# Patient Record
Sex: MALE | Race: WHITE | Employment: UNEMPLOYED | URBAN - METROPOLITAN AREA
[De-identification: names, ages, dates, MRNs, and addresses within clinical notes are randomized per-mention and may not be internally consistent; named-entity substitution may affect disease eponyms.]

---

## 2017-03-06 ENCOUNTER — OFFICE VISIT (OUTPATIENT)
Dept: BEHAVIORAL/MENTAL HEALTH CLINIC | Age: 52
End: 2017-03-06

## 2017-03-06 VITALS
OXYGEN SATURATION: 93 % | WEIGHT: 182 LBS | BODY MASS INDEX: 24.12 KG/M2 | SYSTOLIC BLOOD PRESSURE: 162 MMHG | HEIGHT: 73 IN | DIASTOLIC BLOOD PRESSURE: 101 MMHG | HEART RATE: 77 BPM

## 2017-03-06 DIAGNOSIS — F19.10 POLYSUBSTANCE ABUSE (HCC): ICD-10-CM

## 2017-03-06 DIAGNOSIS — Z91.199 NON COMPLIANCE WITH MEDICAL TREATMENT: ICD-10-CM

## 2017-03-06 DIAGNOSIS — F60.9 PERSONALITY DISORDER (HCC): ICD-10-CM

## 2017-03-06 DIAGNOSIS — F19.94 DRUG-INDUCED MOOD DISORDER (HCC): ICD-10-CM

## 2017-03-06 DIAGNOSIS — F31.62 BIPOLAR 1 DISORDER, MIXED, MODERATE (HCC): Primary | ICD-10-CM

## 2017-03-06 RX ORDER — DOXEPIN HYDROCHLORIDE 25 MG/1
25 CAPSULE ORAL
Qty: 30 CAP | Refills: 1 | Status: SHIPPED | OUTPATIENT
Start: 2017-03-06 | End: 2017-04-05

## 2017-03-06 RX ORDER — DIVALPROEX SODIUM 500 MG/1
500 TABLET, DELAYED RELEASE ORAL 2 TIMES DAILY
Qty: 60 TAB | Refills: 1 | Status: SHIPPED | OUTPATIENT
Start: 2017-03-06 | End: 2017-04-05

## 2017-03-06 RX ORDER — ARIPIPRAZOLE 10 MG/1
10 TABLET ORAL
Qty: 30 TAB | Refills: 1 | Status: SHIPPED | OUTPATIENT
Start: 2017-03-06 | End: 2017-07-10 | Stop reason: SDUPTHER

## 2017-03-06 RX ORDER — METOPROLOL TARTRATE 50 MG/1
50 TABLET ORAL 2 TIMES DAILY
Status: ON HOLD | COMMUNITY
Start: 2017-01-18 | End: 2017-06-06

## 2017-03-06 RX ORDER — ARIPIPRAZOLE 5 MG/1
TABLET ORAL
Refills: 0 | COMMUNITY
Start: 2017-02-21 | End: 2017-03-06 | Stop reason: SDUPTHER

## 2017-03-06 RX ORDER — ASPIRIN 81 MG/1
TABLET ORAL
Refills: 5 | COMMUNITY
Start: 2017-02-24 | End: 2017-06-05

## 2017-03-06 RX ORDER — TRAZODONE HYDROCHLORIDE 50 MG/1
50 TABLET ORAL
COMMUNITY
End: 2017-03-06 | Stop reason: SINTOL

## 2017-03-06 RX ORDER — GABAPENTIN 300 MG/1
300 CAPSULE ORAL 3 TIMES DAILY
COMMUNITY
Start: 2017-01-18 | End: 2017-03-06 | Stop reason: SDUPTHER

## 2017-03-06 RX ORDER — LISINOPRIL 10 MG/1
10 TABLET ORAL DAILY
Status: ON HOLD | COMMUNITY
Start: 2017-01-18 | End: 2017-06-06

## 2017-03-06 RX ORDER — DIVALPROEX SODIUM 250 MG/1
250 TABLET, DELAYED RELEASE ORAL 2 TIMES DAILY
COMMUNITY
Start: 2017-01-18 | End: 2017-03-06 | Stop reason: SDUPTHER

## 2017-03-06 RX ORDER — NAPROXEN 375 MG/1
TABLET ORAL
Refills: 0 | Status: ON HOLD | COMMUNITY
Start: 2017-02-24 | End: 2017-06-06

## 2017-03-06 RX ORDER — GABAPENTIN 100 MG/1
600 CAPSULE ORAL 3 TIMES DAILY
Qty: 180 CAP | Refills: 1 | Status: SHIPPED | OUTPATIENT
Start: 2017-03-06 | End: 2017-04-05

## 2017-03-06 RX ORDER — GLIPIZIDE 5 MG/1
5 TABLET ORAL 2 TIMES DAILY
Status: ON HOLD | COMMUNITY
Start: 2017-01-18 | End: 2017-06-06

## 2017-03-06 RX ORDER — HYDROXYZINE PAMOATE 50 MG/1
50 CAPSULE ORAL
Status: ON HOLD | COMMUNITY
Start: 2017-01-18 | End: 2017-06-06

## 2017-03-06 RX ORDER — METFORMIN HYDROCHLORIDE 750 MG/1
TABLET, EXTENDED RELEASE ORAL
Refills: 5 | Status: ON HOLD | COMMUNITY
Start: 2017-02-24 | End: 2017-06-06

## 2017-03-06 RX ORDER — ARIPIPRAZOLE 5 MG/1
10 TABLET ORAL
Qty: 30 TAB | Refills: 1 | Status: SHIPPED | OUTPATIENT
Start: 2017-03-06 | End: 2017-03-06 | Stop reason: SDUPTHER

## 2017-03-06 RX ORDER — ATORVASTATIN CALCIUM 40 MG/1
TABLET, FILM COATED ORAL
Refills: 5 | COMMUNITY
Start: 2017-02-24 | End: 2017-06-08

## 2017-03-06 RX ORDER — BACLOFEN 20 MG/1
TABLET ORAL
Refills: 0 | COMMUNITY
Start: 2017-01-18 | End: 2017-03-06
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
## 2017-06-05 ENCOUNTER — HOSPITAL ENCOUNTER (EMERGENCY)
Age: 52
Discharge: PSYCHIATRIC OR SUBSTANCE USE FACILITY | End: 2017-06-05
Attending: EMERGENCY MEDICINE
Payer: MEDICARE

## 2017-06-05 ENCOUNTER — HOSPITAL ENCOUNTER (INPATIENT)
Age: 52
LOS: 3 days | Discharge: HOME | DRG: 881 | End: 2017-06-08
Attending: PSYCHIATRY & NEUROLOGY | Admitting: PSYCHIATRY & NEUROLOGY
Payer: MEDICARE

## 2017-06-05 ENCOUNTER — APPOINTMENT (OUTPATIENT)
Dept: GENERAL RADIOLOGY | Age: 52
End: 2017-06-05
Attending: EMERGENCY MEDICINE
Payer: MEDICARE

## 2017-06-05 VITALS
BODY MASS INDEX: 21.45 KG/M2 | SYSTOLIC BLOOD PRESSURE: 125 MMHG | DIASTOLIC BLOOD PRESSURE: 81 MMHG | OXYGEN SATURATION: 98 % | TEMPERATURE: 98 F | HEIGHT: 73 IN | RESPIRATION RATE: 14 BRPM | HEART RATE: 56 BPM | WEIGHT: 161.82 LBS

## 2017-06-05 DIAGNOSIS — E86.0 DEHYDRATION: ICD-10-CM

## 2017-06-05 DIAGNOSIS — F32.A DEPRESSION, UNSPECIFIED DEPRESSION TYPE: Primary | ICD-10-CM

## 2017-06-05 DIAGNOSIS — F19.10 POLYSUBSTANCE ABUSE (HCC): ICD-10-CM

## 2017-06-05 DIAGNOSIS — R73.9 HYPERGLYCEMIA: ICD-10-CM

## 2017-06-05 PROBLEM — F43.20 ADJUSTMENT DISORDER: Status: ACTIVE | Noted: 2017-06-05

## 2017-06-05 LAB
ALBUMIN SERPL BCP-MCNC: 3.7 G/DL (ref 3.5–5)
ALBUMIN/GLOB SERPL: 0.9 {RATIO} (ref 1.1–2.2)
ALP SERPL-CCNC: 95 U/L (ref 45–117)
ALT SERPL-CCNC: 78 U/L (ref 12–78)
AMPHET UR QL SCN: NEGATIVE
ANION GAP BLD CALC-SCNC: 6 MMOL/L (ref 5–15)
APAP SERPL-MCNC: <2 UG/ML (ref 10–30)
APPEARANCE UR: CLEAR
AST SERPL W P-5'-P-CCNC: 33 U/L (ref 15–37)
ATRIAL RATE: 77 BPM
BACTERIA URNS QL MICRO: NEGATIVE /HPF
BARBITURATES UR QL SCN: NEGATIVE
BASOPHILS # BLD AUTO: 0.1 K/UL (ref 0–0.1)
BASOPHILS # BLD: 1 % (ref 0–1)
BENZODIAZ UR QL: NEGATIVE
BILIRUB SERPL-MCNC: 0.8 MG/DL (ref 0.2–1)
BILIRUB UR QL: NEGATIVE
BUN SERPL-MCNC: 15 MG/DL (ref 6–20)
BUN/CREAT SERPL: 15 (ref 12–20)
CALCIUM SERPL-MCNC: 8.8 MG/DL (ref 8.5–10.1)
CALCULATED P AXIS, ECG09: 80 DEGREES
CALCULATED R AXIS, ECG10: 71 DEGREES
CALCULATED T AXIS, ECG11: 70 DEGREES
CANNABINOIDS UR QL SCN: NEGATIVE
CHLORIDE SERPL-SCNC: 98 MMOL/L (ref 97–108)
CO2 SERPL-SCNC: 29 MMOL/L (ref 21–32)
COCAINE UR QL SCN: POSITIVE
COLOR UR: ABNORMAL
CREAT SERPL-MCNC: 0.99 MG/DL (ref 0.7–1.3)
DIAGNOSIS, 93000: NORMAL
DRUG SCRN COMMENT,DRGCM: ABNORMAL
EOSINOPHIL # BLD: 0.1 K/UL (ref 0–0.4)
EOSINOPHIL NFR BLD: 1 % (ref 0–7)
EPITH CASTS URNS QL MICRO: ABNORMAL /LPF
ERYTHROCYTE [DISTWIDTH] IN BLOOD BY AUTOMATED COUNT: 13.8 % (ref 11.5–14.5)
ETHANOL SERPL-MCNC: <10 MG/DL
GLOBULIN SER CALC-MCNC: 4.2 G/DL (ref 2–4)
GLUCOSE BLD STRIP.AUTO-MCNC: 211 MG/DL (ref 65–100)
GLUCOSE BLD STRIP.AUTO-MCNC: 240 MG/DL (ref 65–100)
GLUCOSE SERPL-MCNC: 361 MG/DL (ref 65–100)
GLUCOSE UR STRIP.AUTO-MCNC: >1000 MG/DL
HCT VFR BLD AUTO: 47.7 % (ref 36.6–50.3)
HGB BLD-MCNC: 17.4 G/DL (ref 12.1–17)
HGB UR QL STRIP: NEGATIVE
HYALINE CASTS URNS QL MICRO: ABNORMAL /LPF (ref 0–5)
KETONES UR QL STRIP.AUTO: NEGATIVE MG/DL
LEUKOCYTE ESTERASE UR QL STRIP.AUTO: NEGATIVE
LYMPHOCYTES # BLD AUTO: 14 % (ref 12–49)
LYMPHOCYTES # BLD: 0.9 K/UL (ref 0.8–3.5)
MCH RBC QN AUTO: 31.8 PG (ref 26–34)
MCHC RBC AUTO-ENTMCNC: 36.5 G/DL (ref 30–36.5)
MCV RBC AUTO: 84.5 FL (ref 80–99)
METHADONE UR QL: NEGATIVE
MONOCYTES # BLD: 0.5 K/UL (ref 0–1)
MONOCYTES NFR BLD AUTO: 8 % (ref 5–13)
NEUTS SEG # BLD: 4.8 K/UL (ref 1.8–8)
NEUTS SEG NFR BLD AUTO: 76 % (ref 32–75)
NITRITE UR QL STRIP.AUTO: NEGATIVE
OPIATES UR QL: NEGATIVE
P-R INTERVAL, ECG05: 150 MS
PCP UR QL: NEGATIVE
PH UR STRIP: 5.5 [PH] (ref 5–8)
PLATELET # BLD AUTO: 152 K/UL (ref 150–400)
POTASSIUM SERPL-SCNC: 3.8 MMOL/L (ref 3.5–5.1)
PROT SERPL-MCNC: 7.9 G/DL (ref 6.4–8.2)
PROT UR STRIP-MCNC: ABNORMAL MG/DL
Q-T INTERVAL, ECG07: 370 MS
QRS DURATION, ECG06: 94 MS
QTC CALCULATION (BEZET), ECG08: 418 MS
RBC # BLD AUTO: 5.35 M/UL (ref 4.1–5.7)
RBC #/AREA URNS HPF: ABNORMAL /HPF (ref 0–5)
SALICYLATES SERPL-MCNC: <1.7 MG/DL (ref 2.8–20)
SERVICE CMNT-IMP: ABNORMAL
SERVICE CMNT-IMP: ABNORMAL
SODIUM SERPL-SCNC: 133 MMOL/L (ref 136–145)
SP GR UR REFRACTOMETRY: 1.01 (ref 1–1.03)
TROPONIN I SERPL-MCNC: <0.04 NG/ML
UA: UC IF INDICATED,UAUC: ABNORMAL
UROBILINOGEN UR QL STRIP.AUTO: 0.2 EU/DL (ref 0.2–1)
VENTRICULAR RATE, ECG03: 77 BPM
WBC # BLD AUTO: 6.4 K/UL (ref 4.1–11.1)
WBC URNS QL MICRO: ABNORMAL /HPF (ref 0–4)

## 2017-06-05 PROCEDURE — 71020 XR CHEST PA LAT: CPT

## 2017-06-05 PROCEDURE — 80053 COMPREHEN METABOLIC PANEL: CPT | Performed by: EMERGENCY MEDICINE

## 2017-06-05 PROCEDURE — 36415 COLL VENOUS BLD VENIPUNCTURE: CPT | Performed by: EMERGENCY MEDICINE

## 2017-06-05 PROCEDURE — 81001 URINALYSIS AUTO W/SCOPE: CPT | Performed by: EMERGENCY MEDICINE

## 2017-06-05 PROCEDURE — 84484 ASSAY OF TROPONIN QUANT: CPT | Performed by: EMERGENCY MEDICINE

## 2017-06-05 PROCEDURE — 65220000003 HC RM SEMIPRIVATE PSYCH

## 2017-06-05 PROCEDURE — 96361 HYDRATE IV INFUSION ADD-ON: CPT

## 2017-06-05 PROCEDURE — 85025 COMPLETE CBC W/AUTO DIFF WBC: CPT | Performed by: EMERGENCY MEDICINE

## 2017-06-05 PROCEDURE — 80307 DRUG TEST PRSMV CHEM ANLYZR: CPT | Performed by: EMERGENCY MEDICINE

## 2017-06-05 PROCEDURE — 82962 GLUCOSE BLOOD TEST: CPT

## 2017-06-05 PROCEDURE — 99284 EMERGENCY DEPT VISIT MOD MDM: CPT

## 2017-06-05 PROCEDURE — 90791 PSYCH DIAGNOSTIC EVALUATION: CPT

## 2017-06-05 PROCEDURE — 74011250637 HC RX REV CODE- 250/637: Performed by: PSYCHIATRY & NEUROLOGY

## 2017-06-05 PROCEDURE — 93005 ELECTROCARDIOGRAM TRACING: CPT

## 2017-06-05 PROCEDURE — 74011250636 HC RX REV CODE- 250/636: Performed by: EMERGENCY MEDICINE

## 2017-06-05 PROCEDURE — 96360 HYDRATION IV INFUSION INIT: CPT

## 2017-06-05 RX ORDER — OLANZAPINE 5 MG/1
5 TABLET ORAL
Status: DISCONTINUED | OUTPATIENT
Start: 2017-06-05 | End: 2017-06-08 | Stop reason: HOSPADM

## 2017-06-05 RX ORDER — BENZTROPINE MESYLATE 2 MG/1
2 TABLET ORAL
Status: DISCONTINUED | OUTPATIENT
Start: 2017-06-05 | End: 2017-06-08 | Stop reason: HOSPADM

## 2017-06-05 RX ORDER — BENZTROPINE MESYLATE 1 MG/ML
2 INJECTION INTRAMUSCULAR; INTRAVENOUS
Status: DISCONTINUED | OUTPATIENT
Start: 2017-06-05 | End: 2017-06-08 | Stop reason: HOSPADM

## 2017-06-05 RX ORDER — METFORMIN HYDROCHLORIDE 500 MG/1
500 TABLET ORAL 2 TIMES DAILY WITH MEALS
Qty: 60 TAB | Refills: 0 | Status: SHIPPED | OUTPATIENT
Start: 2017-06-05 | End: 2017-06-08

## 2017-06-05 RX ORDER — HYDROXYZINE PAMOATE 25 MG/1
50 CAPSULE ORAL
Status: DISCONTINUED | OUTPATIENT
Start: 2017-06-05 | End: 2017-06-08 | Stop reason: HOSPADM

## 2017-06-05 RX ORDER — IBUPROFEN 200 MG
1 TABLET ORAL
Status: DISCONTINUED | OUTPATIENT
Start: 2017-06-05 | End: 2017-06-08 | Stop reason: HOSPADM

## 2017-06-05 RX ORDER — ZOLPIDEM TARTRATE 10 MG/1
10 TABLET ORAL
Status: DISCONTINUED | OUTPATIENT
Start: 2017-06-05 | End: 2017-06-08 | Stop reason: HOSPADM

## 2017-06-05 RX ORDER — IBUPROFEN 400 MG/1
400 TABLET ORAL
Status: DISCONTINUED | OUTPATIENT
Start: 2017-06-05 | End: 2017-06-08 | Stop reason: HOSPADM

## 2017-06-05 RX ORDER — ADHESIVE BANDAGE
30 BANDAGE TOPICAL DAILY PRN
Status: DISCONTINUED | OUTPATIENT
Start: 2017-06-05 | End: 2017-06-08 | Stop reason: HOSPADM

## 2017-06-05 RX ORDER — ACETAMINOPHEN 325 MG/1
650 TABLET ORAL
Status: DISCONTINUED | OUTPATIENT
Start: 2017-06-05 | End: 2017-06-08 | Stop reason: HOSPADM

## 2017-06-05 RX ADMIN — SODIUM CHLORIDE 1000 ML: 900 INJECTION, SOLUTION INTRAVENOUS at 12:41

## 2017-06-05 RX ADMIN — SODIUM CHLORIDE 1000 ML: 900 INJECTION, SOLUTION INTRAVENOUS at 13:13

## 2017-06-05 RX ADMIN — IBUPROFEN 400 MG: 400 TABLET, FILM COATED ORAL at 20:25

## 2017-06-05 NOTE — IP AVS SNAPSHOT
[facility] 
 
 
 [address] Kristin Aparicio Patient: Tabitha Sheppard MRN: [id number] :1965 You are allergic to the following Allergen Reactions Versed (Midazolam) Shortness of Breath Weakness Versed (Midazolam) Unknown (comments) Numbness, with shortness of breath Recent Documentation Smoking Status Current Every Day Smoker Emergency Contacts Name Discharge Info Relation Home Work Mobile [address]  CAREGIVER [3] Father [15] [phone] About your hospitalization You were admitted on:  2017 [facility] You were discharged on:  2017 Unit phone number:  [phone] Why you were hospitalized Your primary diagnosis was: Adjustment Disorder With Depressed Mood Your diagnoses also included:  Type 2 Diabetes Mellitus (Hcc), Non Compliance With Medical Treatment, Polysubstance Abuse Providers Seen During Your Hospitalizations Provider Role Specialty Primary office phone Loyd Meyer MD Attending Provider Psychiatry [phone] Your Primary Care Physician (PCP) Primary Care Physician Office Phone Office Fax NONE ** None ** ** None ** Follow-up Information Follow up With Details Comments Contact Info Skyler MACE  Please follow up with Skyler MACE by accessing walk-in Mon, Tues or Th from 9am-3pm for case management and/or substance abuse services   [facility] FARHAD Davidson. [place], [address] [place] [address] Ph: [phone] Fax: [phone] None   None (395) Patient stated that they have no PCP Current Discharge Medication List  
  
CONTINUE these medications which have CHANGED Dose & Instructions Dispensing Information Comments Morning Noon Evening Bedtime  
 metFORMIN 1,000 mg tablet Commonly known as:  GLUCOPHAGE  
 What changed:   
- medication strength 
- how much to take Your last dose was: Your next dose is:    
   
   
 Dose:  1000 mg Take 1 Tab by mouth two (2) times daily (with meals) for 7 days. Indications: type 2 diabetes mellitus Quantity:  14 Tab Refills:  1 STOP taking these medications   
 atorvastatin 40 mg tablet Commonly known as:  LIPITOR  
   
  
  
ASK your doctor about these medications Dose & Instructions Dispensing Information Comments Morning Noon Evening Bedtime ARIPiprazole 10 mg tablet Commonly known as:  ABILIFY Your last dose was: Your next dose is:    
   
   
 Dose:  10 mg Take 1 Tab by mouth daily (after breakfast) for 30 days. Indications: MIXED BIPOLAR I DISORDER Quantity:  30 Tab Refills:  1 Where to Get Your Medications Information on where to get these meds will be given to you by the nurse or doctor. ! Ask your nurse or doctor about these medications  
  metFORMIN 1,000 mg tablet Discharge Instructions DISCHARGE SUMMARY from Nurse The following personal items are in your possession at time of discharge: 
 
Dental Appliances: None Visual Aid: None Home Medications: None (pt had hard copy script on chart) Jewelry: None Clothing: Footwear, Shirt, Shorts, Socks, Undergarments Other Valuables: Keys, Money (comment), Wallet (sent to safe) Personal Items Sent to Safe: wallet, keys, money PATIENT INSTRUCTIONS: 
 
 
What to do at Home: 
Recommended activity: Activity as tolerated, If I feel that I can not keep these promises and I am at risk of hurting myself or others, I will call the crisis office and speak with a crisis worker who will assist me during my crisis. [address] [place] [address]  [phone] [address] [place] [address]   [phone] [facility] Ministerio Todd Crisis  [phone] [facility]  [phone] *  Please give a list of your current medications to your Primary Care Provider. *  Please update this list whenever your medications are discontinued, doses are 
    changed, or new medications (including over-the-counter products) are added. *  Please carry medication information at all times in case of emergency situations. These are general instructions for a healthy lifestyle: No smoking/ No tobacco products/ Avoid exposure to second hand smoke Surgeon General's Warning:  Quitting smoking now greatly reduces serious risk to your health. Obesity, smoking, and sedentary lifestyle greatly increases your risk for illness A healthy diet, regular physical exercise & weight monitoring are important for maintaining a healthy lifestyle You may be retaining fluid if you have a history of heart failure or if you experience any of the following symptoms:  Weight gain of 3 pounds or more overnight or 5 pounds in a week, increased swelling in our hands or feet or shortness of breath while lying flat in bed. Please call your doctor as soon as you notice any of these symptoms; do not wait until your next office visit. Recognize signs and symptoms of STROKE: 
 
F-face looks uneven A-arms unable to move or move unevenly S-speech slurred or non-existent T-time-call 911 as soon as signs and symptoms begin-DO NOT go Back to bed or wait to see if you get better-TIME IS BRAIN. Warning Signs of HEART ATTACK Call 911 if you have these symptoms: 
? Chest discomfort. Most heart attacks involve discomfort in the center of the chest that lasts more than a few minutes, or that goes away and comes back. It can feel like uncomfortable pressure, squeezing, fullness, or pain. ? Discomfort in other areas of the upper body. Symptoms can include pain or discomfort in one or both arms, the back, neck, jaw, or stomach. ? Shortness of breath with or without chest discomfort. ? Other signs may include breaking out in a cold sweat, nausea, or lightheadedness. Don't wait more than five minutes to call [address]! Fast action can save your life. Calling 911 is almost always the fastest way to get lifesaving treatment. Emergency Medical Services staff can begin treatment when they arrive  up to an hour sooner than if someone gets to the hospital by car. The discharge information has been reviewed with the patient. The patient verbalized understanding. Discharge medications reviewed with the patient and appropriate educational materials and side effects teaching were provided. Discharge Orders None [employer] Announcement We are excited to announce that we are making your provider's discharge notes available to you in [employer]. You will see these notes when they are completed and signed by the physician that discharged you from your recent hospital stay. If you have any questions or concerns about any information you see in [employer], please call the Health Information Department where you were seen or reach out to your Primary Care Provider for more information about your plan of care. Introducing [facility]! Marylu Rodríguez introduces [employer] patient portal. Now you can access parts of your medical record, email your doctor's office, and request medication refills online. 1. In your internet browser, go to [email or web address] 2. Click on the First Time User? Click Here link in the Sign In box. You will see the New Member Sign Up page. 3. Enter your [employer] Access Code exactly as it appears below. You will not need to use this code after youve completed the sign-up process. If you do not sign up before the expiration date, you must request a new code. · [employer] Access Code: [email or web address] Expires: 9/3/2017  3:54 PM 
 
 4. Enter the last four digits of your Social Security Number (xxxx) and Date of Birth (mm/dd/yyyy) as indicated and click Submit. You will be taken to the next sign-up page. 5. Create a [employer] ID. This will be your [employer] login ID and cannot be changed, so think of one that is secure and easy to remember. 6. Create a [employer] password. You can change your password at any time. 7. Enter your Password Reset Question and Answer. This can be used at a later time if you forget your password. 8. Enter your e-mail address. You will receive e-mail notification when new information is available in [address] 9. Click Sign Up. You can now view and download portions of your medical record. 10. Click the Download Summary menu link to download a portable copy of your medical information. If you have questions, please visit the Frequently Asked Questions section of the [employer] website. Remember, [employer] is NOT to be used for urgent needs. For medical emergencies, dial 911. Now available from your iPhone and Android! General Information Please provide this summary of care documentation to your next provider. Patient Signature:  ____________________________________________________________ Date:  ____________________________________________________________  
  
Mark Breath Provider Signature:  ____________________________________________________________ Date:  ____________________________________________________________

## 2017-06-05 NOTE — BSMART NOTE
Comprehensive Assessment Form Part 1      Section I - Disposition    Axis I - Adjustment Disorder with depressed mood vs Major Depressive Disorder   Bipolar Disorder   Substance Induced Mood Disorder by history   Malingering by history   Axis II - Personality Disorder NOS  Axis III -   Past Medical History:   Diagnosis Date    Adverse effect of anesthesia     breathing diff. with versed    Bipolar 1 disorder, mixed, moderate ([address] 3/6/2017    Chronic pain     Depression     Pt stated diagnosed years ago    Depression 3/13/2013    Diabetes ([address]     Diabetes ([address] 2003    Drug-induced mood disorder 5/28/2013    Homicide attempt     HTN (hypertension) 11/3/2011    Narcotic dependence, episodic use ([address] 11/3/2011    NIDDM (non-insulin dependent diabetes mellitus) 11/3/2011    Non compliance with medical treatment 11/3/2011    Other ill-defined conditions     chronic low back pain    Psychiatric disorder     bipolar    Sleep disorder     Substance abuse     Suicidal thoughts    Axis IV - Lack of structure, Problems with housing, Relationship issues   [place] V - 45      The Medical Doctor to Psychiatrist conference was not completed. The Medical Doctor is in agreement with Psychiatrist disposition because of (reason) patient is a voluntary admission. The plan is admission to [facility]. The on-call Psychiatrist consulted was Dr. Myrick Cooks. The admitting Psychiatrist will be Dr. Myrick Cooks. The admitting Diagnosis is Adjustment Disorder vs Major Depressive Disorder. The Payor source is [place] Medicare/ [place] Medicare Part A & B. Section II - Integrated Summary  Summary:  Patient is a 51yo male who presents to the ER with reports of suicidal ideation with vague plans. He denies homicidal ideation or hallucinations. He reports feeling overwhelmed with life and reports \"I go to bed each night hoping I do not wake up and get made because I know I will. \" He reports \"What's the point? \" Patient reports lots fo recent stressors but is only willing to talk about a few. He reports that his aunt  2 month ago and he is still very upset and started crying when mentioning her death. He also reports that his girlfriend broke up with him, he had to move back in with his parents, and he relapsed on cocaine. He reports being sober since January and relapsed 2 days ago using cocaine one time. He denies continued use and denies use of any other substances except for cigarettes. Patient reports he has been sleeping in his car the last couple days because he wants to be alone and that is the best place to be alone. He reports not eating due to not being interested in food. Patient reports his sleep is poor and he sleeps a few hours at a time. Patient appears thin and tired with flat affect and little eye contact. Patient reports that he has an adult son but that he has tried to form a relationship but his son does not want one so he pretends he does not have a son. He reports the only person he can talk to is his friend Rita You but that \"I don't tell her how messed up my head is. \" Patient reports he has tried counseling once \"it didn't work\" and he stopped after a few months. He admits to meeting 1 time with a psychiatrist but reports he did not like the psychiatrist and never went back. Patient reports he has not taken medications in months due to not having a psychiatrist and considered getting a doctor but has not. He reports he is not taking any medications as he lost \"a bunch of weight and I don't need them anymore. \" Patient reports needing help because he can't live like this anymore and he is considering attempting suicide and reports the main thought would be to hang himself but he does not know if this will be painless. He reports past attempt to hurt himself including previous attempt on 10/17/16 by taking 4 Seroquel, past overdoses, and putting a gun to his head but not pulling the trigger.  Patient denies currently having a weapon. He admits previous hospitalizations and treatment. Reports being on disability for back issues and has pain but that this is managed with over the counter medications. Per chart review: Patient was admitted to [facility] on 10/17-10/20/16 for substance induced mood disorder. He also met with Dr. Brenden Malagon at the [facility] outpatient clinic on 3/6/17 but never returned 4 weeks later when scheduled. The diagnosis if Bipolar Disorder was given He was prescribed multiple medications at that time including medication changes. Medications are listed in office visit note. Patient reports he did take the Depakote. Previous admissions were in 2013 and earlier. Consulted Dr Soren Najera who is willing to admit patient to [facility] acute but patient is to be informed that he will not receive narcotics for pain on the unit. Discussed blood sugar levels and Dr. Soren Najera would like ER provider to write how to address patient's blood sugar levels as patient is reporting not taking medication for this. Informed him that patient is receiving a second bag of fluid and his level will be checked again. The patienthas demonstrated mental capacity to provide informed consent. The information is given by the patient and past medical records. The Chief Complaint is mental health problem, suicidal ideation. The Precipitant Factors are psychosocial stressors. Previous Hospitalizations: Yes  The patient has not previously been in restraints. Current Psychiatrist and/or  is N/A. Lethality Assessment:    The potential for suicide noted by the following: previous history of attempts per patient report, vague plan, ideation and current substance abuse . The potential for homicide is not noted. The patient has not been a perpetrator of sexual or physical abuse. There are not pending charges. The patient is felt to be at risk for self harm or harm to others.   The attending nurse was advised to remove potentially harmful or dangerous items from the patient's room  and that security has not been notified. Section III - Psychosocial  The patient's overall mood and attitude is depressed, withdrawn, and tearful. Feelings of helplessness and hopelessness are observed by verbal report, lack of eye contact, and flat affect. Generalized anxiety is not observed. Panic is not observed. Phobias are not observed. Obsessive compulsive tendencies are not observed. Section IV - Mental Status Exam  The patient's appearance shows no evidence of impairment. The patient's behavior shows poor eye contact. The patient is oriented to time, place, person and situation. The patient's speech is soft. The patient's mood is depressed, is withdrawn, is sad and displays anhedonia. The range of affect is flat. The patient's thought content demonstrates no evidence of impairment. The thought process shows no evidence of impairment. The patient's perception shows no evidence of impairment. The patient's memory shows no evidence of impairment. The patient's appetite is decreased and shows signs of weight loss. The patient's sleep has evidence of insomnia. The patient shows no insight. The patient's judgement is psychologically impaired. Section V - Substance Abuse  The patient is using substances. The patient is using tobacco by inhalation for 3-4 months (started smoking in February) with last use on today and cocaine by inhalation for unknown with last use on 2 days ago. The patient has experienced the following withdrawal symptoms: N/A. Section VI - Living Arrangements  The patient is single. The patient lives with a parent. The patient has 1 child ages 29's. The patient does plan to return home upon discharge. The patient does not have legal issues pending. The patient's source of income comes from disability. Mormon and cultural practices have not been voiced at this time.     The patient's greatest support comes from friend, Seema Aleman and this person will not be involved with the treatment. The patient has not been in an event described as horrible or outside the realm of ordinary life experience either currently or in the past.  The patient has not been a victim of sexual/physical abuse. Section VII - Other Areas of Clinical Concern  The highest grade achieved is not assessed with the overall quality of school experience being described as not assessed. The patient is currently disabled and speaks [place] as a primary language. The patient has no communication impairments affecting communication. The patient's preference for learning can be described as: can read and write adequately.   The patient's hearing is normal.  The patient's vision is normal.      Gaston Jane MA [facility] Resident in Counseling

## 2017-06-05 NOTE — ED PROVIDER NOTES
HPI Comments: Martín Carmen is a 47 y.o. male with PMHx of DM, HTN, depression, and suicidal thoughts presenting ambulatory to [facility] c/o being depressed for 1 week. When asked what he is depressed about he responds with \"life in general\". He reports that he has been eating and drinking, but has not been sleeping well. Pt notes that he does not work because he is disabled. Per pt, he does not have a specific plan for suicide, and does not have a gun at home. Pt notes that he lives at home with family, but recently has been sleeping in his car alone. He reports additional symptom of right to mid chest pain frequently, and reports that his last episode was yesterday. Pt describes the pain as \"stabbing\". He denies fever, chills, or SOB. PCP: None  Social Hx: + tobacco, - EtOH, + illicit drugs (used cocaine 4 days ago)    There are no other complaints, changes, or physical findings at this time. The history is provided by the patient. No  was used.         Past Medical History:   Diagnosis Date    Adverse effect of anesthesia     breathing diff. with versed    Bipolar 1 disorder, mixed, moderate ([address] 3/6/2017    Chronic pain     Depression     Pt stated diagnosed years ago    Depression 3/13/2013    Diabetes ([address]     Diabetes ([address] 2003    Drug-induced mood disorder 5/28/2013    Homicide attempt     HTN (hypertension) 11/3/2011    Narcotic dependence, episodic use ([address] 11/3/2011    NIDDM (non-insulin dependent diabetes mellitus) 11/3/2011    Non compliance with medical treatment 11/3/2011    Other ill-defined conditions     chronic low back pain    Psychiatric disorder     bipolar    Sleep disorder     Substance abuse     Suicidal thoughts        Past Surgical History:   Procedure Laterality Date    CARDIAC SURG PROCEDURE UNLIST      cardiac stents X2 lad drug eluting    COLONOSCOPY N/A 8/31/2016    COLONOSCOPY performed by Apurva Shaffer MD at [facility] ENDOSCOPY  COLONOSCOPY,DIAGNOSTIC  8/31/2016         HX ORTHOPAEDIC      chronic back pain    HX ORTHOPAEDIC      left knee arthroplasty    NV ANESTH,LUMBAR SPINE,CORD SURGERY  7 1999    l4 l5    REMV KIDNEY,COMPLICATED  9199    side unknown - kidney stones    UPPER GI ENDOSCOPY,BIOPSY  8/31/2016              Family History:   Problem Relation Age of Onset    Stroke Mother     Hypertension Mother     Heart Disease Father     Hypertension Father     Cancer Maternal Grandmother      unknown type    Diabetes Maternal Grandfather        Social History     Social History    Marital status:      Spouse name: N/A    Number of children: N/A    Years of education: N/A     Occupational History    Not on file. Social History Main Topics    Smoking status: Current Every Day Smoker     Packs/day: 0.50    Smokeless tobacco: Never Used    Alcohol use No      Comment: sociially in the past    Drug use: Yes     Special: Cocaine    Sexual activity: Yes     Partners: Female     Birth control/ protection: None      Comment:  but seperated now     Other Topics Concern    Not on file     Social History Narrative    ** Merged History Encounter **     states he has 2 yrs of college. He was working part time for 7 months- missed his job and he might loose his job. , 1 children. Moved in with Parent    No legal problem         ALLERGIES: Versed [midazolam] and Versed [midazolam]    Review of Systems   Constitutional: Negative for activity change, appetite change, chills, fever and unexpected weight change. HENT: Negative for congestion. Eyes: Negative for pain and visual disturbance. Respiratory: Negative for cough and shortness of breath. Cardiovascular: Negative for chest pain. Gastrointestinal: Negative for abdominal pain, diarrhea, nausea and vomiting. Genitourinary: Negative for dysuria. Musculoskeletal: Negative for back pain. Skin: Negative for rash.    Neurological: Negative for headaches. Psychiatric/Behavioral: Positive for suicidal ideas. Patient Vitals for the past 12 hrs:   Temp Pulse Resp BP SpO2   06/05/17 1414 - 60 14 117/84 99 %   06/05/17 1118 98 °F (36.7 °C) (!) 101 14 135/85 99 %            Physical Exam   Constitutional: He is oriented to person, place, and time. He appears well-developed and well-nourished. He appears distressed. Cachetic middle aged male in moderate distress  Very flat affect   No eye contact   HENT:   Head: Normocephalic and atraumatic. Mouth/Throat: Oropharynx is clear and moist.   Eyes: Conjunctivae and EOM are normal. Pupils are equal, round, and reactive to light. Right eye exhibits no discharge. Left eye exhibits no discharge. Neck: Normal range of motion. Neck supple. Cardiovascular: Normal rate and normal heart sounds. No murmur heard. Pulmonary/Chest: Effort normal and breath sounds normal. No respiratory distress. He has no wheezes. He has no rales. Abdominal: Soft. Bowel sounds are normal. He exhibits no distension. There is no tenderness. Musculoskeletal: Normal range of motion. Scattered bruising in bilateral lower extremities  No edema   Neurological: He is alert and oriented to person, place, and time. No cranial nerve deficit. He exhibits normal muscle tone. Skin: Skin is warm and dry. No rash noted. He is not diaphoretic. Nursing note and vitals reviewed. MDM  Number of Diagnoses or Management Options  Dehydration:   Depression, unspecified depression type:   Hyperglycemia:   Polysubstance abuse:   Diagnosis management comments:  middle aged male with high concern for depression and lack of emotional support at home. Concern for suicide risk will consult [facility].         Amount and/or Complexity of Data Reviewed  Clinical lab tests: ordered and reviewed  Tests in the radiology section of CPT®: ordered and reviewed  Tests in the medicine section of CPT®: ordered and reviewed  Review and summarize past medical records: yes (Admission 2016 after similar presentation. )  Independent visualization of images, tracings, or specimens: yes    Patient Progress  Patient progress: stable    ED Course     EKG interpretation: (Preliminary)  11:37 AM  Rhythm: normal sinus rhythm; and regular . Rate (approx.): 77; Axis: normal; [place] interval: normal; QRS interval: normal ; ST/T wave: normal; Other findings: possible left atrial enlargement. Procedures    11:31 AM  [facility] has been notified, and is on the way. Written by Hansa Chaudhari ED Scribe, as dictated by Kamilla Preciado MD.    12:30 BS elevated. IVF infusion initiated. 16:30 BS <250, pt accepted to [facility] Dr Cinthia Perales, psychiatry. VS continue wnl. Await transport. LABORATORY TESTS:  Recent Results (from the past 12 hour(s))   CBC WITH AUTOMATED DIFF    Collection Time: 06/05/17 11:36 AM   Result Value Ref Range    WBC 6.4 4.1 - 11.1 K/uL    RBC 5.35 4.10 - 5.70 M/uL    HGB 17.4 (H) 12.1 - 17.0 g/dL    HCT 47.7 36.6 - 50.3 %    MCV 84.5 80.0 - 99.0 FL    MCH 31.8 26.0 - 34.0 PG    MCHC 36.5 30.0 - 36.5 g/dL    RDW 13.8 11.5 - 14.5 %    PLATELET 821 165 - 944 K/uL    NEUTROPHILS 76 (H) 32 - 75 %    LYMPHOCYTES 14 12 - 49 %    MONOCYTES 8 5 - 13 %    EOSINOPHILS 1 0 - 7 %    BASOPHILS 1 0 - 1 %    ABS. NEUTROPHILS 4.8 1.8 - 8.0 K/UL    ABS. LYMPHOCYTES 0.9 0.8 - 3.5 K/UL    ABS. MONOCYTES 0.5 0.0 - 1.0 K/UL    ABS. EOSINOPHILS 0.1 0.0 - 0.4 K/UL    ABS.  BASOPHILS 0.1 0.0 - 0.1 K/UL   METABOLIC PANEL, COMPREHENSIVE    Collection Time: 06/05/17 11:36 AM   Result Value Ref Range    Sodium 133 (L) 136 - 145 mmol/L    Potassium 3.8 3.5 - 5.1 mmol/L    Chloride 98 97 - 108 mmol/L    CO2 29 21 - 32 mmol/L    Anion gap 6 5 - 15 mmol/L    Glucose 361 (H) 65 - 100 mg/dL    BUN 15 6 - 20 MG/DL    Creatinine 0.99 0.70 - 1.30 MG/DL    BUN/Creatinine ratio 15 12 - 20      GFR est AA >60 >60 ml/min/1.73m2    GFR est non-AA >60 >60 ml/min/1.73m2    Calcium 8.8 8.5 - 10.1 MG/DL    Bilirubin, total 0.8 0.2 - 1.0 MG/DL    ALT (SGPT) 78 12 - 78 U/L    AST (SGOT) 33 15 - 37 U/L    Alk.  phosphatase 95 45 - 117 U/L    Protein, total 7.9 6.4 - 8.2 g/dL    Albumin 3.7 3.5 - 5.0 g/dL    Globulin 4.2 (H) 2.0 - 4.0 g/dL    A-G Ratio 0.9 (L) 1.1 - 2.2     SALICYLATE    Collection Time: 06/05/17 11:36 AM   Result Value Ref Range    SALICYLATE <6.9 (L) 2.8 - 20.0 MG/DL   ACETAMINOPHEN    Collection Time: 06/05/17 11:36 AM   Result Value Ref Range    Acetaminophen level <2 (L) 10 - 30 ug/mL   ETHYL ALCOHOL    Collection Time: 06/05/17 11:36 AM   Result Value Ref Range    ALCOHOL(ETHYL),SERUM <10 <10 MG/DL   TROPONIN I    Collection Time: 06/05/17 11:36 AM   Result Value Ref Range    Troponin-I, Qt. <0.04 <0.05 ng/mL   EKG, 12 LEAD, INITIAL    Collection Time: 06/05/17 11:37 AM   Result Value Ref Range    Ventricular Rate 77 BPM    Atrial Rate 77 BPM    P-R Interval 150 ms    QRS Duration 94 ms    Q-T Interval 370 ms    QTC Calculation (Bezet) 418 ms    Calculated P Axis 80 degrees    Calculated R Axis 71 degrees    Calculated T Axis 70 degrees    Diagnosis       Normal sinus rhythm  Left atrial enlargement  When compared with ECG of 20-NOV-2016 11:31,  No significant change was found  Confirmed by Rich Slagado ([id number]) on 6/5/2017 4:31:28 PM     URINALYSIS W/ REFLEX CULTURE    Collection Time: 06/05/17 11:55 AM   Result Value Ref Range    Color YELLOW/STRAW      Appearance CLEAR CLEAR      Specific gravity 1.015 1.003 - 1.030      pH (UA) 5.5 5.0 - 8.0      Protein TRACE (A) NEG mg/dL    Glucose >1000 (A) NEG mg/dL    Ketone NEGATIVE  NEG mg/dL    Bilirubin NEGATIVE  NEG      Blood NEGATIVE  NEG      Urobilinogen 0.2 0.2 - 1.0 EU/dL    Nitrites NEGATIVE  NEG      Leukocyte Esterase NEGATIVE  NEG      UA:UC IF INDICATED CULTURE NOT INDICATED BY UA RESULT CNI      WBC 0-4 0 - 4 /hpf    RBC 0-5 0 - 5 /hpf    Epithelial cells FEW FEW /lpf    Bacteria NEGATIVE  NEG /hpf    Hyaline cast 0-2 0 - 5 /lpf   DRUG SCREEN, URINE    Collection Time: 06/05/17 11:55 AM   Result Value Ref Range    AMPHETAMINE NEGATIVE  NEG      BARBITURATES NEGATIVE  NEG      BENZODIAZEPINE NEGATIVE  NEG      COCAINE POSITIVE (A) NEG      METHADONE NEGATIVE  NEG      OPIATES NEGATIVE  NEG      PCP(PHENCYCLIDINE) NEGATIVE  NEG      THC (TH-CANNABINOL) NEGATIVE  NEG      Drug screen comment (NOTE)    GLUCOSE, POC    Collection Time: 06/05/17  1:45 PM   Result Value Ref Range    Glucose (POC) 240 (H) 65 - 100 mg/dL    Performed by Mary Ellen Wilcox (ED [facility])    GLUCOSE, POC    Collection Time: 06/05/17  3:25 PM   Result Value Ref Range    Glucose (POC) 211 (H) 65 - 100 mg/dL    Performed by Mary Ellen Wilcox (ED [facility])        IMAGING RESULTS:    Exam: 2 view chest     Indication: Suicidal ideations     Comparison to 11/20/2016.     PA and lateral views demonstrate normal heart size. There is no acute process in  the lung fields. Degenerative changes are seen in the thoracic spine.     IMPRESSION  Impression: No acute process or change compared to the prior exam.       MEDICATIONS GIVEN:  Medications   sodium chloride 0.9 % bolus infusion 1,000 mL (1,000 mL IntraVENous New Bag 6/5/17 1241)   sodium chloride 0.9 % bolus infusion 1,000 mL (1,000 mL IntraVENous New Bag 6/5/17 1313)       IMPRESSION:  1. Depression, unspecified depression type    2. Polysubstance abuse    3. Hyperglycemia    4. Dehydration        PLAN:  1. Transfer to [facility]    Transfer Note:  4:41 PM  Pt is being transferred to [facility], transfer is accepted by Dr. Bismark Aguila. The reasons for their transfer have been discussed with them and available family.  They convey agreement and understanding of the need to be transferred as explained to them by Deja Arroyo MD.        This note is prepared by Wei García, acting as Scribe for MD Deja Jameson MD: The scribe's documentation has been prepared under my direction and personally reviewed by me in its entirety. I confirm that the note above accurately reflects all work, treatment, procedures, and medical decision making performed by me.

## 2017-06-05 NOTE — ED NOTES
Pt presents c/o suicidal ideations. Pt states he does not care if he lives or dies anymore. Pt denies a specific plan at this time. Pt denies homicidal ideations at this time as well.

## 2017-06-05 NOTE — ED NOTES
Per [facility], they need to respond to [facility] to see another pt, but can see results from there. Pt's BS currently at 240, but they would like to see it lower before accepting the pt at [place]. [facility] will call here in a bit to see status, and hopefully admit pt to [facility] at that time.

## 2017-06-05 NOTE — IP AVS SNAPSHOT
Summary of Care Report The Summary of Care report has been created to help improve care coordination. Users with access to [employer] or [address] (Web-based application) may access additional patient information including the Discharge Summary. If you are not currently a [address] user and need more information, please call the number listed below in the [place] [address] section and ask to be connected with Medical Records. Facility Information Name Address Phone [facility] [address] [place] [id number] [phone] Patient Information Patient Name Sex UMESH Ludwig ([id number]) Male 1965 Discharge Information Admitting Provider Service Area Unit Stacy Aranda MD / [address] / [phone] Discharge Provider Discharge Date/Time Discharge Disposition Destination Stacy Aranda MD / [phone] 17 [id number] [facility] (none) Patient Language Language ENGLISH [13] Hospital Problems as of 2017  Reviewed: 3/6/2017  9:27 AM by Merlin Steward MD  
  
  
  
 Class Noted - Resolved Last Modified POA Active Problems Type 2 diabetes mellitus ([facility] [address]  11/3/2011 - Present 2017 by Stacy Aranda MD Yes Entered by Joseph Vela MD  
  Non compliance with medical treatment  11/3/2011 - Present 2017 by Stacy Aranda MD Yes Entered by Joseph Vela MD  
  Polysubstance abuse  10/18/2016 - Present 2017 by Stacy Aranda MD Yes Entered by Stacy Aranda MD  
  Overview Signed 3/6/2017  9:26 AM by Merlin Steward MD  
   Opioids, THC, cocaine * (Principal)Adjustment disorder with depressed mood  2017 - Present 2017 by Stacy Aranda MD Yes   Entered by Stacy Aranda MD  
  
 Non-Hospital Problems as of 6/8/2017  Reviewed: 3/6/2017  9:27 AM by Ken Perry MD  
  
  
  
 Class Noted - Resolved Last Modified Active Problems HTN (hypertension)  11/3/2011 - Present 10/18/2016 by Chloé Presley MD  
  Entered by Jose M Pham MD  
  Chronic pain  11/3/2011 - Present 11/3/2011 by Jose M Pham MD  
  Entered by Jose M Pham MD  
  Do not give narcotics  11/8/2011 - Present 11/8/2011 by Jose M Pham MD  
  Entered by Jose M Pham MD  
  Drug-induced mood disorder ([address]  5/28/2013 - Present 3/6/2017 by Ken Perry MD  
  Entered by Ken Perry MD  
  Personality disorder  7/26/2013 - Present 3/6/2017 by Ken Perry MD  
  Entered by Ken Perry MD  
  Bipolar 1 disorder, mixed, moderate ([address]  3/6/2017 - Present 3/6/2017 by Ken Perry MD  
  Entered by Ken Perry MD  
  
You are allergic to the following Allergen Reactions Versed (Midazolam) Shortness of Breath Weakness Versed (Midazolam) Unknown (comments) Numbness, with shortness of breath Current Discharge Medication List  
  
CONTINUE these medications which have CHANGED Dose & Instructions Dispensing Information Comments  
 metFORMIN 1,000 mg tablet Commonly known as:  GLUCOPHAGE What changed:   
- medication strength 
- how much to take Dose:  1000 mg Take 1 Tab by mouth two (2) times daily (with meals) for 7 days. Indications: type 2 diabetes mellitus Quantity:  14 Tab Refills:  1 STOP taking these medications Comments  
 atorvastatin 40 mg tablet Commonly known as:  LIPITOR  
   
   
  
ASK your doctor about these medications Dose & Instructions Dispensing Information Comments ARIPiprazole 10 mg tablet Commonly known as:  ABILIFY Dose:  10 mg Take 1 Tab by mouth daily (after breakfast) for 30 days.  Indications: MIXED BIPOLAR I DISORDER  
 Quantity:  30 Tab Refills:  1 Current Immunizations Name Date Hep B, Adol/Ped 3/12/2013 Pneumococcal Conjugate (PCV-13)  Deferred (Patient Refused) Follow-up Information Follow up With Details Comments Contact Info Skyler MACE  Please follow up with Skyler MACE by accessing walk-in Mon, Tues or Thurs from 9am-3pm for case management and/or substance abuse services   [address] [place] [address], [address] [place] [address] Ph: [phone] Fax: [phone] None   None (395) Patient stated that they have no PCP Discharge Instructions DISCHARGE SUMMARY from Nurse The following personal items are in your possession at time of discharge: 
 
Dental Appliances: None Visual Aid: None Home Medications: None (pt had hard copy script on chart) Jewelry: None Clothing: Footwear, Shirt, Shorts, Socks, Undergarments Other Valuables: Keys, Money (comment), Wallet (sent to safe) Personal Items Sent to Safe: wallet, keys, money PATIENT INSTRUCTIONS: 
 
 
What to do at Home: 
Recommended activity: Activity as tolerated, If I feel that I can not keep these promises and I am at risk of hurting myself or others, I will call the crisis office and speak with a crisis worker who will assist me during my crisis. [address] [place] [address]  [phone] [address] [place] [address]   [phone] [place] [facility]  [phone] [facility]  [phone] *  Please give a list of your current medications to your Primary Care Provider. *  Please update this list whenever your medications are discontinued, doses are 
    changed, or new medications (including over-the-counter products) are added. *  Please carry medication information at all times in case of emergency situations. These are general instructions for a healthy lifestyle: No smoking/ No tobacco products/ Avoid exposure to second hand smoke Surgeon General's Warning:  Quitting smoking now greatly reduces serious risk to your health. Obesity, smoking, and sedentary lifestyle greatly increases your risk for illness A healthy diet, regular physical exercise & weight monitoring are important for maintaining a healthy lifestyle You may be retaining fluid if you have a history of heart failure or if you experience any of the following symptoms:  Weight gain of 3 pounds or more overnight or 5 pounds in a week, increased swelling in our hands or feet or shortness of breath while lying flat in bed. Please call your doctor as soon as you notice any of these symptoms; do not wait until your next office visit. Recognize signs and symptoms of STROKE: 
 
F-face looks uneven A-arms unable to move or move unevenly S-speech slurred or non-existent T-time-call 911 as soon as signs and symptoms begin-DO NOT go Back to bed or wait to see if you get better-TIME IS BRAIN. Warning Signs of HEART ATTACK Call 911 if you have these symptoms: 
? Chest discomfort. Most heart attacks involve discomfort in the center of the chest that lasts more than a few minutes, or that goes away and comes back. It can feel like uncomfortable pressure, squeezing, fullness, or pain. ? Discomfort in other areas of the upper body. Symptoms can include pain or discomfort in one or both arms, the back, neck, jaw, or stomach. ? Shortness of breath with or without chest discomfort. ? Other signs may include breaking out in a cold sweat, nausea, or lightheadedness. Don't wait more than five minutes to call [address]! Fast action can save your life. Calling 911 is almost always the fastest way to get lifesaving treatment. Emergency Medical Services staff can begin treatment when they arrive  up to an hour sooner than if someone gets to the hospital by car. The discharge information has been reviewed with the patient.   The patient verbalized understanding. Discharge medications reviewed with the patient and appropriate educational materials and side effects teaching were provided. Chart Review Routing History No Routing History on File

## 2017-06-05 NOTE — IP AVS SNAPSHOT
Current Discharge Medication List  
  
CONTINUE these medications which have CHANGED Dose & Instructions Dispensing Information Comments Morning Noon Evening Bedtime  
 metFORMIN 1,000 mg tablet Commonly known as:  GLUCOPHAGE What changed:   
- medication strength 
- how much to take Your last dose was: Your next dose is:    
   
   
 Dose:  1000 mg Take 1 Tab by mouth two (2) times daily (with meals) for 7 days. Indications: type 2 diabetes mellitus Quantity:  14 Tab Refills:  1 STOP taking these medications   
 atorvastatin 40 mg tablet Commonly known as:  LIPITOR  
   
  
  
ASK your doctor about these medications Dose & Instructions Dispensing Information Comments Morning Noon Evening Bedtime ARIPiprazole 10 mg tablet Commonly known as:  ABILIFY Your last dose was: Your next dose is:    
   
   
 Dose:  10 mg Take 1 Tab by mouth daily (after breakfast) for 30 days. Indications: MIXED BIPOLAR I DISORDER Quantity:  30 Tab Refills:  1 Where to Get Your Medications Information on where to get these meds will be given to you by the nurse or doctor. ! Ask your nurse or doctor about these medications  
  metFORMIN 1,000 mg tablet

## 2017-06-05 NOTE — ROUTINE PROCESS
TRANSFER - OUT REPORT:    Verbal report given to ALICE Pulido(name) on Claudean Catching  being transferred to [facility](unit) for routine progression of care       Report consisted of patients Situation, Background, Assessment and   Recommendations(SBAR). Information from the following report(s) SBAR, ED Summary and Recent Results was reviewed with the receiving nurse. Lines:       Opportunity for questions and clarification was provided.       Patient transported with:

## 2017-06-05 NOTE — ED NOTES
Bedside shift change report given to Ambrose Sanders RN (oncoming nurse) by Claudio Amador RN (offgoing nurse). Report included the following information SBAR, ED Summary, Intake/Output, MAR and Recent Results.

## 2017-06-06 PROBLEM — F43.21 ADJUSTMENT DISORDER WITH DEPRESSED MOOD: Status: ACTIVE | Noted: 2017-06-05

## 2017-06-06 LAB
CHOLEST SERPL-MCNC: 123 MG/DL
GLUCOSE BLD STRIP.AUTO-MCNC: 246 MG/DL (ref 65–100)
GLUCOSE BLD STRIP.AUTO-MCNC: 287 MG/DL (ref 65–100)
GLUCOSE BLD STRIP.AUTO-MCNC: 316 MG/DL (ref 65–100)
GLUCOSE P FAST SERPL-MCNC: 259 MG/DL (ref 65–100)
HDLC SERPL-MCNC: 52 MG/DL
HDLC SERPL: 2.4 {RATIO} (ref 0–5)
LDLC SERPL CALC-MCNC: 54.4 MG/DL (ref 0–100)
LIPID PROFILE,FLP: NORMAL
SERVICE CMNT-IMP: ABNORMAL
TRIGL SERPL-MCNC: 83 MG/DL (ref ?–150)
TSH SERPL DL<=0.05 MIU/L-ACNC: 0.32 UIU/ML (ref 0.36–3.74)
VLDLC SERPL CALC-MCNC: 16.6 MG/DL

## 2017-06-06 PROCEDURE — 74011250637 HC RX REV CODE- 250/637: Performed by: PSYCHIATRY & NEUROLOGY

## 2017-06-06 PROCEDURE — 82962 GLUCOSE BLOOD TEST: CPT

## 2017-06-06 PROCEDURE — 65220000003 HC RM SEMIPRIVATE PSYCH

## 2017-06-06 PROCEDURE — 36415 COLL VENOUS BLD VENIPUNCTURE: CPT | Performed by: PSYCHIATRY & NEUROLOGY

## 2017-06-06 PROCEDURE — 82962 GLUCOSE BLOOD TEST: CPT | Performed by: PSYCHIATRY & NEUROLOGY

## 2017-06-06 PROCEDURE — 82947 ASSAY GLUCOSE BLOOD QUANT: CPT | Performed by: PSYCHIATRY & NEUROLOGY

## 2017-06-06 PROCEDURE — 80061 LIPID PANEL: CPT | Performed by: PSYCHIATRY & NEUROLOGY

## 2017-06-06 PROCEDURE — 84443 ASSAY THYROID STIM HORMONE: CPT | Performed by: PSYCHIATRY & NEUROLOGY

## 2017-06-06 RX ORDER — DEXTROSE 50 % IN WATER (D50W) INTRAVENOUS SYRINGE
12.5-25 AS NEEDED
Status: DISCONTINUED | OUTPATIENT
Start: 2017-06-06 | End: 2017-06-06

## 2017-06-06 RX ORDER — MAGNESIUM SULFATE 100 %
4 CRYSTALS MISCELLANEOUS AS NEEDED
Status: DISCONTINUED | OUTPATIENT
Start: 2017-06-06 | End: 2017-06-08 | Stop reason: HOSPADM

## 2017-06-06 RX ORDER — INSULIN LISPRO 100 [IU]/ML
INJECTION, SOLUTION INTRAVENOUS; SUBCUTANEOUS
Status: DISCONTINUED | OUTPATIENT
Start: 2017-06-07 | End: 2017-06-08 | Stop reason: HOSPADM

## 2017-06-06 RX ORDER — DEXTROSE 50 % IN WATER (D50W) INTRAVENOUS SYRINGE
12.5-25 AS NEEDED
Status: DISCONTINUED | OUTPATIENT
Start: 2017-06-06 | End: 2017-06-08 | Stop reason: HOSPADM

## 2017-06-06 RX ORDER — METFORMIN HYDROCHLORIDE 500 MG/1
500 TABLET ORAL 2 TIMES DAILY WITH MEALS
Status: DISCONTINUED | OUTPATIENT
Start: 2017-06-06 | End: 2017-06-08

## 2017-06-06 RX ADMIN — IBUPROFEN 400 MG: 400 TABLET, FILM COATED ORAL at 20:27

## 2017-06-06 RX ADMIN — METFORMIN HYDROCHLORIDE 500 MG: 500 TABLET, FILM COATED ORAL at 11:51

## 2017-06-06 RX ADMIN — ZOLPIDEM TARTRATE 10 MG: 10 TABLET, FILM COATED ORAL at 21:11

## 2017-06-06 RX ADMIN — METFORMIN HYDROCHLORIDE 500 MG: 500 TABLET, FILM COATED ORAL at 16:57
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
## 2017-06-06 NOTE — PROGRESS NOTES
[facility] Admission Pharmacy Medication Reconciliation     Recommendations/Findings:   1) Patient reports that he hasn't been taking his prescription medications for the past 3-4 months. Information confirmed with [facility] pharmacy. Patient most recently filled atorvastatin and metformin in March 2017. 2) Dr. Caleb Aldana had prescribed numerous medications for the patient that he didn't get filled. Those medications were: gabapentin, divalproex DR, doxepin, aripiprazole, and metaxalone     Medications added:   None    Medications modified:    None    Medications removed (due to no recent fills):   Lisinopril 10 mg PO daily   Metoprolol 50 mg PO twice daily   Glipizide 5 mg PO twice daily   Hydroxyzine 50 mg PO three times daily as needed   Metformin  mg PO once daily     Total Time Spent: 20 minutes    Information obtained from: Patient, [facility] pharmacy ([phone])    Patient allergies: Allergies as of 06/05/2017 - Review Complete 06/05/2017   Allergen Reaction Noted    Versed [midazolam] Shortness of Breath 10/26/2011    Versed [midazolam] Unknown (comments) 10/01/2011       Prior to Admission Medications   Prescriptions Last Dose Informant Patient Reported? Taking?             atorvastatin (LIPITOR) 40 mg tablet March 2017  Yes No   Sig: TAKE 1 TABLET BY MOUTH EVERY DAY   metFORMIN (GLUCOPHAGE) 500 mg tablet 6/5/2017 at Unknown time  No Yes   Sig: Take 1 Tab by mouth two (2) times daily (with meals).       Facility-Administered Medications: None       Thank you,  Esteban Hernández, PHARMD, BCPS

## 2017-06-06 NOTE — BH NOTES
REFLECTIONS GROUP THERAPY PROGRESS NOTE    The patient Cee Campos is participating in [facility]. Group time: 30 minutes    Personal goal for participation: Share with group about feelings and concerns throughout the course of the day. Goal orientation: personal    Group therapy participation: active     Therapeutic interventions reviewed and discussed: Yes    Impression of participation:   Positive input.     Mariama Easton  6/5/2017

## 2017-06-06 NOTE — BH NOTES
GROUP THERAPY PROGRESS NOTE    Sherrell Nielsen is participating in [employer].      Group time: 30 minutes    Personal goal for participation:  Unit orientation    Goal orientation: Community    Group therapy participation: active    Therapeutic interventions reviewed and discussed: Yes    Impression of participation: good
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
## 2017-06-06 NOTE — BH NOTES
Pt was admitted voluntarily for the professional services of Dr. Geoff Sher. Pt reported that he relapsed on cocaine and last used 2 days ago. Pt also reported SI. He presented sad on admission. He was cooperative with the skin assessment and search. Pt was oriented to the unit. Orders received. Will monitor pt q15 minutes for safety and support.

## 2017-06-06 NOTE — BH NOTES
[facility] Setting TRANSFER - OUT REPORT:    Verbal report given to Burt RN(name) on Linda Jacobs  being transferred to general (unit) for routine progression of care       Report consisted of patients Situation, Background, Assessment and   Recommendations(SBAR). Information from the following report(s) SBAR was reviewed with the receiving nurse. Lines:       Opportunity for questions and clarification was provided.       Patient transported with:   Registered Nurse

## 2017-06-06 NOTE — BH NOTES
Patient skin assessment was benign except for the following. Patient had callus on R foot; patient also has healed scars from self harm on his bilateral forearms as well as a small abrasion on his forearm. No tattoos or open wounds noted. Patient was calm and cooperative during skin assessment and search.
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
## 2017-06-07 LAB
EST. AVERAGE GLUCOSE BLD GHB EST-MCNC: 220 MG/DL
GLUCOSE BLD STRIP.AUTO-MCNC: 157 MG/DL (ref 65–100)
GLUCOSE BLD STRIP.AUTO-MCNC: 234 MG/DL (ref 65–100)
GLUCOSE BLD STRIP.AUTO-MCNC: 234 MG/DL (ref 65–100)
GLUCOSE BLD STRIP.AUTO-MCNC: 379 MG/DL (ref 65–100)
HBA1C MFR BLD: 9.3 % (ref 4.2–6.3)
SERVICE CMNT-IMP: ABNORMAL

## 2017-06-07 PROCEDURE — 36415 COLL VENOUS BLD VENIPUNCTURE: CPT | Performed by: PSYCHIATRY & NEUROLOGY

## 2017-06-07 PROCEDURE — 83036 HEMOGLOBIN GLYCOSYLATED A1C: CPT | Performed by: PSYCHIATRY & NEUROLOGY

## 2017-06-07 PROCEDURE — 82962 GLUCOSE BLOOD TEST: CPT

## 2017-06-07 PROCEDURE — 65220000003 HC RM SEMIPRIVATE PSYCH

## 2017-06-07 PROCEDURE — 74011250637 HC RX REV CODE- 250/637: Performed by: PSYCHIATRY & NEUROLOGY

## 2017-06-07 PROCEDURE — 74011636637 HC RX REV CODE- 636/637: Performed by: FAMILY MEDICINE

## 2017-06-07 RX ADMIN — INSULIN LISPRO 10 UNITS: 100 INJECTION, SOLUTION INTRAVENOUS; SUBCUTANEOUS at 15:56

## 2017-06-07 RX ADMIN — INSULIN LISPRO 2 UNITS: 100 INJECTION, SOLUTION INTRAVENOUS; SUBCUTANEOUS at 12:02

## 2017-06-07 RX ADMIN — METFORMIN HYDROCHLORIDE 500 MG: 500 TABLET, FILM COATED ORAL at 07:29

## 2017-06-07 RX ADMIN — ZOLPIDEM TARTRATE 10 MG: 10 TABLET, FILM COATED ORAL at 21:01

## 2017-06-07 RX ADMIN — METFORMIN HYDROCHLORIDE 500 MG: 500 TABLET, FILM COATED ORAL at 17:32

## 2017-06-07 RX ADMIN — INSULIN LISPRO 2 UNITS: 100 INJECTION, SOLUTION INTRAVENOUS; SUBCUTANEOUS at 07:28

## 2017-06-07 RX ADMIN — IBUPROFEN 400 MG: 400 TABLET, FILM COATED ORAL at 20:10

## 2017-06-07 NOTE — BH NOTES
GROUP THERAPY PROGRESS NOTE    Katrina Spear is participating in [place].      Group time: 30 minutes    Personal goal for participation: daily orientation    Goal orientation: personal    Group therapy participation: active    Therapeutic interventions reviewed and discussed: yes    Impression of participation: cooperative

## 2017-06-07 NOTE — BH NOTES
The patient is depressed in mood and has a flat affect. He denies SI/HI. He is visible on the unit and in the milieu. He interacts with a few of his peers. The patient does attend group and remain until it is finished. monitoring for safety needs and support will continue Q 15 minutes.

## 2017-06-07 NOTE — BH NOTES
The pt has been med meal and milieu compliant. The pt continues to deny S/H I, A/V H nor displayed S/S of distress. The pt has been visible on the unit. The pt was observed getting agitated with mother on phone but was not loud or disruptive to the unit. Will continue to monitor support and update as needed. UPDATE: the pt complained of diarrhea after he flushed. Staff reported this to MD [facility] was advised. Pt was told to let staff know next occurrence and do not flush. PRN ambien given at 2027. Will monitor support and update as needed.

## 2017-06-07 NOTE — PROGRESS NOTES
Leobardo Alberto actively participated in [address] about [place] on [address] [facility]. [address] [facility] ([id number])

## 2017-06-07 NOTE — CONSULTS
[facility]   [address]   [address] [facility] ([place], [place])       Name:  Stephanie Ortiz   MR#:  [id number]   :  1965   Account #:  [de-identified]    Date of Consultation:  2017   Date of Adm:  2017       REFERRING PHYSICIAN: Vidya Jones MD    REASON FOR CONSULTATION: Medical evaluation for psychiatric   admission. CHIEF COMPLAINT: Depression. HISTORY OF PRESENT ILLNESS: A 79-year-old who presents   depressed, requiring further psychiatric evaluation and treatment. Denies any chest pain, shortness of breath, nausea, vomiting, or   diarrhea. Does not have a primary care physician. PAST MEDICAL HISTORY: Diabetes, hypertension, depression,   coronary artery disease status post stent, suicidal ideation. PAST SURGICAL HISTORY: Stent placement. ALLERGIES: VERSED. MEDICINES   1. Metformin 500 mg b.i.d.   2. Lipitor 40 mg at bedtime. 3. Abilify 10 mg. SOCIAL HISTORY: Does smoke cigarettes. Denies any alcohol use. Does smoke cocaine, which he relapsed recently. Denies any heroin or   marijuana. , has one child, age 28. Unemployed and actually   gets disability due to his degenerative back issues. PHYSICAL EXAMINATION   VITAL SIGNS: Temperature 98.0, blood pressure 135/85, pulse 101,   respirations 14, pulse oximetry 99%. GENERAL: Pleasant in no acute distress. HEAD, EYES, EARS, NOSE, AND THROAT: Oropharynx is clear. NECK: Supple. LUNGS: Clear to auscultation. No wheezes, rales or rhonchi. CARDIOVASCULAR: Regular rate. No murmurs, gallops, or rubs. ABDOMEN: Soft, nontender, nondistended. Normoactive bowel   sounds. No hepatosplenomegaly. EXTREMITIES: No cyanosis, clubbing, or edema. LABORATORY DATA: Hemoglobin 17.4, hematocrit is 47.7. UA was   negative. He does have greater than 1000 glucose. Sodium 133,   potassium 3.8, chloride 98, bicarbonate 29, BUN is 15, creatinine 0.99,   glucose 301.  Troponin is less than 0.04. Acetaminophen less than 2. Salicylate is less than 1.7. IMPRESSION: A 50-year-old male with past medical history of   hypertension, diabetes, depression, substance abuse, presents with   severe depression and suicidal ideation, admitted for further   psychiatric evaluation and treatment. PLAN   1. Psychiatry management of mental health issues. 2. Continue home medications once confirmed. 3. We will check hemoglobin A1c and place the patient on sliding   scale. 4. No VTE prophylaxis indicated or warranted at this time. Thank you for this consult.         Hemal Valdez MD DC / Brandi Toure   D:  06/06/2017   22:51   T:  06/06/2017   23:06   Job #:  [id number]

## 2017-06-07 NOTE — PROGRESS NOTES
Problem: Depressed Mood (Adult/Pediatric)  Goal: *STG: Participates in treatment plan  Attends 2 groups daily after 72 hours   Outcome: Progressing Towards Goal  Patient has been resting in their room with their eyes closed and has showed no signs of distress through out the shift. Patient slept for 7 hours. Patient is on every 15 minute checks for safety. Labs obtained.

## 2017-06-07 NOTE — PROGRESS NOTES
Problem: Diabetes Self-Management  Goal: *Developing strategies to promote health/change behavior  Outcome: Progressing Towards Goal  The pt's blood sugar was 379 mg/dL and required 10 units of Humalog insulin. The pt was able to attribute the pasta with lunch as elevating his blood sugar. The pt discussed even though he has been ordered a diabetic diet he still has choices to make that will not elevate blood glucose. The pt requested a salad for dinner without prompting. The pt was attentive when writer explained the concept of positive feedback loop concept as it relates to insulin needs and hunger. The pt reports he has not been med compliant for DM in sometime and does not have a PCP. The pt discussed a plan to follow up with his immediate family's PCP for DM management upon discharge. Will continue to monitor, support and update as needed. UPDATE @ 2048 pt's blood sugar was 157 mg/dL. UPDATE: at 2010 pt disclosed his headache was 5 of 10 and requested prn motrin and declined ice pack. At 2100 the pt reports the headache has subsided. Pt given prn Ambien.
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
## 2017-06-07 NOTE — DIABETES MGMT
DTC Consult Note    Recommendations/ Comments: Please consider the following:     Increasing Metformin to 1000 mg BID. Patient may also benefit from adding Glipizide 5 mg acb to help with prandial BG spikes during the day. DTC will continue to follow patient as needed. ____________________________    Consult received for:   [x]           Hospital Medication Recommendations                 [x]           Hospital Blood Glucose Management    DTC will follow up with patient prior to discharge to discuss outpatient education. Chart reviewed and initial evaluation complete [place]. Patient is a 47 y.o. male with known diabetes on Metformin 500 mg BID at home. A1c:   Lab Results   Component Value Date/Time    Hemoglobin A1c 9.3 06/07/2017 04:58 AM       Recent Glucose Results:   Lab Results   Component Value Date/Time    GLUCPOC 234 (H) 06/07/2017 11:10 AM    GLUCPOC 234 (H) 06/07/2017 07:14 AM    GLUCPOC 246 (H) 06/06/2017 08:23 PM        Lab Results   Component Value Date/Time    Creatinine 0.99 06/05/2017 11:36 AM       Active Orders   Diet    DIET DIABETIC CONSISTENT CARB Regular        PO intake: No data found. Current hospital DM medication: metformin 500 mg BID, Lispro Correctional insulin with normal sensitivity      Thank you.     Chino Reeder, [address], [address]  Office:  [phone]
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
## 2017-06-07 NOTE — BH NOTES
GROUP THERAPY PROGRESS NOTE    The patient She Echevarria a 47 y.o. male is participating in Coping Skills Group. Group time: 45 minutes    Personal goal for participation: To participate in self esteem Paxer game    Goal orientation:  personal    Group therapy participation: active    Therapeutic interventions reviewed and discussed: things pertaining to self esteem    Impression of participation:  The patient was attentive.     Amanda Roth  6/7/2017  1:43 PM

## 2017-06-08 VITALS
TEMPERATURE: 97.1 F | RESPIRATION RATE: 16 BRPM | DIASTOLIC BLOOD PRESSURE: 89 MMHG | SYSTOLIC BLOOD PRESSURE: 140 MMHG | OXYGEN SATURATION: 99 % | HEART RATE: 54 BPM

## 2017-06-08 LAB
GLUCOSE BLD STRIP.AUTO-MCNC: 217 MG/DL (ref 65–100)
SERVICE CMNT-IMP: ABNORMAL

## 2017-06-08 PROCEDURE — 82962 GLUCOSE BLOOD TEST: CPT

## 2017-06-08 PROCEDURE — 74011250637 HC RX REV CODE- 250/637: Performed by: PSYCHIATRY & NEUROLOGY

## 2017-06-08 PROCEDURE — 74011636637 HC RX REV CODE- 636/637: Performed by: FAMILY MEDICINE

## 2017-06-08 RX ORDER — METFORMIN HYDROCHLORIDE 500 MG/1
1000 TABLET ORAL 2 TIMES DAILY WITH MEALS
Status: DISCONTINUED | OUTPATIENT
Start: 2017-06-08 | End: 2017-06-08 | Stop reason: HOSPADM

## 2017-06-08 RX ORDER — METFORMIN HYDROCHLORIDE 1000 MG/1
1000 TABLET ORAL 2 TIMES DAILY WITH MEALS
Qty: 14 TAB | Refills: 1 | Status: SHIPPED | OUTPATIENT
Start: 2017-06-08 | End: 2017-06-15

## 2017-06-08 RX ADMIN — INSULIN LISPRO 2 UNITS: 100 INJECTION, SOLUTION INTRAVENOUS; SUBCUTANEOUS at 07:51

## 2017-06-08 RX ADMIN — METFORMIN HYDROCHLORIDE 500 MG: 500 TABLET, FILM COATED ORAL at 07:52

## 2017-06-08 NOTE — DISCHARGE INSTRUCTIONS
DISCHARGE SUMMARY from Nurse    The following personal items are in your possession at time of discharge:    Dental Appliances: None  Visual Aid: None     Home Medications: None (pt had hard copy script on chart)  Jewelry: None  Clothing: Footwear, Shirt, Shorts, Socks, Undergarments  Other Valuables: Keys, Money (comment), Wallet (sent to safe)  Personal Items Sent to Safe: wallet, keys, money          PATIENT INSTRUCTIONS:      What to do at Home:  Recommended activity: Activity as tolerated,     If I feel that I can not keep these promises and I am at risk of hurting myself or others, I will call the crisis office and speak with a crisis worker who will assist me during my crisis. [facility]  [address] [facility]  [phone]           *  Please give a list of your current medications to your Primary Care Provider. *  Please update this list whenever your medications are discontinued, doses are      changed, or new medications (including over-the-counter products) are added. *  Please carry medication information at all times in case of emergency situations. These are general instructions for a healthy lifestyle:    No smoking/ No tobacco products/ Avoid exposure to second hand smoke    Surgeon General's Warning:  Quitting smoking now greatly reduces serious risk to your health. Obesity, smoking, and sedentary lifestyle greatly increases your risk for illness    A healthy diet, regular physical exercise & weight monitoring are important for maintaining a healthy lifestyle    You may be retaining fluid if you have a history of heart failure or if you experience any of the following symptoms:  Weight gain of 3 pounds or more overnight or 5 pounds in a week, increased swelling in our hands or feet or shortness of breath while lying flat in bed.   Please call your doctor as soon as you notice any of these symptoms; do not wait until your next office visit. Recognize signs and symptoms of STROKE:    F-face looks uneven    A-arms unable to move or move unevenly    S-speech slurred or non-existent    T-time-call 911 as soon as signs and symptoms begin-DO NOT go       Back to bed or wait to see if you get better-TIME IS BRAIN. Warning Signs of HEART ATTACK     Call 911 if you have these symptoms:   Chest discomfort. Most heart attacks involve discomfort in the center of the chest that lasts more than a few minutes, or that goes away and comes back. It can feel like uncomfortable pressure, squeezing, fullness, or pain.  Discomfort in other areas of the upper body. Symptoms can include pain or discomfort in one or both arms, the back, neck, jaw, or stomach.  Shortness of breath with or without chest discomfort.  Other signs may include breaking out in a cold sweat, nausea, or lightheadedness. Don't wait more than five minutes to call 911 - MINUTES MATTER! Fast action can save your life. Calling 911 is almost always the fastest way to get lifesaving treatment. Emergency Medical Services staff can begin treatment when they arrive -- up to an hour sooner than if someone gets to the hospital by car. The discharge information has been reviewed with the patient. The patient verbalized understanding. Discharge medications reviewed with the patient and appropriate educational materials and side effects teaching were provided.

## 2017-06-08 NOTE — DIABETES MGMT
Patient known to [facility] from previous admissions. Patient has been on glipizide previously as well as Januvia. Please consider the following:   Increasing Metformin to 1000 mg BID and adding Glipizide 5 mg acb to help with prandial BG spikes during the day.        [facility], [address] [place] [address], E;

## 2017-06-08 NOTE — BH NOTES
GROUP THERAPY PROGRESS NOTE    Britney Funk is participating in [place].      Group time: 30 minutes    Personal goal for participation: daily orientation    Goal orientation: personal    Group therapy participation: active    Therapeutic interventions reviewed and discussed: yes    Impression of participation: cooperative

## 2017-06-08 NOTE — BH NOTES
GROUP THERAPY PROGRESS NOTE    Penelope Carmen is participating in Process Group. Group time: 50 minutes    Personal goal for participation: Identify existing and new methods of self-care to promote mental health    Goal orientation: personal    Group therapy participation: active    Therapeutic interventions reviewed and discussed:   Topic: Mental Health & Self-care -   Pt reported feeling exhausted, both physically and mentally. He also stated feeling happy, but lonely. Pt reported to have spoken with his ex-girlfriend last night which helped him to emotioanlly separate from her. He stated that she would not take any responsibility for her role in their break up and this lack of accountability was unattractive. Pt reported that his aunt is his main familial support due to her patience, acceptance, and listening skills. HE stated that he needs to improve on taking his medications as prescribed. Impression of participation:   Pt presented with a full affect and euthymic mood. He was an active participant and contributed to discussion. Pts behavior was appropriate and thoughts organized.     LALITA Velázquez, Supervisee in Social Work

## 2017-06-08 NOTE — DISCHARGE SUMMARY
PSYCHIATRIC DISCHARGE SUMMARY         IDENTIFICATION:    Patient Name  Colin Severe   Date of Birth 1965   [facility] [id number]   Medical Record Number  [id number]      Age  47 y.o. PCP None   Admit date:  6/5/2017    Discharge date: 6/8/2017   Room Number  327/01  @ [address]   Date of Service  6/8/2017            TYPE OF DISCHARGE: REGULAR               CONDITION AT DISCHARGE: improved       PROVISIONAL & DISCHARGE DIAGNOSES:    Problem List  Date Reviewed: 3/6/2017          Codes Class    * (Principal)Adjustment disorder with depressed mood ICD-10-CM: F43.21  ICD-9-CM: 309.0         Bipolar 1 disorder, mixed, moderate (HCC) ICD-10-CM: F31.62  ICD-9-CM: 296.62         Polysubstance abuse ICD-10-CM: F19.10  ICD-9-CM: 305.90     Overview Signed 3/6/2017  9:26 AM by Merlin Steward MD     Opioids, THC, cocaine             Personality disorder ICD-10-CM: F60.9  ICD-9-CM: 301.9         Drug-induced mood disorder ([facility] ICD-10-CM: F19.94  ICD-9-CM: 292.84, E980.5         Do not give narcotics ICD-10-CM: IGY7256  ICD-9-CM: [id number]         HTN (hypertension) ICD-10-CM: I10  ICD-9-CM: 401.9         Type 2 diabetes mellitus ([facility] ICD-10-CM: E11.9  ICD-9-CM: 250.00         Chronic pain ICD-10-CM: G89.29  ICD-9-CM: 338.29         Non compliance with medical treatment ICD-10-CM: Z91.19  ICD-9-CM: V15.81               Active Hospital Problems    *Adjustment disorder with depressed mood      Polysubstance abuse      Type 2 diabetes mellitus ([facility]      Non compliance with medical treatment        DISCHARGE DIAGNOSIS:   Axis I:  SEE ABOVE  Axis II: SEE ABOVE  Axis III: SEE ABOVE  Axis IV:  Substance use, relationship problem,lack of structure  Axis V:  30 on admission, 70 on discharge      CC & HISTORY OF PRESENT ILLNESS:  The patient, Colin Severe, is a 47 y.o.   WHITE [place]  male with a past psychiatric history significant for opiate dependence,substance induced mood disorder, who presents at this time with complaints of (and/or evidence of) the following emotional symptoms: depression and suicidal thoughts/threats. Additional symptomatology include  anxiety, chronic pain, concern about health problems, depression worse, difficulty sleeping, financial problems, increased irritability, relationship difficulties and \"I don't want to go on living like this\". Pt feeling overwhelmed and used cocaine 2 days ago. Denies other drug use and report he has been sober from opiates. The above symptoms have been present for few days. These symptoms are of severe severity. These symptoms are constant in nature. The patient's condition has been precipitated by and psychosocial stressors (relationship, housing, financial ). Patient's condition made worse by continued illicit drug use and treatment noncompliance. UDS: +cocaine; BAL=0.        SOCIAL HISTORY:    Social History     Social History    Marital status:      Spouse name: N/A    Number of children: N/A    Years of education: N/A     Occupational History    Not on file. Social History Main Topics    Smoking status: Current Every Day Smoker     Packs/day: 0.50    Smokeless tobacco: Never Used    Alcohol use No      Comment: sociially in the past    Drug use: Yes     Special: Cocaine      Comment: recent use , past opiate use    Sexual activity: Yes     Partners: Female     Birth control/ protection: None      Comment:  but seperated now     Other Topics Concern    Not on file     Social History Narrative    ** Merged History Encounter **     states he has 2 yrs of college. On disability for chronic pain. , 1 children.  Was in relationship but break    Moved in with Parent    No legal problem      FAMILY HISTORY:   Family History   Problem Relation Age of Onset    Stroke Mother     Hypertension Mother     Heart Disease Father     Hypertension Father     Cancer Maternal Grandmother      unknown type    Diabetes Maternal Grandfather              HOSPITALIZATION COURSE:    Marcellus Avendaño was admitted to the inpatient psychiatric unit [facility] for acute psychiatric stabilization in regards to symptomatology as described in the HPI above. While on the unit Marcellus Avendaño was involved in individual, group, occupational and milieu therapy. Psychiatric medications were adjusted during this hospitalization including metformin for unstable DM, prn med. Marcellus Avendaño demonstrated a slow, but progressive improvement in overall condition. Much of patient's depression appeared to be related to situational stressors, effects of drugs of abuse, and psychological factors. Please see individual progress notes for more specific details regarding patient's hospitalization course. At time of discharge, Marcellus Avendaño is without significant problems of depression psychosis  amparo. Patient free of suicidal and homicidal ideations (appears to be at very low risk of suicide or homicide) and reports many positive predictive factors in terms of not attempting suicide or homicide. Overall presentation at time of discharge is most consistent with the diagnosis of adjustment disorder with depressed mood. Patient with request for discharge today. There are no grounds to seek a TDO. Patient has maximized benefit to be derived from acute inpatient psychiatric treatment. All members of the treatment team concur with each other in regards to plans for discharge today per patient's request.  Patient and family are aware and in agreement with discharge and discharge plan. Per my last note: Adjustment disorder with depressed mood (6/5/2017) ruled in    Assessment: stressor leading to change in mood- on admission reports depressed mood, anxious, poor sleep after break up with his GF- now feeling better and denies SI/HI/AVH. Affect is slowly improving, still preoccupied .  Denies SI/HI/AVH    Plan: prn med, advise therapy, consider IOP     Type 2 diabetes mellitus ([facility] (11/3/2011)    Assessment: unstable, new diagnosis, receiving metformin and glipized but pt has been non compliant    Plan: restart Metformin, accu-check, IM to follow- HbA1c- 9.3, On SSI     Non compliance with medical treatment (11/3/2011)    Assessment: per history    Plan: educate   Polysubstance abuse (10/18/2016)    Assessment: opiate dependence in partial remission, cocaine abuse    Plan: rehab vs IOP               LABS AND IMAGAING:    Labs Reviewed   TSH 3RD GENERATION - Abnormal; Notable for the following:        Result Value    TSH 0.32 (*)     All other components within normal limits   GLUCOSE, FASTING - Abnormal; Notable for the following:     Glucose 259 (*)     All other components within normal limits   HEMOGLOBIN A1C WITH EAG - Abnormal; Notable for the following:     Hemoglobin A1c 9.3 (*)     All other components within normal limits   GLUCOSE, POC - Abnormal; Notable for the following:     Glucose (POC) 316 (*)     All other components within normal limits   GLUCOSE, POC - Abnormal; Notable for the following:     Glucose (POC) 287 (*)     All other components within normal limits   GLUCOSE, POC - Abnormal; Notable for the following:     Glucose (POC) 246 (*)     All other components within normal limits   GLUCOSE, POC - Abnormal; Notable for the following:     Glucose (POC) 234 (*)     All other components within normal limits   GLUCOSE, POC - Abnormal; Notable for the following:     Glucose (POC) 234 (*)     All other components within normal limits   GLUCOSE, POC - Abnormal; Notable for the following:     Glucose (POC) 379 (*)     All other components within normal limits   GLUCOSE, POC - Abnormal; Notable for the following:     Glucose (POC) 157 (*)     All other components within normal limits   GLUCOSE, POC - Abnormal; Notable for the following:     Glucose (POC) 217 (*)     All other components within normal limits   LIPID PANEL   POC GLUCOSE   POC GLUCOSE   POC GLUCOSE   POC GLUCOSE   POC GLUCOSE   POC GLUCOSE   POC GLUCOSE   POC GLUCOSE   POC GLUCOSE     No results found for: DS35, PHEN, PHENO, PHENT, DILF, DS39, PHENY, PTN, VALF2, VALAC, VALP, VALPR, DS6, CRBAM, CRBAMP, CARB2, XCRBAM  Admission on 06/05/2017   Component Date Value Ref Range Status    TSH 06/06/2017 0.32* 0.36 - 3.74 uIU/mL Final    LIPID PROFILE 06/06/2017        Final    Cholesterol, total 06/06/2017 123  <200 MG/DL Final    Triglyceride 06/06/2017 83  <150 MG/DL Final    HDL Cholesterol 06/06/2017 52  MG/DL Final    LDL, calculated 06/06/2017 54.4  0 - 100 MG/DL Final    VLDL, calculated 06/06/2017 16.6  MG/DL Final    CHOL/HDL Ratio 06/06/2017 2.4  0 - 5.0   Final    Glucose 06/06/2017 259* 65 - 100 MG/DL Final    Glucose (POC) 06/06/2017 316* 65 - 100 mg/dL Final    Performed by 06/06/2017 PRAVEENA Cam   Final    Glucose (POC) 06/06/2017 287* 65 - 100 mg/dL Final    Performed by 06/06/2017 Joanne Damico   Final    Glucose (POC) 06/06/2017 246* 65 - 100 mg/dL Final    Performed by 06/06/2017 Joanne Damico   Final    Hemoglobin A1c 06/07/2017 9.3* 4.2 - 6.3 % Final    Est. average glucose 06/07/2017 220  mg/dL Final    Glucose (POC) 06/07/2017 234* 65 - 100 mg/dL Final    Performed by 06/07/2017 Auther Pole L   Final    Glucose (POC) 06/07/2017 234* 65 - 100 mg/dL Final    Performed by 06/07/2017 Peterer Pole L   Final    Glucose (POC) 06/07/2017 379* 65 - 100 mg/dL Final    Performed by 06/07/2017 Joanne Damico   Final    Glucose (POC) 06/07/2017 157* 65 - 100 mg/dL Final    Performed by 06/07/2017 Joanne Damico   Final    Glucose (POC) 06/08/2017 217* 65 - 100 mg/dL Final    Performed by 06/08/2017 [place]   Final   Admission on 06/05/2017, Discharged on 06/05/2017   Component Date Value Ref Range Status    WBC 06/05/2017 6.4  4.1 - 11.1 K/uL Final    RBC 06/05/2017 5.35  4.10 - 5.70 M/uL Final    HGB 06/05/2017 17.4* 12.1 - 17.0 g/dL Final    HCT 06/05/2017 47.7  36.6 - 50.3 % Final    MCV 06/05/2017 84.5  80.0 - 99.0 FL Final    MCH 06/05/2017 31.8  26.0 - 34.0 PG Final    MCHC 06/05/2017 36.5  30.0 - 36.5 g/dL Final    RDW 06/05/2017 13.8  11.5 - 14.5 % Final    PLATELET 06/54/8114 623  150 - 400 K/uL Final    NEUTROPHILS 06/05/2017 76* 32 - 75 % Final    LYMPHOCYTES 06/05/2017 14  12 - 49 % Final    MONOCYTES 06/05/2017 8  5 - 13 % Final    EOSINOPHILS 06/05/2017 1  0 - 7 % Final    BASOPHILS 06/05/2017 1  0 - 1 % Final    ABS. NEUTROPHILS 06/05/2017 4.8  1.8 - 8.0 K/UL Final    ABS. LYMPHOCYTES 06/05/2017 0.9  0.8 - 3.5 K/UL Final    ABS. MONOCYTES 06/05/2017 0.5  0.0 - 1.0 K/UL Final    ABS. EOSINOPHILS 06/05/2017 0.1  0.0 - 0.4 K/UL Final    ABS. BASOPHILS 06/05/2017 0.1  0.0 - 0.1 K/UL Final    Sodium 06/05/2017 133* 136 - 145 mmol/L Final    Potassium 06/05/2017 3.8  3.5 - 5.1 mmol/L Final    Chloride 06/05/2017 98  97 - 108 mmol/L Final    CO2 06/05/2017 29  21 - 32 mmol/L Final    Anion gap 06/05/2017 6  5 - 15 mmol/L Final    Glucose 06/05/2017 361* 65 - 100 mg/dL Final    BUN 06/05/2017 15  6 - 20 MG/DL Final    Creatinine 06/05/2017 0.99  0.70 - 1.30 MG/DL Final    BUN/Creatinine ratio 06/05/2017 15  12 - 20   Final    GFR est AA 06/05/2017 >60  >60 ml/min/1.73m2 Final    GFR est non-AA 06/05/2017 >60  >60 ml/min/1.73m2 Final    Calcium 06/05/2017 8.8  8.5 - 10.1 MG/DL Final    Bilirubin, total 06/05/2017 0.8  0.2 - 1.0 MG/DL Final    ALT (SGPT) 06/05/2017 78  12 - 78 U/L Final    AST (SGOT) 06/05/2017 33  15 - 37 U/L Final    Alk.  phosphatase 06/05/2017 95  45 - 117 U/L Final    Protein, total 06/05/2017 7.9  6.4 - 8.2 g/dL Final    Albumin 06/05/2017 3.7  3.5 - 5.0 g/dL Final    Globulin 06/05/2017 4.2* 2.0 - 4.0 g/dL Final    A-G Ratio 06/05/2017 0.9* 1.1 - 2.2   Final    Color 06/05/2017 YELLOW/STRAW    Final    Appearance 06/05/2017 CLEAR  CLEAR   Final    Specific gravity 06/05/2017 1.015  1.003 - 1.030   Final  pH (UA) 06/05/2017 5.5  5.0 - 8.0   Final    Protein 06/05/2017 TRACE* NEG mg/dL Final    Glucose 06/05/2017 >1000* NEG mg/dL Final    Ketone 06/05/2017 NEGATIVE   NEG mg/dL Final    Bilirubin 06/05/2017 NEGATIVE   NEG   Final    Blood 06/05/2017 NEGATIVE   NEG   Final    Urobilinogen 06/05/2017 0.2  0.2 - 1.0 EU/dL Final    Nitrites 06/05/2017 NEGATIVE   NEG   Final    Leukocyte Esterase 06/05/2017 NEGATIVE   NEG   Final    UA:UC IF INDICATED 06/05/2017 CULTURE NOT INDICATED BY UA RESULT  CNI   Final    WBC 06/05/2017 0-4  0 - 4 /hpf Final    RBC 06/05/2017 0-5  0 - 5 /hpf Final    Epithelial cells 06/05/2017 FEW  FEW /lpf Final    Bacteria 06/05/2017 NEGATIVE   NEG /hpf Final    Hyaline cast 06/05/2017 0-2  0 - 5 /lpf Final    AMPHETAMINE 06/05/2017 NEGATIVE   NEG   Final    BARBITURATES 06/05/2017 NEGATIVE   NEG   Final    BENZODIAZEPINE 06/05/2017 NEGATIVE   NEG   Final    COCAINE 06/05/2017 POSITIVE* NEG   Final    METHADONE 06/05/2017 NEGATIVE   NEG   Final    OPIATES 06/05/2017 NEGATIVE   NEG   Final    PCP(PHENCYCLIDINE) 06/05/2017 NEGATIVE   NEG   Final    THC (TH-CANNABINOL) 06/05/2017 NEGATIVE   NEG   Final    Drug screen comment 06/05/2017 (NOTE)   Final    SALICYLATE 87/44/5859 <2.8* 2.8 - 20.0 MG/DL Final    Acetaminophen level 06/05/2017 <2* 10 - 30 ug/mL Final    ALCOHOL(ETHYL),SERUM 06/05/2017 <10  <10 MG/DL Final    Ventricular Rate 06/05/2017 77  BPM Final    Atrial Rate 06/05/2017 77  BPM Final    P-R Interval 06/05/2017 150  ms Final    QRS Duration 06/05/2017 94  ms Final    Q-T Interval 06/05/2017 370  ms Final    QTC Calculation (Bezet) 06/05/2017 418  ms Final    Calculated P Axis 06/05/2017 80  degrees Final    Calculated R Axis 06/05/2017 71  degrees Final    Calculated T Axis 06/05/2017 70  degrees Final    Diagnosis 06/05/2017    Final                    Value:Normal sinus rhythm  Left atrial enlargement  When compared with ECG of 20-NOV-2016 11:31,  No significant change was found  Confirmed by Evangelina Gamino ([id number]) on 6/5/2017 4:31:28 PM      Troponin-I, Qt. 06/05/2017 <0.04  <0.05 ng/mL Final    Glucose (POC) 06/05/2017 240* 65 - 100 mg/dL Final    Performed by 06/05/2017 Na Vaughan ([facility])   Final    Glucose (POC) 06/05/2017 211* 65 - 100 mg/dL Final    Performed by 06/05/2017 Na Vaughan ([facility])   Final     Xr Chest Pa Lat    Result Date: 6/5/2017  Exam:  2 view chest Indication: Suicidal ideations Comparison to 11/20/2016. PA and lateral views demonstrate normal heart size. There is no acute process in the lung fields. Degenerative changes are seen in the thoracic spine. Impression: No acute process or change compared to the prior exam.                   DISPOSITION:    Home. Patient to f/u with drug/etoh rehabilitation, and psychotherapy appointments. Patient is to f/u with internist as directed. FOLLOW-UP CARE:    Activity as tolerated  Diabetic Diet  Wound Care: none needed. Follow-up Information     Follow up With Details Comments Contact Info    [facility]  Please follow up with Skyler MACE by accessing walk-in Mon, Tues or Thurs from 9am-3pm. A message was also left for your , Bk Grimm (ph: [phone]) [facility] SJonny Austin. [facility], [address] [place] [address]  Ph: [phone]  Fax: [phone]    None   None ([phone]) Patient stated that they have no PCP                   PROGNOSIS:   Guarded / Poor---- based on nature of patient's pathology/ies and treatment compliance issues. Prognosis is greatly dependent upon patient's ability to remain sober and to follow up with drug/etoh rehabilitation and psychiatric/psychotherapy appointments as well as to comply with psychiatric medications as prescribed.             DISCHARGE MEDICATIONS:     Informed consent given for the use of following psychotropic medications:  Current Discharge Medication List      CONTINUE these medications which have CHANGED    Details metFORMIN (GLUCOPHAGE) 1,000 mg tablet Take 1 Tab by mouth two (2) times daily (with meals) for 7 days. Indications: type 2 diabetes mellitus  Qty: 14 Tab, Refills: 1         STOP taking these medications       atorvastatin (LIPITOR) 40 mg tablet Comments:   Reason for Stopping:                      A coordinated, multidisplinary treatment team round was conducted with Cathy Luong is done daily here at [facility]. This team consists of the nurse, psychiatric unit pharmcist,  and writer. I have spent greater than 35 minutes on discharge work.     Signed:  Yulissa Reyes MD  6/8/2017

## 2017-06-08 NOTE — BH NOTES
Patient was discharged today. Discharge forms were signed and given to patient after being verified by staff member and patient. Discharge instructions were explained to patient and patient indicated understanding verbally. Patient has been compliant with taking medications and stated understanding the importance of taking medications upon discharge. Patient was given prescriptions and belongings, and was escorted from the unit by staff.

## 2017-06-09 NOTE — BH NOTES
Patient was in a good mood, had an appropriate affect, exhibited no signs or symptoms of being a harm to himself or others and was discharged. His parents transported him home. Worker communicated with patient's former , who said the patient was closed to services and would have to access walk-in for case management and substance abuse services. Due to him having Medicare they would not be able to serve him psychiatrically. Worker informed patient of this and accepted referral back to Dr. Pierce Moscoso office (he wanted to see an NP). Worker contacted Dr. Pierce Moscoso office and they said the patient would have to see Dr. Lima Gibson again and could request changing providers. The patient left the unit before worker could give him this information and I attempted to call the patient at all available numbers. None of the numbers worked for worker to leave a message. Worker called patient's former [facility] worker, Mr. Gold White, and left a message stating if patient came to [facility] to instruct him to call Dr. Pierce Moscoso office to set up his psychiatric appointment. There is availability Monday, 6/12. Continuing care paperwork was faxed to [facility]. Skyler MACE  [id number].   [facility], [address] [place] [address]  Ph: [phone]  Fax: [phone]  Please follow up with Skyler MACE by accessing walk-in Mon, Tues or Thurs from 9am-3pm for case management and/or substance abuse services

## 2017-06-30 ENCOUNTER — HOSPITAL ENCOUNTER (OUTPATIENT)
Dept: MRI IMAGING | Age: 52
Discharge: HOME | End: 2017-06-30
Attending: PHYSICAL MEDICINE & REHABILITATION
Payer: MEDICARE

## 2017-06-30 DIAGNOSIS — M54.9 BACK PAIN: ICD-10-CM

## 2017-06-30 DIAGNOSIS — M51.36 DDD (DEGENERATIVE DISC DISEASE), LUMBAR: ICD-10-CM

## 2017-06-30 PROCEDURE — 74011250636 HC RX REV CODE- 250/636: Performed by: PHYSICAL MEDICINE & REHABILITATION

## 2017-06-30 PROCEDURE — 72158 MRI LUMBAR SPINE W/O & W/DYE: CPT

## 2017-06-30 PROCEDURE — A9577 INJ MULTIHANCE: HCPCS | Performed by: PHYSICAL MEDICINE & REHABILITATION

## 2017-06-30 RX ADMIN — GADOBENATE DIMEGLUMINE 16 ML: 529 INJECTION, SOLUTION INTRAVENOUS at 16:39

## 2017-07-11 RX ORDER — GABAPENTIN 300 MG/1
600 CAPSULE ORAL 3 TIMES DAILY
Qty: 180 CAP | Refills: 0 | Status: SHIPPED | OUTPATIENT
Start: 2017-07-11 | End: 2017-08-30

## 2017-07-11 RX ORDER — ARIPIPRAZOLE 10 MG/1
10 TABLET ORAL
Qty: 30 TAB | Refills: 0 | Status: ON HOLD | OUTPATIENT
Start: 2017-07-11 | End: 2022-05-13

## 2017-07-11 RX ORDER — DOXEPIN HYDROCHLORIDE 25 MG/1
25 CAPSULE ORAL
Qty: 30 CAP | Refills: 0 | Status: SHIPPED | OUTPATIENT
Start: 2017-07-11 | End: 2017-08-30 | Stop reason: SDUPTHER

## 2017-07-11 RX ORDER — DIVALPROEX SODIUM 500 MG/1
500 TABLET, DELAYED RELEASE ORAL 2 TIMES DAILY
Qty: 60 TAB | Refills: 0 | Status: SHIPPED | OUTPATIENT
Start: 2017-07-11 | End: 2017-08-30

## 2017-07-11 RX ORDER — HYDROXYZINE PAMOATE 100 MG/1
100 CAPSULE ORAL
Qty: 60 CAP | Refills: 0 | Status: SHIPPED | OUTPATIENT
Start: 2017-07-11 | End: 2017-07-25

## 2017-07-18 ENCOUNTER — TELEPHONE (OUTPATIENT)
Dept: BEHAVIORAL/MENTAL HEALTH CLINIC | Age: 52
End: 2017-07-18

## 2017-07-18 NOTE — TELEPHONE ENCOUNTER
Pt states that the 500 mg of Depeco is too much for him and he feels severely drowsy when he takes it. He was wondering if he could be reduce to 250 mg.

## 2017-08-30 ENCOUNTER — OFFICE VISIT (OUTPATIENT)
Dept: BEHAVIORAL/MENTAL HEALTH CLINIC | Age: 52
End: 2017-08-30

## 2017-08-30 VITALS
HEIGHT: 73 IN | SYSTOLIC BLOOD PRESSURE: 157 MMHG | BODY MASS INDEX: 21.74 KG/M2 | WEIGHT: 164 LBS | HEART RATE: 73 BPM | OXYGEN SATURATION: 98 % | DIASTOLIC BLOOD PRESSURE: 91 MMHG

## 2017-08-30 DIAGNOSIS — F19.10 POLYSUBSTANCE ABUSE (HCC): ICD-10-CM

## 2017-08-30 DIAGNOSIS — F31.62 BIPOLAR 1 DISORDER, MIXED, MODERATE (HCC): Primary | ICD-10-CM

## 2017-08-30 DIAGNOSIS — F60.9 PERSONALITY DISORDER (HCC): ICD-10-CM

## 2017-08-30 DIAGNOSIS — Z91.199 NON COMPLIANCE WITH MEDICAL TREATMENT: ICD-10-CM

## 2017-08-30 PROBLEM — F43.21 ADJUSTMENT DISORDER WITH DEPRESSED MOOD: Status: RESOLVED | Noted: 2017-06-05 | Resolved: 2017-08-30

## 2017-08-30 RX ORDER — PREGABALIN 75 MG/1
75 CAPSULE ORAL 2 TIMES DAILY
COMMUNITY
End: 2018-01-28 | Stop reason: SDUPTHER

## 2017-08-30 RX ORDER — HYDROCODONE BITARTRATE AND ACETAMINOPHEN 5; 325 MG/1; MG/1
1 TABLET ORAL
COMMUNITY
Start: 2017-08-18 | End: 2017-08-30

## 2017-08-30 RX ORDER — DOXEPIN HYDROCHLORIDE 25 MG/1
25 CAPSULE ORAL
Qty: 30 CAP | Refills: 2 | Status: SHIPPED | OUTPATIENT
Start: 2017-08-30 | End: 2019-05-23

## 2017-08-30 NOTE — PROGRESS NOTES
Psychiatric Outpatient Progress Note    Account Number:  [id number]  Name: Eunice Song    SUBJECTIVE:   CHIEF COMPLAINT:  Eunice Song is a 47 y.o. , White  male and was seen today for first follow-up of psychiatric condition and psychotropic medication management. He was initially seen on 3/6/17 and has been NS since then. He was admitted to inpatient psych at [facility] from 6/5-6/8/17 with SI. He was positive for cocaine in his UDS. HPI:    Jad Koehler reports the following psychiatric symptoms:  depression, anxiety and mood swings, BRIGETTE. The symptoms have been present for years and are of moderate severity. The symptoms occur daily. Pt reported that he has been clean since his last admission at [facility]. Compliance with psych medications is questionable yet reports taking Abilify and Doxepin. He does not want to take Depakote as it makes him groggy and unsteady on his feet. Reports stable sleep/appetite. Denies any psychosis or amparo. He is disabled and currently not working. Lives with parents or GF. Not very active physically due to chronic pain. Takes Lyrica for pain. Contributing factors include: BRIGETTE. Patient denies SI/HI/SIB. Side Effects:  none      Fam/Soc Hx (from [place] with updates):       REVIEW OF SYSTEMS:  Psychiatric:  depression, axiety  Appetite:good   Sleep: good       OBJECTIVE:                 Mental Status exam: WNL except for      Sensorium  oriented to time, place and person   Relations cooperative    Eye Contact    appropriate   Appearance:  age appropriate and casually dressed   Motor Behavior/Gait:  within normal limits   Speech:  normal pitch and normal volume   Thought Process: goal directed and logical   Thought Content free of delusions and free of hallucinations   Suicidal ideations none   Homicidal ideations none   Mood:  euthymic   Affect:  anxious   Memory recent  adequate   Memory remote:  adequate   Concentration:  adequate   Abstraction:  concrete   Insight:  fair Reliability fair   Judgment:  fair       MEDICAL DECISION MAKING  Data: pertinent labs, imaging, medical records and diagnostic tests reviewed and incorporated in diagnosis and treatment plan    Allergies   Allergen Reactions    Versed [Midazolam] Shortness of Breath     Weakness     Versed [Midazolam] Unknown (comments)     Numbness, with shortness of breath        Current Outpatient Prescriptions   Medication Sig Dispense Refill    pregabalin (LYRICA) 75 mg capsule Take 75 mg by mouth two (2) times a day.  doxepin (SINEQUAN) 25 mg capsule Take 1 Cap by mouth nightly. 30 Cap 2    ARIPiprazole (ABILIFY) 10 mg tablet Take 1 Tab by mouth daily (after breakfast). Indications: MIXED BIPOLAR I DISORDER 30 Tab 0        Visit Vitals    BP (!) 157/91 (BP 1 Location: Left arm, BP Patient Position: Sitting)    Pulse 73    Ht 6' 1\" (1.854 m)    Wt 74.4 kg (164 lb)    SpO2 98%    BMI 21.64 kg/m2         Problems addressed today:    ICD-10-CM ICD-9-CM    1. Bipolar 1 disorder, mixed, moderate (HCC) F31.62 296.62    2. Polysubstance abuse F19.10 305.90    3. Personality disorder F60.9 301.9    4. Non compliance with medical treatment Z91.19 V15.81        Assessment:   John Haji  is a 47 y.o.  male  is not compliant with treatment. Symptoms are unstable. Patient denies SI/HI/SIB. No evidence of AH/VH or delusions. Risk Scoring- chronic illnesses and prescription drug management    Treatment Plan:  1. Medications:          Medication Changes/Adjustments: Resume Abilify and Doxepin    Current Outpatient Prescriptions   Medication Sig Dispense Refill    pregabalin (LYRICA) 75 mg capsule Take 75 mg by mouth two (2) times a day.  doxepin (SINEQUAN) 25 mg capsule Take 1 Cap by mouth nightly. 30 Cap 2    ARIPiprazole (ABILIFY) 10 mg tablet Take 1 Tab by mouth daily (after breakfast).  Indications: MIXED BIPOLAR I DISORDER 30 Tab 0                  The following regarding medications was addressed:    (The risks and benefits of the proposed medication; the potential medication side effects ie    dry mouth, weight gain, GI upset, headache; patient given opportunity to ask questions)       2. Counseling and coordination of care including instructions for treatment, risks/benefits, risk factor reduction and patient/family education. He agrees with the plan. Patient instructed to call with any side effects, questions or issues. 3.  Follow-up Disposition:  Return in about 3 months (around 11/30/2017). PSYCHOTHERAPY:  approx 20 minutes  Type:  Supportive/Solution Focused psychotherapy provided  Focus:     Current problems              Non compliance              BRIGETTE   Housing issues   Medical issues     Psychoeducation provided on psych medications    Treatment plan reviewed with patient-including diagnosis and medications    Worked on issues of denial & effects of substance dependency/use    Latosha Madera is not progressing.     Xenia Rossi MD  8/30/2017

## 2018-01-25 ENCOUNTER — OFFICE VISIT (OUTPATIENT)
Dept: INTERNAL MEDICINE CLINIC | Age: 53
End: 2018-01-25

## 2018-01-25 VITALS
OXYGEN SATURATION: 97 % | WEIGHT: 174.2 LBS | TEMPERATURE: 97.7 F | HEART RATE: 62 BPM | BODY MASS INDEX: 23.09 KG/M2 | DIASTOLIC BLOOD PRESSURE: 95 MMHG | SYSTOLIC BLOOD PRESSURE: 168 MMHG | HEIGHT: 73 IN | RESPIRATION RATE: 18 BRPM

## 2018-01-25 DIAGNOSIS — Z72.0 TOBACCO ABUSE: Chronic | ICD-10-CM

## 2018-01-25 DIAGNOSIS — N52.8 OTHER MALE ERECTILE DYSFUNCTION: Chronic | ICD-10-CM

## 2018-01-25 DIAGNOSIS — E11.65 TYPE 2 DIABETES MELLITUS WITH HYPERGLYCEMIA, WITHOUT LONG-TERM CURRENT USE OF INSULIN (HCC): Chronic | ICD-10-CM

## 2018-01-25 DIAGNOSIS — Z00.00 ROUTINE ADULT HEALTH MAINTENANCE: ICD-10-CM

## 2018-01-25 DIAGNOSIS — F31.62 BIPOLAR 1 DISORDER, MIXED, MODERATE (HCC): ICD-10-CM

## 2018-01-25 DIAGNOSIS — I10 ESSENTIAL HYPERTENSION: Primary | Chronic | ICD-10-CM

## 2018-01-25 DIAGNOSIS — F19.10 POLYSUBSTANCE ABUSE (HCC): ICD-10-CM

## 2018-01-25 DIAGNOSIS — B18.2 CHRONIC HEPATITIS C WITHOUT HEPATIC COMA (HCC): Chronic | ICD-10-CM

## 2018-01-25 DIAGNOSIS — G89.4 CHRONIC PAIN SYNDROME: Chronic | ICD-10-CM

## 2018-01-25 DIAGNOSIS — Z23 ENCOUNTER FOR IMMUNIZATION: ICD-10-CM

## 2018-01-25 RX ORDER — ASPIRIN 81 MG/1
81 TABLET ORAL DAILY
Qty: 90 TAB | Refills: 3 | Status: SHIPPED | OUTPATIENT
Start: 2018-01-25

## 2018-01-25 NOTE — PROGRESS NOTES
Marisol Duron is a 47 y.o. male  Chief Complaint   Patient presents with    New Patient     All medications are out due to no PCP patient not taking any meds at this time     Visit Vitals    BP (!) 168/95 (BP 1 Location: Left arm, BP Patient Position: Sitting)    Pulse 62    Temp 97.7 °F (36.5 °C) (Oral)    Resp 18    Ht 6' 1\" (1.854 m)    Wt 174 lb 3.2 oz (79 kg)    SpO2 97%    BMI 22.98 kg/m2     1. Have you been to the ER, urgent care clinic since your last visit? Hospitalized since your last visit?no    2. Have you seen or consulted any other health care providers outside of the [address] [place] [address] since your last visit? Include any pap smears or colon screening.  No

## 2018-01-25 NOTE — PATIENT INSTRUCTIONS
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
## 2018-01-25 NOTE — MR AVS SNAPSHOT
[address] [facility] 
[phone] Patient: Elan Marlow MRN: [id number] :1965 Visit Information Date & Time Provider Department Dept. Phone Encounter #  
 2018  2:30 PM Jorene Riedel, [address]  [id number] Follow-up Instructions Return in about 2 months (around 3/25/2018). Upcoming Health Maintenance Date Due  
 FOOT EXAM Q1 1974 EYE EXAM RETINAL OR DILATED Q1 1974 Pneumococcal 19-64 Medium Risk (1 of 1 - PPSV23) 1983 DTaP/Tdap/Td series (1 - Tdap) 1985 MICROALBUMIN Q1 10/26/2012 FOBT Q 1 YEAR AGE 50-75 2014 Influenza Age 5 to Adult 2017 HEMOGLOBIN A1C Q6M 2017 LIPID PANEL Q1 2018 Allergies as of 2018  Review Complete On: 2018 By: Richmond Otoole III, DO Severity Noted Reaction Type Reactions Versed [Midazolam] High 10/26/2011    Shortness of Breath Weakness Versed [Midazolam]  10/01/2011   Intolerance Unknown (comments) Numbness, with shortness of breath Current Immunizations  Reviewed on 2018 Name Date Hep B, Adol/Ped 3/12/2013  5:30 PM  
 Pneumococcal Conjugate (PCV-13)  Deferred (Patient Refused) Reviewed by Quiana Chand LPN on 3/92/5703 at  3:02 PM  
You Were Diagnosed With   
  
 Codes Comments Essential hypertension    -  Primary ICD-10-CM: I10 
ICD-9-CM: 401.9 Bipolar 1 disorder, mixed, moderate (HCC)     ICD-10-CM: F31.62 
ICD-9-CM: 296.62 Type 2 diabetes mellitus with hyperglycemia, without long-term current use of insulin (HCC)     ICD-10-CM: E11.65 ICD-9-CM: 250.00, 790.29 Polysubstance abuse     ICD-10-CM: F19.10 ICD-9-CM: 305.90 Chronic pain syndrome     ICD-10-CM: G89.4 ICD-9-CM: 338. 4 Chronic hepatitis C without hepatic coma (HCC)     ICD-10-CM: B18.2 ICD-9-CM: 070.54   
 Other male erectile dysfunction     ICD-10-CM: N52.8 ICD-9-CM: 607.84 Tobacco abuse     ICD-10-CM: Z72.0 ICD-9-CM: 305.1 Routine adult health maintenance     ICD-10-CM: Z00.00 ICD-9-CM: V70.0 Vitals BP Pulse Temp Resp Height(growth percentile) Weight(growth percentile) (!) 168/95 (BP 1 Location: Left arm, BP Patient Position: Sitting) 62 97.7 °F (36.5 °C) (Oral) 18 6' 1\" (1.854 m) 174 lb 3.2 oz (79 kg) SpO2 BMI Smoking Status 97% 22.98 kg/m2 Current Every Day Smoker Vitals History BMI and BSA Data Body Mass Index Body Surface Area  
 22.98 kg/m 2 2.02 m 2 Preferred Pharmacy Pharmacy Name Phone [facility] [address] [place] [address] [phone] Your Updated Medication List  
  
   
This list is accurate as of: 1/25/18  4:01 PM.  Always use your most recent med list.  
  
  
  
  
 ARIPiprazole 10 mg tablet Commonly known as:  ABILIFY Take 1 Tab by mouth daily (after breakfast). Indications: MIXED BIPOLAR I DISORDER  
  
 aspirin delayed-release 81 mg tablet Take 1 Tab by mouth daily. doxepin 25 mg capsule Commonly known as:  SINEquan Take 1 Cap by mouth nightly. LYRICA 75 mg capsule Generic drug:  pregabalin Take 75 mg by mouth two (2) times a day. Prescriptions Sent to Pharmacy Refills  
 aspirin delayed-release 81 mg tablet 3 Sig: Take 1 Tab by mouth daily. Class: Normal  
 Pharmacy: [address] [place] [address] #: [phone] Route: Oral  
  
We Performed the Following CBC WITH AUTOMATED DIFF [36930 CPT(R)] HEMOGLOBIN A1C WITH EAG [82066 CPT(R)] HEPATITIS C AB [14904 CPT(R)] HIV 1/2 AG/AB, 4TH GENERATION,W RFLX CONFIRM [id number] CPT(R)] LIPID PANEL [31151 CPT(R)] METABOLIC PANEL, COMPREHENSIVE [16164 CPT(R)] MICROALBUMIN, UR, RAND W/ MICROALBUMIN/CREA RATIO P021958 CPT(R)] PSA, DIAGNOSTIC (PROSTATE SPECIFIC AG) [id number] CPT(R)] TESTOSTERONE, FREE & TOTAL [01178 CPT(R)] TSH 3RD GENERATION [67073 CPT(R)] Follow-up Instructions Return in about 2 months (around 3/25/2018). Patient Instructions Learning About Diabetes Food Guidelines Your Care Instructions Meal planning is important to manage diabetes. It helps keep your blood sugar at a target level (which you set with your doctor). You don't have to eat special foods. You can eat what your family eats, including sweets once in a while. But you do have to pay attention to how often you eat and how much you eat of certain foods. You may want to work with a dietitian or a certified diabetes educator (CDE) to help you plan meals and snacks. A dietitian or CDE can also help you lose weight if that is one of your goals. What should you know about eating carbs? Managing the amount of carbohydrate (carbs) you eat is an important part of healthy meals when you have diabetes. Carbohydrate is found in many foods. · Learn which foods have carbs. And learn the amounts of carbs in different foods. ¨ Bread, cereal, pasta, and rice have about 15 grams of carbs in a serving. A serving is 1 slice of bread (1 ounce), ½ cup of cooked cereal, or 1/3 cup of cooked pasta or rice. ¨ Fruits have 15 grams of carbs in a serving. A serving is 1 small fresh fruit, such as an apple or orange; ½ of a banana; ½ cup of cooked or canned fruit; ½ cup of fruit juice; 1 cup of melon or raspberries; or 2 tablespoons of dried fruit. ¨ Milk and no-sugar-added yogurt have 15 grams of carbs in a serving. A serving is 1 cup of milk or 2/3 cup of no-sugar-added yogurt. ¨ Starchy vegetables have 15 grams of carbs in a serving. A serving is ½ cup of mashed potatoes or sweet potato; 1 cup winter squash; ½ of a small baked potato; ½ cup of cooked beans; or ½ cup cooked corn or green peas. · Learn how much carbs to eat each day and at each meal. A dietitian or CDE can teach you how to keep track of the amount of carbs you eat. This is called carbohydrate counting. · If you are not sure how to count carbohydrate grams, use the Plate Method to plan meals. It is a good, quick way to make sure that you have a balanced meal. It also helps you spread carbs throughout the day. ¨ Divide your plate by types of foods. Put non-starchy vegetables on half the plate, meat or other protein food on one-quarter of the plate, and a grain or starchy vegetable in the final quarter of the plate. To this you can add a small piece of fruit and 1 cup of milk or yogurt, depending on how many carbs you are supposed to eat at a meal. 
· Try to eat about the same amount of carbs at each meal. Do not \"save up\" your daily allowance of carbs to eat at one meal. 
· Proteins have very little or no carbs per serving. Examples of proteins are beef, chicken, turkey, fish, eggs, tofu, cheese, cottage cheese, and peanut butter. A serving size of meat is 3 ounces, which is about the size of a deck of cards. Examples of meat substitute serving sizes (equal to 1 ounce of meat) are 1/4 cup of cottage cheese, 1 egg, 1 tablespoon of peanut butter, and ½ cup of tofu. How can you eat out and still eat healthy? · Learn to estimate the serving sizes of foods that have carbohydrate. If you measure food at home, it will be easier to estimate the amount in a serving of restaurant food. · If the meal you order has too much carbohydrate (such as potatoes, corn, or baked beans), ask to have a low-carbohydrate food instead. Ask for a salad or green vegetables. · If you use insulin, check your blood sugar before and after eating out to help you plan how much to eat in the future. · If you eat more carbohydrate at a meal than you had planned, take a walk or do other exercise. This will help lower your blood sugar. What else should you know? · Limit saturated fat, such as the fat from meat and dairy products. This is a healthy choice because people who have diabetes are at higher risk of heart disease. So choose lean cuts of meat and nonfat or low-fat dairy products. Use olive or canola oil instead of butter or shortening when cooking. · Don't skip meals. Your blood sugar may drop too low if you skip meals and take insulin or certain medicines for diabetes. · Check with your doctor before you drink alcohol. Alcohol can cause your blood sugar to drop too low. Alcohol can also cause a bad reaction if you take certain diabetes medicines. Follow-up care is a key part of your treatment and safety. Be sure to make and go to all appointments, and call your doctor if you are having problems. It's also a good idea to know your test results and keep a list of the medicines you take. Where can you learn more? Go to http://reta-karthik.info/. Enter D565 in the search box to learn more about \"Learning About Diabetes Food Guidelines. \" Current as of: March 13, 2017 Content Version: 11.4 © 5662-7999 [facility]. Care instructions adapted under license by [employer] (which disclaims liability or warranty for this information). If you have questions about a medical condition or this instruction, always ask your healthcare professional. [address] any warranty or liability for your use of this information. Come back for fasting labs, lab opens 8 am, mon-fri. No appt needed. Introducing hospitals & HEALTH SERVICES! Niki Méndez introduces [employer] patient portal. Now you can access parts of your medical record, email your doctor's office, and request medication refills online. 1. In your internet browser, go to [email or web address] 2. Click on the First Time User? Click Here link in the Sign In box. You will see the New Member Sign Up page. 3. Enter your SpinSnap Access Code exactly as it appears below. You will not need to use this code after youve completed the sign-up process. If you do not sign up before the expiration date, you must request a new code. · SpinSnap Access Code: [email or web address] Expires: 4/25/2018  4:01 PM 
 
4. Enter the last four digits of your Social Security Number (xxxx) and Date of Birth (mm/dd/yyyy) as indicated and click Submit. You will be taken to the next sign-up page. 5. Create a SpinSnap ID. This will be your SpinSnap login ID and cannot be changed, so think of one that is secure and easy to remember. 6. Create a SpinSnap password. You can change your password at any time. 7. Enter your Password Reset Question and Answer. This can be used at a later time if you forget your password. 8. Enter your e-mail address. You will receive e-mail notification when new information is available in [address] 9. Click Sign Up. You can now view and download portions of your medical record. 10. Click the Download Summary menu link to download a portable copy of your medical information. If you have questions, please visit the Frequently Asked Questions section of the SpinSnap website. Remember, SpinSnap is NOT to be used for urgent needs. For medical emergencies, dial 911. Now available from your iPhone and Android! Please provide this summary of care documentation to your next provider. Your primary care clinician is listed as NONE. If you have any questions after today's visit, please call [phone].

## 2018-01-25 NOTE — LETTER
1/29/2018 12:31 PM 
 
Mr. Rebecca Chavez [address] [facility] [address] Dear Rebecca Chavez: 
 
Please find your most recent results below. Resulted Orders CBC WITH AUTOMATED DIFF Result Value Ref Range WBC 5.2 3.4 - 10.8 x10E3/uL  
 RBC 5.20 4. 14 - 5.80 x10E6/uL HGB 16.4 13.0 - 17.7 g/dL HCT 46.3 37.5 - 51.0 % MCV 89 79 - 97 fL  
 MCH 31.5 26.6 - 33.0 pg  
 MCHC 35.4 31.5 - 35.7 g/dL  
 RDW 14.1 12.3 - 15.4 % PLATELET 753 (L) 749 - 379 x10E3/uL NEUTROPHILS 66 Not Estab. % Lymphocytes 22 Not Estab. % MONOCYTES 8 Not Estab. % EOSINOPHILS 3 Not Estab. % BASOPHILS 1 Not Estab. %  
 ABS. NEUTROPHILS 3.5 1.4 - 7.0 x10E3/uL Abs Lymphocytes 1.1 0.7 - 3.1 x10E3/uL  
 ABS. MONOCYTES 0.4 0.1 - 0.9 x10E3/uL  
 ABS. EOSINOPHILS 0.1 0.0 - 0.4 x10E3/uL  
 ABS. BASOPHILS 0.1 0.0 - 0.2 x10E3/uL IMMATURE GRANULOCYTES 0 Not Estab. %  
 ABS. IMM. GRANS. 0.0 0.0 - 0.1 x10E3/uL Narrative Performed at:  [address]  [id number] : Kvng Wing MD, Phone:  [phone] METABOLIC PANEL, COMPREHENSIVE Result Value Ref Range Glucose 321 (H) 65 - 99 mg/dL BUN 19 6 - 24 mg/dL Creatinine 0.75 (L) 0.76 - 1.27 mg/dL GFR est non- >59 mL/min/1.73 GFR est  >59 mL/min/1.73  
 BUN/Creatinine ratio 25 (H) 9 - 20 Sodium 137 134 - 144 mmol/L Potassium 4.5 3.5 - 5.2 mmol/L Chloride 96 96 - 106 mmol/L  
 CO2 28 18 - 29 mmol/L Calcium 9.8 8.7 - 10.2 mg/dL Protein, total 7.1 6.0 - 8.5 g/dL Albumin 4.2 3.5 - 5.5 g/dL GLOBULIN, TOTAL 2.9 1.5 - 4.5 g/dL A-G Ratio 1.4 1.2 - 2.2 Bilirubin, total 0.5 0.0 - 1.2 mg/dL Alk. phosphatase 101 39 - 117 IU/L  
 AST (SGOT) 42 (H) 0 - 40 IU/L  
 ALT (SGPT) 87 (H) 0 - 44 IU/L Narrative Performed at:  [address]  [id number] : Kvng Wing MD, Phone:  [phone] HEMOGLOBIN A1C WITH EAG Result Value Ref Range Hemoglobin A1c 8.8 (H) 4.8 - 5.6 % Comment:  
            Pre-diabetes: 5.7 - 6.4 Diabetes: >6.4 Glycemic control for adults with diabetes: <7.0 Estimated average glucose 206 mg/dL Narrative Performed at:  [address]  [id number] : Sav Anton MD, Phone:  [phone] LIPID PANEL Result Value Ref Range Cholesterol, total 141 100 - 199 mg/dL Triglyceride 79 0 - 149 mg/dL HDL Cholesterol 58 >39 mg/dL VLDL, calculated 16 5 - 40 mg/dL LDL, calculated 67 0 - 99 mg/dL Narrative Performed at:  [address]  [id number] : Sav Anton MD, Phone:  [phone] TSH 3RD GENERATION Result Value Ref Range TSH 0.970 0.450 - 4.500 uIU/mL Narrative Performed at:  [address]  [id number] : Sav Atnon MD, Phone:  [phone] HIV 1/2 AG/AB, 4TH GENERATION,W RFLX CONFIRM Result Value Ref Range HIV SCREEN 4TH GENERATION WRFX Non Reactive Non Reactive Narrative Performed at:  [address]  [id number] : Sav Anton MD, Phone:  [phone] HEPATITIS C AB Result Value Ref Range Hep C Virus Ab >11.0 (H) 0.0 - 0.9 s/co ratio Comment:  
                                     Negative:     < 0.8 Indeterminate: 0.8 - 0.9 Positive:     > 0.9 The CDC recommends that a positive HCV antibody result 
 be followed up with a HCV Nucleic Acid Amplification 
 test (459126). Narrative Performed at:  [address]  [id number] : Sav Anton MD, Phone:  [phone] TESTOSTERONE, FREE & TOTAL Result Value Ref Range Testosterone 537 264 - 916 ng/dL Comment:  
   Adult male reference interval is based on a population of 
healthy nonobese males (BMI <30) between 23and 44years old. [address] [facility], [address] [facility] 7026,257;1065-1878. PMID: [id number]. Free testosterone (Direct) 10.7 7.2 - 24.0 pg/mL Narrative Performed at:  [address]  [id number] : Francy Herrera MD, Phone:  [phone] PSA, DIAGNOSTIC (PROSTATE SPECIFIC AG) Result Value Ref Range Prostate Specific Ag 0.8 0.0 - 4.0 ng/mL Comment:  
   Roche ECLIA methodology. According to the American Urological Association, Serum PSA should 
decrease and remain at undetectable levels after radical 
prostatectomy. The AUA defines biochemical recurrence as an initial 
PSA value 0.2 ng/mL or greater followed by a subsequent confirmatory PSA value 0.2 ng/mL or greater. Values obtained with different assay methods or kits cannot be used 
interchangeably. Results cannot be interpreted as absolute evidence 
of the presence or absence of malignant disease. Narrative Performed at:  [address]  [id number] : Francy Herrera MD, Phone:  [phone] MICROALBUMIN, UR, RAND W/ MICROALBUMIN/CREA RATIO Result Value Ref Range Creatinine, urine 51.6 Not Estab. mg/dL Microalbumin, urine 3.7 Not Estab. ug/mL Microalb/Creat ratio (ug/mg creat.) 7.2 0.0 - 30.0 mg/g creat Narrative Performed at:  [address]  [id number] : Francy Herrera MD, Phone:  [phone] RECOMMENDATIONS: 
Your diabetes is poorly controlled.  A prescription has been sent in to start glucophage as wells as isinopril to help with your blood pressure and protect kidneys.  Hep c virus antibody was positive, next blood drawn with check for viral load, to make sure that it has been cured.  Testosterone, prostate testing and other labs look good.  I suspect your erectile dysfunction is from your poorly controlled diabetes and hypertension, so please make sure to start and take the glucophage and lisnopril every day.  We tried to reach you by phone and both of your contact numbers are disconnected. Please call our office with updated contact numbers. Please call me if you have any questions: [phone] Sincerely, Shannon Kang III, DO

## 2018-01-26 ENCOUNTER — HOSPITAL ENCOUNTER (OUTPATIENT)
Dept: LAB | Age: 53
Discharge: HOME | End: 2018-01-26
Payer: MEDICARE

## 2018-01-26 ENCOUNTER — APPOINTMENT (OUTPATIENT)
Dept: INTERNAL MEDICINE CLINIC | Age: 53
End: 2018-01-26

## 2018-01-26 PROCEDURE — 84443 ASSAY THYROID STIM HORMONE: CPT

## 2018-01-26 PROCEDURE — 87389 HIV-1 AG W/HIV-1&-2 AB AG IA: CPT

## 2018-01-26 PROCEDURE — 82043 UR ALBUMIN QUANTITATIVE: CPT

## 2018-01-26 PROCEDURE — 85025 COMPLETE CBC W/AUTO DIFF WBC: CPT

## 2018-01-26 PROCEDURE — 36415 COLL VENOUS BLD VENIPUNCTURE: CPT

## 2018-01-26 PROCEDURE — 80061 LIPID PANEL: CPT

## 2018-01-26 PROCEDURE — 86803 HEPATITIS C AB TEST: CPT

## 2018-01-26 PROCEDURE — 83036 HEMOGLOBIN GLYCOSYLATED A1C: CPT

## 2018-01-26 PROCEDURE — 84153 ASSAY OF PSA TOTAL: CPT

## 2018-01-26 PROCEDURE — 84403 ASSAY OF TOTAL TESTOSTERONE: CPT

## 2018-01-26 PROCEDURE — 80053 COMPREHEN METABOLIC PANEL: CPT

## 2018-01-28 DIAGNOSIS — E11.40 TYPE 2 DIABETES MELLITUS WITH DIABETIC NEUROPATHY, WITHOUT LONG-TERM CURRENT USE OF INSULIN (HCC): Primary | Chronic | ICD-10-CM

## 2018-01-28 LAB
ALBUMIN SERPL-MCNC: 4.2 G/DL (ref 3.5–5.5)
ALBUMIN/CREAT UR: 7.2 MG/G CREAT (ref 0–30)
ALBUMIN/GLOB SERPL: 1.4 {RATIO} (ref 1.2–2.2)
ALP SERPL-CCNC: 101 IU/L (ref 39–117)
ALT SERPL-CCNC: 87 IU/L (ref 0–44)
AST SERPL-CCNC: 42 IU/L (ref 0–40)
BASOPHILS # BLD AUTO: 0.1 X10E3/UL (ref 0–0.2)
BASOPHILS NFR BLD AUTO: 1 %
BILIRUB SERPL-MCNC: 0.5 MG/DL (ref 0–1.2)
BUN SERPL-MCNC: 19 MG/DL (ref 6–24)
BUN/CREAT SERPL: 25 (ref 9–20)
CALCIUM SERPL-MCNC: 9.8 MG/DL (ref 8.7–10.2)
CHLORIDE SERPL-SCNC: 96 MMOL/L (ref 96–106)
CHOLEST SERPL-MCNC: 141 MG/DL (ref 100–199)
CO2 SERPL-SCNC: 28 MMOL/L (ref 18–29)
CREAT SERPL-MCNC: 0.75 MG/DL (ref 0.76–1.27)
CREAT UR-MCNC: 51.6 MG/DL
EOSINOPHIL # BLD AUTO: 0.1 X10E3/UL (ref 0–0.4)
EOSINOPHIL NFR BLD AUTO: 3 %
ERYTHROCYTE [DISTWIDTH] IN BLOOD BY AUTOMATED COUNT: 14.1 % (ref 12.3–15.4)
EST. AVERAGE GLUCOSE BLD GHB EST-MCNC: 206 MG/DL
GFR SERPLBLD CREATININE-BSD FMLA CKD-EPI: 105 ML/MIN/1.73
GFR SERPLBLD CREATININE-BSD FMLA CKD-EPI: 121 ML/MIN/1.73
GLOBULIN SER CALC-MCNC: 2.9 G/DL (ref 1.5–4.5)
GLUCOSE SERPL-MCNC: 321 MG/DL (ref 65–99)
HBA1C MFR BLD: 8.8 % (ref 4.8–5.6)
HCT VFR BLD AUTO: 46.3 % (ref 37.5–51)
HCV AB S/CO SERPL IA: >11 S/CO RATIO (ref 0–0.9)
HDLC SERPL-MCNC: 58 MG/DL
HGB BLD-MCNC: 16.4 G/DL (ref 13–17.7)
HIV 1+2 AB+HIV1 P24 AG SERPL QL IA: NON REACTIVE
IMM GRANULOCYTES # BLD: 0 X10E3/UL (ref 0–0.1)
IMM GRANULOCYTES NFR BLD: 0 %
LDLC SERPL CALC-MCNC: 67 MG/DL (ref 0–99)
LYMPHOCYTES # BLD AUTO: 1.1 X10E3/UL (ref 0.7–3.1)
LYMPHOCYTES NFR BLD AUTO: 22 %
MCH RBC QN AUTO: 31.5 PG (ref 26.6–33)
MCHC RBC AUTO-ENTMCNC: 35.4 G/DL (ref 31.5–35.7)
MCV RBC AUTO: 89 FL (ref 79–97)
MICROALBUMIN UR-MCNC: 3.7 UG/ML
MONOCYTES # BLD AUTO: 0.4 X10E3/UL (ref 0.1–0.9)
MONOCYTES NFR BLD AUTO: 8 %
NEUTROPHILS # BLD AUTO: 3.5 X10E3/UL (ref 1.4–7)
NEUTROPHILS NFR BLD AUTO: 66 %
PLATELET # BLD AUTO: 141 X10E3/UL (ref 150–379)
POTASSIUM SERPL-SCNC: 4.5 MMOL/L (ref 3.5–5.2)
PROT SERPL-MCNC: 7.1 G/DL (ref 6–8.5)
PSA SERPL-MCNC: 0.8 NG/ML (ref 0–4)
RBC # BLD AUTO: 5.2 X10E6/UL (ref 4.14–5.8)
SODIUM SERPL-SCNC: 137 MMOL/L (ref 134–144)
TESTOST FREE SERPL-MCNC: 10.7 PG/ML (ref 7.2–24)
TESTOST SERPL-MCNC: 537 NG/DL (ref 264–916)
TRIGL SERPL-MCNC: 79 MG/DL (ref 0–149)
TSH SERPL DL<=0.005 MIU/L-ACNC: 0.97 UIU/ML (ref 0.45–4.5)
VLDLC SERPL CALC-MCNC: 16 MG/DL (ref 5–40)
WBC # BLD AUTO: 5.2 X10E3/UL (ref 3.4–10.8)

## 2018-01-28 RX ORDER — LISINOPRIL 5 MG/1
5 TABLET ORAL DAILY
Qty: 30 TAB | Refills: 9 | Status: SHIPPED | OUTPATIENT
Start: 2018-01-28 | End: 2018-12-18

## 2018-01-28 RX ORDER — METFORMIN HYDROCHLORIDE 500 MG/1
500 TABLET, EXTENDED RELEASE ORAL 2 TIMES DAILY
Qty: 60 TAB | Refills: 6 | Status: SHIPPED | OUTPATIENT
Start: 2018-01-28 | End: 2018-03-26 | Stop reason: SDUPTHER

## 2018-01-28 RX ORDER — PREGABALIN 75 MG/1
75 CAPSULE ORAL 2 TIMES DAILY
Qty: 60 CAP | Refills: 6 | Status: SHIPPED | OUTPATIENT
Start: 2018-01-28 | End: 2018-12-18

## 2018-01-28 NOTE — PROGRESS NOTES
Diabetes poorly controlled. rx sent in to start glucophage. Lisinopril sent in to help with bp and protect kidneys. Hep c virus antibody was positive, next blood drawn with check for viral load, to make sure that it has been cured. Testosterone, psa and other labs look good. Suspect his ED is from his poorly controlled diabetes and htn, so would make sure to start and take the glucophage and lisnopril every day.

## 2018-01-29 NOTE — PROGRESS NOTES
I have attempted to contact this patient by phone with the following results:  Diabetes poorly controlled.  rx sent in to start glucophage. Lisinopril sent in to help with bp and protect kidneys. Hep c virus antibody was positive, next blood drawn with check for viral load, to make sure that it has been cured. Testosterone, psa and other labs look good.  Suspect his ED is from his poorly controlled diabetes and htn, so would make sure to start and take the glucophage and lisnopril every day. Both contact numbers for patient are disconnected. Will mail results. Results mailed to patient's home.

## 2018-01-30 ENCOUNTER — OFFICE VISIT (OUTPATIENT)
Dept: INTERNAL MEDICINE CLINIC | Age: 53
End: 2018-01-30

## 2018-01-30 VITALS
BODY MASS INDEX: 22.8 KG/M2 | TEMPERATURE: 97.7 F | WEIGHT: 172 LBS | SYSTOLIC BLOOD PRESSURE: 161 MMHG | OXYGEN SATURATION: 100 % | HEART RATE: 67 BPM | HEIGHT: 73 IN | DIASTOLIC BLOOD PRESSURE: 87 MMHG | RESPIRATION RATE: 16 BRPM

## 2018-01-30 DIAGNOSIS — M48.061 SPINAL STENOSIS OF LUMBAR REGION, UNSPECIFIED WHETHER NEUROGENIC CLAUDICATION PRESENT: Primary | ICD-10-CM

## 2018-01-30 DIAGNOSIS — I10 ESSENTIAL HYPERTENSION: Chronic | ICD-10-CM

## 2018-01-30 DIAGNOSIS — E11.40 TYPE 2 DIABETES MELLITUS WITH DIABETIC NEUROPATHY, WITHOUT LONG-TERM CURRENT USE OF INSULIN (HCC): Chronic | ICD-10-CM

## 2018-01-30 DIAGNOSIS — Z72.0 TOBACCO ABUSE: Chronic | ICD-10-CM

## 2018-01-30 DIAGNOSIS — F31.62 BIPOLAR 1 DISORDER, MIXED, MODERATE (HCC): ICD-10-CM

## 2018-01-30 LAB — GLUCOSE DOSE-GTT, POCT, GLDSPOCT: 232

## 2018-01-30 RX ORDER — LANCETS
EACH MISCELLANEOUS
Qty: 50 EACH | Refills: 11 | Status: ON HOLD | OUTPATIENT
Start: 2018-01-30 | End: 2022-05-13

## 2018-01-30 RX ORDER — TRAMADOL HYDROCHLORIDE 50 MG/1
50 TABLET ORAL
Qty: 30 TAB | Refills: 1 | Status: SHIPPED | OUTPATIENT
Start: 2018-01-30 | End: 2018-04-20

## 2018-01-30 RX ORDER — INSULIN PUMP SYRINGE, 3 ML
EACH MISCELLANEOUS
Qty: 1 KIT | Refills: 0 | Status: ON HOLD | OUTPATIENT
Start: 2018-01-30 | End: 2022-05-13

## 2018-01-30 RX ORDER — CLONIDINE HYDROCHLORIDE 0.1 MG/1
0.1 TABLET ORAL 2 TIMES DAILY
Qty: 60 TAB | Refills: 6 | Status: SHIPPED | OUTPATIENT
Start: 2018-01-30 | End: 2018-03-26 | Stop reason: SDUPTHER

## 2018-01-30 NOTE — MR AVS SNAPSHOT
[address] 
[phone] Patient: Norberto Riley MRN: [id number] :1965 Visit Information Date & Time Provider Department Dept. Phone Encounter #  
 2018  2:15 PM Roseanna Mckenzie, [address] [place] [address],[address] [phone] [id number] Follow-up Instructions Return in about 3 months (around 2018). Your Appointments 3/26/2018  8:15 AM  
ROUTINE CARE with Coleman Joaquin III, DO [facility] Appt Note: 2 month follow up  
 [facility] [address] [address]  
[address] [place] [address] [address] Upcoming Health Maintenance Date Due  
 FOOT EXAM Q1 1974 EYE EXAM RETINAL OR DILATED Q1 1974 DTaP/Tdap/Td series (1 - Tdap) 1985 HEMOGLOBIN A1C Q6M 2018 MICROALBUMIN Q1 2019 LIPID PANEL Q1 2019 COLONOSCOPY 2026 Allergies as of 2018  Review Complete On: 2018 By: Coleman Joaquin III, DO Severity Noted Reaction Type Reactions Versed [Midazolam] High 10/26/2011    Shortness of Breath Weakness Versed [Midazolam]  10/01/2011   Intolerance Unknown (comments) Numbness, with shortness of breath Current Immunizations  Reviewed on 2018 Name Date Hep B, Adol/Ped 3/12/2013  5:30 PM  
 Influenza Vaccine (Quad) PF 2018 Pneumococcal Conjugate (PCV-13)  Deferred (Patient Refused) Pneumococcal Polysaccharide (PPSV-23) 2018 Not reviewed this visit You Were Diagnosed With   
  
 Codes Comments Spinal stenosis of lumbar region, unspecified whether neurogenic claudication present    -  Primary ICD-10-CM: M48.061 
ICD-9-CM: 724.02 Type 2 diabetes mellitus with diabetic neuropathy, without long-term current use of insulin (HCC)     ICD-10-CM: E11.40 ICD-9-CM: 250.60, 357.2 Bipolar 1 disorder, mixed, moderate (HCC)     ICD-10-CM: F31.62 
ICD-9-CM: 296.62 Essential hypertension     ICD-10-CM: I10 
ICD-9-CM: 401.9 Tobacco abuse     ICD-10-CM: Z72.0 ICD-9-CM: 305.1 Vitals BP Pulse Temp Resp Height(growth percentile) Weight(growth percentile) 161/87 (BP 1 Location: Right arm, BP Patient Position: Sitting) 67 97.7 °F (36.5 °C) (Oral) 16 6' 1\" (1.854 m) 172 lb (78 kg) SpO2 BMI Smoking Status 100% 22.69 kg/m2 Current Every Day Smoker Vitals History BMI and BSA Data Body Mass Index Body Surface Area  
 22.69 kg/m 2 2 m 2 Preferred Pharmacy Pharmacy Name Phone [facility] [address] [place], [address] [phone] Your Updated Medication List  
  
   
This list is accurate as of: 1/30/18  3:19 PM.  Always use your most recent med list.  
  
  
  
  
 ARIPiprazole 10 mg tablet Commonly known as:  ABILIFY Take 1 Tab by mouth daily (after breakfast). Indications: MIXED BIPOLAR I DISORDER  
  
 aspirin delayed-release 81 mg tablet Take 1 Tab by mouth daily. Blood-Glucose Meter monitoring kit Check blood sugars daily. DX: E11.9  
  
 cloNIDine HCl 0.1 mg tablet Commonly known as:  CATAPRES Take 1 Tab by mouth two (2) times a day. doxepin 25 mg capsule Commonly known as:  SINEquan Take 1 Cap by mouth nightly. glucose blood VI test strips strip Commonly known as:  ASCENSIA AUTODISC VI, ONE TOUCH ULTRA TEST VI Check blood sugars daily. DX: E11.9 Lancets Misc Commonly known as:  LANCETS, Shayan 49 Check blood sugars daily. DX: E11.9  
  
 lisinopril 5 mg tablet Commonly known as:  Cleotis Campoverde Take 1 Tab by mouth daily. metFORMIN  mg tablet Commonly known as:  GLUCOPHAGE XR Take 1 Tab by mouth two (2) times a day. pregabalin 75 mg capsule Commonly known as:  Zaid Rubalcava Take 1 Cap by mouth two (2) times a day. Max Daily Amount: 150 mg. Indications: Diabetic Peripheral Neuropathy  
  
 traMADol 50 mg tablet Commonly known as:  ULTRAM  
Take 1 Tab by mouth every six (6) hours as needed for Pain. Max Daily Amount: 200 mg. Prescriptions Printed Refills  
 traMADol (ULTRAM) 50 mg tablet 1 Sig: Take 1 Tab by mouth every six (6) hours as needed for Pain. Max Daily Amount: 200 mg. Class: Print Route: Oral  
  
Prescriptions Sent to Pharmacy Refills Blood-Glucose Meter monitoring kit 0 Sig: Check blood sugars daily. DX: E11.9 Class: Normal  
 Pharmacy: [facility] [place] [address] Ph #: [phone]  
 glucose blood VI test strips (ASCENSIA AUTODISC VI, ONE TOUCH ULTRA TEST VI) strip 11 Sig: Check blood sugars daily. DX: E11.9 Class: Normal  
 Pharmacy: [address] [place] [address] Ph #: [phone] Lancets (LANCETS, SUPER THIN) misc 11 Sig: Check blood sugars daily. DX: E11.9 Class: Normal  
 Pharmacy: [address] [place] [address] Ph #: [phone]  
 cloNIDine HCl (CATAPRES) 0.1 mg tablet 6 Sig: Take 1 Tab by mouth two (2) times a day. Class: Normal  
 Pharmacy: [address] [place] [address] Ph #: [phone] Route: Oral  
  
We Performed the Following AMB POC GLUCOSE TEST [56390 CPT(R)] REFERRAL TO NEUROSURGERY [TFU03 Custom] Comments:  
 Please evaluate patient for lumbar spinal stenosis. Follow-up Instructions Return in about 3 months (around 4/30/2018). Referral Information Referral ID Referred By Referred To [id number] Hannah Hodge MD   
   [address] [place], [address] Phone: [phone] Fax: [phone] Visits Status Start Date End Date 1 New Request 1/30/18 1/30/19 If your referral has a status of pending review or denied, additional information will be sent to support the outcome of this decision. Patient Instructions Learning About Benefits From Quitting Smoking How does quitting smoking make you healthier? If you're thinking about quitting smoking, you may have a few reasons to be smoke-free. Your health may be one of them. · When you quit smoking, you lower your risks for cancer, lung disease, heart attack, stroke, blood vessel disease, and blindness from macular degeneration. · When you're smoke-free, you get sick less often, and you heal faster. You are less likely to get colds, flu, bronchitis, and pneumonia. · As a nonsmoker, you may find that your mood is better and you are less stressed. When and how will you feel healthier? Quitting has real health benefits that start from day 1 of being smoke-free. And the longer you stay smoke-free, the healthier you get and the better you feel. The first hours · After just 20 minutes, your blood pressure and heart rate go down. That means there's less stress on your heart and blood vessels. · Within 12 hours, the level of carbon monoxide in your blood drops back to normal. That makes room for more oxygen. With more oxygen in your body, you may notice that you have more energy than when you smoked. After 2 weeks · Your lungs start to work better. · Your risk of heart attack starts to drop. After 1 month · When your lungs are clear, you cough less and breathe deeper, so it's easier to be active. · Your sense of taste and smell return. That means you can enjoy food more than you have since you started smoking. Over the years · After 1 year, your risk of heart disease is half what it would be if you kept smoking. · After 5 years, your risk of stroke starts to shrink. Within a few years after that, it's about the same as if you'd never smoked. · After 10 years, your risk of dying from lung cancer is cut by about half. And your risk for many other types of cancer is lower too. How would quitting help others in your life? When you quit smoking, you improve the health of everyone who now breathes in your smoke. · Their heart, lung, and cancer risks drop, much like yours. · They are sick less. For babies and small children, living smoke-free means they're less likely to have ear infections, pneumonia, and bronchitis. · If you're a woman who is or will be pregnant someday, quitting smoking means a healthier . · Children who are close to you are less likely to become adult smokers. Where can you learn more? Go to http://retaLife is Techkarthik.info/. Enter [id number] [id number] 11 in the search box to learn more about \"Learning About Benefits From Quitting Smoking. \" Current as of: 2017 Content Version: 11.4 © 8755-2546 [employer]. Care instructions adapted under license by [employer] (which disclaims liability or warranty for this information). If you have questions about a medical condition or this instruction, always ask your healthcare professional. [place] [address] any warranty or liability for your use of this information. Lumbar Spinal Stenosis: Care Instructions Your Care Instructions Stenosis in the spine is a narrowing of the canal that is around the spinal cord and nerve roots in your back. It can happen as part of aging. Sometimes bone and other tissue grow into this canal and press on the nerves that branch out from the spinal cord. This can cause pain, numbness, and weakness. When it happens in the lower part of your back, it is called lumbar spinal stenosis. It can cause problems in the legs, feet, and rear end (buttocks). You may be able to get relief from the symptoms of spinal stenosis by taking pain medicine.  Your doctor may suggest physical therapy and exercises to keep your spine strong and flexible. Some people try steroid shots to reduce swelling. If pain and numbness in your legs are still so bad that you cannot do your normal activities, you may need surgery. Follow-up care is a key part of your treatment and safety. Be sure to make and go to all appointments, and call your doctor if you are having problems. It's also a good idea to know your test results and keep a list of the medicines you take. How can you care for yourself at home? · Take an over-the-counter pain medicine, such as acetaminophen (Tylenol), ibuprofen (Advil, Motrin), or naproxen (Aleve). Be safe with medicines. Read and follow all instructions on the label. · Do not take two or more pain medicines at the same time unless the doctor told you to. Many pain medicines have acetaminophen, which is Tylenol. Too much acetaminophen (Tylenol) can be harmful. · Stay at a healthy weight. Being overweight puts extra strain on your spine. · Change positions often when you sit or stand. This can ease pain. It may also reduce pressure on the spinal cord and its nerves. · Avoid doing things that make your symptoms worse. Walking downhill and standing for a long time may cause pain. · Stretch and strengthen your back muscles as your doctor or physical therapist recommends. If your doctor says it is okay to do them, these exercises may help. ¨ Lie on your back with your knees bent. Gently pull one bent knee to your chest. Put that foot back on the floor, and then pull the other knee to your chest. 
¨ Do pelvic tilts. Lie on your back with your knees bent. Tighten your stomach muscles. Pull your belly button (navel) in and up toward your ribs. You should feel like your back is pressing to the floor and your hips and pelvis are slightly lifting off the floor. Hold for 6 seconds while breathing smoothly. ¨ Stand with your back flat against a wall.  Slowly slide down until your knees are slightly bent. Hold for 10 seconds, then slide back up the wall. · Remove or change anything in your house that may cause you to fall. Keep walkways clear of clutter, electrical cords, and throw rugs. When should you call for help? Call 911 anytime you think you may need emergency care. For example, call if: 
? · You are unable to move a leg at all. ?Call your doctor now or seek immediate medical care if: 
? · You have new or worse symptoms in your legs, belly, or buttocks. Symptoms may include: ¨ Numbness or tingling. ¨ Weakness. ¨ Pain. ? · You lose bladder or bowel control. ? Watch closely for changes in your health, and be sure to contact your doctor if you are not getting better as expected. Where can you learn more? Go to http://retaCreditCards.comkarthik.info/. Dhaval Martinez in the search box to learn more about \"Lumbar Spinal Stenosis: Care Instructions. \" Current as of: March 21, 2017 Content Version: 11.4 © 4822-3234 [employer] (formerly [employer]). Care instructions adapted under license by [employer]! (which disclaims liability or warranty for this information). If you have questions about a medical condition or this instruction, always ask your healthcare professional. [address] any warranty or liability for your use of this information. Make sure that once you start taking clonidine that you don't miss any doses, as it can make your bp worse. Introducing [facility] [place] [facility]! [facility] introduces Swype patient portal. Now you can access parts of your medical record, email your doctor's office, and request medication refills online. 1. In your internet browser, go to [email or web address] 2. Click on the First Time User? Click Here link in the Sign In box. You will see the New Member Sign Up page. 3. Enter your Swype Access Code exactly as it appears below.  You will not need to use this code after youve completed the sign-up process. If you do not sign up before the expiration date, you must request a new code. · [employer] Access Code: [email or web address] Expires: 4/25/2018  4:01 PM 
 
4. Enter the last four digits of your Social Security Number (xxxx) and Date of Birth (mm/dd/yyyy) as indicated and click Submit. You will be taken to the next sign-up page. 5. Create a [employer] ID. This will be your [employer] login ID and cannot be changed, so think of one that is secure and easy to remember. 6. Create a [employer] password. You can change your password at any time. 7. Enter your Password Reset Question and Answer. This can be used at a later time if you forget your password. 8. Enter your e-mail address. You will receive e-mail notification when new information is available in [address] 9. Click Sign Up. You can now view and download portions of your medical record. 10. Click the Download Summary menu link to download a portable copy of your medical information. If you have questions, please visit the Frequently Asked Questions section of the [employer] website. Remember, [employer] is NOT to be used for urgent needs. For medical emergencies, dial 911. Now available from your iPhone and Android! Please provide this summary of care documentation to your next provider. Your primary care clinician is listed as Wellington Patel If you have any questions after today's visit, please call [phone].

## 2018-01-30 NOTE — PATIENT INSTRUCTIONS
Learning About Benefits From Quitting Smoking  How does quitting smoking make you healthier? If you're thinking about quitting smoking, you may have a few reasons to be smoke-free. Your health may be one of them. · When you quit smoking, you lower your risks for cancer, lung disease, heart attack, stroke, blood vessel disease, and blindness from macular degeneration. · When you're smoke-free, you get sick less often, and you heal faster. You are less likely to get colds, flu, bronchitis, and pneumonia. · As a nonsmoker, you may find that your mood is better and you are less stressed. When and how will you feel healthier? Quitting has real health benefits that start from day 1 of being smoke-free. And the longer you stay smoke-free, the healthier you get and the better you feel. The first hours  · After just 20 minutes, your blood pressure and heart rate go down. That means there's less stress on your heart and blood vessels. · Within 12 hours, the level of carbon monoxide in your blood drops back to normal. That makes room for more oxygen. With more oxygen in your body, you may notice that you have more energy than when you smoked. After 2 weeks  · Your lungs start to work better. · Your risk of heart attack starts to drop. After 1 month  · When your lungs are clear, you cough less and breathe deeper, so it's easier to be active. · Your sense of taste and smell return. That means you can enjoy food more than you have since you started smoking. Over the years  · After 1 year, your risk of heart disease is half what it would be if you kept smoking. · After 5 years, your risk of stroke starts to shrink. Within a few years after that, it's about the same as if you'd never smoked. · After 10 years, your risk of dying from lung cancer is cut by about half. And your risk for many other types of cancer is lower too. How would quitting help others in your life?   When you quit smoking, you improve the health of everyone who now breathes in your smoke. · Their heart, lung, and cancer risks drop, much like yours. · They are sick less. For babies and small children, living smoke-free means they're less likely to have ear infections, pneumonia, and bronchitis. · If you're a woman who is or will be pregnant someday, quitting smoking means a healthier . · Children who are close to you are less likely to become adult smokers. Where can you learn more? Go to http://reta-karthik.info/. Enter [id number] [id number] [id number] in the search box to learn more about \"Learning About Benefits From Quitting Smoking. \"  Current as of: 2017  Content Version: 11.4  © 9821-7759 [employer]. Care instructions adapted under license by [employer] (which disclaims liability or warranty for this information). If you have questions about a medical condition or this instruction, always ask your healthcare professional. [place] [address] any warranty or liability for your use of this information. Lumbar Spinal Stenosis: Care Instructions  Your Care Instructions    Stenosis in the spine is a narrowing of the canal that is around the spinal cord and nerve roots in your back. It can happen as part of aging. Sometimes bone and other tissue grow into this canal and press on the nerves that branch out from the spinal cord. This can cause pain, numbness, and weakness. When it happens in the lower part of your back, it is called lumbar spinal stenosis. It can cause problems in the legs, feet, and rear end (buttocks). You may be able to get relief from the symptoms of spinal stenosis by taking pain medicine. Your doctor may suggest physical therapy and exercises to keep your spine strong and flexible. Some people try steroid shots to reduce swelling. If pain and numbness in your legs are still so bad that you cannot do your normal activities, you may need surgery.   Follow-up care is a key part of your treatment and safety. Be sure to make and go to all appointments, and call your doctor if you are having problems. It's also a good idea to know your test results and keep a list of the medicines you take. How can you care for yourself at home? · Take an over-the-counter pain medicine, such as acetaminophen (Tylenol), ibuprofen (Advil, Motrin), or naproxen (Aleve). Be safe with medicines. Read and follow all instructions on the label. · Do not take two or more pain medicines at the same time unless the doctor told you to. Many pain medicines have acetaminophen, which is Tylenol. Too much acetaminophen (Tylenol) can be harmful. · Stay at a healthy weight. Being overweight puts extra strain on your spine. · Change positions often when you sit or stand. This can ease pain. It may also reduce pressure on the spinal cord and its nerves. · Avoid doing things that make your symptoms worse. Walking downhill and standing for a long time may cause pain. · Stretch and strengthen your back muscles as your doctor or physical therapist recommends. If your doctor says it is okay to do them, these exercises may help. ¨ Lie on your back with your knees bent. Gently pull one bent knee to your chest. Put that foot back on the floor, and then pull the other knee to your chest.  ¨ Do pelvic tilts. Lie on your back with your knees bent. Tighten your stomach muscles. Pull your belly button (navel) in and up toward your ribs. You should feel like your back is pressing to the floor and your hips and pelvis are slightly lifting off the floor. Hold for 6 seconds while breathing smoothly. ¨ Stand with your back flat against a wall. Slowly slide down until your knees are slightly bent. Hold for 10 seconds, then slide back up the wall. · Remove or change anything in your house that may cause you to fall. Keep walkways clear of clutter, electrical cords, and throw rugs. When should you call for help?   Call 911 anytime you think you may need emergency care. For example, call if:  ? · You are unable to move a leg at all. ?Call your doctor now or seek immediate medical care if:  ? · You have new or worse symptoms in your legs, belly, or buttocks. Symptoms may include:  ¨ Numbness or tingling. ¨ Weakness. ¨ Pain. ? · You lose bladder or bowel control. ? Watch closely for changes in your health, and be sure to contact your doctor if you are not getting better as expected. Where can you learn more? Go to http://reta-karthik.info/. Francy Huerta in the search box to learn more about \"Lumbar Spinal Stenosis: Care Instructions. \"  Current as of: March 21, 2017  Content Version: 11.4  © 8034-6169 [employer]. Care instructions adapted under license by [employer] (which disclaims liability or warranty for this information). If you have questions about a medical condition or this instruction, always ask your healthcare professional. [address] any warranty or liability for your use of this information. Make sure that once you start taking clonidine that you don't miss any doses, as it can make your bp worse.

## 2018-01-30 NOTE — PROGRESS NOTES
Francy White is a 47 y.o. male who presents for evaluation of low back pain, re-aggravated it Sunday. Has chronic low back pain. Denies any new trauma. No bowel or bladder issues. Used to see dr Juani Lawson and dr Guevara Carreon, most recently last year. States has had injections done that did not help much. Last seen by me jan 25, 2018 in new pt visit. Girlfriend with him again today.       ROS:  Constitutional: negative for fevers, chills, anorexia and weight loss  Eyes:   negative for visual disturbance and irritation  ENT:   negative for tinnitus,sore throat,nasal congestion,ear pain,hoarseness  Respiratory:  negative for cough, hemoptysis, dyspnea,wheezing  CV:   negative for chest pain, palpitations, lower extremity edema  GI:   negative for nausea, vomiting, diarrhea, abdominal pain,melena  Genitourinary: negative for frequency, dysuria and hematuria  Musculoskel: negative for myalgias, arthralgias, back pain, muscle weakness, joint pain.  ++low back pain  Neurological:  negative for headaches, dizziness, focal weakness, numbness  Psychiatric:     ++ for depression or anxiety      Past Medical History:   Diagnosis Date    Adverse effect of anesthesia     breathing diff. with versed    Bipolar 1 disorder, mixed, moderate ([address] 3/6/2017    Chronic pain     Depression     Pt stated diagnosed years ago    Depression 3/13/2013    Diabetes ([address]     Diabetes ([address] 2003    Drug-induced mood disorder 5/28/2013    Homicide attempt     HTN (hypertension) 11/3/2011    Narcotic dependence, episodic use ([address] 11/3/2011    NIDDM (non-insulin dependent diabetes mellitus) 11/3/2011    Non compliance with medical treatment 11/3/2011    Other ill-defined conditions(799.89)     chronic low back pain    Psychiatric disorder     bipolar    Sleep disorder     Substance abuse     Suicidal thoughts        Past Surgical History:   Procedure Laterality Date    CARDIAC SURG PROCEDURE UNLIST      cardiac stents X2 lad drug eluting    COLONOSCOPY N/A 8/31/2016    COLONOSCOPY performed by Mick Ortiz [facility] at [facility] ENDOSCOPY    COLONOSCOPY,DIAGNOSTIC  8/31/2016         HX ORTHOPAEDIC      chronic back pain    HX ORTHOPAEDIC      left knee arthroplasty    LA ANESTH,LUMBAR SPINE,CORD SURGERY  7 1999    l4 l5    REMV KIDNEY,COMPLICATED  7175    side unknown - kidney stones    UPPER GI ENDOSCOPY,BIOPSY  8/31/2016            Family History   Problem Relation Age of Onset    Stroke Mother     Hypertension Mother     Heart Disease Father     Hypertension Father     Cancer Maternal Grandmother      unknown type    Diabetes Maternal Grandfather        Social History     Social History    Marital status:      Spouse name: N/A    Number of children: N/A    Years of education: N/A     Occupational History    Not on file. Social History Main Topics    Smoking status: Current Every Day Smoker     Packs/day: 1.00    Smokeless tobacco: Never Used    Alcohol use No      Comment: sociially in the past    Drug use: No      Comment: recent use , past opiate use    Sexual activity: Yes     Partners: Female     Birth control/ protection: None      Comment:  but seperated now     Other Topics Concern    Not on file     Social History Narrative    ** Merged History Encounter **     states he has 2 yrs of college. On disability for chronic pain. , 1 children.  Was in relationship but break    Moved in with Parent    No legal problem            Visit Vitals    /87 (BP 1 Location: Right arm, BP Patient Position: Sitting)    Pulse 67    Temp 97.7 °F (36.5 °C) (Oral)    Resp 16    Ht 6' 1\" (1.854 m)    Wt 172 lb (78 kg)    SpO2 100%    BMI 22.69 kg/m2       Physical Examination:   General - looks uncomfortable male  HEENT - PERRL, TM no erythema/opacification, normal nasal turbinates, no oropharyngeal erythema or exudate, MMM  Neck - supple, no bruits, no thyroidomegaly, no lymphadenopathy  Pulm - clear to auscultation bilaterally  Cardio - RRR, normal S1 S2, no murmur  Abd - soft, nontender, no masses, no HSM  Extrem - no edema, +2 distal pulses. ++straight leg raising on left  Neuro-  No focal deficits, CN intact     Assessment/Plan:    1. Low back pain with sciatica--rx for ultram.  Mri lumbar spine done June 2017 showed some spinal stenosis. Will refer him to neurosx, dr Suzanne Carmen  2. htn--continue lisinopril. Clonidine added  3. Bipolar--add clonidine. He had stopped all of his other meds few months ago  4. Cad with stents--on asa  5. Uncontrolled dm, type 2--on glucophage now, last a1c 8.8  6.   Tobacco abuse  7.  hcv ++ ab--need to check viral load with next labs, as he states he was treated and cured while he lived in [place] [address] MD    rtc 3 months        Sonja Endo III, DO

## 2018-01-30 NOTE — PROGRESS NOTES
Reviewed record in preparation for visit and have obtained necessary documentation. Identified pt with two pt identifiers(name and ). Chief Complaint   Patient presents with    Back Pain    Leg Pain     bilateral, but mainly left side    Extremity Weakness     bilateral legs       Health Maintenance Due   Topic Date Due    FOOT EXAM Q1  1974    EYE EXAM RETINAL OR DILATED Q1  1974    DTaP/Tdap/Td series (1 - Tdap) 1985       Mr. Burak Funes has a reminder for a \"due or due soon\" health maintenance. I have asked that he discuss health maintenance topic(s) due with His  primary care provider. Coordination of Care Questionnaire:  :     1) Have you been to an emergency room, urgent care clinic since your last visit? no   Hospitalized since your last visit? no             2) Have you seen or consulted any other health care providers outside of [address] [place] [address] since your last visit? no  (Include any pap smears or colon screenings in this section.)    3) Do you have an Advance Directive on file? no    4) Are you interested in receiving information on Advance Directives? NO    Patient is accompanied by self I have received verbal consent from Syeda Reyes to discuss any/all medical information while they are present in the room.

## 2018-02-02 NOTE — PROGRESS NOTES
Lab results reviewed with patient during his visit on 01/30/18. Patient gave office updated phone numbers at that time. Patient verbalized understanding and does not have any questions at this time.

## 2018-03-26 ENCOUNTER — OFFICE VISIT (OUTPATIENT)
Dept: INTERNAL MEDICINE CLINIC | Age: 53
End: 2018-03-26

## 2018-03-26 VITALS
HEART RATE: 61 BPM | DIASTOLIC BLOOD PRESSURE: 91 MMHG | WEIGHT: 174 LBS | BODY MASS INDEX: 23.06 KG/M2 | RESPIRATION RATE: 16 BRPM | TEMPERATURE: 97.6 F | OXYGEN SATURATION: 99 % | SYSTOLIC BLOOD PRESSURE: 181 MMHG | HEIGHT: 73 IN

## 2018-03-26 DIAGNOSIS — I10 ESSENTIAL HYPERTENSION: Primary | Chronic | ICD-10-CM

## 2018-03-26 DIAGNOSIS — G89.4 CHRONIC PAIN SYNDROME: Chronic | ICD-10-CM

## 2018-03-26 DIAGNOSIS — B18.2 CHRONIC HEPATITIS C WITHOUT HEPATIC COMA (HCC): Chronic | ICD-10-CM

## 2018-03-26 DIAGNOSIS — Z00.00 INITIAL MEDICARE ANNUAL WELLNESS VISIT: ICD-10-CM

## 2018-03-26 DIAGNOSIS — Z72.0 TOBACCO ABUSE: Chronic | ICD-10-CM

## 2018-03-26 DIAGNOSIS — E11.65 TYPE 2 DIABETES MELLITUS WITH HYPERGLYCEMIA, WITHOUT LONG-TERM CURRENT USE OF INSULIN (HCC): Chronic | ICD-10-CM

## 2018-03-26 DIAGNOSIS — M48.062 SPINAL STENOSIS OF LUMBAR REGION WITH NEUROGENIC CLAUDICATION: Chronic | ICD-10-CM

## 2018-03-26 DIAGNOSIS — F31.62 BIPOLAR 1 DISORDER, MIXED, MODERATE (HCC): ICD-10-CM

## 2018-03-26 RX ORDER — CLONIDINE HYDROCHLORIDE 0.2 MG/1
0.2 TABLET ORAL 2 TIMES DAILY
Qty: 60 TAB | Refills: 9 | Status: SHIPPED | OUTPATIENT
Start: 2018-03-26 | End: 2018-10-17 | Stop reason: SDUPTHER

## 2018-03-26 RX ORDER — METFORMIN HYDROCHLORIDE 500 MG/1
500 TABLET, EXTENDED RELEASE ORAL 2 TIMES DAILY
Qty: 360 TAB | Refills: 3 | Status: SHIPPED | OUTPATIENT
Start: 2018-03-26 | End: 2022-05-16

## 2018-03-26 NOTE — MR AVS SNAPSHOT
[address] [facility] 
[phone] Patient: Rich Huber MRN: [id number] :1965 Visit Information Date & Time Provider Department Dept. Phone Encounter #  
 3/26/2018  8:15 AM [place], [address] [id number] Follow-up Instructions Return in about 3 months (around 2018). Upcoming Health Maintenance Date Due  
 EYE EXAM RETINAL OR DILATED Q1 1974 DTaP/Tdap/Td series (1 - Tdap) 1985 HEMOGLOBIN A1C Q6M 2018 MICROALBUMIN Q1 2019 LIPID PANEL Q1 2019 FOOT EXAM Q1 3/26/2019 MEDICARE YEARLY EXAM 3/27/2019 COLONOSCOPY 2026 Allergies as of 3/26/2018  Review Complete On: 3/26/2018 By: Joana Guzmán III, DO Severity Noted Reaction Type Reactions Versed [Midazolam] High 10/26/2011    Shortness of Breath Weakness Versed [Midazolam]  10/01/2011   Intolerance Unknown (comments) Numbness, with shortness of breath Current Immunizations  Reviewed on 2018 Name Date Hep B, Adol/Ped 3/12/2013  5:30 PM  
 Influenza Vaccine (Quad) PF 2018 Pneumococcal Conjugate (PCV-13)  Deferred (Patient Refused) Pneumococcal Polysaccharide (PPSV-23) 2018 Not reviewed this visit You Were Diagnosed With   
  
 Codes Comments Essential hypertension    -  Primary ICD-10-CM: I10 
ICD-9-CM: 401.9 Type 2 diabetes mellitus with hyperglycemia, without long-term current use of insulin (HCC)     ICD-10-CM: E11.65 ICD-9-CM: 250.00, 790.29 Initial Medicare annual wellness visit     ICD-10-CM: Z00.00 ICD-9-CM: V70.0 Chronic pain syndrome     ICD-10-CM: G89.4 ICD-9-CM: 338. 4 Chronic hepatitis C without hepatic coma (HCC)     ICD-10-CM: B18.2 ICD-9-CM: 070.54  Spinal stenosis of lumbar region with neurogenic claudication     ICD-10-CM: M48.062 
 ICD-9-CM: 724.03 Bipolar 1 disorder, mixed, moderate (HCC)     ICD-10-CM: F31.62 
ICD-9-CM: 296.62 Tobacco abuse     ICD-10-CM: Z72.0 ICD-9-CM: 305.1 Vitals BP Pulse Temp Resp Height(growth percentile) Weight(growth percentile) (!) 181/91 (BP 1 Location: Left arm, BP Patient Position: Sitting) 61 97.6 °F (36.4 °C) (Oral) 16 6' 1\" (1.854 m) 174 lb (78.9 kg) SpO2 BMI Smoking Status 99% 22.96 kg/m2 Current Every Day Smoker Vitals History BMI and BSA Data Body Mass Index Body Surface Area  
 22.96 kg/m 2 2.02 m 2 Preferred Pharmacy Pharmacy Name Phone [facility] [address] [place] [address] [phone] Your Updated Medication List  
  
   
This list is accurate as of 3/26/18  8:54 AM.  Always use your most recent med list.  
  
  
  
  
 ARIPiprazole 10 mg tablet Commonly known as:  ABILIFY Take 1 Tab by mouth daily (after breakfast). Indications: MIXED BIPOLAR I DISORDER  
  
 aspirin delayed-release 81 mg tablet Take 1 Tab by mouth daily. Blood-Glucose Meter monitoring kit Check blood sugars daily. DX: E11.9  
  
 cloNIDine HCl 0.2 mg tablet Commonly known as:  CATAPRES Take 1 Tab by mouth two (2) times a day. diphtheria-pertussis (acellular)-tetanus 2.5-8-5 Lf-mcg-Lf/0.5mL Susp susp Commonly known as:  BOOSTRIX TDAP  
0.5 mL by IntraMUSCular route once for 1 dose. doxepin 25 mg capsule Commonly known as:  SINEquan Take 1 Cap by mouth nightly. glucose blood VI test strips strip Commonly known as:  ASCENSIA AUTODISC VI, ONE TOUCH ULTRA TEST VI Check blood sugars daily. DX: E11.9 Lancets Misc Commonly known as:  LANCETSShayan 49 Check blood sugars daily. DX: E11.9  
  
 lisinopril 5 mg tablet Commonly known as:  Daxa [facility] Take 1 Tab by mouth daily. metFORMIN  mg tablet Commonly known as:  GLUCOPHAGE XR  
 Take 1 Tab by mouth two (2) times a day. pregabalin 75 mg capsule Commonly known as:  [place] Take 1 Cap by mouth two (2) times a day. Max Daily Amount: 150 mg. Indications: Diabetic Peripheral Neuropathy  
  
 traMADol 50 mg tablet Commonly known as:  ULTRAM  
Take 1 Tab by mouth every six (6) hours as needed for Pain. Max Daily Amount: 200 mg. Prescriptions Printed Refills diphtheria-pertussis, acellular,-tetanus (BOOSTRIX TDAP) 2.5-8-5 Lf-mcg-Lf/0.5mL susp susp 0 Si.5 mL by IntraMUSCular route once for 1 dose. Class: Print Route: IntraMUSCular Prescriptions Sent to Pharmacy Refills  
 cloNIDine HCl (CATAPRES) 0.2 mg tablet 9 Sig: Take 1 Tab by mouth two (2) times a day. Class: Normal  
 Pharmacy: [address] [place] [address] Ph #: [phone] Route: Oral  
 metFORMIN ER (GLUCOPHAGE XR) 500 mg tablet 3 Sig: Take 1 Tab by mouth two (2) times a day. Class: Normal  
 Pharmacy: [address] [place] [address] Ph #: [phone] Route: Oral  
  
We Performed the Following FUNDUS PHOTOGRAPHY [id number] CPT(R)]  DIABETES FOOT EXAM [HM7 Custom] REFERRAL TO NEUROSURGERY [FZQ43 Custom] Follow-up Instructions Return in about 3 months (around 2018). Referral Information Referral ID Referred By Referred To  
  
 [id number] [facility] [address] [place] [address] Phone: [phone] Fax: [phone] Visits Status Start Date End Date 1 New Request 3/26/18 3/26/19 If your referral has a status of pending review or denied, additional information will be sent to support the outcome of this decision. Patient Instructions Medicare Wellness Visit, Male The best way to live healthy is to have a healthy lifestyle by eating a well-balanced diet, exercising regularly, limiting alcohol and stopping smoking. Regular physical exams and screening tests are another way to keep healthy. Preventive exams provided by your health care provider can find health problems before they become diseases or illnesses. Preventive services including immunizations, screening tests, monitoring and exams can help you take care of your own health. All people over age 72 should have a pneumovax  and and a prevnar shot to prevent pneumonia. These are once in a lifetime unless you and your provider decide differently. All people over 65 should have a yearly flu shot and a tetanus vaccine every 10 years. Screening for diabetes mellitus with a blood sugar test should be done every year. Glaucoma is a disease of the eye due to increased ocular pressure that can lead to blindness and it should be done every year by an eye professional. 
 
Cardiovascular screening tests that check for elevated lipids (fatty part of blood) which can lead to heart disease and strokes should be done every 5 years. Colorectal screening that evaluates for blood or polyps in your colon should be done yearly as a stool test or every five years as a flexible sigmoidoscope or every 10 years as a colonoscopy up to age 76. Men up to age 76 may need a screening blood test for prostate cancer at certain intervals, depending on their personal and family history. This decision is between the patient and his provider. If you have been a smoker or had family history of abdominal aortic aneurysms, you and your provider may decide to schedule an ultrasound test of your aorta. Hepatitis C screening is also recommended for anyone born between [birth year] through [address]. A shingles vaccine is also recommended once in a lifetime after age 61. Your Medicare Wellness Exam is recommended annually. Here is a list of your current Health Maintenance items with a due date: Health Maintenance Due Topic Date Due  
 Diabetic Foot Care  05/20/1974  Eye Exam  05/20/1974  
 DTaP/Tdap/Td  (1 - Tdap) 05/20/1985 [facility] Annual Well Visit  03/14/2018 Introducing hospitals & HEALTH SERVICES! [facility] introduces [employer] patient portal. Now you can access parts of your medical record, email your doctor's office, and request medication refills online. 1. In your internet browser, go to [email or web address] 2. Click on the First Time User? Click Here link in the Sign In box. You will see the New Member Sign Up page. 3. Enter your [employer] Access Code exactly as it appears below. You will not need to use this code after youve completed the sign-up process. If you do not sign up before the expiration date, you must request a new code. · [employer] Access Code: [email or web address] Expires: 4/25/2018  5:01 PM 
 
4. Enter the last four digits of your Social Security Number (xxxx) and Date of Birth (mm/dd/yyyy) as indicated and click Submit. You will be taken to the next sign-up page. 5. Create a [employer] ID. This will be your [employer] login ID and cannot be changed, so think of one that is secure and easy to remember. 6. Create a [employer] password. You can change your password at any time. 7. Enter your Password Reset Question and Answer. This can be used at a later time if you forget your password. 8. Enter your e-mail address. You will receive e-mail notification when new information is available in [address] 9. Click Sign Up. You can now view and download portions of your medical record. 10. Click the Download Summary menu link to download a portable copy of your medical information. If you have questions, please visit the Frequently Asked Questions section of the [employer] website. Remember, [employer] is NOT to be used for urgent needs. For medical emergencies, dial 911. Now available from your iPhone and Android! Please provide this summary of care documentation to your next provider. Your primary care clinician is listed as Scarlet Cobian If you have any questions after today's visit, please call [phone].

## 2018-03-26 NOTE — PROGRESS NOTES
Reviewed record in preparation for visit and have obtained necessary documentation. Identified pt with two pt identifiers(name and ). Chief Complaint   Patient presents with    Diabetes     follow up    Hypertension    Pain (Chronic)     back       Health Maintenance Due   Topic Date Due    FOOT EXAM Q1  1974    EYE EXAM RETINAL OR DILATED Q1  1974    DTaP/Tdap/Td series (1 - Tdap) 1985    MEDICARE YEARLY EXAM  2018       Mr. Lainey Miranda has a reminder for a \"due or due soon\" health maintenance. I have asked that he discuss health maintenance topic(s) due with His  primary care provider. Coordination of Care Questionnaire:  :     1) Have you been to an emergency room, urgent care clinic since your last visit? no   Hospitalized since your last visit? no             2) Have you seen or consulted any other health care providers outside of [place] [address] since your last visit? no  (Include any pap smears or colon screenings in this section.)    3) Do you have an Advance Directive on file? no    4) Are you interested in receiving information on Advance Directives? yes    Patient is accompanied by self I have received verbal consent from Rodo Braswell to discuss any/all medical information while they are present in the room.

## 2018-03-26 NOTE — PATIENT INSTRUCTIONS

## 2018-03-26 NOTE — PROGRESS NOTES
Yazmin Horne is a 47 y.o. male who presents for evaluation of routine follow up. Last seen by me jan 30, 2018. Mri done after that visit showed lumbar spinal stenosis. He has been unable to make appt with neurosx yet. States he called numerous times and left messages, but has been unable to get an appt. Complains of low back pain and foot neuropathy. ROS:  Constitutional: negative for fevers, chills, anorexia and weight loss  Eyes:   negative for visual disturbance and irritation  ENT:   negative for tinnitus,sore throat,nasal congestion,ear pain,hoarseness  Respiratory:  negative for cough, hemoptysis, dyspnea,wheezing  CV:   negative for chest pain, palpitations, lower extremity edema  GI:   negative for nausea, vomiting, diarrhea, abdominal pain,melena  Genitourinary: negative for frequency, dysuria and hematuria  Musculoskel: negative for myalgias, arthralgias,  muscle weakness, joint pain.   ++lbp  Neurological:  negative for headaches, dizziness, focal weakness, numbness  Psychiatric:     Negative for depression or anxiety      Past Medical History:   Diagnosis Date    Adverse effect of anesthesia     breathing diff. with versed    Bipolar 1 disorder, mixed, moderate ([address] 3/6/2017    Chronic pain     Depression     Pt stated diagnosed years ago    Depression 3/13/2013    Diabetes ([address]     Diabetes ([address] 2003    Drug-induced mood disorder 5/28/2013    Homicide attempt     HTN (hypertension) 11/3/2011    Narcotic dependence, episodic use ([address] 11/3/2011    NIDDM (non-insulin dependent diabetes mellitus) 11/3/2011    Non compliance with medical treatment 11/3/2011    Other ill-defined conditions(799.89)     chronic low back pain    Psychiatric disorder     bipolar    Sleep disorder     Substance abuse     Suicidal thoughts        Past Surgical History:   Procedure Laterality Date    CARDIAC SURG PROCEDURE UNLIST      cardiac stents X2 lad drug eluting    COLONOSCOPY N/A 8/31/2016 COLONOSCOPY performed by Nathalie Muir MD at [facility]"" ENDOSCOPY    COLONOSCOPY,DIAGNOSTIC  8/31/2016         HX ORTHOPAEDIC      chronic back pain    HX ORTHOPAEDIC      left knee arthroplasty    [place] ANESTH,LUMBAR SPINE,CORD SURGERY  7 1999    l4 l5    REMV KIDNEY,COMPLICATED  5300    side unknown - kidney stones    UPPER GI ENDOSCOPY,BIOPSY  8/31/2016            Family History   Problem Relation Age of Onset    Stroke Mother     Hypertension Mother     Heart Disease Father     Hypertension Father     Cancer Maternal Grandmother      unknown type    Diabetes Maternal Grandfather        Social History     Social History    Marital status:      Spouse name: N/A    Number of children: N/A    Years of education: N/A     Occupational History    Not on file. Social History Main Topics    Smoking status: Current Every Day Smoker     Packs/day: 1.00    Smokeless tobacco: Never Used    Alcohol use No      Comment: sociially in the past    Drug use: No      Comment: recent use , past opiate use    Sexual activity: Yes     Partners: Female     Birth control/ protection: None      Comment:  but seperated now     Other Topics Concern    Not on file     Social History Narrative    ** Merged History Encounter **     states he has 2 yrs of college. On disability for chronic pain. , 1 children.  Was in relationship but break    Moved in with Parent    No legal problem            Visit Vitals    BP (!) 181/91 (BP 1 Location: Left arm, BP Patient Position: Sitting)    Pulse 61    Temp 97.6 °F (36.4 °C) (Oral)    Resp 16    Ht 6' 1\" (1.854 m)    Wt 174 lb (78.9 kg)    SpO2 99%    BMI 22.96 kg/m2       Physical Examination:   General - Well appearing male  HEENT - PERRL, TM no erythema/opacification, normal nasal turbinates, no oropharyngeal erythema or exudate, MMM  Neck - supple, no bruits, no thyroidomegaly, no lymphadenopathy  Pulm - clear to auscultation bilaterally  Cardio - RRR, normal S1 S2, no murmur  Abd - soft, nontender, no masses, no HSM  Extrem - no edema, +2 distal pulses  Neuro-  No focal deficits, CN intact         Diabetic foot exam performed by Cristhian Murphy III, DO     Measurement  Response Nurse Comment Physician Comment   Monofilament  R - reduced sensation with micro filament  L - reduced sensation with micro filament     Pulse DP R - present  L - present     Pulse TP R - present  L - present     Structural deformity R - None  L - None     Skin Integrity / Deformity R - None  L - None        Reviewed by:              Assessment/Plan:    1.  htn--increase clonidine from 0.1 bid to 0.2 bid. Continue lisinopril  2. Uncontrolled dm, type 2 with PN--last a1c 8.8. Increase glucopage to 1000 bid. Continue with lyrica as well. 3.  Lumbar spinal stenosis with chronic back pain--referral again to see neurosx. Continue ultram  4. Bipolar--continue abilify  5. Cad with stents--on asa  6. Tobacco abuse  7. Hx hcv  8. Routine adult health maintenance--rx given for tdap. Plans to have retinal scan done today in office    rtc 3 months        Cristhian Murphy III, DO                This is an Initial Medicare Annual Wellness Exam (AWV) (Performed 12 months after IPPE or effective date of Medicare Part B enrollment, Once in a lifetime)    I have reviewed the patient's medical history in detail and updated the computerized patient record.      History     Past Medical History:   Diagnosis Date    Adverse effect of anesthesia     breathing diff. with versed    Bipolar 1 disorder, mixed, moderate ([address] 3/6/2017    Chronic pain     Depression     Pt stated diagnosed years ago    Depression 3/13/2013    Diabetes ([address]     Diabetes ([address] 2003    Drug-induced mood disorder 5/28/2013    Homicide attempt     HTN (hypertension) 11/3/2011    Narcotic dependence, episodic use ([address] 11/3/2011    NIDDM (non-insulin dependent diabetes mellitus) 11/3/2011    Non compliance with medical treatment 11/3/2011    Other ill-defined conditions(799.89)     chronic low back pain    Psychiatric disorder     bipolar    Sleep disorder     Substance abuse     Suicidal thoughts       Past Surgical History:   Procedure Laterality Date    CARDIAC SURG PROCEDURE UNLIST      cardiac stents X2 lad drug eluting    COLONOSCOPY N/A 8/31/2016    COLONOSCOPY performed [facility]    COLONOSCOPY,DIAGNOSTIC  8/31/2016         HX ORTHOPAEDIC      chronic back pain    HX ORTHOPAEDIC      left knee arthroplasty    [place] ANESTH,LUMBAR SPINE,CORD SURGERY  7 1999    l4 l5    REMV KIDNEY,COMPLICATED  0608    side unknown - kidney stones    UPPER GI ENDOSCOPY,BIOPSY  8/31/2016          Current Outpatient Prescriptions   Medication Sig Dispense Refill    Blood-Glucose Meter monitoring kit Check blood sugars daily. DX: E11.9 1 Kit 0    glucose blood VI test strips (ASCENSIA AUTODISC VI, ONE TOUCH ULTRA TEST VI) strip Check blood sugars daily. DX: E11.9 50 Strip 11    Lancets (LANCETS, SUPER THIN) misc Check blood sugars daily. DX: E11.9 50 Each 11    cloNIDine HCl (CATAPRES) 0.1 mg tablet Take 1 Tab by mouth two (2) times a day. 60 Tab 6    traMADol (ULTRAM) 50 mg tablet Take 1 Tab by mouth every six (6) hours as needed for Pain. Max Daily Amount: 200 mg. 30 Tab 1    pregabalin (LYRICA) 75 mg capsule Take 1 Cap by mouth two (2) times a day. Max Daily Amount: 150 mg. Indications: Diabetic Peripheral Neuropathy 60 Cap 6    metFORMIN ER (GLUCOPHAGE XR) 500 mg tablet Take 1 Tab by mouth two (2) times a day. 60 Tab 6    lisinopril (PRINIVIL, ZESTRIL) 5 mg tablet Take 1 Tab by mouth daily. 30 Tab 9    aspirin delayed-release 81 mg tablet Take 1 Tab by mouth daily. 90 Tab 3    doxepin (SINEQUAN) 25 mg capsule Take 1 Cap by mouth nightly. 30 Cap 2    ARIPiprazole (ABILIFY) 10 mg tablet Take 1 Tab by mouth daily (after breakfast). Indications: MIXED BIPOLAR I DISORDER 30 Tab 0     Allergies   Allergen Reactions    Versed [Midazolam] Shortness of Breath     Weakness     Versed [Midazolam] Unknown (comments)     Numbness, with shortness of breath     Family History   Problem Relation Age of Onset    Stroke Mother     Hypertension Mother     Heart Disease Father     Hypertension Father     Cancer Maternal Grandmother      unknown type    Diabetes Maternal Grandfather      Social History   Substance Use Topics    Smoking status: Current Every Day Smoker     Packs/day: 1.00    Smokeless tobacco: Never Used    Alcohol use No      Comment: sociially in the past     Patient Active Problem List   Diagnosis Code    Non compliance with medical treatment Z91.19    Do not give narcotics [id number]    Personality disorder F60.9    Polysubstance abuse F19.10    Bipolar 1 disorder, mixed, moderate (HCC) F31.62    Type 2 diabetes mellitus with hyperglycemia, without long-term current use of insulin (HCC) E11.65    Essential hypertension I10    Chronic pain syndrome G89.4    Chronic hepatitis C without hepatic coma (HCC) B18.2    Other male erectile dysfunction N52.8    Tobacco abuse Z72.0    Type 2 diabetes mellitus with diabetic neuropathy, without long-term current use of insulin (HCC) E11.40       Depression Risk Factor Screening:   No flowsheet data found. Alcohol Risk Factor Screening: You do not drink alcohol or very rarely. Functional Ability and Level of Safety:     Hearing Loss  Hearing is good. Activities of Daily Living  The home contains: no safety equipment. Patient does total self care    Fall Risk  No flowsheet data found.     Abuse Screen  Patient is not abused    Cognitive Screening   Evaluation of Cognitive Function:  Has your family/caregiver stated any concerns about your memory: no  Normal    Patient Care Team   Patient Care Team:  Bridgett Hernandez III, DO as PCP - General (Internal Medicine)    Assessment/Plan   Education and counseling provided:  Are appropriate based on today's review and evaluation  End-of-Life planning (with patient's consent)  Pneumococcal Vaccine  Influenza Vaccine  Prostate cancer screening tests (PSA, covered annually)  Colorectal cancer screening tests  Screening for glaucoma    Diagnoses and all orders for this visit:    1. Type 2 diabetes mellitus with hyperglycemia, without long-term current use of insulin (HCC)  -     FUNDUS PHOTOGRAPHY    2.  Initial Medicare annual wellness visit         Health Maintenance Due   Topic Date Due    FOOT EXAM Q1  05/20/1974    EYE EXAM RETINAL OR DILATED Q1  05/20/1974    DTaP/Tdap/Td series (1 - Tdap) 05/20/1985    MEDICARE YEARLY EXAM  03/14/2018

## 2018-03-28 ENCOUNTER — APPOINTMENT (OUTPATIENT)
Dept: GENERAL RADIOLOGY | Age: 53
End: 2018-03-28
Attending: PHYSICIAN ASSISTANT
Payer: MEDICARE

## 2018-03-28 ENCOUNTER — HOSPITAL ENCOUNTER (EMERGENCY)
Age: 53
Discharge: HOME | End: 2018-03-28
Attending: EMERGENCY MEDICINE
Payer: MEDICARE

## 2018-03-28 VITALS
RESPIRATION RATE: 16 BRPM | HEART RATE: 73 BPM | DIASTOLIC BLOOD PRESSURE: 88 MMHG | TEMPERATURE: 97.6 F | SYSTOLIC BLOOD PRESSURE: 156 MMHG | OXYGEN SATURATION: 100 % | HEIGHT: 73 IN | BODY MASS INDEX: 23.37 KG/M2 | WEIGHT: 176.37 LBS

## 2018-03-28 DIAGNOSIS — R07.81 RIB PAIN ON LEFT SIDE: Primary | ICD-10-CM

## 2018-03-28 PROCEDURE — 71101 X-RAY EXAM UNILAT RIBS/CHEST: CPT

## 2018-03-28 PROCEDURE — 99282 EMERGENCY DEPT VISIT SF MDM: CPT

## 2018-03-28 RX ORDER — HYDROCODONE BITARTRATE AND ACETAMINOPHEN 5; 325 MG/1; MG/1
1 TABLET ORAL
Qty: 10 TAB | Refills: 0 | Status: SHIPPED | OUTPATIENT
Start: 2018-03-28 | End: 2018-04-20 | Stop reason: CLARIF

## 2018-03-28 RX ORDER — IBUPROFEN 800 MG/1
800 TABLET ORAL
Qty: 20 TAB | Refills: 0 | Status: SHIPPED | OUTPATIENT
Start: 2018-03-28 | End: 2018-04-04

## 2018-03-28 NOTE — ED PROVIDER NOTES
EMERGENCY DEPARTMENT HISTORY AND PHYSICAL EXAM      Date: 3/28/2018  Patient Name: Rodo Braswell    I have seen and evaluated this patient in the Express Care portion of triage for rib pain. The patients care will begin now and orders have been placed. This patient will be seen and provided further care in the Emergency Room. Written by Kayce Zacarias. Pancho Estrada, a scribe for [employer].    History of Presenting Illness     Chief Complaint   Patient presents with    Rib Pain     pt reported he was leaning over a chair x3 weeks ago and felt something pop in his ribs . C/o pain to left ribs still. Pt reported taking aleve at home for his pain. History Provided By: Patient    HPI: Rodo Braswell, 47 y.o. male with PMHx significant for DM, HTN, substance abuse, depression, bipolar 1 disorder, presents ambulatory to the ED with cc of constant, moderate left sided rib pain x 3 weeks s/p injury sustained while leaning over in a chair resulting in a popping sensation. Pt endorses use of Aleve at home without significant relief of his pain. He reports exacerbation of pain with deep inspiration, when coughing, and with movement. He notes he is currently followed by orthopedics and has plans to follow up with neurosurgery regarding chronic back pain. He denies any further symptoms or complaints. PCP: Libia Armijo III, DO    There are no other complaints, changes, or physical findings at this time. Current Outpatient Prescriptions   Medication Sig Dispense Refill    HYDROcodone-acetaminophen (NORCO) 5-325 mg per tablet Take 1 Tab by mouth every four (4) hours as needed for Pain. Max Daily Amount: 6 Tabs. 10 Tab 0    ibuprofen (MOTRIN) 800 mg tablet Take 1 Tab by mouth every eight (8) hours as needed for Pain for up to 7 days. 20 Tab 0    cloNIDine HCl (CATAPRES) 0.2 mg tablet Take 1 Tab by mouth two (2) times a day.  60 Tab 9    metFORMIN ER (GLUCOPHAGE XR) 500 mg tablet Take 1 Tab by mouth two (2) times a day. 360 Tab 3    Blood-Glucose Meter monitoring kit Check blood sugars daily. DX: E11.9 1 Kit 0    glucose blood VI test strips (ASCENSIA AUTODISC VI, ONE TOUCH ULTRA TEST VI) strip Check blood sugars daily. DX: E11.9 50 Strip 11    Lancets (LANCETS, SUPER THIN) misc Check blood sugars daily. DX: E11.9 50 Each 11    traMADol (ULTRAM) 50 mg tablet Take 1 Tab by mouth every six (6) hours as needed for Pain. Max Daily Amount: 200 mg. 30 Tab 1    pregabalin (LYRICA) 75 mg capsule Take 1 Cap by mouth two (2) times a day. Max Daily Amount: 150 mg. Indications: Diabetic Peripheral Neuropathy 60 Cap 6    lisinopril (PRINIVIL, ZESTRIL) 5 mg tablet Take 1 Tab by mouth daily. 30 Tab 9    aspirin delayed-release 81 mg tablet Take 1 Tab by mouth daily. 90 Tab 3    doxepin (SINEQUAN) 25 mg capsule Take 1 Cap by mouth nightly. 30 Cap 2    ARIPiprazole (ABILIFY) 10 mg tablet Take 1 Tab by mouth daily (after breakfast).  Indications: MIXED BIPOLAR I DISORDER 30 Tab 0       Past History     Past Medical History:  Past Medical History:   Diagnosis Date    Adverse effect of anesthesia     breathing diff. with versed    Bipolar 1 disorder, mixed, moderate ([address] 3/6/2017    Chronic pain     Depression     Pt stated diagnosed years ago    Depression 3/13/2013    Diabetes ([address]     Diabetes ([address] 2003    Drug-induced mood disorder 5/28/2013    Homicide attempt     HTN (hypertension) 11/3/2011    Narcotic dependence, episodic use ([address] 11/3/2011    NIDDM (non-insulin dependent diabetes mellitus) 11/3/2011    Non compliance with medical treatment 11/3/2011    Other ill-defined conditions(799.89)     chronic low back pain    Psychiatric disorder     bipolar    Sleep disorder     Substance abuse     Suicidal thoughts        Past Surgical History:  Past Surgical History:   Procedure Laterality Date    CARDIAC SURG PROCEDURE UNLIST      cardiac stents X2 lad drug eluting    COLONOSCOPY N/A 8/31/2016 COLONOSCOPY performed by Leena Sun [facility] at [facility] ENDOSCOPY    COLONOSCOPY,DIAGNOSTIC  8/31/2016         HX ORTHOPAEDIC      chronic back pain    HX ORTHOPAEDIC      left knee arthroplasty    [place] ANESTH,LUMBAR SPINE,CORD SURGERY  7 1999    l4 l5    REMV KIDNEY,COMPLICATED  9318    side unknown - kidney stones    UPPER GI ENDOSCOPY,BIOPSY  8/31/2016            Family History:  Family History   Problem Relation Age of Onset    Stroke Mother     Hypertension Mother     Heart Disease Father     Hypertension Father     Cancer Maternal Grandmother      unknown type    Diabetes Maternal Grandfather        Social History:  Social History   Substance Use Topics    Smoking status: Current Every Day Smoker     Packs/day: 1.00    Smokeless tobacco: Never Used    Alcohol use No      Comment: sociially in the past       Allergies: Allergies   Allergen Reactions    Versed [Midazolam] Shortness of Breath     Weakness     Versed [Midazolam] Unknown (comments)     Numbness, with shortness of breath         Review of Systems   Review of Systems   Constitutional: Negative for fatigue and fever. HENT: Negative for congestion, ear pain and rhinorrhea. Eyes: Negative for pain and redness. Respiratory: Negative for cough and wheezing. Cardiovascular: Negative for chest pain and palpitations. Gastrointestinal: Negative for abdominal pain, nausea and vomiting. Genitourinary: Negative for dysuria, frequency and urgency. Musculoskeletal: Negative for back pain, neck pain and neck stiffness. Positive for rib pain. Skin: Negative for rash and wound. Neurological: Negative for weakness, light-headedness, numbness and headaches. Physical Exam   Physical Exam   Constitutional: He is oriented to person, place, and time. He appears well-developed and well-nourished. Non-toxic appearance. No distress. HENT:   Head: Normocephalic and atraumatic.  Head is without right periorbital erythema and without left periorbital erythema. Right Ear: External ear normal.   Left Ear: External ear normal.   Nose: Nose normal.   Mouth/Throat: Uvula is midline. No trismus in the jaw. Eyes: Conjunctivae and EOM are normal. Pupils are equal, round, and reactive to light. No scleral icterus. Neck: Normal range of motion and full passive range of motion without pain. Cardiovascular: Normal rate, regular rhythm and normal heart sounds. Pulmonary/Chest: Effort normal and breath sounds normal. No accessory muscle usage. No tachypnea. No respiratory distress. He has no decreased breath sounds. He has no wheezes. Symmetric, full chest wall expansion with deep inspiration. Breathing unlabored; lungs are clear  No bruising, redness or swelling  Left sided rib tenderness   Abdominal: Soft. There is no tenderness. There is no rigidity and no guarding. Musculoskeletal: Normal range of motion. Neurological: He is alert and oriented to person, place, and time. He is not disoriented. No cranial nerve deficit or sensory deficit. GCS eye subscore is 4. GCS verbal subscore is 5. GCS motor subscore is 6. Skin: Skin is intact. No rash noted. Psychiatric: He has a normal mood and affect. His speech is normal.   Nursing note and vitals reviewed. Diagnostic Study Results     Radiologic Studies -   XR RIBS LT W PA CXR MIN 3 V   Final Result   EXAM:  XR RIBS LT W PA CXR MIN 3 V  INDICATION: Left rib pain since leaning over a chair 3 weeks ago and feeling  something pop. COMPARISON: None.     FINDINGS: A frontal radiograph of the chest and 3 oblique views of the left ribs  demonstrate no fracture. There is no pneumothorax or pleural effusion.     IMPRESSION  IMPRESSION: No rib fracture identified. Medical Decision Making   I am the first provider for this patient.     I reviewed the vital signs, available nursing notes, past medical history, past surgical history, family history and social history. Vital Signs-Reviewed the patient's vital signs. Patient Vitals for the past 12 hrs:   Temp Pulse Resp BP SpO2   03/28/18 1212 97.6 °F (36.4 °C) 73 16 156/88 100 %     Records Reviewed: Nursing Notes and Old Medical Records    Provider Notes (Medical Decision Making):   DDx: Sprain, strain, contusion, chronic pain    ED Course:   Initial assessment performed. The patients presenting problems have been discussed, and they are in agreement with the care plan formulated and outlined with them. I have encouraged them to ask questions as they arise throughout their visit. 2:28 PM  I have reviewed discharge instructions with the patient and explained medications that he is being discharged with. The patient verbalized understanding and agrees with plan. Disposition:  Discharge Note:  2:28 PM  The patient has been re-evaluated and is ready for discharge. Reviewed available results with patient. Counseled patient on diagnosis and care plan. Patient has expressed understanding, and all questions have been answered. Patient agrees with plan and agrees to follow up as recommended, or return to the ED if their symptoms worsen. Discharge instructions have been provided and explained to the patient, along with reasons to return to the ED. PLAN:  1. Discharge Medication List as of 3/28/2018  2:17 PM      START taking these medications    Details   HYDROcodone-acetaminophen (NORCO) 5-325 mg per tablet Take 1 Tab by mouth every four (4) hours as needed for Pain. Max Daily Amount: 6 Tabs., Print, Disp-10 Tab, R-0      ibuprofen (MOTRIN) 800 mg tablet Take 1 Tab by mouth every eight (8) hours as needed for Pain for up to 7 days. , Print, Disp-20 Tab, R-0         CONTINUE these medications which have NOT CHANGED    Details   cloNIDine HCl (CATAPRES) 0.2 mg tablet Take 1 Tab by mouth two (2) times a day., Normal, Disp-60 Tab, R-9      metFORMIN ER (GLUCOPHAGE XR) 500 mg tablet Take 1 Tab by mouth two (2) times a day., Normal, Disp-360 Tab, R-3      Blood-Glucose Meter monitoring kit Check blood sugars daily. DX: E11.9, Normal, Disp-1 Kit, R-0      glucose blood VI test strips (ASCENSIA AUTODISC VI, ONE TOUCH ULTRA TEST VI) strip Check blood sugars daily. DX: E11.9, Normal, Disp-50 Strip, R-11      Lancets (LANCETS, SUPER THIN) misc Check blood sugars daily. DX: E11.9, Normal, Disp-50 Each, R-11      traMADol (ULTRAM) 50 mg tablet Take 1 Tab by mouth every six (6) hours as needed for Pain. Max Daily Amount: 200 mg., Print, Disp-30 Tab, R-1      pregabalin (LYRICA) 75 mg capsule Take 1 Cap by mouth two (2) times a day. Max Daily Amount: 150 mg. Indications: Diabetic Peripheral Neuropathy, Print, Disp-60 Cap, R-6      lisinopril (PRINIVIL, ZESTRIL) 5 mg tablet Take 1 Tab by mouth daily. , Normal, Disp-30 Tab, R-9      aspirin delayed-release 81 mg tablet Take 1 Tab by mouth daily. , Normal, Disp-90 Tab, R-3      doxepin (SINEQUAN) 25 mg capsule Take 1 Cap by mouth nightly., Normal, Disp-30 Cap, R-2      ARIPiprazole (ABILIFY) 10 mg tablet Take 1 Tab by mouth daily (after breakfast). Indications: MIXED BIPOLAR I DISORDER, Normal, Disp-30 Tab, R-0           2. Follow-up Information     Follow up With Details Comments [address] [place] [address] Rd III, DO Schedule an appointment as soon as possible for a visit PRIMARY CARE: call to schedule follow up [address] [place] [address]  [phone]          Return to ED if worse     Diagnosis     Clinical Impression:   1. Rib pain on left side        Attestations: This note is prepared by Brian Bradley, acting as Scribe for Kaylie Kelly. JAY Valles: The scribe's documentation has been prepared under my direction and personally reviewed by me in its entirety. I confirm that the note above accurately reflects all work, treatment, procedures, and medical decision making performed by me.

## 2018-03-28 NOTE — LETTER
[address] 
[facility] EMERGENCY DEPT 
[address] [place] [address] [address] [id number] [phone] Work/School Note Date: 3/28/2018 To Whom It May concern: 
 
Francy White was seen and treated today in the emergency room by the following provider(s): 
Attending Provider: Chyna Townsend DO Physician Assistant: BIN Saha. Francy White may return to work on [id number]. Sincerely, BIN Saha

## 2018-03-28 NOTE — ED NOTES
Received patient to exam room, sitting in a chair in a position of comfort, call bell within reach; pt reports ongoing left rib pain x several weeks, states he twisted wrong and felt a pop, reports SOB onset night, pt is worse with mvmt and breathing

## 2018-04-20 ENCOUNTER — APPOINTMENT (OUTPATIENT)
Dept: ULTRASOUND IMAGING | Age: 53
DRG: 629 | End: 2018-04-20
Attending: EMERGENCY MEDICINE
Payer: MEDICARE

## 2018-04-20 ENCOUNTER — TELEPHONE (OUTPATIENT)
Dept: INTERNAL MEDICINE CLINIC | Age: 53
End: 2018-04-20

## 2018-04-20 ENCOUNTER — APPOINTMENT (OUTPATIENT)
Dept: GENERAL RADIOLOGY | Age: 53
DRG: 629 | End: 2018-04-20
Attending: PHYSICIAN ASSISTANT
Payer: MEDICARE

## 2018-04-20 ENCOUNTER — APPOINTMENT (OUTPATIENT)
Dept: GENERAL RADIOLOGY | Age: 53
DRG: 629 | End: 2018-04-20
Attending: EMERGENCY MEDICINE
Payer: MEDICARE

## 2018-04-20 ENCOUNTER — HOSPITAL ENCOUNTER (EMERGENCY)
Age: 53
Discharge: HOME | DRG: 629 | End: 2018-04-20
Attending: EMERGENCY MEDICINE
Payer: MEDICARE

## 2018-04-20 VITALS
RESPIRATION RATE: 16 BRPM | OXYGEN SATURATION: 100 % | DIASTOLIC BLOOD PRESSURE: 90 MMHG | WEIGHT: 170.42 LBS | SYSTOLIC BLOOD PRESSURE: 172 MMHG | BODY MASS INDEX: 22.59 KG/M2 | TEMPERATURE: 97.3 F | HEART RATE: 73 BPM | HEIGHT: 73 IN

## 2018-04-20 DIAGNOSIS — M25.572 ACUTE LEFT ANKLE PAIN: ICD-10-CM

## 2018-04-20 DIAGNOSIS — L03.116 CELLULITIS OF LEFT LEG: Primary | ICD-10-CM

## 2018-04-20 LAB
ANION GAP SERPL CALC-SCNC: 4 MMOL/L (ref 5–15)
BASOPHILS # BLD: 0.1 K/UL (ref 0–0.1)
BASOPHILS NFR BLD: 1 % (ref 0–1)
BUN SERPL-MCNC: 21 MG/DL (ref 6–20)
BUN/CREAT SERPL: 24 (ref 12–20)
CALCIUM SERPL-MCNC: 9.4 MG/DL (ref 8.5–10.1)
CHLORIDE SERPL-SCNC: 100 MMOL/L (ref 97–108)
CO2 SERPL-SCNC: 33 MMOL/L (ref 21–32)
CREAT SERPL-MCNC: 0.86 MG/DL (ref 0.7–1.3)
DIFFERENTIAL METHOD BLD: ABNORMAL
EOSINOPHIL # BLD: 0.2 K/UL (ref 0–0.4)
EOSINOPHIL NFR BLD: 2 % (ref 0–7)
ERYTHROCYTE [DISTWIDTH] IN BLOOD BY AUTOMATED COUNT: 13.2 % (ref 11.5–14.5)
GLUCOSE BLD STRIP.AUTO-MCNC: 272 MG/DL (ref 65–100)
GLUCOSE SERPL-MCNC: 250 MG/DL (ref 65–100)
HCT VFR BLD AUTO: 45.4 % (ref 36.6–50.3)
HGB BLD-MCNC: 16.7 G/DL (ref 12.1–17)
IMM GRANULOCYTES # BLD: 0 K/UL (ref 0–0.04)
IMM GRANULOCYTES NFR BLD AUTO: 0 % (ref 0–0.5)
LACTATE SERPL-SCNC: 1.4 MMOL/L (ref 0.4–2)
LYMPHOCYTES # BLD: 1.7 K/UL (ref 0.8–3.5)
LYMPHOCYTES NFR BLD: 20 % (ref 12–49)
MCH RBC QN AUTO: 31.2 PG (ref 26–34)
MCHC RBC AUTO-ENTMCNC: 36.8 G/DL (ref 30–36.5)
MCV RBC AUTO: 84.7 FL (ref 80–99)
MONOCYTES # BLD: 0.8 K/UL (ref 0–1)
MONOCYTES NFR BLD: 9 % (ref 5–13)
NEUTS SEG # BLD: 6.2 K/UL (ref 1.8–8)
NEUTS SEG NFR BLD: 69 % (ref 32–75)
NRBC # BLD: 0 K/UL (ref 0–0.01)
NRBC BLD-RTO: 0 PER 100 WBC
PLATELET # BLD AUTO: 183 K/UL (ref 150–400)
PMV BLD AUTO: 9.5 FL (ref 8.9–12.9)
POTASSIUM SERPL-SCNC: 4.3 MMOL/L (ref 3.5–5.1)
RBC # BLD AUTO: 5.36 M/UL (ref 4.1–5.7)
SERVICE CMNT-IMP: ABNORMAL
SODIUM SERPL-SCNC: 137 MMOL/L (ref 136–145)
WBC # BLD AUTO: 8.9 K/UL (ref 4.1–11.1)

## 2018-04-20 PROCEDURE — 99283 EMERGENCY DEPT VISIT LOW MDM: CPT

## 2018-04-20 PROCEDURE — 85025 COMPLETE CBC W/AUTO DIFF WBC: CPT | Performed by: PHYSICIAN ASSISTANT

## 2018-04-20 PROCEDURE — 80048 BASIC METABOLIC PNL TOTAL CA: CPT | Performed by: PHYSICIAN ASSISTANT

## 2018-04-20 PROCEDURE — 73630 X-RAY EXAM OF FOOT: CPT

## 2018-04-20 PROCEDURE — 83605 ASSAY OF LACTIC ACID: CPT | Performed by: EMERGENCY MEDICINE

## 2018-04-20 PROCEDURE — 74011250636 HC RX REV CODE- 250/636: Performed by: EMERGENCY MEDICINE

## 2018-04-20 PROCEDURE — 96365 THER/PROPH/DIAG IV INF INIT: CPT

## 2018-04-20 PROCEDURE — 76882 US LMTD JT/FCL EVL NVASC XTR: CPT

## 2018-04-20 PROCEDURE — 36415 COLL VENOUS BLD VENIPUNCTURE: CPT | Performed by: PHYSICIAN ASSISTANT

## 2018-04-20 PROCEDURE — 87040 BLOOD CULTURE FOR BACTERIA: CPT | Performed by: EMERGENCY MEDICINE

## 2018-04-20 PROCEDURE — 82962 GLUCOSE BLOOD TEST: CPT

## 2018-04-20 PROCEDURE — 73610 X-RAY EXAM OF ANKLE: CPT

## 2018-04-20 RX ORDER — CLINDAMYCIN PHOSPHATE 600 MG/50ML
600 INJECTION INTRAVENOUS
Status: COMPLETED | OUTPATIENT
Start: 2018-04-20 | End: 2018-04-20

## 2018-04-20 RX ORDER — DOXYCYCLINE HYCLATE 100 MG
100 TABLET ORAL 2 TIMES DAILY
Qty: 20 TAB | Refills: 0 | Status: SHIPPED | OUTPATIENT
Start: 2018-04-20 | End: 2018-04-29

## 2018-04-20 RX ORDER — IBUPROFEN 600 MG/1
600 TABLET ORAL
Qty: 20 TAB | Refills: 0 | Status: SHIPPED | OUTPATIENT
Start: 2018-04-20 | End: 2018-04-29

## 2018-04-20 RX ORDER — CYCLOBENZAPRINE HCL 10 MG
10 TABLET ORAL
Qty: 20 TAB | Refills: 0 | Status: SHIPPED | OUTPATIENT
Start: 2018-04-20 | End: 2018-05-07 | Stop reason: SDUPTHER

## 2018-04-20 RX ADMIN — CLINDAMYCIN PHOSPHATE 600 MG: 12 INJECTION, SOLUTION INTRAMUSCULAR; INTRAVENOUS at 09:44
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
## 2018-04-20 NOTE — DISCHARGE INSTRUCTIONS
Cellulitis: Care Instructions  Your Care Instructions    Cellulitis is a skin infection. It often occurs after a break in the skin from a scrape, cut, bite, or puncture, or after a rash. The doctor has checked you carefully, but problems can develop later. If you notice any problems or new symptoms, get medical treatment right away. Follow-up care is a key part of your treatment and safety. Be sure to make and go to all appointments, and call your doctor if you are having problems. It's also a good idea to know your test results and keep a list of the medicines you take. How can you care for yourself at home? · Take your antibiotics as directed. Do not stop taking them just because you feel better. You need to take the full course of antibiotics. · Prop up the infected area on pillows to reduce pain and swelling. Try to keep the area above the level of your heart as often as you can. · If your doctor told you how to care for your wound, follow your doctor's instructions. If you did not get instructions, follow this general advice:  ¨ Wash the wound with clean water 2 times a day. Don't use hydrogen peroxide or alcohol, which can slow healing. ¨ You may cover the wound with a thin layer of petroleum jelly, such as Vaseline, and a nonstick bandage. ¨ Apply more petroleum jelly and replace the bandage as needed. · Be safe with medicines. Take pain medicines exactly as directed. ¨ If the doctor gave you a prescription medicine for pain, take it as prescribed. ¨ If you are not taking a prescription pain medicine, ask your doctor if you can take an over-the-counter medicine. To prevent cellulitis in the future  · Try to prevent cuts, scrapes, or other injuries to your skin. Cellulitis most often occurs where there is a break in the skin. · If you get a scrape, cut, mild burn, or bite, wash the wound with clean water as soon as you can to help avoid infection.  Don't use hydrogen peroxide or alcohol, which can slow healing. · If you have swelling in your legs (edema), support stockings and good skin care may help prevent leg sores and cellulitis. · Take care of your feet, especially if you have diabetes or other conditions that increase the risk of infection. Wear shoes and socks. Do not go barefoot. If you have athlete's foot or other skin problems on your feet, talk to your doctor about how to treat them. When should you call for help? Call your doctor now or seek immediate medical care if:  ? · You have signs that your infection is getting worse, such as:  ¨ Increased pain, swelling, warmth, or redness. ¨ Red streaks leading from the area. ¨ Pus draining from the area. ¨ A fever. ? · You get a rash. ? Watch closely for changes in your health, and be sure to contact your doctor if:  ? · You are not getting better after 1 day (24 hours). ? · You do not get better as expected. Where can you learn more? Go to http://reta-karthik.info/. Richmond Mccoy in the search box to learn more about \"Cellulitis: Care Instructions. \"  Current as of: October 13, 2016  Content Version: 11.4  © 8466-4336 [employer]. Care instructions adapted under license by [employer] (which disclaims liability or warranty for this information). If you have questions about a medical condition or this instruction, always ask your healthcare professional. [place] [address] any warranty or liability for your use of this information. Thank you! Thank you for allowing us to provide you with excellent care today. We hope we addressed all of your concerns and needs. We strive to provide excellent quality care in the Emergency Department. You may receive a survey after your visit to evaluate the care you were provided. Should you receive a survey from us, we invite you to share your experience and tell us what made it excellent.     It was a pleasure serving you, we invite you to share your experience with us, in our pursuit for excellence, should you be selected to receive a survey. If you feel that you have not received excellent quality care or timely care, please ask to speak to the nurse manager. Please choose us in the future for your continued health care needs. ------------------------------------------------------------------------------------------------------------  The exam and treatment you received in the Emergency Department were for an urgent problem and are not intended as complete care. It is important that you follow up with a doctor, nurse practitioner, or physician assistant for ongoing care. If your symptoms become worse or you do not improve as expected and you are unable to reach your usual health care provider, you should return to the Emergency Department. We are available 24 hours a day. Please take your discharge instructions with you when you go to your follow-up appointment. If you have any problem arranging a follow-up appointment, contact the Emergency Department immediately. If a prescription has been provided, please have it filled as soon as possible to prevent a delay in treatment. Read the entire medication instruction sheet provided to you by the pharmacy. If you have any questions or reservations about taking the medication due to side effects or interactions with other medications, please call your primary care physician or contact the ER to speak with the charge nurse. Make an appointment with your family doctor or the physician you were referred to for follow-up of this visit as instructed on your discharge paperwork, as this is mandatory follow-up. Return to the ER if you are unable to be seen or if you are unable to be seen in a timely manner. If you have any problem arranging the follow-up visit, contact the Emergency Department immediately.

## 2018-04-22 ENCOUNTER — HOSPITAL ENCOUNTER (INPATIENT)
Age: 53
LOS: 7 days | Discharge: HOME | DRG: 629 | End: 2018-04-29
Attending: EMERGENCY MEDICINE | Admitting: INTERNAL MEDICINE
Payer: MEDICARE

## 2018-04-22 DIAGNOSIS — R74.8 ELEVATED LIVER ENZYMES: ICD-10-CM

## 2018-04-22 DIAGNOSIS — E11.40 TYPE 2 DIABETES MELLITUS WITH DIABETIC NEUROPATHY, WITHOUT LONG-TERM CURRENT USE OF INSULIN (HCC): Chronic | ICD-10-CM

## 2018-04-22 DIAGNOSIS — I10 ESSENTIAL HYPERTENSION: Chronic | ICD-10-CM

## 2018-04-22 DIAGNOSIS — L03.116 CELLULITIS OF LEFT ANKLE: Primary | ICD-10-CM

## 2018-04-22 DIAGNOSIS — R73.9 HYPERGLYCEMIA: ICD-10-CM

## 2018-04-22 LAB
ALBUMIN SERPL-MCNC: 3.4 G/DL (ref 3.5–5)
ALBUMIN/GLOB SERPL: 0.8 {RATIO} (ref 1.1–2.2)
ALP SERPL-CCNC: 125 U/L (ref 45–117)
ALT SERPL-CCNC: 141 U/L (ref 12–78)
ANION GAP SERPL CALC-SCNC: 5 MMOL/L (ref 5–15)
AST SERPL-CCNC: 61 U/L (ref 15–37)
BASOPHILS # BLD: 0.1 K/UL (ref 0–0.1)
BASOPHILS NFR BLD: 1 % (ref 0–1)
BILIRUB SERPL-MCNC: 0.8 MG/DL (ref 0.2–1)
BUN SERPL-MCNC: 21 MG/DL (ref 6–20)
BUN/CREAT SERPL: 26 (ref 12–20)
CALCIUM SERPL-MCNC: 9 MG/DL (ref 8.5–10.1)
CHLORIDE SERPL-SCNC: 99 MMOL/L (ref 97–108)
CO2 SERPL-SCNC: 30 MMOL/L (ref 21–32)
CREAT SERPL-MCNC: 0.8 MG/DL (ref 0.7–1.3)
DIFFERENTIAL METHOD BLD: ABNORMAL
EOSINOPHIL # BLD: 0.1 K/UL (ref 0–0.4)
EOSINOPHIL NFR BLD: 1 % (ref 0–7)
ERYTHROCYTE [DISTWIDTH] IN BLOOD BY AUTOMATED COUNT: 13.2 % (ref 11.5–14.5)
GLOBULIN SER CALC-MCNC: 4.1 G/DL (ref 2–4)
GLUCOSE BLD STRIP.AUTO-MCNC: 419 MG/DL (ref 65–100)
GLUCOSE SERPL-MCNC: 370 MG/DL (ref 65–100)
HCT VFR BLD AUTO: 40.3 % (ref 36.6–50.3)
HGB BLD-MCNC: 14.7 G/DL (ref 12.1–17)
IMM GRANULOCYTES # BLD: 0 K/UL (ref 0–0.04)
IMM GRANULOCYTES NFR BLD AUTO: 1 % (ref 0–0.5)
LACTATE SERPL-SCNC: 1.1 MMOL/L (ref 0.4–2)
LYMPHOCYTES # BLD: 1.4 K/UL (ref 0.8–3.5)
LYMPHOCYTES NFR BLD: 21 % (ref 12–49)
MCH RBC QN AUTO: 31 PG (ref 26–34)
MCHC RBC AUTO-ENTMCNC: 36.5 G/DL (ref 30–36.5)
MCV RBC AUTO: 85 FL (ref 80–99)
MONOCYTES # BLD: 0.6 K/UL (ref 0–1)
MONOCYTES NFR BLD: 9 % (ref 5–13)
NEUTS SEG # BLD: 4.4 K/UL (ref 1.8–8)
NEUTS SEG NFR BLD: 67 % (ref 32–75)
NRBC # BLD: 0 K/UL (ref 0–0.01)
NRBC BLD-RTO: 0 PER 100 WBC
PLATELET # BLD AUTO: 151 K/UL (ref 150–400)
PMV BLD AUTO: 10.2 FL (ref 8.9–12.9)
POTASSIUM SERPL-SCNC: 4.7 MMOL/L (ref 3.5–5.1)
PROT SERPL-MCNC: 7.5 G/DL (ref 6.4–8.2)
RBC # BLD AUTO: 4.74 M/UL (ref 4.1–5.7)
SERVICE CMNT-IMP: ABNORMAL
SODIUM SERPL-SCNC: 134 MMOL/L (ref 136–145)
WBC # BLD AUTO: 6.5 K/UL (ref 4.1–11.1)

## 2018-04-22 PROCEDURE — 96366 THER/PROPH/DIAG IV INF ADDON: CPT

## 2018-04-22 PROCEDURE — 96367 TX/PROPH/DG ADDL SEQ IV INF: CPT

## 2018-04-22 PROCEDURE — 87040 BLOOD CULTURE FOR BACTERIA: CPT | Performed by: EMERGENCY MEDICINE

## 2018-04-22 PROCEDURE — 65660000000 HC RM CCU STEPDOWN

## 2018-04-22 PROCEDURE — 74011636637 HC RX REV CODE- 636/637: Performed by: INTERNAL MEDICINE

## 2018-04-22 PROCEDURE — 82962 GLUCOSE BLOOD TEST: CPT

## 2018-04-22 PROCEDURE — 74011000258 HC RX REV CODE- 258: Performed by: INTERNAL MEDICINE

## 2018-04-22 PROCEDURE — 74011250636 HC RX REV CODE- 250/636: Performed by: FAMILY MEDICINE

## 2018-04-22 PROCEDURE — 96375 TX/PRO/DX INJ NEW DRUG ADDON: CPT

## 2018-04-22 PROCEDURE — 74011250637 HC RX REV CODE- 250/637: Performed by: INTERNAL MEDICINE

## 2018-04-22 PROCEDURE — 99284 EMERGENCY DEPT VISIT MOD MDM: CPT

## 2018-04-22 PROCEDURE — 96376 TX/PRO/DX INJ SAME DRUG ADON: CPT

## 2018-04-22 PROCEDURE — 74011000258 HC RX REV CODE- 258: Performed by: EMERGENCY MEDICINE

## 2018-04-22 PROCEDURE — 74011250636 HC RX REV CODE- 250/636: Performed by: EMERGENCY MEDICINE

## 2018-04-22 PROCEDURE — 80053 COMPREHEN METABOLIC PANEL: CPT | Performed by: EMERGENCY MEDICINE

## 2018-04-22 PROCEDURE — 74011250636 HC RX REV CODE- 250/636: Performed by: INTERNAL MEDICINE

## 2018-04-22 PROCEDURE — 36415 COLL VENOUS BLD VENIPUNCTURE: CPT | Performed by: EMERGENCY MEDICINE

## 2018-04-22 PROCEDURE — 83605 ASSAY OF LACTIC ACID: CPT | Performed by: EMERGENCY MEDICINE

## 2018-04-22 PROCEDURE — 96365 THER/PROPH/DIAG IV INF INIT: CPT

## 2018-04-22 PROCEDURE — 85025 COMPLETE CBC W/AUTO DIFF WBC: CPT | Performed by: EMERGENCY MEDICINE

## 2018-04-22 RX ORDER — DOXEPIN HYDROCHLORIDE 25 MG/1
25 CAPSULE ORAL
Status: DISCONTINUED | OUTPATIENT
Start: 2018-04-22 | End: 2018-04-29 | Stop reason: HOSPADM

## 2018-04-22 RX ORDER — INSULIN LISPRO 100 [IU]/ML
INJECTION, SOLUTION INTRAVENOUS; SUBCUTANEOUS
Status: DISCONTINUED | OUTPATIENT
Start: 2018-04-22 | End: 2018-04-29 | Stop reason: HOSPADM

## 2018-04-22 RX ORDER — ONDANSETRON 2 MG/ML
4 INJECTION INTRAMUSCULAR; INTRAVENOUS
Status: COMPLETED | OUTPATIENT
Start: 2018-04-22 | End: 2018-04-22

## 2018-04-22 RX ORDER — ARIPIPRAZOLE 5 MG/1
10 TABLET ORAL
Status: DISCONTINUED | OUTPATIENT
Start: 2018-04-23 | End: 2018-04-29 | Stop reason: HOSPADM

## 2018-04-22 RX ORDER — IBUPROFEN 200 MG
1 TABLET ORAL DAILY
Status: DISCONTINUED | OUTPATIENT
Start: 2018-04-23 | End: 2018-04-29 | Stop reason: HOSPADM

## 2018-04-22 RX ORDER — LISINOPRIL 5 MG/1
5 TABLET ORAL DAILY
Status: DISCONTINUED | OUTPATIENT
Start: 2018-04-23 | End: 2018-04-23

## 2018-04-22 RX ORDER — FENTANYL CITRATE 50 UG/ML
25 INJECTION, SOLUTION INTRAMUSCULAR; INTRAVENOUS
Status: COMPLETED | OUTPATIENT
Start: 2018-04-22 | End: 2018-04-22

## 2018-04-22 RX ORDER — VANCOMYCIN 2 GRAM/500 ML IN 0.9 % SODIUM CHLORIDE INTRAVENOUS
2000
Status: COMPLETED | OUTPATIENT
Start: 2018-04-22 | End: 2018-04-22

## 2018-04-22 RX ORDER — LANOLIN ALCOHOL/MO/W.PET/CERES
3 CREAM (GRAM) TOPICAL
Status: DISCONTINUED | OUTPATIENT
Start: 2018-04-22 | End: 2018-04-29 | Stop reason: HOSPADM

## 2018-04-22 RX ORDER — ACETAMINOPHEN 325 MG/1
650 TABLET ORAL
Status: DISCONTINUED | OUTPATIENT
Start: 2018-04-22 | End: 2018-04-29 | Stop reason: HOSPADM

## 2018-04-22 RX ORDER — ENOXAPARIN SODIUM 100 MG/ML
40 INJECTION SUBCUTANEOUS EVERY 24 HOURS
Status: DISCONTINUED | OUTPATIENT
Start: 2018-04-22 | End: 2018-04-29 | Stop reason: HOSPADM

## 2018-04-22 RX ORDER — MAGNESIUM SULFATE 100 %
4 CRYSTALS MISCELLANEOUS AS NEEDED
Status: DISCONTINUED | OUTPATIENT
Start: 2018-04-22 | End: 2018-04-29 | Stop reason: HOSPADM

## 2018-04-22 RX ORDER — ONDANSETRON 2 MG/ML
4 INJECTION INTRAMUSCULAR; INTRAVENOUS
Status: DISCONTINUED | OUTPATIENT
Start: 2018-04-22 | End: 2018-04-29 | Stop reason: HOSPADM

## 2018-04-22 RX ORDER — ASPIRIN 81 MG/1
81 TABLET ORAL DAILY
Status: DISCONTINUED | OUTPATIENT
Start: 2018-04-23 | End: 2018-04-29 | Stop reason: HOSPADM

## 2018-04-22 RX ORDER — HYDRALAZINE HYDROCHLORIDE 20 MG/ML
10 INJECTION INTRAMUSCULAR; INTRAVENOUS
Status: DISCONTINUED | OUTPATIENT
Start: 2018-04-22 | End: 2018-04-29 | Stop reason: HOSPADM

## 2018-04-22 RX ORDER — OXYCODONE AND ACETAMINOPHEN 5; 325 MG/1; MG/1
1 TABLET ORAL
Status: DISCONTINUED | OUTPATIENT
Start: 2018-04-22 | End: 2018-04-29 | Stop reason: HOSPADM

## 2018-04-22 RX ORDER — METFORMIN HYDROCHLORIDE 500 MG/1
1000 TABLET ORAL 2 TIMES DAILY WITH MEALS
Status: DISCONTINUED | OUTPATIENT
Start: 2018-04-22 | End: 2018-04-29 | Stop reason: HOSPADM

## 2018-04-22 RX ORDER — SODIUM CHLORIDE 9 MG/ML
75 INJECTION, SOLUTION INTRAVENOUS CONTINUOUS
Status: DISCONTINUED | OUTPATIENT
Start: 2018-04-22 | End: 2018-04-25

## 2018-04-22 RX ORDER — DEXTROSE 50 % IN WATER (D50W) INTRAVENOUS SYRINGE
12.5-25 AS NEEDED
Status: DISCONTINUED | OUTPATIENT
Start: 2018-04-22 | End: 2018-04-29 | Stop reason: HOSPADM

## 2018-04-22 RX ORDER — PREGABALIN 25 MG/1
75 CAPSULE ORAL 2 TIMES DAILY
Status: DISCONTINUED | OUTPATIENT
Start: 2018-04-22 | End: 2018-04-29 | Stop reason: HOSPADM

## 2018-04-22 RX ORDER — FENTANYL CITRATE 50 UG/ML
50 INJECTION, SOLUTION INTRAMUSCULAR; INTRAVENOUS
Status: COMPLETED | OUTPATIENT
Start: 2018-04-22 | End: 2018-04-22

## 2018-04-22 RX ORDER — CLONIDINE HYDROCHLORIDE 0.1 MG/1
0.2 TABLET ORAL 2 TIMES DAILY
Status: DISCONTINUED | OUTPATIENT
Start: 2018-04-22 | End: 2018-04-23

## 2018-04-22 RX ADMIN — ONDANSETRON 4 MG: 2 INJECTION INTRAMUSCULAR; INTRAVENOUS at 12:47

## 2018-04-22 RX ADMIN — FENTANYL CITRATE 50 MCG: 50 INJECTION, SOLUTION INTRAMUSCULAR; INTRAVENOUS at 12:47

## 2018-04-22 RX ADMIN — DOXEPIN HYDROCHLORIDE 25 MG: 25 CAPSULE ORAL at 22:51

## 2018-04-22 RX ADMIN — MELATONIN 3 MG ORAL TABLET 3 MG: 3 TABLET ORAL at 22:51

## 2018-04-22 RX ADMIN — ONDANSETRON 4 MG: 2 INJECTION INTRAMUSCULAR; INTRAVENOUS at 14:49

## 2018-04-22 RX ADMIN — SODIUM CHLORIDE 50 ML/HR: 900 INJECTION, SOLUTION INTRAVENOUS at 20:24

## 2018-04-22 RX ADMIN — SODIUM CHLORIDE 1000 ML: 900 INJECTION, SOLUTION INTRAVENOUS at 13:22

## 2018-04-22 RX ADMIN — VANCOMYCIN HYDROCHLORIDE 1000 MG: 1 INJECTION, POWDER, LYOPHILIZED, FOR SOLUTION INTRAVENOUS at 22:51

## 2018-04-22 RX ADMIN — METFORMIN HYDROCHLORIDE 1000 MG: 500 TABLET, FILM COATED ORAL at 19:31

## 2018-04-22 RX ADMIN — FENTANYL CITRATE 25 MCG: 50 INJECTION, SOLUTION INTRAMUSCULAR; INTRAVENOUS at 14:49

## 2018-04-22 RX ADMIN — INSULIN LISPRO 10 UNITS: 100 INJECTION, SOLUTION INTRAVENOUS; SUBCUTANEOUS at 22:51

## 2018-04-22 RX ADMIN — VANCOMYCIN HYDROCHLORIDE 2000 MG: 10 INJECTION, POWDER, LYOPHILIZED, FOR SOLUTION INTRAVENOUS at 13:32

## 2018-04-22 RX ADMIN — PIPERACILLIN SODIUM,TAZOBACTAM SODIUM 3.38 G: 3; .375 INJECTION, POWDER, FOR SOLUTION INTRAVENOUS at 12:47

## 2018-04-22 RX ADMIN — PREGABALIN 75 MG: 25 CAPSULE ORAL at 19:31

## 2018-04-22 RX ADMIN — CLONIDINE HYDROCHLORIDE 0.2 MG: 0.1 TABLET ORAL at 19:31

## 2018-04-22 RX ADMIN — PIPERACILLIN SODIUM,TAZOBACTAM SODIUM 3.38 G: 3; .375 INJECTION, POWDER, FOR SOLUTION INTRAVENOUS at 20:25

## 2018-04-22 RX ADMIN — OXYCODONE HYDROCHLORIDE AND ACETAMINOPHEN 1 TABLET: 5; 325 TABLET ORAL at 20:23

## 2018-04-22 RX ADMIN — ENOXAPARIN SODIUM 40 MG: 40 INJECTION SUBCUTANEOUS at 19:31
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
## 2018-04-22 NOTE — PROGRESS NOTES
Primary Nurse Hermelinda Urbina RN and Mick Luz RN performed a dual skin assessment on this patient Impairment noted- see wound doc flow sheet  Can score is 21  Patient has wound to left ankle red and swollen. Patient's buttocks and sacrum pink but blanchable.

## 2018-04-22 NOTE — IP AVS SNAPSHOT
[address] [facility] 
[phone] Patient: Caroline Rodarte MRN: [id number] :1965 About your hospitalization You were admitted on:  2018 You last received care in the:  [facility] 3 [facility] You were discharged on:  2018 Why you were hospitalized Your primary diagnosis was:  Not on File Your diagnoses also included:  Cellulitis Of Left Ankle, Bipolar 1 Disorder, Mixed, Moderate (Hcc), Type 2 Diabetes Mellitus With Hyperglycemia, Without Long-Term Current Use Of Insulin (Hcc), Essential Hypertension, Chronic Hepatitis C Without Hepatic Coma (Hcc) Follow-up Information Follow up With Details Comments Contact Info [facility] DIABETIC TREATMENT Go on 2018 You are scheduled at 1pm on 18. Please arrive 15min early. Bring your blood sugar readings and discharge instructions with you. [address] [facility] 81. [address] [facility] 
[phone] Professor Mukund 108, DO   [place] [address] [facility] [address] [facility] 
[phone] Your Scheduled Appointments Friday May 11, 2018  1:00 PM EDT  
DIABETES CLASS 1HR with SURVIVAL SKILLS CLASS [facility] DIABETIC TREATMENT ([address]) [address] [facility] 81. [facility]  
[phone] Discharge Orders None A check diann indicates which time of day the medication should be taken. My Medications START taking these medications Instructions Each Dose to Equal  
 Morning Noon Evening Bedtime  
 ciprofloxacin HCl 750 mg tablet Commonly known as:  CIPRO Your last dose was: Your next dose is: Take 1 Tab by mouth two (2) times a day for 28 days. 750 mg  
    
   
   
   
  
 insulin aspart U-100 100 unit/mL Inpn Commonly known as:  NovoLOG Flexpen U-100 Insulin Your last dose was: Your next dose is:    
   
   
 4 Units by SubCUTAneous route Before breakfast, lunch, and dinner. + SSI; For a glucose of: 140-199=2 u 200-249=3 u 250-299=5 u 300-349=7 u 350-400=9 u  
 4 Units  
    
   
   
   
  
 insulin detemir U-100 100 unit/mL (3 mL) Inpn Commonly known as:  LEVEMIR FLEXTOUCH U-100 INSULN Your last dose was: Your next dose is:    
   
   
 12 Units by SubCUTAneous route nightly. 12 Units L. acidoph & paracasei- S therm- Bifido 8 billion cell Cap cap Commonly known as:  ARLENE-Q/RISAQUAD Start taking on:  4/30/2018 Your last dose was: Your next dose is: Take 1 Cap by mouth daily. 1 Cap  
    
   
   
   
  
 oxyCODONE-acetaminophen  mg per tablet Commonly known as:  PERCOCET 10 Your last dose was: Your next dose is: Take 1 Tab by mouth every six (6) hours as needed for Pain for up to 7 days. Max Daily Amount: 4 Tabs. 1 Tab CHANGE how you take these medications Instructions Each Dose to Equal  
 Morning Noon Evening Bedtime  
 metFORMIN  mg tablet Commonly known as:  GLUCOPHAGE XR What changed:  how much to take Your last dose was: Your next dose is: Take 1 Tab by mouth two (2) times a day. 500 mg CONTINUE taking these medications Instructions Each Dose to Equal  
 Morning Noon Evening Bedtime ARIPiprazole 10 mg tablet Commonly known as:  ABILIFY Your last dose was: Your next dose is: Take 1 Tab by mouth daily (after breakfast). Indications: MIXED BIPOLAR I DISORDER  
 10 mg  
    
   
   
   
  
 aspirin delayed-release 81 mg tablet Your last dose was: Your next dose is: Take 1 Tab by mouth daily. 81 mg Blood-Glucose Meter monitoring kit Your last dose was: Your next dose is:    
   
   
 Check blood sugars daily. DX: E11.9  
     
   
   
   
  
 cloNIDine HCl 0.2 mg tablet Commonly known as:  CATAPRES Your last dose was: Your next dose is: Take 1 Tab by mouth two (2) times a day. 0.2 mg  
    
   
   
   
  
 cyclobenzaprine 10 mg tablet Commonly known as:  FLEXERIL Your last dose was: Your next dose is: Take 1 Tab by mouth three (3) times daily as needed for Muscle Spasm(s). 10 mg  
    
   
   
   
  
 doxepin 25 mg capsule Commonly known as:  SINEquan Your last dose was: Your next dose is: Take 1 Cap by mouth nightly. 25 mg  
    
   
   
   
  
 glucose blood VI test strips strip Commonly known as:  ASCENSIA AUTODISC VI, ONE TOUCH ULTRA TEST VI Your last dose was: Your next dose is:    
   
   
 Check blood sugars daily. DX: E11.9 Lancets Misc Commonly known as:  Shayan BASS Your last dose was: Your next dose is:    
   
   
 Check blood sugars daily. DX: E11.9  
     
   
   
   
  
 lisinopril 5 mg tablet Commonly known as:  Ronald Emma Your last dose was: Your next dose is: Take 1 Tab by mouth daily. 5 mg  
    
   
   
   
  
 pregabalin 75 mg capsule Commonly known as:  Liat Bar Your last dose was: Your next dose is: Take 1 Cap by mouth two (2) times a day. Max Daily Amount: 150 mg. Indications: Diabetic Peripheral Neuropathy 75 mg  
    
   
   
   
  
  
STOP taking these medications   
 doxycycline 100 mg tablet Commonly known as:  VIBRA-TABS  
   
  
 ibuprofen 600 mg tablet Commonly known as:  MOTRIN Where to Get Your Medications Information on where to get these meds will be given to you by the nurse or doctor. ! Ask your nurse or doctor about these medications ciprofloxacin HCl 750 mg tablet  
 insulin aspart U-100 100 unit/mL Inpn  
 insulin detemir U-100 100 unit/mL (3 mL) Inpn L. acidoph & paracasei- S therm- Bifido 8 billion cell Cap cap  
 oxyCODONE-acetaminophen  mg per tablet Opioid Education Prescription Opioids: What You Need to Know: 
 
 
NAME: Brook Ospina :  1965 MRN:  [id number] Date/Time:  2018 8:56 AM 
 
ADMIT DATE: 2018 DISCHARGE DATE: 2018 DISCHARGE DIAGNOSIS: 
Left Ankle Cellulitis/DM Ankle Ulcer Mild Left Lateral Malleolus Osteomyelitis DM type 2, uncontrolled, A1c 9.0:  
Hypertension Chronic hepatitis C Bipolar Disorder Tobacco Abuse History of IV drug abuse 
  MEDICATIONS: 
As per medication reconciliation  list 
· It is important that you take the medication exactly as they are prescribed. · Keep your medication in the bottles provided by the pharmacist and keep a list of the medication names, dosages, and times to be taken in your wallet. · Do not take other medications without consulting your doctor. Pain Management: I have given you a Rx for percocet. Please let your girlfriend, Kourtney Sullivan, regulate these meds as we have discussed and as she has agreed to. What to do at [facility] Recommended diet:  Diabetic Diet Recommended activity: Activity as tolerated. Use your crutches. If you experience any of the following symptoms then please call your primary care physician or return to the emergency room if you cannot get hold of your doctor: 
Fever, chills, nausea, vomiting, diarrhea, change in mentation, falling, bleeding, shortness of breath or any other concerning symptoms.  
 
Follow Up: 
 With your PCP, Dr. Emil Douglas, DO, to discuss you diabetes management Call Dr. Anival Parikh for follow-up appointment in 7-10 days. His office is [facility] and phone number is [phone]. Also call Dr. Evelyn Cano office at [phone] and schedule a 2 week follow-up appointment. Information obtained by : 
I understand that if any problems occur once I am at home I am to contact my physician. I understand and acknowledge receipt of the instructions indicated above. Physician's or R.N.'s Signature                                                                  Date/Time Patient or Representative Signature                                                          Date/Time · [employer] Announcement We are excited to announce that we are making your provider's discharge notes available to you in [employer]. You will see these notes when they are completed and signed by the physician that discharged you from your recent hospital stay. If you have any questions or concerns about any information you see in [employer], please call the Health Information Department where you were seen or reach out to your Primary Care Provider for more information about your plan of care. Introducing [facility] [employer] patient portal. Now you can access parts of your medical record, email your doctor's office, and request medication refills online. 1. In your internet browser, go to [email or web address] 2. Click on the First Time User? Click Here link in the Sign In box. You will see the New Member Sign Up page. 3. Enter your [employer] Access Code exactly as it appears below. You will not need to use this code after youve completed the sign-up process. If you do not sign up before the expiration date, you must request a new code. · [employer] Access Code: [email or web address] Expires: 7/20/2018  5:24 AM 
 
4. Enter the last four digits of your Social Security Number (xxxx) and Date of Birth (mm/dd/yyyy) as indicated and click Submit. You will be taken to the next sign-up page. 5. Create a DokDokt ID. This will be your [employer] login ID and cannot be changed, so think of one that is secure and easy to remember. 6. Create a DokDokt password. You can change your password at any time. 7. Enter your Password Reset Question and Answer. This can be used at a later time if you forget your password. 8. Enter your e-mail address. You will receive e-mail notification when new information is available in [address] 9. Click Sign Up. You can now view and download portions of your medical record. 10. Click the Download Summary menu link to download a portable copy of your medical information. If you have questions, please visit the Frequently Asked Questions section of the [employer] website. Remember, [employer] is NOT to be used for urgent needs. For medical emergencies, dial 911. Now available from your iPhone and Android! Introducing Jt Ritter As a [facility] patient, I wanted to make you aware of our electronic visit tool called [employer]. [facility] 24/7 allows you to connect within minutes with a medical provider 24 hours a day, seven days a week via a mobile device or tablet or logging into a secure website from your computer. You can access Jt Ritter from anywhere in the [place].  
 
A virtual visit might be right for you when you have a simple condition and feel like you just dont want to get out of bed, or cant get away from work for an appointment, when your regular [facility] provider is not available (evenings, weekends or holidays), or when youre out of town and need minor care. Electronic visits cost only $49 and if the [facility] 24/7 provider determines a prescription is needed to treat your condition, one can be electronically transmitted to a nearby pharmacy*. Please take a moment to enroll today if you have not already done so. The enrollment process is free and takes just a few minutes. To enroll, please download the Supramed/Verdiem vivienne to your tablet or phone, or visit [email or web address] to enroll on your computer. And, as an [address] [place] [address] patient with a Send [employer] account, the results of your visits will be scanned into your electronic medical record and your primary care provider will be able to view the scanned results. We urge you to continue to see your regular [facility] provider for your ongoing medical care. And while your primary care provider may not be the one available when you seek a [employer] virtual visit, the peace of mind you get from getting a real diagnosis real time can be priceless. For more information on [employer], view our Frequently Asked Questions (FAQs) at [email or web address]. Sincerely, 
 
Brionna Lucero MD 
Chief Medical Officer [employer] *:  certain medications cannot be prescribed via [employer] Providers Seen During Your Hospitalization Provider Specialty Primary office phone Roxie Kelly MD Emergency Medicine [phone] Sonido Babin MD Internal Medicine [phone] Your Primary Care Physician (PCP) Primary Care Physician Office Phone Office Fax Nichelle [phone] You are allergic to the following Allergen Reactions Versed (Midazolam) Shortness of Breath Weakness Versed (Midazolam) Unknown (comments) Numbness, with shortness of breath Recent Documentation Height Weight BMI Smoking Status 1.854 m 79.8 kg 23.21 kg/m2 Current Every Day Smoker Emergency Contacts Name Discharge Info Relation Home Work Mobile [address] CAREGIVER [3] Father [15] [phone] Patient Belongings The following personal items are in your possession at time of discharge: 
  Dental Appliances: None  Visual Aid: None      Home Medications: None   Jewelry: None  Clothing: Undergarments, Sweater, Shirt, Pants, Footwear    Other Valuables: Cell Phone Please provide this summary of care documentation to your next provider. Signatures-by signing, you are acknowledging that this After Visit Summary has been reviewed with you and you have received a copy. Patient Signature:  ____________________________________________________________ Date:  ____________________________________________________________  
  
Samantha Dickens Provider Signature:  ____________________________________________________________ Date:  ____________________________________________________________
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
## 2018-04-22 NOTE — ED NOTES
TRANSFER - OUT REPORT:    Verbal report given to Whitney(name) on Oskar Velazquez  being transferred to Telemetry(unit) for routine progression of care       Report consisted of patients Situation, Background, Assessment and   Recommendations(SBAR). Information from the following report(s) SBAR, Kardex, ED Summary, Intake/Output, MAR and Cardiac Rhythm Sinus [place] was reviewed with the receiving nurse. Lines:   Peripheral IV 04/22/18 Right Antecubital (Active)   Site Assessment Clean, dry, & intact 4/22/2018 11:38 AM   Phlebitis Assessment 0 4/22/2018 11:38 AM   Infiltration Assessment 0 4/22/2018 11:38 AM   Dressing Status Clean, dry, & intact 4/22/2018 11:38 AM   Dressing Type Transparent;Tape 4/22/2018 11:38 AM        Opportunity for questions and clarification was provided.

## 2018-04-22 NOTE — ED TRIAGE NOTES
Pt arrives to ED with worsening left leg and ankle pain related to diagnosed cellulitis x2 days ago.  Pt reports increased swelling, pain, and redness since discharge

## 2018-04-22 NOTE — PROGRESS NOTES
Pharmacy Automatic Renal Dosing Protocol - Antimicrobials    Indication for Antimicrobials: Left ankle cellulitis/DM foot ulcer     Current Regimen of Each Antimicrobial:  Vancomycin 2000 mg LD followed by (Start Date ; Day #)  Zosyn 3.375 g Q8 hours (Start Date ; Day # )    Previous Antimicrobial Therapy:   Patient recently discharged from ED  with Rx for doxycycline 100 mg BID x 10 days    Goal Level: VANCOMYCIN TROUGH GOAL RANGE    Vancomycin Trough: 10 - 15 mcg/mL    Measured / Extrapolated Vancomycin Level: none    Significant Cultures:    Blood (paired)- Prelim    Radiology / Imaging results: (X-ray, CT scan or MRI): none    Paralysis, amputations, malnutrition: none    Labs:  Recent Labs      18   1204  18   0929   CREA  0.80  0.86   BUN  21*  21*   WBC  6.5  8.9     Temp (24hrs), Av.2 °F (36.2 °C), Min:97.2 °F (36.2 °C), Max:97.2 °F (36.2 °C)      Creatinine Clearance (mL/min) or Dialysis: >100 ml/min      Impression/Plan:   · Scr at baseline, patient afebrile, no leukocytosis  · Zosyn dosed appropriately for renal function and indication  · Vancomycin 2000 mg LD followed by 1,000 mg Q8 hours for anticipated trough of ~14.4 mcg/ml. · Antimicrobial stop date 7 days  · CBC and CMP entered for tomorrow AM     Pharmacy will follow daily and adjust medications as appropriate for renal function and/or serum levels. Thank you,  LUDY Laughlin          Recommended duration of therapy  [email or web address]/[place]/[facility]/Pharmacy/Clinical%20Companion/[email or web address]. docx    Renal Dosing  [email or web address]/[place]/[facility]/Pharmacy/Clinical%20Companion/Renal%[id number]. pdf

## 2018-04-23 ENCOUNTER — APPOINTMENT (OUTPATIENT)
Dept: MRI IMAGING | Age: 53
DRG: 629 | End: 2018-04-23
Attending: INTERNAL MEDICINE
Payer: MEDICARE

## 2018-04-23 DIAGNOSIS — E11.9 DIABETES MELLITUS WITHOUT COMPLICATION (HCC): Primary | ICD-10-CM

## 2018-04-23 LAB
ALBUMIN SERPL-MCNC: 2.9 G/DL (ref 3.5–5)
ALBUMIN/GLOB SERPL: 0.9 {RATIO} (ref 1.1–2.2)
ALP SERPL-CCNC: 80 U/L (ref 45–117)
ALT SERPL-CCNC: 128 U/L (ref 12–78)
ANION GAP SERPL CALC-SCNC: 6 MMOL/L (ref 5–15)
AST SERPL-CCNC: 77 U/L (ref 15–37)
BASOPHILS # BLD: 0 K/UL (ref 0–0.1)
BASOPHILS NFR BLD: 1 % (ref 0–1)
BILIRUB SERPL-MCNC: 0.5 MG/DL (ref 0.2–1)
BUN SERPL-MCNC: 24 MG/DL (ref 6–20)
BUN/CREAT SERPL: 32 (ref 12–20)
CALCIUM SERPL-MCNC: 8.6 MG/DL (ref 8.5–10.1)
CHLORIDE SERPL-SCNC: 105 MMOL/L (ref 97–108)
CO2 SERPL-SCNC: 28 MMOL/L (ref 21–32)
CREAT SERPL-MCNC: 0.75 MG/DL (ref 0.7–1.3)
DIFFERENTIAL METHOD BLD: ABNORMAL
EOSINOPHIL # BLD: 0.1 K/UL (ref 0–0.4)
EOSINOPHIL NFR BLD: 3 % (ref 0–7)
ERYTHROCYTE [DISTWIDTH] IN BLOOD BY AUTOMATED COUNT: 13.3 % (ref 11.5–14.5)
EST. AVERAGE GLUCOSE BLD GHB EST-MCNC: 212 MG/DL
GLOBULIN SER CALC-MCNC: 3.2 G/DL (ref 2–4)
GLUCOSE BLD STRIP.AUTO-MCNC: 132 MG/DL (ref 65–100)
GLUCOSE BLD STRIP.AUTO-MCNC: 176 MG/DL (ref 65–100)
GLUCOSE BLD STRIP.AUTO-MCNC: 224 MG/DL (ref 65–100)
GLUCOSE BLD STRIP.AUTO-MCNC: 293 MG/DL (ref 65–100)
GLUCOSE SERPL-MCNC: 152 MG/DL (ref 65–100)
HBA1C MFR BLD: 9 % (ref 4.2–6.3)
HCT VFR BLD AUTO: 35 % (ref 36.6–50.3)
HGB BLD-MCNC: 12.7 G/DL (ref 12.1–17)
IMM GRANULOCYTES # BLD: 0 K/UL (ref 0–0.04)
IMM GRANULOCYTES NFR BLD AUTO: 1 % (ref 0–0.5)
LYMPHOCYTES # BLD: 1.5 K/UL (ref 0.8–3.5)
LYMPHOCYTES NFR BLD: 34 % (ref 12–49)
MAGNESIUM SERPL-MCNC: 2 MG/DL (ref 1.6–2.4)
MCH RBC QN AUTO: 31.5 PG (ref 26–34)
MCHC RBC AUTO-ENTMCNC: 36.3 G/DL (ref 30–36.5)
MCV RBC AUTO: 86.8 FL (ref 80–99)
MONOCYTES # BLD: 0.4 K/UL (ref 0–1)
MONOCYTES NFR BLD: 9 % (ref 5–13)
NEUTS SEG # BLD: 2.4 K/UL (ref 1.8–8)
NEUTS SEG NFR BLD: 53 % (ref 32–75)
NRBC # BLD: 0 K/UL (ref 0–0.01)
NRBC BLD-RTO: 0 PER 100 WBC
PLATELET # BLD AUTO: 134 K/UL (ref 150–400)
PMV BLD AUTO: 10.2 FL (ref 8.9–12.9)
POTASSIUM SERPL-SCNC: 4.3 MMOL/L (ref 3.5–5.1)
PROT SERPL-MCNC: 6.1 G/DL (ref 6.4–8.2)
RBC # BLD AUTO: 4.03 M/UL (ref 4.1–5.7)
SERVICE CMNT-IMP: ABNORMAL
SODIUM SERPL-SCNC: 139 MMOL/L (ref 136–145)
WBC # BLD AUTO: 4.4 K/UL (ref 4.1–11.1)

## 2018-04-23 PROCEDURE — 77030009526 HC GEL CARSYN MDII -A

## 2018-04-23 PROCEDURE — G8978 MOBILITY CURRENT STATUS: HCPCS

## 2018-04-23 PROCEDURE — 74011636637 HC RX REV CODE- 636/637: Performed by: INTERNAL MEDICINE

## 2018-04-23 PROCEDURE — G8979 MOBILITY GOAL STATUS: HCPCS

## 2018-04-23 PROCEDURE — 73723 MRI JOINT LWR EXTR W/O&W/DYE: CPT

## 2018-04-23 PROCEDURE — 97161 PT EVAL LOW COMPLEX 20 MIN: CPT

## 2018-04-23 PROCEDURE — 74011250636 HC RX REV CODE- 250/636: Performed by: INTERNAL MEDICINE

## 2018-04-23 PROCEDURE — 74011250637 HC RX REV CODE- 250/637: Performed by: INTERNAL MEDICINE

## 2018-04-23 PROCEDURE — 97116 GAIT TRAINING THERAPY: CPT

## 2018-04-23 PROCEDURE — 74011000258 HC RX REV CODE- 258: Performed by: INTERNAL MEDICINE

## 2018-04-23 PROCEDURE — G8988 SELF CARE GOAL STATUS: HCPCS

## 2018-04-23 PROCEDURE — G8989 SELF CARE D/C STATUS: HCPCS

## 2018-04-23 PROCEDURE — 77030011256 HC DRSG MEPILEX <16IN NO BORD MOLN -A

## 2018-04-23 PROCEDURE — 87522 HEPATITIS C REVRS TRNSCRPJ: CPT | Performed by: INTERNAL MEDICINE

## 2018-04-23 PROCEDURE — G8987 SELF CARE CURRENT STATUS: HCPCS

## 2018-04-23 PROCEDURE — 87902 NFCT AGT GNTYP ALYS HEP C: CPT | Performed by: INTERNAL MEDICINE

## 2018-04-23 PROCEDURE — 83735 ASSAY OF MAGNESIUM: CPT | Performed by: INTERNAL MEDICINE

## 2018-04-23 PROCEDURE — 82962 GLUCOSE BLOOD TEST: CPT

## 2018-04-23 PROCEDURE — 97165 OT EVAL LOW COMPLEX 30 MIN: CPT

## 2018-04-23 PROCEDURE — 36415 COLL VENOUS BLD VENIPUNCTURE: CPT | Performed by: INTERNAL MEDICINE

## 2018-04-23 PROCEDURE — 83036 HEMOGLOBIN GLYCOSYLATED A1C: CPT | Performed by: INTERNAL MEDICINE

## 2018-04-23 PROCEDURE — A9576 INJ PROHANCE MULTIPACK: HCPCS | Performed by: INTERNAL MEDICINE

## 2018-04-23 PROCEDURE — 85025 COMPLETE CBC W/AUTO DIFF WBC: CPT | Performed by: INTERNAL MEDICINE

## 2018-04-23 PROCEDURE — 65660000000 HC RM CCU STEPDOWN

## 2018-04-23 PROCEDURE — 77030011255 HC DSG AQUACEL AG BMS -A

## 2018-04-23 PROCEDURE — 80053 COMPREHEN METABOLIC PANEL: CPT | Performed by: INTERNAL MEDICINE

## 2018-04-23 RX ORDER — CLONIDINE HYDROCHLORIDE 0.1 MG/1
0.1 TABLET ORAL 2 TIMES DAILY
Status: DISCONTINUED | OUTPATIENT
Start: 2018-04-23 | End: 2018-04-29 | Stop reason: HOSPADM

## 2018-04-23 RX ORDER — INSULIN LISPRO 100 [IU]/ML
3 INJECTION, SOLUTION INTRAVENOUS; SUBCUTANEOUS
Status: DISCONTINUED | OUTPATIENT
Start: 2018-04-23 | End: 2018-04-25

## 2018-04-23 RX ORDER — MUPIROCIN 20 MG/G
OINTMENT TOPICAL DAILY
Status: DISCONTINUED | OUTPATIENT
Start: 2018-04-23 | End: 2018-04-25

## 2018-04-23 RX ADMIN — OXYCODONE HYDROCHLORIDE AND ACETAMINOPHEN 1 TABLET: 5; 325 TABLET ORAL at 13:27

## 2018-04-23 RX ADMIN — METFORMIN HYDROCHLORIDE 1000 MG: 500 TABLET, FILM COATED ORAL at 16:07

## 2018-04-23 RX ADMIN — Medication 1 CAPSULE: at 17:25

## 2018-04-23 RX ADMIN — CLONIDINE HYDROCHLORIDE 0.1 MG: 0.1 TABLET ORAL at 17:25

## 2018-04-23 RX ADMIN — OXYCODONE HYDROCHLORIDE AND ACETAMINOPHEN 1 TABLET: 5; 325 TABLET ORAL at 08:57

## 2018-04-23 RX ADMIN — VANCOMYCIN HYDROCHLORIDE 1000 MG: 1 INJECTION, POWDER, LYOPHILIZED, FOR SOLUTION INTRAVENOUS at 21:21

## 2018-04-23 RX ADMIN — ASPIRIN 81 MG: 81 TABLET, COATED ORAL at 08:57

## 2018-04-23 RX ADMIN — VANCOMYCIN HYDROCHLORIDE 1000 MG: 1 INJECTION, POWDER, LYOPHILIZED, FOR SOLUTION INTRAVENOUS at 16:00

## 2018-04-23 RX ADMIN — PREGABALIN 75 MG: 25 CAPSULE ORAL at 08:57

## 2018-04-23 RX ADMIN — INSULIN LISPRO 3 UNITS: 100 INJECTION, SOLUTION INTRAVENOUS; SUBCUTANEOUS at 08:58

## 2018-04-23 RX ADMIN — OXYCODONE HYDROCHLORIDE AND ACETAMINOPHEN 1 TABLET: 5; 325 TABLET ORAL at 05:07

## 2018-04-23 RX ADMIN — INSULIN HUMAN 10 UNITS: 100 INJECTION, SUSPENSION SUBCUTANEOUS at 16:59

## 2018-04-23 RX ADMIN — DOXEPIN HYDROCHLORIDE 25 MG: 25 CAPSULE ORAL at 21:19

## 2018-04-23 RX ADMIN — INSULIN HUMAN 10 UNITS: 100 INJECTION, SUSPENSION SUBCUTANEOUS at 10:26

## 2018-04-23 RX ADMIN — INSULIN LISPRO 3 UNITS: 100 INJECTION, SOLUTION INTRAVENOUS; SUBCUTANEOUS at 17:00

## 2018-04-23 RX ADMIN — PIPERACILLIN SODIUM,TAZOBACTAM SODIUM 3.38 G: 3; .375 INJECTION, POWDER, FOR SOLUTION INTRAVENOUS at 19:25

## 2018-04-23 RX ADMIN — PREGABALIN 75 MG: 25 CAPSULE ORAL at 17:25

## 2018-04-23 RX ADMIN — METFORMIN HYDROCHLORIDE 1000 MG: 500 TABLET, FILM COATED ORAL at 08:57

## 2018-04-23 RX ADMIN — ENOXAPARIN SODIUM 40 MG: 40 INJECTION SUBCUTANEOUS at 16:07

## 2018-04-23 RX ADMIN — PIPERACILLIN SODIUM,TAZOBACTAM SODIUM 3.38 G: 3; .375 INJECTION, POWDER, FOR SOLUTION INTRAVENOUS at 13:27

## 2018-04-23 RX ADMIN — VANCOMYCIN HYDROCHLORIDE 1000 MG: 1 INJECTION, POWDER, LYOPHILIZED, FOR SOLUTION INTRAVENOUS at 05:45

## 2018-04-23 RX ADMIN — OXYCODONE HYDROCHLORIDE AND ACETAMINOPHEN 1 TABLET: 5; 325 TABLET ORAL at 19:23

## 2018-04-23 RX ADMIN — INSULIN LISPRO 5 UNITS: 100 INJECTION, SOLUTION INTRAVENOUS; SUBCUTANEOUS at 13:22

## 2018-04-23 RX ADMIN — ARIPIPRAZOLE 10 MG: 5 TABLET ORAL at 08:57

## 2018-04-23 RX ADMIN — PIPERACILLIN SODIUM,TAZOBACTAM SODIUM 3.38 G: 3; .375 INJECTION, POWDER, FOR SOLUTION INTRAVENOUS at 05:07

## 2018-04-23 RX ADMIN — GADOTERIDOL 15 ML: 279.3 INJECTION, SOLUTION INTRAVENOUS at 20:45

## 2018-04-23 NOTE — PROGRESS NOTES
Hospitalist Progress Note    NAME: Saman Ireland   :  1965   MRN:  [id number]       Assessment / Plan:  Left Ankle Cellulitis/DM Ankle Ulcer:   -appreciate wound care evaluation  -await podiatry thoughts  -MRI left ankle ordered, need to rule out left lateral malleolus osteo  -continue on Vancomycin and Zosyn, start probiotic  -percocet prn pain    DM type 2, uncontrolled, A1c 9.0:   -continue metformin  -NPH 10 units BID started  -Humalog 3 units TID AC started    History of Hypertension with low BP measurements this am  -lisinopril discontinued  -home clonidine dose tapered and BP parameters specified to hold if systolic BP less than 813 mmHg  -continue IVF's for now    Sinus Bradycardia: seems stable, avoid BB, monitor in telemetry    H/O HepC: treated as per patient has increased LFT, viral load pending   Bipolar Disorder: c/w home regimen  Tobacco Abuse: nicotine patch    Code Status: Full Code  Surrogate Decision Maker: Alberto Alvarez [phone]  DVT Prophylaxis: lovenox  GI Prophylaxis: not indicated  Baseline: Independent  Body mass index is 23.21 kg/(m^2). Subjective:     Chief Complaint / Reason for Physician Visit: follow-up cellulitis  Patient reports that ankle hurts more today  Denies ever having any drainage    Review of Systems:  Symptom Y/N Comments  Symptom Y/N Comments   Fever/Chills n   Chest Pain n    Poor Appetite    Edema y Feels like left foot starting to swell   Cough    Abdominal Pain     Sputum    Joint Pain     SOB/VIGIL n   Pruritis/Rash     Nausea/vomit    Tolerating PT/OT     Diarrhea n But has been loose  Tolerating Diet     Constipation    Other       Could NOT obtain due to:      Objective:     VITALS:   Last 24hrs VS reviewed since prior progress note.  Most recent are:  Patient Vitals for the past 24 hrs:   Temp Pulse Resp BP SpO2   18 0741 98.1 °F (36.7 °C) (!) 51 19 107/67 97 %   18 0333 97.8 °F (36.6 °C) (!) 49 18 (!) 79/46 98 %   18 2349 97.5 °F (36.4 °C) (!) 48 18 113/72 97 %   04/22/18 1811 97.7 °F (36.5 °C) (!) 57 16 114/69 97 %   04/22/18 1600 - - 13 109/72 100 %   04/22/18 1300 - (!) 55 13 113/75 100 %   04/22/18 1230 - (!) 51 19 100/66 99 %   04/22/18 1157 - (!) 55 17 - 100 %   04/22/18 1154 - - - 118/75 -   04/22/18 1124 97.2 °F (36.2 °C) 71 18 113/72 100 %       Intake/Output Summary (Last 24 hours) at 04/23/18 1004  Last data filed at 04/23/18 0644   Gross per 24 hour   Intake          1216.67 ml   Output                0 ml   Net          1216.67 ml        PHYSICAL EXAM:  General: WD, WN. Alert, cooperative, no acute distress    EENT:  EOMI. Anicteric sclerae. MM dry  Resp:  CTA bilaterally, no wheezing or rales. No accessory muscle use  CV:  Regular  rhythm,  No edema  GI:  Soft, Non distended, Non tender.  +Bowel sounds  Neurologic:  Alert and oriented X 3, normal speech,   Psych:   Good insight. Not anxious nor agitated  Skin:  See below. No jaundice        Reviewed most current lab test results and cultures  YES  Reviewed most current radiology test results   YES  Review and summation of old records today    NO  Reviewed patient's current orders and MAR    YES  PMH/ reviewed - no change compared to H&P  ________________________________________________________________________  Care Plan discussed with:    Comments   Patient x    Family      RN x    Care Manager     Consultant                        Multidiciplinary team rounds were held today with , nursing, pharmacist and clinical coordinator. Patient's plan of care was discussed; medications were reviewed and discharge planning was addressed.      ________________________________________________________________________  Total NON critical care TIME:  35   Minutes    Total CRITICAL CARE TIME Spent:   Minutes non procedure based      Comments   >50% of visit spent in counseling and coordination of care x ________________________________________________________________________  Michelle Duong MD     Procedures: see electronic medical records for all procedures/Xrays and details which were not copied into this note but were reviewed prior to creation of Plan. LABS:  I reviewed today's most current labs and imaging studies.   Pertinent labs include:  Recent Labs      04/23/18   0504  04/22/18   1204   WBC  4.4  6.5   HGB  12.7  14.7   HCT  35.0*  40.3   PLT  134*  151     Recent Labs      04/23/18   0504  04/22/18   1204   NA  139  134*   K  4.3  4.7   CL  105  99   CO2  28  30   GLU  152*  370*   BUN  24*  21*   CREA  0.75  0.80   CA  8.6  9.0   MG  2.0   --    ALB  2.9*  3.4*   TBILI  0.5  0.8   SGOT  77*  61*   ALT  128*  141*       Signed: Michelle Duong MD

## 2018-04-23 NOTE — CONSULTS
Podiatry Consult    Subjective: Pt notice wound on L ankle less than 1 week ago, which appeared to heal. He then had rapid increase in swelling, redness and pain L ankle starting about 2 days ago. He was then admitted to [facility] through the ED. Date of Consultation:  April 23, 2018    Referring Physician: Maria Teresa Fregoso MD    Patient is a 47 y.o.  male who is being seen for cellulitis left ankle.      Patient Active Problem List    Diagnosis Date Noted    Cellulitis of left ankle 04/22/2018    Spinal stenosis of lumbar region with neurogenic claudication 03/26/2018    Type 2 diabetes mellitus with diabetic neuropathy, without long-term current use of insulin ([address] 01/28/2018    Type 2 diabetes mellitus with hyperglycemia, without long-term current use of insulin ([address] 01/25/2018    Essential hypertension 01/25/2018    Chronic pain syndrome 01/25/2018    Chronic hepatitis C without hepatic coma ([address] 01/25/2018    Other male erectile dysfunction 01/25/2018    Tobacco abuse 01/25/2018    Bipolar 1 disorder, mixed, moderate ([address] 03/06/2017    Polysubstance abuse 10/18/2016    Personality disorder 07/26/2013    Do not give narcotics 11/08/2011    Non compliance with medical treatment 11/03/2011     Past Medical History:   Diagnosis Date    Adverse effect of anesthesia     breathing diff. with versed    Bipolar 1 disorder, mixed, moderate ([address] 3/6/2017    Chronic pain     Depression     Pt stated diagnosed years ago    Depression 3/13/2013    Diabetes ([address]     Diabetes ([address] 2003    Drug-induced mood disorder 5/28/2013    Homicide attempt     HTN (hypertension) 11/3/2011    Narcotic dependence, episodic use ([address] 11/3/2011    NIDDM (non-insulin dependent diabetes mellitus) 11/3/2011    Non compliance with medical treatment 11/3/2011    Other ill-defined conditions([address]99[address]89)     chronic low back pain    Psychiatric disorder     bipolar    Sleep disorder     Substance abuse     Suicidal thoughts       Family History   Problem Relation Age of Onset   [facility] Stroke Mother     Hypertension Mother     Heart Disease Father     Hypertension Father     Cancer Maternal Grandmother      unknown type    Diabetes Maternal Grandfather       Social History   Substance Use Topics    Smoking status: Current Every Day Smoker     Packs/day: 0.50    Smokeless tobacco: Never Used    Alcohol use No      Comment: sociially      Past Surgical History:   Procedure Laterality Date    CARDIAC SURG PROCEDURE UNLIST      cardiac stents X2 lad drug eluting    COLONOSCOPY N/A 8/31/2016    COLONOSCOPY performed by Dalia Aase., [facility] at [facility] ENDOSCOPY    COLONOSCOPY,DIAGNOSTIC  8/31/2016         HX ORTHOPAEDIC      chronic back pain    HX ORTHOPAEDIC      left knee arthroplasty    LA ANESTH,LUMBAR SPINE,CORD SURGERY  7 1999    l4 l5    REMV KIDNEY,COMPLICATED  6447    side unknown - kidney stones    UPPER GI ENDOSCOPY,BIOPSY  8/31/2016           Prior to Admission medications    Medication Sig Start Date End Date Taking? Authorizing Provider   doxycycline (VIBRA-TABS) 100 mg tablet Take 1 Tab by mouth two (2) times a day for 10 days. 4/20/18 4/30/18  Chandni Hurt MD   cyclobenzaprine (FLEXERIL) 10 mg tablet Take 1 Tab by mouth three (3) times daily as needed for Muscle Spasm(s). 4/20/18   Chandni Hurt MD   ibuprofen (MOTRIN) 600 mg tablet Take 1 Tab by mouth every six (6) hours as needed for Pain. 4/20/18   Chandni Hurt MD   cloNIDine HCl (CATAPRES) 0.2 mg tablet Take 1 Tab by mouth two (2) times a day. 3/26/18   Reilly Jimenes III, DO   metFORMIN ER (GLUCOPHAGE XR) 500 mg tablet Take 1 Tab by mouth two (2) times a day. Patient taking differently: Take 1,000 mg by mouth two (2) times a day. 3/26/18   Reilly Jimenes III, DO   Blood-Glucose Meter monitoring kit Check blood sugars daily.   DX: E11.9 1/30/18   Reilly Jimenes III, DO   glucose blood VI test strips (ASCENSIA AUTODISC VI, ONE TOUCH ULTRA TEST VI) strip Check blood sugars daily. DX: E11.9 18   Reilly Jimenes III, DO   Lancets (LANCETS, SUPER THIN) misc Check blood sugars daily. DX: E11.9 18   Reilly Jimenes III, DO   pregabalin (LYRICA) 75 mg capsule Take 1 Cap by mouth two (2) times a day. Max Daily Amount: 150 mg. Indications: Diabetic Peripheral Neuropathy 18   Caitlyn Harvey III, DO   lisinopril (PRINIVIL, ZESTRIL) 5 mg tablet Take 1 Tab by mouth daily. 18   Caitlyn Harvey III, DO   aspirin delayed-release 81 mg tablet Take 1 Tab by mouth daily. 18   Reilly Jimenes III, DO   doxepin (SINEQUAN) 25 mg capsule Take 1 Cap by mouth nightly. 17   Kori Castaneda MD   ARIPiprazole (ABILIFY) 10 mg tablet Take 1 Tab by mouth daily (after breakfast). Indications: MIXED BIPOLAR I DISORDER 17   Kori Castaneda MD     Allergies   Allergen Reactions    Versed [Midazolam] Shortness of Breath     Weakness     Versed [Midazolam] Unknown (comments)     Numbness, with shortness of breath        Review of Systems:  AO x3. No current fever or chills. Pain L ankle, but otherwise no distress. Objective:     Patient Vitals for the past 8 hrs:   BP Temp Pulse Resp SpO2   18 1530 150/68 98.4 °F (36.9 °C) 70 20 95 %   18 1525 117/73 98.6 °F (37 °C) (!) 59 20 98 %   18 1522 135/51 98.6 °F (37 °C) 64 20 98 %   18 1135 106/64 98.5 °F (36.9 °C) (!) 53 20 96 %     Temp (24hrs), Av.2 °F (36.8 °C), Min:97.5 °F (36.4 °C), Max:98.6 °F (37 °C)    Lower Extremity Exam:  Skin intact R foot/ankle. +Erythema, edema and calor left foot and ankle. 1 x 2 cm moist eschar lateral Left ankle, but no drainage and no fluctuance of the soft tissue. DP and PT 2/4 B; CFT less than 3 seconds all toes  LE strength 5/5 B. Sensation absent to fine touch B feet and ankles. Moderate pain L ankle with pain and ROM, but pt is able to perform active ROM.     X-rays: 3 views left ankle show no FB, FX or acute osseous process. 3 views left foot also show no FB, FX or acute osseous process. U/S L ankle: no soft tissue abscess. Data Review:   Recent Results (from the past 24 hour(s))   GLUCOSE, POC    Collection Time: 04/22/18  9:50 PM   Result Value Ref Range    Glucose (POC) 419 (H) 65 - 100 mg/dL    Performed by Kellie Lee    CBC WITH AUTOMATED DIFF    Collection Time: 04/23/18  5:04 AM   Result Value Ref Range    WBC 4.4 4.1 - 11.1 K/uL    RBC 4.03 (L) 4.10 - 5.70 M/uL    HGB 12.7 12.1 - 17.0 g/dL    HCT 35.0 (L) 36.6 - 50.3 %    MCV 86.8 80.0 - 99.0 FL    MCH 31.5 26.0 - 34.0 PG    MCHC 36.3 30.0 - 36.5 g/dL    RDW 13.3 11.5 - 14.5 %    PLATELET 140 (L) 372 - 400 K/uL    MPV 10.2 8.9 - 12.9 FL    NRBC 0.0 0  WBC    ABSOLUTE NRBC 0.00 0.00 - 0.01 K/uL    NEUTROPHILS 53 32 - 75 %    LYMPHOCYTES 34 12 - 49 %    MONOCYTES 9 5 - 13 %    EOSINOPHILS 3 0 - 7 %    BASOPHILS 1 0 - 1 %    IMMATURE GRANULOCYTES 1 (H) 0.0 - 0.5 %    ABS. NEUTROPHILS 2.4 1.8 - 8.0 K/UL    ABS. LYMPHOCYTES 1.5 0.8 - 3.5 K/UL    ABS. MONOCYTES 0.4 0.0 - 1.0 K/UL    ABS. EOSINOPHILS 0.1 0.0 - 0.4 K/UL    ABS. BASOPHILS 0.0 0.0 - 0.1 K/UL    ABS. IMM.  GRANS. 0.0 0.00 - 0.04 K/UL    DF AUTOMATED     HEMOGLOBIN A1C WITH EAG    Collection Time: 04/23/18  5:04 AM   Result Value Ref Range    Hemoglobin A1c 9.0 (H) 4.2 - 6.3 %    Est. average glucose 212 mg/dL   MAGNESIUM    Collection Time: 04/23/18  5:04 AM   Result Value Ref Range    Magnesium 2.0 1.6 - 2.4 mg/dL   METABOLIC PANEL, COMPREHENSIVE    Collection Time: 04/23/18  5:04 AM   Result Value Ref Range    Sodium 139 136 - 145 mmol/L    Potassium 4.3 3.5 - 5.1 mmol/L    Chloride 105 97 - 108 mmol/L    CO2 28 21 - 32 mmol/L    Anion gap 6 5 - 15 mmol/L    Glucose 152 (H) 65 - 100 mg/dL    BUN 24 (H) 6 - 20 MG/DL    Creatinine 0.75 0.70 - 1.30 MG/DL    BUN/Creatinine ratio 32 (H) 12 - 20      GFR est AA >60 >60 ml/min/1.73m2    GFR est non-AA >60 >60 ml/min/1.73m2    Calcium 8.6 8.5 - 10.1 MG/DL    Bilirubin, total 0.5 0.2 - 1.0 MG/DL    ALT (SGPT) 128 (H) 12 - 78 U/L    AST (SGOT) 77 (H) 15 - 37 U/L    Alk. phosphatase 80 45 - 117 U/L    Protein, total 6.1 (L) 6.4 - 8.2 g/dL    Albumin 2.9 (L) 3.5 - 5.0 g/dL    Globulin 3.2 2.0 - 4.0 g/dL    A-G Ratio 0.9 (L) 1.1 - 2.2     GLUCOSE, POC    Collection Time: 04/23/18  7:45 AM   Result Value Ref Range    Glucose (POC) 224 (H) 65 - 100 mg/dL    Performed by Rebecca Stroud (PCT)    GLUCOSE, POC    Collection Time: 04/23/18 12:03 PM   Result Value Ref Range    Glucose (POC) 293 (H) 65 - 100 mg/dL    Performed by Rebecca Stroud (PCT)    GLUCOSE, POC    Collection Time: 04/23/18  4:12 PM   Result Value Ref Range    Glucose (POC) 132 (H) 65 - 100 mg/dL    Performed by Rebecca Stroud (PCT)          Impression:   1. Diabetic wound left ankle  2. Cellulitis left ankle  3. Cannot rule out osteomyelitis left ankle or septic arthritis, but these are less likely based upon exam and HPI. Recommendation: An MRI has been ordered and is expected to be performed later tonight. I will await the results. No change in wound care at this time. I will continue to follow.

## 2018-04-23 NOTE — PROGRESS NOTES
Bedside and Verbal shift change report given to [place] (oncoming nurse) by Damien Hernandez RN (offgoing nurse). Report included the following information SBAR, Kardex, Intake/Output, MAR and Cardiac Rhythm sinus arlet. Zone Phone for oncoming shift:   [phone]    Shift Summary: PRN Percocet x2 for ankle pain     LDAs               Peripheral IV 04/22/18 Right Antecubital (Active)   Site Assessment Clean, dry, & intact 4/23/2018  3:33 AM   Phlebitis Assessment 0 4/23/2018  3:33 AM   Infiltration Assessment 0 4/23/2018  3:33 AM   Dressing Status Clean, dry, & intact 4/23/2018  3:33 AM   Dressing Type Transparent;Tape 4/23/2018  3:33 AM   Hub Color/Line Status Pink; Infusing 4/23/2018  3:33 AM                        Intake & Output   Date 04/22/18 0700 - 04/23/18 0659 04/23/18 0700 - 04/24/18 0659   Shift 5054-1858 9494-7806 24 Hour Total 8409-9097 2085-9512 24 Hour Total   I  N  T  A  K  E   I.V.  (mL/kg/hr)  1216.7 1216.7         Volume (0.9% sodium chloride infusion)  516.7 516.7         Volume (piperacillin-tazobactam (ZOSYN) 3.375 g in 0.9% sodium chloride (MBP/ADV) 100 mL)  200 200         Volume (vancomycin (VANCOCIN) 1,000 mg in 0.9% sodium chloride (MBP/ADV) 250 mL)  500 500       Shift Total  (mL/kg)  1216.7  (15.2) 1216.7  (15.2)      O  U  T  P  U  T   Shift Total  (mL/kg)         NET  1216.7 1216.7      Weight (kg) 79.8 79.8 79.8 79.8 79.8 79.8      Last Bowel Movement Last Bowel Movement Date: 04/22/18   Glucose Checks [] N/A  [x] AC/HS  [] Q6  Concerns: Bedtime  gave 10 units insulin per MD   Nutrition Active Orders   Diet    DIET CARDIAC Regular; Consistent Carb 1800kcal       Consults []PT  []OT  []Speech  []Case Management   Cardiac Monitoring []N/A [x]Yes Expires:

## 2018-04-23 NOTE — DIABETES MGMT
[facility]    Elevated A1C Visit Note    Recommendations/ Comments:   Please provide pt with prescriptions for the contour next EZ test strips and generic lancets. Current DM medications: NPH 10units ac b/d added today, humalog correction and metformin 1000mg ac b/d    Patient is a 47 y.o. male with known Type 2 Diabetes on metformin ER 1000mg ac b/d at home. Pt reports his metformin was increased at his appt last month with Dr. Jeanna Evans. Pt reports he has been on additional pills as well as insulin ac b/d in the past.  He cannot recall the names of previous medications. He does not have working glucometer. Provided pt with a new glucometer- pt declined demonstration or to give a return demo stating he knows how to use one. Pt declined insulin instruction as well stating that he remembers how to draw up and inject insulin. Pt reports he had a blister on his foot approx 3-4 weeks ago. Began bothering him last week when he presented to the ED Friday. Reviewed foot care with pt.       A1c:   Lab Results   Component Value Date/Time    Hemoglobin A1c 9.0 (H) 04/23/2018 05:04 AM    Hemoglobin A1c 8.8 (H) 01/26/2018 08:16 AM       Recent Glucose Results:   Lab Results   Component Value Date/Time     (H) 04/23/2018 05:04 AM    GLUCPOC 293 (H) 04/23/2018 12:03 PM    GLUCPOC 224 (H) 04/23/2018 07:45 AM    GLUCPOC 419 (H) 04/22/2018 09:50 PM        Lab Results   Component Value Date/Time    Creatinine 0.75 04/23/2018 05:04 AM       Active Orders   Diet    DIET CARDIAC Regular; Consistent Carb 1800kcal          Assessed and instructed patient on the following:   ·  interpretation of lab results, blood sugar goals, complications of diabetes mellitus and referred to Diabetes Educator    Provided patient with the following: [x]          Diabetes Self-Care Guide               [x]          Insulin education materials               []          Diabetes survival skills handout               [] BG guidelines for post-op patients               []          \"Decreasing  the Cost of Diabetes Care\"               [x]          Outpatient DTC contact number          Patient to follow up with PCP after discharge. Will continue to follow as needed. Thank you.   JUANPABLO Best, RN, [phone]

## 2018-04-23 NOTE — PROGRESS NOTES
Problem: Mobility Impaired (Adult and Pediatric)  Goal: *Acute Goals and Plan of Care (Insert Text)  Physical Therapy Goals  Initiated 4/23/2018  1. Patient will move from supine to sit and sit to supine  in bed with independence within 7 day(s). 2.  Patient will transfer from bed to chair and chair to bed with modified independence using the least restrictive device within 7 day(s). 3.  Patient will perform sit to stand with modified independence within 7 day(s). 4.  Patient will ambulate with modified independence for 200 feet with the least restrictive device within 7 day(s). 5.  Patient will ascend/descend 14 stairs with 1 handrail(s) or with crutches with modified independence within 7 day(s). physical Therapy EVALUATION  Patient: Ese Reza (30 y.o. male)  Date: 4/23/2018  Primary Diagnosis: Cellulitis of left ankle        Precautions:        ASSESSMENT :  Based on the objective data described below, the patient presents with impaired balance and altered gait due to pain in L ankle. Pt was independent with mobility prior to admission, currently on disability. He was received in supine and cleared by nursing to mobilize. He performed bed mobility at an independent/supervision level to come to the EOB. Reports increased pain in L ankle when placed in a dependent position. He was able to perform tranfers with mod I using crutches and ambulated down the beard for 100ft with supervision, no major LOB and can safely manage the crutches. Pt wanted to return to the room before stair training could be assessed. Likely on need 1 more session for negotiate stair and then no needs at discharge. Patient will benefit from skilled intervention to address the above impairments.   Patients rehabilitation potential is considered to be Good  Factors which may influence rehabilitation potential include:   [x]         None noted  []         Mental ability/status  []         Medical condition  []         Home/family situation and support systems  []         Safety awareness  []         Pain tolerance/management  []         Other:      PLAN :  Recommendations and Planned Interventions:  [x]           Bed Mobility Training             []    Neuromuscular Re-Education  [x]           Transfer Training                   []    Orthotic/Prosthetic Training  [x]           Gait Training                         []    Modalities  [x]           Therapeutic Exercises           []    Edema Management/Control  [x]           Therapeutic Activities            [x]    Patient and Family Training/Education  []           Other (comment):    Frequency/Duration: Patient will be followed by physical therapy  3 times a week to address goals. Discharge Recommendations: None  Further Equipment Recommendations for Discharge: has DME     SUBJECTIVE:   Patient stated Namrata Chand can borrow crutches .     OBJECTIVE DATA SUMMARY:   HISTORY:    Past Medical History:   Diagnosis Date    Adverse effect of anesthesia     breathing diff. with versed    Bipolar 1 disorder, mixed, moderate ([address] 3/6/2017    Chronic pain     Depression     Pt stated diagnosed years ago    Depression 3/13/2013    Diabetes ([address]     Diabetes ([address] 2003    Drug-induced mood disorder 5/28/2013    Homicide attempt     HTN (hypertension) 11/3/2011    Narcotic dependence, episodic use ([address] 11/3/2011    NIDDM (non-insulin dependent diabetes mellitus) 11/3/2011    Non compliance with medical treatment 11/3/2011    Other ill-defined conditions(799.89)     chronic low back pain    Psychiatric disorder     bipolar    Sleep disorder     Substance abuse     Suicidal thoughts      Past Surgical History:   Procedure Laterality Date    CARDIAC SURG PROCEDURE UNLIST      cardiac stents X2 lad drug eluting    COLONOSCOPY N/A 8/31/2016    COLONOSCOPY performed by Marty Silver MD at [facility]"" ENDOSCOPY    COLONOSCOPY,DIAGNOSTIC  8/31/2016         HX ORTHOPAEDIC      chronic back pain    HX ORTHOPAEDIC      left knee arthroplasty    IN ANESTH,LUMBAR SPINE,CORD SURGERY  7 1999    l4 l5    REMV KIDNEY,COMPLICATED  1208    side unknown - kidney stones    UPPER GI ENDOSCOPY,BIOPSY  8/31/2016          Prior Level of Function/Home Situation: pt independent living with frederic and her son. On disability, but active. Personal factors and/or comorbidities impacting plan of care:     Home Situation  Home Environment: Private residence  # Steps to Enter: 3  Rails to Enter: No  One/Two Story Residence: Two story  # of Interior Steps: 15  Interior Rails: Right  Living Alone: No  Support Systems: Spouse/Significant Other/Partner  Patient Expects to be Discharged to[de-identified] Private residence  Current DME Used/Available at Home: Crutches, Walker, rolling  Tub or Shower Type: Tub/Shower combination    EXAMINATION/PRESENTATION/DECISION MAKING:   Critical Behavior:   alert and oriented x 4            Hearing: Auditory  Auditory Impairment: None  Skin:  Redness in L ankle  Edema: L ankle  Range Of Motion:  AROM: Within functional limits           PROM: Within functional limits           Strength:    Strength: Within functional limits                    Tone & Sensation:   Tone: Normal              Sensation: Intact               Coordination:  Coordination: Within functional limits  Vision:      Functional Mobility:  Bed Mobility:  Rolling: Independent  Supine to Sit: Independent     Scooting: Independent  Transfers:  Sit to Stand: Modified independent  Stand to Sit: Modified independent        Bed to Chair: Modified independent              Balance:   Sitting: Intact  Standing: Intact; With support  Ambulation/Gait Training:  Distance (ft): 100 Feet (ft)  Assistive Device: Crutches  Ambulation - Level of Assistance: Supervision        Gait Abnormalities: Antalgic        Base of Support: Shift to right  Stance: Right increased  Speed/Kalyn: Pace decreased (<100 feet/min)  Step Length: Left shortened Functional Measure:  Barthel Index:    Bathin  Bladder: 10  Bowels: 10  Groomin  Dressing: 10  Feeding: 10  Mobility: 10  Stairs: 5  Toilet Use: 10  Transfer (Bed to Chair and Back): 10  Total: 85       Barthel and G-code impairment scale:  Percentage of impairment CH  0% CI  1-19% CJ  20-39% CK  40-59% CL  60-79% CM  80-99% CN  100%   Barthel Score 0-100 100 99-80 79-60 59-40 20-39 1-19   0   Barthel Score 0-20 20 17-19 13-16 9-12 5-8 1-4 0      The Barthel ADL Index: Guidelines  1. The index should be used as a record of what a patient does, not as a record of what a patient could do. 2. The main aim is to establish degree of independence from any help, physical or verbal, however minor and for whatever reason. 3. The need for supervision renders the patient not independent. 4. A patient's performance should be established using the best available evidence. Asking the patient, friends/relatives and nurses are the usual sources, but direct observation and common sense are also important. However direct testing is not needed. 5. Usually the patient's performance over the preceding 24-48 hours is important, but occasionally longer periods will be relevant. 6. Middle categories imply that the patient supplies over 50 per cent of the effort. 7. Use of aids to be independent is allowed. Bella Lugo., Barthel, DKASIE. (1626). Functional evaluation: the Barthel Index. [address] [facility] (14)2. Taya Betancourt, BRENTJJonnyMSELENE, Yennifer Kimble., Sarah Jimenez.[facility], [address] [place] [address] (). Measuring the change indisability after inpatient rehabilitation; comparison of the responsiveness of the Barthel Index and Functional [place] Measure. Journal of Neurology, Neurosurgery, and Psychiatry, 66(4), 800-628. Misty Tuttle, N.J.A, JOSUE Escalera, & Mg Soliz M.A. (2004.) Assessment of post-stroke quality of life in cost-effectiveness studies: The usefulness of the Barthel Index and the EuroQoL-5D.  Quality of Life Research, 13, 951-58         G codes: In compliance with CMSs Claims Based Outcome Reporting, the following G-code set was chosen for this patient based on their primary functional limitation being treated: The outcome measure chosen to determine the severity of the functional limitation was the barthel with a score of 85/100 which was correlated with the impairment scale. ? Mobility - Walking and Moving Around:     - CURRENT STATUS: CI - 1%-19% impaired, limited or restricted    - GOAL STATUS: CH - 0% impaired, limited or restricted    - D/C STATUS:  ---------------To be determined---------------      Physical Therapy Evaluation Charge Determination   History Examination Presentation Decision-Making   HIGH Complexity :3+ comorbidities / personal factors will impact the outcome/ POC  LOW Complexity : 1-2 Standardized tests and measures addressing body structure, function, activity limitation and / or participation in recreation  LOW Complexity : Stable, uncomplicated  Other outcome measures barthel  LOW       Based on the above components, the patient evaluation is determined to be of the following complexity level: LOW     Pain:  Pain Scale 1: Numeric (0 - 10)  Pain Intensity 1: 0  Pain Location 1: Foot  Pain Orientation 1: Lower  Pain Description 1: Aching; Throbbing  Pain Intervention(s) 1: Medication (see MAR)  Activity Tolerance:   VSS  Please refer to the flowsheet for vital signs taken during this treatment. After treatment:   [x]         Patient left in no apparent distress sitting up in chair  []         Patient left in no apparent distress in bed  [x]         Call bell left within reach  [x]         Nursing notified  []         Caregiver present  []         Bed alarm activated    COMMUNICATION/EDUCATION:   The patients plan of care was discussed with: Occupational Therapist, Registered Nurse and .   [x]         Fall prevention education was provided and the patient/caregiver indicated understanding. []         Patient/family have participated as able in goal setting and plan of care. [x]         Patient/family agree to work toward stated goals and plan of care. []         Patient understands intent and goals of therapy, but is neutral about his/her participation. []         Patient is unable to participate in goal setting and plan of care.     Thank you for this referral.  Kraig Guardado, PT, DPT   Time Calculation: 20 mins
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
## 2018-04-23 NOTE — PROGRESS NOTES
Problem: Falls - Risk of  Goal: *Absence of Falls  Document Yu Fall Risk and appropriate interventions in the flowsheet.   Outcome: Progressing Towards Goal  Fall Risk Interventions:  Mobility Interventions: Patient to call before getting OOB         Medication Interventions: Teach patient to arise slowly, Patient to call before getting OOB

## 2018-04-23 NOTE — PROGRESS NOTES

## 2018-04-23 NOTE — PROGRESS NOTES
Reason for Admission:   Sepsis                    RRAT Score:     21             Do you (patient/family) have any concerns for transition/discharge? compliance with meds//follow up vivienne't              Plan for utilizing home health:     Declines the need. Encouraged to let me know if he changes his mind    Likelihood of readmission?   moderate            Transition of Care Plan:          Pt admitted with sepsis. Has past history of bipoloar, non compliance, drug abuse, and suicide attempts. Pt is independent with adls, has crutches//RW, full code, Medicare due to back issues, and PCP Dr Jimenes(vivienne't for 6/26). Per chart, PT ambulated with him 100 feet and has no d/c recommendations at present time. Chart states he lives with Venora Grumbling at [phone] and her son. Care Management Interventions  PCP Verified by CM:  Yes  Mode of Transport at Discharge: Self  Transition of Care Consult (CM Consult): Discharge Planning  MyChart Signup: No  Discharge Durable Medical Equipment: Yes  Health Maintenance Reviewed: Yes  Physical Therapy Consult: Yes  Occupational Therapy Consult: Yes  Speech Therapy Consult: No  Current Support Network: Lives with Caregiver  Confirm Follow Up Transport: Family  Plan discussed with Pt/Family/Caregiver: Yes  Freedom of Choice Offered: Yes  Discharge Location  Discharge Placement: Home

## 2018-04-23 NOTE — WOUND CARE
Wound care Nurse consult from Dr Dee Dee Rodriguez for \" left ankle DM foot ulcer\" a consult placed to podiatry also. Patient is a 48 y/o CM who developed a blister to left lateral ankle while pulling up carpet aprox 3-4 weeks ago. Within the past week, the wound became painful and swollen. He was admitted to [facility] last evening for cellulitis of left ankle and IV ABX's. Patient has a dry scab/eschar with a white ring of intact skin surrounding and then erythema. There is some warmth to swollen skin. Pt had an x-ray which showed no abnormalities and is scheduled to have an MRI. WOUND POA CONDITIONS    Wound Ankle Left;Lateral (Active)   DRESSING TYPE Open to air 4/23/2018  3:31 PM   Non-Pressure Injury Full thickness (subcut/muscle) 4/23/2018  3:31 PM   Wound Length (cm) 1.2 cm 4/23/2018  3:31 PM   Wound Width (cm) 0.8 cm 4/23/2018  3:31 PM   Wound Surface area (cm^2) 0.96 cm^2 4/23/2018  3:31 PM   Condition of Base Eschar 4/23/2018  3:31 PM   Condition of Edges Open 4/23/2018  3:31 PM   Tissue Type Black 4/23/2018  3:31 PM   Tissue Type Percent Black 100 % 4/23/2018  3:31 PM   Drainage Amount  None 4/23/2018  3:31 PM   Wound Odor None 4/23/2018  3:31 PM   Periwound Skin Condition Erythema, blanchable;Edematous 4/23/2018  3:31 PM   Cleansing and Cleansing Agents  Dermal wound cleanser 4/23/2018  3:31 PM   Dressing Type Applied Wound gel;Silver products; Foam 4/23/2018  3:31 PM   Procedure Tolerated Well 4/23/2018  3:31 PM   Number of days:1           Recommend;    Left, lateral ankle: cleanse with dermal wound cleanser (Saida), wipe clean. Apply Bactroban/mipirocin ointment to wound and cover with small foam dressing.  Please defer to podiatrist wound care orders once seen by MD. Maria D Gonzalez RN, CWON

## 2018-04-24 ENCOUNTER — ANESTHESIA EVENT (OUTPATIENT)
Dept: SURGERY | Age: 53
DRG: 629 | End: 2018-04-24
Payer: MEDICARE

## 2018-04-24 ENCOUNTER — ANESTHESIA (OUTPATIENT)
Dept: SURGERY | Age: 53
DRG: 629 | End: 2018-04-24
Payer: MEDICARE

## 2018-04-24 LAB
DATE LAST DOSE: ABNORMAL
GLUCOSE BLD STRIP.AUTO-MCNC: 101 MG/DL (ref 65–100)
GLUCOSE BLD STRIP.AUTO-MCNC: 103 MG/DL (ref 65–100)
GLUCOSE BLD STRIP.AUTO-MCNC: 226 MG/DL (ref 65–100)
GLUCOSE BLD STRIP.AUTO-MCNC: 237 MG/DL (ref 65–100)
GLUCOSE BLD STRIP.AUTO-MCNC: 96 MG/DL (ref 65–100)
REPORTED DOSE,DOSE: ABNORMAL UNITS
REPORTED DOSE/TIME,TMG: ABNORMAL
SERVICE CMNT-IMP: ABNORMAL
SERVICE CMNT-IMP: NORMAL
VANCOMYCIN TROUGH SERPL-MCNC: 12 UG/ML (ref 5–10)

## 2018-04-24 PROCEDURE — 74011250636 HC RX REV CODE- 250/636: Performed by: INTERNAL MEDICINE

## 2018-04-24 PROCEDURE — 88311 DECALCIFY TISSUE: CPT | Performed by: PODIATRIST

## 2018-04-24 PROCEDURE — 77030002916 HC SUT ETHLN J&J -A: Performed by: PODIATRIST

## 2018-04-24 PROCEDURE — 74011250636 HC RX REV CODE- 250/636

## 2018-04-24 PROCEDURE — 0QBK0ZX EXCISION OF LEFT FIBULA, OPEN APPROACH, DIAGNOSTIC: ICD-10-PCS | Performed by: PODIATRIST

## 2018-04-24 PROCEDURE — 88307 TISSUE EXAM BY PATHOLOGIST: CPT | Performed by: PODIATRIST

## 2018-04-24 PROCEDURE — 74011250637 HC RX REV CODE- 250/637: Performed by: INTERNAL MEDICINE

## 2018-04-24 PROCEDURE — 87077 CULTURE AEROBIC IDENTIFY: CPT | Performed by: INTERNAL MEDICINE

## 2018-04-24 PROCEDURE — 80202 ASSAY OF VANCOMYCIN: CPT | Performed by: INTERNAL MEDICINE

## 2018-04-24 PROCEDURE — 76010000138 HC OR TIME 0.5 TO 1 HR: Performed by: PODIATRIST

## 2018-04-24 PROCEDURE — 77030018836 HC SOL IRR NACL ICUM -A: Performed by: PODIATRIST

## 2018-04-24 PROCEDURE — 74011636637 HC RX REV CODE- 636/637: Performed by: INTERNAL MEDICINE

## 2018-04-24 PROCEDURE — 77030006788 HC BLD SAW OSC STRY -B: Performed by: PODIATRIST

## 2018-04-24 PROCEDURE — 74011000258 HC RX REV CODE- 258: Performed by: INTERNAL MEDICINE

## 2018-04-24 PROCEDURE — 77030011640 HC PAD GRND REM COVD -A: Performed by: PODIATRIST

## 2018-04-24 PROCEDURE — 87186 SC STD MICRODIL/AGAR DIL: CPT | Performed by: INTERNAL MEDICINE

## 2018-04-24 PROCEDURE — 36415 COLL VENOUS BLD VENIPUNCTURE: CPT | Performed by: INTERNAL MEDICINE

## 2018-04-24 PROCEDURE — 65660000000 HC RM CCU STEPDOWN

## 2018-04-24 PROCEDURE — 76210000063 HC OR PH I REC FIRST 0.5 HR: Performed by: PODIATRIST

## 2018-04-24 PROCEDURE — 74011250636 HC RX REV CODE- 250/636: Performed by: ANESTHESIOLOGY

## 2018-04-24 PROCEDURE — 76060000032 HC ANESTHESIA 0.5 TO 1 HR: Performed by: PODIATRIST

## 2018-04-24 PROCEDURE — 82962 GLUCOSE BLOOD TEST: CPT

## 2018-04-24 PROCEDURE — 87205 SMEAR GRAM STAIN: CPT | Performed by: INTERNAL MEDICINE

## 2018-04-24 PROCEDURE — 77030031139 HC SUT VCRL2 J&J -A: Performed by: PODIATRIST

## 2018-04-24 PROCEDURE — 77030000032 HC CUF TRNQT ZIMM -B: Performed by: PODIATRIST

## 2018-04-24 PROCEDURE — 87075 CULTR BACTERIA EXCEPT BLOOD: CPT | Performed by: INTERNAL MEDICINE

## 2018-04-24 RX ORDER — LIDOCAINE HYDROCHLORIDE 10 MG/ML
0.1 INJECTION, SOLUTION EPIDURAL; INFILTRATION; INTRACAUDAL; PERINEURAL AS NEEDED
Status: DISCONTINUED | OUTPATIENT
Start: 2018-04-24 | End: 2018-04-24 | Stop reason: HOSPADM

## 2018-04-24 RX ORDER — PROPOFOL 10 MG/ML
INJECTION, EMULSION INTRAVENOUS
Status: DISCONTINUED | OUTPATIENT
Start: 2018-04-24 | End: 2018-04-24 | Stop reason: HOSPADM

## 2018-04-24 RX ORDER — PROPOFOL 10 MG/ML
INJECTION, EMULSION INTRAVENOUS AS NEEDED
Status: DISCONTINUED | OUTPATIENT
Start: 2018-04-24 | End: 2018-04-24 | Stop reason: HOSPADM

## 2018-04-24 RX ORDER — FENTANYL CITRATE 50 UG/ML
50 INJECTION, SOLUTION INTRAMUSCULAR; INTRAVENOUS AS NEEDED
Status: DISCONTINUED | OUTPATIENT
Start: 2018-04-24 | End: 2018-04-24 | Stop reason: HOSPADM

## 2018-04-24 RX ORDER — SODIUM CHLORIDE, SODIUM LACTATE, POTASSIUM CHLORIDE, CALCIUM CHLORIDE 600; 310; 30; 20 MG/100ML; MG/100ML; MG/100ML; MG/100ML
25 INJECTION, SOLUTION INTRAVENOUS CONTINUOUS
Status: DISCONTINUED | OUTPATIENT
Start: 2018-04-24 | End: 2018-04-24 | Stop reason: HOSPADM

## 2018-04-24 RX ORDER — VANCOMYCIN HYDROCHLORIDE
1250 EVERY 8 HOURS
Status: DISCONTINUED | OUTPATIENT
Start: 2018-04-24 | End: 2018-04-26

## 2018-04-24 RX ORDER — FENTANYL CITRATE 50 UG/ML
INJECTION, SOLUTION INTRAMUSCULAR; INTRAVENOUS AS NEEDED
Status: DISCONTINUED | OUTPATIENT
Start: 2018-04-24 | End: 2018-04-24 | Stop reason: HOSPADM

## 2018-04-24 RX ORDER — SODIUM CHLORIDE 0.9 % (FLUSH) 0.9 %
5-10 SYRINGE (ML) INJECTION AS NEEDED
Status: DISCONTINUED | OUTPATIENT
Start: 2018-04-24 | End: 2018-04-25 | Stop reason: HOSPADM

## 2018-04-24 RX ORDER — FENTANYL CITRATE 50 UG/ML
50 INJECTION, SOLUTION INTRAMUSCULAR; INTRAVENOUS
Status: DISCONTINUED | OUTPATIENT
Start: 2018-04-24 | End: 2018-04-29 | Stop reason: HOSPADM

## 2018-04-24 RX ORDER — FENTANYL CITRATE 50 UG/ML
25 INJECTION, SOLUTION INTRAMUSCULAR; INTRAVENOUS
Status: DISCONTINUED | OUTPATIENT
Start: 2018-04-24 | End: 2018-04-25 | Stop reason: HOSPADM

## 2018-04-24 RX ORDER — HYDROMORPHONE HYDROCHLORIDE 1 MG/ML
.2-.5 INJECTION, SOLUTION INTRAMUSCULAR; INTRAVENOUS; SUBCUTANEOUS
Status: DISCONTINUED | OUTPATIENT
Start: 2018-04-24 | End: 2018-04-25 | Stop reason: HOSPADM

## 2018-04-24 RX ORDER — DIPHENHYDRAMINE HYDROCHLORIDE 50 MG/ML
12.5 INJECTION, SOLUTION INTRAMUSCULAR; INTRAVENOUS AS NEEDED
Status: ACTIVE | OUTPATIENT
Start: 2018-04-24 | End: 2018-04-24

## 2018-04-24 RX ORDER — MORPHINE SULFATE 10 MG/ML
2 INJECTION, SOLUTION INTRAMUSCULAR; INTRAVENOUS
Status: DISCONTINUED | OUTPATIENT
Start: 2018-04-24 | End: 2018-04-25 | Stop reason: HOSPADM

## 2018-04-24 RX ADMIN — INSULIN LISPRO 3 UNITS: 100 INJECTION, SOLUTION INTRAVENOUS; SUBCUTANEOUS at 08:47

## 2018-04-24 RX ADMIN — ACETAMINOPHEN 650 MG: 325 TABLET ORAL at 07:58

## 2018-04-24 RX ADMIN — INSULIN LISPRO 3 UNITS: 100 INJECTION, SOLUTION INTRAVENOUS; SUBCUTANEOUS at 12:39

## 2018-04-24 RX ADMIN — VANCOMYCIN HYDROCHLORIDE 1250 MG: 10 INJECTION, POWDER, LYOPHILIZED, FOR SOLUTION INTRAVENOUS at 17:30

## 2018-04-24 RX ADMIN — PIPERACILLIN SODIUM,TAZOBACTAM SODIUM 3.38 G: 3; .375 INJECTION, POWDER, FOR SOLUTION INTRAVENOUS at 20:17

## 2018-04-24 RX ADMIN — Medication 1 CAPSULE: at 08:33

## 2018-04-24 RX ADMIN — PREGABALIN 75 MG: 25 CAPSULE ORAL at 08:33

## 2018-04-24 RX ADMIN — PIPERACILLIN SODIUM,TAZOBACTAM SODIUM 3.38 G: 3; .375 INJECTION, POWDER, FOR SOLUTION INTRAVENOUS at 04:13

## 2018-04-24 RX ADMIN — INSULIN HUMAN 10 UNITS: 100 INJECTION, SUSPENSION SUBCUTANEOUS at 18:00

## 2018-04-24 RX ADMIN — PROPOFOL 50 MCG/KG/MIN: 10 INJECTION, EMULSION INTRAVENOUS at 22:53

## 2018-04-24 RX ADMIN — INSULIN HUMAN 10 UNITS: 100 INJECTION, SUSPENSION SUBCUTANEOUS at 09:19

## 2018-04-24 RX ADMIN — SODIUM CHLORIDE, POTASSIUM CHLORIDE, SODIUM LACTATE AND CALCIUM CHLORIDE: 600; 310; 30; 20 INJECTION, SOLUTION INTRAVENOUS at 22:30

## 2018-04-24 RX ADMIN — CLONIDINE HYDROCHLORIDE 0.1 MG: 0.1 TABLET ORAL at 08:32

## 2018-04-24 RX ADMIN — PROPOFOL 75 MCG/KG/MIN: 10 INJECTION, EMULSION INTRAVENOUS at 22:50

## 2018-04-24 RX ADMIN — PROPOFOL 50 MG: 10 INJECTION, EMULSION INTRAVENOUS at 22:54

## 2018-04-24 RX ADMIN — FENTANYL CITRATE 50 MCG: 50 INJECTION, SOLUTION INTRAMUSCULAR; INTRAVENOUS at 23:00

## 2018-04-24 RX ADMIN — METFORMIN HYDROCHLORIDE 1000 MG: 500 TABLET, FILM COATED ORAL at 08:32

## 2018-04-24 RX ADMIN — PROPOFOL 50 MG: 10 INJECTION, EMULSION INTRAVENOUS at 22:53

## 2018-04-24 RX ADMIN — SODIUM CHLORIDE, POTASSIUM CHLORIDE, SODIUM LACTATE AND CALCIUM CHLORIDE: 600; 310; 30; 20 INJECTION, SOLUTION INTRAVENOUS at 23:00

## 2018-04-24 RX ADMIN — VANCOMYCIN HYDROCHLORIDE 1000 MG: 1 INJECTION, POWDER, LYOPHILIZED, FOR SOLUTION INTRAVENOUS at 05:02

## 2018-04-24 RX ADMIN — FENTANYL CITRATE 50 MCG: 50 INJECTION, SOLUTION INTRAMUSCULAR; INTRAVENOUS at 11:20

## 2018-04-24 RX ADMIN — OXYCODONE HYDROCHLORIDE AND ACETAMINOPHEN 1 TABLET: 5; 325 TABLET ORAL at 16:00

## 2018-04-24 RX ADMIN — MUPIROCIN: 20 OINTMENT TOPICAL at 17:31

## 2018-04-24 RX ADMIN — SODIUM CHLORIDE 100 ML/HR: 900 INJECTION, SOLUTION INTRAVENOUS at 04:14

## 2018-04-24 RX ADMIN — OXYCODONE HYDROCHLORIDE AND ACETAMINOPHEN 1 TABLET: 5; 325 TABLET ORAL at 05:02

## 2018-04-24 RX ADMIN — PROPOFOL 50 MG: 10 INJECTION, EMULSION INTRAVENOUS at 22:55

## 2018-04-24 RX ADMIN — PREGABALIN 75 MG: 25 CAPSULE ORAL at 17:31

## 2018-04-24 RX ADMIN — FENTANYL CITRATE 50 MCG: 50 INJECTION, SOLUTION INTRAMUSCULAR; INTRAVENOUS at 20:24

## 2018-04-24 RX ADMIN — CLONIDINE HYDROCHLORIDE 0.1 MG: 0.1 TABLET ORAL at 17:32

## 2018-04-24 RX ADMIN — ARIPIPRAZOLE 10 MG: 5 TABLET ORAL at 08:31

## 2018-04-24 RX ADMIN — FENTANYL CITRATE 50 MCG: 50 INJECTION, SOLUTION INTRAMUSCULAR; INTRAVENOUS at 22:53

## 2018-04-24 RX ADMIN — ASPIRIN 81 MG: 81 TABLET, COATED ORAL at 08:32

## 2018-04-24 RX ADMIN — PIPERACILLIN SODIUM,TAZOBACTAM SODIUM 3.38 G: 3; .375 INJECTION, POWDER, FOR SOLUTION INTRAVENOUS at 12:41

## 2018-04-24 RX ADMIN — METFORMIN HYDROCHLORIDE 1000 MG: 500 TABLET, FILM COATED ORAL at 17:31

## 2018-04-24 NOTE — PROGRESS NOTES
Hospitalist Progress Note    NAME: Saman Ireland   :  1965   MRN:  [id number]       Assessment / Plan:  Left Ankle Cellulitis/DM Ankle Ulcer  Rule out Left Lateral Malleolus Osteomyelitis  -appreciate wound care evaluation  -Case discussed with Dr. Roderick Vázquez today, patient to go for Bone Bx tonight  -MRI left ankle: IMPRESSION:  1. Superficial wound in the skin surface lateral to the lateral malleolus. There is extensive subcutaneous edema surrounding the ankle and over the dorsum of the foot. No evidence of a focal drainable fluid collection adjacent to the lateral malleolus. There is mild edema in the tip of lateral malleolus which may be  reactive or related to early osteomyelitis. 2. Small fluid collection in the subcutaneous tissues plantar to the posterior calcaneus.  -continue on Vancomycin and Zosyn  -continue probiotic  -percocet prn pain; IV fentanyl added for severe pain  -check ESR and CRP tomorrow    DM type 2, uncontrolled, A1c 9.0:   -continue metformin  -NPH 10 units BID started  -Humalog 3 units TID AC started    History of Hypertension with low BP measurements this am  -lisinopril discontinued  -home clonidine dose tapered and BP parameters specified to hold if systolic BP less than 378 mmHg  -continue IVF's for now    Sinus Bradycardia: seems stable, avoid BB, monitor in telemetry    H/O HepC: treated as per patient has increased LFT, viral load pending   Bipolar Disorder: c/w home regimen  Tobacco Abuse: nicotine patch    Code Status: Full Code  Surrogate Decision Maker: Alberto Alvarez [phone]  DVT Prophylaxis: lovenox  GI Prophylaxis: not indicated  Baseline: Independent  Body mass index is 23.21 kg/(m^2).        Subjective:     Chief Complaint / Reason for Physician Visit: follow-up cellulitis  Patient reports that percocet is not helping his pain  He is going for bone Bx tonight    Review of Systems:  Symptom Y/N Comments  Symptom Y/N Comments   Fever/Chills n   Chest Pain n Poor Appetite    Edema y Left ankle   Cough    Abdominal Pain     Sputum    Joint Pain     SOB/VIGIL n   Pruritis/Rash     Nausea/vomit    Tolerating PT/OT     Diarrhea n remains loose  Tolerating Diet y    Constipation    Other       Could NOT obtain due to:      Objective:     VITALS:   Last 24hrs VS reviewed since prior progress note. Most recent are:  Patient Vitals for the past 24 hrs:   Temp Pulse Resp BP SpO2   04/24/18 0739 98 °F (36.7 °C) (!) 59 18 143/88 98 %   04/24/18 0304 98.2 °F (36.8 °C) (!) 53 14 138/65 97 %   04/24/18 0012 98 °F (36.7 °C) 63 16 148/73 98 %   04/23/18 2000 98.3 °F (36.8 °C) 69 18 152/78 97 %   04/23/18 1530 98.4 °F (36.9 °C) 70 20 150/68 95 %   04/23/18 1525 98.6 °F (37 °C) (!) 59 20 117/73 98 %   04/23/18 1522 98.6 °F (37 °C) 64 20 135/51 98 %   04/23/18 1135 98.5 °F (36.9 °C) (!) 53 20 106/64 96 %       Intake/Output Summary (Last 24 hours) at 04/24/18 1118  Last data filed at 04/24/18 4056   Gross per 24 hour   Intake             2460 ml   Output                0 ml   Net             2460 ml        PHYSICAL EXAM:  General: WD, WN. Alert, cooperative, no acute distress    EENT:  EOMI. Anicteric sclerae. MMM  Resp:  CTA bilaterally, no wheezing or rales. No accessory muscle use  CV:  Regular  rhythm,  swelling around left ankle  GI:  Soft, Non distended, Non tender.  +Bowel sounds  Neurologic:  Alert and oriented X 3, normal speech,   Psych:   Good insight. Not anxious nor agitated  Skin:  See below.   No jaundice    Reviewed most current lab test results and cultures  YES  Reviewed most current radiology test results   YES  Review and summation of old records today    NO  Reviewed patient's current orders and MAR    YES  PMH/SH reviewed - no change compared to H&P  ________________________________________________________________________  Care Plan discussed with:    Comments   Patient x    Family      RN x    Care Manager     Consultant  x Dr. Mingo Burns Multidiciplinary team rounds were held today with , nursing, pharmacist and clinical coordinator. Patient's plan of care was discussed; medications were reviewed and discharge planning was addressed. ________________________________________________________________________  Total NON critical care TIME:  25   Minutes    Total CRITICAL CARE TIME Spent:   Minutes non procedure based      Comments   >50% of visit spent in counseling and coordination of care     ________________________________________________________________________  Gabriella Alberts MD     Procedures: see electronic medical records for all procedures/Xrays and details which were not copied into this note but were reviewed prior to creation of Plan. LABS:  I reviewed today's most current labs and imaging studies.   Pertinent labs include:  Recent Labs      04/23/18   0504  04/22/18   1204   WBC  4.4  6.5   HGB  12.7  14.7   HCT  35.0*  40.3   PLT  134*  151     Recent Labs      04/23/18   0504  04/22/18   1204   NA  139  134*   K  4.3  4.7   CL  105  99   CO2  28  30   GLU  152*  370*   BUN  24*  21*   CREA  0.75  0.80   CA  8.6  9.0   MG  2.0   --    ALB  2.9*  3.4*   TBILI  0.5  0.8   SGOT  77*  61*   ALT  128*  141*       Signed: Gabriella Alberts MD

## 2018-04-24 NOTE — PROGRESS NOTES
Vancomycin trough = 12; Extrapolated trough = 9.90 mcg/ml    Will adjust Vancomycin dosing regimen to 1250 mg IV Q8H for predicted level of 12.4 mcg/ml

## 2018-04-24 NOTE — PROGRESS NOTES
Podiatry    MRI reviewed, report and images. No definite evidence of osteomyelitis, but in T2 there is significant edema and signal increase in the left lateral malleolus and cortical changes in T1. Recommended bone biopsy to determine whether or not the pt needs 4-6 weeks of IV antibiotics. Either way, I don't think aggressive surgical procedure will be needed. Pt will be scheduled for tonight.

## 2018-04-24 NOTE — PROGRESS NOTES
Pt asked to speak with CM. Pt was concerned about copay since he only has Medicare A and B. Pt indicated that he only gets disability and he is on a limited income. CM offered him a Care Card Application to see if he could apply for BS FA program.  He was very appreciative. CM discussed with Pt if he would be open to the idea of [facility] for RN care. . We are anticipating Wound Care and possible antibiotic needs. Pt stated that his fiancee/sister are very supportive and they would be willing to possibly be taught to help care for Pt at home with [facility] services monitoring if needed. Based on GLOS. . Pt may be ready for DC on 4/26/18? CM will continue to monitor discharge plan.      Olga Simeon, [address]   Ext [phone]

## 2018-04-24 NOTE — PROGRESS NOTES
Bedside and Verbal shift change report given to [address] [place] [address] (oncoming nurse) by Bam Alston (offgoing nurse). Report included the following information SBAR, Kardex, Intake/Output, MAR, Accordion, Recent Results and Cardiac Rhythm NSR. Zone Phone for oncoming shift:   [phone]    Shift Summary: Pt scheduled for biopsy this evening. Has been NPO since 0900. PRN fentanyl given x1 this shift. LDAs               Peripheral IV 04/23/18 Right Arm (Active)   Site Assessment Clean, dry, & intact 4/24/2018  2:50 PM   Phlebitis Assessment 0 4/24/2018  2:50 PM   Infiltration Assessment 0 4/24/2018  2:50 PM   Dressing Status Clean, dry, & intact 4/24/2018  2:50 PM   Dressing Type Tape;Transparent 4/24/2018  2:50 PM   Hub Color/Line Status Infusing;Green 4/24/2018  2:50 PM                        Intake & Output   Date 04/23/18 1900 - 04/24/18 0659 04/24/18 0700 - 04/25/18 0659   Shift 2423-8748 24 Hour Total 3893-4047 7754-2871 24 Hour Total   I  N  T  A  K  E   P.O. 1040 1680 240  240      P. O. 1040 1680 240  240    I.V.  (mL/kg/hr) 1100 1100         I.V. 1100 1100       Shift Total  (mL/kg) 2140  (26.8) 2780  (34.8) 240  (3)  240  (3)   O  U  T  P  U  T   Shift Total  (mL/kg)        NET 2140 2780 240  240   Weight (kg) 79.8 79.8 79.8 79.8 79.8      Last Bowel Movement Last Bowel Movement Date: 04/24/18   Glucose Checks [] N/A  [x] AC/HS  [] Q6  Concerns:   Nutrition Active Orders   Diet    DIET NPO Except Meds       Consults []PT  []OT  []Speech  [x]Case Management   Cardiac Monitoring []N/A [x]Yes Expires:

## 2018-04-24 NOTE — PROGRESS NOTES
Problem: Falls - Risk of  Goal: *Absence of Falls  Document Yu Fall Risk and appropriate interventions in the flowsheet.    Outcome: Progressing Towards Goal  Fall Risk Interventions:  Mobility Interventions: Communicate number of staff needed for ambulation/transfer, Patient to call before getting OOB, PT Consult for mobility concerns, PT Consult for assist device competence         Medication Interventions: Evaluate medications/consider consulting pharmacy, Patient to call before getting OOB, Teach patient to arise slowly         History of Falls Interventions: Consult care management for discharge planning, Evaluate medications/consider consulting pharmacy

## 2018-04-24 NOTE — PROGRESS NOTES
Bedside shift change report given to Darian High (oncoming nurse) by Oswaldo Cross (offgoing nurse). Report included the following information SBAR, Kardex, Intake/Output, MAR and Recent Results. Zone Phone for oncoming shift:   [phone]    Shift Summary: Pt rested quietly through night. Some issues with ankle pain, partly relieved by medication. LDAs               Peripheral IV 04/22/18 Right Antecubital (Active)   Site Assessment Clean, dry, & intact 4/24/2018  3:06 AM   Phlebitis Assessment 0 4/24/2018  3:06 AM   Infiltration Assessment 0 4/24/2018  3:06 AM   Dressing Status Clean, dry, & intact 4/24/2018  3:06 AM   Dressing Type Transparent;Tape 4/24/2018  3:06 AM   Hub Color/Line Status Pink; Infusing 4/24/2018  3:06 AM                        Intake & Output   Date 04/23/18 0700 - 04/24/18 0659 04/24/18 0700 - 04/25/18 0659   Shift 3905-1454 1301-6262 24 Hour Total 3497-9705 3054-7344 24 Hour Total   I  N  T  A  K  E   P. O. 640 1040 1680         P. O. 640 1040 1680       I.V.  (mL/kg/hr)  1100 1100         I.V.  1100 1100       Shift Total  (mL/kg) 640  (8) 2140  (26.8) 2780  (34.8)      O  U  T  P  U  T   Shift Total  (mL/kg)          2140 2780      Weight (kg) 79.8 79.8 79.8 79.8 79.8 79.8      Last Bowel Movement Last Bowel Movement Date: 04/22/18   Glucose Checks [] N/A  [x] AC/HS  [] Q6  Concerns:   Nutrition Active Orders   Diet    DIET CARDIAC Regular; Consistent Carb 1800kcal       Consults [x]PT  [x]OT  []Speech  [x]Case Management   Cardiac Monitoring []N/A [x]Yes Expires:48 hrs

## 2018-04-24 NOTE — ANESTHESIA PREPROCEDURE EVALUATION
Anesthetic History   No history of anesthetic complications            Review of Systems / Medical History  Patient summary reviewed, nursing notes reviewed and pertinent labs reviewed    Pulmonary  Within defined limits                 Neuro/Psych         Psychiatric history    Comments: Bipolar  Suicidal Thoughts  Depression  Cardiovascular    Hypertension: well controlled          CAD and cardiac stents    Exercise tolerance: >4 METS     GI/Hepatic/Renal           Liver disease    Comments: Abdominal pain  Cholelithiasis  Diarrhea  Elevated AST Endo/Other    Diabetes: poorly controlled, type 2         Other Findings   Comments: Left Ankle Wound  Substance abuse - opioid addiction  Non-compliance with medical treatment         Physical Exam    Airway  Mallampati: I  TM Distance: > 6 cm  Neck ROM: normal range of motion   Mouth opening: Normal     Cardiovascular  Regular rate and rhythm,  S1 and S2 normal,  no murmur, click, rub, or gallop             Dental    Dentition: Poor dentition  Comments: Some broken lower molars (bilateral)   Pulmonary  Breath sounds clear to auscultation               Abdominal  GI exam deferred       Other Findings            Anesthetic Plan    ASA: 3  Anesthesia type: MAC and total IV anesthesia          Induction: Intravenous  Anesthetic plan and risks discussed with: Patient

## 2018-04-24 NOTE — DIABETES MGMT
[facility]    Progress Note    Recommendations/ Comments: Pt with  mg/dL this am. If appropriate, please consider:  1. Increasing NPH pm only to 12 units  2. Increase meal time insulin to 6 units once pt's diet resumed      Current DM medications: NPH 10units  BID before meals, humalog correction and metformin 1000mg BID    Patient is a 47 y.o. male with known Type 2 Diabetes on metformin ER 1000mg ac b/d at home. A1c:   Lab Results   Component Value Date/Time    Hemoglobin A1c 9.0 (H) 04/23/2018 05:04 AM    Hemoglobin A1c 8.8 (H) 01/26/2018 08:16 AM       Recent Glucose Results:   Lab Results   Component Value Date/Time    GLUCPOC 103 (H) 04/24/2018 03:51 PM    GLUCPOC 237 (H) 04/24/2018 11:28 AM    GLUCPOC 226 (H) 04/24/2018 08:30 AM        Lab Results   Component Value Date/Time    Creatinine 0.75 04/23/2018 05:04 AM       Active Orders   Diet    DIET NPO Except Meds                Thank you.   Haris Ortiz, [address]

## 2018-04-25 LAB
ALBUMIN SERPL-MCNC: 3.2 G/DL (ref 3.5–5)
ALBUMIN/GLOB SERPL: 0.8 {RATIO} (ref 1.1–2.2)
ALP SERPL-CCNC: 80 U/L (ref 45–117)
ALT SERPL-CCNC: 176 U/L (ref 12–78)
ANION GAP SERPL CALC-SCNC: 6 MMOL/L (ref 5–15)
AST SERPL-CCNC: 121 U/L (ref 15–37)
BASOPHILS # BLD: 0 K/UL (ref 0–0.1)
BASOPHILS NFR BLD: 1 % (ref 0–1)
BILIRUB SERPL-MCNC: 0.4 MG/DL (ref 0.2–1)
BUN SERPL-MCNC: 9 MG/DL (ref 6–20)
BUN/CREAT SERPL: 13 (ref 12–20)
CALCIUM SERPL-MCNC: 8.6 MG/DL (ref 8.5–10.1)
CHLORIDE SERPL-SCNC: 102 MMOL/L (ref 97–108)
CO2 SERPL-SCNC: 32 MMOL/L (ref 21–32)
CREAT SERPL-MCNC: 0.69 MG/DL (ref 0.7–1.3)
CRP SERPL-MCNC: 0.88 MG/DL (ref 0–0.6)
DIFFERENTIAL METHOD BLD: ABNORMAL
EOSINOPHIL # BLD: 0.1 K/UL (ref 0–0.4)
EOSINOPHIL NFR BLD: 1 % (ref 0–7)
ERYTHROCYTE [DISTWIDTH] IN BLOOD BY AUTOMATED COUNT: 13.1 % (ref 11.5–14.5)
ERYTHROCYTE [SEDIMENTATION RATE] IN BLOOD: 12 MM/HR (ref 0–20)
GLOBULIN SER CALC-MCNC: 3.9 G/DL (ref 2–4)
GLUCOSE BLD STRIP.AUTO-MCNC: 145 MG/DL (ref 65–100)
GLUCOSE BLD STRIP.AUTO-MCNC: 157 MG/DL (ref 65–100)
GLUCOSE BLD STRIP.AUTO-MCNC: 218 MG/DL (ref 65–100)
GLUCOSE BLD STRIP.AUTO-MCNC: 239 MG/DL (ref 65–100)
GLUCOSE SERPL-MCNC: 123 MG/DL (ref 65–100)
HCT VFR BLD AUTO: 41.2 % (ref 36.6–50.3)
HGB BLD-MCNC: 14.9 G/DL (ref 12.1–17)
IMM GRANULOCYTES # BLD: 0 K/UL (ref 0–0.04)
IMM GRANULOCYTES NFR BLD AUTO: 0 % (ref 0–0.5)
LYMPHOCYTES # BLD: 0.9 K/UL (ref 0.8–3.5)
LYMPHOCYTES NFR BLD: 19 % (ref 12–49)
MCH RBC QN AUTO: 31.4 PG (ref 26–34)
MCHC RBC AUTO-ENTMCNC: 36.2 G/DL (ref 30–36.5)
MCV RBC AUTO: 86.7 FL (ref 80–99)
MONOCYTES # BLD: 0.2 K/UL (ref 0–1)
MONOCYTES NFR BLD: 5 % (ref 5–13)
NEUTS SEG # BLD: 3.6 K/UL (ref 1.8–8)
NEUTS SEG NFR BLD: 74 % (ref 32–75)
NRBC # BLD: 0 K/UL (ref 0–0.01)
NRBC BLD-RTO: 0 PER 100 WBC
PLATELET # BLD AUTO: 146 K/UL (ref 150–400)
PMV BLD AUTO: 9.1 FL (ref 8.9–12.9)
POTASSIUM SERPL-SCNC: 3.1 MMOL/L (ref 3.5–5.1)
PROT SERPL-MCNC: 7.1 G/DL (ref 6.4–8.2)
RBC # BLD AUTO: 4.75 M/UL (ref 4.1–5.7)
SERVICE CMNT-IMP: ABNORMAL
SODIUM SERPL-SCNC: 140 MMOL/L (ref 136–145)
WBC # BLD AUTO: 4.8 K/UL (ref 4.1–11.1)

## 2018-04-25 PROCEDURE — 82962 GLUCOSE BLOOD TEST: CPT

## 2018-04-25 PROCEDURE — 74011636637 HC RX REV CODE- 636/637: Performed by: INTERNAL MEDICINE

## 2018-04-25 PROCEDURE — 74011250636 HC RX REV CODE- 250/636: Performed by: INTERNAL MEDICINE

## 2018-04-25 PROCEDURE — 65660000000 HC RM CCU STEPDOWN

## 2018-04-25 PROCEDURE — 86140 C-REACTIVE PROTEIN: CPT | Performed by: INTERNAL MEDICINE

## 2018-04-25 PROCEDURE — 36415 COLL VENOUS BLD VENIPUNCTURE: CPT | Performed by: INTERNAL MEDICINE

## 2018-04-25 PROCEDURE — 74011000258 HC RX REV CODE- 258: Performed by: INTERNAL MEDICINE

## 2018-04-25 PROCEDURE — 85652 RBC SED RATE AUTOMATED: CPT | Performed by: INTERNAL MEDICINE

## 2018-04-25 PROCEDURE — 80053 COMPREHEN METABOLIC PANEL: CPT | Performed by: INTERNAL MEDICINE

## 2018-04-25 PROCEDURE — 74011250637 HC RX REV CODE- 250/637: Performed by: INTERNAL MEDICINE

## 2018-04-25 PROCEDURE — G8980 MOBILITY D/C STATUS: HCPCS

## 2018-04-25 PROCEDURE — 97530 THERAPEUTIC ACTIVITIES: CPT

## 2018-04-25 PROCEDURE — 85025 COMPLETE CBC W/AUTO DIFF WBC: CPT | Performed by: INTERNAL MEDICINE

## 2018-04-25 RX ORDER — INSULIN LISPRO 100 [IU]/ML
4 INJECTION, SOLUTION INTRAVENOUS; SUBCUTANEOUS
Status: DISCONTINUED | OUTPATIENT
Start: 2018-04-25 | End: 2018-04-29 | Stop reason: HOSPADM

## 2018-04-25 RX ORDER — POTASSIUM CHLORIDE 750 MG/1
40 TABLET, FILM COATED, EXTENDED RELEASE ORAL
Status: COMPLETED | OUTPATIENT
Start: 2018-04-25 | End: 2018-04-25

## 2018-04-25 RX ADMIN — CLONIDINE HYDROCHLORIDE 0.1 MG: 0.1 TABLET ORAL at 17:28

## 2018-04-25 RX ADMIN — INSULIN LISPRO 3 UNITS: 100 INJECTION, SOLUTION INTRAVENOUS; SUBCUTANEOUS at 07:56

## 2018-04-25 RX ADMIN — PIPERACILLIN SODIUM,TAZOBACTAM SODIUM 3.38 G: 3; .375 INJECTION, POWDER, FOR SOLUTION INTRAVENOUS at 11:23

## 2018-04-25 RX ADMIN — INSULIN LISPRO 3 UNITS: 100 INJECTION, SOLUTION INTRAVENOUS; SUBCUTANEOUS at 16:50

## 2018-04-25 RX ADMIN — PIPERACILLIN SODIUM,TAZOBACTAM SODIUM 3.38 G: 3; .375 INJECTION, POWDER, FOR SOLUTION INTRAVENOUS at 19:30

## 2018-04-25 RX ADMIN — OXYCODONE HYDROCHLORIDE AND ACETAMINOPHEN 1 TABLET: 5; 325 TABLET ORAL at 10:29

## 2018-04-25 RX ADMIN — VANCOMYCIN HYDROCHLORIDE 1250 MG: 10 INJECTION, POWDER, LYOPHILIZED, FOR SOLUTION INTRAVENOUS at 06:53

## 2018-04-25 RX ADMIN — DOXEPIN HYDROCHLORIDE 25 MG: 25 CAPSULE ORAL at 00:38

## 2018-04-25 RX ADMIN — POTASSIUM CHLORIDE 40 MEQ: 750 TABLET, FILM COATED, EXTENDED RELEASE ORAL at 16:21

## 2018-04-25 RX ADMIN — CLONIDINE HYDROCHLORIDE 0.1 MG: 0.1 TABLET ORAL at 08:02

## 2018-04-25 RX ADMIN — Medication 1 CAPSULE: at 08:01

## 2018-04-25 RX ADMIN — OXYCODONE HYDROCHLORIDE AND ACETAMINOPHEN 1 TABLET: 5; 325 TABLET ORAL at 04:49

## 2018-04-25 RX ADMIN — INSULIN LISPRO 2 UNITS: 100 INJECTION, SOLUTION INTRAVENOUS; SUBCUTANEOUS at 07:55

## 2018-04-25 RX ADMIN — VANCOMYCIN HYDROCHLORIDE 1250 MG: 10 INJECTION, POWDER, LYOPHILIZED, FOR SOLUTION INTRAVENOUS at 14:21

## 2018-04-25 RX ADMIN — INSULIN LISPRO 3 UNITS: 100 INJECTION, SOLUTION INTRAVENOUS; SUBCUTANEOUS at 11:22

## 2018-04-25 RX ADMIN — SODIUM CHLORIDE 75 ML/HR: 900 INJECTION, SOLUTION INTRAVENOUS at 02:30

## 2018-04-25 RX ADMIN — FENTANYL CITRATE 50 MCG: 50 INJECTION, SOLUTION INTRAMUSCULAR; INTRAVENOUS at 19:31

## 2018-04-25 RX ADMIN — METFORMIN HYDROCHLORIDE 1000 MG: 500 TABLET, FILM COATED ORAL at 16:51

## 2018-04-25 RX ADMIN — VANCOMYCIN HYDROCHLORIDE 1250 MG: 10 INJECTION, POWDER, LYOPHILIZED, FOR SOLUTION INTRAVENOUS at 22:19

## 2018-04-25 RX ADMIN — FENTANYL CITRATE 50 MCG: 50 INJECTION, SOLUTION INTRAMUSCULAR; INTRAVENOUS at 14:09

## 2018-04-25 RX ADMIN — INSULIN HUMAN 10 UNITS: 100 INJECTION, SUSPENSION SUBCUTANEOUS at 07:55

## 2018-04-25 RX ADMIN — VANCOMYCIN HYDROCHLORIDE 1250 MG: 10 INJECTION, POWDER, LYOPHILIZED, FOR SOLUTION INTRAVENOUS at 02:28

## 2018-04-25 RX ADMIN — INSULIN HUMAN 10 UNITS: 100 INJECTION, SUSPENSION SUBCUTANEOUS at 16:49

## 2018-04-25 RX ADMIN — FENTANYL CITRATE 50 MCG: 50 INJECTION, SOLUTION INTRAMUSCULAR; INTRAVENOUS at 07:57

## 2018-04-25 RX ADMIN — PIPERACILLIN SODIUM,TAZOBACTAM SODIUM 3.38 G: 3; .375 INJECTION, POWDER, FOR SOLUTION INTRAVENOUS at 03:43

## 2018-04-25 RX ADMIN — METFORMIN HYDROCHLORIDE 1000 MG: 500 TABLET, FILM COATED ORAL at 08:01

## 2018-04-25 RX ADMIN — ENOXAPARIN SODIUM 40 MG: 40 INJECTION SUBCUTANEOUS at 16:50

## 2018-04-25 RX ADMIN — PREGABALIN 75 MG: 25 CAPSULE ORAL at 17:28

## 2018-04-25 RX ADMIN — ARIPIPRAZOLE 10 MG: 5 TABLET ORAL at 08:02

## 2018-04-25 RX ADMIN — DOXEPIN HYDROCHLORIDE 25 MG: 25 CAPSULE ORAL at 22:20

## 2018-04-25 RX ADMIN — ASPIRIN 81 MG: 81 TABLET, COATED ORAL at 08:02

## 2018-04-25 RX ADMIN — INSULIN LISPRO 4 UNITS: 100 INJECTION, SOLUTION INTRAVENOUS; SUBCUTANEOUS at 16:49

## 2018-04-25 RX ADMIN — PREGABALIN 75 MG: 25 CAPSULE ORAL at 08:01

## 2018-04-25 NOTE — OP NOTES
[address]  OPERATIVE REPORT    Meaghan Toth  MR#: [id number]  : 1965  ACCOUNT #: [de-identified]   DATE OF SERVICE: 2018    SURGEON:  Ministerio Ventura DPM       ASSISTANT:  none     ANESTHESIA TYPE:  IV sedation with local infiltration. PREOPERATIVE DIAGNOSES:  1. Diabetic ulcer, lateral left ankle. 2.  Suspected osteomyelitis, left fibula (lateral malleolus). POSTOPERATIVE DIAGNOSES:    1. Diabetic ulcer, lateral left ankle. 2.  Suspected osteomyelitis, left fibula (lateral malleolus). PROCEDURES PERFORMED:  1. Open bone biopsy, left fibula. 2.  Debridement of ulcer to subcutaneous tissue, left ankle. ESTIMATED BLOOD LOSS:  5 mL. COMPLICATIONS:  None. SPECIMENS REMOVED:  1. Bone biopsy, left fibula, suspected osteomyelitis. 2.  Aerobic and anaerobic bone cultures, left fibula. IMPLANTS:  none      PROCEDURE IN DETAIL:  The patient was brought into the operating room and placed supine upon the OR table. After administration of IV sedation, the left lateral ankle was infiltrated with 10 mL of 0.5% plain Marcaine. The left lower extremity was prepped and draped in the usual sterile technique. A #15 blade was used to make an incision around the margins of the ulceration. The ulceration was of irregular dimensions, but was essentially 1 cm wide and 2 cm long, and penetrated to a depth of 4 mm, reaching the subcutaneous tissue. There was a central dark area that penetrated deeper, approximately 5 mm, but did not directly probe to bone over the lateral malleolus. The surgical site was flushed well with sterile irrigation. A fresh, clean #15 blade was used to make a linear incision from superior to inferior, beginning just above and ending just below the ulceration over the tip of the lateral malleolus. This was deepened directly to bone with the #15 blade, and a Pierpont elevator was used to elevate the periosteum.   Tourniquet was not used, so there was some bleeding in the surgical field, but I did not notice any visible abnormality of the bone of the fibula. A Jamshidi needle was then used to take 2 core specimens from the fibula, corresponding to the deepest point of the ulceration. One was sent for examination by pathology. The other was split into 2 sections and placed into aerobic and anaerobic culture tubes. The site was flushed again with sterile irrigation and closure was carried out utilizing 5-0 nylon in simple interrupted sutures. The foot was cleaned with wet, then dry gauze and a dry sterile dressing with Xeroform over the wound and incision was applied. The patient was transported to the recovery room with vital signs stable and vascular status intact. He will remain until deemed stable. After that, he will be transfered back up to his hospital bed.   The patient will be followed as long as he is in the hospital.      Kelly Alonzo DPM CB / SHANTEL  D: 04/24/2018 23:42     T: 04/25/2018 02:04  JOB #: [id number]

## 2018-04-25 NOTE — PERIOP NOTES
Handoff Report from Operating Room to PACU    Report received from Vanna Xie CRNA and Sana Farris RN regarding Lora Folds. Surgeon(s):  Vinayak Adan DPM  And Procedure(s) (LRB):  Debridement Left Ankle with Left Ankle Bone Biopsy (Left)  confirmed   with allergies and dressings discussed. Anesthesia type, drugs, patient history, complications, estimated blood loss, vital signs, intake and output, and last pain medication, lines and temperature were reviewed.

## 2018-04-25 NOTE — PROGRESS NOTES
Yesterday, pt was concerned about copay since he only has Medicare A and B. Pt indicated that he only gets disability and he is on a limited income. CM offered him a Care Card Application to see if he could apply for BS FA program.      Pt may benefit from Aidan Romano as we are anticipating Wound Care and possible antibiotic needs. Pt stated that his fiancee/sister are very supportive and they would be willing to possibly be taught to help care for Pt at home with [facility] services monitoring if needed.       2730  Pt has a history of IVDA per chart. Pt may benefit from SNF vs PICC line at home.       1230  Chart cclink'd to [address], and [facility] after discussion with ID.

## 2018-04-25 NOTE — PROGRESS NOTES
Bedside shift change report given to Enriqueta Levine (oncoming nurse) by Nedra Arango RN (offgoing nurse). Report included the following information SBAR, Kardex, MAR and Recent Results. Patient had biopsy of ankle last night around 10pm.  Patient has had some pain in ankle and can receive percocet or fentanly. Patient required 1X dose of fentanyl and 1x dose of percocet. Antibiotics (Zosyn and Vanc) are scheduled.

## 2018-04-25 NOTE — PROGRESS NOTES
Problem: Mobility Impaired (Adult and Pediatric)  Goal: *Acute Goals and Plan of Care (Insert Text)  Physical Therapy Goals  Initiated 4/23/2018  1. Patient will move from supine to sit and sit to supine  in bed with independence within 7 day(s). 2.  Patient will transfer from bed to chair and chair to bed with modified independence using the least restrictive device within 7 day(s). 3.  Patient will perform sit to stand with modified independence within 7 day(s). 4.  Patient will ambulate with modified independence for 200 feet with the least restrictive device within 7 day(s). 5.  Patient will ascend/descend 14 stairs with 1 handrail(s) or with crutches with modified independence within 7 day(s). physical Therapy TREATMENT/DISCHARGE  Patient: Oskar Velazquez (45 y.o. male)  Date: 4/25/2018  Diagnosis: Cellulitis of left ankle  Left Ankle Wound <principal problem not specified>  Procedure(s) (LRB):  Debridement Left Ankle with Left Ankle Bone Biopsy (Left) 1 Day Post-Op  Precautions:    Chart, physical therapy assessment, plan of care and goals were reviewed. ASSESSMENT:  Pt was received in supine and cleared by nursing to mobilize. He performed all mobility at an independent or modified independent level. He was able to ambulate into the beard and use a wheelchair to transport to ortho gym where he negotiated 4 stairs 4x with the L rail and crutch on the R using step to pattern. He did not have any questions or concerns at the end of the session. Does not need any further PT needs. Crutches that were brought from home were adjusted to proper height. Will complete order. Progression toward goals:  [x]      Improving appropriately and progressing toward goals  []      Improving slowly and progressing toward goals  []      Not making progress toward goals and plan of care will be adjusted     PLAN:  Patient will be discharged from physical therapy at this time.   Rationale for discharge:  [x] Goals Achieved  [] Plateau Reached  [] Patient not participating in therapy  [] Other:  Discharge Recommendations:  None  Further Equipment Recommendations for Discharge:  Has crutches in room     SUBJECTIVE:   Patient stated it is hurting more after the biopsy .     OBJECTIVE DATA SUMMARY:   Critical Behavior:  Neurologic State: Alert  Orientation Level: Oriented X4  Cognition: Follows commands     Functional Mobility Training:  Bed Mobility:  Rolling: Independent  Supine to Sit: Independent     Scooting: Independent        Transfers:  Sit to Stand: Modified independent  Stand to Sit: Modified independent        Bed to Chair: Modified independent                    Balance:  Sitting: Intact  Standing: Intact; With support  Ambulation/Gait Training:  Distance (ft): 200 Feet (ft)  Assistive Device: Crutches  Ambulation - Level of Assistance: Modified independent        Gait Abnormalities: Antalgic                         Stairs:  Number of Stairs Trained: 16  Stairs - Level of Assistance: Modified independent   Rail Use: Left   Functional Measure:  Barthel Index:    Bathin  Bladder: 10  Bowels: 10  Groomin  Dressing: 10  Feeding: 10  Mobility: 15  Stairs: 10  Toilet Use: 10  Transfer (Bed to Chair and Back): 15  Total: 100       Barthel and G-code impairment scale:  Percentage of impairment CH  0% CI  1-19% CJ  20-39% CK  40-59% CL  60-79% CM  80-99% CN  100%   Barthel Score 0-100 100 99-80 79-60 59-40 20-39 1-19   0   Barthel Score 0-20 20 17-19 13-16 9-12 5-8 1-4 0      The Barthel ADL Index: Guidelines  1. The index should be used as a record of what a patient does, not as a record of what a patient could do. 2. The main aim is to establish degree of independence from any help, physical or verbal, however minor and for whatever reason. 3. The need for supervision renders the patient not independent. 4. A patient's performance should be established using the best available evidence.  Asking the patient, friends/relatives and nurses are the usual sources, but direct observation and common sense are also important. However direct testing is not needed. 5. Usually the patient's performance over the preceding 24-48 hours is important, but occasionally longer periods will be relevant. 6. Middle categories imply that the patient supplies over 50 per cent of the effort. 7. Use of aids to be independent is allowed. Marty Landers., Barthel, D.W. (6073). Functional evaluation: the Barthel Index. [facility] (14)2. Kelley Linn jalen LOPEZ Sanchez, Yessica Mena., Susana Bolaños., [place], [address] (1999). Measuring the change indisability after inpatient rehabilitation; comparison of the responsiveness of the Barthel Index and Functional [place] Measure. Journal of Neurology, Neurosurgery, and Psychiatry, 66(4), 737-988. SANDRO Eldridge, JOSUE Escalera, & Leisa Carrillo MSERGEY. (2004.) Assessment of post-stroke quality of life in cost-effectiveness studies: The usefulness of the Barthel Index and the EuroQoL-5D. Quality of Life Research, 13, 343-32         G codes: In compliance with CMSs Claims Based Outcome Reporting, the following G-code set was chosen for this patient based on their primary functional limitation being treated: The outcome measure chosen to determine the severity of the functional limitation was the barthel with a score of 100/100 which was correlated with the impairment scale.     ? Mobility - Walking and Moving Around:     - CURRENT STATUS: CI - 1%-19% impaired, limited or restricted    - GOAL STATUS: CH - 0% impaired, limited or restricted    - D/C STATUS:  CH - 0% impaired, limited or restricted     Pain:  Pain Scale 1: Numeric (0 - 10)  Pain Intensity 1: 6  Pain Location 1: Ankle  Pain Orientation 1: Left  Pain Description 1: Throbbing  Pain Intervention(s) 1: Medication (see MAR)  Activity Tolerance:   WFL, painful ankle  Please refer to the flowsheet for vital signs taken during this treatment.   After treatment:   [x] Patient left in no apparent distress sitting up in chair  [] Patient left in no apparent distress in bed  [x] Call bell left within reach  [x] Nursing notified  [] Caregiver present  [] Bed alarm activated    COMMUNICATION/COLLABORATION:   The patients plan of care was discussed with: Registered Nurse    Henry López, PT, DPT   Time Calculation: 20 mins

## 2018-04-25 NOTE — DIABETES MGMT
[facility]    Progress Note    Recommendations/ Comments: If appropriate, please consider:  1. Increase meal time insulin to 6 units pt has required 8 units of correction over the past 24 hours. Current DM medications: NPH 10units  BID before meals, humalog correction and metformin 1000mg BID    Patient is a 47 y.o. male with known Type 2 Diabetes on metformin ER 1000mg ac b/d at home. A1c:   Lab Results   Component Value Date/Time    Hemoglobin A1c 9.0 (H) 04/23/2018 05:04 AM    Hemoglobin A1c 8.8 (H) 01/26/2018 08:16 AM       Recent Glucose Results:   Lab Results   Component Value Date/Time     (H) 04/25/2018 01:08 AM    GLUCPOC 239 (H) 04/25/2018 11:09 AM    GLUCPOC 157 (H) 04/25/2018 07:39 AM    GLUCPOC 96 04/24/2018 11:43 PM        Lab Results   Component Value Date/Time    Creatinine 0.69 (L) 04/25/2018 01:08 AM       Active Orders   Diet    DIET DIABETIC CONSISTENT CARB Regular                Thank you.     Sylvia Hinkle, [address], [address]  Office: [phone]

## 2018-04-25 NOTE — BRIEF OP NOTE
BRIEF OPERATIVE NOTE    Date of Procedure: 4/24/2018   Preoperative Diagnosis: 1. Left Ankle Diabetic Ulcer, 2. Suspected Osteomyelitis Left fibula (lateral malleolus)  Postoperative Diagnosis: 1. Left Ankle Diabetic Ulcer, 2. Suspected Osteomyelitis Left fibula (lateral malleolus)   Procedure(s):  1. Debridement Left Ankle Ulcer, 2.  Left Ankle Bone Biopsy, open  Surgeon(s) and Role:     * Chacha Euceda DPM - Primary         Surgical Assistant: None    Surgical Staff:  Circ-1: Jennifer Bermudez RN  Circ-Relief: Vladimir Bartlett RN  Scrub Tech-1: Montserrat Campbell  Scrub Tech-Relief: Alvina Huynh  Event Time In   Incision Start 2306   Incision Close 2326     Anesthesia: MAC   Estimated Blood Loss: 5 ml  Specimens:   ID Type Source Tests Collected by Time Destination   1 : left ankle bone biopsy Preservative Ankle  Chacha Euceda DPM 4/24/2018 2314 Pathology   1 : bone biopsy left ankle Tissue Ankle CULTURE, TISSUE W GRAM STAIN Jesu Elkins DPM 4/24/2018 2316 Microbiology   2 : bone biopsy left ankle Tissue Ankle CULTURE, ANAEROBIC Jesu Adan DPM 4/24/2018 2317 Microbiology      Findings: No abscess, no visible osseous changes   Complications: None  Implants: * No implants in log *

## 2018-04-25 NOTE — PERIOP NOTES
TRANSFER - OUT REPORT:    Verbal report given to Markell Ruelas RN (name) on Brook Ospina  being transferred to [place] [address] (unit) for routine post - op       Report consisted of patients Situation, Background, Assessment and   Recommendations(SBAR). Information from the following report(s) SBAR, Kardex, OR Summary, Procedure Summary, Intake/Output and MAR was reviewed with the receiving nurse. Lines:   Peripheral IV 04/23/18 Right Arm (Active)   Site Assessment Clean, dry, & intact 4/24/2018  2:50 PM   Phlebitis Assessment 0 4/24/2018  2:50 PM   Infiltration Assessment 0 4/24/2018  2:50 PM   Dressing Status Clean, dry, & intact 4/24/2018  2:50 PM   Dressing Type Tape;Transparent 4/24/2018  2:50 PM   Hub Color/Line Status Infusing;Green 4/24/2018  2:50 PM        Opportunity for questions and clarification was provided.       Patient transported with:   Registered Nurse

## 2018-04-25 NOTE — PROGRESS NOTES
2130  Pt very frustrated about still not being taken for biopsy. OR called to inquire about time. OR stated that pt was next in line and would be gotten soon. When pt given this information, he was very frustrated. Stated that if he had not seen anyone to take him to the OR by 2200, he would not be getting it. Stated that he felt as if his time was not valuable. 2140  OR called for report, stating that the pt had already been sent for. Pt informed of this information, with slightly improving disposition. 2340  Received report from PACU.    2345   Pt arrived back on unit. Pt in much better mood after getting procedure done.

## 2018-04-25 NOTE — CDMP QUERY
Please give the clinical significance of the following lab data.      K 3.1    Please clarify and document your clinical opinion in the progress notes and discharge summary including the definitive and/or presumptive diagnosis, (suspected or probable), related to the above clinical findings. Please include clinical findings supporting your diagnosis.   Thanks,  Skyler Nesbitt RN/CDMP

## 2018-04-25 NOTE — PROGRESS NOTES
Podiatry    Subjective:         Patient is a 47 y.o.  male who is being seen for cellulitis, possible osteomyelitis left ankle.     Patient Active Problem List    Diagnosis Date Noted    Cellulitis of left ankle 04/22/2018    Spinal stenosis of lumbar region with neurogenic claudication 03/26/2018    Type 2 diabetes mellitus with diabetic neuropathy, without long-term current use of insulin ([address] 01/28/2018    Type 2 diabetes mellitus with hyperglycemia, without long-term current use of insulin ([address] 01/25/2018    Essential hypertension 01/25/2018    Chronic pain syndrome 01/25/2018    Chronic hepatitis C without hepatic coma ([address] 01/25/2018    Other male erectile dysfunction 01/25/2018    Tobacco abuse 01/25/2018    Bipolar 1 disorder, mixed, moderate ([address] 03/06/2017    Polysubstance abuse 10/18/2016    Personality disorder 07/26/2013    Do not give narcotics 11/08/2011    Non compliance with medical treatment 11/03/2011     Past Medical History:   Diagnosis Date    Adverse effect of anesthesia     breathing diff. with versed    Bipolar 1 disorder, mixed, moderate ([address] 3/6/2017    Chronic pain     Depression     Pt stated diagnosed years ago    Depression 3/13/2013    Diabetes ([address]     Diabetes ([address] 2003    Drug-induced mood disorder 5/28/2013    Homicide attempt     HTN (hypertension) 11/3/2011    Narcotic dependence, episodic use ([address] 11/3/2011    NIDDM (non-insulin dependent diabetes mellitus) 11/3/2011    Non compliance with medical treatment 11/3/2011    Other ill-defined conditions(799.89)     chronic low back pain    Psychiatric disorder     bipolar    Sleep disorder     Substance abuse     Suicidal thoughts      Past Surgical History:   Procedure Laterality Date    CARDIAC SURG PROCEDURE UNLIST      cardiac stents X2 lad drug eluting    COLONOSCOPY N/A 8/31/2016    [facility]COPY    COLONOSCOPY,DIAGNOSTIC  8/31/2016         HX ORTHOPAEDIC      chronic back pain    HX ORTHOPAEDIC      left knee arthroplasty    HI ANESTH,LUMBAR SPINE,CORD SURGERY  7 1999    l4 l5    REMV KIDNEY,COMPLICATED  4180    side unknown - kidney stones    UPPER GI ENDOSCOPY,BIOPSY  8/31/2016            Family History   Problem Relation Age of Onset    Stroke Mother     Hypertension Mother     Heart Disease Father     Hypertension Father     Cancer Maternal Grandmother      unknown type    Diabetes Maternal Grandfather       Social History   Substance Use Topics    Smoking status: Current Every Day Smoker     Packs/day: 0.50    Smokeless tobacco: Never Used    Alcohol use No      Comment: sociially      Allergies   Allergen Reactions    Versed [Midazolam] Shortness of Breath     Weakness     Versed [Midazolam] Unknown (comments)     Numbness, with shortness of breath     Prior to Admission medications    Medication Sig Start Date End Date Taking? Authorizing Provider   doxycycline (VIBRA-TABS) 100 mg tablet Take 1 Tab by mouth two (2) times a day for 10 days. 4/20/18 4/30/18  Ashley Griffin MD   cyclobenzaprine (FLEXERIL) 10 mg tablet Take 1 Tab by mouth three (3) times daily as needed for Muscle Spasm(s). 4/20/18   Ashley Griffin MD   ibuprofen (MOTRIN) 600 mg tablet Take 1 Tab by mouth every six (6) hours as needed for Pain. 4/20/18   Ashley Griffin MD   cloNIDine HCl (CATAPRES) 0.2 mg tablet Take 1 Tab by mouth two (2) times a day. 3/26/18   Reilly Jimenes III, DO   metFORMIN ER (GLUCOPHAGE XR) 500 mg tablet Take 1 Tab by mouth two (2) times a day. Patient taking differently: Take 1,000 mg by mouth two (2) times a day. 3/26/18   Reilly Jimenes III, DO   Blood-Glucose Meter monitoring kit Check blood sugars daily. DX: E11.9 1/30/18   Reilly Jimenes III, DO   glucose blood VI test strips (ASCENSIA AUTODISC VI, ONE TOUCH ULTRA TEST VI) strip Check blood sugars daily.  DX: E11.9 1/30/18 Reilly Jimenes III, DO   Lancets (LANCETS, SUPER THIN) misc Check blood sugars daily. DX: E11.9 18   Reilly Jimenes III, DO   pregabalin (LYRICA) 75 mg capsule Take 1 Cap by mouth two (2) times a day. Max Daily Amount: 150 mg. Indications: Diabetic Peripheral Neuropathy 18   Renaye [place] III, DO   lisinopril (PRINIVIL, ZESTRIL) 5 mg tablet Take 1 Tab by mouth daily. 18   Kyle Abrahaml III, DO   aspirin delayed-release 81 mg tablet Take 1 Tab by mouth daily. 18   Reilly Jimenes III, DO   doxepin (SINEQUAN) 25 mg capsule Take 1 Cap by mouth nightly. 17   Anders Hi MD   ARIPiprazole (ABILIFY) 10 mg tablet Take 1 Tab by mouth daily (after breakfast). Indications: MIXED BIPOLAR I DISORDER 17   Anders Hi MD       Review of Systems AO x3. Pain left ankle, but no acute distress. Objective:     Patient Vitals for the past 8 hrs:   BP Temp Pulse Resp SpO2   18 1434 155/86 98.1 °F (36.7 °C) 62 16 100 %     Temp (24hrs), Av.9 °F (36.6 °C), Min:97.7 °F (36.5 °C), Max:98.1 °F (36.7 °C)      Physical Exam Lower Extremity  Dressing clean, dry and intact L ankle, not removed. Still +edema and erythema L foot, ankle and lower leg. DP and PT 2/4; CFT less than 3 seconds  Sensation absent to fine touch B feet.     Bone C&S: pending  Bone Biopsy L ankle: pending    Data Review:   Recent Results (from the past 24 hour(s))   GLUCOSE, POC    Collection Time: 18  9:09 PM   Result Value Ref Range    Glucose (POC) 101 (H) 65 - 100 mg/dL    Performed by Scarlet Cake    CULTURE, WOUND [place] STAIN    Collection Time: 18 11:16 PM   Result Value Ref Range    Special Requests: NO SPECIAL REQUESTS      GRAM STAIN NO ORGANISMS SEEN      Culture result: PENDING    GLUCOSE, POC    Collection Time: 18 11:43 PM   Result Value Ref Range    Glucose (POC) 96 65 - 100 mg/dL    Performed by Meg Washington    CBC WITH AUTOMATED DIFF Collection Time: 04/25/18  1:08 AM   Result Value Ref Range    WBC 4.8 4.1 - 11.1 K/uL    RBC 4.75 4.10 - 5.70 M/uL    HGB 14.9 12.1 - 17.0 g/dL    HCT 41.2 36.6 - 50.3 %    MCV 86.7 80.0 - 99.0 FL    MCH 31.4 26.0 - 34.0 PG    MCHC 36.2 30.0 - 36.5 g/dL    RDW 13.1 11.5 - 14.5 %    PLATELET 164 (L) 417 - 400 K/uL    MPV 9.1 8.9 - 12.9 FL    NRBC 0.0 0  WBC    ABSOLUTE NRBC 0.00 0.00 - 0.01 K/uL    NEUTROPHILS 74 32 - 75 %    LYMPHOCYTES 19 12 - 49 %    MONOCYTES 5 5 - 13 %    EOSINOPHILS 1 0 - 7 %    BASOPHILS 1 0 - 1 %    IMMATURE GRANULOCYTES 0 0.0 - 0.5 %    ABS. NEUTROPHILS 3.6 1.8 - 8.0 K/UL    ABS. LYMPHOCYTES 0.9 0.8 - 3.5 K/UL    ABS. MONOCYTES 0.2 0.0 - 1.0 K/UL    ABS. EOSINOPHILS 0.1 0.0 - 0.4 K/UL    ABS. BASOPHILS 0.0 0.0 - 0.1 K/UL    ABS. IMM. GRANS. 0.0 0.00 - 0.04 K/UL    DF AUTOMATED     METABOLIC PANEL, COMPREHENSIVE    Collection Time: 04/25/18  1:08 AM   Result Value Ref Range    Sodium 140 136 - 145 mmol/L    Potassium 3.1 (L) 3.5 - 5.1 mmol/L    Chloride 102 97 - 108 mmol/L    CO2 32 21 - 32 mmol/L    Anion gap 6 5 - 15 mmol/L    Glucose 123 (H) 65 - 100 mg/dL    BUN 9 6 - 20 MG/DL    Creatinine 0.69 (L) 0.70 - 1.30 MG/DL    BUN/Creatinine ratio 13 12 - 20      GFR est AA >60 >60 ml/min/1.73m2    GFR est non-AA >60 >60 ml/min/1.73m2    Calcium 8.6 8.5 - 10.1 MG/DL    Bilirubin, total 0.4 0.2 - 1.0 MG/DL    ALT (SGPT) 176 (H) 12 - 78 U/L    AST (SGOT) 121 (H) 15 - 37 U/L    Alk.  phosphatase 80 45 - 117 U/L    Protein, total 7.1 6.4 - 8.2 g/dL    Albumin 3.2 (L) 3.5 - 5.0 g/dL    Globulin 3.9 2.0 - 4.0 g/dL    A-G Ratio 0.8 (L) 1.1 - 2.2     SED RATE (ESR)    Collection Time: 04/25/18  1:08 AM   Result Value Ref Range    Sed rate, automated 12 0 - 20 mm/hr   C REACTIVE PROTEIN, QT    Collection Time: 04/25/18  1:08 AM   Result Value Ref Range    C-Reactive protein 0.88 (H) 0.00 - 0.60 mg/dL   GLUCOSE, POC    Collection Time: 04/25/18  7:39 AM   Result Value Ref Range    Glucose (POC) 157 (H) 65 - 100 mg/dL    Performed by BRANDEN GONZALES(CON)    GLUCOSE, POC    Collection Time: 04/25/18 11:09 AM   Result Value Ref Range    Glucose (POC) 239 (H) 65 - 100 mg/dL    Performed by BRANDEN GONZALES(CON)    GLUCOSE, POC    Collection Time: 04/25/18  4:19 PM   Result Value Ref Range    Glucose (POC) 218 (H) 65 - 100 mg/dL    Performed by Cal Calloway          Impression:   1. Diabetic wound L ankle  2. Cellulitis L ankle  3. Possible osteomyelitis L ankle    Recommendation:   Will change bandage tomorrow. Still waiting on bone pathology and cultures for now.     Signed By: Yessenia Dean DPM     April 25, 2018

## 2018-04-25 NOTE — PROGRESS NOTES
Hospitalist Progress Note    NAME: Lyric Jordan   :  1965   MRN:  [id number]       Assessment / Plan:  Left Ankle Cellulitis/DM Ankle Ulcer  Suspect Mild Left Lateral Malleolus Osteomyelitis  -Dr. Nava Courser following, s/p Bone Bx on evening of   -continue on Vancomycin and Zosyn  -continue probiotic  -percocet prn pain; IV fentanyl added for severe pain  -consult ID  -note that patient has a history of IV drug abuse and this would make it risky to discharge with PICC in place    DM type 2, uncontrolled, A1c 9.0:   -continue metformin  -NPH 10 units BID started  -Humalog increased to 4 units TID AC     History of Hypertension with low BP on Hospital Day 1, resolved  -lisinopril discontinued  -home clonidine dose tapered and BP parameters specified to hold if systolic BP less than 643 mmHg  -holding IVF's    Sinus Bradycardia: improved, avoid BB, monitor in telemetry    Hypokalemia:  -replete and monitor    H/O HepC: treated as per patient has increased LFT, viral load still elevated  Bipolar Disorder: c/w home regimen  Tobacco Abuse: nicotine patch    Code Status: Full Code  Surrogate Decision Maker: Alexey Damon [phone]  DVT Prophylaxis: lovenox  GI Prophylaxis: not indicated  Baseline: Independent  Body mass index is 23.21 kg/(m^2).        Subjective:     Chief Complaint / Reason for Physician Visit: follow-up cellulitis  Pain remains especially since s/p bone Bx  Patient states that he has been a long time since he used IV drugs and he does not plan to restart    Review of Systems:  Symptom Y/N Comments  Symptom Y/N Comments   Fever/Chills    Chest Pain n    Poor Appetite    Edema y Left ankle   Cough    Abdominal Pain     Sputum    Joint Pain     SOB/VIGIL n   Pruritis/Rash     Nausea/vomit    Tolerating PT/OT     Diarrhea y 3-4 times last pm  Tolerating Diet y    Constipation    Other       Could NOT obtain due to:      Objective:     VITALS:   Last 24hrs VS reviewed since prior progress note. Most recent are:  Patient Vitals for the past 24 hrs:   Temp Pulse Resp BP SpO2   04/25/18 0836 - - - - 99 %   04/25/18 0736 98.1 °F (36.7 °C) 66 16 (!) 176/97 99 %   04/25/18 0000 97.7 °F (36.5 °C) 60 15 141/75 99 %   04/24/18 2355 - 67 19 151/79 99 %   04/24/18 2350 - 63 14 141/84 99 %   04/24/18 2345 - 62 15 131/80 99 %   04/24/18 2340 - 62 13 127/73 100 %   04/24/18 2337 97.7 °F (36.5 °C) 63 17 123/81 100 %   04/24/18 2004 97.8 °F (36.6 °C) - - 114/66 98 %   04/24/18 1731 - 64 - 140/74 -   04/24/18 1602 - 74 - 152/87 -   04/24/18 1536 97.5 °F (36.4 °C) 76 20 170/79 98 %   04/24/18 1133 98.5 °F (36.9 °C) 65 18 170/89 97 %       Intake/Output Summary (Last 24 hours) at 04/25/18 1047  Last data filed at 04/25/18 0836   Gross per 24 hour   Intake             4170 ml   Output                0 ml   Net             4170 ml        PHYSICAL EXAM:  General: WD, WN. Alert, cooperative, no acute distress    EENT:  EOMI. Anicteric sclerae. MMM  Resp:  CTA bilaterally, no wheezing or rales. No accessory muscle use  CV:  Regular  rhythm,  swelling around left ankle  GI:  Soft, Non distended, Non tender.  +Bowel sounds  Neurologic:  Alert and oriented X 3, normal speech,   Psych:   Good insight. Not anxious nor agitated  Skin:  Left Ankle Bandaged. No jaundice    Reviewed most current lab test results and cultures  YES  Reviewed most current radiology test results   YES  Review and summation of old records today    NO  Reviewed patient's current orders and MAR    YES  PMH/SH reviewed - no change compared to H&P  ________________________________________________________________________  Care Plan discussed with:    Comments   Patient x    Family      RN x    Care Manager x    Consultant                        Multidiciplinary team rounds were held today with , nursing, pharmacist and clinical coordinator. Patient's plan of care was discussed; medications were reviewed and discharge planning was addressed. ________________________________________________________________________  Total NON critical care TIME:  25   Minutes    Total CRITICAL CARE TIME Spent:   Minutes non procedure based      Comments   >50% of visit spent in counseling and coordination of care     ________________________________________________________________________  Michelle Duong MD     Procedures: see electronic medical records for all procedures/Xrays and details which were not copied into this note but were reviewed prior to creation of Plan. LABS:  I reviewed today's most current labs and imaging studies.   Pertinent labs include:  Recent Labs      04/25/18   0108  04/23/18   0504  04/22/18   1204   WBC  4.8  4.4  6.5   HGB  14.9  12.7  14.7   HCT  41.2  35.0*  40.3   PLT  146*  134*  151     Recent Labs      04/25/18   0108  04/23/18   0504  04/22/18   1204   NA  140  139  134*   K  3.1*  4.3  4.7   CL  102  105  99   CO2  32  28  30   GLU  123*  152*  370*   BUN  9  24*  21*   CREA  0.69*  0.75  0.80   CA  8.6  8.6  9.0   MG   --   2.0   --    ALB  3.2*  2.9*  3.4*   TBILI  0.4  0.5  0.8   SGOT  121*  77*  61*   ALT  176*  128*  141*       Signed: Michelle Duong MD

## 2018-04-26 LAB
ALBUMIN SERPL-MCNC: 2.7 G/DL (ref 3.5–5)
ALBUMIN/GLOB SERPL: 0.8 {RATIO} (ref 1.1–2.2)
ALP SERPL-CCNC: 76 U/L (ref 45–117)
ALT SERPL-CCNC: 151 U/L (ref 12–78)
ANION GAP SERPL CALC-SCNC: 2 MMOL/L (ref 5–15)
AST SERPL-CCNC: 97 U/L (ref 15–37)
BACTERIA SPEC CULT: NORMAL
BACTERIA SPEC CULT: NORMAL
BASOPHILS # BLD: 0 K/UL (ref 0–0.1)
BASOPHILS NFR BLD: 1 % (ref 0–1)
BILIRUB SERPL-MCNC: 0.5 MG/DL (ref 0.2–1)
BUN SERPL-MCNC: 9 MG/DL (ref 6–20)
BUN/CREAT SERPL: 13 (ref 12–20)
CALCIUM SERPL-MCNC: 8.8 MG/DL (ref 8.5–10.1)
CHLORIDE SERPL-SCNC: 104 MMOL/L (ref 97–108)
CO2 SERPL-SCNC: 32 MMOL/L (ref 21–32)
CREAT SERPL-MCNC: 0.68 MG/DL (ref 0.7–1.3)
DATE LAST DOSE: ABNORMAL
DIFFERENTIAL METHOD BLD: ABNORMAL
EOSINOPHIL # BLD: 0.1 K/UL (ref 0–0.4)
EOSINOPHIL NFR BLD: 3 % (ref 0–7)
ERYTHROCYTE [DISTWIDTH] IN BLOOD BY AUTOMATED COUNT: 13.2 % (ref 11.5–14.5)
GLOBULIN SER CALC-MCNC: 3.6 G/DL (ref 2–4)
GLUCOSE BLD STRIP.AUTO-MCNC: 151 MG/DL (ref 65–100)
GLUCOSE BLD STRIP.AUTO-MCNC: 164 MG/DL (ref 65–100)
GLUCOSE BLD STRIP.AUTO-MCNC: 181 MG/DL (ref 65–100)
GLUCOSE BLD STRIP.AUTO-MCNC: 200 MG/DL (ref 65–100)
GLUCOSE SERPL-MCNC: 167 MG/DL (ref 65–100)
HBV SURFACE AG SER QL: <0.1 INDEX
HBV SURFACE AG SER QL: NEGATIVE
HCT VFR BLD AUTO: 36.5 % (ref 36.6–50.3)
HCV AB SER IA-ACNC: >11.9 INDEX
HCV AB SERPL QL IA: REACTIVE
HCV COMMENT,HCGAC: ABNORMAL
HCV GENOTYPE: NORMAL
HCV GENTYP SERPL NAA+PROBE: NORMAL
HCV RNA SERPL NAA+PROBE-ACNC: NORMAL IU/ML
HCV RNA SERPL NAA+PROBE-LOG IU: 5.99 LOG10 IU/ML
HGB BLD-MCNC: 13.2 G/DL (ref 12.1–17)
HIV1 P24 AG SERPL QL IA: NONREACTIVE
HIV1+2 AB SERPL QL IA: NONREACTIVE
IMM GRANULOCYTES # BLD: 0 K/UL (ref 0–0.04)
IMM GRANULOCYTES NFR BLD AUTO: 0 % (ref 0–0.5)
LYMPHOCYTES # BLD: 1.2 K/UL (ref 0.8–3.5)
LYMPHOCYTES NFR BLD: 30 % (ref 12–49)
MCH RBC QN AUTO: 31.2 PG (ref 26–34)
MCHC RBC AUTO-ENTMCNC: 36.2 G/DL (ref 30–36.5)
MCV RBC AUTO: 86.3 FL (ref 80–99)
MONOCYTES # BLD: 0.4 K/UL (ref 0–1)
MONOCYTES NFR BLD: 9 % (ref 5–13)
NEUTS SEG # BLD: 2.2 K/UL (ref 1.8–8)
NEUTS SEG NFR BLD: 56 % (ref 32–75)
NRBC # BLD: 0 K/UL (ref 0–0.01)
NRBC BLD-RTO: 0 PER 100 WBC
PLATELET # BLD AUTO: 126 K/UL (ref 150–400)
PLEASE NOTE, 550474: NORMAL
PMV BLD AUTO: 9.7 FL (ref 8.9–12.9)
POTASSIUM SERPL-SCNC: 4.2 MMOL/L (ref 3.5–5.1)
PROT SERPL-MCNC: 6.3 G/DL (ref 6.4–8.2)
RBC # BLD AUTO: 4.23 M/UL (ref 4.1–5.7)
REPORTED DOSE,DOSE: ABNORMAL UNITS
REPORTED DOSE/TIME,TMG: ABNORMAL
SERVICE CMNT-IMP: ABNORMAL
SERVICE CMNT-IMP: NORMAL
SERVICE CMNT-IMP: NORMAL
SODIUM SERPL-SCNC: 138 MMOL/L (ref 136–145)
TEST INFORMATION, 550045: NORMAL
VANCOMYCIN TROUGH SERPL-MCNC: <0.8 UG/ML (ref 5–10)
WBC # BLD AUTO: 4 K/UL (ref 4.1–11.1)

## 2018-04-26 PROCEDURE — 80053 COMPREHEN METABOLIC PANEL: CPT | Performed by: INTERNAL MEDICINE

## 2018-04-26 PROCEDURE — 74011636637 HC RX REV CODE- 636/637: Performed by: INTERNAL MEDICINE

## 2018-04-26 PROCEDURE — 74011250637 HC RX REV CODE- 250/637: Performed by: INTERNAL MEDICINE

## 2018-04-26 PROCEDURE — 36415 COLL VENOUS BLD VENIPUNCTURE: CPT | Performed by: INTERNAL MEDICINE

## 2018-04-26 PROCEDURE — 85025 COMPLETE CBC W/AUTO DIFF WBC: CPT | Performed by: INTERNAL MEDICINE

## 2018-04-26 PROCEDURE — 80202 ASSAY OF VANCOMYCIN: CPT | Performed by: INTERNAL MEDICINE

## 2018-04-26 PROCEDURE — 77030009526 HC GEL CARSYN MDII -A

## 2018-04-26 PROCEDURE — 74011000258 HC RX REV CODE- 258: Performed by: INTERNAL MEDICINE

## 2018-04-26 PROCEDURE — 65660000000 HC RM CCU STEPDOWN

## 2018-04-26 PROCEDURE — 82962 GLUCOSE BLOOD TEST: CPT

## 2018-04-26 PROCEDURE — 74011250636 HC RX REV CODE- 250/636: Performed by: INTERNAL MEDICINE

## 2018-04-26 RX ORDER — VANCOMYCIN/0.9 % SOD CHLORIDE 1.5G/250ML
1500 PLASTIC BAG, INJECTION (ML) INTRAVENOUS EVERY 8 HOURS
Status: DISCONTINUED | OUTPATIENT
Start: 2018-04-26 | End: 2018-04-27

## 2018-04-26 RX ADMIN — CLONIDINE HYDROCHLORIDE 0.1 MG: 0.1 TABLET ORAL at 08:26

## 2018-04-26 RX ADMIN — PREGABALIN 75 MG: 25 CAPSULE ORAL at 17:17

## 2018-04-26 RX ADMIN — PIPERACILLIN SODIUM,TAZOBACTAM SODIUM 3.38 G: 3; .375 INJECTION, POWDER, FOR SOLUTION INTRAVENOUS at 04:35

## 2018-04-26 RX ADMIN — ARIPIPRAZOLE 10 MG: 5 TABLET ORAL at 08:26

## 2018-04-26 RX ADMIN — PREGABALIN 75 MG: 25 CAPSULE ORAL at 08:26

## 2018-04-26 RX ADMIN — VANCOMYCIN HYDROCHLORIDE 1500 MG: 10 INJECTION, POWDER, LYOPHILIZED, FOR SOLUTION INTRAVENOUS at 17:11

## 2018-04-26 RX ADMIN — PIPERACILLIN SODIUM,TAZOBACTAM SODIUM 3.38 G: 3; .375 INJECTION, POWDER, FOR SOLUTION INTRAVENOUS at 20:06

## 2018-04-26 RX ADMIN — FENTANYL CITRATE 50 MCG: 50 INJECTION, SOLUTION INTRAMUSCULAR; INTRAVENOUS at 13:17

## 2018-04-26 RX ADMIN — OXYCODONE HYDROCHLORIDE AND ACETAMINOPHEN 1 TABLET: 5; 325 TABLET ORAL at 09:17

## 2018-04-26 RX ADMIN — CLONIDINE HYDROCHLORIDE 0.1 MG: 0.1 TABLET ORAL at 17:17

## 2018-04-26 RX ADMIN — METFORMIN HYDROCHLORIDE 1000 MG: 500 TABLET, FILM COATED ORAL at 17:17

## 2018-04-26 RX ADMIN — INSULIN HUMAN 10 UNITS: 100 INJECTION, SUSPENSION SUBCUTANEOUS at 08:26

## 2018-04-26 RX ADMIN — FENTANYL CITRATE 50 MCG: 50 INJECTION, SOLUTION INTRAMUSCULAR; INTRAVENOUS at 04:48

## 2018-04-26 RX ADMIN — PIPERACILLIN SODIUM,TAZOBACTAM SODIUM 3.38 G: 3; .375 INJECTION, POWDER, FOR SOLUTION INTRAVENOUS at 12:19

## 2018-04-26 RX ADMIN — INSULIN LISPRO 2 UNITS: 100 INJECTION, SOLUTION INTRAVENOUS; SUBCUTANEOUS at 22:02

## 2018-04-26 RX ADMIN — VANCOMYCIN HYDROCHLORIDE 1250 MG: 10 INJECTION, POWDER, LYOPHILIZED, FOR SOLUTION INTRAVENOUS at 05:42

## 2018-04-26 RX ADMIN — INSULIN LISPRO 2 UNITS: 100 INJECTION, SOLUTION INTRAVENOUS; SUBCUTANEOUS at 12:19

## 2018-04-26 RX ADMIN — METFORMIN HYDROCHLORIDE 1000 MG: 500 TABLET, FILM COATED ORAL at 08:26

## 2018-04-26 RX ADMIN — INSULIN LISPRO 4 UNITS: 100 INJECTION, SOLUTION INTRAVENOUS; SUBCUTANEOUS at 12:19

## 2018-04-26 RX ADMIN — INSULIN LISPRO 2 UNITS: 100 INJECTION, SOLUTION INTRAVENOUS; SUBCUTANEOUS at 17:19

## 2018-04-26 RX ADMIN — INSULIN HUMAN 12 UNITS: 100 INJECTION, SUSPENSION SUBCUTANEOUS at 18:02

## 2018-04-26 RX ADMIN — INSULIN LISPRO 2 UNITS: 100 INJECTION, SOLUTION INTRAVENOUS; SUBCUTANEOUS at 08:25

## 2018-04-26 RX ADMIN — DOXEPIN HYDROCHLORIDE 25 MG: 25 CAPSULE ORAL at 22:01

## 2018-04-26 RX ADMIN — ENOXAPARIN SODIUM 40 MG: 40 INJECTION SUBCUTANEOUS at 17:19

## 2018-04-26 RX ADMIN — INSULIN LISPRO 4 UNITS: 100 INJECTION, SOLUTION INTRAVENOUS; SUBCUTANEOUS at 08:25

## 2018-04-26 RX ADMIN — ASPIRIN 81 MG: 81 TABLET, COATED ORAL at 08:26

## 2018-04-26 RX ADMIN — INSULIN LISPRO 4 UNITS: 100 INJECTION, SOLUTION INTRAVENOUS; SUBCUTANEOUS at 17:19

## 2018-04-26 RX ADMIN — FENTANYL CITRATE 50 MCG: 50 INJECTION, SOLUTION INTRAMUSCULAR; INTRAVENOUS at 18:40

## 2018-04-26 RX ADMIN — Medication 1 CAPSULE: at 08:26

## 2018-04-26 NOTE — PROGRESS NOTES
Pharmacy Automatic Renal Dosing Protocol - Antimicrobials    Indication for Antimicrobials: Left ankle cellulitis/DM foot ulcer; possible osteo    Current Regimen of Each Antimicrobial:  Vancomycin 2000 mg LD followed by 1250 mg IV Q8H (Start Date ; Day #)  Zosyn 3.375 g Q8 hours (Start Date ; Day # )    Previous Antimicrobial Therapy:   Patient recently discharged from ED  with Rx for doxycycline 100 mg BID x 10 days    Goal Level: VANCOMYCIN TROUGH GOAL RANGE:  15-20 for now    Measured / Extrapolated Vancomycin Level:   :  vancomycin level 12  : vancomycin level <0.8    Significant Cultures:    Blood (paired)- NG - Prelim   Wound - No organisms - Prelim   Anaerobic - Pending    Radiology / Imaging results: (X-ray, CT scan or MRI):   : MRI ankle: ?osteo    Paralysis, amputations, malnutrition: none    Labs:  Recent Labs      18   0441  18   0108   CREA  0.68*  0.69*   BUN  9  9   WBC  4.0*  4.8     Temp (24hrs), Av ° F (36.7 ° C), Min:97.6 ° F (36.4 ° C), Max:98.2 ° F (36.8 ° C)      Creatinine Clearance (mL/min) or Dialysis: >100 ml/min      Impression/Plan:   · Awaiting pathology report  · Vancomycin level on  was drawn 7 hours after last dose and does not appear accurate. · Based on population based kinetics; will adjust regimen to 1500 mg IV q8h for projected trough of ~18 to cover for possible osteo for now. · Will order another vancomycin trough on  prior to 0600 dose  · Will continue same zosyn dose for now     Pharmacy will follow daily and adjust medications as appropriate for renal function and/or serum levels.     Thank you,  Sebastien Boykin, PHARMD

## 2018-04-26 NOTE — PROGRESS NOTES
Podiatry    Subjective:         Patient is a 47 y.o.  male who is being seen for cellulitis and possible osteomyelitis L ankle. Workup has revealed bone biopsy negative for pathology, but \"possible staph aureus\" from the bone culture.     Patient Active Problem List    Diagnosis Date Noted    Cellulitis of left ankle 04/22/2018    Spinal stenosis of lumbar region with neurogenic claudication 03/26/2018    Type 2 diabetes mellitus with diabetic neuropathy, without long-term current use of insulin ([address] 01/28/2018    Type 2 diabetes mellitus with hyperglycemia, without long-term current use of insulin ([address] 01/25/2018    Essential hypertension 01/25/2018    Chronic pain syndrome 01/25/2018    Chronic hepatitis C without hepatic coma ([address] 01/25/2018    Other male erectile dysfunction 01/25/2018    Tobacco abuse 01/25/2018    Bipolar 1 disorder, mixed, moderate ([address] 03/06/2017    Polysubstance abuse 10/18/2016    Personality disorder 07/26/2013    Do not give narcotics 11/08/2011    Non compliance with medical treatment 11/03/2011     Past Medical History:   Diagnosis Date    Adverse effect of anesthesia     breathing diff. with versed    Bipolar 1 disorder, mixed, moderate ([address] 3/6/2017    Chronic pain     Depression     Pt stated diagnosed years ago    Depression 3/13/2013    Diabetes ([address]     Diabetes ([address] 2003    Drug-induced mood disorder 5/28/2013    Homicide attempt     HTN (hypertension) 11/3/2011    Narcotic dependence, episodic use ([address] 11/3/2011    NIDDM (non-insulin dependent diabetes mellitus) 11/3/2011    Non compliance with medical treatment 11/3/2011    Other ill-defined conditions(799.89)     chronic low back pain    Psychiatric disorder     bipolar    Sleep disorder     Substance abuse     Suicidal thoughts      Past Surgical History:   Procedure Laterality Date    CARDIAC SURG PROCEDURE UNLIST      cardiac stents X2 lad drug eluting    COLONOSCOPY N/A 8/31/2016    [facility] Louise [facility]COPY    COLONOSCOPY,DIAGNOSTIC  8/31/2016         HX ORTHOPAEDIC      chronic back pain    HX ORTHOPAEDIC      left knee arthroplasty    [place] ANESTH,LUMBAR SPINE,CORD SURGERY  7 1999    l4 l5    REMV KIDNEY,COMPLICATED  2636    side unknown - kidney stones    UPPER GI ENDOSCOPY,BIOPSY  8/31/2016            Family History   Problem Relation Age of Onset    Stroke Mother     Hypertension Mother     Heart Disease Father     Hypertension Father     Cancer Maternal Grandmother      unknown type    Diabetes Maternal Grandfather       Social History   Substance Use Topics    Smoking status: Current Every Day Smoker     Packs/day: 0.50    Smokeless tobacco: Never Used    Alcohol use No      Comment: sociially      Allergies   Allergen Reactions    Versed [Midazolam] Shortness of Breath     Weakness     Versed [Midazolam] Unknown (comments)     Numbness, with shortness of breath     Prior to Admission medications    Medication Sig Start Date End Date Taking? Authorizing Provider   doxycycline (VIBRA-TABS) 100 mg tablet Take 1 Tab by mouth two (2) times a day for 10 days. 4/20/18 4/30/18  Benito Shetty MD   cyclobenzaprine (FLEXERIL) 10 mg tablet Take 1 Tab by mouth three (3) times daily as needed for Muscle Spasm(s). 4/20/18   Benito Shetty MD   ibuprofen (MOTRIN) 600 mg tablet Take 1 Tab by mouth every six (6) hours as needed for Pain. 4/20/18   Benito Shetty MD   cloNIDine HCl (CATAPRES) 0.2 mg tablet Take 1 Tab by mouth two (2) times a day. 3/26/18   Reilly Jimenes III, DO   metFORMIN ER (GLUCOPHAGE XR) 500 mg tablet Take 1 Tab by mouth two (2) times a day. Patient taking differently: Take 1,000 mg by mouth two (2) times a day. 3/26/18   Reilly Jimenes III,    Blood-Glucose Meter monitoring kit Check blood sugars daily.   DX: E11.9 1/30/18   Reilly Jimenes III, DO   glucose blood VI test strips (ASCENSIA AUTODISC VI, ONE TOUCH ULTRA TEST VI) strip Check blood sugars daily. DX: E11.9 18   Reilly Jimenes III, DO   Lancets (LANCETS, SUPER THIN) misc Check blood sugars daily. DX: E11.9 18   Reilly Jimenes III, DO   pregabalin (LYRICA) 75 mg capsule Take 1 Cap by mouth two (2) times a day. Max Daily Amount: 150 mg. Indications: Diabetic Peripheral Neuropathy 18   Bryanna Cespedes III, DO   lisinopril (PRINIVIL, ZESTRIL) 5 mg tablet Take 1 Tab by mouth daily. 18   Bryanna Cespedes III, DO   aspirin delayed-release 81 mg tablet Take 1 Tab by mouth daily. 18   Reilly Jimenes III, DO   doxepin (SINEQUAN) 25 mg capsule Take 1 Cap by mouth nightly. 17   Jethro Hooks MD   ARIPiprazole (ABILIFY) 10 mg tablet Take 1 Tab by mouth daily (after breakfast). Indications: MIXED BIPOLAR I DISORDER 17   Jethro Hooks MD       Review of Systems AO x3. NAD. No fever, chills, nausea or vomiting. Objective:     Patient Vitals for the past 8 hrs:   BP Temp Pulse Resp SpO2   18 1717 (!) 156/93 - 67 - -   18 1430 116/71 98.1 °F (36.7 °C) 62 15 97 %   18 1106 120/74 98 °F (36.7 °C) 60 16 99 %     Temp (24hrs), Av °F (36.7 °C), Min:97.6 °F (36.4 °C), Max:98.2 °F (36.8 °C)      Physical Exam Lower extremity  Dressings removed. Continued erythema, edema and calor left ankle. Wound lateral left ankle has a clean, granular base. Sutures intact. No active bleeding or pus, but some blood and drainage on the bandage. DP and PT 2/4 B; CFT less than 3 seconds  Sensation absent to fine touch B feet.     Bone biopsy: negative for acute osteomyelitis  Bone culture: \"Possible light staph aureus\"    Data Review:   Recent Results (from the past 24 hour(s))   GLUCOSE, POC    Collection Time: 18  9:21 PM   Result Value Ref Range    Glucose (POC) 145 (H) 65 - 100 mg/dL    Performed by Inocente Dockery, TROUGH    Collection Time: 18  4:41 AM   Result Value Ref Range    Vancomycin,trough <0.8 (L) 5.0 - 10.0 ug/mL    Reported dose date: NOT PROVIDED      Reported dose time: NOT PROVIDED      Reported dose: NOT PROVIDED UNITS   CBC WITH AUTOMATED DIFF    Collection Time: 04/26/18  4:41 AM   Result Value Ref Range    WBC 4.0 (L) 4.1 - 11.1 K/uL    RBC 4.23 4. 10 - 5.70 M/uL    HGB 13.2 12.1 - 17.0 g/dL    HCT 36.5 (L) 36.6 - 50.3 %    MCV 86.3 80.0 - 99.0 FL    MCH 31.2 26.0 - 34.0 PG    MCHC 36.2 30.0 - 36.5 g/dL    RDW 13.2 11.5 - 14.5 %    PLATELET 562 (L) 892 - 400 K/uL    MPV 9.7 8.9 - 12.9 FL    NRBC 0.0 0  WBC    ABSOLUTE NRBC 0.00 0.00 - 0.01 K/uL    NEUTROPHILS 56 32 - 75 %    LYMPHOCYTES 30 12 - 49 %    MONOCYTES 9 5 - 13 %    EOSINOPHILS 3 0 - 7 %    BASOPHILS 1 0 - 1 %    IMMATURE GRANULOCYTES 0 0.0 - 0.5 %    ABS. NEUTROPHILS 2.2 1.8 - 8.0 K/UL    ABS. LYMPHOCYTES 1.2 0.8 - 3.5 K/UL    ABS. MONOCYTES 0.4 0.0 - 1.0 K/UL    ABS. EOSINOPHILS 0.1 0.0 - 0.4 K/UL    ABS. BASOPHILS 0.0 0.0 - 0.1 K/UL    ABS. IMM. GRANS. 0.0 0.00 - 0.04 K/UL    DF AUTOMATED     METABOLIC PANEL, COMPREHENSIVE    Collection Time: 04/26/18  4:41 AM   Result Value Ref Range    Sodium 138 136 - 145 mmol/L    Potassium 4.2 3.5 - 5.1 mmol/L    Chloride 104 97 - 108 mmol/L    CO2 32 21 - 32 mmol/L    Anion gap 2 (L) 5 - 15 mmol/L    Glucose 167 (H) 65 - 100 mg/dL    BUN 9 6 - 20 MG/DL    Creatinine 0.68 (L) 0.70 - 1.30 MG/DL    BUN/Creatinine ratio 13 12 - 20      GFR est AA >60 >60 ml/min/1.73m2    GFR est non-AA >60 >60 ml/min/1.73m2    Calcium 8.8 8.5 - 10.1 MG/DL    Bilirubin, total 0.5 0.2 - 1.0 MG/DL    ALT (SGPT) 151 (H) 12 - 78 U/L    AST (SGOT) 97 (H) 15 - 37 U/L    Alk.  phosphatase 76 45 - 117 U/L    Protein, total 6.3 (L) 6.4 - 8.2 g/dL    Albumin 2.7 (L) 3.5 - 5.0 g/dL    Globulin 3.6 2.0 - 4.0 g/dL    A-G Ratio 0.8 (L) 1.1 - 2.2     GLUCOSE, POC    Collection Time: 04/26/18  7:18 AM   Result Value Ref Range    Glucose (POC) 151 (H) 65 - 100 mg/dL Performed by Luz Quiles (PCT)    GLUCOSE, POC    Collection Time: 04/26/18 11:29 AM   Result Value Ref Range    Glucose (POC) 181 (H) 65 - 100 mg/dL    Performed by Abigail Stuart (PCT)    GLUCOSE, POC    Collection Time: 04/26/18  3:47 PM   Result Value Ref Range    Glucose (POC) 164 (H) 65 - 100 mg/dL    Performed by Reg Dunaway          Impression:   1. Diabetic would L ankle  2. Cellulitis L ankle  3. Osteomyelitis L ankle      Recommendation:   Dressing changed, nursing staff may resume daily dressing changes. Antibiotics per ID service. I will continue to follow.       Signed By: Eli Estrada DPM     April 26, 2018

## 2018-04-26 NOTE — PROGRESS NOTES
Problem: Falls - Risk of  Goal: *Absence of Falls  Document Yu Fall Risk and appropriate interventions in the flowsheet.    Outcome: Progressing Towards Goal  Fall Risk Interventions:  Mobility Interventions: Communicate number of staff needed for ambulation/transfer         Medication Interventions: Evaluate medications/consider consulting pharmacy, Teach patient to arise slowly         History of Falls Interventions: Consult care management for discharge planning

## 2018-04-26 NOTE — PROGRESS NOTES
Hospitalist Progress Note    NAME: Codie Chadwick   :  1965   MRN:  [id number]       Assessment / Plan:  Left Ankle Cellulitis/DM Ankle Ulcer  Suspect Mild Left Lateral Malleolus Osteomyelitis  -Dr. Catarina Grider following, s/p Bone Bx on evening of   -continue on Vancomycin and Zosyn  -continue probiotic  -percocet prn pain; IV fentanyl for severe pain  -ID following, awaiting bone pathology  -note that patient has a history of IV drug abuse and this would make it risky to discharge with PICC in place    DM type 2, uncontrolled, A1c 9.0:   -continue metformin  -NPH increased to 12 units BID   -Continue Humalog 4 units TID AC   -continue SSI    History of Hypertension with low BP on Hospital Day 1, resolved  -lisinopril discontinued  -home clonidine dose tapered and BP parameters specified to hold if systolic BP less than 355 mmHg    Sinus Bradycardia: improved, avoid BB, monitor in telemetry    Hypokalemia:  -wnl today after repleation on     H/O HepC: treated as per patient has increased LFT, viral load still elevated  Bipolar Disorder: c/w home regimen  Tobacco Abuse: nicotine patch    Code Status: Full Code  Surrogate Decision Maker: Erasmo Rodriguezshirley [phone]  DVT Prophylaxis: lovenox  GI Prophylaxis: not indicated  Baseline: Independent  Body mass index is 23.21 kg/(m^2).        Subjective:     Chief Complaint / Reason for Physician Visit: follow-up cellulitis  Pain remains especially since s/p bone Bx  Patient states that he has been a long time since he used IV drugs and he does not plan to restart    Review of Systems:  Symptom Y/N Comments  Symptom Y/N Comments   Fever/Chills    Chest Pain n    Poor Appetite    Edema y Left ankle   Cough    Abdominal Pain     Sputum    Joint Pain     SOB/VIGIL n   Pruritis/Rash     Nausea/vomit    Tolerating PT/OT     Diarrhea y Better today  Tolerating Diet y    Constipation    Other       Could NOT obtain due to:      Objective:     VITALS:   Last 24hrs VS reviewed since prior progress note. Most recent are:  Patient Vitals for the past 24 hrs:   Temp Pulse Resp BP SpO2   04/26/18 0819 98.2 °F (36.8 °C) 78 18 155/90 -   04/25/18 2321 97.6 °F (36.4 °C) (!) 56 16 124/83 99 %   04/25/18 1928 98.2 °F (36.8 °C) 63 16 129/87 100 %   04/25/18 1434 98.1 °F (36.7 °C) 62 16 155/86 100 %   04/25/18 1057 98 °F (36.7 °C) (!) 59 14 (!) 150/92 98 %       Intake/Output Summary (Last 24 hours) at 04/26/18 0939  Last data filed at 04/26/18 0829   Gross per 24 hour   Intake              360 ml   Output                0 ml   Net              360 ml        PHYSICAL EXAM:  General: WD, WN. Alert, cooperative, no acute distress    EENT:  EOMI. Anicteric sclerae. MMM  Resp:  CTA bilaterally, no wheezing or rales. No accessory muscle use  CV:  Regular  rhythm,  swelling around left ankle  GI:  Soft, Non distended, Non tender.  +Bowel sounds  Neurologic:  Alert and oriented X 3, normal speech,   Psych:   Good insight. Not anxious nor agitated  Skin:  Left Ankle Bandaged. No jaundice    Reviewed most current lab test results and cultures  YES  Reviewed most current radiology test results   YES  Review and summation of old records today    NO  Reviewed patient's current orders and MAR    YES  PMH/ reviewed - no change compared to H&P  ________________________________________________________________________  Care Plan discussed with:    Comments   Patient x    Family      RN x    Care Manager x    Consultant                        Multidiciplinary team rounds were held today with , nursing, pharmacist and clinical coordinator. Patient's plan of care was discussed; medications were reviewed and discharge planning was addressed.      ________________________________________________________________________  Total NON critical care TIME:  20   Minutes    Total CRITICAL CARE TIME Spent:   Minutes non procedure based      Comments   >50% of visit spent in counseling and coordination of care     ________________________________________________________________________  Raheem Loza MD     Procedures: see electronic medical records for all procedures/Xrays and details which were not copied into this note but were reviewed prior to creation of Plan. LABS:  I reviewed today's most current labs and imaging studies.   Pertinent labs include:  Recent Labs      04/26/18 0441 04/25/18   0108   WBC  4.0*  4.8   HGB  13.2  14.9   HCT  36.5*  41.2   PLT  126*  146*     Recent Labs      04/26/18 0441 04/25/18   0108   NA  138  140   K  4.2  3.1*   CL  104  102   CO2  32  32   GLU  167*  123*   BUN  9  9   CREA  0.68*  0.69*   CA  8.8  8.6   ALB  2.7*  3.2*   TBILI  0.5  0.4   SGOT  97*  121*   ALT  151*  176*       Signed: Raheem Loza MD

## 2018-04-26 NOTE — PROGRESS NOTES
Bedside and Verbal shift change report given to Josué Warner (oncoming nurse) by Paul Bullard RN (offgoing nurse). Report included the following information SBAR, Kardex, MAR and Recent Results. Patient rested quietly throughout the night. Patient complained of pain once during shift. Pain medicine was given at 0448.

## 2018-04-27 LAB
ALBUMIN SERPL-MCNC: 2.6 G/DL (ref 3.5–5)
ALBUMIN/GLOB SERPL: 0.7 {RATIO} (ref 1.1–2.2)
ALP SERPL-CCNC: 70 U/L (ref 45–117)
ALT SERPL-CCNC: 155 U/L (ref 12–78)
ANION GAP SERPL CALC-SCNC: 5 MMOL/L (ref 5–15)
AST SERPL-CCNC: 106 U/L (ref 15–37)
BACTERIA SPEC CULT: ABNORMAL
BACTERIA SPEC CULT: NORMAL
BACTERIA SPEC CULT: NORMAL
BASOPHILS # BLD: 0 K/UL (ref 0–0.1)
BASOPHILS NFR BLD: 1 % (ref 0–1)
BILIRUB SERPL-MCNC: 0.4 MG/DL (ref 0.2–1)
BUN SERPL-MCNC: 11 MG/DL (ref 6–20)
BUN/CREAT SERPL: 16 (ref 12–20)
CALCIUM SERPL-MCNC: 8.5 MG/DL (ref 8.5–10.1)
CHLORIDE SERPL-SCNC: 106 MMOL/L (ref 97–108)
CO2 SERPL-SCNC: 29 MMOL/L (ref 21–32)
CREAT SERPL-MCNC: 0.68 MG/DL (ref 0.7–1.3)
DATE LAST DOSE: ABNORMAL
DIFFERENTIAL METHOD BLD: ABNORMAL
EOSINOPHIL # BLD: 0.1 K/UL (ref 0–0.4)
EOSINOPHIL NFR BLD: 3 % (ref 0–7)
ERYTHROCYTE [DISTWIDTH] IN BLOOD BY AUTOMATED COUNT: 13.2 % (ref 11.5–14.5)
GLOBULIN SER CALC-MCNC: 3.5 G/DL (ref 2–4)
GLUCOSE BLD STRIP.AUTO-MCNC: 117 MG/DL (ref 65–100)
GLUCOSE BLD STRIP.AUTO-MCNC: 132 MG/DL (ref 65–100)
GLUCOSE BLD STRIP.AUTO-MCNC: 153 MG/DL (ref 65–100)
GLUCOSE BLD STRIP.AUTO-MCNC: 199 MG/DL (ref 65–100)
GLUCOSE SERPL-MCNC: 138 MG/DL (ref 65–100)
GRAM STN SPEC: ABNORMAL
HCT VFR BLD AUTO: 36.3 % (ref 36.6–50.3)
HGB BLD-MCNC: 12.8 G/DL (ref 12.1–17)
IMM GRANULOCYTES # BLD: 0 K/UL (ref 0–0.04)
IMM GRANULOCYTES NFR BLD AUTO: 0 % (ref 0–0.5)
LYMPHOCYTES # BLD: 1.1 K/UL (ref 0.8–3.5)
LYMPHOCYTES NFR BLD: 26 % (ref 12–49)
MCH RBC QN AUTO: 31 PG (ref 26–34)
MCHC RBC AUTO-ENTMCNC: 35.3 G/DL (ref 30–36.5)
MCV RBC AUTO: 87.9 FL (ref 80–99)
MONOCYTES # BLD: 0.4 K/UL (ref 0–1)
MONOCYTES NFR BLD: 9 % (ref 5–13)
NEUTS SEG # BLD: 2.5 K/UL (ref 1.8–8)
NEUTS SEG NFR BLD: 61 % (ref 32–75)
NRBC # BLD: 0 K/UL (ref 0–0.01)
NRBC BLD-RTO: 0 PER 100 WBC
PLATELET # BLD AUTO: 131 K/UL (ref 150–400)
POTASSIUM SERPL-SCNC: 4.1 MMOL/L (ref 3.5–5.1)
PROT SERPL-MCNC: 6.1 G/DL (ref 6.4–8.2)
RBC # BLD AUTO: 4.13 M/UL (ref 4.1–5.7)
RBC MORPH BLD: ABNORMAL
REPORTED DOSE,DOSE: ABNORMAL UNITS
REPORTED DOSE/TIME,TMG: ABNORMAL
SERVICE CMNT-IMP: ABNORMAL
SERVICE CMNT-IMP: NORMAL
SERVICE CMNT-IMP: NORMAL
SODIUM SERPL-SCNC: 140 MMOL/L (ref 136–145)
VANCOMYCIN TROUGH SERPL-MCNC: 18.2 UG/ML (ref 5–10)
WBC # BLD AUTO: 4.1 K/UL (ref 4.1–11.1)

## 2018-04-27 PROCEDURE — 74011250636 HC RX REV CODE- 250/636: Performed by: INTERNAL MEDICINE

## 2018-04-27 PROCEDURE — 74011636637 HC RX REV CODE- 636/637: Performed by: INTERNAL MEDICINE

## 2018-04-27 PROCEDURE — 80053 COMPREHEN METABOLIC PANEL: CPT | Performed by: INTERNAL MEDICINE

## 2018-04-27 PROCEDURE — 85025 COMPLETE CBC W/AUTO DIFF WBC: CPT | Performed by: INTERNAL MEDICINE

## 2018-04-27 PROCEDURE — 74011250637 HC RX REV CODE- 250/637: Performed by: INTERNAL MEDICINE

## 2018-04-27 PROCEDURE — 74011000258 HC RX REV CODE- 258: Performed by: INTERNAL MEDICINE

## 2018-04-27 PROCEDURE — 65660000000 HC RM CCU STEPDOWN

## 2018-04-27 PROCEDURE — 77010033678 HC OXYGEN DAILY

## 2018-04-27 PROCEDURE — 82962 GLUCOSE BLOOD TEST: CPT

## 2018-04-27 PROCEDURE — 36415 COLL VENOUS BLD VENIPUNCTURE: CPT | Performed by: INTERNAL MEDICINE

## 2018-04-27 PROCEDURE — 80202 ASSAY OF VANCOMYCIN: CPT | Performed by: INTERNAL MEDICINE

## 2018-04-27 RX ORDER — LISINOPRIL 5 MG/1
5 TABLET ORAL
Status: DISCONTINUED | OUTPATIENT
Start: 2018-04-27 | End: 2018-04-29 | Stop reason: HOSPADM

## 2018-04-27 RX ADMIN — FENTANYL CITRATE 50 MCG: 50 INJECTION, SOLUTION INTRAMUSCULAR; INTRAVENOUS at 15:35

## 2018-04-27 RX ADMIN — CLONIDINE HYDROCHLORIDE 0.1 MG: 0.1 TABLET ORAL at 17:55

## 2018-04-27 RX ADMIN — DOXEPIN HYDROCHLORIDE 25 MG: 25 CAPSULE ORAL at 22:36

## 2018-04-27 RX ADMIN — PREGABALIN 75 MG: 25 CAPSULE ORAL at 17:55

## 2018-04-27 RX ADMIN — FENTANYL CITRATE 50 MCG: 50 INJECTION, SOLUTION INTRAMUSCULAR; INTRAVENOUS at 20:36

## 2018-04-27 RX ADMIN — INSULIN HUMAN 12 UNITS: 100 INJECTION, SUSPENSION SUBCUTANEOUS at 17:54

## 2018-04-27 RX ADMIN — FENTANYL CITRATE 50 MCG: 50 INJECTION, SOLUTION INTRAMUSCULAR; INTRAVENOUS at 03:32

## 2018-04-27 RX ADMIN — INSULIN LISPRO 2 UNITS: 100 INJECTION, SOLUTION INTRAVENOUS; SUBCUTANEOUS at 12:34

## 2018-04-27 RX ADMIN — PREGABALIN 75 MG: 25 CAPSULE ORAL at 08:16

## 2018-04-27 RX ADMIN — LISINOPRIL 5 MG: 5 TABLET ORAL at 22:36

## 2018-04-27 RX ADMIN — INSULIN HUMAN 12 UNITS: 100 INJECTION, SUSPENSION SUBCUTANEOUS at 08:15

## 2018-04-27 RX ADMIN — ARIPIPRAZOLE 10 MG: 5 TABLET ORAL at 08:16

## 2018-04-27 RX ADMIN — VANCOMYCIN HYDROCHLORIDE 1500 MG: 10 INJECTION, POWDER, LYOPHILIZED, FOR SOLUTION INTRAVENOUS at 10:05

## 2018-04-27 RX ADMIN — INSULIN LISPRO 4 UNITS: 100 INJECTION, SOLUTION INTRAVENOUS; SUBCUTANEOUS at 08:15

## 2018-04-27 RX ADMIN — PIPERACILLIN SODIUM,TAZOBACTAM SODIUM 3.38 G: 3; .375 INJECTION, POWDER, FOR SOLUTION INTRAVENOUS at 12:34

## 2018-04-27 RX ADMIN — NAFCILLIN 2 G: 2 INJECTION, POWDER, FOR SOLUTION INTRAMUSCULAR; INTRAVENOUS at 20:58

## 2018-04-27 RX ADMIN — MELATONIN 3 MG ORAL TABLET 3 MG: 3 TABLET ORAL at 22:36

## 2018-04-27 RX ADMIN — METFORMIN HYDROCHLORIDE 1000 MG: 500 TABLET, FILM COATED ORAL at 08:16

## 2018-04-27 RX ADMIN — VANCOMYCIN HYDROCHLORIDE 1500 MG: 10 INJECTION, POWDER, LYOPHILIZED, FOR SOLUTION INTRAVENOUS at 01:43

## 2018-04-27 RX ADMIN — INSULIN LISPRO 4 UNITS: 100 INJECTION, SOLUTION INTRAVENOUS; SUBCUTANEOUS at 12:34

## 2018-04-27 RX ADMIN — ACETAMINOPHEN 650 MG: 325 TABLET ORAL at 13:33

## 2018-04-27 RX ADMIN — Medication 1 CAPSULE: at 08:16

## 2018-04-27 RX ADMIN — METFORMIN HYDROCHLORIDE 1000 MG: 500 TABLET, FILM COATED ORAL at 17:55

## 2018-04-27 RX ADMIN — INSULIN LISPRO 2 UNITS: 100 INJECTION, SOLUTION INTRAVENOUS; SUBCUTANEOUS at 17:54

## 2018-04-27 RX ADMIN — CLONIDINE HYDROCHLORIDE 0.1 MG: 0.1 TABLET ORAL at 08:16

## 2018-04-27 RX ADMIN — INSULIN LISPRO 4 UNITS: 100 INJECTION, SOLUTION INTRAVENOUS; SUBCUTANEOUS at 17:54

## 2018-04-27 RX ADMIN — ENOXAPARIN SODIUM 40 MG: 40 INJECTION SUBCUTANEOUS at 17:54

## 2018-04-27 RX ADMIN — PIPERACILLIN SODIUM,TAZOBACTAM SODIUM 3.38 G: 3; .375 INJECTION, POWDER, FOR SOLUTION INTRAVENOUS at 03:59

## 2018-04-27 RX ADMIN — OXYCODONE HYDROCHLORIDE AND ACETAMINOPHEN 1 TABLET: 5; 325 TABLET ORAL at 08:25

## 2018-04-27 RX ADMIN — ASPIRIN 81 MG: 81 TABLET, COATED ORAL at 08:16

## 2018-04-27 RX ADMIN — NAFCILLIN 2 G: 2 INJECTION, POWDER, FOR SOLUTION INTRAMUSCULAR; INTRAVENOUS at 18:32

## 2018-04-27 NOTE — PROGRESS NOTES
Bedside and Verbal shift change report given to Derick GREGORY (oncoming nurse) by Christian Koroma (offgoing nurse). Report included the following information SBAR, Kardex, Intake/Output, MAR, Accordion, Recent Results and Cardiac Rhythm NSR. PRN percocet and PRN fentanyl given x1 this shift. Daily wound care complete.

## 2018-04-27 NOTE — PROGRESS NOTES
Infectious Disease Progress Note        IMPRESSION:      · Cellulitis /Diabetic foot ulcer / s/p I&D on   · MRI shows mild edema tip of lateral malleolus ? Early early OM. . ESR 12/ CRP . 80 which is not c/w OM   · Bone biopsy & pathology pending / Initial culture+ rare possible Staph aureus  · Poorly controlled DM2 with neuropathy A1c 9.0  · Hep C , h/o partial treatment in the past  · Current smoker  · UDS+ cocaine         PLAN:      · Continue Vancomycin / Pip-tazobactam IV   · Await cultures ID sensitivity on Staph, bone pathology. · Change antibiotics per bone cultures   · Duration of antibiotics per bone pathology . If no OM on bone pathology would recommend 2 weeks of antibiotics with close out patient  follow up . May need to extend duration as needed to 4 weeks if wound healing at 2 week diann unsatisfactory. · Better blood sugar control, d/w patient   · Smoking cessation, d/w patient  · Out patient referral to Dr Dilshad Carr for treatment of Hep C on discharge , d/w patient        Subjective:      Pt seen . Complains of pain on weight bearing on foot     Review of Systems:  A comprehensive review of systems was negative except for that written in the History of Present Illness. Objective:     Blood pressure 133/79, pulse 60, temperature 97.7 °F (36.5 °C), resp. rate 16, height 6' 1\" (1.854 m), weight 175 lb 14.8 oz (79.8 kg), SpO2 98 %.   Temp (24hrs), Av °F (36.7 °C), Min:97.7 °F (36.5 °C), Max:98.2 °F (36.8 °C)      Patient Vitals for the past 24 hrs:   Temp Pulse Resp BP SpO2   18 2355 97.7 °F (36.5 °C) 60 16 133/79 98 %   18 1856 97.8 °F (36.6 °C) 75 16 (!) 175/92 98 %   18 1717 - 67 - (!) 156/93 -   18 1430 98.1 °F (36.7 °C) 62 15 116/71 97 %   18 1106 98 °F (36.7 °C) 60 16 120/74 99 %   18 0819 98.2 °F (36.8 °C) 78 18 155/90 -         Lines:  Peripheral IV:            Physical Exam:   General:  Awake, cooperative,    Extremities: No  Edema, wound dressing on. Lines/Devices:  Intact, no erythema, drainage or tenderness     Data Review:   CBC:   Recent Labs      04/26/18   0441  04/25/18   0108   WBC  4.0*  4.8   RBC  4.23  4.75   HGB  13.2  14.9   HCT  36.5*  41.2   PLT  126*  146*   GRANS  56  74   LYMPH  30  19   EOS  3  1     CMP:   Recent Labs      04/26/18   0441  04/25/18   0108   GLU  167*  123*   NA  138  140   K  4.2  3.1*   CL  104  102   CO2  32  32   BUN  9  9   CREA  0.68*  0.69*   CA  8.8  8.6   AGAP  2*  6   BUCR  13  13   AP  76  80   TP  6.3*  7.1   ALB  2.7*  3.2*   GLOB  3.6  3.9   AGRAT  0.8*  0.8*       Studies:      Lab Results   Component Value Date/Time    Culture result: RARE POSSIBLE STAPHYLOCOCCUS AUREUS (A) 04/24/2018 11:16 PM    Culture result: NO GROWTH 4 DAYS 04/22/2018 12:30 PM    Culture result: NO GROWTH 6 DAYS 04/20/2018 09:46 AM    Culture result: NO GROWTH 6 DAYS 04/20/2018 09:44 AM        XR Results (most recent):    Results from Hospital Encounter encounter on 04/20/18   XR FOOT LT MIN 3 V   Narrative EXAM:  XR FOOT LT MIN 3 V    INDICATION:   Trauma. COMPARISON:  None. FINDINGS:  Three views of the left foot demonstrate no fracture or other acute  osseous or articular abnormality. There are plantar and dorsal calcaneal spurs. There are vascular calcifications. Impression IMPRESSION:  No acute abnormality.               Patient Active Problem List   Diagnosis Code    Non compliance with medical treatment Z91.19    Do not give narcotics [id number]    Personality disorder F60.9    Polysubstance abuse F19.10    Bipolar 1 disorder, mixed, moderate ([address] F31.62    Type 2 diabetes mellitus with hyperglycemia, without long-term current use of insulin (HCC) E11.65    Essential hypertension I10    Chronic pain syndrome G89.4    Chronic hepatitis C without hepatic coma (HCC) B18.2    Other male erectile dysfunction N52.8    Tobacco abuse Z72.0    Type 2 diabetes mellitus with diabetic neuropathy, without long-term current use of insulin ([facility]) E11.40    Spinal stenosis of lumbar region with neurogenic claudication M48.062    Cellulitis of left ankle L03.116         ICD-10-CM ICD-9-CM    1. Cellulitis of left ankle L03.116 682.6    2. Type 2 diabetes mellitus with diabetic neuropathy, without long-term current use of insulin ([facility]) E11.40 250.60      357.2    3. Essential hypertension I10 401.9    4. Hyperglycemia R73.9 790.29    5. Elevated liver enzymes R74.8 790.5        I have discussed the diagnosis with the patient and the intended plan as seen in the above orders. I have discussed medication side effects and warnings with the patient as well.     Reviewed test results at length with patient    Anti-infectives:      Vancomycin IV    Pip-tazobactam IV      Bry Wallace MD [address]

## 2018-04-27 NOTE — PROGRESS NOTES
Infectious Disease Progress Note        IMPRESSION:      · Cellulitis /Diabetic foot ulcer / s/p I&D on   · MRI shows mild edema tip of lateral malleolus ? Early early OM. . ESR 12/ CRP .86 . Bone pathology negative for OM  · Left lower leg & ankle still erythematous , warm , swollen. . D/w Dr Afia Snyder - appearance suggestive of early Osteomyelitis . MRI  suggests same   · Bone  culture+ rare possible Staph aureus, AILEEN ready  · Poorly controlled DM2 with neuropathy A1c 9.0  · Hep C , h/o partial treatment in the past  · Current smoker  · UDS+ cocaine         PLAN:      · Start Nafcillin , DC Vancomycin / Pip-tazobactam IV   · Continue until erythema, swelling , discharge improves 2-3 days, then DC planning on Cipro 750 mg po BIDx 4-6 weeks. · .Outpatient follow up in 2 weeks, call [phone] for appointment  · Better blood sugar control, d/w patient   · Smoking cessation, d/w patient  · Out patient referral to Dr Mickeal Aase for treatment of Hep C on discharge , d/w patient        Subjective:      Pt seen . Complains of pain on weight bearing on foot     Review of Systems:  A comprehensive review of systems was negative except for that written in the History of Present Illness. Objective:     Blood pressure 137/85, pulse 62, temperature 97.7 °F (36.5 °C), resp. rate 16, height 6' 1\" (1.854 m), weight 175 lb 14.8 oz (79.8 kg), SpO2 97 %.   Temp (24hrs), Av.9 °F (36.6 °C), Min:97.7 °F (36.5 °C), Max:98.4 °F (36.9 °C)      Patient Vitals for the past 24 hrs:   Temp Pulse Resp BP SpO2   18 1104 97.7 °F (36.5 °C) 62 16 137/85 97 %   18 0800 98.4 °F (36.9 °C) 69 16 (!) 159/95 96 %   18 0315 98 °F (36.7 °C) (!) 58 18 127/86 97 %   18 2355 97.7 °F (36.5 °C) 60 16 133/79 98 %   18 1856 97.8 °F (36.6 °C) 75 16 (!) 175/92 98 %   18 1717 - 67 - (!) 156/93 -         Lines:  Peripheral IV:            Physical Exam:   General:  Awake, cooperative,    Extremities: No  Edema, wound dressing on. Wound exam done - small wound over left lateral malleolus, localized erythema , swelling , warmth of lower leg & ankle   Lines/Devices:  Intact, no erythema, drainage or tenderness     Data Review:   CBC:   Recent Labs      04/27/18   0455  04/26/18   0441  04/25/18   0108   WBC  4.1  4.0*  4.8   RBC  4.13  4.23  4.75   HGB  12.8  13.2  14.9   HCT  36.3*  36.5*  41.2   PLT  131*  126*  146*   GRANS  61  56  74   LYMPH  26  30  19   EOS  3  3  1     CMP:   Recent Labs      04/27/18   0455  04/26/18   0441  04/25/18   0108   GLU  138*  167*  123*   NA  140  138  140   K  4.1  4.2  3.1*   CL  106  104  102   CO2  29  32  32   BUN  11  9  9   CREA  0.68*  0.68*  0.69*   CA  8.5  8.8  8.6   AGAP  5  2*  6   BUCR  16  13  13   AP  70  76  80   TP  6.1*  6.3*  7.1   ALB  2.6*  2.7*  3.2*   GLOB  3.5  3.6  3.9   AGRAT  0.7*  0.8*  0.8*       Studies:      Lab Results   Component Value Date/Time    Culture result: NO ANAEROBES ISOLATED 04/24/2018 11:16 PM    Culture result: SCANT STAPHYLOCOCCUS AUREUS (A) 04/24/2018 11:16 PM    Culture result: NO GROWTH 5 DAYS 04/22/2018 12:30 PM    Culture result: NO GROWTH 6 DAYS 04/20/2018 09:46 AM    Culture result: NO GROWTH 6 DAYS 04/20/2018 09:44 AM        XR Results (most recent):    Results from Hospital Encounter encounter on 04/20/18   XR FOOT LT MIN 3 V   Narrative EXAM:  XR FOOT LT MIN 3 V    INDICATION:   Trauma. COMPARISON:  None. FINDINGS:  Three views of the left foot demonstrate no fracture or other acute  osseous or articular abnormality. There are plantar and dorsal calcaneal spurs. There are vascular calcifications. Impression IMPRESSION:  No acute abnormality.               Patient Active Problem List   Diagnosis Code    Non compliance with medical treatment Z91.19    Do not give narcotics [id number]    Personality disorder F60.9    Polysubstance abuse F19.10    Bipolar 1 disorder, mixed, moderate ([address] F31.62    Type 2 diabetes mellitus with hyperglycemia, without long-term current use of insulin (HCC) E11.65    Essential hypertension I10    Chronic pain syndrome G89.4    Chronic hepatitis C without hepatic coma (HCC) B18.2    Other male erectile dysfunction N52.8    Tobacco abuse Z72.0    Type 2 diabetes mellitus with diabetic neuropathy, without long-term current use of insulin (HCC) E11.40    Spinal stenosis of lumbar region with neurogenic claudication M48.062    Cellulitis of left ankle L03.116         ICD-10-CM ICD-9-CM    1. Cellulitis of left ankle L03.116 682.6    2. Type 2 diabetes mellitus with diabetic neuropathy, without long-term current use of insulin (HCC) E11.40 250.60      357.2    3. Essential hypertension I10 401.9    4. Hyperglycemia R73.9 790.29    5. Elevated liver enzymes R74.8 790.5        I have discussed the diagnosis with the patient and mom &  the intended plan as seen in the above orders. I have discussed medication side effects and warnings with the patient as well.     Reviewed test results at length with patient    Anti-infectives:      Vancomycin IV -DC   Pip-tazobactam IV - DC     Stephen Phlegm MD [address]

## 2018-04-27 NOTE — PROGRESS NOTES
D/C plans discussed with Dr Mitch Rojas who anticipates the need for a SNF at d/c. Pt has Medicare only and will need IV abx. Pt has a history of IVDA so Home IV abx and infusion center are not safe options. I have not discussed SNF with pt as he really wants to go home. [facility] SNF can accept at present time.

## 2018-04-27 NOTE — PROGRESS NOTES
Bedside and Verbal shift change report given to [address] [place] [address] (oncoming nurse) by Rolly Banda (offgoing nurse). Report included the following information SBAR, Kardex, Intake/Output, MAR, Accordion, Recent Results and Cardiac Rhythm NSR. Zone Phone for oncoming shift:   [phone]    Shift Summary: PRN tylenol, PRN percocet and PRN fentanyl each given x1 this shift for L ankle pain. Daily would care done. LDAs               Peripheral IV 04/23/18 Right Arm (Active)   Site Assessment Clean, dry, & intact 4/27/2018  8:00 AM   Phlebitis Assessment 0 4/27/2018  8:00 AM   Infiltration Assessment 0 4/27/2018  8:00 AM   Dressing Status Clean, dry, & intact 4/27/2018  8:00 AM   Dressing Type Tape;Transparent 4/27/2018  8:00 AM   Hub Color/Line Status Green; Infusing 4/27/2018  8:00 AM                        Intake & Output   Date 04/26/18 1900 - 04/27/18 0659 04/27/18 0700 - 04/28/18 0659   Shift 8031-2798 24 Hour Total 5880-4683 8678-6918 24 Hour Total   I  N  T  A  K  E   P.O.  360 480  480      P. O.  360 480  480    Shift Total  (mL/kg)  360  (4.5) 480  (6)  480  (6)   O  U  T  P  U  T   Shift Total  (mL/kg)        NET  360 480  480   Weight (kg) 79.8 79.8 79.8 79.8 79.8      Last Bowel Movement Last Bowel Movement Date: 04/27/18   Glucose Checks [] N/A  [x] AC/HS  [] Q6  Concerns:   Nutrition Active Orders   Diet    DIET DIABETIC CONSISTENT CARB Regular       Consults []PT  []OT  []Speech  [x]Case Management   Cardiac Monitoring []N/A [x]Yes Expires:

## 2018-04-27 NOTE — PROGRESS NOTES
Pharmacy Automatic Renal Dosing Protocol - Antimicrobials    Indication for Antimicrobials: Left ankle cellulitis/DM foot ulcer; possible osteo    Current Regimen of Each Antimicrobial:  Vancomycin 2000 mg LD followed by 1250 mg IV Q8H (Start Date ; Day #)  Zosyn 3.375 g Q8 hours (Start Date ; Day # )    Previous Antimicrobial Therapy:   Patient recently discharged from ED  with Rx for doxycycline 100 mg BID x 10 days    Goal Level: VANCOMYCIN TROUGH GOAL RANGE:  15-20 for now    Measured / Extrapolated Vancomycin Level:   :  vancomycin level 12  : vancomycin level <0.8    Significant Cultures:    Blood (paired)- NG - Final   Wound - Scant S. Aureus - Final   Anaerobic - No Anaerobes isolated - Final    Radiology / Imaging results: (X-ray, CT scan or MRI):   : MRI ankle: ?osteo    Paralysis, amputations, malnutrition: none    Labs:  Recent Labs      18   0455  18   0441  18   0108   CREA  0.68*  0.68*  0.69*   BUN  11  9  9   WBC  4.1  4.0*  4.8     Temp (24hrs), Av °F (36.7 °C), Min:97.7 °F (36.5 °C), Max:98.4 °F (36.9 °C)      Creatinine Clearance (mL/min) or Dialysis: >100 ml/min      Impression/Plan:   · Pathology \" Unremarkable bone, negative for acute osteomyelitis\"  · Scant MSSA growing on culture from ankle. · Spoke with Dr. Rinku Lynn about need for IV abx on discharge due to patients hx of drug abuse. Per Podiatry this looks like early osteo so we will continue IV treatment. · Extrapolated Vancomycin trough = 17.5 mcg/ml which is at goal.   · Continue Vancomycin 1.5 g IV Q8H  · Continue Zosyn 3.375g IV Q8H extended infusion. · 7 day stop dates entered by admitting provider. Would assume this will want to be extended. Pharmacy will follow daily and adjust medications as appropriate for renal function and/or serum levels.     Thank you,  Daryl Champion, PHARMD          Recommended duration of therapy  [email or web address]%[email or web address]. docx    Renal Dosing  [email or web address]/[facility]/Pharmacy/Clinical%20Companion/Renal%[email or web address]. pdf

## 2018-04-27 NOTE — PROGRESS NOTES
Hospitalist Progress Note    NAME: Michelle Amaro   :  1965   MRN:  [id number]       Assessment / Plan:  Left Ankle Cellulitis/DM Ankle Ulcer  Mild Left Lateral Malleolus Osteomyelitis  -Dr. Ardon Sero following, s/p Bone Bx on evening of , Bx negative for osteo but Bone Cx with MSSA  -Per Dr Ezequiel Guillen (case discussed with her today), continue on Nafcillin until swelling improves then will switch to Cipro for 4-6 weeks (patient has a history of IV drug abuse and this would make it risky to discharge with PICC in place)    -continue probiotic  -percocet prn pain; IV fentanyl for severe pain    DM type 2, uncontrolled, A1c 9.0:   -continue metformin  -continue NPH 12 units BID   -Continue Humalog 4 units TID AC   -continue SSI    History of Hypertension with low BP on Hospital Day 1, resolved  -lisinopril to restart tonight   -home clonidine dose tapered and BP parameters specified to hold if systolic BP less than 228 mmHg    Sinus Bradycardia: improved, avoid BB, monitor in telemetry    Hypokalemia:  -wnl today     H/O HepC: treated as per patient has increased LFT, viral load still elevated  Bipolar Disorder: c/w home regimen  Tobacco Abuse: nicotine patch    Code Status: Full Code  Surrogate Decision Maker: Allan Tatiana [phone]  DVT Prophylaxis: lovenox  GI Prophylaxis: not indicated  Baseline: Independent  Body mass index is 23.21 kg/(m^2).        Subjective:     Chief Complaint / Reason for Physician Visit: follow-up cellulitis  Pain about the same, still with swelling    Review of Systems:  Symptom Y/N Comments  Symptom Y/N Comments   Fever/Chills    Chest Pain n    Poor Appetite    Edema y Left ankle a little better   Cough    Abdominal Pain     Sputum    Joint Pain     SOB/VIGIL n   Pruritis/Rash     Nausea/vomit    Tolerating PT/OT     Diarrhea n Loose stool  Tolerating Diet y    Constipation    Other       Could NOT obtain due to:      Objective:     VITALS:   Last 24hrs VS reviewed since prior progress note. Most recent are:  Patient Vitals for the past 24 hrs:   Temp Pulse Resp BP SpO2   04/27/18 0800 98.4 °F (36.9 °C) 69 16 (!) 159/95 96 %   04/27/18 0315 98 °F (36.7 °C) (!) 58 18 127/86 97 %   04/26/18 2355 97.7 °F (36.5 °C) 60 16 133/79 98 %   04/26/18 1856 97.8 °F (36.6 °C) 75 16 (!) 175/92 98 %   04/26/18 1717 - 67 - (!) 156/93 -   04/26/18 1430 98.1 °F (36.7 °C) 62 15 116/71 97 %   04/26/18 1106 98 °F (36.7 °C) 60 16 120/74 99 %       Intake/Output Summary (Last 24 hours) at 04/27/18 0917  Last data filed at 04/27/18 0829   Gross per 24 hour   Intake              240 ml   Output                0 ml   Net              240 ml        PHYSICAL EXAM:  General: WD, WN. Alert, cooperative, no acute distress    EENT:  EOMI. Anicteric sclerae. MMM  Resp:  CTA bilaterally, no wheezing or rales. No accessory muscle use  CV:  Regular  rhythm,  swelling around left ankle  GI:  Soft, Non distended, Non tender.  +Bowel sounds  Neurologic:  Alert and oriented X 3, normal speech,   Psych:   Good insight. Not anxious nor agitated  Skin:  Left Ankle Bandaged remaoves and swelling a little better but erythema unchanged. No jaundice    Reviewed most current lab test results and cultures  YES  Reviewed most current radiology test results   YES  Review and summation of old records today    NO  Reviewed patient's current orders and MAR    YES  PMH/ reviewed - no change compared to H&P  ________________________________________________________________________  Care Plan discussed with:    Comments   Patient x    Family      RN x    Care Manager x    Consultant                        Multidiciplinary team rounds were held today with , nursing, pharmacist and clinical coordinator. Patient's plan of care was discussed; medications were reviewed and discharge planning was addressed.      ________________________________________________________________________  Total NON critical care TIME:  20 Minutes    Total CRITICAL CARE TIME Spent:   Minutes non procedure based      Comments   >50% of visit spent in counseling and coordination of care     ________________________________________________________________________  Debbie Colindres MD     Procedures: see electronic medical records for all procedures/Xrays and details which were not copied into this note but were reviewed prior to creation of Plan. LABS:  I reviewed today's most current labs and imaging studies.   Pertinent labs include:  Recent Labs      04/27/18 0455  04/26/18   0441  04/25/18   0108   WBC  4.1  4.0*  4.8   HGB  12.8  13.2  14.9   HCT  36.3*  36.5*  41.2   PLT  131*  126*  146*     Recent Labs      04/27/18 0455  04/26/18   0441  04/25/18   0108   NA  140  138  140   K  4.1  4.2  3.1*   CL  106  104  102   CO2  29  32  32   GLU  138*  167*  123*   BUN  11  9  9   CREA  0.68*  0.68*  0.69*   CA  8.5  8.8  8.6   ALB  2.6*  2.7*  3.2*   TBILI  0.4  0.5  0.4   SGOT  106*  97*  121*   ALT  155*  151*  176*       Signed: Debbie Colindres MD

## 2018-04-27 NOTE — PROGRESS NOTES
Bedside and Verbal shift change report given to Luigi King RN (oncoming nurse) by Randi Martin RN (offgoing nurse). Report included the following information SBAR, Kardex, MAR and Recent Results. Patient had one complaint of pain during shift. Pain medicine was given at 0330. Patient still receiving IV antibiotics.
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
## 2018-04-27 NOTE — DIABETES MGMT
[facility]    Progress Note    Recommendations/ Comments: If appropriate, please consider:  1. Increase meal time insulin to 6 units pt has required 8 units of correction over the past 24 hours. Current DM medications: NPH 10units  BID before meals, humalog correction and metformin 1000mg BID    Patient is a 47 y.o. male with known Type 2 Diabetes on metformin ER 1000mg ac b/d at home. A1c:   Lab Results   Component Value Date/Time    Hemoglobin A1c 9.0 (H) 04/23/2018 05:04 AM    Hemoglobin A1c 8.8 (H) 01/26/2018 08:16 AM       Recent Glucose Results:   Lab Results   Component Value Date/Time     (H) 04/27/2018 04:55 AM    GLUCPOC 199 (H) 04/27/2018 11:08 AM    GLUCPOC 132 (H) 04/27/2018 07:59 AM    GLUCPOC 200 (H) 04/26/2018 09:07 PM        Lab Results   Component Value Date/Time    Creatinine 0.68 (L) 04/27/2018 04:55 AM       Active Orders   Diet    DIET DIABETIC CONSISTENT CARB Regular                Thank you.     Misty Ford, [address], [address]  Office: [phone]

## 2018-04-27 NOTE — PROGRESS NOTES
Spiritual Care Partner Volunteer visited patient in [address] on 4/26/18. Documented by:  TI Oliva

## 2018-04-28 LAB
ALBUMIN SERPL-MCNC: 2.9 G/DL (ref 3.5–5)
ALBUMIN/GLOB SERPL: 0.9 {RATIO} (ref 1.1–2.2)
ALP SERPL-CCNC: 65 U/L (ref 45–117)
ALT SERPL-CCNC: 138 U/L (ref 12–78)
ANION GAP SERPL CALC-SCNC: 4 MMOL/L (ref 5–15)
AST SERPL-CCNC: 87 U/L (ref 15–37)
BASOPHILS # BLD: 0 K/UL (ref 0–0.1)
BASOPHILS NFR BLD: 1 % (ref 0–1)
BILIRUB SERPL-MCNC: 0.8 MG/DL (ref 0.2–1)
BUN SERPL-MCNC: 17 MG/DL (ref 6–20)
BUN/CREAT SERPL: 16 (ref 12–20)
CALCIUM SERPL-MCNC: 9 MG/DL (ref 8.5–10.1)
CHLORIDE SERPL-SCNC: 105 MMOL/L (ref 97–108)
CO2 SERPL-SCNC: 29 MMOL/L (ref 21–32)
CREAT SERPL-MCNC: 1.06 MG/DL (ref 0.7–1.3)
DIFFERENTIAL METHOD BLD: ABNORMAL
EOSINOPHIL # BLD: 0.1 K/UL (ref 0–0.4)
EOSINOPHIL NFR BLD: 1 % (ref 0–7)
ERYTHROCYTE [DISTWIDTH] IN BLOOD BY AUTOMATED COUNT: 13.2 % (ref 11.5–14.5)
GLOBULIN SER CALC-MCNC: 3.4 G/DL (ref 2–4)
GLUCOSE BLD STRIP.AUTO-MCNC: 118 MG/DL (ref 65–100)
GLUCOSE BLD STRIP.AUTO-MCNC: 173 MG/DL (ref 65–100)
GLUCOSE BLD STRIP.AUTO-MCNC: 220 MG/DL (ref 65–100)
GLUCOSE SERPL-MCNC: 124 MG/DL (ref 65–100)
HCT VFR BLD AUTO: 35.9 % (ref 36.6–50.3)
HGB BLD-MCNC: 12.9 G/DL (ref 12.1–17)
IMM GRANULOCYTES # BLD: 0 K/UL (ref 0–0.04)
IMM GRANULOCYTES NFR BLD AUTO: 0 % (ref 0–0.5)
LYMPHOCYTES # BLD: 1 K/UL (ref 0.8–3.5)
LYMPHOCYTES NFR BLD: 15 % (ref 12–49)
MCH RBC QN AUTO: 31 PG (ref 26–34)
MCHC RBC AUTO-ENTMCNC: 35.9 G/DL (ref 30–36.5)
MCV RBC AUTO: 86.3 FL (ref 80–99)
MONOCYTES # BLD: 0.7 K/UL (ref 0–1)
MONOCYTES NFR BLD: 10 % (ref 5–13)
NEUTS SEG # BLD: 5.1 K/UL (ref 1.8–8)
NEUTS SEG NFR BLD: 74 % (ref 32–75)
NRBC # BLD: 0 K/UL (ref 0–0.01)
NRBC BLD-RTO: 0 PER 100 WBC
PLATELET # BLD AUTO: 141 K/UL (ref 150–400)
PMV BLD AUTO: 9.3 FL (ref 8.9–12.9)
POTASSIUM SERPL-SCNC: 3.9 MMOL/L (ref 3.5–5.1)
PROT SERPL-MCNC: 6.3 G/DL (ref 6.4–8.2)
RBC # BLD AUTO: 4.16 M/UL (ref 4.1–5.7)
SERVICE CMNT-IMP: ABNORMAL
SODIUM SERPL-SCNC: 138 MMOL/L (ref 136–145)
WBC # BLD AUTO: 6.9 K/UL (ref 4.1–11.1)

## 2018-04-28 PROCEDURE — 74011000258 HC RX REV CODE- 258: Performed by: INTERNAL MEDICINE

## 2018-04-28 PROCEDURE — 74011250637 HC RX REV CODE- 250/637: Performed by: INTERNAL MEDICINE

## 2018-04-28 PROCEDURE — 74011250636 HC RX REV CODE- 250/636: Performed by: INTERNAL MEDICINE

## 2018-04-28 PROCEDURE — 82962 GLUCOSE BLOOD TEST: CPT

## 2018-04-28 PROCEDURE — 36415 COLL VENOUS BLD VENIPUNCTURE: CPT | Performed by: INTERNAL MEDICINE

## 2018-04-28 PROCEDURE — 74011636637 HC RX REV CODE- 636/637: Performed by: INTERNAL MEDICINE

## 2018-04-28 PROCEDURE — 65660000000 HC RM CCU STEPDOWN

## 2018-04-28 PROCEDURE — 80053 COMPREHEN METABOLIC PANEL: CPT | Performed by: INTERNAL MEDICINE

## 2018-04-28 PROCEDURE — 85025 COMPLETE CBC W/AUTO DIFF WBC: CPT | Performed by: INTERNAL MEDICINE

## 2018-04-28 RX ADMIN — INSULIN LISPRO 2 UNITS: 100 INJECTION, SOLUTION INTRAVENOUS; SUBCUTANEOUS at 16:21

## 2018-04-28 RX ADMIN — DOXEPIN HYDROCHLORIDE 25 MG: 25 CAPSULE ORAL at 21:44

## 2018-04-28 RX ADMIN — FENTANYL CITRATE 50 MCG: 50 INJECTION, SOLUTION INTRAMUSCULAR; INTRAVENOUS at 20:40

## 2018-04-28 RX ADMIN — INSULIN LISPRO 3 UNITS: 100 INJECTION, SOLUTION INTRAVENOUS; SUBCUTANEOUS at 11:51

## 2018-04-28 RX ADMIN — ASPIRIN 81 MG: 81 TABLET, COATED ORAL at 08:22

## 2018-04-28 RX ADMIN — FENTANYL CITRATE 50 MCG: 50 INJECTION, SOLUTION INTRAMUSCULAR; INTRAVENOUS at 12:03

## 2018-04-28 RX ADMIN — NAFCILLIN 2 G: 2 INJECTION, POWDER, FOR SOLUTION INTRAMUSCULAR; INTRAVENOUS at 16:41

## 2018-04-28 RX ADMIN — LISINOPRIL 5 MG: 5 TABLET ORAL at 21:43

## 2018-04-28 RX ADMIN — Medication 1 CAPSULE: at 08:22

## 2018-04-28 RX ADMIN — OXYCODONE HYDROCHLORIDE AND ACETAMINOPHEN 1 TABLET: 5; 325 TABLET ORAL at 08:23

## 2018-04-28 RX ADMIN — FENTANYL CITRATE 50 MCG: 50 INJECTION, SOLUTION INTRAMUSCULAR; INTRAVENOUS at 16:07

## 2018-04-28 RX ADMIN — NAFCILLIN 2 G: 2 INJECTION, POWDER, FOR SOLUTION INTRAMUSCULAR; INTRAVENOUS at 13:54

## 2018-04-28 RX ADMIN — ONDANSETRON 4 MG: 2 INJECTION INTRAMUSCULAR; INTRAVENOUS at 08:37

## 2018-04-28 RX ADMIN — ENOXAPARIN SODIUM 40 MG: 40 INJECTION SUBCUTANEOUS at 16:07

## 2018-04-28 RX ADMIN — INSULIN HUMAN 12 UNITS: 100 INJECTION, SUSPENSION SUBCUTANEOUS at 08:23

## 2018-04-28 RX ADMIN — OXYCODONE HYDROCHLORIDE AND ACETAMINOPHEN 1 TABLET: 5; 325 TABLET ORAL at 00:39

## 2018-04-28 RX ADMIN — MELATONIN 3 MG ORAL TABLET 3 MG: 3 TABLET ORAL at 21:44

## 2018-04-28 RX ADMIN — NAFCILLIN 2 G: 2 INJECTION, POWDER, FOR SOLUTION INTRAMUSCULAR; INTRAVENOUS at 23:37

## 2018-04-28 RX ADMIN — INSULIN LISPRO 4 UNITS: 100 INJECTION, SOLUTION INTRAVENOUS; SUBCUTANEOUS at 08:23

## 2018-04-28 RX ADMIN — NAFCILLIN 2 G: 2 INJECTION, POWDER, FOR SOLUTION INTRAMUSCULAR; INTRAVENOUS at 09:21

## 2018-04-28 RX ADMIN — NAFCILLIN 2 G: 2 INJECTION, POWDER, FOR SOLUTION INTRAMUSCULAR; INTRAVENOUS at 20:21

## 2018-04-28 RX ADMIN — METFORMIN HYDROCHLORIDE 1000 MG: 500 TABLET, FILM COATED ORAL at 16:07

## 2018-04-28 RX ADMIN — INSULIN LISPRO 4 UNITS: 100 INJECTION, SOLUTION INTRAVENOUS; SUBCUTANEOUS at 16:21

## 2018-04-28 RX ADMIN — ARIPIPRAZOLE 10 MG: 5 TABLET ORAL at 08:22

## 2018-04-28 RX ADMIN — PREGABALIN 75 MG: 25 CAPSULE ORAL at 08:22

## 2018-04-28 RX ADMIN — NAFCILLIN 2 G: 2 INJECTION, POWDER, FOR SOLUTION INTRAMUSCULAR; INTRAVENOUS at 00:31

## 2018-04-28 RX ADMIN — INSULIN HUMAN 12 UNITS: 100 INJECTION, SUSPENSION SUBCUTANEOUS at 16:22

## 2018-04-28 RX ADMIN — INSULIN LISPRO 4 UNITS: 100 INJECTION, SOLUTION INTRAVENOUS; SUBCUTANEOUS at 11:50

## 2018-04-28 RX ADMIN — NAFCILLIN 2 G: 2 INJECTION, POWDER, FOR SOLUTION INTRAMUSCULAR; INTRAVENOUS at 03:52

## 2018-04-28 RX ADMIN — METFORMIN HYDROCHLORIDE 1000 MG: 500 TABLET, FILM COATED ORAL at 08:23

## 2018-04-28 RX ADMIN — FENTANYL CITRATE 50 MCG: 50 INJECTION, SOLUTION INTRAMUSCULAR; INTRAVENOUS at 05:08

## 2018-04-28 RX ADMIN — PREGABALIN 75 MG: 25 CAPSULE ORAL at 17:32

## 2018-04-28 NOTE — PROGRESS NOTES
Bedside and Verbal shift change report given to [place] (oncoming nurse) by Orlando Hernandez RN (offgoing nurse). Report included the following information SBAR, Kardex, Intake/Output, MAR, Accordion, Recent Results and Med Rec Status.

## 2018-04-28 NOTE — PROGRESS NOTES
Hospitalist Progress Note    NAME: Salbador Molina   :  1965   MRN:  [id number]       Assessment / Plan:  Left Ankle Cellulitis/DM Ankle Ulcer  Mild Left Lateral Malleolus Osteomyelitis  -Dr. Afia Snyder following, s/p Bone Bx on evening of , Bx negative for osteo but Bone Cx with MSSA  -Per Dr Rosy Cloud continue on Nafcillin until swelling improves then will switch to Cipro for 4-6 weeks (patient has a history of IV drug abuse and this would make it risky to discharge with PICC in place), patient may be ready for discharge tomorrow  -continue probiotic  -percocet prn pain; IV fentanyl for severe pain    DM type 2, uncontrolled, A1c 9.0:   -continue metformin  -continue NPH 12 units BID   -Continue Humalog 4 units TID AC   -continue SSI    History of Hypertension with low BP on Hospital Day 1, resolved  -lisinopril restarted on evening of  and BP better today  -home clonidine dose tapered and BP parameters specified to hold if systolic BP less than 030 mmHg    Sinus Bradycardia: improved, avoid BB, monitor in telemetry    Hypokalemia:  -wnl today     H/O HepC: treated as per patient has increased LFT, viral load still elevated  Bipolar Disorder: c/w home regimen  Tobacco Abuse: nicotine patch    Code Status: Full Code  Surrogate Decision Maker: Shanta Iverson [phone]  DVT Prophylaxis: lovenox  GI Prophylaxis: not indicated  Baseline: Independent  Body mass index is 23.21 kg/(m^2).        Subjective:     Chief Complaint / Reason for Physician Visit: follow-up cellulitis  Pain about the same, swelling is improving, BP better  Okay with his girlfriend holding his pain meds and dispensing to him on discharge    Review of Systems:  Symptom Y/N Comments  Symptom Y/N Comments   Fever/Chills    Chest Pain n    Poor Appetite    Edema     Cough    Abdominal Pain     Sputum    Joint Pain     SOB/VIGIL n   Pruritis/Rash     Nausea/vomit    Tolerating PT/OT     Diarrhea y   Tolerating Diet y    Constipation Other       Could NOT obtain due to:      Objective:     VITALS:   Last 24hrs VS reviewed since prior progress note. Most recent are:  Patient Vitals for the past 24 hrs:   Temp Pulse Resp BP SpO2   04/28/18 1140 97.9 °F (36.6 °C) 60 16 139/84 95 %   04/28/18 0725 98.7 °F (37.1 °C) 61 18 128/77 94 %   04/28/18 0355 98 °F (36.7 °C) 61 18 116/69 96 %   04/28/18 0025 98.1 °F (36.7 °C) 66 18 137/83 97 %   04/27/18 1942 97.8 °F (36.6 °C) 69 17 155/86 98 %   04/27/18 1755 - 63 - (!) 178/93 -   04/27/18 1649 97.6 °F (36.4 °C) 60 16 (!) 162/91 99 %     No intake or output data in the 24 hours ending 04/28/18 1446     PHYSICAL EXAM:  General: WD, WN. Alert, cooperative, no acute distress    EENT:  EOMI. Anicteric sclerae. MMM  Resp:  CTA bilaterally, no wheezing or rales. No accessory muscle use  CV:  Regular  rhythm,  swelling around left ankle  GI:  Soft, Non distended, Non tender.  +Bowel sounds  Neurologic:  Alert and oriented X 3, normal speech,   Psych:   Good insight. Not anxious nor agitated  Skin:  Left Ankle Bandaged remaoves and swelling a little better but erythema unchanged. No jaundice    Reviewed most current lab test results and cultures  YES  Reviewed most current radiology test results   YES  Review and summation of old records today    NO  Reviewed patient's current orders and MAR    YES  PMH/SH reviewed - no change compared to H&P  ________________________________________________________________________  Care Plan discussed with:    Comments   Patient x    Family      RN x    Care Manager     Consultant                        Multidiciplinary team rounds were held today with , nursing, pharmacist and clinical coordinator. Patient's plan of care was discussed; medications were reviewed and discharge planning was addressed.      ________________________________________________________________________  Total NON critical care TIME:  25   Minutes    Total CRITICAL CARE TIME Spent:   Minutes non procedure based      Comments   >50% of visit spent in counseling and coordination of care x Discussion about antibiotic plan, DM control and addiction   ________________________________________________________________________  Edna Buckley MD     Procedures: see electronic medical records for all procedures/Xrays and details which were not copied into this note but were reviewed prior to creation of Plan. LABS:  I reviewed today's most current labs and imaging studies.   Pertinent labs include:  Recent Labs      04/28/18 0401 04/27/18 0455  04/26/18   0441   WBC  6.9  4.1  4.0*   HGB  12.9  12.8  13.2   HCT  35.9*  36.3*  36.5*   PLT  141*  131*  126*     Recent Labs      04/28/18 0401 04/27/18 0455  04/26/18   0441   NA  138  140  138   K  3.9  4.1  4.2   CL  105  106  104   CO2  29  29  32   GLU  124*  138*  167*   BUN  17  11  9   CREA  1.06  0.68*  0.68*   CA  9.0  8.5  8.8   ALB  2.9*  2.6*  2.7*   TBILI  0.8  0.4  0.5   SGOT  87*  106*  97*   ALT  138*  155*  151*       Signed: Edna Buckley MD

## 2018-04-29 VITALS
SYSTOLIC BLOOD PRESSURE: 134 MMHG | BODY MASS INDEX: 23.32 KG/M2 | TEMPERATURE: 98 F | HEART RATE: 64 BPM | WEIGHT: 175.93 LBS | DIASTOLIC BLOOD PRESSURE: 80 MMHG | HEIGHT: 73 IN | OXYGEN SATURATION: 97 % | RESPIRATION RATE: 20 BRPM

## 2018-04-29 LAB
ANION GAP SERPL CALC-SCNC: 6 MMOL/L (ref 5–15)
BASOPHILS # BLD: 0.1 K/UL (ref 0–0.1)
BASOPHILS NFR BLD: 1 % (ref 0–1)
BUN SERPL-MCNC: 19 MG/DL (ref 6–20)
BUN/CREAT SERPL: 17 (ref 12–20)
CALCIUM SERPL-MCNC: 9.1 MG/DL (ref 8.5–10.1)
CHLORIDE SERPL-SCNC: 107 MMOL/L (ref 97–108)
CO2 SERPL-SCNC: 29 MMOL/L (ref 21–32)
CREAT SERPL-MCNC: 1.15 MG/DL (ref 0.7–1.3)
DIFFERENTIAL METHOD BLD: ABNORMAL
EOSINOPHIL # BLD: 0.1 K/UL (ref 0–0.4)
EOSINOPHIL NFR BLD: 2 % (ref 0–7)
ERYTHROCYTE [DISTWIDTH] IN BLOOD BY AUTOMATED COUNT: 13 % (ref 11.5–14.5)
GLUCOSE BLD STRIP.AUTO-MCNC: 101 MG/DL (ref 65–100)
GLUCOSE BLD STRIP.AUTO-MCNC: 115 MG/DL (ref 65–100)
GLUCOSE SERPL-MCNC: 126 MG/DL (ref 65–100)
HCT VFR BLD AUTO: 37.4 % (ref 36.6–50.3)
HGB BLD-MCNC: 13.4 G/DL (ref 12.1–17)
IMM GRANULOCYTES # BLD: 0 K/UL (ref 0–0.04)
IMM GRANULOCYTES NFR BLD AUTO: 0 % (ref 0–0.5)
LYMPHOCYTES # BLD: 1.1 K/UL (ref 0.8–3.5)
LYMPHOCYTES NFR BLD: 20 % (ref 12–49)
MCH RBC QN AUTO: 31.1 PG (ref 26–34)
MCHC RBC AUTO-ENTMCNC: 35.8 G/DL (ref 30–36.5)
MCV RBC AUTO: 86.8 FL (ref 80–99)
MONOCYTES # BLD: 0.5 K/UL (ref 0–1)
MONOCYTES NFR BLD: 9 % (ref 5–13)
NEUTS SEG # BLD: 3.7 K/UL (ref 1.8–8)
NEUTS SEG NFR BLD: 67 % (ref 32–75)
NRBC # BLD: 0 K/UL (ref 0–0.01)
NRBC BLD-RTO: 0 PER 100 WBC
PLATELET # BLD AUTO: 144 K/UL (ref 150–400)
PMV BLD AUTO: 9.1 FL (ref 8.9–12.9)
POTASSIUM SERPL-SCNC: 4 MMOL/L (ref 3.5–5.1)
RBC # BLD AUTO: 4.31 M/UL (ref 4.1–5.7)
SERVICE CMNT-IMP: ABNORMAL
SERVICE CMNT-IMP: ABNORMAL
SODIUM SERPL-SCNC: 142 MMOL/L (ref 136–145)
WBC # BLD AUTO: 5.5 K/UL (ref 4.1–11.1)

## 2018-04-29 PROCEDURE — 74011000258 HC RX REV CODE- 258: Performed by: INTERNAL MEDICINE

## 2018-04-29 PROCEDURE — 74011636637 HC RX REV CODE- 636/637: Performed by: INTERNAL MEDICINE

## 2018-04-29 PROCEDURE — 36415 COLL VENOUS BLD VENIPUNCTURE: CPT | Performed by: INTERNAL MEDICINE

## 2018-04-29 PROCEDURE — 85025 COMPLETE CBC W/AUTO DIFF WBC: CPT | Performed by: INTERNAL MEDICINE

## 2018-04-29 PROCEDURE — 80048 BASIC METABOLIC PNL TOTAL CA: CPT | Performed by: INTERNAL MEDICINE

## 2018-04-29 PROCEDURE — 74011250637 HC RX REV CODE- 250/637: Performed by: INTERNAL MEDICINE

## 2018-04-29 PROCEDURE — 77030011256 HC DRSG MEPILEX <16IN NO BORD MOLN -A

## 2018-04-29 PROCEDURE — 82962 GLUCOSE BLOOD TEST: CPT

## 2018-04-29 RX ORDER — INSULIN ASPART 100 [IU]/ML
4 INJECTION, SOLUTION INTRAVENOUS; SUBCUTANEOUS
Qty: 4 ADJUSTABLE DOSE PRE-FILLED PEN SYRINGE | Refills: 0 | Status: SHIPPED | OUTPATIENT
Start: 2018-04-29 | End: 2018-09-18

## 2018-04-29 RX ORDER — OXYCODONE AND ACETAMINOPHEN 10; 325 MG/1; MG/1
1 TABLET ORAL
Qty: 28 TAB | Refills: 0 | Status: SHIPPED | OUTPATIENT
Start: 2018-04-29 | End: 2018-05-07 | Stop reason: ALTCHOICE

## 2018-04-29 RX ORDER — CIPROFLOXACIN 750 MG/1
750 TABLET, FILM COATED ORAL 2 TIMES DAILY
Qty: 56 TAB | Refills: 0 | Status: SHIPPED | OUTPATIENT
Start: 2018-04-29 | End: 2018-05-27

## 2018-04-29 RX ADMIN — INSULIN LISPRO 4 UNITS: 100 INJECTION, SOLUTION INTRAVENOUS; SUBCUTANEOUS at 08:01

## 2018-04-29 RX ADMIN — ARIPIPRAZOLE 10 MG: 5 TABLET ORAL at 08:00

## 2018-04-29 RX ADMIN — INSULIN HUMAN 12 UNITS: 100 INJECTION, SUSPENSION SUBCUTANEOUS at 08:01

## 2018-04-29 RX ADMIN — OXYCODONE HYDROCHLORIDE AND ACETAMINOPHEN 1 TABLET: 5; 325 TABLET ORAL at 08:00

## 2018-04-29 RX ADMIN — ASPIRIN 81 MG: 81 TABLET, COATED ORAL at 08:00

## 2018-04-29 RX ADMIN — NAFCILLIN 2 G: 2 INJECTION, POWDER, FOR SOLUTION INTRAMUSCULAR; INTRAVENOUS at 08:43

## 2018-04-29 RX ADMIN — PREGABALIN 75 MG: 25 CAPSULE ORAL at 08:00

## 2018-04-29 RX ADMIN — METFORMIN HYDROCHLORIDE 1000 MG: 500 TABLET, FILM COATED ORAL at 07:46

## 2018-04-29 RX ADMIN — OXYCODONE HYDROCHLORIDE AND ACETAMINOPHEN 1 TABLET: 5; 325 TABLET ORAL at 03:59

## 2018-04-29 RX ADMIN — Medication 1 CAPSULE: at 08:44

## 2018-04-29 RX ADMIN — CLONIDINE HYDROCHLORIDE 0.1 MG: 0.1 TABLET ORAL at 08:00

## 2018-04-29 RX ADMIN — NAFCILLIN 2 G: 2 INJECTION, POWDER, FOR SOLUTION INTRAMUSCULAR; INTRAVENOUS at 03:54

## 2018-04-29 NOTE — PROGRESS NOTES
Discharge instructions, prescriptions and follow up appointments were reviewed with patient who verbalized an understanding. An opportunity for questions and clarification was provided for.

## 2018-04-29 NOTE — DISCHARGE INSTRUCTIONS
HOSPITALIST DISCHARGE INSTRUCTIONS    NAME: Katerina Calle   :  1965   MRN:  [id number]     Date/Time:  2018 8:56 AM    ADMIT DATE: 2018     DISCHARGE DATE: 2018     DISCHARGE DIAGNOSIS:  Left Ankle Cellulitis/DM Ankle Ulcer  Mild Left Lateral Malleolus Osteomyelitis  DM type 2, uncontrolled, A1c 9.0:   Hypertension  Chronic hepatitis C  Bipolar Disorder  Tobacco Abuse  History of IV drug abuse       MEDICATIONS:  As per medication reconciliation  list  · It is important that you take the medication exactly as they are prescribed. · Keep your medication in the bottles provided by the pharmacist and keep a list of the medication names, dosages, and times to be taken in your wallet. · Do not take other medications without consulting your doctor. Pain Management: I have given you a Rx for percocet. Please let your girlfriend, Christine Moya, regulate these meds as we have discussed and as she has agreed to. What to do at Home    Recommended diet:  Diabetic Diet    Recommended activity: Activity as tolerated. Use your crutches. If you experience any of the following symptoms then please call your primary care physician or return to the emergency room if you cannot get hold of your doctor:  Fever, chills, nausea, vomiting, diarrhea, change in mentation, falling, bleeding, shortness of breath or any other concerning symptoms. Follow Up: With your PCP, Dr. Ilene Valdez DO, to discuss you diabetes management    Call Dr. Goldie Noguera for follow-up appointment in 7-10 days. His office is [facility] and phone number is [phone]. Also call Dr. Markie Garrison office at [phone] and schedule a 2 week follow-up appointment. Information obtained by :  I understand that if any problems occur once I am at home I am to contact my physician. I understand and acknowledge receipt of the instructions indicated above. Physician's or R.N.'s Signature                                                                  Date/Time                                                                                                                                              Patient or Representative Signature                                                          Date/Time    ·

## 2018-04-29 NOTE — DISCHARGE SUMMARY
Hospitalist Discharge Summary     Patient ID:  Florence   [id number]  56 y.o.  1965    PCP on record: Caitlyn Harvey III, DO    Admit date: 4/22/2018  Discharge date and time: 4/29/2018      DISCHARGE DIAGNOSIS:  Left Ankle Cellulitis/DM Ankle Ulcer  Mild Left Lateral Malleolus Osteomyelitis  DM type 2, uncontrolled, A1c 9.0:   Hypertension  Chronic hepatitis C  Bipolar Disorder  Tobacco Abuse  History of IV drug abuse      CONSULTATIONS:  IP CONSULT TO PODIATRY  IP CONSULT TO INFECTIOUS DISEASES    Excerpted HPI from H&P of Kelly Sarmiento MD:  Adi Ramos is a 47 y.o.  male  with PMHx significant for DM, and HTN, presents ambulatory to the ED with cc of worsening constant left foot redness for 3 days. Pt notes additional symptom nausea, left left ankle pain, left foot pain, and left leg pain. He reports that he was at [facility] ED 2 days ago for the same issues. Pt notes that had a blister to his left ankle that he thought had healed, but then developed his current symptoms. He reports that the pain is now worse at 9/10, and notes that the pain is exacerbated with movement or touching the area. Pt notes that the area had spread since 2 days ago. He reports that he was given antibiotics 2 days ago which he has been taking without relief. He reports that he has FMHx of DM and cancer. Pt denies fever, chills, chest pain, or SOB. At this time patient is lying in bed c/o pain and swelling in left ankle with associated erythema, denies chest pain, no SOB, no fever, no Diarrhea, no cough, no urinary symptoms, no other associated symptoms. We were asked to admit for work up and evaluation of the above problems. \"     ______________________________________________________________________  DISCHARGE SUMMARY/HOSPITAL COURSE:  for full details see H&P, daily progress notes, labs, consult notes.      Hospital course via problem below:  Left Ankle Cellulitis/DM Ankle Ulcer  Mild Left Lateral Malleolus Osteomyelitis  -Dr. Clayton Blanc following, s/p Bone Bx on evening of 4/24, Bx negative for osteo but Bone Cx with MSSA  -Per Dr Maddison Rodriguez I continued Nafcillin until swelling improved (wound looked improved today with minimal swelling and essentially resolved erythema) so patient was discharged home with Rx for 4 weeks of Cipro 750 mg BID  -continue probiotic  -percocet prn pain; I spoke with his girlfriend Kalina Gutierrez this am and she agreed to he his pain meds locked up and administer them as Rx's  -follow-up per below with Podiatry and ID     DM type 2, uncontrolled, A1c 9.0:   -continue metformin  -NPH 12 units BID changed to levemir 12 units qhs on discharge  -Continue Humalog 4 units TID AC   -continue SSI  -patient comfortable injecting insulin     History of Hypertension with low BP on Hospital Day 1, resolved  -resume home antihypertensives on discharge     Sinus Bradycardia: improved, avoid BB, monitor in telemetry     Hypokalemia:  -wnl today      H/O HepC: treated as per patient has increased LFT, viral load still elevated; will need outpatient follow-up  Bipolar Disorder: c/w home regimen  Tobacco Abuse: nicotine patch, cessation encouraged        _______________________________________________________________________  Patient seen and examined by me on discharge day. Pertinent Findings:  Gen:    Not in distress  Chest: No accessory muscle use  CVS:   No edema  Abd:  ND  Neuro:  Alert  _______________________________________________________________________  DISCHARGE MEDICATIONS:   Current Discharge Medication List      START taking these medications    Details   insulin aspart U-100 (NOVOLOG FLEXPEN U-100 INSULIN) 100 unit/mL inpn 4 Units by SubCUTAneous route Before breakfast, lunch, and dinner. + SSI;  For a glucose of:  140-199=2 u          200-249=3 u  250-299=5 u  300-349=7 u  350-400=9 u  Qty: 4 Adjustable Dose Pre-filled Pen Syringe, Refills: 0      L. acidoph & paracasei- S therm- Bifido (ARLENE-Q/RISAQUAD) 8 billion cell cap cap Take 1 Cap by mouth daily. Qty: 30 Cap, Refills: 1      insulin detemir U-100 (LEVEMIR FLEXTOUCH U-100 INSULN) 100 unit/mL (3 mL) inpn 12 Units by SubCUTAneous route nightly. Qty: 2 Adjustable Dose Pre-filled Pen Syringe, Refills: 0      ciprofloxacin HCl (CIPRO) 750 mg tablet Take 1 Tab by mouth two (2) times a day for 28 days. Qty: 56 Tab, Refills: 0      oxyCODONE-acetaminophen (PERCOCET 10)  mg per tablet Take 1 Tab by mouth every six (6) hours as needed for Pain for up to 7 days. Max Daily Amount: 4 Tabs. Qty: 28 Tab, Refills: 0    Associated Diagnoses: Cellulitis of left ankle         CONTINUE these medications which have NOT CHANGED    Details   cyclobenzaprine (FLEXERIL) 10 mg tablet Take 1 Tab by mouth three (3) times daily as needed for Muscle Spasm(s). Qty: 20 Tab, Refills: 0      cloNIDine HCl (CATAPRES) 0.2 mg tablet Take 1 Tab by mouth two (2) times a day. Qty: 60 Tab, Refills: 9      metFORMIN ER (GLUCOPHAGE XR) 500 mg tablet Take 1 Tab by mouth two (2) times a day. Qty: 360 Tab, Refills: 3      Blood-Glucose Meter monitoring kit Check blood sugars daily. DX: E11.9  Qty: 1 Kit, Refills: 0      glucose blood VI test strips (ASCENSIA AUTODISC VI, ONE TOUCH ULTRA TEST VI) strip Check blood sugars daily. DX: E11.9  Qty: 50 Strip, Refills: 11      Lancets (LANCETS, SUPER THIN) misc Check blood sugars daily. DX: E11.9  Qty: 50 Each, Refills: 11      pregabalin (LYRICA) 75 mg capsule Take 1 Cap by mouth two (2) times a day. Max Daily Amount: 150 mg. Indications: Diabetic Peripheral Neuropathy  Qty: 60 Cap, Refills: 6    Associated Diagnoses: Type 2 diabetes mellitus with diabetic neuropathy, without long-term current use of insulin (HCC)      lisinopril (PRINIVIL, ZESTRIL) 5 mg tablet Take 1 Tab by mouth daily. Qty: 30 Tab, Refills: 9      aspirin delayed-release 81 mg tablet Take 1 Tab by mouth daily.   Qty: 90 Tab, Refills: 3      doxepin (SINEQUAN) 25 mg capsule Take 1 Cap by mouth nightly. Qty: 30 Cap, Refills: 2      ARIPiprazole (ABILIFY) 10 mg tablet Take 1 Tab by mouth daily (after breakfast). Indications: MIXED BIPOLAR I DISORDER  Qty: 30 Tab, Refills: 0         STOP taking these medications       doxycycline (VIBRA-TABS) 100 mg tablet Comments:   Reason for Stopping:         ibuprofen (MOTRIN) 600 mg tablet Comments:   Reason for Stopping:               My Recommended Diet, Activity, Wound Care, and follow-up labs are listed in the patient's Discharge Insturctions which I have personally completed and reviewed. ______________________________________________________________________    Risk of deterioration: Low    Condition at Discharge:  Stable  ______________________________________________________________________    Disposition  Home with family, no needs  ______________________________________________________________________    Care Plan discussed with:   Patient, Family, RN    ______________________________________________________________________    Code Status: Full Code  ______________________________________________________________________      Follow up with:   PCP : Kinza Simmons III, DO  Follow-up Information     Follow up With Details Comments Contact Info    [facility] Go on 5/11/2018 You are scheduled at 1pm on 5/11/18. Please arrive 15min early. Bring your blood sugar readings and discharge instructions with you. [facility] [address] [place] [address]  [phone]          With your PCP, Dr. Tai Villareal DO, to discuss you diabetes management    Call Dr. Wendy Orellana for follow-up appointment in 7-10 days. His office is [facility] and phone number is [phone].     Also call Dr. Nicolas Scott office at [phone] and schedule a 2 week follow-up appointment.         Total time in minutes spent coordinating this discharge (includes going over instructions, follow-up, prescriptions, and preparing report for sign off to her PCP) :  35 minutes    Signed:  Gabriella Alberts MD

## 2018-04-29 NOTE — PROGRESS NOTES
Bedside and Verbal shift change report given to [place] (oncoming nurse) by Jessika Padilla RN (offgoing nurse). Report included the following information SBAR, Kardex, Intake/Output, MAR, Accordion, Recent Results and Med Rec Status.

## 2018-04-30 ENCOUNTER — PATIENT OUTREACH (OUTPATIENT)
Dept: INTERNAL MEDICINE CLINIC | Age: 53
End: 2018-04-30
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
## 2018-04-30 NOTE — PROGRESS NOTES
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 5/1/2018 - AFD  8779  Attempted NBA contact. VM left with purpose of call and contact information for this NN.    1500  Pt has not returned call. Will attempt NBA contact on 5/2/18.

## 2018-05-07 ENCOUNTER — PATIENT OUTREACH (OUTPATIENT)
Dept: INTERNAL MEDICINE CLINIC | Age: 53
End: 2018-05-07

## 2018-05-07 ENCOUNTER — OFFICE VISIT (OUTPATIENT)
Dept: INTERNAL MEDICINE CLINIC | Age: 53
End: 2018-05-07

## 2018-05-07 VITALS
HEIGHT: 73 IN | BODY MASS INDEX: 23.33 KG/M2 | TEMPERATURE: 98.1 F | WEIGHT: 176 LBS | HEART RATE: 75 BPM | OXYGEN SATURATION: 97 % | SYSTOLIC BLOOD PRESSURE: 153 MMHG | RESPIRATION RATE: 16 BRPM | DIASTOLIC BLOOD PRESSURE: 84 MMHG

## 2018-05-07 DIAGNOSIS — M86.172 OTHER ACUTE OSTEOMYELITIS OF LEFT ANKLE (HCC): Primary | ICD-10-CM

## 2018-05-07 DIAGNOSIS — L03.116 CELLULITIS OF LEFT ANKLE: ICD-10-CM

## 2018-05-07 DIAGNOSIS — I10 ESSENTIAL HYPERTENSION: Chronic | ICD-10-CM

## 2018-05-07 DIAGNOSIS — Z72.0 TOBACCO ABUSE: Chronic | ICD-10-CM

## 2018-05-07 DIAGNOSIS — E11.40 TYPE 2 DIABETES MELLITUS WITH DIABETIC NEUROPATHY, WITHOUT LONG-TERM CURRENT USE OF INSULIN (HCC): Chronic | ICD-10-CM

## 2018-05-07 RX ORDER — IBUPROFEN 200 MG
1 TABLET ORAL EVERY 24 HOURS
Qty: 30 PATCH | Refills: 0 | Status: SHIPPED | OUTPATIENT
Start: 2018-05-07 | End: 2018-06-06

## 2018-05-07 RX ORDER — CYCLOBENZAPRINE HCL 10 MG
10 TABLET ORAL
Qty: 40 TAB | Refills: 0 | Status: SHIPPED | OUTPATIENT
Start: 2018-05-07 | End: 2018-09-18 | Stop reason: ALTCHOICE

## 2018-05-07 NOTE — PROGRESS NOTES
Pt was seen today by Dr. Saturnino Elias for NBA. Pt was admitted to [id number] [facility] 4/22-4/29/18 for: Left Ankle Cellulitis/DM Ankle Ulcer  Mild Left Lateral Malleolus Osteomyelitis  DM type 2, uncontrolled, A1c 9.0:   Hypertension  Chronic hepatitis C  Bipolar Disorder  Tobacco Abuse  History of IV drug abuse    Lab Results   Component Value Date/Time    Hemoglobin A1c 9.0 (H) 04/23/2018 05:04 AM    Hemoglobin A1c 8.8 (H) 01/26/2018 08:16 AM    Hemoglobin A1c 9.3 (H) 06/07/2017 04:58 AM     Key Antihyperglycemic Medications             insulin aspart U-100 (NOVOLOG FLEXPEN U-100 INSULIN) 100 unit/mL inpn 4 Units by SubCUTAneous route Before breakfast, lunch, and dinner. + SSI; For a glucose of:  140-199=2 u          200-249=3 u  250-299=5 u  300-349=7 u  350-400=9 u    insulin detemir U-100 (LEVEMIR FLEXTOUCH U-100 INSULN) 100 unit/mL (3 mL) inpn 12 Units by SubCUTAneous route nightly. metFORMIN ER (GLUCOPHAGE XR) 500 mg tablet Take 1 Tab by mouth two (2) times a day. Pt was given Living With Type 2 Diabetes: Where Do I Begin? Discussed what is a carb. Pt has appt with the [facility] on 5/11/18. Pt was given my contact info to call as a resource when he finishes his series of classes.      Future Appointments  Date Time Provider Bert Chappell   5/11/2018 1:00 PM SURVIVAL SKILLS CLASS [facility]   5/22/2018 1:00 PM Jada Alex MD [id number]   6/26/2018 8:15 AM T[address]      Last Appointment My Department:  5/7/2018

## 2018-05-07 NOTE — PROGRESS NOTES
[address] - AFD - 2018    Pt seen during [address] Pt stated he did not have the time for a full interview. Stated medication review was performed by the rooming nurse and Dr. David Valdez. 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 Pt states he is very busy. Hospital Discharge Follow-Up      Date/Time:  2018 5:03 PM    Patient was admitted to [facility] on 18 and discharged on 18 for left ankle cellulitis. The physician discharge summary was available at the time of outreach. Patient was contacted within 1 business days of discharge. 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 Pt was unable to state a time in coming days when a NBA interview could be completed. Inpatient RRAT score: 27    Top Challenges reviewed with the provider            Was this a readmission? no   Nurse Navigators impression of the reason for the readmission: na  Patient stated reason for the readmission: na    Method of communication with provider :chart routing    Nurse Navigator (NN) contacted the patient by telephone to perform post hospital discharge assessment. Verified name and  with patient as identifiers. Provided introduction to self, and explanation of the Nurse Navigator role. Reviewed discharge instructions and red flags with  patient who verbalizes understanding. Patient given an opportunity to ask questions and does not have any further questions or concerns at this time. The patient agrees to contact the PCP office for questions related to their healthcare. NN provided contact information for future reference. Summary of patients top problems:  Pt denied any new issues. Pt states he has chronic pain but he \"deals with it\". Home Health orders at discharge: [address]: na  Date of initial visit: na    Durable Medical Equipment ordered/company: None  Durable Medical Equipment received: na    Barriers to care?  Unable to determine barriers    Advance Care Planning:   Does patient have an Advance Directive:  not on file       Medication:     New Medications at Discharge:   insulin aspart U-100 (NOVOLOG FLEXPEN U-100 INSULIN) 100 unit/mL inpn 4 Units by SubCUTAneous route Before breakfast, lunch, and dinner. + SSI; For a glucose of:  140-199=2 u          200-249=3 u  250-299=5 u  300-349=7 u  350-400=9 u  Qty: 4 Adjustable Dose Pre-filled Pen Syringe, Refills: 0       L. acidoph & paracasei- S therm- Bifido (ARLENE-Q/RISAQUAD) 8 billion cell cap cap Take 1 Cap by mouth daily. Qty: 30 Cap, Refills: 1       insulin detemir U-100 (LEVEMIR FLEXTOUCH U-100 INSULN) 100 unit/mL (3 mL) inpn 12 Units by SubCUTAneous route nightly. Qty: 2 Adjustable Dose Pre-filled Pen Syringe, Refills: 0       ciprofloxacin HCl (CIPRO) 750 mg tablet Take 1 Tab by mouth two (2) times a day for 28 days. Qty: 56 Tab, Refills: 0       oxyCODONE-acetaminophen (PERCOCET 10)  mg per tablet Take 1 Tab by mouth every six (6) hours as needed for Pain for up to 7 days. Max Daily Amount: 4 Tabs. Qty: 28 Tab, Refills: 0     Associated Diagnoses: Cellulitis of left ankle       Changed Medications at Discharge: na  Discontinued Medications at Discharge:   doxycycline (VIBRA-TABS) 100 mg tablet Comments:   Reason for Stopping:             ibuprofen (MOTRIN) 600 mg tablet Comments:   Reason for Stopping:                Medication reconciliation was performed by rooming nurse and Dr. Lady Wilcox    Referral to Pharm D needed: no     Prior to Admission medications    Medication Sig Start Date End Date Taking? Authorizing Provider   cyclobenzaprine (FLEXERIL) 10 mg tablet Take 1 Tab by mouth three (3) times daily as needed for Muscle Spasm(s). 5/7/18   Reilly Jimenes III, DO   nicotine (NICODERM CQ) 14 mg/24 hr patch 1 Patch by TransDERmal route every twenty-four (24) hours for 30 days.  5/7/18 6/6/18  Reilly Jimenes III, DO   insulin aspart U-100 (NOVOLOG FLEXPEN U-100 INSULIN) 100 unit/mL inpn 4 Units by SubCUTAneous route Before breakfast, lunch, and dinner. + SSI; For a glucose of:  140-199=2 u          200-249=3 u  250-299=5 u  300-349=7 u  350-400=9 u 4/29/18   Tyrone Rey MD   L. acidoph & paracasei- S therm- Bifido (ARLENE-Q/RISAQUAD) 8 billion cell cap cap Take 1 Cap by mouth daily. 4/30/18   Tyrone Rey MD   insulin detemir U-100 (LEVEMIR FLEXTOUCH U-100 INSULN) 100 unit/mL (3 mL) inpn 12 Units by SubCUTAneous route nightly. 4/29/18   Tyrone Rey MD   ciprofloxacin HCl (CIPRO) 750 mg tablet Take 1 Tab by mouth two (2) times a day for 28 days. 4/29/18 5/27/18  Tyrone Rey MD   cloNIDine HCl (CATAPRES) 0.2 mg tablet Take 1 Tab by mouth two (2) times a day. 3/26/18   Reilly Jimenes III, DO   metFORMIN ER (GLUCOPHAGE XR) 500 mg tablet Take 1 Tab by mouth two (2) times a day. Patient taking differently: Take 1,000 mg by mouth two (2) times a day. 3/26/18   Reilly Jimenes III, DO   Blood-Glucose Meter monitoring kit Check blood sugars daily. DX: E11.9 1/30/18   Reilly Jimenes III, DO   glucose blood VI test strips (ASCENSIA AUTODISC VI, ONE TOUCH ULTRA TEST VI) strip Check blood sugars daily. DX: E11.9 1/30/18   Reilly Jimenes III, DO   Lancets (LANCETS, SUPER THIN) misc Check blood sugars daily. DX: E11.9 1/30/18   Reilly Jimenes III, DO   pregabalin (LYRICA) 75 mg capsule Take 1 Cap by mouth two (2) times a day. Max Daily Amount: 150 mg. Indications: Diabetic Peripheral Neuropathy 1/28/18   Kong Anton III, DO   lisinopril (PRINIVIL, ZESTRIL) 5 mg tablet Take 1 Tab by mouth daily. 1/28/18   Kong Anton III, DO   aspirin delayed-release 81 mg tablet Take 1 Tab by mouth daily. 1/25/18   Reilly Jimenes III, DO   doxepin (SINEQUAN) 25 mg capsule Take 1 Cap by mouth nightly. 8/30/17   Sonia Dias MD   ARIPiprazole (ABILIFY) 10 mg tablet Take 1 Tab by mouth daily (after breakfast).  Indications: MIXED BIPOLAR I DISORDER 7/11/17 Brandin Ricketts MD       There are no discontinued medications.     PCP/Specialist follow up: Future Appointments  Date Time Provider Bert Wrayisti   5/11/2018 1:00 PM SURVIVAL SKILLS CLASS [facility]   5/22/2018 1:00 PM Demetrice Israel MD [address] [place]   6/26/2018 8:15 AM Catalina Warner III, DO [facility] SCHED      Goals: Unable to conduct full interview and determine pt's goals

## 2018-05-07 NOTE — PROGRESS NOTES
Reviewed record in preparation for visit and have obtained necessary documentation. Identified pt with two pt identifiers(name and ). Chief Complaint   Patient presents with   [facility] Follow Up       Health Maintenance Due   Topic Date Due    EYE EXAM RETINAL OR DILATED Q1  1974    DTaP/Tdap/Td series (1 - Tdap) 1985       Mr. Varsha Sommer has a reminder for a \"due or due soon\" health maintenance. I have asked that he discuss health maintenance topic(s) due with His  primary care provider. Coordination of Care Questionnaire:  :     1) Have you been to an emergency room, urgent care clinic since your last visit? no   Hospitalized since your last visit? no             2) Have you seen or consulted any other health care providers outside of [address] [place] [address] since your last visit? no  (Include any pap smears or colon screenings in this section.)    3) Do you have an Advance Directive on file? no    4) Are you interested in receiving information on Advance Directives? NO    Patient is accompanied by self I have received verbal consent from Radha Noguera to discuss any/all medical information while they are present in the room.

## 2018-05-07 NOTE — MR AVS SNAPSHOT
[address] [facility] 
[phone] Patient: Oskar Velazquez MRN: [id number] :1965 Visit Information Date & Time Provider Department Dept. Phone Encounter #  
 2018  3:30 PM Jenny Meier, [address] [id number] Follow-up Instructions Return in about 3 months (around 2018). Your Appointments 2018  1:00 PM  
New Patient with Chris Kahn MD  
[facility] [address] [facility]) Appt Note: [address] [facility] [address] [phone]  
[facility]  
  
    
 2018  8:15 AM  
ROUTINE CARE with Angsu Dorsey III, DO [facility] [address] [facility]) Appt Note: 3  mnth follow up  
 [address] [facility] [address] [phone]  
[address] [facility] [address] [facility] Upcoming Health Maintenance Date Due  
 EYE EXAM RETINAL OR DILATED Q1 1974 DTaP/Tdap/Td series (1 - Tdap) 1985 Influenza Age 5 to Adult 2018 HEMOGLOBIN A1C Q6M 10/23/2018 MICROALBUMIN Q1 2019 LIPID PANEL Q1 2019 MEDICARE YEARLY EXAM 3/27/2019 FOOT EXAM Q1 2019 COLONOSCOPY 2026 Allergies as of 2018  Review Complete On: 2018 By: Angus Dorsey III, DO Severity Noted Reaction Type Reactions Versed [Midazolam] High 10/26/2011    Shortness of Breath Weakness Versed [Midazolam]  10/01/2011   Intolerance Unknown (comments) Numbness, with shortness of breath Current Immunizations  Reviewed on 2018 Name Date Hep B, Adol/Ped 3/12/2013  5:30 PM  
 Influenza Vaccine (Quad) PF 2018 Pneumococcal Conjugate (PCV-13)  Deferred (Patient Refused) Pneumococcal Polysaccharide (PPSV-23) 1/25/2018 Not reviewed this visit You Were Diagnosed With   
  
 Codes Comments Other acute osteomyelitis of left ankle ([facility] [address]    -  Primary ICD-10-CM: C18.038 ICD-9-CM: 730.07 Cellulitis of left ankle     ICD-10-CM: L03.116 ICD-9-CM: 287. 6 Type 2 diabetes mellitus with diabetic neuropathy, without long-term current use of insulin (HCC)     ICD-10-CM: E11.40 ICD-9-CM: 250.60, 357.2 Essential hypertension     ICD-10-CM: I10 
ICD-9-CM: 401.9 Tobacco abuse     ICD-10-CM: Z72.0 ICD-9-CM: 305.1 Vitals BP Pulse Temp Resp Height(growth percentile) Weight(growth percentile) 153/84 (BP 1 Location: Left arm, BP Patient Position: Sitting) 75 98.1 °F (36.7 °C) (Oral) 16 6' 1\" (1.854 m) 176 lb (79.8 kg) SpO2 BMI Smoking Status 97% 23.22 kg/m2 Current Every Day Smoker Vitals History BMI and BSA Data Body Mass Index Body Surface Area  
 23.22 kg/m 2 2.03 m 2 Preferred Pharmacy Pharmacy Name Phone [facility] [address] [place] [address] [phone] Your Updated Medication List  
  
   
This list is accurate as of 5/7/18  4:53 PM.  Always use your most recent med list.  
  
  
  
  
 ARIPiprazole 10 mg tablet Commonly known as:  ABILIFY Take 1 Tab by mouth daily (after breakfast). Indications: MIXED BIPOLAR I DISORDER  
  
 aspirin delayed-release 81 mg tablet Take 1 Tab by mouth daily. Blood-Glucose Meter monitoring kit Check blood sugars daily. DX: E11.9  
  
 ciprofloxacin HCl 750 mg tablet Commonly known as:  CIPRO Take 1 Tab by mouth two (2) times a day for 28 days. cloNIDine HCl 0.2 mg tablet Commonly known as:  CATAPRES Take 1 Tab by mouth two (2) times a day. cyclobenzaprine 10 mg tablet Commonly known as:  FLEXERIL Take 1 Tab by mouth three (3) times daily as needed for Muscle Spasm(s). doxepin 25 mg capsule Commonly known as:  SINEquan Take 1 Cap by mouth nightly. glucose blood VI test strips strip Commonly known as:  ASCENSIA AUTODISC VI, ONE TOUCH ULTRA TEST VI Check blood sugars daily. DX: E11.9  
  
 insulin aspart U-100 100 unit/mL Inpn Commonly known as:  NovoLOG Flexpen U-100 Insulin 4 Units by SubCUTAneous route Before breakfast, lunch, and dinner. + SSI; For a glucose of: 140-199=2 u 200-249=3 u 250-299=5 u 300-349=7 u 350-400=9 u  
  
 insulin detemir U-100 100 unit/mL (3 mL) Inpn Commonly known as:  LEVEMIR FLEXTOUCH U-100 INSULN  
12 Units by SubCUTAneous route nightly. L. acidoph & paracasei- S therm- Bifido 8 billion cell Cap cap Commonly known as:  ARLENE-Q/RISAQUAD Take 1 Cap by mouth daily. Lancets Misc Commonly known as:  LANCETSShayan 49 Check blood sugars daily. DX: E11.9  
  
 lisinopril 5 mg tablet Commonly known as:  Nelson Guido Take 1 Tab by mouth daily. metFORMIN  mg tablet Commonly known as:  GLUCOPHAGE XR Take 1 Tab by mouth two (2) times a day. nicotine 14 mg/24 hr patch Commonly known as:  NICODERM CQ  
1 Patch by TransDERmal route every twenty-four (24) hours for 30 days. pregabalin 75 mg capsule Commonly known as:  Carllindan Karel Take 1 Cap by mouth two (2) times a day. Max Daily Amount: 150 mg. Indications: Diabetic Peripheral Neuropathy Prescriptions Printed Refills  
 nicotine (NICODERM CQ) 14 mg/24 hr patch 0 Si Patch by TransDERmal route every twenty-four (24) hours for 30 days. Class: Print Route: TransDERmal  
  
Prescriptions Sent to Pharmacy Refills  
 cyclobenzaprine (FLEXERIL) 10 mg tablet 0 Sig: Take 1 Tab by mouth three (3) times daily as needed for Muscle Spasm(s). Class: Normal  
 Pharmacy: [address] [place] [address], [address] [place] [address] #: [phone] Route: Oral  
  
Follow-up Instructions Return in about 3 months (around 8/7/2018). To-Do List   
 05/11/2018 1:00 PM  
  Appointment with [address] [facility] DI[facility] ([phone]) Patient Instructions Learning About Benefits From Quitting Smoking How does quitting smoking make you healthier? If you're thinking about quitting smoking, you may have a few reasons to be smoke-free. Your health may be one of them. · When you quit smoking, you lower your risks for cancer, lung disease, heart attack, stroke, blood vessel disease, and blindness from macular degeneration. · When you're smoke-free, you get sick less often, and you heal faster. You are less likely to get colds, flu, bronchitis, and pneumonia. · As a nonsmoker, you may find that your mood is better and you are less stressed. When and how will you feel healthier? Quitting has real health benefits that start from day 1 of being smoke-free. And the longer you stay smoke-free, the healthier you get and the better you feel. The first hours · After just 20 minutes, your blood pressure and heart rate go down. That means there's less stress on your heart and blood vessels. · Within 12 hours, the level of carbon monoxide in your blood drops back to normal. That makes room for more oxygen. With more oxygen in your body, you may notice that you have more energy than when you smoked. After 2 weeks · Your lungs start to work better. · Your risk of heart attack starts to drop. After 1 month · When your lungs are clear, you cough less and breathe deeper, so it's easier to be active. · Your sense of taste and smell return. That means you can enjoy food more than you have since you started smoking. Over the years · After 1 year, your risk of heart disease is half what it would be if you kept smoking. · After 5 years, your risk of stroke starts to shrink. Within a few years after that, it's about the same as if you'd never smoked. · After 10 years, your risk of dying from lung cancer is cut by about half. And your risk for many other types of cancer is lower too. How would quitting help others in your life? When you quit smoking, you improve the health of everyone who now breathes in your smoke. · Their heart, lung, and cancer risks drop, much like yours. · They are sick less. For babies and small children, living smoke-free means they're less likely to have ear infections, pneumonia, and bronchitis. · If you're a woman who is or will be pregnant someday, quitting smoking means a healthier . · Children who are close to you are less likely to become adult smokers. Where can you learn more? Go to http://retaToutpostkarthik.info/. Enter [id number] [id number] [id number] 11 in the search box to learn more about \"Learning About Benefits From Quitting Smoking. \" Current as of: 2017 Content Version: 11.4 © 5374-7795 CinemaWell.com. Care instructions adapted under license by Stkr.it (which disclaims liability or warranty for this information). If you have questions about a medical condition or this instruction, always ask your healthcare professional. [place] [address] any warranty or liability for your use of this information. Stopping Smoking: Care Instructions Your Care Instructions Cigarette smokers crave the nicotine in cigarettes. Giving it up is much harder than simply changing a habit. Your body has to stop craving the nicotine. It is hard to quit, but you can do it. There are many tools that people use to quit smoking. You may find that combining tools works best for you. There are several steps to quitting. First you get ready to quit. Then you get support to help you. After that, you learn new skills and behaviors to become a nonsmoker. For many people, a necessary step is getting and using medicine. Your doctor will help you set up the plan that best meets your needs.  You may want to attend a smoking cessation program to help you quit smoking. When you choose a program, look for one that has proven success. Ask your doctor for ideas. You will greatly increase your chances of success if you take medicine as well as get counseling or join a cessation program. 
Some of the changes you feel when you first quit tobacco are uncomfortable. Your body will miss the nicotine at first, and you may feel short-tempered and grumpy. You may have trouble sleeping or concentrating. Medicine can help you deal with these symptoms. You may struggle with changing your smoking habits and rituals. The last step is the tricky one: Be prepared for the smoking urge to continue for a time. This is a lot to deal with, but keep at it. You will feel better. Follow-up care is a key part of your treatment and safety. Be sure to make and go to all appointments, and call your doctor if you are having problems. It's also a good idea to know your test results and keep a list of the medicines you take. How can you care for yourself at home? · Ask your family, friends, and coworkers for support. You have a better chance of quitting if you have help and support. · Join a support group, such as Nicotine Anonymous, for people who are trying to quit smoking. · Consider signing up for a smoking cessation program, such as the American Lung Association's Freedom from Smoking program. 
· Set a quit date. Pick your date carefully so that it is not right in the middle of a big deadline or stressful time. Once you quit, do not even take a puff. Get rid of all ashtrays and lighters after your last cigarette. Clean your house and your clothes so that they do not smell of smoke. · Learn how to be a nonsmoker. Think about ways you can avoid those things that make you reach for a cigarette. ¨ Avoid situations that put you at greatest risk for smoking. For some people, it is hard to have a drink with friends without smoking.  For others, they might skip a coffee break with coworkers who smoke. ¨ Change your daily routine. Take a different route to work or eat a meal in a different place. · Cut down on stress. Calm yourself or release tension by doing an activity you enjoy, such as reading a book, taking a hot bath, or gardening. · Talk to your doctor or pharmacist about nicotine replacement therapy, which replaces the nicotine in your body. You still get nicotine but you do not use tobacco. Nicotine replacement products help you slowly reduce the amount of nicotine you need. These products come in several forms, many of them available over-the-counter: ¨ Nicotine patches ¨ Nicotine gum and lozenges ¨ Nicotine inhaler · Ask your doctor about bupropion (Wellbutrin) or varenicline (Chantix), which are prescription medicines. They do not contain nicotine. They help you by reducing withdrawal symptoms, such as stress and anxiety. · Some people find hypnosis, acupuncture, and massage helpful for ending the smoking habit. · Eat a healthy diet and get regular exercise. Having healthy habits will help your body move past its craving for nicotine. · Be prepared to keep trying. Most people are not successful the first few times they try to quit. Do not get mad at yourself if you smoke again. Make a list of things you learned and think about when you want to try again, such as next week, next month, or next year. Where can you learn more? Go to http://reta-karthik.info/. Enter H405 in the search box to learn more about \"Stopping Smoking: Care Instructions. \" Current as of: March 20, 2017 Content Version: 11.4 © 4472-3748 ONDiGO Mobile CRM. Care instructions adapted under license by EmSense (which disclaims liability or warranty for this information).  If you have questions about a medical condition or this instruction, always ask your healthcare professional. [employer] disclaims any warranty or liability for your use of this information. Introducing [facility]! [facility] introduces Teedot patient portal. Now you can access parts of your medical record, email your doctor's office, and request medication refills online. 1. In your internet browser, go to [email or web address] 2. Click on the First Time User? Click Here link in the Sign In box. You will see the New Member Sign Up page. 3. Enter your Teedot Access Code exactly as it appears below. You will not need to use this code after youve completed the sign-up process. If you do not sign up before the expiration date, you must request a new code. · Teedot Access Code: [email or web address] Expires: 7/20/2018  5:24 AM 
 
4. Enter the last four digits of your Social Security Number (xxxx) and Date of Birth (mm/dd/yyyy) as indicated and click Submit. You will be taken to the next sign-up page. 5. Create a Teedot ID. This will be your Teedot login ID and cannot be changed, so think of one that is secure and easy to remember. 6. Create a Teedot password. You can change your password at any time. 7. Enter your Password Reset Question and Answer. This can be used at a later time if you forget your password. 8. Enter your e-mail address. You will receive e-mail notification when new information is available in [address] 9. Click Sign Up. You can now view and download portions of your medical record. 10. Click the Download Summary menu link to download a portable copy of your medical information. If you have questions, please visit the Frequently Asked Questions section of the Teedot website. Remember, Teedot is NOT to be used for urgent needs. For medical emergencies, dial 911. Now available from your iPhone and Android! Please provide this summary of care documentation to your next provider. Your primary care clinician is listed as Glorious Robes If you have any questions after today's visit, please call [phone].

## 2018-05-07 NOTE — PATIENT INSTRUCTIONS
Learning About Benefits From Quitting Smoking  How does quitting smoking make you healthier? If you're thinking about quitting smoking, you may have a few reasons to be smoke-free. Your health may be one of them. · When you quit smoking, you lower your risks for cancer, lung disease, heart attack, stroke, blood vessel disease, and blindness from macular degeneration. · When you're smoke-free, you get sick less often, and you heal faster. You are less likely to get colds, flu, bronchitis, and pneumonia. · As a nonsmoker, you may find that your mood is better and you are less stressed. When and how will you feel healthier? Quitting has real health benefits that start from day 1 of being smoke-free. And the longer you stay smoke-free, the healthier you get and the better you feel. The first hours  · After just 20 minutes, your blood pressure and heart rate go down. That means there's less stress on your heart and blood vessels. · Within 12 hours, the level of carbon monoxide in your blood drops back to normal. That makes room for more oxygen. With more oxygen in your body, you may notice that you have more energy than when you smoked. After 2 weeks  · Your lungs start to work better. · Your risk of heart attack starts to drop. After 1 month  · When your lungs are clear, you cough less and breathe deeper, so it's easier to be active. · Your sense of taste and smell return. That means you can enjoy food more than you have since you started smoking. Over the years  · After 1 year, your risk of heart disease is half what it would be if you kept smoking. · After 5 years, your risk of stroke starts to shrink. Within a few years after that, it's about the same as if you'd never smoked. · After 10 years, your risk of dying from lung cancer is cut by about half. And your risk for many other types of cancer is lower too. How would quitting help others in your life?   When you quit smoking, you improve the health of everyone who now breathes in your smoke. · Their heart, lung, and cancer risks drop, much like yours. · They are sick less. For babies and small children, living smoke-free means they're less likely to have ear infections, pneumonia, and bronchitis. · If you're a woman who is or will be pregnant someday, quitting smoking means a healthier . · Children who are close to you are less likely to become adult smokers. Where can you learn more? Go to http://reta-karthik.info/. Enter [id number] [id number] [id number] in the search box to learn more about \"Learning About Benefits From Quitting Smoking. \"  Current as of: 2017  Content Version: 11.4  © 0137-7340 [employer]. Care instructions adapted under license by [employer] (which disclaims liability or warranty for this information). If you have questions about a medical condition or this instruction, always ask your healthcare professional. [place] [address] any warranty or liability for your use of this information. Stopping Smoking: Care Instructions  Your Care Instructions    Cigarette smokers crave the nicotine in cigarettes. Giving it up is much harder than simply changing a habit. Your body has to stop craving the nicotine. It is hard to quit, but you can do it. There are many tools that people use to quit smoking. You may find that combining tools works best for you. There are several steps to quitting. First you get ready to quit. Then you get support to help you. After that, you learn new skills and behaviors to become a nonsmoker. For many people, a necessary step is getting and using medicine. Your doctor will help you set up the plan that best meets your needs. You may want to attend a smoking cessation program to help you quit smoking. When you choose a program, look for one that has proven success. Ask your doctor for ideas.  You will greatly increase your chances of success if you take medicine as well as get counseling or join a cessation program.  Some of the changes you feel when you first quit tobacco are uncomfortable. Your body will miss the nicotine at first, and you may feel short-tempered and grumpy. You may have trouble sleeping or concentrating. Medicine can help you deal with these symptoms. You may struggle with changing your smoking habits and rituals. The last step is the tricky one: Be prepared for the smoking urge to continue for a time. This is a lot to deal with, but keep at it. You will feel better. Follow-up care is a key part of your treatment and safety. Be sure to make and go to all appointments, and call your doctor if you are having problems. It's also a good idea to know your test results and keep a list of the medicines you take. How can you care for yourself at home? · Ask your family, friends, and coworkers for support. You have a better chance of quitting if you have help and support. · Join a support group, such as Nicotine Anonymous, for people who are trying to quit smoking. · Consider signing up for a smoking cessation program, such as the American Lung Association's Freedom from Smoking program.  · Set a quit date. Pick your date carefully so that it is not right in the middle of a big deadline or stressful time. Once you quit, do not even take a puff. Get rid of all ashtrays and lighters after your last cigarette. Clean your house and your clothes so that they do not smell of smoke. · Learn how to be a nonsmoker. Think about ways you can avoid those things that make you reach for a cigarette. ¨ Avoid situations that put you at greatest risk for smoking. For some people, it is hard to have a drink with friends without smoking. For others, they might skip a coffee break with coworkers who smoke. ¨ Change your daily routine. Take a different route to work or eat a meal in a different place. · Cut down on stress.  Calm yourself or release tension by doing an activity you enjoy, such as reading a book, taking a hot bath, or gardening. · Talk to your doctor or pharmacist about nicotine replacement therapy, which replaces the nicotine in your body. You still get nicotine but you do not use tobacco. Nicotine replacement products help you slowly reduce the amount of nicotine you need. These products come in several forms, many of them available over-the-counter:  ¨ Nicotine patches  ¨ Nicotine gum and lozenges  ¨ Nicotine inhaler  · Ask your doctor about bupropion (Wellbutrin) or varenicline (Chantix), which are prescription medicines. They do not contain nicotine. They help you by reducing withdrawal symptoms, such as stress and anxiety. · Some people find hypnosis, acupuncture, and massage helpful for ending the smoking habit. · Eat a healthy diet and get regular exercise. Having healthy habits will help your body move past its craving for nicotine. · Be prepared to keep trying. Most people are not successful the first few times they try to quit. Do not get mad at yourself if you smoke again. Make a list of things you learned and think about when you want to try again, such as next week, next month, or next year. Where can you learn more? Go to http://reta-karthik.info/. Enter D055 in the search box to learn more about \"Stopping Smoking: Care Instructions. \"  Current as of: March 20, 2017  Content Version: 11.4  © 0766-7391 [employer]. Care instructions adapted under license by [employer] (which disclaims liability or warranty for this information). If you have questions about a medical condition or this instruction, always ask your healthcare professional. [place] [address] any warranty or liability for your use of this information.

## 2018-05-07 NOTE — PROGRESS NOTES
Loco Wooten is a 47 y.o. male who presents for evaluation of transition of care. Was inpt [facility] from April 22-29 with left lateral malleolus cellulitis and osteomyelitis. D/c to home with cipro, which he is still taking. Able to walk much better, though still has some pain. Had some increased bleeding to ankle overnight, but none since. No purulent drainage. ROS:  Constitutional: negative for fevers, chills, anorexia and weight loss  Eyes:   negative for visual disturbance and irritation  ENT:   negative for tinnitus,sore throat,nasal congestion,ear pain,hoarseness  Respiratory:  negative for cough, hemoptysis, dyspnea,wheezing  CV:   negative for chest pain, palpitations, lower extremity edema  GI:   negative for nausea, vomiting, diarrhea, abdominal pain,melena  Genitourinary: negative for frequency, dysuria and hematuria  Musculoskel: negative for myalgias, arthralgias, back pain, muscle weakness.   ++left ankle joint pain  Neurological:  negative for headaches, dizziness, focal weakness, numbness  Psychiatric:     ++ for depression or anxiety      Past Medical History:   Diagnosis Date    Adverse effect of anesthesia     breathing diff. with versed    Bipolar 1 disorder, mixed, moderate ([address] 3/6/2017    Chronic pain     Depression     Pt stated diagnosed years ago    Depression 3/13/2013    Diabetes ([address]     Diabetes ([address] 2003    Drug-induced mood disorder 5/28/2013    Homicide attempt     HTN (hypertension) 11/3/2011    Narcotic dependence, episodic use ([address] 11/3/2011    NIDDM (non-insulin dependent diabetes mellitus) 11/3/2011    Non compliance with medical treatment 11/3/2011    Other ill-defined conditions(799.89)     chronic low back pain    Psychiatric disorder     bipolar    Sleep disorder     Substance abuse     Suicidal thoughts        Past Surgical History:   Procedure Laterality Date    CARDIAC SURG PROCEDURE UNLIST      cardiac stents X2 lad drug eluting    COLONOSCOPY N/A 8/31/2016    COLONOSCOPY performed by Aspen Ngo [facility] at [facility] ENDOSCOPY    COLONOSCOPY,DIAGNOSTIC  8/31/2016         HX ORTHOPAEDIC      chronic back pain    HX ORTHOPAEDIC      left knee arthroplasty    CO ANESTH,LUMBAR SPINE,CORD SURGERY  7 1999    l4 l5    REMV KIDNEY,COMPLICATED  3214    side unknown - kidney stones    UPPER GI ENDOSCOPY,BIOPSY  8/31/2016            Family History   Problem Relation Age of Onset    Stroke Mother     Hypertension Mother     Heart Disease Father     Hypertension Father     Cancer Maternal Grandmother      unknown type    Diabetes Maternal Grandfather        Social History     Social History    Marital status:      Spouse name: N/A    Number of children: N/A    Years of education: N/A     Occupational History    Not on file. Social History Main Topics    Smoking status: Current Every Day Smoker     Packs/day: 0.50    Smokeless tobacco: Never Used    Alcohol use No      Comment: sociially     Drug use: Yes     Special: Cocaine, Prescription      Comment: recent use , past opiate use    Sexual activity: Yes     Partners: Female     Birth control/ protection: None      Comment:  but seperated now     Other Topics Concern    Not on file     Social History Narrative    ** Merged History Encounter **     states he has 2 yrs of college. On disability for chronic pain. , 1 children.  Was in relationship but break    Moved in with Parent    No legal problem            Visit Vitals    /84 (BP 1 Location: Left arm, BP Patient Position: Sitting)    Pulse 75    Temp 98.1 °F (36.7 °C) (Oral)    Resp 16    Ht 6' 1\" (1.854 m)    Wt 176 lb (79.8 kg)    SpO2 97%    BMI 23.22 kg/m2       Physical Examination:   General - Well appearing male  HEENT - PERRL, TM no erythema/opacification, normal nasal turbinates, no oropharyngeal erythema or exudate, MMM  Neck - supple, no bruits, no thyroidomegaly, no lymphadenopathy  Pulm - clear to auscultation bilaterally  Cardio - RRR, normal S1 S2, no murmur  Abd - soft, nontender, no masses, no HSM  Extrem - no edema, +2 distal pulses. ++left lateral malleolus with no drainage, no erythema, does cause some pain with palpation and is some mild swelling  Neuro-  No focal deficits, CN intact     Assessment/Plan:    1. Left lateral malleolus cellulitis and osteomyelitis--remains on cipro. Has follow up later this week with podiatry, dr Emmie Garcia, and ID next week, dr garcia. 2.  Dm, type 2 with pn--continue levemir, glucophage, and aspart with meals  3. Tobacco abuse--rx given for patches  4. Hx CAD with stents--on asa  5. Hx hcv  6. Hx ivdu  7. Lumbar spinal stenosis  8.   Bipolar--contineu with abilify, doxepin    rtc 3 months        Rosebud Pilo III, DO

## 2018-07-09 ENCOUNTER — APPOINTMENT (OUTPATIENT)
Dept: GENERAL RADIOLOGY | Age: 53
End: 2018-07-09
Attending: EMERGENCY MEDICINE
Payer: MEDICARE

## 2018-07-09 ENCOUNTER — HOSPITAL ENCOUNTER (EMERGENCY)
Age: 53
Discharge: HOME | End: 2018-07-09
Attending: EMERGENCY MEDICINE | Admitting: EMERGENCY MEDICINE
Payer: MEDICARE

## 2018-07-09 VITALS
HEIGHT: 73 IN | SYSTOLIC BLOOD PRESSURE: 171 MMHG | HEART RATE: 90 BPM | WEIGHT: 157.41 LBS | TEMPERATURE: 98.4 F | OXYGEN SATURATION: 99 % | BODY MASS INDEX: 20.86 KG/M2 | RESPIRATION RATE: 17 BRPM | DIASTOLIC BLOOD PRESSURE: 105 MMHG

## 2018-07-09 DIAGNOSIS — R03.0 ELEVATED BLOOD PRESSURE READING: ICD-10-CM

## 2018-07-09 DIAGNOSIS — S62.339A CLOSED BOXER'S FRACTURE, INITIAL ENCOUNTER: Primary | ICD-10-CM

## 2018-07-09 PROCEDURE — 75810000053 HC SPLINT APPLICATION

## 2018-07-09 PROCEDURE — 73130 X-RAY EXAM OF HAND: CPT

## 2018-07-09 PROCEDURE — 99281 EMR DPT VST MAYX REQ PHY/QHP: CPT

## 2018-07-09 RX ORDER — HYDROCODONE BITARTRATE AND ACETAMINOPHEN 7.5; 325 MG/1; MG/1
1 TABLET ORAL
Qty: 12 TAB | Refills: 0 | Status: SHIPPED | OUTPATIENT
Start: 2018-07-09 | End: 2018-08-23

## 2018-07-09 NOTE — DISCHARGE INSTRUCTIONS
Bones of the Hand: Anatomy Sketch    Current as of: March 21, 2017  Content Version: 11.4  © 5414-8932 [employer]. Care instructions adapted under license by [employer] (which disclaims liability or warranty for this information). If you have questions about a medical condition or this instruction, always ask your healthcare professional. [address] any warranty or liability for your use of this information. Broken Hand: Care Instructions  Your Care Instructions  A hand can break (fracture) during sports, a fall, or other accidents. The break may happen when your hand twists, is hit, or is used to protect you in a fall. Fractures can range from a small, hairline crack, to a bone or bones broken into two or more pieces. Your treatment depends on how bad the break is. Your doctor may have put your hand in a brace, splint, or cast to allow it to heal or to keep it stable until you see another doctor. It may take weeks or months for your hand to heal. You can help it heal with some care at home. You heal best when you take good care of yourself. Eat a variety of healthy foods, and don't smoke. Follow-up care is a key part of your treatment and safety. Be sure to make and go to all appointments, and call your doctor if you are having problems. It's also a good idea to know your test results and keep a list of the medicines you take. How can you care for yourself at home? · Put ice or a cold pack on your hand for 10 to 20 minutes at a time. Try to do this every 1 to 2 hours for the next 3 days (when you are awake). Put a thin cloth between the ice and your cast or splint. Keep your cast or splint dry. · Follow the cast care instructions your doctor gives you. If you have a splint, do not take it off unless your doctor tells you to. · Be safe with medicines. Take pain medicines exactly as directed.   ¨ If the doctor gave you a prescription medicine for pain, take it as prescribed. ¨ If you are not taking a prescription pain medicine, ask your doctor if you can take an over-the-counter medicine. · Prop up your hand on pillows when you sit or lie down in the first few days after the injury. Keep your hand higher than the level of your heart. This will help reduce swelling. · Follow instructions for exercises to keep your arm strong. · Wiggle your uninjured fingers often to reduce swelling and stiffness, but do not use that hand to grasp or carry anything. When should you call for help? Call your doctor now or seek immediate medical care if:  ? · You have new or worse pain. ? · Your hand or fingers are cool or pale or change color. ? · Your cast or splint feels too tight. ? · You have tingling, weakness, or numbness in your hand or fingers. ? Watch closely for changes in your health, and be sure to contact your doctor if:  ? · You do not get better as expected. ? · You have problems with your cast or splint. Where can you learn more? Go to http://reta-karthik.info/. Enter [phone] in the search box to learn more about \"Broken Hand: Care Instructions. \"  Current as of: March 21, 2017  Content Version: 11.4  © 8017-6320 [employer]. Care instructions adapted under license by [employer] (which disclaims liability or warranty for this information). If you have questions about a medical condition or this instruction, always ask your healthcare professional. [place] [address] any warranty or liability for your use of this information.

## 2018-07-09 NOTE — ED NOTES
Assumed care of patient. Patient is alert and oriented and is ambulatory or ambulatory with minimal assistance. Patient is evaluated by mid-level provider in the JET express procedure of the Emergency Department. The Patient is made aware this nurse is available for any assistance at any point of the JET express process. Family and/or caregiver is encouraged to be present. Focus Note: Patient c/o right hand pain secondary to punching the wall two days ago      Physical Assessment:    Neuro:  WDL  Cardiac: WDL  Resp: WDL  PVS: WDL  GI/: WDL  Skin: WDL  ENT: WDL  Musculoskeletal: right hand swelling and pain

## 2018-07-09 NOTE — ED NOTES
Resident applied ulnar splint. Discharge instructions provided to patient by PA/MD. VSS. Patient refuses wheelchair and ambulatroy to discharge with steady gait.
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
## 2018-07-09 NOTE — LETTER
[address] 
[facility] EMERGENCY DEPT 
[address] [place] [address] [address] [address] [phone] Work/School Note Date: 7/9/2018 To Whom It May concern: 
 
Claudia Hamilton was seen and treated today in the emergency room by the following provider(s): 
Attending Provider: Mansoor Jorge MD 
Physician Assistant: Jackie Freitas. Tim May. Claudia Hamilton may return to work on 7/11/2018. Sincerely, 
 
 
 
 
Jackie Freitas.  Tim May

## 2018-07-11 ENCOUNTER — OFFICE VISIT (OUTPATIENT)
Dept: INTERNAL MEDICINE CLINIC | Age: 53
End: 2018-07-11

## 2018-07-11 VITALS
TEMPERATURE: 98.1 F | WEIGHT: 166 LBS | HEART RATE: 75 BPM | RESPIRATION RATE: 19 BRPM | SYSTOLIC BLOOD PRESSURE: 158 MMHG | HEIGHT: 73 IN | DIASTOLIC BLOOD PRESSURE: 81 MMHG | OXYGEN SATURATION: 98 % | BODY MASS INDEX: 22 KG/M2

## 2018-07-11 DIAGNOSIS — I10 ESSENTIAL HYPERTENSION: Chronic | ICD-10-CM

## 2018-07-11 DIAGNOSIS — E11.40 TYPE 2 DIABETES MELLITUS WITH DIABETIC NEUROPATHY, WITHOUT LONG-TERM CURRENT USE OF INSULIN (HCC): Chronic | ICD-10-CM

## 2018-07-11 DIAGNOSIS — S62.91XA CLOSED FRACTURE OF RIGHT HAND, INITIAL ENCOUNTER: Primary | ICD-10-CM

## 2018-07-11 DIAGNOSIS — B18.2 CHRONIC HEPATITIS C WITHOUT HEPATIC COMA (HCC): Chronic | ICD-10-CM

## 2018-07-11 DIAGNOSIS — F31.62 BIPOLAR 1 DISORDER, MIXED, MODERATE (HCC): ICD-10-CM

## 2018-07-11 NOTE — PATIENT INSTRUCTIONS
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
## 2018-07-11 NOTE — MR AVS SNAPSHOT
[address] [facility] 
[phone] Patient: Federico Sender MRN: [id number] :1965 Visit Information Date & Time Provider Department Dept. Phone Encounter #  
 2018  8:00 AM Dionte Curran, [address] [id number] Follow-up Instructions Return if symptoms worsen or fail to improve, for regular visit. Upcoming Health Maintenance Date Due  
 EYE EXAM RETINAL OR DILATED Q1 1974 DTaP/Tdap/Td series (1 - Tdap) 1985 Influenza Age 5 to Adult 2018 HEMOGLOBIN A1C Q6M 10/23/2018 MICROALBUMIN Q1 2019 LIPID PANEL Q1 2019 MEDICARE YEARLY EXAM 3/27/2019 FOOT EXAM Q1 2019 COLONOSCOPY 2026 Allergies as of 2018  Review Complete On: 2018 By: Kesha Vick III, DO Severity Noted Reaction Type Reactions Versed [Midazolam] High 10/26/2011    Shortness of Breath Weakness Versed [Midazolam]  10/01/2011   Intolerance Unknown (comments) Numbness, with shortness of breath Current Immunizations  Reviewed on 2018 Name Date Hep B, Adol/Ped 3/12/2013  5:30 PM  
 Influenza Vaccine (Quad) PF 2018 Pneumococcal Conjugate (PCV-13)  Deferred (Patient Refused) Pneumococcal Polysaccharide (PPSV-23) 2018 Not reviewed this visit You Were Diagnosed With   
  
 Codes Comments Closed fracture of right hand, initial encounter    -  Primary ICD-10-CM: Y47.00AW ICD-9-CM: 815.00 Essential hypertension     ICD-10-CM: I10 
ICD-9-CM: 401.9 Bipolar 1 disorder, mixed, moderate (HCC)     ICD-10-CM: F31.62 
ICD-9-CM: 296.62 Chronic hepatitis C without hepatic coma (HCC)     ICD-10-CM: B18.2 ICD-9-CM: 070.54 Type 2 diabetes mellitus with diabetic neuropathy, without long-term current use of insulin (HCC)     ICD-10-CM: E11.40 ICD-9-CM: 250.60, 357.2 Vitals BP Pulse Temp Resp Height(growth percentile) Weight(growth percentile) 158/81 (BP 1 Location: Left arm, BP Patient Position: Sitting) 75 98.1 °F (36.7 °C) (Oral) 19 6' 1\" (1.854 m) 166 lb (75.3 kg) SpO2 BMI Smoking Status 98% 21.9 kg/m2 Current Every Day Smoker BMI and BSA Data Body Mass Index Body Surface Area  
 21.9 kg/m 2 1.97 m 2 Preferred Pharmacy Pharmacy Name Phone [facility], [facility] [address] [place] [address] [phone] Your Updated Medication List  
  
   
This list is accurate as of 7/11/18  8:13 AM.  Always use your most recent med list.  
  
  
  
  
 ARIPiprazole 10 mg tablet Commonly known as:  ABILIFY Take 1 Tab by mouth daily (after breakfast). Indications: MIXED BIPOLAR I DISORDER  
  
 aspirin delayed-release 81 mg tablet Take 1 Tab by mouth daily. Blood-Glucose Meter monitoring kit Check blood sugars daily. DX: E11.9  
  
 cloNIDine HCl 0.2 mg tablet Commonly known as:  CATAPRES Take 1 Tab by mouth two (2) times a day. cyclobenzaprine 10 mg tablet Commonly known as:  FLEXERIL Take 1 Tab by mouth three (3) times daily as needed for Muscle Spasm(s). doxepin 25 mg capsule Commonly known as:  SINEquan Take 1 Cap by mouth nightly. glucose blood VI test strips strip Commonly known as:  ASCENSIA AUTODISC VI, ONE TOUCH ULTRA TEST VI Check blood sugars daily. DX: E11.9 HYDROcodone-acetaminophen 7.5-325 mg per tablet Commonly known as:  Edmar Bent Take 1 Tab by mouth every six (6) hours as needed for Pain. Max Daily Amount: 4 Tabs. insulin aspart U-100 100 unit/mL Inpn Commonly known as:  NovoLOG Flexpen U-100 Insulin 4 Units by SubCUTAneous route Before breakfast, lunch, and dinner. + SSI; For a glucose of: 140-199=2 u 200-249=3 u 250-299=5 u 300-349=7 u 350-400=9 u  
  
 insulin detemir U-100 100 unit/mL (3 mL) Inpn Commonly known as:  LEVEMIR FLEXTOUCH U-100 INSULN  
12 Units by SubCUTAneous route nightly. L. acidoph & paracasei- S therm- Bifido 8 billion cell Cap cap Commonly known as:  ARLENE-Q/RISAQUAD Take 1 Cap by mouth daily. Lancets Misc Commonly known as:  LANCETSShayan 49 Check blood sugars daily. DX: E11.9  
  
 lisinopril 5 mg tablet Commonly known as:  Sonia Pinch Take 1 Tab by mouth daily. metFORMIN  mg tablet Commonly known as:  GLUCOPHAGE XR Take 1 Tab by mouth two (2) times a day. pregabalin 75 mg capsule Commonly known as:  Larey Melquiades Take 1 Cap by mouth two (2) times a day. Max Daily Amount: 150 mg. Indications: Diabetic Peripheral Neuropathy We Performed the Following REFERRAL TO ORTHOPEDICS [LKB410 Custom] Follow-up Instructions Return if symptoms worsen or fail to improve, for regular visit. Referral Information Referral ID Referred By Referred To  
  
 [id number] Daily العراقي MD   
   [address] [place] [address] Phone: [phone] Fax: [phone] Visits Status Start Date End Date 1 New Request 7/11/18 7/11/19 If your referral has a status of pending review or denied, additional information will be sent to support the outcome of this decision. Patient Instructions Learning About Diabetes Food Guidelines Your Care Instructions Meal planning is important to manage diabetes. It helps keep your blood sugar at a target level (which you set with your doctor). You don't have to eat special foods. You can eat what your family eats, including sweets once in a while. But you do have to pay attention to how often you eat and how much you eat of certain foods. You may want to work with a dietitian or a certified diabetes educator (CDE) to help you plan meals and snacks.  A dietitian or CDE can also help you lose weight if that is one of your goals. What should you know about eating carbs? Managing the amount of carbohydrate (carbs) you eat is an important part of healthy meals when you have diabetes. Carbohydrate is found in many foods. · Learn which foods have carbs. And learn the amounts of carbs in different foods. ¨ Bread, cereal, pasta, and rice have about 15 grams of carbs in a serving. A serving is 1 slice of bread (1 ounce), ½ cup of cooked cereal, or 1/3 cup of cooked pasta or rice. ¨ Fruits have 15 grams of carbs in a serving. A serving is 1 small fresh fruit, such as an apple or orange; ½ of a banana; ½ cup of cooked or canned fruit; ½ cup of fruit juice; 1 cup of melon or raspberries; or 2 tablespoons of dried fruit. ¨ Milk and no-sugar-added yogurt have 15 grams of carbs in a serving. A serving is 1 cup of milk or 2/3 cup of no-sugar-added yogurt. ¨ Starchy vegetables have 15 grams of carbs in a serving. A serving is ½ cup of mashed potatoes or sweet potato; 1 cup winter squash; ½ of a small baked potato; ½ cup of cooked beans; or ½ cup cooked corn or green peas. · Learn how much carbs to eat each day and at each meal. A dietitian or CDE can teach you how to keep track of the amount of carbs you eat. This is called carbohydrate counting. · If you are not sure how to count carbohydrate grams, use the Plate Method to plan meals. It is a good, quick way to make sure that you have a balanced meal. It also helps you spread carbs throughout the day. ¨ Divide your plate by types of foods. Put non-starchy vegetables on half the plate, meat or other protein food on one-quarter of the plate, and a grain or starchy vegetable in the final quarter of the plate.  To this you can add a small piece of fruit and 1 cup of milk or yogurt, depending on how many carbs you are supposed to eat at a meal. 
· Try to eat about the same amount of carbs at each meal. Do not \"save up\" your daily allowance of carbs to eat at one meal. 
· Proteins have very little or no carbs per serving. Examples of proteins are beef, chicken, turkey, fish, eggs, tofu, cheese, cottage cheese, and peanut butter. A serving size of meat is 3 ounces, which is about the size of a deck of cards. Examples of meat substitute serving sizes (equal to 1 ounce of meat) are 1/4 cup of cottage cheese, 1 egg, 1 tablespoon of peanut butter, and ½ cup of tofu. How can you eat out and still eat healthy? · Learn to estimate the serving sizes of foods that have carbohydrate. If you measure food at home, it will be easier to estimate the amount in a serving of restaurant food. · If the meal you order has too much carbohydrate (such as potatoes, corn, or baked beans), ask to have a low-carbohydrate food instead. Ask for a salad or green vegetables. · If you use insulin, check your blood sugar before and after eating out to help you plan how much to eat in the future. · If you eat more carbohydrate at a meal than you had planned, take a walk or do other exercise. This will help lower your blood sugar. What else should you know? · Limit saturated fat, such as the fat from meat and dairy products. This is a healthy choice because people who have diabetes are at higher risk of heart disease. So choose lean cuts of meat and nonfat or low-fat dairy products. Use olive or canola oil instead of butter or shortening when cooking. · Don't skip meals. Your blood sugar may drop too low if you skip meals and take insulin or certain medicines for diabetes. · Check with your doctor before you drink alcohol. Alcohol can cause your blood sugar to drop too low. Alcohol can also cause a bad reaction if you take certain diabetes medicines. Follow-up care is a key part of your treatment and safety.  Be sure to make and go to all appointments, and call your doctor if you are having problems. It's also a good idea to know your test results and keep a list of the medicines you take. Where can you learn more? Go to http://reta-karthik.info/. Enter N880 in the search box to learn more about \"Learning About Diabetes Food Guidelines. \" Current as of: December 7, 2017 Content Version: 11.7 © 9362-1196 [employer]. Care instructions adapted under license by [employer] (which disclaims liability or warranty for this information). If you have questions about a medical condition or this instruction, always ask your healthcare professional. [address] any warranty or liability for your use of this information. Introducing [facility]! [employer] introduces [employer] patient portal. Now you can access parts of your medical record, email your doctor's office, and request medication refills online. 1. In your internet browser, go to [email or web address] 2. Click on the First Time User? Click Here link in the Sign In box. You will see the New Member Sign Up page. 3. Enter your A[employer]asker Access Code exactly as it appears below. You will not need to use this code after youve completed the sign-up process. If you do not sign up before the expiration date, you must request a new code. · [employer]er Access Code: [email or web address] Expires: 7/20/2018  5:24 AM 
 
4. Enter the last four digits of your Social Security Number (xxxx) and Date of Birth (mm/dd/yyyy) as indicated and click Submit. You will be taken to the next sign-up page. 5. Create a Airtasker ID. This will be your Airtasker login ID and cannot be changed, so think of one that is secure and easy to remember. 6. Create a A[employer]asker password. You can change your password at any time. 7. Enter your Password Reset Question and Answer. This can be used at a later time if you forget your password. 8. Enter your e-mail address. You will receive e-mail notification when new information is available in [address] 9. Click Sign Up. You can now view and download portions of your medical record. 10. Click the Download Summary menu link to download a portable copy of your medical information. If you have questions, please visit the Frequently Asked Questions section of the [employer] website. Remember, [employer] is NOT to be used for urgent needs. For medical emergencies, dial 911. Now available from your iPhone and Android! Please provide this summary of care documentation to your next provider. Your primary care clinician is listed as Hanane Freeman If you have any questions after today's visit, please call [phone].

## 2018-07-11 NOTE — PROGRESS NOTES
Chief Complaint   Patient presents with    Hand Injury     Injured hand on Saturday     1. Have you been to the ER, urgent care clinic since your last visit? Monday ED [facility]  Hospitalized since your last visit? No     2. Have you seen or consulted any other health care providers outside of the [address] [place] [address] since your last visit? No        AVS & Referral given to patient. Vidal Olea LPN

## 2018-08-23 ENCOUNTER — HOSPITAL ENCOUNTER (EMERGENCY)
Age: 53
Discharge: HOME | End: 2018-08-23
Attending: EMERGENCY MEDICINE
Payer: MEDICARE

## 2018-08-23 ENCOUNTER — APPOINTMENT (OUTPATIENT)
Dept: GENERAL RADIOLOGY | Age: 53
End: 2018-08-23
Attending: PHYSICIAN ASSISTANT
Payer: MEDICARE

## 2018-08-23 VITALS
WEIGHT: 168.21 LBS | DIASTOLIC BLOOD PRESSURE: 100 MMHG | HEIGHT: 72 IN | BODY MASS INDEX: 22.78 KG/M2 | TEMPERATURE: 98.3 F | HEART RATE: 62 BPM | OXYGEN SATURATION: 100 % | RESPIRATION RATE: 18 BRPM | SYSTOLIC BLOOD PRESSURE: 180 MMHG

## 2018-08-23 DIAGNOSIS — R03.0 ELEVATED BLOOD PRESSURE READING: ICD-10-CM

## 2018-08-23 DIAGNOSIS — M54.50 ACUTE MIDLINE LOW BACK PAIN WITHOUT SCIATICA: ICD-10-CM
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
## 2018-08-23 DIAGNOSIS — S32.2XXA CLOSED FRACTURE OF COCCYX, INITIAL ENCOUNTER (HCC): Primary | ICD-10-CM

## 2018-08-23 PROCEDURE — 74011250637 HC RX REV CODE- 250/637: Performed by: PHYSICIAN ASSISTANT

## 2018-08-23 PROCEDURE — 99283 EMERGENCY DEPT VISIT LOW MDM: CPT

## 2018-08-23 PROCEDURE — 72100 X-RAY EXAM L-S SPINE 2/3 VWS: CPT

## 2018-08-23 PROCEDURE — 72220 X-RAY EXAM SACRUM TAILBONE: CPT

## 2018-08-23 RX ORDER — TIZANIDINE HYDROCHLORIDE 4 MG/1
4 CAPSULE, GELATIN COATED ORAL 3 TIMES DAILY
Qty: 21 CAP | Refills: 0 | Status: SHIPPED | OUTPATIENT
Start: 2018-08-23 | End: 2018-08-30

## 2018-08-23 RX ORDER — HYDROCODONE BITARTRATE AND ACETAMINOPHEN 5; 325 MG/1; MG/1
1 TABLET ORAL
Qty: 20 TAB | Refills: 0 | Status: SHIPPED | OUTPATIENT
Start: 2018-08-23 | End: 2018-09-18 | Stop reason: SDUPTHER

## 2018-08-23 RX ORDER — CLONIDINE HYDROCHLORIDE 0.1 MG/1
0.2 TABLET ORAL
Status: COMPLETED | OUTPATIENT
Start: 2018-08-23 | End: 2018-08-23

## 2018-08-23 RX ORDER — NAPROXEN 500 MG/1
500 TABLET ORAL 2 TIMES DAILY WITH MEALS
Qty: 20 TAB | Refills: 0 | Status: SHIPPED | OUTPATIENT
Start: 2018-08-23 | End: 2018-09-02

## 2018-08-23 RX ADMIN — CLONIDINE HYDROCHLORIDE 0.2 MG: 0.1 TABLET ORAL at 23:00

## 2018-08-24 NOTE — DISCHARGE INSTRUCTIONS
Thank you!     Thank you for allowing us to provide you with excellent care today. We hope we addressed all of your concerns and needs. We strive to provide excellent quality care in the Emergency Department. You will receive a survey after your visit to evaluate the care you were provided.      Please rate us a level 5 (excellent), as anything less than excellent does not meet our goals.      If you feel that you have not received excellent quality care or timely care, please ask to speak to the nurse manager. Please choose us in the future for your continued health care needs. ______________________________________________________________________    The exam and treatment you received in the Emergency Department were for an urgent problem and are not intended as complete care. It is important that you follow-up with a doctor, nurse practitioner, or physician assistant to:  (1) confirm your diagnosis,  (2) re-evaluation of changes in your illness and treatment, and  (3) for ongoing care. If your symptoms become worse or you do not improve as expected and you are unable to reach your usual health care provider, you should return to the Emergency Department. We are available 24 hours a day. Take this sheet with you when you go to your follow-up visit. If you have any problem arranging the follow-up visit, contact [address] [place] [address] 21 [phone])    Make an appointment with your Primary Care doctor for follow up of this visit. Return to the ER if you are unable to be seen in the time recommended on your discharge instructions. Tailbone Injury: Care Instructions  Your Care Instructions    Injuries to the tailbone (coccyx) can occur when you slip or fall and hit your tailbone. A tailbone injury causes pain when you sit, especially when you slump or sit on a hard seat. Straining to have a bowel movement can also be very painful.  Tailbone injuries can take several months to heal, but home treatment can ease the pain.  Follow-up care is a key part of your treatment and safety. Be sure to make and go to all appointments, and call your doctor if you are having problems. It's also a good idea to know your test results and keep a list of the medicines you take. How can you care for yourself at home? · Take pain medicines exactly as directed. ¨ If the doctor gave you a prescription medicine for pain, take it as prescribed. ¨ If you are not taking a prescription pain medicine, ask your doctor if you can take an over-the-counter medicine to reduce pain and swelling. Read and follow all instructions on the label. · Put ice or a cold pack on your tailbone for 10 to 20 minutes at a time. Try to do this every 1 to 2 hours for the next 3 days (when you are awake) or until the swelling goes down. Put a thin cloth between the ice and your skin. · You can switch between using ice and heat 2 to 3 days after the injury. Take a warm bath for 20 minutes, 3 or 4 times a day. You can use a doughnut-shaped pillow or towel in the tub to pad the hard tub surface. · Do not sit on hard, unpadded surfaces. Sit on a doughnut-shaped pillow to take pressure off the tailbone area. · Avoid constipation, because straining to have a bowel movement will increase your tailbone pain. ¨ Include fruits, vegetables, beans, and whole grains in your diet each day. These foods are high in fiber. ¨ Drink plenty of fluids, enough so that your urine is light yellow or clear like water. If you have kidney, heart, or liver disease and have to limit fluids, talk with your doctor before you increase your fluid intake. ¨ Get some exercise every day. Build up slowly to 30 to 60 minutes a day on 5 or more days of the week. ¨ Take a fiber supplement, such as Citrucel or Metamucil, every day if needed. Read and follow all instructions on the label. ¨ Schedule time each day for a bowel movement. A daily routine may help.  Take your time and do not strain when having your bowel movement. When should you call for help? Call your doctor now or seek immediate medical care if you have new or worse symptoms in your legs or buttocks. Symptoms may include:    · Numbness or tingling.     · Weakness.     · Pain.    Watch closely for changes in your health, and be sure to contact your doctor if:    · You do not get better as expected. Where can you learn more? Go to http://reta-karthik.info/. Enter P195 in the search box to learn more about \"Tailbone Injury: Care Instructions. \"  Current as of: November 29, 2017  Content Version: 11.7  © 4535-1442 [employer]. Care instructions adapted under license by [employer] (which disclaims liability or warranty for this information). If you have questions about a medical condition or this instruction, always ask your healthcare professional. [address] any warranty or liability for your use of this information.
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
## 2018-09-05 ENCOUNTER — APPOINTMENT (OUTPATIENT)
Dept: GENERAL RADIOLOGY | Age: 53
End: 2018-09-05
Attending: PHYSICIAN ASSISTANT
Payer: MEDICARE

## 2018-09-05 ENCOUNTER — HOSPITAL ENCOUNTER (EMERGENCY)
Age: 53
Discharge: HOME | End: 2018-09-05
Attending: EMERGENCY MEDICINE | Admitting: EMERGENCY MEDICINE
Payer: MEDICARE

## 2018-09-05 VITALS
TEMPERATURE: 98.2 F | HEART RATE: 88 BPM | SYSTOLIC BLOOD PRESSURE: 152 MMHG | OXYGEN SATURATION: 100 % | RESPIRATION RATE: 14 BRPM | DIASTOLIC BLOOD PRESSURE: 97 MMHG

## 2018-09-05 DIAGNOSIS — S62.396A CLOSED NONDISPLACED FRACTURE OF OTHER PART OF FIFTH METACARPAL BONE OF RIGHT HAND, INITIAL ENCOUNTER: Primary | ICD-10-CM

## 2018-09-05 PROCEDURE — 99283 EMERGENCY DEPT VISIT LOW MDM: CPT

## 2018-09-05 PROCEDURE — 75810000053 HC SPLINT APPLICATION

## 2018-09-05 PROCEDURE — 73130 X-RAY EXAM OF HAND: CPT

## 2018-09-05 PROCEDURE — 74011250637 HC RX REV CODE- 250/637: Performed by: PHYSICIAN ASSISTANT

## 2018-09-05 RX ORDER — HYDROCODONE BITARTRATE AND ACETAMINOPHEN 5; 325 MG/1; MG/1
1 TABLET ORAL
Qty: 15 TAB | Refills: 0 | Status: SHIPPED | OUTPATIENT
Start: 2018-09-05 | End: 2018-09-18 | Stop reason: ALTCHOICE

## 2018-09-05 RX ORDER — ACETAMINOPHEN 650 MG/1
650 SUPPOSITORY RECTAL
Status: DISCONTINUED | OUTPATIENT
Start: 2018-09-05 | End: 2018-09-05

## 2018-09-05 RX ORDER — ACETAMINOPHEN 325 MG/1
650 TABLET ORAL
Status: COMPLETED | OUTPATIENT
Start: 2018-09-05 | End: 2018-09-05

## 2018-09-05 RX ADMIN — ACETAMINOPHEN 650 MG: 325 TABLET ORAL at 15:46
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
## 2018-09-05 NOTE — LETTER
[address] 
[facility] EMERGENCY DEPT 
[address] [place] [address] [id number] [id number] [phone] Work/School Note Date: 9/5/2018 To Whom It May concern: 
 
Winifred Pabon was seen and treated today in the emergency room by the following provider(s): 
Attending Provider: Virginie Sibley MD 
Physician Assistant: BIN Hyatt. Winifred Pabon may return to work on [id number]. Sincerely, BIN Hyatt

## 2018-09-17 ENCOUNTER — TELEPHONE (OUTPATIENT)
Dept: INTERNAL MEDICINE CLINIC | Age: 53
End: 2018-09-17

## 2018-09-17 NOTE — TELEPHONE ENCOUNTER
Spoke with patient after 2 patient identifiers being note and advised of appt on Tuesday, September 18, 2018 10:30 AM, patient accepted. Patient expressed understanding and has no further questions at this time.

## 2018-09-17 NOTE — TELEPHONE ENCOUNTER
#[phone] pt states he needs an appt due to hand and how he has been feeling otherwise as soon as possible. Please use this number only. Thanks.

## 2018-09-18 ENCOUNTER — OFFICE VISIT (OUTPATIENT)
Dept: INTERNAL MEDICINE CLINIC | Age: 53
End: 2018-09-18

## 2018-09-18 ENCOUNTER — HOSPITAL ENCOUNTER (OUTPATIENT)
Dept: LAB | Age: 53
Discharge: HOME | End: 2018-09-18
Payer: MEDICARE

## 2018-09-18 VITALS
DIASTOLIC BLOOD PRESSURE: 110 MMHG | TEMPERATURE: 98 F | BODY MASS INDEX: 22.75 KG/M2 | HEIGHT: 72 IN | HEART RATE: 78 BPM | RESPIRATION RATE: 18 BRPM | OXYGEN SATURATION: 99 % | SYSTOLIC BLOOD PRESSURE: 205 MMHG | WEIGHT: 168 LBS

## 2018-09-18 DIAGNOSIS — I10 ESSENTIAL HYPERTENSION: Chronic | ICD-10-CM

## 2018-09-18 DIAGNOSIS — Z72.0 TOBACCO ABUSE: Chronic | ICD-10-CM

## 2018-09-18 DIAGNOSIS — F31.62 BIPOLAR 1 DISORDER, MIXED, MODERATE (HCC): ICD-10-CM

## 2018-09-18 DIAGNOSIS — Z23 ENCOUNTER FOR IMMUNIZATION: ICD-10-CM

## 2018-09-18 DIAGNOSIS — E11.40 TYPE 2 DIABETES MELLITUS WITH DIABETIC NEUROPATHY, WITHOUT LONG-TERM CURRENT USE OF INSULIN (HCC): Chronic | ICD-10-CM

## 2018-09-18 DIAGNOSIS — S62.91XA CLOSED FRACTURE OF RIGHT HAND, INITIAL ENCOUNTER: Chronic | ICD-10-CM

## 2018-09-18 DIAGNOSIS — R53.83 FATIGUE, UNSPECIFIED TYPE: Primary | ICD-10-CM

## 2018-09-18 LAB
BILIRUB UR QL STRIP: NEGATIVE
GLUCOSE UR-MCNC: NORMAL MG/DL
KETONES P FAST UR STRIP-MCNC: NEGATIVE MG/DL
PH UR STRIP: 7 [PH] (ref 4.6–8)
PROT UR QL STRIP: NEGATIVE
SP GR UR STRIP: 1.01 (ref 1–1.03)
UA UROBILINOGEN AMB POC: NORMAL (ref 0.2–1)
URINALYSIS CLARITY POC: CLEAR
URINALYSIS COLOR POC: YELLOW
URINE BLOOD POC: NORMAL
URINE LEUKOCYTES POC: NEGATIVE
URINE NITRITES POC: NEGATIVE

## 2018-09-18 PROCEDURE — 84403 ASSAY OF TOTAL TESTOSTERONE: CPT

## 2018-09-18 PROCEDURE — 85025 COMPLETE CBC W/AUTO DIFF WBC: CPT

## 2018-09-18 PROCEDURE — 36415 COLL VENOUS BLD VENIPUNCTURE: CPT

## 2018-09-18 PROCEDURE — 83036 HEMOGLOBIN GLYCOSYLATED A1C: CPT

## 2018-09-18 PROCEDURE — 84443 ASSAY THYROID STIM HORMONE: CPT

## 2018-09-18 PROCEDURE — 80053 COMPREHEN METABOLIC PANEL: CPT

## 2018-09-18 NOTE — MR AVS SNAPSHOT
[address] [facility] 
[phone] Patient: Letty Moran MRN: [id number] :1965 Visit Information Date & Time Provider Department Dept. Phone Encounter #  
 2018 10:30 AM Danika Lambert, [address] [phone] [id number] Follow-up Instructions Return in about 3 months (around 2018). Upcoming Health Maintenance Date Due  
 EYE EXAM RETINAL OR DILATED Q1 1974 DTaP/Tdap/Td series (1 - Tdap) 1985 Influenza Age 5 to Adult 2018 HEMOGLOBIN A1C Q6M 10/23/2018 MICROALBUMIN Q1 2019 LIPID PANEL Q1 2019 MEDICARE YEARLY EXAM 3/27/2019 FOOT EXAM Q1 2019 COLONOSCOPY 2026 Allergies as of 2018  Review Complete On: 2018 By: Angela Weinberg III, DO Severity Noted Reaction Type Reactions Versed [Midazolam] High 10/26/2011    Shortness of Breath Weakness Versed [Midazolam]  10/01/2011   Intolerance Unknown (comments) Numbness, with shortness of breath Current Immunizations  Reviewed on 2018 Name Date Hep B, Adol/Ped 3/12/2013  5:30 PM  
 Influenza Vaccine (Quad) PF  Incomplete, 2018 Pneumococcal Conjugate (PCV-13)  Deferred (Patient Refused) Pneumococcal Polysaccharide (PPSV-23) 2018 Not reviewed this visit You Were Diagnosed With   
  
 Codes Comments Fatigue, unspecified type    -  Primary ICD-10-CM: R53.83 ICD-9-CM: 780.79 Type 2 diabetes mellitus with diabetic neuropathy, without long-term current use of insulin (HCC)     ICD-10-CM: E11.40 ICD-9-CM: 250.60, 357.2 Encounter for immunization     ICD-10-CM: Z66 ICD-9-CM: V03.89 Bipolar 1 disorder, mixed, moderate (HCC)     ICD-10-CM: F31.62 
ICD-9-CM: 296.62 Essential hypertension     ICD-10-CM: I10 
ICD-9-CM: 401.9 Tobacco abuse     ICD-10-CM: Z72.0 ICD-9-CM: 305.1 Closed fracture of right hand, initial encounter     ICD-10-CM: S62.91XA ICD-9-CM: 815.00 Vitals BP Pulse Temp Resp Height(growth percentile) Weight(growth percentile) (!) 205/110 (BP 1 Location: Left arm, BP Patient Position: Sitting) 78 98 °F (36.7 °C) (Oral) 18 6' (1.829 m) 168 lb (76.2 kg) SpO2 BMI Smoking Status 99% 22.78 kg/m2 Current Every Day Smoker BMI and BSA Data Body Mass Index Body Surface Area 22.78 kg/m 2 1.97 m 2 Preferred Pharmacy Pharmacy Name Phone [facility], [address] [place] [address] [phone] Your Updated Medication List  
  
   
This list is accurate as of 9/18/18 11:40 AM.  Always use your most recent med list.  
  
  
  
  
 ARIPiprazole 10 mg tablet Commonly known as:  ABILIFY Take 1 Tab by mouth daily (after breakfast). Indications: MIXED BIPOLAR I DISORDER  
  
 aspirin delayed-release 81 mg tablet Take 1 Tab by mouth daily. Blood-Glucose Meter monitoring kit Check blood sugars daily. DX: E11.9  
  
 cloNIDine HCl 0.2 mg tablet Commonly known as:  CATAPRES Take 1 Tab by mouth two (2) times a day. doxepin 25 mg capsule Commonly known as:  SINEquan Take 1 Cap by mouth nightly. glucose blood VI test strips strip Commonly known as:  ASCENSIA AUTODISC VI, ONE TOUCH ULTRA TEST VI Check blood sugars daily. DX: E11.9  
  
 insulin detemir U-100 100 unit/mL (3 mL) Inpn Commonly known as:  LEVEMIR FLEXTOUCH U-100 INSULN  
12 Units by SubCUTAneous route nightly. L. acidoph & paracasei- S therm- Bifido 8 billion cell Cap cap Commonly known as:  ARLENE-Q/RISAQUAD Take 1 Cap by mouth daily. Lancets Misc Commonly known as:  LANCETSShayan 49 Check blood sugars daily. DX: E11.9  
  
 lisinopril 5 mg tablet Commonly known as:  Rosy Shows Take 1 Tab by mouth daily. metFORMIN  mg tablet Commonly known as:  GLUCOPHAGE XR Take 1 Tab by mouth two (2) times a day. pregabalin 75 mg capsule Commonly known as:  Aguilar Daily Take 1 Cap by mouth two (2) times a day. Max Daily Amount: 150 mg. Indications: Diabetic Peripheral Neuropathy We Performed the Following AMB POC URINALYSIS DIP STICK AUTO W/ MICRO [92315 CPT(R)] CBC WITH AUTOMATED DIFF [80257 CPT(R)] HEMOGLOBIN A1C WITH EAG [72795 CPT(R)] INFLUENZA VIRUS VAC QUAD,SPLIT,PRESV FREE SYRINGE IM [id number] CPT(R)] METABOLIC PANEL, COMPREHENSIVE [51903 CPT(R)] REFERRAL TO ORTHOPEDICS [QBB716 Custom] TESTOSTERONE, FREE & TOTAL [83016 CPT(R)] TSH 3RD GENERATION [75704 CPT(R)] Follow-up Instructions Return in about 3 months (around 12/18/2018). Referral Information Referral ID Referred By Referred To  
  
 [id number] [address], Lesley Dent M.D. [address] Visits Status Start Date End Date 1 New Request 9/18/18 9/18/19 If your referral has a status of pending review or denied, additional information will be sent to support the outcome of this decision. Patient Instructions Learning About Diabetes Food Guidelines Your Care Instructions Meal planning is important to manage diabetes. It helps keep your blood sugar at a target level (which you set with your doctor). You don't have to eat special foods. You can eat what your family eats, including sweets once in a while. But you do have to pay attention to how often you eat and how much you eat of certain foods. You may want to work with a dietitian or a certified diabetes educator (CDE) to help you plan meals and snacks. A dietitian or CDE can also help you lose weight if that is one of your goals. What should you know about eating carbs?  
Managing the amount of carbohydrate (carbs) you eat is an important part of healthy meals when you have diabetes. Carbohydrate is found in many foods. · Learn which foods have carbs. And learn the amounts of carbs in different foods. ¨ Bread, cereal, pasta, and rice have about 15 grams of carbs in a serving. A serving is 1 slice of bread (1 ounce), ½ cup of cooked cereal, or 1/3 cup of cooked pasta or rice. ¨ Fruits have 15 grams of carbs in a serving. A serving is 1 small fresh fruit, such as an apple or orange; ½ of a banana; ½ cup of cooked or canned fruit; ½ cup of fruit juice; 1 cup of melon or raspberries; or 2 tablespoons of dried fruit. ¨ Milk and no-sugar-added yogurt have 15 grams of carbs in a serving. A serving is 1 cup of milk or 2/3 cup of no-sugar-added yogurt. ¨ Starchy vegetables have 15 grams of carbs in a serving. A serving is ½ cup of mashed potatoes or sweet potato; 1 cup winter squash; ½ of a small baked potato; ½ cup of cooked beans; or ½ cup cooked corn or green peas. · Learn how much carbs to eat each day and at each meal. A dietitian or CDE can teach you how to keep track of the amount of carbs you eat. This is called carbohydrate counting. · If you are not sure how to count carbohydrate grams, use the Plate Method to plan meals. It is a good, quick way to make sure that you have a balanced meal. It also helps you spread carbs throughout the day. ¨ Divide your plate by types of foods. Put non-starchy vegetables on half the plate, meat or other protein food on one-quarter of the plate, and a grain or starchy vegetable in the final quarter of the plate. To this you can add a small piece of fruit and 1 cup of milk or yogurt, depending on how many carbs you are supposed to eat at a meal. 
· Try to eat about the same amount of carbs at each meal. Do not \"save up\" your daily allowance of carbs to eat at one meal. 
· Proteins have very little or no carbs per serving.  Examples of proteins are beef, chicken, turkey, fish, eggs, tofu, cheese, cottage cheese, and peanut butter. A serving size of meat is 3 ounces, which is about the size of a deck of cards. Examples of meat substitute serving sizes (equal to 1 ounce of meat) are 1/4 cup of cottage cheese, 1 egg, 1 tablespoon of peanut butter, and ½ cup of tofu. How can you eat out and still eat healthy? · Learn to estimate the serving sizes of foods that have carbohydrate. If you measure food at home, it will be easier to estimate the amount in a serving of restaurant food. · If the meal you order has too much carbohydrate (such as potatoes, corn, or baked beans), ask to have a low-carbohydrate food instead. Ask for a salad or green vegetables. · If you use insulin, check your blood sugar before and after eating out to help you plan how much to eat in the future. · If you eat more carbohydrate at a meal than you had planned, take a walk or do other exercise. This will help lower your blood sugar. What else should you know? · Limit saturated fat, such as the fat from meat and dairy products. This is a healthy choice because people who have diabetes are at higher risk of heart disease. So choose lean cuts of meat and nonfat or low-fat dairy products. Use olive or canola oil instead of butter or shortening when cooking. · Don't skip meals. Your blood sugar may drop too low if you skip meals and take insulin or certain medicines for diabetes. · Check with your doctor before you drink alcohol. Alcohol can cause your blood sugar to drop too low. Alcohol can also cause a bad reaction if you take certain diabetes medicines. Follow-up care is a key part of your treatment and safety. Be sure to make and go to all appointments, and call your doctor if you are having problems. It's also a good idea to know your test results and keep a list of the medicines you take. Where can you learn more? Go to http://reta-karthik.info/.  
Enter L522 in the search box to learn more about \"Learning About Diabetes Food Guidelines. \" Current as of: December 7, 2017 Content Version: 11.7 © 0375-4904 [employer]. Care instructions adapted under license by [employer] (which disclaims liability or warranty for this information). If you have questions about a medical condition or this instruction, always ask your healthcare professional. [address] any warranty or liability for your use of this information. Diabetes Blood Sugar Emergencies: Your Action Plan How can you prevent a blood sugar emergency? An important part of living with diabetes is keeping your blood sugar in your target range. You'll need to know what to do if it's too high or too low. Managing your blood sugar levels helps you avoid emergencies. This care sheet will teach you about the signs of high and low blood sugar. It will help you make an action plan with your doctor for when these signs occur. Low blood sugar is more likely to happen if you take certain medicines for diabetes. It can also happen if you skip a meal, drink alcohol, or exercise more than usual. 
You may get high blood sugar if you eat differently than you normally do. One example is eating more carbohydrate than usual. Having a cold, the flu, or other sudden illness can also cause high blood sugar levels. Levels can also rise if you miss a dose of medicine. Any change in how you take your medicine may affect your blood sugar level. So it's important to work with your doctor before you make any changes. Check your blood sugar Work with your doctor to fill in the blank spaces below that apply to you. Track your levels, know your target range, and write down ways you can get your blood sugar back in your target range. A log book can help you track your levels. Take the book to all of your medical appointments. · Check your blood sugar _____ times a day, at these times:________________________________________________.   (For example: Before meals, at bedtime, before exercise, during exercise, other.) · Your blood sugar target range before a meal is ___________________. Your blood sugar target range after a meal is _______________________. · Do mivt-___________________________________________________-mk get your blood sugar back within your safe range if your blood sugar results are _________________________________________. (For example: Less than 70 or above 250 mg/dL.) Call your doctor when your blood sugar results are ___________________________________. (For example: Less than 70 or above 250 mg/dL.) What are the symptoms of low and high blood sugar? Common symptoms of low blood sugar are sweating and feeling shaky, weak, hungry, or confused. Symptoms can start quickly. Common symptoms of high blood sugar are feeling very thirsty or very hungry. You may also pass urine more often than usual. You may have blurry vision and may lose weight without trying. But some people may have high or low blood sugar without having any symptoms. That's a good reason to check your blood sugar on a regular schedule. What should you do if you have symptoms? Work with your doctor to fill in the blank spaces below that apply to you. Low blood sugar If you have symptoms of low blood sugar, check your blood sugar. If it's below _____ ( for example, below 70), eat or drink a quick-sugar food that has about 15 grams of carbohydrate. Your goal is to get your level back to your safe range. Check your blood sugar again 15 minutes later. If it's still not in your target range, take another 15 grams of carbohydrate and check your blood sugar again in 15 minutes. Repeat this until you reach your target. Then go back to your regular testing schedule. When you have low blood sugar, it's best to stop or reduce any physical activity until your blood sugar is back in your target range and is stable. If you must stay active, eat or drink 30 grams of carbohydrate. Then check your blood sugar again in 15 minutes. If it's not in your target range, take another 30 grams of carbohydrates. Check your blood sugar again in 15 minutes. Keep doing this until you reach your target. You can then go back to your regular testing schedule. If your symptoms or blood sugar levels are getting worse or have not improved after 15 minutes, seek medical care right away. Here are some examples of quick-sugar foods with 15 grams of carbohydrate: · 3 or 4 glucose tablets · 1 tube of glucose gel · Hard candy (such as 3 Jolly Ranchers or 5 to [employer]) · ½ cup to ¾ cup (4 to 6 ounces) of fruit juice or regular (not diet) soda High blood sugar If you have symptoms of high blood sugar, check your blood sugar. Your goal is to get your level back to your target range. If it's above ______ ( for example, above 250), follow these steps: · If you missed a dose of your diabetes medicine, take it now. Take only the amount of medicine that you have been prescribed. Do not take more or less medicine. · Give yourself insulin if your doctor has prescribed it for high blood sugar. · Test for ketones, if the doctor told you to do so. If the results of the ketone test show a moderate-to-large amount of ketones, call the doctor for advice. · Wait 30 minutes after you take the extra insulin or the missed medicine. Check your blood sugar again. If your symptoms or blood sugar levels are getting worse or have not improved after taking these steps, seek medical care right away. Follow-up care is a key part of your treatment and safety. Be sure to make and go to all appointments, and call your doctor if you are having problems. It's also a good idea to know your test results and keep a list of the medicines you take. Where can you learn more? Go to http://reta-karthik.info/. Enter E914 in the search box to learn more about \"Diabetes Blood Sugar Emergencies: Your Action Plan. \" 
 Current as of: December 7, 2017 Content Version: 11.7 © 2072-7612 [place] Everywhere. Care instructions adapted under license by [employer] (which disclaims liability or warranty for this information). If you have questions about a medical condition or this instruction, always ask your healthcare professional. [address] any warranty or liability for your use of this information. You need to start checking your sugars at least every morning, and ideally 2x per day. Introducing [facility]! [address] introduces [employer] patient portal. Now you can access parts of your medical record, email your doctor's office, and request medication refills online. 1. In your internet browser, go to [email or web address] 2. Click on the First Time User? Click Here link in the Sign In box. You will see the New Member Sign Up page. 3. Enter your [employer] Access Code exactly as it appears below. You will not need to use this code after youve completed the sign-up process. If you do not sign up before the expiration date, you must request a new code. · [employer] Access Code: [email or web address] Expires: 11/21/2018  8:19 PM 
 
4. Enter the last four digits of your Social Security Number (xxxx) and Date of Birth (mm/dd/yyyy) as indicated and click Submit. You will be taken to the next sign-up page. 5. Create a [employer] ID. This will be your [employer] login ID and cannot be changed, so think of one that is secure and easy to remember. 6. Create a [employer] password. You can change your password at any time. 7. Enter your Password Reset Question and Answer. This can be used at a later time if you forget your password. 8. Enter your e-mail address. You will receive e-mail notification when new information is available in [address]. 9. Click Sign Up. You can now view and download portions of your medical record. 10. Click the Download Summary menu link to download a portable copy of your medical information. If you have questions, please visit the Frequently Asked Questions section of the [employer] website. Remember, [employer] is NOT to be used for urgent needs. For medical emergencies, dial 911. Now available from your iPhone and Android! Please provide this summary of care documentation to your next provider. Your primary care clinician is listed as Pj Castillo If you have any questions after today's visit, please call [phone].

## 2018-09-18 NOTE — PATIENT INSTRUCTIONS
Learning About Diabetes Food Guidelines  Your Care Instructions    Meal planning is important to manage diabetes. It helps keep your blood sugar at a target level (which you set with your doctor). You don't have to eat special foods. You can eat what your family eats, including sweets once in a while. But you do have to pay attention to how often you eat and how much you eat of certain foods. You may want to work with a dietitian or a certified diabetes educator (CDE) to help you plan meals and snacks. A dietitian or CDE can also help you lose weight if that is one of your goals. What should you know about eating carbs? Managing the amount of carbohydrate (carbs) you eat is an important part of healthy meals when you have diabetes. Carbohydrate is found in many foods. · Learn which foods have carbs. And learn the amounts of carbs in different foods. ¨ Bread, cereal, pasta, and rice have about 15 grams of carbs in a serving. A serving is 1 slice of bread (1 ounce), ½ cup of cooked cereal, or 1/3 cup of cooked pasta or rice. ¨ Fruits have 15 grams of carbs in a serving. A serving is 1 small fresh fruit, such as an apple or orange; ½ of a banana; ½ cup of cooked or canned fruit; ½ cup of fruit juice; 1 cup of melon or raspberries; or 2 tablespoons of dried fruit. ¨ Milk and no-sugar-added yogurt have 15 grams of carbs in a serving. A serving is 1 cup of milk or 2/3 cup of no-sugar-added yogurt. ¨ Starchy vegetables have 15 grams of carbs in a serving. A serving is ½ cup of mashed potatoes or sweet potato; 1 cup winter squash; ½ of a small baked potato; ½ cup of cooked beans; or ½ cup cooked corn or green peas. · Learn how much carbs to eat each day and at each meal. A dietitian or CDE can teach you how to keep track of the amount of carbs you eat. This is called carbohydrate counting. · If you are not sure how to count carbohydrate grams, use the Plate Method to plan meals.  It is a good, quick way to make sure that you have a balanced meal. It also helps you spread carbs throughout the day. ¨ Divide your plate by types of foods. Put non-starchy vegetables on half the plate, meat or other protein food on one-quarter of the plate, and a grain or starchy vegetable in the final quarter of the plate. To this you can add a small piece of fruit and 1 cup of milk or yogurt, depending on how many carbs you are supposed to eat at a meal.  · Try to eat about the same amount of carbs at each meal. Do not \"save up\" your daily allowance of carbs to eat at one meal.  · Proteins have very little or no carbs per serving. Examples of proteins are beef, chicken, turkey, fish, eggs, tofu, cheese, cottage cheese, and peanut butter. A serving size of meat is 3 ounces, which is about the size of a deck of cards. Examples of meat substitute serving sizes (equal to 1 ounce of meat) are 1/4 cup of cottage cheese, 1 egg, 1 tablespoon of peanut butter, and ½ cup of tofu. How can you eat out and still eat healthy? · Learn to estimate the serving sizes of foods that have carbohydrate. If you measure food at home, it will be easier to estimate the amount in a serving of restaurant food. · If the meal you order has too much carbohydrate (such as potatoes, corn, or baked beans), ask to have a low-carbohydrate food instead. Ask for a salad or green vegetables. · If you use insulin, check your blood sugar before and after eating out to help you plan how much to eat in the future. · If you eat more carbohydrate at a meal than you had planned, take a walk or do other exercise. This will help lower your blood sugar. What else should you know? · Limit saturated fat, such as the fat from meat and dairy products. This is a healthy choice because people who have diabetes are at higher risk of heart disease. So choose lean cuts of meat and nonfat or low-fat dairy products.  Use olive or canola oil instead of butter or shortening when cooking. · Don't skip meals. Your blood sugar may drop too low if you skip meals and take insulin or certain medicines for diabetes. · Check with your doctor before you drink alcohol. Alcohol can cause your blood sugar to drop too low. Alcohol can also cause a bad reaction if you take certain diabetes medicines. Follow-up care is a key part of your treatment and safety. Be sure to make and go to all appointments, and call your doctor if you are having problems. It's also a good idea to know your test results and keep a list of the medicines you take. Where can you learn more? Go to http://reta-karthik.info/. Enter H547 in the search box to learn more about \"Learning About Diabetes Food Guidelines. \"  Current as of: December 7, 2017  Content Version: 11.7  © 8722-4306 [employer]. Care instructions adapted under license by [employer] (which disclaims liability or warranty for this information). If you have questions about a medical condition or this instruction, always ask your healthcare professional. [address] any warranty or liability for your use of this information. Diabetes Blood Sugar Emergencies: Your Action Plan  How can you prevent a blood sugar emergency? An important part of living with diabetes is keeping your blood sugar in your target range. You'll need to know what to do if it's too high or too low. Managing your blood sugar levels helps you avoid emergencies. This care sheet will teach you about the signs of high and low blood sugar. It will help you make an action plan with your doctor for when these signs occur. Low blood sugar is more likely to happen if you take certain medicines for diabetes. It can also happen if you skip a meal, drink alcohol, or exercise more than usual.  You may get high blood sugar if you eat differently than you normally do.  One example is eating more carbohydrate than usual. Having a cold, the flu, or other sudden illness can also cause high blood sugar levels. Levels can also rise if you miss a dose of medicine. Any change in how you take your medicine may affect your blood sugar level. So it's important to work with your doctor before you make any changes. Check your blood sugar  Work with your doctor to fill in the blank spaces below that apply to you. Track your levels, know your target range, and write down ways you can get your blood sugar back in your target range. A log book can help you track your levels. Take the book to all of your medical appointments. · Check your blood sugar _____ times a day, at these times:________________________________________________. (For example: Before meals, at bedtime, before exercise, during exercise, other.)  · Your blood sugar target range before a meal is ___________________. Your blood sugar target range after a meal is _______________________. · Do ihzs-___________________________________________________-eh get your blood sugar back within your safe range if your blood sugar results are _________________________________________. (For example: Less than 70 or above 250 mg/dL.)  Call your doctor when your blood sugar results are ___________________________________. (For example: Less than 70 or above 250 mg/dL.)  What are the symptoms of low and high blood sugar? Common symptoms of low blood sugar are sweating and feeling shaky, weak, hungry, or confused. Symptoms can start quickly. Common symptoms of high blood sugar are feeling very thirsty or very hungry. You may also pass urine more often than usual. You may have blurry vision and may lose weight without trying. But some people may have high or low blood sugar without having any symptoms. That's a good reason to check your blood sugar on a regular schedule. What should you do if you have symptoms? Work with your doctor to fill in the blank spaces below that apply to you.   Low blood sugar  If you have symptoms of low blood sugar, check your blood sugar. If it's below _____ ( for example, below 70), eat or drink a quick-sugar food that has about 15 grams of carbohydrate. Your goal is to get your level back to your safe range. Check your blood sugar again 15 minutes later. If it's still not in your target range, take another 15 grams of carbohydrate and check your blood sugar again in 15 minutes. Repeat this until you reach your target. Then go back to your regular testing schedule. When you have low blood sugar, it's best to stop or reduce any physical activity until your blood sugar is back in your target range and is stable. If you must stay active, eat or drink 30 grams of carbohydrate. Then check your blood sugar again in 15 minutes. If it's not in your target range, take another 30 grams of carbohydrates. Check your blood sugar again in 15 minutes. Keep doing this until you reach your target. You can then go back to your regular testing schedule. If your symptoms or blood sugar levels are getting worse or have not improved after 15 minutes, seek medical care right away. Here are some examples of quick-sugar foods with 15 grams of carbohydrate:  · 3 or 4 glucose tablets  · 1 tube of glucose gel  · Hard candy (such as 3 Jolly Ranchers or 5 to 7 Life Savers)  · ½ cup to ¾ cup (4 to 6 ounces) of fruit juice or regular (not diet) soda  High blood sugar  If you have symptoms of high blood sugar, check your blood sugar. Your goal is to get your level back to your target range. If it's above ______ ( for example, above 250), follow these steps:  · If you missed a dose of your diabetes medicine, take it now. Take only the amount of medicine that you have been prescribed. Do not take more or less medicine. · Give yourself insulin if your doctor has prescribed it for high blood sugar. · Test for ketones, if the doctor told you to do so.  If the results of the ketone test show a moderate-to-large amount of ketones, call the doctor for advice. · Wait 30 minutes after you take the extra insulin or the missed medicine. Check your blood sugar again. If your symptoms or blood sugar levels are getting worse or have not improved after taking these steps, seek medical care right away. Follow-up care is a key part of your treatment and safety. Be sure to make and go to all appointments, and call your doctor if you are having problems. It's also a good idea to know your test results and keep a list of the medicines you take. Where can you learn more? Go to http://reta-karthik.info/. Enter G775 in the search box to learn more about \"Diabetes Blood Sugar Emergencies: Your Action Plan. \"  Current as of: December 7, 2017  Content Version: 11.7  © 8522-0115 [employer]. Care instructions adapted under license by "[employer]" (which disclaims liability or warranty for this information). If you have questions about a medical condition or this instruction, always ask your healthcare professional. [address] any warranty or liability for your use of this information. You need to start checking your sugars at least every morning, and ideally 2x per day.

## 2018-09-18 NOTE — PROGRESS NOTES
Reviewed record in preparation for visit and have obtained necessary documentation. Identified pt with two pt identifiers(name and ). Chief Complaint   Patient presents with    Hand Injury     x3 weeks    Fatigue     not able to sleep       Health Maintenance Due   Topic Date Due    Eye Exam  1975    DTaP/Tdap/Td  (1 - Tdap) 1985    Flu Vaccine  2018       Mr. Curt Travis has a reminder for a \"due or due soon\" health maintenance. I have asked that he discuss this further with his primary care provider for follow-up on this health maintenance. Coordination of Care Questionnaire:  :     1) Have you been to an emergency room, urgent care clinic since your last visit? yes   Hospitalized since your last visit? no             2) Have you seen or consulted any other health care providers outside of [address] [place] [address] since your last visit? no  (Include any pap smears or colon screenings in this section.)    3) In the event something were to happen to you and you were unable to speak on your behalf, do you have an Advance Directive/ Living Will in place stating your wishes? NO    Do you have an Advance Directive on file? no    4) Are you interested in receiving information on Advance Directives? NO    Patient is accompanied by self I have received verbal consent from Issa Morse to discuss any/all medical information while they are present in the room.

## 2018-09-20 LAB
ALBUMIN SERPL-MCNC: 4.8 G/DL (ref 3.5–5.5)
ALBUMIN/GLOB SERPL: 1.7 {RATIO} (ref 1.2–2.2)
ALP SERPL-CCNC: 92 IU/L (ref 39–117)
ALT SERPL-CCNC: 70 IU/L (ref 0–44)
AST SERPL-CCNC: 43 IU/L (ref 0–40)
BASOPHILS # BLD AUTO: 0 X10E3/UL (ref 0–0.2)
BASOPHILS NFR BLD AUTO: 1 %
BILIRUB SERPL-MCNC: 0.5 MG/DL (ref 0–1.2)
BUN SERPL-MCNC: 12 MG/DL (ref 6–24)
BUN/CREAT SERPL: 17 (ref 9–20)
CALCIUM SERPL-MCNC: 9.6 MG/DL (ref 8.7–10.2)
CHLORIDE SERPL-SCNC: 95 MMOL/L (ref 96–106)
CO2 SERPL-SCNC: 29 MMOL/L (ref 20–29)
CREAT SERPL-MCNC: 0.72 MG/DL (ref 0.76–1.27)
EOSINOPHIL # BLD AUTO: 0.1 X10E3/UL (ref 0–0.4)
EOSINOPHIL NFR BLD AUTO: 2 %
ERYTHROCYTE [DISTWIDTH] IN BLOOD BY AUTOMATED COUNT: 13.8 % (ref 12.3–15.4)
EST. AVERAGE GLUCOSE BLD GHB EST-MCNC: 209 MG/DL
GLOBULIN SER CALC-MCNC: 2.8 G/DL (ref 1.5–4.5)
GLUCOSE SERPL-MCNC: 230 MG/DL (ref 65–99)
HBA1C MFR BLD: 8.9 % (ref 4.8–5.6)
HCT VFR BLD AUTO: 44.2 % (ref 37.5–51)
HGB BLD-MCNC: 15.8 G/DL (ref 13–17.7)
IMM GRANULOCYTES # BLD: 0 X10E3/UL (ref 0–0.1)
IMM GRANULOCYTES NFR BLD: 0 %
LYMPHOCYTES # BLD AUTO: 1.2 X10E3/UL (ref 0.7–3.1)
LYMPHOCYTES NFR BLD AUTO: 26 %
MCH RBC QN AUTO: 31.7 PG (ref 26.6–33)
MCHC RBC AUTO-ENTMCNC: 35.7 G/DL (ref 31.5–35.7)
MCV RBC AUTO: 89 FL (ref 79–97)
MONOCYTES # BLD AUTO: 0.3 X10E3/UL (ref 0.1–0.9)
MONOCYTES NFR BLD AUTO: 6 %
NEUTROPHILS # BLD AUTO: 2.9 X10E3/UL (ref 1.4–7)
NEUTROPHILS NFR BLD AUTO: 65 %
PLATELET # BLD AUTO: 164 X10E3/UL (ref 150–379)
POTASSIUM SERPL-SCNC: 4.2 MMOL/L (ref 3.5–5.2)
PROT SERPL-MCNC: 7.6 G/DL (ref 6–8.5)
RBC # BLD AUTO: 4.98 X10E6/UL (ref 4.14–5.8)
SODIUM SERPL-SCNC: 136 MMOL/L (ref 134–144)
TESTOST FREE SERPL-MCNC: 9.7 PG/ML (ref 7.2–24)
TESTOST SERPL-MCNC: 404 NG/DL (ref 264–916)
TSH SERPL DL<=0.005 MIU/L-ACNC: 0.87 UIU/ML (ref 0.45–4.5)
WBC # BLD AUTO: 4.4 X10E3/UL (ref 3.4–10.8)

## 2018-09-20 NOTE — PROGRESS NOTES
Diabetes poorly controlled, suspect fatigue due to that. Would resume levemir each evening with dinner. Other labs look fine.

## 2018-10-02 ENCOUNTER — TELEPHONE (OUTPATIENT)
Dept: INTERNAL MEDICINE CLINIC | Age: 53
End: 2018-10-02

## 2018-10-02 NOTE — TELEPHONE ENCOUNTER
Spoke with patient after 2 patient identifiers being note and advised that he thinks that his BP is elevated and he is nauseous. He reports that he can not come in today and that he did not know what his BP was as he does not have a BP machine at this girlfriends house. Patient will be home later today and he will check hi BP with his dads machine and also check his blood sugar and call us with the results . Patient expressed understanding and has no further questions at this time.

## 2018-10-02 NOTE — TELEPHONE ENCOUNTER
#[phone] or #[phone] pt states on 10-1-18 pt was light headed and dizzy with a headache and nausea when he woke up. This is really concerning him and would like to discuss this with you. He thinks it could be b/p.

## 2018-10-18 RX ORDER — CLONIDINE HYDROCHLORIDE 0.2 MG/1
0.2 TABLET ORAL 2 TIMES DAILY
Qty: 180 TAB | Refills: 3 | Status: SHIPPED | OUTPATIENT
Start: 2018-10-18 | End: 2022-02-17

## 2018-12-18 ENCOUNTER — OFFICE VISIT (OUTPATIENT)
Dept: INTERNAL MEDICINE CLINIC | Age: 53
End: 2018-12-18

## 2018-12-18 VITALS
BODY MASS INDEX: 23.3 KG/M2 | SYSTOLIC BLOOD PRESSURE: 177 MMHG | HEART RATE: 66 BPM | DIASTOLIC BLOOD PRESSURE: 92 MMHG | RESPIRATION RATE: 16 BRPM | OXYGEN SATURATION: 93 % | TEMPERATURE: 97.9 F | HEIGHT: 72 IN | WEIGHT: 172 LBS

## 2018-12-18 DIAGNOSIS — B18.2 CHRONIC HEPATITIS C WITHOUT HEPATIC COMA (HCC): ICD-10-CM

## 2018-12-18 DIAGNOSIS — I10 ESSENTIAL HYPERTENSION: Primary | ICD-10-CM

## 2018-12-18 DIAGNOSIS — M48.062 SPINAL STENOSIS OF LUMBAR REGION WITH NEUROGENIC CLAUDICATION: ICD-10-CM

## 2018-12-18 DIAGNOSIS — Z72.0 TOBACCO ABUSE: ICD-10-CM

## 2018-12-18 DIAGNOSIS — F31.62 BIPOLAR 1 DISORDER, MIXED, MODERATE (HCC): ICD-10-CM

## 2018-12-18 DIAGNOSIS — E11.40 TYPE 2 DIABETES MELLITUS WITH DIABETIC NEUROPATHY, WITHOUT LONG-TERM CURRENT USE OF INSULIN (HCC): Chronic | ICD-10-CM

## 2018-12-18 LAB — HBA1C MFR BLD HPLC: 8 %

## 2018-12-18 RX ORDER — LISINOPRIL 10 MG/1
10 TABLET ORAL DAILY
Qty: 90 TAB | Refills: 3 | Status: SHIPPED | OUTPATIENT
Start: 2018-12-18 | End: 2022-02-17

## 2018-12-18 RX ORDER — IBUPROFEN 600 MG/1
600 TABLET ORAL
Qty: 30 TAB | Refills: 0 | Status: SHIPPED | OUTPATIENT
Start: 2018-12-18 | End: 2022-05-21

## 2018-12-18 RX ORDER — PREGABALIN 150 MG/1
150 CAPSULE ORAL 2 TIMES DAILY
Qty: 60 CAP | Refills: 11 | Status: ON HOLD | OUTPATIENT
Start: 2018-12-18 | End: 2022-05-12 | Stop reason: ALTCHOICE

## 2018-12-18 NOTE — PROGRESS NOTES
Reviewed record in preparation for visit and have obtained necessary documentation. Identified pt with two pt identifiers(name and ). Chief Complaint   Patient presents with   SYL Aram RODRIGUEZ [place] [address] Maintenance Due   Topic Date Due    Eye Exam  1974    DTaP/Tdap/Td  (1 - Tdap) 1985    Shingles Vaccine (1 of 2) 2014       Mr. Annamaria Sheppard has a reminder for a \"due or due soon\" health maintenance. I have asked that he discuss this further with his primary care provider for follow-up on this health maintenance. Coordination of Care Questionnaire:  :     1) Have you been to an emergency room, urgent care clinic since your last visit? no   Hospitalized since your last visit? no             2) Have you seen or consulted any other health care providers outside of [address] [place] [address] since your last visit? no  (Include any pap smears or colon screenings in this section.)    3) In the event something were to happen to you and you were unable to speak on your behalf, do you have an Advance Directive/ Living Will in place stating your wishes? NO    Do you have an Advance Directive on file? no    4) Are you interested in receiving information on Advance Directives? NO    Patient is accompanied by self I have received verbal consent from Antonio Campa to discuss any/all medical information while they are present in the room.
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
## 2018-12-18 NOTE — PATIENT INSTRUCTIONS

## 2019-05-23 ENCOUNTER — APPOINTMENT (OUTPATIENT)
Dept: GENERAL RADIOLOGY | Age: 54
End: 2019-05-23
Payer: MEDICARE

## 2019-05-23 ENCOUNTER — HOSPITAL ENCOUNTER (EMERGENCY)
Age: 54
Discharge: HOME | End: 2019-05-23
Attending: EMERGENCY MEDICINE
Payer: MEDICARE

## 2019-05-23 VITALS
WEIGHT: 169.31 LBS | HEART RATE: 63 BPM | BODY MASS INDEX: 22.44 KG/M2 | HEIGHT: 73 IN | TEMPERATURE: 98.7 F | DIASTOLIC BLOOD PRESSURE: 62 MMHG | RESPIRATION RATE: 16 BRPM | SYSTOLIC BLOOD PRESSURE: 149 MMHG | OXYGEN SATURATION: 99 %

## 2019-05-23 DIAGNOSIS — K13.70 ORAL LESION: Primary | ICD-10-CM

## 2019-05-23 DIAGNOSIS — R59.1 LAD (LYMPHADENOPATHY): ICD-10-CM

## 2019-05-23 DIAGNOSIS — F17.200 TOBACCO DEPENDENCE: ICD-10-CM

## 2019-05-23 DIAGNOSIS — E11.9 TYPE 2 DIABETES MELLITUS WITHOUT COMPLICATION, WITHOUT LONG-TERM CURRENT USE OF INSULIN (HCC): ICD-10-CM

## 2019-05-23 PROCEDURE — 74011000250 HC RX REV CODE- 250: Performed by: PHYSICIAN ASSISTANT

## 2019-05-23 PROCEDURE — 70360 X-RAY EXAM OF NECK: CPT

## 2019-05-23 PROCEDURE — 99283 EMERGENCY DEPT VISIT LOW MDM: CPT

## 2019-05-23 PROCEDURE — 74011250637 HC RX REV CODE- 250/637: Performed by: PHYSICIAN ASSISTANT

## 2019-05-23 RX ORDER — LIDOCAINE HYDROCHLORIDE 20 MG/ML
15 SOLUTION OROPHARYNGEAL AS NEEDED
Qty: 1 BOTTLE | Refills: 0 | Status: SHIPPED | OUTPATIENT
Start: 2019-05-23 | End: 2022-02-17

## 2019-05-23 RX ORDER — PENICILLIN V POTASSIUM 500 MG/1
500 TABLET, FILM COATED ORAL 3 TIMES DAILY
Qty: 30 TAB | Refills: 0 | Status: SHIPPED | OUTPATIENT
Start: 2019-05-23 | End: 2019-06-02

## 2019-05-23 RX ADMIN — LIDOCAINE HYDROCHLORIDE: 20 SOLUTION ORAL; TOPICAL at 12:17

## 2019-05-23 NOTE — LETTER
[address] 
[facility] EMERGENCY DEPT 
[address] [place] [address] [address] [id number] [phone] Work/School Note Date: 5/23/2019 To Whom It May concern: 
 
Jarrett Tafoya was seen and treated today in the emergency room. He may return to work in1 to 2 days, as symptoms improve. Sincerely, Tay Shah, [address]

## 2019-05-23 NOTE — ED TRIAGE NOTES
Assumed care of pt from triage. Pt is A&O x 4. Pt reports CC of sore under his tongue that he noticed last night. Pt also reports he has swollen glands on his left neck. Pt reports the swelling has gone down and he is now feeling worse. PA at bedside.

## 2019-05-24 NOTE — ED PROVIDER NOTES
EMERGENCY DEPARTMENT HISTORY AND PHYSICAL EXAM      Date: 5/23/2019  Patient Name: Michelle Hampton    History of Presenting Illness     Chief Complaint   Patient presents with    Gland Swelling     reports last night he felt a sore under his tongue and now he feels like his lymph nodes are swollen on the left side of his neck       History Provided By: Patient    HPI: Michelle Hampton, 53 y.o. male presents ambulatory to the ED with concern for oral lesion noted beneath his tongue on 5/22/19. He reports \"it had a head on it\". He denied feeling any drainage or bleeding. No fever/chills. He notes increased swelling to the L side of his throat and in front of his L ear. He is a smoker. He is diabetic. He denied h/o similar sx in the past.  He denied difficulty swallowing/breathing. He denied chest pain or shortness of breath. No N/V/D. Chief Complaint: oral lesion and gland swelling  Duration: 1 Days  Timing:  Acute  Location: sublingual, L preauricular and L anterior cervical region  Quality: Sharp  Severity: Moderate  Modifying Factors: pt is a smoker and is diabetic  Associated Symptoms: denies any other associated signs or symptoms        There are no other complaints, changes, or physical findings at this time. PCP: Kalina Funes, DO    Current Outpatient Medications   Medication Sig Dispense Refill    penicillin v potassium (VEETID) 500 mg tablet Take 1 Tab by mouth three (3) times daily for 10 days. 30 Tab 0    lidocaine (LIDOCAINE VISCOUS) 2 % solution Take 15 mL by mouth as needed for Pain for up to 6 doses. 1 Bottle 0    pregabalin (LYRICA) 150 mg capsule Take 1 Cap by mouth two (2) times a day. Max Daily Amount: 300 mg. 60 Cap 11    lisinopril (PRINIVIL, ZESTRIL) 10 mg tablet Take 1 Tab by mouth daily. 90 Tab 3    ibuprofen (MOTRIN) 600 mg tablet Take 1 Tab by mouth every twelve (12) hours as needed for Pain.  30 Tab 0    cloNIDine HCl (CATAPRES) 0.2 mg tablet Take 1 Tab by mouth two (2) times a day. 180 Tab 3    L. acidoph & paracasei- S therm- Bifido (ARLENE-Q/RISAQUAD) 8 billion cell cap cap Take 1 Cap by mouth daily. 30 Cap 1    metFORMIN ER (GLUCOPHAGE XR) 500 mg tablet Take 1 Tab by mouth two (2) times a day. (Patient taking differently: Take 1,000 mg by mouth two (2) times a day.) 360 Tab 3    Blood-Glucose Meter monitoring kit Check blood sugars daily. DX: E11.9 1 Kit 0    glucose blood VI test strips (ASCENSIA AUTODISC VI, ONE TOUCH ULTRA TEST VI) strip Check blood sugars daily. DX: E11.9 50 Strip 11    Lancets (LANCETS, SUPER THIN) misc Check blood sugars daily. DX: E11.9 50 Each 11    aspirin delayed-release 81 mg tablet Take 1 Tab by mouth daily. 90 Tab 3    ARIPiprazole (ABILIFY) 10 mg tablet Take 1 Tab by mouth daily (after breakfast).  Indications: MIXED BIPOLAR I DISORDER 30 Tab 0       Past History     Past Medical History:  Past Medical History:   Diagnosis Date    Adverse effect of anesthesia     breathing diff. with versed    Bipolar 1 disorder, mixed, moderate ([address] 3/6/2017    Chronic pain     Depression     Pt stated diagnosed years ago    Depression 3/13/2013    Diabetes ([address]     Diabetes ([address] 2003    Drug-induced mood disorder 5/28/2013    Homicide attempt     HTN (hypertension) 11/3/2011    Narcotic dependence, episodic use ([address] 11/3/2011    NIDDM (non-insulin dependent diabetes mellitus) 11/3/2011    Non compliance with medical treatment 11/3/2011    Other ill-defined conditions(799.89)     chronic low back pain    Psychiatric disorder     bipolar    Sleep disorder     Substance abuse ([address]     Suicidal thoughts        Past Surgical History:  Past Surgical History:   Procedure Laterality Date    CARDIAC SURG PROCEDURE UNLIST      cardiac stents X2 lad drug eluting    COLONOSCOPY N/A 8/31/2016    COLONOSCOPY performed by Jef Riley MD at hospitals ENDOSCOPY    COLONOSCOPY,DIAGNOSTIC  8/31/2016         HX ORTHOPAEDIC chronic back pain    HX ORTHOPAEDIC      left knee arthroplasty    HX ORTHOPAEDIC  08/2018    right hand    [place] ANESTH,LUMBAR SPINE,CORD SURGERY  7 1999    l4 l5    REMV KIDNEY,COMPLICATED  5051    side unknown - kidney stones    UPPER GI ENDOSCOPY,BIOPSY  8/31/2016            Family History:  Family History   Problem Relation Age of Onset    Stroke Mother     Hypertension Mother     Heart Disease Father     Hypertension Father     Cancer Maternal Grandmother         unknown type    Diabetes Maternal Grandfather        Social History:  Social History     Tobacco Use    Smoking status: Current Every Day Smoker     Packs/day: 0.25    Smokeless tobacco: Never Used   Substance Use Topics    Alcohol use: No    Drug use: Not Currently     Types: Cocaine, Prescription     Comment: recent use , past opiate use       Allergies: Allergies   Allergen Reactions    Versed [Midazolam] Shortness of Breath     Weakness     Versed [Midazolam] Unknown (comments)     Numbness, with shortness of breath         Review of Systems   Review of Systems   Constitutional: Negative for chills and fever. HENT: Positive for mouth sores. Negative for congestion, facial swelling, rhinorrhea, sore throat and trouble swallowing. Eyes: Negative for pain, discharge and redness. Respiratory: Negative for cough, choking, shortness of breath, wheezing and stridor. Cardiovascular: Negative for chest pain and palpitations. Gastrointestinal: Negative for diarrhea, nausea and vomiting. Musculoskeletal: Negative for neck pain and neck stiffness. Skin: Negative for rash and wound. Allergic/Immunologic: Negative for food allergies and immunocompromised state. Neurological: Negative for dizziness and headaches. Hematological: Negative for adenopathy. Does not bruise/bleed easily. Psychiatric/Behavioral: Negative for agitation and confusion.        Physical Exam   Physical Exam   Constitutional: He is oriented to person, place, and time. He appears well-developed and well-nourished. No distress. WDWN male, alert, in NAD   HENT:   Head: Normocephalic and atraumatic. Nose: Nose normal.   Mouth/Throat: No oropharyngeal exudate. Punctate oral lesion noted to central sublingual region, no fluctuance, no purulent drainage, no bleeding. Minimal tenderness. Managing own secretions well. Eyes: Conjunctivae and EOM are normal. Right eye exhibits no discharge. Left eye exhibits no discharge. No scleral icterus. Neck: Normal range of motion. Neck supple. No JVD present. No tracheal deviation present. No thyromegaly present. +L anterior cervical LAD noted   Cardiovascular: Normal rate, regular rhythm and normal heart sounds. Pulmonary/Chest: Effort normal and breath sounds normal. No respiratory distress. He has no wheezes. Musculoskeletal: Normal range of motion. He exhibits no edema. Lymphadenopathy:     He has cervical adenopathy. Neurological: He is alert and oriented to person, place, and time. He exhibits normal muscle tone. Coordination normal.   Skin: Skin is warm and dry. He is not diaphoretic. Psychiatric: He has a normal mood and affect. His behavior is normal. Judgment normal.   Nursing note and vitals reviewed. Diagnostic Study Results     Labs -   No results found for this or any previous visit (from the past 12 hour(s)). Radiologic Studies -   XR NECK SOFT TISSUE   Final Result   Normal airway. Medical Decision Making   I am the first provider for this patient. I reviewed the vital signs, available nursing notes, past medical history, past surgical history, family history and social history. Vital Signs-Reviewed the patient's vital signs.   Patient Vitals for the past 12 hrs:   Temp Pulse Resp BP SpO2   05/23/19 1149 98.7 °F (37.1 °C) 63 16 149/62 99 %         Records Reviewed: Nursing Notes, Old Medical Records, Previous Radiology Studies and Previous Laboratory Studies    Provider Notes (Medical Decision Making):   Aphthous ulcer, intra oral lesion/cancer, abscess, LAD    ED Course:   Initial assessment performed. The patients presenting problems have been discussed, and they are in agreement with the care plan formulated and outlined with them. I have encouraged them to ask questions as they arise throughout their visit. TOBACCO CESSATION COUNSELING  The patient was counseled on the dangers of tobacco use, and was advised to quit. Reviewed strategies to maximize success, including removing cigarettes and smoking materials from environment, stress management, substitution of other forms of reinforcement, support of family/friends and written materials. DISCHARGE NOTE:  The care plan has been outline with the patient and/or family, who verbally conveyed understanding and agreement. Available results have been reviewed. Patient and/or family understand the follow up plan as outlined and discharge instructions. Should their condition deterioration at any time after discharge the patient agrees to return, follow up sooner than outlined or seek medical assistance at the closest Emergency Room as soon as possible. Questions have been answered. Discharge instructions and educational information regarding the patient's diagnosis as well a list of reasons why the patient would want to seek immediate medical attention, should their condition change, were reviewed directly with the patient/family       PLAN:  1. Discharge Medication List as of 5/23/2019  1:13 PM        2.    Follow-up Information     Follow up With Specialties Details Why Manuel Beck DO Internal Medicine   [facility] [address]  [address] [id number]      [place] Rajan[place], RUBEN Oral Surgery  As needed [address]  [phone]      [facility] EMERGENCY DEPT Emergency Medicine  If symptoms worsen [address] [place] [facility]  [phone]        Return to ED if worse     Diagnosis     Clinical Impression:   1. Oral lesion    2. LAD (lymphadenopathy)    3. Type 2 diabetes mellitus without complication, without long-term current use of insulin ([facility]    4.  Tobacco dependence

## 2020-05-16 ENCOUNTER — HOSPITAL ENCOUNTER (EMERGENCY)
Age: 55
Discharge: HOME | End: 2020-05-16
Attending: EMERGENCY MEDICINE
Payer: MEDICARE

## 2020-05-16 VITALS
BODY MASS INDEX: 24.61 KG/M2 | HEIGHT: 73 IN | RESPIRATION RATE: 11 BRPM | WEIGHT: 185.7 LBS | OXYGEN SATURATION: 96 % | DIASTOLIC BLOOD PRESSURE: 81 MMHG | SYSTOLIC BLOOD PRESSURE: 138 MMHG | HEART RATE: 61 BPM | TEMPERATURE: 98.1 F

## 2020-05-16 DIAGNOSIS — T40.2X1A OPIOID OVERDOSE, ACCIDENTAL OR UNINTENTIONAL, INITIAL ENCOUNTER (HCC): ICD-10-CM

## 2020-05-16 DIAGNOSIS — F14.10 COCAINE ABUSE (HCC): Primary | ICD-10-CM

## 2020-05-16 LAB
ALBUMIN SERPL-MCNC: 3.5 G/DL (ref 3.5–5)
ALBUMIN/GLOB SERPL: 1 {RATIO} (ref 1.1–2.2)
ALP SERPL-CCNC: 152 U/L (ref 45–117)
ALT SERPL-CCNC: 167 U/L (ref 12–78)
AMPHET UR QL SCN: NEGATIVE
ANION GAP SERPL CALC-SCNC: 7 MMOL/L (ref 5–15)
APAP SERPL-MCNC: <2 UG/ML (ref 10–30)
APPEARANCE UR: CLEAR
AST SERPL-CCNC: 226 U/L (ref 15–37)
ATRIAL RATE: 72 BPM
BACTERIA URNS QL MICRO: NEGATIVE /HPF
BARBITURATES UR QL SCN: NEGATIVE
BASOPHILS # BLD: 0.1 K/UL (ref 0–0.1)
BASOPHILS NFR BLD: 1 % (ref 0–1)
BENZODIAZ UR QL: NEGATIVE
BILIRUB SERPL-MCNC: 0.7 MG/DL (ref 0.2–1)
BILIRUB UR QL: NEGATIVE
BUN SERPL-MCNC: 31 MG/DL (ref 6–20)
BUN/CREAT SERPL: 24 (ref 12–20)
CALCIUM SERPL-MCNC: 8.6 MG/DL (ref 8.5–10.1)
CALCULATED P AXIS, ECG09: 69 DEGREES
CALCULATED R AXIS, ECG10: 56 DEGREES
CALCULATED T AXIS, ECG11: 77 DEGREES
CANNABINOIDS UR QL SCN: NEGATIVE
CHLORIDE SERPL-SCNC: 100 MMOL/L (ref 97–108)
CO2 SERPL-SCNC: 29 MMOL/L (ref 21–32)
COCAINE UR QL SCN: POSITIVE
COLOR UR: ABNORMAL
CREAT SERPL-MCNC: 1.27 MG/DL (ref 0.7–1.3)
DIAGNOSIS, 93000: NORMAL
DIFFERENTIAL METHOD BLD: ABNORMAL
DRUG SCRN COMMENT,DRGCM: ABNORMAL
EOSINOPHIL # BLD: 0.1 K/UL (ref 0–0.4)
EOSINOPHIL NFR BLD: 3 % (ref 0–7)
EPITH CASTS URNS QL MICRO: ABNORMAL /LPF
ERYTHROCYTE [DISTWIDTH] IN BLOOD BY AUTOMATED COUNT: 13.2 % (ref 11.5–14.5)
GLOBULIN SER CALC-MCNC: 3.4 G/DL (ref 2–4)
GLUCOSE BLD STRIP.AUTO-MCNC: 460 MG/DL (ref 65–100)
GLUCOSE SERPL-MCNC: 450 MG/DL (ref 65–100)
GLUCOSE UR STRIP.AUTO-MCNC: >1000 MG/DL
HCT VFR BLD AUTO: 41 % (ref 36.6–50.3)
HGB BLD-MCNC: 14.8 G/DL (ref 12.1–17)
HGB UR QL STRIP: ABNORMAL
HYALINE CASTS URNS QL MICRO: ABNORMAL /LPF (ref 0–5)
IMM GRANULOCYTES # BLD AUTO: 0.1 K/UL (ref 0–0.04)
IMM GRANULOCYTES NFR BLD AUTO: 1 % (ref 0–0.5)
KETONES UR QL STRIP.AUTO: NEGATIVE MG/DL
LACTATE SERPL-SCNC: 3.3 MMOL/L (ref 0.4–2)
LEUKOCYTE ESTERASE UR QL STRIP.AUTO: NEGATIVE
LYMPHOCYTES # BLD: 1.1 K/UL (ref 0.8–3.5)
LYMPHOCYTES NFR BLD: 21 % (ref 12–49)
MAGNESIUM SERPL-MCNC: 2.2 MG/DL (ref 1.6–2.4)
MCH RBC QN AUTO: 31 PG (ref 26–34)
MCHC RBC AUTO-ENTMCNC: 36.1 G/DL (ref 30–36.5)
MCV RBC AUTO: 85.8 FL (ref 80–99)
METHADONE UR QL: NEGATIVE
MONOCYTES # BLD: 0.4 K/UL (ref 0–1)
MONOCYTES NFR BLD: 7 % (ref 5–13)
NEUTS SEG # BLD: 3.6 K/UL (ref 1.8–8)
NEUTS SEG NFR BLD: 68 % (ref 32–75)
NITRITE UR QL STRIP.AUTO: NEGATIVE
NRBC # BLD: 0 K/UL (ref 0–0.01)
NRBC BLD-RTO: 0 PER 100 WBC
OPIATES UR QL: NEGATIVE
P-R INTERVAL, ECG05: 166 MS
PCP UR QL: NEGATIVE
PH UR STRIP: 6 [PH] (ref 5–8)
PLATELET # BLD AUTO: 135 K/UL (ref 150–400)
PMV BLD AUTO: 9.8 FL (ref 8.9–12.9)
POTASSIUM SERPL-SCNC: 4.4 MMOL/L (ref 3.5–5.1)
PROT SERPL-MCNC: 6.9 G/DL (ref 6.4–8.2)
PROT UR STRIP-MCNC: 30 MG/DL
Q-T INTERVAL, ECG07: 422 MS
QRS DURATION, ECG06: 96 MS
QTC CALCULATION (BEZET), ECG08: 462 MS
RBC # BLD AUTO: 4.78 M/UL (ref 4.1–5.7)
RBC #/AREA URNS HPF: ABNORMAL /HPF (ref 0–5)
SALICYLATES SERPL-MCNC: <1.7 MG/DL (ref 2.8–20)
SERVICE CMNT-IMP: ABNORMAL
SODIUM SERPL-SCNC: 136 MMOL/L (ref 136–145)
SP GR UR REFRACTOMETRY: 1.03 (ref 1–1.03)
TROPONIN I SERPL-MCNC: <0.05 NG/ML
UROBILINOGEN UR QL STRIP.AUTO: 0.2 EU/DL (ref 0.2–1)
VENTRICULAR RATE, ECG03: 72 BPM
WBC # BLD AUTO: 5.3 K/UL (ref 4.1–11.1)
WBC URNS QL MICRO: ABNORMAL /HPF (ref 0–4)

## 2020-05-16 PROCEDURE — 81001 URINALYSIS AUTO W/SCOPE: CPT

## 2020-05-16 PROCEDURE — 93005 ELECTROCARDIOGRAM TRACING: CPT

## 2020-05-16 PROCEDURE — 96361 HYDRATE IV INFUSION ADD-ON: CPT

## 2020-05-16 PROCEDURE — 96375 TX/PRO/DX INJ NEW DRUG ADDON: CPT

## 2020-05-16 PROCEDURE — 83735 ASSAY OF MAGNESIUM: CPT

## 2020-05-16 PROCEDURE — 80053 COMPREHEN METABOLIC PANEL: CPT

## 2020-05-16 PROCEDURE — 74011250636 HC RX REV CODE- 250/636: Performed by: STUDENT IN AN ORGANIZED HEALTH CARE EDUCATION/TRAINING PROGRAM

## 2020-05-16 PROCEDURE — 84484 ASSAY OF TROPONIN QUANT: CPT

## 2020-05-16 PROCEDURE — 74011250636 HC RX REV CODE- 250/636: Performed by: EMERGENCY MEDICINE

## 2020-05-16 PROCEDURE — 85025 COMPLETE CBC W/AUTO DIFF WBC: CPT

## 2020-05-16 PROCEDURE — 82962 GLUCOSE BLOOD TEST: CPT

## 2020-05-16 PROCEDURE — 83605 ASSAY OF LACTIC ACID: CPT

## 2020-05-16 PROCEDURE — 80307 DRUG TEST PRSMV CHEM ANLYZR: CPT

## 2020-05-16 PROCEDURE — 36415 COLL VENOUS BLD VENIPUNCTURE: CPT

## 2020-05-16 PROCEDURE — 74011250637 HC RX REV CODE- 250/637: Performed by: EMERGENCY MEDICINE

## 2020-05-16 PROCEDURE — 96374 THER/PROPH/DIAG INJ IV PUSH: CPT

## 2020-05-16 PROCEDURE — 99285 EMERGENCY DEPT VISIT HI MDM: CPT

## 2020-05-16 RX ORDER — NALOXONE HYDROCHLORIDE 1 MG/ML
2 INJECTION INTRAMUSCULAR; INTRAVENOUS; SUBCUTANEOUS
Status: COMPLETED | OUTPATIENT
Start: 2020-05-16 | End: 2020-05-16

## 2020-05-16 RX ORDER — ONDANSETRON 4 MG/1
4 TABLET, ORALLY DISINTEGRATING ORAL
Status: COMPLETED | OUTPATIENT
Start: 2020-05-16 | End: 2020-05-16

## 2020-05-16 RX ADMIN — SODIUM CHLORIDE 1000 ML: 900 INJECTION, SOLUTION INTRAVENOUS at 03:54

## 2020-05-16 RX ADMIN — SODIUM CHLORIDE 1000 ML: 900 INJECTION, SOLUTION INTRAVENOUS at 03:05

## 2020-05-16 RX ADMIN — NALOXONE HYDROCHLORIDE 2 MG: 1 INJECTION PARENTERAL at 02:50

## 2020-05-16 RX ADMIN — ONDANSETRON 4 MG: 4 TABLET, ORALLY DISINTEGRATING ORAL at 05:15

## 2020-05-16 NOTE — ED NOTES
Spoke with parents at this time about pt update, all questions and concerns answered at this time    Parents are going to head home and wait for updates via phone    Mom Martin Ash  Dad, [address] [place] [address] Avenue: [phone]

## 2020-05-16 NOTE — ED NOTES
Alejandro Cast MD reviewed discharge instructions with the patient. The patient verbalized understanding. All questions and concerns were addressed. The patient is discharged ambulatory in the care of family members with instructions and prescriptions in hand. Pt is alert and oriented x 4. Respirations are clear and unlabored.

## 2020-05-16 NOTE — ED PROVIDER NOTES
EMERGENCY DEPARTMENT HISTORY AND PHYSICAL EXAM          Date: 5/16/2020  Patient Name: John Serra  Attending of Record: Gena Davidson    History of Presenting Illness     Chief Complaint   Patient presents with    Drug Overdose     Pt as found at home by parents unresponsive, brought in by EMS. Per EMS irregular breathing, pale during transport. EMS adminsitered 2 mg of nasal Narcan. After administration of Narcan pts respiratory rate increased and patient woke for a couple minutes 5 minutes prior to arrival. Pts parents states he has a hx of drug use of heroine.  High Blood Sugar     BG of 513 per EMS prior to arrival. 460 at hospital.        History Provided By: EMS, Patient    HPI: John Serra is a 53 y.o. male, pmhx of hypertension, diabetes mellitus, hepatitis C, polysubstance abuse (heroin, cocaine), who presents via EMS to the ED c/o drug overdose. Per EMS the patient's parents found the patient unresponsive and called EMS. When EMS arrived he had irregular respirations and was pale. He got 0.4 mg of intranasal Narcan which increase the patient's respiratory rate and made him more alert. Per EMS his blood sugar was 513. Currently patient denies any pain he \"does not remember\" what he took tonight. He does endorse history of cocaine abuse but says he \"does not know\" if he used heroin. History is otherwise limited due to patient's mental status. PCP: Phyllis Mayo DO    There are no other complaints, changes, or physical findings at this time. Current Facility-Administered Medications   Medication Dose Route Frequency Provider Last Rate Last Dose    sodium chloride 0.9 % bolus infusion 1,000 mL  1,000 mL IntraVENous ONCE Chuy RODRIGUEZ DO         Current Outpatient Medications   Medication Sig Dispense Refill    lidocaine (LIDOCAINE VISCOUS) 2 % solution Take 15 mL by mouth as needed for Pain for up to 6 doses.  1 Bottle 0    pregabalin (LYRICA) 150 mg capsule Take 1 Cap by mouth two (2) times a day. Max Daily Amount: 300 mg. 60 Cap 11    lisinopril (PRINIVIL, ZESTRIL) 10 mg tablet Take 1 Tab by mouth daily. 90 Tab 3    ibuprofen (MOTRIN) 600 mg tablet Take 1 Tab by mouth every twelve (12) hours as needed for Pain. 30 Tab 0    cloNIDine HCl (CATAPRES) 0.2 mg tablet Take 1 Tab by mouth two (2) times a day. 180 Tab 3    L. acidoph & paracasei- S therm- Bifido (ARLENE-Q/RISAQUAD) 8 billion cell cap cap Take 1 Cap by mouth daily. 30 Cap 1    metFORMIN ER (GLUCOPHAGE XR) 500 mg tablet Take 1 Tab by mouth two (2) times a day. (Patient taking differently: Take 1,000 mg by mouth two (2) times a day.) 360 Tab 3    Blood-Glucose Meter monitoring kit Check blood sugars daily. DX: E11.9 1 Kit 0    glucose blood VI test strips (ASCENSIA AUTODISC VI, ONE TOUCH ULTRA TEST VI) strip Check blood sugars daily. DX: E11.9 50 Strip 11    Lancets (LANCETS, SUPER THIN) misc Check blood sugars daily. DX: E11.9 50 Each 11    aspirin delayed-release 81 mg tablet Take 1 Tab by mouth daily. 90 Tab 3    ARIPiprazole (ABILIFY) 10 mg tablet Take 1 Tab by mouth daily (after breakfast).  Indications: MIXED BIPOLAR I DISORDER 30 Tab 0       Past History     Past Medical History:  Past Medical History:   Diagnosis Date    Adverse effect of anesthesia     breathing diff. with versed    Bipolar 1 disorder, mixed, moderate ([address] 3/6/2017    Chronic pain     Depression     Pt stated diagnosed years ago    Depression 3/13/2013    Diabetes ([address]     Diabetes ([address] 2003    Drug-induced mood disorder 5/28/2013    Homicide attempt     HTN (hypertension) 11/3/2011    Narcotic dependence, episodic use ([address] 11/3/2011    NIDDM (non-insulin dependent diabetes mellitus) 11/3/2011    Non compliance with medical treatment 11/3/2011    Other ill-defined conditions(799.89)     chronic low back pain    Psychiatric disorder     bipolar    Sleep disorder     Substance abuse ([address]     Suicidal thoughts        Past Surgical History:  Past Surgical History:   Procedure Laterality Date    CARDIAC SURG PROCEDURE UNLIST      cardiac stents X2 lad drug eluting    COLONOSCOPY N/A 8/31/2016    COL[facility] performed by Sofia Gordon [facility] at [facility] ENDOSCOPY    COLONOSCOPY,DIAGNOSTIC  8/31/2016         HX ORTHOPAEDIC      chronic back pain    HX ORTHOPAEDIC      left knee arthroplasty    HX ORTHOPAEDIC  08/2018    right hand    [place] ANESTH,LUMBAR SPINE,CORD SURGERY  7 1999    l4 l5    REMV KIDNEY,COMPLICATED  9552    side unknown - kidney stones    UPPER GI ENDOSCOPY,BIOPSY  8/31/2016            Family History:  Family History   Problem Relation Age of Onset    Stroke Mother     Hypertension Mother     Heart Disease Father     Hypertension Father     Cancer Maternal Grandmother         unknown type    Diabetes Maternal Grandfather        Social History:  Social History     Tobacco Use    Smoking status: Current Every Day Smoker     Packs/day: 0.25    Smokeless tobacco: Never Used   Substance Use Topics    Alcohol use: No    Drug use: Not Currently     Types: Cocaine, Prescription     Comment: recent use , past opiate use       Allergies: Allergies   Allergen Reactions    Versed [Midazolam] Shortness of Breath     Weakness     Versed [Midazolam] Unknown (comments)     Numbness, with shortness of breath         Review of Systems   Review of Systems   Respiratory: Negative for shortness of breath. Cardiovascular: Negative for chest pain. Gastrointestinal: Negative for abdominal pain. Neurological: Negative for headaches. Review of systems otherwise limited due to patient's mental status    Physical Exam   Physical Exam  Vitals signs and nursing note reviewed. Constitutional:       Appearance: He is normal weight. He is diaphoretic. Comments: Patient somnolent, intermittently responsive, responds to sternal rub   HENT:      Head: Normocephalic and atraumatic.    Eyes:      Conjunctiva/sclera: Conjunctivae normal.      Pupils: Pupils are equal, round, and reactive to light. Cardiovascular:      Rate and Rhythm: Normal rate. Rhythm irregular. Pulses:           Radial pulses are 2+ on the right side and 2+ on the left side. Dorsalis pedis pulses are 2+ on the right side and 2+ on the left side. Heart sounds: Normal heart sounds. No murmur. Pulmonary:      Effort: Pulmonary effort is normal.      Breath sounds: Normal breath sounds. Chest:      Chest wall: No tenderness. Abdominal:      Palpations: Abdomen is soft. Tenderness: There is no abdominal tenderness. There is no guarding or rebound. Skin:     Capillary Refill: Capillary refill takes less than 2 seconds. Coloration: Skin is pale. Neurological:      General: No focal deficit present. Comments: Patient is sleepy but oriented. He moves all extremities symmetrically. He has no facial asymmetry. He has no pupillary abnormalities.   Due to his somnolence I am unable to assess his gait time          Diagnostic Study Results     Labs -     Recent Results (from the past 12 hour(s))   GLUCOSE, POC    Collection Time: 05/16/20  2:42 AM   Result Value Ref Range    Glucose (POC) 460 (H) 65 - 100 mg/dL    Performed by Mamta Goodman    EKG, 12 LEAD, INITIAL    Collection Time: 05/16/20  2:43 AM   Result Value Ref Range    Ventricular Rate 72 BPM    Atrial Rate 72 BPM    P-R Interval 166 ms    QRS Duration 96 ms    Q-T Interval 422 ms    QTC Calculation (Bezet) 462 ms    Calculated P Axis 69 degrees    Calculated R Axis 56 degrees    Calculated T Axis 77 degrees    Diagnosis       Normal sinus rhythm with sinus arrhythmia  Normal ECG  When compared with ECG of 05-JUN-2017 11:37,  No significant change was found         Radiologic Studies -   No orders to display     CT Results  (Last 48 hours)    None        CXR Results  (Last 48 hours)    None            Medical Decision Making   I am the first provider for this patient. I reviewed the vital signs, available nursing notes, past medical history, past surgical history, family history and social history. Vital Signs-Reviewed the patient's vital signs. Patient Vitals for the past 12 hrs:   Temp Pulse Resp BP SpO2   05/16/20 0247 97.5 °F (36.4 °C) 72 18 131/81 91 %       Records Reviewed: Nursing Notes and Old Medical Records    Provider Notes (Medical Decision Making):   Patient is a 58-year-old male with above-mentioned past medical history who presents by EMS for being unresponsive after likely drug overdose. Patient responded to Narcan. Currently he is still sleepy and falling asleep during our conversation. He is oxygenation is at 92% on room air. His vital signs are otherwise stable. He has no gross neurologic abnormalities. Will give 2 more milligrams IV Narcan. Will obtain a CBC, BMP, drug screen, UDS, Tylenol and aspirin levels. We will continue to monitor in the emergency department for several hours to ensure that his Narcan does not wear off or cause any adverse side effects. Will evaluate for organic causes of somnolence and altered mental status. If patient does not improve with the next dose of Narcan will consider head CT as well. ED Course and Progress Notes:   Initial assessment performed. The patients presenting problems have been discussed, and they are in agreement with the care plan formulated and outlined with them. I have encouraged them to ask questions as they arise throughout their visit. ED Course as of May 16 0451   Sat May 16, 2020   0874 Patient's lab work reveals elevated glucose at 450 but patient denies taking any of his medications. [LD]   4847 His lactic acid was elevated at 3.3. He appears dry on exam.  We will give him another liter of IV fluids for the elevated lactate as well as a hyperglycemia. Other lab work reveals negative salicylate and acetaminophen levels.   Normal magnesium, no elevation in the troponin, CBC that is unremarkable. Still awaiting his urinalysis and urine drug screen. Patient now endorses snorting cocaine regularly and his concern is that his cocaine may have been laced with an opiate. He is more alert now, responding to all questions with a normal neurologic exam.    [LD]   0424 Urinalysis reveals some protein and greater than thousand glucose. This is not surprising as patient has hyperglycemia and a diagnosis of diabetes and will not take his medications. [LD]   2943 However there is no evidence of ketones and his metabolic panel does not reveal any concerning findings for DKA.    [LD]   0434 UDS is positive for cocaine. [LD]      ED Course User Index  [LD] Trevin Mayfield DO     4006 Patient re-evaluated. Symptoms improved. Wants to eat and drink. VS are stable. Counseled him extensively about abstaining from drugs and alcohol and following up with a primary care doctor for his chronic medical problems. He is stable for discharge. Diagnosis     Clinical Impression:   1. Cocaine abuse ([facility] [address]    2. Opioid overdose, accidental or unintentional, initial encounter [facility])        Disposition:  Discharge    DISCHARGE PLAN:   1. See PCP for medications  Current Discharge Medication List        2. FU with PCP per list   Follow-up Information    None       3.  Return to ED if worse         Resident Signature: Valencia Carter DO

## 2020-05-16 NOTE — ED NOTES
After standing pt for d/c, pt became nauseous and had an episode of vomiting.   Verbal orders from Antoinette Greer MD for ODT zofran 4mg

## 2020-05-16 NOTE — ED TRIAGE NOTES
Pt arrives via EMS from home. Pt was found by parents unresponsive. Per EMS pt has irregular respirations and pale skin. EMS administered Narcan intranasal and IV and found an improvement of RR. Per EMS BG was 513 on transport. Pt BG was 460 on arrival to ED. Per EMS pt became more arrousable 5 minutes prior to arrival to hospital. Pt presents to ED with voice and stimulation. Pt lives with parents,they stated he has a hx of cocaine and heroin use. Pt is A/o x4 however still is lethargic. Dr. Chitra Alvarez at bedside and verbal for Narcan IV. Monitor x3. Bed lowest position. Call bell within reach. Pt arrived to ED with 90% O2 stats. Pt placed on NC at 2L pt O2 improved to 96.    0300: pt given 2 mg iv narcan 0250, pt is more arousable and answering questions appropriately. Pt stated \"I bought a 20 bag of cocaine and sniffed it, I think they tried to kill me\".

## 2020-05-16 NOTE — DISCHARGE INSTRUCTIONS
You should stop using Cocaine as it can cause heart problems and kill you. Your liver enzymes were elevated so you should stop drinking alcohol as well. Your sugar was elevated and it is important to take your diabetes medications as it can cause kidney failure as well as other health problems. Please see a primary care doctor and get evaluated for all your chronic medical problems.    Local Primary Care Physicians  [facility] [phone]  MD Kira Fischer MD Donold Papa, MD [facility] [phone]  Matteo Garcia, [facility]  MD Katalina Regalado MD Arne Falco, MD [address] [facility] [phone]  MD Mary Ann Claros MD [address] [facility] [phone]  Dorisann Hodgkin, MD Arleene Bolt, MD Laretta Blind, MD Gerhard Sharp, MD   [facility] [phone]  OMAR XVZEBD , MD Margarita Amaral, MD Albino Robledo, NP [address] [phone]  MD Penny Dixon MD Lucie Chant, MD Danita Byers, MD Epifanio Garza, MD Roxy Mendoza MD   [facility] [phone]  Brian Polo MD [facility] [phone]  MD Titus Granda, NP  Brit Agee, MD Reji Alanis MD Metro Erp, MD Rosanna Slick, MD   [address] [facility] [phone]  MD Héctor Harvey, [facility]  Ricky Waters, NP  Daniela Hsieh, MD Ham Garnica MD Brynn Fitting, MD [facility] [phone]  MD Justen Zamora MD Cindia Piccolo, MD Sherryl Harper, MD Alvaro Bear, MD   [facility] [phone]  DuMD Alejandra Arreola MD [place] [phone]  MD Kristy Glover MD  [facility], [facility] [phone]  MD Bhanu Figueroa MD Rox Elks, MD Gwendolyn Saran MD Yolis Whatley MD Santo Snider, BETHANY Herrera MD [facility]   [phone]  Hilliard Haller, MD Nolia Buerger, MD Purnell Ding, MD   [address]102 [facility] [phone]  MD Roly Call, FNP  JAY Bajwa, JAY Alcantara MD Pat Goodnight, BETHANY Arias DO             Miscellaneous:  Samara Saint, MD [facility] Departments     For adult and child immunizations, family planning, TB screening, STD testing and women's health services. [facility]: [place] [phone]      [facility] 25   657 [facility]   [address]   170 [facility]: [facility] 200 [facility] Street  [phone]      [address] [place] [address]          [address] [address]     For primary care services, woman and child wellness, and some clinics providing specialty care. [facility] -- [facility]. [address] [place] [address] [phone]   411 [facility] 200 [facility] [address] [place] [address] [phone]   [address] [place], [place] [address]eestr. [address] [facility] [address] [phone]   [address]  [employer] [address] [place] [address] 5850  Community  [phone]   [facility] [place] [address] [address] [place] [phone]   [address] [phone]   Erna Sethi [facility] [address] [place] [address] [phone]   [facility] shravan Tovar [address] [place] [address], [address] [phone]     [address] [phone]   [facility] 5850  Community  [phone]   [employer]  [address], [address] ([email or web address]) [phone]         Sexual Health/Woman Wellness Clinics    For STD/HIV testing and treatment, pregnancy testing and services, men's health, birth control services, LGBT services, and hepatitis/HPV vaccine services. [facility] [employer] [facility] for [employer] 201 [facility] 75 [facility] 1579 [address] [phone]   [facility] [address], 5th floor [phone]   Pregnancy 3928 [facility] [address] for Women [address]  Senthil Dyson [phone]         [place] [address] [facility] [address] [place] [address]   [phone]   [place]   [phone]   Women, Infant and Children's Services: [address] [phone]       [address] [place] [address]   [phone]   [address]   5214 [facility]     [phone]   [address] Crisis   1212 [facility] [phone]

## 2020-05-18 ENCOUNTER — PATIENT OUTREACH (OUTPATIENT)
Dept: INTERNAL MEDICINE CLINIC | Age: 55
End: 2020-05-18

## 2020-05-18 NOTE — PROGRESS NOTES
Patient contacted regarding recent discharge and COVID-19 risk   Care Transition Nurse/ Ambulatory Care Manager contacted the patient by telephone to perform post discharge assessment; unable to reach the patient - see contacts/comments. Patient has following risk factors of: diabetes. Rx -none. - PCP Follow-Up: (Dr. Cheryl Kent Provider).

## 2020-05-20 NOTE — PROGRESS NOTES
2020  1:13 PM    Patient contacted regarding recent discharge and COVID-19 risk   Care Transition Nurse/ Ambulatory Care Manager contacted the patient by telephone to perform post discharge assessment. Verified name and  with patient as identifiers. Patient has following risk factors of: diabetes. CTN/ACM reviewed discharge instructions, medical action plan and red flags related to discharge diagnosis. Reviewed and educated them on any new and changed medications related to discharge diagnosis. ; Rx -none. Advised obtaining a 90-day supply of all daily and as-needed medications. Education provided regarding infection prevention, and signs and symptoms of COVID-19 and when to seek medical attention with patient who verbalized understanding. Discussed exposure protocols and quarantine from [address] you at higher risk for severe illness  and given an opportunity for questions and concerns. The patient agrees to contact the COVID-19 hotline [phone] or PCP office for questions related to their healthcare. CTN/ACM provided contact information for future reference. From CDC: Are you at higher risk for severe illness?  Wash your hands often.  Avoid close contact (6 feet, which is about two arm lengths) with people who are sick.  Put distance between yourself and other people if COVID-19 is spreading in your community.  Clean and disinfect frequently touched surfaces.  Avoid all cruise travel and non-essential air travel.  Call your healthcare professional if you have concerns about COVID-19 and your underlying condition or if you are sick.     For more information on steps you can take to protect yourself, see CDC's How to Protect Yourself      Patient/family/caregiver given information for Erick Rossi and agrees to enroll no  Patient's preferred e-mail:  N/A  Patient's preferred phone number: N/A  Based on Loop alert triggers, patient will be contacted by nurse care manager for worsening symptoms.    - PCP Follow-Up: (Dr. Maria Del Rosario Haskins Provider). Patient plans to contact his PCP office today regarding post-discharge follow-up. Patient declines ACM assistance with appointment scheduling. Plan for follow-up call in 7-14 days based on severity of symptoms and risk factors.

## 2020-06-05 ENCOUNTER — PATIENT OUTREACH (OUTPATIENT)
Dept: INTERNAL MEDICINE CLINIC | Age: 55
End: 2020-06-05

## 2020-06-05 NOTE — PROGRESS NOTES
Patient resolved from Transition of Care episode on 6/5/2020 . ACM/CTN was unsuccessful at contacting this patient today. Patient/family was provided the following resources and education related to COVID-19 during the initial call:                         Signs, symptoms and red flags related to COVID-19            CDC exposure and quarantine guidelines            Conduit exposure contact - [phone]            Contact for their local Department of Health                 Patient has not had any additional ED or hospital visits. No further outreach scheduled with this CTN/ACM. Episode of Care resolved. Patient has this CTN/ACM contact information if future needs arise.

## 2020-08-21 NOTE — BH NOTES
Anticoagulation Summary  As of 2020    INR goal:  2.0-3.0   TTR:  77.3 % (2.2 y)   INR used for dosin.60 (2020)   Warfarin maintenance plan:  1.5 mg (3 mg x 0.5) every Wed; 3 mg (3 mg x 1) all other days   Weekly warfarin total:  19.5 mg   Plan last modified:  Brady Piña, PharmD (2020)   Next INR check:  2020   Target end date:  Indefinite    Indications    Chronic anticoagulation [Z79.01]  PAF (paroxysmal atrial fibrillation) ([facility]) [I48.0]             Anticoagulation Episode Summary     INR check location:      Preferred lab:      Send INR reminders to:      Comments:        Anticoagulation Care Providers     Provider Role Specialty Phone number    Ganesh Mckeon M.D. Referring Geriatrics [phone]    [facility] Responsible  [phone]        Anticoagulation Patient Findings  Patient Findings     Positives:  Extra doses    Negatives:  Signs/symptoms of thrombosis, Signs/symptoms of bleeding, Laboratory test error suspected, Change in health, Change in alcohol use, Change in activity, Upcoming invasive procedure, Emergency department visit, Upcoming dental procedure, Missed doses, Change in medications, Change in diet/appetite, Hospital admission, Bruising, Other complaints              HPI:   Shereen Dale was seen in clinic today, on anticoagulation therapy with warfarin for stroke prevention due to history of PAF.    Patient's previous INR was subtherapeutic at 1.9 on 20, at which time patient was instructed to bolus X 1 3 mg dose and continue on with the weekly regimen.  She returns to clinic today to recheck INR to ensure it is therapeutic and thus preventing possible clotting and/or bleeding/bruising complications.    CHADS-VASc = 5  (unadjusted ischemic stroke risk/year:  6.7%, which is moderate risk)    Does patient have any changes to current medical/health status since last appt (Y/N):  No  Does patient have any signs/symptoms of bleeding  GROUP THERAPY PROGRESS NOTE    The patient Britney dias 47 y.o. male is participating in [employer]. Group time: 1 hour    Personal goal for participation: To concentrate on selected task    Goal orientation: social    Group therapy participation: active    Therapeutic interventions reviewed and discussed: Crafts, games, music    Impression of participation: The patient was attentive.     Raina Browning  6/7/2017  5:29 PM and/or thrombosis since the last appt (Y/N):  No  Does patient have any interval changes to diet or medications since last appt (Y/N):  Yes, patient increased weekly regimen on her own.  Are there any complications or cost restrictions with current therapy (Y/N):  No     Does patient have [facility] PCP? Dr. Ganesh Mckeon (If not, please document discussion that patient must be seen at [facility])       Vitals:  declined by patient today.    There were no vitals filed for this visit.     Asssessment:      INR subtherapeutic at 1.6, therefore increasing risk of blood clots and stroke.   Reason(s) for out of range INR today:  Dose too low.      Plan:  patient was told to bolus X 1 and increase weekly regimen as detailed above.      Follow up:  Because warfarin is a high risk medication and current CHEST guidelines recommend regular monitoring intervals (few days up to 12 weeks), will have patient return to clinic in 10 days to recheck INR.    Mathieu Bosch, Pharmacy intern  Brady Piña, PharmD, BCACP

## 2021-01-15 ENCOUNTER — HOSPITAL ENCOUNTER (EMERGENCY)
Age: 56
Discharge: LEFT BEFORE DISCHARGE | End: 2021-01-15
Attending: EMERGENCY MEDICINE
Payer: MEDICARE

## 2021-01-15 VITALS
WEIGHT: 168.21 LBS | HEART RATE: 85 BPM | BODY MASS INDEX: 22.29 KG/M2 | DIASTOLIC BLOOD PRESSURE: 86 MMHG | TEMPERATURE: 100.3 F | SYSTOLIC BLOOD PRESSURE: 142 MMHG | OXYGEN SATURATION: 99 % | RESPIRATION RATE: 18 BRPM | HEIGHT: 73 IN

## 2021-01-15 LAB
FLUAV AG NPH QL IA: NEGATIVE
FLUBV AG NOSE QL IA: NEGATIVE

## 2021-01-15 PROCEDURE — 75810000275 HC EMERGENCY DEPT VISIT NO LEVEL OF CARE

## 2021-01-15 PROCEDURE — 87804 INFLUENZA ASSAY W/OPTIC: CPT

## 2021-01-15 PROCEDURE — 87635 SARS-COV-2 COVID-19 AMP PRB: CPT

## 2021-01-15 NOTE — LETTER
1/22/2021 
 
 
Keanu Gonzalez 
[address] [place] [id number] 
 
 
Dear Jonny Carlos, 
 
 
You were recently seen in the Emergency Department of [facility] and had lab work performed. 
 
We would like to discuss these results with you. Please call the Emergency Department at your earliest convenience at [phone] between 10am-8pm to speak with one of our providers. 
 
Sincerely, 
 
 
BIN Solis 
 
 
[facility] EMERGENCY DEPT 
[address] [facility] [address] [phone] Option #3

## 2021-01-18 LAB
COVID-19, XGCOVT: DETECTED
HEALTH STATUS, XMCV2T: ABNORMAL
SOURCE, COVRS: ABNORMAL
SPECIMEN SOURCE, FCOV2M: ABNORMAL
SPECIMEN TYPE, XMCV1T: ABNORMAL

## 2021-03-05 ENCOUNTER — VIRTUAL VISIT (OUTPATIENT)
Dept: INTERNAL MEDICINE CLINIC | Age: 56
End: 2021-03-05
Payer: MEDICARE

## 2021-03-05 DIAGNOSIS — R10.10 UPPER ABDOMINAL PAIN: Primary | ICD-10-CM

## 2021-03-05 DIAGNOSIS — R11.2 NON-INTRACTABLE VOMITING WITH NAUSEA, UNSPECIFIED VOMITING TYPE: ICD-10-CM

## 2021-03-05 PROCEDURE — 99442 PR PHYS/QHP TELEPHONE EVALUATION 11-20 MIN: CPT | Performed by: FAMILY MEDICINE

## 2021-03-05 RX ORDER — PANTOPRAZOLE SODIUM 40 MG/1
40 TABLET, DELAYED RELEASE ORAL DAILY
Qty: 30 TAB | Refills: 1 | Status: ON HOLD | OUTPATIENT
Start: 2021-03-05 | End: 2022-05-12 | Stop reason: ALTCHOICE

## 2021-03-05 RX ORDER — ONDANSETRON 4 MG/1
4 TABLET, ORALLY DISINTEGRATING ORAL
Qty: 15 TAB | Refills: 1 | Status: ON HOLD | OUTPATIENT
Start: 2021-03-05 | End: 2022-05-13
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
## 2021-05-25 ENCOUNTER — HOSPITAL ENCOUNTER (EMERGENCY)
Age: 56
Discharge: HOME | End: 2021-05-25
Attending: EMERGENCY MEDICINE
Payer: MEDICARE

## 2021-05-25 ENCOUNTER — APPOINTMENT (OUTPATIENT)
Dept: GENERAL RADIOLOGY | Age: 56
End: 2021-05-25
Attending: PHYSICIAN ASSISTANT
Payer: MEDICARE

## 2021-05-25 VITALS
RESPIRATION RATE: 15 BRPM | HEART RATE: 65 BPM | TEMPERATURE: 97.9 F | SYSTOLIC BLOOD PRESSURE: 192 MMHG | BODY MASS INDEX: 22.26 KG/M2 | OXYGEN SATURATION: 100 % | HEIGHT: 73 IN | DIASTOLIC BLOOD PRESSURE: 106 MMHG | WEIGHT: 167.99 LBS

## 2021-05-25 DIAGNOSIS — G89.29 CHRONIC HAND PAIN, RIGHT: Primary | ICD-10-CM

## 2021-05-25 DIAGNOSIS — M79.641 CHRONIC HAND PAIN, RIGHT: Primary | ICD-10-CM

## 2021-05-25 PROCEDURE — 99282 EMERGENCY DEPT VISIT SF MDM: CPT

## 2021-05-25 PROCEDURE — 73130 X-RAY EXAM OF HAND: CPT

## 2021-05-25 NOTE — ED NOTES
Discharge paperwork provided to patient at this time. Vital signs stable. Patient in no apparent distress at this time. Mental status at baseline. Discharge paperwork in hand and prescription instructions if applicable. [employer] form filled out by patient/family member. Patient given a copy and second copy placed in folder with ED .

## 2021-05-25 NOTE — DISCHARGE INSTRUCTIONS
DISCHARGE NOTE:  11:19 AM  The pt is ready for discharge. The pt's signs, symptoms, diagnosis, and discharge instructions have been discussed and pt has conveyed their understanding. The pt is to follow up as recommended or return to ER should their symptoms worsen. Plan has been discussed and pt is in agreement.
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
## 2021-05-26 ENCOUNTER — PATIENT OUTREACH (OUTPATIENT)
Dept: CASE MANAGEMENT | Age: 56
End: 2021-05-26

## 2021-05-26 NOTE — ACP (ADVANCE CARE PLANNING)
Advance Care Planning:   Does patient have an Advance Directive: not on file; education provided. Pt named his sister as his SDM stating he has an adult son, but does not even know where he is, therefore sister is primary.       Primary Decision Maker: Pamela Jones - Sister - [phone]

## 2021-05-26 NOTE — PROGRESS NOTES
Educated patient about risk for severe COVID-19 due to risk factors according to CDC guidelines-[facility] ED 5/25/21 dx-chronic Rt hand pain     ACM reviewed discharge instructions, medical action plan and red flag symptoms patient who verbalized understanding. Discussed COVID vaccination status yes. Education provided on COVID-19 vaccination as appropriate. Discussed exposure protocols and quarantine with CDC Guidelines. Patient was given an opportunity to verbalize any questions and concerns and agrees to contact ACM or health care provider for questions related to their healthcare. Pt stated his hand still hurts and OTCs don't work. He is at a loss. Advised pt to schedule appt with ortho and he verbalized understanding. Pt will call PCP also. Pt had first Moderna covid vaccine this morning at [facility]. Advised to complete the second dose in ~ four weeks. Pt verbalized understanding, but here are no available appts at this time. He will keep trying to schedule appt.

## 2021-06-09 ENCOUNTER — PATIENT OUTREACH (OUTPATIENT)
Dept: CASE MANAGEMENT | Age: 56
End: 2021-06-09

## 2021-06-09 NOTE — PROGRESS NOTES
Patient resolved from Transition of Care episode on 6/9/21to follow up on [facility] ED 5/25/21 dx-chronic Rt hand pain . ACM/CTN was unsuccessful at contacting this patient today. Patient/family was provided the following resources and education related to COVID-19 during the initial call:                         Signs, symptoms and red flags related to COVID-19            CDC exposure and quarantine guidelines            [employer] exposure contact - [phone]            Contact for their local Department of Health                 Patient has not had any additional ED or hospital visits. No further outreach scheduled with this CTN/ACM. Episode of Care resolved. Patient has this CTN/ACM contact information if future needs arise.

## 2021-08-14 ENCOUNTER — APPOINTMENT (OUTPATIENT)
Dept: GENERAL RADIOLOGY | Age: 56
End: 2021-08-14
Attending: EMERGENCY MEDICINE
Payer: MEDICARE

## 2021-08-14 ENCOUNTER — HOSPITAL ENCOUNTER (EMERGENCY)
Age: 56
Discharge: HOME | End: 2021-08-14
Attending: EMERGENCY MEDICINE | Admitting: EMERGENCY MEDICINE
Payer: MEDICARE

## 2021-08-14 VITALS
DIASTOLIC BLOOD PRESSURE: 86 MMHG | HEIGHT: 74 IN | OXYGEN SATURATION: 99 % | TEMPERATURE: 97.8 F | RESPIRATION RATE: 23 BRPM | BODY MASS INDEX: 21.82 KG/M2 | HEART RATE: 70 BPM | SYSTOLIC BLOOD PRESSURE: 164 MMHG | WEIGHT: 170 LBS

## 2021-08-14 DIAGNOSIS — T25.029A: ICD-10-CM

## 2021-08-14 DIAGNOSIS — L03.119 CELLULITIS OF LOWER EXTREMITY, UNSPECIFIED LATERALITY: Primary | ICD-10-CM

## 2021-08-14 LAB
ALBUMIN SERPL-MCNC: 3.3 G/DL (ref 3.5–5)
ALBUMIN/GLOB SERPL: 0.7 {RATIO} (ref 1.1–2.2)
ALP SERPL-CCNC: 96 U/L (ref 45–117)
ALT SERPL-CCNC: 37 U/L (ref 12–78)
ANION GAP SERPL CALC-SCNC: 4 MMOL/L (ref 5–15)
AST SERPL-CCNC: 23 U/L (ref 15–37)
BASOPHILS # BLD: 0.1 K/UL (ref 0–0.1)
BASOPHILS NFR BLD: 1 % (ref 0–1)
BILIRUB SERPL-MCNC: 0.6 MG/DL (ref 0.2–1)
BUN SERPL-MCNC: 25 MG/DL (ref 6–20)
BUN/CREAT SERPL: 22 (ref 12–20)
CALCIUM SERPL-MCNC: 9.3 MG/DL (ref 8.5–10.1)
CHLORIDE SERPL-SCNC: 100 MMOL/L (ref 97–108)
CO2 SERPL-SCNC: 29 MMOL/L (ref 21–32)
COMMENT, HOLDF: NORMAL
CREAT SERPL-MCNC: 1.12 MG/DL (ref 0.7–1.3)
DIFFERENTIAL METHOD BLD: ABNORMAL
EOSINOPHIL # BLD: 0.1 K/UL (ref 0–0.4)
EOSINOPHIL NFR BLD: 1 % (ref 0–7)
ERYTHROCYTE [DISTWIDTH] IN BLOOD BY AUTOMATED COUNT: 13.5 % (ref 11.5–14.5)
GLOBULIN SER CALC-MCNC: 4.6 G/DL (ref 2–4)
GLUCOSE BLD STRIP.AUTO-MCNC: 401 MG/DL (ref 65–117)
GLUCOSE SERPL-MCNC: 380 MG/DL (ref 65–100)
HCT VFR BLD AUTO: 36.8 % (ref 36.6–50.3)
HGB BLD-MCNC: 13.1 G/DL (ref 12.1–17)
IMM GRANULOCYTES # BLD AUTO: 0.1 K/UL (ref 0–0.04)
IMM GRANULOCYTES NFR BLD AUTO: 1 % (ref 0–0.5)
LACTATE BLD-SCNC: 1.6 MMOL/L (ref 0.4–2)
LYMPHOCYTES # BLD: 0.8 K/UL (ref 0.8–3.5)
LYMPHOCYTES NFR BLD: 12 % (ref 12–49)
MCH RBC QN AUTO: 30.8 PG (ref 26–34)
MCHC RBC AUTO-ENTMCNC: 35.6 G/DL (ref 30–36.5)
MCV RBC AUTO: 86.4 FL (ref 80–99)
MONOCYTES # BLD: 0.5 K/UL (ref 0–1)
MONOCYTES NFR BLD: 8 % (ref 5–13)
NEUTS SEG # BLD: 4.8 K/UL (ref 1.8–8)
NEUTS SEG NFR BLD: 77 % (ref 32–75)
NRBC # BLD: 0 K/UL (ref 0–0.01)
NRBC BLD-RTO: 0 PER 100 WBC
PLATELET # BLD AUTO: 157 K/UL (ref 150–400)
PMV BLD AUTO: 10 FL (ref 8.9–12.9)
POTASSIUM SERPL-SCNC: 4 MMOL/L (ref 3.5–5.1)
PROT SERPL-MCNC: 7.9 G/DL (ref 6.4–8.2)
RBC # BLD AUTO: 4.26 M/UL (ref 4.1–5.7)
RBC MORPH BLD: ABNORMAL
SAMPLES BEING HELD,HOLD: NORMAL
SERVICE CMNT-IMP: ABNORMAL
SODIUM SERPL-SCNC: 133 MMOL/L (ref 136–145)
WBC # BLD AUTO: 6.4 K/UL (ref 4.1–11.1)

## 2021-08-14 PROCEDURE — 83605 ASSAY OF LACTIC ACID: CPT

## 2021-08-14 PROCEDURE — 73630 X-RAY EXAM OF FOOT: CPT

## 2021-08-14 PROCEDURE — 87040 BLOOD CULTURE FOR BACTERIA: CPT

## 2021-08-14 PROCEDURE — 80053 COMPREHEN METABOLIC PANEL: CPT

## 2021-08-14 PROCEDURE — 74011000250 HC RX REV CODE- 250: Performed by: EMERGENCY MEDICINE

## 2021-08-14 PROCEDURE — 85025 COMPLETE CBC W/AUTO DIFF WBC: CPT

## 2021-08-14 PROCEDURE — 82962 GLUCOSE BLOOD TEST: CPT

## 2021-08-14 PROCEDURE — 74011250636 HC RX REV CODE- 250/636: Performed by: EMERGENCY MEDICINE

## 2021-08-14 PROCEDURE — 96374 THER/PROPH/DIAG INJ IV PUSH: CPT

## 2021-08-14 PROCEDURE — 99285 EMERGENCY DEPT VISIT HI MDM: CPT

## 2021-08-14 PROCEDURE — 74011250637 HC RX REV CODE- 250/637: Performed by: EMERGENCY MEDICINE

## 2021-08-14 PROCEDURE — 73590 X-RAY EXAM OF LOWER LEG: CPT

## 2021-08-14 PROCEDURE — 36415 COLL VENOUS BLD VENIPUNCTURE: CPT

## 2021-08-14 RX ORDER — CIPROFLOXACIN 500 MG/1
500 TABLET ORAL 2 TIMES DAILY
Qty: 20 TABLET | Refills: 0 | OUTPATIENT
Start: 2021-08-14 | End: 2021-08-23

## 2021-08-14 RX ORDER — DOXYCYCLINE HYCLATE 100 MG
100 TABLET ORAL 2 TIMES DAILY
Qty: 20 TABLET | Refills: 0 | OUTPATIENT
Start: 2021-08-14 | End: 2021-08-23

## 2021-08-14 RX ORDER — CIPROFLOXACIN 500 MG/1
750 TABLET ORAL
Status: COMPLETED | OUTPATIENT
Start: 2021-08-14 | End: 2021-08-14

## 2021-08-14 RX ORDER — DOXYCYCLINE HYCLATE 100 MG
100 TABLET ORAL
Status: COMPLETED | OUTPATIENT
Start: 2021-08-14 | End: 2021-08-14

## 2021-08-14 RX ORDER — BACITRACIN 500 [USP'U]/G
OINTMENT TOPICAL 3 TIMES DAILY
Qty: 1 TUBE | Refills: 2 | Status: SHIPPED | OUTPATIENT
Start: 2021-08-14 | End: 2022-02-17

## 2021-08-14 RX ORDER — MORPHINE SULFATE 2 MG/ML
4 INJECTION, SOLUTION INTRAMUSCULAR; INTRAVENOUS
Status: COMPLETED | OUTPATIENT
Start: 2021-08-14 | End: 2021-08-14

## 2021-08-14 RX ORDER — BACITRACIN 500 UNIT/G
1 PACKET (EA) TOPICAL
Status: COMPLETED | OUTPATIENT
Start: 2021-08-14 | End: 2021-08-14

## 2021-08-14 RX ORDER — OXYCODONE HYDROCHLORIDE 5 MG/1
5 TABLET ORAL
Qty: 18 TABLET | Refills: 0 | Status: SHIPPED | OUTPATIENT
Start: 2021-08-14 | End: 2021-08-19

## 2021-08-14 RX ADMIN — SODIUM CHLORIDE 1000 ML: 9 INJECTION, SOLUTION INTRAVENOUS at 04:39

## 2021-08-14 RX ADMIN — DOXYCYCLINE HYCLATE 100 MG: 100 TABLET, COATED ORAL at 04:39

## 2021-08-14 RX ADMIN — CIPROFLOXACIN 750 MG: 500 TABLET, FILM COATED ORAL at 04:39

## 2021-08-14 RX ADMIN — MORPHINE SULFATE 4 MG: 2 INJECTION, SOLUTION INTRAMUSCULAR; INTRAVENOUS at 04:20

## 2021-08-14 RX ADMIN — BACITRACIN 1 PACKET: 500 OINTMENT TOPICAL at 04:42
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
## 2021-08-14 NOTE — ED NOTES
I have reviewed written and verbal discharge instructions with the patient. The patient verbalized understanding. I.V. removed and all questions answered. Wound care applied to BLE. Pt ambulated without difficulty.

## 2021-08-14 NOTE — DISCHARGE INSTRUCTIONS
It was a pleasure taking care of you in our Emergency Department today. We know that when you come to [facility], you are entrusting us with your health, comfort, and safety. Our physicians and nurses honor that trust, and truly appreciate the opportunity to care for you and your loved ones. We also value your feedback. If you receive a survey about your Emergency Department experience today, please fill it out. We care about our patients' feedback, and we listen to what you have to say.   Thank you!       --- Dr. Marcela Kanner, MD

## 2021-08-20 LAB
BACTERIA SPEC CULT: NORMAL
SERVICE CMNT-IMP: NORMAL

## 2021-08-23 ENCOUNTER — HOSPITAL ENCOUNTER (EMERGENCY)
Age: 56
Discharge: HOME | End: 2021-08-23
Attending: STUDENT IN AN ORGANIZED HEALTH CARE EDUCATION/TRAINING PROGRAM
Payer: MEDICARE

## 2021-08-23 VITALS
SYSTOLIC BLOOD PRESSURE: 184 MMHG | TEMPERATURE: 98.2 F | HEART RATE: 83 BPM | WEIGHT: 175 LBS | HEIGHT: 73 IN | OXYGEN SATURATION: 100 % | DIASTOLIC BLOOD PRESSURE: 94 MMHG | RESPIRATION RATE: 14 BRPM | BODY MASS INDEX: 23.19 KG/M2

## 2021-08-23 DIAGNOSIS — T25.221S: ICD-10-CM

## 2021-08-23 DIAGNOSIS — F17.200 TOBACCO DEPENDENCE: ICD-10-CM

## 2021-08-23 DIAGNOSIS — T25.222S: Primary | ICD-10-CM

## 2021-08-23 DIAGNOSIS — E11.40 TYPE 2 DIABETES MELLITUS WITH DIABETIC NEUROPATHY, WITH LONG-TERM CURRENT USE OF INSULIN (HCC): ICD-10-CM

## 2021-08-23 DIAGNOSIS — Z79.4 TYPE 2 DIABETES MELLITUS WITH DIABETIC NEUROPATHY, WITH LONG-TERM CURRENT USE OF INSULIN (HCC): ICD-10-CM

## 2021-08-23 PROCEDURE — 99283 EMERGENCY DEPT VISIT LOW MDM: CPT

## 2021-08-23 PROCEDURE — 74011000250 HC RX REV CODE- 250: Performed by: PHYSICIAN ASSISTANT

## 2021-08-23 RX ORDER — OXYCODONE HYDROCHLORIDE 5 MG/1
5 TABLET ORAL
Qty: 12 TABLET | Refills: 0 | Status: SHIPPED | OUTPATIENT
Start: 2021-08-23 | End: 2021-08-28 | Stop reason: SDUPTHER

## 2021-08-23 RX ORDER — CIPROFLOXACIN 500 MG/1
500 TABLET ORAL 2 TIMES DAILY
Qty: 10 TABLET | Refills: 0 | Status: SHIPPED | OUTPATIENT
Start: 2021-08-23 | End: 2021-08-28

## 2021-08-23 RX ORDER — DOXYCYCLINE HYCLATE 100 MG
100 TABLET ORAL 2 TIMES DAILY
Qty: 10 TABLET | Refills: 0 | Status: SHIPPED | OUTPATIENT
Start: 2021-08-23 | End: 2021-08-28

## 2021-08-23 RX ADMIN — BACITRACIN ZINC, POLYMYXIN B SULFATE, NEOMYCIN SULFATE 1 PACKET: 400; 5000; 3.5 OINTMENT TOPICAL at 12:15
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
## 2021-08-23 NOTE — ED NOTES
1129: Assumed care of patient at this time, placed on monitor x 2, call light in reach    1130: PA at bedside    1248: I have reviewed discharge instructions with the patient. The patient verbalized understanding.  Patient aware of prescriptions sent to pharmacy
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
## 2021-08-28 RX ORDER — OXYCODONE HYDROCHLORIDE 5 MG/1
5 TABLET ORAL
Qty: 18 TABLET | Refills: 0 | Status: SHIPPED | OUTPATIENT
Start: 2021-08-28 | End: 2021-09-02

## 2021-09-29 LAB — HBA1C MFR BLD HPLC: 9.2 %

## 2022-01-05 ENCOUNTER — HOSPITAL ENCOUNTER (EMERGENCY)
Age: 57
Discharge: HOME | End: 2022-01-05
Attending: STUDENT IN AN ORGANIZED HEALTH CARE EDUCATION/TRAINING PROGRAM
Payer: MEDICARE

## 2022-01-05 VITALS
DIASTOLIC BLOOD PRESSURE: 110 MMHG | WEIGHT: 175 LBS | OXYGEN SATURATION: 98 % | RESPIRATION RATE: 18 BRPM | HEIGHT: 73 IN | HEART RATE: 112 BPM | BODY MASS INDEX: 23.19 KG/M2 | TEMPERATURE: 97.8 F | SYSTOLIC BLOOD PRESSURE: 184 MMHG

## 2022-01-05 DIAGNOSIS — S91.312A LACERATION OF LEFT FOOT, INITIAL ENCOUNTER: Primary | ICD-10-CM

## 2022-01-05 DIAGNOSIS — Z23 NEED FOR PROPHYLACTIC VACCINATION AGAINST DIPHTHERIA AND TETANUS: ICD-10-CM

## 2022-01-05 PROCEDURE — 75810000293 HC SIMP/SUPERF WND  RPR

## 2022-01-05 PROCEDURE — 90715 TDAP VACCINE 7 YRS/> IM: CPT | Performed by: PHYSICIAN ASSISTANT

## 2022-01-05 PROCEDURE — 74011250636 HC RX REV CODE- 250/636: Performed by: PHYSICIAN ASSISTANT

## 2022-01-05 PROCEDURE — 99281 EMR DPT VST MAYX REQ PHY/QHP: CPT

## 2022-01-05 PROCEDURE — 90471 IMMUNIZATION ADMIN: CPT

## 2022-01-05 PROCEDURE — 74011000250 HC RX REV CODE- 250: Performed by: PHYSICIAN ASSISTANT

## 2022-01-05 RX ORDER — IBUPROFEN 600 MG/1
600 TABLET ORAL
Qty: 20 TABLET | Refills: 0 | Status: SHIPPED | OUTPATIENT
Start: 2022-01-05 | End: 2022-02-17

## 2022-01-05 RX ORDER — CEPHALEXIN 500 MG/1
500 CAPSULE ORAL 3 TIMES DAILY
Qty: 21 CAPSULE | Refills: 0 | Status: SHIPPED | OUTPATIENT
Start: 2022-01-05 | End: 2022-01-12

## 2022-01-05 RX ORDER — LIDOCAINE HYDROCHLORIDE AND EPINEPHRINE 20; 10 MG/ML; UG/ML
10 INJECTION, SOLUTION INFILTRATION; PERINEURAL
Status: COMPLETED | OUTPATIENT
Start: 2022-01-05 | End: 2022-01-05

## 2022-01-05 RX ORDER — HYDROCODONE BITARTRATE AND ACETAMINOPHEN 5; 325 MG/1; MG/1
1 TABLET ORAL
Qty: 9 TABLET | Refills: 0 | Status: SHIPPED | OUTPATIENT
Start: 2022-01-05 | End: 2022-01-08

## 2022-01-05 RX ADMIN — LIDOCAINE HYDROCHLORIDE AND EPINEPHRINE 200 MG: 20; 10 INJECTION, SOLUTION INFILTRATION; PERINEURAL at 20:24

## 2022-01-05 RX ADMIN — TETANUS TOXOID, REDUCED DIPHTHERIA TOXOID AND ACELLULAR PERTUSSIS VACCINE, ADSORBED 0.5 ML: 5; 2.5; 8; 8; 2.5 SUSPENSION INTRAMUSCULAR at 20:24
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
## 2022-02-12 ENCOUNTER — HOSPITAL ENCOUNTER (INPATIENT)
Age: 57
LOS: 5 days | Discharge: HOME HEALTH | DRG: 617 | End: 2022-02-17
Attending: EMERGENCY MEDICINE | Admitting: STUDENT IN AN ORGANIZED HEALTH CARE EDUCATION/TRAINING PROGRAM
Payer: MEDICARE

## 2022-02-12 ENCOUNTER — APPOINTMENT (OUTPATIENT)
Dept: GENERAL RADIOLOGY | Age: 57
DRG: 617 | End: 2022-02-12
Attending: EMERGENCY MEDICINE
Payer: MEDICARE

## 2022-02-12 DIAGNOSIS — I73.9 PAD (PERIPHERAL ARTERY DISEASE) (HCC): ICD-10-CM

## 2022-02-12 DIAGNOSIS — E11.621 DIABETIC ULCER OF LEFT MIDFOOT ASSOCIATED WITH TYPE 2 DIABETES MELLITUS, WITH MUSCLE INVOLVEMENT WITHOUT EVIDENCE OF NECROSIS (HCC): ICD-10-CM

## 2022-02-12 DIAGNOSIS — B95.7 COAGULASE-NEGATIVE STAPHYLOCOCCAL INFECTION: ICD-10-CM

## 2022-02-12 DIAGNOSIS — L03.116 CELLULITIS OF LEFT ANKLE: ICD-10-CM

## 2022-02-12 DIAGNOSIS — M86.9 OSTEOMYELITIS OF LEFT FOOT, UNSPECIFIED TYPE (HCC): Primary | ICD-10-CM

## 2022-02-12 DIAGNOSIS — B18.2 CHRONIC HEPATITIS C WITHOUT HEPATIC COMA (HCC): Chronic | ICD-10-CM

## 2022-02-12 DIAGNOSIS — E11.621 DIABETIC ULCER OF LEFT MIDFOOT ASSOCIATED WITH TYPE 2 DIABETES MELLITUS, WITH BONE INVOLVEMENT WITHOUT EVIDENCE OF NECROSIS (HCC): ICD-10-CM

## 2022-02-12 DIAGNOSIS — M86.60 CHRONIC OSTEOMYELITIS (HCC): ICD-10-CM

## 2022-02-12 DIAGNOSIS — I10 ESSENTIAL HYPERTENSION: Chronic | ICD-10-CM

## 2022-02-12 DIAGNOSIS — F17.210 CIGARETTE SMOKER: ICD-10-CM

## 2022-02-12 DIAGNOSIS — F19.10 POLYSUBSTANCE ABUSE (HCC): ICD-10-CM

## 2022-02-12 DIAGNOSIS — A49.1 INFECTION DUE TO ALPHA-HEMOLYTIC STREPTOCOCCUS: ICD-10-CM

## 2022-02-12 DIAGNOSIS — A49.8 INFECTION DUE TO DIPHTHEROID BACTERIA: ICD-10-CM

## 2022-02-12 DIAGNOSIS — Z91.199 NON COMPLIANCE WITH MEDICAL TREATMENT: ICD-10-CM

## 2022-02-12 DIAGNOSIS — L97.425 DIABETIC ULCER OF LEFT MIDFOOT ASSOCIATED WITH TYPE 2 DIABETES MELLITUS, WITH MUSCLE INVOLVEMENT WITHOUT EVIDENCE OF NECROSIS (HCC): ICD-10-CM

## 2022-02-12 DIAGNOSIS — L97.426 DIABETIC ULCER OF LEFT MIDFOOT ASSOCIATED WITH TYPE 2 DIABETES MELLITUS, WITH BONE INVOLVEMENT WITHOUT EVIDENCE OF NECROSIS (HCC): ICD-10-CM

## 2022-02-12 PROBLEM — L97.509 DIABETIC FOOT ULCER (HCC): Status: ACTIVE | Noted: 2022-02-12

## 2022-02-12 LAB
ALBUMIN SERPL-MCNC: 3.6 G/DL (ref 3.5–5)
ALBUMIN/GLOB SERPL: 0.9 {RATIO} (ref 1.1–2.2)
ALP SERPL-CCNC: 93 U/L (ref 45–117)
ALT SERPL-CCNC: 104 U/L (ref 12–78)
ANION GAP SERPL CALC-SCNC: 1 MMOL/L (ref 5–15)
AST SERPL-CCNC: 47 U/L (ref 15–37)
BASOPHILS # BLD: 0.1 K/UL (ref 0–0.1)
BASOPHILS NFR BLD: 1 % (ref 0–1)
BILIRUB SERPL-MCNC: 0.8 MG/DL (ref 0.2–1)
BUN SERPL-MCNC: 27 MG/DL (ref 6–20)
BUN/CREAT SERPL: 26 (ref 12–20)
CALCIUM SERPL-MCNC: 9.2 MG/DL (ref 8.5–10.1)
CHLORIDE SERPL-SCNC: 103 MMOL/L (ref 97–108)
CO2 SERPL-SCNC: 30 MMOL/L (ref 21–32)
CREAT SERPL-MCNC: 1.03 MG/DL (ref 0.7–1.3)
DIFFERENTIAL METHOD BLD: ABNORMAL
EOSINOPHIL # BLD: 0.1 K/UL (ref 0–0.4)
EOSINOPHIL NFR BLD: 1 % (ref 0–7)
ERYTHROCYTE [DISTWIDTH] IN BLOOD BY AUTOMATED COUNT: 13 % (ref 11.5–14.5)
GLOBULIN SER CALC-MCNC: 3.9 G/DL (ref 2–4)
GLUCOSE SERPL-MCNC: 215 MG/DL (ref 65–100)
HCT VFR BLD AUTO: 34.4 % (ref 36.6–50.3)
HGB BLD-MCNC: 12.6 G/DL (ref 12.1–17)
IMM GRANULOCYTES # BLD AUTO: 0 K/UL (ref 0–0.04)
IMM GRANULOCYTES NFR BLD AUTO: 0 % (ref 0–0.5)
LYMPHOCYTES # BLD: 1.3 K/UL (ref 0.8–3.5)
LYMPHOCYTES NFR BLD: 19 % (ref 12–49)
MCH RBC QN AUTO: 30.7 PG (ref 26–34)
MCHC RBC AUTO-ENTMCNC: 36.6 G/DL (ref 30–36.5)
MCV RBC AUTO: 83.9 FL (ref 80–99)
MONOCYTES # BLD: 0.5 K/UL (ref 0–1)
MONOCYTES NFR BLD: 8 % (ref 5–13)
NEUTS SEG # BLD: 4.8 K/UL (ref 1.8–8)
NEUTS SEG NFR BLD: 71 % (ref 32–75)
NRBC # BLD: 0 K/UL (ref 0–0.01)
NRBC BLD-RTO: 0 PER 100 WBC
PLATELET # BLD AUTO: 160 K/UL (ref 150–400)
PMV BLD AUTO: 9.3 FL (ref 8.9–12.9)
POTASSIUM SERPL-SCNC: 4.6 MMOL/L (ref 3.5–5.1)
PROT SERPL-MCNC: 7.5 G/DL (ref 6.4–8.2)
RBC # BLD AUTO: 4.1 M/UL (ref 4.1–5.7)
SODIUM SERPL-SCNC: 134 MMOL/L (ref 136–145)
WBC # BLD AUTO: 6.8 K/UL (ref 4.1–11.1)

## 2022-02-12 PROCEDURE — 99284 EMERGENCY DEPT VISIT MOD MDM: CPT

## 2022-02-12 PROCEDURE — 96375 TX/PRO/DX INJ NEW DRUG ADDON: CPT

## 2022-02-12 PROCEDURE — 36415 COLL VENOUS BLD VENIPUNCTURE: CPT

## 2022-02-12 PROCEDURE — 74011250636 HC RX REV CODE- 250/636: Performed by: EMERGENCY MEDICINE

## 2022-02-12 PROCEDURE — 74011000258 HC RX REV CODE- 258: Performed by: EMERGENCY MEDICINE

## 2022-02-12 PROCEDURE — 96365 THER/PROPH/DIAG IV INF INIT: CPT

## 2022-02-12 PROCEDURE — 73630 X-RAY EXAM OF FOOT: CPT

## 2022-02-12 PROCEDURE — 85025 COMPLETE CBC W/AUTO DIFF WBC: CPT

## 2022-02-12 PROCEDURE — 74011250637 HC RX REV CODE- 250/637: Performed by: EMERGENCY MEDICINE

## 2022-02-12 PROCEDURE — 87040 BLOOD CULTURE FOR BACTERIA: CPT

## 2022-02-12 PROCEDURE — 65270000029 HC RM PRIVATE

## 2022-02-12 PROCEDURE — 80053 COMPREHEN METABOLIC PANEL: CPT

## 2022-02-12 RX ORDER — METRONIDAZOLE 500 MG/100ML
500 INJECTION, SOLUTION INTRAVENOUS EVERY 8 HOURS
Status: DISCONTINUED | OUTPATIENT
Start: 2022-02-12 | End: 2022-02-14

## 2022-02-12 RX ORDER — CLONIDINE HYDROCHLORIDE 0.1 MG/1
0.2 TABLET ORAL 2 TIMES DAILY
Status: DISCONTINUED | OUTPATIENT
Start: 2022-02-13 | End: 2022-02-16

## 2022-02-12 RX ORDER — INSULIN LISPRO 100 [IU]/ML
INJECTION, SOLUTION INTRAVENOUS; SUBCUTANEOUS
Status: DISCONTINUED | OUTPATIENT
Start: 2022-02-13 | End: 2022-02-17 | Stop reason: HOSPADM

## 2022-02-12 RX ORDER — HYDROCODONE BITARTRATE AND ACETAMINOPHEN 5; 325 MG/1; MG/1
1 TABLET ORAL
Status: COMPLETED | OUTPATIENT
Start: 2022-02-12 | End: 2022-02-12

## 2022-02-12 RX ORDER — VANCOMYCIN 2 GRAM/500 ML IN 0.9 % SODIUM CHLORIDE INTRAVENOUS
2000 ONCE
Status: COMPLETED | OUTPATIENT
Start: 2022-02-12 | End: 2022-02-12

## 2022-02-12 RX ORDER — ASPIRIN 81 MG/1
81 TABLET ORAL DAILY
Status: DISCONTINUED | OUTPATIENT
Start: 2022-02-13 | End: 2022-02-17 | Stop reason: HOSPADM

## 2022-02-12 RX ORDER — FENTANYL CITRATE 50 UG/ML
100 INJECTION, SOLUTION INTRAMUSCULAR; INTRAVENOUS ONCE
Status: COMPLETED | OUTPATIENT
Start: 2022-02-12 | End: 2022-02-12

## 2022-02-12 RX ORDER — PANTOPRAZOLE SODIUM 40 MG/1
40 TABLET, DELAYED RELEASE ORAL DAILY
Status: DISCONTINUED | OUTPATIENT
Start: 2022-02-13 | End: 2022-02-17 | Stop reason: HOSPADM

## 2022-02-12 RX ORDER — MORPHINE SULFATE 2 MG/ML
2 INJECTION, SOLUTION INTRAMUSCULAR; INTRAVENOUS
Status: DISCONTINUED | OUTPATIENT
Start: 2022-02-12 | End: 2022-02-17 | Stop reason: HOSPADM

## 2022-02-12 RX ORDER — ARIPIPRAZOLE 5 MG/1
10 TABLET ORAL
Status: DISCONTINUED | OUTPATIENT
Start: 2022-02-13 | End: 2022-02-17 | Stop reason: HOSPADM

## 2022-02-12 RX ORDER — INSULIN GLARGINE 100 [IU]/ML
15 INJECTION, SOLUTION SUBCUTANEOUS
Status: DISCONTINUED | OUTPATIENT
Start: 2022-02-12 | End: 2022-02-17 | Stop reason: HOSPADM

## 2022-02-12 RX ORDER — MAGNESIUM SULFATE 100 %
4 CRYSTALS MISCELLANEOUS AS NEEDED
Status: DISCONTINUED | OUTPATIENT
Start: 2022-02-12 | End: 2022-02-17 | Stop reason: HOSPADM

## 2022-02-12 RX ORDER — PREGABALIN 150 MG/1
150 CAPSULE ORAL 2 TIMES DAILY
Status: DISCONTINUED | OUTPATIENT
Start: 2022-02-13 | End: 2022-02-17 | Stop reason: HOSPADM

## 2022-02-12 RX ORDER — LISINOPRIL 10 MG/1
10 TABLET ORAL DAILY
Status: DISCONTINUED | OUTPATIENT
Start: 2022-02-13 | End: 2022-02-17

## 2022-02-12 RX ADMIN — VANCOMYCIN HYDROCHLORIDE 2000 MG: 10 INJECTION, POWDER, LYOPHILIZED, FOR SOLUTION INTRAVENOUS at 21:37

## 2022-02-12 RX ADMIN — PIPERACILLIN AND TAZOBACTAM 3.38 G: 3; .375 INJECTION, POWDER, LYOPHILIZED, FOR SOLUTION INTRAVENOUS at 19:43

## 2022-02-12 RX ADMIN — HYDROCODONE BITARTRATE AND ACETAMINOPHEN 1 TABLET: 5; 325 TABLET ORAL at 18:46

## 2022-02-12 RX ADMIN — FENTANYL CITRATE 100 MCG: 50 INJECTION, SOLUTION INTRAMUSCULAR; INTRAVENOUS at 20:19
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
## 2022-02-13 ENCOUNTER — APPOINTMENT (OUTPATIENT)
Dept: MRI IMAGING | Age: 57
DRG: 617 | End: 2022-02-13
Attending: PODIATRIST
Payer: MEDICARE

## 2022-02-13 ENCOUNTER — ANESTHESIA EVENT (OUTPATIENT)
Dept: SURGERY | Age: 57
DRG: 617 | End: 2022-02-13
Payer: MEDICARE

## 2022-02-13 LAB
ANION GAP SERPL CALC-SCNC: 3 MMOL/L (ref 5–15)
BUN SERPL-MCNC: 27 MG/DL (ref 6–20)
BUN/CREAT SERPL: 24 (ref 12–20)
CALCIUM SERPL-MCNC: 8.9 MG/DL (ref 8.5–10.1)
CHLORIDE SERPL-SCNC: 104 MMOL/L (ref 97–108)
CO2 SERPL-SCNC: 27 MMOL/L (ref 21–32)
CREAT SERPL-MCNC: 1.12 MG/DL (ref 0.7–1.3)
ERYTHROCYTE [DISTWIDTH] IN BLOOD BY AUTOMATED COUNT: 13.1 % (ref 11.5–14.5)
EST. AVERAGE GLUCOSE BLD GHB EST-MCNC: 189 MG/DL
GLUCOSE BLD STRIP.AUTO-MCNC: 170 MG/DL (ref 65–117)
GLUCOSE BLD STRIP.AUTO-MCNC: 202 MG/DL (ref 65–117)
GLUCOSE BLD STRIP.AUTO-MCNC: 225 MG/DL (ref 65–117)
GLUCOSE SERPL-MCNC: 269 MG/DL (ref 65–100)
HBA1C MFR BLD: 8.2 % (ref 4–5.6)
HCT VFR BLD AUTO: 32.9 % (ref 36.6–50.3)
HGB BLD-MCNC: 11.8 G/DL (ref 12.1–17)
LACTATE SERPL-SCNC: 1.5 MMOL/L (ref 0.4–2)
MCH RBC QN AUTO: 30.4 PG (ref 26–34)
MCHC RBC AUTO-ENTMCNC: 35.9 G/DL (ref 30–36.5)
MCV RBC AUTO: 84.8 FL (ref 80–99)
NRBC # BLD: 0 K/UL (ref 0–0.01)
NRBC BLD-RTO: 0 PER 100 WBC
PLATELET # BLD AUTO: 123 K/UL (ref 150–400)
PMV BLD AUTO: 9.5 FL (ref 8.9–12.9)
POTASSIUM SERPL-SCNC: 4 MMOL/L (ref 3.5–5.1)
RBC # BLD AUTO: 3.88 M/UL (ref 4.1–5.7)
SERVICE CMNT-IMP: ABNORMAL
SODIUM SERPL-SCNC: 134 MMOL/L (ref 136–145)
WBC # BLD AUTO: 5.1 K/UL (ref 4.1–11.1)

## 2022-02-13 PROCEDURE — 74011000258 HC RX REV CODE- 258: Performed by: INTERNAL MEDICINE

## 2022-02-13 PROCEDURE — 74011250636 HC RX REV CODE- 250/636: Performed by: INTERNAL MEDICINE

## 2022-02-13 PROCEDURE — A9576 INJ PROHANCE MULTIPACK: HCPCS | Performed by: INTERNAL MEDICINE

## 2022-02-13 PROCEDURE — 74011636637 HC RX REV CODE- 636/637: Performed by: STUDENT IN AN ORGANIZED HEALTH CARE EDUCATION/TRAINING PROGRAM

## 2022-02-13 PROCEDURE — 82962 GLUCOSE BLOOD TEST: CPT

## 2022-02-13 PROCEDURE — 74011250636 HC RX REV CODE- 250/636: Performed by: STUDENT IN AN ORGANIZED HEALTH CARE EDUCATION/TRAINING PROGRAM

## 2022-02-13 PROCEDURE — 80048 BASIC METABOLIC PNL TOTAL CA: CPT

## 2022-02-13 PROCEDURE — 73720 MRI LWR EXTREMITY W/O&W/DYE: CPT

## 2022-02-13 PROCEDURE — 93005 ELECTROCARDIOGRAM TRACING: CPT

## 2022-02-13 PROCEDURE — 36415 COLL VENOUS BLD VENIPUNCTURE: CPT

## 2022-02-13 PROCEDURE — 85027 COMPLETE CBC AUTOMATED: CPT

## 2022-02-13 PROCEDURE — 83036 HEMOGLOBIN GLYCOSYLATED A1C: CPT

## 2022-02-13 PROCEDURE — 83605 ASSAY OF LACTIC ACID: CPT

## 2022-02-13 PROCEDURE — 74011250637 HC RX REV CODE- 250/637: Performed by: INTERNAL MEDICINE

## 2022-02-13 PROCEDURE — 74011250637 HC RX REV CODE- 250/637: Performed by: STUDENT IN AN ORGANIZED HEALTH CARE EDUCATION/TRAINING PROGRAM

## 2022-02-13 PROCEDURE — 65270000029 HC RM PRIVATE

## 2022-02-13 RX ORDER — SODIUM CHLORIDE 9 MG/ML
125 INJECTION, SOLUTION INTRAVENOUS CONTINUOUS
Status: DISCONTINUED | OUTPATIENT
Start: 2022-02-13 | End: 2022-02-15

## 2022-02-13 RX ORDER — HYDROCODONE BITARTRATE AND ACETAMINOPHEN 5; 325 MG/1; MG/1
1 TABLET ORAL
Status: DISCONTINUED | OUTPATIENT
Start: 2022-02-13 | End: 2022-02-13

## 2022-02-13 RX ORDER — ACETAMINOPHEN 325 MG/1
650 TABLET ORAL
Status: DISCONTINUED | OUTPATIENT
Start: 2022-02-13 | End: 2022-02-17 | Stop reason: HOSPADM

## 2022-02-13 RX ORDER — OXYCODONE HYDROCHLORIDE 5 MG/1
2.5 TABLET ORAL
Status: DISCONTINUED | OUTPATIENT
Start: 2022-02-13 | End: 2022-02-15

## 2022-02-13 RX ADMIN — SODIUM CHLORIDE 125 ML/HR: 9 INJECTION, SOLUTION INTRAVENOUS at 16:16

## 2022-02-13 RX ADMIN — MORPHINE SULFATE 2 MG: 2 INJECTION, SOLUTION INTRAMUSCULAR; INTRAVENOUS at 13:08

## 2022-02-13 RX ADMIN — HYDROCODONE BITARTRATE AND ACETAMINOPHEN 1 TABLET: 5; 325 TABLET ORAL at 14:49

## 2022-02-13 RX ADMIN — MORPHINE SULFATE 2 MG: 2 INJECTION, SOLUTION INTRAMUSCULAR; INTRAVENOUS at 00:37

## 2022-02-13 RX ADMIN — PREGABALIN 150 MG: 150 CAPSULE ORAL at 19:01

## 2022-02-13 RX ADMIN — ARIPIPRAZOLE 10 MG: 5 TABLET ORAL at 08:58

## 2022-02-13 RX ADMIN — SODIUM CHLORIDE 1000 ML: 9 INJECTION, SOLUTION INTRAVENOUS at 15:10

## 2022-02-13 RX ADMIN — MORPHINE SULFATE 2 MG: 2 INJECTION, SOLUTION INTRAMUSCULAR; INTRAVENOUS at 19:00

## 2022-02-13 RX ADMIN — MORPHINE SULFATE 2 MG: 2 INJECTION, SOLUTION INTRAMUSCULAR; INTRAVENOUS at 09:07

## 2022-02-13 RX ADMIN — VANCOMYCIN HYDROCHLORIDE 750 MG: 750 INJECTION, POWDER, LYOPHILIZED, FOR SOLUTION INTRAVENOUS at 13:08

## 2022-02-13 RX ADMIN — GADOTERIDOL 15 ML: 279.3 INJECTION, SOLUTION INTRAVENOUS at 18:10

## 2022-02-13 RX ADMIN — Medication 2 UNITS: at 13:22

## 2022-02-13 RX ADMIN — ASPIRIN 81 MG: 81 TABLET, COATED ORAL at 08:58

## 2022-02-13 RX ADMIN — MORPHINE SULFATE 2 MG: 2 INJECTION, SOLUTION INTRAMUSCULAR; INTRAVENOUS at 04:50

## 2022-02-13 RX ADMIN — METRONIDAZOLE 500 MG: 500 INJECTION, SOLUTION INTRAVENOUS at 05:11

## 2022-02-13 RX ADMIN — METRONIDAZOLE 500 MG: 500 INJECTION, SOLUTION INTRAVENOUS at 13:23

## 2022-02-13 RX ADMIN — PREGABALIN 150 MG: 150 CAPSULE ORAL at 08:58

## 2022-02-13 RX ADMIN — CEFEPIME HYDROCHLORIDE 2 G: 2 INJECTION, POWDER, FOR SOLUTION INTRAVENOUS at 19:00

## 2022-02-13 RX ADMIN — PANTOPRAZOLE SODIUM 40 MG: 40 TABLET, DELAYED RELEASE ORAL at 08:58

## 2022-02-13 RX ADMIN — CEFEPIME HYDROCHLORIDE 2 G: 2 INJECTION, POWDER, FOR SOLUTION INTRAVENOUS at 04:23

## 2022-02-13 RX ADMIN — CLONIDINE HYDROCHLORIDE 0.2 MG: 0.1 TABLET ORAL at 08:58

## 2022-02-13 RX ADMIN — Medication 3 UNITS: at 08:57

## 2022-02-13 RX ADMIN — CEFEPIME HYDROCHLORIDE 2 G: 2 INJECTION, POWDER, FOR SOLUTION INTRAVENOUS at 12:02

## 2022-02-13 RX ADMIN — LISINOPRIL 10 MG: 10 TABLET ORAL at 08:58

## 2022-02-13 RX ADMIN — Medication 15 UNITS: at 04:16

## 2022-02-13 NOTE — PROGRESS NOTES
End of Shift Note    Bedside shift change report given to Steven Patrick RN (oncoming nurse) by Basilia Harmon RN (offgoing nurse). Report included the following information SBAR, Kardex, ED Summary, Intake/Output, MAR and Recent Results    Shift worked:  [id number]     Shift summary and any significant changes:     Pt received medication for pain this shift-- see MAR    Pt states pain begins to become noticeable about 2 hours after pain medication has been administered. Pt has no orders for a rapid COVID test   Concerns for physician to address:  Rapid COVID test?    Pain medication administration intervals adjusted to be given sooner than Q4?    Zone phone for oncoming shift:   Ismael Schreiber RN

## 2022-02-13 NOTE — PROGRESS NOTES
-Please complete MRI History and Safety Screening Form   - Patient cannot be scanned until this form is completed, including signatures, and reviewed in MRI to ensure patient is SAFE and eligible for MRI. - CALL MRI when this has been successfully completed at [phone].

## 2022-02-13 NOTE — CONSULTS
Seen patient in the room resting comfortably    Has been suffering with left foot ulceration for at least 6 months and has been treated at [facility] and [facility] [place] [facility]     Hx of abx treatment IV and from the reviewed notes from nov' 2021 was suspected of possible OM in the 5th Met left    Reviewed the xrays most likely chronic OM     Will get MRI and Vascular eval     Needs surgical debridement with possible amputation of the left 5th partial ray resection     Spoke to patient of the tentative treatment plan and verbal consent obtained will scheduel for the same    Full note to follow

## 2022-02-13 NOTE — ED NOTES
Patient is being transferred to [facility] 2 General Surgery, Room # 2100. Report given to Erin Fabian RN on Reynold Browning for routine progression of care. Report consisted of the following information SBAR, ED Summary, Intake/Output, MAR and Recent Results. Patient transferred to receiving unit by: tranporter (RN or tech name). Outstanding consults needed: No     Next labs due: Yes    The following personal items will be sent with the patient during transfer to the floor:     All valuables:    Cardiac monitoring ordered: No     The following CURRENT information was reported to the receiving RN:    Code status: Full Code at time of transfer    Last set of vital signs:  Vital Signs  Level of Consciousness: Alert (0) (02/12/22 2200)  Temp: 98.3 °F (36.8 °C) (02/12/22 1702)  Temp Source: Oral (02/12/22 1702)  Pulse (Heart Rate): 85 (02/12/22 1702)  Heart Rate Source: Monitor (02/12/22 2200)  Resp Rate: 18 (02/12/22 1702)  BP: (!) 144/89 (02/12/22 2200)  MAP (Monitor): 106 (02/12/22 2200)  MAP (Calculated): 107 (02/12/22 2200)  BP 1 Location: Left upper arm (02/12/22 1702)  MEWS Score: 1 (02/12/22 1702)         Oxygen Therapy  O2 Sat (%): 99 % (02/12/22 2200)  Pulse via Oximetry: 69 beats per minute (02/12/22 2200)  O2 Device: None (Room air) (02/12/22 2200)      Last pain assessment:  Pain 1  Pain Scale 1: Numeric (0 - 10)  Pain Intensity 1:  (\"it's pretty bad\")      Wounds: Yes    Urinary catheter: voiding  Is there a barragan order: No     LDAs:       Peripheral IV 02/12/22 Left Antecubital (Active)   Site Assessment Clean, dry, & intact 02/12/22 1902   Phlebitis Assessment 0 02/12/22 1902   Infiltration Assessment 0 02/12/22 1902   Dressing Status Clean, dry, & intact 02/12/22 1902   Dressing Type Tape;Transparent 02/12/22 1902   Hub Color/Line Status Pink;Flushed;Patent 02/12/22 1902   Action Taken Blood drawn 02/12/22 1902   Alcohol Cap Used No 02/12/22 1902         Opportunity for questions and clarification was provided.     Chad Izquierdo RN

## 2022-02-13 NOTE — PROGRESS NOTES
Pharmacy Antimicrobial Kinetic Dosing    Indication for Antimicrobials: Osteomyelitis     Current Regimen of Each Antimicrobial:  Vancomycin 1000 mg x 1 (Start Date ; Day # 1)  Zosyn 3.375 gm x 1 (Start Date: ; Day #1)    Previous Antimicrobial Therapy:    Goal Level: AUC: 400-600 mg/hr/Liter/day    Date Dose & Interval Measured (mcg/mL) Predicted AUC/AILEEN                       Date & time of next level:     Dosing calculator used: Gl[employer]knowledge calculator    Significant Positive Cultures:       Conditions for Dosing Consideration: None    Labs:  Recent Labs     22   CREA 1.03   BUN 27*     Recent Labs     22   WBC 6.8     Temp (24hrs), Av.3 °F (36.8 °C), Min:98.3 °F (36.8 °C), Max:98.3 °F (36.8 °C)      Creatinine Clearance (mL/min):   CrCl (Ideal Body Weight): 86.9   If actual weight < IBW: CrCl (Actual Body Weight) 86.9    Impression/Plan:   · XR of Foot shows- suspicious for acute osteomyelitis at the fifth MTP joint. .  · Based on current indication and renal function, will adjust Vancomycin loading dose to 2 gm x 1  · Pt has received Zosyn 3.375 gm loading dose  · Antimicrobial stop date TBD  · Will order BMP, CBC w/o dif     Pharmacy will follow daily and adjust medications as appropriate for renal function and/or serum levels.     Thank you,  Latasha Colon, PHARMD    Vancomycin Dosing Document    Documents located on pharmacy Teams site: Clinical Practice -> Antimicrobial Stewardship -> Antibiotics_Vancomycin     Aminoglycoside Dosing Document    Documents located on pharmacy Teams site: Clinical Practice -> Antimicrobial Stewardship -> Antibiotics_Aminoglycosides
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
## 2022-02-14 ENCOUNTER — APPOINTMENT (OUTPATIENT)
Dept: VASCULAR SURGERY | Age: 57
DRG: 617 | End: 2022-02-14
Attending: PODIATRIST
Payer: MEDICARE

## 2022-02-14 ENCOUNTER — ANESTHESIA (OUTPATIENT)
Dept: SURGERY | Age: 57
DRG: 617 | End: 2022-02-14
Payer: MEDICARE

## 2022-02-14 LAB
ANION GAP SERPL CALC-SCNC: 2 MMOL/L (ref 5–15)
ATRIAL RATE: 50 BPM
BUN SERPL-MCNC: 37 MG/DL (ref 6–20)
BUN/CREAT SERPL: 23 (ref 12–20)
CALCIUM SERPL-MCNC: 8.6 MG/DL (ref 8.5–10.1)
CALCULATED P AXIS, ECG09: 69 DEGREES
CALCULATED R AXIS, ECG10: 58 DEGREES
CALCULATED T AXIS, ECG11: 74 DEGREES
CHLORIDE SERPL-SCNC: 108 MMOL/L (ref 97–108)
CO2 SERPL-SCNC: 27 MMOL/L (ref 21–32)
COVID-19 RAPID TEST, COVR: NOT DETECTED
CREAT SERPL-MCNC: 1.63 MG/DL (ref 0.7–1.3)
DIAGNOSIS, 93000: NORMAL
ERYTHROCYTE [DISTWIDTH] IN BLOOD BY AUTOMATED COUNT: 13.2 % (ref 11.5–14.5)
GLUCOSE BLD STRIP.AUTO-MCNC: 136 MG/DL (ref 65–117)
GLUCOSE BLD STRIP.AUTO-MCNC: 146 MG/DL (ref 65–117)
GLUCOSE BLD STRIP.AUTO-MCNC: 176 MG/DL (ref 65–117)
GLUCOSE BLD STRIP.AUTO-MCNC: 181 MG/DL (ref 65–117)
GLUCOSE BLD STRIP.AUTO-MCNC: 196 MG/DL (ref 65–117)
GLUCOSE SERPL-MCNC: 186 MG/DL (ref 65–100)
HCT VFR BLD AUTO: 28.1 % (ref 36.6–50.3)
HGB BLD-MCNC: 9.7 G/DL (ref 12.1–17)
MCH RBC QN AUTO: 30.3 PG (ref 26–34)
MCHC RBC AUTO-ENTMCNC: 34.5 G/DL (ref 30–36.5)
MCV RBC AUTO: 87.8 FL (ref 80–99)
NRBC # BLD: 0 K/UL (ref 0–0.01)
NRBC BLD-RTO: 0 PER 100 WBC
P-R INTERVAL, ECG05: 164 MS
PLATELET # BLD AUTO: 92 K/UL (ref 150–400)
PMV BLD AUTO: 9.4 FL (ref 8.9–12.9)
POTASSIUM SERPL-SCNC: 4 MMOL/L (ref 3.5–5.1)
Q-T INTERVAL, ECG07: 442 MS
QRS DURATION, ECG06: 100 MS
QTC CALCULATION (BEZET), ECG08: 402 MS
RBC # BLD AUTO: 3.2 M/UL (ref 4.1–5.7)
SERVICE CMNT-IMP: ABNORMAL
SODIUM SERPL-SCNC: 137 MMOL/L (ref 136–145)
SOURCE, COVRS: NORMAL
VANCOMYCIN SERPL-MCNC: 14 UG/ML
VENTRICULAR RATE, ECG03: 50 BPM
WBC # BLD AUTO: 4 K/UL (ref 4.1–11.1)

## 2022-02-14 PROCEDURE — 74011250636 HC RX REV CODE- 250/636: Performed by: NURSE ANESTHETIST, CERTIFIED REGISTERED

## 2022-02-14 PROCEDURE — 74011636637 HC RX REV CODE- 636/637: Performed by: STUDENT IN AN ORGANIZED HEALTH CARE EDUCATION/TRAINING PROGRAM

## 2022-02-14 PROCEDURE — 74011636637 HC RX REV CODE- 636/637: Performed by: PODIATRIST

## 2022-02-14 PROCEDURE — 82962 GLUCOSE BLOOD TEST: CPT

## 2022-02-14 PROCEDURE — 80202 ASSAY OF VANCOMYCIN: CPT

## 2022-02-14 PROCEDURE — 74011250636 HC RX REV CODE- 250/636: Performed by: PODIATRIST

## 2022-02-14 PROCEDURE — 77030006788 HC BLD SAW OSC STRY -B: Performed by: PODIATRIST

## 2022-02-14 PROCEDURE — 74011000250 HC RX REV CODE- 250: Performed by: NURSE ANESTHETIST, CERTIFIED REGISTERED

## 2022-02-14 PROCEDURE — 85027 COMPLETE CBC AUTOMATED: CPT

## 2022-02-14 PROCEDURE — 76210000063 HC OR PH I REC FIRST 0.5 HR: Performed by: PODIATRIST

## 2022-02-14 PROCEDURE — 77030002916 HC SUT ETHLN J&J -A: Performed by: PODIATRIST

## 2022-02-14 PROCEDURE — 36415 COLL VENOUS BLD VENIPUNCTURE: CPT

## 2022-02-14 PROCEDURE — 74011250637 HC RX REV CODE- 250/637: Performed by: INTERNAL MEDICINE

## 2022-02-14 PROCEDURE — 74011250636 HC RX REV CODE- 250/636: Performed by: STUDENT IN AN ORGANIZED HEALTH CARE EDUCATION/TRAINING PROGRAM

## 2022-02-14 PROCEDURE — 74011250637 HC RX REV CODE- 250/637: Performed by: PODIATRIST

## 2022-02-14 PROCEDURE — 87186 SC STD MICRODIL/AGAR DIL: CPT

## 2022-02-14 PROCEDURE — 76060000033 HC ANESTHESIA 1 TO 1.5 HR: Performed by: PODIATRIST

## 2022-02-14 PROCEDURE — 74011000258 HC RX REV CODE- 258: Performed by: INTERNAL MEDICINE

## 2022-02-14 PROCEDURE — 65270000029 HC RM PRIVATE

## 2022-02-14 PROCEDURE — 93922 UPR/L XTREMITY ART 2 LEVELS: CPT

## 2022-02-14 PROCEDURE — 80048 BASIC METABOLIC PNL TOTAL CA: CPT

## 2022-02-14 PROCEDURE — 76010000149 HC OR TIME 1 TO 1.5 HR: Performed by: PODIATRIST

## 2022-02-14 PROCEDURE — 74011000258 HC RX REV CODE- 258: Performed by: PODIATRIST

## 2022-02-14 PROCEDURE — 87077 CULTURE AEROBIC IDENTIFY: CPT

## 2022-02-14 PROCEDURE — 74011250636 HC RX REV CODE- 250/636: Performed by: INTERNAL MEDICINE

## 2022-02-14 PROCEDURE — 87205 SMEAR GRAM STAIN: CPT

## 2022-02-14 PROCEDURE — 77030038692 HC WND DEB SYS IRMX -B: Performed by: PODIATRIST

## 2022-02-14 PROCEDURE — 0Y6N0ZF DETACHMENT AT LEFT FOOT, PARTIAL 5TH RAY, OPEN APPROACH: ICD-10-PCS | Performed by: PODIATRIST

## 2022-02-14 PROCEDURE — 51798 US URINE CAPACITY MEASURE: CPT

## 2022-02-14 PROCEDURE — 74011000250 HC RX REV CODE- 250: Performed by: PODIATRIST

## 2022-02-14 PROCEDURE — 77030000032 HC CUF TRNQT ZIMM -B: Performed by: PODIATRIST

## 2022-02-14 PROCEDURE — 88311 DECALCIFY TISSUE: CPT

## 2022-02-14 PROCEDURE — 88305 TISSUE EXAM BY PATHOLOGIST: CPT

## 2022-02-14 PROCEDURE — 87075 CULTR BACTERIA EXCEPT BLOOD: CPT

## 2022-02-14 PROCEDURE — 2709999900 HC NON-CHARGEABLE SUPPLY: Performed by: PODIATRIST

## 2022-02-14 PROCEDURE — 87635 SARS-COV-2 COVID-19 AMP PRB: CPT

## 2022-02-14 RX ORDER — HYDROMORPHONE HYDROCHLORIDE 1 MG/ML
0.2 INJECTION, SOLUTION INTRAMUSCULAR; INTRAVENOUS; SUBCUTANEOUS
Status: DISCONTINUED | OUTPATIENT
Start: 2022-02-14 | End: 2022-02-14 | Stop reason: HOSPADM

## 2022-02-14 RX ORDER — SODIUM CHLORIDE, SODIUM LACTATE, POTASSIUM CHLORIDE, CALCIUM CHLORIDE 600; 310; 30; 20 MG/100ML; MG/100ML; MG/100ML; MG/100ML
INJECTION, SOLUTION INTRAVENOUS
Status: DISCONTINUED | OUTPATIENT
Start: 2022-02-14 | End: 2022-02-14 | Stop reason: HOSPADM

## 2022-02-14 RX ORDER — BUPIVACAINE HYDROCHLORIDE 5 MG/ML
INJECTION, SOLUTION EPIDURAL; INTRACAUDAL AS NEEDED
Status: DISCONTINUED | OUTPATIENT
Start: 2022-02-14 | End: 2022-02-14 | Stop reason: HOSPADM

## 2022-02-14 RX ORDER — PROPOFOL 10 MG/ML
INJECTION, EMULSION INTRAVENOUS
Status: DISCONTINUED | OUTPATIENT
Start: 2022-02-14 | End: 2022-02-14 | Stop reason: HOSPADM

## 2022-02-14 RX ORDER — METRONIDAZOLE 500 MG/100ML
500 INJECTION, SOLUTION INTRAVENOUS EVERY 12 HOURS
Status: DISCONTINUED | OUTPATIENT
Start: 2022-02-14 | End: 2022-02-17 | Stop reason: HOSPADM

## 2022-02-14 RX ORDER — EPHEDRINE SULFATE/0.9% NACL/PF 50 MG/5 ML
SYRINGE (ML) INTRAVENOUS AS NEEDED
Status: DISCONTINUED | OUTPATIENT
Start: 2022-02-14 | End: 2022-02-14 | Stop reason: HOSPADM

## 2022-02-14 RX ORDER — SODIUM CHLORIDE 0.9 % (FLUSH) 0.9 %
5-40 SYRINGE (ML) INJECTION AS NEEDED
Status: CANCELLED | OUTPATIENT
Start: 2022-02-14

## 2022-02-14 RX ORDER — SODIUM CHLORIDE 0.9 % (FLUSH) 0.9 %
5-40 SYRINGE (ML) INJECTION EVERY 8 HOURS
Status: CANCELLED | OUTPATIENT
Start: 2022-02-14

## 2022-02-14 RX ORDER — PROPOFOL 10 MG/ML
INJECTION, EMULSION INTRAVENOUS AS NEEDED
Status: DISCONTINUED | OUTPATIENT
Start: 2022-02-14 | End: 2022-02-14 | Stop reason: HOSPADM

## 2022-02-14 RX ORDER — LIDOCAINE HYDROCHLORIDE 10 MG/ML
0.1 INJECTION, SOLUTION EPIDURAL; INFILTRATION; INTRACAUDAL; PERINEURAL AS NEEDED
Status: CANCELLED | OUTPATIENT
Start: 2022-02-14

## 2022-02-14 RX ORDER — DIPHENHYDRAMINE HYDROCHLORIDE 50 MG/ML
12.5 INJECTION, SOLUTION INTRAMUSCULAR; INTRAVENOUS AS NEEDED
Status: DISCONTINUED | OUTPATIENT
Start: 2022-02-14 | End: 2022-02-14 | Stop reason: HOSPADM

## 2022-02-14 RX ORDER — VANCOMYCIN HYDROCHLORIDE
1250 EVERY 24 HOURS
Status: DISCONTINUED | OUTPATIENT
Start: 2022-02-15 | End: 2022-02-16

## 2022-02-14 RX ORDER — SODIUM CHLORIDE, SODIUM LACTATE, POTASSIUM CHLORIDE, CALCIUM CHLORIDE 600; 310; 30; 20 MG/100ML; MG/100ML; MG/100ML; MG/100ML
25 INJECTION, SOLUTION INTRAVENOUS CONTINUOUS
Status: DISCONTINUED | OUTPATIENT
Start: 2022-02-14 | End: 2022-02-14 | Stop reason: HOSPADM

## 2022-02-14 RX ORDER — SODIUM CHLORIDE 0.9 % (FLUSH) 0.9 %
5-40 SYRINGE (ML) INJECTION EVERY 8 HOURS
Status: DISCONTINUED | OUTPATIENT
Start: 2022-02-14 | End: 2022-02-14 | Stop reason: HOSPADM

## 2022-02-14 RX ORDER — ONDANSETRON 2 MG/ML
INJECTION INTRAMUSCULAR; INTRAVENOUS AS NEEDED
Status: DISCONTINUED | OUTPATIENT
Start: 2022-02-14 | End: 2022-02-14 | Stop reason: HOSPADM

## 2022-02-14 RX ORDER — SODIUM CHLORIDE 0.9 % (FLUSH) 0.9 %
5-40 SYRINGE (ML) INJECTION AS NEEDED
Status: DISCONTINUED | OUTPATIENT
Start: 2022-02-14 | End: 2022-02-14 | Stop reason: HOSPADM

## 2022-02-14 RX ORDER — DEXMEDETOMIDINE HYDROCHLORIDE 100 UG/ML
INJECTION, SOLUTION INTRAVENOUS AS NEEDED
Status: DISCONTINUED | OUTPATIENT
Start: 2022-02-14 | End: 2022-02-14 | Stop reason: HOSPADM

## 2022-02-14 RX ORDER — GLYCOPYRROLATE 0.2 MG/ML
INJECTION INTRAMUSCULAR; INTRAVENOUS AS NEEDED
Status: DISCONTINUED | OUTPATIENT
Start: 2022-02-14 | End: 2022-02-14 | Stop reason: HOSPADM

## 2022-02-14 RX ORDER — FENTANYL CITRATE 50 UG/ML
INJECTION, SOLUTION INTRAMUSCULAR; INTRAVENOUS AS NEEDED
Status: DISCONTINUED | OUTPATIENT
Start: 2022-02-14 | End: 2022-02-14 | Stop reason: HOSPADM

## 2022-02-14 RX ORDER — LIDOCAINE HYDROCHLORIDE 20 MG/ML
INJECTION, SOLUTION EPIDURAL; INFILTRATION; INTRACAUDAL; PERINEURAL AS NEEDED
Status: DISCONTINUED | OUTPATIENT
Start: 2022-02-14 | End: 2022-02-14 | Stop reason: HOSPADM

## 2022-02-14 RX ORDER — FENTANYL CITRATE 50 UG/ML
25 INJECTION, SOLUTION INTRAMUSCULAR; INTRAVENOUS
Status: DISCONTINUED | OUTPATIENT
Start: 2022-02-14 | End: 2022-02-14 | Stop reason: HOSPADM

## 2022-02-14 RX ADMIN — GLYCOPYRROLATE 0.2 MG: 0.2 INJECTION, SOLUTION INTRAMUSCULAR; INTRAVENOUS at 09:28

## 2022-02-14 RX ADMIN — METRONIDAZOLE 500 MG: 500 INJECTION, SOLUTION INTRAVENOUS at 00:31

## 2022-02-14 RX ADMIN — VANCOMYCIN HYDROCHLORIDE 750 MG: 750 INJECTION, POWDER, LYOPHILIZED, FOR SOLUTION INTRAVENOUS at 15:53

## 2022-02-14 RX ADMIN — LIDOCAINE HYDROCHLORIDE 20 MG: 20 INJECTION, SOLUTION EPIDURAL; INFILTRATION; INTRACAUDAL; PERINEURAL at 09:23

## 2022-02-14 RX ADMIN — FENTANYL CITRATE 25 MCG: 50 INJECTION, SOLUTION INTRAMUSCULAR; INTRAVENOUS at 09:34

## 2022-02-14 RX ADMIN — LISINOPRIL 10 MG: 10 TABLET ORAL at 11:41

## 2022-02-14 RX ADMIN — PROPOFOL 25 MG: 10 INJECTION, EMULSION INTRAVENOUS at 09:27

## 2022-02-14 RX ADMIN — CLONIDINE HYDROCHLORIDE 0.2 MG: 0.1 TABLET ORAL at 11:41

## 2022-02-14 RX ADMIN — DEXMEDETOMIDINE HYDROCHLORIDE 10 MCG: 100 INJECTION, SOLUTION, CONCENTRATE INTRAVENOUS at 09:22

## 2022-02-14 RX ADMIN — ONDANSETRON HYDROCHLORIDE 4 MG: 2 INJECTION, SOLUTION INTRAMUSCULAR; INTRAVENOUS at 09:35

## 2022-02-14 RX ADMIN — VANCOMYCIN HYDROCHLORIDE 750 MG: 750 INJECTION, POWDER, LYOPHILIZED, FOR SOLUTION INTRAVENOUS at 00:31

## 2022-02-14 RX ADMIN — FENTANYL CITRATE 25 MCG: 50 INJECTION, SOLUTION INTRAMUSCULAR; INTRAVENOUS at 09:24

## 2022-02-14 RX ADMIN — PANTOPRAZOLE SODIUM 40 MG: 40 TABLET, DELAYED RELEASE ORAL at 11:42

## 2022-02-14 RX ADMIN — ARIPIPRAZOLE 10 MG: 5 TABLET ORAL at 11:41

## 2022-02-14 RX ADMIN — Medication 2 UNITS: at 00:39

## 2022-02-14 RX ADMIN — FENTANYL CITRATE 25 MCG: 50 INJECTION, SOLUTION INTRAMUSCULAR; INTRAVENOUS at 09:30

## 2022-02-14 RX ADMIN — Medication 15 UNITS: at 00:39

## 2022-02-14 RX ADMIN — PROPOFOL 75 MCG/KG/MIN: 10 INJECTION, EMULSION INTRAVENOUS at 09:27

## 2022-02-14 RX ADMIN — SODIUM CHLORIDE 125 ML/HR: 9 INJECTION, SOLUTION INTRAVENOUS at 23:39

## 2022-02-14 RX ADMIN — Medication 15 UNITS: at 22:09

## 2022-02-14 RX ADMIN — SODIUM CHLORIDE 125 ML/HR: 9 INJECTION, SOLUTION INTRAVENOUS at 05:12

## 2022-02-14 RX ADMIN — MORPHINE SULFATE 2 MG: 2 INJECTION, SOLUTION INTRAMUSCULAR; INTRAVENOUS at 15:50

## 2022-02-14 RX ADMIN — OXYCODONE 2.5 MG: 5 TABLET ORAL at 13:32

## 2022-02-14 RX ADMIN — SODIUM CHLORIDE, POTASSIUM CHLORIDE, SODIUM LACTATE AND CALCIUM CHLORIDE: 600; 310; 30; 20 INJECTION, SOLUTION INTRAVENOUS at 09:19

## 2022-02-14 RX ADMIN — OXYCODONE 2.5 MG: 5 TABLET ORAL at 00:44

## 2022-02-14 RX ADMIN — Medication 10 MG: at 09:52

## 2022-02-14 RX ADMIN — PROPOFOL 25 MG: 10 INJECTION, EMULSION INTRAVENOUS at 09:25

## 2022-02-14 RX ADMIN — MORPHINE SULFATE 2 MG: 2 INJECTION, SOLUTION INTRAMUSCULAR; INTRAVENOUS at 11:35

## 2022-02-14 RX ADMIN — PREGABALIN 150 MG: 150 CAPSULE ORAL at 09:00

## 2022-02-14 RX ADMIN — METRONIDAZOLE 500 MG: 500 INJECTION, SOLUTION INTRAVENOUS at 22:02

## 2022-02-14 RX ADMIN — CEFEPIME HYDROCHLORIDE 2 G: 2 INJECTION, POWDER, FOR SOLUTION INTRAVENOUS at 05:12

## 2022-02-14 RX ADMIN — ACETAMINOPHEN 650 MG: 325 TABLET ORAL at 13:32

## 2022-02-14 RX ADMIN — ASPIRIN 81 MG: 81 TABLET, COATED ORAL at 11:41

## 2022-02-14 RX ADMIN — CEFEPIME HYDROCHLORIDE 2 G: 2 INJECTION, POWDER, FOR SOLUTION INTRAVENOUS at 11:48

## 2022-02-14 RX ADMIN — MORPHINE SULFATE 2 MG: 2 INJECTION, SOLUTION INTRAMUSCULAR; INTRAVENOUS at 21:01

## 2022-02-14 RX ADMIN — PREGABALIN 150 MG: 150 CAPSULE ORAL at 17:56

## 2022-02-14 RX ADMIN — GLYCOPYRROLATE 0.2 MG: 0.2 INJECTION, SOLUTION INTRAMUSCULAR; INTRAVENOUS at 09:35

## 2022-02-14 RX ADMIN — Medication 2 UNITS: at 13:32

## 2022-02-14 RX ADMIN — METRONIDAZOLE 500 MG: 500 INJECTION, SOLUTION INTRAVENOUS at 08:46

## 2022-02-14 RX ADMIN — FENTANYL CITRATE 25 MCG: 50 INJECTION, SOLUTION INTRAMUSCULAR; INTRAVENOUS at 09:27

## 2022-02-14 RX ADMIN — CEFEPIME HYDROCHLORIDE 2 G: 2 INJECTION, POWDER, FOR SOLUTION INTRAVENOUS at 21:01

## 2022-02-14 NOTE — PROGRESS NOTES
End of Shift Note    Bedside shift change report given to Duyen, (oncoming nurse) by Margarito Rankin RN (offgoing nurse).   Report included the following information SBAR, Kardex, ED Summary, Intake/Output, MAR and Recent Results    Shift worked:  7p-7a     Shift summary and any significant changes:    Pt was placed on cardiac monitoring     Pt remained sinus arlet this shift with HR in the 40's the majority of this shift    No urine output this shift    Pt received BG coverage    BP in the 80's/50's, but MAP greater than 60    Pt received PRN pain medication-- see MAR    Pt rested this shift   Concerns for physician to address:  Lower BP and heart rate in the 40's     Zone phone for oncoming shift:   Derek Pool, RN

## 2022-02-14 NOTE — CONSULTS
Vascular Surgery Consult Note  2/14/2022    Subjective:     Celso Rowley is a 61 y.o. male with a past medical history significant for diabetes mellitus, hypertension, polysubstance abuse, and bipolar disorder. He is admitted to the hospital with a nonhealing diabetic ulcer of the left foot. He is status post left fifth partial ray amputation with Dr. Renate Gallagher 2/14. We have been asked to evaluate for PAD. He has been hypotensive with bradycardia since admission. Past medical history  Diabetes mellitus  Hypertension  Coronary artery disease  -History of drug-eluting stent placement  Renal calculi  Chronic pain syndrome  Chronic hepatitis C  Polysubstance abuse  -Tobacco and opioids  Bipolar disorder    Past surgical history  Left knee arthroplasty  Right hand surgery for closed fracture    Family history  Cancer: Maternal grandmother  Diabetes: Maternal grandfather  Coronary artery disease: Father  Hypertension: Mother and father  Stroke: Mother    Social history  He is 1.5 pack/day smoker  He has a history of opioid abuse  He denies alcohol use    Home medications   ibuprofen (MOTRIN) 600 mg tablet Take 1 Tablet by mouth every eight (8) hours as needed for Pain. bacitracin (BACITRACIN) 500 unit/gram oint Apply  to affected area three (3) times daily. ondansetron (ZOFRAN ODT) 4 mg disintegrating tablet Take 1 Tab by mouth every eight (8) hours as needed for Nausea or Vomiting. pantoprazole (PROTONIX) 40 mg tablet Take 1 Tab by mouth daily. lidocaine (LIDOCAINE VISCOUS) 2 % solution Take 15 mL by mouth as needed for Pain for up to 6 doses. pregabalin (LYRICA) 150 mg capsule Take 1 Cap by mouth two (2) times a day. Max Daily Amount: 300 mg.   lisinopril (PRINIVIL, ZESTRIL) 10 mg tablet Take 1 Tab by mouth daily. ibuprofen (MOTRIN) 600 mg tablet Take 1 Tab by mouth every twelve (12) hours as needed for Pain. cloNIDine HCl (CATAPRES) 0.2 mg tablet Take 1 Tab by mouth two (2) times a day.    Génesis Hernandez paracasei- S therm- Bifido (ARLENE-Q/RISAQUAD) 8 billion cell cap cap Take 1 Cap by mouth daily. metFORMIN ER (GLUCOPHAGE XR) 500 mg tablet Take 1 Tab by mouth two (2) times a day. Patient taking differently: Take 1,000 mg by mouth two (2) times a day. Blood-Glucose Meter monitoring kit Check blood sugars daily. DX: E11.9   glucose blood VI test strips (ASCENSIA AUTODISC VI, ONE TOUCH ULTRA TEST VI) strip Check blood sugars daily. DX: E11.9   Lancets (LANCETS, SUPER THIN) misc Check blood sugars daily. DX: E11.9   aspirin delayed-release 81 mg tablet Take 1 Tab by mouth daily. ARIPiprazole (ABILIFY) 10 mg tablet Take 1 Tab by mouth daily (after breakfast). Indications: MIXED BIPOLAR I DISORDER     Allergies:  Versed: Shortness of breath and numbness    Review of Systems   Constitutional: Positive for activity change. Negative for chills, fatigue and fever. HENT: Negative for congestion. Eyes: Negative for visual disturbance. Respiratory: Negative for cough and shortness of breath. Cardiovascular: Positive for leg swelling. Negative for chest pain. Gastrointestinal: Negative for nausea and vomiting. Endocrine: Negative for polydipsia and polyuria. Genitourinary: Negative. Musculoskeletal: Positive for gait problem, joint swelling and myalgias. Skin: Positive for color change and wound. Allergic/Immunologic: Negative for immunocompromised state. Neurological: Negative for weakness. Hematological: Negative. Psychiatric/Behavioral: Negative.       Objective:     Patient Vitals for the past 24 hrs:   BP Temp Pulse Resp SpO2 Height Weight   02/14/22 1320 96/65 97.7 °F (36.5 °C) (!) 53 8 100 % -- --   02/14/22 1220 106/69 97.8 °F (36.6 °C) (!) 58 20 99 % -- --   02/14/22 1120 (!) 147/89 97.9 °F (36.6 °C) (!) 55 20 99 % -- --   02/14/22 1100 (!) 145/76 97.7 °F (36.5 °C) (!) 50 12 100 % -- --   02/14/22 1045 (!) 143/77 -- (!) 53 17 100 % -- --   02/14/22 1040 137/78 -- (!) 53 15 98 % -- --   02/14/22 1035 (!) 126/46 -- (!) 53 12 100 % -- --   02/14/22 1030 118/75 97.7 °F (36.5 °C) (!) 54 14 99 % -- --   02/14/22 1029 118/75 -- -- -- -- -- --   02/14/22 0840 129/76 -- (!) 48 14 100 % -- --   02/14/22 0818 122/82 98.1 °F (36.7 °C) (!) 50 14 100 % 6' (1.829 m) 77.6 kg (171 lb 1.2 oz)   02/14/22 0518 (!) 80/54 98.1 °F (36.7 °C) (!) 42 12 96 % -- --   02/13/22 2323 (!) 87/53 97.4 °F (36.3 °C) (!) 40 16 -- -- --   02/13/22 1934 92/63 -- (!) 45 -- -- -- --   02/13/22 1849 (!) 86/60 -- (!) 47 -- -- -- --   02/13/22 1800 -- -- (!) 47 -- -- -- --   02/13/22 1711 (!) 85/56 -- (!) 45 -- -- -- --   02/13/22 1655 (!) 83/54 -- (!) 45 -- -- -- --   02/13/22 1640 (!) 77/49 -- (!) 47 -- -- -- --   02/13/22 1639 (!) 76/49 -- (!) 48 -- -- -- --   02/13/22 1621 (!) 83/59 -- (!) 49 -- -- -- --   02/13/22 1605 (!) 81/56 -- (!) 45 -- -- -- --   02/13/22 1551 (!) 84/51 -- (!) 48 -- -- -- --   02/13/22 1535 (!) 77/50 -- (!) 48 -- -- -- --   02/13/22 1522 (!) 74/48 -- (!) 47 -- -- -- --   02/13/22 1521 (!) 75/51 -- (!) 49 -- -- -- --   02/13/22 1519 (!) 75/53 -- (!) 49 -- -- -- --   02/13/22 1515 (!) 77/51 -- (!) 49 -- -- -- --   02/13/22 1512 (!) 68/50 -- (!) 42 -- -- -- --        Physical Exam  HENT:      Head: Normocephalic. Nose: Nose normal.      Mouth/Throat:      Mouth: Mucous membranes are moist.   Eyes:      Pupils: Pupils are equal, round, and reactive to light. Cardiovascular:      Rate and Rhythm: Regular rhythm. Bradycardia present. Pulmonary:      Effort: Pulmonary effort is normal. No respiratory distress. Abdominal:      General: Abdomen is flat. There is no distension. Musculoskeletal:         General: Normal range of motion. Cervical back: Normal range of motion. Skin:     General: Skin is warm. Comments: Bulky dressing left foot. Unable to assess distal pulses. Neurological:      General: No focal deficit present. Mental Status: He is alert. Mental status is at baseline. Psychiatric:         Behavior: Behavior normal.       Pertinent Test Results:   Recent Results (from the past 24 hour(s))   EKG, 12 LEAD, INITIAL    Collection Time: 02/13/22  3:13 PM   Result Value Ref Range    Ventricular Rate 50 BPM    Atrial Rate 50 BPM    P-R Interval 164 ms    QRS Duration 100 ms    Q-T Interval 442 ms    QTC Calculation (Bezet) 402 ms    Calculated P Axis 69 degrees    Calculated R Axis 58 degrees    Calculated T Axis 74 degrees    Diagnosis       Sinus bradycardia  When compared with ECG of 16-MAY-2020 02:43,  QT has shortened  Confirmed by Tiffanie Ulrich ([id number]) on 2/14/2022 1:59:55 PM     LACTIC ACID    Collection Time: 02/13/22  3:25 PM   Result Value Ref Range    Lactic acid 1.5 0.4 - 2.0 MMOL/L   GLUCOSE, POC    Collection Time: 02/13/22 10:55 PM   Result Value Ref Range    Glucose (POC) 225 (H) 65 - 117 mg/dL    Performed by James Maddox 113, BASIC    Collection Time: 02/14/22  5:31 AM   Result Value Ref Range    Sodium 137 136 - 145 mmol/L    Potassium 4.0 3.5 - 5.1 mmol/L    Chloride 108 97 - 108 mmol/L    CO2 27 21 - 32 mmol/L    Anion gap 2 (L) 5 - 15 mmol/L    Glucose 186 (H) 65 - 100 mg/dL    BUN 37 (H) 6 - 20 MG/DL    Creatinine 1.63 (H) 0.70 - 1.30 MG/DL    BUN/Creatinine ratio 23 (H) 12 - 20      GFR est AA 53 (L) >60 ml/min/1.73m2    GFR est non-AA 44 (L) >60 ml/min/1.73m2    Calcium 8.6 8.5 - 10.1 MG/DL   CBC W/O DIFF    Collection Time: 02/14/22  5:31 AM   Result Value Ref Range    WBC 4.0 (L) 4.1 - 11.1 K/uL    RBC 3.20 (L) 4.10 - 5.70 M/uL    HGB 9.7 (L) 12.1 - 17.0 g/dL    HCT 28.1 (L) 36.6 - 50.3 %    MCV 87.8 80.0 - 99.0 FL    MCH 30.3 26.0 - 34.0 PG    MCHC 34.5 30.0 - 36.5 g/dL    RDW 13.2 11.5 - 14.5 %    PLATELET 92 (L) 139 - 400 K/uL    MPV 9.4 8.9 - 12.9 FL    NRBC 0.0 0  WBC    ABSOLUTE NRBC 0.00 0.00 - 0.01 K/uL   COVID-19 RAPID TEST    Collection Time: 02/14/22  8:13 AM   Result Value Ref Range    Specimen source Nasopharyngeal COVID-19 rapid test Not detected NOTD     GLUCOSE, POC    Collection Time: 02/14/22  8:48 AM   Result Value Ref Range    Glucose (POC) 196 (H) 65 - 117 mg/dL    Performed by Sunny Villanueva    GLUCOSE, POC    Collection Time: 02/14/22 10:31 AM   Result Value Ref Range    Glucose (POC) 181 (H) 65 - 117 mg/dL    Performed by Chris Mejia    GLUCOSE, POC    Collection Time: 02/14/22 11:46 AM   Result Value Ref Range    Glucose (POC) 146 (H) 65 - 117 mg/dL    Performed by David Jacob RN    ANKLE BRACHIAL INDEX    Collection Time: 02/14/22  2:27 PM   Result Value Ref Range    Left toe pressure 103 mmHg    Right arm  mmHg    Right posterior tibial 180 mmHg    Right dorsalis pedis  mmHg    Right toe pressure 114 mmHg    Right RAY 1.62     Left TBI 0.93     Right TBI 1.03        Assessmen/Plan:     Peripheral arterial disease with nonhealing diabetic ulcer of the left foot  -Status post left partial fifth ray amputation Dr. Lianna Ferrer 2/14  -Aspirin  -ABIs NC. TBIs w/in normal limits. · Normal perfusion of the bilateral lower extremities. No need for further vascular evaluation, procedure, or follow-up at this time. If foot pain fails to improve after aggressive debridement and IV antibiotics an arteriogram could be considered. Reconsult as needed. · Wound care per Dr. Lianna Ferrer. · IV antibiotics per primary team.    Hypotension with a history of hypertension  -Low blood pressure improving. Clonidine and lisinopril resumed.   Bradycardia  Coronary artery disease    Noninsulin dependent diabetes mellitus w/ hyperglycemia  -Hemoglobin A1c 8.2  Pseudohyponatremia  -resolved    Acute kidney injury nPOA   -IV hydration initiated    Anemia of acute blood loss nPOA   Pancytopenia nPOA   Chronic hepatitis C   -with elevated LFTs    Chronic pain syndrome  Polysubstance abuse  -Tobacco and opioids  -Smoking cessation encouraged  Bipolar I disorder    VTE Prophylaxis:  Held for procedure   SCDs contraindicated in diffuse PAD    Disposition:  TBD following procedure. Likely home with home health. Vascular surgery signing off. We appreciate the opportunity care for Mr. Des Munoz. Please reconsult as needed.     Signed By: Lucila Delaney NP     February 14, 2022

## 2022-02-14 NOTE — PROGRESS NOTES
End of Shift Note    Bedside shift change report given to [address] (oncoming nurse) by Gloria De Guzman RN (offgoing nurse). Report included the following information SBAR, Kardex, Procedure Summary, Intake/Output, MAR, Accordion and Recent Results    Shift worked:  9a-7p     Shift summary and any significant changes:     Pts BP 71/41 HR 47, pt feeling dizzy and tired RRT called, Dr Pasha Hi at the bedside. 1L NS 0.9 bulus given, pt positioned in trendelenberg, EKG and labs done, cont IVF started. Goal is for map to be 60 or greater. Pt responded to IVF BP 90s/60s at end of shift. MRI left foot done. Plan for OR tomorrow. Concerns for physician to address:  none     Zone phone for oncoming shift:   [phone]       Activity:  Activity Level: Up with Assistance  Number times ambulated in hallways past shift: 0  Number of times OOB to chair past shift: 0    Cardiac:   Cardiac Monitoring: Yes           Access:   Current line(s): PIV     Genitourinary:   Urinary status: voiding    Respiratory:   O2 Device: None (Room air)  Chronic home O2 use?: NO  Incentive spirometer at bedside: NO     GI:     Current diet:  ADULT DIET Regular; 4 carb choices (60 gm/meal)  DIET NPO  Passing flatus: YES  Tolerating current diet: YES       Pain Management:   Patient states pain is manageable on current regimen: YES    Skin:  Can Score: 20  Interventions: increase time out of bed    Patient Safety:  Fall Score:  Total Score: 1  Interventions: gripper socks       Length of Stay:  Expected LOS: - - -  Actual LOS: 1      Gloria De Guzman RN

## 2022-02-14 NOTE — PROGRESS NOTES
Pharmacy Antimicrobial Kinetic Dosing    Indication for Antimicrobials: Osteomyelitis     Current Regimen of Each Antimicrobial:  Vancomycin 2000 mg x 1, pharmacy to dose (Start Date ; Day # 3)  Cefepime 2g IV q8h (start ; day 3)  Metronidazole 500mg IV q12h (start ; day 3)    Previous Antimicrobial Therapy:  Zosyn 3.375 gm x 1 (Start Date: ; Day #2)    Goal Level: AUC: 400-600 mg/hr/Liter/day    Date Dose & Interval Measured (mcg/mL) Predicted AUC/AILEEN    750 mg q12h 14.0 21.7                 Date & time of next level:     Dosing calculator used: [employer] calculator    Significant Positive Cultures:    bcx - ngsf (pending)   foot -   anaerobic -     Conditions for Dosing Consideration: None    Labs:  Recent Labs     2231 225 22  1902   CREA 1.63* 1.12 1.03   BUN 37* 27* 27*     Recent Labs     2231 225 22  1902   WBC 4.0* 5.1 6.8     Temp (24hrs), Av.7 °F (36.5 °C), Min:97.4 °F (36.3 °C), Max:98.1 °F (36.7 °C)    Creatinine Clearance (mL/min):   CrCl (Ideal Body Weight): 54.9   If actual weight < IBW: CrCl (Actual Body Weight) 54.9    Impression/Plan:   S/p toe amputation   Vancomycin adjusted to 1250 mg q24h, expected , trough 16.1  C/w cefepime/flagyl  Antimicrobial stop date TBD  BMP daily     Pharmacy will follow daily and adjust medications as appropriate for renal function and/or serum levels.     Thank you,  Brandyn Lane, PHARMD    Vancomycin Dosing Document    Documents located on pharmacy Teams site: Clinical Practice -> Antimicrobial Stewardship -> Antibiotics_Vancomycin     Aminoglycoside Dosing Document    Documents located on pharmacy Teams site: Clinical Practice -> Antimicrobial Stewardship -> Antibiotics_Aminoglycosides

## 2022-02-14 NOTE — PERIOP NOTES
1029-Handoff Report from Operating Room to PACU    Report received from [address] and Noris Munson CRNA regarding Ene Clancy. Surgeon(s):  Benja Fitzgerald DPM  And Procedure(s) (LRB):  LEFT 5TH PARTIAL RAY AMPUTATION (Left)  confirmed   with allergies and dressings discussed. Anesthesia type, drugs, patient history, complications, estimated blood loss, vital signs, intake and output, and last pain medication, lines, reversal medications and temperature were reviewed. 1100- TRANSFER - OUT REPORT:    Verbal report given to Duyen RN(name) on Ene Clancy  being transferred to surg tele(unit) for routine post - op       Report consisted of patients Situation, Background, Assessment and   Recommendations(SBAR). Information from the following report(s) SBAR, Kardex, OR Summary, Procedure Summary, Intake/Output, MAR and Recent Results was reviewed with the receiving nurse. Opportunity for questions and clarification was provided.       Patient transported with:   Monitor  Registered Nurse  Tech

## 2022-02-14 NOTE — PROGRESS NOTES
Pharmacy Antimicrobial Kinetic Dosing    Indication for Antimicrobials: Osteomyelitis     Current Regimen of Each Antimicrobial:  Vancomycin 2000 mg x 1 (Start Date ; Day # 3)  Cefepime 2g IV q8h (start ; day 3)  Metronidazole 500mg IV q12h (start ; day 3)    Previous Antimicrobial Therapy:  Zosyn 3.375 gm x 1 (Start Date: ; Day #2)    Goal Level: AUC: 400-600 mg/hr/Liter/day    Date Dose & Interval Measured (mcg/mL) Predicted AUC/AILEEN                       Date & time of next level:      Dosing calculator used: [employer] calculator    Significant Positive Cultures:    bcx - ngsf (pending)   foot -   anaerobic -     Conditions for Dosing Consideration: None    Labs:  Recent Labs     22  1902   CREA 1.63* 1.12 1.03   BUN 37* 27* 27*     Recent Labs     2231 22  1902   WBC 4.0* 5.1 6.8     Temp (24hrs), Av.9 °F (36.6 °C), Min:97.4 °F (36.3 °C), Max:98.4 °F (36.9 °C)    Creatinine Clearance (mL/min):   CrCl (Ideal Body Weight): 54.9   If actual weight < IBW: CrCl (Actual Body Weight) 54.9    Impression/Plan:   S/p toe amputation   Bump in Scr -- asked RN to draw level now before dose. Will evaluate to adjust vancomycin regimen. Continue cefepime as ordered  Will adjust metronidazole to 500mg IV q12h per protocol  Antimicrobial stop date TBD  BMP daily     Pharmacy will follow daily and adjust medications as appropriate for renal function and/or serum levels.     Thank you,  Rico Wilkinson, PHARMD    Vancomycin Dosing Document    Documents located on pharmacy Teams site: Clinical Practice -> Antimicrobial Stewardship -> Antibiotics_Vancomycin     Aminoglycoside Dosing Document    Documents located on pharmacy Teams site: Clinical Practice -> Antimicrobial Stewardship -> Antibiotics_Aminoglycosides

## 2022-02-14 NOTE — ANESTHESIA PREPROCEDURE EVALUATION
Anesthetic History   No history of anesthetic complications            Review of Systems / Medical History  Patient summary reviewed, nursing notes reviewed and pertinent labs reviewed    Pulmonary          Smoker      Comments: Current Every Day Smoker   Neuro/Psych         Psychiatric history (Bipolar 1 disorder, Depression, Hx Suicidal thoughts)    Comments: Diabetic Neuropathy  Spinal stenosis of lumbar region with neurogenic claudication s/p lumbar surgery at L4-L5 (1999) Cardiovascular    Hypertension: well controlled          CAD and cardiac stents    Exercise tolerance: >4 METS  Comments: ECG (5/16/20):  Normal sinus rhythm with sinus arrhythmia   Normal ECG   When compared with ECG of 05-JUN-2017 11:37,   No significant change was found      GI/Hepatic/Renal       Hepatitis (Chronic hepatitis C without hepatic coma ): type C  Renal disease: stones  Liver disease    Comments: Abdominal pain  Cholelithiasis  Diarrhea  Elevated AST Endo/Other    Diabetes: poorly controlled, type 2, using insulin    Anemia (Hgb = 11.8 on 2/13/22)     Other Findings   Comments: Diabetic left foot ulcer  Thrombocytopenia  Chronic pain syndrome  Polysubstance abuse   Non-compliance with medical treatment         Physical Exam    Airway  Mallampati: I  TM Distance: 4 - 6 cm  Neck ROM: normal range of motion   Mouth opening: Normal     Cardiovascular  Regular rate and rhythm,  S1 and S2 normal,  no murmur, click, rub, or gallop             Dental      Comments: Some broken lower molars (bilateral)   Pulmonary  Breath sounds clear to auscultation               Abdominal  GI exam deferred       Other Findings            Anesthetic Plan    ASA: 3  Anesthesia type: MAC and total IV anesthesia          Induction: Intravenous  Anesthetic plan and risks discussed with: Patient

## 2022-02-14 NOTE — PROGRESS NOTES
End of Shift Note    Bedside shift change report given to [address] (oncoming nurse) by Curt Vaughan RN (offgoing nurse). Report included the following information SBAR and Procedure Summary    Shift worked:  7a-3:30p     Shift summary and any significant changes:     Pt went for left partial foot resection. Bandages continue to be clean dry and intact. Pt needs to void. May need to be bladder scanned if no output within the next hour.       Concerns for physician to address: Pain management, pt is on Oxycodone 2.5mg.    Zone phone for oncoming shift:   Ronna Crystal RN

## 2022-02-14 NOTE — PERIOP NOTES
12:  TRANSFER - IN REPORT:    Verbal report received from [facility], [address] [facility] on [facility]  being received from [phone] for ordered procedure      Report consisted of patients Situation, Background, Assessment and   Recommendations(SBAR). Information from the following report(s) SBAR, Kardex, Intake/Output, MAR, Recent Results and Cardiac Rhythm SB was reviewed with the receiving nurse. Opportunity for questions and clarification was provided. 0800:  Patient assisted OOB to BR to void. Steady gait. Denies being lightheaded or dizzy. 7663:  Assessment completed upon patients arrival to unit and care assumed. Nares culture obtained for rapid COVID test & taken to lab. Nozin completed after sample obtained. Tele box sent to PACU     8306:  Dr. Hurst Odor in to see the patient & discuss anesthesia plan of care.

## 2022-02-14 NOTE — PROGRESS NOTES
Hospitalist Progress Note    NAME: John Smith   :  1965   MRN:  [id number]       Assessment / Plan:  Addendum:  Rapid response called ghis afternoon for symptomatic hypotension  -occurred just after patient received norco (had morphine earlier for foot pain)  -patient alert, responsive, reports feeling lightheaded  -BP 70/50s, mild sinus arlet on EKG  -BP already responding to fluid bolus  -will bolus 1L NS then run fluids at 125cc/hr  -monitor on telemetry  -lactic acid normal  -avoid unnecessary opiods    Diabetic foot ulcer  Acute osteomyelitis of left fifth MTP joint     X-ray foot:  Question of postsurgical changes in the distal fifth metatarsal.  Findings suspicious for acute osteomyelitis at the fifth MTP joint. .     IV antibiotics vancomycin plus cefepime plus Flagyl  -appreciate podiatry consult, plan for MRI  -MRI foot ordered  -tentatively planned for surgery, possible 5th partial ray resection  -wound culture and blood culture send on admission     Hypertension  Continue with lisinopril and clonidine     Diabetes mellitus  Hold Metformin  Sliding scale plus Lantus, will need tight blood sugar control     CAD  Continue with aspirin     Code Status: Full  Surrogate Decision Maker:     DVT Prophylaxis: Lovenox  GI Prophylaxis: not indicated     Baseline: Ambulatory     Subjective:     Chief Complaint / Reason for Physician Visit  Pt complaints of left foot pain, denies fevers/chills/nausea    Discussed with RN events overnight. Review of Systems:  Symptom Y/N Comments  Symptom Y/N Comments   Fever/Chills n   Chest Pain n    Poor Appetite n   Edema n    Cough n   Abdominal Pain n    Sputum n   Joint Pain  Lt foot pain   SOB/VIGIL n   Pruritis/Rash     Nausea/vomit n   Tolerating PT/OT     Diarrhea n   Tolerating Diet     Constipation n   Other       Could NOT obtain due to:      Objective:     VITALS:   Last 24hrs VS reviewed since prior progress note.  Most recent are:  Patient Vitals for the past 24 hrs:   Temp Pulse Resp BP SpO2   02/13/22 2323 -- (!) 40 -- (!) 87/53 --   02/13/22 1934 -- (!) 45 -- 92/63 --   02/13/22 1849 -- (!) 47 -- (!) 86/60 --   02/13/22 1800 -- (!) 47 -- -- --   02/13/22 1711 -- (!) 45 -- (!) 85/56 --   02/13/22 1655 -- (!) 45 -- (!) 83/54 --   02/13/22 1640 -- (!) 47 -- (!) 77/49 --   02/13/22 1639 -- (!) 48 -- (!) 76/49 --   02/13/22 1621 -- (!) 49 -- (!) 83/59 --   02/13/22 1605 -- (!) 45 -- (!) 81/56 --   02/13/22 1551 -- (!) 48 -- (!) 84/51 --   02/13/22 1535 -- (!) 48 -- (!) 77/50 --   02/13/22 1522 -- (!) 47 -- (!) 74/48 --   02/13/22 1521 -- (!) 49 -- (!) 75/51 --   02/13/22 1519 -- (!) 49 -- (!) 75/53 --   02/13/22 1515 -- (!) 49 -- (!) 77/51 --   02/13/22 1512 -- (!) 42 -- (!) 68/50 --   02/13/22 1505 -- (!) 48 -- (!) 71/41 --   02/13/22 1500 98.4 °F (36.9 °C) (!) 51 19 (!) 72/42 98 %   02/13/22 1149 99 °F (37.2 °C) (!) 53 18 (!) 78/50 98 %   02/13/22 0944 98.8 °F (37.1 °C) (!) 58 19 118/67 98 %   02/13/22 0341 98.3 °F (36.8 °C) (!) 58 18 111/71 100 %       Intake/Output Summary (Last 24 hours) at 2/13/2022 2334  Last data filed at 2/13/2022 0650  Gross per 24 hour   Intake 200 ml   Output 825 ml   Net -625 ml        I had a face to face encounter and independently examined this patient on 2/13/2022, as outlined below:  PHYSICAL EXAM:  General: WD, WN. Alert, cooperative, no acute distress    EENT:  EOMI. Anicteric sclerae. MMM  Resp:  CTA bilaterally, no wheezing or rales. No accessory muscle use  CV:  Regular  rhythm,  No edema  GI:  Soft, Non distended, Non tender. +Bowel sounds  Neurologic:  Alert and oriented X 3, normal speech,   Psych:   Good insight. Not anxious nor agitated  Skin:  No rashes.   No jaundice  MSK:   Lt foot ulceration plantar of 5th digit, no drainage    Reviewed most current lab test results and cultures  YES  Reviewed most current radiology test results   YES  Review and summation of old records today    NO  Reviewed patient's current orders and MAR    YES  PMH/SH reviewed - no change compared to H&P  ________________________________________________________________________  Care Plan discussed with:    Comments   Patient x    Family      RN x    Care Manager     Consultant                        Multidiciplinary team rounds were held today with , nursing, pharmacist and clinical coordinator. Patient's plan of care was discussed; medications were reviewed and discharge planning was addressed. ________________________________________________________________________  Total NON critical care TIME:  35   Minutes    Total CRITICAL CARE TIME Spent: 35   Minutes non procedure based    I provided 35 minutes of critical care time. During this entire length of time I was immediately available to the patient.      The reason for providing this level of medical care was due to a critical illness that impaired one or more vital organ systems, such that there was a high probability of imminent or life threatening deterioration in the patient's condition. This care involved high complexity decision making which includes reviewing the patient's past medical records, current laboratory results, and actual Xray films in order to assess, support vital system function, and to treat this degree of vital organ system failure, and to prevent further life threatening deterioration of the patients condition.          Comments   >50% of visit spent in counseling and coordination of care x    ________________________________________________________________________  Yosvany Copping, DO     Procedures: see electronic medical records for all procedures/Xrays and details which were not copied into this note but were reviewed prior to creation of Plan. LABS:  I reviewed today's most current labs and imaging studies.   Pertinent labs include:  Recent Labs     02/13/22  0415 02/12/22  1902   WBC 5.1 6.8   HGB 11.8* 12.6   HCT 32.9* 34.4*   * 160 Recent Labs     02/13/22  0415 02/12/22  1902   * 134*   K 4.0 4.6    103   CO2 27 30   * 215*   BUN 27* 27*   CREA 1.12 1.03   CA 8.9 9.2   ALB  --  3.6   TBILI  --  0.8   ALT  --  104*       Signed: Flor Montgomery, DO

## 2022-02-14 NOTE — PERIOP NOTES
Irrisept Wound Debridement and Cleansing System  Ref: [id number]; LOT: [id number] Expiration Date: 10/31/2023

## 2022-02-14 NOTE — BRIEF OP NOTE
Brief Postoperative Note    Patient: Arvin Argueta  YOB: 1965  MRN: [id number]    Date of Procedure: 2/14/2022     Pre-Op Diagnosis: infected left foot with Osteomyelitis 5th ray    Post-Op Diagnosis: Same as preoperative diagnosis.       Procedure(s):  LEFT 5TH PARTIAL RAY AMPUTATION    Surgeon(s):  Lauren Rinne, DPM    Surgical Assistant: None    Anesthesia: MAC     Estimated Blood Loss (mL): less than 657     Complications: None    Specimens: * No specimens in log * removed toe 5th left up to mid metatarsal and Aerobic and Anaerobic cultures     Implants: * No implants in log *     Drains: * No LDAs found *    Findings: good viable margins with excellent hemorrhage     Electronically Signed by Chucho Roy DPM on 2/14/2022 at 10:30 AM

## 2022-02-14 NOTE — PROGRESS NOTES
TRANSFER - IN REPORT:    Verbal report received from PACU RN(name) on Keke Cook  being received from [place] [id number] (unit) for routine post - op      Report consisted of patients Situation, Background, Assessment and   Recommendations(SBAR). Information from the following report(s) SBAR, OR Summary and MAR was reviewed with the receiving nurse. Opportunity for questions and clarification was provided. Assessment completed upon patients arrival to unit and care assumed. Vitals stable. Dressing to Left foot is clean, dry and intact without any breakthrough drainage.

## 2022-02-14 NOTE — PROGRESS NOTES
1510 - Rapid Response called secondary to hypotension, Dr. Chacon Schools at bedside for treatment options, receiving Bolus NS, labs pending. No further escalation of care needed.

## 2022-02-14 NOTE — ANESTHESIA POSTPROCEDURE EVALUATION
Procedure(s):  LEFT 5TH PARTIAL RAY AMPUTATION. MAC, total IV anesthesia    Anesthesia Post Evaluation      Multimodal analgesia: multimodal analgesia used between 6 hours prior to anesthesia start to PACU discharge  Patient location during evaluation: PACU  Level of consciousness: sleepy but conscious  Pain management: adequate  Airway patency: patent  Anesthetic complications: no  Cardiovascular status: acceptable  Respiratory status: acceptable  Hydration status: acceptable  Comments: +Post-Anesthesia Evaluation and Assessment    Patient: Wicho Johnson MRN: [id number]  SSN: [id number]   YOB: 1965  Age: 61 y.o. Sex: male      Cardiovascular Function/Vital Signs    BP (!) 143/77   Pulse (!) 53   Temp 36.5 °C (97.7 °F)   Resp 17   Ht 6' (1.829 m)   Wt 77.6 kg (171 lb 1.2 oz)   SpO2 100%   BMI 23.20 kg/m²     Patient is status post Procedure(s) with comments:  LEFT 5TH PARTIAL RAY AMPUTATION - MAC WITH BLOCK. Nausea/Vomiting: Controlled. Postoperative hydration reviewed and adequate. Pain:  Pain Scale 1: Numeric (0 - 10) (02/14/22 1040)  Pain Intensity 1: 0 (02/14/22 1040)   Managed. Neurological Status:   Neuro (WDL): Exceptions to WDL (02/14/22 1030)   At baseline. Mental Status and Level of Consciousness: Arousable. Pulmonary Status:   O2 Device: None (Room air) (02/14/22 1040)   Adequate oxygenation and airway patent. Complications related to anesthesia: None    Post-anesthesia assessment completed. No concerns. Signed By: Nicolas Forbes MD    2/14/2022  Post anesthesia nausea and vomiting:  controlled  Final Post Anesthesia Temperature Assessment:  Normothermia (36.0-37.5 degrees C)      INITIAL Post-op Vital signs:   Vitals Value Taken Time   /77 02/14/22 1045   Temp 36.5 °C (97.7 °F) 02/14/22 1030   Pulse 52 02/14/22 1048   Resp 12 02/14/22 1048   SpO2 100 % 02/14/22 1048   Vitals shown include unvalidated device data.

## 2022-02-15 LAB
ANION GAP SERPL CALC-SCNC: 2 MMOL/L (ref 5–15)
BUN SERPL-MCNC: 34 MG/DL (ref 6–20)
BUN/CREAT SERPL: 29 (ref 12–20)
CALCIUM SERPL-MCNC: 7.9 MG/DL (ref 8.5–10.1)
CHLORIDE SERPL-SCNC: 109 MMOL/L (ref 97–108)
CO2 SERPL-SCNC: 26 MMOL/L (ref 21–32)
CREAT SERPL-MCNC: 1.18 MG/DL (ref 0.7–1.3)
ERYTHROCYTE [DISTWIDTH] IN BLOOD BY AUTOMATED COUNT: 13.1 % (ref 11.5–14.5)
GLUCOSE BLD STRIP.AUTO-MCNC: 161 MG/DL (ref 65–117)
GLUCOSE BLD STRIP.AUTO-MCNC: 172 MG/DL (ref 65–117)
GLUCOSE BLD STRIP.AUTO-MCNC: 205 MG/DL (ref 65–117)
GLUCOSE BLD STRIP.AUTO-MCNC: 224 MG/DL (ref 65–117)
GLUCOSE SERPL-MCNC: 252 MG/DL (ref 65–100)
HCT VFR BLD AUTO: 29.4 % (ref 36.6–50.3)
HGB BLD-MCNC: 10.1 G/DL (ref 12.1–17)
LEFT TBI: 0.93
LEFT TOE PRESSURE: 103 MMHG
MCH RBC QN AUTO: 30.6 PG (ref 26–34)
MCHC RBC AUTO-ENTMCNC: 34.4 G/DL (ref 30–36.5)
MCV RBC AUTO: 89.1 FL (ref 80–99)
NRBC # BLD: 0 K/UL (ref 0–0.01)
NRBC BLD-RTO: 0 PER 100 WBC
PLATELET # BLD AUTO: 99 K/UL (ref 150–400)
PMV BLD AUTO: 9.8 FL (ref 8.9–12.9)
POTASSIUM SERPL-SCNC: 4.1 MMOL/L (ref 3.5–5.1)
RBC # BLD AUTO: 3.3 M/UL (ref 4.1–5.7)
RIGHT ABI: 1.62
RIGHT ARM BP: 111 MMHG
RIGHT POSTERIOR TIBIAL: 180 MMHG
RIGHT TBI: 1.03
RIGHT TOE PRESSURE: 114 MMHG
SERVICE CMNT-IMP: ABNORMAL
SODIUM SERPL-SCNC: 137 MMOL/L (ref 136–145)
VAS RIGHT DORSALIS PEDIS BP: 164 MMHG
WBC # BLD AUTO: 4 K/UL (ref 4.1–11.1)

## 2022-02-15 PROCEDURE — 74011250637 HC RX REV CODE- 250/637: Performed by: PODIATRIST

## 2022-02-15 PROCEDURE — 74011636637 HC RX REV CODE- 636/637: Performed by: PODIATRIST

## 2022-02-15 PROCEDURE — 85027 COMPLETE CBC AUTOMATED: CPT

## 2022-02-15 PROCEDURE — 97116 GAIT TRAINING THERAPY: CPT

## 2022-02-15 PROCEDURE — 74011250636 HC RX REV CODE- 250/636: Performed by: INTERNAL MEDICINE

## 2022-02-15 PROCEDURE — 36415 COLL VENOUS BLD VENIPUNCTURE: CPT

## 2022-02-15 PROCEDURE — 74011000258 HC RX REV CODE- 258: Performed by: PODIATRIST

## 2022-02-15 PROCEDURE — 80048 BASIC METABOLIC PNL TOTAL CA: CPT

## 2022-02-15 PROCEDURE — 51798 US URINE CAPACITY MEASURE: CPT

## 2022-02-15 PROCEDURE — 97161 PT EVAL LOW COMPLEX 20 MIN: CPT

## 2022-02-15 PROCEDURE — 74011250637 HC RX REV CODE- 250/637: Performed by: INTERNAL MEDICINE

## 2022-02-15 PROCEDURE — 82962 GLUCOSE BLOOD TEST: CPT

## 2022-02-15 PROCEDURE — 65270000029 HC RM PRIVATE

## 2022-02-15 PROCEDURE — 94760 N-INVAS EAR/PLS OXIMETRY 1: CPT

## 2022-02-15 PROCEDURE — 74011250636 HC RX REV CODE- 250/636: Performed by: PODIATRIST

## 2022-02-15 RX ORDER — OXYCODONE HYDROCHLORIDE 5 MG/1
5 TABLET ORAL
Status: DISCONTINUED | OUTPATIENT
Start: 2022-02-15 | End: 2022-02-17 | Stop reason: HOSPADM

## 2022-02-15 RX ORDER — ENOXAPARIN SODIUM 100 MG/ML
40 INJECTION SUBCUTANEOUS EVERY 24 HOURS
Status: DISCONTINUED | OUTPATIENT
Start: 2022-02-16 | End: 2022-02-17 | Stop reason: HOSPADM

## 2022-02-15 RX ORDER — ONDANSETRON 4 MG/1
4 TABLET, ORALLY DISINTEGRATING ORAL
Status: DISCONTINUED | OUTPATIENT
Start: 2022-02-15 | End: 2022-02-17 | Stop reason: HOSPADM

## 2022-02-15 RX ORDER — ONDANSETRON 2 MG/ML
4 INJECTION INTRAMUSCULAR; INTRAVENOUS
Status: DISCONTINUED | OUTPATIENT
Start: 2022-02-15 | End: 2022-02-17 | Stop reason: HOSPADM

## 2022-02-15 RX ADMIN — OXYCODONE 2.5 MG: 5 TABLET ORAL at 04:18

## 2022-02-15 RX ADMIN — MORPHINE SULFATE 2 MG: 2 INJECTION, SOLUTION INTRAMUSCULAR; INTRAVENOUS at 08:56

## 2022-02-15 RX ADMIN — CLONIDINE HYDROCHLORIDE 0.2 MG: 0.1 TABLET ORAL at 09:00

## 2022-02-15 RX ADMIN — LISINOPRIL 10 MG: 10 TABLET ORAL at 09:00

## 2022-02-15 RX ADMIN — MORPHINE SULFATE 2 MG: 2 INJECTION, SOLUTION INTRAMUSCULAR; INTRAVENOUS at 01:14

## 2022-02-15 RX ADMIN — Medication 15 UNITS: at 22:00

## 2022-02-15 RX ADMIN — CEFEPIME HYDROCHLORIDE 2 G: 2 INJECTION, POWDER, FOR SOLUTION INTRAVENOUS at 13:08

## 2022-02-15 RX ADMIN — MORPHINE SULFATE 2 MG: 2 INJECTION, SOLUTION INTRAMUSCULAR; INTRAVENOUS at 05:12

## 2022-02-15 RX ADMIN — MORPHINE SULFATE 2 MG: 2 INJECTION, SOLUTION INTRAMUSCULAR; INTRAVENOUS at 22:48

## 2022-02-15 RX ADMIN — METRONIDAZOLE 500 MG: 500 INJECTION, SOLUTION INTRAVENOUS at 22:01

## 2022-02-15 RX ADMIN — PREGABALIN 150 MG: 150 CAPSULE ORAL at 09:00

## 2022-02-15 RX ADMIN — MORPHINE SULFATE 2 MG: 2 INJECTION, SOLUTION INTRAMUSCULAR; INTRAVENOUS at 13:08

## 2022-02-15 RX ADMIN — SODIUM CHLORIDE 125 ML/HR: 9 INJECTION, SOLUTION INTRAVENOUS at 09:48

## 2022-02-15 RX ADMIN — OXYCODONE 5 MG: 5 TABLET ORAL at 21:37

## 2022-02-15 RX ADMIN — MORPHINE SULFATE 2 MG: 2 INJECTION, SOLUTION INTRAMUSCULAR; INTRAVENOUS at 17:48

## 2022-02-15 RX ADMIN — Medication 2 UNITS: at 14:24

## 2022-02-15 RX ADMIN — Medication 2 UNITS: at 09:49

## 2022-02-15 RX ADMIN — ARIPIPRAZOLE 10 MG: 5 TABLET ORAL at 09:00

## 2022-02-15 RX ADMIN — METRONIDAZOLE 500 MG: 500 INJECTION, SOLUTION INTRAVENOUS at 09:00

## 2022-02-15 RX ADMIN — VANCOMYCIN HYDROCHLORIDE 1250 MG: 10 INJECTION, POWDER, LYOPHILIZED, FOR SOLUTION INTRAVENOUS at 16:00

## 2022-02-15 RX ADMIN — ASPIRIN 81 MG: 81 TABLET, COATED ORAL at 09:00

## 2022-02-15 RX ADMIN — ONDANSETRON 4 MG: 4 TABLET, ORALLY DISINTEGRATING ORAL at 10:39

## 2022-02-15 RX ADMIN — Medication 3 UNITS: at 16:30

## 2022-02-15 RX ADMIN — CEFEPIME HYDROCHLORIDE 2 G: 2 INJECTION, POWDER, FOR SOLUTION INTRAVENOUS at 03:39

## 2022-02-15 RX ADMIN — PANTOPRAZOLE SODIUM 40 MG: 40 TABLET, DELAYED RELEASE ORAL at 09:00

## 2022-02-15 RX ADMIN — CEFEPIME HYDROCHLORIDE 2 G: 2 INJECTION, POWDER, FOR SOLUTION INTRAVENOUS at 22:01

## 2022-02-15 RX ADMIN — PREGABALIN 150 MG: 150 CAPSULE ORAL at 17:42

## 2022-02-15 NOTE — ROUTINE PROCESS
End of Shift Note    Bedside shift change report given to ALICE villeda (oncoming nurse) by Orlando Capellan RN (offgoing nurse). Report included the following information SBAR, Kardex, Intake/Output, MAR and Recent Results    Shift worked:  7P-7A     Shift summary and any significant changes:          Concerns for physician to address:       Zone phone for oncoming shift:   [phone]       Activity:  Activity Level: Up with Assistance  Number times ambulated in hallways past shift: 0  Number of times OOB to chair past shift: 0    Cardiac:   Cardiac Monitoring: Yes      Cardiac Rhythm: Sinus Hieu    Access:   Current line(s): PIV     Genitourinary:   Urinary status: voiding    Respiratory:   O2 Device: None (Room air)  Chronic home O2 use?: NO  Incentive spirometer at bedside: NO     GI:  Last Bowel Movement Date: 02/12/22  Current diet:  ADULT DIET Regular; 4 carb choices (60 gm/meal)  Passing flatus: NO  Tolerating current diet: YES       Pain Management:   Patient states pain is manageable on current regimen: YES    Skin:  Can Score: 21  Interventions: float heels and increase time out of bed    Patient Safety:  Fall Score:  Total Score: 2  Interventions: assistive device (walker, cane, etc), gripper socks and pt to call before getting OOB  High Fall Risk: Yes    Length of Stay:  Expected LOS: - - -  Actual LOS: 3      Axel Wilkins RN

## 2022-02-15 NOTE — PROGRESS NOTES
Problem: Falls - Risk of  Goal: *Absence of Falls  Description: Document Dennie Oak Fall Risk and appropriate interventions in the flowsheet.   2/15/2022 0440 by Rusty Henriquez RN  Outcome: Progressing Towards Goal  Note: Fall Risk Interventions:  Mobility Interventions: Patient to call before getting OOB         Medication Interventions: Teach patient to arise slowly,Patient to call before getting OOB                2/15/2022 0439 by Rusty Henriquez RN  Outcome: Progressing Towards Goal  Note: Fall Risk Interventions:  Mobility Interventions: Patient to call before getting OOB         Medication Interventions: Teach patient to arise slowly,Patient to call before getting OOB                   Problem: Patient Education: Go to Patient Education Activity  Goal: Patient/Family Education  2/15/2022 0440 by Rusty Henriquez RN  Outcome: Progressing Towards Goal  2/15/2022 0439 by Rusty Henriquez RN  Outcome: Progressing Towards Goal     Problem: Patient Education: Go to Patient Education Activity  Goal: Patient/Family Education  Outcome: Progressing Towards Goal     Problem: Pain  Goal: *Control of Pain  Outcome: Progressing Towards Goal     Problem: Patient Education: Go to Patient Education Activity  Goal: Patient/Family Education  Outcome: Progressing Towards Goal

## 2022-02-15 NOTE — PROGRESS NOTES
Hospitalist Progress Note    NAME: Bushra Kimble   :  1965   MRN:  [id number]       Assessment / Plan:  Diabetic foot ulcer  Acute osteomyelitis of left fifth MTP joint  -s/p partial ray amputation 2022  -cont pain management  -cont broad spectrum abx  -await surgical cultures  -per op report viable margins  -nausea earlier this am improved with zofran     MRI LT foot   1. Osteomyelitis distal fifth metatarsal shaft and proximal phalanx of the  little toe. No drainable abscess or other areas of osteomyelitis    X-ray foot:  Question of postsurgical changes in the distal fifth metatarsal.  Findings suspicious for acute osteomyelitis at the fifth MTP joint    hypotension  -resolved  -rapid response called . Patient became hypotensive after administration of norco (had received morphone earlier  -sinus bradycardia on EKG  -improved with fluid bolus  -normal lactate  -hold antihypertensives, will DC clonidine     Hypertension  -antihypertensives on hold as above d/t hypotension     Diabetes mellitus  Hold Metformin  Sliding scale plus Lantus, will need tight blood sugar control     CAD  Continue with aspirin     Code Status: Full  Surrogate Decision Maker:     DVT Prophylaxis: Lovenox  GI Prophylaxis: not indicated     Baseline: Ambulatory     Subjective:     Chief Complaint / Reason for Physician Visit  Admits nausea earlier this am. Foot pain relatively controlled. Still experiences some breakthrough pain    Discussed with RN events overnight. Review of Systems:  Symptom Y/N Comments  Symptom Y/N Comments   Fever/Chills n   Chest Pain n    Poor Appetite n   Edema n    Cough n   Abdominal Pain n    Sputum n   Joint Pain  Lt foot pain   SOB/VIGIL n   Pruritis/Rash     Nausea/vomit n   Tolerating PT/OT     Diarrhea n   Tolerating Diet     Constipation n   Other       Could NOT obtain due to:      Objective:     VITALS:   Last 24hrs VS reviewed since prior progress note.  Most recent are:  Patient Vitals for the past 24 hrs:   Temp Pulse Resp BP SpO2   02/15/22 1123 98.5 °F (36.9 °C) (!) 53 18 (!) 146/78 99 %   02/15/22 0824 98 °F (36.7 °C) (!) 56 18 (!) 156/92 97 %   02/15/22 0435 97.8 °F (36.6 °C) (!) 53 18 111/65 99 %   02/15/22 0340 98.8 °F (37.1 °C) (!) 50 18 128/70 98 %   02/15/22 0120 -- (!) 51 -- 131/68 --   02/15/22 0000 97.5 °F (36.4 °C) (!) 50 16 115/68 100 %   02/14/22 2340 97.8 °F (36.6 °C) (!) 46 16 120/74 98 %   02/14/22 2059 97.7 °F (36.5 °C) (!) 47 18 125/76 98 %   02/14/22 2012 97 °F (36.1 °C) (!) 51 16 107/67 100 %   02/14/22 1616 97.4 °F (36.3 °C) (!) 44 16 122/66 100 %       Intake/Output Summary (Last 24 hours) at 2/15/2022 1526  Last data filed at 2/15/2022 1317  Gross per 24 hour   Intake 979 ml   Output 2140 ml   Net -1161 ml        I had a face to face encounter and independently examined this patient on 2/15/2022, as outlined below:  PHYSICAL EXAM:  General: WD, WN. Alert, cooperative, no acute distress    EENT:  EOMI. Anicteric sclerae. MMM  Resp:  CTA bilaterally, no wheezing or rales. No accessory muscle use  CV:  Regular  rhythm,  No edema  GI:  Soft, Non distended, Non tender. +Bowel sounds  Neurologic:  Alert and oriented X 3, normal speech,   Psych:   Good insight. Not anxious nor agitated  Skin:  No rashes. No jaundice  MSK:   Lt foot with surgical dressing in place    Reviewed most current lab test results and cultures  YES  Reviewed most current radiology test results   YES  Review and summation of old records today    NO  Reviewed patient's current orders and MAR    YES  PMH/SH reviewed - no change compared to H&P  ________________________________________________________________________  Care Plan discussed with:    Comments   Patient x    Family      RN x    Care Manager     Consultant                        Multidiciplinary team rounds were held today with , nursing, pharmacist and clinical coordinator.   Patient's plan of care was discussed; medications were reviewed and discharge planning was addressed. ________________________________________________________________________  Total NON critical care TIME:  35   Minutes    Total CRITICAL CARE TIME Spent: 35   Minutes non procedure based    I provided 35 minutes of critical care time. During this entire length of time I was immediately available to the patient.      The reason for providing this level of medical care was due to a critical illness that impaired one or more vital organ systems, such that there was a high probability of imminent or life threatening deterioration in the patient's condition. This care involved high complexity decision making which includes reviewing the patient's past medical records, current laboratory results, and actual Xray films in order to assess, support vital system function, and to treat this degree of vital organ system failure, and to prevent further life threatening deterioration of the patients condition.          Comments   >50% of visit spent in counseling and coordination of care x    ________________________________________________________________________  Amber Zhou DO     Procedures: see electronic medical records for all procedures/Xrays and details which were not copied into this note but were reviewed prior to creation of Plan. LABS:  I reviewed today's most current labs and imaging studies.   Pertinent labs include:  Recent Labs     02/15/22  0136 02/14/22  0531 02/13/22  0415   WBC 4.0* 4.0* 5.1   HGB 10.1* 9.7* 11.8*   HCT 29.4* 28.1* 32.9*   PLT 99* 92* 123*     Recent Labs     02/15/22  0136 02/14/22  0531 02/13/22  0415 02/12/22  1902 02/12/22  1902    137 134*   < > 134*   K 4.1 4.0 4.0   < > 4.6   * 108 104   < > 103   CO2 26 27 27   < > 30   * 186* 269*   < > 215*   BUN 34* 37* 27*   < > 27*   CREA 1.18 1.63* 1.12   < > 1.03   CA 7.9* 8.6 8.9   < > 9.2   ALB  --   --   --   --  3.6   TBILI  --   --   --   --  0.8 ALT  --   --   --   --  104*    < > = values in this interval not displayed.        Signed: Jenna Proper, DO

## 2022-02-15 NOTE — PROGRESS NOTES
Transition of Care Plan:    RUR: 12  Disposition: Home with [facility]  Follow up appointments: PCP  DME needed:Pt has access to necessary DME's  Transportation at Discharge: Pt parked his car at Emergency department. [place] or means to access home: Yes    IM Medicare Letter: Will provider upon d/c   Is patient a BCPI-A Bundle:        NA   If yes, was Bundle Letter given?:  NA  Is patient a  and connected with the [place]? NA             If yes, was [employer] transfer form completed and [facility] notified? NA  Caregiver Contact:  Jermaine Tracey [phone]  Discharge Caregiver contacted prior to discharge? Yes  Care Conference needed?:       No    Reason for Admission:  Infected left foot with Osteomyelitis 5th ray                     RUR Score:  12                  Plan for utilizing home health:    Yes if necessary       PCP: First and Last name:  None     [facility]    Are you a current patient: Yes/No: Yes   Approximate date of last visit: next appointment on scheduled 2/22/2022   Can you participate in a virtual visit with your PCP: Yes                    Current Advanced Directive/Advance Care Plan: Full Code      Healthcare Decision Maker:   Click here to complete [employer] including selection of the Healthcare Decision Maker Relationship (ie \"Primary\")             Primary Decision MakerChevis Bench Brian Tracey - [phone]                  Transition of Care Plan:         Home with [address] Management Interventions  PCP Verified by CM:  Yes  Mode of Transport at Discharge: Self (Parked at Emergency room )  Transition of Care Consult (CM Consult):  (Pt never has any experiance with [facility] or  rehab)  Discharge Durable Medical Equipment:  (Pt has acess to cane,RW,crutches)  Physical Therapy Consult: Yes  Support Systems: Other Family Member(s)  Confirm Follow Up Transport: Self  Discharge Location  Patient Expects to be Discharged to[de-identified] Home with home health    Advance Care Planning     General Advance Care Planning (ACP) Conversation      Date of Conversation: 2022  Conducted with: Patient with Decision Making Capacity    Healthcare Decision Maker:     Primary Decision Maker: Serena Jennings - Sister - [phone]  Click here to complete [address] including selection of the Healthcare Decision Maker Relationship (ie \"Primary\")      Content/Action Overview:   DECLINED ACP conversation - will revisit periodically   Reviewed DNR/DNI and patient elects Full Code (Attempt Resuscitation)    Length of Voluntary ACP Conversation in minutes:  16 minutes    Morris                       CM spoke to pt and completed CM initial assessment. Pt reported that he stays with his father in a single level home has 4 steps at entrance. Pt was his father's care take. Pt parked his car at hospital ER parking lot and he assume he will drive his car back to home upon d/c. Floor CM will follow up. As per chart pt  is status post left fifth partial ray amputation with Dr. Chetna Green            Morris Elgin MSW  ED Case Manager   Exy [phone]
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
## 2022-02-15 NOTE — PROGRESS NOTES
Hospitalist Progress Note    NAME: Arvin Argueta   :  1965   MRN:  [id number]       Assessment / Plan:  Diabetic foot ulcer  Acute osteomyelitis of left fifth MTP joint  -s/p partial ray amputation today ()   -cont pain management  -cont broad spectrum abx  -await surgical cultures  -per op report viable margins     MRI LT foot   1. Osteomyelitis distal fifth metatarsal shaft and proximal phalanx of the  little toe. No drainable abscess or other areas of osteomyelitis    X-ray foot:  Question of postsurgical changes in the distal fifth metatarsal.  Findings suspicious for acute osteomyelitis at the fifth MTP joint    Hypotensive shock  -resolved  -rapid response called . Patient became hypotensive after administration of norco (had received morphone earlier  -sinus bradycardia on EKG  -improved with fluid bolus  -normal lactate  -hold antihypertensives     Hypertension  Continue with lisinopril and clonidine     Diabetes mellitus  Hold Metformin  Sliding scale plus Lantus, will need tight blood sugar control     CAD  Continue with aspirin     Code Status: Full  Surrogate Decision Maker:     DVT Prophylaxis: Lovenox  GI Prophylaxis: not indicated     Baseline: Ambulatory     Subjective:     Chief Complaint / Reason for Physician Visit  Having some expected post-op foot pain. Denies n/v or fevers    Discussed with RN events overnight. Review of Systems:  Symptom Y/N Comments  Symptom Y/N Comments   Fever/Chills n   Chest Pain n    Poor Appetite n   Edema n    Cough n   Abdominal Pain n    Sputum n   Joint Pain  Lt foot pain   SOB/VIGIL n   Pruritis/Rash     Nausea/vomit n   Tolerating PT/OT     Diarrhea n   Tolerating Diet     Constipation n   Other       Could NOT obtain due to:      Objective:     VITALS:   Last 24hrs VS reviewed since prior progress note.  Most recent are:  Patient Vitals for the past 24 hrs:   Temp Pulse Resp BP SpO2   22 97.7 °F (36.5 °C) (!) 47 18 125/76 98 % 02/14/22 2012 97 °F (36.1 °C) (!) 51 16 107/67 100 %   02/14/22 1616 97.4 °F (36.3 °C) (!) 44 16 122/66 100 %   02/14/22 1511 97.5 °F (36.4 °C) (!) 50 20 126/80 99 %   02/14/22 1420 97.5 °F (36.4 °C) (!) 55 -- 96/60 --   02/14/22 1320 97.7 °F (36.5 °C) (!) 53 8 96/65 100 %   02/14/22 1220 97.8 °F (36.6 °C) (!) 58 20 106/69 99 %   02/14/22 1120 97.9 °F (36.6 °C) (!) 55 20 (!) 147/89 99 %   02/14/22 1100 97.7 °F (36.5 °C) (!) 50 12 (!) 145/76 100 %   02/14/22 1045 -- (!) 53 17 (!) 143/77 100 %   02/14/22 1040 -- (!) 53 15 137/78 98 %   02/14/22 1035 -- (!) 53 12 (!) 126/46 100 %   02/14/22 1030 97.7 °F (36.5 °C) (!) 54 14 118/75 99 %   02/14/22 1029 -- -- -- 118/75 --   02/14/22 0840 -- (!) 48 14 129/76 100 %   02/14/22 0818 98.1 °F (36.7 °C) (!) 50 14 122/82 100 %   02/14/22 0518 98.1 °F (36.7 °C) (!) 42 12 (!) 80/54 96 %       Intake/Output Summary (Last 24 hours) at 2/14/2022 2356  Last data filed at 2/14/2022 2111  Gross per 24 hour   Intake 3389.42 ml   Output 430 ml   Net 2959.42 ml        I had a face to face encounter and independently examined this patient on 2/14/2022, as outlined below:  PHYSICAL EXAM:  General: WD, WN. Alert, cooperative, no acute distress    EENT:  EOMI. Anicteric sclerae. MMM  Resp:  CTA bilaterally, no wheezing or rales. No accessory muscle use  CV:  Regular  rhythm,  No edema  GI:  Soft, Non distended, Non tender. +Bowel sounds  Neurologic:  Alert and oriented X 3, normal speech,   Psych:   Good insight. Not anxious nor agitated  Skin:  No rashes.   No jaundice  MSK:   Lt foot with surgical dressing in place    Reviewed most current lab test results and cultures  YES  Reviewed most current radiology test results   YES  Review and summation of old records today    NO  Reviewed patient's current orders and MAR    YES  PMH/SH reviewed - no change compared to H&P  ________________________________________________________________________  Care Plan discussed with:    Comments   Patient x Family      RN x    Care Manager     Consultant                        Multidiciplinary team rounds were held today with , nursing, pharmacist and clinical coordinator. Patient's plan of care was discussed; medications were reviewed and discharge planning was addressed. ________________________________________________________________________  Total NON critical care TIME:  35   Minutes    Total CRITICAL CARE TIME Spent: 35   Minutes non procedure based    I provided 35 minutes of critical care time. During this entire length of time I was immediately available to the patient.      The reason for providing this level of medical care was due to a critical illness that impaired one or more vital organ systems, such that there was a high probability of imminent or life threatening deterioration in the patient's condition. This care involved high complexity decision making which includes reviewing the patient's past medical records, current laboratory results, and actual Xray films in order to assess, support vital system function, and to treat this degree of vital organ system failure, and to prevent further life threatening deterioration of the patients condition.          Comments   >50% of visit spent in counseling and coordination of care x    ________________________________________________________________________  Susan Purvis,      Procedures: see electronic medical records for all procedures/Xrays and details which were not copied into this note but were reviewed prior to creation of Plan. LABS:  I reviewed today's most current labs and imaging studies.   Pertinent labs include:  Recent Labs     02/14/22  0531 02/13/22 0415 02/12/22 1902   WBC 4.0* 5.1 6.8   HGB 9.7* 11.8* 12.6   HCT 28.1* 32.9* 34.4*   PLT 92* 123* 160     Recent Labs     02/14/22  0531 02/13/22 0415 02/12/22 1902    134* 134*   K 4.0 4.0 4.6    104 103   CO2 27 27 30   * 269* 215* BUN 37* 27* 27*   CREA 1.63* 1.12 1.03   CA 8.6 8.9 9.2   ALB  --   --  3.6   TBILI  --   --  0.8   ALT  --   --  104*       Signed: Susan Purvis, DO

## 2022-02-16 LAB
ANION GAP SERPL CALC-SCNC: 4 MMOL/L (ref 5–15)
BACTERIA SPEC CULT: NORMAL
BUN SERPL-MCNC: 26 MG/DL (ref 6–20)
BUN/CREAT SERPL: 23 (ref 12–20)
CALCIUM SERPL-MCNC: 8.9 MG/DL (ref 8.5–10.1)
CHLORIDE SERPL-SCNC: 111 MMOL/L (ref 97–108)
CO2 SERPL-SCNC: 23 MMOL/L (ref 21–32)
CREAT SERPL-MCNC: 1.14 MG/DL (ref 0.7–1.3)
ERYTHROCYTE [DISTWIDTH] IN BLOOD BY AUTOMATED COUNT: 13.3 % (ref 11.5–14.5)
GLUCOSE BLD STRIP.AUTO-MCNC: 110 MG/DL (ref 65–117)
GLUCOSE BLD STRIP.AUTO-MCNC: 132 MG/DL (ref 65–117)
GLUCOSE BLD STRIP.AUTO-MCNC: 152 MG/DL (ref 65–117)
GLUCOSE BLD STRIP.AUTO-MCNC: 224 MG/DL (ref 65–117)
GLUCOSE SERPL-MCNC: 121 MG/DL (ref 65–100)
HCT VFR BLD AUTO: 30.2 % (ref 36.6–50.3)
HGB BLD-MCNC: 10.6 G/DL (ref 12.1–17)
MCH RBC QN AUTO: 30.5 PG (ref 26–34)
MCHC RBC AUTO-ENTMCNC: 35.1 G/DL (ref 30–36.5)
MCV RBC AUTO: 87 FL (ref 80–99)
NRBC # BLD: 0 K/UL (ref 0–0.01)
NRBC BLD-RTO: 0 PER 100 WBC
PLATELET # BLD AUTO: 111 K/UL (ref 150–400)
PMV BLD AUTO: 10.2 FL (ref 8.9–12.9)
POTASSIUM SERPL-SCNC: 4.5 MMOL/L (ref 3.5–5.1)
RBC # BLD AUTO: 3.47 M/UL (ref 4.1–5.7)
SERVICE CMNT-IMP: ABNORMAL
SERVICE CMNT-IMP: NORMAL
SERVICE CMNT-IMP: NORMAL
SODIUM SERPL-SCNC: 138 MMOL/L (ref 136–145)
VANCOMYCIN SERPL-MCNC: 11.9 UG/ML
WBC # BLD AUTO: 5.4 K/UL (ref 4.1–11.1)

## 2022-02-16 PROCEDURE — 74011250637 HC RX REV CODE- 250/637: Performed by: STUDENT IN AN ORGANIZED HEALTH CARE EDUCATION/TRAINING PROGRAM

## 2022-02-16 PROCEDURE — 36415 COLL VENOUS BLD VENIPUNCTURE: CPT

## 2022-02-16 PROCEDURE — 74011250637 HC RX REV CODE- 250/637: Performed by: PODIATRIST

## 2022-02-16 PROCEDURE — 80202 ASSAY OF VANCOMYCIN: CPT

## 2022-02-16 PROCEDURE — 99223 1ST HOSP IP/OBS HIGH 75: CPT | Performed by: INTERNAL MEDICINE

## 2022-02-16 PROCEDURE — 74011250636 HC RX REV CODE- 250/636: Performed by: PODIATRIST

## 2022-02-16 PROCEDURE — 74011250637 HC RX REV CODE- 250/637: Performed by: INTERNAL MEDICINE

## 2022-02-16 PROCEDURE — 74011636637 HC RX REV CODE- 636/637: Performed by: PODIATRIST

## 2022-02-16 PROCEDURE — 74011250636 HC RX REV CODE- 250/636: Performed by: INTERNAL MEDICINE

## 2022-02-16 PROCEDURE — 85027 COMPLETE CBC AUTOMATED: CPT

## 2022-02-16 PROCEDURE — 74011000258 HC RX REV CODE- 258: Performed by: PODIATRIST

## 2022-02-16 PROCEDURE — 65270000029 HC RM PRIVATE

## 2022-02-16 PROCEDURE — 74011250636 HC RX REV CODE- 250/636: Performed by: STUDENT IN AN ORGANIZED HEALTH CARE EDUCATION/TRAINING PROGRAM

## 2022-02-16 PROCEDURE — 80048 BASIC METABOLIC PNL TOTAL CA: CPT

## 2022-02-16 PROCEDURE — 82962 GLUCOSE BLOOD TEST: CPT

## 2022-02-16 RX ORDER — VANCOMYCIN/0.9 % SOD CHLORIDE 1.5G/250ML
1500 PLASTIC BAG, INJECTION (ML) INTRAVENOUS EVERY 24 HOURS
Status: DISCONTINUED | OUTPATIENT
Start: 2022-02-16 | End: 2022-02-16

## 2022-02-16 RX ORDER — CLONIDINE HYDROCHLORIDE 0.1 MG/1
0.2 TABLET ORAL 2 TIMES DAILY
Status: DISCONTINUED | OUTPATIENT
Start: 2022-02-16 | End: 2022-02-17 | Stop reason: HOSPADM

## 2022-02-16 RX ORDER — VANCOMYCIN HYDROCHLORIDE
1250
Status: DISCONTINUED | OUTPATIENT
Start: 2022-02-16 | End: 2022-02-17 | Stop reason: HOSPADM

## 2022-02-16 RX ADMIN — CLONIDINE HYDROCHLORIDE 0.2 MG: 0.1 TABLET ORAL at 13:04

## 2022-02-16 RX ADMIN — MORPHINE SULFATE 2 MG: 2 INJECTION, SOLUTION INTRAMUSCULAR; INTRAVENOUS at 15:05

## 2022-02-16 RX ADMIN — PANTOPRAZOLE SODIUM 40 MG: 40 TABLET, DELAYED RELEASE ORAL at 09:12

## 2022-02-16 RX ADMIN — CEFEPIME HYDROCHLORIDE 2 G: 2 INJECTION, POWDER, FOR SOLUTION INTRAVENOUS at 08:43

## 2022-02-16 RX ADMIN — Medication 15 UNITS: at 22:00

## 2022-02-16 RX ADMIN — METRONIDAZOLE 500 MG: 500 INJECTION, SOLUTION INTRAVENOUS at 10:58

## 2022-02-16 RX ADMIN — OXYCODONE 5 MG: 5 TABLET ORAL at 17:47

## 2022-02-16 RX ADMIN — OXYCODONE 5 MG: 5 TABLET ORAL at 09:12

## 2022-02-16 RX ADMIN — MORPHINE SULFATE 2 MG: 2 INJECTION, SOLUTION INTRAMUSCULAR; INTRAVENOUS at 11:03

## 2022-02-16 RX ADMIN — PREGABALIN 150 MG: 150 CAPSULE ORAL at 09:12

## 2022-02-16 RX ADMIN — MORPHINE SULFATE 2 MG: 2 INJECTION, SOLUTION INTRAMUSCULAR; INTRAVENOUS at 07:03

## 2022-02-16 RX ADMIN — ENOXAPARIN SODIUM 40 MG: 100 INJECTION SUBCUTANEOUS at 09:13

## 2022-02-16 RX ADMIN — Medication 2 UNITS: at 00:18

## 2022-02-16 RX ADMIN — ARIPIPRAZOLE 10 MG: 5 TABLET ORAL at 09:12

## 2022-02-16 RX ADMIN — ASPIRIN 81 MG: 81 TABLET, COATED ORAL at 09:12

## 2022-02-16 RX ADMIN — METRONIDAZOLE 500 MG: 500 INJECTION, SOLUTION INTRAVENOUS at 22:10

## 2022-02-16 RX ADMIN — PREGABALIN 150 MG: 150 CAPSULE ORAL at 17:44

## 2022-02-16 RX ADMIN — Medication 2 UNITS: at 13:04

## 2022-02-16 RX ADMIN — LISINOPRIL 10 MG: 10 TABLET ORAL at 09:12

## 2022-02-16 RX ADMIN — CLONIDINE HYDROCHLORIDE 0.2 MG: 0.1 TABLET ORAL at 17:44

## 2022-02-16 RX ADMIN — Medication 2 UNITS: at 22:00

## 2022-02-16 RX ADMIN — VANCOMYCIN HYDROCHLORIDE 1250 MG: 10 INJECTION, POWDER, LYOPHILIZED, FOR SOLUTION INTRAVENOUS at 12:05

## 2022-02-16 RX ADMIN — CEFEPIME HYDROCHLORIDE 2 G: 2 INJECTION, POWDER, FOR SOLUTION INTRAVENOUS at 15:05

## 2022-02-16 RX ADMIN — MORPHINE SULFATE 2 MG: 2 INJECTION, SOLUTION INTRAMUSCULAR; INTRAVENOUS at 22:00

## 2022-02-16 NOTE — PROGRESS NOTES
End of Shift Note    Bedside shift change report given to ALICE Mayer (oncoming nurse) by Soniya Gold LPN (offgoing nurse). Report included the following information SBAR, Kardex, OR Summary, Procedure Summary, Intake/Output, MAR and Recent Results    Shift worked:  7am-7pm     Shift summary and any significant changes:     Continued plan of care, Monitor Vitals, BP and heart rate, monitor pain needs, monitor I&O     Concerns for physician to address:  Plan of care     Zone phone for oncoming shift:   [phone]       Activity:  Activity Level: Up with Assistance  Number times ambulated in hallways past shift: 0  Number of times OOB to chair past shift: 1    Cardiac:   Cardiac Monitoring: Yes      Cardiac Rhythm: Sinus Rhythm    Access:   Current line(s): PIV     Genitourinary:   Urinary status: voiding    Respiratory:   O2 Device: None (Room air)  Chronic home O2 use?: NO  Incentive spirometer at bedside: YES     GI:  Last Bowel Movement Date: 02/12/22  Current diet:  ADULT DIET Regular; 4 carb choices (60 gm/meal)  Passing flatus: YES  Tolerating current diet: YES       Pain Management:   Patient states pain is manageable on current regimen: YES    Skin:  Can Score: 21  Interventions: float heels, increase time out of bed, PT/OT consult and nutritional support     Patient Safety:  Fall Score:  Total Score: 2  Interventions: assistive device (walker, cane, etc), gripper socks, pt to call before getting OOB and stay with me (per policy)  High Fall Risk: Yes    Length of Stay:  Expected LOS: Ethyl Lea  Actual LOS: [address], LPN

## 2022-02-16 NOTE — PROGRESS NOTES
Physician Progress Note      Rafi Fritz  [facility] #:                  [id number]  :                       1965  ADMIT DATE:       2022 5:06 PM  DISCH DATE:  RESPONDING  PROVIDER #:        Yaritza Almazan MD        QUERY TEXT:    Type of Shock: Please provide further specificity, if known. Clinical indicators include:  Options provided:  -- Cardiogenic shock  -- Septic shock  -- Hypovolemic shock  -- Hemorrhagic shock due to trauma  -- Hemorrhagic shock related to surgery  -- Anaphylactic shock  -- Other - I will add my own diagnosis  -- Disagree - Not applicable / Not valid  -- Disagree - Clinically Unable to determine / Unknown        PROVIDER RESPONSE TEXT:    Provider dismissed this query because it was not applicable to the patient or not a valid query. no shock          QUERY TEXT:    Pt admitted with Dm foot ulcer. Pt noted to have crea 1.63 (1.03). If possible, please document in the progress notes and discharge summary if you are evaluating and/or treating any of the following:     The medical record reflects the following:  Risk Factors: Dm foot ulcer -s/p partial ray amputation today ()  Clinical Indicators: crea 1.63 (1.03)  Treatment: ivfs@ 125, NS1L bolus  Thanks,  Anders Thrasher RN/CDI     Defined by Kidney Disease Improving Global Outcomes (KDIGO) clinical practice guideline for acute kidney injury:  -Increase in SCr by greater than or equal to 0.3 mg/dl within 48?hours; or  -Increase or decrease in SCr to greater than or equal to 1.5 times baseline, which is known or presumed to have occurred within the prior 7 days; or  -Urine volume < 0.5ml/kg/h for 6 hours  Options provided:  -- Acute kidney failure  -- Acute kidney failure with acute tubular necrosis  -- Acute kidney injury  -- Other - I will add my own diagnosis  -- Disagree - Not applicable / Not valid  -- Disagree - Clinically unable to determine / Unknown  -- Refer to Clinical Documentation Reviewer    PROVIDER RESPONSE TEXT:    This patient is in Acute kidney failure.     Query created by: Arnol Ortiz on 2/15/2022 12:36 PM      Electronically signed by:  Rey Hull MD 2/16/2022 11:54 AM

## 2022-02-16 NOTE — PROGRESS NOTES
Spiritual Care Assessment/Progress Note  [facility]      NAME: Annabel Barlow      MRN: [id number]  AGE: 61 y.o.  SEX: male  Sabianism Affiliation: No Sabianism   Language: English     2/16/2022     Total Time (in minutes): 14     Spiritual Assessment begun in [facility] 3 SURG TELE through conversation with:         [x]Patient        [] Family    [] Friend(s)        Reason for Consult: Initial/Spiritual assessment, patient floor     Spiritual beliefs: (Please include comment if needed)     [] Identifies with a matias tradition:         [] Supported by a matias community:            [] Claims no spiritual orientation:           [] Seeking spiritual identity:                [x] Adheres to an individual form of spirituality:           [] Not able to assess:                           Identified resources for coping:      [] Prayer                               [] Music                  [] Guided Imagery     [x] Family/friends                 [] Pet visits     [] Devotional reading                         [] Unknown     [] Other:                                              Interventions offered during this visit: (See comments for more details)    Patient Interventions: Affirmation of emotions/emotional suffering,Affirmation of matias,Catharsis/review of pertinent events in supportive environment,Iconic (affirming the presence of God/Higher Power),Initial/Spiritual assessment, patient floor,Sabianism beliefs/image of God discussed           Plan of Care:     [] Support spiritual and/or cultural needs    [x] Support AMD and/or advance care planning process      [] Support grieving process   [] Coordinate Rites and/or Rituals    [] Coordination with community clergy   [] No spiritual needs identified at this time   [] Detailed Plan of Care below (See Comments)  [] Make referral to Music Therapy  [] Make referral to Pet Therapy     [] Make referral to Addiction services  [] Make referral to [facility]  [] Make referral to Spiritual Care Partner  [] No future visits requested        [] Contact Spiritual Care for further referrals     Comments:   Reviewed chart prior to visit with patient on Surg Tele. No family/friends present. Provided bedside presence and supportive listening as patient shared about his recent surgery. He expressed hope of being discharged soon. Mr. Anders Arana shared that he helps care for his father who is elderly. Chart review indicated care manager had documented patient's sister as primary decision maker.  explained  healthcare decision makers according to state's hierarchy of decision makers. Explained his father, as [facility] ROVERTO, would be his decision maker and suggested it would be prudent to complete the advance medical directive if he wanted someone else to make his decisions. He stated he would want his sister to serve in that capacity.  will return this afternoon to assist with completing advance medical directive.      XOCHITL Flores, [facility], Staff [address] [place], [place] [address]ing Service  [phone])

## 2022-02-16 NOTE — PROGRESS NOTES
End of Shift Note    Bedside shift change report given to [facility], RN (oncoming nurse) by Valery Pinzon RN (offgoing nurse). Report included the following information SBAR, Kardex, Intake/Output, MAR and Recent Results    Shift worked:  Day     Shift summary and any significant changes:    Patient has had high blood pressures today despite pain management medications. Clonidine given this evening. Concerns for physician to address:  n/a     Zone phone for oncoming shift:  [phone]     Activity:  Activity Level: Up with Assistance  Number times ambulated in hallways past shift: 0  Number of times OOB to chair past shift: 0    Cardiac:   Cardiac Monitoring: Yes      Cardiac Rhythm: Sinus Hieu    Access:   Current line(s): PIV     Genitourinary:   Urinary status: voiding    Respiratory:   O2 Device: None (Room air)  Chronic home O2 use?: NO  Incentive spirometer at bedside: YES     GI:  Last Bowel Movement Date: 02/12/22  Current diet:  ADULT DIET Regular; 4 carb choices (60 gm/meal)  Passing flatus: YES  Tolerating current diet: YES       Pain Management:   Patient states pain is manageable on current regimen: YES    Skin:  Can Score: 18  Interventions: float heels and increase time out of bed    Patient Safety:  Fall Score:  Total Score: 2  Interventions: gripper socks and pt to call before getting OOB  High Fall Risk: Yes    Length of Stay:  Expected LOS: Ramon Aid  Actual LOS: Zechariah Lan RN

## 2022-02-16 NOTE — PROGRESS NOTES
Problem: Falls - Risk of  Goal: *Absence of Falls  Description: Document Ines Bradley Fall Risk and appropriate interventions in the flowsheet. Outcome: Progressing Towards Goal  Note: Fall Risk Interventions:  Mobility Interventions: Communicate number of staff needed for ambulation/transfer         Medication Interventions: Patient to call before getting OOB,Teach patient to arise slowly    Elimination Interventions: Call light in reach    History of Falls Interventions: Consult care management for discharge planning         Problem: Patient Education: Go to Patient Education Activity  Goal: Patient/Family Education  Outcome: Progressing Towards Goal     Problem: Diabetes Self-Management  Goal: *Disease process and treatment process  Description: Define diabetes and identify own type of diabetes; list 3 options for treating diabetes. Outcome: Progressing Towards Goal  Goal: *Incorporating nutritional management into lifestyle  Description: Describe effect of type, amount and timing of food on blood glucose; list 3 methods for planning meals. Outcome: Progressing Towards Goal  Goal: *Incorporating physical activity into lifestyle  Description: State effect of exercise on blood glucose levels. Outcome: Progressing Towards Goal  Goal: *Developing strategies to promote health/change behavior  Description: Define the ABC's of diabetes; identify appropriate screenings, schedule and personal plan for screenings. Outcome: Progressing Towards Goal  Goal: *Using medications safely  Description: State effect of diabetes medications on diabetes; name diabetes medication taking, action and side effects. Outcome: Progressing Towards Goal  Goal: *Monitoring blood glucose, interpreting and using results  Description: Identify recommended blood glucose targets  and personal targets.   Outcome: Progressing Towards Goal  Goal: *Prevention, detection, treatment of acute complications  Description: List symptoms of hyper- and hypoglycemia; describe how to treat low blood sugar and actions for lowering  high blood glucose level. Outcome: Progressing Towards Goal  Goal: *Prevention, detection and treatment of chronic complications  Description: Define the natural course of diabetes and describe the relationship of blood glucose levels to long term complications of diabetes.   Outcome: Progressing Towards Goal  Goal: *Developing strategies to address psychosocial issues  Description: Describe feelings about living with diabetes; identify support needed and support network  Outcome: Progressing Towards Goal  Goal: *Insulin pump training  Outcome: Progressing Towards Goal  Goal: *Sick day guidelines  Outcome: Progressing Towards Goal  Goal: *Patient Specific Goal (EDIT GOAL, INSERT TEXT)  Outcome: Progressing Towards Goal     Problem: Patient Education: Go to Patient Education Activity  Goal: Patient/Family Education  Outcome: Progressing Towards Goal     Problem: Pain  Goal: *Control of Pain  Outcome: Progressing Towards Goal     Problem: Patient Education: Go to Patient Education Activity  Goal: Patient/Family Education  Outcome: Progressing Towards Goal

## 2022-02-16 NOTE — PROGRESS NOTES
Pharmacy Antimicrobial Kinetic Dosing    Indication for Antimicrobials: Osteomyelitis     Current Regimen of Each Antimicrobial:  Vancomycin 2000 mg x 1, pharmacy to dose (Start Date ; Day # 5)  Cefepime 2g IV q8h (start ; day 5)  Metronidazole 500mg IV q12h (start ; day 5)    Previous Antimicrobial Therapy:  Zosyn 3.375 gm x 1 (Start Date: ; Day #2)    Goal Level: AUC: 400-600 mg/hr/Liter/day    Date Dose & Interval Measured (mcg/mL) Predicted AUC/AILEEN    750 mg q12h 14.0 21.7    0741 1250mg q24h 11.9 366           Date & time of next level:     Dosing calculator used: [employer] calculator    Significant Positive Cultures:    bcx - ngsf (pending)   foot - alpha strep, diphtheroids, staph coag neg (pending)   anaerobic - ng    Conditions for Dosing Consideration: None    Labs:  Recent Labs     22  0741 02/15/22  0136 22  0531   CREA 1.14 1.18 1.63*   BUN 26* 34* 37*     Recent Labs     22  0741 02/15/22  0136 22  0531   WBC 5.4 4.0* 4.0*     Temp (24hrs), Av.2 °F (36.8 °C), Min:97.9 °F (36.6 °C), Max:98.5 °F (36.9 °C)    Creatinine Clearance (mL/min):   CrCl (Ideal Body Weight): 78.5   If actual weight < IBW: CrCl (Actual Body Weight) 78.5    Impression/Plan:   S/p toe amputation   Scr improved. RL gives AUC below goal.  Will adjust to vancomycin 1250mg IV q18h to give an estimated AUC of 479. C/w cefepime/flagyl  Antimicrobial stop date TBD - waiting for final pathology  BMP daily     Pharmacy will follow daily and adjust medications as appropriate for renal function and/or serum levels.     Thank you,  Catherine Tomlin, PHARMD    Vancomycin Dosing Document    Documents located on pharmacy Teams site: Clinical Practice -> Antimicrobial Stewardship -> Antibiotics_Vancomycin     Aminoglycoside Dosing Document    Documents located on pharmacy Teams site: Clinical Practice -> Antimicrobial Stewardship -> Antibiotics_Aminoglycosides
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
## 2022-02-16 NOTE — PROGRESS NOTES
Reviewed the path report     Can be discharged when medically appropriate with following recommendations and instructions    1. Follow up in my office for post op care in 2 weeks  2. Limited fore foot weight bearing left in a post op shoe  3. Bandage can be changed after one week and replace with a gauze sterile bandage and keep the foot clean and dry.     If need further assistance from me please reach out to me on perfect serve

## 2022-02-16 NOTE — PROGRESS NOTES
End of Shift Note    Bedside shift change report given to Amber Painter RN (oncoming nurse) by Den Pillai RN (offgoing nurse). Report included the following information SBAR, Kardex, Intake/Output and MAR    Shift worked:  7p-7a     Shift summary and any significant changes:    Pt received PRN pain medications-- see MAR       Concerns for physician to address:  None      Zone phone for oncoming shift:          Activity:  Activity Level: Up with Assistance  Number times ambulated in hallways past shift: 0  Number of times OOB to chair past shift: 0    Cardiac:   Cardiac Monitoring: Yes      Cardiac Rhythm: Sinus Hieu    Access:   Current line(s): PIV     Genitourinary:   Urinary status: voiding    Respiratory:   O2 Device: None (Room air)  Chronic home O2 use?: NO  GI:  Last Bowel Movement Date: 02/12/22  Current diet:  ADULT DIET Regular; 4 carb choices (60 gm/meal)  Tolerating current diet: YES       Pain Management:   Patient states pain is manageable on current regimen: YES    Skin:  Can Score: 18  Interventions: increase time out of bed    Patient Safety:  Fall Score:  Total Score: 2  Interventions: assistive device (walker, cane, etc), gripper socks and pt to call before getting OOB  High Fall Risk: Yes    Length of Stay:  Expected LOS: 5d 19h  Actual LOS: 4      Den Pillai RN

## 2022-02-16 NOTE — PROGRESS NOTES
Spiritual Care Assessment/Progress Note  [facility]      NAME: Des Munoz      MRN: [id number]  AGE: 61 y.o.  SEX: male  Advent Affiliation: No Episcopal   Language: English     2/16/2022     Total Time (in minutes): 76     Spiritual Assessment begun in [facility] 3 SURG TELE through conversation with:         [x]Patient        [] Family    [] Friend(s)        Reason for Consult: Advance medical directive consult     Spiritual beliefs: (Please include comment if needed)     [] Identifies with a matias tradition:         [] Supported by a matias community:            [] Claims no spiritual orientation:           [x] Seeking spiritual identity:                [] Adheres to an individual form of spirituality:           [] Not able to assess:                           Identified resources for coping:      [] Prayer                               [] Music                  [] Guided Imagery     [x] Family/friends                 [] Pet visits     [] Devotional reading                         [] Unknown     [x] Other: His cat Shmuel                                            Interventions offered during this visit: (See comments for more details)    Patient Interventions: Advance medical directive consult,Advance medical directive completed,Affirmation of emotions/emotional suffering,Affirmation of matias,Catharsis/review of pertinent events in supportive environment,Coping skills reviewed/reinforced,Iconic (affirming the presence of God/Higher Power),Initial/Spiritual assessment, patient floor,Life review/legacy,Issues around forgiveness/closure,Normalization of emotional/spiritual concerns,Advent beliefs/image of God discussed           Plan of Care:     [x] Support spiritual and/or cultural needs    [] Support AMD and/or advance care planning process      [] Support grieving process   [] Coordinate Rites and/or Rituals    [] Coordination with community clergy   [] No spiritual needs identified at this time   [] Detailed Plan of Care below (See Comments)  [] Make referral to Music Therapy  [] Make referral to Pet Therapy     [] Make referral to Addiction services  [] Make referral to [facility]  [] Make referral to Spiritual Care Partner  [] No future visits requested        [] Contact Spiritual Care for further referrals     Comments:  ACP Assessment:    Chart review indicated patient had told the care manager he wanted his sister to be his decision maker. During our visit, patient mentioned his father.  explained state hierarchy of healthcare decision makers and that in absence of advance directive, his father being his Leena Setter would be his healthcare decision maker. Suggested if he wanted to be certain his sister would be the decision maker it would be best to complete advance directive. Assisted him in completing advance medical directive documenting his sister Javy Foley as his primary decision maker. He also elected to document his end of life care decisions as well as his preference to be considered for organ donation. During visit, patient engaged in life review sharing thoughts and feelings that have brought him to this moment in time. He shared about some significant losses in his life. Mr Luz Elena Escobedo also shared about his experience of Shinto and his beliefs about God. It seems apparent he has a searching matias. Explored various ways of knowing God. Provided empathic listening and emotional support.  plans to return tomorrow to provide further support.        XOCHITL Adams, [facility], Staff [address]    [address] [place] [address] Road Paging Service  [phone]

## 2022-02-16 NOTE — ACP (ADVANCE CARE PLANNING)
Advance Care Planning   Advance Care Planning Inpatient Note  [address]  Spiritual Care Department    Today's Date: 2/16/2022  Unit: [facility] 3 SURG TELE      Review of chart indicated possible need for documentation of healthcare decision maker. Zoila Arechiga Upon review of chart and communication with care team, patient's decision making abilities are not in question. Patient was present in the room during visit. Goals of ACP Conversation:  Discuss Advance Care planning documents  Establish and document healthcare decision maker    Health Care Decision Makers:      Primary Decision Maker: Donna Willett Sister - [phone]      Summary:  Completed New Documents    Advance Care Planning Documents (Patient Wishes) on file:  Healthcare Power of /Advance Directive appointment of Health care agent  Living Will/ Advance Directive  Anatomical Gift/Organ donation     Assessment:    Chart review indicated patient had told the care manager he wanted his sister to be his decision maker. During our visit, patient mentioned his father.  explained state hierarchy of healthcare decision makers and that in absence of advance directive, his father being his Nolvia Beckham would be his healthcare decision maker. Suggested if he wanted to be certain his sister would be the decision maker it would be best to complete advance directive. Assisted him in completing advance medical directive documenting his sister Ellamae Holstein as his primary decision maker. He also elected to document his end of life care decisions as well as his preference to be considered for organ donation.          Interventions:  Provided education on documents for clarity and greater understanding  Discussed and provided education on state decision maker hierarchy  Assisted in the completion of documents according to patient's wishes at this time  Encouraged ongoing ACP conversation with future decision makers and loved ones    Care Preferences Communicated:  No    Outcomes/Plan:  ACP Discussion Completed  New Advance Directive completed  Returned original document(s) to patient, as well as copies for distribution to appointed agents  Placed copy of advance directive on patient's chart    Melvin Payne [facility] on 2/16/2022 at 2:55 PM

## 2022-02-16 NOTE — PROGRESS NOTES
Hospitalist Progress Note    NAME: Maik Andrew   :  1965   MRN:  [id number]       Assessment / Plan:  Diabetic foot ulcer  Acute osteomyelitis of left fifth MTP joint  -s/p partial ray amputation 2022  -cont pain management  -cont broad spectrum abx  -Surgery surgical culture shows Streptococcus, CONS, diphtheroids, final report still pending. As per ID patient can go home on oral antibiotics. Podiatry cleared the patient.  -per op report viable margins  -nausea earlier this am improved with zofran     MRI LT foot   1. Osteomyelitis distal fifth metatarsal shaft and proximal phalanx of the  little toe. No drainable abscess or other areas of osteomyelitis    X-ray foot:  Question of postsurgical changes in the distal fifth metatarsal.  Findings suspicious for acute osteomyelitis at the fifth MTP joint    hypotension  -resolved  -rapid response called . Patient became hypotensive after administration of norco (had received morphone earlier  -sinus bradycardia on EKG  -improved with fluid bolus  -normal lactate  -hold antihypertensives, will DC clonidine     Hypertension  -antihypertensives on hold as above d/t hypotension     Diabetes mellitus  Hold Metformin  Sliding scale plus Lantus, will need tight blood sugar control     CAD  Continue with aspirin     Code Status: Full  Surrogate Decision Maker:     DVT Prophylaxis: Lovenox  GI Prophylaxis: not indicated     Baseline: Ambulatory     Subjective:     Chief Complaint / Reason for Physician Visit  Patient seen today, doing fine, no complaints today. Discussed with RN events overnight.      Review of Systems:  Symptom Y/N Comments  Symptom Y/N Comments   Fever/Chills n   Chest Pain n    Poor Appetite n   Edema n    Cough n   Abdominal Pain n    Sputum n   Joint Pain  Lt foot pain   SOB/VIGIL n   Pruritis/Rash     Nausea/vomit n   Tolerating PT/OT     Diarrhea n   Tolerating Diet     Constipation n   Other       Could NOT obtain due to: Objective:     VITALS:   Last 24hrs VS reviewed since prior progress note. Most recent are:  Patient Vitals for the past 24 hrs:   Temp Pulse Resp BP SpO2   02/16/22 1250 -- -- -- (!) 183/99 --   02/16/22 1222 98.1 °F (36.7 °C) (!) 56 17 (!) 199/89 99 %   02/16/22 0825 97.9 °F (36.6 °C) (!) 52 17 (!) 176/86 99 %   02/15/22 2355 98 °F (36.7 °C) (!) 106 18 112/75 97 %   02/15/22 2054 98.4 °F (36.9 °C) (!) 48 18 105/60 92 %   02/15/22 1604 98 °F (36.7 °C) (!) 50 18 104/65 100 %       Intake/Output Summary (Last 24 hours) at 2/16/2022 1303  Last data filed at 2/15/2022 1317  Gross per 24 hour   Intake --   Output 500 ml   Net -500 ml        I had a face to face encounter and independently examined this patient on 2/16/2022, as outlined below:  PHYSICAL EXAM:  General: WD, WN. Alert, cooperative, no acute distress    EENT:  EOMI. Anicteric sclerae. MMM  Resp:  CTA bilaterally, no wheezing or rales. No accessory muscle use  CV:  Regular  rhythm,  No edema  GI:  Soft, Non distended, Non tender. +Bowel sounds  Neurologic:  Alert and oriented X 3, normal speech,   Psych:   Good insight. Not anxious nor agitated  Skin:  No rashes. No jaundice  MSK:   Lt foot with surgical dressing in place    Reviewed most current lab test results and cultures  YES  Reviewed most current radiology test results   YES  Review and summation of old records today    NO  Reviewed patient's current orders and MAR    YES  PMH/SH reviewed - no change compared to H&P  ________________________________________________________________________  Care Plan discussed with:    Comments   Patient x    Family      RN x    Care Manager     Consultant                        Multidiciplinary team rounds were held today with , nursing, pharmacist and clinical coordinator. Patient's plan of care was discussed; medications were reviewed and discharge planning was addressed. ________________________________________________________________________  Total NON critical care TIME:  35   Minutes    Total CRITICAL CARE TIME Spent: 35   Minutes non procedure based    I provided 35 minutes of critical care time. During this entire length of time I was immediately available to the patient.      The reason for providing this level of medical care was due to a critical illness that impaired one or more vital organ systems, such that there was a high probability of imminent or life threatening deterioration in the patient's condition. This care involved high complexity decision making which includes reviewing the patient's past medical records, current laboratory results, and actual Xray films in order to assess, support vital system function, and to treat this degree of vital organ system failure, and to prevent further life threatening deterioration of the patients condition.          Comments   >50% of visit spent in counseling and coordination of care x    ________________________________________________________________________  Rachelle Baugh MD     Procedures: see electronic medical records for all procedures/Xrays and details which were not copied into this note but were reviewed prior to creation of Plan. LABS:  I reviewed today's most current labs and imaging studies.   Pertinent labs include:  Recent Labs     02/16/22  0741 02/15/22  0136 02/14/22  0531   WBC 5.4 4.0* 4.0*   HGB 10.6* 10.1* 9.7*   HCT 30.2* 29.4* 28.1*   * 99* 92*     Recent Labs     02/16/22  0741 02/15/22  0136 02/14/22  0531    137 137   K 4.5 4.1 4.0   * 109* 108   CO2 23 26 27   * 252* 186*   BUN 26* 34* 37*   CREA 1.14 1.18 1.63*   CA 8.9 7.9* 8.6       Signed: Rachelle Baugh MD

## 2022-02-17 VITALS
WEIGHT: 171.08 LBS | DIASTOLIC BLOOD PRESSURE: 84 MMHG | TEMPERATURE: 97.6 F | BODY MASS INDEX: 23.17 KG/M2 | HEIGHT: 72 IN | SYSTOLIC BLOOD PRESSURE: 184 MMHG | HEART RATE: 51 BPM | OXYGEN SATURATION: 100 % | RESPIRATION RATE: 17 BRPM

## 2022-02-17 LAB
ANION GAP SERPL CALC-SCNC: 2 MMOL/L (ref 5–15)
BUN SERPL-MCNC: 27 MG/DL (ref 6–20)
BUN/CREAT SERPL: 23 (ref 12–20)
CALCIUM SERPL-MCNC: 8.2 MG/DL (ref 8.5–10.1)
CHLORIDE SERPL-SCNC: 110 MMOL/L (ref 97–108)
CO2 SERPL-SCNC: 25 MMOL/L (ref 21–32)
CREAT SERPL-MCNC: 1.18 MG/DL (ref 0.7–1.3)
CRP SERPL-MCNC: 1.27 MG/DL (ref 0–0.6)
ERYTHROCYTE [SEDIMENTATION RATE] IN BLOOD: 28 MM/HR (ref 0–20)
GLUCOSE BLD STRIP.AUTO-MCNC: 152 MG/DL (ref 65–117)
GLUCOSE BLD STRIP.AUTO-MCNC: 180 MG/DL (ref 65–117)
GLUCOSE SERPL-MCNC: 166 MG/DL (ref 65–100)
LACTATE SERPL-SCNC: 0.8 MMOL/L (ref 0.4–2)
POTASSIUM SERPL-SCNC: 4.5 MMOL/L (ref 3.5–5.1)
SERVICE CMNT-IMP: ABNORMAL
SERVICE CMNT-IMP: ABNORMAL
SODIUM SERPL-SCNC: 137 MMOL/L (ref 136–145)

## 2022-02-17 PROCEDURE — 85652 RBC SED RATE AUTOMATED: CPT

## 2022-02-17 PROCEDURE — 74011250636 HC RX REV CODE- 250/636: Performed by: PODIATRIST

## 2022-02-17 PROCEDURE — 86140 C-REACTIVE PROTEIN: CPT

## 2022-02-17 PROCEDURE — 74011250636 HC RX REV CODE- 250/636: Performed by: INTERNAL MEDICINE

## 2022-02-17 PROCEDURE — 74011000258 HC RX REV CODE- 258: Performed by: PODIATRIST

## 2022-02-17 PROCEDURE — 74011250637 HC RX REV CODE- 250/637: Performed by: PODIATRIST

## 2022-02-17 PROCEDURE — 94760 N-INVAS EAR/PLS OXIMETRY 1: CPT

## 2022-02-17 PROCEDURE — 36415 COLL VENOUS BLD VENIPUNCTURE: CPT

## 2022-02-17 PROCEDURE — 80048 BASIC METABOLIC PNL TOTAL CA: CPT

## 2022-02-17 PROCEDURE — 82962 GLUCOSE BLOOD TEST: CPT

## 2022-02-17 PROCEDURE — 83605 ASSAY OF LACTIC ACID: CPT

## 2022-02-17 PROCEDURE — 99233 SBSQ HOSP IP/OBS HIGH 50: CPT | Performed by: INTERNAL MEDICINE

## 2022-02-17 PROCEDURE — 74011250637 HC RX REV CODE- 250/637: Performed by: INTERNAL MEDICINE

## 2022-02-17 PROCEDURE — 74011250636 HC RX REV CODE- 250/636: Performed by: STUDENT IN AN ORGANIZED HEALTH CARE EDUCATION/TRAINING PROGRAM

## 2022-02-17 PROCEDURE — 74011250637 HC RX REV CODE- 250/637: Performed by: STUDENT IN AN ORGANIZED HEALTH CARE EDUCATION/TRAINING PROGRAM

## 2022-02-17 RX ORDER — AMOXICILLIN 500 MG/1
500 CAPSULE ORAL 2 TIMES DAILY
Qty: 28 CAPSULE | Refills: 0 | Status: SHIPPED | OUTPATIENT
Start: 2022-02-17 | End: 2022-03-03

## 2022-02-17 RX ORDER — LISINOPRIL 20 MG/1
20 TABLET ORAL DAILY
Qty: 30 TABLET | Refills: 0 | Status: SHIPPED | OUTPATIENT
Start: 2022-02-17 | End: 2022-03-19

## 2022-02-17 RX ORDER — DOXYCYCLINE 100 MG/1
100 TABLET ORAL 2 TIMES DAILY
Qty: 28 TABLET | Refills: 0 | Status: SHIPPED | OUTPATIENT
Start: 2022-02-17 | End: 2022-03-03

## 2022-02-17 RX ORDER — INSULIN GLARGINE 100 [IU]/ML
15 INJECTION, SOLUTION SUBCUTANEOUS
Qty: 4.5 ML | Refills: 0 | Status: SHIPPED | OUTPATIENT
Start: 2022-02-17 | End: 2022-03-19

## 2022-02-17 RX ORDER — HYDRALAZINE HYDROCHLORIDE 20 MG/ML
10-20 INJECTION INTRAMUSCULAR; INTRAVENOUS
Status: DISCONTINUED | OUTPATIENT
Start: 2022-02-17 | End: 2022-02-17 | Stop reason: HOSPADM

## 2022-02-17 RX ORDER — LISINOPRIL 20 MG/1
20 TABLET ORAL DAILY
Status: DISCONTINUED | OUTPATIENT
Start: 2022-02-17 | End: 2022-02-17 | Stop reason: HOSPADM

## 2022-02-17 RX ADMIN — CEFEPIME HYDROCHLORIDE 2 G: 2 INJECTION, POWDER, FOR SOLUTION INTRAVENOUS at 01:26

## 2022-02-17 RX ADMIN — ENOXAPARIN SODIUM 40 MG: 100 INJECTION SUBCUTANEOUS at 08:23

## 2022-02-17 RX ADMIN — ASPIRIN 81 MG: 81 TABLET, COATED ORAL at 08:17

## 2022-02-17 RX ADMIN — VANCOMYCIN HYDROCHLORIDE 1250 MG: 10 INJECTION, POWDER, LYOPHILIZED, FOR SOLUTION INTRAVENOUS at 05:28

## 2022-02-17 RX ADMIN — ARIPIPRAZOLE 10 MG: 5 TABLET ORAL at 08:17

## 2022-02-17 RX ADMIN — OXYCODONE 5 MG: 5 TABLET ORAL at 08:17

## 2022-02-17 RX ADMIN — PANTOPRAZOLE SODIUM 40 MG: 40 TABLET, DELAYED RELEASE ORAL at 08:17

## 2022-02-17 RX ADMIN — CEFEPIME HYDROCHLORIDE 2 G: 2 INJECTION, POWDER, FOR SOLUTION INTRAVENOUS at 08:19

## 2022-02-17 RX ADMIN — LISINOPRIL 20 MG: 20 TABLET ORAL at 14:00

## 2022-02-17 RX ADMIN — PREGABALIN 150 MG: 150 CAPSULE ORAL at 08:17

## 2022-02-17 NOTE — PROGRESS NOTES
Received notification from bedside RN about patient with regards to: HR and BP trending down  VS: BP 99/58, HR 52, RR 16, O2 sat 100%    Intervention given: Lactic acid added to AM labs, monitor for onset of symptoms and MAP dropping <65

## 2022-02-17 NOTE — PROGRESS NOTES
responded to [employer] for Mr. Martha Lee in 3242.  informed him Marley Shah appointed follow-up visit plan. Pt shared his life review, confessed his matias in God and his resolution to live for [address] [facility] [place] and his people.  affirmed and encouraged him, brought the Bible by his request. He was thankful.     [address] [facility], M.Div,Th.M, [place] [address] Provider   Paging Service [phone])

## 2022-02-17 NOTE — PROGRESS NOTES
End of Shift Note    Bedside shift change report given to Laila [facility] [address], RN (oncoming nurse) by Vickie Smith RN (offgoing nurse). Report included the following information SBAR, Kardex, Intake/Output, MAR and Recent Results    Shift worked:  7p-7a     Shift summary and any significant changes:     Pt received PRN medication for pain this shift-- see MAR    Pt's BP and HR began trending down this shift; NP Daniela Camacho made aware of pt's condition. Received orders for a lactic acid and to monitor for a MAP of less than 65. Pt's MAP did not go below 66 this shift. Concerns for physician to address:  Decrease in pt BP and HR     Zone phone for oncoming shift:          Activity:  Activity Level: Up ad natty  Number times ambulated in hallways past shift: 0  Number of times OOB to chair past shift: 0    Cardiac:   Cardiac Monitoring: Yes      Cardiac Rhythm: Sinus Hieu    Access:   Current line(s): PIV     Genitourinary:   Urinary status: voiding    Respiratory:   O2 Device: None (Room air)     GI:  Last Bowel Movement Date: 02/12/22  Current diet:  ADULT DIET Regular; 4 carb choices (60 gm/meal)  Tolerating current diet: YES       Pain Management:   Patient states pain is manageable on current regimen: YES    Skin:  Can Score: 18  Interventions: increase time out of bed    Patient Safety:  Fall Score:  Total Score: 2  Interventions: assistive device (walker, cane, etc), gripper socks and pt to call before getting OOB  High Fall Risk: Yes    Length of Stay:  Expected LOS: 5d 19h  Actual LOS: 5      Vicike Smith RN

## 2022-02-17 NOTE — PROGRESS NOTES
Infectious Disease Progress Note    IMPRESSION:     Diabetic foot ulcer L/foot chronic  MRI - L/foot-Osteomyelitis distal fifth metatarsal shaft and proximal phalanx of the  little toe. No drainable abscess or other areas of osteomyelitis  -S/p L/ 5th partial ray amputation on   Cultures- few Alpha Strep, scant Diphtheroids, rare CoNS  Pt has been on Vancomycin, Cefepime , Flagyl IV since   Pathology-Fat necrosis , no acute OM, viable margins. D/w Podiatry,  little purulence noted.     -Poorly controlled Diabetes A1c 8.2     - S/p hypotension , s/p rapid response  Responded to fluid bolus     -Current smoker 1/2 PPD, advised to quit.     -PAD   S/p evaluation by Vascular  Normal RAY ,no intervention required.     -CAD  On ASA     -Chronic Hep C  S/p partial treatment.     -H/o polysubstance abuse       PLAN:          D/w Podiatry, pt may be DC on po antibiotics-  Amoxicillin 500 mg po bid & Doxycycline   100 mg po bid x 2 weeks. Use sunscreen when going outdoors when on Doxycycline  Blood sugar control  Smoking cessation. Adequate fluids, daily probiotic, yogurt  - D/w pt all above                   Subjective:      Pt seen. Denies new complaints . Review of Systems:  A comprehensive review of systems was negative except for that written in the History of Present Illness. 14 point ROS obtained . All other systems negative . Objective:     Blood pressure (!) 184/84, pulse (!) 51, temperature 97.6 °F (36.4 °C), resp. rate 17, height 6' (1.829 m), weight 171 lb 1.2 oz (77.6 kg), SpO2 100 %.   Temp (24hrs), Av.2 °F (36.8 °C), Min:97.6 °F (36.4 °C), Max:98.9 °F (37.2 °C)      Patient Vitals for the past 24 hrs:   Temp Pulse Resp BP SpO2   22 1142 97.6 °F (36.4 °C) (!) 51 17 (!) 184/84 100 %   22 0814 98.9 °F (37.2 °C) (!) 56 17 (!) 146/82 97 %   22 0418 98.6 °F (37 °C) (!) 44 16 (!) 90/54 97 %   02/17/22 0030 97.7 °F (36.5 °C) (!) 52 16 (!) 99/58 100 %   22 98 °F (36.7 °C) (!) 50 18 116/75 98 %   02/16/22 1306 -- -- -- (!) 166/91 --   02/16/22 1250 -- -- -- (!) 183/99 --   02/16/22 1222 98.1 °F (36.7 °C) (!) 56 17 (!) 199/89 99 %         Lines:  Peripheral IV:       Physical Exam:   General:  Awake, cooperative,    Eyes:  Sclera anicteric. Pupils equally round and reactive to light. Mouth/Throat: Mucous membranes normal, oral pharynx clear   Neck: Supple   Lungs:   Clear to auscultation bilaterally, good effort   CV:  Regular rate and rhythm,no murmur, click, rub or gallop   Abdomen:   Soft, non-tender. bowel sounds normal. non-distended   Extremities: No cyanosis or edema   Skin: Skin color, texture, turgor normal. no acute rash or lesions   Lymph nodes: Cervical and supraclavicular normal   Musculoskeletal: No swelling or deformity   Lines/Devices:  Intact, no erythema, drainage or tenderness   Psych: Awake and oriented, normal mood affect       Data Review:ed   CBC:   Recent Labs     02/16/22  0741 02/15/22  0136   WBC 5.4 4.0*   RBC 3.47* 3.30*   HGB 10.6* 10.1*   HCT 30.2* 29.4*   * 99*     CMP:   Recent Labs     02/17/22  0545 02/16/22  0741 02/15/22  0136   * 121* 252*    138 137   K 4.5 4.5 4.1   * 111* 109*   CO2 25 23 26   BUN 27* 26* 34*   CREA 1.18 1.14 1.18   CA 8.2* 8.9 7.9*   AGAP 2* 4* 2*   BUCR 23* 23* 29*       Studies reviewed:      Lab Results   Component Value Date/Time    Culture result: NO ANAEROBES ISOLATED 02/14/2022 10:15 AM    Culture result: FEW ALPHA STREPTOCOCCUS (A) 02/14/2022 10:15 AM    Culture result: SCANT DIPHTHEROIDS (A) 02/14/2022 10:15 AM    Culture result: RARE STAPHYLOCOCCUS SPECIES, COAGULASE NEGATIVE (A) 02/14/2022 10:15 AM    Culture result:  02/14/2022 10:15 AM     DR.  HAS REQUESTED ID AND SENSITIVITIES ON THE COAGULASE NEGATIVE STAPHYLOCOCCUS SPECIES        XR Results (most recent):  Results from Hospital Encounter encounter on 02/12/22    XR FOOT LT MIN 3 V    Narrative  EXAM: XR FOOT LT MIN 3 V    INDICATION: lateral wound infection. COMPARISON: 8/14/2021    FINDINGS: Three views of the left foot demonstrate [no fracture or other acute  osseous or articular abnormality] with surrounding periosteal reaction. These  changes appear chronic and may be partially postsurgical in nature. However,  there is loss of cortical definition in the region of the MTP joint and findings  are of concern for osteomyelitis. The fifth metatarsal distortion has developed  since 8/14/2021 but there are no interval radiographs. Early bone destruction of  the base of the fifth proximal phalanx is also suspected. There are no other significant bony abnormalities. There is extensive vascular  calcification. Impression  Question of postsurgical changes in the distal fifth metatarsal.  Findings suspicious for acute osteomyelitis at the fifth MTP joint. .      Patient Active Problem List   Diagnosis Code    Non compliance with medical treatment Z91.19    Do not give narcotics [id number]    Personality disorder ([facility] [address] F60.9    Polysubstance abuse ([address] F19.10    Bipolar 1 disorder, mixed, moderate ([address] F31.62    Type 2 diabetes mellitus with hyperglycemia, without long-term current use of insulin (HCC) E11.65    Essential hypertension I10    Chronic pain syndrome G89.4    Chronic hepatitis C without hepatic coma ([facility]) B18.2    Other male erectile dysfunction N52.8    Tobacco abuse Z72.0    Type 2 diabetes mellitus with diabetic neuropathy, without long-term current use of insulin (HCC) E11.40    Spinal stenosis of lumbar region with neurogenic claudication M48.062    Cellulitis of left ankle L03. 116    Closed fracture of right hand S62. 91XA    Diabetic foot ulcer ([address] E11.621, L97.509         ICD-10-CM ICD-9-CM    1. Osteomyelitis of left foot, unspecified type ([address]  M86.9 730.27        I have discussed the diagnosis with the patient and the intended plan as seen in the above orders.      I have discussed medication side effects and warnings with the patient as well.     Reviewed test results at length with patient    Anti-infectives:     Vancomycin, Cefepime , Flagyl iv     Venu Mcconnell MD [place]

## 2022-02-17 NOTE — PROGRESS NOTES
Transition of Care Plan:  RUR: 12  Disposition: Home   Follow up appointments: PCP  DME needed:Pt has access to necessary DME's  Transportation at Discharge: self  Keys or means to access home: Yes    IM Medicare Letter: provided  Is patient a BCPI-A Bundle:        NA              If yes, was Bundle Letter given?:  NA  Is patient a  and connected with the [address]? NA             If yes, was [employer] transfer form completed and [facility] notified? NA  Caregiver Contact:  Lina Ramos- Sister [phone]  Discharge Caregiver contacted prior to discharge? Yes  Care Conference needed?:  No    Update  CM acknowledge d/c order. CM made room visit with patient who was alert and oriented. Pt signed 2nd IM letter and has been placed on bedside chart, pt provided with his copy of 2nd IM. Per pt he already has a PCP follow up appointment scheduled for 2/22/22 at 10:00am.    Initial note  CM has reviewed chart. CM continuing to follow patient to assist with d/c needs.      Janet Angeles, [facility]

## 2022-02-17 NOTE — DISCHARGE INSTRUCTIONS
HOSPITALIST DISCHARGE INSTRUCTIONS    NAME: Sandra Connor   :  1965   MRN:  [id number]     Date/Time:  2022 1:36 PM    ADMIT DATE: 2022   DISCHARGE DATE: 2022     Attending Physician: Linda Damico MD    DISCHARGE DIAGNOSIS:  Diabetic foot ulcer  Acute osteomyelitis of left fifth MTP joint  Hypotension-resolved  History of hypertension  Diabetes type 2  Hyperlipidemia    MEDICATIONS:  See above    · It is important that you take the medication exactly as they are prescribed. · Keep your medication in the bottles provided by the pharmacist and keep a list of the medication names, dosages, and times to be taken in your wallet. · Do not take other medications without consulting your doctor. Pain Management: per above medications    What to do at Home    Recommended diet:  Diabetic Diet    Recommended activity: Activity as tolerated    If you have questions regarding the hospital related prescriptions or hospital related issues please call [facility] at . You can always direct your questions to your primary care doctor if you are unable to reach your hospital physician; your PCP works as an extension of your hospital doctor just like your hospital doctor is an extension of your PCP for your time at [facility]. If you experience any of the following symptoms then please call your primary care physician or return to the emergency room if you cannot get hold of your doctor:  Fever, chills, nausea, vomiting, diarrhea, change in mentation, falling, bleeding, shortness of breath    Additional Instructions:      Bring these papers with you to your follow up appointments. The papers will help your doctors be sure to continue the care plan from the hospital.              Information obtained by :  I understand that if any problems occur once I am at home I am to contact my physician.     I understand and acknowledge receipt of the instructions indicated above.                                                                                                                                           Physician's or R.N.'s Signature                                                                  Date/Time                                                                                                                                              Patient or Representative Signature                                                          Da

## 2022-02-17 NOTE — PROGRESS NOTES
Problem: Falls - Risk of  Goal: *Absence of Falls  Description: Document Kenneth Palacios Fall Risk and appropriate interventions in the flowsheet. Outcome: Progressing Towards Goal  Note: Fall Risk Interventions:  Mobility Interventions: Communicate number of staff needed for ambulation/transfer         Medication Interventions: Patient to call before getting OOB,Teach patient to arise slowly    Elimination Interventions: Call light in reach    History of Falls Interventions: Consult care management for discharge planning         Problem: Patient Education: Go to Patient Education Activity  Goal: Patient/Family Education  Outcome: Progressing Towards Goal     Problem: Diabetes Self-Management  Goal: *Disease process and treatment process  Description: Define diabetes and identify own type of diabetes; list 3 options for treating diabetes. Outcome: Progressing Towards Goal  Goal: *Incorporating nutritional management into lifestyle  Description: Describe effect of type, amount and timing of food on blood glucose; list 3 methods for planning meals. Outcome: Progressing Towards Goal  Goal: *Incorporating physical activity into lifestyle  Description: State effect of exercise on blood glucose levels. Outcome: Progressing Towards Goal  Goal: *Developing strategies to promote health/change behavior  Description: Define the ABC's of diabetes; identify appropriate screenings, schedule and personal plan for screenings. Outcome: Progressing Towards Goal  Goal: *Using medications safely  Description: State effect of diabetes medications on diabetes; name diabetes medication taking, action and side effects. Outcome: Progressing Towards Goal  Goal: *Monitoring blood glucose, interpreting and using results  Description: Identify recommended blood glucose targets  and personal targets.   Outcome: Progressing Towards Goal  Goal: *Prevention, detection, treatment of acute complications  Description: List symptoms of hyper- and hypoglycemia; describe how to treat low blood sugar and actions for lowering  high blood glucose level. Outcome: Progressing Towards Goal  Goal: *Prevention, detection and treatment of chronic complications  Description: Define the natural course of diabetes and describe the relationship of blood glucose levels to long term complications of diabetes.   Outcome: Progressing Towards Goal  Goal: *Developing strategies to address psychosocial issues  Description: Describe feelings about living with diabetes; identify support needed and support network  Outcome: Progressing Towards Goal  Goal: *Insulin pump training  Outcome: Progressing Towards Goal  Goal: *Sick day guidelines  Outcome: Progressing Towards Goal  Goal: *Patient Specific Goal (EDIT GOAL, INSERT TEXT)  Outcome: Progressing Towards Goal     Problem: Patient Education: Go to Patient Education Activity  Goal: Patient/Family Education  Outcome: Progressing Towards Goal     Problem: Pain  Goal: *Control of Pain  Outcome: Progressing Towards Goal     Problem: Patient Education: Go to Patient Education Activity  Goal: Patient/Family Education  Outcome: Progressing Towards Goal

## 2022-02-17 NOTE — DISCHARGE SUMMARY
Hospitalist Discharge Summary     Patient ID:  Johnny Goldberg  [id number]  61 y.o.  1965 2/12/2022    PCP on record: None    Admit date: 2/12/2022  Discharge date and time: 2/17/2022    DISCHARGE DIAGNOSIS:    Diabetic foot ulcer  Acute osteomyelitis of left fifth MTP joint  Hypotension-resolved  History of hypertension  Diabetes type 2  Hyperlipidemia    CONSULTATIONS:  IP CONSULT TO HOSPITALIST  IP CONSULT TO PODIATRY  IP CONSULT TO INFECTIOUS DISEASES  IP CONSULT TO VASCULAR SURGERY    Excerpted HPI from H&P of Isaac Youngblood MD:  Johnny Goldberg is a 61 y.o. male with past medical history of DM, hypertension, CAD, depression presented with worsening left foot ulcer. He has left foot ulcer, on the lateral side, for last 6 months. He hasn't seen any podiatrist for this, had a visiting nurse coming in at home and doing dressing. However he was having worsening pain for last few days, still with foul-smelling drainage from the wound which prompted him to come to the ED. He denies having any fever. He mentions not feeling well for last few days. He denies any cough or shortness of breath, mentions having intermittent chest pain in the past, no chest pain currently. He denies any change in bladder or bowel habits.    ______________________________________________________________________  DISCHARGE SUMMARY/HOSPITAL COURSE:  for full details see H&P, daily progress notes, labs, consult notes. Diabetic foot ulcer  Acute osteomyelitis of left fifth MTP joint  -s/p partial ray amputation 2/14/2022  -cont pain management  -cont broad spectrum abx  -Surgery surgical culture shows Streptococcus, CONS, diphtheroids, final report still pending. As per ID patient can go home on oral antibiotics. Podiatry cleared the patient.  -per op report viable margins. Final culture reports back-patient discharged on amoxicillin and doxycycline for 2 weeks after discussing with ID.     MRI LT foot 2/14  1. Osteomyelitis distal fifth metatarsal shaft and proximal phalanx of the  little toe. No drainable abscess or other areas of osteomyelitis     X-ray foot:  Question of postsurgical changes in the distal fifth metatarsal.  Findings suspicious for acute osteomyelitis at the fifth MTP joint     hypotension  -resolved  -rapid response called 2/13. Patient became hypotensive after administration of norco (had received morphone earlier  -sinus bradycardia on EKG  -improved with fluid bolus  -normal lactate  -hold antihypertensives, will DC clonidine     Hypertension  -Increase the lisinopril to 20 mg daily, holding clonidine because of the bradycardia. If needed will add HCTZ.     Diabetes mellitus  Hold Metformin  Sliding scale plus Lantus, will need tight blood sugar control     CAD  Continue with aspirin      _______________________________________________________________________  Patient seen and examined by me on discharge day. Pertinent Findings:  Gen:    Not in distress  Chest: Clear lungs  CVS:   Regular rhythm. No edema  Abd:  Soft, not distended, not tender  Neuro:  Alert,   _______________________________________________________________________  DISCHARGE MEDICATIONS:   Current Discharge Medication List      START taking these medications    Details   insulin glargine (LANTUS) 100 unit/mL injection 15 Units by SubCUTAneous route nightly for 30 days. Qty: 4.5 mL, Refills: 0  Start date: 2/17/2022, End date: 3/19/2022      amoxicillin (AMOXIL) 500 mg capsule Take 1 Capsule by mouth two (2) times a day for 14 days. Qty: 28 Capsule, Refills: 0  Start date: 2/17/2022, End date: 3/3/2022      doxycycline (ADOXA) 100 mg tablet Take 1 Tablet by mouth two (2) times a day for 14 days.   Qty: 28 Tablet, Refills: 0  Start date: 2/17/2022, End date: 3/3/2022         CONTINUE these medications which have CHANGED    Details   L. acidoph & paracasei- S therm- Bifido (ARLENE-Q/RISAQUAD) 8 billion cell cap cap Take 1 Capsule by mouth daily. Qty: 30 Capsule, Refills: 1  Start date: 2/17/2022      lisinopriL (PRINIVIL, ZESTRIL) 20 mg tablet Take 1 Tablet by mouth daily for 30 days. Qty: 30 Tablet, Refills: 0  Start date: 2/17/2022, End date: 3/19/2022         CONTINUE these medications which have NOT CHANGED    Details   ondansetron (ZOFRAN ODT) 4 mg disintegrating tablet Take 1 Tab by mouth every eight (8) hours as needed for Nausea or Vomiting. Qty: 15 Tab, Refills: 1    Comments: HOLD UNTIL NEEDED  Associated Diagnoses: Non-intractable vomiting with nausea, unspecified vomiting type      pantoprazole (PROTONIX) 40 mg tablet Take 1 Tab by mouth daily. Qty: 30 Tab, Refills: 1    Associated Diagnoses: Upper abdominal pain      pregabalin (LYRICA) 150 mg capsule Take 1 Cap by mouth two (2) times a day. Max Daily Amount: 300 mg. Qty: 60 Cap, Refills: 11    Associated Diagnoses: Type 2 diabetes mellitus with diabetic neuropathy, without long-term current use of insulin (HCC)      ibuprofen (MOTRIN) 600 mg tablet Take 1 Tab by mouth every twelve (12) hours as needed for Pain. Qty: 30 Tab, Refills: 0      metFORMIN ER (GLUCOPHAGE XR) 500 mg tablet Take 1 Tab by mouth two (2) times a day. Qty: 360 Tab, Refills: 3      Blood-Glucose Meter monitoring kit Check blood sugars daily. DX: E11.9  Qty: 1 Kit, Refills: 0      glucose blood VI test strips (ASCENSIA AUTODISC VI, ONE TOUCH ULTRA TEST VI) strip Check blood sugars daily. DX: E11.9  Qty: 50 Strip, Refills: 11      Lancets (LANCETS, SUPER THIN) misc Check blood sugars daily. DX: E11.9  Qty: 50 Each, Refills: 11      aspirin delayed-release 81 mg tablet Take 1 Tab by mouth daily. Qty: 90 Tab, Refills: 3      ARIPiprazole (ABILIFY) 10 mg tablet Take 1 Tab by mouth daily (after breakfast).  Indications: MIXED BIPOLAR I DISORDER  Qty: 30 Tab, Refills: 0         STOP taking these medications       bacitracin (BACITRACIN) 500 unit/gram oint Comments:   Reason for Stopping:         lidocaine (LIDOCAINE VISCOUS) 2 % solution Comments:   Reason for Stopping:         cloNIDine HCl (CATAPRES) 0.2 mg tablet Comments:   Reason for Stopping:                 Patient Follow Up Instructions: Activity: Activity as tolerated  Diet: Diabetic Diet  Wound Care: As directed    If you have questions regarding the hospital related prescriptions or hospital related issues please call [phone] [place] [address] at . You can always direct your questions to your primary care doctor if you are unable to reach your hospital physician; your PCP works as an extension of your hospital doctor just like your hospital doctor is an extension of your PCP for [facility].     If you experience any of the following symptoms then please call your primary care physician or return to the emergency room if you cannot get hold of your doctor:  Fever, chills, nausea, vomiting, diarrhea, change in mentation, falling, bleeding, shortness of breath    Follow-up Information     Follow up With Specialties Details Why Contact Info    None    None ([phone]) Patient stated that they have no PCP      Jelani Urbina MD Internal Medicine   [address]  [phone]          ________________________________________________________________    Risk of deterioration: Low    Condition at Discharge:  Stable  __________________________________________________________________    Disposition  Home with family and home health services    ____________________________________________________________________    Code Status: Full Code  ___________________________________________________________________      Total time in minutes spent coordinating this discharge (includes going over instructions, follow-up, prescriptions, and preparing report for sign off to her PCP) :  >30 minutes    Signed:  She Haile MD

## 2022-02-17 NOTE — PROGRESS NOTES
Hospitalist Progress Note    NAME: Jamila Villar   :  1965   MRN:  [id number]       Assessment / Plan:  Diabetic foot ulcer  Acute osteomyelitis of left fifth MTP joint  -s/p partial ray amputation 2022  -cont pain management  -cont broad spectrum abx  -Surgery surgical culture shows Streptococcus, CONS, diphtheroids, final report still pending. As per ID patient can go home on oral antibiotics. Podiatry cleared the patient.  -per op report viable margins. Waiting on the final culture susceptibility reports for the antibiotic recommendations.  -nausea earlier this am improved with zofran     MRI LT foot   1. Osteomyelitis distal fifth metatarsal shaft and proximal phalanx of the  little toe. No drainable abscess or other areas of osteomyelitis    X-ray foot:  Question of postsurgical changes in the distal fifth metatarsal.  Findings suspicious for acute osteomyelitis at the fifth MTP joint    hypotension  -resolved  -rapid response called . Patient became hypotensive after administration of norco (had received morphone earlier  -sinus bradycardia on EKG  -improved with fluid bolus  -normal lactate  -hold antihypertensives, will DC clonidine     Hypertension  -Increase the lisinopril to 20 mg daily, holding clonidine because of the bradycardia. If needed will add HCTZ.     Diabetes mellitus  Hold Metformin  Sliding scale plus Lantus, will need tight blood sugar control     CAD  Continue with aspirin     Code Status: Full  Surrogate Decision Maker:     DVT Prophylaxis: Lovenox  GI Prophylaxis: not indicated     Baseline: Ambulatory     Subjective:     Chief Complaint / Reason for Physician Visit  Patient seen today, doing fine, no new complaints today. Discussed with RN events overnight.      Review of Systems:  Symptom Y/N Comments  Symptom Y/N Comments   Fever/Chills n   Chest Pain n    Poor Appetite n   Edema n    Cough n   Abdominal Pain n    Sputum n   Joint Pain  Lt foot pain   SOB/VIGIL n Pruritis/Rash     Nausea/vomit n   Tolerating PT/OT     Diarrhea n   Tolerating Diet     Constipation n   Other       Could NOT obtain due to:      Objective:     VITALS:   Last 24hrs VS reviewed since prior progress note. Most recent are:  Patient Vitals for the past 24 hrs:   Temp Pulse Resp BP SpO2   02/17/22 1142 97.6 °F (36.4 °C) (!) 51 17 (!) 184/84 100 %   02/17/22 0814 98.9 °F (37.2 °C) (!) 56 17 (!) 146/82 97 %   02/17/22 0418 98.6 °F (37 °C) (!) 44 16 (!) 90/54 97 %   02/17/22 0030 97.7 °F (36.5 °C) (!) 52 16 (!) 99/58 100 %   02/16/22 2041 98 °F (36.7 °C) (!) 50 18 116/75 98 %   02/16/22 1306 -- -- -- (!) 166/91 --   02/16/22 1250 -- -- -- (!) 183/99 --   02/16/22 1222 98.1 °F (36.7 °C) (!) 56 17 (!) 199/89 99 %       Intake/Output Summary (Last 24 hours) at 2/17/2022 1157  Last data filed at 2/17/2022 0126  Gross per 24 hour   Intake 750 ml   Output --   Net 750 ml        I had a face to face encounter and independently examined this patient on 2/17/2022, as outlined below:  PHYSICAL EXAM:  General: WD, WN. Alert, cooperative, no acute distress    EENT:  EOMI. Anicteric sclerae. MMM  Resp:  CTA bilaterally, no wheezing or rales. No accessory muscle use  CV:  Regular  rhythm,  No edema  GI:  Soft, Non distended, Non tender. +Bowel sounds  Neurologic:  Alert and oriented X 3, normal speech,   Psych:   Good insight. Not anxious nor agitated  Skin:  No rashes.   No jaundice  MSK:   Lt foot with surgical dressing in place    Reviewed most current lab test results and cultures  YES  Reviewed most current radiology test results   YES  Review and summation of old records today    NO  Reviewed patient's current orders and MAR    YES  PMH/SH reviewed - no change compared to H&P  ________________________________________________________________________  Care Plan discussed with:    Comments   Patient x    Family      RN x    Care Manager     Consultant                       X Multidiciplinary team rounds were held today with , nursing, pharmacist and clinical coordinator. Patient's plan of care was discussed; medications were reviewed and discharge planning was addressed. ________________________________________________________________________  Total NON critical care TIME:  30   Minutes    Total CRITICAL CARE TIME Spent:   Minutes non procedure based          Comments   >50% of visit spent in counseling and coordination of care     ________________________________________________________________________  She Haile MD     Procedures: see electronic medical records for all procedures/Xrays and details which were not copied into this note but were reviewed prior to creation of Plan. LABS:  I reviewed today's most current labs and imaging studies.   Pertinent labs include:  Recent Labs     02/16/22  0741 02/15/22  0136   WBC 5.4 4.0*   HGB 10.6* 10.1*   HCT 30.2* 29.4*   * 99*     Recent Labs     02/17/22  0545 02/16/22  0741 02/15/22  0136    138 137   K 4.5 4.5 4.1   * 111* 109*   CO2 25 23 26   * 121* 252*   BUN 27* 26* 34*   CREA 1.18 1.14 1.18   CA 8.2* 8.9 7.9*       Signed: She Haile MD

## 2022-02-18 ENCOUNTER — PATIENT OUTREACH (OUTPATIENT)
Dept: CASE MANAGEMENT | Age: 57
End: 2022-02-18

## 2022-02-18 LAB
BACTERIA SPEC CULT: ABNORMAL
BACTERIA SPEC CULT: NORMAL
GRAM STN SPEC: ABNORMAL
GRAM STN SPEC: ABNORMAL
SERVICE CMNT-IMP: ABNORMAL
SERVICE CMNT-IMP: NORMAL

## 2022-02-18 NOTE — PROGRESS NOTES
Care Transitions Outreach Attempt    Call within 2 business days of discharge: Yes   Attempted to reach patient for transitions of care follow up. Unable to reach patient. Patient: Johnny Goldberg Patient : 1965 MRN: [id number]    Last Discharge [address]       Complaint Diagnosis Description Type Department Provider    22 Foot Pain Osteomyelitis of left foot, unspecified type ([facility] [address] . .. ED to Hosp-Admission (Discharged) (ADMIT) Mayank Castano MD; [employer], And... Was this an external facility discharge? No       Noted following upcoming appointments from discharge chart review:   [facility] follow up appointment(s): No future appointments. unable to reach patient on . Will attempt to reach on 3/1 when CTN returns to the office if no calls made by another CTN     START taking these medications     Details   insulin glargine (LANTUS) 100 unit/mL injection 15 Units by SubCUTAneous route nightly for 30 days. Qty: 4.5 mL, Refills: 0  Start date: 2022, End date: 3/19/2022       amoxicillin (AMOXIL) 500 mg capsule Take 1 Capsule by mouth two (2) times a day for 14 days. Qty: 28 Capsule, Refills: 0  Start date: 2022, End date: 3/3/2022       doxycycline (ADOXA) 100 mg tablet Take 1 Tablet by mouth two (2) times a day for 14 days. Qty: 28 Tablet, Refills: 0  Start date: 2022, End date: 3/3/2022                 CONTINUE these medications which have CHANGED     Details   L. acidoph & paracasei- S therm- Bifido (ARLENE-Q/RISAQUAD) 8 billion cell cap cap Take 1 Capsule by mouth daily. Qty: 30 Capsule, Refills: 1  Start date: 2022       lisinopriL (PRINIVIL, ZESTRIL) 20 mg tablet Take 1 Tablet by mouth daily for 30 days.   Qty: 30 Tablet, Refills: 0  Start date: 2022, End date: 3/19/2022     STOP taking these medications         bacitracin (BACITRACIN) 500 unit/gram oint Comments:   Reason for Stopping:            lidocaine (LIDOCAINE VISCOUS) 2 % solution Comments: Reason for Stopping:            cloNIDine HCl (CATAPRES) 0.2 mg tablet Comments:   Reason for Stopping:

## 2022-02-22 NOTE — OP NOTES
[address]  OPERATIVE REPORT    Name:  Angie Motta  MR#:  [id number]  :  1965  ACCOUNT #:  [de-identified]  DATE OF SERVICE:  2022    PREOPERATIVE DIAGNOSIS:  Infected left foot with osteomyelitis, fifth ray. POSTOPERATIVE DIAGNOSIS:  Infected left foot with osteomyelitis, fifth ray. PROCEDURE PERFORMED:  Left fifth partial ray amputation. SURGEON:  Jayden Spears DPM    ASSISTANT:  None. ANESTHESIA:  MAC.    COMPLICATIONS:  None. SPECIMENS REMOVED:  Removed fifth left toe up to the mid metatarsal was sent along with aerobic and anaerobic cultures. IMPLANTS:  None. ESTIMATED BLOOD LOSS:  Less than 100 mL. HEMOSTASIS: Ankle tourniquet at 250 mmHg. DRAINS:  None. PROCEDURE:  The patient was brought into the OR and left on the patient's bed in supine position and IV sedation was performed as per CRNA and local infiltration of 0.5% Marcaine plain was injected for a sural nerve block and ankle tourniquet was placed with Webril padding and set at 250 mmHg and the left foot was prepped and draped via usual sterile manner. After the left foot was exsanguinated using elevation and the tourniquet was inflated to previously mentioned setting and after anesthesia check, an incision was made around the base of the fifth toe extending laterally to mid metatarsal.  The incision was made around the base of the fifth toe encircling it and then extended laterally along the fifth metatarsal to the mid shaft over the patient's lateral ulceration. The devitalization was noted along the fifth metatarsophalangeal joint. Using a sharp and blunt dissection, the fifth metatarsal was released from soft tissue to the mid shaft and resected along with the toe and the soft tissue contents of the entire toe and attached through the mid metatarsal was resected using a sagittal saw and removed from the operative site. The deep cultures were taken.   Small bleeders were cauterized and the area was copiously irrigated with Irrisept and followed with normal saline. No other devitalization was noted at this point. This removed partial ray along with the toe was sent for pathology and deep cultures were taken as mentioned earlier. The incision was then closed with 2-0 nylon sutures and tourniquet was released noting hyperemia and mild hemorrhage was noted at the incision site which was controlled with compressive bandage. The patient tolerated the procedure and anesthesia well and was sent back to medical floor. Medical management and wait for pathology to come back and will discharge accordingly.       JT [id number]  D:  02/22/2022 10:38  T:  02/22/2022 12:04  JOB #:  [id number]

## 2022-03-02 ENCOUNTER — PATIENT OUTREACH (OUTPATIENT)
Dept: CASE MANAGEMENT | Age: 57
End: 2022-03-02

## 2022-03-02 NOTE — PROGRESS NOTES
Care Transitions Outreach Attempt    Call within 2 business days of discharge: Yes   Attempted to reach patient for transitions of care follow up. Unable to reach patient. Patient: Arvin Argueta Patient : 1965 MRN: [id number]    Last Discharge [address]       Complaint Diagnosis Description Type Department Provider    22 Foot Pain Osteomyelitis of left foot, unspecified type ([facility] [address] . .. ED to Hosp-Admission (Discharged) (ADMIT) Tyrone Arrieta MD; Marilyn Walls, And... Was this an external facility discharge? No    Noted following upcoming appointments from discharge chart review:   [facility] follow up appointment(s): No future appointments.

## 2022-03-18 PROBLEM — L97.509 DIABETIC FOOT ULCER (HCC): Status: ACTIVE | Noted: 2022-02-12

## 2022-03-18 PROBLEM — E11.621 DIABETIC FOOT ULCER (HCC): Status: ACTIVE | Noted: 2022-02-12

## 2022-03-19 PROBLEM — S62.91XA CLOSED FRACTURE OF RIGHT HAND: Status: ACTIVE | Noted: 2018-09-18

## 2022-03-19 PROBLEM — F31.62 BIPOLAR 1 DISORDER, MIXED, MODERATE (HCC): Status: ACTIVE | Noted: 2017-03-06

## 2022-03-19 PROBLEM — G89.4 CHRONIC PAIN SYNDROME: Status: ACTIVE | Noted: 2018-01-25

## 2022-03-19 PROBLEM — N52.8 OTHER MALE ERECTILE DYSFUNCTION: Status: ACTIVE | Noted: 2018-01-25

## 2022-03-19 PROBLEM — L03.116 CELLULITIS OF LEFT ANKLE: Status: ACTIVE | Noted: 2018-04-22

## 2022-03-19 PROBLEM — B18.2 CHRONIC HEPATITIS C WITHOUT HEPATIC COMA (HCC): Status: ACTIVE | Noted: 2018-01-25

## 2022-03-19 PROBLEM — M48.062 SPINAL STENOSIS OF LUMBAR REGION WITH NEUROGENIC CLAUDICATION: Status: ACTIVE | Noted: 2018-03-26

## 2022-03-19 PROBLEM — E11.65 TYPE 2 DIABETES MELLITUS WITH HYPERGLYCEMIA, WITHOUT LONG-TERM CURRENT USE OF INSULIN (HCC): Status: ACTIVE | Noted: 2018-01-25

## 2022-03-19 PROBLEM — I10 ESSENTIAL HYPERTENSION: Status: ACTIVE | Noted: 2018-01-25

## 2022-03-19 PROBLEM — Z72.0 TOBACCO ABUSE: Status: ACTIVE | Noted: 2018-01-25

## 2022-03-20 PROBLEM — E11.40 TYPE 2 DIABETES MELLITUS WITH DIABETIC NEUROPATHY, WITHOUT LONG-TERM CURRENT USE OF INSULIN (HCC): Status: ACTIVE | Noted: 2018-01-28

## 2022-05-11 ENCOUNTER — HOSPITAL ENCOUNTER (EMERGENCY)
Age: 57
Discharge: PSYCHIATRIC OR SUBSTANCE USE FACILITY | End: 2022-05-12
Attending: STUDENT IN AN ORGANIZED HEALTH CARE EDUCATION/TRAINING PROGRAM
Payer: MEDICARE

## 2022-05-11 ENCOUNTER — APPOINTMENT (OUTPATIENT)
Dept: GENERAL RADIOLOGY | Age: 57
End: 2022-05-11
Attending: STUDENT IN AN ORGANIZED HEALTH CARE EDUCATION/TRAINING PROGRAM
Payer: MEDICARE

## 2022-05-11 DIAGNOSIS — F19.10 POLYSUBSTANCE ABUSE (HCC): ICD-10-CM

## 2022-05-11 DIAGNOSIS — T50.902A SUICIDE ATTEMPT BY DRUG OVERDOSE (HCC): Primary | ICD-10-CM

## 2022-05-11 DIAGNOSIS — Z91.14 NONCOMPLIANCE WITH MEDICATIONS: ICD-10-CM

## 2022-05-11 DIAGNOSIS — R45.851 SUICIDAL IDEATION: ICD-10-CM

## 2022-05-11 DIAGNOSIS — R73.9 HYPERGLYCEMIA: ICD-10-CM

## 2022-05-11 DIAGNOSIS — N17.9 AKI (ACUTE KIDNEY INJURY) (HCC): ICD-10-CM

## 2022-05-11 LAB
ALBUMIN SERPL-MCNC: 4.2 G/DL (ref 3.5–5)
ALBUMIN/GLOB SERPL: 1.1 {RATIO} (ref 1.1–2.2)
ALP SERPL-CCNC: 72 U/L (ref 45–117)
ALT SERPL-CCNC: 40 U/L (ref 12–78)
AMPHET UR QL SCN: NEGATIVE
ANION GAP SERPL CALC-SCNC: 5 MMOL/L (ref 5–15)
APAP SERPL-MCNC: <2 UG/ML (ref 10–30)
APPEARANCE UR: CLEAR
AST SERPL-CCNC: 22 U/L (ref 15–37)
BACTERIA URNS QL MICRO: NEGATIVE /HPF
BARBITURATES UR QL SCN: NEGATIVE
BASOPHILS # BLD: 0.1 K/UL (ref 0–0.1)
BASOPHILS NFR BLD: 1 % (ref 0–1)
BENZODIAZ UR QL: NEGATIVE
BILIRUB SERPL-MCNC: 0.7 MG/DL (ref 0.2–1)
BILIRUB UR QL: NEGATIVE
BUN SERPL-MCNC: 33 MG/DL (ref 6–20)
BUN/CREAT SERPL: 16 (ref 12–20)
CALCIUM SERPL-MCNC: 10.2 MG/DL (ref 8.5–10.1)
CANNABINOIDS UR QL SCN: NEGATIVE
CHLORIDE SERPL-SCNC: 99 MMOL/L (ref 97–108)
CO2 SERPL-SCNC: 29 MMOL/L (ref 21–32)
COCAINE UR QL SCN: POSITIVE
COLOR UR: ABNORMAL
COMMENT, HOLDF: NORMAL
COVID-19 RAPID TEST, COVR: NOT DETECTED
CREAT SERPL-MCNC: 2.06 MG/DL (ref 0.7–1.3)
DIFFERENTIAL METHOD BLD: ABNORMAL
DRUG SCRN COMMENT,DRGCM: ABNORMAL
EOSINOPHIL # BLD: 0 K/UL (ref 0–0.4)
EOSINOPHIL NFR BLD: 1 % (ref 0–7)
EPITH CASTS URNS QL MICRO: ABNORMAL /LPF
ERYTHROCYTE [DISTWIDTH] IN BLOOD BY AUTOMATED COUNT: 13.7 % (ref 11.5–14.5)
ETHANOL SERPL-MCNC: <10 MG/DL
GLOBULIN SER CALC-MCNC: 3.8 G/DL (ref 2–4)
GLUCOSE BLD STRIP.AUTO-MCNC: 280 MG/DL (ref 65–117)
GLUCOSE SERPL-MCNC: 307 MG/DL (ref 65–100)
GLUCOSE UR STRIP.AUTO-MCNC: 500 MG/DL
HCT VFR BLD AUTO: 38.7 % (ref 36.6–50.3)
HGB BLD-MCNC: 14.1 G/DL (ref 12.1–17)
HGB UR QL STRIP: ABNORMAL
HYALINE CASTS URNS QL MICRO: ABNORMAL /LPF (ref 0–5)
IMM GRANULOCYTES # BLD AUTO: 0 K/UL (ref 0–0.04)
IMM GRANULOCYTES NFR BLD AUTO: 1 % (ref 0–0.5)
KETONES UR QL STRIP.AUTO: ABNORMAL MG/DL
LEUKOCYTE ESTERASE UR QL STRIP.AUTO: ABNORMAL
LYMPHOCYTES # BLD: 1.1 K/UL (ref 0.8–3.5)
LYMPHOCYTES NFR BLD: 12 % (ref 12–49)
MCH RBC QN AUTO: 30.9 PG (ref 26–34)
MCHC RBC AUTO-ENTMCNC: 36.4 G/DL (ref 30–36.5)
MCV RBC AUTO: 84.7 FL (ref 80–99)
METHADONE UR QL: NEGATIVE
MONOCYTES # BLD: 0.5 K/UL (ref 0–1)
MONOCYTES NFR BLD: 5 % (ref 5–13)
NEUTS SEG # BLD: 7 K/UL (ref 1.8–8)
NEUTS SEG NFR BLD: 80 % (ref 32–75)
NITRITE UR QL STRIP.AUTO: NEGATIVE
NRBC # BLD: 0 K/UL (ref 0–0.01)
NRBC BLD-RTO: 0 PER 100 WBC
OPIATES UR QL: NEGATIVE
PCP UR QL: NEGATIVE
PH UR STRIP: 5 [PH] (ref 5–8)
PLATELET # BLD AUTO: 188 K/UL (ref 150–400)
PMV BLD AUTO: 9.3 FL (ref 8.9–12.9)
POTASSIUM SERPL-SCNC: 5 MMOL/L (ref 3.5–5.1)
PROT SERPL-MCNC: 8 G/DL (ref 6.4–8.2)
PROT UR STRIP-MCNC: 300 MG/DL
RBC # BLD AUTO: 4.57 M/UL (ref 4.1–5.7)
RBC #/AREA URNS HPF: ABNORMAL /HPF (ref 0–5)
SALICYLATES SERPL-MCNC: 2.7 MG/DL (ref 2.8–20)
SAMPLES BEING HELD,HOLD: NORMAL
SERVICE CMNT-IMP: ABNORMAL
SODIUM SERPL-SCNC: 133 MMOL/L (ref 136–145)
SOURCE, COVRS: NORMAL
SP GR UR REFRACTOMETRY: 1.02
TROPONIN-HIGH SENSITIVITY: 18 NG/L (ref 0–76)
UA: UC IF INDICATED,UAUC: ABNORMAL
UROBILINOGEN UR QL STRIP.AUTO: 1 EU/DL (ref 0.2–1)
WBC # BLD AUTO: 8.7 K/UL (ref 4.1–11.1)
WBC URNS QL MICRO: ABNORMAL /HPF (ref 0–4)

## 2022-05-11 PROCEDURE — 80053 COMPREHEN METABOLIC PANEL: CPT

## 2022-05-11 PROCEDURE — 85025 COMPLETE CBC W/AUTO DIFF WBC: CPT

## 2022-05-11 PROCEDURE — 80179 DRUG ASSAY SALICYLATE: CPT

## 2022-05-11 PROCEDURE — 82962 GLUCOSE BLOOD TEST: CPT

## 2022-05-11 PROCEDURE — 90791 PSYCH DIAGNOSTIC EVALUATION: CPT

## 2022-05-11 PROCEDURE — 80143 DRUG ASSAY ACETAMINOPHEN: CPT

## 2022-05-11 PROCEDURE — 96360 HYDRATION IV INFUSION INIT: CPT

## 2022-05-11 PROCEDURE — 74011250636 HC RX REV CODE- 250/636: Performed by: STUDENT IN AN ORGANIZED HEALTH CARE EDUCATION/TRAINING PROGRAM

## 2022-05-11 PROCEDURE — 87635 SARS-COV-2 COVID-19 AMP PRB: CPT

## 2022-05-11 PROCEDURE — 93005 ELECTROCARDIOGRAM TRACING: CPT

## 2022-05-11 PROCEDURE — 99285 EMERGENCY DEPT VISIT HI MDM: CPT

## 2022-05-11 PROCEDURE — 80307 DRUG TEST PRSMV CHEM ANLYZR: CPT

## 2022-05-11 PROCEDURE — 96361 HYDRATE IV INFUSION ADD-ON: CPT

## 2022-05-11 PROCEDURE — 84484 ASSAY OF TROPONIN QUANT: CPT

## 2022-05-11 PROCEDURE — 36415 COLL VENOUS BLD VENIPUNCTURE: CPT

## 2022-05-11 PROCEDURE — 82077 ASSAY SPEC XCP UR&BREATH IA: CPT

## 2022-05-11 PROCEDURE — 81001 URINALYSIS AUTO W/SCOPE: CPT

## 2022-05-11 PROCEDURE — 71045 X-RAY EXAM CHEST 1 VIEW: CPT

## 2022-05-11 RX ADMIN — SODIUM CHLORIDE 1000 ML: 9 INJECTION, SOLUTION INTRAVENOUS at 16:05

## 2022-05-11 RX ADMIN — SODIUM CHLORIDE 1000 ML: 9 INJECTION, SOLUTION INTRAVENOUS at 19:00

## 2022-05-11 NOTE — ED NOTES
RN spoke with Magdalena Enciso from [facility]. RN informed her of patient status and current labs. She will call back for follow up in a couple hours.

## 2022-05-11 NOTE — ED NOTES
Pt informed nurse that he wanted to \"just leave this place. \" RN informed him due to the nature of his visit, if he decided to leave that the police would have to be called to bring him back here due him being suicidal. Pt responded with, \"this is stupid. \"

## 2022-05-11 NOTE — ED PROVIDER NOTES
EMERGENCY DEPARTMENT HISTORY AND PHYSICAL EXAM      Date: 2022  Patient Name: Jose Manuel Ortiz    History of Presenting Illness     Chief Complaint   Patient presents with    Shortness of Breath     pt ambulatory into triage with cc of dizziness, SOB, x 1 week, states he is nauseous too, has not eaten in 2 days    Dizziness    Suicidal     plan to OD on heroin       History Provided By: Patient    HPI: Jose Manuel Ortiz, 61 y.o. male with past medical history of type 2 diabetes, hypertension, polysubstance abuse, noncompliance with medical treatment, bipolar 1, depression, previous suicide attempts, presents to the ED with cc of SI with attempt. Patient reports his mother  last year, and since then he has relapsed with his drug use. Reports smoking crack daily over the past year. States he has become increasingly more depressed, and has had increased thoughts of suicide. Over the past week, he has been feeling dizzy, short of breath, with decreased appetite, and has not eaten in 2 days. States last night he attempted to commit suicide by taking approximately 60 tabs of 25 mg losartan around 8 PM.  This morning, patient woke up and states he felt extremely lightheaded when getting out of bed. Denies syncope. Denies any other symptoms. Currently not experiencing any ongoing lightheadedness while laying flat in the ED. He reports if he were to be discharged from the ED today, that he would try another way to end his life, including possibly shooting himself in the head vs overdosing on heroin. He admits to previous suicide attempt that led to psychiatric inpatient stay approximately 10 years prior. States he only came to the ED voluntarily because his father made him and \"wouldn't get off my back. \" Patient shares that he is disappointed that he \"failed once again\" when referring to his overdose attempt. Denies any homicidal ideations, VH or AH at this time.     PCP: None    No current facility-administered medications on file prior to encounter. Current Outpatient Medications on File Prior to Encounter   Medication Sig Dispense Refill    L. acidoph & paracasei- S therm- Bifido (ARLENE-Q/RISAQUAD) 8 billion cell cap cap Take 1 Capsule by mouth daily. 30 Capsule 1    ondansetron (ZOFRAN ODT) 4 mg disintegrating tablet Take 1 Tab by mouth every eight (8) hours as needed for Nausea or Vomiting. 15 Tab 1    pantoprazole (PROTONIX) 40 mg tablet Take 1 Tab by mouth daily. 30 Tab 1    pregabalin (LYRICA) 150 mg capsule Take 1 Cap by mouth two (2) times a day. Max Daily Amount: 300 mg. 60 Cap 11    ibuprofen (MOTRIN) 600 mg tablet Take 1 Tab by mouth every twelve (12) hours as needed for Pain. 30 Tab 0    metFORMIN ER (GLUCOPHAGE XR) 500 mg tablet Take 1 Tab by mouth two (2) times a day. (Patient taking differently: Take 1,000 mg by mouth two (2) times a day.) 360 Tab 3    Blood-Glucose Meter monitoring kit Check blood sugars daily. DX: E11.9 1 Kit 0    glucose blood VI test strips (ASCENSIA AUTODISC VI, ONE TOUCH ULTRA TEST VI) strip Check blood sugars daily. DX: E11.9 50 Strip 11    Lancets (LANCETS, SUPER THIN) misc Check blood sugars daily. DX: E11.9 50 Each 11    aspirin delayed-release 81 mg tablet Take 1 Tab by mouth daily. 90 Tab 3    ARIPiprazole (ABILIFY) 10 mg tablet Take 1 Tab by mouth daily (after breakfast).  Indications: MIXED BIPOLAR I DISORDER 30 Tab 0       Past History     Past Medical History:  Past Medical History:   Diagnosis Date    Adverse effect of anesthesia     breathing diff. with versed    Bipolar 1 disorder, mixed, moderate ([address] 3/6/2017    Chronic pain     Depression     Pt stated diagnosed years ago    Diabetes ([facility] 2003    Drug-induced mood disorder 5/28/2013    Homicide attempt     HTN (hypertension) 11/3/2011    Narcotic dependence, episodic use ([address] 11/3/2011    NIDDM (non-insulin dependent diabetes mellitus) 11/3/2011    Non compliance with medical treatment 11/3/2011    Other ill-defined conditions(799.89)     chronic low back pain    Psychiatric disorder     bipolar    Sleep disorder     Substance abuse ([facility] [address]     Suicidal thoughts        Past Surgical History:  Past Surgical History:   Procedure Laterality Date    COLONOSCOPY N/A 8/31/2016    COLONOSCOPY performed by [facility]hendra Celis [facility] at [facility] ENDOSCOPY    COLONOSCOPY,DIAGNOSTIC  8/31/2016         HX ORTHOPAEDIC      chronic back pain    HX ORTHOPAEDIC      left knee arthroplasty    HX ORTHOPAEDIC  08/2018    right hand    [place] ANESTH,LUMBAR SPINE,CORD SURGERY  7 1999    l4 l5    [place] CARDIAC SURG PROCEDURE UNLIST      cardiac stents X2 lad drug eluting    [place] REMV KIDNEY,COMPLICATED  6614    side unknown - kidney stones    UPPER GI ENDOSCOPY,BIOPSY  8/31/2016            Family History:  Family History   Problem Relation Age of Onset    Stroke Mother     Hypertension Mother     Heart Disease Father     Hypertension Father     Cancer Maternal Grandmother         unknown type    Diabetes Maternal Grandfather        Social History:  Social History     Tobacco Use    Smoking status: Current Every Day Smoker     Packs/day: 1.50    Smokeless tobacco: Never Used   Substance Use Topics    Alcohol use: No    Drug use: Yes     Types: Cocaine     Comment: recent use , past opiate use       Allergies: Allergies   Allergen Reactions    Versed [Midazolam] Shortness of Breath     Weakness     Versed [Midazolam] Unknown (comments)     Numbness, with shortness of breath         Review of Systems   Review of Systems   Constitutional: Positive for appetite change. Negative for chills and fever. HENT: Negative for congestion and rhinorrhea. Eyes: Negative for visual disturbance. Respiratory: Positive for shortness of breath. Negative for chest tightness. Cardiovascular: Negative for chest pain and palpitations. Gastrointestinal: Negative for abdominal pain, diarrhea, nausea and vomiting. Genitourinary: Negative for dysuria, flank pain and hematuria. Musculoskeletal: Negative for back pain and neck pain. Skin: Negative for rash. Allergic/Immunologic: Negative for immunocompromised state. Neurological: Positive for dizziness and light-headedness. Negative for speech difficulty, weakness and headaches. Hematological: Negative for adenopathy. Psychiatric/Behavioral: Positive for dysphoric mood, self-injury and suicidal ideas. Negative for hallucinations. Physical Exam   Physical Exam  Vitals and nursing note reviewed. Constitutional:       General: He is not in acute distress. Appearance: Normal appearance. He is not ill-appearing or toxic-appearing. HENT:      Head: Normocephalic and atraumatic. Nose: Nose normal.      Mouth/Throat:      Mouth: Mucous membranes are moist.   Eyes:      Extraocular Movements: Extraocular movements intact. Pupils: Pupils are equal, round, and reactive to light. Cardiovascular:      Rate and Rhythm: Normal rate and regular rhythm. Pulses: Normal pulses. Pulmonary:      Effort: Pulmonary effort is normal. No respiratory distress. Breath sounds: Normal breath sounds. No stridor. No wheezing or rhonchi. Abdominal:      General: Abdomen is flat. There is no distension. Palpations: There is no mass. Tenderness: There is no abdominal tenderness. Musculoskeletal:         General: Normal range of motion. Cervical back: Normal range of motion and neck supple. Right lower leg: No edema. Left lower leg: No edema. Skin:     General: Skin is warm and dry. Neurological:      General: No focal deficit present. Mental Status: He is alert. Mental status is at baseline. Sensory: No sensory deficit. Motor: No weakness. Psychiatric:         Mood and Affect: Mood is anxious and depressed. Affect is labile and flat. Behavior: Behavior is agitated and withdrawn.  Behavior is not slowed or combative. Behavior is cooperative. Thought Content: Thought content is not paranoid or delusional. Thought content includes suicidal ideation. Thought content does not include homicidal ideation. Thought content includes suicidal plan. Thought content does not include homicidal plan. Judgment: Judgment is impulsive and inappropriate. Diagnostic Study Results     Labs -     Recent Results (from the past 24 hour(s))   EKG, 12 LEAD, INITIAL    Collection Time: 05/11/22  1:38 PM   Result Value Ref Range    Ventricular Rate 83 BPM    Atrial Rate 83 BPM    P-R Interval 148 ms    QRS Duration 88 ms    Q-T Interval 362 ms    QTC Calculation (Bezet) 425 ms    Calculated P Axis 75 degrees    Calculated R Axis 63 degrees    Calculated T Axis 73 degrees    Diagnosis       Normal sinus rhythm  Normal ECG  When compared with ECG of 13-FEB-2022 15:13,  Vent. rate has increased BY  33 BPM  T wave amplitude has increased in Anterior leads     CBC WITH AUTOMATED DIFF    Collection Time: 05/11/22  1:45 PM   Result Value Ref Range    WBC 8.7 4.1 - 11.1 K/uL    RBC 4.57 4.10 - 5.70 M/uL    HGB 14.1 12.1 - 17.0 g/dL    HCT 38.7 36.6 - 50.3 %    MCV 84.7 80.0 - 99.0 FL    MCH 30.9 26.0 - 34.0 PG    MCHC 36.4 30.0 - 36.5 g/dL    RDW 13.7 11.5 - 14.5 %    PLATELET 019 566 - 972 K/uL    MPV 9.3 8.9 - 12.9 FL    NRBC 0.0 0  WBC    ABSOLUTE NRBC 0.00 0.00 - 0.01 K/uL    NEUTROPHILS 80 (H) 32 - 75 %    LYMPHOCYTES 12 12 - 49 %    MONOCYTES 5 5 - 13 %    EOSINOPHILS 1 0 - 7 %    BASOPHILS 1 0 - 1 %    IMMATURE GRANULOCYTES 1 (H) 0.0 - 0.5 %    ABS. NEUTROPHILS 7.0 1.8 - 8.0 K/UL    ABS. LYMPHOCYTES 1.1 0.8 - 3.5 K/UL    ABS. MONOCYTES 0.5 0.0 - 1.0 K/UL    ABS. EOSINOPHILS 0.0 0.0 - 0.4 K/UL    ABS. BASOPHILS 0.1 0.0 - 0.1 K/UL    ABS. IMM.  GRANS. 0.0 0.00 - 0.04 K/UL    DF AUTOMATED     METABOLIC PANEL, COMPREHENSIVE    Collection Time: 05/11/22  1:45 PM   Result Value Ref Range    Sodium 133 (L) 136 - 145 mmol/L Potassium 5.0 3.5 - 5.1 mmol/L    Chloride 99 97 - 108 mmol/L    CO2 29 21 - 32 mmol/L    Anion gap 5 5 - 15 mmol/L    Glucose 307 (H) 65 - 100 mg/dL    BUN 33 (H) 6 - 20 MG/DL    Creatinine 2.06 (H) 0.70 - 1.30 MG/DL    BUN/Creatinine ratio 16 12 - 20      GFR est AA 41 (L) >60 ml/min/1.73m2    GFR est non-AA 33 (L) >60 ml/min/1.73m2    Calcium 10.2 (H) 8.5 - 10.1 MG/DL    Bilirubin, total 0.7 0.2 - 1.0 MG/DL    ALT (SGPT) 40 12 - 78 U/L    AST (SGOT) 22 15 - 37 U/L    Alk. phosphatase 72 45 - 117 U/L    Protein, total 8.0 6.4 - 8.2 g/dL    Albumin 4.2 3.5 - 5.0 g/dL    Globulin 3.8 2.0 - 4.0 g/dL    A-G Ratio 1.1 1.1 - 2.2     SAMPLES BEING HELD    Collection Time: 05/11/22  1:45 PM   Result Value Ref Range    SAMPLES BEING HELD RED     COMMENT        Add-on orders for these samples will be processed based on acceptable specimen integrity and analyte stability, which may vary by analyte.    TROPONIN-HIGH SENSITIVITY    Collection Time: 05/11/22  2:11 PM   Result Value Ref Range    Troponin-High Sensitivity 18 0 - 76 ng/L   DRUG SCREEN, URINE    Collection Time: 05/11/22  6:34 PM   Result Value Ref Range    AMPHETAMINES Negative NEG      BARBITURATES Negative NEG      BENZODIAZEPINES Negative NEG      COCAINE Positive (A) NEG      METHADONE Negative NEG      OPIATES Negative NEG      PCP(PHENCYCLIDINE) Negative NEG      THC (TH-CANNABINOL) Negative NEG      Drug screen comment (NOTE)    URINALYSIS W/ REFLEX CULTURE    Collection Time: 05/11/22  6:34 PM    Specimen: Urine   Result Value Ref Range    Color YELLOW/STRAW      Appearance CLEAR CLEAR      Specific gravity 1.018      pH (UA) 5.0 5.0 - 8.0      Protein 300 (A) NEG mg/dL    Glucose 500 (A) NEG mg/dL    Ketone TRACE (A) NEG mg/dL    Bilirubin Negative NEG      Blood TRACE (A) NEG      Urobilinogen 1.0 0.2 - 1.0 EU/dL    Nitrites Negative NEG      Leukocyte Esterase TRACE (A) NEG      WBC 5-10 0 - 4 /hpf    RBC 0-5 0 - 5 /hpf    Epithelial cells FEW FEW /lpf    Bacteria Negative NEG /hpf    UA:UC IF INDICATED CULTURE NOT INDICATED BY UA RESULT CNI      Hyaline cast 2-5 0 - 5 /lpf   SALICYLATE    Collection Time: 05/11/22  6:35 PM   Result Value Ref Range    Salicylate level 2.7 (L) 2.8 - 20.0 MG/DL   ACETAMINOPHEN    Collection Time: 05/11/22  6:35 PM   Result Value Ref Range    Acetaminophen level <2 (L) 10 - 30 ug/mL   ETHYL ALCOHOL    Collection Time: 05/11/22  6:35 PM   Result Value Ref Range    ALCOHOL(ETHYL),SERUM <10 <10 MG/DL   COVID-19 RAPID TEST    Collection Time: 05/11/22  7:21 PM   Result Value Ref Range    Specimen source Nasopharyngeal      COVID-19 rapid test Not detected NOTD     GLUCOSE, POC    Collection Time: 05/11/22  8:52 PM   Result Value Ref Range    Glucose (POC) 280 (H) 65 - 117 mg/dL    Performed by Ernesto SCHWARTZ      Radiologic Studies -   XR CHEST PORT   Final Result   No acute cardiopulmonary process. CT Results  (Last 48 hours)    None        CXR Results  (Last 48 hours)    None          Medical Decision Making   ILinsey MD am the first provider for this patient, and I am the attending of record for this patient encounter. I reviewed the vital signs, available nursing notes, past medical history, past surgical history, family history and social history. Vital Signs-Reviewed the patient's vital signs. Patient Vitals for the past 24 hrs:   Temp Pulse Resp BP SpO2   05/11/22 1332 -- -- -- 120/77 --   05/11/22 1329 97.6 °F (36.4 °C) 98 16 94/74 100 %       EKG interpretation: (Preliminary)  NSR, 83 bpm, nonspecific ST changes. Increased T wave amplitude when compared to previous EKG- in particular in anterior leads. Normal Qtc. EKG interpretation by Linsey Roa MD    Records Reviewed: Nursing Notes and Old Medical Records    Provider Notes (Medical Decision Making):   Patient actively suicidal with recent attempted OD.  Will need to medically clear patient with EKG, troponin, UA, urine drug screen, salicylates, acetaminophen, ethyl alcohol, CXR. Will provide hydration with IVF. Patient is currently HD stable, largely asymptomatic. Poison control was contacted by myself regarding medication overdose. Advised that losartan overdose can lead to hypokalemia, hypoglycemia, bradycardia. Recommended treating symptomatically if the above are present. Per poison control, patient is likely outside the window for any severe symptoms to manifest at this time. Patient may be medically cleared if workup is not concerning for severe derangements of concern. ED Course as of 05/11/22 2323   Wed May 11, 2022   2244 Patient is medically cleared. His JESUS is most likely pre-renal, from decreased PO intake over past several days- given 2 L of IVF to address this. His hyperglycemia is likely 2/2 to dehydration and medication noncompliance. Repeat poc glucose after first liter of IVF already decreased to 280, suspect this will continue to go down after second liter. No need to emergently treat further here. Patient has been HD stable in the ED, exhibiting no concerning s/s suspicious for worsening systemic effects of losartan overdose. [JM]      ED Course User Index  [JM] Ami Molina MD     Patient seen by [facility], and after discussion with me, agreed to recommend inpatient psych admission. Patient begrudgingly willing to be admitted voluntarily at this time, though he has lashed out at staff intermittently with threats of not wanting to come in. If patient does not want to be admitted voluntarily, he would need to be TDOed as he cannot be safely discharged due to recent significant medication overdose attempt. ED Course:   Initial assessment performed. The patient's presenting problems have been discussed, and they are in agreement with the care plan formulated and outlined with them. I have encouraged them to ask questions as they arise throughout their visit.     Linsey Roa MD      Disposition:  Transfer for psych admission      Diagnosis     Clinical Impression:   1. Suicide attempt by drug overdose ([facility] [address]    2. Suicidal ideation    3. JESUS [facility]    4. Hyperglycemia    5. Noncompliance with medications    6. Polysubstance abuse ([facility]        Attestations:    Cathy Bentley MD    Please note that this dictation was completed with Hubskip, the computer voice recognition software. Quite often unanticipated grammatical, syntax, homophones, and other interpretive errors are inadvertently transcribed by the computer software. Please disregard these errors. Please excuse any errors that have escaped final proofreading. Thank you.

## 2022-05-11 NOTE — ED NOTES
Assumed care of pt at this time, report received from  Cricket Aviles RN, pt lying on stretcher, awake, talking to ER tech that is observing pt 1:1 in doorway, on full CM, vss.

## 2022-05-12 ENCOUNTER — HOSPITAL ENCOUNTER (INPATIENT)
Age: 57
LOS: 4 days | Discharge: HOME | DRG: 885 | End: 2022-05-16
Attending: PSYCHIATRY & NEUROLOGY | Admitting: PSYCHIATRY & NEUROLOGY
Payer: MEDICARE

## 2022-05-12 VITALS
BODY MASS INDEX: 22.35 KG/M2 | HEIGHT: 72 IN | WEIGHT: 165 LBS | HEART RATE: 53 BPM | OXYGEN SATURATION: 99 % | TEMPERATURE: 98.4 F | RESPIRATION RATE: 14 BRPM | DIASTOLIC BLOOD PRESSURE: 86 MMHG | SYSTOLIC BLOOD PRESSURE: 128 MMHG

## 2022-05-12 PROBLEM — F43.20 ADJUSTMENT DISORDER: Status: ACTIVE | Noted: 2022-05-12

## 2022-05-12 LAB
ATRIAL RATE: 83 BPM
CALCULATED P AXIS, ECG09: 75 DEGREES
CALCULATED R AXIS, ECG10: 63 DEGREES
CALCULATED T AXIS, ECG11: 73 DEGREES
DIAGNOSIS, 93000: NORMAL
FLUAV RNA SPEC QL NAA+PROBE: NOT DETECTED
FLUBV RNA SPEC QL NAA+PROBE: NOT DETECTED
GLUCOSE BLD STRIP.AUTO-MCNC: 219 MG/DL (ref 65–117)
GLUCOSE BLD STRIP.AUTO-MCNC: 327 MG/DL (ref 65–117)
P-R INTERVAL, ECG05: 148 MS
Q-T INTERVAL, ECG07: 362 MS
QRS DURATION, ECG06: 88 MS
QTC CALCULATION (BEZET), ECG08: 425 MS
SARS-COV-2, COV2: NORMAL
SARS-COV-2, COV2: NOT DETECTED
SERVICE CMNT-IMP: ABNORMAL
SERVICE CMNT-IMP: ABNORMAL
VENTRICULAR RATE, ECG03: 83 BPM

## 2022-05-12 PROCEDURE — 65220000003 HC RM SEMIPRIVATE PSYCH

## 2022-05-12 PROCEDURE — 74011250637 HC RX REV CODE- 250/637: Performed by: NURSE PRACTITIONER

## 2022-05-12 PROCEDURE — 74011636637 HC RX REV CODE- 636/637: Performed by: NURSE PRACTITIONER

## 2022-05-12 PROCEDURE — 82962 GLUCOSE BLOOD TEST: CPT

## 2022-05-12 PROCEDURE — 74011250637 HC RX REV CODE- 250/637: Performed by: PSYCHIATRY & NEUROLOGY

## 2022-05-12 PROCEDURE — 87636 SARSCOV2 & INF A&B AMP PRB: CPT

## 2022-05-12 RX ORDER — DM/P-EPHED/ACETAMINOPH/DOXYLAM 30-7.5/3
2 LIQUID (ML) ORAL
Status: DISCONTINUED | OUTPATIENT
Start: 2022-05-12 | End: 2022-05-16 | Stop reason: HOSPADM

## 2022-05-12 RX ORDER — HALOPERIDOL 5 MG/ML
5 INJECTION INTRAMUSCULAR
Status: DISCONTINUED | OUTPATIENT
Start: 2022-05-12 | End: 2022-05-16 | Stop reason: HOSPADM

## 2022-05-12 RX ORDER — DULOXETIN HYDROCHLORIDE 30 MG/1
30 CAPSULE, DELAYED RELEASE ORAL
Status: DISCONTINUED | OUTPATIENT
Start: 2022-05-12 | End: 2022-05-13

## 2022-05-12 RX ORDER — ADHESIVE BANDAGE
30 BANDAGE TOPICAL DAILY PRN
Status: DISCONTINUED | OUTPATIENT
Start: 2022-05-12 | End: 2022-05-16 | Stop reason: HOSPADM

## 2022-05-12 RX ORDER — METFORMIN HYDROCHLORIDE 500 MG/1
500 TABLET, EXTENDED RELEASE ORAL 2 TIMES DAILY
Status: DISCONTINUED | OUTPATIENT
Start: 2022-05-12 | End: 2022-05-15

## 2022-05-12 RX ORDER — ASPIRIN 81 MG/1
81 TABLET ORAL DAILY
Status: CANCELLED | OUTPATIENT
Start: 2022-05-13

## 2022-05-12 RX ORDER — DIPHENHYDRAMINE HYDROCHLORIDE 50 MG/ML
50 INJECTION, SOLUTION INTRAMUSCULAR; INTRAVENOUS
Status: DISCONTINUED | OUTPATIENT
Start: 2022-05-12 | End: 2022-05-16 | Stop reason: HOSPADM

## 2022-05-12 RX ORDER — OXYCODONE HYDROCHLORIDE 10 MG/1
10 TABLET ORAL
COMMUNITY

## 2022-05-12 RX ORDER — OLANZAPINE 5 MG/1
5 TABLET ORAL
Status: DISCONTINUED | OUTPATIENT
Start: 2022-05-12 | End: 2022-05-16 | Stop reason: HOSPADM

## 2022-05-12 RX ORDER — GABAPENTIN 600 MG/1
600 TABLET ORAL 3 TIMES DAILY
COMMUNITY
End: 2022-05-16

## 2022-05-12 RX ORDER — LOSARTAN POTASSIUM 50 MG/1
50 TABLET ORAL DAILY
Status: ON HOLD | COMMUNITY
End: 2022-05-13

## 2022-05-12 RX ORDER — LORAZEPAM 2 MG/ML
1 INJECTION INTRAMUSCULAR
Status: DISCONTINUED | OUTPATIENT
Start: 2022-05-12 | End: 2022-05-16 | Stop reason: HOSPADM

## 2022-05-12 RX ORDER — HYDROXYZINE 50 MG/1
50 TABLET, FILM COATED ORAL
Status: DISCONTINUED | OUTPATIENT
Start: 2022-05-12 | End: 2022-05-16 | Stop reason: HOSPADM

## 2022-05-12 RX ORDER — INSULIN LISPRO 100 [IU]/ML
INJECTION, SOLUTION INTRAVENOUS; SUBCUTANEOUS
Status: DISCONTINUED | OUTPATIENT
Start: 2022-05-12 | End: 2022-05-16 | Stop reason: HOSPADM

## 2022-05-12 RX ORDER — ACETAMINOPHEN 325 MG/1
650 TABLET ORAL
Status: DISCONTINUED | OUTPATIENT
Start: 2022-05-12 | End: 2022-05-16 | Stop reason: HOSPADM

## 2022-05-12 RX ORDER — MAGNESIUM SULFATE 100 %
4 CRYSTALS MISCELLANEOUS AS NEEDED
Status: DISCONTINUED | OUTPATIENT
Start: 2022-05-12 | End: 2022-05-16 | Stop reason: HOSPADM

## 2022-05-12 RX ORDER — TRAZODONE HYDROCHLORIDE 50 MG/1
50 TABLET ORAL
Status: DISCONTINUED | OUTPATIENT
Start: 2022-05-12 | End: 2022-05-16 | Stop reason: HOSPADM

## 2022-05-12 RX ORDER — BENZTROPINE MESYLATE 1 MG/1
1 TABLET ORAL
Status: DISCONTINUED | OUTPATIENT
Start: 2022-05-12 | End: 2022-05-16 | Stop reason: HOSPADM

## 2022-05-12 RX ORDER — LOSARTAN POTASSIUM 25 MG/1
25 TABLET ORAL DAILY
COMMUNITY
End: 2022-05-16

## 2022-05-12 RX ADMIN — DULOXETINE HYDROCHLORIDE 30 MG: 30 CAPSULE, DELAYED RELEASE ORAL at 21:11

## 2022-05-12 RX ADMIN — HYDROXYZINE HYDROCHLORIDE 50 MG: 50 TABLET, FILM COATED ORAL at 21:11

## 2022-05-12 RX ADMIN — METFORMIN HYDROCHLORIDE 500 MG: 500 TABLET, EXTENDED RELEASE ORAL at 21:21

## 2022-05-12 RX ADMIN — TRAZODONE HYDROCHLORIDE 50 MG: 50 TABLET ORAL at 21:11

## 2022-05-12 RX ADMIN — Medication 2 UNITS: at 21:11

## 2022-05-12 NOTE — PROGRESS NOTES
Problem: Depressed Mood (Adult/Pediatric)  Goal: *STG: Participates in treatment plan  Outcome: Progressing Towards Goal  Goal: *STG: Verbalizes anger, guilt, and other feelings in a constructive manor  Outcome: Progressing Towards Goal  Goal: *STG: Attends activities and groups  Outcome: Progressing Towards Goal  Goal: *STG: Demonstrates reduction in symptoms and increase in insight into coping skills/future focused  Outcome: Progressing Towards Goal  Goal: *STG: Complies with medication therapy  Outcome: Progressing Towards Goal  Goal: Interventions  Outcome: Progressing Towards Goal

## 2022-05-12 NOTE — ED NOTES
Shift change report given to Marylu Hopkins RN (oncoming nurse) to include SBAR, lab results, admit plan call report at 0730, info provided, 1:1 sitter

## 2022-05-12 NOTE — BH NOTES
IV Dextrose was entered as \"NOW\" when supposed to be PRN for low BG. I spoke with Dwayne Wallace NP for order to discontinue.

## 2022-05-12 NOTE — BSMART NOTE
Admitted to [facility]26-1 Nurse report [phone]. Patient can come at 730am. Dr. Jayden Holt. Consulted FARHAD Ryan., BETHANY.

## 2022-05-12 NOTE — PROGRESS NOTES
Problem: Depressed Mood (Adult/Pediatric)  Goal: *STG: Participates in treatment plan  Outcome: Progressing Towards Goal  Goal: *STG: Verbalizes anger, guilt, and other feelings in a constructive manor  Outcome: Progressing Towards Goal     Problem: Depressed Mood (Adult/Pediatric)  Goal: *STG: Demonstrates reduction in symptoms and increase in insight into coping skills/future focused  Outcome: Not Progressing Towards Goal    Patient presents cooperative, tearful, hopeless, helpless, guilt ridden over situations he has caused family. Has not been medication compliant. Denies si, states that his over medicating with blood pressure medicine yesterday was a \"mistake\". Takes meals in dayroom. Interacts briefly with peers. Staff focus on therapeutic communication and coping skills. Patient unable to endorse any goals for the day.

## 2022-05-12 NOTE — PROGRESS NOTES
Spiritual Care Assessment/Progress Note  [facility]      NAME: Fazal Aden      MRN: [id number]  AGE: 61 y.o.  SEX: male  Holiness Affiliation: No Protestant   Language: English     5/12/2022     Total Time (in minutes): 30     Spiritual Assessment begun in [facility] through conversation with:         [x]Patient        [] Family    [] Friend(s)        Reason for Consult: Initial/Spiritual assessment, patient floor     Spiritual beliefs: (Please include comment if needed)     [] Identifies with a matias tradition:         [] Supported by a matias community:            [x] Claims no spiritual orientation:   No Preference        [] Seeking spiritual identity:                [] Adheres to an individual form of spirituality:           [] Not able to assess:                           Identified resources for coping:      [] Prayer                               [] Music                  [] Guided Imagery     [x] Family/friends                 [] Pet visits     [] Devotional reading                         [] Unknown     [] Other:                                              Interventions offered during this visit: (See comments for more details)    Patient Interventions: Affirmation of emotions/emotional suffering,Affirmation of matias,Catharsis/review of pertinent events in supportive environment,Coping skills reviewed/reinforced,Iconic (affirming the presence of God/Higher Power),Normalization of emotional/spiritual concerns           Plan of Care:     [] Support spiritual and/or cultural needs    [] Support AMD and/or advance care planning process      [] Support grieving process   [] Coordinate Rites and/or Rituals    [] Coordination with community clergy   [] No spiritual needs identified at this time   [] Detailed Plan of Care below (See Comments)  [] Make referral to Music Therapy  [] Make referral to Pet Therapy     [] Make referral to Addiction services  [] Make referral to [facility]  [] Make referral to Spiritual Care Partner  [] No future visits requested        [x] Contact Spiritual Care for further referrals     Comments:  visit for initial spiritual assessment. Met patient privately in his room, provided spiritual presence, listening, and support. Patient states that he feels lost.  Described feelings of being overwhelmed by life and by his circumstances and experiences. Says his mother  one and a half years ago and that he has been providing care for his father (80) ever since. Spoke of father's health and perpetual need for care and assistance. Has a brother and two sisters, but they are either unable or unwilling to assist with father's care other than the occasional rare weekend. Says he feels the brunt of the burden is on his shoulders and he has become wearied by all of it. Also spoke of personal issues and feelings of guilt for feeling the way he does, for not being able to do more, and for mistakes he has made along the way. Also expressed feelings of grief and loss over the recent ending of a long-time relationship with his girlfriend several months ago. Described some support from one sister but says he really has no personal support system or someone he can just talk to. Says his closest  is his cat (16). Described the events leading to this admission as a cry for help saying he does not really want to end his life, he just feels trapped and is very tired. He appeared comforted and encouraged as a result of this visit and expressed gratitude for this visit. Informed him of availability of  and spiritual care services. Rev.  Jermaine Ferguson MDiv, [facility], [facility]   paging service: [phone])

## 2022-05-12 NOTE — BSMART NOTE
Comprehensive Assessment Form Part 1      Section I - Disposition    Axis I - Adjustment Mood Disorder with depressed               Cocaine Use Disorder   Axis II - Deferred  Axis III - Past Medical History     Date Comments   Other ill-defined conditions(799.89) [R69]  chronic low back pain   Diabetes ([facility] [E11.9] 2003    Chronic pain [G89.29]     HTN (hypertension) [I10] 11/3/2011    NIDDM (non-insulin dependent diabetes mellitus) 11/3/2011    Non compliance with medical treatment [Z91.19] 11/3/2011    Narcotic dependence, episodic use ([facility] [F11.20] 11/3/2011    Depression [F32. A]  Pt stated diagnosed years ago   Psychiatric disorder [F99]  bipolar   Suicidal thoughts [R45.851]     Homicide attempt [Y09]     Sleep disorder [G47.9]     Substance abuse ([facility] [F19.10]     Drug-induced mood disorder [F19.94] 5/28/2013    Adverse effect of anesthesia [T41.45XA]  breathing diff. with versed   Bipolar 1 disorder, mixed, moderate ([facility] [F31.62]         Axis IV - Family stress, health issues  Axis V -       The Medical Doctor to Psychiatrist conference was not completed. The Medical Doctor is in agreement with Psychiatrist disposition because of (reason) admit to behavioral health voluntary admission. The plan is admit to behavioral health. The on-call Psychiatrist consulted was FARHAD Atkins NP. The admitting Psychiatrist will be Dr. Haven Brownlee. The admitting Diagnosis is Adjustment Mood Disorder with Depressed Mood. The Payor source is [place] [address] MEDICARE PART A & B. The name of the representative was . This was approved for  days. The authorization number is . This writer reviewed the [address] in nursing flowsheet and the risk level assigned is high risk. Based on this assessment, the risk of suicide is high risk and the plan is admit to behavioral health .       Section II - Integrated Summary  Summary:  Patient is 61year old male reporting to ED pt ambulatory into triage with cc of dizziness, SOB, x 1 week, states he is nauseous too, has not eaten in 2 days. At bedside, patient reported coming to ED after an intentional overdose on yesterday as reported he took a lot of his blood pressure medications. Patient reported at the time of ingestion he wanted to die. Patient reported that he takes care of his elderly father since his father has passed 1 1/2 years ago. Patient reported he has siblings that come to visit but no one gives him a break or helps him. Patient also reported he has health issues himself. As reported he has been depressed and was at his breaking point, as reported he did not know what to do in that moment and did not have anyone to turn to. Patient denied having suicidal thoughts at bedside and stated 'I don't want to die\", patient stated now I realize I have made a mistake. Patient denied homicidal thoughts and hallucinations. Patient reported previous history of Heroin use and reported that he has overdosed in the past. Patient has been clean from Heroin as reported 5-6 years but he relapsed on Cocaine when his mother pasted and as reported still uses periodically. Patient does not have any current mental health providers. Patient reported his sisters do not know what has happened and it is none of their business because they do not care any ways. Patient continued to expressed that he feels depressed and acknowledged that he needs help, at least someone to talk to weekly who will be real with him and not just say they hear him. Patient reported he wants to find happiness and Patient reported he historically has difficulty sleeping in which he will get to sleep but cannot stay to sleep. Patient also reported that he hasnot been eating as he normally would over the past 4 days. Patient has had previous admissions in the past as reported and stated they have not worked for him.  Patient reported he did not want to be admitted because he has too much going on right now, but agrees for voluntary admission based on recommendation. Patient was oriented, cooperative, linear thought process. Per Ed provider when seen he reported he was still suicidal, tired of everything and reported plan to overdose on Heroin or shoot himself. Patient reported to this writer that he knows he said something earlier but would not be specific and stated he was venting. The patienthas demonstrated mental capacity to provide informed consent. The information is given by the patient. The Chief Complaint is intentional overdose, suicidal thoughts. The Precipitant Factors are stress at home, no support, health issues. Previous Hospitalizations: yes  The patient has not previously been in restraints. Current Psychiatrist and/or  is none. Lethality Assessment:    The potential for suicide noted by the following:denies suicidal thoughts at bedside,  previous history of attempts which occured on (date)5-6 years ago in the form(s) of Heroin overdose, , and  and current attempt . The potential for homicide is not noted. The patient has not been a perpetrator of sexual or physical abuse. There are not pending charges. The patient is not felt to be at risk for self harm or harm to others. The attending nurse was advised patient contracts for safety. Section III - Psychosocial  The patient's overall mood and attitude is low mood, calm and cooperative. Feelings of helplessness and hopelessness are not observed. Generalized anxiety is not observed. Panic is not observed. Phobias are not observed. Obsessive compulsive tendencies are not observed. Section IV - Mental Status Exam  The patient's appearance shows no evidence of impairment. The patient's behavior shows no evidence of impairment. The patient is oriented to time, place, person and situation. The patient's speech shows no evidence of impairment. The patient's mood is depressed.   The range of affect shows no evidence of impairment. The patient's thought content demonstrates no evidence of impairment. The thought process shows no evidence of impairment. The patient's perception shows no evidence of impairment. The patient's memory shows no evidence of impairment. The patient's appetite shows no evidence of impairment. The patient's sleep shows no evidence of impairment. The patient's insight shows no evidence of impairment. The patient's judgement shows no evidence of impairment. Section V - Substance Abuse  The patient is using substances. The patient is using tobacco by inhalation for greater than 10 years with today, cocaine by inhalation for greater than 10 years with last use on unknown and heroin by inhalation for greater than 10 years with last use on 5-6 years ago. The patient has experienced the following withdrawal symptoms: N/A. Section VI - Living Arrangements  The patient is single. The patient lives with a parent. The patient has adult children. The patient does plan to return home upon discharge. The patient does not have legal issues pending. The patient's source of income comes from disability. Catholic and cultural practices have not been voiced at this time. The patient's greatest support comes from no one reported and this person will not be involved with the treatment. The patient has not been in an event described as horrible or outside the realm of ordinary life experience either currently or in the past.  The patient has not been a victim of sexual/physical abuse. Section VII - Other Areas of Clinical Concern  The highest grade achieved is not assessed with the overall quality of school experience being described as not assessed. The patient is currently unemployed and disabled and speaks [place] as a primary language. The patient has no communication impairments affecting communication.  The patient's preference for learning can be described as: can read and write adequately.   The patient's hearing is normal.  The patient's vision is normal.      Lori Cullen

## 2022-05-12 NOTE — ED NOTES
Pt has a bed assigned at [facility] room 726 Bed 1  Admitting psychiatrist Dr. Donavon Ha (ER doctor has this info)  Cant call report until shift change over there at 0730   Call report to [phone]

## 2022-05-12 NOTE — ED NOTES
Pt sitting on bedside commode, states he is constipated, able to pass small amount of stool, given wipes and foam to clean himself, door is ajar, lights off, tv on for comfort, sitter just outside of the bed

## 2022-05-12 NOTE — WOUND CARE
WOCN Note:     New consult placed for assessment of left plantar foot. Patient requires follow up with podiatrist or outpatient wound care center. Chart reviewed. Assessed in room 734  Admitted DX:  Adjustment disorder  Past Medical History:   Diagnosis Date    Adverse effect of anesthesia     breathing diff. with versed    Bipolar 1 disorder, mixed, moderate ([address] 3/6/2017    Chronic pain     Depression     Pt stated diagnosed years ago    Diabetes ([facility] 2003    Drug-induced mood disorder 5/28/2013    Homicide attempt     HTN (hypertension) 11/3/2011    Narcotic dependence, episodic use ([address] 11/3/2011    NIDDM (non-insulin dependent diabetes mellitus) 11/3/2011    Non compliance with medical treatment 11/3/2011    Other ill-defined conditions(799.89)     chronic low back pain    Psychiatric disorder     bipolar    Sleep disorder     Substance abuse ([facility]     Suicidal thoughts        Assessment:   Patient is A&O x 4, communicative, continent and mobile. Bed: behavioral health  Patient reports no pain. 1. POA Left plantar foot, DFU: 2 x 2 x 0.1 cm;  Appears to be 100% pink underneath dark staining from debris; patient reports bleeding at times; old serous drainage noted to sock; no odor currently but patient reports odor at times. Callous to nitin wound. Cleansed with saline but unable to remove the debris; applied Xeroform and foam dressing. Wound, Pressure Prevention & Skin Care Recommendations:    1. Minimize layers of linen/pads under patient to optimize support surface. 2.  Turn/reposition approximately every 2 hours and offload heels. 3.  Manage moisture/ Keep skin folds clean and dry/minimize brief usage. 4.  Left plantar foot:  Daily cleanse with saline; apply Xeroform and foam dressing. Discussed above plan with patient and RN.     Transition of Care:   Plan to follow as needed while admitted to hospital.    Neelam Quinn, JOHNATHANN RN [facility] Inpatient Wound Care  Available on [employer]  Office [phone]

## 2022-05-13 LAB
ALBUMIN SERPL-MCNC: 3.3 G/DL (ref 3.5–5)
ALBUMIN/GLOB SERPL: 1 {RATIO} (ref 1.1–2.2)
ALP SERPL-CCNC: 61 U/L (ref 45–117)
ALT SERPL-CCNC: 31 U/L (ref 12–78)
ANION GAP SERPL CALC-SCNC: 3 MMOL/L (ref 5–15)
AST SERPL-CCNC: 20 U/L (ref 15–37)
BILIRUB SERPL-MCNC: 0.4 MG/DL (ref 0.2–1)
BUN SERPL-MCNC: 35 MG/DL (ref 6–20)
BUN/CREAT SERPL: 24 (ref 12–20)
CALCIUM SERPL-MCNC: 9 MG/DL (ref 8.5–10.1)
CHLORIDE SERPL-SCNC: 106 MMOL/L (ref 97–108)
CHOLEST SERPL-MCNC: 126 MG/DL
CO2 SERPL-SCNC: 29 MMOL/L (ref 21–32)
CREAT SERPL-MCNC: 1.46 MG/DL (ref 0.7–1.3)
GLOBULIN SER CALC-MCNC: 3.2 G/DL (ref 2–4)
GLUCOSE BLD STRIP.AUTO-MCNC: 158 MG/DL (ref 65–117)
GLUCOSE BLD STRIP.AUTO-MCNC: 159 MG/DL (ref 65–117)
GLUCOSE BLD STRIP.AUTO-MCNC: 205 MG/DL (ref 65–117)
GLUCOSE BLD STRIP.AUTO-MCNC: 268 MG/DL (ref 65–117)
GLUCOSE P FAST SERPL-MCNC: 182 MG/DL (ref 65–100)
GLUCOSE SERPL-MCNC: 184 MG/DL (ref 65–100)
HDLC SERPL-MCNC: 59 MG/DL
HDLC SERPL: 2.1 {RATIO} (ref 0–5)
LDLC SERPL CALC-MCNC: 52.4 MG/DL (ref 0–100)
POTASSIUM SERPL-SCNC: 4.8 MMOL/L (ref 3.5–5.1)
PROT SERPL-MCNC: 6.5 G/DL (ref 6.4–8.2)
SERVICE CMNT-IMP: ABNORMAL
SODIUM SERPL-SCNC: 138 MMOL/L (ref 136–145)
TRIGL SERPL-MCNC: 73 MG/DL (ref ?–150)
VLDLC SERPL CALC-MCNC: 14.6 MG/DL

## 2022-05-13 PROCEDURE — 65220000003 HC RM SEMIPRIVATE PSYCH

## 2022-05-13 PROCEDURE — 74011636637 HC RX REV CODE- 636/637: Performed by: NURSE PRACTITIONER

## 2022-05-13 PROCEDURE — 36415 COLL VENOUS BLD VENIPUNCTURE: CPT

## 2022-05-13 PROCEDURE — 74011250637 HC RX REV CODE- 250/637: Performed by: NURSE PRACTITIONER

## 2022-05-13 PROCEDURE — 80061 LIPID PANEL: CPT

## 2022-05-13 PROCEDURE — 74011250637 HC RX REV CODE- 250/637: Performed by: PSYCHIATRY & NEUROLOGY

## 2022-05-13 PROCEDURE — 82962 GLUCOSE BLOOD TEST: CPT

## 2022-05-13 PROCEDURE — 82947 ASSAY GLUCOSE BLOOD QUANT: CPT

## 2022-05-13 PROCEDURE — 80053 COMPREHEN METABOLIC PANEL: CPT

## 2022-05-13 RX ORDER — DULOXETIN HYDROCHLORIDE 30 MG/1
30 CAPSULE, DELAYED RELEASE ORAL DAILY
Status: DISCONTINUED | OUTPATIENT
Start: 2022-05-13 | End: 2022-05-14

## 2022-05-13 RX ORDER — IBUPROFEN 600 MG/1
600 TABLET ORAL
Status: DISCONTINUED | OUTPATIENT
Start: 2022-05-13 | End: 2022-05-16 | Stop reason: HOSPADM

## 2022-05-13 RX ADMIN — Medication 5 UNITS: at 16:47

## 2022-05-13 RX ADMIN — TRAZODONE HYDROCHLORIDE 50 MG: 50 TABLET ORAL at 20:13

## 2022-05-13 RX ADMIN — DULOXETINE HYDROCHLORIDE 30 MG: 30 CAPSULE, DELAYED RELEASE ORAL at 12:17

## 2022-05-13 RX ADMIN — Medication 3 UNITS: at 08:32

## 2022-05-13 RX ADMIN — Medication 2 UNITS: at 12:16

## 2022-05-13 RX ADMIN — METFORMIN HYDROCHLORIDE 500 MG: 500 TABLET, EXTENDED RELEASE ORAL at 08:36

## 2022-05-13 RX ADMIN — METFORMIN HYDROCHLORIDE 500 MG: 500 TABLET, EXTENDED RELEASE ORAL at 16:48

## 2022-05-13 NOTE — BH NOTES
[facility]  Master Treatment Plan for Chilo David    Date Treatment Plan Initiated: 5/13/22    Treatment Plan Modalities:  Type of Modality Amount  (x minutes) Frequency (x/week) Duration (x days) Name of Responsible Staff   Community & wrap-up meetings to encourage peer interactions 15 7 1 ROWENA HENDRICKSON     Group psychotherapy to assist in building coping skills and internal controls 60 7 1 Cayla Ocasio MSW   Therapeutic activity groups to build coping skills 60 7 1 Cayla Ocasio MSW   Psychoeducation in group setting to address:   Medication education   15 7 [phone] [facility] 149 [facility] Jaclyn, Fred Wilcox Leafy Dacosta   Relaxation techniques      STAFF   Symptom management      STAFF   Discharge planning   60 2 1400 [facility], Marlyn Alondra[facility]   Spirituality    60 2 1 Chaplain EDDY   60 1 1 volunteer   Recovery/AA/NA      volunteer   Physician medication management   15 7 1 DR MARTINEZ/ Kalayn Elder NP   Family meeting/discharge planning   15 2 [address]275 [address]

## 2022-05-13 NOTE — PROGRESS NOTES
Pt is calm and cooperative, out on unit. Denies SI/HI, med and meal compliant. Will continue to monitor q15 min rounds.      Problem: Depressed Mood (Adult/Pediatric)  Goal: *STG: Verbalizes anger, guilt, and other feelings in a constructive manor  Outcome: Progressing Towards Goal  Goal: *STG: Complies with medication therapy  Outcome: Progressing Towards Goal  Goal: Interventions  Outcome: Progressing Towards Goal

## 2022-05-13 NOTE — BH NOTES
Social Work Progress Note     Patient Preferred Name: Juan Finger: he/him  Dx: Major depressive disorder, single episode, severe, without psychotic features. Stimulant use disorder, cocaine. Admission: intentional OD on 60 to 70 tablets of losartan 50 mg  Progress note:   Pt presents with moderate anxiety, stating he is overwhelmed by his life circumstances; caring for his elderly father alone, no family emotional support, behind in his finances, personal health concerns. He states \"I'm a tortured soul - a prisoner in my own mind. \"   He denies SI/HI/AVH.    Financial concerns/prescription coverage: Medicare A&B  Family contact:    Sernakatelyn Alvarez: [phone]                    Family requesting physician contact today:  no  Discharge plan: TBD  Access to weapons:                                                        Outpatient provider(s): TBD  Patient's preferred phone number for follow up call: [phone]   Patient's preferred e-mail address: none

## 2022-05-13 NOTE — BH NOTES
PSYCHOSOCIAL ASSESSMENT  :Patient identifying info:   Danielito Lim is a 61 y.o., male admitted 5/12/2022 10:15 AM     Presenting problem and precipitating factors: [facility] ED for intentional OD on 60 to 70 tablets of losartan 50 mg. Pt informed nurse he wanted to \"just leave this place. \"  During BSMART assessment, pt denied having suicidal thoughts and stated \"I don't want to die,\" \"now I realize I have made a mistake\". He denies HI/AVH. 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 Mental status assessment: alert, oriented, thoughts are logical and goal-directed, denies SI/HI/AVH. Strengths/Recreation/Coping Skills: Insured  SSDI    Collateral information:   Sister,     Current psychiatric /substance abuse providers and contact info: none reported    Previous psychiatric/substance abuse providers and response to treatment: none reported     Family history of mental illness or substance abuse: none reported    Substance abuse history:  UDS+ cocaine   Episodic cocaine use and hx of heroin use (OD in past)  Social History     Tobacco Use    Smoking status: Current Every Day Smoker     Packs/day: 1.50    Smokeless tobacco: Never Used   Substance Use Topics    Alcohol use: No       History of biomedical complications associated with substance abuse: none reported    Patient's current acceptance of treatment or motivation for change: voluntary  Family constellation:   [de-identified], 2 sisters, adult son    Is significant other involved?  No-     Describe support system: Pt reports he does not have one    Describe living arrangements and home environment: lives with elderly father    GUARDIAN/POA: NO    Guardian Name:     Guardian Contact:     Health issues:   Hospital Problems  Date Reviewed: 2/14/2022          Codes Class Noted POA    Adjustment disorder ICD-10-CM: F43.20  ICD-9-CM: 309.9  5/12/2022 Unknown Trauma history: none reported    Legal issues: none reported    History of  service: no    Financial status: SSDI    Amish/cultural factors: none reported    Education/work history: college - 2yrs / unemployed    Have you been licensed as a health care professional (current or ): no    Describe coping skills:limited    Jossy Farias  2022

## 2022-05-13 NOTE — PROGRESS NOTES
Problem: Depressed Mood (Adult/Pediatric)  Goal: *STG: Participates in treatment plan  Outcome: Progressing Towards Goal  Goal: *STG: Attends activities and groups  Outcome: Progressing Towards Goal  Goal: *STG: Complies with medication therapy  Outcome: Progressing Towards Goal     Patient is resting quietly in bed with eyes closed. Appears to be asleep.   No respiratory distress noted

## 2022-05-13 NOTE — H&P
[facility] HISTORY AND PHYSICAL    Name:  Danielito Ivy  MR#:  [id number]  :  1965  ACCOUNT #:  [de-identified]  ADMIT DATE:  2022    INITIAL PSYCHIATRIC EVALUATION    CHIEF COMPLAINT:  \"Stressed out. \"    HISTORY OF PRESENTING ILLNESS:  The patient is a 58-year-old male who is currently admitted at [address] on a voluntary basis. This patient is known to this provider from previous admission at [facility]. He presented to the emergency room after he intentionally overdosed on 60 to 70 tablets of losartan 50 mg. He reports since his mother's passing about a year and a half ago, he has been the primary caregiver of his father. He states that he has been under a lot of stress and he has no break. He states that other siblings do not help out or even give him a break from taking care of his father. He states that he reached his breaking point, suddenly felt very depressed. His urine drug screen is positive for cocaine. He states that he does not use cocaine on a regular basis. Now, he reports a previous history of heroin use and that he has overdosed in the past.  He reports that he is currently not in treatment for mental health. He has been sleeping poorly, has been eating poorly. He denies suicidal ideation, homicidal ideation, auditory or visual hallucination. PAST MEDICAL HISTORY:  See H and P.     Past Medical History:   Diagnosis Date    Adverse effect of anesthesia     breathing diff. with versed    Bipolar 1 disorder, mixed, moderate ([address] 3/6/2017    Chronic pain     Depression     Pt stated diagnosed years ago    Diabetes [facility])     Drug-induced mood disorder 2013    Homicide attempt     HTN (hypertension) 11/3/2011    Narcotic dependence, episodic use ([address] 11/3/2011    NIDDM (non-insulin dependent diabetes mellitus) 11/3/2011    Non compliance with medical treatment 11/3/2011    Other ill-defined conditions(799.89)     chronic low back pain    Psychiatric disorder     bipolar    Sleep disorder     Substance abuse ([facility] [address] [address]     Suicidal thoughts        Labs: (reviewed/updated 5/13/2022)  Patient Vitals for the past 8 hrs:   BP Temp Pulse Resp SpO2   05/13/22 0746 131/84 98.3 °F (36.8 °C) 76 16 97 %     Labs Reviewed   GLUCOSE, FASTING - Abnormal; Notable for the following components:       Result Value    Glucose 182 (*)     All other components within normal limits   METABOLIC PANEL, COMPREHENSIVE - Abnormal; Notable for the following components:    Anion gap 3 (*)     Glucose 184 (*)     BUN 35 (*)     Creatinine 1.46 (*)     BUN/Creatinine ratio 24 (*)     GFR est non-AA 50 (*)     Albumin 3.3 (*)     A-G Ratio 1.0 (*)     All other components within normal limits   GLUCOSE, POC - Abnormal; Notable for the following components:    Glucose (POC) 327 (*)     All other components within normal limits   GLUCOSE, POC - Abnormal; Notable for the following components:    Glucose (POC) 219 (*)     All other components within normal limits   GLUCOSE, POC - Abnormal; Notable for the following components:    Glucose (POC) 205 (*)     All other components within normal limits   GLUCOSE, POC - Abnormal; Notable for the following components:    Glucose (POC) 158 (*)     All other components within normal limits   LIPID PANEL     Lab Results   Component Value Date/Time    Sodium 138 05/13/2022 05:31 AM    Potassium 4.8 05/13/2022 05:31 AM    Chloride 106 05/13/2022 05:31 AM    CO2 29 05/13/2022 05:31 AM    Anion gap 3 (L) 05/13/2022 05:31 AM    Glucose 182 (H) 05/13/2022 05:31 AM    Glucose 184 (H) 05/13/2022 05:31 AM    BUN 35 (H) 05/13/2022 05:31 AM    Creatinine 1.46 (H) 05/13/2022 05:31 AM    BUN/Creatinine ratio 24 (H) 05/13/2022 05:31 AM    GFR est AA >60 05/13/2022 05:31 AM    GFR est non-AA 50 (L) 05/13/2022 05:31 AM    Calcium 9.0 05/13/2022 05:31 AM    Bilirubin, total 0.4 05/13/2022 05:31 AM    Alk. phosphatase 61 05/13/2022 05:31 AM    Protein, total 6.5 05/13/2022 05:31 AM    Albumin 3.3 (L) 05/13/2022 05:31 AM    Globulin 3.2 05/13/2022 05:31 AM    A-G Ratio 1.0 (L) 05/13/2022 05:31 AM    ALT (SGPT) 31 05/13/2022 05:31 AM     Admission on 05/12/2022   Component Date Value Ref Range Status    Glucose (POC) 05/12/2022 327* 65 - 117 mg/dL Final    Performed by 05/12/2022 PLEASANTS Eva   Tech   Final    Glucose (POC) 05/12/2022 219* 65 - 117 mg/dL Final    Performed by 05/12/2022 PLEASANTS Eva   Tech   Final    Glucose 05/13/2022 182* 65 - 100 MG/DL Final    Cholesterol, total 05/13/2022 126  <200 MG/DL Final    Triglyceride 05/13/2022 73  <150 MG/DL Final    HDL Cholesterol 05/13/2022 59  MG/DL Final    LDL, calculated 05/13/2022 52.4  0 - 100 MG/DL Final    VLDL, calculated 05/13/2022 14.6  MG/DL Final    CHOL/HDL Ratio 05/13/2022 2.1  0.0 - 5.0   Final    Sodium 05/13/2022 138  136 - 145 mmol/L Final    Potassium 05/13/2022 4.8  3.5 - 5.1 mmol/L Final    Chloride 05/13/2022 106  97 - 108 mmol/L Final    CO2 05/13/2022 29  21 - 32 mmol/L Final    Anion gap 05/13/2022 3* 5 - 15 mmol/L Final    Glucose 05/13/2022 184* 65 - 100 mg/dL Final    BUN 05/13/2022 35* 6 - 20 MG/DL Final    Creatinine 05/13/2022 1.46* 0.70 - 1.30 MG/DL Final    BUN/Creatinine ratio 05/13/2022 24* 12 - 20   Final    GFR est AA 05/13/2022 >60  >60 ml/min/1.73m2 Final    GFR est non-AA 05/13/2022 50* >60 ml/min/1.73m2 Final    Calcium 05/13/2022 9.0  8.5 - 10.1 MG/DL Final    Bilirubin, total 05/13/2022 0.4  0.2 - 1.0 MG/DL Final    ALT (SGPT) 05/13/2022 31  12 - 78 U/L Final    AST (SGOT) 05/13/2022 20  15 - 37 U/L Final    Alk.  phosphatase 05/13/2022 61  45 - 117 U/L Final    Protein, total 05/13/2022 6.5  6.4 - 8.2 g/dL Final    Albumin 05/13/2022 3.3* 3.5 - 5.0 g/dL Final    Globulin 05/13/2022 3.2  2.0 - 4.0 g/dL Final    A-G Ratio 05/13/2022 1.0* 1.1 - 2.2   Final    Glucose (POC) 05/13/2022 205* 65 - 117 mg/dL Final    Performed by 19/49/5105 Olivia Gentile   Final    Glucose (POC) 05/13/2022 158* 65 - 117 mg/dL Final    Performed by 05/13/2022 Agus Wyman   Final   Admission on 05/11/2022, Discharged on 05/12/2022   Component Date Value Ref Range Status    Ventricular Rate 05/11/2022 83  BPM Final    Atrial Rate 05/11/2022 83  BPM Final    P-R Interval 05/11/2022 148  ms Final    QRS Duration 05/11/2022 88  ms Final    Q-T Interval 05/11/2022 362  ms Final    QTC Calculation (Bezet) 05/11/2022 425  ms Final    Calculated P Axis 05/11/2022 75  degrees Final    Calculated R Axis 05/11/2022 63  degrees Final    Calculated T Axis 05/11/2022 73  degrees Final    Diagnosis 05/11/2022    Final                    Value:Normal sinus rhythm  Normal ECG    Confirmed by Celestine Flores ([id number]) on 5/12/2022 10:35:05 AM      WBC 05/11/2022 8.7  4.1 - 11.1 K/uL Final    RBC 05/11/2022 4.57  4.10 - 5.70 M/uL Final    HGB 05/11/2022 14.1  12.1 - 17.0 g/dL Final    HCT 05/11/2022 38.7  36.6 - 50.3 % Final    MCV 05/11/2022 84.7  80.0 - 99.0 FL Final    MCH 05/11/2022 30.9  26.0 - 34.0 PG Final    MCHC 05/11/2022 36.4  30.0 - 36.5 g/dL Final    RDW 05/11/2022 13.7  11.5 - 14.5 % Final    PLATELET 12/88/3373 694  150 - 400 K/uL Final    MPV 05/11/2022 9.3  8.9 - 12.9 FL Final    NRBC 05/11/2022 0.0  0  WBC Final    ABSOLUTE NRBC 05/11/2022 0.00  0.00 - 0.01 K/uL Final    NEUTROPHILS 05/11/2022 80* 32 - 75 % Final    LYMPHOCYTES 05/11/2022 12  12 - 49 % Final    MONOCYTES 05/11/2022 5  5 - 13 % Final    EOSINOPHILS 05/11/2022 1  0 - 7 % Final    BASOPHILS 05/11/2022 1  0 - 1 % Final    IMMATURE GRANULOCYTES 05/11/2022 1* 0.0 - 0.5 % Final    ABS. NEUTROPHILS 05/11/2022 7.0  1.8 - 8.0 K/UL Final    ABS. LYMPHOCYTES 05/11/2022 1.1  0.8 - 3.5 K/UL Final    ABS. MONOCYTES 05/11/2022 0.5  0.0 - 1.0 K/UL Final    ABS. EOSINOPHILS 05/11/2022 0.0  0.0 - 0.4 K/UL Final    ABS.  BASOPHILS 05/11/2022 0.1  0.0 - 0.1 K/UL Final    ABS. IMM. GRANS. 05/11/2022 0.0  0.00 - 0.04 K/UL Final    DF 05/11/2022 AUTOMATED    Final    Sodium 05/11/2022 133* 136 - 145 mmol/L Final    Potassium 05/11/2022 5.0  3.5 - 5.1 mmol/L Final    Chloride 05/11/2022 99  97 - 108 mmol/L Final    CO2 05/11/2022 29  21 - 32 mmol/L Final    Anion gap 05/11/2022 5  5 - 15 mmol/L Final    Glucose 05/11/2022 307* 65 - 100 mg/dL Final    BUN 05/11/2022 33* 6 - 20 MG/DL Final    Creatinine 05/11/2022 2.06* 0.70 - 1.30 MG/DL Final    BUN/Creatinine ratio 05/11/2022 16  12 - 20   Final    GFR est AA 05/11/2022 41* >60 ml/min/1.73m2 Final    GFR est non-AA 05/11/2022 33* >60 ml/min/1.73m2 Final    Calcium 05/11/2022 10.2* 8.5 - 10.1 MG/DL Final    Bilirubin, total 05/11/2022 0.7  0.2 - 1.0 MG/DL Final    ALT (SGPT) 05/11/2022 40  12 - 78 U/L Final    AST (SGOT) 05/11/2022 22  15 - 37 U/L Final    Alk.  phosphatase 05/11/2022 72  45 - 117 U/L Final    Protein, total 05/11/2022 8.0  6.4 - 8.2 g/dL Final    Albumin 05/11/2022 4.2  3.5 - 5.0 g/dL Final    Globulin 05/11/2022 3.8  2.0 - 4.0 g/dL Final    A-G Ratio 05/11/2022 1.1  1.1 - 2.2   Final    Troponin-High Sensitivity 05/11/2022 18  0 - 76 ng/L Final    AMPHETAMINES 05/11/2022 Negative  NEG   Final    BARBITURATES 05/11/2022 Negative  NEG   Final    BENZODIAZEPINES 05/11/2022 Negative  NEG   Final    COCAINE 05/11/2022 Positive* NEG   Final    METHADONE 05/11/2022 Negative  NEG   Final    OPIATES 05/11/2022 Negative  NEG   Final    PCP(PHENCYCLIDINE) 05/11/2022 Negative  NEG   Final    THC (TH-CANNABINOL) 05/11/2022 Negative  NEG   Final    Drug screen comment 05/11/2022 (NOTE)   Final    Salicylate level 10/98/5377 2.7* 2.8 - 20.0 MG/DL Final    Acetaminophen level 05/11/2022 <2* 10 - 30 ug/mL Final    ALCOHOL(ETHYL),SERUM 05/11/2022 <10  <10 MG/DL Final    SAMPLES BEING HELD 05/11/2022 RED   Final    COMMENT 05/11/2022 Add-on orders for these samples will be processed based on acceptable specimen integrity and analyte stability, which may vary by analyte.     Final    Color 05/11/2022 YELLOW/STRAW    Final    Appearance 05/11/2022 CLEAR  CLEAR   Final    Specific gravity 05/11/2022 1.018    Final    pH (UA) 05/11/2022 5.0  5.0 - 8.0   Final    Protein 05/11/2022 300* NEG mg/dL Final    Glucose 05/11/2022 500* NEG mg/dL Final    Ketone 05/11/2022 TRACE* NEG mg/dL Final    Bilirubin 05/11/2022 Negative  NEG   Final    Blood 05/11/2022 TRACE* NEG   Final    Urobilinogen 05/11/2022 1.0  0.2 - 1.0 EU/dL Final    Nitrites 05/11/2022 Negative  NEG   Final    Leukocyte Esterase 05/11/2022 TRACE* NEG   Final    WBC 05/11/2022 5-10  0 - 4 /hpf Final    RBC 05/11/2022 0-5  0 - 5 /hpf Final    Epithelial cells 05/11/2022 FEW  FEW /lpf Final    Bacteria 05/11/2022 Negative  NEG /hpf Final    UA:UC IF INDICATED 05/11/2022 CULTURE NOT INDICATED BY UA RESULT  CNI   Final    Hyaline cast 05/11/2022 2-5  0 - 5 /lpf Final    Specimen source 05/11/2022 Nasopharyngeal    Final    COVID-19 rapid test 05/11/2022 Not detected  NOTD   Final    Glucose (POC) 05/11/2022 280* 65 - 117 mg/dL Final    [facility]   Final    SARS-CoV-2 by PCR 05/12/2022 Please find results under separate order    Final    SARS-CoV-2 by PCR 05/12/2022 Not detected  NOTD   Final    Influenza A by PCR 05/12/2022 Not detected  NOTD   Final    Influenza B by PCR 05/12/2022 Not detected  NOTD   Final     Vitals:    05/12/22 0950 05/12/22 1606 05/13/22 0746   BP: 116/72 138/87 131/84   Pulse: 61 69 76   Resp: 16 16 16   Temp: 98.2 °F (36.8 °C) 98.3 °F (36.8 °C) 98.3 °F (36.8 °C)   SpO2: 99% 97% 97%   Weight: 74.8 kg (165 lb)     Height: 6' (1.829 m)       Recent Results (from the past 24 hour(s))   GLUCOSE, POC    Collection Time: 05/12/22  6:24 PM   Result Value Ref Range    Glucose (POC) 327 (H) 65 - 117 mg/dL    Performed by Susan Ospina GLUCOSE, POC    Collection Time: 05/12/22  7:54 PM   Result Value Ref Range    Glucose (POC) 219 (H) 65 - 117 mg/dL    Performed by Xiomara Brice   Tech    GLUCOSE, FASTING    Collection Time: 05/13/22  5:31 AM   Result Value Ref Range    Glucose 182 (H) 65 - 100 MG/DL   LIPID PANEL    Collection Time: 05/13/22  5:31 AM   Result Value Ref Range    Cholesterol, total 126 <200 MG/DL    Triglyceride 73 <150 MG/DL    HDL Cholesterol 59 MG/DL    LDL, calculated 52.4 0 - 100 MG/DL    VLDL, calculated 14.6 MG/DL    CHOL/HDL Ratio 2.1 0.0 - 5.0     METABOLIC PANEL, COMPREHENSIVE    Collection Time: 05/13/22  5:31 AM   Result Value Ref Range    Sodium 138 136 - 145 mmol/L    Potassium 4.8 3.5 - 5.1 mmol/L    Chloride 106 97 - 108 mmol/L    CO2 29 21 - 32 mmol/L    Anion gap 3 (L) 5 - 15 mmol/L    Glucose 184 (H) 65 - 100 mg/dL    BUN 35 (H) 6 - 20 MG/DL    Creatinine 1.46 (H) 0.70 - 1.30 MG/DL    BUN/Creatinine ratio 24 (H) 12 - 20      GFR est AA >60 >60 ml/min/1.73m2    GFR est non-AA 50 (L) >60 ml/min/1.73m2    Calcium 9.0 8.5 - 10.1 MG/DL    Bilirubin, total 0.4 0.2 - 1.0 MG/DL    ALT (SGPT) 31 12 - 78 U/L    AST (SGOT) 20 15 - 37 U/L    Alk. phosphatase 61 45 - 117 U/L    Protein, total 6.5 6.4 - 8.2 g/dL    Albumin 3.3 (L) 3.5 - 5.0 g/dL    Globulin 3.2 2.0 - 4.0 g/dL    A-G Ratio 1.0 (L) 1.1 - 2.2     GLUCOSE, POC    Collection Time: 05/13/22  7:34 AM   Result Value Ref Range    Glucose (POC) 205 (H) 65 - 117 mg/dL    Performed by [address], POC    Collection Time: 05/13/22 11:26 AM   Result Value Ref Range    Glucose (POC) 158 (H) 65 - 117 mg/dL    Performed by 95Jessica Juan [address]:  Results from Hospital Encounter encounter on 05/11/22    XR CHEST PORT    Narrative  EXAM: XR CHEST PORT    HISTORY: sob. COMPARISON: 6/5/2017    FINDINGS: Single view(s) of the chest. The lungs are well inflated. No focal  consolidation, pleural effusion, or pneumothorax.  The cardiomediastinal  silhouette is unremarkable. The visualized osseous structures are unremarkable. Impression  No acute cardiopulmonary process. XR FOOT LT MIN 3 V    Result Date: 2/12/2022  EXAM: XR FOOT LT MIN 3 V INDICATION: lateral wound infection. COMPARISON: 8/14/2021 FINDINGS: Three views of the left foot demonstrate [no fracture or other acute osseous or articular abnormality] with surrounding periosteal reaction. These changes appear chronic and may be partially postsurgical in nature. However, there is loss of cortical definition in the region of the MTP joint and findings are of concern for osteomyelitis. The fifth metatarsal distortion has developed since 8/14/2021 but there are no interval radiographs. Early bone destruction of the base of the fifth proximal phalanx is also suspected. There are no other significant bony abnormalities. There is extensive vascular calcification. Question of postsurgical changes in the distal fifth metatarsal. Findings suspicious for acute osteomyelitis at the fifth MTP joint. Hina Moscoso MRI FOOT LT W WO CONT    Result Date: 2/13/2022  INDICATION: Ostomy minus left foot fifth metatarsal and possible first metatarsal Exam: MRI left foot with and without contrast Sequences include short axis and long axis T1, short axis, long axis and sagittal T2 with fat saturation. Short axis TI with fat saturation. After the intravenous administration of 15 cc ProHance multiplanar fat-suppressed T1-weighted images were obtained Comparisons: Prior day FINDINGS: There is abnormal marrow signal within the distal half of the fifth metatarsal shaft with cortical bone loss. Similar findings are noted at the base of the proximal phalanx of the little toe. Findings are compatible with osteomyelitis. Soft tissue ulcer lateral to the distal shaft of the fifth metatarsal with probable sinus tract. No drainable abscess.  Increased signal within the muscles of the foot Examination of the remaining marrow signal demonstrates no other areas of osteomyelitis. Specifically no osteomyelitis of the sesamoids. No fluid tracking proximally within the tendon sheath     1. Osteomyelitis distal fifth metatarsal shaft and proximal phalanx of the little toe. No drainable abscess or other areas of osteomyelitis     XR CHEST PORT    Result Date: 5/11/2022  EXAM: XR CHEST PORT HISTORY: sob. COMPARISON: 6/5/2017 FINDINGS: Single view(s) of the chest. The lungs are well inflated. No focal consolidation, pleural effusion, or pneumothorax. The cardiomediastinal silhouette is unremarkable. The visualized osseous structures are unremarkable. No acute cardiopulmonary process. ANKLE BRACHIAL INDEX    Result Date: 2/15/2022  · Right ABIs are unreliable due to medial calcinosis. Right TBI is WNL (1.03). · Left RAY non-compressible. Left TBI is WNL (0.93). · PVR waveforms are normal bilaterally; no evidence of significant arterial obstruction according to PVR waveforms. PAST PSYCHIATRIC HISTORY:  He was previously admitted at [facility].  He is currently not receiving services for mental health, though he agreed to be started on duloxetine. PSYCHOSOCIAL HISTORY:  He is . He has a 43-year-old son. His highest level of education is 2 years of college. He is on social security disability income. He lives with his father for whom the patient is the primary caregiver. MENTAL STATUS EXAM:  He is alert and oriented in all spheres. He is dressed in hospital apparel. He reports his mood is anxious and depressed. Affect is mildly constricted. Speech is normal rate and rhythm. Thought process, logical and goal directed. He denies suicidal ideation, homicidal ideation, auditory or visual hallucinations. Memory is intact. Intelligence is average. Insight is poor. Judgment is poor. DIAGNOSES:  Major depressive disorder, single episode, severe, without psychotic features.   Stimulant use disorder, cocaine. TREATMENT PLANNING:  I will continue his inpatient stay. He will be provided with support and encouraged to attend groups. His safety will be monitored. His medications will be modified and assessed. Case Management will work on discharge planning. ASSETS AND STRENGTHS:  He is willing to seek help. He is willing to take medications. ESTIMATED LENGTH OF STAY:  5-7 days. This note was dictated with an electronic dictation software. Quite often, unanticipated grammatical, syntax, homophones, and other interpretive errors are inadvertently transcribed. Please disregard these errors. Please excuse any errors that have escaped final proofreading. If there are any questions, please contact me directly for clarification.           LORI MARS NP      [id number]  D:  05/12/2022 22:46  T:  05/13/2022 0:33  JOB #:  [id number]

## 2022-05-13 NOTE — BH NOTES
PSYCHIATRIC PROGRESS NOTE       Patient: Bambi Fernandez MRN: [id number]  SSN: [id number]    YOB: 1965  Age: 61 y.o. Sex: male      Admit Date: 5/12/2022    LOS: 1 day       Chief Complaint:  I have hangover from the medications. Interval History:  Bambi Fernandez is feeling drowsy from taking both trazodone and atarax at the same time last night. He's been worried about his dad, says he needs to go home. He shares that he's been under a lot of stress, overwhelmed finances, health issues, not having support. \"I know my mental health is not the best. I am a tortured soul. \" Denies si hi or avh.  If he requests discharge, we will request [facility] for tdo eval.      Past Medical History:  Past Medical History:   Diagnosis Date    Adverse effect of anesthesia     breathing diff. with versed    Bipolar 1 disorder, mixed, moderate ([address] 3/6/2017    Chronic pain     Depression     Pt stated diagnosed years ago    Diabetes ([address] 2003    Drug-induced mood disorder 5/28/2013    Homicide attempt     HTN (hypertension) 11/3/2011    Narcotic dependence, episodic use ([address] 11/3/2011    NIDDM (non-insulin dependent diabetes mellitus) 11/3/2011    Non compliance with medical treatment 11/3/2011    Other ill-defined conditions(799.89)     chronic low back pain    Psychiatric disorder     bipolar    Sleep disorder     Substance abuse ([address]     Suicidal thoughts          ALLERGIES:(reviewed/updated 5/13/2022)  Allergies   Allergen Reactions    Versed [Midazolam] Shortness of Breath     Weakness     Versed [Midazolam] Unknown (comments)     Numbness, with shortness of breath       Laboratory report:  Lab Results   Component Value Date/Time    WBC 8.7 05/11/2022 01:45 PM    Hemoglobin (POC) 15.3 07/25/2013 05:37 PM    HGB 14.1 05/11/2022 01:45 PM    Hematocrit (POC) 45 07/25/2013 05:37 PM    HCT 38.7 05/11/2022 01:45 PM    PLATELET 420 62/82/2127 01:45 PM    MCV 84.7 05/11/2022 01:45 PM      Lab Results   Component Value Date/Time    Sodium 138 05/13/2022 05:31 AM    Potassium 4.8 05/13/2022 05:31 AM    Chloride 106 05/13/2022 05:31 AM    CO2 29 05/13/2022 05:31 AM    Anion gap 3 (L) 05/13/2022 05:31 AM    Glucose 182 (H) 05/13/2022 05:31 AM    Glucose 184 (H) 05/13/2022 05:31 AM    BUN 35 (H) 05/13/2022 05:31 AM    Creatinine 1.46 (H) 05/13/2022 05:31 AM    BUN/Creatinine ratio 24 (H) 05/13/2022 05:31 AM    GFR est AA >60 05/13/2022 05:31 AM    GFR est non-AA 50 (L) 05/13/2022 05:31 AM    Calcium 9.0 05/13/2022 05:31 AM    Bilirubin, total 0.4 05/13/2022 05:31 AM    Alk.  phosphatase 61 05/13/2022 05:31 AM    Protein, total 6.5 05/13/2022 05:31 AM    Albumin 3.3 (L) 05/13/2022 05:31 AM    Globulin 3.2 05/13/2022 05:31 AM    A-G Ratio 1.0 (L) 05/13/2022 05:31 AM    ALT (SGPT) 31 05/13/2022 05:31 AM      Vitals:    05/12/22 0950 05/12/22 1606 05/13/22 0746   BP: 116/72 138/87 131/84   Pulse: 61 69 76   Resp: 16 16 16   Temp: 98.2 °F (36.8 °C) 98.3 °F (36.8 °C) 98.3 °F (36.8 °C)   SpO2: 99% 97% 97%   Weight: 74.8 kg (165 lb)     Height: 6' (1.829 m)         No results found for: VALF2, VALAC, VALP, VALPR, DS6, CRBAM, CRBAMP, CARB2, XCRBAM  No results found for: LITHM    Vital Signs  Patient Vitals for the past 24 hrs:   Temp Pulse Resp BP SpO2   05/13/22 0746 98.3 °F (36.8 °C) 76 16 131/84 97 %   05/12/22 1606 98.3 °F (36.8 °C) 69 16 138/87 97 %     Wt Readings from Last 3 Encounters:   05/12/22 74.8 kg (165 lb)   05/11/22 74.8 kg (165 lb)   02/14/22 77.6 kg (171 lb 1.2 oz)     Temp Readings from Last 3 Encounters:   05/13/22 98.3 °F (36.8 °C)   05/12/22 98.4 °F (36.9 °C)   02/17/22 97.6 °F (36.4 °C)     BP Readings from Last 3 Encounters:   05/13/22 131/84   05/12/22 128/86   02/17/22 (!) 184/84     Pulse Readings from Last 3 Encounters:   05/13/22 76   05/12/22 (!) 53   02/17/22 (!) 51       Radiology (reviewed/updated 5/13/2022)  XR FOOT LT MIN 3 V    Result Date: 2/12/2022  EXAM: XR FOOT LT MIN 3 V INDICATION: lateral wound infection. COMPARISON: 8/14/2021 FINDINGS: Three views of the left foot demonstrate [no fracture or other acute osseous or articular abnormality] with surrounding periosteal reaction. These changes appear chronic and may be partially postsurgical in nature. However, there is loss of cortical definition in the region of the MTP joint and findings are of concern for osteomyelitis. The fifth metatarsal distortion has developed since 8/14/2021 but there are no interval radiographs. Early bone destruction of the base of the fifth proximal phalanx is also suspected. There are no other significant bony abnormalities. There is extensive vascular calcification. Question of postsurgical changes in the distal fifth metatarsal. Findings suspicious for acute osteomyelitis at the fifth MTP joint. [employer] MRI FOOT LT W WO CONT    Result Date: 2/13/2022  INDICATION: Ostomy minus left foot fifth metatarsal and possible first metatarsal Exam: MRI left foot with and without contrast Sequences include short axis and long axis T1, short axis, long axis and sagittal T2 with fat saturation. Short axis TI with fat saturation. After the intravenous administration of 15 cc ProHance multiplanar fat-suppressed T1-weighted images were obtained Comparisons: Prior day FINDINGS: There is abnormal marrow signal within the distal half of the fifth metatarsal shaft with cortical bone loss. Similar findings are noted at the base of the proximal phalanx of the little toe. Findings are compatible with osteomyelitis. Soft tissue ulcer lateral to the distal shaft of the fifth metatarsal with probable sinus tract. No drainable abscess. Increased signal within the muscles of the foot Examination of the remaining marrow signal demonstrates no other areas of osteomyelitis. Specifically no osteomyelitis of the sesamoids. No fluid tracking proximally within the tendon sheath     1.  Osteomyelitis distal fifth metatarsal shaft and proximal phalanx of the little toe. No drainable abscess or other areas of osteomyelitis     XR CHEST PORT    Result Date: 5/11/2022  EXAM: XR CHEST PORT HISTORY: sob. COMPARISON: 6/5/2017 FINDINGS: Single view(s) of the chest. The lungs are well inflated. No focal consolidation, pleural effusion, or pneumothorax. The cardiomediastinal silhouette is unremarkable. The visualized osseous structures are unremarkable. No acute cardiopulmonary process. ANKLE BRACHIAL INDEX    Result Date: 2/15/2022  · Right ABIs are unreliable due to medial calcinosis. Right TBI is WNL (1.03). · Left RAY non-compressible. Left TBI is WNL (0.93). · PVR waveforms are normal bilaterally; no evidence of significant arterial obstruction according to PVR waveforms.         Current Facility-Administered Medications   Medication Dose Route Frequency Provider Last Rate Last Admin    OLANZapine (ZyPREXA) tablet 5 mg  5 mg Oral Q6H PRN Kvng Henao MD        haloperidol lactate (HALDOL) injection 5 mg  5 mg IntraMUSCular Q6H PRN Kvng Henao MD        benztropine (COGENTIN) tablet 1 mg  1 mg Oral BID PRN Kvng Henao MD        diphenhydrAMINE (BENADRYL) injection 50 mg  50 mg IntraMUSCular BID PRN Kvng Henao MD        hydrOXYzine HCL (ATARAX) tablet 50 mg  50 mg Oral TID PRN Kvng Henao MD   50 mg at 05/12/22 2111    LORazepam (ATIVAN) injection 1 mg  1 mg IntraMUSCular Q4H PRN Kvng Henao MD        traZODone (DESYREL) tablet 50 mg  50 mg Oral QHS PRN Kvng Henao MD   50 mg at 05/12/22 2111    acetaminophen (TYLENOL) tablet 650 mg  650 mg Oral Q4H PRN Kvng Henao MD        magnesium hydroxide (MILK OF MAGNESIA) 400 mg/5 mL oral suspension 30 mL  30 mL Oral DAILY PRN Kvng Henao MD        nicotine buccal (POLACRILEX) lozenge 2 mg  2 mg Oral Q2H PRN Kvng Henao MD        metFORMIN ER (GLUCOPHAGE XR) tablet 500 mg  500 mg Oral BID Sunni Daniel NP   500 mg at 05/13/22 0836    DULoxetine (CYMBALTA) capsule 30 mg  30 mg Oral QHS Darrell RODRIGUEZ NP   30 mg at 05/12/22 2111    insulin lispro (HUMALOG) injection   SubCUTAneous AC&HS Darrell RODRIGUEZ NP   3 Units at 05/13/22 0101    glucose chewable tablet 16 g  4 Tablet Oral PRN Sunni Daniel NP        glucagon (GLUCAGEN) injection 1 mg  1 mg IntraMUSCular PRN Sunni Daniel NP        dextrose 10 % infusion 0-250 mL  0-250 mL IntraVENous PRN Anna Alvares NP           Side Effects: (reviewed/updated 5/13/2022)  None reported or admitted to. Review of Systems: (reviewed/updated 5/13/2022)  Appetite: good  Sleep: good   All other Review of Systems: foot pain    Mental Status Exam:  Eye contact: Good eye contact  Psychomotor activity: wnl  Speech is spontaneous  Thought process: Logical and goal directed   Mood is \"ok\"  Affect: Blunted  Perception: No avh  Suicidal ideation: No si  Homicidal ideation: No hi  Insight/judgment: Poor  Cognition is grossly intact      Physical Exam:  Musculoskeletal system: steady gait  Tremor not present  Cog wheeling not present      Assessment and Plan:  Markel Kang meets criteria for a diagnosis of Major depressive disorder, single episode, severe, without psychotic features. Stimulant use disorder, cocaine. Cymbalta 30 mg daily. TDO eval should he requests discharge. Continue current medications as prescribed. We will closely monitor for safety. We will encourage reality orientation. Disposition planning to continue. I certify that this patients inpatient psychiatric hospital services furnished since the previous certification were, and continue to be, required for treatment that could reasonably be expected to improve the patient's condition, or for diagnostic study, and that the patient continues to need, on a daily basis, active treatment furnished directly by or requiring the supervision of inpatient psychiatric facility personnel.  In addition, the hospital records show that services furnished were intensive treatment services, admission or related services, or equivalent services.       Signed:  Brigid Herrera NP  5/13/2022

## 2022-05-13 NOTE — INTERDISCIPLINARY ROUNDS
Behavioral Health Interdisciplinary Rounds     Patient Name: Fazal Aden  Age: 61 y.o. Room/Bed:  734/02  Primary Diagnosis: <principal problem not specified>   Admission Status: Voluntary     Readmission within 30 days: no  Power of  in place: no  Patient requires a blocked bed: no          Reason for blocked bed:     VTE Prophylaxis: No    Mobility needs/Fall risk: no  Flu Vaccine : no   Nutritional Plan: no  Consults:          Labs/Testing due today?:     Sleep hours:        Participation in Care/Groups:   Medication Compliant?: Yes  PRNS (last 24 hours): Antipsychotic (IM) and Antianxiety    Restraints (last 24 hours):  no     CIWA (range last 24 hours):     COWS (range last 24 hours):      Alcohol screening (AUDIT) completed -         If applicable, date SBIRT discussed in treatment team AND documented:   AUDIT Screen Score:        Document Brief Intervention (corresponds directly with the 5 A's, Ask, Advise, Assess, Assist, and Arrange): At- Risk Patients (Score 7-15 for women; 8-15 for men)  Discuss concern patient is drinking at unhealthy levels known to increase risk of alcohol-related health problems. Is Patient ready to commit to change? If No:   Encourage reflection   Discuss short term and long term health risks of consuming alcohol   Barriers to change   Reaffirm willingness to help / Educational materials provided  If Yes:   Set goal  [employer] provided    Harmful use or Dependence (Score 16 or greater)   Discuss short term and long term health risks of consuming alcohol   Recommendations   Negotiate drinking goal   Recommend addiction specialist/center   Arrange follow-up appointments.     Tobacco - patient is a smoker: Have You Used Tobacco in the Past 30 Days: Yes  Illegal Drugs use: Have You Used Any Illegal Substances Over the Past 12 Months: Yes    24 hour chart check complete: yes ____________________________________________________________________________________________________________    Patient goal(s) for today:   Treatment team focus/goals:   Progress note     LOS:  1    Financial concerns/prescription coverage:    Family contact:       Family requesting physician contact today:    Discharge plan:   Access to weapons :        Outpatient provider(s):   Patient's preferred phone number for follow up call :   Patient's preferred e-mail address :  Participating treatment team members: Sky Gongora, * (assigned SW),

## 2022-05-13 NOTE — PROGRESS NOTES
Problem: Discharge Planning  Goal: *Discharge to safe environment  Outcome: Progressing Towards Goal  Note: Pt will return home when stable  Goal: *Knowledge of medication management  Outcome: Progressing Towards Goal  Note: Pt verbalized his understanding of the prescribed medications  Goal: *Knowledge of discharge instructions  Outcome: Progressing Towards Goal  Note: Pt verbalized his understanding of the treatment goals and discharge plan.

## 2022-05-13 NOTE — PROGRESS NOTES
Problem: Depressed Mood (Adult/Pediatric)  Goal: *STG: Participates in treatment plan  Outcome: Progressing Towards Goal  Goal: *STG: Verbalizes anger, guilt, and other feelings in a constructive manor  Outcome: Progressing Towards Goal  Goal: *STG: Demonstrates reduction in symptoms and increase in insight into coping skills/future focused  Outcome: Progressing Towards Goal  Variance Patient slowly responding

## 2022-05-14 LAB
GLUCOSE BLD STRIP.AUTO-MCNC: 109 MG/DL (ref 65–117)
GLUCOSE BLD STRIP.AUTO-MCNC: 167 MG/DL (ref 65–117)
GLUCOSE BLD STRIP.AUTO-MCNC: 192 MG/DL (ref 65–117)
GLUCOSE BLD STRIP.AUTO-MCNC: 203 MG/DL (ref 65–117)
SERVICE CMNT-IMP: ABNORMAL
SERVICE CMNT-IMP: NORMAL

## 2022-05-14 PROCEDURE — 74011250637 HC RX REV CODE- 250/637: Performed by: PSYCHIATRY & NEUROLOGY

## 2022-05-14 PROCEDURE — 74011250637 HC RX REV CODE- 250/637: Performed by: NURSE PRACTITIONER

## 2022-05-14 PROCEDURE — 74011636637 HC RX REV CODE- 636/637: Performed by: NURSE PRACTITIONER

## 2022-05-14 PROCEDURE — 82962 GLUCOSE BLOOD TEST: CPT

## 2022-05-14 PROCEDURE — 65220000003 HC RM SEMIPRIVATE PSYCH

## 2022-05-14 RX ORDER — OXYCODONE HYDROCHLORIDE 5 MG/1
10 TABLET ORAL
Status: DISCONTINUED | OUTPATIENT
Start: 2022-05-14 | End: 2022-05-16 | Stop reason: HOSPADM

## 2022-05-14 RX ORDER — LOSARTAN POTASSIUM 25 MG/1
25 TABLET ORAL DAILY
Status: DISCONTINUED | OUTPATIENT
Start: 2022-05-15 | End: 2022-05-15

## 2022-05-14 RX ORDER — DULOXETIN HYDROCHLORIDE 60 MG/1
60 CAPSULE, DELAYED RELEASE ORAL DAILY
Status: DISCONTINUED | OUTPATIENT
Start: 2022-05-15 | End: 2022-05-16 | Stop reason: HOSPADM

## 2022-05-14 RX ADMIN — Medication 2 UNITS: at 20:50

## 2022-05-14 RX ADMIN — Medication 2 UNITS: at 16:40

## 2022-05-14 RX ADMIN — DULOXETINE HYDROCHLORIDE 30 MG: 30 CAPSULE, DELAYED RELEASE ORAL at 08:13

## 2022-05-14 RX ADMIN — TRAZODONE HYDROCHLORIDE 50 MG: 50 TABLET ORAL at 20:50

## 2022-05-14 RX ADMIN — OXYCODONE 10 MG: 5 TABLET ORAL at 13:53

## 2022-05-14 RX ADMIN — Medication 2 UNITS: at 08:50

## 2022-05-14 RX ADMIN — METFORMIN HYDROCHLORIDE 500 MG: 500 TABLET, EXTENDED RELEASE ORAL at 08:13

## 2022-05-14 RX ADMIN — METFORMIN HYDROCHLORIDE 500 MG: 500 TABLET, EXTENDED RELEASE ORAL at 16:40

## 2022-05-14 NOTE — PROGRESS NOTES
Problem: Depressed Mood (Adult/Pediatric)  Goal: *STG: Participates in treatment plan  Outcome: Progressing Towards Goal  Note: Pt out on the unit watching TV. Ate meals and took scheduled medications. Continues to feel depressed. Stated he has \" a lot on his mind\" and continues to have anxiety regarding his home life and health. Wound care completed in the morning.    Goal: Interventions  Outcome: Progressing Towards Goal     Problem: Patient Education: Go to Patient Education Activity  Goal: Patient/Family Education  Outcome: Progressing Towards Goal

## 2022-05-14 NOTE — PROGRESS NOTES
Problem: Depressed Mood (Adult/Pediatric)  Goal: *STG: Participates in treatment plan  Outcome: Progressing Towards Goal  Goal: *STG: Verbalizes anger, guilt, and other feelings in a constructive manor  Outcome: Progressing Towards Goal  Goal: *STG: Attends activities and groups  Outcome: Progressing Towards Goal     Patient seen out on unit, watching TV with peers. Meal and medication compliant. Denies SI/HI. Verbalizes no complaints. Staff will continue to monitor safety q15 and provide support.

## 2022-05-14 NOTE — PROGRESS NOTES
Behavioral Services  Medicare Certification Upon Admission    I certify that this patient's inpatient psychiatric hospital admission is medically necessary for:    [x] (1) Treatment which could reasonably be expected to improve this patient's condition,       [x] (2) Or for diagnostic study;     AND     [x](2) The inpatient psychiatric services are provided while the individual is under the care of a physician and are included in the individualized plan of care.     Estimated length of stay/service 3 - 5 days    Plan for post-hospital care per social work    Electronically signed by Blanca Davidson MD on 5/14/2022 at 1:30 PM

## 2022-05-14 NOTE — BH NOTES
PSYCHIATRIC PROGRESS NOTE       Patient: Evaristo Nelson MRN: [id number]  SSN: [id number]    YOB: 1965  Age: 61 y.o. Sex: male      Admit Date: 5/12/2022    LOS: 2 days       Chief Complaint:  I have hangover from the medications. Interval History:  Evaristo Nelson is feeling drowsy from taking both trazodone and atarax at the same time last night. He's been worried about his dad, says he needs to go home. He shares that he's been under a lot of stress, overwhelmed finances, health issues, not having support. \"I know my mental health is not the best. I am a tortured soul. \" Denies si hi or avh. If he requests discharge, we will request [facility] for tdo eval.    5/14 - Evaristo Nelson reports feeling well and moods are good. Has chronic pain concerns and a foot ulcer. Denies SI/HI/AH/VH. No aggression or violence. Appropriately interactive and aware. Tolerating medications well. Eating and sleeping fairly.       Past Medical History:  Past Medical History:   Diagnosis Date    Adverse effect of anesthesia     breathing diff. with versed    Bipolar 1 disorder, mixed, moderate ([address] 3/6/2017    Chronic pain     Depression     Pt stated diagnosed years ago    Diabetes ([address] 2003    Drug-induced mood disorder 5/28/2013    Homicide attempt     HTN (hypertension) 11/3/2011    Narcotic dependence, episodic use ([address] 11/3/2011    NIDDM (non-insulin dependent diabetes mellitus) 11/3/2011    Non compliance with medical treatment 11/3/2011    Other ill-defined conditions(799.89)     chronic low back pain    Psychiatric disorder     bipolar    Sleep disorder     Substance abuse ([address]     Suicidal thoughts          ALLERGIES:(reviewed/updated 5/14/2022)  Allergies   Allergen Reactions    Versed [Midazolam] Shortness of Breath     Weakness     Versed [Midazolam] Unknown (comments)     Numbness, with shortness of breath       Laboratory report:  Lab Results   Component Value Date/Time    WBC 8.7 05/11/2022 01:45 PM    Hemoglobin (POC) 15.3 07/25/2013 05:37 PM    HGB 14.1 05/11/2022 01:45 PM    Hematocrit (POC) 45 07/25/2013 05:37 PM    HCT 38.7 05/11/2022 01:45 PM    PLATELET 467 88/07/4216 01:45 PM    MCV 84.7 05/11/2022 01:45 PM      Lab Results   Component Value Date/Time    Sodium 138 05/13/2022 05:31 AM    Potassium 4.8 05/13/2022 05:31 AM    Chloride 106 05/13/2022 05:31 AM    CO2 29 05/13/2022 05:31 AM    Anion gap 3 (L) 05/13/2022 05:31 AM    Glucose 182 (H) 05/13/2022 05:31 AM    Glucose 184 (H) 05/13/2022 05:31 AM    BUN 35 (H) 05/13/2022 05:31 AM    Creatinine 1.46 (H) 05/13/2022 05:31 AM    BUN/Creatinine ratio 24 (H) 05/13/2022 05:31 AM    GFR est AA >60 05/13/2022 05:31 AM    GFR est non-AA 50 (L) 05/13/2022 05:31 AM    Calcium 9.0 05/13/2022 05:31 AM    Bilirubin, total 0.4 05/13/2022 05:31 AM    Alk.  phosphatase 61 05/13/2022 05:31 AM    Protein, total 6.5 05/13/2022 05:31 AM    Albumin 3.3 (L) 05/13/2022 05:31 AM    Globulin 3.2 05/13/2022 05:31 AM    A-G Ratio 1.0 (L) 05/13/2022 05:31 AM    ALT (SGPT) 31 05/13/2022 05:31 AM      Vitals:    05/13/22 1558 05/13/22 2029 05/14/22 0750 05/14/22 1129   BP: (!) 150/81 138/86 120/79 (!) 157/89   Pulse: 64 60 65 60   Resp: 16 16 16 16   Temp: 98.9 °F (37.2 °C) 97.7 °F (36.5 °C) 97.7 °F (36.5 °C) 98.4 °F (36.9 °C)   SpO2: 98%  97% 96%   Weight:       Height:           No results found for: VALF2, VALAC, VALP, VALPR, DS6, CRBAM, CRBAMP, CARB2, XCRBAM  No results found for: LITHM    Vital Signs  Patient Vitals for the past 24 hrs:   Temp Pulse Resp BP SpO2   05/14/22 1129 98.4 °F (36.9 °C) 60 16 (!) 157/89 96 %   05/14/22 0750 97.7 °F (36.5 °C) 65 16 120/79 97 %   05/13/22 2029 97.7 °F (36.5 °C) 60 16 138/86 --   05/13/22 1558 98.9 °F (37.2 °C) 64 16 (!) 150/81 98 %     Wt Readings from Last 3 Encounters:   05/12/22 74.8 kg (165 lb)   05/11/22 74.8 kg (165 lb)   02/14/22 77.6 kg (171 lb 1.2 oz)     Temp Readings from Last 3 Encounters:   05/14/22 98.4 °F (36.9 °C) 05/12/22 98.4 °F (36.9 °C)   02/17/22 97.6 °F (36.4 °C)     BP Readings from Last 3 Encounters:   05/14/22 (!) 157/89   05/12/22 128/86   02/17/22 (!) 184/84     Pulse Readings from Last 3 Encounters:   05/14/22 60   05/12/22 (!) 53   02/17/22 (!) 51       Radiology (reviewed/updated 5/14/2022)  XR FOOT LT MIN 3 V    Result Date: 2/12/2022  EXAM: XR FOOT LT MIN 3 V INDICATION: lateral wound infection. COMPARISON: 8/14/2021 FINDINGS: Three views of the left foot demonstrate [no fracture or other acute osseous or articular abnormality] with surrounding periosteal reaction. These changes appear chronic and may be partially postsurgical in nature. However, there is loss of cortical definition in the region of the MTP joint and findings are of concern for osteomyelitis. The fifth metatarsal distortion has developed since 8/14/2021 but there are no interval radiographs. Early bone destruction of the base of the fifth proximal phalanx is also suspected. There are no other significant bony abnormalities. There is extensive vascular calcification. Question of postsurgical changes in the distal fifth metatarsal. Findings suspicious for acute osteomyelitis at the fifth MTP joint. [facility] MRI FOOT LT W WO CONT    Result Date: 2/13/2022  INDICATION: Ostomy minus left foot fifth metatarsal and possible first metatarsal Exam: MRI left foot with and without contrast Sequences include short axis and long axis T1, short axis, long axis and sagittal T2 with fat saturation. Short axis TI with fat saturation. After the intravenous administration of 15 cc ProHance multiplanar fat-suppressed T1-weighted images were obtained Comparisons: Prior day FINDINGS: There is abnormal marrow signal within the distal half of the fifth metatarsal shaft with cortical bone loss. Similar findings are noted at the base of the proximal phalanx of the little toe. Findings are compatible with osteomyelitis.  Soft tissue ulcer lateral to the distal shaft of the fifth metatarsal with probable sinus tract. No drainable abscess. Increased signal within the muscles of the foot Examination of the remaining marrow signal demonstrates no other areas of osteomyelitis. Specifically no osteomyelitis of the sesamoids. No fluid tracking proximally within the tendon sheath     1. Osteomyelitis distal fifth metatarsal shaft and proximal phalanx of the little toe. No drainable abscess or other areas of osteomyelitis     XR CHEST PORT    Result Date: 5/11/2022  EXAM: XR CHEST PORT HISTORY: sob. COMPARISON: 6/5/2017 FINDINGS: Single view(s) of the chest. The lungs are well inflated. No focal consolidation, pleural effusion, or pneumothorax. The cardiomediastinal silhouette is unremarkable. The visualized osseous structures are unremarkable. No acute cardiopulmonary process. ANKLE BRACHIAL INDEX    Result Date: 2/15/2022  · Right ABIs are unreliable due to medial calcinosis. Right TBI is WNL (1.03). · Left RAY non-compressible. Left TBI is WNL (0.93). · PVR waveforms are normal bilaterally; no evidence of significant arterial obstruction according to PVR waveforms.         Current Facility-Administered Medications   Medication Dose Route Frequency Provider Last Rate Last Admin    [START ON 5/15/2022] losartan (COZAAR) tablet 25 mg  25 mg Oral DAILY Miriam Lew NP        oxyCODONE IR (ROXICODONE) tablet 10 mg  10 mg Oral BID PRN Miriam Lew NP        ibuprofen (MOTRIN) tablet 600 mg  600 mg Oral Q6H PRN Sunni Daniel NP        DULoxetine (CYMBALTA) capsule 30 mg  30 mg Oral DAILY Sunni Daniel NP   30 mg at 05/14/22 0813    OLANZapine (ZyPREXA) tablet 5 mg  5 mg Oral Q6H PRN Mary Peña MD        haloperidol lactate (HALDOL) injection 5 mg  5 mg IntraMUSCular Q6H PRN Mary Peña MD        benztropine (COGENTIN) tablet 1 mg  1 mg Oral BID PRN Mary Peña MD        diphenhydrAMINE (BENADRYL) injection 50 mg  50 mg IntraMUSCular BID PRN Nick Scott Hollie Fields MD        hydrOXYzine HCL (ATARAX) tablet 50 mg  50 mg Oral TID PRN Marcus Markham MD   50 mg at 05/12/22 2111    LORazepam (ATIVAN) injection 1 mg  1 mg IntraMUSCular Q4H PRN Marcus Markham MD        traZODone (DESYREL) tablet 50 mg  50 mg Oral QHS PRN Marcus Markham MD   50 mg at 05/13/22 2013    acetaminophen (TYLENOL) tablet 650 mg  650 mg Oral Q4H PRN Marcus Markham MD        magnesium hydroxide (MILK OF MAGNESIA) 400 mg/5 mL oral suspension 30 mL  30 mL Oral DAILY PRN Marcus Markham MD        nicotine buccal (POLACRILEX) lozenge 2 mg  2 mg Oral Q2H PRN Marcus Markham MD        metFORMIN ER (GLUCOPHAGE XR) tablet 500 mg  500 mg Oral BID Sunni Daniel NP   500 mg at 05/14/22 0813    insulin lispro (HUMALOG) injection   SubCUTAneous AC&HS Sunni Daniel NP   2 Units at 05/14/22 0850    glucose chewable tablet 16 g  4 Tablet Oral PRN Sunni Daniel NP        glucagon (GLUCAGEN) injection 1 mg  1 mg IntraMUSCular PRN Sunni Daniel NP        dextrose 10 % infusion 0-250 mL  0-250 mL IntraVENous PRN Martínez Rodas NP           Side Effects: (reviewed/updated 5/14/2022)  None reported or admitted to. Review of Systems: (reviewed/updated 5/14/2022)  Appetite: good  Sleep: good   All other Review of Systems: foot pain    Mental Status Exam:  Eye contact: Good eye contact  Psychomotor activity: wnl  Speech is spontaneous  Thought process: Logical and goal directed   Mood is \"ok\"  Affect: Blunted  Perception: No avh  Suicidal ideation: No si  Homicidal ideation: No hi  Insight/judgment: Poor  Cognition is grossly intact      Physical Exam:  Musculoskeletal system: steady gait  Tremor not present  Cog wheeling not present      Assessment and Plan:  Danielito Lim meets criteria for a diagnosis of Major depressive disorder, single episode, severe, without psychotic features. Stimulant use disorder, cocaine. Increased Cymbalta 60 mg daily.   Glucerna with breakfast lunch and dinner. TDO eval should he requests discharge. Continue current medications as prescribed. We will closely monitor for safety. We will encourage reality orientation. Disposition planning to continue. I certify that this patients inpatient psychiatric hospital services furnished since the previous certification were, and continue to be, required for treatment that could reasonably be expected to improve the patient's condition, or for diagnostic study, and that the patient continues to need, on a daily basis, active treatment furnished directly by or requiring the supervision of inpatient psychiatric facility personnel. In addition, the hospital records show that services furnished were intensive treatment services, admission or related services, or equivalent services.       Signed:  Montserrat Cook MD  5/14/2022

## 2022-05-14 NOTE — H&P
[address] [facility]  Hospitalist Group  History and Physical    Primary Care Provider: None  Date of Service:  5/14/2022    Chief Complaint: MDD/chronic pain/hypertension    Subjective:     Huma Darby is a 61 y.o. male with a past medical history significant for hypertension, type 2 diabetes with an A1c of 8.2. He also has a chronic pain with chronic opiate usage as well as hypertension. Patient with depression, was admitted to inpatient psychiatry at our facility after intentional overdose of losartan. Hospitalist service was consulted for medical clearance. At the moment of the initial interview he was cooperative with the exam.  He denied fever, chills.   His only physical complaint was back pain which is chronic in nature as well as left foot pain from previously treated wound    Review of Systems:    Constitutional: negative for fevers, chills and sweats  Respiratory: negative for cough, sputum, wheezing or dyspnea on exertion  Cardiovascular: negative for chest pain, chest pressure/discomfort, dyspnea  Gastrointestinal: negative for nausea and vomiting  Genitourinary:negative for frequency and dysuria  Integument/Breast: negative for rash, skin lesion(s) and Positive for foot wound  Musculoskeletal:negative for myalgias and Positive for back and left foot pain  Neurological: negative for headaches, dizziness and vertigo     Past Medical History:   Diagnosis Date    Adverse effect of anesthesia     breathing diff. with versed    Bipolar 1 disorder, mixed, moderate ([address] 3/6/2017    Chronic pain     Depression     Pt stated diagnosed years ago    Diabetes ([address] 2003    Drug-induced mood disorder 5/28/2013    Homicide attempt     HTN (hypertension) 11/3/2011    Narcotic dependence, episodic use ([address] 11/3/2011    NIDDM (non-insulin dependent diabetes mellitus) 11/3/2011    Non compliance with medical treatment 11/3/2011    Other ill-defined conditions(799.89)     chronic low back pain  Psychiatric disorder     bipolar    Sleep disorder     [address] [address]     Suicidal thoughts       Past Surgical History:   Procedure Laterality Date    COLONOSCOPY N/A 8/31/2016    COLON[facility]COPY performed [facility] ENDOSCOPY    COLONOSCOPY,DIAGNOSTIC  8/31/2016         HX ORTHOPAEDIC      chronic back pain    HX ORTHOPAEDIC      left knee arthroplasty    HX ORTHOPAEDIC  08/2018    right hand    MD ANESTH,LUMBAR SPINE,CORD SURGERY  7 1999    l4 l5    MD CARDIAC SURG PROCEDURE UNLIST      cardiac stents X2 lad drug eluting    MD REMV KIDNEY,COMPLICATED  0796    side unknown - kidney stones    UPPER GI ENDOSCOPY,BIOPSY  8/31/2016          Prior to Admission medications    Medication Sig Start Date End Date Taking? Authorizing Provider   oxyCODONE IR (ROXICODONE) 10 mg tab immediate release tablet Take 10 mg by mouth two (2) times daily as needed for Pain. Indications: pain   Yes Provider, Historical   losartan (COZAAR) 25 mg tablet Take 25 mg by mouth daily. Provider, Historical   gabapentin (NEURONTIN) 600 mg tablet Take 600 mg by mouth three (3) times daily. Patient not taking: Reported on 5/13/2022    Provider, Historical   L. acidoph & paracasei- S therm- Bifido (ARLENE-Q/RISAQUAD) 8 billion cell cap cap Take 1 Capsule by mouth daily. 2/17/22   Rosaura Irizarry MD   ibuprofen (MOTRIN) 600 mg tablet Take 1 Tab by mouth every twelve (12) hours as needed for Pain. 12/18/18   Ashleigh KEE III, DO   metFORMIN ER (GLUCOPHAGE XR) 500 mg tablet Take 1 Tab by mouth two (2) times a day. Patient not taking: Reported on 5/12/2022 3/26/18   Ashleigh KEE III, DO   aspirin delayed-release 81 mg tablet Take 1 Tab by mouth daily.   Patient not taking: Reported on 5/12/2022 1/25/18   Ashleigh KEE III, DO     Allergies   Allergen Reactions    Versed [Midazolam] Shortness of Breath     Weakness     Versed [Midazolam] Unknown (comments)     Numbness, with shortness of breath      Family History   Problem Relation Age of Onset    Stroke Mother     Hypertension Mother     Heart Disease Father     Hypertension Father     Cancer Maternal Grandmother         unknown type    Diabetes Maternal Grandfather         SOCIAL HISTORY:  Patient resides at Home. Patient ambulates with independent. Smoking history: Current  Alcohol history: None        Objective:       Physical Exam:   Visit Vitals  BP (!) 176/89   Pulse 61   Temp 98 °F (36.7 °C)   Resp 20   Ht 6' (1.829 m)   Wt 74.8 kg (165 lb)   SpO2 97%   BMI 22.38 kg/m²     General appearance: alert, cooperative, no distress, appears stated age  Head: Normocephalic, without obvious abnormality, atraumatic  Lungs: clear to auscultation bilaterally  Heart: regular rate and rhythm, S1, S2 normal, no murmur, click, rub or gallop  Abdomen: soft, non-tender. Bowel sounds normal. No masses,  no organomegaly  Extremities: extremities normal, atraumatic, no cyanosis or edema dressing left foot dry and intact  Cap refill: Brisk, less than 3 seconds  Pulses: 2+, symmetric in all extremities  Skin: Warm, dry, without rashes or lesions    ECG:  unchanged from previous tracings     Data Review: All diagnostic labs and studies have been reviewed. Chest x-ray was negative for infiltrate, effusion, pneumothorax, or wide mediastinum.     Assessment:     Active Problems:    Adjustment disorder (5/12/2022)        Plan:     MDD  Treatment per primary team    Chronic pain  I did review the , he has been on 10 mg of oxycodone twice daily for quite some time  Continuing this medication    Hypertension  Blood pressure has been controlled most of the day  Was elevated in the latter part of the day, could be related to pain  We will continue losartan at current dose and continue to monitor  If blood pressure remains elevated will increase dosage of losartan    Type 2 diabetes  Blood sugars currently controlled  Continuing metformin    Tobacco abuse  Counseled on the importance of smoking cessation  Offered nicotine patch however he declined  Nicotine lozenges are available    Foot ulcer  Due to diabetes, POA  Continue wound care with Left plantar foot:  Daily cleanse with saline; apply Xeroform and foam dressing. Thank you for the opportunity to participate in the care of this patient.   Regarding his hypertension, hospitalist service will follow along with you      Signed By: Mena Walker NP     May 14, 2022

## 2022-05-14 NOTE — PROGRESS NOTES
05/14/22 1353   Pain Screen   Pain Scale 1 Numeric (0 - 10)   Pain Intensity 1 7   Pain Onset 1 immediate   Pain Location 1 Generalized   Pain Orientation 1 Anterior   Pain Description 1 Aching   Pain Intervention(s) 1 Medication (see MAR)   Patient Stated Pain Goal 0   PRN roxicodone administered per pt's request.

## 2022-05-15 LAB
GLUCOSE BLD STRIP.AUTO-MCNC: 186 MG/DL (ref 65–117)
GLUCOSE BLD STRIP.AUTO-MCNC: 200 MG/DL (ref 65–117)
GLUCOSE BLD STRIP.AUTO-MCNC: 206 MG/DL (ref 65–117)
GLUCOSE BLD STRIP.AUTO-MCNC: 222 MG/DL (ref 65–117)
SERVICE CMNT-IMP: ABNORMAL
TSH SERPL DL<=0.05 MIU/L-ACNC: 1.01 UIU/ML (ref 0.36–3.74)

## 2022-05-15 PROCEDURE — 74011636637 HC RX REV CODE- 636/637: Performed by: NURSE PRACTITIONER

## 2022-05-15 PROCEDURE — 36415 COLL VENOUS BLD VENIPUNCTURE: CPT

## 2022-05-15 PROCEDURE — 74011250637 HC RX REV CODE- 250/637: Performed by: NURSE PRACTITIONER

## 2022-05-15 PROCEDURE — 82962 GLUCOSE BLOOD TEST: CPT

## 2022-05-15 PROCEDURE — 84443 ASSAY THYROID STIM HORMONE: CPT

## 2022-05-15 PROCEDURE — 74011250637 HC RX REV CODE- 250/637: Performed by: PSYCHIATRY & NEUROLOGY

## 2022-05-15 PROCEDURE — 65220000003 HC RM SEMIPRIVATE PSYCH

## 2022-05-15 RX ORDER — LOSARTAN POTASSIUM 50 MG/1
50 TABLET ORAL DAILY
Status: DISCONTINUED | OUTPATIENT
Start: 2022-05-16 | End: 2022-05-16 | Stop reason: HOSPADM

## 2022-05-15 RX ORDER — LOSARTAN POTASSIUM 25 MG/1
25 TABLET ORAL ONCE
Status: COMPLETED | OUTPATIENT
Start: 2022-05-15 | End: 2022-05-15

## 2022-05-15 RX ORDER — METFORMIN HYDROCHLORIDE 750 MG/1
750 TABLET, EXTENDED RELEASE ORAL 2 TIMES DAILY
Status: DISCONTINUED | OUTPATIENT
Start: 2022-05-15 | End: 2022-05-16 | Stop reason: HOSPADM

## 2022-05-15 RX ADMIN — OXYCODONE 10 MG: 5 TABLET ORAL at 18:21

## 2022-05-15 RX ADMIN — Medication 3 UNITS: at 16:47

## 2022-05-15 RX ADMIN — DULOXETINE HYDROCHLORIDE 60 MG: 60 CAPSULE, DELAYED RELEASE ORAL at 08:15

## 2022-05-15 RX ADMIN — METFORMIN HYDROCHLORIDE 500 MG: 500 TABLET, EXTENDED RELEASE ORAL at 08:16

## 2022-05-15 RX ADMIN — Medication 3 UNITS: at 11:40

## 2022-05-15 RX ADMIN — Medication 3 UNITS: at 08:43

## 2022-05-15 RX ADMIN — ACETAMINOPHEN 650 MG: 325 TABLET ORAL at 20:34

## 2022-05-15 RX ADMIN — METFORMIN HYDROCHLORIDE 750 MG: 750 TABLET, EXTENDED RELEASE ORAL at 17:42

## 2022-05-15 RX ADMIN — OXYCODONE 10 MG: 5 TABLET ORAL at 06:22

## 2022-05-15 RX ADMIN — LOSARTAN POTASSIUM 25 MG: 25 TABLET, FILM COATED ORAL at 16:47

## 2022-05-15 RX ADMIN — LOSARTAN POTASSIUM 25 MG: 25 TABLET, FILM COATED ORAL at 08:16

## 2022-05-15 NOTE — PROGRESS NOTES
[address] Adult  Hospitalist Group                                                                                          Hospitalist Progress Note  Gutierrez Marquis NP  Answering service: [phone] from in house phone        Date of Service:  5/15/2022  NAME:  Hue Miramontes  :  1965  MRN:  [id number]      Admission Summary:   Hue Miramontes is a 61 y.o. male with a past medical history significant for hypertension, type 2 diabetes with an A1c of 8.2. He also has a chronic pain with chronic opiate usage as well as hypertension. Patient with depression, was admitted to inpatient psychiatry at our facility after intentional overdose of losartan. Hospitalist service was consulted for medical clearance.     At the moment of the initial interview he was cooperative with the exam.  He denied fever, chills. His only physical complaint was back pain which is chronic in nature as well as left foot pain from previously treated wound    Interval history / Subjective:   I saw the patient this afternoon on rounds. Blood pressure was well controlled in the morning however became elevated in the afternoon. Patient denies pain or discomfort     Assessment & Plan:     MDD  Treatment per primary team     Chronic pain  I did review the , he has been on 10 mg of oxycodone twice daily for quite some time  Continuing this medication     Hypertension  Blood pressure has been elevated today  Increasing losartan     Type 2 diabetes  Blood sugars mildly elevated  Increasing metformin     Tobacco abuse  Counseled on the importance of smoking cessation  Offered nicotine patch however he declined  Nicotine lozenges are available     Foot ulcer  Due to diabetes, POA  Continue wound care with Left plantar foot:  Daily cleanse with saline; apply Xeroform and foam dressing.     Thank you for the opportunity to participate in the care of this patient.   Medications have been adjusted, hospitalist service will continue to follow with you       Hospital Problems  Date Reviewed: 2/14/2022          Codes Class Noted POA    Adjustment disorder ICD-10-CM: F43.20  ICD-9-CM: 309.9  5/12/2022 Unknown                Review of Systems:   A comprehensive review of systems was negative except for that written in the HPI. Vital Signs:    Last 24hrs VS reviewed since prior progress note. Most recent are:  Visit Vitals  BP (!) 166/82   Pulse 60   Temp 98.7 °F (37.1 °C)   Resp 14   Ht 6' (1.829 m)   Wt 76.7 kg (169 lb)   SpO2 99%   BMI 22.92 kg/m²       No intake or output data in the 24 hours ending 05/15/22 7219     Physical Examination:     I had a face to face encounter with this patient and independently examined them on 5/15/2022 as outlined below:          Constitutional:  No acute distress, cooperative, pleasant    ENT:  Oral mucosa moist, oropharynx benign. Resp:  CTA bilaterally. No wheezing/rhonchi/rales. No accessory muscle use   CV:  Regular rhythm, normal rate, no murmurs, gallops, rubs    GI:  Soft, non distended, non tender. normoactive bowel sounds, no hepatosplenomegaly     Musculoskeletal:  No edema, warm, 2+ pulses throughout    Neurologic:  Moves all extremities. AAOx3, CN II-XII reviewed            Data Review:    Review and/or order of clinical lab test      Labs:   No results for input(s): WBC, HGB, HCT, PLT, HGBEXT, HCTEXT, PLTEXT in the last 72 hours. Recent Labs     05/13/22  0531      K 4.8      CO2 29   BUN 35*   CREA 1.46*   *  184*   CA 9.0     Recent Labs     05/13/22  0531   ALT 31   AP 61   TBILI 0.4   TP 6.5   ALB 3.3*   GLOB 3.2     No results for input(s): INR, PTP, APTT, INREXT in the last 72 hours. No results for input(s): FE, TIBC, PSAT, FERR in the last 72 hours. Lab Results   Component Value Date/Time    Folate 42.8 (H) 01/23/2013 05:34 PM      No results for input(s): PH, PCO2, PO2 in the last 72 hours.   No results for input(s): CPK, CKNDX, DONALD in the last 72 hours.    No lab exists for component: CPKMB  Lab Results   Component Value Date/Time    Cholesterol, total 126 05/13/2022 05:31 AM    HDL Cholesterol 59 05/13/2022 05:31 AM    LDL, calculated 52.4 05/13/2022 05:31 AM    Triglyceride 73 05/13/2022 05:31 AM    CHOL/HDL Ratio 2.1 05/13/2022 05:31 AM     Lab Results   Component Value Date/Time    Glucose (POC) 200 (H) 05/15/2022 11:35 AM    Glucose (POC) 206 (H) 05/15/2022 07:19 AM    Glucose (POC) 203 (H) 05/14/2022 07:58 PM    Glucose (POC) 192 (H) 05/14/2022 04:25 PM    Glucose (POC) 109 05/14/2022 11:18 AM     Lab Results   Component Value Date/Time    Color YELLOW/STRAW 05/11/2022 06:34 PM    Appearance CLEAR 05/11/2022 06:34 PM    Specific gravity 1.018 05/11/2022 06:34 PM    Specific gravity 1.015 06/05/2017 11:55 AM    pH (UA) 5.0 05/11/2022 06:34 PM    Protein 300 (A) 05/11/2022 06:34 PM    Glucose 500 (A) 05/11/2022 06:34 PM    Ketone TRACE (A) 05/11/2022 06:34 PM    Bilirubin Negative 05/11/2022 06:34 PM    Urobilinogen 1.0 05/11/2022 06:34 PM    Nitrites Negative 05/11/2022 06:34 PM    Leukocyte Esterase TRACE (A) 05/11/2022 06:34 PM    Epithelial cells FEW 05/11/2022 06:34 PM    Bacteria Negative 05/11/2022 06:34 PM    WBC 5-10 05/11/2022 06:34 PM    RBC 0-5 05/11/2022 06:34 PM         Medications Reviewed:     Current Facility-Administered Medications   Medication Dose Route Frequency    [START ON 5/16/2022] losartan (COZAAR) tablet 50 mg  50 mg Oral DAILY    losartan (COZAAR) tablet 25 mg  25 mg Oral ONCE    oxyCODONE IR (ROXICODONE) tablet 10 mg  10 mg Oral BID PRN    DULoxetine (CYMBALTA) capsule 60 mg  60 mg Oral DAILY    ibuprofen (MOTRIN) tablet 600 mg  600 mg Oral Q6H PRN    OLANZapine (ZyPREXA) tablet 5 mg  5 mg Oral Q6H PRN    haloperidol lactate (HALDOL) injection 5 mg  5 mg IntraMUSCular Q6H PRN    benztropine (COGENTIN) tablet 1 mg  1 mg Oral BID PRN    diphenhydrAMINE (BENADRYL) injection 50 mg  50 mg IntraMUSCular BID PRN    hydrOXYzine HCL (ATARAX) tablet 50 mg  50 mg Oral TID PRN    LORazepam (ATIVAN) injection 1 mg  1 mg IntraMUSCular Q4H PRN    traZODone (DESYREL) tablet 50 mg  50 mg Oral QHS PRN    acetaminophen (TYLENOL) tablet 650 mg  650 mg Oral Q4H PRN    magnesium hydroxide (MILK OF MAGNESIA) 400 mg/5 mL oral suspension 30 mL  30 mL Oral DAILY PRN    nicotine buccal (POLACRILEX) lozenge 2 mg  2 mg Oral Q2H PRN    metFORMIN ER (GLUCOPHAGE XR) tablet 500 mg  500 mg Oral BID    insulin lispro (HUMALOG) injection   SubCUTAneous AC&HS    glucose chewable tablet 16 g  4 Tablet Oral PRN    glucagon (GLUCAGEN) injection 1 mg  1 mg IntraMUSCular PRN    dextrose 10 % infusion 0-250 mL  0-250 mL IntraVENous PRN     ______________________________________________________________________  EXPECTED LENGTH OF STAY: - - -  ACTUAL LENGTH OF STAY:          3                 Cleo Bolds, NP

## 2022-05-15 NOTE — BH NOTES
PSYCHIATRIC PROGRESS NOTE       Patient: Evaristo Nelson MRN: [id number]  SSN: [id number]    YOB: 1965  Age: 61 y.o. Sex: male      Admit Date: 5/12/2022    LOS: 3 days       Chief Complaint:  I have hangover from the medications. Interval History:  Evaristo Nelson is feeling drowsy from taking both trazodone and atarax at the same time last night. He's been worried about his dad, says he needs to go home. He shares that he's been under a lot of stress, overwhelmed finances, health issues, not having support. \"I know my mental health is not the best. I am a tortured soul. \" Denies si hi or avh. If he requests discharge, we will request [facility] for tdo eval.    5/14 - Evaristo Nelson reports feeling well and moods are good. Has chronic pain concerns and a foot ulcer. Denies SI/HI/AH/VH. No aggression or violence. Appropriately interactive and aware. Tolerating medications well. Eating and sleeping fairly. 5/15 Piter Gerda reports broken sleep, but pain is less today. Feeling well and moods are good. Denies SI/HI/AH/VH. No aggression or violence. Appropriately interactive and aware. Tolerating medications well. Eating and sleeping fairly.       Past Medical History:  Past Medical History:   Diagnosis Date    Adverse effect of anesthesia     breathing diff. with versed    Bipolar 1 disorder, mixed, moderate ([address] 3/6/2017    Chronic pain     Depression     Pt stated diagnosed years ago    Diabetes ([address] 2003    Drug-induced mood disorder 5/28/2013    Homicide attempt     HTN (hypertension) 11/3/2011    Narcotic dependence, episodic use ([address] 11/3/2011    NIDDM (non-insulin dependent diabetes mellitus) 11/3/2011    Non compliance with medical treatment 11/3/2011    Other ill-defined conditions(799.89)     chronic low back pain    Psychiatric disorder     bipolar    Sleep disorder     Substance abuse ([address]     Suicidal thoughts          ALLERGIES:(reviewed/updated 5/15/2022)  Allergies Allergen Reactions    Versed [Midazolam] Shortness of Breath     Weakness     Versed [Midazolam] Unknown (comments)     Numbness, with shortness of breath       Laboratory report:  Lab Results   Component Value Date/Time    WBC 8.7 05/11/2022 01:45 PM    Hemoglobin (POC) 15.3 07/25/2013 05:37 PM    HGB 14.1 05/11/2022 01:45 PM    Hematocrit (POC) 45 07/25/2013 05:37 PM    HCT 38.7 05/11/2022 01:45 PM    PLATELET 102 41/88/3528 01:45 PM    MCV 84.7 05/11/2022 01:45 PM      Lab Results   Component Value Date/Time    Sodium 138 05/13/2022 05:31 AM    Potassium 4.8 05/13/2022 05:31 AM    Chloride 106 05/13/2022 05:31 AM    CO2 29 05/13/2022 05:31 AM    Anion gap 3 (L) 05/13/2022 05:31 AM    Glucose 182 (H) 05/13/2022 05:31 AM    Glucose 184 (H) 05/13/2022 05:31 AM    BUN 35 (H) 05/13/2022 05:31 AM    Creatinine 1.46 (H) 05/13/2022 05:31 AM    BUN/Creatinine ratio 24 (H) 05/13/2022 05:31 AM    GFR est AA >60 05/13/2022 05:31 AM    GFR est non-AA 50 (L) 05/13/2022 05:31 AM    Calcium 9.0 05/13/2022 05:31 AM    Bilirubin, total 0.4 05/13/2022 05:31 AM    Alk.  phosphatase 61 05/13/2022 05:31 AM    Protein, total 6.5 05/13/2022 05:31 AM    Albumin 3.3 (L) 05/13/2022 05:31 AM    Globulin 3.2 05/13/2022 05:31 AM    A-G Ratio 1.0 (L) 05/13/2022 05:31 AM    ALT (SGPT) 31 05/13/2022 05:31 AM      Vitals:    05/14/22 1954 05/15/22 0728 05/15/22 0729 05/15/22 1215   BP: (!) 145/86 114/72  (!) 177/92   Pulse: (!) 56 61  (!) 53   Resp: 16 16     Temp: 98.6 °F (37 °C) 98.6 °F (37 °C)     SpO2:  99%     Weight:   76.7 kg (169 lb)    Height:           No results found for: VALF2, VALAC, VALP, VALPR, DS6, CRBAM, CRBAMP, CARB2, XCRBAM  No results found for: LITHM    Vital Signs  Patient Vitals for the past 24 hrs:   Temp Pulse Resp BP SpO2   05/15/22 1215 -- (!) 53 -- (!) 177/92 --   05/15/22 0728 98.6 °F (37 °C) 61 16 114/72 99 %   05/14/22 1954 98.6 °F (37 °C) (!) 56 16 (!) 145/86 --   05/14/22 1615 98 °F (36.7 °C) 61 20 (!) 176/89 97 %     Wt Readings from Last 3 Encounters:   05/15/22 76.7 kg (169 lb)   05/11/22 74.8 kg (165 lb)   02/14/22 77.6 kg (171 lb 1.2 oz)     Temp Readings from Last 3 Encounters:   05/15/22 98.6 °F (37 °C)   05/12/22 98.4 °F (36.9 °C)   02/17/22 97.6 °F (36.4 °C)     BP Readings from Last 3 Encounters:   05/15/22 (!) 177/92   05/12/22 128/86   02/17/22 (!) 184/84     Pulse Readings from Last 3 Encounters:   05/15/22 (!) 53   05/12/22 (!) 53   02/17/22 (!) 51       Radiology (reviewed/updated 5/15/2022)  XR FOOT LT MIN 3 V    Result Date: 2/12/2022  EXAM: XR FOOT LT MIN 3 V INDICATION: lateral wound infection. COMPARISON: 8/14/2021 FINDINGS: Three views of the left foot demonstrate [no fracture or other acute osseous or articular abnormality] with surrounding periosteal reaction. These changes appear chronic and may be partially postsurgical in nature. However, there is loss of cortical definition in the region of the MTP joint and findings are of concern for osteomyelitis. The fifth metatarsal distortion has developed since 8/14/2021 but there are no interval radiographs. Early bone destruction of the base of the fifth proximal phalanx is also suspected. There are no other significant bony abnormalities. There is extensive vascular calcification. Question of postsurgical changes in the distal fifth metatarsal. Findings suspicious for acute osteomyelitis at the fifth MTP joint. [facility] MRI FOOT LT W WO CONT    Result Date: 2/13/2022  INDICATION: Ostomy minus left foot fifth metatarsal and possible first metatarsal Exam: MRI left foot with and without contrast Sequences include short axis and long axis T1, short axis, long axis and sagittal T2 with fat saturation. Short axis TI with fat saturation.  After the intravenous administration of 15 cc ProHance multiplanar fat-suppressed T1-weighted images were obtained Comparisons: Prior day FINDINGS: There is abnormal marrow signal within the distal half of the fifth metatarsal shaft with cortical bone loss. Similar findings are noted at the base of the proximal phalanx of the little toe. Findings are compatible with osteomyelitis. Soft tissue ulcer lateral to the distal shaft of the fifth metatarsal with probable sinus tract. No drainable abscess. Increased signal within the muscles of the foot Examination of the remaining marrow signal demonstrates no other areas of osteomyelitis. Specifically no osteomyelitis of the sesamoids. No fluid tracking proximally within the tendon sheath     1. Osteomyelitis distal fifth metatarsal shaft and proximal phalanx of the little toe. No drainable abscess or other areas of osteomyelitis     XR CHEST PORT    Result Date: 5/11/2022  EXAM: XR CHEST PORT HISTORY: sob. COMPARISON: 6/5/2017 FINDINGS: Single view(s) of the chest. The lungs are well inflated. No focal consolidation, pleural effusion, or pneumothorax. The cardiomediastinal silhouette is unremarkable. The visualized osseous structures are unremarkable. No acute cardiopulmonary process. ANKLE BRACHIAL INDEX    Result Date: 2/15/2022  · Right ABIs are unreliable due to medial calcinosis. Right TBI is WNL (1.03). · Left RAY non-compressible. Left TBI is WNL (0.93). · PVR waveforms are normal bilaterally; no evidence of significant arterial obstruction according to PVR waveforms.         Current Facility-Administered Medications   Medication Dose Route Frequency Provider Last Rate Last Admin    [START ON 5/16/2022] losartan (COZAAR) tablet 50 mg  50 mg Oral DAILY Zaid Hernández NP        losartan (COZAAR) tablet 25 mg  25 mg Oral Ash Suarez NP        oxyCODONE IR (ROXICODONE) tablet 10 mg  10 mg Oral BID PRN Zaid Hernández NP   10 mg at 05/15/22 0622    DULoxetine (CYMBALTA) capsule 60 mg  60 mg Oral DAILY Parul Torre MD   60 mg at 05/15/22 0815    ibuprofen (MOTRIN) tablet 600 mg  600 mg Oral Q6H PRN Sunni Daniel NP        OLANZapine (ZyPREXA) tablet 5 mg  5 mg Oral Q6H PRN Pallavi Flynn MD        haloperidol lactate (HALDOL) injection 5 mg  5 mg IntraMUSCular Q6H PRN Pallavi Flynn MD        benztropine (COGENTIN) tablet 1 mg  1 mg Oral BID PRN Pallavi Flynn MD        diphenhydrAMINE (BENADRYL) injection 50 mg  50 mg IntraMUSCular BID PRN Pallavi Flynn MD        hydrOXYzine HCL (ATARAX) tablet 50 mg  50 mg Oral TID PRN Pallavi Flynn MD   50 mg at 05/12/22 2111    LORazepam (ATIVAN) injection 1 mg  1 mg IntraMUSCular Q4H PRN Pallavi Flynn MD        traZODone (DESYREL) tablet 50 mg  50 mg Oral QHS PRN Pallavi Flynn MD   50 mg at 05/14/22 2050    acetaminophen (TYLENOL) tablet 650 mg  650 mg Oral Q4H PRN Pallavi Flynn MD        magnesium hydroxide (MILK OF MAGNESIA) 400 mg/5 mL oral suspension 30 mL  30 mL Oral DAILY PRN Pallavi Flynn MD        nicotine buccal (POLACRILEX) lozenge 2 mg  2 mg Oral Q2H PRN Pallavi Flynn MD        metFORMIN ER (GLUCOPHAGE XR) tablet 500 mg  500 mg Oral BID Sunni Daniel NP   500 mg at 05/15/22 0816    insulin lispro (HUMALOG) injection   SubCUTAneous AC&HS Sunni Daniel NP   3 Units at 05/15/22 1140    glucose chewable tablet 16 g  4 Tablet Oral PRN Sunni Daniel NP        glucagon (GLUCAGEN) injection 1 mg  1 mg IntraMUSCular PRN Sunni Daniel NP        dextrose 10 % infusion 0-250 mL  0-250 mL IntraVENous PRN Chani Mistry NP           Side Effects: (reviewed/updated 5/15/2022)  None reported or admitted to.     Review of Systems: (reviewed/updated 5/15/2022)  Appetite: good  Sleep: good   All other Review of Systems: foot pain    Mental Status Exam:  Eye contact: Good eye contact  Psychomotor activity: wnl  Speech is spontaneous  Thought process: Logical and goal directed   Mood is \"ok\"  Affect: Blunted  Perception: No avh  Suicidal ideation: No si  Homicidal ideation: No hi  Insight/judgment: Poor  Cognition is grossly intact      Physical Exam:  Musculoskeletal system: steady gait  Tremor not present  Cog wheeling not present      Assessment and Plan:  Nella Whitman meets criteria for a diagnosis of Major depressive disorder, single episode, severe, without psychotic features. Stimulant use disorder, cocaine. Increased Cymbalta 60 mg daily. Glucerna with breakfast lunch and dinner. TDO eval should he requests discharge. Continue current medications as prescribed. We will closely monitor for safety. We will encourage reality orientation. Disposition planning to continue. I certify that this patients inpatient psychiatric hospital services furnished since the previous certification were, and continue to be, required for treatment that could reasonably be expected to improve the patient's condition, or for diagnostic study, and that the patient continues to need, on a daily basis, active treatment furnished directly by or requiring the supervision of inpatient psychiatric facility personnel. In addition, the hospital records show that services furnished were intensive treatment services, admission or related services, or equivalent services.       Signed:  Juliann Mason MD  5/15/2022

## 2022-05-15 NOTE — PROGRESS NOTES
Problem: Depressed Mood (Adult/Pediatric)  Goal: *STG: Complies with medication therapy  Outcome: Progressing Towards Goal    Visible on the unit. Interacting appropriately with peers. Calm and cooperative, denies SI. Compliant with meals and medications. 1512: BETHANY Aleman is here seeing pt.

## 2022-05-15 NOTE — PROGRESS NOTES
Problem: Falls - Risk of  Goal: *Absence of Falls  Description: Document Johanna De Guzman Fall Risk and appropriate interventions in the flowsheet. Outcome: Progressing Towards Goal  Note: Fall Risk Interventions:     Medication Interventions: Teach patient to arise slowly    Elimination Interventions: Toilet paper/wipes in reach    Patient currently resting quietly in bed. No signs of distress. Even and unlabored breathing. Staff will continue to monitor safety q15 and provide support.       0622: Pt c/o pain rated 8/10, prn Roxicodone 10 mg given. Staff will reassess pain.

## 2022-05-15 NOTE — PROGRESS NOTES
Problem: Depressed Mood (Adult/Pediatric)  Goal: Interventions  Outcome: Progressing Towards Goal     Problem: Patient Education: Go to Patient Education Activity  Goal: Patient/Family Education  Outcome: Progressing Towards Goal     Problem: Patient Education: Go to Patient Education Activity  Goal: Patient/Family Education  Outcome: Progressing Towards Goal     Problem: Falls - Risk of  Goal: *Absence of Falls  Description: Document Johanna De Guzman Fall Risk and appropriate interventions in the flowsheet. Outcome: Progressing Towards Goal  Note: Fall Risk Interventions:     Medication Interventions: Teach patient to arise slowly    Elimination Interventions: Toilet paper/wipes in reach    Problem: Patient Education: Go to Patient Education Activity  Goal: Patient/Family Education  Outcome: Progressing Towards Goal     Patient remains free from falls throughout shift. Denies SI/HI. Denies AH/VH. Meal compliant and medication compliant. No acute issues or complaints. No further complaints at this time; will continue to monitor q15 minutes for safety checks.

## 2022-05-16 VITALS
RESPIRATION RATE: 16 BRPM | BODY MASS INDEX: 22.89 KG/M2 | HEART RATE: 59 BPM | WEIGHT: 169 LBS | SYSTOLIC BLOOD PRESSURE: 106 MMHG | HEIGHT: 72 IN | TEMPERATURE: 98 F | DIASTOLIC BLOOD PRESSURE: 68 MMHG | OXYGEN SATURATION: 96 %

## 2022-05-16 LAB
GLUCOSE BLD STRIP.AUTO-MCNC: 185 MG/DL (ref 65–117)
GLUCOSE BLD STRIP.AUTO-MCNC: 197 MG/DL (ref 65–117)
SERVICE CMNT-IMP: ABNORMAL
SERVICE CMNT-IMP: ABNORMAL

## 2022-05-16 PROCEDURE — 74011636637 HC RX REV CODE- 636/637: Performed by: NURSE PRACTITIONER

## 2022-05-16 PROCEDURE — 74011250637 HC RX REV CODE- 250/637: Performed by: NURSE PRACTITIONER

## 2022-05-16 PROCEDURE — 82962 GLUCOSE BLOOD TEST: CPT

## 2022-05-16 PROCEDURE — 74011250637 HC RX REV CODE- 250/637: Performed by: PSYCHIATRY & NEUROLOGY

## 2022-05-16 RX ORDER — TRAZODONE HYDROCHLORIDE 50 MG/1
50 TABLET ORAL
Qty: 7 TABLET | Refills: 0 | Status: SHIPPED | OUTPATIENT
Start: 2022-05-16

## 2022-05-16 RX ORDER — LOSARTAN POTASSIUM 50 MG/1
50 TABLET ORAL DAILY
Qty: 7 TABLET | Refills: 0 | Status: SHIPPED | OUTPATIENT
Start: 2022-05-17

## 2022-05-16 RX ORDER — DULOXETIN HYDROCHLORIDE 60 MG/1
60 CAPSULE, DELAYED RELEASE ORAL DAILY
Qty: 7 CAPSULE | Refills: 0 | Status: SHIPPED | OUTPATIENT
Start: 2022-05-17 | End: 2022-05-24

## 2022-05-16 RX ORDER — METFORMIN HYDROCHLORIDE 750 MG/1
750 TABLET, EXTENDED RELEASE ORAL 2 TIMES DAILY
Qty: 14 TABLET | Refills: 0 | Status: SHIPPED | OUTPATIENT
Start: 2022-05-16

## 2022-05-16 RX ADMIN — METFORMIN HYDROCHLORIDE 750 MG: 750 TABLET, EXTENDED RELEASE ORAL at 12:24

## 2022-05-16 RX ADMIN — IBUPROFEN 600 MG: 600 TABLET ORAL at 08:13

## 2022-05-16 RX ADMIN — Medication 2 UNITS: at 11:58

## 2022-05-16 RX ADMIN — Medication 2 UNITS: at 08:04

## 2022-05-16 RX ADMIN — DULOXETINE HYDROCHLORIDE 60 MG: 60 CAPSULE, DELAYED RELEASE ORAL at 08:03

## 2022-05-16 NOTE — PROGRESS NOTES
Pharmacist Discharge Medication Reconciliation    Discharging Provider: Dr. Marian Lucia [facility]:   Past Medical History:   Diagnosis Date    Adverse effect of anesthesia     breathing diff. with versed    Bipolar 1 disorder, mixed, moderate ([facility] 3/6/2017    Chronic pain     Depression     Pt stated diagnosed years ago    Diabetes ([facility] 2003    Drug-induced mood disorder 5/28/2013    Homicide attempt     HTN (hypertension) 11/3/2011    Narcotic dependence, episodic use ([facility] 11/3/2011    NIDDM (non-insulin dependent diabetes mellitus) 11/3/2011    Non compliance with medical treatment 11/3/2011    Other ill-defined conditions(799.89)     chronic low back pain    Psychiatric disorder     bipolar    Sleep disorder     Substance abuse ([facility]     Suicidal thoughts      Chief Complaint for this Admission: No chief complaint on file. Allergies: Versed [midazolam] and Versed [midazolam]    Discharge Medications:   Current Discharge Medication List        START taking these medications    Details   DULoxetine (CYMBALTA) 60 mg capsule Take 1 Capsule by mouth daily for 7 days. Indications: anxiousness associated with depression  Qty: 7 Capsule, Refills: 0  Start date: 5/17/2022, End date: 5/24/2022      traZODone (DESYREL) 50 mg tablet Take 1 Tablet by mouth nightly as needed for Sleep (For insomnia). Indications: insomnia associated with depression  Qty: 7 Tablet, Refills: 0  Start date: 5/16/2022           CONTINUE these medications which have CHANGED    Details   losartan (COZAAR) 50 mg tablet Take 1 Tablet by mouth daily. Indications: high blood pressure  Qty: 7 Tablet, Refills: 0  Start date: 5/17/2022      metFORMIN ER (GLUCOPHAGE XR) 750 mg tablet Take 1 Tablet by mouth two (2) times a day.  Indications: type 2 diabetes mellitus  Qty: 14 Tablet, Refills: 0  Start date: 5/16/2022           CONTINUE these medications which have NOT CHANGED    Details   oxyCODONE IR (ROXICODONE) 10 mg tab immediate release tablet Take 10 mg by mouth two (2) times daily as needed for Pain. Indications: pain      L. acidoph & paracasei- S therm- Bifido (ARLENE-Q/RISAQUAD) 8 billion cell cap cap Take 1 Capsule by mouth daily. Qty: 30 Capsule, Refills: 1      ibuprofen (MOTRIN) 600 mg tablet Take 1 Tab by mouth every twelve (12) hours as needed for Pain. Qty: 30 Tab, Refills: 0      aspirin delayed-release 81 mg tablet Take 1 Tab by mouth daily.   Qty: 90 Tab, Refills: 3           STOP taking these medications       gabapentin (NEURONTIN) 600 mg tablet Comments:   Reason for Stopping:         pantoprazole (PROTONIX) 40 mg tablet Comments:   Reason for Stopping:         pregabalin (LYRICA) 150 mg capsule Comments:   Reason for Stopping:             The patient's chart, MAR and AVS were reviewed by Muna Jane, PHARMD.

## 2022-05-16 NOTE — BH NOTES
Behavioral Health Transition Record to Provider    Patient Name: Octavia Zambrano  YOB: 1965  Medical Record Number: [id number]  Date of Admission: 5/12/2022  Date of Discharge: 5/16/2022    Attending Provider: Enriqueta Jordan MD  Discharging Provider: Tiffanie Milner NP  To contact this individual call [phone] and ask the  to page. If unavailable, ask to be transferred to [address] [place] [address] Provider on call. [facility] Provider will be available on call 24/7 and during holidays. Primary Care Provider: None    Allergies   Allergen Reactions    Versed [Midazolam] Shortness of Breath     Weakness     Versed [Midazolam] Unknown (comments)     Numbness, with shortness of breath       Reason for Admission: The patient is a 69-year-old male who is currently admitted at [address] on a voluntary basis.  This patient is known to this provider from previous admission at [facility] presented to the emergency room after he intentionally overdosed on 60 to 70 tablets of losartan 50 mg. 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 Cheryn Kanner states that he has been under a lot of stress and he has no break. Cheryn Kanner states that other siblings do not help out or even give him a break from taking care of his father. Cheryn Kanner states that he reached his breaking point, suddenly felt very depressed.  His urine drug screen is positive for cocaine. Cheryn Kanner states that he does not use cocaine on a regular basis.  Now, he reports a previous history of heroin use and that he has overdosed in the past. Cheryn Kanner reports that he is currently not in treatment for mental health. Cheryn Kanner has been sleeping poorly, has been eating poorly.  He denies suicidal ideation, homicidal ideation, auditory or visual hallucination.     Admission Diagnosis: Adjustment disorder [F43.20]    * No surgery found *    Results for orders placed or performed during the hospital encounter of 05/12/22   GLUCOSE, FASTING   Result Value Ref Range    Glucose 182 (H) 65 - 100 MG/DL   LIPID PANEL   Result Value Ref Range    Cholesterol, total 126 <200 MG/DL    Triglyceride 73 <150 MG/DL    HDL Cholesterol 59 MG/DL    LDL, calculated 52.4 0 - 100 MG/DL    VLDL, calculated 14.6 MG/DL    CHOL/HDL Ratio 2.1 0.0 - 5.0     METABOLIC PANEL, COMPREHENSIVE   Result Value Ref Range    Sodium 138 136 - 145 mmol/L    Potassium 4.8 3.5 - 5.1 mmol/L    Chloride 106 97 - 108 mmol/L    CO2 29 21 - 32 mmol/L    Anion gap 3 (L) 5 - 15 mmol/L    Glucose 184 (H) 65 - 100 mg/dL    BUN 35 (H) 6 - 20 MG/DL    Creatinine 1.46 (H) 0.70 - 1.30 MG/DL    BUN/Creatinine ratio 24 (H) 12 - 20      GFR est AA >60 >60 ml/min/1.73m2    GFR est non-AA 50 (L) >60 ml/min/1.73m2    Calcium 9.0 8.5 - 10.1 MG/DL    Bilirubin, total 0.4 0.2 - 1.0 MG/DL    ALT (SGPT) 31 12 - 78 U/L    AST (SGOT) 20 15 - 37 U/L    Alk.  phosphatase 61 45 - 117 U/L    Protein, total 6.5 6.4 - 8.2 g/dL    Albumin 3.3 (L) 3.5 - 5.0 g/dL    Globulin 3.2 2.0 - 4.0 g/dL    A-G Ratio 1.0 (L) 1.1 - 2.2     TSH 3RD GENERATION   Result Value Ref Range    TSH 1.01 0.36 - 3.74 uIU/mL   GLUCOSE, POC   Result Value Ref Range    Glucose (POC) 327 (H) 65 - 117 mg/dL    Performed by Lanetta Hurry   [employer]    GLUCOSE, POC   Result Value Ref Range    Glucose (POC) 219 (H) 65 - 117 mg/dL    Performed by Lanetta Hurry   [employer]    GLUCOSE, POC   Result Value Ref Range    Glucose (POC) 205 (H) 65 - 117 mg/dL    Performed by [employer]    GLUCOSE, POC   Result Value Ref Range    Glucose (POC) 158 (H) 65 - 117 mg/dL    Performed by Mana Connor    GLUCOSE, POC   Result Value Ref Range    Glucose (POC) 268 (H) 65 - 117 mg/dL    Performed by Snehal Bullock    GLUCOSE, POC   Result Value Ref Range    Glucose (POC) 159 (H) 65 - 117 mg/dL    Performed by Jenni Odom    GLUCOSE, POC   Result Value Ref Range    Glucose (POC) 167 (H) 65 - 117 mg/dL Performed by Tereso Baugh    GLUCOSE, POC   Result Value Ref Range    Glucose (POC) 109 65 - 117 mg/dL    Performed by Tereso Baugh    GLUCOSE, POC   Result Value Ref Range    Glucose (POC) 192 (H) 65 - 117 mg/dL    Performed by Henrietta Manuel    GLUCOSE, POC   Result Value Ref Range    Glucose (POC) 203 (H) 65 - 117 mg/dL    Performed by Henrietta Manuel    GLUCOSE, POC   Result Value Ref Range    Glucose (POC) 206 (H) 65 - 117 mg/dL    Performed by Tereso Baugh    GLUCOSE, POC   Result Value Ref Range    Glucose (POC) 200 (H) 65 - 117 mg/dL    Performed by Tereso Baugh    GLUCOSE, POC   Result Value Ref Range    Glucose (POC) 222 (H) 65 - 117 mg/dL    Performed by [address], POC   Result Value Ref Range    Glucose (POC) 186 (H) 65 - 117 mg/dL    Performed by [address], POC   Result Value Ref Range    Glucose (POC) 185 (H) 65 - 117 mg/dL    Performed by Tereso Baugh    GLUCOSE, POC   Result Value Ref Range    Glucose (POC) 197 (H) 65 - 117 mg/dL    Performed by Luiz Chambers        Immunizations administered during this encounter:   Immunization History   Administered Date(s) Administered    Hep B, Adol/Ped 03/12/2013    Influenza Vaccine 09/18/2018    Influenza Vaccine (Quad) PF (>6 Mo Flulaval, Fluarix, and >3 Yrs Afluria, Fluzone [id number]) 01/25/2018, 09/18/2018    Pneumococcal Polysaccharide (PPSV-23) 01/25/2018    Tdap 01/05/2022       Screening for Metabolic Disorders for Patients on Antipsychotic Medications  (Data obtained from the EMR)    Estimated Body Mass Index  Estimated body mass index is 22.92 kg/m² as calculated from the following:    Height as of this encounter: 6' (1.829 m). Weight as of this encounter: 76.7 kg (169 lb).      Vital Signs/Blood Pressure  Visit Vitals  /68   Pulse (!) 59   Temp 98 °F (36.7 °C)   Resp 16   Ht 6' (1.829 m)   Wt 76.7 kg (169 lb)   SpO2 96%   BMI 22.92 kg/m²       Blood Glucose/Hemoglobin A1c  Lab Results   Component Value Date/Time    Glucose 182 (H) 05/13/2022 05:31 AM    Glucose 184 (H) 05/13/2022 05:31 AM    Glucose (POC) 197 (H) 05/16/2022 11:51 AM       Lab Results   Component Value Date/Time    Hemoglobin A1c 8.2 (H) 02/13/2022 04:15 AM    Hemoglobin A1c, External 9.2 09/29/2021 12:00 AM        Lipid Panel  Lab Results   Component Value Date/Time    Cholesterol, total 126 05/13/2022 05:31 AM    HDL Cholesterol 59 05/13/2022 05:31 AM    LDL, calculated 52.4 05/13/2022 05:31 AM    Triglyceride 73 05/13/2022 05:31 AM    CHOL/HDL Ratio 2.1 05/13/2022 05:31 AM        Discharge Diagnosis: Major depressive disorder, single episode, severe, without psychotic features.  Stimulant use disorder, cocaine. Discharge Plan: The patient Jaxon Mcmahon exhibits the ability to control behavior in a less restrictive environment. Patient's level of functioning is improving. No assaultive/destructive behavior has been observed for the past 24 hours. No suicidal/homicidal threat or behavior has been observed for the past 24 hours. There is no evidence of serious medication side effects. Patient has not been in physical or protective restraints for at least the past 24 hours. If weapons involved, how are they secured? NA    Is patient aware of and in agreement with discharge plan? yes    Arrangements for medication:  7-days of prescriptions filled at [address] [place] [address] Ave of discharge instructions to provider?:  yes    Arrangements for transportation home:  Lyft  @ 13:15    Keep all follow up appointments as scheduled, continue to take prescribed medications per physician instructions.   Mental health crisis number:  [address] or [facility]: [phone]      [address] Emergency WARM LINE      [phone]-MHAV ([phone])      M-F: 9am to 9pm      Sat & Sun: 5pm - 9pm  National suicide prevention lines:                             3-058-TVYHSCV (1-493.583.4993)       7-597-118-TALK ([phone])   24/7 Crisis Text Line:  Text HOME to 860555      Discharge Medication List and Instructions:   Current Discharge Medication List      START taking these medications    Details   DULoxetine (CYMBALTA) 60 mg capsule Take 1 Capsule by mouth daily for 7 days. Indications: anxiousness associated with depression  Qty: 7 Capsule, Refills: 0  Start date: 5/17/2022, End date: 5/24/2022      traZODone (DESYREL) 50 mg tablet Take 1 Tablet by mouth nightly as needed for Sleep (For insomnia). Indications: insomnia associated with depression  Qty: 7 Tablet, Refills: 0  Start date: 5/16/2022         CONTINUE these medications which have CHANGED    Details   losartan (COZAAR) 50 mg tablet Take 1 Tablet by mouth daily. Indications: high blood pressure  Qty: 7 Tablet, Refills: 0  Start date: 5/17/2022      metFORMIN ER (GLUCOPHAGE XR) 750 mg tablet Take 1 Tablet by mouth two (2) times a day. Indications: type 2 diabetes mellitus  Qty: 14 Tablet, Refills: 0  Start date: 5/16/2022         CONTINUE these medications which have NOT CHANGED    Details   oxyCODONE IR (ROXICODONE) 10 mg tab immediate release tablet Take 10 mg by mouth two (2) times daily as needed for Pain. Indications: pain      L. acidoph & paracasei- S therm- Bifido (ARLENE-Q/RISAQUAD) 8 billion cell cap cap Take 1 Capsule by mouth daily. Qty: 30 Capsule, Refills: 1      ibuprofen (MOTRIN) 600 mg tablet Take 1 Tab by mouth every twelve (12) hours as needed for Pain. Qty: 30 Tab, Refills: 0      aspirin delayed-release 81 mg tablet Take 1 Tab by mouth daily.   Qty: 90 Tab, Refills: 3         STOP taking these medications       gabapentin (NEURONTIN) 600 mg tablet Comments:   Reason for Stopping:         pantoprazole (PROTONIX) 40 mg tablet Comments:   Reason for Stopping:         pregabalin (LYRICA) 150 mg capsule Comments:   Reason for Stopping:               Unresulted Labs (24h ago, onward)            None        To obtain results of studies pending at discharge, please contact [phone]    Follow-up Information     Follow up With Specialties Details Why Contact Lin García MD Internal Medicine Physician   [address]  [facility] [address]  [facility]  [phone]      Dr. Joanne Gilbert   On 5/24/2022 Tuesday 5/24 at 10:30am [facility] ([facility])  [address] [place] [address] [address], Dilan Bowie, [address]  Phone: [phone]  [employer]    [facility]   Follow-up for Medication Management, Counseling, and available Community services Chelsey Sorensen [address]  [phone]          Advanced Directive:   Does the patient have an appointed surrogate decision maker? No  Does the patient have a Medical Advance Directive? No  Does the patient have a Psychiatric Advance Directive? No  If the patient does not have a surrogate or Medical Advance Directive AND Psychiatric Advance Directive, the patient was offered information on these advance directives Patient declined to complete    Patient Instructions: Please continue all medications until otherwise directed by physician. Tobacco Cessation Discharge Plan:   Is the patient a smoker and needs referral for smoking cessation? Yes  Patient referred to the following for smoking cessation with an appointment? Refused     Patient was offered medication to assist with smoking cessation at discharge? No  Was education for smoking cessation added to the discharge instructions? Yes    Alcohol/Substance Abuse Discharge Plan:   Does the patient have a history of substance/alcohol abuse and requires a referral for treatment? Yes  Patient referred to the following for substance/alcohol abuse treatment with an appointment? Refused  Patient was offered medication to assist with alcohol cessation at discharge? No  Was education for substance/alcohol abuse added to discharge instructions?  Yes    Patient discharged to Home; discussed with patient/caregiver and provided to the patient/caregiver either in hard copy or electronically.

## 2022-05-16 NOTE — PROGRESS NOTES
Problem: Depressed Mood (Adult/Pediatric)  Goal: *STG: Participates in treatment plan  Outcome: Progressing Towards Goal  Note: Out on unit engaged and social w peers. Denies SI, daily goal is to discuss d/c plans. Reports feeling hopeful and safe. Future focused. Review medications and follow up care.  Staff focus is on offering support and medication education  Goal: *STG: Demonstrates reduction in symptoms and increase in insight into coping skills/future focused  Outcome: Progressing Towards Goal  Goal: *STG: Complies with medication therapy  Outcome: Progressing Towards Goal  Goal: Interventions  Outcome: Progressing Towards Goal

## 2022-05-16 NOTE — DISCHARGE SUMMARY
PSYCHIATRIC DISCHARGE SUMMARY    Patient: Geoffrey Beauchamp MRN: [id number]  SSN: [id number]    YOB: 1965  Age: 61 y.o. Sex: male        Date of Admission: 5/12/2022  Date of Discharge:5/16/2022      Type of Discharge:  REGULAR    Admission data:  CHIEF COMPLAINT:  \"Stressed out. \"     HISTORY OF PRESENTING ILLNESS:  The patient is a 60-year-old male who is currently admitted at [address] on a voluntary basis. This patient is known to this provider from previous admission at [facility]. He presented to the emergency room after he intentionally overdosed on 60 to 70 tablets of losartan 50 mg. He reports since his mother's passing about a year and a half ago, he has been the primary caregiver of his father. He states that he has been under a lot of stress and he has no break. He states that other siblings do not help out or even give him a break from taking care of his father. He states that he reached his breaking point, suddenly felt very depressed. His urine drug screen is positive for cocaine. He states that he does not use cocaine on a regular basis. Now, he reports a previous history of heroin use and that he has overdosed in the past.  He reports that he is currently not in treatment for mental health. He has been sleeping poorly, has been eating poorly.   He denies suicidal ideation, homicidal ideation, auditory or visual hallucination.     PAST MEDICAL HISTORY:  See H and P.          Past Medical History:   Diagnosis Date    Adverse effect of anesthesia       breathing diff. with versed    Bipolar 1 disorder, mixed, moderate ([address] 3/6/2017    Chronic pain      Depression       Pt stated diagnosed years ago    Diabetes ([address] 2003    Drug-induced mood disorder 5/28/2013    Homicide attempt      HTN (hypertension) 11/3/2011    Narcotic dependence, episodic use ([address] 11/3/2011    NIDDM (non-insulin dependent diabetes mellitus) 11/3/2011    Non compliance with medical treatment 11/3/2011    Other ill-defined conditions(799.89)       chronic low back pain    Psychiatric disorder       bipolar    Sleep disorder      Substance abuse ([facility]      Suicidal thoughts           Labs: (reviewed/updated 5/13/2022)  Patient Vitals for the past 8 hrs:    BP Temp Pulse Resp SpO2   05/13/22 0746 131/84 98.3 °F (36.8 °C) 76 16 97 %            Labs Reviewed   GLUCOSE, FASTING - Abnormal; Notable for the following components:       Result Value      Glucose 182 (*)       All other components within normal limits   METABOLIC PANEL, COMPREHENSIVE - Abnormal; Notable for the following components:     Anion gap 3 (*)       Glucose 184 (*)       BUN 35 (*)       Creatinine 1.46 (*)       BUN/Creatinine ratio 24 (*)       GFR est non-AA 50 (*)       Albumin 3.3 (*)       A-G Ratio 1.0 (*)       All other components within normal limits   GLUCOSE, POC - Abnormal; Notable for the following components:     Glucose (POC) 327 (*)       All other components within normal limits   GLUCOSE, POC - Abnormal; Notable for the following components:     Glucose (POC) 219 (*)       All other components within normal limits   GLUCOSE, POC - Abnormal; Notable for the following components:     Glucose (POC) 205 (*)       All other components within normal limits   GLUCOSE, POC - Abnormal; Notable for the following components:     Glucose (POC) 158 (*)       All other components within normal limits   LIPID PANEL            Lab Results   Component Value Date/Time     Sodium 138 05/13/2022 05:31 AM     Potassium 4.8 05/13/2022 05:31 AM     Chloride 106 05/13/2022 05:31 AM     CO2 29 05/13/2022 05:31 AM     Anion gap 3 (L) 05/13/2022 05:31 AM     Glucose 182 (H) 05/13/2022 05:31 AM     Glucose 184 (H) 05/13/2022 05:31 AM     BUN 35 (H) 05/13/2022 05:31 AM     Creatinine 1.46 (H) 05/13/2022 05:31 AM     BUN/Creatinine ratio 24 (H) 05/13/2022 05:31 AM     GFR est AA >60 05/13/2022 05:31 AM     GFR est non-AA 50 (L) 05/13/2022 05:31 AM     Calcium 9.0 05/13/2022 05:31 AM     Bilirubin, total 0.4 05/13/2022 05:31 AM     Alk. phosphatase 61 05/13/2022 05:31 AM     Protein, total 6.5 05/13/2022 05:31 AM     Albumin 3.3 (L) 05/13/2022 05:31 AM     Globulin 3.2 05/13/2022 05:31 AM     A-G Ratio 1.0 (L) 05/13/2022 05:31 AM     ALT (SGPT) 31 05/13/2022 05:31 AM               Admission on 05/12/2022   Component Date Value Ref Range Status     Glucose (POC) 05/12/2022 327* 65 - 117 mg/dL Final    Performed by 05/12/2022 Maurita Pupa   Tech    Final    Glucose (POC) 05/12/2022 219* 65 - 117 mg/dL Final    Performed by 05/12/2022 Maurita Pupa   Tech    Final    Glucose 05/13/2022 182* 65 - 100 MG/DL Final    Cholesterol, total 05/13/2022 126  <200 MG/DL Final    Triglyceride 05/13/2022 73  <150 MG/DL Final    HDL Cholesterol 05/13/2022 59  MG/DL Final    LDL, calculated 05/13/2022 52.4  0 - 100 MG/DL Final    VLDL, calculated 05/13/2022 14.6  MG/DL Final    CHOL/HDL Ratio 05/13/2022 2.1  0.0 - 5.0   Final    Sodium 05/13/2022 138  136 - 145 mmol/L Final    Potassium 05/13/2022 4.8  3.5 - 5.1 mmol/L Final    Chloride 05/13/2022 106  97 - 108 mmol/L Final    CO2 05/13/2022 29  21 - 32 mmol/L Final    Anion gap 05/13/2022 3* 5 - 15 mmol/L Final    Glucose 05/13/2022 184* 65 - 100 mg/dL Final    BUN 05/13/2022 35* 6 - 20 MG/DL Final    Creatinine 05/13/2022 1.46* 0.70 - 1.30 MG/DL Final    BUN/Creatinine ratio 05/13/2022 24* 12 - 20   Final    GFR est AA 05/13/2022 >60  >60 ml/min/1.73m2 Final    GFR est non-AA 05/13/2022 50* >60 ml/min/1.73m2 Final    Calcium 05/13/2022 9.0  8.5 - 10.1 MG/DL Final    Bilirubin, total 05/13/2022 0.4  0.2 - 1.0 MG/DL Final    ALT (SGPT) 05/13/2022 31  12 - 78 U/L Final    AST (SGOT) 05/13/2022 20  15 - 37 U/L Final    Alk.  phosphatase 05/13/2022 61  45 - 117 U/L Final    Protein, total 05/13/2022 6.5  6.4 - 8.2 g/dL Final    Albumin 05/13/2022 3.3* 3.5 - 5.0 g/dL Final    Globulin 05/13/2022 3.2  2.0 - 4.0 g/dL Final    A-G Ratio 05/13/2022 1.0* 1.1 - 2.2   Final    Glucose (POC) 05/13/2022 205* 65 - 117 mg/dL Final    Performed by [id number]/6639 Tatyanamichael Gentile    Final    Glucose (POC) 05/13/2022 158* 65 - 117 mg/dL Final    Performed by 05/13/2022 Sheryl Arriaga    Final   Admission on 05/11/2022, Discharged on 05/12/2022   Component Date Value Ref Range Status     Ventricular Rate 05/11/2022 83  BPM Final    Atrial Rate 05/11/2022 83  BPM Final    P-R Interval 05/11/2022 148  ms Final    QRS Duration 05/11/2022 88  ms Final    Q-T Interval 05/11/2022 362  ms Final    QTC Calculation (Bezet) 05/11/2022 425  ms Final    Calculated P Axis 05/11/2022 75  degrees Final    Calculated R Axis 05/11/2022 63  degrees Final    Calculated T Axis 05/11/2022 73  degrees Final    Diagnosis 05/11/2022     Final                    Value:Normal sinus rhythm  Normal ECG     Confirmed by Bernarda Luis ([id number]) on 5/12/2022 10:35:05 AM       WBC 05/11/2022 8.7  4.1 - 11.1 K/uL Final    RBC 05/11/2022 4.57  4.10 - 5.70 M/uL Final    HGB 05/11/2022 14.1  12.1 - 17.0 g/dL Final    HCT 05/11/2022 38.7  36.6 - 50.3 % Final    MCV 05/11/2022 84.7  80.0 - 99.0 FL Final    MCH 05/11/2022 30.9  26.0 - 34.0 PG Final    MCHC 05/11/2022 36.4  30.0 - 36.5 g/dL Final    RDW 05/11/2022 13.7  11.5 - 14.5 % Final    PLATELET 36/70/4271 578  150 - 400 K/uL Final    MPV 05/11/2022 9.3  8.9 - 12.9 FL Final    NRBC 05/11/2022 0.0  0  WBC Final    ABSOLUTE NRBC 05/11/2022 0.00  0.00 - 0.01 K/uL Final    NEUTROPHILS 05/11/2022 80* 32 - 75 % Final    LYMPHOCYTES 05/11/2022 12  12 - 49 % Final    MONOCYTES 05/11/2022 5  5 - 13 % Final    EOSINOPHILS 05/11/2022 1  0 - 7 % Final    BASOPHILS 05/11/2022 1  0 - 1 % Final    IMMATURE GRANULOCYTES 05/11/2022 1* 0.0 - 0.5 % Final    ABS. NEUTROPHILS 05/11/2022 7.0  1.8 - 8.0 K/UL Final    ABS.  LYMPHOCYTES 05/11/2022 1.1  0.8 - 3.5 K/UL Final    ABS. MONOCYTES 05/11/2022 0.5  0.0 - 1.0 K/UL Final    ABS. EOSINOPHILS 05/11/2022 0.0  0.0 - 0.4 K/UL Final    ABS. BASOPHILS 05/11/2022 0.1  0.0 - 0.1 K/UL Final    ABS. IMM. GRANS. 05/11/2022 0.0  0.00 - 0.04 K/UL Final    DF 05/11/2022 AUTOMATED    Final    Sodium 05/11/2022 133* 136 - 145 mmol/L Final    Potassium 05/11/2022 5.0  3.5 - 5.1 mmol/L Final    Chloride 05/11/2022 99  97 - 108 mmol/L Final    CO2 05/11/2022 29  21 - 32 mmol/L Final    Anion gap 05/11/2022 5  5 - 15 mmol/L Final    Glucose 05/11/2022 307* 65 - 100 mg/dL Final    BUN 05/11/2022 33* 6 - 20 MG/DL Final    Creatinine 05/11/2022 2.06* 0.70 - 1.30 MG/DL Final    BUN/Creatinine ratio 05/11/2022 16  12 - 20   Final    GFR est AA 05/11/2022 41* >60 ml/min/1.73m2 Final    GFR est non-AA 05/11/2022 33* >60 ml/min/1.73m2 Final    Calcium 05/11/2022 10.2* 8.5 - 10.1 MG/DL Final    Bilirubin, total 05/11/2022 0.7  0.2 - 1.0 MG/DL Final    ALT (SGPT) 05/11/2022 40  12 - 78 U/L Final    AST (SGOT) 05/11/2022 22  15 - 37 U/L Final    Alk.  phosphatase 05/11/2022 72  45 - 117 U/L Final    Protein, total 05/11/2022 8.0  6.4 - 8.2 g/dL Final    Albumin 05/11/2022 4.2  3.5 - 5.0 g/dL Final    Globulin 05/11/2022 3.8  2.0 - 4.0 g/dL Final    A-G Ratio 05/11/2022 1.1  1.1 - 2.2   Final    Troponin-High Sensitivity 05/11/2022 18  0 - 76 ng/L Final    AMPHETAMINES 05/11/2022 Negative  NEG   Final    BARBITURATES 05/11/2022 Negative  NEG   Final    BENZODIAZEPINES 05/11/2022 Negative  NEG   Final    COCAINE 05/11/2022 Positive* NEG   Final    METHADONE 05/11/2022 Negative  NEG   Final    OPIATES 05/11/2022 Negative  NEG   Final    PCP(PHENCYCLIDINE) 05/11/2022 Negative  NEG   Final    THC (TH-CANNABINOL) 05/11/2022 Negative  NEG   Final    Drug screen comment 05/11/2022 (NOTE)    Final    Salicylate level 90/42/3367 2.7* 2.8 - 20.0 MG/DL Final    Acetaminophen level 05/11/2022 <2* 10 - 30 ug/mL Final    ALCOHOL(ETHYL),SERUM 05/11/2022 <10  <10 MG/DL Final    SAMPLES BEING HELD 05/11/2022 RED    Final    COMMENT 05/11/2022 Add-on orders for these samples will be processed based on acceptable specimen integrity and analyte stability, which may vary by analyte.     Final    Color 05/11/2022 YELLOW/STRAW    Final    Appearance 05/11/2022 CLEAR  CLEAR   Final    Specific gravity 05/11/2022 1.018    Final    pH (UA) 05/11/2022 5.0  5.0 - 8.0   Final    Protein 05/11/2022 300* NEG mg/dL Final    Glucose 05/11/2022 500* NEG mg/dL Final    Ketone 05/11/2022 TRACE* NEG mg/dL Final    Bilirubin 05/11/2022 Negative  NEG   Final    Blood 05/11/2022 TRACE* NEG   Final    Urobilinogen 05/11/2022 1.0  0.2 - 1.0 EU/dL Final    Nitrites 05/11/2022 Negative  NEG   Final    Leukocyte Esterase 05/11/2022 TRACE* NEG   Final    WBC 05/11/2022 5-10  0 - 4 /hpf Final    RBC 05/11/2022 0-5  0 - 5 /hpf Final    Epithelial cells 05/11/2022 FEW  FEW /lpf Final    Bacteria 05/11/2022 Negative  NEG /hpf Final    UA:UC IF INDICATED 05/11/2022 CULTURE NOT INDICATED BY UA RESULT  CNI   Final    Hyaline cast 05/11/2022 2-5  0 - 5 /lpf Final    Specimen source 05/11/2022 Nasopharyngeal    Final    COVID-19 rapid test 05/11/2022 Not detected  NOTD   Final    Glucose (POC) 05/11/2022 280* 65 - 117 mg/dL Final    Performed by 05/11/2022 Em Bolton EDT    Final    SARS-CoV-2 by PCR 05/12/2022 Please find results under separate order    Final    SARS-CoV-2 by PCR 05/12/2022 Not detected  NOTD   Final    Influenza A by PCR 05/12/2022 Not detected  NOTD   Final    Influenza B by PCR 05/12/2022 Not detected  NOTD   Final            Vitals:     05/12/22 0950 05/12/22 1606 05/13/22 0746   BP: 116/72 138/87 131/84   Pulse: 61 69 76   Resp: 16 16 16   Temp: 98.2 °F (36.8 °C) 98.3 °F (36.8 °C) 98.3 °F (36.8 °C)   SpO2: 99% 97% 97%   Weight: 74.8 kg (165 lb)       Height: 6' (1.829 m)          Recent Results         Recent Results (from the past 24 hour(s))   GLUCOSE, POC     Collection Time: 05/12/22  6:24 PM   Result Value Ref Range     Glucose (POC) 327 (H) 65 - 117 mg/dL     Performed by Xiomara Lacy   Tech     GLUCOSE, POC     Collection Time: 05/12/22  7:54 PM   Result Value Ref Range     Glucose (POC) 219 (H) 65 - 117 mg/dL     Performed by Xiomara Lacy   Tech     GLUCOSE, FASTING     Collection Time: 05/13/22  5:31 AM   Result Value Ref Range     Glucose 182 (H) 65 - 100 MG/DL   LIPID PANEL     Collection Time: 05/13/22  5:31 AM   Result Value Ref Range     Cholesterol, total 126 <200 MG/DL     Triglyceride 73 <150 MG/DL     HDL Cholesterol 59 MG/DL     LDL, calculated 52.4 0 - 100 MG/DL     VLDL, calculated 14.6 MG/DL     CHOL/HDL Ratio 2.1 0.0 - 5.0     METABOLIC PANEL, COMPREHENSIVE     Collection Time: 05/13/22  5:31 AM   Result Value Ref Range     Sodium 138 136 - 145 mmol/L     Potassium 4.8 3.5 - 5.1 mmol/L     Chloride 106 97 - 108 mmol/L     CO2 29 21 - 32 mmol/L     Anion gap 3 (L) 5 - 15 mmol/L     Glucose 184 (H) 65 - 100 mg/dL     BUN 35 (H) 6 - 20 MG/DL     Creatinine 1.46 (H) 0.70 - 1.30 MG/DL     BUN/Creatinine ratio 24 (H) 12 - 20       GFR est AA >60 >60 ml/min/1.73m2     GFR est non-AA 50 (L) >60 ml/min/1.73m2     Calcium 9.0 8.5 - 10.1 MG/DL     Bilirubin, total 0.4 0.2 - 1.0 MG/DL     ALT (SGPT) 31 12 - 78 U/L     AST (SGOT) 20 15 - 37 U/L     Alk.  phosphatase 61 45 - 117 U/L     Protein, total 6.5 6.4 - 8.2 g/dL     Albumin 3.3 (L) 3.5 - 5.0 g/dL     Globulin 3.2 2.0 - 4.0 g/dL     A-G Ratio 1.0 (L) 1.1 - 2.2     GLUCOSE, POC     Collection Time: 05/13/22  7:34 AM   Result Value Ref Range     Glucose (POC) 205 (H) 65 - 117 mg/dL     Performed by Tiny Cisneros     GLUCOSE, POC     Collection Time: 05/13/22 11:26 AM   Result Value Ref Range     Glucose (POC) 158 (H) 65 - 117 mg/dL     Performed by Arnel 3:  Results from Hospital Encounter encounter on 05/11/22     XR CHEST PORT     Narrative  EXAM: XR CHEST PORT     HISTORY: sob.     COMPARISON: 6/5/2017     FINDINGS: Single view(s) of the chest. The lungs are well inflated. No focal  consolidation, pleural effusion, or pneumothorax. The cardiomediastinal  silhouette is unremarkable. The visualized osseous structures are unremarkable.     Impression  No acute cardiopulmonary process. XR FOOT LT MIN 3 V     Result Date: 2/12/2022  EXAM: XR FOOT LT MIN 3 V INDICATION: lateral wound infection. COMPARISON: 8/14/2021 FINDINGS: Three views of the left foot demonstrate [no fracture or other acute osseous or articular abnormality] with surrounding periosteal reaction. These changes appear chronic and may be partially postsurgical in nature. However, there is loss of cortical definition in the region of the MTP joint and findings are of concern for osteomyelitis. The fifth metatarsal distortion has developed since 8/14/2021 but there are no interval radiographs. Early bone destruction of the base of the fifth proximal phalanx is also suspected. There are no other significant bony abnormalities. There is extensive vascular calcification.      Question of postsurgical changes in the distal fifth metatarsal. Findings suspicious for acute osteomyelitis at the fifth MTP joint. .     MRI FOOT LT W WO CONT     Result Date: 2/13/2022  INDICATION: Ostomy minus left foot fifth metatarsal and possible first metatarsal Exam: MRI left foot with and without contrast Sequences include short axis and long axis T1, short axis, long axis and sagittal T2 with fat saturation. Short axis TI with fat saturation. After the intravenous administration of 15 cc ProHance multiplanar fat-suppressed T1-weighted images were obtained Comparisons: Prior day FINDINGS: There is abnormal marrow signal within the distal half of the fifth metatarsal shaft with cortical bone loss. Similar findings are noted at the base of the proximal phalanx of the little toe.  Findings are compatible with osteomyelitis. Soft tissue ulcer lateral to the distal shaft of the fifth metatarsal with probable sinus tract. No drainable abscess. Increased signal within the muscles of the foot Examination of the remaining marrow signal demonstrates no other areas of osteomyelitis. Specifically no osteomyelitis of the sesamoids. No fluid tracking proximally within the tendon sheath      1. Osteomyelitis distal fifth metatarsal shaft and proximal phalanx of the little toe. No drainable abscess or other areas of osteomyelitis      XR CHEST PORT     Result Date: 5/11/2022  EXAM: XR CHEST PORT HISTORY: sob. COMPARISON: 6/5/2017 FINDINGS: Single view(s) of the chest. The lungs are well inflated. No focal consolidation, pleural effusion, or pneumothorax. The cardiomediastinal silhouette is unremarkable. The visualized osseous structures are unremarkable.      No acute cardiopulmonary process.      ANKLE BRACHIAL INDEX     Result Date: 2/15/2022  · Right ABIs are unreliable due to medial calcinosis. Right TBI is WNL (1.03). · Left RAY non-compressible. Left TBI is WNL (0.93). · PVR waveforms are normal bilaterally; no evidence of significant arterial obstruction according to PVR waveforms.                         PAST PSYCHIATRIC HISTORY:  He was previously admitted at [facility].  He is currently not receiving services for mental health, though he agreed to be started on duloxetine.     PSYCHOSOCIAL HISTORY:  He is . He has a 22-year-old son. His highest level of education is 2 years of college. He is on social security disability income. He lives with his father for whom the patient is the primary caregiver.     MENTAL STATUS EXAM:  He is alert and oriented in all spheres. He is dressed in hospital apparel. He reports his mood is anxious and depressed. Affect is mildly constricted. Speech is normal rate and rhythm. Thought process, logical and goal directed.   He denies suicidal ideation, homicidal ideation, auditory or visual hallucinations. Memory is intact. Intelligence is average. Insight is poor. Judgment is poor.     DIAGNOSES:  Major depressive disorder, single episode, severe, without psychotic features. Stimulant use disorder, cocaine.     TREATMENT PLANNING:  I will continue his inpatient stay. He will be provided with support and encouraged to attend groups. His safety will be monitored. His medications will be modified and assessed. Case Management will work on discharge planning.     ASSETS AND STRENGTHS:  He is willing to seek help. He is willing to take medications.     ESTIMATED LENGTH OF STAY:  5-7 days. Hospital Course:    Patient was admitted to the Psychiatric services for acute psychiatric stabilization in regards to symptomatology as described in the HPI above and placed on Q15 minute checks and suicide precautions. He was started back on his usual medication regimen as well as PRN medications including cozaar, metformin. He was started on cymbalta. While on the unit Naima Hutchins was involved in individual, group, occupational and milieu therapy. He improved gradually and was able to integrate into the milieu with help from the nursing staff. Patients symptoms improved gradually including si, worsening mood, depression, anxiety, poor sleep. He was appropriate in his interactions, and cooperative with medications and the unit routine. Please see individual progress notes for more specific details regarding patient's hospitalization course. Patient was discharged as per the plan. He had been doing well on the unit as per the report of the nursing staff and my observations. No PRN medication for agitation, seclusion or restraints were required during the last 48 hours of his stay. Naima Hutchins had improved progressively to the point of being stable for discharge and outpatient FU. At this time he did not offer any complaints. Patient denied any SI or HI. Denied any AH or VH.  He denied any delusions. Was not considered a danger to self or to others and is safe for discharge. Will FU with his appointments and remains motivated to be in treatment. The patient verbalized understanding of his discharge instructions. Allergies:(reviewed/updated 5/16/2022)  Allergies   Allergen Reactions    Versed [Midazolam] Shortness of Breath     Weakness     Versed [Midazolam] Unknown (comments)     Numbness, with shortness of breath       Side Effects: (reviewed/updated 5/16/2022)  None reported or admitted to.     Vital Signs:  Patient Vitals for the past 24 hrs:   Temp Pulse Resp BP SpO2   05/16/22 0727 98 °F (36.7 °C) (!) 59 16 106/68 96 %   05/15/22 2039 98.5 °F (36.9 °C) (!) 54 16 139/84 93 %   05/15/22 1538 98.7 °F (37.1 °C) 60 14 (!) 166/82 --   05/15/22 1215 -- (!) 53 -- (!) 177/92 --     Wt Readings from Last 3 Encounters:   05/15/22 76.7 kg (169 lb)   05/11/22 74.8 kg (165 lb)   02/14/22 77.6 kg (171 lb 1.2 oz)     Temp Readings from Last 3 Encounters:   05/16/22 98 °F (36.7 °C)   05/12/22 98.4 °F (36.9 °C)   02/17/22 97.6 °F (36.4 °C)     BP Readings from Last 3 Encounters:   05/16/22 106/68   05/12/22 128/86   02/17/22 (!) 184/84     Pulse Readings from Last 3 Encounters:   05/16/22 (!) 59   05/12/22 (!) 53   02/17/22 (!) 51       Labs: (reviewed/updated 5/16/2022)  Recent Results (from the past 24 hour(s))   GLUCOSE, POC    Collection Time: 05/15/22 11:35 AM   Result Value Ref Range    Glucose (POC) 200 (H) 65 - 117 mg/dL    Performed by Aguilar Iverson    GLUCOSE, POC    Collection Time: 05/15/22  4:38 PM   Result Value Ref Range    Glucose (POC) 222 (H) 65 - 117 mg/dL    Performed by [address], POC    Collection Time: 05/15/22  8:19 PM   Result Value Ref Range    Glucose (POC) 186 (H) 65 - 117 mg/dL    Performed by [address], POC    Collection Time: 05/16/22  7:12 AM   Result Value Ref Range    Glucose (POC) 185 (H) 65 - 117 mg/dL    Performed by Shanthi Lyme      No results found for: VALF2, VALAC, VALP, VALPR, DS6, CRBAM, CRBAMP, CARB2, XCRBAM  No results found for: [place] [facility] EVALUATION [place]    Radiology (reviewed/updated 5/16/2022)  XR CHEST PORT    Result Date: 5/11/2022  EXAM: XR CHEST PORT HISTORY: sob. COMPARISON: 6/5/2017 FINDINGS: Single view(s) of the chest. The lungs are well inflated. No focal consolidation, pleural effusion, or pneumothorax. The cardiomediastinal silhouette is unremarkable. The visualized osseous structures are unremarkable. No acute cardiopulmonary process. Mental Status Exam on Discharge:  General appearance:   Nat Olmos is a 61 y.o. WHITE/NON- male who is well groomed, psychomotor activity is WNL  Eye contact: makes good eye contact  Speech: Spontaneous and coherent  Affect : Euthymic  Mood: \"OK\"  Thought Process: Logical, goal directed  Perception: Denies any AH or VH. Thought Content: Denies any SI or Plan  Insight: Partial  Judgement: Fair  Cognition: Intact grossly. Discharge Diagnosis:    Major depressive disorder, single episode, severe, without psychotic features. Stimulant use disorder, cocaine. Current Discharge Medication List      START taking these medications    Details   DULoxetine (CYMBALTA) 60 mg capsule Take 1 Capsule by mouth daily for 7 days. Indications: anxiousness associated with depression  Qty: 7 Capsule, Refills: 0  Start date: 5/17/2022, End date: 5/24/2022      traZODone (DESYREL) 50 mg tablet Take 1 Tablet by mouth nightly as needed for Sleep (For insomnia). Indications: insomnia associated with depression  Qty: 7 Tablet, Refills: 0  Start date: 5/16/2022         CONTINUE these medications which have CHANGED    Details   losartan (COZAAR) 50 mg tablet Take 1 Tablet by mouth daily. Indications: high blood pressure  Qty: 7 Tablet, Refills: 0  Start date: 5/17/2022      metFORMIN ER (GLUCOPHAGE XR) 750 mg tablet Take 1 Tablet by mouth two (2) times a day.  Indications: type 2 diabetes mellitus  Qty: 14 Tablet, Refills: 0  Start date: 5/16/2022         CONTINUE these medications which have NOT CHANGED    Details   oxyCODONE IR (ROXICODONE) 10 mg tab immediate release tablet Take 10 mg by mouth two (2) times daily as needed for Pain. Indications: pain      L. acidoph & paracasei- S therm- Bifido (ARLENE-Q/RISAQUAD) 8 billion cell cap cap Take 1 Capsule by mouth daily. Qty: 30 Capsule, Refills: 1      ibuprofen (MOTRIN) 600 mg tablet Take 1 Tab by mouth every twelve (12) hours as needed for Pain. Qty: 30 Tab, Refills: 0      aspirin delayed-release 81 mg tablet Take 1 Tab by mouth daily. Qty: 90 Tab, Refills: 3         STOP taking these medications       gabapentin (NEURONTIN) 600 mg tablet Comments:   Reason for Stopping:         pantoprazole (PROTONIX) 40 mg tablet Comments:   Reason for Stopping:         pregabalin (LYRICA) 150 mg capsule Comments:   Reason for Stopping: Follow-up Information     Follow up With Specialties Details Why Contact Info    None    None ([phone]) Patient stated that they have no PCP      Lori Strickland MD Internal Medicine Physician   [address] [place] [address] [address]  [facility]  [phone]          WOUND CARE: none needed. Prognosis:   Good / Arthor Stack based on nature of patient's pathology/ies and treatment compliance issues. Prognosis is greatly dependent upon patient's ability to  follow up on psychiatric/psychotherapy appointments as well as to comply with psychiatric medications as prescribed.        I certify that this patients inpatient psychiatric hospital services furnished since the previous certification were, and continue to be, required for treatment that could reasonably be expected to improve the patient's condition, or for diagnostic study, and that the patient continues to need, on a daily basis, active treatment furnished directly by or requiring the supervision of inpatient psychiatric facility personnel. In addition, the hospital records show that services furnished were intensive treatment services, admission or related services, or equivalent services.      Signed:  Brigid Herrera NP  5/16/2022

## 2022-05-16 NOTE — DISCHARGE INSTRUCTIONS
DISCHARGE SUMMARY    NAME:Keanu Coon  : 1965  MRN: [id number]    The patient Anjana Lie exhibits the ability to control behavior in a less restrictive environment. Patient's level of functioning is improving. No assaultive/destructive behavior has been observed for the past 24 hours. No suicidal/homicidal threat or behavior has been observed for the past 24 hours. There is no evidence of serious medication side effects. Patient has not been in physical or protective restraints for at least the past 24 hours. If weapons involved, how are they secured? NA    Is patient aware of and in agreement with discharge plan? yes    Arrangements for medication:  7-days of prescriptions filled at [address] [place] [address] Ave of discharge instructions to provider?:  yes    Arrangements for transportation home:  Lyft  @ 13:15    Keep all follow up appointments as scheduled, continue to take prescribed medications per physician instructions. Mental health crisis number:  838 or [facility]: [phone]      [address] Emergency WARM LINE      [phone] ([phone])      M-F: 9am to 9pm      Sat & Sun: 5pm - 9pm  National suicide prevention lines:                             6-581-MILJEPZ (1-205-296-631-571-2099)       8-200-186-TALK ([phone])    Crisis Text Line:  Text HOME to [address] from Nurse    PATIENT INSTRUCTIONS:    What to do at Home:  Recommended activity: Activity as tolerated,       *  Please give a list of your current medications to your Primary Care Provider. *  Please update this list whenever your medications are discontinued, doses are      changed, or new medications (including over-the-counter products) are added. *  Please carry medication information at all times in case of emergency situations.     These are general instructions for a healthy lifestyle:    No smoking/ No tobacco products/ Avoid exposure to second hand smoke  Surgeon General's Warning: Quitting smoking now greatly reduces serious risk to your health. Obesity, smoking, and sedentary lifestyle greatly increases your risk for illness    A healthy diet, regular physical exercise & weight monitoring are important for maintaining a healthy lifestyle    You may be retaining fluid if you have a history of heart failure or if you experience any of the following symptoms:  Weight gain of 3 pounds or more overnight or 5 pounds in a week, increased swelling in our hands or feet or shortness of breath while lying flat in bed. Please call your doctor as soon as you notice any of these symptoms; do not wait until your next office visit. The discharge information has been reviewed with the patient. The patient verbalized understanding. Discharge medications reviewed with the patient and appropriate educational materials and side effects teaching were provided.   ___________________________________________________________________________________________________________________________________

## 2022-05-16 NOTE — PROGRESS NOTES
Problem: Falls - Risk of  Goal: *Absence of Falls  Description: Document Morene Hole Fall Risk and appropriate interventions in the flowsheet. Outcome: Progressing Towards Goal  Note: Fall Risk Interventions:    Medication Interventions: Teach patient to arise slowly    Elimination Interventions: Toilet paper/wipes in reach      Patient received resting quietly in bed. No signs of distress. Even and unlabored breathing. Staff will continue to monitor safety q15 and provide support.

## 2022-05-16 NOTE — INTERDISCIPLINARY ROUNDS
Behavioral Health Interdisciplinary Rounds     Patient Name: Nella Whitman  Age: 61 y.o. Room/Bed:  731/02  Primary Diagnosis: <principal problem not specified>   Admission Status: Voluntary     Readmission within 30 days: no  Power of  in place: no  Patient requires a blocked bed: no          Reason for blocked bed:     VTE Prophylaxis: No    Mobility needs/Fall risk: no  Flu Vaccine : no   Nutritional Plan: no  Consults:          Labs/Testing due today?: no    Sleep hours: 7       Participation in Care/Groups:  yes  Medication Compliant?: Yes  PRNS (last 24 hours): Pain    Restraints (last 24 hours):  no     CIWA (range last 24 hours):     COWS (range last 24 hours):      Alcohol screening (AUDIT) completed -         If applicable, date SBIRT discussed in treatment team AND documented:   AUDIT Screen Score:        Document Brief Intervention (corresponds directly with the 5 A's, Ask, Advise, Assess, Assist, and Arrange): At- Risk Patients (Score 7-15 for women; 8-15 for men)  Discuss concern patient is drinking at unhealthy levels known to increase risk of alcohol-related health problems. Is Patient ready to commit to change? If No:   Encourage reflection   Discuss short term and long term health risks of consuming alcohol   Barriers to change   Reaffirm willingness to help / Educational materials provided  If Yes:   Set goal  Carbonated Content provided    Harmful use or Dependence (Score 16 or greater)   Discuss short term and long term health risks of consuming alcohol   Recommendations   Negotiate drinking goal   Recommend addiction specialist/center   Arrange follow-up appointments.     Tobacco - patient is a smoker: Have You Used Tobacco in the Past 30 Days: Yes  Illegal Drugs use: Have You Used Any Illegal Substances Over the Past 12 Months: Yes    24 hour chart check complete: yes ____________________________________________________________________________________________________________    Patient goal(s) for today:   Treatment team focus/goals:   Progress note     LOS:  4  Expected LOS:     Financial concerns/prescription coverage:   Family contact:     Family requesting physician contact today:    Discharge plan:  Access to weapons :        Outpatient provider(s):   Patient's preferred phone number for follow up call :  Patient's preferred e-mail address :  Participating treatment team members: Ines Jo, * (assigned SW),

## 2022-05-17 ENCOUNTER — HOSPITAL ENCOUNTER (EMERGENCY)
Age: 57
Discharge: LEFT BEFORE DISCHARGE | End: 2022-05-17
Payer: MEDICARE

## 2022-05-17 VITALS
HEIGHT: 72 IN | OXYGEN SATURATION: 100 % | DIASTOLIC BLOOD PRESSURE: 139 MMHG | WEIGHT: 170 LBS | SYSTOLIC BLOOD PRESSURE: 166 MMHG | HEART RATE: 113 BPM | BODY MASS INDEX: 23.03 KG/M2 | RESPIRATION RATE: 20 BRPM | TEMPERATURE: 98.2 F

## 2022-05-17 LAB
ALBUMIN SERPL-MCNC: 4.1 G/DL (ref 3.5–5)
ALBUMIN/GLOB SERPL: 1.2 {RATIO} (ref 1.1–2.2)
ALP SERPL-CCNC: 79 U/L (ref 45–117)
ALT SERPL-CCNC: 38 U/L (ref 12–78)
ANION GAP SERPL CALC-SCNC: 1 MMOL/L (ref 5–15)
AST SERPL-CCNC: 22 U/L (ref 15–37)
BASOPHILS # BLD: 0 K/UL (ref 0–0.1)
BASOPHILS NFR BLD: 0 % (ref 0–1)
BILIRUB SERPL-MCNC: 1 MG/DL (ref 0.2–1)
BUN SERPL-MCNC: 27 MG/DL (ref 6–20)
BUN/CREAT SERPL: 23 (ref 12–20)
CALCIUM SERPL-MCNC: 9.8 MG/DL (ref 8.5–10.1)
CHLORIDE SERPL-SCNC: 104 MMOL/L (ref 97–108)
CO2 SERPL-SCNC: 30 MMOL/L (ref 21–32)
CREAT SERPL-MCNC: 1.19 MG/DL (ref 0.7–1.3)
DIFFERENTIAL METHOD BLD: ABNORMAL
EOSINOPHIL # BLD: 0 K/UL (ref 0–0.4)
EOSINOPHIL NFR BLD: 0 % (ref 0–7)
ERYTHROCYTE [DISTWIDTH] IN BLOOD BY AUTOMATED COUNT: 13.5 % (ref 11.5–14.5)
GLOBULIN SER CALC-MCNC: 3.5 G/DL (ref 2–4)
GLUCOSE SERPL-MCNC: 244 MG/DL (ref 65–100)
HCT VFR BLD AUTO: 34.1 % (ref 36.6–50.3)
HGB BLD-MCNC: 12.3 G/DL (ref 12.1–17)
IMM GRANULOCYTES # BLD AUTO: 0 K/UL (ref 0–0.04)
IMM GRANULOCYTES NFR BLD AUTO: 0 % (ref 0–0.5)
LYMPHOCYTES # BLD: 0.4 K/UL (ref 0.8–3.5)
LYMPHOCYTES NFR BLD: 4 % (ref 12–49)
MCH RBC QN AUTO: 30.7 PG (ref 26–34)
MCHC RBC AUTO-ENTMCNC: 36.1 G/DL (ref 30–36.5)
MCV RBC AUTO: 85 FL (ref 80–99)
MONOCYTES # BLD: 0.7 K/UL (ref 0–1)
MONOCYTES NFR BLD: 6 % (ref 5–13)
NEUTS SEG # BLD: 10.1 K/UL (ref 1.8–8)
NEUTS SEG NFR BLD: 90 % (ref 32–75)
NRBC # BLD: 0 K/UL (ref 0–0.01)
NRBC BLD-RTO: 0 PER 100 WBC
PLATELET # BLD AUTO: 159 K/UL (ref 150–400)
PMV BLD AUTO: 9.9 FL (ref 8.9–12.9)
POTASSIUM SERPL-SCNC: 4.9 MMOL/L (ref 3.5–5.1)
PROT SERPL-MCNC: 7.6 G/DL (ref 6.4–8.2)
RBC # BLD AUTO: 4.01 M/UL (ref 4.1–5.7)
RBC MORPH BLD: ABNORMAL
SODIUM SERPL-SCNC: 135 MMOL/L (ref 136–145)
WBC # BLD AUTO: 11.2 K/UL (ref 4.1–11.1)

## 2022-05-17 PROCEDURE — 36415 COLL VENOUS BLD VENIPUNCTURE: CPT

## 2022-05-17 PROCEDURE — 75810000275 HC EMERGENCY DEPT VISIT NO LEVEL OF CARE

## 2022-05-17 PROCEDURE — 85025 COMPLETE CBC W/AUTO DIFF WBC: CPT

## 2022-05-17 PROCEDURE — 80053 COMPREHEN METABOLIC PANEL: CPT

## 2022-05-21 ENCOUNTER — APPOINTMENT (OUTPATIENT)
Dept: GENERAL RADIOLOGY | Age: 57
End: 2022-05-21
Attending: PHYSICIAN ASSISTANT
Payer: MEDICARE

## 2022-05-21 ENCOUNTER — HOSPITAL ENCOUNTER (EMERGENCY)
Age: 57
Discharge: HOME | End: 2022-05-21
Attending: EMERGENCY MEDICINE
Payer: MEDICARE

## 2022-05-21 ENCOUNTER — APPOINTMENT (OUTPATIENT)
Dept: ULTRASOUND IMAGING | Age: 57
End: 2022-05-21
Attending: EMERGENCY MEDICINE
Payer: MEDICARE

## 2022-05-21 VITALS
TEMPERATURE: 98.1 F | BODY MASS INDEX: 22.34 KG/M2 | HEART RATE: 69 BPM | DIASTOLIC BLOOD PRESSURE: 76 MMHG | OXYGEN SATURATION: 100 % | SYSTOLIC BLOOD PRESSURE: 128 MMHG | HEIGHT: 72 IN | WEIGHT: 164.9 LBS | RESPIRATION RATE: 14 BRPM

## 2022-05-21 DIAGNOSIS — L03.116 CELLULITIS OF LEFT LOWER EXTREMITY: Primary | ICD-10-CM

## 2022-05-21 LAB
ALBUMIN SERPL-MCNC: 3.5 G/DL (ref 3.5–5)
ALBUMIN/GLOB SERPL: 0.9 {RATIO} (ref 1.1–2.2)
ALP SERPL-CCNC: 60 U/L (ref 45–117)
ALT SERPL-CCNC: 34 U/L (ref 12–78)
ANION GAP SERPL CALC-SCNC: 2 MMOL/L (ref 5–15)
APPEARANCE UR: CLEAR
AST SERPL-CCNC: 24 U/L (ref 15–37)
BACTERIA URNS QL MICRO: NEGATIVE /HPF
BASOPHILS # BLD: 0 K/UL (ref 0–0.1)
BASOPHILS NFR BLD: 1 % (ref 0–1)
BILIRUB SERPL-MCNC: 0.4 MG/DL (ref 0.2–1)
BILIRUB UR QL: NEGATIVE
BUN SERPL-MCNC: 16 MG/DL (ref 6–20)
BUN/CREAT SERPL: 14 (ref 12–20)
CALCIUM SERPL-MCNC: 10.4 MG/DL (ref 8.5–10.1)
CHLORIDE SERPL-SCNC: 96 MMOL/L (ref 97–108)
CO2 SERPL-SCNC: 35 MMOL/L (ref 21–32)
COLOR UR: ABNORMAL
CREAT SERPL-MCNC: 1.13 MG/DL (ref 0.7–1.3)
CRP SERPL-MCNC: 2.8 MG/DL (ref 0–0.6)
DIFFERENTIAL METHOD BLD: ABNORMAL
EOSINOPHIL # BLD: 0.1 K/UL (ref 0–0.4)
EOSINOPHIL NFR BLD: 1 % (ref 0–7)
EPITH CASTS URNS QL MICRO: ABNORMAL /LPF
ERYTHROCYTE [DISTWIDTH] IN BLOOD BY AUTOMATED COUNT: 13.1 % (ref 11.5–14.5)
ERYTHROCYTE [SEDIMENTATION RATE] IN BLOOD: 45 MM/HR (ref 0–20)
GLOBULIN SER CALC-MCNC: 3.8 G/DL (ref 2–4)
GLUCOSE SERPL-MCNC: 315 MG/DL (ref 65–100)
GLUCOSE UR STRIP.AUTO-MCNC: >1000 MG/DL
HCT VFR BLD AUTO: 31.8 % (ref 36.6–50.3)
HGB BLD-MCNC: 11.6 G/DL (ref 12.1–17)
HGB UR QL STRIP: ABNORMAL
HYALINE CASTS URNS QL MICRO: ABNORMAL /LPF (ref 0–2)
IMM GRANULOCYTES # BLD AUTO: 0 K/UL (ref 0–0.04)
IMM GRANULOCYTES NFR BLD AUTO: 0 % (ref 0–0.5)
KETONES UR QL STRIP.AUTO: NEGATIVE MG/DL
LACTATE BLD-SCNC: 1.05 MMOL/L (ref 0.4–2)
LEUKOCYTE ESTERASE UR QL STRIP.AUTO: NEGATIVE
LYMPHOCYTES # BLD: 0.9 K/UL (ref 0.8–3.5)
LYMPHOCYTES NFR BLD: 24 % (ref 12–49)
MCH RBC QN AUTO: 31.1 PG (ref 26–34)
MCHC RBC AUTO-ENTMCNC: 36.5 G/DL (ref 30–36.5)
MCV RBC AUTO: 85.3 FL (ref 80–99)
MONOCYTES # BLD: 0.4 K/UL (ref 0–1)
MONOCYTES NFR BLD: 9 % (ref 5–13)
NEUTS SEG # BLD: 2.4 K/UL (ref 1.8–8)
NEUTS SEG NFR BLD: 65 % (ref 32–75)
NITRITE UR QL STRIP.AUTO: NEGATIVE
NRBC # BLD: 0 K/UL (ref 0–0.01)
NRBC BLD-RTO: 0 PER 100 WBC
PH UR STRIP: 7 [PH] (ref 5–8)
PLATELET # BLD AUTO: 138 K/UL (ref 150–400)
PMV BLD AUTO: 9 FL (ref 8.9–12.9)
POTASSIUM SERPL-SCNC: 4.6 MMOL/L (ref 3.5–5.1)
PROCALCITONIN SERPL-MCNC: <0.05 NG/ML
PROT SERPL-MCNC: 7.3 G/DL (ref 6.4–8.2)
PROT UR STRIP-MCNC: 30 MG/DL
RBC # BLD AUTO: 3.73 M/UL (ref 4.1–5.7)
RBC #/AREA URNS HPF: ABNORMAL /HPF (ref 0–5)
SODIUM SERPL-SCNC: 133 MMOL/L (ref 136–145)
SP GR UR REFRACTOMETRY: 1.02
UA: UC IF INDICATED,UAUC: ABNORMAL
UROBILINOGEN UR QL STRIP.AUTO: 0.2 EU/DL (ref 0.2–1)
WBC # BLD AUTO: 3.8 K/UL (ref 4.1–11.1)
WBC URNS QL MICRO: ABNORMAL /HPF (ref 0–4)

## 2022-05-21 PROCEDURE — 86140 C-REACTIVE PROTEIN: CPT

## 2022-05-21 PROCEDURE — 74011250636 HC RX REV CODE- 250/636: Performed by: EMERGENCY MEDICINE

## 2022-05-21 PROCEDURE — 81001 URINALYSIS AUTO W/SCOPE: CPT

## 2022-05-21 PROCEDURE — 85652 RBC SED RATE AUTOMATED: CPT

## 2022-05-21 PROCEDURE — 93971 EXTREMITY STUDY: CPT

## 2022-05-21 PROCEDURE — 73630 X-RAY EXAM OF FOOT: CPT

## 2022-05-21 PROCEDURE — 83605 ASSAY OF LACTIC ACID: CPT

## 2022-05-21 PROCEDURE — 84145 PROCALCITONIN (PCT): CPT

## 2022-05-21 PROCEDURE — 85025 COMPLETE CBC W/AUTO DIFF WBC: CPT

## 2022-05-21 PROCEDURE — 80053 COMPREHEN METABOLIC PANEL: CPT

## 2022-05-21 PROCEDURE — 99284 EMERGENCY DEPT VISIT MOD MDM: CPT

## 2022-05-21 PROCEDURE — 36415 COLL VENOUS BLD VENIPUNCTURE: CPT

## 2022-05-21 RX ORDER — ACETAMINOPHEN 325 MG/1
650 TABLET ORAL
Qty: 20 TABLET | Refills: 0 | Status: SHIPPED | OUTPATIENT
Start: 2022-05-21

## 2022-05-21 RX ORDER — SULFAMETHOXAZOLE AND TRIMETHOPRIM 800; 160 MG/1; MG/1
1 TABLET ORAL 2 TIMES DAILY
Qty: 20 TABLET | Refills: 0 | Status: SHIPPED | OUTPATIENT
Start: 2022-05-21 | End: 2022-05-31

## 2022-05-21 RX ORDER — IBUPROFEN 400 MG/1
400 TABLET ORAL
Qty: 30 TABLET | Refills: 0 | Status: SHIPPED | OUTPATIENT
Start: 2022-05-21

## 2022-05-21 RX ORDER — CEPHALEXIN 500 MG/1
500 CAPSULE ORAL 2 TIMES DAILY
Qty: 10 CAPSULE | Refills: 0 | Status: SHIPPED | OUTPATIENT
Start: 2022-05-21

## 2022-05-21 RX ADMIN — SODIUM CHLORIDE 1000 ML: 9 INJECTION, SOLUTION INTRAVENOUS at 17:09
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
## 2022-05-21 NOTE — DISCHARGE INSTRUCTIONS
It was a pleasure taking care of you at [facility] Emergency Department today. We know that when you come to [address] [place] [address], you are entrusting us with your health, comfort, and safety. Our physicians and nurses honor that trust, and we truly appreciate the opportunity to care for you and your loved ones. We also value your feedback. If you receive a survey about your Emergency Department experience today, please fill it out. We care about our patients' feedback, and we listen to what you have to say. Thank you!

## 2022-12-05 ENCOUNTER — APPOINTMENT (OUTPATIENT)
Dept: GENERAL RADIOLOGY | Age: 57
End: 2022-12-05
Attending: EMERGENCY MEDICINE
Payer: MEDICARE

## 2022-12-05 ENCOUNTER — HOSPITAL ENCOUNTER (INPATIENT)
Age: 57
LOS: 5 days | Discharge: HOME | End: 2022-12-10
Attending: EMERGENCY MEDICINE | Admitting: INTERNAL MEDICINE
Payer: MEDICARE

## 2022-12-05 DIAGNOSIS — M86.172 OTHER ACUTE OSTEOMYELITIS OF LEFT FOOT (HCC): Primary | ICD-10-CM

## 2022-12-05 DIAGNOSIS — M86.9 OSTEOMYELITIS, UNSPECIFIED SITE, UNSPECIFIED TYPE (HCC): ICD-10-CM

## 2022-12-05 LAB
ALBUMIN SERPL-MCNC: 3.2 G/DL (ref 3.5–5)
ALBUMIN/GLOB SERPL: 0.6 {RATIO} (ref 1.1–2.2)
ALP SERPL-CCNC: 129 U/L (ref 45–117)
ALT SERPL-CCNC: 39 U/L (ref 12–78)
ANION GAP SERPL CALC-SCNC: 6 MMOL/L (ref 5–15)
AST SERPL-CCNC: 22 U/L (ref 15–37)
BASE EXCESS BLD CALC-SCNC: 0.8 MMOL/L
BASOPHILS # BLD: 0.1 K/UL (ref 0–0.1)
BASOPHILS NFR BLD: 1 % (ref 0–1)
BILIRUB SERPL-MCNC: 0.7 MG/DL (ref 0.2–1)
BUN SERPL-MCNC: 30 MG/DL (ref 6–20)
BUN/CREAT SERPL: 21 (ref 12–20)
CA-I BLD-MCNC: 1.16 MMOL/L (ref 1.12–1.32)
CALCIUM SERPL-MCNC: 9.2 MG/DL (ref 8.5–10.1)
CHLORIDE BLD-SCNC: 97 MMOL/L (ref 100–108)
CHLORIDE SERPL-SCNC: 98 MMOL/L (ref 97–108)
CO2 BLD-SCNC: 25 MMOL/L (ref 19–24)
CO2 SERPL-SCNC: 29 MMOL/L (ref 21–32)
COMMENT, HOLDF: NORMAL
CREAT SERPL-MCNC: 1.41 MG/DL (ref 0.7–1.3)
CREAT UR-MCNC: 1.1 MG/DL (ref 0.6–1.3)
CRP SERPL HS-MCNC: >9.5 MG/L
DIFFERENTIAL METHOD BLD: ABNORMAL
EOSINOPHIL # BLD: 0.2 K/UL (ref 0–0.4)
EOSINOPHIL NFR BLD: 2 % (ref 0–7)
ERYTHROCYTE [DISTWIDTH] IN BLOOD BY AUTOMATED COUNT: 13.4 % (ref 11.5–14.5)
ERYTHROCYTE [SEDIMENTATION RATE] IN BLOOD: >140 MM/HR (ref 0–20)
GLOBULIN SER CALC-MCNC: 5.3 G/DL (ref 2–4)
GLUCOSE BLD STRIP.AUTO-MCNC: 255 MG/DL (ref 65–117)
GLUCOSE BLD STRIP.AUTO-MCNC: 435 MG/DL (ref 74–106)
GLUCOSE SERPL-MCNC: 406 MG/DL (ref 65–100)
HCO3 BLDA-SCNC: 26 MMOL/L
HCT VFR BLD AUTO: 32.5 % (ref 36.6–50.3)
HGB BLD-MCNC: 10.9 G/DL (ref 12.1–17)
IMM GRANULOCYTES # BLD AUTO: 0 K/UL (ref 0–0.04)
IMM GRANULOCYTES NFR BLD AUTO: 0 % (ref 0–0.5)
LACTATE BLD-SCNC: 1.39 MMOL/L (ref 0.4–2)
LYMPHOCYTES # BLD: 0.8 K/UL (ref 0.8–3.5)
LYMPHOCYTES NFR BLD: 10 % (ref 12–49)
MCH RBC QN AUTO: 28.9 PG (ref 26–34)
MCHC RBC AUTO-ENTMCNC: 33.5 G/DL (ref 30–36.5)
MCV RBC AUTO: 86.2 FL (ref 80–99)
MONOCYTES # BLD: 0.3 K/UL (ref 0–1)
MONOCYTES NFR BLD: 4 % (ref 5–13)
NEUTS SEG # BLD: 7 K/UL (ref 1.8–8)
NEUTS SEG NFR BLD: 83 % (ref 32–75)
NRBC # BLD: 0 K/UL (ref 0–0.01)
NRBC BLD-RTO: 0 PER 100 WBC
PCO2 BLDV: 44.5 MMHG (ref 41–51)
PH BLDV: 7.38 [PH] (ref 7.32–7.42)
PLATELET # BLD AUTO: 186 K/UL (ref 150–400)
PMV BLD AUTO: 9.6 FL (ref 8.9–12.9)
PO2 BLDV: 25 MMHG (ref 25–40)
POTASSIUM BLD-SCNC: 5.1 MMOL/L (ref 3.5–5.5)
POTASSIUM SERPL-SCNC: 5.1 MMOL/L (ref 3.5–5.1)
PROT SERPL-MCNC: 8.5 G/DL (ref 6.4–8.2)
RBC # BLD AUTO: 3.77 M/UL (ref 4.1–5.7)
SAMPLES BEING HELD,HOLD: NORMAL
SERVICE CMNT-IMP: ABNORMAL
SODIUM BLD-SCNC: 132 MMOL/L (ref 136–145)
SODIUM SERPL-SCNC: 133 MMOL/L (ref 136–145)
SPECIMEN SITE: ABNORMAL
WBC # BLD AUTO: 8.4 K/UL (ref 4.1–11.1)

## 2022-12-05 PROCEDURE — 65270000046 HC RM TELEMETRY

## 2022-12-05 PROCEDURE — 85652 RBC SED RATE AUTOMATED: CPT

## 2022-12-05 PROCEDURE — 82962 GLUCOSE BLOOD TEST: CPT

## 2022-12-05 PROCEDURE — 96375 TX/PRO/DX INJ NEW DRUG ADDON: CPT

## 2022-12-05 PROCEDURE — 80053 COMPREHEN METABOLIC PANEL: CPT

## 2022-12-05 PROCEDURE — 96374 THER/PROPH/DIAG INJ IV PUSH: CPT

## 2022-12-05 PROCEDURE — 65270000029 HC RM PRIVATE

## 2022-12-05 PROCEDURE — 74011250636 HC RX REV CODE- 250/636: Performed by: INTERNAL MEDICINE

## 2022-12-05 PROCEDURE — 74011000250 HC RX REV CODE- 250: Performed by: INTERNAL MEDICINE

## 2022-12-05 PROCEDURE — 73630 X-RAY EXAM OF FOOT: CPT

## 2022-12-05 PROCEDURE — 74011636637 HC RX REV CODE- 636/637: Performed by: INTERNAL MEDICINE

## 2022-12-05 PROCEDURE — 74011250637 HC RX REV CODE- 250/637: Performed by: INTERNAL MEDICINE

## 2022-12-05 PROCEDURE — 85025 COMPLETE CBC W/AUTO DIFF WBC: CPT

## 2022-12-05 PROCEDURE — 71045 X-RAY EXAM CHEST 1 VIEW: CPT

## 2022-12-05 PROCEDURE — 36415 COLL VENOUS BLD VENIPUNCTURE: CPT

## 2022-12-05 PROCEDURE — 86141 C-REACTIVE PROTEIN HS: CPT

## 2022-12-05 PROCEDURE — 99285 EMERGENCY DEPT VISIT HI MDM: CPT

## 2022-12-05 PROCEDURE — 93005 ELECTROCARDIOGRAM TRACING: CPT

## 2022-12-05 PROCEDURE — 87040 BLOOD CULTURE FOR BACTERIA: CPT

## 2022-12-05 PROCEDURE — 74011000258 HC RX REV CODE- 258: Performed by: EMERGENCY MEDICINE

## 2022-12-05 PROCEDURE — 74011250636 HC RX REV CODE- 250/636: Performed by: EMERGENCY MEDICINE

## 2022-12-05 PROCEDURE — 82947 ASSAY GLUCOSE BLOOD QUANT: CPT

## 2022-12-05 RX ORDER — VANCOMYCIN 2 GRAM/500 ML IN 0.9 % SODIUM CHLORIDE INTRAVENOUS
2000 ONCE
Status: COMPLETED | OUTPATIENT
Start: 2022-12-05 | End: 2022-12-05

## 2022-12-05 RX ORDER — SODIUM CHLORIDE 9 MG/ML
75 INJECTION, SOLUTION INTRAVENOUS CONTINUOUS
Status: DISCONTINUED | OUTPATIENT
Start: 2022-12-05 | End: 2022-12-10 | Stop reason: HOSPADM

## 2022-12-05 RX ORDER — POLYETHYLENE GLYCOL 3350 17 G/17G
17 POWDER, FOR SOLUTION ORAL DAILY PRN
Status: DISCONTINUED | OUTPATIENT
Start: 2022-12-05 | End: 2022-12-09 | Stop reason: SDUPTHER

## 2022-12-05 RX ORDER — ACETAMINOPHEN 325 MG/1
650 TABLET ORAL
Status: DISCONTINUED | OUTPATIENT
Start: 2022-12-05 | End: 2022-12-10 | Stop reason: HOSPADM

## 2022-12-05 RX ORDER — TRAZODONE HYDROCHLORIDE 50 MG/1
50 TABLET ORAL
Status: DISCONTINUED | OUTPATIENT
Start: 2022-12-05 | End: 2022-12-10 | Stop reason: HOSPADM

## 2022-12-05 RX ORDER — DEXTROSE MONOHYDRATE 100 MG/ML
0-250 INJECTION, SOLUTION INTRAVENOUS AS NEEDED
Status: DISCONTINUED | OUTPATIENT
Start: 2022-12-05 | End: 2022-12-10 | Stop reason: HOSPADM

## 2022-12-05 RX ORDER — HYDROMORPHONE HYDROCHLORIDE 1 MG/ML
1 INJECTION, SOLUTION INTRAMUSCULAR; INTRAVENOUS; SUBCUTANEOUS
Status: DISCONTINUED | OUTPATIENT
Start: 2022-12-05 | End: 2022-12-10 | Stop reason: HOSPADM

## 2022-12-05 RX ORDER — LOSARTAN POTASSIUM 50 MG/1
50 TABLET ORAL DAILY
Status: DISCONTINUED | OUTPATIENT
Start: 2022-12-06 | End: 2022-12-10 | Stop reason: HOSPADM

## 2022-12-05 RX ORDER — HYDRALAZINE HYDROCHLORIDE 20 MG/ML
10 INJECTION INTRAMUSCULAR; INTRAVENOUS
Status: DISCONTINUED | OUTPATIENT
Start: 2022-12-05 | End: 2022-12-10 | Stop reason: HOSPADM

## 2022-12-05 RX ORDER — ACETAMINOPHEN 650 MG/1
650 SUPPOSITORY RECTAL
Status: DISCONTINUED | OUTPATIENT
Start: 2022-12-05 | End: 2022-12-10 | Stop reason: HOSPADM

## 2022-12-05 RX ORDER — ONDANSETRON 2 MG/ML
4 INJECTION INTRAMUSCULAR; INTRAVENOUS
Status: DISCONTINUED | OUTPATIENT
Start: 2022-12-05 | End: 2022-12-10 | Stop reason: HOSPADM

## 2022-12-05 RX ORDER — HYDROMORPHONE HYDROCHLORIDE 1 MG/ML
1 INJECTION, SOLUTION INTRAMUSCULAR; INTRAVENOUS; SUBCUTANEOUS ONCE
Status: COMPLETED | OUTPATIENT
Start: 2022-12-05 | End: 2022-12-05

## 2022-12-05 RX ORDER — HYDROMORPHONE HYDROCHLORIDE 1 MG/ML
0.5 INJECTION, SOLUTION INTRAMUSCULAR; INTRAVENOUS; SUBCUTANEOUS
Status: DISCONTINUED | OUTPATIENT
Start: 2022-12-05 | End: 2022-12-05

## 2022-12-05 RX ORDER — MAGNESIUM SULFATE 100 %
4 CRYSTALS MISCELLANEOUS AS NEEDED
Status: DISCONTINUED | OUTPATIENT
Start: 2022-12-05 | End: 2022-12-10 | Stop reason: HOSPADM

## 2022-12-05 RX ORDER — OXYCODONE HYDROCHLORIDE 5 MG/1
5 TABLET ORAL
Status: DISCONTINUED | OUTPATIENT
Start: 2022-12-05 | End: 2022-12-05

## 2022-12-05 RX ORDER — SODIUM CHLORIDE 0.9 % (FLUSH) 0.9 %
5-40 SYRINGE (ML) INJECTION AS NEEDED
Status: DISCONTINUED | OUTPATIENT
Start: 2022-12-05 | End: 2022-12-10 | Stop reason: HOSPADM

## 2022-12-05 RX ORDER — MORPHINE SULFATE 2 MG/ML
2 INJECTION, SOLUTION INTRAMUSCULAR; INTRAVENOUS
Status: DISCONTINUED | OUTPATIENT
Start: 2022-12-05 | End: 2022-12-05

## 2022-12-05 RX ORDER — ONDANSETRON 4 MG/1
4 TABLET, ORALLY DISINTEGRATING ORAL
Status: DISCONTINUED | OUTPATIENT
Start: 2022-12-05 | End: 2022-12-10 | Stop reason: HOSPADM

## 2022-12-05 RX ORDER — OXYCODONE HYDROCHLORIDE 5 MG/1
10 TABLET ORAL
Status: DISCONTINUED | OUTPATIENT
Start: 2022-12-05 | End: 2022-12-10 | Stop reason: HOSPADM

## 2022-12-05 RX ORDER — SODIUM CHLORIDE 0.9 % (FLUSH) 0.9 %
5-40 SYRINGE (ML) INJECTION EVERY 8 HOURS
Status: DISCONTINUED | OUTPATIENT
Start: 2022-12-05 | End: 2022-12-10 | Stop reason: HOSPADM

## 2022-12-05 RX ORDER — VANCOMYCIN HYDROCHLORIDE
1250 EVERY 24 HOURS
Status: DISCONTINUED | OUTPATIENT
Start: 2022-12-06 | End: 2022-12-06

## 2022-12-05 RX ORDER — LANOLIN ALCOHOL/MO/W.PET/CERES
3 CREAM (GRAM) TOPICAL
Status: DISCONTINUED | OUTPATIENT
Start: 2022-12-05 | End: 2022-12-10 | Stop reason: HOSPADM

## 2022-12-05 RX ORDER — SODIUM CHLORIDE 0.9 % (FLUSH) 0.9 %
5-10 SYRINGE (ML) INJECTION AS NEEDED
Status: DISCONTINUED | OUTPATIENT
Start: 2022-12-05 | End: 2022-12-05

## 2022-12-05 RX ORDER — INSULIN LISPRO 100 [IU]/ML
INJECTION, SOLUTION INTRAVENOUS; SUBCUTANEOUS
Status: DISCONTINUED | OUTPATIENT
Start: 2022-12-05 | End: 2022-12-10 | Stop reason: HOSPADM

## 2022-12-05 RX ORDER — ONDANSETRON 2 MG/ML
4 INJECTION INTRAMUSCULAR; INTRAVENOUS ONCE
Status: COMPLETED | OUTPATIENT
Start: 2022-12-05 | End: 2022-12-05

## 2022-12-05 RX ADMIN — SODIUM CHLORIDE, PRESERVATIVE FREE 10 ML: 5 INJECTION INTRAVENOUS at 22:51

## 2022-12-05 RX ADMIN — VANCOMYCIN HYDROCHLORIDE 2000 MG: 10 INJECTION, POWDER, LYOPHILIZED, FOR SOLUTION INTRAVENOUS at 14:00

## 2022-12-05 RX ADMIN — Medication 3 UNITS: at 22:51

## 2022-12-05 RX ADMIN — HYDROMORPHONE HYDROCHLORIDE 1 MG: 1 INJECTION, SOLUTION INTRAMUSCULAR; INTRAVENOUS; SUBCUTANEOUS at 12:36

## 2022-12-05 RX ADMIN — HYDROMORPHONE HYDROCHLORIDE 1 MG: 1 INJECTION, SOLUTION INTRAMUSCULAR; INTRAVENOUS; SUBCUTANEOUS at 22:13

## 2022-12-05 RX ADMIN — Medication 8 UNITS: at 19:52

## 2022-12-05 RX ADMIN — MORPHINE SULFATE 2 MG: 2 INJECTION, SOLUTION INTRAMUSCULAR; INTRAVENOUS at 14:58

## 2022-12-05 RX ADMIN — PIPERACILLIN AND TAZOBACTAM 3.38 G: 3; .375 INJECTION, POWDER, FOR SOLUTION INTRAVENOUS at 19:52

## 2022-12-05 RX ADMIN — PIPERACILLIN AND TAZOBACTAM 4.5 G: 4; .5 INJECTION, POWDER, FOR SOLUTION INTRAVENOUS at 13:00

## 2022-12-05 RX ADMIN — OXYCODONE 5 MG: 5 TABLET ORAL at 16:27

## 2022-12-05 RX ADMIN — HYDROMORPHONE HYDROCHLORIDE 1 MG: 1 INJECTION, SOLUTION INTRAMUSCULAR; INTRAVENOUS; SUBCUTANEOUS at 17:41

## 2022-12-05 RX ADMIN — ONDANSETRON 4 MG: 2 INJECTION INTRAMUSCULAR; INTRAVENOUS at 12:35
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
## 2022-12-05 NOTE — ED NOTES
Pt states he takes narcotic pain meds at home and is requesting more pain meds. This RN gave 1 mg dilaudid at 1236, but dilaudid is ordered Q4h PRN. Dr. Eugenio Harada spoke with pt and gave verbal orders for Morphine 2 mg Q6h PRN and Oxycodone Q6h PRN.  Entered orders per MD.
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
## 2022-12-05 NOTE — ED NOTES
Pt upset that dilaudid PRN was discontinued. Pt states \"I don't even want the morphine. It doesn't do anything. \" Pt states he takes oxycodone extended release 10 mg three times per day at home.  Notified MD.

## 2022-12-05 NOTE — PROGRESS NOTES
Pharmacy Antimicrobial Kinetic Dosing    Indication for Antimicrobials: OM     Current Regimen of Each Antimicrobial:  Vancomycin consult - started  day s1  Zosyn 3.375gm IV q8h - started  day 1    Previous Antimicrobial Therapy:    Vancomycin Goal Level: AUC: 400-600 mg/hr/Liter/day    Date Dose & Interval Measured (mcg/mL) Predicted AUC/AILEEN   12.7                       Dosing calculator used: [employer] calculator    Significant Positive Cultures:    PBCx - pending    Conditions for Dosing Consideration: None    Labs:  Recent Labs     22  1117   CREA 1.41*   BUN 30*     Recent Labs     22  1117   WBC 8.4     Temp (24hrs), Av.1 °F (36.7 °C), Min:98.1 °F (36.7 °C), Max:98.1 °F (36.7 °C)    Creatinine Clearance (mL/min):   CrCl (Adjusted Body Weight): 63.7 mL/min   If actual weight < IBW: CrCl (Actual Body Weight) 65.3    Impression/Plan:   JESUS so may need to adjust Vanc regimen 12/6 am; left handoff note  Vancomycin 1250mg IV q24h for a projected AUC at Css of 443  Continue Zosyn regimen  Plan to order Vanc level   Antimicrobial stop date - pending     Pharmacy will follow daily and adjust medications as appropriate for renal function and/or serum levels.     Thank you,  Jazmyn Mike, [facility]

## 2022-12-06 ENCOUNTER — ANESTHESIA (OUTPATIENT)
Dept: SURGERY | Age: 57
End: 2022-12-06
Payer: MEDICARE

## 2022-12-06 ENCOUNTER — ANESTHESIA EVENT (OUTPATIENT)
Dept: SURGERY | Age: 57
End: 2022-12-06
Payer: MEDICARE

## 2022-12-06 LAB
ANION GAP SERPL CALC-SCNC: 5 MMOL/L (ref 5–15)
ATRIAL RATE: 67 BPM
BASOPHILS # BLD: 0.1 K/UL (ref 0–0.1)
BASOPHILS NFR BLD: 1 % (ref 0–1)
BUN SERPL-MCNC: 27 MG/DL (ref 6–20)
BUN/CREAT SERPL: 21 (ref 12–20)
CALCIUM SERPL-MCNC: 8.8 MG/DL (ref 8.5–10.1)
CALCULATED P AXIS, ECG09: 62 DEGREES
CALCULATED R AXIS, ECG10: 42 DEGREES
CALCULATED T AXIS, ECG11: 68 DEGREES
CHLORIDE SERPL-SCNC: 100 MMOL/L (ref 97–108)
CO2 SERPL-SCNC: 30 MMOL/L (ref 21–32)
CREAT SERPL-MCNC: 1.28 MG/DL (ref 0.7–1.3)
DIAGNOSIS, 93000: NORMAL
DIFFERENTIAL METHOD BLD: ABNORMAL
EOSINOPHIL # BLD: 0.3 K/UL (ref 0–0.4)
EOSINOPHIL NFR BLD: 5 % (ref 0–7)
ERYTHROCYTE [DISTWIDTH] IN BLOOD BY AUTOMATED COUNT: 13.3 % (ref 11.5–14.5)
EST. AVERAGE GLUCOSE BLD GHB EST-MCNC: 212 MG/DL
GLUCOSE BLD STRIP.AUTO-MCNC: 147 MG/DL (ref 65–117)
GLUCOSE BLD STRIP.AUTO-MCNC: 178 MG/DL (ref 65–117)
GLUCOSE BLD STRIP.AUTO-MCNC: 187 MG/DL (ref 65–117)
GLUCOSE BLD STRIP.AUTO-MCNC: 243 MG/DL (ref 65–117)
GLUCOSE SERPL-MCNC: 178 MG/DL (ref 65–100)
HBA1C MFR BLD: 9 % (ref 4–5.6)
HCT VFR BLD AUTO: 25.4 % (ref 36.6–50.3)
HGB BLD-MCNC: 8.8 G/DL (ref 12.1–17)
IMM GRANULOCYTES # BLD AUTO: 0 K/UL (ref 0–0.04)
IMM GRANULOCYTES NFR BLD AUTO: 0 % (ref 0–0.5)
LYMPHOCYTES # BLD: 0.9 K/UL (ref 0.8–3.5)
LYMPHOCYTES NFR BLD: 14 % (ref 12–49)
MCH RBC QN AUTO: 29.4 PG (ref 26–34)
MCHC RBC AUTO-ENTMCNC: 34.6 G/DL (ref 30–36.5)
MCV RBC AUTO: 84.9 FL (ref 80–99)
MONOCYTES # BLD: 0.5 K/UL (ref 0–1)
MONOCYTES NFR BLD: 8 % (ref 5–13)
NEUTS SEG # BLD: 4.6 K/UL (ref 1.8–8)
NEUTS SEG NFR BLD: 72 % (ref 32–75)
NRBC # BLD: 0 K/UL (ref 0–0.01)
NRBC BLD-RTO: 0 PER 100 WBC
P-R INTERVAL, ECG05: 152 MS
PLATELET # BLD AUTO: 139 K/UL (ref 150–400)
PMV BLD AUTO: 8.9 FL (ref 8.9–12.9)
POTASSIUM SERPL-SCNC: 4.6 MMOL/L (ref 3.5–5.1)
Q-T INTERVAL, ECG07: 394 MS
QRS DURATION, ECG06: 90 MS
QTC CALCULATION (BEZET), ECG08: 416 MS
RBC # BLD AUTO: 2.99 M/UL (ref 4.1–5.7)
SERVICE CMNT-IMP: ABNORMAL
SODIUM SERPL-SCNC: 135 MMOL/L (ref 136–145)
VENTRICULAR RATE, ECG03: 67 BPM
WBC # BLD AUTO: 6.4 K/UL (ref 4.1–11.1)

## 2022-12-06 PROCEDURE — 74011000250 HC RX REV CODE- 250: Performed by: INTERNAL MEDICINE

## 2022-12-06 PROCEDURE — 74011000250 HC RX REV CODE- 250: Performed by: NURSE ANESTHETIST, CERTIFIED REGISTERED

## 2022-12-06 PROCEDURE — 82962 GLUCOSE BLOOD TEST: CPT

## 2022-12-06 PROCEDURE — 2709999900 HC NON-CHARGEABLE SUPPLY: Performed by: PODIATRIST

## 2022-12-06 PROCEDURE — 87075 CULTR BACTERIA EXCEPT BLOOD: CPT

## 2022-12-06 PROCEDURE — 88305 TISSUE EXAM BY PATHOLOGIST: CPT

## 2022-12-06 PROCEDURE — 74011250636 HC RX REV CODE- 250/636: Performed by: INTERNAL MEDICINE

## 2022-12-06 PROCEDURE — 0Y6W0Z0 DETACHMENT AT LEFT 4TH TOE, COMPLETE, OPEN APPROACH: ICD-10-PCS | Performed by: PODIATRIST

## 2022-12-06 PROCEDURE — 77030002916 HC SUT ETHLN J&J -A: Performed by: PODIATRIST

## 2022-12-06 PROCEDURE — 77030000032 HC CUF TRNQT ZIMM -B: Performed by: PODIATRIST

## 2022-12-06 PROCEDURE — 87077 CULTURE AEROBIC IDENTIFY: CPT

## 2022-12-06 PROCEDURE — 74011000258 HC RX REV CODE- 258: Performed by: EMERGENCY MEDICINE

## 2022-12-06 PROCEDURE — 36415 COLL VENOUS BLD VENIPUNCTURE: CPT

## 2022-12-06 PROCEDURE — 76060000032 HC ANESTHESIA 0.5 TO 1 HR: Performed by: PODIATRIST

## 2022-12-06 PROCEDURE — 74011250637 HC RX REV CODE- 250/637: Performed by: INTERNAL MEDICINE

## 2022-12-06 PROCEDURE — 80048 BASIC METABOLIC PNL TOTAL CA: CPT

## 2022-12-06 PROCEDURE — 76010000138 HC OR TIME 0.5 TO 1 HR: Performed by: PODIATRIST

## 2022-12-06 PROCEDURE — 74011250636 HC RX REV CODE- 250/636: Performed by: NURSE ANESTHETIST, CERTIFIED REGISTERED

## 2022-12-06 PROCEDURE — 74011250636 HC RX REV CODE- 250/636: Performed by: EMERGENCY MEDICINE

## 2022-12-06 PROCEDURE — 87186 SC STD MICRODIL/AGAR DIL: CPT

## 2022-12-06 PROCEDURE — 76210000063 HC OR PH I REC FIRST 0.5 HR: Performed by: PODIATRIST

## 2022-12-06 PROCEDURE — 85025 COMPLETE CBC W/AUTO DIFF WBC: CPT

## 2022-12-06 PROCEDURE — 0Y6U0Z0 DETACHMENT AT LEFT 3RD TOE, COMPLETE, OPEN APPROACH: ICD-10-PCS | Performed by: PODIATRIST

## 2022-12-06 PROCEDURE — 77030038692 HC WND DEB SYS IRMX -B: Performed by: PODIATRIST

## 2022-12-06 PROCEDURE — 74011000250 HC RX REV CODE- 250: Performed by: PODIATRIST

## 2022-12-06 PROCEDURE — 74011250636 HC RX REV CODE- 250/636: Performed by: ANESTHESIOLOGY

## 2022-12-06 PROCEDURE — 83036 HEMOGLOBIN GLYCOSYLATED A1C: CPT

## 2022-12-06 PROCEDURE — 77030006788 HC BLD SAW OSC STRY -B: Performed by: PODIATRIST

## 2022-12-06 PROCEDURE — 65270000046 HC RM TELEMETRY

## 2022-12-06 PROCEDURE — 87205 SMEAR GRAM STAIN: CPT

## 2022-12-06 PROCEDURE — 88311 DECALCIFY TISSUE: CPT

## 2022-12-06 RX ORDER — SODIUM CHLORIDE 0.9 % (FLUSH) 0.9 %
5-40 SYRINGE (ML) INJECTION EVERY 8 HOURS
Status: DISCONTINUED | OUTPATIENT
Start: 2022-12-06 | End: 2022-12-06 | Stop reason: HOSPADM

## 2022-12-06 RX ORDER — HYDROMORPHONE HYDROCHLORIDE 1 MG/ML
.2-.5 INJECTION, SOLUTION INTRAMUSCULAR; INTRAVENOUS; SUBCUTANEOUS
Status: DISCONTINUED | OUTPATIENT
Start: 2022-12-06 | End: 2022-12-06 | Stop reason: HOSPADM

## 2022-12-06 RX ORDER — SODIUM CHLORIDE 0.9 % (FLUSH) 0.9 %
5-40 SYRINGE (ML) INJECTION AS NEEDED
Status: DISCONTINUED | OUTPATIENT
Start: 2022-12-06 | End: 2022-12-06 | Stop reason: HOSPADM

## 2022-12-06 RX ORDER — EPHEDRINE SULFATE/0.9% NACL/PF 50 MG/5 ML
SYRINGE (ML) INTRAVENOUS AS NEEDED
Status: DISCONTINUED | OUTPATIENT
Start: 2022-12-06 | End: 2022-12-06 | Stop reason: HOSPADM

## 2022-12-06 RX ORDER — FENTANYL CITRATE 50 UG/ML
50 INJECTION, SOLUTION INTRAMUSCULAR; INTRAVENOUS AS NEEDED
Status: DISCONTINUED | OUTPATIENT
Start: 2022-12-06 | End: 2022-12-07

## 2022-12-06 RX ORDER — VANCOMYCIN HYDROCHLORIDE
1250
Status: DISCONTINUED | OUTPATIENT
Start: 2022-12-06 | End: 2022-12-10 | Stop reason: HOSPADM

## 2022-12-06 RX ORDER — LIDOCAINE HYDROCHLORIDE 10 MG/ML
0.1 INJECTION, SOLUTION EPIDURAL; INFILTRATION; INTRACAUDAL; PERINEURAL AS NEEDED
Status: DISCONTINUED | OUTPATIENT
Start: 2022-12-06 | End: 2022-12-09 | Stop reason: HOSPADM

## 2022-12-06 RX ORDER — PROPOFOL 10 MG/ML
INJECTION, EMULSION INTRAVENOUS
Status: DISCONTINUED | OUTPATIENT
Start: 2022-12-06 | End: 2022-12-06 | Stop reason: HOSPADM

## 2022-12-06 RX ORDER — DEXMEDETOMIDINE HYDROCHLORIDE 100 UG/ML
INJECTION, SOLUTION INTRAVENOUS AS NEEDED
Status: DISCONTINUED | OUTPATIENT
Start: 2022-12-06 | End: 2022-12-06 | Stop reason: HOSPADM

## 2022-12-06 RX ORDER — BUPIVACAINE HYDROCHLORIDE 5 MG/ML
INJECTION, SOLUTION PERINEURAL AS NEEDED
Status: DISCONTINUED | OUTPATIENT
Start: 2022-12-06 | End: 2022-12-06 | Stop reason: HOSPADM

## 2022-12-06 RX ORDER — DIPHENHYDRAMINE HYDROCHLORIDE 50 MG/ML
12.5 INJECTION, SOLUTION INTRAMUSCULAR; INTRAVENOUS AS NEEDED
Status: DISCONTINUED | OUTPATIENT
Start: 2022-12-06 | End: 2022-12-06 | Stop reason: HOSPADM

## 2022-12-06 RX ORDER — SODIUM CHLORIDE, SODIUM LACTATE, POTASSIUM CHLORIDE, CALCIUM CHLORIDE 600; 310; 30; 20 MG/100ML; MG/100ML; MG/100ML; MG/100ML
25 INJECTION, SOLUTION INTRAVENOUS CONTINUOUS
Status: DISPENSED | OUTPATIENT
Start: 2022-12-06 | End: 2022-12-07

## 2022-12-06 RX ORDER — GABAPENTIN 600 MG/1
600 TABLET ORAL 3 TIMES DAILY
COMMUNITY

## 2022-12-06 RX ORDER — FENTANYL CITRATE 50 UG/ML
25 INJECTION, SOLUTION INTRAMUSCULAR; INTRAVENOUS
Status: DISCONTINUED | OUTPATIENT
Start: 2022-12-06 | End: 2022-12-06 | Stop reason: HOSPADM

## 2022-12-06 RX ORDER — MORPHINE SULFATE 4 MG/ML
2 INJECTION INTRAVENOUS
Status: DISCONTINUED | OUTPATIENT
Start: 2022-12-06 | End: 2022-12-06 | Stop reason: HOSPADM

## 2022-12-06 RX ORDER — ONDANSETRON 2 MG/ML
4 INJECTION INTRAMUSCULAR; INTRAVENOUS AS NEEDED
Status: DISCONTINUED | OUTPATIENT
Start: 2022-12-06 | End: 2022-12-06 | Stop reason: HOSPADM

## 2022-12-06 RX ORDER — PROPOFOL 10 MG/ML
INJECTION, EMULSION INTRAVENOUS AS NEEDED
Status: DISCONTINUED | OUTPATIENT
Start: 2022-12-06 | End: 2022-12-06 | Stop reason: HOSPADM

## 2022-12-06 RX ORDER — FENTANYL CITRATE 50 UG/ML
INJECTION, SOLUTION INTRAMUSCULAR; INTRAVENOUS AS NEEDED
Status: DISCONTINUED | OUTPATIENT
Start: 2022-12-06 | End: 2022-12-06 | Stop reason: HOSPADM

## 2022-12-06 RX ADMIN — Medication 1 CAPSULE: at 09:11

## 2022-12-06 RX ADMIN — HYDROMORPHONE HYDROCHLORIDE 1 MG: 1 INJECTION, SOLUTION INTRAMUSCULAR; INTRAVENOUS; SUBCUTANEOUS at 03:36

## 2022-12-06 RX ADMIN — OXYCODONE 10 MG: 5 TABLET ORAL at 12:05

## 2022-12-06 RX ADMIN — PIPERACILLIN AND TAZOBACTAM 3.38 G: 3; .375 INJECTION, POWDER, FOR SOLUTION INTRAVENOUS at 10:43

## 2022-12-06 RX ADMIN — Medication 10 MG: at 22:34

## 2022-12-06 RX ADMIN — PROPOFOL 50 MCG/KG/MIN: 10 INJECTION, EMULSION INTRAVENOUS at 22:12

## 2022-12-06 RX ADMIN — VANCOMYCIN HYDROCHLORIDE 1250 MG: 10 INJECTION, POWDER, LYOPHILIZED, FOR SOLUTION INTRAVENOUS at 14:02

## 2022-12-06 RX ADMIN — PROPOFOL 70 MG: 10 INJECTION, EMULSION INTRAVENOUS at 22:45

## 2022-12-06 RX ADMIN — PROPOFOL 40 MG: 10 INJECTION, EMULSION INTRAVENOUS at 22:12

## 2022-12-06 RX ADMIN — ONDANSETRON 4 MG: 2 INJECTION INTRAMUSCULAR; INTRAVENOUS at 03:36

## 2022-12-06 RX ADMIN — LOSARTAN POTASSIUM 50 MG: 50 TABLET, FILM COATED ORAL at 09:11

## 2022-12-06 RX ADMIN — SODIUM CHLORIDE, POTASSIUM CHLORIDE, SODIUM LACTATE AND CALCIUM CHLORIDE: 600; 310; 30; 20 INJECTION, SOLUTION INTRAVENOUS at 22:07

## 2022-12-06 RX ADMIN — HYDROMORPHONE HYDROCHLORIDE 1 MG: 1 INJECTION, SOLUTION INTRAMUSCULAR; INTRAVENOUS; SUBCUTANEOUS at 14:03

## 2022-12-06 RX ADMIN — HYDROMORPHONE HYDROCHLORIDE 1 MG: 1 INJECTION, SOLUTION INTRAMUSCULAR; INTRAVENOUS; SUBCUTANEOUS at 23:47

## 2022-12-06 RX ADMIN — Medication 10 MG: at 22:37

## 2022-12-06 RX ADMIN — SODIUM CHLORIDE, PRESERVATIVE FREE 10 ML: 5 INJECTION INTRAVENOUS at 06:10

## 2022-12-06 RX ADMIN — DEXMEDETOMIDINE HYDROCHLORIDE 4 MCG: 100 INJECTION, SOLUTION, CONCENTRATE INTRAVENOUS at 22:10

## 2022-12-06 RX ADMIN — FENTANYL CITRATE 25 MCG: 50 INJECTION, SOLUTION INTRAMUSCULAR; INTRAVENOUS at 22:10

## 2022-12-06 RX ADMIN — PIPERACILLIN AND TAZOBACTAM 3.38 G: 3; .375 INJECTION, POWDER, FOR SOLUTION INTRAVENOUS at 03:36

## 2022-12-06 RX ADMIN — ONDANSETRON 4 MG: 2 INJECTION INTRAMUSCULAR; INTRAVENOUS at 08:23

## 2022-12-06 RX ADMIN — SODIUM CHLORIDE, PRESERVATIVE FREE 10 ML: 5 INJECTION INTRAVENOUS at 14:03

## 2022-12-06 RX ADMIN — OXYCODONE 10 MG: 5 TABLET ORAL at 03:36

## 2022-12-06 RX ADMIN — HYDROMORPHONE HYDROCHLORIDE 1 MG: 1 INJECTION, SOLUTION INTRAMUSCULAR; INTRAVENOUS; SUBCUTANEOUS at 08:23

## 2022-12-06 RX ADMIN — PROPOFOL 25 MG: 10 INJECTION, EMULSION INTRAVENOUS at 22:23

## 2022-12-06 RX ADMIN — HYDROMORPHONE HYDROCHLORIDE 1 MG: 1 INJECTION, SOLUTION INTRAMUSCULAR; INTRAVENOUS; SUBCUTANEOUS at 17:51

## 2022-12-06 NOTE — PROGRESS NOTES
Hospitalist Progress Note    NAME: Des Munoz   :  1965   MRN:  [id number]       Assessment / Plan:  L 4th toe OM  Age- indeterminate displaced fracture through the prox phalanx  DM with hyperglycemia  XR L foot showed findings consistent with osteomyelitis of the fourth proximal, middle, and distal phalanges with marked osteolysis (particularly of the distal phalanx) and age indeterminant mildly displaced fracture through the proximal phalanx. Hold metformin. Continue NPH, titrate prn. SSI  Pain control with prn oxycodone and IV morphine  Empiric abx with vanc and zosyn  Plan for surgery today waiting for podiatry     HTN  Cont' losartan  Prn iv hydralazine      18.5 - 24.9 Normal weight / Body mass index is 24.19 kg/m². Estimated discharge date:   Barriers:    Code status: Full  Prophylaxis: SCD's  Recommended Disposition: Home w/Family     Subjective:     Chief Complaint / Reason for Physician Visit  \"\". Discussed with RN events overnight. Seen and examined, waiting for surgery-continue antibiotic therapy    Review of Systems:  Symptom Y/N Comments  Symptom Y/N Comments   Fever/Chills n   Chest Pain n    Poor Appetite    Edema     Cough    Abdominal Pain     Sputum    Joint Pain     SOB/VIGIL n   Pruritis/Rash     Nausea/vomit    Tolerating PT/OT     Diarrhea    Tolerating Diet y    Constipation    Other       Could NOT obtain due to:      Objective:     VITALS:   Last 24hrs VS reviewed since prior progress note.  Most recent are:  Patient Vitals for the past 24 hrs:   Temp Pulse Resp BP SpO2   22 1400 98.5 °F (36.9 °C) (!) 59 16 (!) 135/90 98 %   22 1044 98.8 °F (37.1 °C) (!) 56 10 (!) 140/90 98 %   22 0734 97.8 °F (36.6 °C) (!) 56 11 (!) 135/93 96 %   22 0600 -- 60 14 124/74 97 %   22 0400 98.2 °F (36.8 °C) 62 16 132/79 95 %   22 0020 98 °F (36.7 °C) 60 13 134/83 98 %   22 2054 98.6 °F (37 °C) 62 15 (!) 149/84 98 %   22 1825 -- -- -- -- 99 %   12/05/22 1655 -- -- -- (!) 189/94 98 %       Intake/Output Summary (Last 24 hours) at 12/6/2022 1541  Last data filed at 12/6/2022 1226  Gross per 24 hour   Intake 600 ml   Output 2200 ml   Net -1600 ml        I had a face to face encounter and independently examined this patient on 12/6/2022, as outlined below:  PHYSICAL EXAM:  General: WD, WN. Alert, cooperative, no acute distress    EENT:  EOMI. Anicteric sclerae. MMM  Resp:  CTA bilaterally, no wheezing or rales. No accessory muscle use  CV:  Regular  rhythm,  No edema  GI:  Soft, Non distended, Non tender. +Bowel sounds  Neurologic:  Alert and oriented X 3, normal speech,   Psych:   Good insight. Not anxious nor agitated  Skin:  No rashes. No jaundice    Reviewed most current lab test results and cultures  YES  Reviewed most current radiology test results   YES  Review and summation of old records today    NO  Reviewed patient's current orders and MAR    YES  PMH/ reviewed - no change compared to H&P  ________________________________________________________________________  Care Plan discussed with:    Comments   Patient     Family      RN     Care Manager     Consultant                        Multidiciplinary team rounds were held today with , nursing, pharmacist and clinical coordinator. Patient's plan of care was discussed; medications were reviewed and discharge planning was addressed. ________________________________________________________________________  Total NON critical care TIME:  35   Minutes    Total CRITICAL CARE TIME Spent:   Minutes non procedure based      Comments   >50% of visit spent in counseling and coordination of care     ________________________________________________________________________  Jarvis Harrison MD     Procedures: see electronic medical records for all procedures/Xrays and details which were not copied into this note but were reviewed prior to creation of Plan.       LABS:  I reviewed today's most current labs and imaging studies. Pertinent labs include:  Recent Labs     12/06/22  0341 12/05/22  1117   WBC 6.4 8.4   HGB 8.8* 10.9*   HCT 25.4* 32.5*   * 186     Recent Labs     12/06/22  0341 12/05/22  1117   * 133*   K 4.6 5.1    98   CO2 30 29   * 406*   BUN 27* 30*   CREA 1.28 1.41*   CA 8.8 9.2   ALB  --  3.2*   TBILI  --  0.7   ALT  --  39       Signed:  Geovanny Granados MD

## 2022-12-06 NOTE — PROGRESS NOTES
Care Management Initial Assessment    Transition of Care Plan:    RUR: 14% low risk for readmission   Disposition: Home with outpatient follow up   If SNF or IPR: Date FOC offered:  Date FOC received:  Date authorization started with reference number:  Date authorization received and expires:  Accepting facility:  Follow up appointments: PCP, Podiatry  DME needed: No DME use at baseline, pending hospital course  Transportation at Discharge: Self - vehicle at hospital   [place] or means to access home:  Has access      IM Medicare Letter: Will need 2nd IMM letter prior to d/c; 1st IMM signed 12/5  Is patient a  and connected with the [address]? N/A               If yes, was [employer] transfer form completed and [facility] notified? Caregiver Contact: Pt's sister/mPOA: Margarita Curran [phone]  Discharge Caregiver contacted prior to discharge? To be contacted if pt elects   Care Conference needed?: No                  Reason for Admission:  L 4th toe OM  Age- indeterminate displaced fracture through the prox phalanx  DM with hyperglycemia                     RUR Score:  14% low risk for readmission                    Plan for utilizing home health:  Pt reports he has had poor experiences previously with [facility] and declines HH for wound care if needed. Advised of        PCP: First and Last name:  None - pt declines to add current PCP to chart     Name of Practice:    Are you a current patient: Yes/No:    Approximate date of last visit:    Can you participate in a virtual visit with your PCP:                     Current Advanced Directive/Advance Care Plan: Full Code      Healthcare Decision Maker:     Primary Decision Maker: Lauri Santos -  - [phone]                  Transition of Care Plan:   Home with outpatient follow up    Initial note: CM reviewed chart. CM completed assessment with pt. CM introduced self, role of CM, verified demographics, and discussed transition of care planning.  Pt reports frustration due to delays with pending surgery, CM provided empathetic listening and space to vent frustrations. Pt plans to return home at d/c, will transport himself via private vehicle at hospital. Pt owns no DME and reports he ambulates to the best of his abilities with foot pain. Denied HH if recommended d/t previous poor experiences, CM advised that outpatient wound care is also an option if needed. Pending podiatry surgery. Care management will continue to be available to assist as transition of care planning needs arise.                        Care Management Interventions  PCP Verified by CM: No (Pt reports he has a primary care provider but does not wish to add to medical chart )  Palliative Care Criteria Met (RRAT>21 & CHF Dx)?: No  Mode of Transport at Discharge: Self (Vehicle at hospital)  Transition of Care Consult (CM Consult): Discharge Planning  Discharge Durable Medical Equipment: No  Physical Therapy Consult: No  Occupational Therapy Consult: No  Speech Therapy Consult: No  Support Systems: Other Family Member(s)  Confirm Follow Up Transport: Self  The Patient and/or Patient Representative was Provided with a Choice of Provider and Agrees with the Discharge Plan?: Yes  Discharge Location  Patient Expects to be Discharged to[de-identified] [address] [facility], [address]

## 2022-12-06 NOTE — PROGRESS NOTES
Pharmacy Antimicrobial Kinetic Dosing    Indication for Antimicrobials: OM     Current Regimen of Each Antimicrobial:  Vancomycin consult - started  day 2  Zosyn 3.375gm IV q8h - started  day 2    Previous Antimicrobial Therapy:    Vancomycin Goal Level: AUC: 400-600 mg/hr/Liter/day    Date Dose & Interval Measured (mcg/mL) Predicted AUC/AILEEN                         Dosing calculator used: [employer] calculator    Significant Positive Cultures:    PBCx - pending    Conditions for Dosing Consideration: None    Labs:  Recent Labs     22  0341 22  1117   CREA 1.28 1.41*   BUN 27* 30*     Recent Labs     22  0341 22  1117   WBC 6.4 8.4     Temp (24hrs), Av.3 °F (36.8 °C), Min:97.8 °F (36.6 °C), Max:98.8 °F (37.1 °C)    Creatinine Clearance (mL/min):   CrCl (Adjusted Body Weight): 70.2 mL/min   If actual weight < IBW: CrCl (Actual Body Weight) 72.0    Impression/Plan:   JESUS so empirically adjusted vancomycin dose on   Vancomycin 1250mg IV q18h for a projected AUC at Css of  538  Consider vancomycin trough on  since  dose a little late  Continue Zosyn regimen  Antimicrobial stop date - pending     Pharmacy will follow daily and adjust medications as appropriate for renal function and/or serum levels.     Thank you,  LUDY Nelson

## 2022-12-06 NOTE — PROGRESS NOTES
0795 - Surgery is not until after 5pm per preop RN. Messaged dr. Rabia Yan for direction with insulin. Awaiting return response. Hold NPH and lispro per Dr. Rabia Yan. 1140 - Blood glucose now 243. Messaged Dr. Rabia Yan. Awaiting return response. Hold per Dr. Rabia Yan. 1710 - TRANSFER - OUT REPORT:    Verbal report given to Osiel Durham RN(name) on Heather Basilio  being transferred to surg tele(unit) for routine progression of care       Report consisted of patients Situation, Background, Assessment and   Recommendations(SBAR). Information from the following report(s) SBAR, Kardex, Intake/Output, MAR, Accordion, Recent Results, Med Rec Status, and Cardiac Rhythm sinus arlet  was reviewed with the receiving nurse. Lines:   Peripheral IV 12/05/22 Right Antecubital (Active)   Site Assessment Clean, dry, & intact 12/05/22 2054   Phlebitis Assessment 0 12/05/22 2054   Infiltration Assessment 0 12/05/22 2054   Dressing Status Clean, dry, & intact 12/05/22 2054   Dressing Type Transparent 12/05/22 2054   Hub Color/Line Status Green 12/05/22 2054        Opportunity for questions and clarification was provided.       Patient transported with:   [employer]

## 2022-12-06 NOTE — ED NOTES
This RN noticed Oxycodone was discontinued. Spoke with Dr. Ministerio Aguero. Received verbal order for Oxycodone 10 mg Q6h PRN.

## 2022-12-07 LAB
GLUCOSE BLD STRIP.AUTO-MCNC: 171 MG/DL (ref 65–117)
GLUCOSE BLD STRIP.AUTO-MCNC: 201 MG/DL (ref 65–117)
GLUCOSE BLD STRIP.AUTO-MCNC: 214 MG/DL (ref 65–117)
SERVICE CMNT-IMP: ABNORMAL

## 2022-12-07 PROCEDURE — 74011250637 HC RX REV CODE- 250/637: Performed by: PODIATRIST

## 2022-12-07 PROCEDURE — 74011636637 HC RX REV CODE- 636/637: Performed by: PODIATRIST

## 2022-12-07 PROCEDURE — 74011636637 HC RX REV CODE- 636/637: Performed by: INTERNAL MEDICINE

## 2022-12-07 PROCEDURE — 74011000250 HC RX REV CODE- 250: Performed by: INTERNAL MEDICINE

## 2022-12-07 PROCEDURE — 74011000250 HC RX REV CODE- 250: Performed by: PODIATRIST

## 2022-12-07 PROCEDURE — 74011250636 HC RX REV CODE- 250/636: Performed by: PODIATRIST

## 2022-12-07 PROCEDURE — 82962 GLUCOSE BLOOD TEST: CPT

## 2022-12-07 PROCEDURE — 74011250636 HC RX REV CODE- 250/636: Performed by: INTERNAL MEDICINE

## 2022-12-07 PROCEDURE — 74011000258 HC RX REV CODE- 258: Performed by: PODIATRIST

## 2022-12-07 PROCEDURE — 74011250637 HC RX REV CODE- 250/637: Performed by: STUDENT IN AN ORGANIZED HEALTH CARE EDUCATION/TRAINING PROGRAM

## 2022-12-07 PROCEDURE — 74011000258 HC RX REV CODE- 258: Performed by: EMERGENCY MEDICINE

## 2022-12-07 PROCEDURE — 65270000046 HC RM TELEMETRY

## 2022-12-07 PROCEDURE — 74011250636 HC RX REV CODE- 250/636: Performed by: EMERGENCY MEDICINE

## 2022-12-07 RX ORDER — GABAPENTIN 100 MG/1
100 CAPSULE ORAL 3 TIMES DAILY
Status: DISCONTINUED | OUTPATIENT
Start: 2022-12-07 | End: 2022-12-10 | Stop reason: HOSPADM

## 2022-12-07 RX ADMIN — HYDROMORPHONE HYDROCHLORIDE 1 MG: 1 INJECTION, SOLUTION INTRAMUSCULAR; INTRAVENOUS; SUBCUTANEOUS at 21:03

## 2022-12-07 RX ADMIN — HYDROMORPHONE HYDROCHLORIDE 1 MG: 1 INJECTION, SOLUTION INTRAMUSCULAR; INTRAVENOUS; SUBCUTANEOUS at 12:37

## 2022-12-07 RX ADMIN — Medication 1 CAPSULE: at 08:35

## 2022-12-07 RX ADMIN — Medication 8 UNITS: at 08:44

## 2022-12-07 RX ADMIN — Medication 1 AMPULE: at 08:47

## 2022-12-07 RX ADMIN — GABAPENTIN 100 MG: 100 CAPSULE ORAL at 17:16

## 2022-12-07 RX ADMIN — HYDROMORPHONE HYDROCHLORIDE 1 MG: 1 INJECTION, SOLUTION INTRAMUSCULAR; INTRAVENOUS; SUBCUTANEOUS at 04:20

## 2022-12-07 RX ADMIN — VANCOMYCIN HYDROCHLORIDE 1250 MG: 10 INJECTION, POWDER, LYOPHILIZED, FOR SOLUTION INTRAVENOUS at 08:52

## 2022-12-07 RX ADMIN — SODIUM CHLORIDE, PRESERVATIVE FREE 10 ML: 5 INJECTION INTRAVENOUS at 06:07

## 2022-12-07 RX ADMIN — PIPERACILLIN AND TAZOBACTAM 3.38 G: 3; .375 INJECTION, POWDER, FOR SOLUTION INTRAVENOUS at 00:01

## 2022-12-07 RX ADMIN — SODIUM CHLORIDE, PRESERVATIVE FREE 10 ML: 5 INJECTION INTRAVENOUS at 14:00

## 2022-12-07 RX ADMIN — Medication 1 AMPULE: at 21:06

## 2022-12-07 RX ADMIN — Medication 3 UNITS: at 17:17

## 2022-12-07 RX ADMIN — HYDROMORPHONE HYDROCHLORIDE 1 MG: 1 INJECTION, SOLUTION INTRAMUSCULAR; INTRAVENOUS; SUBCUTANEOUS at 08:46

## 2022-12-07 RX ADMIN — Medication 1 AMPULE: at 00:07

## 2022-12-07 RX ADMIN — Medication 8 UNITS: at 17:41

## 2022-12-07 RX ADMIN — LOSARTAN POTASSIUM 50 MG: 50 TABLET, FILM COATED ORAL at 08:35

## 2022-12-07 RX ADMIN — PIPERACILLIN AND TAZOBACTAM 3.38 G: 3; .375 INJECTION, POWDER, FOR SOLUTION INTRAVENOUS at 08:38

## 2022-12-07 RX ADMIN — Medication 2 UNITS: at 00:01

## 2022-12-07 RX ADMIN — HYDROMORPHONE HYDROCHLORIDE 1 MG: 1 INJECTION, SOLUTION INTRAMUSCULAR; INTRAVENOUS; SUBCUTANEOUS at 17:12

## 2022-12-07 RX ADMIN — Medication 3 UNITS: at 12:38

## 2022-12-07 RX ADMIN — SODIUM CHLORIDE, PRESERVATIVE FREE 10 ML: 5 INJECTION INTRAVENOUS at 00:07

## 2022-12-07 RX ADMIN — GABAPENTIN 100 MG: 100 CAPSULE ORAL at 21:03

## 2022-12-07 RX ADMIN — SODIUM CHLORIDE, PRESERVATIVE FREE 10 ML: 5 INJECTION INTRAVENOUS at 21:04

## 2022-12-07 RX ADMIN — Medication 3 UNITS: at 08:44

## 2022-12-07 RX ADMIN — PIPERACILLIN AND TAZOBACTAM 3.38 G: 3; .375 INJECTION, POWDER, FOR SOLUTION INTRAVENOUS at 17:16

## 2022-12-07 NOTE — PROGRESS NOTES
Spiritual Care Partner Volunteer visited patient at [address] in [facility] SURG TELE on 12/7/2022   Documented by: Judson Mcwilliams  To madison : Jacinto-DHARA  ([id number])

## 2022-12-07 NOTE — PROGRESS NOTES
Problem: Diabetes Self-Management  Goal: *Disease process and treatment process  Description: Define diabetes and identify own type of diabetes; list 3 options for treating diabetes. Outcome: Progressing Towards Goal  Goal: *Prevention, detection, treatment of acute complications  Description: List symptoms of hyper- and hypoglycemia; describe how to treat low blood sugar and actions for lowering  high blood glucose level.   Outcome: Progressing Towards Goal

## 2022-12-07 NOTE — PROGRESS NOTES
End of Shift Note    Bedside shift change report given to RN (oncoming nurse) by Edgar Oro LPN (offgoing nurse). Report included the following information SBAR    Shift worked:  7p-7a     Shift summary and any significant changes:    Pt reported pain upon arriving to unit from PACU. Given dilaudid twice throughout the shift. Pt rested well throughout the night. Dressing is CD&I. No morning labs ordered     Concerns for physician to address: None     Zone phone for oncoming shift:  [phone]        Activity:  Activity Level: Up with Assistance  Number times ambulated in hallways past shift: 0  Number of times OOB to chair past shift: 0    Cardiac:   Cardiac Monitoring: Yes      Cardiac Rhythm: Sinus Rhythm    Access:  Current line(s): PIV     Genitourinary:   Urinary status: voiding    Respiratory:   O2 Device: None (Room air)  Chronic home O2 use?: NO  Incentive spirometer at bedside: YES       GI:  Last Bowel Movement Date: 12/05/22  Current diet:  ADULT DIET Regular; 4 carb choices (60 gm/meal)  Passing flatus: YES  Tolerating current diet: YES       Pain Management:   Patient states pain is manageable on current regimen: YES    Skin:  Can Score: 19  Interventions: increase time out of bed    Patient Safety:  Fall Score:  Total Score: 3  Interventions: gripper socks and pt to call before getting OOB  High Fall Risk: Yes    Length of Stay:  Expected LOS: 2d 21h  Actual LOS: 2      Edgar Oro LPN

## 2022-12-07 NOTE — PROGRESS NOTES
Hospitalist Progress Note    NAME: Maik Andrew   :  1965   MRN:  [id number]       Assessment / Plan:  L 4th toe OM s/p third/fourth partial ray resection   Age- indeterminate displaced fracture through the prox phalanx  DM with hyperglycemia  XR L foot showed findings consistent with osteomyelitis of the fourth proximal, middle, and distal phalanges with marked osteolysis (particularly of the distal phalanx) and age indeterminant mildly displaced fracture through the proximal phalanx. Hold metformin. Continue NPH, titrate prn. SSI  Pain control with prn oxycodone and IV morphine  Empiric abx with vanc and zosyn  Podiatry following and the patient underwent partial ray resection , pending cultures     HTN  Cont' losartan  Prn iv hydralazine      18.5 - 24.9 Normal weight / Body mass index is 24.19 kg/m². Estimated discharge date:   Barriers:    Code status: Full  Prophylaxis: Lovenox patient was seen and examined this morning. Patient denies any new complaints. Recommended Disposition: Home w/Family     Subjective:     Chief Complaint / Reason for Physician Visit    Patient was seen and examined this morning. Patient denies any new complaints. Patient denies fever, chills, shortness of breath, chest pain, abdominal pain, nausea, vomiting, and diarrhea. No overnight events reported by nursing staff. Review of Systems:  Symptom Y/N Comments  Symptom Y/N Comments   Fever/Chills n   Chest Pain n    Poor Appetite    Edema     Cough    Abdominal Pain     Sputum    Joint Pain     SOB/VIGIL n   Pruritis/Rash     Nausea/vomit    Tolerating PT/OT     Diarrhea    Tolerating Diet y    Constipation    Other       Could NOT obtain due to:      Objective:     VITALS:   Last 24hrs VS reviewed since prior progress note.  Most recent are:  Patient Vitals for the past 24 hrs:   Temp Pulse Resp BP SpO2   22 0722 98.4 °F (36.9 °C) 65 16 126/71 98 %   22 0030 98.1 °F (36.7 °C) 62 18 (!) 160/82 99 %   12/06/22 2334 97.7 °F (36.5 °C) (!) 58 17 -- 99 %   12/06/22 2315 98 °F (36.7 °C) 66 20 (!) 143/72 95 %   12/06/22 2310 -- 62 15 (!) 157/75 98 %   12/06/22 2305 -- 67 12 (!) 148/73 99 %   12/06/22 2300 -- 72 20 132/71 100 %   12/06/22 2255 -- 75 25 (!) 140/90 98 %   12/06/22 2254 98.4 °F (36.9 °C) 76 17 136/75 100 %   12/06/22 2250 -- -- -- 136/75 --   12/06/22 1910 98.1 °F (36.7 °C) (!) 56 16 (!) 160/87 100 %   12/06/22 1400 98.5 °F (36.9 °C) (!) 59 16 (!) 135/90 98 %         Intake/Output Summary (Last 24 hours) at 12/7/2022 1119  Last data filed at 12/7/2022 1024  Gross per 24 hour   Intake 500 ml   Output 1780 ml   Net -1280 ml          I had a face to face encounter and independently examined this patient on 12/7/2022, as outlined below:  PHYSICAL EXAM:  General: WD, WN. Alert, cooperative, no acute distress    EENT:  EOMI. Anicteric sclerae. MMM  Resp:  CTA bilaterally, no wheezing or rales. No accessory muscle use  CV:  Regular  rhythm,  No edema  GI:  Soft, Non distended, Non tender. +Bowel sounds  Neurologic:  Alert and oriented X 3, normal speech,   Psych:   Good insight. Not anxious nor agitated  Skin:  Left foot with dressing in place, clean s/p surgery    Reviewed most current lab test results and cultures  YES  Reviewed most current radiology test results   YES  Review and summation of old records today    NO  Reviewed patient's current orders and MAR    YES  PMH/SH reviewed - no change compared to H&P  ________________________________________________________________________  Care Plan discussed with:    Comments   Patient     Family      RN     Care Manager     Consultant                        Multidiciplinary team rounds were held today with , nursing, pharmacist and clinical coordinator. Patient's plan of care was discussed; medications were reviewed and discharge planning was addressed. ________________________________________________________________________  Total NON critical care TIME:  30   Minutes    Total CRITICAL CARE TIME Spent:   Minutes non procedure based      Comments   >50% of visit spent in counseling and coordination of care     ________________________________________________________________________  Indio Pool DO     Procedures: see electronic medical records for all procedures/Xrays and details which were not copied into this note but were reviewed prior to creation of Plan. LABS:  I reviewed today's most current labs and imaging studies. Pertinent labs include:  Recent Labs     12/06/22  0341 12/05/22  1117   WBC 6.4 8.4   HGB 8.8* 10.9*   HCT 25.4* 32.5*   * 186       Recent Labs     12/06/22  0341 12/05/22  1117   * 133*   K 4.6 5.1    98   CO2 30 29   * 406*   BUN 27* 30*   CREA 1.28 1.41*   CA 8.8 9.2   ALB  --  3.2*   TBILI  --  0.7   ALT  --  39         Signed:  Indio Pool DO

## 2022-12-07 NOTE — ANESTHESIA PREPROCEDURE EVALUATION
Anesthetic History   No history of anesthetic complications            Review of Systems / Medical History  Patient summary reviewed, nursing notes reviewed and pertinent labs reviewed    Pulmonary  Within defined limits                 Neuro/Psych         Psychiatric history    Comments: Bipolar  Suicidal Thoughts  Depression   diabetic neuropathy Cardiovascular    Hypertension: well controlled          CAD and cardiac stents    Exercise tolerance: >4 METS     GI/Hepatic/Renal         Renal disease: CRI  Liver disease    Comments: Abdominal pain  Cholelithiasis  Diarrhea  Elevated AST Endo/Other    Diabetes: poorly controlled, type 2    Anemia     Other Findings   Comments: OSTEOMYELITIS  Hx Substance abuse - opioid addiction  Hx Non-compliance with medical treatment  Hx chronic low back pain           Physical Exam    Airway  Mallampati: I  TM Distance: > 6 cm  Neck ROM: normal range of motion   Mouth opening: Normal     Cardiovascular  Regular rate and rhythm,  S1 and S2 normal,  no murmur, click, rub, or gallop             Dental    Dentition: Poor dentition  Comments: Some missing teeth, denies any loose teeth   Pulmonary  Breath sounds clear to auscultation               Abdominal  GI exam deferred       Other Findings            Anesthetic Plan    ASA: 3  Anesthesia type: total IV anesthesia and general - backup          Induction: Intravenous  Anesthetic plan and risks discussed with: Patient

## 2022-12-07 NOTE — ANESTHESIA POSTPROCEDURE EVALUATION
Procedure(s):  LEFT  THIRD AND FOURTH  PARTIAL RAY RESECTION. total IV anesthesia, general - backup    Anesthesia Post Evaluation        Patient location during evaluation: PACU  Note status: Adequate. Level of consciousness: responsive to verbal stimuli and sleepy but conscious  Pain management: satisfactory to patient  Airway patency: patent  Anesthetic complications: no  Cardiovascular status: acceptable  Respiratory status: acceptable  Hydration status: acceptable  Comments: +Post-Anesthesia Evaluation and Assessment    Patient: Annabel Barlow MRN: [id number]  SSN: [id number]   YOB: 1965  Age: 61 y.o. Sex: male      Cardiovascular Function/Vital Signs    BP (!) 143/72   Pulse 66   Temp 36.9 °C (98.4 °F)   Resp 20   Ht 6' (1.829 m)   Wt 80.9 kg (178 lb 5.6 oz)   SpO2 95%   BMI 24.19 kg/m²     Patient is status post Procedure(s):  LEFT  THIRD AND FOURTH  PARTIAL RAY RESECTION. Nausea/Vomiting: Controlled. Postoperative hydration reviewed and adequate. Pain:  Pain Scale 1: Visual (12/06/22 2315)  Pain Intensity 1: 0 (12/06/22 2315)   Managed. Neurological Status:   Neuro (WDL): Exceptions to WDL (12/06/22 2254)   At baseline. Mental Status and Level of Consciousness: Arousable. Pulmonary Status:   O2 Device: None (Room air) (12/06/22 2315)   Adequate oxygenation and airway patent. Complications related to anesthesia: None    Post-anesthesia assessment completed. No concerns. Signed By: Saurabh Denny MD    12/6/2022  Post anesthesia nausea and vomiting:  controlled      INITIAL Post-op Vital signs:   Vitals Value Taken Time   /72 12/06/22 2315   Temp 36.9 °C (98.4 °F) 12/06/22 2254   Pulse 60 12/06/22 2317   Resp 19 12/06/22 2317   SpO2 99 % 12/06/22 2317   Vitals shown include unvalidated device data.

## 2022-12-07 NOTE — PROGRESS NOTES
Pharmacy Antimicrobial Kinetic Dosing    Indication for Antimicrobials: OM     Current Regimen of Each Antimicrobial:  Vancomycin consult - started  day 2  Zosyn 3.375gm IV q8h - started  day 2    Previous Antimicrobial Therapy:    Vancomycin Goal Level: AUC: 400-600 mg/hr/Liter/day    Date Dose & Interval Measured (mcg/mL) Predicted AUC/AILEEN   22 1,250mg IV Q18H                     Dosing calculator used: [employer] calculator    Significant Positive Cultures:    PBCx - pending    Conditions for Dosing Consideration: None    Labs:  Recent Labs     22  0341 22  1117   CREA 1.28 1.41*   BUN 27* 30*     Recent Labs     22  0341 22  1117   WBC 6.4 8.4     Temp (24hrs), Av.3 °F (36.8 °C), Min:97.7 °F (36.5 °C), Max:98.8 °F (37.1 °C)    Creatinine Clearance (mL/min):   CrCl (Adjusted Body Weight): 70.2 mL/min   If actual weight < IBW: CrCl (Actual Body Weight) 72.0    Impression/Plan:   JESUS so empirically adjusted vancomycin dose on   Scr decreasing, WBCs decreasing, Afebrile  Continue Vancomycin 1250mg IV q18h for a projected AUC at Css of 551  Vancomycin level ordered @ 0100 on  (prior to 0200 dose)  Ordered BMP and CBC x3  Continue Zosyn regimen  Antimicrobial stop date - pending     Pharmacy will follow daily and adjust medications as appropriate for renal function and/or serum levels.     Thank you,  LUDY Arnold

## 2022-12-07 NOTE — PROGRESS NOTES
Problem: Falls - Risk of  Goal: *Absence of Falls  Description: Document Nuzhat Bolaños Fall Risk and appropriate interventions in the flowsheet.   Outcome: Progressing Towards Goal  Note: Fall Risk Interventions:  Mobility Interventions: Assess mobility with egress test         Medication Interventions: Patient to call before getting OOB    Elimination Interventions: Urinal in reach              Problem: Patient Education: Go to Patient Education Activity  Goal: Patient/Family Education  Outcome: Progressing Towards Goal

## 2022-12-07 NOTE — BRIEF OP NOTE
Brief Postoperative Note    Patient: Keke Cook  YOB: 1965  MRN: [id number]    Date of Procedure: 12/6/2022     Pre-Op Diagnosis: OSTEOMYELITIS    Post-Op Diagnosis: Same as preoperative diagnosis.       Procedure(s):  LEFT  FOOT THIRD AND FOURTH  PARTIAL RAY RESECTION    Surgeon(s):  Leta Felder DPM    Surgical Assistant: None    Anesthesia: MAC     Estimated Blood Loss (mL): less than 088     Complications: None    Specimens:   ID Type Source Tests Collected by Time Destination   1 : Left third and fourth toes, metatarsal heads Preservative Foot, left  Leta Felder DPM 12/6/2022 2233 Pathology   1 : Left foot wound Wound Foot, left AEROBIC/ANAEROBIC CULTURE Leta Felder DPM 12/6/2022 2240 Microbiology        Implants: * No implants in log *    Drains: * No LDAs found *    Findings: good viable margins in the stump    Electronically Signed by Alirio Hernandez DPM on 12/6/2022 at 10:56 PM

## 2022-12-07 NOTE — PERIOP NOTES
[facility] from Operating Room to PACU    Report received from [address] and Anna Jurado CRNA regarding Americo Grullon. Surgeon(s):  Tash Denis DPM  And Procedure(s) (LRB):  LEFT  THIRD AND FOURTH  PARTIAL RAY RESECTION (Left)  confirmed   with allergies and dressings discussed. Anesthesia type, drugs, patient history, complications, estimated blood loss, vital signs, intake and output, and last pain medication, lines, and temperature were reviewed. 2322 TRANSFER - OUT REPORT:    Verbal report given to Renetta SANCHEZ(name) on Americo Grullon  being transferred to [facility](unit) for routine post - op       Report consisted of patients Situation, Background, Assessment and   Recommendations(SBAR). Information from the following report(s) SBAR, Kardex, OR Summary, Intake/Output, MAR, and Cardiac Rhythm SR  was reviewed with the receiving nurse. Lines:   Peripheral IV 12/05/22 Right Antecubital (Active)   Site Assessment Clean, dry, & intact 12/06/22 2254   Phlebitis Assessment 0 12/06/22 2254   Infiltration Assessment 0 12/06/22 2254   Dressing Status Clean, dry, & intact 12/06/22 2254   Dressing Type Transparent;Tape 12/06/22 2254   Hub Color/Line Status Green; Infusing 12/06/22 2254        Opportunity for questions and clarification was provided.       Patient transported with:   Registered Nurse  Monitor

## 2022-12-08 LAB
ALBUMIN SERPL-MCNC: 2.3 G/DL (ref 3.5–5)
ALBUMIN/GLOB SERPL: 0.6 {RATIO} (ref 1.1–2.2)
ALP SERPL-CCNC: 103 U/L (ref 45–117)
ALT SERPL-CCNC: 28 U/L (ref 12–78)
ANION GAP SERPL CALC-SCNC: 6 MMOL/L (ref 5–15)
AST SERPL-CCNC: 22 U/L (ref 15–37)
BASOPHILS # BLD: 0 K/UL (ref 0–0.1)
BASOPHILS NFR BLD: 1 % (ref 0–1)
BILIRUB SERPL-MCNC: 0.3 MG/DL (ref 0.2–1)
BUN SERPL-MCNC: 23 MG/DL (ref 6–20)
BUN/CREAT SERPL: 18 (ref 12–20)
CALCIUM SERPL-MCNC: 8.5 MG/DL (ref 8.5–10.1)
CHLORIDE SERPL-SCNC: 102 MMOL/L (ref 97–108)
CO2 SERPL-SCNC: 28 MMOL/L (ref 21–32)
CREAT SERPL-MCNC: 1.3 MG/DL (ref 0.7–1.3)
DIFFERENTIAL METHOD BLD: ABNORMAL
EOSINOPHIL # BLD: 0.1 K/UL (ref 0–0.4)
EOSINOPHIL NFR BLD: 3 % (ref 0–7)
ERYTHROCYTE [DISTWIDTH] IN BLOOD BY AUTOMATED COUNT: 13.1 % (ref 11.5–14.5)
GLOBULIN SER CALC-MCNC: 4.1 G/DL (ref 2–4)
GLUCOSE BLD STRIP.AUTO-MCNC: 134 MG/DL (ref 65–117)
GLUCOSE BLD STRIP.AUTO-MCNC: 163 MG/DL (ref 65–117)
GLUCOSE BLD STRIP.AUTO-MCNC: 173 MG/DL (ref 65–117)
GLUCOSE BLD STRIP.AUTO-MCNC: 229 MG/DL (ref 65–117)
GLUCOSE SERPL-MCNC: 180 MG/DL (ref 65–100)
HCT VFR BLD AUTO: 22.2 % (ref 36.6–50.3)
HGB BLD-MCNC: 7.8 G/DL (ref 12.1–17)
IMM GRANULOCYTES # BLD AUTO: 0 K/UL (ref 0–0.04)
IMM GRANULOCYTES NFR BLD AUTO: 1 % (ref 0–0.5)
LYMPHOCYTES # BLD: 0.7 K/UL (ref 0.8–3.5)
LYMPHOCYTES NFR BLD: 16 % (ref 12–49)
MCH RBC QN AUTO: 29.8 PG (ref 26–34)
MCHC RBC AUTO-ENTMCNC: 35.1 G/DL (ref 30–36.5)
MCV RBC AUTO: 84.7 FL (ref 80–99)
MONOCYTES # BLD: 0.4 K/UL (ref 0–1)
MONOCYTES NFR BLD: 9 % (ref 5–13)
NEUTS SEG # BLD: 3.4 K/UL (ref 1.8–8)
NEUTS SEG NFR BLD: 70 % (ref 32–75)
NRBC # BLD: 0 K/UL (ref 0–0.01)
NRBC BLD-RTO: 0 PER 100 WBC
PLATELET # BLD AUTO: 110 K/UL (ref 150–400)
PMV BLD AUTO: 9.1 FL (ref 8.9–12.9)
POTASSIUM SERPL-SCNC: 4.5 MMOL/L (ref 3.5–5.1)
PROT SERPL-MCNC: 6.4 G/DL (ref 6.4–8.2)
RBC # BLD AUTO: 2.62 M/UL (ref 4.1–5.7)
RBC MORPH BLD: ABNORMAL
SERVICE CMNT-IMP: ABNORMAL
SODIUM SERPL-SCNC: 136 MMOL/L (ref 136–145)
VANCOMYCIN SERPL-MCNC: 12.3 UG/ML
WBC # BLD AUTO: 4.6 K/UL (ref 4.1–11.1)

## 2022-12-08 PROCEDURE — 80053 COMPREHEN METABOLIC PANEL: CPT

## 2022-12-08 PROCEDURE — 74011250636 HC RX REV CODE- 250/636: Performed by: PODIATRIST

## 2022-12-08 PROCEDURE — 74011250637 HC RX REV CODE- 250/637: Performed by: PODIATRIST

## 2022-12-08 PROCEDURE — 74011000258 HC RX REV CODE- 258: Performed by: PODIATRIST

## 2022-12-08 PROCEDURE — 74011000250 HC RX REV CODE- 250: Performed by: PODIATRIST

## 2022-12-08 PROCEDURE — 74011000250 HC RX REV CODE- 250: Performed by: GENERAL ACUTE CARE HOSPITAL

## 2022-12-08 PROCEDURE — 82962 GLUCOSE BLOOD TEST: CPT

## 2022-12-08 PROCEDURE — 85025 COMPLETE CBC W/AUTO DIFF WBC: CPT

## 2022-12-08 PROCEDURE — 36415 COLL VENOUS BLD VENIPUNCTURE: CPT

## 2022-12-08 PROCEDURE — 65270000046 HC RM TELEMETRY

## 2022-12-08 PROCEDURE — 74011250636 HC RX REV CODE- 250/636: Performed by: GENERAL ACUTE CARE HOSPITAL

## 2022-12-08 PROCEDURE — 74011250637 HC RX REV CODE- 250/637: Performed by: STUDENT IN AN ORGANIZED HEALTH CARE EDUCATION/TRAINING PROGRAM

## 2022-12-08 PROCEDURE — 80202 ASSAY OF VANCOMYCIN: CPT

## 2022-12-08 PROCEDURE — 74011636637 HC RX REV CODE- 636/637: Performed by: PODIATRIST

## 2022-12-08 RX ORDER — ENOXAPARIN SODIUM 100 MG/ML
40 INJECTION SUBCUTANEOUS EVERY 24 HOURS
Status: DISCONTINUED | OUTPATIENT
Start: 2022-12-08 | End: 2022-12-10 | Stop reason: HOSPADM

## 2022-12-08 RX ORDER — POLYMYXIN B SULFATE AND TRIMETHOPRIM 1; 10000 MG/ML; [USP'U]/ML
1 SOLUTION OPHTHALMIC EVERY 6 HOURS
Status: DISCONTINUED | OUTPATIENT
Start: 2022-12-08 | End: 2022-12-10 | Stop reason: HOSPADM

## 2022-12-08 RX ORDER — POLYETHYLENE GLYCOL 3350 17 G/17G
17 POWDER, FOR SOLUTION ORAL DAILY PRN
Status: DISCONTINUED | OUTPATIENT
Start: 2022-12-08 | End: 2022-12-10 | Stop reason: HOSPADM

## 2022-12-08 RX ORDER — POLYMYXIN B SULFATE AND TRIMETHOPRIM 1; 10000 MG/ML; [USP'U]/ML
1 SOLUTION OPHTHALMIC 2 TIMES DAILY
Status: DISCONTINUED | OUTPATIENT
Start: 2022-12-08 | End: 2022-12-08

## 2022-12-08 RX ADMIN — SODIUM CHLORIDE, PRESERVATIVE FREE 10 ML: 5 INJECTION INTRAVENOUS at 22:13

## 2022-12-08 RX ADMIN — SODIUM CHLORIDE, PRESERVATIVE FREE 10 ML: 5 INJECTION INTRAVENOUS at 14:02

## 2022-12-08 RX ADMIN — VANCOMYCIN HYDROCHLORIDE 1250 MG: 10 INJECTION, POWDER, LYOPHILIZED, FOR SOLUTION INTRAVENOUS at 03:43

## 2022-12-08 RX ADMIN — SODIUM CHLORIDE 75 ML/HR: 9 INJECTION, SOLUTION INTRAVENOUS at 00:15

## 2022-12-08 RX ADMIN — GABAPENTIN 100 MG: 100 CAPSULE ORAL at 08:44

## 2022-12-08 RX ADMIN — PIPERACILLIN AND TAZOBACTAM 3.38 G: 3; .375 INJECTION, POWDER, FOR SOLUTION INTRAVENOUS at 08:44

## 2022-12-08 RX ADMIN — GABAPENTIN 100 MG: 100 CAPSULE ORAL at 17:20

## 2022-12-08 RX ADMIN — Medication 3 UNITS: at 12:19

## 2022-12-08 RX ADMIN — PIPERACILLIN AND TAZOBACTAM 3.38 G: 3; .375 INJECTION, POWDER, FOR SOLUTION INTRAVENOUS at 17:20

## 2022-12-08 RX ADMIN — POLYMYXIN B SULFATE AND TRIMETHOPRIM 1 DROP: 10000; 1 SOLUTION OPHTHALMIC at 17:20

## 2022-12-08 RX ADMIN — Medication 1 AMPULE: at 08:44

## 2022-12-08 RX ADMIN — HYDROMORPHONE HYDROCHLORIDE 1 MG: 1 INJECTION, SOLUTION INTRAMUSCULAR; INTRAVENOUS; SUBCUTANEOUS at 14:02

## 2022-12-08 RX ADMIN — ENOXAPARIN SODIUM 40 MG: 100 INJECTION SUBCUTANEOUS at 08:50

## 2022-12-08 RX ADMIN — HYDROMORPHONE HYDROCHLORIDE 1 MG: 1 INJECTION, SOLUTION INTRAMUSCULAR; INTRAVENOUS; SUBCUTANEOUS at 18:10

## 2022-12-08 RX ADMIN — Medication 8 UNITS: at 17:20

## 2022-12-08 RX ADMIN — GABAPENTIN 100 MG: 100 CAPSULE ORAL at 22:13

## 2022-12-08 RX ADMIN — HYDROMORPHONE HYDROCHLORIDE 1 MG: 1 INJECTION, SOLUTION INTRAMUSCULAR; INTRAVENOUS; SUBCUTANEOUS at 22:13

## 2022-12-08 RX ADMIN — VANCOMYCIN HYDROCHLORIDE 1250 MG: 10 INJECTION, POWDER, LYOPHILIZED, FOR SOLUTION INTRAVENOUS at 22:13

## 2022-12-08 RX ADMIN — LOSARTAN POTASSIUM 50 MG: 50 TABLET, FILM COATED ORAL at 08:44

## 2022-12-08 RX ADMIN — SODIUM CHLORIDE, PRESERVATIVE FREE 10 ML: 5 INJECTION INTRAVENOUS at 06:51

## 2022-12-08 RX ADMIN — PIPERACILLIN AND TAZOBACTAM 3.38 G: 3; .375 INJECTION, POWDER, FOR SOLUTION INTRAVENOUS at 00:13

## 2022-12-08 RX ADMIN — HYDROMORPHONE HYDROCHLORIDE 1 MG: 1 INJECTION, SOLUTION INTRAMUSCULAR; INTRAVENOUS; SUBCUTANEOUS at 01:20

## 2022-12-08 RX ADMIN — HYDROMORPHONE HYDROCHLORIDE 1 MG: 1 INJECTION, SOLUTION INTRAMUSCULAR; INTRAVENOUS; SUBCUTANEOUS at 10:03

## 2022-12-08 RX ADMIN — Medication 1 CAPSULE: at 08:44

## 2022-12-08 RX ADMIN — HYDROMORPHONE HYDROCHLORIDE 1 MG: 1 INJECTION, SOLUTION INTRAMUSCULAR; INTRAVENOUS; SUBCUTANEOUS at 05:36

## 2022-12-08 RX ADMIN — Medication 2 UNITS: at 17:20

## 2022-12-08 RX ADMIN — Medication 8 UNITS: at 08:44

## 2022-12-08 NOTE — PROGRESS NOTES
End of Shift Note    Bedside shift change report given to Janina Hurley (oncoming nurse) by Joanne Fuentes RN (offgoing nurse). Report included the following information SBAR, Kardex, Procedure Summary, Intake/Output, MAR, and Recent Results    Shift worked:  7 pm - 7 am     Shift summary and any significant changes:     No changes. Concerns for physician to address:  No concerns     Zone phone for oncoming shift:   [phone]       Activity:  Activity Level: Up with Assistance  Number times ambulated in hallways past shift: 0  Number of times OOB to chair past shift: 0    Cardiac:   Cardiac Monitoring: Yes      Cardiac Rhythm: Sinus Hieu    Access:  Current line(s): PIV     Genitourinary:   Urinary status: voiding    Respiratory:   O2 Device: None (Room air)  Chronic home O2 use?: YES  Incentive spirometer at bedside: YES       GI:  Last Bowel Movement Date: 12/04/22  Current diet:  ADULT DIET Regular; 4 carb choices (60 gm/meal)  Passing flatus: YES  Tolerating current diet: YES       Pain Management:   Patient states pain is manageable on current regimen: YES    Skin:  Can Score: 19  Interventions: increase time out of bed and foam dressing    Patient Safety:  Fall Score:  Total Score: 3  Interventions: pt to call before getting OOB  High Fall Risk: Yes    Length of Stay:  Expected LOS: 5d 19h  Actual LOS: 3      Joanne Fuentes RN

## 2022-12-08 NOTE — PROGRESS NOTES
Pharmacy Antimicrobial Kinetic Dosing    Indication for Antimicrobials: OM     Current Regimen of Each Antimicrobial:  Vancomycin consult - started  day 3  Zosyn 3.375gm IV q8h - started  day 3    Previous Antimicrobial Therapy:    Vancomycin Goal Level: AUC: 400-600 mg/hr/Liter/day    Date Dose & Interval Measured (mcg/mL) Predicted AUCss / C trough ss   22 1,250mg IV Q18H 12.3 496 / 14.9                   Dosing calculator used: [employer] calculator    Significant Positive Cultures:    PBCx - ngtd   wound- scant GNR and alpha strep    Conditions for Dosing Consideration: None    Labs:  Recent Labs     22  0140 22  0341 22  1117   CREA 1.30 1.28 1.41*   BUN 23* 27* 30*     Recent Labs     22  0140 22  0341 22  1117   WBC 4.6 6.4 8.4     Temp (24hrs), Av.4 °F (36.9 °C), Min:98.2 °F (36.8 °C), Max:98.6 °F (37 °C)    Creatinine Clearance (mL/min):   CrCl (Adjusted Body Weight): 69.1 mL/min   If actual weight < IBW: CrCl (Actual Body Weight) 70.9    Impression/Plan:   Scr stable, WBCs decreasing, Afebrile  Vancomycin level on 22 of 12.3 (drawn appropriately) is therapeutic  Continue Vancomycin 1,250 mg IV q18h for a projected AUCss of 496 and trough of 14.9  Next level > 48 hours unless significant change in renal function or clinical course  BMP and CBC ordered  Continue Zosyn regimen  Antimicrobial stop date - pending     Pharmacy will follow daily and adjust medications as appropriate for renal function and/or serum levels.     Thank you,  LUDY Hillman

## 2022-12-08 NOTE — PROGRESS NOTES
0715-  Bedside and Verbal shift change report given to Alhaji Mccallum RN (oncoming nurse) by Bernadine Maldonado RN (offgoing nurse). Report included the following information SBAR, Kardex, Intake/Output, MAR, and Recent Results. End of Shift Note    Bedside shift change report given to Cheko Jaquez RN (oncoming nurse) by Araceli Vegas RN (offgoing nurse). Report included the following information SBAR, Kardex, Intake/Output, MAR, and Recent Results    Shift worked:  7a-7p     Shift summary and any significant changes:     none     Concerns for physician to address:  none     Zone phone for oncoming shift:   [phone]       Activity:  Activity Level: Up ad natty  Number times ambulated in hallways past shift: 0  Number of times OOB to chair past shift: 1    Cardiac:   Cardiac Monitoring: Yes      Cardiac Rhythm: Sinus Hieu    Access:  Current line(s): PIV     Genitourinary:   Urinary status: voiding    Respiratory:   O2 Device: None (Room air)  Chronic home O2 use?: NO  Incentive spirometer at bedside: YES       GI:  Last Bowel Movement Date: 12/08/22  Current diet:  ADULT DIET Regular; 4 carb choices (60 gm/meal)  Passing flatus: YES  Tolerating current diet: YES       Pain Management:   Patient states pain is manageable on current regimen: YES    Skin:  Can Score: 19  Interventions: float heels, increase time out of bed, and PT/OT consult    Patient Safety:  Fall Score:  Total Score: 2  Interventions: gripper socks and pt to call before getting OOB  High Fall Risk: Yes    Length of Stay:  Expected LOS: 5d 19h  Actual LOS: [address] [place], RN

## 2022-12-08 NOTE — PROGRESS NOTES
Seen patient in the room for post op bala and bandage change     Reduced edema and erythema only bloody drainage on the bandage none acute sutures in place pics taken    Minimal dehiscence replaced bandage     Partial weight bearing on the left foot with post op shoe     Will wait for the path report to plan further

## 2022-12-08 NOTE — PROGRESS NOTES
Problem: Falls - Risk of  Goal: *Absence of Falls  Description: Document Vernia Colder Fall Risk and appropriate interventions in the flowsheet.   Outcome: Progressing Towards Goal  Note: Fall Risk Interventions:  Mobility Interventions: Communicate number of staff needed for ambulation/transfer         Medication Interventions: Evaluate medications/consider consulting pharmacy, Patient to call before getting OOB, Teach patient to arise slowly    Elimination Interventions: Urinal in reach, Call light in reach

## 2022-12-08 NOTE — PROGRESS NOTES
Hospitalist Progress Note    NAME: Jamey Saxena   :  1965   MRN:  [id number]       Assessment / Plan:  L 4th toe OM s/p third/fourth partial ray resection   Age- indeterminate displaced fracture through the prox phalanx  DM with hyperglycemia  XR L foot showed findings consistent with osteomyelitis of the fourth proximal, middle, and distal phalanges with marked osteolysis (particularly of the distal phalanx) and age indeterminant mildly displaced fracture through the proximal phalanx. Hold metformin. Continue NPH, titrate prn. SSI  Pain control with prn oxycodone and IV morphine  Empiric abx with vanc and zosyn  Podiatry following and the patient underwent partial ray resection , pending cultures      Awaiting Cx     HTN  Cont' losartan  Prn iv hydralazine    Pink eye, ?conjunctivitis  Start abx and artificial tears eye drop     18.5 - 24.9 Normal weight / Body mass index is 24.19 kg/m². Estimated discharge date:   Barriers:    Code status: Full  Prophylaxis: Lovenox patient was seen and examined this morning. Patient denies any new complaints. Recommended Disposition: Home w/Family     Subjective:     Chief Complaint / Reason for Physician Visit    Patient was seen and examined this morning. Patient denies any new complaints. Patient denies fever, chills, shortness of breath, chest pain, abdominal pain, nausea, vomiting, and diarrhea. No overnight events reported by nursing staff. C/o right eye itching       Review of Systems:  Symptom Y/N Comments  Symptom Y/N Comments   Fever/Chills n   Chest Pain n    Poor Appetite    Edema     Cough    Abdominal Pain     Sputum    Joint Pain     SOB/VIGIL n   Pruritis/Rash     Nausea/vomit    Tolerating PT/OT     Diarrhea    Tolerating Diet y    Constipation    Other       Could NOT obtain due to:      Objective:     VITALS:   Last 24hrs VS reviewed since prior progress note.  Most recent are:  Patient Vitals for the past 24 hrs:   Temp Pulse Resp BP SpO2   12/08/22 1143 98.1 °F (36.7 °C) (!) 54 16 (!) 141/77 99 %   12/08/22 0732 98.2 °F (36.8 °C) (!) 58 -- (!) 140/73 100 %   12/08/22 0343 98.6 °F (37 °C) (!) 59 17 111/70 98 %   12/07/22 2302 -- 63 -- 118/65 --   12/07/22 1950 98.6 °F (37 °C) 62 17 (!) 157/86 99 %   12/07/22 1517 98.4 °F (36.9 °C) 62 16 (!) 149/74 98 %       No intake or output data in the 24 hours ending 12/08/22 1240       I had a face to face encounter and independently examined this patient on 12/8/2022, as outlined below:  PHYSICAL EXAM:  General: WD, WN. Alert, cooperative, no acute distress    EENT:  EOMI. Anicteric sclerae. MMM. Right eye pinkish, erythema around eyelid, minimal discharge   Resp:  CTA bilaterally, no wheezing or rales. No accessory muscle use  CV:  Regular  rhythm,  No edema  GI:  Soft, Non distended, Non tender. +Bowel sounds  Neurologic:  Alert and oriented X 3, normal speech,   Psych:   Good insight. Not anxious nor agitated  Skin:  Left foot with dressing in place, intact     Reviewed most current lab test results and cultures  YES  Reviewed most current radiology test results   YES  Review and summation of old records today    NO  Reviewed patient's current orders and MAR    YES  PMH/ reviewed - no change compared to H&P  ________________________________________________________________________  Care Plan discussed with:    Comments   Patient x    Family      RN     Care Manager     Consultant                        Multidiciplinary team rounds were held today with , nursing, pharmacist and clinical coordinator. Patient's plan of care was discussed; medications were reviewed and discharge planning was addressed.      ________________________________________________________________________  Total NON critical care TIME:  30   Minutes    Total CRITICAL CARE TIME Spent:   Minutes non procedure based      Comments   >50% of visit spent in counseling and coordination of care ________________________________________________________________________  Edna Lozada MD     Procedures: see electronic medical records for all procedures/Xrays and details which were not copied into this note but were reviewed prior to creation of Plan. LABS:  I reviewed today's most current labs and imaging studies.   Pertinent labs include:  Recent Labs     12/08/22 0140 12/06/22 0341   WBC 4.6 6.4   HGB 7.8* 8.8*   HCT 22.2* 25.4*   * 139*       Recent Labs     12/08/22 0140 12/06/22 0341    135*   K 4.5 4.6    100   CO2 28 30   * 178*   BUN 23* 27*   CREA 1.30 1.28   CA 8.5 8.8   ALB 2.3*  --    TBILI 0.3  --    ALT 28  --          Signed: Edna Lozada MD

## 2022-12-08 NOTE — PROGRESS NOTES
End of Shift Note    Bedside shift change report given to Flavio Goodrich (oncoming nurse) by Hina Munoz (offgoing nurse). Report included the following information SBAR, Kardex, and MAR    Shift worked:  4018-7618     Shift summary and any significant changes:     Pain control     Concerns for physician to address:  none     Zone phone for oncoming shift:   [phone]       Activity:  Activity Level: Up with Assistance  Number times ambulated in hallways past shift: 0  Number of times OOB to chair past shift: 0    Cardiac:   Cardiac Monitoring: Yes      Cardiac Rhythm: Sinus Hieu    Access:  Current line(s): PIV     Genitourinary:   Urinary status: voiding    Respiratory:   O2 Device: None (Room air)  Chronic home O2 use?: YES  Incentive spirometer at bedside: YES       GI:  Last Bowel Movement Date:  (PTA;bowel sounds active)  Current diet:  ADULT DIET Regular; 4 carb choices (60 gm/meal)  Passing flatus: YES  Tolerating current diet: YES       Pain Management:   Patient states pain is manageable on current regimen: YES    Skin:  Can Score: 20  Interventions: increase time out of bed    Patient Safety:  Fall Score:  Total Score: 3  Interventions: gripper socks  High Fall Risk: Yes    Length of Stay:  Expected LOS: Ilir Noyola  Actual LOS: [address]

## 2022-12-08 NOTE — PROGRESS NOTES
Problem: Falls - Risk of  Goal: *Absence of Falls  Description: Document Jasbir Riveras Fall Risk and appropriate interventions in the flowsheet.   Outcome: Progressing Towards Goal  Note: Fall Risk Interventions:  Mobility Interventions: Communicate number of staff needed for ambulation/transfer         Medication Interventions: Patient to call before getting OOB, Teach patient to arise slowly, Evaluate medications/consider consulting pharmacy    Elimination Interventions: Urinal in reach, Call light in reach              Problem: Pain  Goal: *Control of Pain  Outcome: Progressing Towards Goal

## 2022-12-09 LAB
BACTERIA SPEC CULT: ABNORMAL
BACTERIA SPEC CULT: ABNORMAL
BACTERIA SPEC CULT: NORMAL
GLUCOSE BLD STRIP.AUTO-MCNC: 105 MG/DL (ref 65–117)
GLUCOSE BLD STRIP.AUTO-MCNC: 119 MG/DL (ref 65–117)
GLUCOSE BLD STRIP.AUTO-MCNC: 152 MG/DL (ref 65–117)
GLUCOSE BLD STRIP.AUTO-MCNC: 170 MG/DL (ref 65–117)
GLUCOSE BLD STRIP.AUTO-MCNC: 252 MG/DL (ref 65–117)
GRAM STN SPEC: ABNORMAL
GRAM STN SPEC: ABNORMAL
SERVICE CMNT-IMP: ABNORMAL
SERVICE CMNT-IMP: NORMAL
SERVICE CMNT-IMP: NORMAL

## 2022-12-09 PROCEDURE — 74011000250 HC RX REV CODE- 250: Performed by: PODIATRIST

## 2022-12-09 PROCEDURE — 74011250636 HC RX REV CODE- 250/636: Performed by: GENERAL ACUTE CARE HOSPITAL

## 2022-12-09 PROCEDURE — 82962 GLUCOSE BLOOD TEST: CPT

## 2022-12-09 PROCEDURE — 74011000258 HC RX REV CODE- 258: Performed by: PODIATRIST

## 2022-12-09 PROCEDURE — 74011250636 HC RX REV CODE- 250/636: Performed by: PODIATRIST

## 2022-12-09 PROCEDURE — 65270000046 HC RM TELEMETRY

## 2022-12-09 PROCEDURE — 74011250637 HC RX REV CODE- 250/637: Performed by: STUDENT IN AN ORGANIZED HEALTH CARE EDUCATION/TRAINING PROGRAM

## 2022-12-09 PROCEDURE — 74011000250 HC RX REV CODE- 250: Performed by: GENERAL ACUTE CARE HOSPITAL

## 2022-12-09 PROCEDURE — 74011636637 HC RX REV CODE- 636/637: Performed by: PODIATRIST

## 2022-12-09 PROCEDURE — 74011250637 HC RX REV CODE- 250/637: Performed by: PODIATRIST

## 2022-12-09 RX ADMIN — GABAPENTIN 100 MG: 100 CAPSULE ORAL at 08:23

## 2022-12-09 RX ADMIN — GABAPENTIN 100 MG: 100 CAPSULE ORAL at 22:22

## 2022-12-09 RX ADMIN — PIPERACILLIN AND TAZOBACTAM 3.38 G: 3; .375 INJECTION, POWDER, FOR SOLUTION INTRAVENOUS at 08:23

## 2022-12-09 RX ADMIN — Medication 5 UNITS: at 17:32

## 2022-12-09 RX ADMIN — MELATONIN 3 MG: at 22:22

## 2022-12-09 RX ADMIN — HYDROMORPHONE HYDROCHLORIDE 1 MG: 1 INJECTION, SOLUTION INTRAMUSCULAR; INTRAVENOUS; SUBCUTANEOUS at 14:08

## 2022-12-09 RX ADMIN — POLYMYXIN B SULFATE AND TRIMETHOPRIM 1 DROP: 10000; 1 SOLUTION OPHTHALMIC at 12:23

## 2022-12-09 RX ADMIN — VANCOMYCIN HYDROCHLORIDE 1250 MG: 10 INJECTION, POWDER, LYOPHILIZED, FOR SOLUTION INTRAVENOUS at 14:36

## 2022-12-09 RX ADMIN — HYDROMORPHONE HYDROCHLORIDE 1 MG: 1 INJECTION, SOLUTION INTRAMUSCULAR; INTRAVENOUS; SUBCUTANEOUS at 22:20

## 2022-12-09 RX ADMIN — Medication 1 AMPULE: at 22:24

## 2022-12-09 RX ADMIN — PIPERACILLIN AND TAZOBACTAM 3.38 G: 3; .375 INJECTION, POWDER, FOR SOLUTION INTRAVENOUS at 17:33

## 2022-12-09 RX ADMIN — SODIUM CHLORIDE, PRESERVATIVE FREE 10 ML: 5 INJECTION INTRAVENOUS at 14:08

## 2022-12-09 RX ADMIN — HYDROMORPHONE HYDROCHLORIDE 1 MG: 1 INJECTION, SOLUTION INTRAMUSCULAR; INTRAVENOUS; SUBCUTANEOUS at 17:59

## 2022-12-09 RX ADMIN — SODIUM CHLORIDE, PRESERVATIVE FREE 10 ML: 5 INJECTION INTRAVENOUS at 22:25

## 2022-12-09 RX ADMIN — POLYMYXIN B SULFATE AND TRIMETHOPRIM 1 DROP: 10000; 1 SOLUTION OPHTHALMIC at 05:30

## 2022-12-09 RX ADMIN — MELATONIN 3 MG: at 01:47

## 2022-12-09 RX ADMIN — SODIUM CHLORIDE, PRESERVATIVE FREE 10 ML: 5 INJECTION INTRAVENOUS at 05:22

## 2022-12-09 RX ADMIN — HYDROMORPHONE HYDROCHLORIDE 1 MG: 1 INJECTION, SOLUTION INTRAMUSCULAR; INTRAVENOUS; SUBCUTANEOUS at 05:56

## 2022-12-09 RX ADMIN — HYDROMORPHONE HYDROCHLORIDE 1 MG: 1 INJECTION, SOLUTION INTRAMUSCULAR; INTRAVENOUS; SUBCUTANEOUS at 01:47

## 2022-12-09 RX ADMIN — Medication 1 AMPULE: at 08:22

## 2022-12-09 RX ADMIN — TRAZODONE HYDROCHLORIDE 50 MG: 50 TABLET ORAL at 22:22

## 2022-12-09 RX ADMIN — POLYMYXIN B SULFATE AND TRIMETHOPRIM 1 DROP: 10000; 1 SOLUTION OPHTHALMIC at 17:33

## 2022-12-09 RX ADMIN — HYDROMORPHONE HYDROCHLORIDE 1 MG: 1 INJECTION, SOLUTION INTRAMUSCULAR; INTRAVENOUS; SUBCUTANEOUS at 09:56

## 2022-12-09 RX ADMIN — LOSARTAN POTASSIUM 50 MG: 50 TABLET, FILM COATED ORAL at 08:23

## 2022-12-09 RX ADMIN — PIPERACILLIN AND TAZOBACTAM 3.38 G: 3; .375 INJECTION, POWDER, FOR SOLUTION INTRAVENOUS at 01:47

## 2022-12-09 RX ADMIN — Medication 8 UNITS: at 17:31

## 2022-12-09 RX ADMIN — ENOXAPARIN SODIUM 40 MG: 100 INJECTION SUBCUTANEOUS at 08:23

## 2022-12-09 RX ADMIN — Medication 8 UNITS: at 08:23

## 2022-12-09 RX ADMIN — Medication 1 CAPSULE: at 08:23

## 2022-12-09 RX ADMIN — GABAPENTIN 100 MG: 100 CAPSULE ORAL at 17:32

## 2022-12-09 RX ADMIN — Medication 2 UNITS: at 08:24

## 2022-12-09 NOTE — PROGRESS NOTES
Report given to day shift Nurse Lorne Chin. Report included SBAR, MAR, KARDEX, INTAKE/OUTPUT. Nurse Lorne Chin given opportunity to ask any questions concerning care.

## 2022-12-09 NOTE — PROGRESS NOTES
Reviewed the path report margins are clear     Can be discharged when medically stable and appropriate with following instructions and recommendations    Can be discharged with oral antibiotics for 10 days like Levaquin 250 once a day   May need home health or rehab care for weight bearing and ambulation training on the left foot with a post op shoe  Follow up in 10 days from discharge for post op care and eval  Partial weight bearing on the left foot with a post op shoe    If schuyler further instructions please reach out to me on perfect serve or cell [phone]

## 2022-12-09 NOTE — PROGRESS NOTES
Spiritual Care Assessment/Progress Note  [facility]      NAME: Wicho Johnson      MRN: [id number]  AGE: 61 y.o.  SEX: male  Nondenominational Affiliation: No Sabianism   Language: English     12/9/2022     Total Time (in minutes): 8     Spiritual Assessment begun in [facility] 3 SURG TELE through conversation with:         [x]Patient        [] Family    [] Friend(s)        Reason for Consult: Initial/Spiritual assessment, patient floor     Spiritual beliefs: (Please include comment if needed)     [] Identifies with a matias tradition:         [] Supported by a matias community:            [] Claims no spiritual orientation:           [] Seeking spiritual identity:                [] Adheres to an individual form of spirituality:           [x] Not able to assess: Did not indicate                          Identified resources for coping:      [] Prayer                               [] Music                  [] Guided Imagery     [] Family/friends                 [] Pet visits     [] Devotional reading                         [x] Unknown     [] Other:                                                Interventions offered during this visit: (See comments for more details)    Patient Interventions: Initial/Spiritual assessment, patient floor, Catharsis/review of pertinent events in supportive environment           Plan of Care:     [] Support spiritual and/or cultural needs    [] Support AMD and/or advance care planning process      [] Support grieving process   [] Coordinate Rites and/or Rituals    [] Coordination with community clergy   [x] No spiritual needs identified at this time   [] Detailed Plan of Care below (See Comments)  [] Make referral to Music Therapy  [] Make referral to Pet Therapy     [] Make referral to Addiction services  [] Make referral to [facility]  [] Make referral to Spiritual Care Partner  [] No future visits requested        [x] Contact Spiritual Care for further referrals     Comments: Reviewed chart prior to visit on Surg Tele unit for spiritual assessment. No family/friends present. Patient was seated in chair watching television. He declined pastoral visit indicating he is doing fine this afternoon. Advised him of ongoing availability of pastoral support.       XOCHITL Tariq, [address] [facility], Staff [address] [place] [address] Road Paging Service  [phone]

## 2022-12-09 NOTE — PROGRESS NOTES
Problem: Falls - Risk of  Goal: *Absence of Falls  Description: Document Mayra Nap Fall Risk and appropriate interventions in the flowsheet.   Outcome: Progressing Towards Goal  Note: Fall Risk Interventions:  Mobility Interventions: Communicate number of staff needed for ambulation/transfer         Medication Interventions: Teach patient to arise slowly    Elimination Interventions: Urinal in reach              Problem: Pain  Goal: *Control of Pain  Outcome: Progressing Towards Goal

## 2022-12-09 NOTE — PROGRESS NOTES
0715-  Bedside and Verbal shift change report given to Audra Conklin RN (oncoming nurse) by Ruth Ann Mccormick RN (offgoing nurse). Report included the following information SBAR, Kardex, Intake/Output, MAR, and Recent Results. 1915- End of Shift Note    Bedside shift change report given to Tanner Mitchell RN (oncoming nurse) by Tonja Caro RN (offgoing nurse). Report included the following information SBAR, Kardex, Intake/Output, MAR, and Recent Results    Shift worked: 7a-7p     Shift summary and any significant changes:     none     Concerns for physician to address:  none     Zone phone for oncoming shift:   [phone]       Activity:  Activity Level: Up ad natty  Number times ambulated in hallways past shift: 1  Number of times OOB to chair past shift: 2    Cardiac:   Cardiac Monitoring: No      Cardiac Rhythm: Sinus Hieu    Access:  Current line(s): PIV     Genitourinary:   Urinary status: voiding    Respiratory:   O2 Device: None (Room air)  Chronic home O2 use?: NO  Incentive spirometer at bedside: YES       GI:  Last Bowel Movement Date: 12/08/22  Current diet:  ADULT DIET Regular; 4 carb choices (60 gm/meal)  Passing flatus: YES  Tolerating current diet: YES       Pain Management:   Patient states pain is manageable on current regimen: YES    Skin:  Can Score: 21  Interventions: float heels, increase time out of bed, and PT/OT consult    Patient Safety:  Fall Score:  Total Score: 2  Interventions: gripper socks  High Fall Risk: Yes    Length of Stay:  Expected LOS: Charlette Or  Actual LOS: [address] Allan Azevedo RN

## 2022-12-09 NOTE — PROGRESS NOTES
Problem: Falls - Risk of  Goal: *Absence of Falls  Description: Document Francisca Kearney Fall Risk and appropriate interventions in the flowsheet.   Outcome: Progressing Towards Goal  Note: Fall Risk Interventions:  Mobility Interventions: Communicate number of staff needed for ambulation/transfer         Medication Interventions: Teach patient to arise slowly    Elimination Interventions: Urinal in reach

## 2022-12-09 NOTE — PROGRESS NOTES
Hospitalist Progress Note    NAME: Clarence Olsen   :  1965   MRN:  [id number]       Assessment / Plan:  L 4th toe OM s/p third/fourth partial ray resection   Age- indeterminate displaced fracture through the prox phalanx  DM with hyperglycemia  XR L foot showed findings consistent with osteomyelitis of the fourth proximal, middle, and distal phalanges with marked osteolysis (particularly of the distal phalanx) and age indeterminant mildly displaced fracture through the proximal phalanx. Hold metformin. Continue NPH, titrate prn. SSI  Pain control with prn oxycodone and IV morphine  Empiric abx with vanc and zosyn  Podiatry following and the patient underwent partial ray resection , pending cultures  Awaiting Cx/Pathology     HTN  Cont' losartan  Prn iv hydralazine    Pink eye, ?conjunctivitis  Start abx and artificial tears eye drop     18.5 - 24.9 Normal weight / Body mass index is 24.19 kg/m². Estimated discharge date:   Barriers:    Code status: Full  Prophylaxis: Lovenox patient was seen and examined this morning. Patient denies any new complaints. Recommended Disposition: Home w/Family     Subjective:     Chief Complaint / Reason for Physician Visit    Patient was seen and examined this morning. Patient denies any new complaints. Patient denies fever, chills, shortness of breath, chest pain, abdominal pain, nausea, vomiting, and diarrhea. No overnight events reported by nursing staff. C/o right eye itching       Review of Systems:  Symptom Y/N Comments  Symptom Y/N Comments   Fever/Chills n   Chest Pain n    Poor Appetite    Edema     Cough    Abdominal Pain     Sputum    Joint Pain     SOB/VIGIL n   Pruritis/Rash     Nausea/vomit    Tolerating PT/OT     Diarrhea    Tolerating Diet y    Constipation    Other       Could NOT obtain due to:      Objective:     VITALS:   Last 24hrs VS reviewed since prior progress note.  Most recent are:  Patient Vitals for the past 24 hrs:   Temp Pulse Resp BP SpO2   12/09/22 1538 98.4 °F (36.9 °C) (!) 51 16 (!) 164/84 100 %   12/09/22 1133 98.2 °F (36.8 °C) (!) 51 16 134/83 100 %   12/09/22 0740 97.5 °F (36.4 °C) (!) 55 16 (!) 141/85 100 %   12/09/22 0246 97.5 °F (36.4 °C) (!) 50 -- 136/61 97 %   12/08/22 2353 98.4 °F (36.9 °C) (!) 55 -- (!) 142/80 97 %       No intake or output data in the 24 hours ending 12/09/22 1837       I had a face to face encounter and independently examined this patient on 12/9/2022, as outlined below:  PHYSICAL EXAM:  General: WD, WN. Alert, cooperative, no acute distress    EENT:  EOMI. Anicteric sclerae. MMM. Right eye pinkish, erythema around eyelid, minimal discharge   Resp:  CTA bilaterally, no wheezing or rales. No accessory muscle use  CV:  Regular  rhythm,  No edema  GI:  Soft, Non distended, Non tender. +Bowel sounds  Neurologic:  Alert and oriented X 3, normal speech,   Psych:   Good insight. Not anxious nor agitated  Skin:  Left foot with dressing in place, intact     Reviewed most current lab test results and cultures  YES  Reviewed most current radiology test results   YES  Review and summation of old records today    NO  Reviewed patient's current orders and MAR    YES  PMH/SH reviewed - no change compared to H&P  ________________________________________________________________________  Care Plan discussed with:    Comments   Patient x    Family      RN     Care Manager     Consultant                        Multidiciplinary team rounds were held today with , nursing, pharmacist and clinical coordinator. Patient's plan of care was discussed; medications were reviewed and discharge planning was addressed.      ________________________________________________________________________  Total NON critical care TIME:  30   Minutes    Total CRITICAL CARE TIME Spent:   Minutes non procedure based      Comments   >50% of visit spent in counseling and coordination of care ________________________________________________________________________  Kayce Daniel MD     Procedures: see electronic medical records for all procedures/Xrays and details which were not copied into this note but were reviewed prior to creation of Plan. LABS:  I reviewed today's most current labs and imaging studies.   Pertinent labs include:  Recent Labs     12/08/22  0140   WBC 4.6   HGB 7.8*   HCT 22.2*   *       Recent Labs     12/08/22  0140      K 4.5      CO2 28   *   BUN 23*   CREA 1.30   CA 8.5   ALB 2.3*   TBILI 0.3   ALT 28         Signed: Kayce Daniel MD

## 2022-12-10 VITALS
HEART RATE: 60 BPM | TEMPERATURE: 98.1 F | HEIGHT: 72 IN | RESPIRATION RATE: 18 BRPM | WEIGHT: 178.35 LBS | OXYGEN SATURATION: 99 % | DIASTOLIC BLOOD PRESSURE: 84 MMHG | BODY MASS INDEX: 24.16 KG/M2 | SYSTOLIC BLOOD PRESSURE: 187 MMHG

## 2022-12-10 LAB
ANION GAP SERPL CALC-SCNC: 6 MMOL/L (ref 5–15)
BUN SERPL-MCNC: 24 MG/DL (ref 6–20)
BUN/CREAT SERPL: 17 (ref 12–20)
CALCIUM SERPL-MCNC: 8.5 MG/DL (ref 8.5–10.1)
CHLORIDE SERPL-SCNC: 103 MMOL/L (ref 97–108)
CO2 SERPL-SCNC: 28 MMOL/L (ref 21–32)
CREAT SERPL-MCNC: 1.43 MG/DL (ref 0.7–1.3)
ERYTHROCYTE [DISTWIDTH] IN BLOOD BY AUTOMATED COUNT: 13.1 % (ref 11.5–14.5)
FERRITIN SERPL-MCNC: 117 NG/ML (ref 26–388)
GLUCOSE BLD STRIP.AUTO-MCNC: 185 MG/DL (ref 65–117)
GLUCOSE BLD STRIP.AUTO-MCNC: 281 MG/DL (ref 65–117)
GLUCOSE SERPL-MCNC: 173 MG/DL (ref 65–100)
HCT VFR BLD AUTO: 24.6 % (ref 36.6–50.3)
HGB BLD-MCNC: 8.2 G/DL (ref 12.1–17)
IRON SATN MFR SERPL: 11 % (ref 20–50)
IRON SERPL-MCNC: 29 UG/DL (ref 35–150)
MCH RBC QN AUTO: 29.4 PG (ref 26–34)
MCHC RBC AUTO-ENTMCNC: 33.3 G/DL (ref 30–36.5)
MCV RBC AUTO: 88.2 FL (ref 80–99)
NRBC # BLD: 0 K/UL (ref 0–0.01)
NRBC BLD-RTO: 0 PER 100 WBC
PLATELET # BLD AUTO: 140 K/UL (ref 150–400)
PMV BLD AUTO: 9.4 FL (ref 8.9–12.9)
POTASSIUM SERPL-SCNC: 4.6 MMOL/L (ref 3.5–5.1)
RBC # BLD AUTO: 2.79 M/UL (ref 4.1–5.7)
SERVICE CMNT-IMP: ABNORMAL
SERVICE CMNT-IMP: ABNORMAL
SODIUM SERPL-SCNC: 137 MMOL/L (ref 136–145)
TIBC SERPL-MCNC: 253 UG/DL (ref 250–450)
WBC # BLD AUTO: 3.5 K/UL (ref 4.1–11.1)

## 2022-12-10 PROCEDURE — 82962 GLUCOSE BLOOD TEST: CPT

## 2022-12-10 PROCEDURE — 83540 ASSAY OF IRON: CPT

## 2022-12-10 PROCEDURE — 74011000250 HC RX REV CODE- 250: Performed by: PODIATRIST

## 2022-12-10 PROCEDURE — 74011250636 HC RX REV CODE- 250/636: Performed by: GENERAL ACUTE CARE HOSPITAL

## 2022-12-10 PROCEDURE — 36415 COLL VENOUS BLD VENIPUNCTURE: CPT

## 2022-12-10 PROCEDURE — 74011250636 HC RX REV CODE- 250/636: Performed by: PODIATRIST

## 2022-12-10 PROCEDURE — 74011636637 HC RX REV CODE- 636/637: Performed by: PODIATRIST

## 2022-12-10 PROCEDURE — 80048 BASIC METABOLIC PNL TOTAL CA: CPT

## 2022-12-10 PROCEDURE — 82728 ASSAY OF FERRITIN: CPT

## 2022-12-10 PROCEDURE — 74011000258 HC RX REV CODE- 258: Performed by: PODIATRIST

## 2022-12-10 PROCEDURE — 74011250637 HC RX REV CODE- 250/637: Performed by: PODIATRIST

## 2022-12-10 PROCEDURE — 74011250637 HC RX REV CODE- 250/637: Performed by: STUDENT IN AN ORGANIZED HEALTH CARE EDUCATION/TRAINING PROGRAM

## 2022-12-10 PROCEDURE — 85027 COMPLETE CBC AUTOMATED: CPT

## 2022-12-10 RX ORDER — CIPROFLOXACIN 250 MG/1
250 TABLET, FILM COATED ORAL 2 TIMES DAILY
Qty: 20 TABLET | Refills: 0 | Status: SHIPPED | OUTPATIENT
Start: 2022-12-10 | End: 2022-12-20

## 2022-12-10 RX ORDER — POLYMYXIN B SULFATE AND TRIMETHOPRIM 1; 10000 MG/ML; [USP'U]/ML
1 SOLUTION OPHTHALMIC EVERY 6 HOURS
Qty: 1 EACH | Refills: 0 | Status: SHIPPED
Start: 2022-12-10 | End: 2022-12-14

## 2022-12-10 RX ORDER — OXYCODONE AND ACETAMINOPHEN 5; 325 MG/1; MG/1
1 TABLET ORAL
Qty: 12 TABLET | Refills: 0 | Status: SHIPPED | OUTPATIENT
Start: 2022-12-10 | End: 2022-12-13

## 2022-12-10 RX ADMIN — LOSARTAN POTASSIUM 50 MG: 50 TABLET, FILM COATED ORAL at 08:52

## 2022-12-10 RX ADMIN — POLYMYXIN B SULFATE AND TRIMETHOPRIM 1 DROP: 10000; 1 SOLUTION OPHTHALMIC at 13:04

## 2022-12-10 RX ADMIN — ENOXAPARIN SODIUM 40 MG: 100 INJECTION SUBCUTANEOUS at 08:53

## 2022-12-10 RX ADMIN — HYDROMORPHONE HYDROCHLORIDE 1 MG: 1 INJECTION, SOLUTION INTRAMUSCULAR; INTRAVENOUS; SUBCUTANEOUS at 10:20

## 2022-12-10 RX ADMIN — PIPERACILLIN AND TAZOBACTAM 3.38 G: 3; .375 INJECTION, POWDER, FOR SOLUTION INTRAVENOUS at 00:34

## 2022-12-10 RX ADMIN — Medication 1 CAPSULE: at 08:52

## 2022-12-10 RX ADMIN — SODIUM CHLORIDE, PRESERVATIVE FREE 10 ML: 5 INJECTION INTRAVENOUS at 07:11

## 2022-12-10 RX ADMIN — HYDROMORPHONE HYDROCHLORIDE 1 MG: 1 INJECTION, SOLUTION INTRAMUSCULAR; INTRAVENOUS; SUBCUTANEOUS at 02:35

## 2022-12-10 RX ADMIN — Medication 5 UNITS: at 08:53

## 2022-12-10 RX ADMIN — OXYCODONE 10 MG: 5 TABLET ORAL at 12:38

## 2022-12-10 RX ADMIN — Medication 2 UNITS: at 13:05

## 2022-12-10 RX ADMIN — Medication 1 AMPULE: at 08:53

## 2022-12-10 RX ADMIN — POLYMYXIN B SULFATE AND TRIMETHOPRIM 1 DROP: 10000; 1 SOLUTION OPHTHALMIC at 07:11

## 2022-12-10 RX ADMIN — PIPERACILLIN AND TAZOBACTAM 3.38 G: 3; .375 INJECTION, POWDER, FOR SOLUTION INTRAVENOUS at 08:52

## 2022-12-10 RX ADMIN — Medication 8 UNITS: at 08:53

## 2022-12-10 RX ADMIN — POLYMYXIN B SULFATE AND TRIMETHOPRIM 1 DROP: 10000; 1 SOLUTION OPHTHALMIC at 00:34

## 2022-12-10 RX ADMIN — HYDROMORPHONE HYDROCHLORIDE 1 MG: 1 INJECTION, SOLUTION INTRAMUSCULAR; INTRAVENOUS; SUBCUTANEOUS at 06:33

## 2022-12-10 RX ADMIN — GABAPENTIN 100 MG: 100 CAPSULE ORAL at 08:52

## 2022-12-10 RX ADMIN — VANCOMYCIN HYDROCHLORIDE 1250 MG: 10 INJECTION, POWDER, LYOPHILIZED, FOR SOLUTION INTRAVENOUS at 10:20

## 2022-12-10 NOTE — PROGRESS NOTES
Problem: Diabetes Self-Management  Goal: *Disease process and treatment process  Description: Define diabetes and identify own type of diabetes; list 3 options for treating diabetes. Outcome: Progressing Towards Goal     Problem: Patient Education: Go to Patient Education Activity  Goal: Patient/Family Education  Outcome: Progressing Towards Goal     Problem: Falls - Risk of  Goal: *Absence of Falls  Description: Document eMhran Villanueva Fall Risk and appropriate interventions in the flowsheet.   Outcome: Progressing Towards Goal  Note: Fall Risk Interventions:  Mobility Interventions: Communicate number of staff needed for ambulation/transfer, Bed/chair exit alarm         Medication Interventions: Teach patient to arise slowly, Patient to call before getting OOB    Elimination Interventions: Bed/chair exit alarm, Call light in reach

## 2022-12-10 NOTE — PROGRESS NOTES
DISCHARGE NOTE FROM [facility] NURSE    Patient determined to be stable for discharge by attending provider. I have reviewed the discharge instructions and follow-up appointments with the patient. They verbalized understanding and all questions were answered to their satisfaction. No complaints or further questions were expressed. Medications sent to pharmacy. Appropriate educational materials and medication side effect teaching were provided. PIV were removed prior to discharge. Patient did not discharge with any line, barragan, or drain. All personal items collected during admission were returned to the patient prior to discharge. Post-op patient: Yes- Patient given post-op discharge kit and instructed on use.        Griselda Whitt RN

## 2022-12-10 NOTE — PROGRESS NOTES
End of Shift Note    Bedside shift change report given to Trent Fink RN (oncoming nurse) by Lilibeth Ospina LPN (offgoing nurse). Report included the following information SBAR, Kardex, Procedure Summary, Intake/Output, and Recent Results    Shift worked:  7p-0a     Shift summary and any significant changes:     Pt rested in bed through shift. Pt up ad natty w/ boot. Treated pain q4h-see MAR. AM labs drawn. Otherwise, pt had an uneventful shift. Concerns for physician to address:  Pt states Oxycodone makes him sick and would like an alternative. Zone phone for oncoming shift:   [phone]       Activity:  Activity Level: Up ad natty  Number times ambulated in hallways past shift: 1  Number of times OOB to chair past shift: 1    Cardiac:   Cardiac Monitoring: No      Cardiac Rhythm: Sinus Hieu    Access:   Current line(s): PIV     Genitourinary:   Urinary status: voiding    Respiratory:   O2 Device: None (Room air)  Chronic home O2 use?: NO  Incentive spirometer at bedside: NO     GI:  Last Bowel Movement Date: 12/08/22  Current diet:  ADULT DIET Regular; 4 carb choices (60 gm/meal)  Passing flatus: YES  Tolerating current diet: YES       Pain Management:   Patient states pain is manageable on current regimen: YES    Skin:  Can Score: 21  Interventions: float heels and increase time out of bed    Patient Safety:  Fall Score:  Total Score: 2  Interventions: assistive device (walker, cane, etc) and gripper socks  High Fall Risk: Yes    Length of Stay:  Expected LOS: Courtney Presley  Actual LOS: Quincy Alexander [address], LPN

## 2022-12-10 NOTE — DISCHARGE SUMMARY
Hospitalist Discharge Summary     Patient ID:  Cherylle Baumgarten  [id number]  45 y.o.  1965 12/5/2022    PCP on record: None    Admit date: 12/5/2022  Discharge date and time: 12/10/2022    DISCHARGE DIAGNOSIS:  As below     CONSULTATIONS:  IP CONSULT TO PODIATRY  IP CONSULT TO HOSPITALIST    Excerpted HPI from H&P of Miguel Angel De La Cruz MD:  Cherylle Baumgarten is a 61 y.o.  male with PMHx significant for T2DM, HTN, chronic L foot wound, presents to the ER for evaluation of L foot pain with swelling, foul-smelling drainage and pain. Symptoms had been ongoing for 2 years per pt, recently worsened in the last 2-4 months. No association to fever, chills, cp, sob, palpitations, n/v/d. In the ER, XR showed OM  of 4th prox, middle and distal phalanges. Vitals/labs/imaging reviewed. ____________________________________________________________________  DISCHARGE SUMMARY/HOSPITAL COURSE:  for full details see H&P, daily progress notes, labs, consult notes. L 4th toe OM s/p third/fourth partial ray resection 12/07  Age- indeterminate displaced fracture through the prox phalanx  DM with hyperglycemia  XR L foot showed findings consistent with osteomyelitis of the fourth proximal, middle, and distal phalanges with marked osteolysis (particularly of the distal phalanx) and age indeterminant mildly displaced fracture through the proximal phalanx.      Resume home meds  Pain control with prn oxycodone   Empiric abx with vanc and zosyn, on DC will start Cipro as per Pod recs  Patient underwent partial ray resection 12/07  Cx grew E Coli and Alpha strep  Pathology neg margins   Partial weight bearing  F/up with Pod in 10 days     HTN  Cont' losartan  Prn iv hydralazine     Pink eye, ?conjunctivitis  Start abx and artificial tears eye drop     All Micro Results       Procedure Component Value Units Date/Time    CULTURE, BLOOD, PAIRED [[id number]] Collected: 12/05/22 1117    Order Status: Completed Specimen: Blood Updated: 12/10/22 1125     Special Requests: NO SPECIAL REQUESTS        Culture result: NO GROWTH 5 DAYS       CULTURE, WOUND Lugenia Chain STAIN [[id number]]  (Abnormal)  (Susceptibility) Collected: 12/06/22 2240    Order Status: Completed Specimen: Wound from Foot Updated: 12/09/22 0955     Special Requests: NO SPECIAL REQUESTS        GRAM STAIN NO WBC'S SEEN         NO ORGANISMS SEEN        Culture result: SCANT ESCHERICHIA COLI         SCANT ALPHA STREPTOCOCCUS       CULTURE, ANAEROBIC [[id number]] Collected: 12/06/22 2240    Order Status: Completed Specimen: Foot, left Updated: 12/09/22 0952     Special Requests: NO SPECIAL REQUESTS        Culture result: NO ANAEROBES ISOLATED                _______________________________________________________________________  Patient seen and examined by me on discharge day. POC d/w pt, all question/concerns addressed and pt verbalized understanding. Pertinent Findings:  Gen:    Not in distress  Chest: Clear lungs  CVS:   Regular rhythm. No edema  Abd/: Soft, not distended, not tender  Neuro:  Alert, oriented   MSK:    foot covered by dressing   _______________________________________________________________________  DISCHARGE MEDICATIONS:   Current Discharge Medication List        START taking these medications    Details   oxyCODONE-acetaminophen (Percocet) 5-325 mg per tablet Take 1 Tablet by mouth every six (6) hours as needed for Pain for up to 3 days. Max Daily Amount: 4 Tablets. Qty: 12 Tablet, Refills: 0  Start date: 12/10/2022, End date: 12/13/2022    Associated Diagnoses: Osteomyelitis, unspecified site, unspecified type ([address]      trimethoprim-polymyxin b (POLYTRIM) ophthalmic solution Administer 1 Drop to right eye every six (6) hours for 4 days. Qty: 1 Each, Refills: 0  Start date: 12/10/2022, End date: 12/14/2022      ciprofloxacin HCl (Cipro) 250 mg tablet Take 1 Tablet by mouth two (2) times a day for 10 days.   Qty: 20 Tablet, Refills: 0  Start date: 12/10/2022, End date: 12/20/2022           CONTINUE these medications which have CHANGED    Details   L. acidoph & paracasei- S therm- Bifido (ARLENE-Q/RISAQUAD) 8 billion cell cap cap Take 1 Capsule by mouth daily for 15 days. Indications: supplement  Qty: 15 Capsule, Refills: 1  Start date: 12/10/2022, End date: 12/25/2022           CONTINUE these medications which have NOT CHANGED    Details   gabapentin (NEURONTIN) 600 mg tablet Take 600 mg by mouth three (3) times daily. acetaminophen (TYLENOL) 325 mg tablet Take 2 Tablets by mouth every four (4) hours as needed for Pain. Qty: 20 Tablet, Refills: 0      ibuprofen (MOTRIN) 400 mg tablet Take 1 Tablet by mouth every eight (8) hours as needed for Pain. Qty: 30 Tablet, Refills: 0      losartan (COZAAR) 50 mg tablet Take 1 Tablet by mouth daily. Indications: high blood pressure  Qty: 7 Tablet, Refills: 0      metFORMIN ER (GLUCOPHAGE XR) 750 mg tablet Take 1 Tablet by mouth two (2) times a day. Indications: type 2 diabetes mellitus  Qty: 14 Tablet, Refills: 0      traZODone (DESYREL) 50 mg tablet Take 1 Tablet by mouth nightly as needed for Sleep (For insomnia). Indications: insomnia associated with depression  Qty: 7 Tablet, Refills: 0           STOP taking these medications       oxyCODONE IR (ROXICODONE) 10 mg tab immediate release tablet Comments:   Reason for Stopping:         cephALEXin (Keflex) 500 mg capsule Comments:   Reason for Stopping:         aspirin delayed-release 81 mg tablet Comments:   Reason for Stopping:                 Patient Follow Up Instructions: Activity: Partial weight bearing on the left foot with a post op shoe  Diet: ADULT DIET Regular; 4 carb choices (60 gm/meal)  Wound Care: As directed  Follow-up with PCP in  1 week. Follow-up tests/labs none   If you have any concerns that you feel you need to come back to the hospital, please do not hesitate.     Follow-up Information       Follow up With Specialties Details Why Contact Info    None    None ([phone]) Patient stated that they have no PCP      Jose Diggs MD Family Medicine Follow up in 1 week(s)  [address] [phone]      Vishal Rivera DPM Podiatry, Orthopedic Surgery Follow up in 10 day(s)  [address] [facility]  [phone]            ________________________________________________________________    Risk of deterioration: Moderate    Condition at Discharge:  Stable  __________________________________________________________________    Disposition  Home with family and home health services    ____________________________________________________________________    Code Status: Full Code  ___________________________________________________________________      Total time in minutes spent coordinating this discharge (includes going over instructions, follow-up, prescriptions, and preparing report for sign off to her PCP) :  >30 minutes    Signed:  Gina Vela MD

## 2022-12-10 NOTE — DISCHARGE INSTRUCTIONS
Patient Follow Up Instructions: Activity: Partial weight bearing on the left foot with a post op shoe  Diet: ADULT DIET Regular; 4 carb choices (60 gm/meal)  Wound Care: As directed  Follow-up with PCP in  1 week. Follow-up tests/labs none   If you have any concerns that you feel you need to come back to the hospital, please do not hesitate. Partial weight bearing on the left foot with a post op shoe           Infection After Surgery: Care Instructions  Overview  After surgery, an infection is always possible. It doesn't mean that the surgery didn't go well. Because an infection can be serious, your doctor has taken steps to manage it. Your doctor checked the infection and cleaned it if necessary. Your doctor may have made an opening in the area so that the pus can drain out. You may have gauze in the cut so that the area will stay open and keep draining. You may need antibiotics. You will need to follow up with your doctor to make sure the infection has gone away. Follow-up care is a key part of your treatment and safety. Be sure to make and go to all appointments, and call your doctor if you are having problems. It's also a good idea to know your test results and keep a list of the medicines you take. How can you care for yourself at home? Make sure your surgeon knows about the infection, especially if you saw another doctor about your symptoms. If your doctor prescribed antibiotics, take them as directed. Do not stop taking them just because you feel better. You need to take the full course of antibiotics. Ask your doctor if you can take an over-the-counter pain medicine, such as acetaminophen (Tylenol), ibuprofen (Advil, Motrin), or naproxen (Aleve). Be safe with medicines. Read and follow all instructions on the label. Do not take two or more pain medicines at the same time unless the doctor told you to. Many pain medicines have acetaminophen, which is Tylenol.  Too much acetaminophen (Tylenol) can be harmful. Prop up the area on a pillow anytime you sit or lie down during the next 3 days. Try to keep it above the level of your heart. This will help reduce swelling. Keep the skin clean and dry. You may have a bandage over the cut (incision). A bandage helps the incision heal and protects it. Your doctor will tell you how to take care of this. Keep it clean and dry. You may have drainage from the wound. If your doctor told you how to care for your incision, follow your doctor's instructions. If you did not get instructions, follow this general advice:  Wash around the incision with clean water 2 times a day. Don't use hydrogen peroxide or alcohol, which can slow healing. When should you call for help? Call your doctor now or seek immediate medical care if:    You have signs that your infection is getting worse, such as: Increased pain, swelling, warmth, or redness in the area. Red streaks leading from the area. Pus draining from the wound. A new or higher fever. Watch closely for changes in your health, and be sure to contact your doctor if you have any problems. Where can you learn more? Go to http://www.gray.com/  Enter C340 in the search box to learn more about \"Infection After Surgery: Care Instructions. \"  Current as of: January 20, 2022               Content Version: 13.4  © 2006-2022 [employer]. Care instructions adapted under license by [employer] (which disclaims liability or warranty for this information). If you have questions about a medical condition or this instruction, always ask your healthcare professional. [place] [address] any warranty or liability for your use of this information. Wound Care: After Your Visit  Your Care Instructions  Taking good care of your wound at home will help it heal quickly and reduce your chance of infection.   The doctor has checked you carefully, but problems can develop later. If you notice any problems or new symptoms, get medical treatment right away. Follow-up care is a key part of your treatment and safety. Be sure to make and go to all appointments, and call your doctor if you are having problems. It's also a good idea to know your test results and keep a list of the medicines you take. How can you care for yourself at home? Clean the area with soap and water 2 times a day unless your doctor gives you different instructions. Don't use hydrogen peroxide or alcohol, which can slow healing. You may cover the wound with a thin layer of antibiotic ointment, such as bacitracin, and a nonstick bandage. Apply more ointment and replace the bandage as needed. Take pain medicines exactly as directed. Some pain is normal with a wound, but do not ignore pain that is getting worse instead of better. You could have an infection. If the doctor gave you a prescription medicine for pain, take it as prescribed. If you are not taking a prescription pain medicine, ask your doctor if you can take an over-the-counter medicine. Your doctor may have closed your wound with stitches (sutures), staples, or skin glue. If you have stitches, your doctor may remove them after several days to 2 weeks. Or you may have stitches that dissolve on their own. If you have staples, your doctor may remove them after 7 to 10 days. If your wound was closed with skin glue, the glue will wear off in a few days to 2 weeks. When should you call for help? Call your doctor now or seek immediate medical care if:  You have signs of infection, such as: Increased pain, swelling, warmth, or redness near the wound. Red streaks leading from the wound. Pus draining from the wound. A fever. You bleed so much from your incision that you soak one or more bandages over 2 to 4 hours. Watch closely for changes in your health, and be sure to contact your doctor if:  The wound is not getting better each day.    Where can you learn more? Go to [employer]be  Enter M973 in the search box to learn more about \"Wound Care: After Your Visit. \"   © 7130-0957 [employer]. Care instructions adapted under license by [facility] (which disclaims liability or warranty for this information). This care instruction is for use with your licensed healthcare professional. If you have questions about a medical condition or this instruction, always ask your healthcare professional. [address] any warranty or liability for your use of this information. Content Version: 69.1.363684; Last Revised: April 23, 2012               Osteomyelitis: Care Instructions  Overview  Osteomyelitis (say \"ct-dqli-lt-zk-si-JY-tus\") is a bone infection. It is caused by bacteria that can infect a bone. The bacterial infection can come from an injury, a wound, or a previous surgery. Or it can be carried through the blood from another area in the body. Osteomyelitis can be a short- or long-term problem. It is treated with antibiotics. You will probably get treatment in the hospital first with antibiotics through a needle in a vein (IV) and then take antibiotic pills. The type of treatment depends on the type of bacteria causing the infection, the bones affected, and how bad the infection is. Often people need surgery to drain pus from a bone or to fix a damaged bone or joint. Short-term osteomyelitis that is treated right away usually can be cured. But the long-term form sometimes comes back after treatment. You can help your chances of stopping the infection by taking your medicines as directed. Follow-up care is a key part of your treatment and safety. Be sure to make and go to all appointments, and call your doctor if you are having problems. It's also a good idea to know your test results and keep a list of the medicines you take. How can you care for yourself at home? Take your antibiotics as directed.  Do not stop taking them just because you feel better. You need to take the full course of antibiotics. Take pain medicines exactly as directed. If the doctor gave you a prescription medicine for pain, take it as prescribed. If you are not taking a prescription pain medicine, ask your doctor if you can take an over-the-counter medicine. Do mild exercise and stretching if your doctor says it is okay. This can help keep your bones and muscles healthy. Avoid strenuous work or exercise until your doctor says you can do it. Consider physical therapy if your doctor suggests it. Physical therapy may help you have a normal range of movement. Do not smoke. Smoking can slow healing of the infection. If you need help quitting, talk to your doctor about stop-smoking programs and medicines. These can increase your chances of quitting for good. When should you call for help? Call 911 anytime you think you may need emergency care. For example, call if:    You have severe bone pain. Call your doctor now or seek immediate medical care if:    You continue to have bone pain. You have signs of infection, such as: Increased pain, swelling, warmth, or redness. Red streaks leading from a wound. Pus draining from a wound. A fever. Watch closely for changes in your health, and be sure to contact your doctor if:    You do not get better as expected. Current as of: March 9, 2022               Content Version: 13.4  © 2006-2022 [employer]wise[employer] Inc[employer]. Care instructions adapted under license by [employer] (which disclaims liability or warranty for this information). If you have questions about a medical condition or this instruction, always ask your healthcare professional. [place] [address] any warranty or liability for your use of this information.

## 2022-12-10 NOTE — PROGRESS NOTES
No further concerns indicated at this time. AVS updated. Pt is ready for discharge from a Care Management standpoint. RN informed. Transition of Care Plans: Home with outpatient follow up    CM acknowledges d/c orders, talked with pt via room phone about transition of care plans. Pt reports that he has his discharge papers and is ready for d/c, has his vehicle here and will drive himself home. He acknowledges outpatient follow up needs and encouraged to call all offices on Monday AM and schedule follow up. Pt declines any additional needs, RN is providing wound care supplies for pt to use at home. Pt reports that he knows how to care for wound based on teaching from RN and politely declined CM's offer to coordinate [facility] wound care. Pt asked if CM could help with coordinating caregivers with his father at home, CM offered caregiver resources and discussed that this would be private pay, pt declined resources at this time and will contact family for support with his father's care at home. Pt had left hospital before CM could obtain signature for 2nd IMM. No further concerns indicated at this time.     Care Management Interventions  PCP Verified by CM: No (Pt reports he has a primary care provider but does not wish to add to medical chart )  Palliative Care Criteria Met (RRAT>21 & CHF Dx)?: No  Mode of Transport at Discharge: Self (Vehicle at hospital)  Transition of Care Consult (CM Consult): Discharge Planning  Discharge Durable Medical Equipment: No  Physical Therapy Consult: No  Occupational Therapy Consult: No  Speech Therapy Consult: No  Support Systems: Parent(s)  Confirm Follow Up Transport: Self  The Patient and/or Patient Representative was Provided with a Choice of Provider and Agrees with the Discharge Plan?: Yes  Discharge Location  Patient Expects to be Discharged to[de-identified] [address] [place] [address] [phone]

## 2022-12-11 LAB
BACTERIA SPEC CULT: NORMAL
SERVICE CMNT-IMP: NORMAL

## 2022-12-13 NOTE — OP NOTES
[address]  OPERATIVE REPORT    Name:  Mallory Wolff  MR#:  [id number]  :  1965  ACCOUNT #:  [de-identified]  DATE OF SERVICE:  2022    PREOPERATIVE DIAGNOSIS:  Osteomyelitis, left foot. POSTOPERATIVE DIAGNOSIS:  Osteomyelitis, left foot. PROCEDURE PERFORMED:  Left foot third and fourth metatarsal partial ray resections. SURGEON:  Mitchell Del Rio DPM    ASSISTANT:  Surgical assistants, none. ANESTHESIA:  MAC.    COMPLICATIONS:  None. SPECIMENS REMOVED:  The removed left third and fourth toes were sent for pathology, and microbiology left foot deep cultures were taken. IMPLANTS:  None. ESTIMATED BLOOD LOSS:  Less than 100 mL. DRAINS:  None. PROCEDURE:  This patient was admitted through the ER for infected left foot with confirmation of the left foot osteomyelitis. The patient has a history of partial ray resection for the fifth previously due to the similar presentation with osteomyelitis, which is confirmed of this episode. The patient is a brittle diabetic with frequent infections and presented with a severely infected left fifth toe partially disintegrated from the infection and osteolytic area of the fifth toe. The patient was admitted and started the IV antibiotics and advice was given for resection of the third and fourth toes due to the infection signs also spread into the third toe as well. This patient was brought into the OR and left on the patient's bed in supine position. IV sedation was performed as per CRNA and lateral ankle block was performed using 0.5% plain and the tourniquet was placed with Webril padding at 250 mmHg, and the left foot was prepped and draped via usual sterile manner.   The attention was directed to the left foot, and using elevation as exsanguination, the tourniquet was inflated to previously mentioned setting, and after anesthesia check, an incision was made around the base of the third and fourth toes encircling the entire toes and proximal to the fibrotic soft skin edges on the dorsal aspect and the viable skin edges on the plantar aspect encircling the entire area along the soft tissue dissection into the intermetatarsal space of the second and third,  the metatarsals from the devitalized soft tissue, and the deep dissection included to the metatarsal shafts. Using a sagittal saw, the third and fourth metatarsals were resected along with the entire devitalized toes along with the soft tissue and nonviable skin margins and the entire resection was done using sharp and blunt dissection and removed from the operative site and the deep cultures were taken and further devitalized tissue was debrided using a sharp and blunt dissection at this time, and the area was then copiously irrigated with Irrisept followed with normal saline and no further nonviable tissue was noted, and decision was made to close the wound using 2-0 nylon sutures, and the bleeders were cauterized, and after complete closure, the tourniquet was deflated noting hyperemia and mild hemorrhage was noted to the incision site which was controlled with compressive bandage. The patient tolerated the procedure and anesthesia without complications, sent back to the medical floor for medical management, will be followed up accordingly until the pathology reports come in. The patient did have significant infection on the removed areas with necrotic and infected areas, which were all included in the resected areas.         JT [id number]  D:  12/13/2022 10:46  T:  12/13/2022 13:30  JOB #:  [id number]

## 2023-01-05 ENCOUNTER — HOSPITAL ENCOUNTER (EMERGENCY)
Age: 58
Discharge: HOME | End: 2023-01-06
Attending: EMERGENCY MEDICINE
Payer: MEDICARE

## 2023-01-05 DIAGNOSIS — R11.2 NAUSEA AND VOMITING, UNSPECIFIED VOMITING TYPE: Primary | ICD-10-CM

## 2023-01-05 DIAGNOSIS — R73.9 HYPERGLYCEMIA: ICD-10-CM

## 2023-01-05 LAB
ALBUMIN SERPL-MCNC: 3.7 G/DL (ref 3.5–5)
ALBUMIN/GLOB SERPL: 0.9 {RATIO} (ref 1.1–2.2)
ALP SERPL-CCNC: 100 U/L (ref 45–117)
ALT SERPL-CCNC: 43 U/L (ref 12–78)
ANION GAP SERPL CALC-SCNC: 4 MMOL/L (ref 5–15)
AST SERPL-CCNC: 48 U/L (ref 15–37)
BASOPHILS # BLD: 0.1 K/UL (ref 0–0.1)
BASOPHILS NFR BLD: 1 % (ref 0–1)
BILIRUB SERPL-MCNC: 0.7 MG/DL (ref 0.2–1)
BUN SERPL-MCNC: 42 MG/DL (ref 6–20)
BUN/CREAT SERPL: 21 (ref 12–20)
CALCIUM SERPL-MCNC: 11.4 MG/DL (ref 8.5–10.1)
CHLORIDE SERPL-SCNC: 94 MMOL/L (ref 97–108)
CO2 SERPL-SCNC: 32 MMOL/L (ref 21–32)
CREAT SERPL-MCNC: 2 MG/DL (ref 0.7–1.3)
DIFFERENTIAL METHOD BLD: ABNORMAL
EOSINOPHIL # BLD: 0.1 K/UL (ref 0–0.4)
EOSINOPHIL NFR BLD: 1 % (ref 0–7)
ERYTHROCYTE [DISTWIDTH] IN BLOOD BY AUTOMATED COUNT: 14 % (ref 11.5–14.5)
GLOBULIN SER CALC-MCNC: 4.1 G/DL (ref 2–4)
GLUCOSE SERPL-MCNC: 318 MG/DL (ref 65–100)
HCT VFR BLD AUTO: 31.5 % (ref 36.6–50.3)
HGB BLD-MCNC: 11.4 G/DL (ref 12.1–17)
IMM GRANULOCYTES # BLD AUTO: 0.1 K/UL (ref 0–0.04)
IMM GRANULOCYTES NFR BLD AUTO: 1 % (ref 0–0.5)
LYMPHOCYTES # BLD: 1.2 K/UL (ref 0.8–3.5)
LYMPHOCYTES NFR BLD: 15 % (ref 12–49)
MCH RBC QN AUTO: 29.2 PG (ref 26–34)
MCHC RBC AUTO-ENTMCNC: 36.2 G/DL (ref 30–36.5)
MCV RBC AUTO: 80.6 FL (ref 80–99)
MONOCYTES # BLD: 0.5 K/UL (ref 0–1)
MONOCYTES NFR BLD: 7 % (ref 5–13)
NEUTS SEG # BLD: 5.9 K/UL (ref 1.8–8)
NEUTS SEG NFR BLD: 76 % (ref 32–75)
NRBC # BLD: 0 K/UL (ref 0–0.01)
NRBC BLD-RTO: 0 PER 100 WBC
PLATELET # BLD AUTO: 142 K/UL (ref 150–400)
PMV BLD AUTO: 9 FL (ref 8.9–12.9)
POTASSIUM SERPL-SCNC: 4.3 MMOL/L (ref 3.5–5.1)
PROT SERPL-MCNC: 7.8 G/DL (ref 6.4–8.2)
RBC # BLD AUTO: 3.91 M/UL (ref 4.1–5.7)
SODIUM SERPL-SCNC: 130 MMOL/L (ref 136–145)
WBC # BLD AUTO: 7.7 K/UL (ref 4.1–11.1)

## 2023-01-05 PROCEDURE — 83690 ASSAY OF LIPASE: CPT

## 2023-01-05 PROCEDURE — 99284 EMERGENCY DEPT VISIT MOD MDM: CPT

## 2023-01-05 PROCEDURE — 85025 COMPLETE CBC W/AUTO DIFF WBC: CPT

## 2023-01-05 PROCEDURE — 93005 ELECTROCARDIOGRAM TRACING: CPT

## 2023-01-05 PROCEDURE — 36415 COLL VENOUS BLD VENIPUNCTURE: CPT

## 2023-01-05 PROCEDURE — 80053 COMPREHEN METABOLIC PANEL: CPT

## 2023-01-05 NOTE — Clinical Note
[address]  [facility] EMERGENCY DEPT  [address] [facility]  [place] [id number] [phone]    Work/School Note    Date: 1/5/2023    To Whom It May concern:      Birdie Rios was seen and treated today in the emergency room by the following provider(s):  Attending Provider: Sabrina Delaney MD.      Birdie Rios is excused from work/school on 01/06/23. He is clear to return to work/school on 01/07/23.         Sincerely,          Meek Perrin MD

## 2023-01-06 VITALS
DIASTOLIC BLOOD PRESSURE: 87 MMHG | RESPIRATION RATE: 18 BRPM | WEIGHT: 161.38 LBS | BODY MASS INDEX: 21.86 KG/M2 | SYSTOLIC BLOOD PRESSURE: 174 MMHG | HEART RATE: 70 BPM | HEIGHT: 72 IN | TEMPERATURE: 99 F | OXYGEN SATURATION: 99 %

## 2023-01-06 LAB
APPEARANCE UR: CLEAR
ATRIAL RATE: 79 BPM
BACTERIA URNS QL MICRO: NEGATIVE /HPF
BILIRUB UR QL: NEGATIVE
CALCULATED P AXIS, ECG09: 77 DEGREES
CALCULATED R AXIS, ECG10: 45 DEGREES
CALCULATED T AXIS, ECG11: 69 DEGREES
COLOR UR: ABNORMAL
COVID-19 RAPID TEST, COVR: NOT DETECTED
DIAGNOSIS, 93000: NORMAL
EPITH CASTS URNS QL MICRO: ABNORMAL /LPF
FLUAV AG NPH QL IA: NEGATIVE
FLUBV AG NOSE QL IA: NEGATIVE
GLUCOSE BLD STRIP.AUTO-MCNC: 109 MG/DL (ref 65–117)
GLUCOSE UR STRIP.AUTO-MCNC: >1000 MG/DL
HGB UR QL STRIP: ABNORMAL
HYALINE CASTS URNS QL MICRO: ABNORMAL /LPF (ref 0–2)
KETONES UR QL STRIP.AUTO: NEGATIVE MG/DL
LACTATE BLD-SCNC: 0.93 MMOL/L (ref 0.4–2)
LEUKOCYTE ESTERASE UR QL STRIP.AUTO: NEGATIVE
LIPASE SERPL-CCNC: 203 U/L (ref 73–393)
NITRITE UR QL STRIP.AUTO: NEGATIVE
P-R INTERVAL, ECG05: 152 MS
PH UR STRIP: 7 [PH] (ref 5–8)
PROT UR STRIP-MCNC: 300 MG/DL
Q-T INTERVAL, ECG07: 364 MS
QRS DURATION, ECG06: 86 MS
QTC CALCULATION (BEZET), ECG08: 417 MS
RBC #/AREA URNS HPF: ABNORMAL /HPF (ref 0–5)
SERVICE CMNT-IMP: NORMAL
SOURCE, COVRS: NORMAL
SP GR UR REFRACTOMETRY: 1.02
UA: UC IF INDICATED,UAUC: ABNORMAL
UROBILINOGEN UR QL STRIP.AUTO: 0.2 EU/DL (ref 0.2–1)
VENTRICULAR RATE, ECG03: 79 BPM
WBC URNS QL MICRO: ABNORMAL /HPF (ref 0–4)

## 2023-01-06 PROCEDURE — 82962 GLUCOSE BLOOD TEST: CPT

## 2023-01-06 PROCEDURE — 74011636637 HC RX REV CODE- 636/637: Performed by: EMERGENCY MEDICINE

## 2023-01-06 PROCEDURE — 87635 SARS-COV-2 COVID-19 AMP PRB: CPT

## 2023-01-06 PROCEDURE — 96374 THER/PROPH/DIAG INJ IV PUSH: CPT

## 2023-01-06 PROCEDURE — 96360 HYDRATION IV INFUSION INIT: CPT

## 2023-01-06 PROCEDURE — 83605 ASSAY OF LACTIC ACID: CPT

## 2023-01-06 PROCEDURE — 87804 INFLUENZA ASSAY W/OPTIC: CPT

## 2023-01-06 PROCEDURE — 74011250637 HC RX REV CODE- 250/637: Performed by: EMERGENCY MEDICINE

## 2023-01-06 PROCEDURE — 81001 URINALYSIS AUTO W/SCOPE: CPT

## 2023-01-06 PROCEDURE — 74011250636 HC RX REV CODE- 250/636: Performed by: EMERGENCY MEDICINE

## 2023-01-06 RX ORDER — ONDANSETRON 4 MG/1
4 TABLET, FILM COATED ORAL
Qty: 20 TABLET | Refills: 0 | Status: SHIPPED | OUTPATIENT
Start: 2023-01-06

## 2023-01-06 RX ORDER — ONDANSETRON 2 MG/ML
4 INJECTION INTRAMUSCULAR; INTRAVENOUS
Status: COMPLETED | OUTPATIENT
Start: 2023-01-06 | End: 2023-01-06

## 2023-01-06 RX ORDER — OXYCODONE HYDROCHLORIDE 10 MG/1
TABLET ORAL
COMMUNITY
Start: 2022-12-14

## 2023-01-06 RX ORDER — OXYCODONE HYDROCHLORIDE 5 MG/1
10 TABLET ORAL
Status: COMPLETED | OUTPATIENT
Start: 2023-01-06 | End: 2023-01-06

## 2023-01-06 RX ADMIN — ONDANSETRON 4 MG: 2 INJECTION INTRAMUSCULAR; INTRAVENOUS at 01:10

## 2023-01-06 RX ADMIN — OXYCODONE HYDROCHLORIDE 10 MG: 5 TABLET ORAL at 02:12

## 2023-01-06 RX ADMIN — Medication 10 UNITS: at 02:11

## 2023-01-06 RX ADMIN — SODIUM CHLORIDE 1000 ML: 9 INJECTION, SOLUTION INTRAVENOUS at 01:10
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
## 2023-01-06 NOTE — DISCHARGE INSTRUCTIONS
It was a pleasure taking care of you in our Emergency Department today. We know that when you come to [facility], you are entrusting us with your health, comfort, and safety. Our physicians and nurses honor that trust, and truly appreciate the opportunity to care for you and your loved ones. We also value your feedback. If you receive a survey about your Emergency Department experience today, please fill it out. We care about our patients' feedback, and we listen to what you have to say. Thank you! Dr. Niurka Castrejon MD.    [address]:  [facility] 85  [address] [place] [address]       [phone]      Accepts Insured Patients Only:  [facility]  [address]       [phone]  Near the corner of [facility] and [facility] [address] in the near Psychiatric hospital. Accepts most insurance including Medicaid/Medicare. No psychiatry. On the [facility] bus line. [address] most major insurances. Psychiatry available. Some DBT groups. [address] [facility]. [address] [phone]  Abran Tomlinson, [facility] ([address] [facility])  Jeffrey Couch LCSW ([facility])  Benigno Echevarria LCSW ([facility])    [address] [facility] ([address]  [facility] ([address] [place] [address])    [address] [facility], [address] [place])     [phone]   Dolly Gates, [address] (Trauma)  Javier Judd ([address])    [facility])    [phone]   Mixture of psychologists and psychiatrists. They do not accept Medicaid or Medicare. The [facility] Group  [address] [place], [place] [address]ve       [phone]   Mixture of clinical social workers and psychologists.     [facility] Psychiatric       [phone] [facility], [address] [place] [address] and Treatment  (Clinicians: Stanley Thomas LCSW and Eri Mandel MSW both specialize in Trauma)  [address], [address]        [phone]     [address]  [place], [address]         [address], [address] [facility], [address]  [place], [address]        [address], [address] [facility], [address] [facility], [address]        [address] Counseling  [address]  [place], [address]        Leonellarissa Naik 1778 (*Two psychologists practice here)  [address] [place], [address]  [place], [address]        [phone]    Sliding Scale/Financial Aid/Differing Payment Options  [address]      [phone]   Variety of treatment options, including DBT. [address]       [phone]   Provides a variety of group and individual counseling options. Insurance, Medicaid, Medicare and sliding scale    [facility], [address]      [phone]    [address] [facility] Psychological Services and Development ([facility])  [address] [facility], [address] [place] [address]    [phone]  Training clinic for [employer] in Psychology; [place] [address] also carry some cases as well. Director: Momo Martin, Ph.D., LCP, NCSP (* She specializes in Anxiety; Child & Adol. Mental health)      Medicaid/Medicare providers in the [address]       [phone]    [facility]         [address]       [phone]    [place]  [address], [address]    [phone] ex. [facility], [address]      [phone]    [address] [facility] [address]     [phone]  [facility], [address] [place] [address], [address]    1850 [facility], [address] [facility]    Gaona [facility] Main Office    [phone]  [address] [facility]  [address]    Intensive Community Outreach Services (Jimenez Rowley)  Call ahead for appointment time  [address] [facility]     [address]ath[address]ulia     [phone]    [facility] [address]    [address] [facility]      [address], [address], Maggie fenton)   [phone]      Services for patients without Medicaid, Medicare or Insurance  The [address] [place] [address] Drive       [phone]   See handout in separate folder    [address] [place] [address] on-site, psychiatry, AA meetings, counseling and social workers  [place]town: [address] Mcm[address]     [phone]    [address] [place] [address]:  [facility] 202    [phone]      Medication Assisted Treatment (MAT) Programs  The purpose of Medication Assisted Treatment (MAT) programs is to provide quality treatment for individuals living with Substance Use Disorder. We understand that after patients undergo a detox, some may need MAT treatment services to provide additional assistance on their road to recovery. When it comes to medication assisted treatment, Suboxone, Buprenorphine, and other drugs can serve as a valuable resource during recovery. We know that making such a significant change is difficult for patients and want to do everything we can to make the addiction treatment process as smooth and comfortable as possible. There are several locations around [address] [facility]; many can offer same day or next day appointments.  Please call      [facility] ([address])  [address], [address] [place] [address] [address]  [phone]  Standing ED [facility] ED referral appointment 10:00-11:00 Tuesday - Friday    [place] [facility])   [address] , [address] [facility] [phone]  Standing ED [facility] ED referral appointment 10:00-11:00 Monday    [facility] location)   [address]  [phone]  Call or email Jorgito Kem: [phone], [email or web address]  To schedule an appointment    The [facility] Addiction Medicine  [address] [facility] 6, [address] for Recovery  [address] [facility], [address]  [address]  [address], [address]  [address] ([employer])  1516 [facility], [facility]  [phone]    Recovery from Addiction is possible!

## 2023-07-18 ENCOUNTER — APPOINTMENT (OUTPATIENT)
Facility: HOSPITAL | Age: 58
DRG: 552 | End: 2023-07-18
Payer: MEDICARE

## 2023-07-18 ENCOUNTER — HOSPITAL ENCOUNTER (INPATIENT)
Facility: HOSPITAL | Age: 58
LOS: 2 days | Discharge: HOME HEALTH | DRG: 552 | End: 2023-07-20
Attending: STUDENT IN AN ORGANIZED HEALTH CARE EDUCATION/TRAINING PROGRAM | Admitting: STUDENT IN AN ORGANIZED HEALTH CARE EDUCATION/TRAINING PROGRAM
Payer: MEDICARE

## 2023-07-18 DIAGNOSIS — R53.1 GENERALIZED WEAKNESS: Primary | ICD-10-CM

## 2023-07-18 DIAGNOSIS — F32.A DEPRESSION, UNSPECIFIED DEPRESSION TYPE: ICD-10-CM

## 2023-07-18 DIAGNOSIS — S32.000A COMPRESSION FRACTURE OF LUMBAR VERTEBRA, UNSPECIFIED LUMBAR VERTEBRAL LEVEL, INITIAL ENCOUNTER (HCC): ICD-10-CM

## 2023-07-18 PROBLEM — M54.16 LUMBAR BACK PAIN WITH RADICULOPATHY AFFECTING LOWER EXTREMITY: Status: ACTIVE | Noted: 2023-07-18

## 2023-07-18 LAB
ALBUMIN SERPL-MCNC: 3.3 G/DL (ref 3.5–5)
ALBUMIN/GLOB SERPL: 1 (ref 1.1–2.2)
ALP SERPL-CCNC: 72 U/L (ref 45–117)
ALT SERPL-CCNC: 33 U/L (ref 12–78)
ANION GAP SERPL CALC-SCNC: 8 MMOL/L (ref 5–15)
AST SERPL-CCNC: 21 U/L (ref 15–37)
BASOPHILS # BLD: 0 K/UL (ref 0–0.1)
BASOPHILS NFR BLD: 1 % (ref 0–1)
BILIRUB SERPL-MCNC: 0.4 MG/DL (ref 0.2–1)
BUN SERPL-MCNC: 30 MG/DL (ref 6–20)
BUN/CREAT SERPL: 14 (ref 12–20)
CALCIUM SERPL-MCNC: 9.3 MG/DL (ref 8.5–10.1)
CHLORIDE SERPL-SCNC: 100 MMOL/L (ref 97–108)
CO2 SERPL-SCNC: 26 MMOL/L (ref 21–32)
CREAT SERPL-MCNC: 2.18 MG/DL (ref 0.7–1.3)
DIFFERENTIAL METHOD BLD: ABNORMAL
EOSINOPHIL # BLD: 0 K/UL (ref 0–0.4)
EOSINOPHIL NFR BLD: 1 % (ref 0–7)
ERYTHROCYTE [DISTWIDTH] IN BLOOD BY AUTOMATED COUNT: 13.7 % (ref 11.5–14.5)
ETHANOL SERPL-MCNC: <10 MG/DL (ref 0–0.08)
GLOBULIN SER CALC-MCNC: 3.3 G/DL (ref 2–4)
GLUCOSE BLD STRIP.AUTO-MCNC: 393 MG/DL (ref 65–117)
GLUCOSE SERPL-MCNC: 255 MG/DL (ref 65–100)
HCT VFR BLD AUTO: 29.3 % (ref 36.6–50.3)
HGB BLD-MCNC: 11 G/DL (ref 12.1–17)
IMM GRANULOCYTES # BLD AUTO: 0 K/UL (ref 0–0.04)
IMM GRANULOCYTES NFR BLD AUTO: 0 % (ref 0–0.5)
LYMPHOCYTES # BLD: 1.4 K/UL (ref 0.8–3.5)
LYMPHOCYTES NFR BLD: 22 % (ref 12–49)
MAGNESIUM SERPL-MCNC: 2.1 MG/DL (ref 1.6–2.4)
MCH RBC QN AUTO: 30.3 PG (ref 26–34)
MCHC RBC AUTO-ENTMCNC: 37.5 G/DL (ref 30–36.5)
MCV RBC AUTO: 80.7 FL (ref 80–99)
MONOCYTES # BLD: 0.3 K/UL (ref 0–1)
MONOCYTES NFR BLD: 5 % (ref 5–13)
NEUTS SEG # BLD: 4.5 K/UL (ref 1.8–8)
NEUTS SEG NFR BLD: 71 % (ref 32–75)
NRBC # BLD: 0 K/UL (ref 0–0.01)
NRBC BLD-RTO: 0 PER 100 WBC
PLATELET # BLD AUTO: 167 K/UL (ref 150–400)
PMV BLD AUTO: 8.8 FL (ref 8.9–12.9)
POTASSIUM SERPL-SCNC: 4.1 MMOL/L (ref 3.5–5.1)
PROT SERPL-MCNC: 6.6 G/DL (ref 6.4–8.2)
RBC # BLD AUTO: 3.63 M/UL (ref 4.1–5.7)
SERVICE CMNT-IMP: ABNORMAL
SODIUM SERPL-SCNC: 134 MMOL/L (ref 136–145)
TROPONIN I SERPL HS-MCNC: 37 NG/L (ref 0–76)
WBC # BLD AUTO: 6.3 K/UL (ref 4.1–11.1)

## 2023-07-18 PROCEDURE — 96375 TX/PRO/DX INJ NEW DRUG ADDON: CPT

## 2023-07-18 PROCEDURE — 71045 X-RAY EXAM CHEST 1 VIEW: CPT

## 2023-07-18 PROCEDURE — 36415 COLL VENOUS BLD VENIPUNCTURE: CPT

## 2023-07-18 PROCEDURE — 70450 CT HEAD/BRAIN W/O DYE: CPT

## 2023-07-18 PROCEDURE — 2580000003 HC RX 258: Performed by: STUDENT IN AN ORGANIZED HEALTH CARE EDUCATION/TRAINING PROGRAM

## 2023-07-18 PROCEDURE — 72100 X-RAY EXAM L-S SPINE 2/3 VWS: CPT

## 2023-07-18 PROCEDURE — 96374 THER/PROPH/DIAG INJ IV PUSH: CPT

## 2023-07-18 PROCEDURE — 6360000002 HC RX W HCPCS: Performed by: STUDENT IN AN ORGANIZED HEALTH CARE EDUCATION/TRAINING PROGRAM

## 2023-07-18 PROCEDURE — 85025 COMPLETE CBC W/AUTO DIFF WBC: CPT

## 2023-07-18 PROCEDURE — 72125 CT NECK SPINE W/O DYE: CPT

## 2023-07-18 PROCEDURE — 82962 GLUCOSE BLOOD TEST: CPT

## 2023-07-18 PROCEDURE — 82077 ASSAY SPEC XCP UR&BREATH IA: CPT

## 2023-07-18 PROCEDURE — 72070 X-RAY EXAM THORAC SPINE 2VWS: CPT

## 2023-07-18 PROCEDURE — 93005 ELECTROCARDIOGRAM TRACING: CPT | Performed by: STUDENT IN AN ORGANIZED HEALTH CARE EDUCATION/TRAINING PROGRAM

## 2023-07-18 PROCEDURE — 6370000000 HC RX 637 (ALT 250 FOR IP): Performed by: STUDENT IN AN ORGANIZED HEALTH CARE EDUCATION/TRAINING PROGRAM

## 2023-07-18 PROCEDURE — 72148 MRI LUMBAR SPINE W/O DYE: CPT

## 2023-07-18 PROCEDURE — 99285 EMERGENCY DEPT VISIT HI MDM: CPT

## 2023-07-18 PROCEDURE — 80053 COMPREHEN METABOLIC PANEL: CPT

## 2023-07-18 PROCEDURE — 83735 ASSAY OF MAGNESIUM: CPT

## 2023-07-18 PROCEDURE — 84484 ASSAY OF TROPONIN QUANT: CPT

## 2023-07-18 PROCEDURE — 1100000000 HC RM PRIVATE

## 2023-07-18 RX ORDER — OXYCODONE HYDROCHLORIDE AND ACETAMINOPHEN 5; 325 MG/1; MG/1
1 TABLET ORAL
Status: COMPLETED | OUTPATIENT
Start: 2023-07-18 | End: 2023-07-18

## 2023-07-18 RX ORDER — MORPHINE SULFATE 2 MG/ML
4 INJECTION, SOLUTION INTRAMUSCULAR; INTRAVENOUS
Status: COMPLETED | OUTPATIENT
Start: 2023-07-18 | End: 2023-07-18

## 2023-07-18 RX ORDER — ACETAMINOPHEN 325 MG/1
650 TABLET ORAL EVERY 6 HOURS PRN
Status: DISCONTINUED | OUTPATIENT
Start: 2023-07-18 | End: 2023-07-20 | Stop reason: HOSPADM

## 2023-07-18 RX ORDER — POLYETHYLENE GLYCOL 3350 17 G/17G
17 POWDER, FOR SOLUTION ORAL DAILY PRN
Status: DISCONTINUED | OUTPATIENT
Start: 2023-07-18 | End: 2023-07-20 | Stop reason: HOSPADM

## 2023-07-18 RX ORDER — 0.9 % SODIUM CHLORIDE 0.9 %
1000 INTRAVENOUS SOLUTION INTRAVENOUS ONCE
Status: COMPLETED | OUTPATIENT
Start: 2023-07-18 | End: 2023-07-18

## 2023-07-18 RX ORDER — ONDANSETRON 2 MG/ML
4 INJECTION INTRAMUSCULAR; INTRAVENOUS ONCE
Status: COMPLETED | OUTPATIENT
Start: 2023-07-18 | End: 2023-07-18

## 2023-07-18 RX ORDER — GABAPENTIN 300 MG/1
600 CAPSULE ORAL 3 TIMES DAILY
Status: DISCONTINUED | OUTPATIENT
Start: 2023-07-18 | End: 2023-07-20 | Stop reason: HOSPADM

## 2023-07-18 RX ORDER — ONDANSETRON 4 MG/1
4 TABLET, ORALLY DISINTEGRATING ORAL EVERY 8 HOURS PRN
Status: DISCONTINUED | OUTPATIENT
Start: 2023-07-18 | End: 2023-07-20 | Stop reason: HOSPADM

## 2023-07-18 RX ORDER — FOLIC ACID 1 MG/1
1 TABLET ORAL DAILY
Status: DISCONTINUED | OUTPATIENT
Start: 2023-07-19 | End: 2023-07-20 | Stop reason: HOSPADM

## 2023-07-18 RX ORDER — DEXTROSE MONOHYDRATE 100 MG/ML
INJECTION, SOLUTION INTRAVENOUS CONTINUOUS PRN
Status: DISCONTINUED | OUTPATIENT
Start: 2023-07-18 | End: 2023-07-20 | Stop reason: HOSPADM

## 2023-07-18 RX ORDER — ACETAMINOPHEN 650 MG/1
650 SUPPOSITORY RECTAL EVERY 6 HOURS PRN
Status: DISCONTINUED | OUTPATIENT
Start: 2023-07-18 | End: 2023-07-20 | Stop reason: HOSPADM

## 2023-07-18 RX ORDER — SODIUM CHLORIDE 9 MG/ML
INJECTION, SOLUTION INTRAVENOUS PRN
Status: DISCONTINUED | OUTPATIENT
Start: 2023-07-18 | End: 2023-07-20 | Stop reason: HOSPADM

## 2023-07-18 RX ORDER — SODIUM CHLORIDE 0.9 % (FLUSH) 0.9 %
5-40 SYRINGE (ML) INJECTION PRN
Status: DISCONTINUED | OUTPATIENT
Start: 2023-07-18 | End: 2023-07-20 | Stop reason: HOSPADM

## 2023-07-18 RX ORDER — GAUZE BANDAGE 2" X 2"
100 BANDAGE TOPICAL DAILY
Status: DISCONTINUED | OUTPATIENT
Start: 2023-07-19 | End: 2023-07-20 | Stop reason: HOSPADM

## 2023-07-18 RX ORDER — ENOXAPARIN SODIUM 100 MG/ML
40 INJECTION SUBCUTANEOUS DAILY
Status: DISCONTINUED | OUTPATIENT
Start: 2023-07-19 | End: 2023-07-20 | Stop reason: HOSPADM

## 2023-07-18 RX ORDER — INSULIN LISPRO 100 [IU]/ML
0-8 INJECTION, SOLUTION INTRAVENOUS; SUBCUTANEOUS
Status: DISCONTINUED | OUTPATIENT
Start: 2023-07-19 | End: 2023-07-20 | Stop reason: HOSPADM

## 2023-07-18 RX ORDER — INSULIN LISPRO 100 [IU]/ML
0-4 INJECTION, SOLUTION INTRAVENOUS; SUBCUTANEOUS NIGHTLY
Status: DISCONTINUED | OUTPATIENT
Start: 2023-07-18 | End: 2023-07-20 | Stop reason: HOSPADM

## 2023-07-18 RX ORDER — ONDANSETRON 2 MG/ML
4 INJECTION INTRAMUSCULAR; INTRAVENOUS EVERY 6 HOURS PRN
Status: DISCONTINUED | OUTPATIENT
Start: 2023-07-18 | End: 2023-07-20 | Stop reason: HOSPADM

## 2023-07-18 RX ORDER — SODIUM CHLORIDE 0.9 % (FLUSH) 0.9 %
5-40 SYRINGE (ML) INJECTION EVERY 12 HOURS SCHEDULED
Status: DISCONTINUED | OUTPATIENT
Start: 2023-07-18 | End: 2023-07-20 | Stop reason: HOSPADM

## 2023-07-18 RX ORDER — M-VIT,TX,IRON,MINS/CALC/FOLIC 27MG-0.4MG
1 TABLET ORAL DAILY
Status: DISCONTINUED | OUTPATIENT
Start: 2023-07-19 | End: 2023-07-20 | Stop reason: HOSPADM

## 2023-07-18 RX ORDER — OXYCODONE HYDROCHLORIDE AND ACETAMINOPHEN 5; 325 MG/1; MG/1
1 TABLET ORAL
Status: DISCONTINUED | OUTPATIENT
Start: 2023-07-18 | End: 2023-07-18

## 2023-07-18 RX ORDER — OXYCODONE HYDROCHLORIDE 5 MG/1
10 TABLET ORAL EVERY 6 HOURS PRN
Status: DISCONTINUED | OUTPATIENT
Start: 2023-07-18 | End: 2023-07-20 | Stop reason: HOSPADM

## 2023-07-18 RX ADMIN — MORPHINE SULFATE 4 MG: 2 INJECTION, SOLUTION INTRAMUSCULAR; INTRAVENOUS at 21:06

## 2023-07-18 RX ADMIN — SODIUM CHLORIDE 1000 ML: 9 INJECTION, SOLUTION INTRAVENOUS at 19:39

## 2023-07-18 RX ADMIN — ONDANSETRON 4 MG: 2 INJECTION INTRAMUSCULAR; INTRAVENOUS at 19:39

## 2023-07-18 RX ADMIN — OXYCODONE HYDROCHLORIDE AND ACETAMINOPHEN 1 TABLET: 5; 325 TABLET ORAL at 19:39

## 2023-07-18 ASSESSMENT — PAIN DESCRIPTION - LOCATION
LOCATION: BACK

## 2023-07-18 ASSESSMENT — PAIN SCALES - GENERAL
PAINLEVEL_OUTOF10: 10
PAINLEVEL_OUTOF10: 8
PAINLEVEL_OUTOF10: 8

## 2023-07-18 ASSESSMENT — PAIN DESCRIPTION - ORIENTATION: ORIENTATION: RIGHT;LEFT

## 2023-07-18 NOTE — ED PROVIDER NOTES
peripheral nerve injury. ED Course:     Initial assessment performed. The patients presenting problems have been discussed, and they are in agreement with the care plan formulated and outlined with them. I have encouraged them to ask questions as they arise throughout their visit. Medications   ondansetron (ZOFRAN) injection 4 mg (4 mg IntraVENous Given 7/18/23 1939)   0.9 % sodium chloride bolus (0 mLs IntraVENous Stopped 7/18/23 2131)   oxyCODONE-acetaminophen (PERCOCET) 5-325 MG per tablet 1 tablet (1 tablet Oral Given 7/18/23 1939)   morphine (PF) injection 4 mg (4 mg IntraVENous Given 7/18/23 2106)              I reviewed his discharge summary from 12/10/2022, he had osteomyelitis of the left foot with subsequent resection. Noted history of hypertension. His blood pressure was elevated here, likely in setting of his discomfort and noncompliance with his antihypertensives. EKG is performed at 20: 54, independently interpreted by myself as sinus rhythm at a rate of 61, no ST segment elevation or depression concerning for ACS. His metabolic panel with elevated creatinine of 2.18, prior per chart review was 2. CBC is negative for leukocytosis. Chest x-ray shows no acute process, x-ray of the lumbar spine shows L2 superior endplate compression deformity. Patient denies any known history of prior compression fracture. After multiple doses of pain medications, patient continues to endorse pain, and on trial of ambulation, requires 2 people to take 1 step. Given this, he would not be safe to go home without any assistance at home. Will obtain MRI to assess for amenability for kyphoplasty. He agrees to admission. Critical Care Time:         Disposition:  Admit  Adrián Torres  results have been reviewed with him. He has been counseled regarding his diagnosis, treatment, and plan.   He verbally conveys understanding and agreement of the signs, symptoms, diagnosis, treatment and prognosis and

## 2023-07-18 NOTE — ANESTHESIA POSTPROCEDURE EVALUATION
Keep wound clean and dry. You received a one time dose of antibiotics to cover for tick born illness. Follow up with your primary care provider if you develop rash, fever, body aches, or headache. Return as needed.    Post-Anesthesia Evaluation and Assessment    Patient: Roxy Mccormack MRN: [id number]  SSN: [id number]    YOB: 1965  Age: 47 y.o. Sex: male       Cardiovascular Function/Vital Signs  Visit Vitals    /84 (BP 1 Location: Left arm, BP Patient Position: At rest)    Pulse 63    Temp 36.5 °C (97.7 °F)    Resp 14    Ht 6' 1\" (1.854 m)    Wt 79.8 kg (175 lb 14.8 oz)    SpO2 99%    BMI 23.21 kg/m2       Patient is status post MAC, total IV anesthesia anesthesia for Procedure(s):  Debridement Left Ankle with Left Ankle Bone Biopsy. Nausea/Vomiting: None    Postoperative hydration reviewed and adequate. Pain:  Pain Scale 1: Numeric (0 - 10) (04/24/18 2350)  Pain Intensity 1: 0 (04/24/18 2350)   Managed    Neurological Status:   Neuro (WDL): Exceptions to WDL (04/24/18 2335)  Neuro  Neurologic State: Alert;Eyes open spontaneously (04/24/18 2335)  Orientation Level: Oriented X4 (04/24/18 2335)  Cognition: Follows commands (04/24/18 2335)  Speech: Clear (04/24/18 2335)  LUE Motor Response: Purposeful;Spontaneous  (04/24/18 2335)  LLE Motor Response: Purposeful;Spontaneous ;Numbness (04/24/18 2335)  RUE Motor Response: Purposeful;Spontaneous  (04/24/18 2335)  RLE Motor Response: Purposeful;Spontaneous ;Numbness (04/24/18 2335)   At baseline    Mental Status and Level of Consciousness: Arousable    Pulmonary Status:   O2 Device: Nasal cannula (04/24/18 2350)   Adequate oxygenation and airway patent    Complications related to anesthesia: None    Post-anesthesia assessment completed.  No concerns    Signed By: Urbano Cabral MD     April 24, 2018

## 2023-07-19 LAB
AMPHET UR QL SCN: NEGATIVE
BARBITURATES UR QL SCN: NEGATIVE
BENZODIAZ UR QL: NEGATIVE
CANNABINOIDS UR QL SCN: POSITIVE
COCAINE UR QL SCN: POSITIVE
EKG ATRIAL RATE: 61 BPM
EKG DIAGNOSIS: NORMAL
EKG P AXIS: 75 DEGREES
EKG P-R INTERVAL: 164 MS
EKG Q-T INTERVAL: 444 MS
EKG QRS DURATION: 90 MS
EKG QTC CALCULATION (BAZETT): 446 MS
EKG R AXIS: 33 DEGREES
EKG T AXIS: 69 DEGREES
EKG VENTRICULAR RATE: 61 BPM
GLUCOSE BLD STRIP.AUTO-MCNC: 190 MG/DL (ref 65–117)
GLUCOSE BLD STRIP.AUTO-MCNC: 214 MG/DL (ref 65–117)
GLUCOSE BLD STRIP.AUTO-MCNC: 236 MG/DL (ref 65–117)
GLUCOSE BLD STRIP.AUTO-MCNC: 249 MG/DL (ref 65–117)
GLUCOSE BLD STRIP.AUTO-MCNC: 329 MG/DL (ref 65–117)
Lab: ABNORMAL
METHADONE UR QL: NEGATIVE
OPIATES UR QL: POSITIVE
PCP UR QL: NEGATIVE
SERVICE CMNT-IMP: ABNORMAL

## 2023-07-19 PROCEDURE — 6360000002 HC RX W HCPCS: Performed by: STUDENT IN AN ORGANIZED HEALTH CARE EDUCATION/TRAINING PROGRAM

## 2023-07-19 PROCEDURE — 6370000000 HC RX 637 (ALT 250 FOR IP): Performed by: STUDENT IN AN ORGANIZED HEALTH CARE EDUCATION/TRAINING PROGRAM

## 2023-07-19 PROCEDURE — 82962 GLUCOSE BLOOD TEST: CPT

## 2023-07-19 PROCEDURE — 2580000003 HC RX 258: Performed by: STUDENT IN AN ORGANIZED HEALTH CARE EDUCATION/TRAINING PROGRAM

## 2023-07-19 PROCEDURE — 97535 SELF CARE MNGMENT TRAINING: CPT | Performed by: OCCUPATIONAL THERAPIST

## 2023-07-19 PROCEDURE — 97530 THERAPEUTIC ACTIVITIES: CPT

## 2023-07-19 PROCEDURE — 6370000000 HC RX 637 (ALT 250 FOR IP): Performed by: NURSE PRACTITIONER

## 2023-07-19 PROCEDURE — 1100000000 HC RM PRIVATE

## 2023-07-19 PROCEDURE — 80307 DRUG TEST PRSMV CHEM ANLYZR: CPT

## 2023-07-19 PROCEDURE — 97165 OT EVAL LOW COMPLEX 30 MIN: CPT | Performed by: OCCUPATIONAL THERAPIST

## 2023-07-19 PROCEDURE — 97162 PT EVAL MOD COMPLEX 30 MIN: CPT

## 2023-07-19 RX ORDER — MORPHINE SULFATE 2 MG/ML
1 INJECTION, SOLUTION INTRAMUSCULAR; INTRAVENOUS EVERY 4 HOURS PRN
Status: DISCONTINUED | OUTPATIENT
Start: 2023-07-19 | End: 2023-07-20 | Stop reason: HOSPADM

## 2023-07-19 RX ORDER — QUETIAPINE FUMARATE 25 MG/1
25 TABLET, FILM COATED ORAL NIGHTLY
Status: DISCONTINUED | OUTPATIENT
Start: 2023-07-19 | End: 2023-07-20 | Stop reason: HOSPADM

## 2023-07-19 RX ORDER — SODIUM CHLORIDE 9 MG/ML
INJECTION, SOLUTION INTRAVENOUS CONTINUOUS
Status: ACTIVE | OUTPATIENT
Start: 2023-07-19 | End: 2023-07-20

## 2023-07-19 RX ORDER — KETOROLAC TROMETHAMINE 30 MG/ML
30 INJECTION, SOLUTION INTRAMUSCULAR; INTRAVENOUS
Status: COMPLETED | OUTPATIENT
Start: 2023-07-19 | End: 2023-07-19

## 2023-07-19 RX ADMIN — OXYCODONE HYDROCHLORIDE 10 MG: 5 TABLET ORAL at 13:22

## 2023-07-19 RX ADMIN — SODIUM CHLORIDE: 9 INJECTION, SOLUTION INTRAVENOUS at 17:55

## 2023-07-19 RX ADMIN — MULTIPLE VITAMINS W/ MINERALS TAB 1 TABLET: TAB at 08:05

## 2023-07-19 RX ADMIN — Medication 4 UNITS: at 21:00

## 2023-07-19 RX ADMIN — FOLIC ACID 1 MG: 1 TABLET ORAL at 08:05

## 2023-07-19 RX ADMIN — GABAPENTIN 600 MG: 300 CAPSULE ORAL at 08:05

## 2023-07-19 RX ADMIN — GABAPENTIN 600 MG: 300 CAPSULE ORAL at 21:00

## 2023-07-19 RX ADMIN — GABAPENTIN 600 MG: 300 CAPSULE ORAL at 00:20

## 2023-07-19 RX ADMIN — GABAPENTIN 600 MG: 300 CAPSULE ORAL at 13:22

## 2023-07-19 RX ADMIN — QUETIAPINE FUMARATE 25 MG: 25 TABLET ORAL at 21:41

## 2023-07-19 RX ADMIN — OXYCODONE HYDROCHLORIDE 10 MG: 5 TABLET ORAL at 00:20

## 2023-07-19 RX ADMIN — SODIUM CHLORIDE, PRESERVATIVE FREE 10 ML: 5 INJECTION INTRAVENOUS at 21:41

## 2023-07-19 RX ADMIN — SODIUM CHLORIDE, PRESERVATIVE FREE 10 ML: 5 INJECTION INTRAVENOUS at 08:07

## 2023-07-19 RX ADMIN — OXYCODONE HYDROCHLORIDE 10 MG: 5 TABLET ORAL at 06:25

## 2023-07-19 RX ADMIN — ENOXAPARIN SODIUM 40 MG: 100 INJECTION SUBCUTANEOUS at 08:05

## 2023-07-19 RX ADMIN — MORPHINE SULFATE 1 MG: 2 INJECTION, SOLUTION INTRAMUSCULAR; INTRAVENOUS at 17:46

## 2023-07-19 RX ADMIN — Medication 2 UNITS: at 08:05

## 2023-07-19 RX ADMIN — Medication 100 MG: at 08:05

## 2023-07-19 RX ADMIN — KETOROLAC TROMETHAMINE 30 MG: 30 INJECTION, SOLUTION INTRAMUSCULAR; INTRAVENOUS at 11:10

## 2023-07-19 RX ADMIN — Medication 2 UNITS: at 13:32

## 2023-07-19 RX ADMIN — MORPHINE SULFATE 1 MG: 2 INJECTION, SOLUTION INTRAMUSCULAR; INTRAVENOUS at 21:40

## 2023-07-19 ASSESSMENT — PAIN SCALES - GENERAL
PAINLEVEL_OUTOF10: 0
PAINLEVEL_OUTOF10: 9
PAINLEVEL_OUTOF10: 8
PAINLEVEL_OUTOF10: 8
PAINLEVEL_OUTOF10: 4
PAINLEVEL_OUTOF10: 7

## 2023-07-19 ASSESSMENT — PAIN DESCRIPTION - ORIENTATION
ORIENTATION: LOWER
ORIENTATION: LEFT
ORIENTATION: MID;LEFT
ORIENTATION: RIGHT;LEFT

## 2023-07-19 ASSESSMENT — PAIN DESCRIPTION - LOCATION
LOCATION: BACK;LEG
LOCATION: BACK
LOCATION: BACK;LEG
LOCATION: BACK

## 2023-07-19 ASSESSMENT — PAIN DESCRIPTION - DESCRIPTORS
DESCRIPTORS: THROBBING
DESCRIPTORS: ACHING
DESCRIPTORS: ACHING

## 2023-07-19 ASSESSMENT — PAIN - FUNCTIONAL ASSESSMENT: PAIN_FUNCTIONAL_ASSESSMENT: ACTIVITIES ARE NOT PREVENTED

## 2023-07-19 NOTE — BH NOTE
Attempted to consult with patient but unable to connect because the monitor is having a problem with the keyboard. The nurse has to go look for another monitor. Will try again later.

## 2023-07-19 NOTE — ED NOTES
S/w nursing supervisor, Andres Shells about patient coming up to [phone] with active SI precaution orders. Patient is now low risk SI as charted by this nurse as well as previous nurse. Per previous nurse, there has been some technical trouble connecting via zoom with the psychiatrist for reevaluation and d/c of SI orders. The accepting nurse is aware of need of re consult and stated that she will do it once patient comes up to unit. BHAKTI Alberto stated to this nurse that they will have to use their tech on the floor until SI orders can be D/C'd. Per charge nurse, [phone] current tech Kristian Jeremye is not to stay with patient in [phone].      Christelle Arnold RN  07/19/23 4085

## 2023-07-19 NOTE — ED NOTES
Pt refusing AM labs, pt states \" you aren't hooking anything up to me until I get food\". Pt given one of every food item available in the ER and informed that breakfast trays will be brought around 7/7:30. Pt stated \" I don't want that\". This Rn asked again if labs could be done pt refused. Charge RN and Dr Claudine Spencer notified.       Sebastian Perez RN  07/19/23 7671

## 2023-07-19 NOTE — H&P
chest.        _______________________________________________________________________    TOTAL TIME:  75 Minutes   TOBACCO CESSATION COUNSELING: Yes    Critical Care Provided  N/A  (Minutes non procedure based)        [facility], [facility] - Internal Medicine Hospitalist/ Nocturnist  7/19/2023 12:50 AM    Please do not hesitate to reach out to myself or assigned provider on-call via [facility] paging system with questions or concerns. Procedures: see electronic medical records for all procedures/Xrays and details which were not copied into this note but were reviewed prior to creation of Plan.

## 2023-07-19 NOTE — CONSULTS
\"  Thought Process: Logical, goal directed  Perception: Denies AH or VH. Thought Content: denies SI/HI or Plan  Insight: Partial  Judgement: Fair  Cognition: Intact grossly. ASSESSMENT AND PLAN:  Eloina Osborne meets criteria for a diagnosis of  Unspecified mood disorder, hx of bipolar disorder per the patient. Patient declines cymbalta and states he was on it in the past but it didn't help. Will start seroquel 25mg at bedtime as depressed mood and sleep . Patient declines the recommendation for inpatient psychiatry. He wants to be discharged with resources and emergency number if he starts having suicidal thoughts again. He reports feeling safe to be discharged and would notify or call the emergency number. Would recommend to discharge patient with safety plan and Intensive outpatient services. He also needs to be connected with outpatient mental health services for continued medication management. May d/c miguel      Thank your your consult. Please feel free to consult us again as needed.

## 2023-07-19 NOTE — ED NOTES
Patient moved to the pod 3 in anticipation of an oncoming inpatient nurse. Patient refusing to be hooked up to the monitor or have vitals completed until he got \"real food\".        Ang Bernal RN  07/19/23 5509

## 2023-07-20 VITALS
SYSTOLIC BLOOD PRESSURE: 142 MMHG | OXYGEN SATURATION: 100 % | HEIGHT: 72 IN | TEMPERATURE: 98.1 F | DIASTOLIC BLOOD PRESSURE: 78 MMHG | RESPIRATION RATE: 16 BRPM | WEIGHT: 160 LBS | HEART RATE: 68 BPM | BODY MASS INDEX: 21.67 KG/M2

## 2023-07-20 LAB
ANION GAP SERPL CALC-SCNC: 4 MMOL/L (ref 5–15)
BUN SERPL-MCNC: 28 MG/DL (ref 6–20)
BUN/CREAT SERPL: 14 (ref 12–20)
CALCIUM SERPL-MCNC: 7.7 MG/DL (ref 8.5–10.1)
CHLORIDE SERPL-SCNC: 105 MMOL/L (ref 97–108)
CO2 SERPL-SCNC: 26 MMOL/L (ref 21–32)
CREAT SERPL-MCNC: 2.02 MG/DL (ref 0.7–1.3)
GLUCOSE BLD STRIP.AUTO-MCNC: 121 MG/DL (ref 65–117)
GLUCOSE BLD STRIP.AUTO-MCNC: 309 MG/DL (ref 65–117)
GLUCOSE BLD STRIP.AUTO-MCNC: 358 MG/DL (ref 65–117)
GLUCOSE SERPL-MCNC: 254 MG/DL (ref 65–100)
PHOSPHATE SERPL-MCNC: 3.6 MG/DL (ref 2.6–4.7)
POTASSIUM SERPL-SCNC: 4.2 MMOL/L (ref 3.5–5.1)
SERVICE CMNT-IMP: ABNORMAL
SODIUM SERPL-SCNC: 135 MMOL/L (ref 136–145)

## 2023-07-20 PROCEDURE — 2580000003 HC RX 258: Performed by: STUDENT IN AN ORGANIZED HEALTH CARE EDUCATION/TRAINING PROGRAM

## 2023-07-20 PROCEDURE — 80048 BASIC METABOLIC PNL TOTAL CA: CPT

## 2023-07-20 PROCEDURE — 84100 ASSAY OF PHOSPHORUS: CPT

## 2023-07-20 PROCEDURE — 97535 SELF CARE MNGMENT TRAINING: CPT | Performed by: OCCUPATIONAL THERAPIST

## 2023-07-20 PROCEDURE — 82962 GLUCOSE BLOOD TEST: CPT

## 2023-07-20 PROCEDURE — 36415 COLL VENOUS BLD VENIPUNCTURE: CPT

## 2023-07-20 PROCEDURE — 6370000000 HC RX 637 (ALT 250 FOR IP): Performed by: STUDENT IN AN ORGANIZED HEALTH CARE EDUCATION/TRAINING PROGRAM

## 2023-07-20 PROCEDURE — 6360000002 HC RX W HCPCS: Performed by: STUDENT IN AN ORGANIZED HEALTH CARE EDUCATION/TRAINING PROGRAM

## 2023-07-20 PROCEDURE — 97116 GAIT TRAINING THERAPY: CPT

## 2023-07-20 RX ORDER — QUETIAPINE FUMARATE 25 MG/1
25 TABLET, FILM COATED ORAL NIGHTLY
Qty: 15 TABLET | Refills: 0 | Status: SHIPPED | OUTPATIENT
Start: 2023-07-20 | End: 2023-08-04

## 2023-07-20 RX ORDER — SODIUM CHLORIDE 9 MG/ML
INJECTION, SOLUTION INTRAVENOUS CONTINUOUS
Status: DISCONTINUED | OUTPATIENT
Start: 2023-07-20 | End: 2023-07-20 | Stop reason: HOSPADM

## 2023-07-20 RX ORDER — QUETIAPINE FUMARATE 25 MG/1
25 TABLET, FILM COATED ORAL NIGHTLY
Qty: 60 TABLET | Refills: 0 | Status: SHIPPED | OUTPATIENT
Start: 2023-07-20 | End: 2023-07-20 | Stop reason: SDUPTHER

## 2023-07-20 RX ORDER — FOLIC ACID 1 MG/1
1 TABLET ORAL DAILY
Qty: 30 TABLET | Refills: 3 | Status: SHIPPED | OUTPATIENT
Start: 2023-07-21

## 2023-07-20 RX ADMIN — FOLIC ACID 1 MG: 1 TABLET ORAL at 09:43

## 2023-07-20 RX ADMIN — Medication 6 UNITS: at 18:01

## 2023-07-20 RX ADMIN — OXYCODONE HYDROCHLORIDE 10 MG: 5 TABLET ORAL at 09:43

## 2023-07-20 RX ADMIN — MORPHINE SULFATE 1 MG: 2 INJECTION, SOLUTION INTRAMUSCULAR; INTRAVENOUS at 04:15

## 2023-07-20 RX ADMIN — Medication 100 MG: at 09:47

## 2023-07-20 RX ADMIN — OXYCODONE HYDROCHLORIDE 10 MG: 5 TABLET ORAL at 16:13

## 2023-07-20 RX ADMIN — Medication 8 UNITS: at 09:42

## 2023-07-20 RX ADMIN — SODIUM CHLORIDE, PRESERVATIVE FREE 10 ML: 5 INJECTION INTRAVENOUS at 09:44

## 2023-07-20 RX ADMIN — MULTIPLE VITAMINS W/ MINERALS TAB 1 TABLET: TAB at 09:43

## 2023-07-20 RX ADMIN — ENOXAPARIN SODIUM 40 MG: 100 INJECTION SUBCUTANEOUS at 09:44

## 2023-07-20 RX ADMIN — GABAPENTIN 600 MG: 300 CAPSULE ORAL at 09:43

## 2023-07-20 RX ADMIN — MORPHINE SULFATE 1 MG: 2 INJECTION, SOLUTION INTRAMUSCULAR; INTRAVENOUS at 09:39

## 2023-07-20 RX ADMIN — SODIUM CHLORIDE: 9 INJECTION, SOLUTION INTRAVENOUS at 03:54

## 2023-07-20 RX ADMIN — GABAPENTIN 600 MG: 300 CAPSULE ORAL at 16:13

## 2023-07-20 ASSESSMENT — PAIN DESCRIPTION - LOCATION
LOCATION: LEG
LOCATION: BACK

## 2023-07-20 ASSESSMENT — PAIN DESCRIPTION - DESCRIPTORS
DESCRIPTORS: THROBBING
DESCRIPTORS: POUNDING

## 2023-07-20 ASSESSMENT — PAIN SCALES - GENERAL
PAINLEVEL_OUTOF10: 8
PAINLEVEL_OUTOF10: 8
PAINLEVEL_OUTOF10: 0
PAINLEVEL_OUTOF10: 8

## 2023-07-20 ASSESSMENT — PAIN DESCRIPTION - ORIENTATION
ORIENTATION: RIGHT;LEFT
ORIENTATION: LOWER
ORIENTATION: LOWER

## 2023-07-20 NOTE — PLAN OF CARE
Problem: Occupational Therapy - Adult  Goal: By Discharge: Performs self-care activities at highest level of function for planned discharge setting. See evaluation for individualized goals. 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 Occupational Therapy Goals:  Initiated 7/19/2023  1. Patient will perform grooming standing with Modified [place] within 7 day(s). 2.  Patient will perform upper body dressing and lower body dressing with Modified [place] within 7 day(s). 3.  Patient will perform toileting with Modified [place] within 7 day(s). 4.  Patient will perform toilet transfers with Modified [place]  within 7 day(s). Outcome: Progressing   OCCUPATIONAL THERAPY EVALUATION    Patient: Ruby Garcias (21 y.o. male)  Date: 7/19/2023  Primary Diagnosis: Lumbar back pain with radiculopathy affecting lower extremity [M54.16]         Precautions: Fall Risk                  ASSESSMENT :  The patient is limited by decreased functional mobility, independence in ADLs, high-level IADLs, strength, sensation, activity tolerance, coordination, balance, increased pain levels. Based on the impairments listed above pt reports LLE weakness and improving numbness in BLE (RLE>LLE). He reports two recent falls with report of dizziness, feeling flushed and onset of numbness in BLE while standing. He reports inability to prevent falls when theses symptoms occur. Pt was not orthostatic this session and did report dizziness with standing. Educated on BLT precautions for comfort.   Pt was able to perform
Problem: Physical Therapy - Adult  Goal: By Discharge: Performs mobility at highest level of function for planned discharge setting. See evaluation for individualized goals. Description: FUNCTIONAL STATUS PRIOR TO ADMISSION: Pt states he lives with his father for whom he is caregiver - physically assists him on the stairs and as needed. He states that he had had a hx of back and LE pain, did not use a device and amb independently and did own ADLs. Increased pain and more difficult mobility since fall. Has hx MDD/YUE/bipolar. HOME SUPPORT PRIOR TO ADMISSION: The patient lived with father but did not require assistance. Physical Therapy Goals  Initiated 7/19/2023  1. Patient will move from supine to sit and sit to supine, scoot up and down, and roll side to side in bed with independence within 7 day(s). 2.  Patient will perform sit to stand with independence within 7 day(s). 3.  Patient will transfer from bed to chair and chair to bed with modified independence using the least restrictive device within 7 day(s). 4.  Patient will ambulate with modified independence for 150 feet with the least restrictive device within 7 day(s).        7/19/2023 1303 by Sotero Nicholson, PT  Outcome: Not Progressing
Problem: Physical Therapy - Adult  Goal: By Discharge: Performs mobility at highest level of function for planned discharge setting. See evaluation for individualized goals. Description: FUNCTIONAL STATUS PRIOR TO ADMISSION: Pt states he lives with his father for whom he is caregiver - physically assists him on the stairs and as needed. He states that he had had a hx of back and LE pain, did not use a device and amb independently and did own ADLs. Increased pain and more difficult mobility since fall. Has hx MDD/YUE/bipolar. HOME SUPPORT PRIOR TO ADMISSION: The patient lived with father but did not require assistance. Physical Therapy Goals  Initiated 7/19/2023  1. Patient will move from supine to sit and sit to supine, scoot up and down, and roll side to side in bed with independence within 7 day(s). 2.  Patient will perform sit to stand with independence within 7 day(s). 3.  Patient will transfer from bed to chair and chair to bed with modified independence using the least restrictive device within 7 day(s). 4.  Patient will ambulate with modified independence for 150 feet with the least restrictive device within 7 day(s). Outcome: Not Progressing   PHYSICAL THERAPY EVALUATION    Patient: Estevan Roman (72 y.o. male)  Date: 7/19/2023  Primary Diagnosis: Lumbar back pain with radiculopathy affecting lower extremity [M54.16]       Precautions: Fall Risk                    ASSESSMENT :   DEFICITS/IMPAIRMENTS:   The patient is limited by decreased independence in ADLs, strength, sensation, activity tolerance, increased pain levels to 8/10 mainly in LLE. He is overall min A with bed mobility and was instructed in log roll technique for back safety. He was able to side step at edge of bed with HHA min A of 2 but did show some instability due to pain in LLE at knee but to MMT resistive testing he presents at 4/5.  Encouraged pt to try rolling walker for support to allow him to progress to amb with
Problem: Physical Therapy - Adult  Goal: By Discharge: Performs mobility at highest level of function for planned discharge setting. See evaluation for individualized goals. Description: FUNCTIONAL STATUS PRIOR TO ADMISSION: Pt states he lives with his father for whom he is caregiver - physically assists him on the stairs and as needed. He states that he had had a hx of back and LE pain, did not use a device and amb independently and did own ADLs. Increased pain and more difficult mobility since fall. Has hx MDD/YUE/bipolar. HOME SUPPORT PRIOR TO ADMISSION: The patient lived with father but did not require assistance. Physical Therapy Goals  Initiated 7/19/2023  1. Patient will move from supine to sit and sit to supine, scoot up and down, and roll side to side in bed with independence within 7 day(s). 2.  Patient will perform sit to stand with independence within 7 day(s). 3.  Patient will transfer from bed to chair and chair to bed with modified independence using the least restrictive device within 7 day(s). 4.  Patient will ambulate with modified independence for 150 feet with the least restrictive device within 7 day(s). Outcome: Progressing   PHYSICAL THERAPY TREATMENT    Patient: Ruby Garcias (39 y.o. male)  Date: 7/20/2023  Diagnosis: Generalized weakness [R53.1]  Depression, unspecified depression type [F32. A]  Lumbar back pain with radiculopathy affecting lower extremity [M54.16]  Compression fracture of lumbar vertebra, unspecified lumbar vertebral level, initial encounter ([address]) [S32.000A] Lumbar back pain with radiculopathy affecting lower extremity      Precautions: Fall Risk                    ASSESSMENT:  Patient continues to benefit from skilled PT services and is progressing towards goals. Pt with improved activity tolerance, strength, balance, and overall mobility this date despite continued c/o increased back pain. Pt ambulated an initial 80ft w/ CGAx2 and HHAx2.  Gait unsteady
to sit and sit to supine, scoot up and down, and roll side to side in bed with independence within 7 day(s). 2.  Patient will perform sit to stand with independence within 7 day(s). 3.  Patient will transfer from bed to chair and chair to bed with modified independence using the least restrictive device within 7 day(s). 4.  Patient will ambulate with modified independence for 150 feet with the least restrictive device within 7 day(s).        7/19/2023 1303 by Lawrence Valentin, PT  Outcome: Not Progressing
Continue lumbar spine pain  Pain Intervention(s):   rest and repositioning      Activity Tolerance:   Fair       After treatment:   Patient left in no apparent distress sitting up in chair, Call bell within reach, and Bed/ chair alarm activated    COMMUNICATION/EDUCATION:   The patient's plan of care was discussed with: physical therapist and registered nurse    Patient Education  Education Provided: ADL Adaptive Strategies;Transfer Training; Fall Prevention Strategies  Education Method: Verbal  Barriers to Learning: None  Education Outcome: Verbalized understanding;Demonstrated understanding    Thank you for this referral.  Malachi Leos, OTR/L  Minutes: 16

## 2023-07-20 NOTE — DISCHARGE SUMMARY
Hospitalist Discharge Summary     Patient ID:  Jennifer Lyn  [id number]  55 y.o.  1965 7/18/2023    PCP on record: No primary care provider on file. Admit date: 7/18/2023  Discharge date and time: 7/20/2023    DISCHARGE DIAGNOSIS:    ***    CONSULTATIONS:  IP CONSULT TO HOSPITALIST  IP CONSULT TO PSYCHIATRY  IP CONSULT TO NEUROSURGERY  IP CONSULT TO DIETITIAN  IP CONSULT TO BSMART  IP CONSULT HOME HEALTH    Excerpted HPI from H&P of Stephanie Dean, DO:  ***    ______________________________________________________________________  DISCHARGE SUMMARY/HOSPITAL COURSE:  for full details see H&P, daily progress notes, labs, consult notes. _______________________________________________________________________  Patient seen and examined by me on discharge day. Pertinent Findings:  Gen:    Not in distress  Chest: Clear lungs  CVS:   Regular rhythm. No edema  Abd:  Soft, not distended, not tender  Neuro:  Alert,   _______________________________________________________________________  DISCHARGE MEDICATIONS:      Medication List        ASK your doctor about these medications      acetaminophen 325 MG tablet  Commonly known as: TYLENOL     gabapentin 600 MG tablet  Commonly known as: NEURONTIN     ibuprofen 400 MG tablet  Commonly known as: ADVIL;MOTRIN     losartan 50 MG tablet  Commonly known as: COZAAR     metFORMIN 750 MG extended release tablet  Commonly known as: GLUCOPHAGE-XR     ondansetron 4 MG tablet  Commonly known as: ZOFRAN     oxyCODONE HCl 10 MG immediate release tablet  Commonly known as: OXY-IR                Patient Follow Up Instructions: Activity: {discharge activity:36475}  Diet: {diet:05404}  Wound Care: {wound care:42588}                          Follow-up with *** in {0-10:30999} {units:11}.   Follow-up tests/labs ***    Follow-up Information    None       ________________________________________________________________    Risk of deterioration: {BSHSI

## 2023-07-20 NOTE — CARE COORDINATION
Transition of Care Plan:    RUR: 12% Low Risk  Prior Level of Functioning: Independent  Disposition: Home with [facility] list provided   DME needed: N/A  Transportation at discharge: Sister Adrienne Patino [phone])  IM/IMM Medicare/ letter given: 1st IM given 7/19/23  Is patient a [place] and connected with [address] [facility]? N/A  Caregiver Contact: Sister Adrienne Patino [phone])  Discharge Caregiver contacted prior to discharge? Yes by pt  Care Conference needed? N/A  Barriers to discharge:  N/A    Initial Note: CM acknowledged d/c. Chart reviewed, IDR completed. Pt will d/c home with [facility] list provided. Due to late discharge order CM will send [facility] referrals tomorrow once pt calls with options. Sister Adrienne Patino [phone]) will pick her up for d/c. 2 IM let not needed -1st IM letter given 7/19. CM will remain accessible if any needs arise prior to discharge. 07/20/23 [address] Discharge   Transition of Care Consult (CM Consult) [address] Resource Information Provided? No   Mode of Transport at Discharge Other (see comment)  (Car)   Confirm Follow Up Transport Family  (Sister Adrienne Patino [phone]))   Condition of Participation: Discharge Planning   The Plan for Transition of Care is related to the following treatment goals: Pt will discharge with [facility]   The Patient and/or Patient Representative was provided with a Choice of Provider? Patient   The Patient and/Or Patient Representative agree with the Discharge Plan? Yes   Freedom of Choice list was provided with basic dialogue that supports the patient's individualized plan of care/goals, treatment preferences, and shares the quality data associated with the providers?   Yes     [place]  [phone]

## 2023-07-20 NOTE — CARE COORDINATION
Care Management Initial Assessment       RUR: 12%  Readmission? No  1st IM letter given? Yes   1st  letter given: No     07/20/23 1101   Service Assessment   Patient Orientation Alert and Oriented   Cognition Alert   History Provided By Patient   Primary Caregiver Self   Support Systems Spouse/Significant Other   Patient's Healthcare Decision Maker is: Named in [address]   PCP Verified by CM Yes   Last Visit to PCP Within last 3 months   Prior Functional Level Independent in ADLs/IADLs   Current Functional Level Independent in ADLs/IADLs   Can patient return to prior living arrangement Yes   Ability to make needs known: Good   Family able to assist with home care needs: Yes   Would you like for me to discuss the discharge plan with any other family members/significant others, and if so, who?  Yes   Financial Resources Medicare   Social/Functional History   Lives With Family  (Lives with Father)   Type of [facility]  One level   Receives Help From Family  (Sisters)   ADL Assistance Independent   Homemaking Assistance Independent   Homemaking Responsibilities Yes   Ambulation Assistance Independent   Transfer Assistance Independent   Discharge Planning   Type of [address] Family Members  (Lives with Father)   Patient expects to be discharged to: [address] [place] [address]  Phone: [phone]

## 2024-05-29 ENCOUNTER — APPOINTMENT (OUTPATIENT)
Facility: HOSPITAL | Age: 59
DRG: 617 | End: 2024-05-29
Payer: MEDICARE

## 2024-05-29 ENCOUNTER — HOSPITAL ENCOUNTER (INPATIENT)
Facility: HOSPITAL | Age: 59
LOS: 7 days | Discharge: HOME | DRG: 617 | End: 2024-06-05
Attending: INTERNAL MEDICINE | Admitting: INTERNAL MEDICINE
Payer: MEDICARE

## 2024-05-29 DIAGNOSIS — L97.519 DIABETIC ULCER OF RIGHT FOOT ASSOCIATED WITH OTHER SPECIFIED DIABETES MELLITUS, UNSPECIFIED PART OF FOOT, UNSPECIFIED ULCER STAGE (HCC): Primary | ICD-10-CM

## 2024-05-29 DIAGNOSIS — E13.621 DIABETIC ULCER OF RIGHT FOOT ASSOCIATED WITH OTHER SPECIFIED DIABETES MELLITUS, UNSPECIFIED PART OF FOOT, UNSPECIFIED ULCER STAGE (HCC): Primary | ICD-10-CM

## 2024-05-29 DIAGNOSIS — L03.90 CELLULITIS, UNSPECIFIED CELLULITIS SITE: ICD-10-CM

## 2024-05-29 DIAGNOSIS — R60.9 EDEMA, UNSPECIFIED TYPE: ICD-10-CM

## 2024-05-29 PROBLEM — N18.4 STAGE 4 CHRONIC KIDNEY DISEASE (HCC): Status: ACTIVE | Noted: 2024-05-29

## 2024-05-29 PROBLEM — E11.621 TYPE 2 DIABETES MELLITUS WITH RIGHT DIABETIC FOOT ULCER (HCC): Status: ACTIVE | Noted: 2024-05-29

## 2024-05-29 LAB
ALBUMIN SERPL-MCNC: 2.5 G/DL (ref 3.5–5)
ALBUMIN/GLOB SERPL: 0.6 (ref 1.1–2.2)
ALP SERPL-CCNC: 208 U/L (ref 45–117)
ALT SERPL-CCNC: 91 U/L (ref 12–78)
AMPHET UR QL SCN: NEGATIVE
ANION GAP SERPL CALC-SCNC: 6 MMOL/L (ref 5–15)
APPEARANCE UR: CLEAR
AST SERPL-CCNC: 70 U/L (ref 15–37)
BACTERIA URNS QL MICRO: NEGATIVE /HPF
BARBITURATES UR QL SCN: NEGATIVE
BASOPHILS # BLD: 0.1 K/UL (ref 0–0.1)
BASOPHILS NFR BLD: 1 % (ref 0–1)
BENZODIAZ UR QL: NEGATIVE
BILIRUB SERPL-MCNC: 0.2 MG/DL (ref 0.2–1)
BILIRUB UR QL: NEGATIVE
BUN SERPL-MCNC: 33 MG/DL (ref 6–20)
BUN/CREAT SERPL: 15 (ref 12–20)
CALCIUM SERPL-MCNC: 7.9 MG/DL (ref 8.5–10.1)
CANNABINOIDS UR QL SCN: POSITIVE
CHLORIDE SERPL-SCNC: 106 MMOL/L (ref 97–108)
CO2 SERPL-SCNC: 26 MMOL/L (ref 21–32)
COCAINE UR QL SCN: POSITIVE
COLOR UR: ABNORMAL
COMMENT:: NORMAL
CREAT SERPL-MCNC: 2.27 MG/DL (ref 0.7–1.3)
DIFFERENTIAL METHOD BLD: ABNORMAL
ECHO BSA: 1.98 M2
EKG ATRIAL RATE: 76 BPM
EKG DIAGNOSIS: NORMAL
EKG P AXIS: 39 DEGREES
EKG P-R INTERVAL: 162 MS
EKG Q-T INTERVAL: 402 MS
EKG QRS DURATION: 92 MS
EKG QTC CALCULATION (BAZETT): 452 MS
EKG R AXIS: 46 DEGREES
EKG T AXIS: 66 DEGREES
EKG VENTRICULAR RATE: 76 BPM
EOSINOPHIL # BLD: 0.2 K/UL (ref 0–0.4)
EOSINOPHIL NFR BLD: 3 % (ref 0–7)
EPITH CASTS URNS QL MICRO: ABNORMAL /LPF
ERYTHROCYTE [DISTWIDTH] IN BLOOD BY AUTOMATED COUNT: 13.9 % (ref 11.5–14.5)
EST. AVERAGE GLUCOSE BLD GHB EST-MCNC: 171 MG/DL
FLUAV RNA SPEC QL NAA+PROBE: NOT DETECTED
FLUBV RNA SPEC QL NAA+PROBE: NOT DETECTED
GLOBULIN SER CALC-MCNC: 4.3 G/DL (ref 2–4)
GLUCOSE BLD STRIP.AUTO-MCNC: 149 MG/DL (ref 65–117)
GLUCOSE BLD STRIP.AUTO-MCNC: 265 MG/DL (ref 65–117)
GLUCOSE BLD STRIP.AUTO-MCNC: 282 MG/DL (ref 65–117)
GLUCOSE SERPL-MCNC: 293 MG/DL (ref 65–100)
GLUCOSE UR STRIP.AUTO-MCNC: >1000 MG/DL
HBA1C MFR BLD: 7.6 % (ref 4–5.6)
HCT VFR BLD AUTO: 21.4 % (ref 36.6–50.3)
HGB BLD-MCNC: 6.9 G/DL (ref 12.1–17)
HGB UR QL STRIP: ABNORMAL
HYALINE CASTS URNS QL MICRO: ABNORMAL /LPF (ref 0–2)
IMM GRANULOCYTES # BLD AUTO: 0.1 K/UL (ref 0–0.04)
IMM GRANULOCYTES NFR BLD AUTO: 1 % (ref 0–0.5)
KETONES UR QL STRIP.AUTO: NEGATIVE MG/DL
LACTATE BLD-SCNC: 1.39 MMOL/L (ref 0.4–2)
LEUKOCYTE ESTERASE UR QL STRIP.AUTO: ABNORMAL
LYMPHOCYTES # BLD: 0.8 K/UL (ref 0.8–3.5)
LYMPHOCYTES NFR BLD: 14 % (ref 12–49)
Lab: ABNORMAL
MCH RBC QN AUTO: 27.8 PG (ref 26–34)
MCHC RBC AUTO-ENTMCNC: 32.2 G/DL (ref 30–36.5)
MCV RBC AUTO: 86.3 FL (ref 80–99)
METHADONE UR QL: NEGATIVE
MONOCYTES # BLD: 0.4 K/UL (ref 0–1)
MONOCYTES NFR BLD: 7 % (ref 5–13)
NEUTS SEG # BLD: 4 K/UL (ref 1.8–8)
NEUTS SEG NFR BLD: 74 % (ref 32–75)
NITRITE UR QL STRIP.AUTO: NEGATIVE
NRBC # BLD: 0 K/UL (ref 0–0.01)
NRBC BLD-RTO: 0 PER 100 WBC
NT PRO BNP: 3612 PG/ML
OPIATES UR QL: POSITIVE
PCP UR QL: NEGATIVE
PH UR STRIP: 6 (ref 5–8)
PLATELET # BLD AUTO: 238 K/UL (ref 150–400)
PMV BLD AUTO: 9.8 FL (ref 8.9–12.9)
POTASSIUM SERPL-SCNC: 4.1 MMOL/L (ref 3.5–5.1)
PROCALCITONIN SERPL-MCNC: <0.05 NG/ML
PROT SERPL-MCNC: 6.8 G/DL (ref 6.4–8.2)
PROT UR STRIP-MCNC: 300 MG/DL
RBC # BLD AUTO: 2.48 M/UL (ref 4.1–5.7)
RBC #/AREA URNS HPF: ABNORMAL /HPF (ref 0–5)
RBC MORPH BLD: ABNORMAL
SARS-COV-2 RNA RESP QL NAA+PROBE: NOT DETECTED
SERVICE CMNT-IMP: ABNORMAL
SODIUM SERPL-SCNC: 138 MMOL/L (ref 136–145)
SP GR UR REFRACTOMETRY: 1.02
SPECIMEN HOLD: NORMAL
URINE CULTURE IF INDICATED: ABNORMAL
UROBILINOGEN UR QL STRIP.AUTO: 1 EU/DL (ref 0.2–1)
WBC # BLD AUTO: 5.6 K/UL (ref 4.1–11.1)
WBC URNS QL MICRO: ABNORMAL /HPF (ref 0–4)

## 2024-05-29 PROCEDURE — 73720 MRI LWR EXTREMITY W/O&W/DYE: CPT

## 2024-05-29 PROCEDURE — 87636 SARSCOV2 & INF A&B AMP PRB: CPT

## 2024-05-29 PROCEDURE — 2580000003 HC RX 258

## 2024-05-29 PROCEDURE — 87040 BLOOD CULTURE FOR BACTERIA: CPT

## 2024-05-29 PROCEDURE — 85025 COMPLETE CBC W/AUTO DIFF WBC: CPT

## 2024-05-29 PROCEDURE — 96374 THER/PROPH/DIAG INJ IV PUSH: CPT

## 2024-05-29 PROCEDURE — 87147 CULTURE TYPE IMMUNOLOGIC: CPT

## 2024-05-29 PROCEDURE — 6370000000 HC RX 637 (ALT 250 FOR IP)

## 2024-05-29 PROCEDURE — 6360000002 HC RX W HCPCS: Performed by: HOSPITALIST

## 2024-05-29 PROCEDURE — 93005 ELECTROCARDIOGRAM TRACING: CPT | Performed by: INTERNAL MEDICINE

## 2024-05-29 PROCEDURE — 87077 CULTURE AEROBIC IDENTIFY: CPT

## 2024-05-29 PROCEDURE — 83880 ASSAY OF NATRIURETIC PEPTIDE: CPT

## 2024-05-29 PROCEDURE — 87205 SMEAR GRAM STAIN: CPT

## 2024-05-29 PROCEDURE — 6360000002 HC RX W HCPCS: Performed by: PHYSICIAN ASSISTANT

## 2024-05-29 PROCEDURE — 84145 PROCALCITONIN (PCT): CPT

## 2024-05-29 PROCEDURE — 96375 TX/PRO/DX INJ NEW DRUG ADDON: CPT

## 2024-05-29 PROCEDURE — 73630 X-RAY EXAM OF FOOT: CPT

## 2024-05-29 PROCEDURE — 6360000002 HC RX W HCPCS

## 2024-05-29 PROCEDURE — 93971 EXTREMITY STUDY: CPT

## 2024-05-29 PROCEDURE — 1100000003 HC PRIVATE W/ TELEMETRY

## 2024-05-29 PROCEDURE — 80307 DRUG TEST PRSMV CHEM ANLYZR: CPT

## 2024-05-29 PROCEDURE — 83036 HEMOGLOBIN GLYCOSYLATED A1C: CPT

## 2024-05-29 PROCEDURE — 6360000004 HC RX CONTRAST MEDICATION: Performed by: PODIATRIST

## 2024-05-29 PROCEDURE — A9579 GAD-BASE MR CONTRAST NOS,1ML: HCPCS | Performed by: PODIATRIST

## 2024-05-29 PROCEDURE — 36415 COLL VENOUS BLD VENIPUNCTURE: CPT

## 2024-05-29 PROCEDURE — 80053 COMPREHEN METABOLIC PANEL: CPT

## 2024-05-29 PROCEDURE — 87070 CULTURE OTHR SPECIMN AEROBIC: CPT

## 2024-05-29 PROCEDURE — 99285 EMERGENCY DEPT VISIT HI MDM: CPT

## 2024-05-29 PROCEDURE — 71045 X-RAY EXAM CHEST 1 VIEW: CPT

## 2024-05-29 PROCEDURE — 99221 1ST HOSP IP/OBS SF/LOW 40: CPT

## 2024-05-29 PROCEDURE — 93922 UPR/L XTREMITY ART 2 LEVELS: CPT

## 2024-05-29 PROCEDURE — 2580000003 HC RX 258: Performed by: PHYSICIAN ASSISTANT

## 2024-05-29 PROCEDURE — 83605 ASSAY OF LACTIC ACID: CPT

## 2024-05-29 PROCEDURE — 76700 US EXAM ABDOM COMPLETE: CPT

## 2024-05-29 PROCEDURE — 82962 GLUCOSE BLOOD TEST: CPT

## 2024-05-29 PROCEDURE — 81001 URINALYSIS AUTO W/SCOPE: CPT

## 2024-05-29 PROCEDURE — 87186 SC STD MICRODIL/AGAR DIL: CPT

## 2024-05-29 RX ORDER — LANOLIN ALCOHOL/MO/W.PET/CERES
3 CREAM (GRAM) TOPICAL NIGHTLY PRN
Status: DISCONTINUED | OUTPATIENT
Start: 2024-05-29 | End: 2024-06-05 | Stop reason: HOSPADM

## 2024-05-29 RX ORDER — MORPHINE SULFATE 2 MG/ML
2 INJECTION, SOLUTION INTRAMUSCULAR; INTRAVENOUS EVERY 4 HOURS PRN
Status: DISCONTINUED | OUTPATIENT
Start: 2024-05-29 | End: 2024-06-01

## 2024-05-29 RX ORDER — GUAIFENESIN 600 MG/1
600 TABLET, EXTENDED RELEASE ORAL 2 TIMES DAILY
Status: DISCONTINUED | OUTPATIENT
Start: 2024-05-29 | End: 2024-06-05 | Stop reason: HOSPADM

## 2024-05-29 RX ORDER — SODIUM CHLORIDE 0.9 % (FLUSH) 0.9 %
5-40 SYRINGE (ML) INJECTION PRN
Status: DISCONTINUED | OUTPATIENT
Start: 2024-05-29 | End: 2024-06-01 | Stop reason: SDUPTHER

## 2024-05-29 RX ORDER — ACETAMINOPHEN 650 MG/1
650 SUPPOSITORY RECTAL EVERY 6 HOURS PRN
Status: DISCONTINUED | OUTPATIENT
Start: 2024-05-29 | End: 2024-06-05 | Stop reason: HOSPADM

## 2024-05-29 RX ORDER — SODIUM CHLORIDE 9 MG/ML
INJECTION, SOLUTION INTRAVENOUS CONTINUOUS
Status: ACTIVE | OUTPATIENT
Start: 2024-05-29 | End: 2024-05-30

## 2024-05-29 RX ORDER — GABAPENTIN 100 MG/1
100 CAPSULE ORAL 3 TIMES DAILY
Status: DISCONTINUED | OUTPATIENT
Start: 2024-05-29 | End: 2024-06-05 | Stop reason: HOSPADM

## 2024-05-29 RX ORDER — MORPHINE SULFATE 4 MG/ML
4 INJECTION, SOLUTION INTRAMUSCULAR; INTRAVENOUS
Status: COMPLETED | OUTPATIENT
Start: 2024-05-29 | End: 2024-05-29

## 2024-05-29 RX ORDER — DEXTROSE MONOHYDRATE 100 MG/ML
INJECTION, SOLUTION INTRAVENOUS CONTINUOUS PRN
Status: DISCONTINUED | OUTPATIENT
Start: 2024-05-29 | End: 2024-06-01 | Stop reason: SDUPTHER

## 2024-05-29 RX ORDER — SODIUM CHLORIDE 0.9 % (FLUSH) 0.9 %
5-40 SYRINGE (ML) INJECTION EVERY 12 HOURS SCHEDULED
Status: DISCONTINUED | OUTPATIENT
Start: 2024-05-29 | End: 2024-06-01 | Stop reason: SDUPTHER

## 2024-05-29 RX ORDER — MORPHINE SULFATE 4 MG/ML
4 INJECTION, SOLUTION INTRAMUSCULAR; INTRAVENOUS
Status: DISCONTINUED | OUTPATIENT
Start: 2024-05-29 | End: 2024-05-29

## 2024-05-29 RX ORDER — SODIUM CHLORIDE 9 MG/ML
INJECTION, SOLUTION INTRAVENOUS PRN
Status: DISCONTINUED | OUTPATIENT
Start: 2024-05-29 | End: 2024-06-05 | Stop reason: HOSPADM

## 2024-05-29 RX ORDER — ONDANSETRON 2 MG/ML
4 INJECTION INTRAMUSCULAR; INTRAVENOUS EVERY 6 HOURS PRN
Status: DISCONTINUED | OUTPATIENT
Start: 2024-05-29 | End: 2024-06-05 | Stop reason: HOSPADM

## 2024-05-29 RX ORDER — AMLODIPINE BESYLATE 5 MG/1
5 TABLET ORAL DAILY
Status: DISCONTINUED | OUTPATIENT
Start: 2024-05-29 | End: 2024-06-01

## 2024-05-29 RX ORDER — BENZONATATE 100 MG/1
100 CAPSULE ORAL 3 TIMES DAILY
Status: DISCONTINUED | OUTPATIENT
Start: 2024-05-29 | End: 2024-06-05 | Stop reason: HOSPADM

## 2024-05-29 RX ORDER — HYDRALAZINE HYDROCHLORIDE 20 MG/ML
5 INJECTION INTRAMUSCULAR; INTRAVENOUS ONCE
Status: COMPLETED | OUTPATIENT
Start: 2024-05-29 | End: 2024-05-29

## 2024-05-29 RX ORDER — POLYETHYLENE GLYCOL 3350 17 G/17G
17 POWDER, FOR SOLUTION ORAL DAILY
Status: DISCONTINUED | OUTPATIENT
Start: 2024-05-29 | End: 2024-06-05 | Stop reason: HOSPADM

## 2024-05-29 RX ORDER — OXYCODONE HYDROCHLORIDE AND ACETAMINOPHEN 5; 325 MG/1; MG/1
1 TABLET ORAL EVERY 6 HOURS PRN
Status: DISCONTINUED | OUTPATIENT
Start: 2024-05-29 | End: 2024-06-01

## 2024-05-29 RX ORDER — ACETAMINOPHEN 325 MG/1
650 TABLET ORAL EVERY 6 HOURS PRN
Status: DISCONTINUED | OUTPATIENT
Start: 2024-05-29 | End: 2024-06-05 | Stop reason: HOSPADM

## 2024-05-29 RX ORDER — GLUCAGON 1 MG/ML
1 KIT INJECTION PRN
Status: DISCONTINUED | OUTPATIENT
Start: 2024-05-29 | End: 2024-06-01 | Stop reason: SDUPTHER

## 2024-05-29 RX ORDER — GUAIFENESIN 200 MG/10ML
200 LIQUID ORAL EVERY 4 HOURS PRN
Status: DISCONTINUED | OUTPATIENT
Start: 2024-05-29 | End: 2024-06-05 | Stop reason: HOSPADM

## 2024-05-29 RX ORDER — ENOXAPARIN SODIUM 100 MG/ML
40 INJECTION SUBCUTANEOUS DAILY
Status: DISCONTINUED | OUTPATIENT
Start: 2024-05-29 | End: 2024-05-29

## 2024-05-29 RX ORDER — HYDRALAZINE HYDROCHLORIDE 20 MG/ML
10 INJECTION INTRAMUSCULAR; INTRAVENOUS EVERY 6 HOURS PRN
Status: DISCONTINUED | OUTPATIENT
Start: 2024-05-29 | End: 2024-06-05 | Stop reason: HOSPADM

## 2024-05-29 RX ORDER — BISACODYL 10 MG
10 SUPPOSITORY, RECTAL RECTAL DAILY PRN
Status: DISCONTINUED | OUTPATIENT
Start: 2024-05-29 | End: 2024-06-05 | Stop reason: HOSPADM

## 2024-05-29 RX ORDER — INSULIN LISPRO 100 [IU]/ML
0-8 INJECTION, SOLUTION INTRAVENOUS; SUBCUTANEOUS
Status: DISCONTINUED | OUTPATIENT
Start: 2024-05-29 | End: 2024-06-05 | Stop reason: HOSPADM

## 2024-05-29 RX ORDER — NICOTINE 21 MG/24HR
1 PATCH, TRANSDERMAL 24 HOURS TRANSDERMAL DAILY
Status: DISCONTINUED | OUTPATIENT
Start: 2024-05-29 | End: 2024-06-05 | Stop reason: HOSPADM

## 2024-05-29 RX ORDER — INSULIN GLARGINE 100 [IU]/ML
23 INJECTION, SOLUTION SUBCUTANEOUS NIGHTLY
Status: DISCONTINUED | OUTPATIENT
Start: 2024-05-29 | End: 2024-06-03

## 2024-05-29 RX ORDER — INSULIN LISPRO 100 [IU]/ML
0-4 INJECTION, SOLUTION INTRAVENOUS; SUBCUTANEOUS NIGHTLY
Status: DISCONTINUED | OUTPATIENT
Start: 2024-05-29 | End: 2024-06-05 | Stop reason: HOSPADM

## 2024-05-29 RX ORDER — LISINOPRIL 5 MG/1
5 TABLET ORAL DAILY
Status: DISCONTINUED | OUTPATIENT
Start: 2024-05-29 | End: 2024-06-05 | Stop reason: HOSPADM

## 2024-05-29 RX ORDER — SENNOSIDES A AND B 8.6 MG/1
1 TABLET, FILM COATED ORAL NIGHTLY PRN
Status: DISCONTINUED | OUTPATIENT
Start: 2024-05-29 | End: 2024-06-05 | Stop reason: HOSPADM

## 2024-05-29 RX ORDER — ONDANSETRON 4 MG/1
4 TABLET, ORALLY DISINTEGRATING ORAL EVERY 8 HOURS PRN
Status: DISCONTINUED | OUTPATIENT
Start: 2024-05-29 | End: 2024-06-05 | Stop reason: HOSPADM

## 2024-05-29 RX ADMIN — VANCOMYCIN HYDROCHLORIDE 2000 MG: 10 INJECTION, POWDER, LYOPHILIZED, FOR SOLUTION INTRAVENOUS at 10:35

## 2024-05-29 RX ADMIN — PIPERACILLIN AND TAZOBACTAM 3375 MG: 3; .375 INJECTION, POWDER, LYOPHILIZED, FOR SOLUTION INTRAVENOUS at 21:32

## 2024-05-29 RX ADMIN — HYDRALAZINE HYDROCHLORIDE 10 MG: 20 INJECTION INTRAMUSCULAR; INTRAVENOUS at 10:32

## 2024-05-29 RX ADMIN — SODIUM CHLORIDE, PRESERVATIVE FREE 10 ML: 5 INJECTION INTRAVENOUS at 10:37

## 2024-05-29 RX ADMIN — SODIUM CHLORIDE, PRESERVATIVE FREE 10 ML: 5 INJECTION INTRAVENOUS at 19:54

## 2024-05-29 RX ADMIN — INSULIN LISPRO 4 UNITS: 100 INJECTION, SOLUTION INTRAVENOUS; SUBCUTANEOUS at 17:17

## 2024-05-29 RX ADMIN — INSULIN GLARGINE 23 UNITS: 100 INJECTION, SOLUTION SUBCUTANEOUS at 20:03

## 2024-05-29 RX ADMIN — GABAPENTIN 100 MG: 100 CAPSULE ORAL at 20:02

## 2024-05-29 RX ADMIN — HYDRALAZINE HYDROCHLORIDE 10 MG: 20 INJECTION INTRAMUSCULAR; INTRAVENOUS at 16:42

## 2024-05-29 RX ADMIN — PIPERACILLIN AND TAZOBACTAM 3375 MG: 3; .375 INJECTION, POWDER, LYOPHILIZED, FOR SOLUTION INTRAVENOUS at 13:26

## 2024-05-29 RX ADMIN — LISINOPRIL 5 MG: 5 TABLET ORAL at 13:29

## 2024-05-29 RX ADMIN — GUAIFENESIN 600 MG: 600 TABLET, EXTENDED RELEASE ORAL at 13:29

## 2024-05-29 RX ADMIN — GUAIFENESIN 600 MG: 600 TABLET, EXTENDED RELEASE ORAL at 20:02

## 2024-05-29 RX ADMIN — AMLODIPINE BESYLATE 5 MG: 5 TABLET ORAL at 13:29

## 2024-05-29 RX ADMIN — MORPHINE SULFATE 4 MG: 4 INJECTION INTRAVENOUS at 07:51

## 2024-05-29 RX ADMIN — INSULIN LISPRO 2 UNITS: 100 INJECTION, SOLUTION INTRAVENOUS; SUBCUTANEOUS at 14:12

## 2024-05-29 RX ADMIN — MORPHINE SULFATE 4 MG: 4 INJECTION INTRAVENOUS at 09:50

## 2024-05-29 RX ADMIN — HYDRALAZINE HYDROCHLORIDE 5 MG: 20 INJECTION INTRAMUSCULAR; INTRAVENOUS at 19:52

## 2024-05-29 RX ADMIN — BENZONATATE 100 MG: 100 CAPSULE ORAL at 13:29

## 2024-05-29 RX ADMIN — BENZONATATE 100 MG: 100 CAPSULE ORAL at 20:02

## 2024-05-29 RX ADMIN — PIPERACILLIN AND TAZOBACTAM 4500 MG: 4; .5 INJECTION, POWDER, LYOPHILIZED, FOR SOLUTION INTRAVENOUS at 07:52

## 2024-05-29 RX ADMIN — GADOTERIDOL 15 ML: 279.3 INJECTION, SOLUTION INTRAVENOUS at 20:51

## 2024-05-29 RX ADMIN — MORPHINE SULFATE 2 MG: 2 INJECTION, SOLUTION INTRAMUSCULAR; INTRAVENOUS at 18:53

## 2024-05-29 RX ADMIN — SODIUM CHLORIDE: 9 INJECTION, SOLUTION INTRAVENOUS at 13:20

## 2024-05-29 RX ADMIN — ACETAMINOPHEN 650 MG: 325 TABLET ORAL at 16:41

## 2024-05-29 RX ADMIN — OXYCODONE HYDROCHLORIDE AND ACETAMINOPHEN 1 TABLET: 5; 325 TABLET ORAL at 17:16

## 2024-05-29 RX ADMIN — MORPHINE SULFATE 2 MG: 2 INJECTION, SOLUTION INTRAMUSCULAR; INTRAVENOUS at 14:12

## 2024-05-29 ASSESSMENT — PAIN DESCRIPTION - ORIENTATION
ORIENTATION: RIGHT

## 2024-05-29 ASSESSMENT — PAIN SCALES - GENERAL
PAINLEVEL_OUTOF10: 7
PAINLEVEL_OUTOF10: 9
PAINLEVEL_OUTOF10: 7
PAINLEVEL_OUTOF10: 9
PAINLEVEL_OUTOF10: 7
PAINLEVEL_OUTOF10: 7

## 2024-05-29 ASSESSMENT — PAIN DESCRIPTION - LOCATION
LOCATION: LEG;FOOT
LOCATION: LEG

## 2024-05-29 ASSESSMENT — PAIN DESCRIPTION - DESCRIPTORS
DESCRIPTORS: ACHING
DESCRIPTORS: ACHING
DESCRIPTORS: THROBBING
DESCRIPTORS: ACHING
DESCRIPTORS: ACHING

## 2024-05-29 NOTE — PROGRESS NOTES
Physician Progress Note      PATIENT:               KASEY TREJO  CSN #:                  [id number]  :                       1965  ADMIT DATE:       2024 7:25 AM  DISCH DATE:  RESPONDING  PROVIDER #:        Kael Quinones NP          QUERY TEXT:    Pt admitted with Right leg cellulitis. Pt noted to have DM2. If possible,   please document in progress notes and discharge summary the relationship, if   any, between cellulitis and DM.    The medical record reflects the following:    Risk Factors: To ED C/O right foot pain. Open wound to third toe and bottom of   the heel.  Hx prior toe amputations of left foot. CKD4, DM2.    Clinical Indicators:  H&P: Right leg cellulitis; Diabetic ulcers right foot POA      Treatment: admit medical tele, Vancomycin, Zosyn, Wound cultures, LUPE, XR   right foot, Podiatry consult, Elevate BLE, Consult Diabetes management,   consult dietitian, vitals per unit routine.  Options provided:  -- Right leg cellulitis associated with Diabetes  -- Right leg cellulitis unrelated to Diabetes  -- Other - I will add my own diagnosis  -- Disagree - Not applicable / Not valid  -- Disagree - Clinically unable to determine / Unknown  -- Refer to Clinical Documentation Reviewer    PROVIDER RESPONSE TEXT:    Right leg cellulitis associated with Diabetes.    Query created by: Rilee, Cheryle on 2024 1:15 PM      Electronically signed by:  Kael Quinones NP 2024 1:19 PM

## 2024-05-29 NOTE — CONSENT
Informed Refusal for Blood Component Transfusion Note    I have discussed with the patient the rationale for blood component transfusion; its benefits in treating or preventing fatigue, organ damage, or death; and its risk which includes mild transfusion reactions, rare risk of blood borne infection, or more serious but rare reactions. I have discussed the alternatives to transfusion, including the risk and consequences of not receiving transfusion. The patient had an opportunity to ask questions and had REFUSED to proceed with transfusion of packed red blood cells, fresh frozen plasma, cryoprecipitate, platelets , and whole blood.    Electronically signed by LOIS Bowdne on 5/29/24 at 10:45 AM EDT

## 2024-05-29 NOTE — PLAN OF CARE
Problem: Chronic Conditions and Co-morbidities  Goal: Patient's chronic conditions and co-morbidity symptoms are monitored and maintained or improved  Outcome: Progressing     Problem: Discharge Planning  Goal: Discharge to home or other facility with appropriate resources  Outcome: Progressing     Problem: Pain  Goal: Verbalizes/displays adequate comfort level or baseline comfort level  Outcome: Progressing     Problem: Risk for Elopement  Goal: Patient will not exit the unit/facility without proper excort  Outcome: Progressing  Flowsheets  Taken 5/29/2024 1300 by Norah Santana, RN  Nursing Interventions for Elopement Risk:   Make sure patient has all necessary personal care items   Reduce environmental triggers  Taken 5/29/2024 0832 by Monique Singh, RN  Nursing Interventions for Elopement Risk:   Reduce environmental triggers   Make sure patient has all necessary personal care items   Assist with personal care needs such as toileting, eating, dressing, as needed to reduce the risk of wandering     Problem: Safety - Adult  Goal: Free from fall injury  Outcome: Progressing

## 2024-05-29 NOTE — DIABETES MGMT
[facility]  [facility]  DIABETES MANAGEMENT CONSULT    Consulted by Provider for advanced nursing evaluation and care for inpatient blood glucose management.    Evaluation and Action Plan   Keaton Van is a 59 year old male admitted for right foot wound. The patient has a hx of diabetes but reports that he has not taken medication or followed good self management practices. Current A1c pending, but I suspect it is elevated. The patient would likely benefit from outpatient diabetes management. Diabetes management will work to find a diabetes regimen to keep Bg's stable while hospitalized and at discharge.     Management Rationale Action Plan   Medication   Basal needs Using 0.3 units/kg/D based on weight  Use 23 units Lantus nightly   Corrective insulin Using medium dose sensitivity based on weight    Additional orders          Diabetes Discharge Plan   Medication  TBD   Referral  []        Outpatient diabetes education   Additional orders            Initial Presentation   Keaton Van is a 59 y.o. male admitted  after experiencing extreme pain in the right foot and radiating up rt leg. Foot wounds noted on bot feet. The patient reports wounds started 5-6 months ago.   LAB: Glucose 293. Creatine 2.27. GFR 32. AST 70.ALT 91.   CXR:Narrative & Impression  EXAM: XR FOOT RIGHT (MIN 3 VIEWS)  ACC#: [id number]     INDICATION: infection.     COMPARISON: None.     TECHNIQUE: 3 view(s) of the right foot.     FINDINGS:  No acute fractures, dislocations, or radiopaque foreign bodies.  No aggressive appearing erosive bony changes. Soft tissue ulceration by the  fifth metatarsophalangeal joint is noted.  There are vascular calcifications. Calcaneal plantar and Achilles spurs are  noted.     IMPRESSION:   No acute bony abnormalities.         HX:   Past Medical History:   Diagnosis Date    Adverse effect of anesthesia     breathing diff. with versed    Bipolar 1 disorder, mixed, moderate (HCC) 3/6/2017    Chronic

## 2024-05-29 NOTE — PROGRESS NOTES
Pharmacy Antimicrobial Kinetic Dosing    Indication for Antimicrobials: ssti     Current Regimen of Each Antimicrobial:  Vancomycin pharmacy to dose; Start Date ; Day #   Zosyn 4.5g x 1, then 3.375g q 8h (start: , day     Previous Antimicrobial Therapy:    Goal Level: Vancomycin -600    Date Dose & Interval Measured (mcg/mL) Predicted AUC                       Significant Cultures:   : blood - prelim    Labs:  Recent Labs     Units 24  0753   CREATININE MG/DL 2.27*   BUN MG/DL 33*   WBC K/uL 5.6     Temp (24hrs), Av.7 °F (36.5 °C), Min:97.7 °F (36.5 °C), Max:97.7 °F (36.5 °C)    Conditions for Dosing Consideration: None    Creatinine Clearance (mL/min): Estimated Creatinine Clearance: 38 mL/min (A) (based on SCr of 2.27 mg/dL (H)).     Impression/Plan:   Vancomycin 2000mg load,then 1000mg q 24h for auc 552  Zosyn 4.5g x 1, then 3.375g q 8h   Cbc and bmp through   Antimicrobial stop date 5 days     Pharmacy will follow daily and adjust medications as appropriate for renal function and/or serum levels.    Thank you,  Sal Ramirez RPH

## 2024-05-29 NOTE — ED PROVIDER NOTES
Index  [JH] Zhang Brown DO         FINAL IMPRESSION     1. Diabetic ulcer of right foot associated with other specified diabetes mellitus, unspecified part of foot, unspecified ulcer stage (HCC)    2. Cellulitis, unspecified cellulitis site          DISPOSITION/PLAN   DISPOSITION Decision To Admit 05/29/2024 09:34:18 AM      Admit Note: Pt is being admitted by hospitalist team. The results of their tests and reason(s) for their admission have been discussed with pt and/or available family. They convey agreement and understanding for the need to be admitted and for the admission diagnosis.     PATIENT REFERRED TO:  No follow-up provider specified.     DISCHARGE MEDICATIONS:     Medication List        ASK your doctor about these medications      acetaminophen 325 MG tablet  Commonly known as: TYLENOL     folic acid 1 MG tablet  Commonly known as: FOLVITE  Take 1 tablet by mouth daily     gabapentin 600 MG tablet  Commonly known as: NEURONTIN     ibuprofen 400 MG tablet  Commonly known as: ADVIL;MOTRIN     losartan 50 MG tablet  Commonly known as: COZAAR     metFORMIN 750 MG extended release tablet  Commonly known as: GLUCOPHAGE-XR     ondansetron 4 MG tablet  Commonly known as: ZOFRAN     oxyCODONE HCl 10 MG immediate release tablet  Commonly known as: OXY-IR     QUEtiapine 25 MG tablet  Commonly known as: SEROQUEL  Take 1 tablet by mouth nightly for 15 days     UNABLE TO FIND  Dx: Lumbar pain with radiculopathy    PT/OT 3 times a week                DISCONTINUED MEDICATIONS:  Current Discharge Medication List          I have seen and evaluated the patient autonomously. My supervision physician was on site and available for consultation if needed.     I am the Primary Clinician of Record.   Adina eMhta PA-C (electronically signed)    (Please note that parts of this dictation were completed with voice recognition software. Quite often unanticipated grammatical, syntax, homophones, and other interpretive

## 2024-05-29 NOTE — PROGRESS NOTES
Patient admitted for bilateral foot pain. Patient states pain is about the same since given Morphine by ED RN at 0950.  On room air.  Pending transport to inpatient unit.    1130  Inpatient report called to Flora YA.

## 2024-05-29 NOTE — CONSULTS
Seen patient in the room   Right foot cellulitis with visible abscess and tracking along the plantar surface   Most likely Osteo on the right 5th MPJ will get MRI to eval extent of the abscess and confirm OM  Advised patient for Surgical treatment with I&D and partial 5th ray resection   Keep patient NPO tomorrow after 10 AM   Full note to follow    EXAM: XR FOOT RIGHT (MIN 3 VIEWS)  ACC#: [id number]     INDICATION: infection.     COMPARISON: None.     TECHNIQUE: 3 view(s) of the right foot.     FINDINGS:  No acute fractures, dislocations, or radiopaque foreign bodies.  No aggressive appearing erosive bony changes. Soft tissue ulceration by the  fifth metatarsophalangeal joint is noted.  There are vascular calcifications. Calcaneal plantar and Achilles spurs are  noted.     IMPRESSION:   No acute bony abnormalities.

## 2024-05-29 NOTE — PROGRESS NOTES
MRI PENDING    Completion of MRI Screening Sheet    Fax to [phone] when completed    Please call [phone]  When this is done    Thank You

## 2024-05-30 LAB
-: ABNORMAL
ALBUMIN SERPL-MCNC: 2.1 G/DL (ref 3.5–5)
ALBUMIN/GLOB SERPL: 0.6 (ref 1.1–2.2)
ALP SERPL-CCNC: 143 U/L (ref 45–117)
ALT SERPL-CCNC: 71 U/L (ref 12–78)
ANION GAP SERPL CALC-SCNC: 3 MMOL/L (ref 5–15)
AST SERPL-CCNC: 48 U/L (ref 15–37)
BASOPHILS # BLD: 0 K/UL (ref 0–0.1)
BASOPHILS NFR BLD: 1 % (ref 0–1)
BILIRUB DIRECT SERPL-MCNC: 0.1 MG/DL (ref 0–0.2)
BILIRUB SERPL-MCNC: 0.3 MG/DL (ref 0.2–1)
BUN SERPL-MCNC: 25 MG/DL (ref 6–20)
BUN/CREAT SERPL: 13 (ref 12–20)
CALCIUM SERPL-MCNC: 7.6 MG/DL (ref 8.5–10.1)
CHLORIDE SERPL-SCNC: 112 MMOL/L (ref 97–108)
CHOLEST SERPL-MCNC: 136 MG/DL
CO2 SERPL-SCNC: 24 MMOL/L (ref 21–32)
CREAT SERPL-MCNC: 1.86 MG/DL (ref 0.7–1.3)
DIFFERENTIAL METHOD BLD: ABNORMAL
EOSINOPHIL # BLD: 0.2 K/UL (ref 0–0.4)
EOSINOPHIL NFR BLD: 4 % (ref 0–7)
ERYTHROCYTE [DISTWIDTH] IN BLOOD BY AUTOMATED COUNT: 13.8 % (ref 11.5–14.5)
FERRITIN SERPL-MCNC: 21 NG/ML (ref 26–388)
FOLATE SERPL-MCNC: 8.4 NG/ML (ref 5–21)
GLOBULIN SER CALC-MCNC: 3.7 G/DL (ref 2–4)
GLUCOSE BLD STRIP.AUTO-MCNC: 161 MG/DL (ref 65–117)
GLUCOSE BLD STRIP.AUTO-MCNC: 170 MG/DL (ref 65–117)
GLUCOSE BLD STRIP.AUTO-MCNC: 171 MG/DL (ref 65–117)
GLUCOSE BLD STRIP.AUTO-MCNC: 205 MG/DL (ref 65–117)
GLUCOSE BLD STRIP.AUTO-MCNC: 213 MG/DL (ref 65–117)
GLUCOSE SERPL-MCNC: 171 MG/DL (ref 65–100)
HAV IGM SER QL: NONREACTIVE
HBV CORE IGM SER QL: NONREACTIVE
HBV SURFACE AG SER QL: <0.1 INDEX
HBV SURFACE AG SER QL: NEGATIVE
HCT VFR BLD AUTO: 19.2 % (ref 36.6–50.3)
HCV AB SER IA-ACNC: >11 INDEX
HCV AB SERPL QL IA: REACTIVE
HDLC SERPL-MCNC: 69 MG/DL
HDLC SERPL: 2 (ref 0–5)
HGB BLD-MCNC: 6 G/DL (ref 12.1–17)
HISTORY CHECK: NORMAL
IMM GRANULOCYTES # BLD AUTO: 0 K/UL (ref 0–0.04)
IMM GRANULOCYTES NFR BLD AUTO: 1 % (ref 0–0.5)
IRON SATN MFR SERPL: 6 % (ref 20–50)
IRON SERPL-MCNC: 16 UG/DL (ref 35–150)
LDLC SERPL CALC-MCNC: 57.4 MG/DL (ref 0–100)
LYMPHOCYTES # BLD: 0.4 K/UL (ref 0.8–3.5)
LYMPHOCYTES NFR BLD: 11 % (ref 12–49)
MCH RBC QN AUTO: 26.9 PG (ref 26–34)
MCHC RBC AUTO-ENTMCNC: 31.3 G/DL (ref 30–36.5)
MCV RBC AUTO: 86.1 FL (ref 80–99)
MONOCYTES # BLD: 0.3 K/UL (ref 0–1)
MONOCYTES NFR BLD: 8 % (ref 5–13)
NEUTS SEG # BLD: 3.1 K/UL (ref 1.8–8)
NEUTS SEG NFR BLD: 75 % (ref 32–75)
NRBC # BLD: 0 K/UL (ref 0–0.01)
NRBC BLD-RTO: 0 PER 100 WBC
PLATELET # BLD AUTO: 186 K/UL (ref 150–400)
PMV BLD AUTO: 9.2 FL (ref 8.9–12.9)
POTASSIUM SERPL-SCNC: 4.1 MMOL/L (ref 3.5–5.1)
PROT SERPL-MCNC: 5.8 G/DL (ref 6.4–8.2)
RBC # BLD AUTO: 2.23 M/UL (ref 4.1–5.7)
RBC MORPH BLD: ABNORMAL
SERVICE CMNT-IMP: ABNORMAL
SODIUM SERPL-SCNC: 139 MMOL/L (ref 136–145)
TIBC SERPL-MCNC: 281 UG/DL (ref 250–450)
TRIGL SERPL-MCNC: 48 MG/DL
VIT B12 SERPL-MCNC: 639 PG/ML (ref 193–986)
VLDLC SERPL CALC-MCNC: 9.6 MG/DL
WBC # BLD AUTO: 4 K/UL (ref 4.1–11.1)

## 2024-05-30 PROCEDURE — 6370000000 HC RX 637 (ALT 250 FOR IP)

## 2024-05-30 PROCEDURE — 86901 BLOOD TYPING SEROLOGIC RH(D): CPT

## 2024-05-30 PROCEDURE — 36415 COLL VENOUS BLD VENIPUNCTURE: CPT

## 2024-05-30 PROCEDURE — 80048 BASIC METABOLIC PNL TOTAL CA: CPT

## 2024-05-30 PROCEDURE — 83550 IRON BINDING TEST: CPT

## 2024-05-30 PROCEDURE — 86923 COMPATIBILITY TEST ELECTRIC: CPT

## 2024-05-30 PROCEDURE — 36430 TRANSFUSION BLD/BLD COMPNT: CPT

## 2024-05-30 PROCEDURE — 6360000002 HC RX W HCPCS

## 2024-05-30 PROCEDURE — 99231 SBSQ HOSP IP/OBS SF/LOW 25: CPT

## 2024-05-30 PROCEDURE — 2580000003 HC RX 258

## 2024-05-30 PROCEDURE — 94760 N-INVAS EAR/PLS OXIMETRY 1: CPT

## 2024-05-30 PROCEDURE — 86850 RBC ANTIBODY SCREEN: CPT

## 2024-05-30 PROCEDURE — 86900 BLOOD TYPING SEROLOGIC ABO: CPT

## 2024-05-30 PROCEDURE — 85025 COMPLETE CBC W/AUTO DIFF WBC: CPT

## 2024-05-30 PROCEDURE — P9016 RBC LEUKOCYTES REDUCED: HCPCS

## 2024-05-30 PROCEDURE — 82728 ASSAY OF FERRITIN: CPT

## 2024-05-30 PROCEDURE — 80076 HEPATIC FUNCTION PANEL: CPT

## 2024-05-30 PROCEDURE — 1100000003 HC PRIVATE W/ TELEMETRY

## 2024-05-30 PROCEDURE — 83540 ASSAY OF IRON: CPT

## 2024-05-30 PROCEDURE — 80061 LIPID PANEL: CPT

## 2024-05-30 PROCEDURE — 82746 ASSAY OF FOLIC ACID SERUM: CPT

## 2024-05-30 PROCEDURE — 30233N1 TRANSFUSION OF NONAUTOLOGOUS RED BLOOD CELLS INTO PERIPHERAL VEIN, PERCUTANEOUS APPROACH: ICD-10-PCS | Performed by: STUDENT IN AN ORGANIZED HEALTH CARE EDUCATION/TRAINING PROGRAM

## 2024-05-30 PROCEDURE — 80074 ACUTE HEPATITIS PANEL: CPT

## 2024-05-30 PROCEDURE — 82962 GLUCOSE BLOOD TEST: CPT

## 2024-05-30 PROCEDURE — 82607 VITAMIN B-12: CPT

## 2024-05-30 PROCEDURE — 6370000000 HC RX 637 (ALT 250 FOR IP): Performed by: STUDENT IN AN ORGANIZED HEALTH CARE EDUCATION/TRAINING PROGRAM

## 2024-05-30 PROCEDURE — 84443 ASSAY THYROID STIM HORMONE: CPT

## 2024-05-30 RX ORDER — SODIUM CHLORIDE 9 MG/ML
INJECTION, SOLUTION INTRAVENOUS PRN
Status: DISCONTINUED | OUTPATIENT
Start: 2024-05-30 | End: 2024-06-01

## 2024-05-30 RX ORDER — CARVEDILOL 12.5 MG/1
12.5 TABLET ORAL 2 TIMES DAILY WITH MEALS
Status: DISCONTINUED | OUTPATIENT
Start: 2024-05-30 | End: 2024-05-31

## 2024-05-30 RX ADMIN — SODIUM CHLORIDE: 9 INJECTION, SOLUTION INTRAVENOUS at 09:50

## 2024-05-30 RX ADMIN — OXYCODONE HYDROCHLORIDE AND ACETAMINOPHEN 1 TABLET: 5; 325 TABLET ORAL at 08:14

## 2024-05-30 RX ADMIN — MORPHINE SULFATE 2 MG: 2 INJECTION, SOLUTION INTRAMUSCULAR; INTRAVENOUS at 22:43

## 2024-05-30 RX ADMIN — GABAPENTIN 100 MG: 100 CAPSULE ORAL at 14:40

## 2024-05-30 RX ADMIN — INSULIN LISPRO 2 UNITS: 100 INJECTION, SOLUTION INTRAVENOUS; SUBCUTANEOUS at 11:49

## 2024-05-30 RX ADMIN — MORPHINE SULFATE 2 MG: 2 INJECTION, SOLUTION INTRAMUSCULAR; INTRAVENOUS at 18:37

## 2024-05-30 RX ADMIN — LISINOPRIL 5 MG: 5 TABLET ORAL at 08:24

## 2024-05-30 RX ADMIN — GABAPENTIN 100 MG: 100 CAPSULE ORAL at 08:30

## 2024-05-30 RX ADMIN — VANCOMYCIN HYDROCHLORIDE 1000 MG: 1 INJECTION, POWDER, LYOPHILIZED, FOR SOLUTION INTRAVENOUS at 09:54

## 2024-05-30 RX ADMIN — MORPHINE SULFATE 2 MG: 2 INJECTION, SOLUTION INTRAMUSCULAR; INTRAVENOUS at 05:48

## 2024-05-30 RX ADMIN — MORPHINE SULFATE 2 MG: 2 INJECTION, SOLUTION INTRAMUSCULAR; INTRAVENOUS at 14:24

## 2024-05-30 RX ADMIN — BENZONATATE 100 MG: 100 CAPSULE ORAL at 08:24

## 2024-05-30 RX ADMIN — POLYETHYLENE GLYCOL 3350 17 G: 17 POWDER, FOR SOLUTION ORAL at 08:24

## 2024-05-30 RX ADMIN — GABAPENTIN 100 MG: 100 CAPSULE ORAL at 21:14

## 2024-05-30 RX ADMIN — BENZONATATE 100 MG: 100 CAPSULE ORAL at 21:16

## 2024-05-30 RX ADMIN — PIPERACILLIN AND TAZOBACTAM 3375 MG: 3; .375 INJECTION, POWDER, LYOPHILIZED, FOR SOLUTION INTRAVENOUS at 14:55

## 2024-05-30 RX ADMIN — MORPHINE SULFATE 2 MG: 2 INJECTION, SOLUTION INTRAMUSCULAR; INTRAVENOUS at 00:06

## 2024-05-30 RX ADMIN — SODIUM CHLORIDE, PRESERVATIVE FREE 10 ML: 5 INJECTION INTRAVENOUS at 21:15

## 2024-05-30 RX ADMIN — MORPHINE SULFATE 2 MG: 2 INJECTION, SOLUTION INTRAMUSCULAR; INTRAVENOUS at 09:55

## 2024-05-30 RX ADMIN — GUAIFENESIN 600 MG: 600 TABLET, EXTENDED RELEASE ORAL at 21:15

## 2024-05-30 RX ADMIN — CARVEDILOL 12.5 MG: 12.5 TABLET, FILM COATED ORAL at 11:44

## 2024-05-30 RX ADMIN — SODIUM CHLORIDE, PRESERVATIVE FREE 10 ML: 5 INJECTION INTRAVENOUS at 08:25

## 2024-05-30 RX ADMIN — SODIUM CHLORIDE: 9 INJECTION, SOLUTION INTRAVENOUS at 14:43

## 2024-05-30 RX ADMIN — BENZONATATE 100 MG: 100 CAPSULE ORAL at 14:40

## 2024-05-30 RX ADMIN — SENNOSIDES 8.6 MG: 8.6 TABLET, FILM COATED ORAL at 21:14

## 2024-05-30 RX ADMIN — AMLODIPINE BESYLATE 5 MG: 5 TABLET ORAL at 08:24

## 2024-05-30 RX ADMIN — GUAIFENESIN 600 MG: 600 TABLET, EXTENDED RELEASE ORAL at 08:24

## 2024-05-30 RX ADMIN — CARVEDILOL 12.5 MG: 12.5 TABLET, FILM COATED ORAL at 17:42

## 2024-05-30 RX ADMIN — MELATONIN 3 MG: at 21:14

## 2024-05-30 RX ADMIN — PIPERACILLIN AND TAZOBACTAM 3375 MG: 3; .375 INJECTION, POWDER, LYOPHILIZED, FOR SOLUTION INTRAVENOUS at 05:47

## 2024-05-30 RX ADMIN — INSULIN GLARGINE 23 UNITS: 100 INJECTION, SOLUTION SUBCUTANEOUS at 21:14

## 2024-05-30 RX ADMIN — PIPERACILLIN AND TAZOBACTAM 3375 MG: 3; .375 INJECTION, POWDER, LYOPHILIZED, FOR SOLUTION INTRAVENOUS at 21:51

## 2024-05-30 ASSESSMENT — PAIN SCALES - GENERAL
PAINLEVEL_OUTOF10: 7
PAINLEVEL_OUTOF10: 0
PAINLEVEL_OUTOF10: 7
PAINLEVEL_OUTOF10: 7

## 2024-05-30 ASSESSMENT — PAIN DESCRIPTION - LOCATION
LOCATION: LEG
LOCATION: LEG;FOOT
LOCATION: LEG
LOCATION: FOOT
LOCATION: FOOT
LOCATION: LEG
LOCATION: FOOT
LOCATION: FOOT

## 2024-05-30 ASSESSMENT — PAIN DESCRIPTION - ORIENTATION
ORIENTATION: RIGHT

## 2024-05-30 ASSESSMENT — PAIN DESCRIPTION - DESCRIPTORS
DESCRIPTORS: ACHING
DESCRIPTORS: THROBBING
DESCRIPTORS: THROBBING;ACHING
DESCRIPTORS: ACHING
DESCRIPTORS: THROBBING;ACHING
DESCRIPTORS: ACHING;THROBBING
DESCRIPTORS: ACHING
DESCRIPTORS: THROBBING

## 2024-05-30 ASSESSMENT — PAIN - FUNCTIONAL ASSESSMENT
PAIN_FUNCTIONAL_ASSESSMENT: PREVENTS OR INTERFERES SOME ACTIVE ACTIVITIES AND ADLS
PAIN_FUNCTIONAL_ASSESSMENT: ACTIVITIES ARE NOT PREVENTED

## 2024-05-30 NOTE — PROGRESS NOTES
Pharmacy Antimicrobial Kinetic Dosing    Indication for Antimicrobials: ssti - right leg cellulitis/diabetic foot ulcers    Current Regimen of Each Antimicrobial:  Vancomycin pharmacy to dose; Start Date ; Day # 25  Zosyn 4.5g x 1, then 3.375g q 8h (start: , day 2    Goal Level: Vancomycin -600    Date Dose & Interval Measured (mcg/mL) Predicted AUC                       Significant Cultures:   : blood - ngtd, prelim   wound: checking for possible moderate mixed skin missy, pending     Labs:  Recent Labs     Units 24  0244 24  0753   CREATININE MG/DL 1.86* 2.27*   BUN MG/DL 25* 33*   PROCAL ng/mL  --  <0.05   WBC K/uL 4.0* 5.6     Temp (24hrs), Av.9 °F (36.6 °C), Min:97.3 °F (36.3 °C), Max:98.6 °F (37 °C)    Conditions for Dosing Consideration: None    Creatinine Clearance (mL/min): Estimated Creatinine Clearance: 46 mL/min (A) (based on SCr of 1.86 mg/dL (H)).     Impression/Plan:   Vancomycin  1000mg q 24h for . Will check a level tomorrow with am labs.   Zosyn  as above   Cbc and bmp through   Podiatry following - plan for I&D and resection  Antimicrobial stop date 5 days     Pharmacy will follow daily and adjust medications as appropriate for renal function and/or serum levels.    Thank you,  Christy Escobedo, [facility]

## 2024-05-30 NOTE — PLAN OF CARE
Problem: Chronic Conditions and Co-morbidities  Goal: Patient's chronic conditions and co-morbidity symptoms are monitored and maintained or improved  5/30/2024 1845 by Esther Garcia RN  Outcome: Progressing  5/30/2024 1838 by Oly Perez RN  Outcome: Progressing     Problem: Discharge Planning  Goal: Discharge to home or other facility with appropriate resources  5/30/2024 1845 by Esther Garcia RN  Outcome: Progressing  5/30/2024 1838 by Oly Perez RN  Outcome: Progressing     Problem: Pain  Goal: Verbalizes/displays adequate comfort level or baseline comfort level  5/30/2024 1845 by Esther Garcia RN  Outcome: Progressing  5/30/2024 1838 by Oly Perez RN  Outcome: Progressing     Problem: Risk for Elopement  Goal: Patient will not exit the unit/facility without proper excort  5/30/2024 1845 by Esther Garcia RN  Outcome: Progressing  5/30/2024 1838 by Oly Perez RN  Outcome: Progressing     Problem: Safety - Adult  Goal: Free from fall injury  5/30/2024 1845 by Esther Garcia RN  Outcome: Progressing  5/30/2024 1838 by Oly Perez RN  Outcome: Progressing     Problem: ABCDS Injury Assessment  Goal: Absence of physical injury  5/30/2024 1845 by Esther Garcia RN  Outcome: Progressing  5/30/2024 1838 by Oly Perez RN  Outcome: Progressing     Problem: Nutrition Deficit:  Goal: Optimize nutritional status  5/30/2024 1845 by Esther Garcia RN  Outcome: Progressing  5/30/2024 1838 by Oly Perez RN  Outcome: Progressing

## 2024-05-30 NOTE — DIABETES MGMT
[facility]  [facility]  DIABETES MANAGEMENT CONSULT    Consulted by Provider for advanced nursing evaluation and care for inpatient blood glucose management.    Evaluation and Action Plan   Keaton Van is a 59 year old male admitted for right foot wound. The patient has a hx of diabetes but reports that he has not taken medication or followed good self management practices. Current A1c pending, but I suspect it is elevated. The patient would likely benefit from outpatient diabetes management. Diabetes management will work to find a diabetes regimen to keep Bg's stable while hospitalized and at discharge.     BG's stable on the current insulin regimen. A1c resulted at 7.6%. The patient may be able to transition to an oral diabetes regimen. Diabetes management will continue to monitor BG's and insulin requirements making changes as needed.       Management Rationale Action Plan   Medication   Basal needs Using 0.3 units/kg/D based on weight  Use 23 units Lantus nightly   Corrective insulin Using medium dose sensitivity based on weight    Additional orders          Diabetes Discharge Plan   Medication  TBD   Referral  []        Outpatient diabetes education   Additional orders            Initial Presentation   Keaton Van is a 59 y.o. male admitted  after experiencing extreme pain in the right foot and radiating up rt leg. Foot wounds noted on bot feet. The patient reports wounds started 5-6 months ago.   LAB: Glucose 293. Creatine 2.27. GFR 32. AST 70.ALT 91.   CXR:Narrative & Impression  EXAM: XR FOOT RIGHT (MIN 3 VIEWS)  ACC#: [id number]     INDICATION: infection.     COMPARISON: None.     TECHNIQUE: 3 view(s) of the right foot.     FINDINGS:  No acute fractures, dislocations, or radiopaque foreign bodies.  No aggressive appearing erosive bony changes. Soft tissue ulceration by the  fifth metatarsophalangeal joint is noted.  There are vascular calcifications. Calcaneal plantar and Achilles spurs  42284 Ineffective Health Management   Nursing Intervention Domain 5255 Decision-making Support   Nursing Interventions Identified diabetes self-management practices impeding diabetes control  Discussed diabetes survival skills related to  Healthy Plate eating plan; given handouts  Role of physical activity in improving insulin sensitivity and action  Procedure for blood glucose monitoring & options for low-cost products  Medications plan at discharge     Billing Code(s)   [] 87413 IP subsequent hospital care - 50 minutes   [] 72460 IP subsequent hospital care - 35 minutes   [x] 36976 IP subsequent hospital care - 25 minutes   [] 84456 IP initial hospital care - 40 minutes     Before making these care recommendations, I personally reviewed the hospitalization record, including notes, laboratory & diagnostic data and current medications, and examined the patient at the bedside.  Total minutes: 25 minutes    LOIS Corona - CNS  Diabetes Clinical Nurse Specialist  [facility]  Access via [employer]

## 2024-05-30 NOTE — PROGRESS NOTES
Gabapentin count witnessed by Linh YA, Discrepancy resolved. [employer] is unable to print labels at this time.

## 2024-05-30 NOTE — PROGRESS NOTES
Bedside shift change report given to Leeroy Santana RN (oncoming nurse) by Zluma RN (offgoing nurse). Report included the following information Nurse Handoff Report, Intake/Output, MAR, Recent Results, and Cardiac Rhythm NSR .    -Facilitated MRI, pending result  -Pt slept mot of the night  -Pain control done    -Labs drawn      At handoff report pt is sleeping in bed easily arousable.

## 2024-05-30 NOTE — PROGRESS NOTES
Comprehensive Nutrition Assessment    Type and Reason for Visit:  Initial, Consult    Nutrition Recommendations/Plan:   Advance to 5 carb choice/2g Na diet with double protein portions   Add ensure high protein BID one diet advanced     Malnutrition Assessment:  Malnutrition Status:  No malnutrition (05/30/24 1100)      Nutrition Assessment:    Patient medically noted for right leg cellulitis, diabetic foot ulcer, Hypertensive urgency, DM, CKD, Anemia, Bipolar, and polysubstance abuse. Consult received for oral nutrition supplements. Patient made NPO this morning for possible procedure. He reports a good appetite at home and not getting enough to eat at breakfast this morning. No recent weight changes. He drinks ensure protein shakes at home. Will plan to add supplements once diet advanced. Menu provided.     Nutrition Related Findings:    A1c 7.6, -886-   BM 5/28   Nonpitting edema BLE   Norvasc, Lantus, Humalog, Lisinopril, Glycolax   Wound Type: Diabetic Ulcer       Current Nutrition Intake & Therapies:          Diet NPO    Anthropometric Measures:  Height: 182.9 cm (6')  Ideal Body Weight (IBW): 178 lbs (81 kg)       Current Body Weight: 77.1 kg (169 lb 15.6 oz),   IBW.    Current BMI (kg/m2): 23                          BMI Categories: Normal Weight (BMI 18.5-24.9)    Estimated Daily Nutrient Needs:  Energy Requirements Based On: Formula  Weight Used for Energy Requirements: Current  Energy (kcal/day): 2106 kcals (BMR x 1.3AF)  Weight Used for Protein Requirements: Current  Protein (g/day): 93g (1.2 g/kg bw)  Method Used for Fluid Requirements: 1 ml/kcal  Fluid (ml/day): 2100 mL    Nutrition Diagnosis:   Increased nutrient needs related to increase demand for energy/nutrients as evidenced by wounds    Nutrition Interventions:   Food and/or Nutrient Delivery: Start Oral Diet, Start Oral Nutrition Supplement  Nutrition Education/Counseling: No recommendation at this time  Coordination of Nutrition

## 2024-05-30 NOTE — PLAN OF CARE
Problem: Chronic Conditions and Co-morbidities  Goal: Patient's chronic conditions and co-morbidity symptoms are monitored and maintained or improved  Outcome: Progressing     Problem: Discharge Planning  Goal: Discharge to home or other facility with appropriate resources  Outcome: Progressing     Problem: Pain  Goal: Verbalizes/displays adequate comfort level or baseline comfort level  Outcome: Progressing     Problem: Risk for Elopement  Goal: Patient will not exit the unit/facility without proper excort  Outcome: Progressing     Problem: Safety - Adult  Goal: Free from fall injury  Outcome: Progressing     Problem: ABCDS Injury Assessment  Goal: Absence of physical injury  Outcome: Progressing     Problem: Nutrition Deficit:  Goal: Optimize nutritional status  Outcome: Progressing

## 2024-05-30 NOTE — CONSENT
Informed Consent for Blood Component Transfusion Note    I have discussed with the patient the rationale for blood component transfusion; its benefits in treating or preventing fatigue, organ damage, or death; and its risk which includes mild transfusion reactions, rare risk of blood borne infection, or more serious but rare reactions. I have discussed the alternatives to transfusion, including the risk and consequences of not receiving transfusion. The patient had an opportunity to ask questions and had agreed to proceed with transfusion of blood components.    Electronically signed by Nino Alicia MD on 5/30/24 at 2:41 PM EDT

## 2024-05-30 NOTE — PLAN OF CARE
Problem: Chronic Conditions and Co-morbidities  Goal: Patient's chronic conditions and co-morbidity symptoms are monitored and maintained or improved  5/29/2024 2239 by Zulma Griffith RN  Outcome: Progressing  5/29/2024 1510 by Norah Santana RN  Outcome: Progressing     Problem: Discharge Planning  Goal: Discharge to home or other facility with appropriate resources  5/29/2024 2239 by Zulma Griffith RN  Outcome: Progressing  5/29/2024 1510 by Norah Santana RN  Outcome: Progressing     Problem: Pain  Goal: Verbalizes/displays adequate comfort level or baseline comfort level  5/29/2024 2239 by Zulma Griffith RN  Outcome: Progressing  5/29/2024 1510 by Norah Santana RN  Outcome: Progressing     Problem: Risk for Elopement  Goal: Patient will not exit the unit/facility without proper excort  5/29/2024 2239 by Zulma Griffith RN  Outcome: Progressing  Flowsheets (Taken 5/29/2024 1930)  Nursing Interventions for Elopement Risk:   Make sure patient has all necessary personal care items   Reduce environmental triggers  5/29/2024 1510 by Norah Santana RN  Outcome: Progressing  Flowsheets  Taken 5/29/2024 1300 by Norah Santana RN  Nursing Interventions for Elopement Risk:   Make sure patient has all necessary personal care items   Reduce environmental triggers  Taken 5/29/2024 0832 by Monique Singh RN  Nursing Interventions for Elopement Risk:   Reduce environmental triggers   Make sure patient has all necessary personal care items   Assist with personal care needs such as toileting, eating, dressing, as needed to reduce the risk of wandering     Problem: Safety - Adult  Goal: Free from fall injury  5/29/2024 2239 by Zulma Griffith RN  Outcome: Progressing  5/29/2024 1510 by Norah Santana RN  Outcome: Progressing     Problem: ABCDS Injury Assessment  Goal: Absence of physical injury  Outcome: Progressing

## 2024-05-30 NOTE — PROGRESS NOTES
Hospitalist Progress Note    NAME:   Keaton Van   : 1965   MRN: [id number]     Date/Time: 2024 2:45 PM  Patient PCP: No primary care provider on file.    Estimated discharge date: 2 days  Barriers: Surgical procedure      Assessment / Plan:    Right leg cellulitis  Diabetic ulcers right foot POA  Chronic eschar wound left foot POA  Suspect undiagnosed vascular dz  Hx of prior toe amputations left foot  Podiatry evaluated the patient and planning to perform incision and drainage today.  Continue broad-spectrum antibiotics for now.  Patient is on vancomycin and Zosyn.     New onset atrial fibrillation:  -EKG reviewed.  -Added Coreg for rate control.  -[id number] 3.  -Will start anticoagulation after procedure with podiatry.  -Likely due to underlying anemia.    Hypertensive urgency  208/107, improved to 162/74 after IV hydralazine  Currently well-controlled.  Started on Norvasc and lisinopril.     Diabetes mellitus type 2, uncontrolled  Hemoglobin A1c 7.6.  Patient is on 23 units long-acting insulin in the hospital.  Will resume oral medications on discharge.     Acute kidney injury on chronic kidney disease 3b  Creatinine trended down from 2.2 to 1.8.  Outpatient follow-up with nephrology.     Acute on chronic normocytic anemia  Patient agreed to receive blood.  Hemoglobin 6.0.  Ordered 1 PRBC transfusion.  Ferritin and iron profile reviewed.  Possible iron deficiency.  Will administer IV iron after surgical procedure.     Bipolar I  Polysubstance abuse  Medication noncompliance, financial and social barriers  Nicotine patch  Case mgmt expertise appreciated  UDS (+) THC, cocaine, opiates     Medical Decision Making:   I personally reviewed labs: CBC, CMP  I personally reviewed imaging:right foot XR  I personally reviewed EKG:NSR  Toxic drug monitoring: Hgb, Cr  Discussed case with: Patient, nurse, podiatry        Code Status: Full Code  DVT Prophylaxis: Will start eliquis after surgical

## 2024-05-31 ENCOUNTER — APPOINTMENT (OUTPATIENT)
Facility: HOSPITAL | Age: 59
DRG: 617 | End: 2024-05-31
Payer: MEDICARE

## 2024-05-31 LAB
ANION GAP SERPL CALC-SCNC: 5 MMOL/L (ref 5–15)
BACTERIA SPEC CULT: ABNORMAL
BACTERIA SPEC CULT: ABNORMAL
BASOPHILS # BLD: 0.1 K/UL (ref 0–0.1)
BASOPHILS NFR BLD: 2 % (ref 0–1)
BUN SERPL-MCNC: 28 MG/DL (ref 6–20)
BUN/CREAT SERPL: 13 (ref 12–20)
CALCIUM SERPL-MCNC: 7.9 MG/DL (ref 8.5–10.1)
CHLORIDE SERPL-SCNC: 109 MMOL/L (ref 97–108)
CO2 SERPL-SCNC: 23 MMOL/L (ref 21–32)
CREAT SERPL-MCNC: 2.19 MG/DL (ref 0.7–1.3)
DIFFERENTIAL METHOD BLD: ABNORMAL
EOSINOPHIL # BLD: 0.1 K/UL (ref 0–0.4)
EOSINOPHIL NFR BLD: 4 % (ref 0–7)
ERYTHROCYTE [DISTWIDTH] IN BLOOD BY AUTOMATED COUNT: 14.5 % (ref 11.5–14.5)
GLUCOSE BLD STRIP.AUTO-MCNC: 116 MG/DL (ref 65–117)
GLUCOSE BLD STRIP.AUTO-MCNC: 173 MG/DL (ref 65–117)
GLUCOSE BLD STRIP.AUTO-MCNC: 224 MG/DL (ref 65–117)
GLUCOSE BLD STRIP.AUTO-MCNC: 243 MG/DL (ref 65–117)
GLUCOSE SERPL-MCNC: 245 MG/DL (ref 65–100)
GRAM STN SPEC: ABNORMAL
GRAM STN SPEC: ABNORMAL
HCT VFR BLD AUTO: 19.6 % (ref 36.6–50.3)
HCT VFR BLD AUTO: 23.8 % (ref 36.6–50.3)
HGB BLD-MCNC: 6.1 G/DL (ref 12.1–17)
HGB BLD-MCNC: 7.2 G/DL (ref 12.1–17)
HISTORY CHECK: NORMAL
IMM GRANULOCYTES # BLD AUTO: 0 K/UL (ref 0–0.04)
IMM GRANULOCYTES NFR BLD AUTO: 0 % (ref 0–0.5)
LYMPHOCYTES # BLD: 0.6 K/UL (ref 0.8–3.5)
LYMPHOCYTES NFR BLD: 18 % (ref 12–49)
MCH RBC QN AUTO: 27 PG (ref 26–34)
MCHC RBC AUTO-ENTMCNC: 31.1 G/DL (ref 30–36.5)
MCV RBC AUTO: 86.7 FL (ref 80–99)
MONOCYTES # BLD: 0.3 K/UL (ref 0–1)
MONOCYTES NFR BLD: 9 % (ref 5–13)
NEUTS SEG # BLD: 2.1 K/UL (ref 1.8–8)
NEUTS SEG NFR BLD: 67 % (ref 32–75)
NRBC # BLD: 0 K/UL (ref 0–0.01)
NRBC BLD-RTO: 0 PER 100 WBC
PLATELET # BLD AUTO: 177 K/UL (ref 150–400)
PMV BLD AUTO: 9.5 FL (ref 8.9–12.9)
POTASSIUM SERPL-SCNC: 4.5 MMOL/L (ref 3.5–5.1)
RBC # BLD AUTO: 2.26 M/UL (ref 4.1–5.7)
RBC MORPH BLD: ABNORMAL
SERVICE CMNT-IMP: ABNORMAL
SERVICE CMNT-IMP: NORMAL
SODIUM SERPL-SCNC: 137 MMOL/L (ref 136–145)
TSH SERPL DL<=0.05 MIU/L-ACNC: 2.08 UIU/ML (ref 0.45–4.5)
VANCOMYCIN SERPL-MCNC: 17.4 UG/ML
WBC # BLD AUTO: 3.2 K/UL (ref 4.1–11.1)

## 2024-05-31 PROCEDURE — 74183 MRI ABD W/O CNTR FLWD CNTR: CPT

## 2024-05-31 PROCEDURE — 80202 ASSAY OF VANCOMYCIN: CPT

## 2024-05-31 PROCEDURE — 6370000000 HC RX 637 (ALT 250 FOR IP): Performed by: STUDENT IN AN ORGANIZED HEALTH CARE EDUCATION/TRAINING PROGRAM

## 2024-05-31 PROCEDURE — 2580000003 HC RX 258: Performed by: HOSPITALIST

## 2024-05-31 PROCEDURE — 6370000000 HC RX 637 (ALT 250 FOR IP): Performed by: CLINICAL NURSE SPECIALIST

## 2024-05-31 PROCEDURE — 85018 HEMOGLOBIN: CPT

## 2024-05-31 PROCEDURE — 6360000002 HC RX W HCPCS

## 2024-05-31 PROCEDURE — 85014 HEMATOCRIT: CPT

## 2024-05-31 PROCEDURE — 82962 GLUCOSE BLOOD TEST: CPT

## 2024-05-31 PROCEDURE — 6370000000 HC RX 637 (ALT 250 FOR IP)

## 2024-05-31 PROCEDURE — 2580000003 HC RX 258: Performed by: STUDENT IN AN ORGANIZED HEALTH CARE EDUCATION/TRAINING PROGRAM

## 2024-05-31 PROCEDURE — P9016 RBC LEUKOCYTES REDUCED: HCPCS

## 2024-05-31 PROCEDURE — 99232 SBSQ HOSP IP/OBS MODERATE 35: CPT | Performed by: CLINICAL NURSE SPECIALIST

## 2024-05-31 PROCEDURE — 1100000003 HC PRIVATE W/ TELEMETRY

## 2024-05-31 PROCEDURE — A9579 GAD-BASE MR CONTRAST NOS,1ML: HCPCS | Performed by: STUDENT IN AN ORGANIZED HEALTH CARE EDUCATION/TRAINING PROGRAM

## 2024-05-31 PROCEDURE — 36415 COLL VENOUS BLD VENIPUNCTURE: CPT

## 2024-05-31 PROCEDURE — 6360000002 HC RX W HCPCS: Performed by: STUDENT IN AN ORGANIZED HEALTH CARE EDUCATION/TRAINING PROGRAM

## 2024-05-31 PROCEDURE — 36430 TRANSFUSION BLD/BLD COMPNT: CPT

## 2024-05-31 PROCEDURE — 6370000000 HC RX 637 (ALT 250 FOR IP): Performed by: HOSPITALIST

## 2024-05-31 PROCEDURE — 2580000003 HC RX 258

## 2024-05-31 PROCEDURE — 6360000004 HC RX CONTRAST MEDICATION: Performed by: STUDENT IN AN ORGANIZED HEALTH CARE EDUCATION/TRAINING PROGRAM

## 2024-05-31 PROCEDURE — 80048 BASIC METABOLIC PNL TOTAL CA: CPT

## 2024-05-31 PROCEDURE — 85025 COMPLETE CBC W/AUTO DIFF WBC: CPT

## 2024-05-31 RX ORDER — ENOXAPARIN SODIUM 100 MG/ML
1 INJECTION SUBCUTANEOUS 2 TIMES DAILY
Status: DISCONTINUED | OUTPATIENT
Start: 2024-05-31 | End: 2024-05-31

## 2024-05-31 RX ORDER — INSULIN LISPRO 100 [IU]/ML
0.05 INJECTION, SOLUTION INTRAVENOUS; SUBCUTANEOUS
Status: DISCONTINUED | OUTPATIENT
Start: 2024-05-31 | End: 2024-06-05 | Stop reason: HOSPADM

## 2024-05-31 RX ORDER — MAGNESIUM HYDROXIDE/ALUMINUM HYDROXICE/SIMETHICONE 120; 1200; 1200 MG/30ML; MG/30ML; MG/30ML
30 SUSPENSION ORAL EVERY 6 HOURS PRN
Status: DISCONTINUED | OUTPATIENT
Start: 2024-05-31 | End: 2024-06-05 | Stop reason: HOSPADM

## 2024-05-31 RX ORDER — SODIUM CHLORIDE 9 MG/ML
INJECTION, SOLUTION INTRAVENOUS PRN
Status: COMPLETED | OUTPATIENT
Start: 2024-05-31 | End: 2024-05-31

## 2024-05-31 RX ORDER — CARVEDILOL 6.25 MG/1
6.25 TABLET ORAL 2 TIMES DAILY WITH MEALS
Status: DISCONTINUED | OUTPATIENT
Start: 2024-05-31 | End: 2024-06-05 | Stop reason: HOSPADM

## 2024-05-31 RX ADMIN — SODIUM CHLORIDE, PRESERVATIVE FREE 10 ML: 5 INJECTION INTRAVENOUS at 10:23

## 2024-05-31 RX ADMIN — PIPERACILLIN AND TAZOBACTAM 3375 MG: 3; .375 INJECTION, POWDER, LYOPHILIZED, FOR SOLUTION INTRAVENOUS at 07:15

## 2024-05-31 RX ADMIN — BENZONATATE 100 MG: 100 CAPSULE ORAL at 14:35

## 2024-05-31 RX ADMIN — PIPERACILLIN AND TAZOBACTAM 3375 MG: 3; .375 INJECTION, POWDER, LYOPHILIZED, FOR SOLUTION INTRAVENOUS at 14:48

## 2024-05-31 RX ADMIN — GUAIFENESIN 600 MG: 600 TABLET, EXTENDED RELEASE ORAL at 09:17

## 2024-05-31 RX ADMIN — AMLODIPINE BESYLATE 5 MG: 5 TABLET ORAL at 09:17

## 2024-05-31 RX ADMIN — CARVEDILOL 12.5 MG: 12.5 TABLET, FILM COATED ORAL at 09:18

## 2024-05-31 RX ADMIN — MORPHINE SULFATE 2 MG: 2 INJECTION, SOLUTION INTRAMUSCULAR; INTRAVENOUS at 17:26

## 2024-05-31 RX ADMIN — GABAPENTIN 100 MG: 100 CAPSULE ORAL at 22:04

## 2024-05-31 RX ADMIN — MORPHINE SULFATE 2 MG: 2 INJECTION, SOLUTION INTRAMUSCULAR; INTRAVENOUS at 03:01

## 2024-05-31 RX ADMIN — CARVEDILOL 6.25 MG: 6.25 TABLET, FILM COATED ORAL at 18:23

## 2024-05-31 RX ADMIN — INSULIN GLARGINE 23 UNITS: 100 INJECTION, SOLUTION SUBCUTANEOUS at 22:05

## 2024-05-31 RX ADMIN — BENZONATATE 100 MG: 100 CAPSULE ORAL at 09:18

## 2024-05-31 RX ADMIN — PIPERACILLIN AND TAZOBACTAM 3375 MG: 3; .375 INJECTION, POWDER, LYOPHILIZED, FOR SOLUTION INTRAVENOUS at 22:10

## 2024-05-31 RX ADMIN — MORPHINE SULFATE 2 MG: 2 INJECTION, SOLUTION INTRAMUSCULAR; INTRAVENOUS at 07:42

## 2024-05-31 RX ADMIN — LISINOPRIL 5 MG: 5 TABLET ORAL at 09:18

## 2024-05-31 RX ADMIN — GABAPENTIN 100 MG: 100 CAPSULE ORAL at 09:18

## 2024-05-31 RX ADMIN — INSULIN LISPRO 4 UNITS: 100 INJECTION, SOLUTION INTRAVENOUS; SUBCUTANEOUS at 18:23

## 2024-05-31 RX ADMIN — ALUMINUM HYDROXIDE, MAGNESIUM HYDROXIDE, AND SIMETHICONE 30 ML: 1200; 120; 1200 SUSPENSION ORAL at 22:12

## 2024-05-31 RX ADMIN — GABAPENTIN 100 MG: 100 CAPSULE ORAL at 14:35

## 2024-05-31 RX ADMIN — VANCOMYCIN HYDROCHLORIDE 1000 MG: 1 INJECTION, POWDER, LYOPHILIZED, FOR SOLUTION INTRAVENOUS at 11:14

## 2024-05-31 RX ADMIN — SODIUM CHLORIDE, PRESERVATIVE FREE 10 ML: 5 INJECTION INTRAVENOUS at 22:06

## 2024-05-31 RX ADMIN — INSULIN LISPRO 4 UNITS: 100 INJECTION, SOLUTION INTRAVENOUS; SUBCUTANEOUS at 12:44

## 2024-05-31 RX ADMIN — MORPHINE SULFATE 2 MG: 2 INJECTION, SOLUTION INTRAMUSCULAR; INTRAVENOUS at 12:07

## 2024-05-31 RX ADMIN — MORPHINE SULFATE 2 MG: 2 INJECTION, SOLUTION INTRAMUSCULAR; INTRAVENOUS at 22:07

## 2024-05-31 RX ADMIN — INSULIN LISPRO 4 UNITS: 100 INJECTION, SOLUTION INTRAVENOUS; SUBCUTANEOUS at 10:22

## 2024-05-31 RX ADMIN — BENZONATATE 100 MG: 100 CAPSULE ORAL at 22:05

## 2024-05-31 RX ADMIN — GADOTERIDOL 18 ML: 279.3 INJECTION, SOLUTION INTRAVENOUS at 21:09

## 2024-05-31 RX ADMIN — SODIUM CHLORIDE: 9 INJECTION, SOLUTION INTRAVENOUS at 14:40

## 2024-05-31 RX ADMIN — INSULIN LISPRO 2 UNITS: 100 INJECTION, SOLUTION INTRAVENOUS; SUBCUTANEOUS at 09:15

## 2024-05-31 RX ADMIN — GUAIFENESIN 600 MG: 600 TABLET, EXTENDED RELEASE ORAL at 22:04

## 2024-05-31 RX ADMIN — SODIUM CHLORIDE: 9 INJECTION, SOLUTION INTRAVENOUS at 11:09

## 2024-05-31 ASSESSMENT — PAIN DESCRIPTION - DESCRIPTORS
DESCRIPTORS: ACHING;THROBBING
DESCRIPTORS: ACHING
DESCRIPTORS: THROBBING;ACHING
DESCRIPTORS: ACHING;THROBBING
DESCRIPTORS: ACHING
DESCRIPTORS: ACHING;THROBBING
DESCRIPTORS: ACHING
DESCRIPTORS: ACHING;THROBBING

## 2024-05-31 ASSESSMENT — PAIN DESCRIPTION - LOCATION
LOCATION: LEG
LOCATION: FOOT
LOCATION: LEG

## 2024-05-31 ASSESSMENT — PAIN SCALES - GENERAL
PAINLEVEL_OUTOF10: 6
PAINLEVEL_OUTOF10: 7
PAINLEVEL_OUTOF10: 0
PAINLEVEL_OUTOF10: 6
PAINLEVEL_OUTOF10: 8
PAINLEVEL_OUTOF10: 7
PAINLEVEL_OUTOF10: 8
PAINLEVEL_OUTOF10: 0
PAINLEVEL_OUTOF10: 7
PAINLEVEL_OUTOF10: 7

## 2024-05-31 ASSESSMENT — PAIN DESCRIPTION - ORIENTATION
ORIENTATION: RIGHT

## 2024-05-31 NOTE — PROGRESS NOTES
Hospitalist Progress Note    NAME:   Keaton Van   : 1965   MRN: [id number]     Date/Time: 2024 12:49 PM  Patient PCP: No primary care provider on file.    Estimated discharge date: 2 days  Barriers: Surgical procedure with podiatry      Assessment / Plan:    Osteomyelitis of the fifth proximal phalanx:   Early osteomyelitis of the fifth metatarsal head and  first distal phalanx:  Right leg cellulitis  Diabetic ulcers right foot POA  Chronic eschar wound left foot POA  Suspect undiagnosed vascular dz  Hx of prior toe amputations left foot  Podiatry evaluated the patient and planning to perform incision and drainage/resection tomorrow  Continue broad-spectrum antibiotics for now.  Patient is on vancomycin and Zosyn.     New onset atrial fibrillation:  -EKG reviewed and atrial fibrillation confirmed.  -Patient converted into sinus rhythm.  -Added Coreg for rate control.  -[id number] 3.  -Will start anticoagulation after procedure with podiatry.  -Likely due to underlying anemia.    Hypertensive urgency  208/107, improved to 162/74 after IV hydralazine  Currently well-controlled.  Started on Norvasc and lisinopril.     Diabetes mellitus type 2, uncontrolled  Hemoglobin A1c 7.6.  Patient is on 23 units long-acting insulin in the hospital.  Will resume oral medications on discharge.     Acute kidney injury on chronic kidney disease 3b  Creatinine trended down from 2.2 to 2.1  Outpatient follow-up with nephrology.     Acute on chronic normocytic anemia  Patient agreed to receive blood.  Hemoglobin 6.0.  Ordered 2 PRBC transfusion and Hgb improved to 7.2  Ferritin and iron profile reviewed.  Possible iron deficiency.  Will administer IV iron after surgical procedure.     Bipolar I  Polysubstance abuse  Medication noncompliance, financial and social barriers  Nicotine patch  Case mgmt expertise appreciated  UDS (+) THC, cocaine, opiates     Medical Decision Making:   I personally reviewed labs: CBC,

## 2024-05-31 NOTE — PROGRESS NOTES
Unfortunately surgery had to be moved due to the OR scheduling   Scheduled for Saturday which is the best time slot due to no scheduled cases and time wound be early AM   Npo changed to Saturday     EXAM:  MRI FOOT RIGHT W WO CONTRAST     INDICATION: Chronic right foot pain. Open wound in the right small toe.  Diabetes.     COMPARISON: Right foot views earlier the same day at 0945 hours     TECHNIQUE: Axial, coronal, and sagittal MRI of the right forefoot in the T1 and  inversion recovery pulse sequences with and without fat saturation .     CONTRAST: Pre and post IV contrast 15 mL ProHance.     FINDINGS:     Bone marrow: Heterogeneous bone marrow edema is in the fifth proximal phalanx.  Mild bone marrow edema in the fifth metatarsal head. Subtle cortical thinning of  the fifth proximal phalanx. No cortical erosion in the fifth metatarsal head.     Subtle bone marrow edema in the first distal phalanx. No cortical erosion.     No fracture or osteonecrosis.     Joint fluid: Physiologic.     Tendons: Intact.     Muscles: Moderate diffuse atrophy. Moderate diffuse edema.     Neurovascular bundles: No neuroma.     Articular cartilage: Mild first MTP and moderate MTS osteoarthritis. No septic  arthritis.     Soft tissue mass: None. No drainable fluid collection. Limited perfusion of the  forefoot, especially the fifth toe soft tissues.     IMPRESSION:     1. Osteomyelitis of the fifth proximal phalanx.  2. Reactive edema versus early osteomyelitis of the fifth metatarsal head and  first distal phalanx.  3. Hypoperfusion of the distal forefoot. No drainable fluid collection.

## 2024-05-31 NOTE — PROGRESS NOTES
Pharmacy Antimicrobial Kinetic Dosing    Indication for Antimicrobials: Right foot cellulitis with abscess; OM on the right 5th MP    Current Regimen of Each Antimicrobial:  Vancomycin pharmacy to dose; Start Date ; Day # 3  Zosyn 3.375g q 8h (start: , day 3    Goal Level: Vancomycin -600    Date Dose & Interval Measured (mcg/mL) Predicted AUC    2:50 1000 mg q24h 17.4 588                 Significant Cultures:   : blood - NG x 2 days, prelim   wound: moderate preliminary report of probable staph aureus; checking for possible moderate mixed skin missy, pending     Labs:  Recent Labs     Units 24  0250 24  0244 24  0753   CREATININE MG/DL 2.19* 1.86* 2.27*   BUN MG/DL 28* 25* 33*   PROCAL ng/mL  --   --  <0.05   WBC K/uL 3.2* 4.0* 5.6     Temp (24hrs), Av.9 °F (36.6 °C), Min:97.5 °F (36.4 °C), Max:98.6 °F (37 °C)    Conditions for Dosing Consideration:  KIN on CKD3b    Creatinine Clearance (mL/min): Estimated Creatinine Clearance: 39 mL/min (A) (based on SCr of 2.19 mg/dL (H)).     Impression/Plan:   The vancomycin level resulted at 17.4mcg/ml (). Will continue with the current regimen of 1000 mg IV q24h.    Zosyn  as above   Cbc and bmp through . Scr increase noted today.   Podiatry following - plan for I&D Saturday  Antimicrobial stop date 5 days - may need to extend due to OM      Pharmacy will follow daily and adjust medications as appropriate for renal function and/or serum levels.    Thank you,  Christy Escobedo, [facility]

## 2024-05-31 NOTE — DIABETES MGMT
[facility]  DIABETES MANAGEMENT CONSULT    Consulted by Provider for advanced nursing evaluation and care for inpatient blood glucose management.    Evaluation and Action Plan   Keaton Van is a 59 year old gentleman with Type 2 Diabetes, Bipolar and depression, peripheral neuropathy s/p left great and second toe amputations, CKD, substance abuse and a history of narcotic dependence, HCV? and suicide attempt who is admitted with right leg cellulitis with foot wounds and HTN urgency. He is on IV antibiotics and awaiting I&D with podiatry tomorrow (6/1).  Wound cx currently with staph aureus.    Mr Van is on disability, living along.  At some point he tells me he was on insulin and do see that he filled 25mg Januvia and Glipizide 5mg daily back in August 2023 followed by a prescription of metformin 500mg daily that he filled 4/2024.  He shared that he got tired of taking his medications and just stopped them.  His A1C of 7.6% is likely falsely low s/t anemia.  Due to 1000+ glucosuria and BG trends 149- 293 this admission, believe his underlying glucose trends PTA were significantly higher. Will obtain a fructosamine to confirm as this will impact discharge planning.     Glucose has remained elevated this admission despite starting moderate dose Lantus, 23 units daily.  Will add mealtime humalog today.  Suspect he will need continued insulin advances until glycemic control is achieved. BG target 100-180mg/dl while admitted.     Management Rationale Action Plan   Medication   Basal needs Using 0.3 units/kg/D  Continue moderate dose Lantus: 23 units daily.   Nutritional needs Using low sensitivity Started 4 units humalog/consumed meal  Advance by 2 units daily for persistent   pre-prandial hyperglycemia   Corrective insulin Using medium sensitivity AC/HS   Additional orders  We talked extensively about ordering a carb consistent diet, even offering double protein and non-starchy vegetables.   He was insistent about being on a regular diet despite my recommendations.  Please avoid offering him crackers/starchy snacks.   Fructosamine with next set of labs       Diabetes Discharge Plan   Medication  TBD   Referral  [x]        Outpatient diabetes education   Additional orders  Will need a FUV with PCP within 1-2 weeks after hospital discharge for ongoing diabetes management   Smoking and cocaine cessation, discussed with patient   No A1C x3 mos due to PRBC transfusion          Initial Presentation   Keaton Van is a 59 y.o. male who presented to the ED 5/29/24 for evaluation of a right foot wound  Labs: Creat 2.27, eGFR 32, , BNP 3612, Alk phos 208, ALT 91, AST 70, Hgb 6.9, HCT 21.4. UA with 1000+ glucosuria, small blood, 300 protein, trace leuk esterase   CXR: Subtle patchy density in the left lung base may represent acute infiltrate.   Right Foot XR:   FINDINGS:  No acute fractures, dislocations, or radiopaque foreign bodies.  No aggressive appearing erosive bony changes. Soft tissue ulceration by the  fifth metatarsophalangeal joint is noted.  There are vascular calcifications. Calcaneal plantar and Achilles spurs are  noted.     IMPRESSION:   No acute bony abnormalities.    HX:   Past Medical History:   Diagnosis Date    Adverse effect of anesthesia     breathing diff. with versed    Bipolar 1 disorder, mixed, moderate (HCC) 3/6/2017    Chronic pain     Depression     Pt stated diagnosed years ago    Diabetes ([facility]) 2003    Drug-induced mood disorder(292.84) 5/28/2013    Homicide attempt     HTN (hypertension) 11/3/2011    Narcotic dependence, episodic use (HCC) 11/3/2011    Non compliance with medical treatment 11/3/2011    Other ill-defined conditions(799.89)     chronic low back pain    Psychiatric disorder     bipolar    Sleep disorder     Substance abuse (HCC)     Suicidal thoughts         INITIAL DX: Type 2 diabetes mellitus with right diabetic foot ulcer (HCC) [E11.621, L97.519]  Cellulitis,

## 2024-05-31 NOTE — CARE COORDINATION
Care Management Initial Assessment       RUR: 17% Moderate Risk  Readmission? No  1st IM letter given? Yes   1st  letter given: N/A    Initial Assessment: Chart reviewed. CM met with pt at the bedside to introduce self and role. Verified contact information and demographics. Pt resides alone in apartment. Pt states due to lack of transportation he is unable to go to a PCP regularly. CM will provided resources. Preferred pharmacy is [facility] on [address]. Pt has no hx needing HH/IPR/SNF services. Pt independent with ADL's and uses a cane to ambulate if needed. Pt is not an active . ?Pts has no transportation and will need round trip for discharge. CM will continue to follow.      Pt states he does not want medical staff giving updates to family! Pt is aware of sister being listed as Healthcare decision maker. CM informed nursing and MD.    Plan A: Home   Full assessment below:          05/31/24 1216   Service Assessment   Patient Orientation Alert and Oriented;Place;Person;Situation;Self   Cognition Alert   History Provided By Patient   Primary Caregiver Self   Patient's Healthcare Decision Maker is: Legal Next of Kin  (Evelyn Julio (Other)  [phone] (Mobile))   PCP Verified by CM No  (Pt is need of PCP)   Prior Functional Level Independent in ADLs/IADLs;Mobility;Bathing;Dressing;Toileting;Feeding;Cooking;Housework;Shopping   Current Functional Level Independent in ADLs/IADLs   Can patient return to prior living arrangement Yes   Ability to make needs known: Good   Family able to assist with home care needs: Yes   Social/Functional History   Lives With Alone   Type of Home Apartment   Home Equipment Cane   Active  No   Discharge Planning   Type of Residence Apartment   Current Services Prior To Admission None   Patient expects to be discharged to: Apartment       Advance Care Planning     General Advance Care Planning (ACP) Conversation    Date of Conversation: 5/31/2024  Conducted with: Patient  with Decision Making Capacity  Other persons present: None    Healthcare Decision Maker:   Primary Decision Maker: Evelyn Julio - Reema - [phone]  Click here to complete Healthcare Decision Makers including selection of the Healthcare Decision Maker Relationship (ie \"Primary\").       Content/Action Overview:  Has ACP document(s) on file - reflects the patient's care preferences  Reviewed DNR/DNI and patient elects Full Code (Attempt Resuscitation)        Length of Voluntary ACP Conversation in minutes:  <16 minutes (Non-Billable)    MONIQUE Peguero,  [phone]

## 2024-05-31 NOTE — PLAN OF CARE
Problem: Chronic Conditions and Co-morbidities  Goal: Patient's chronic conditions and co-morbidity symptoms are monitored and maintained or improved  5/31/2024 0047 by Rolando Key RN  Outcome: Progressing  5/30/2024 1845 by Esther Garcia RN  Outcome: Progressing  5/30/2024 1838 by Oly Perez RN  Outcome: Progressing     Problem: Discharge Planning  Goal: Discharge to home or other facility with appropriate resources  5/31/2024 0047 by Rolando Key RN  Outcome: Progressing  5/30/2024 1845 by Esther Garcia RN  Outcome: Progressing  5/30/2024 1838 by Oly Perez RN  Outcome: Progressing     Problem: Pain  Goal: Verbalizes/displays adequate comfort level or baseline comfort level  5/31/2024 0047 by Rolando Key RN  Outcome: Progressing  5/30/2024 1845 by Esther Garcia RN  Outcome: Progressing  5/30/2024 1838 by Oly Perez RN  Outcome: Progressing     Problem: Risk for Elopement  Goal: Patient will not exit the unit/facility without proper excort  5/31/2024 0047 by Rolando Key RN  Outcome: Progressing  5/30/2024 1845 by Esther Garcia RN  Outcome: Progressing  5/30/2024 1838 by Oly Perez RN  Outcome: Progressing     Problem: Safety - Adult  Goal: Free from fall injury  5/31/2024 0047 by Rolando Key RN  Outcome: Progressing  5/30/2024 1845 by Esther Garcia RN  Outcome: Progressing  5/30/2024 1838 by Oly Perez RN  Outcome: Progressing     Problem: ABCDS Injury Assessment  Goal: Absence of physical injury  5/31/2024 0047 by Rolando Key RN  Outcome: Progressing  5/30/2024 1845 by Esthre Garcia RN  Outcome: Progressing  5/30/2024 1838 by Oly Perez RN  Outcome: Progressing     Problem: Nutrition Deficit:  Goal: Optimize nutritional status  5/31/2024 0047 by Rolando Key RN  Outcome: Progressing  5/30/2024 1845 by Esther Garcia RN  Outcome: Progressing  5/30/2024 1838 by Oly Perez RN  Outcome: Progressing

## 2024-05-31 NOTE — WOUND CARE
Wound care nurs consult for bilateral feet diabetic wounds.    58 y/o male admitted for Right diabetic foot ulcers with infection. Podiatrist, Dr Jama following and plans on taking patient to OR for right foot wound tomorrow for I&D and partial 5th ray resection.  Past Medical History:   Diagnosis Date    Adverse effect of anesthesia     breathing diff. with versed    Bipolar 1 disorder, mixed, moderate (HCC) 3/6/2017    Chronic pain     Depression     Pt stated diagnosed years ago    Diabetes ([facility]) 2003    Drug-induced mood disorder(292.84) 5/28/2013    Homicide attempt     HTN (hypertension) 11/3/2011    Narcotic dependence, episodic use (HCC) 11/3/2011    Non compliance with medical treatment 11/3/2011    Other ill-defined conditions(799.89)     chronic low back pain    Psychiatric disorder     bipolar    Sleep disorder     Substance abuse ([facility])     Suicidal thoughts      Past Surgical History:   Procedure Laterality Date    COLONOSCOPY N/A 8/31/2016    COLONOSCOPY performed by Keaton Rice Jr., MD at [facility] ENDOSCOPY    COLONOSCOPY,BIOPSY  8/31/2016         ORTHOPEDIC SURGERY  08/2018    right hand    ORTHOPEDIC SURGERY      left knee arthroplasty    ORTHOPEDIC SURGERY      chronic back pain    FL UNLISTED PROCEDURE CARDIAC SURGERY      cardiac stents X2 lad drug eluting    REMV KIDNEY,COMPLICATED  2006    side unknown - kidney stones    UPPER GI ENDOSCOPY,BIOPSY  8/31/2016        Patient on IV abxs    Patient has diabetic wounds to left foot that need orders:  Left lateral foot: 3 x 2.5 wound with scant yellow-clear drainage, no odor, dry and calloused to surrounding skin    Left plantar first metatarsal head - dry and calloused      WOUND POA CONDITIONS    Wound 05/29/24 Right;Distal;Lateral;Upper (Active)   Wound Image   05/29/24 1930   Wound Etiology Diabetic 05/30/24 2243   Dressing Status New dressing applied 05/30/24 2243   Wound Cleansed Wound cleanser 05/30/24 2243   Dressing/Treatment Dry  dressing 05/30/24 2243   Offloading for Diabetic Foot Ulcers Offloading ordered 05/30/24 2243   Dressing Change Due 05/30/24 05/29/24 1930   Number of days: 2       Wound 05/31/24 Foot Left;Lateral (Active)   Wound Image   05/31/24 1520   Wound Etiology Diabetic 05/31/24 1520   Dressing Status New dressing applied 05/31/24 1520   Wound Cleansed Wound cleanser 05/31/24 1520   Dressing/Treatment Alginate with Ag;Dry dressing 05/31/24 1520   Wound Length (cm) 3 cm 05/31/24 1520   Wound Width (cm) 2.5 cm 05/31/24 1520   Wound Surface Area (cm^2) 7.5 cm^2 05/31/24 1520   Wound Assessment Devitalized tissue 05/31/24 1520   Drainage Amount Scant (moist but unmeasurable) 05/31/24 1520   Drainage Description Clear 05/31/24 1520   Odor None 05/31/24 1520   Roula-wound Assessment Dry/flaky 05/31/24 1520   Margins Undefined edges 05/31/24 1520   Wound Thickness Description not for Pressure Injury Full thickness 05/31/24 1520   Number of days: 0       Wound 05/31/24 Left;Plantar first metatarsal head (Active)   Wound Image   05/31/24 1520   Wound Etiology Diabetic 05/31/24 1520   Wound Cleansed Betadine/povidone iodine 05/31/24 1520   Dressing/Treatment Open to air 05/31/24 1520   Wound Length (cm) 1.5 cm 05/31/24 1520   Wound Width (cm) 1 cm 05/31/24 1520   Wound Surface Area (cm^2) 1.5 cm^2 05/31/24 1520   Wound Assessment Dry 05/31/24 1520   Drainage Amount None (dry) 05/31/24 1520   Odor None 05/31/24 1520   Roula-wound Assessment Intact 05/31/24 1520   Number of days: 0         Recommend:   Defer right foot wound care to Dr Jama.    Left foot wounds: MWF, cleanse wounds with wound cleanser and 4x4.Paint plantar wound with betadine and let air dry. Cover lateral foot wound with a piece of Opticell AG, dry 4x4 and secure with kimberley and tape.    Float heels - patient has neuropathy and cannot feel his feet    CARLOS CLEMENT RN, CWON

## 2024-06-01 ENCOUNTER — ANESTHESIA (OUTPATIENT)
Facility: HOSPITAL | Age: 59
End: 2024-06-01
Payer: MEDICARE

## 2024-06-01 ENCOUNTER — ANESTHESIA EVENT (OUTPATIENT)
Facility: HOSPITAL | Age: 59
End: 2024-06-01
Payer: MEDICARE

## 2024-06-01 LAB
ABO + RH BLD: NORMAL
ANION GAP SERPL CALC-SCNC: 4 MMOL/L (ref 5–15)
BASOPHILS # BLD: 0.1 K/UL (ref 0–0.1)
BASOPHILS NFR BLD: 1 % (ref 0–1)
BLD PROD TYP BPU: NORMAL
BLD PROD TYP BPU: NORMAL
BLOOD BANK BLOOD PRODUCT EXPIRATION DATE: NORMAL
BLOOD BANK BLOOD PRODUCT EXPIRATION DATE: NORMAL
BLOOD BANK DISPENSE STATUS: NORMAL
BLOOD BANK DISPENSE STATUS: NORMAL
BLOOD BANK ISBT PRODUCT BLOOD TYPE: 5100
BLOOD BANK ISBT PRODUCT BLOOD TYPE: 5100
BLOOD BANK PRODUCT CODE: NORMAL
BLOOD BANK PRODUCT CODE: NORMAL
BLOOD BANK UNIT TYPE AND RH: NORMAL
BLOOD BANK UNIT TYPE AND RH: NORMAL
BLOOD GROUP ANTIBODIES SERPL: NORMAL
BPU ID: NORMAL
BPU ID: NORMAL
BUN SERPL-MCNC: 35 MG/DL (ref 6–20)
BUN/CREAT SERPL: 15 (ref 12–20)
CALCIUM SERPL-MCNC: 8.1 MG/DL (ref 8.5–10.1)
CHLORIDE SERPL-SCNC: 111 MMOL/L (ref 97–108)
CO2 SERPL-SCNC: 24 MMOL/L (ref 21–32)
CREAT SERPL-MCNC: 2.38 MG/DL (ref 0.7–1.3)
CROSSMATCH RESULT: NORMAL
CROSSMATCH RESULT: NORMAL
DIFFERENTIAL METHOD BLD: ABNORMAL
EOSINOPHIL # BLD: 0.2 K/UL (ref 0–0.4)
EOSINOPHIL NFR BLD: 5 % (ref 0–7)
ERYTHROCYTE [DISTWIDTH] IN BLOOD BY AUTOMATED COUNT: 14.5 % (ref 11.5–14.5)
GLUCOSE BLD STRIP.AUTO-MCNC: 107 MG/DL (ref 65–117)
GLUCOSE BLD STRIP.AUTO-MCNC: 134 MG/DL (ref 65–117)
GLUCOSE BLD STRIP.AUTO-MCNC: 171 MG/DL (ref 65–117)
GLUCOSE BLD STRIP.AUTO-MCNC: 214 MG/DL (ref 65–117)
GLUCOSE BLD STRIP.AUTO-MCNC: 254 MG/DL (ref 65–117)
GLUCOSE BLD STRIP.AUTO-MCNC: 71 MG/DL (ref 65–117)
GLUCOSE BLD STRIP.AUTO-MCNC: 85 MG/DL (ref 65–117)
GLUCOSE SERPL-MCNC: 75 MG/DL (ref 65–100)
HCT VFR BLD AUTO: 23.8 % (ref 36.6–50.3)
HGB BLD-MCNC: 7.4 G/DL (ref 12.1–17)
IMM GRANULOCYTES # BLD AUTO: 0 K/UL (ref 0–0.04)
IMM GRANULOCYTES NFR BLD AUTO: 0 % (ref 0–0.5)
LYMPHOCYTES # BLD: 0.8 K/UL (ref 0.8–3.5)
LYMPHOCYTES NFR BLD: 16 % (ref 12–49)
MCH RBC QN AUTO: 26.9 PG (ref 26–34)
MCHC RBC AUTO-ENTMCNC: 31.1 G/DL (ref 30–36.5)
MCV RBC AUTO: 86.5 FL (ref 80–99)
MONOCYTES # BLD: 0.4 K/UL (ref 0–1)
MONOCYTES NFR BLD: 9 % (ref 5–13)
NEUTS SEG # BLD: 3.2 K/UL (ref 1.8–8)
NEUTS SEG NFR BLD: 69 % (ref 32–75)
NRBC # BLD: 0 K/UL (ref 0–0.01)
NRBC BLD-RTO: 0 PER 100 WBC
PLATELET # BLD AUTO: 242 K/UL (ref 150–400)
PMV BLD AUTO: 9.5 FL (ref 8.9–12.9)
POTASSIUM SERPL-SCNC: 4.8 MMOL/L (ref 3.5–5.1)
RBC # BLD AUTO: 2.75 M/UL (ref 4.1–5.7)
SERVICE CMNT-IMP: ABNORMAL
SERVICE CMNT-IMP: NORMAL
SODIUM SERPL-SCNC: 139 MMOL/L (ref 136–145)
SPECIMEN EXP DATE BLD: NORMAL
UNIT DIVISION: 0
UNIT DIVISION: 0
UNIT ISSUE DATE/TIME: NORMAL
UNIT ISSUE DATE/TIME: NORMAL
WBC # BLD AUTO: 4.7 K/UL (ref 4.1–11.1)

## 2024-06-01 PROCEDURE — 6360000002 HC RX W HCPCS

## 2024-06-01 PROCEDURE — 2709999900 HC NON-CHARGEABLE SUPPLY: Performed by: PODIATRIST

## 2024-06-01 PROCEDURE — 82962 GLUCOSE BLOOD TEST: CPT

## 2024-06-01 PROCEDURE — 3700000000 HC ANESTHESIA ATTENDED CARE: Performed by: PODIATRIST

## 2024-06-01 PROCEDURE — 6370000000 HC RX 637 (ALT 250 FOR IP)

## 2024-06-01 PROCEDURE — 85025 COMPLETE CBC W/AUTO DIFF WBC: CPT

## 2024-06-01 PROCEDURE — 2580000003 HC RX 258: Performed by: NURSE ANESTHETIST, CERTIFIED REGISTERED

## 2024-06-01 PROCEDURE — 6360000002 HC RX W HCPCS: Performed by: NURSE ANESTHETIST, CERTIFIED REGISTERED

## 2024-06-01 PROCEDURE — 6370000000 HC RX 637 (ALT 250 FOR IP): Performed by: PODIATRIST

## 2024-06-01 PROCEDURE — 88311 DECALCIFY TISSUE: CPT

## 2024-06-01 PROCEDURE — 3600000002 HC SURGERY LEVEL 2 BASE: Performed by: PODIATRIST

## 2024-06-01 PROCEDURE — 2580000003 HC RX 258: Performed by: STUDENT IN AN ORGANIZED HEALTH CARE EDUCATION/TRAINING PROGRAM

## 2024-06-01 PROCEDURE — 2580000003 HC RX 258: Performed by: HOSPITALIST

## 2024-06-01 PROCEDURE — 7100000001 HC PACU RECOVERY - ADDTL 15 MIN: Performed by: PODIATRIST

## 2024-06-01 PROCEDURE — 3600000012 HC SURGERY LEVEL 2 ADDTL 15MIN: Performed by: PODIATRIST

## 2024-06-01 PROCEDURE — 7100000000 HC PACU RECOVERY - FIRST 15 MIN: Performed by: PODIATRIST

## 2024-06-01 PROCEDURE — 80048 BASIC METABOLIC PNL TOTAL CA: CPT

## 2024-06-01 PROCEDURE — 2580000003 HC RX 258

## 2024-06-01 PROCEDURE — 87070 CULTURE OTHR SPECIMN AEROBIC: CPT

## 2024-06-01 PROCEDURE — 87075 CULTR BACTERIA EXCEPT BLOOD: CPT

## 2024-06-01 PROCEDURE — 3700000001 HC ADD 15 MINUTES (ANESTHESIA): Performed by: PODIATRIST

## 2024-06-01 PROCEDURE — 0Y6M0ZF DETACHMENT AT RIGHT FOOT, PARTIAL 5TH RAY, OPEN APPROACH: ICD-10-PCS | Performed by: PODIATRIST

## 2024-06-01 PROCEDURE — 88305 TISSUE EXAM BY PATHOLOGIST: CPT

## 2024-06-01 PROCEDURE — 6360000002 HC RX W HCPCS: Performed by: PODIATRIST

## 2024-06-01 PROCEDURE — 2500000003 HC RX 250 WO HCPCS: Performed by: NURSE ANESTHETIST, CERTIFIED REGISTERED

## 2024-06-01 PROCEDURE — 6360000002 HC RX W HCPCS: Performed by: STUDENT IN AN ORGANIZED HEALTH CARE EDUCATION/TRAINING PROGRAM

## 2024-06-01 PROCEDURE — 2060000000 HC ICU INTERMEDIATE R&B

## 2024-06-01 PROCEDURE — 82985 ASSAY OF GLYCATED PROTEIN: CPT

## 2024-06-01 PROCEDURE — 36415 COLL VENOUS BLD VENIPUNCTURE: CPT

## 2024-06-01 PROCEDURE — 87205 SMEAR GRAM STAIN: CPT

## 2024-06-01 PROCEDURE — 6370000000 HC RX 637 (ALT 250 FOR IP): Performed by: STUDENT IN AN ORGANIZED HEALTH CARE EDUCATION/TRAINING PROGRAM

## 2024-06-01 PROCEDURE — 6360000002 HC RX W HCPCS: Performed by: ANESTHESIOLOGY

## 2024-06-01 RX ORDER — PROCHLORPERAZINE EDISYLATE 5 MG/ML
5 INJECTION INTRAMUSCULAR; INTRAVENOUS
Status: DISCONTINUED | OUTPATIENT
Start: 2024-06-01 | End: 2024-06-01 | Stop reason: HOSPADM

## 2024-06-01 RX ORDER — DEXTROSE MONOHYDRATE 100 MG/ML
INJECTION, SOLUTION INTRAVENOUS CONTINUOUS PRN
Status: DISCONTINUED | OUTPATIENT
Start: 2024-06-01 | End: 2024-06-05 | Stop reason: HOSPADM

## 2024-06-01 RX ORDER — MORPHINE SULFATE 4 MG/ML
4 INJECTION, SOLUTION INTRAMUSCULAR; INTRAVENOUS EVERY 4 HOURS PRN
Status: DISCONTINUED | OUTPATIENT
Start: 2024-06-01 | End: 2024-06-05 | Stop reason: HOSPADM

## 2024-06-01 RX ORDER — EPHEDRINE SULFATE/0.9% NACL/PF 50 MG/5 ML
SYRINGE (ML) INTRAVENOUS PRN
Status: DISCONTINUED | OUTPATIENT
Start: 2024-06-01 | End: 2024-06-01 | Stop reason: SDUPTHER

## 2024-06-01 RX ORDER — SODIUM CHLORIDE 0.9 % (FLUSH) 0.9 %
5-40 SYRINGE (ML) INJECTION PRN
Status: DISCONTINUED | OUTPATIENT
Start: 2024-06-01 | End: 2024-06-01 | Stop reason: HOSPADM

## 2024-06-01 RX ORDER — DEXTROSE MONOHYDRATE AND SODIUM CHLORIDE 5; .9 G/100ML; G/100ML
INJECTION, SOLUTION INTRAVENOUS CONTINUOUS
Status: DISCONTINUED | OUTPATIENT
Start: 2024-06-01 | End: 2024-06-01

## 2024-06-01 RX ORDER — SODIUM CHLORIDE 0.9 % (FLUSH) 0.9 %
5-40 SYRINGE (ML) INJECTION EVERY 12 HOURS SCHEDULED
Status: DISCONTINUED | OUTPATIENT
Start: 2024-06-01 | End: 2024-06-01 | Stop reason: SDUPTHER

## 2024-06-01 RX ORDER — HYDROMORPHONE HYDROCHLORIDE 1 MG/ML
0.25 INJECTION, SOLUTION INTRAMUSCULAR; INTRAVENOUS; SUBCUTANEOUS EVERY 5 MIN PRN
Status: DISCONTINUED | OUTPATIENT
Start: 2024-06-01 | End: 2024-06-01 | Stop reason: HOSPADM

## 2024-06-01 RX ORDER — AMLODIPINE BESYLATE 5 MG/1
10 TABLET ORAL DAILY
Status: DISCONTINUED | OUTPATIENT
Start: 2024-06-02 | End: 2024-06-03

## 2024-06-01 RX ORDER — SODIUM CHLORIDE 9 MG/ML
INJECTION, SOLUTION INTRAVENOUS PRN
Status: DISCONTINUED | OUTPATIENT
Start: 2024-06-01 | End: 2024-06-01 | Stop reason: HOSPADM

## 2024-06-01 RX ORDER — FENTANYL CITRATE 50 UG/ML
50 INJECTION, SOLUTION INTRAMUSCULAR; INTRAVENOUS EVERY 5 MIN PRN
Status: COMPLETED | OUTPATIENT
Start: 2024-06-01 | End: 2024-06-01

## 2024-06-01 RX ORDER — NALOXONE HYDROCHLORIDE 0.4 MG/ML
INJECTION, SOLUTION INTRAMUSCULAR; INTRAVENOUS; SUBCUTANEOUS PRN
Status: DISCONTINUED | OUTPATIENT
Start: 2024-06-01 | End: 2024-06-01 | Stop reason: HOSPADM

## 2024-06-01 RX ORDER — SODIUM CHLORIDE 0.9 % (FLUSH) 0.9 %
5-40 SYRINGE (ML) INJECTION EVERY 12 HOURS SCHEDULED
Status: DISCONTINUED | OUTPATIENT
Start: 2024-06-01 | End: 2024-06-01 | Stop reason: HOSPADM

## 2024-06-01 RX ORDER — SODIUM CHLORIDE 0.9 % (FLUSH) 0.9 %
5-40 SYRINGE (ML) INJECTION PRN
Status: DISCONTINUED | OUTPATIENT
Start: 2024-06-01 | End: 2024-06-01 | Stop reason: SDUPTHER

## 2024-06-01 RX ORDER — GLUCAGON 1 MG/ML
1 KIT INJECTION PRN
Status: DISCONTINUED | OUTPATIENT
Start: 2024-06-01 | End: 2024-06-05 | Stop reason: HOSPADM

## 2024-06-01 RX ORDER — BUPIVACAINE HYDROCHLORIDE 5 MG/ML
INJECTION, SOLUTION PERINEURAL PRN
Status: DISCONTINUED | OUTPATIENT
Start: 2024-06-01 | End: 2024-06-01 | Stop reason: ALTCHOICE

## 2024-06-01 RX ORDER — SODIUM CHLORIDE, SODIUM LACTATE, POTASSIUM CHLORIDE, CALCIUM CHLORIDE 600; 310; 30; 20 MG/100ML; MG/100ML; MG/100ML; MG/100ML
INJECTION, SOLUTION INTRAVENOUS CONTINUOUS PRN
Status: DISCONTINUED | OUTPATIENT
Start: 2024-06-01 | End: 2024-06-01 | Stop reason: SDUPTHER

## 2024-06-01 RX ORDER — OXYCODONE AND ACETAMINOPHEN 10; 325 MG/1; MG/1
1 TABLET ORAL EVERY 4 HOURS PRN
Status: DISCONTINUED | OUTPATIENT
Start: 2024-06-01 | End: 2024-06-05 | Stop reason: HOSPADM

## 2024-06-01 RX ADMIN — MORPHINE SULFATE 2 MG: 2 INJECTION, SOLUTION INTRAMUSCULAR; INTRAVENOUS at 17:58

## 2024-06-01 RX ADMIN — FENTANYL CITRATE 50 MCG: 50 INJECTION INTRAMUSCULAR; INTRAVENOUS at 12:15

## 2024-06-01 RX ADMIN — INSULIN LISPRO 2 UNITS: 100 INJECTION, SOLUTION INTRAVENOUS; SUBCUTANEOUS at 17:25

## 2024-06-01 RX ADMIN — Medication 10 MG: at 10:24

## 2024-06-01 RX ADMIN — PIPERACILLIN AND TAZOBACTAM 3375 MG: 3; .375 INJECTION, POWDER, LYOPHILIZED, FOR SOLUTION INTRAVENOUS at 14:10

## 2024-06-01 RX ADMIN — AMLODIPINE BESYLATE 5 MG: 5 TABLET ORAL at 14:00

## 2024-06-01 RX ADMIN — MORPHINE SULFATE 2 MG: 2 INJECTION, SOLUTION INTRAMUSCULAR; INTRAVENOUS at 04:15

## 2024-06-01 RX ADMIN — PROPOFOL 50 MG: 10 INJECTION, EMULSION INTRAVENOUS at 09:47

## 2024-06-01 RX ADMIN — SODIUM CHLORIDE: 9 INJECTION, SOLUTION INTRAVENOUS at 14:09

## 2024-06-01 RX ADMIN — FENTANYL CITRATE 50 MCG: 50 INJECTION INTRAMUSCULAR; INTRAVENOUS at 12:10

## 2024-06-01 RX ADMIN — DEXTROSE AND SODIUM CHLORIDE: 5; 900 INJECTION, SOLUTION INTRAVENOUS at 05:35

## 2024-06-01 RX ADMIN — PROPOFOL 50 MG: 10 INJECTION, EMULSION INTRAVENOUS at 09:38

## 2024-06-01 RX ADMIN — MORPHINE SULFATE 2 MG: 2 INJECTION, SOLUTION INTRAMUSCULAR; INTRAVENOUS at 14:01

## 2024-06-01 RX ADMIN — PROPOFOL 50 MG: 10 INJECTION, EMULSION INTRAVENOUS at 09:52

## 2024-06-01 RX ADMIN — SODIUM CHLORIDE, PRESERVATIVE FREE 10 ML: 5 INJECTION INTRAVENOUS at 08:08

## 2024-06-01 RX ADMIN — SODIUM CHLORIDE, POTASSIUM CHLORIDE, SODIUM LACTATE AND CALCIUM CHLORIDE: 600; 310; 30; 20 INJECTION, SOLUTION INTRAVENOUS at 09:31

## 2024-06-01 RX ADMIN — BENZONATATE 100 MG: 100 CAPSULE ORAL at 20:38

## 2024-06-01 RX ADMIN — GABAPENTIN 100 MG: 100 CAPSULE ORAL at 20:38

## 2024-06-01 RX ADMIN — INSULIN LISPRO 4 UNITS: 100 INJECTION, SOLUTION INTRAVENOUS; SUBCUTANEOUS at 17:26

## 2024-06-01 RX ADMIN — OXYCODONE AND ACETAMINOPHEN 1 TABLET: 10; 325 TABLET ORAL at 23:27

## 2024-06-01 RX ADMIN — PROPOFOL 30 MG: 10 INJECTION, EMULSION INTRAVENOUS at 10:36

## 2024-06-01 RX ADMIN — PROPOFOL 20 MG: 10 INJECTION, EMULSION INTRAVENOUS at 10:17

## 2024-06-01 RX ADMIN — INSULIN GLARGINE 23 UNITS: 100 INJECTION, SOLUTION SUBCUTANEOUS at 20:41

## 2024-06-01 RX ADMIN — GLUCAGON 1 MG: 1 INJECTION, POWDER, LYOPHILIZED, FOR SOLUTION INTRAMUSCULAR; INTRAVENOUS at 08:00

## 2024-06-01 RX ADMIN — MORPHINE SULFATE 4 MG: 4 INJECTION, SOLUTION INTRAMUSCULAR; INTRAVENOUS at 20:38

## 2024-06-01 RX ADMIN — OXYCODONE HYDROCHLORIDE AND ACETAMINOPHEN 1 TABLET: 5; 325 TABLET ORAL at 15:31

## 2024-06-01 RX ADMIN — PROPOFOL 20 MG: 10 INJECTION, EMULSION INTRAVENOUS at 10:24

## 2024-06-01 RX ADMIN — GUAIFENESIN 600 MG: 600 TABLET, EXTENDED RELEASE ORAL at 20:38

## 2024-06-01 RX ADMIN — Medication 10 MG: at 10:17

## 2024-06-01 RX ADMIN — BENZONATATE 100 MG: 100 CAPSULE ORAL at 14:00

## 2024-06-01 RX ADMIN — DEXTROSE MONOHYDRATE: 100 INJECTION, SOLUTION INTRAVENOUS at 05:07

## 2024-06-01 RX ADMIN — CARVEDILOL 6.25 MG: 6.25 TABLET, FILM COATED ORAL at 17:25

## 2024-06-01 RX ADMIN — PIPERACILLIN AND TAZOBACTAM 3375 MG: 3; .375 INJECTION, POWDER, LYOPHILIZED, FOR SOLUTION INTRAVENOUS at 05:54

## 2024-06-01 RX ADMIN — GABAPENTIN 100 MG: 100 CAPSULE ORAL at 13:59

## 2024-06-01 RX ADMIN — MELATONIN 3 MG: at 20:38

## 2024-06-01 RX ADMIN — PROPOFOL 50 MG: 10 INJECTION, EMULSION INTRAVENOUS at 10:06

## 2024-06-01 ASSESSMENT — PAIN DESCRIPTION - LOCATION
LOCATION: LEG;FOOT
LOCATION: FOOT
LOCATION: LEG;FOOT
LOCATION: LEG;FOOT
LOCATION: FOOT
LOCATION: LEG;FOOT
LOCATION: LEG;FOOT

## 2024-06-01 ASSESSMENT — PAIN SCALES - GENERAL
PAINLEVEL_OUTOF10: 7
PAINLEVEL_OUTOF10: 7
PAINLEVEL_OUTOF10: 6
PAINLEVEL_OUTOF10: 10
PAINLEVEL_OUTOF10: 7
PAINLEVEL_OUTOF10: 0
PAINLEVEL_OUTOF10: 8
PAINLEVEL_OUTOF10: 10
PAINLEVEL_OUTOF10: 8
PAINLEVEL_OUTOF10: 6
PAINLEVEL_OUTOF10: 0
PAINLEVEL_OUTOF10: 4

## 2024-06-01 ASSESSMENT — PAIN DESCRIPTION - ORIENTATION
ORIENTATION: RIGHT

## 2024-06-01 ASSESSMENT — PAIN DESCRIPTION - DESCRIPTORS
DESCRIPTORS: ACHING
DESCRIPTORS: ACHING;THROBBING
DESCRIPTORS: ACHING
DESCRIPTORS: ACHING;THROBBING
DESCRIPTORS: THROBBING
DESCRIPTORS: ACHING

## 2024-06-01 ASSESSMENT — LIFESTYLE VARIABLES: SMOKING_STATUS: 1

## 2024-06-01 NOTE — PROGRESS NOTES
BED ALARM REFUSAL    The patient refused the bed alarm.  Education regarding measures to prevent fall and risk for serious injury related to fall were provided to the patient  by Wilfrido YA,    .  The  patient verbalized understanding.  Informed refusal form was signed by the patient (See signed informed refusal form in chart).            End of Shift Note    Bedside shift change report given to RN (oncoming nurse) by WILFRIDO CARTER RN (offgoing nurse).  Report included the following information SBAR, Kardex, Intake/Output, and MAR    Shift worked:  7-7pm     Shift summary and any significant changes:    Admitted pt from PACU. Normothermic since admission. VS are stable  PRN pain meds given per order.       Concerns for physician to address:      Zone phone for oncoming shift:           WILFRIDO CARTER RN

## 2024-06-01 NOTE — ANESTHESIA POSTPROCEDURE EVALUATION
Department of Anesthesiology  Postprocedure Note    Patient: Keaton Van  MRN: [id number]  YOB: 1965  Date of evaluation: 6/1/2024    Procedure Summary       Date: 06/01/24 Room / Location: [facility] MAIN OR M3 / [facility] MAIN OR    Anesthesia Start: 0931 Anesthesia Stop: 1046    Procedure: RIGHT PARTIAL FIFTH RAY AMPUTATION (Right: Foot) Diagnosis:       Osteomyelitis of ankle or foot, right, acute (HCC)      (Osteomyelitis of ankle or foot, right, acute (HCC) [M86.171])    Providers: Michael Jama DPM Responsible Provider: Carl Rosen MD    Anesthesia Type: MAC ASA Status: 3            Anesthesia Type: No value filed.    Agustin Phase I: Agustin Score: 10    Agustin Phase II:      Anesthesia Post Evaluation    Patient location during evaluation: PACU  Patient participation: complete - patient participated  Level of consciousness: sleepy but conscious and responsive to verbal stimuli  Airway patency: patent  Nausea & Vomiting: no vomiting and no nausea  Cardiovascular status: blood pressure returned to baseline and hemodynamically stable  Respiratory status: acceptable  Hydration status: stable  Pain management: adequate    No notable events documented.

## 2024-06-01 NOTE — PROGRESS NOTES
TRANSFER - OUT REPORT:    Verbal report given to BHAKTI Silva on Keaton Van  being transferred to [facility] for change in patient condition (HYPOthermic- post op and needs bear hugger)       Report consisted of patient's Situation, Background, Assessment and   Recommendations(SBAR).     Information from the following report(s) Nurse Handoff Report, Adult Overview, Recent Results, and Neuro Assessment was reviewed with the receiving nurse.           Lines:   Peripheral IV 05/29/24 Right Antecubital (Active)   Site Assessment Clean, dry & intact 06/01/24 1100   Line Status Capped 06/01/24 1100   Line Care Ports disinfected 06/01/24 1100   Phlebitis Assessment No symptoms 06/01/24 1100   Infiltration Assessment 0 06/01/24 1100   Alcohol Cap Used Yes 06/01/24 1100   Dressing Status Clean, dry & intact 06/01/24 1100   Dressing Type Transparent 06/01/24 1100       Peripheral IV 05/30/24 Left;Anterior Forearm (Active)   Site Assessment Clean, dry & intact 06/01/24 1100   Line Status Infusing 06/01/24 1100   Line Care Ports disinfected 06/01/24 1100   Phlebitis Assessment No symptoms 06/01/24 1100   Infiltration Assessment 0 06/01/24 1100   Alcohol Cap Used Yes 06/01/24 1100   Dressing Status Clean, dry & intact 06/01/24 1100   Dressing Type Transparent 06/01/24 1100        Opportunity for questions and clarification was provided.      Patient transported with:  Monitor and Registered Nurse IN PACU STILL

## 2024-06-01 NOTE — PROGRESS NOTES
0447: pt complained of dizziness, lightheadedness and profusely sweating, alert and oriented. Blood glucose of 71 mg/dl.    0457: Notified MD Borden ordered IV bolus of D10 and continuous IV fluids of D5% 0.9 NS to run 50 ml/hr which was given and started.    0532: blood glucose rechecked 171 mg/dl, MD Borden was informed with new order made then was carried out. Patient said \"I feel much better.\" No other complaints at this time.

## 2024-06-01 NOTE — BRIEF OP NOTE
Brief Postoperative Note      Patient: Keaton Van  YOB: 1965  MRN: [id number]    Date of Procedure: 6/1/2024    Pre-Op Diagnosis Codes:     * Osteomyelitis of ankle or foot, right, acute ([facility]) [M86.171]    Post-Op Diagnosis: Same       Procedure(s):  RIGHT PARTIAL FIFTH RAY AMPUTATION    Surgeon(s):  Michael Jama DPM    Assistant:  * No surgical staff found *    Anesthesia: Monitor Anesthesia Care    Estimated Blood Loss (mL): less than 50     Complications: None    Specimens:   ID Type Source Tests Collected by Time Destination   1 : RIGHT FOOT WOUND Swab Foot CULTURE, ANAEROBIC, CULTURE, WOUND Michael Jama DPM 6/1/2024 0933    2 : RIGHT FIFTH METATARSAL AND TOE Bone Foot SURGICAL PATHOLOGY Michael Jama DPM 6/1/2024 0957        Implants:  * No implants in log * 1/3\" iodoform packed in to the plantar tracks as well in the tissue void       Drains: * No LDAs found *    Findings:  Infection Present At Time Of Surgery (PATOS) (choose all levels that have infection present):  - Deep Infection (muscle/fascia) present as evidenced by pus, purulent fluid, and fluid consistent with infection  Other Findings: viable bone margins, plantar fad pad medial tracking of the infection up to and complete medial and plantar arch    Electronically signed by Michael Jama DPM on 6/1/2024 at 10:57 AM

## 2024-06-01 NOTE — ANESTHESIA PRE PROCEDURE
Department of Anesthesiology  Preprocedure Note       Name:  Keaton Van   Age:  59 y.o.  :  1965                                          MRN:  [id number]         Date:  2024      Surgeon: Surgeon(s):  Michael Jama DPM    Procedure: Procedure(s):  RIGHT PARTIAL FIFTH RAY AMPUTATION    Medications prior to admission:   Prior to Admission medications    Medication Sig Start Date End Date Taking? Authorizing Provider   folic acid (FOLVITE) 1 MG tablet Take 1 tablet by mouth daily  Patient not taking: Reported on 2024   Karla Fernandez MD   UNABLE TO FIND Dx: Lumbar pain with radiculopathy    PT/OT 3 times a week 23   Karla Fernandez MD   QUEtiapine (SEROQUEL) 25 MG tablet Take 1 tablet by mouth nightly for 15 days 23  Karla Fernandez MD   acetaminophen (TYLENOL) 325 MG tablet Take 2 tablets by mouth every 4 hours as needed 22   Automatic Reconciliation, Ar   gabapentin (NEURONTIN) 600 MG tablet Take 1 tablet by mouth 3 times daily.    Automatic Reconciliation, Ar   ibuprofen (ADVIL;MOTRIN) 400 MG tablet Take 1 tablet by mouth every 8 hours as needed 22   Automatic Reconciliation, Ar   losartan (COZAAR) 50 MG tablet Take 1 tablet by mouth daily 22   Automatic Reconciliation, Ar   metFORMIN (GLUCOPHAGE-XR) 750 MG extended release tablet Take 1 tablet by mouth 2 times daily 22   Automatic Reconciliation, Ar   ondansetron (ZOFRAN) 4 MG tablet Take 1 tablet by mouth every 8 hours as needed 23   Automatic Reconciliation, Ar   oxyCODONE HCl (OXY-IR) 10 MG immediate release tablet Take 1 tablet by mouth every 8 hours as needed. 22   Automatic Reconciliation, Ar       Current medications:    Current Facility-Administered Medications   Medication Dose Route Frequency Provider Last Rate Last Admin   • glucose chewable tablet 16 g  4 tablet Oral PRN Suha Urrutia MD       • dextrose bolus 10% 125 mL  125 mL IntraVENous PRN Suha Urrutia MD        Or

## 2024-06-01 NOTE — PROGRESS NOTES
Hospitalist Progress Note    NAME:   Keaton Van   : 1965   MRN: [id number]     Date/Time: 2024 7:15 PM  Patient PCP: No primary care provider on file.    Estimated discharge date: 2 days  Barriers: Surgical procedure with podiatry      Assessment / Plan:    Osteomyelitis of the fifth proximal phalanx:   Early osteomyelitis of the fifth metatarsal head and  first distal phalanx:  Right leg cellulitis  Diabetic ulcers right foot POA  Chronic eschar wound left foot POA  Suspect undiagnosed vascular dz  Hx of prior toe amputations left foot  Patient underwent incision and drainage, resection on 2024.  Patient to go back to the OR.  Follow-up with podiatry.  Follow-up IntraOp cultures.  Continue broad-spectrum antibiotics for now.  Patient is on vancomycin and Zosyn.     New onset atrial fibrillation:  -EKG reviewed and atrial fibrillation confirmed.  -Patient converted into sinus rhythm.  -Added Coreg for rate control.  -[id number] 3.  -Will start anticoagulation after procedure with podiatry.  -Likely due to underlying anemia.    Hypertensive urgency  208/107, improved to 162/74 after IV hydralazine  Currently well-controlled.  Started on Norvasc 10 mg and hold lisinopril.     Diabetes mellitus type 2, uncontrolled  Hemoglobin A1c 7.6.  Patient is on 23 units long-acting insulin in the hospital.  Will resume oral medications on discharge.     Acute kidney injury on chronic kidney disease 3b  Creatinine trended up.  Hold lisinopril.  Will continue to monitor.  Outpatient follow-up with nephrology.     Acute on chronic normocytic anemia  Patient agreed to receive blood.  Hemoglobin 6.0.  Ordered 2 PRBC transfusion and Hgb improved to 7.2  Ferritin and iron profile reviewed.  Possible iron deficiency.  Will administer IV iron after surgical procedure.     Bipolar I  Polysubstance abuse  Medication noncompliance, financial and social barriers  Nicotine patch  Case mgmt expertise appreciated  UDS (+)  0.3  --   --    AST 48*  --   --    ALT 71  --   --          Signed: Nino Alicia MD

## 2024-06-02 LAB
ANION GAP SERPL CALC-SCNC: 2 MMOL/L (ref 5–15)
BASOPHILS # BLD: 0.1 K/UL (ref 0–0.1)
BASOPHILS NFR BLD: 1 % (ref 0–1)
BUN SERPL-MCNC: 38 MG/DL (ref 6–20)
BUN/CREAT SERPL: 17 (ref 12–20)
CALCIUM SERPL-MCNC: 8 MG/DL (ref 8.5–10.1)
CHLORIDE SERPL-SCNC: 107 MMOL/L (ref 97–108)
CO2 SERPL-SCNC: 26 MMOL/L (ref 21–32)
CREAT SERPL-MCNC: 2.27 MG/DL (ref 0.7–1.3)
DIFFERENTIAL METHOD BLD: ABNORMAL
EOSINOPHIL # BLD: 0.1 K/UL (ref 0–0.4)
EOSINOPHIL NFR BLD: 2 % (ref 0–7)
ERYTHROCYTE [DISTWIDTH] IN BLOOD BY AUTOMATED COUNT: 14.1 % (ref 11.5–14.5)
FRUCTOSAMINE SERPL-SCNC: 306 UMOL/L (ref 0–285)
GLUCOSE BLD STRIP.AUTO-MCNC: 108 MG/DL (ref 65–117)
GLUCOSE BLD STRIP.AUTO-MCNC: 111 MG/DL (ref 65–117)
GLUCOSE BLD STRIP.AUTO-MCNC: 122 MG/DL (ref 65–117)
GLUCOSE BLD STRIP.AUTO-MCNC: 171 MG/DL (ref 65–117)
GLUCOSE SERPL-MCNC: 159 MG/DL (ref 65–100)
HCT VFR BLD AUTO: 23.7 % (ref 36.6–50.3)
HGB BLD-MCNC: 7.4 G/DL (ref 12.1–17)
IMM GRANULOCYTES # BLD AUTO: 0 K/UL (ref 0–0.04)
IMM GRANULOCYTES NFR BLD AUTO: 1 % (ref 0–0.5)
LYMPHOCYTES # BLD: 0.5 K/UL (ref 0.8–3.5)
LYMPHOCYTES NFR BLD: 8 % (ref 12–49)
MCH RBC QN AUTO: 26.7 PG (ref 26–34)
MCHC RBC AUTO-ENTMCNC: 31.2 G/DL (ref 30–36.5)
MCV RBC AUTO: 85.6 FL (ref 80–99)
MONOCYTES # BLD: 0.5 K/UL (ref 0–1)
MONOCYTES NFR BLD: 7 % (ref 5–13)
NEUTS SEG # BLD: 5.2 K/UL (ref 1.8–8)
NEUTS SEG NFR BLD: 82 % (ref 32–75)
NRBC # BLD: 0 K/UL (ref 0–0.01)
NRBC BLD-RTO: 0 PER 100 WBC
PLATELET # BLD AUTO: 235 K/UL (ref 150–400)
PMV BLD AUTO: 9.1 FL (ref 8.9–12.9)
POTASSIUM SERPL-SCNC: 5.3 MMOL/L (ref 3.5–5.1)
RBC # BLD AUTO: 2.77 M/UL (ref 4.1–5.7)
SERVICE CMNT-IMP: ABNORMAL
SERVICE CMNT-IMP: ABNORMAL
SERVICE CMNT-IMP: NORMAL
SERVICE CMNT-IMP: NORMAL
SODIUM SERPL-SCNC: 135 MMOL/L (ref 136–145)
VANCOMYCIN SERPL-MCNC: 12.2 UG/ML
WBC # BLD AUTO: 6.3 K/UL (ref 4.1–11.1)

## 2024-06-02 PROCEDURE — 6370000000 HC RX 637 (ALT 250 FOR IP): Performed by: PODIATRIST

## 2024-06-02 PROCEDURE — 6360000002 HC RX W HCPCS: Performed by: PODIATRIST

## 2024-06-02 PROCEDURE — 85025 COMPLETE CBC W/AUTO DIFF WBC: CPT

## 2024-06-02 PROCEDURE — 82962 GLUCOSE BLOOD TEST: CPT

## 2024-06-02 PROCEDURE — 36415 COLL VENOUS BLD VENIPUNCTURE: CPT

## 2024-06-02 PROCEDURE — 2060000000 HC ICU INTERMEDIATE R&B

## 2024-06-02 PROCEDURE — 6370000000 HC RX 637 (ALT 250 FOR IP): Performed by: STUDENT IN AN ORGANIZED HEALTH CARE EDUCATION/TRAINING PROGRAM

## 2024-06-02 PROCEDURE — 2580000003 HC RX 258: Performed by: PODIATRIST

## 2024-06-02 PROCEDURE — 6360000002 HC RX W HCPCS: Performed by: STUDENT IN AN ORGANIZED HEALTH CARE EDUCATION/TRAINING PROGRAM

## 2024-06-02 PROCEDURE — 80048 BASIC METABOLIC PNL TOTAL CA: CPT

## 2024-06-02 PROCEDURE — 80202 ASSAY OF VANCOMYCIN: CPT

## 2024-06-02 RX ADMIN — GABAPENTIN 100 MG: 100 CAPSULE ORAL at 08:42

## 2024-06-02 RX ADMIN — OXYCODONE AND ACETAMINOPHEN 1 TABLET: 10; 325 TABLET ORAL at 04:41

## 2024-06-02 RX ADMIN — PIPERACILLIN AND TAZOBACTAM 3375 MG: 3; .375 INJECTION, POWDER, LYOPHILIZED, FOR SOLUTION INTRAVENOUS at 08:56

## 2024-06-02 RX ADMIN — BENZONATATE 100 MG: 100 CAPSULE ORAL at 13:56

## 2024-06-02 RX ADMIN — MORPHINE SULFATE 4 MG: 4 INJECTION, SOLUTION INTRAMUSCULAR; INTRAVENOUS at 13:56

## 2024-06-02 RX ADMIN — MORPHINE SULFATE 4 MG: 4 INJECTION, SOLUTION INTRAMUSCULAR; INTRAVENOUS at 17:50

## 2024-06-02 RX ADMIN — SODIUM CHLORIDE: 9 INJECTION, SOLUTION INTRAVENOUS at 01:32

## 2024-06-02 RX ADMIN — MORPHINE SULFATE 4 MG: 4 INJECTION, SOLUTION INTRAMUSCULAR; INTRAVENOUS at 01:28

## 2024-06-02 RX ADMIN — GUAIFENESIN 600 MG: 600 TABLET, EXTENDED RELEASE ORAL at 20:42

## 2024-06-02 RX ADMIN — BENZONATATE 100 MG: 100 CAPSULE ORAL at 08:42

## 2024-06-02 RX ADMIN — GUAIFENESIN 600 MG: 600 TABLET, EXTENDED RELEASE ORAL at 08:42

## 2024-06-02 RX ADMIN — CARVEDILOL 6.25 MG: 6.25 TABLET, FILM COATED ORAL at 17:05

## 2024-06-02 RX ADMIN — OXYCODONE AND ACETAMINOPHEN 1 TABLET: 10; 325 TABLET ORAL at 10:30

## 2024-06-02 RX ADMIN — MORPHINE SULFATE 4 MG: 4 INJECTION, SOLUTION INTRAMUSCULAR; INTRAVENOUS at 22:21

## 2024-06-02 RX ADMIN — GABAPENTIN 100 MG: 100 CAPSULE ORAL at 20:42

## 2024-06-02 RX ADMIN — AMLODIPINE BESYLATE 10 MG: 5 TABLET ORAL at 08:42

## 2024-06-02 RX ADMIN — INSULIN LISPRO 4 UNITS: 100 INJECTION, SOLUTION INTRAVENOUS; SUBCUTANEOUS at 12:47

## 2024-06-02 RX ADMIN — INSULIN LISPRO 4 UNITS: 100 INJECTION, SOLUTION INTRAVENOUS; SUBCUTANEOUS at 08:44

## 2024-06-02 RX ADMIN — PIPERACILLIN AND TAZOBACTAM 3375 MG: 3; .375 INJECTION, POWDER, LYOPHILIZED, FOR SOLUTION INTRAVENOUS at 22:26

## 2024-06-02 RX ADMIN — BENZONATATE 100 MG: 100 CAPSULE ORAL at 20:42

## 2024-06-02 RX ADMIN — OXYCODONE AND ACETAMINOPHEN 1 TABLET: 10; 325 TABLET ORAL at 19:47

## 2024-06-02 RX ADMIN — SODIUM CHLORIDE: 9 INJECTION, SOLUTION INTRAVENOUS at 08:53

## 2024-06-02 RX ADMIN — SODIUM ZIRCONIUM CYCLOSILICATE 10 G: 10 POWDER, FOR SUSPENSION ORAL at 10:28

## 2024-06-02 RX ADMIN — CARVEDILOL 6.25 MG: 6.25 TABLET, FILM COATED ORAL at 08:42

## 2024-06-02 RX ADMIN — MORPHINE SULFATE 4 MG: 4 INJECTION, SOLUTION INTRAMUSCULAR; INTRAVENOUS at 09:35

## 2024-06-02 RX ADMIN — PIPERACILLIN AND TAZOBACTAM 3375 MG: 3; .375 INJECTION, POWDER, LYOPHILIZED, FOR SOLUTION INTRAVENOUS at 01:33

## 2024-06-02 RX ADMIN — MORPHINE SULFATE 4 MG: 4 INJECTION, SOLUTION INTRAMUSCULAR; INTRAVENOUS at 05:44

## 2024-06-02 RX ADMIN — INSULIN GLARGINE 23 UNITS: 100 INJECTION, SOLUTION SUBCUTANEOUS at 20:49

## 2024-06-02 RX ADMIN — PIPERACILLIN AND TAZOBACTAM 3375 MG: 3; .375 INJECTION, POWDER, LYOPHILIZED, FOR SOLUTION INTRAVENOUS at 14:10

## 2024-06-02 RX ADMIN — INSULIN LISPRO 4 UNITS: 100 INJECTION, SOLUTION INTRAVENOUS; SUBCUTANEOUS at 17:05

## 2024-06-02 RX ADMIN — VANCOMYCIN HYDROCHLORIDE 1000 MG: 1 INJECTION, POWDER, LYOPHILIZED, FOR SOLUTION INTRAVENOUS at 10:38

## 2024-06-02 RX ADMIN — GABAPENTIN 100 MG: 100 CAPSULE ORAL at 13:56

## 2024-06-02 ASSESSMENT — PAIN DESCRIPTION - LOCATION
LOCATION: LEG;FOOT
LOCATION: LEG;FOOT
LOCATION: FOOT
LOCATION: LEG;FOOT
LOCATION: LEG;FOOT

## 2024-06-02 ASSESSMENT — PAIN SCALES - GENERAL
PAINLEVEL_OUTOF10: 5
PAINLEVEL_OUTOF10: 7
PAINLEVEL_OUTOF10: 8
PAINLEVEL_OUTOF10: 5
PAINLEVEL_OUTOF10: 7
PAINLEVEL_OUTOF10: 10
PAINLEVEL_OUTOF10: 6
PAINLEVEL_OUTOF10: 0
PAINLEVEL_OUTOF10: 0
PAINLEVEL_OUTOF10: 7
PAINLEVEL_OUTOF10: 7
PAINLEVEL_OUTOF10: 4

## 2024-06-02 ASSESSMENT — PAIN DESCRIPTION - DESCRIPTORS
DESCRIPTORS: ACHING;SORE
DESCRIPTORS: ACHING
DESCRIPTORS: ACHING;SORE;SHARP;DISCOMFORT

## 2024-06-02 ASSESSMENT — PAIN DESCRIPTION - ORIENTATION
ORIENTATION: RIGHT
ORIENTATION: RIGHT
ORIENTATION: RIGHT;LEFT
ORIENTATION: RIGHT
ORIENTATION: RIGHT;LEFT

## 2024-06-02 NOTE — PLAN OF CARE
Problem: Chronic Conditions and Co-morbidities  Goal: Patient's chronic conditions and co-morbidity symptoms are monitored and maintained or improved  6/2/2024 1122 by Shira Villarreal RN  Outcome: Progressing  6/2/2024 0403 by Bebe Waters RN  Outcome: Progressing  Flowsheets (Taken 6/1/2024 1930)  Care Plan - Patient's Chronic Conditions and Co-Morbidity Symptoms are Monitored and Maintained or Improved: Monitor and assess patient's chronic conditions and comorbid symptoms for stability, deterioration, or improvement     Problem: Discharge Planning  Goal: Discharge to home or other facility with appropriate resources  6/2/2024 0403 by Bebe Waters RN  Outcome: Progressing  Flowsheets (Taken 6/1/2024 1930)  Discharge to home or other facility with appropriate resources: Identify barriers to discharge with patient and caregiver     Problem: Pain  Goal: Verbalizes/displays adequate comfort level or baseline comfort level  Outcome: Progressing     Problem: Risk for Elopement  Goal: Patient will not exit the unit/facility without proper excort  6/2/2024 0403 by Bebe Waters RN  Outcome: Progressing  Flowsheets (Taken 6/1/2024 1930)  Nursing Interventions for Elopement Risk:   Make sure patient has all necessary personal care items   Reduce environmental triggers     Problem: Safety - Adult  Goal: Free from fall injury  6/2/2024 1122 by Shira Villarreal RN  Outcome: Progressing  6/2/2024 0403 by Bebe Waters RN  Outcome: Progressing     Problem: ABCDS Injury Assessment  Goal: Absence of physical injury  6/2/2024 1122 by Shira Villarreal RN  Outcome: Progressing  6/2/2024 0403 by Bebe Waters RN  Outcome: Progressing     Problem: Nutrition Deficit:  Goal: Optimize nutritional status  6/2/2024 0403 by Bebe Waters RN  Outcome: Progressing     Problem: Skin/Tissue Integrity  Goal: Absence of new skin breakdown  Description: 1.  Monitor for areas of redness and/or skin breakdown  2.  Assess

## 2024-06-02 NOTE — PLAN OF CARE
Problem: Chronic Conditions and Co-morbidities  Goal: Patient's chronic conditions and co-morbidity symptoms are monitored and maintained or improved  Outcome: Progressing  Flowsheets (Taken 6/1/2024 1930)  Care Plan - Patient's Chronic Conditions and Co-Morbidity Symptoms are Monitored and Maintained or Improved: Monitor and assess patient's chronic conditions and comorbid symptoms for stability, deterioration, or improvement     Problem: Discharge Planning  Goal: Discharge to home or other facility with appropriate resources  Outcome: Progressing  Flowsheets (Taken 6/1/2024 1930)  Discharge to home or other facility with appropriate resources: Identify barriers to discharge with patient and caregiver     Problem: Risk for Elopement  Goal: Patient will not exit the unit/facility without proper excort  Outcome: Progressing  Flowsheets (Taken 6/1/2024 1930)  Nursing Interventions for Elopement Risk:   Make sure patient has all necessary personal care items   Reduce environmental triggers     Problem: Safety - Adult  Goal: Free from fall injury  Outcome: Progressing     Problem: ABCDS Injury Assessment  Goal: Absence of physical injury  Outcome: Progressing     Problem: Nutrition Deficit:  Goal: Optimize nutritional status  Outcome: Progressing     Problem: Skin/Tissue Integrity  Goal: Absence of new skin breakdown  Description: 1.  Monitor for areas of redness and/or skin breakdown  2.  Assess vascular access sites hourly  3.  Every 4-6 hours minimum:  Change oxygen saturation probe site  4.  Every 4-6 hours:  If on nasal continuous positive airway pressure, respiratory therapy assess nares and determine need for appliance change or resting period.  Outcome: Progressing

## 2024-06-02 NOTE — PROGRESS NOTES
Pharmacy Antimicrobial Kinetic Dosing    Indication for Antimicrobials: Right foot cellulitis with abscess; OM on the right 5th MP    Current Regimen of Each Antimicrobial:  Vancomycin pharmacy to dose; Start Date ; Day # 5  Zosyn 3.375g q 8h (start: , day 5    Previous Antimicrobial Therapy:    Goal Level: Vancomycin -600    Date Dose & Interval Measured (mcg/mL) Predicted AUC    2:50 1000 mg q24h 17.4 588   6/2 0857 1000 mg q24h 12.2 515           Significant Cultures:   : blood - NG x 2 days, prelim   wound: MSSA   Wound - NG   Anaerobic NG    Labs:  Recent Labs     Units 24  0134 24  0415 24  0250   CREATININE MG/DL 2.27* 2.38* 2.19*   BUN MG/DL 38* 35* 28*   WBC K/uL 6.3 4.7 3.2*     Temp (24hrs), Av.6 °F (35.9 °C), Min:93.2 °F (34 °C), Max:99.8 °F (37.7 °C)    Conditions for Dosing Consideration:  KIN on CKD3b    Creatinine Clearance (mL/min): Estimated Creatinine Clearance: 38 mL/min (A) (based on SCr of 2.27 mg/dL (H)).     Impression/Plan:   **Note that vancomycin dose was not documented on  - Supposedly given in OR. I have entered a guess that dose was given at 1000, I will repeat level tomorrow since I cannot say for sure that dose was given yesterday**  Continue with the current regimen of 1000 mg IV q24h.  Level in AM  Zosyn  as above   Discussed de-escalation. Waiting on perioperative culture results.   Antimicrobial stop date 5 days - may need to extend due to OM      Pharmacy will follow daily and adjust medications as appropriate for renal function and/or serum levels.    Thank you,  Gary Parnell [facility]

## 2024-06-02 NOTE — PROGRESS NOTES
End of Shift Note    Bedside shift change report given to RN (oncoming nurse) by WILFRIDO CARTER RN (offgoing nurse).  Report included the following information SBAR, Kardex, Intake/Output, and MAR    Shift worked:  7-7pm     Shift summary and any significant changes:     Normothermic since admission. VS are stable  PRN pain meds given per order.  Edematous R leg, positive popliteal pulse,intact dressing.       Concerns for physician to address:      Zone phone for oncoming shift:           WILFRIDO CARTER RN

## 2024-06-02 NOTE — PROGRESS NOTES
Hospitalist Progress Note    NAME:   Keaton Van   : 1965   MRN: [id number]     Date/Time: 2024 2:12 PM  Patient PCP: No primary care provider on file.    Estimated discharge date: 2 days  Barriers: Surgical procedure with podiatry      Assessment / Plan:    Osteomyelitis of the fifth proximal phalanx:   Early osteomyelitis of the fifth metatarsal head and  first distal phalanx:  Right leg cellulitis  Diabetic ulcers right foot POA  Chronic eschar wound left foot POA  Suspect undiagnosed vascular dz  Hx of prior toe amputations left foot  Patient underwent incision and drainage, resection on 2024.  Patient to go back to the OR.  Follow-up with podiatry.  Follow-up IntraOp cultures.  Continue broad-spectrum antibiotics for now.  Patient is on vancomycin and Zosyn.     New onset atrial fibrillation:  -EKG reviewed and atrial fibrillation confirmed.  -Patient converted into sinus rhythm.  -Added Coreg for rate control.  -[id number] 3.  -Will start anticoagulation after procedure with podiatry.  -Likely due to underlying anemia.    Hypertensive urgency  208/107, improved to 162/74 after IV hydralazine  Currently well-controlled.  Started on Norvasc 10 mg and hold lisinopril.     Diabetes mellitus type 2, uncontrolled  Hemoglobin A1c 7.6.  Patient is on 23 units long-acting insulin in the hospital.  Will resume oral medications on discharge.     Acute kidney injury on chronic kidney disease 3b  Creatinine trended up.  Hold lisinopril.  Will continue to monitor.  Outpatient follow-up with nephrology.     Acute on chronic normocytic anemia  Patient agreed to receive blood.  Hemoglobin 6.0.  Ordered 2 PRBC transfusion and Hgb improved to 7.2  Ferritin and iron profile reviewed.  Possible iron deficiency.  Will administer IV iron after surgical procedure.     Bipolar I  Polysubstance abuse  Medication noncompliance, financial and social barriers  Nicotine patch  Case mgmt expertise appreciated  UDS (+)

## 2024-06-03 ENCOUNTER — APPOINTMENT (OUTPATIENT)
Facility: HOSPITAL | Age: 59
DRG: 617 | End: 2024-06-03
Payer: MEDICARE

## 2024-06-03 LAB
ANION GAP SERPL CALC-SCNC: 3 MMOL/L (ref 5–15)
APPEARANCE UR: CLEAR
BACTERIA SPEC CULT: ABNORMAL
BACTERIA SPEC CULT: NORMAL
BACTERIA URNS QL MICRO: NEGATIVE /HPF
BASOPHILS # BLD: 0 K/UL (ref 0–0.1)
BASOPHILS NFR BLD: 1 % (ref 0–1)
BILIRUB UR QL: NEGATIVE
BUN SERPL-MCNC: 40 MG/DL (ref 6–20)
BUN/CREAT SERPL: 15 (ref 12–20)
CALCIUM SERPL-MCNC: 8.4 MG/DL (ref 8.5–10.1)
CHLORIDE SERPL-SCNC: 104 MMOL/L (ref 97–108)
CO2 SERPL-SCNC: 26 MMOL/L (ref 21–32)
COLOR UR: ABNORMAL
CREAT SERPL-MCNC: 2.64 MG/DL (ref 0.7–1.3)
DIFFERENTIAL METHOD BLD: ABNORMAL
ECHO BSA: 1.98 M2
EOSINOPHIL # BLD: 0.1 K/UL (ref 0–0.4)
EOSINOPHIL NFR BLD: 2 % (ref 0–7)
EPITH CASTS URNS QL MICRO: ABNORMAL /LPF
ERYTHROCYTE [DISTWIDTH] IN BLOOD BY AUTOMATED COUNT: 14.1 % (ref 11.5–14.5)
GLUCOSE BLD STRIP.AUTO-MCNC: 193 MG/DL (ref 65–117)
GLUCOSE BLD STRIP.AUTO-MCNC: 219 MG/DL (ref 65–117)
GLUCOSE BLD STRIP.AUTO-MCNC: 84 MG/DL (ref 65–117)
GLUCOSE BLD STRIP.AUTO-MCNC: 85 MG/DL (ref 65–117)
GLUCOSE SERPL-MCNC: 74 MG/DL (ref 65–100)
GLUCOSE UR STRIP.AUTO-MCNC: NEGATIVE MG/DL
GRAM STN SPEC: ABNORMAL
GRAM STN SPEC: ABNORMAL
HCT VFR BLD AUTO: 22.8 % (ref 36.6–50.3)
HGB BLD-MCNC: 7.1 G/DL (ref 12.1–17)
HGB UR QL STRIP: NEGATIVE
HYALINE CASTS URNS QL MICRO: ABNORMAL /LPF (ref 0–2)
IMM GRANULOCYTES # BLD AUTO: 0 K/UL (ref 0–0.04)
IMM GRANULOCYTES NFR BLD AUTO: 0 % (ref 0–0.5)
KETONES UR QL STRIP.AUTO: NEGATIVE MG/DL
LEUKOCYTE ESTERASE UR QL STRIP.AUTO: NEGATIVE
LYMPHOCYTES # BLD: 0.8 K/UL (ref 0.8–3.5)
LYMPHOCYTES NFR BLD: 11 % (ref 12–49)
MCH RBC QN AUTO: 26.6 PG (ref 26–34)
MCHC RBC AUTO-ENTMCNC: 31.1 G/DL (ref 30–36.5)
MCV RBC AUTO: 85.4 FL (ref 80–99)
MONOCYTES # BLD: 0.7 K/UL (ref 0–1)
MONOCYTES NFR BLD: 10 % (ref 5–13)
NEUTS SEG # BLD: 5.5 K/UL (ref 1.8–8)
NEUTS SEG NFR BLD: 76 % (ref 32–75)
NITRITE UR QL STRIP.AUTO: NEGATIVE
NRBC # BLD: 0 K/UL (ref 0–0.01)
NRBC BLD-RTO: 0 PER 100 WBC
PH UR STRIP: 5 (ref 5–8)
PLATELET # BLD AUTO: 162 K/UL (ref 150–400)
PMV BLD AUTO: 9.1 FL (ref 8.9–12.9)
POTASSIUM SERPL-SCNC: 5.1 MMOL/L (ref 3.5–5.1)
PROT UR STRIP-MCNC: 100 MG/DL
RBC # BLD AUTO: 2.67 M/UL (ref 4.1–5.7)
RBC #/AREA URNS HPF: ABNORMAL /HPF (ref 0–5)
SERVICE CMNT-IMP: ABNORMAL
SERVICE CMNT-IMP: NORMAL
SODIUM SERPL-SCNC: 133 MMOL/L (ref 136–145)
SP GR UR REFRACTOMETRY: 1.01
UROBILINOGEN UR QL STRIP.AUTO: 0.2 EU/DL (ref 0.2–1)
VANCOMYCIN SERPL-MCNC: 14 UG/ML
WBC # BLD AUTO: 7.2 K/UL (ref 4.1–11.1)
WBC URNS QL MICRO: ABNORMAL /HPF (ref 0–4)

## 2024-06-03 PROCEDURE — 2580000003 HC RX 258: Performed by: PODIATRIST

## 2024-06-03 PROCEDURE — 6370000000 HC RX 637 (ALT 250 FOR IP): Performed by: PODIATRIST

## 2024-06-03 PROCEDURE — 6360000002 HC RX W HCPCS: Performed by: STUDENT IN AN ORGANIZED HEALTH CARE EDUCATION/TRAINING PROGRAM

## 2024-06-03 PROCEDURE — 6370000000 HC RX 637 (ALT 250 FOR IP): Performed by: CLINICAL NURSE SPECIALIST

## 2024-06-03 PROCEDURE — 36415 COLL VENOUS BLD VENIPUNCTURE: CPT

## 2024-06-03 PROCEDURE — 2060000000 HC ICU INTERMEDIATE R&B

## 2024-06-03 PROCEDURE — 85025 COMPLETE CBC W/AUTO DIFF WBC: CPT

## 2024-06-03 PROCEDURE — 99231 SBSQ HOSP IP/OBS SF/LOW 25: CPT | Performed by: CLINICAL NURSE SPECIALIST

## 2024-06-03 PROCEDURE — 76770 US EXAM ABDO BACK WALL COMP: CPT

## 2024-06-03 PROCEDURE — 6370000000 HC RX 637 (ALT 250 FOR IP): Performed by: STUDENT IN AN ORGANIZED HEALTH CARE EDUCATION/TRAINING PROGRAM

## 2024-06-03 PROCEDURE — 6360000002 HC RX W HCPCS: Performed by: PODIATRIST

## 2024-06-03 PROCEDURE — 81001 URINALYSIS AUTO W/SCOPE: CPT

## 2024-06-03 PROCEDURE — 80048 BASIC METABOLIC PNL TOTAL CA: CPT

## 2024-06-03 PROCEDURE — 2580000003 HC RX 258: Performed by: STUDENT IN AN ORGANIZED HEALTH CARE EDUCATION/TRAINING PROGRAM

## 2024-06-03 PROCEDURE — 82962 GLUCOSE BLOOD TEST: CPT

## 2024-06-03 PROCEDURE — 80202 ASSAY OF VANCOMYCIN: CPT

## 2024-06-03 RX ORDER — ENOXAPARIN SODIUM 100 MG/ML
1 INJECTION SUBCUTANEOUS 2 TIMES DAILY
Status: DISCONTINUED | OUTPATIENT
Start: 2024-06-03 | End: 2024-06-05 | Stop reason: HOSPADM

## 2024-06-03 RX ORDER — INSULIN GLARGINE 100 [IU]/ML
18 INJECTION, SOLUTION SUBCUTANEOUS NIGHTLY
Status: DISCONTINUED | OUTPATIENT
Start: 2024-06-03 | End: 2024-06-04

## 2024-06-03 RX ORDER — NIFEDIPINE 30 MG/1
60 TABLET, EXTENDED RELEASE ORAL DAILY
Status: DISCONTINUED | OUTPATIENT
Start: 2024-06-03 | End: 2024-06-05 | Stop reason: HOSPADM

## 2024-06-03 RX ADMIN — BENZONATATE 100 MG: 100 CAPSULE ORAL at 16:02

## 2024-06-03 RX ADMIN — VANCOMYCIN HYDROCHLORIDE 1000 MG: 1 INJECTION, POWDER, LYOPHILIZED, FOR SOLUTION INTRAVENOUS at 10:33

## 2024-06-03 RX ADMIN — CARVEDILOL 6.25 MG: 6.25 TABLET, FILM COATED ORAL at 08:02

## 2024-06-03 RX ADMIN — MORPHINE SULFATE 4 MG: 4 INJECTION, SOLUTION INTRAMUSCULAR; INTRAVENOUS at 20:27

## 2024-06-03 RX ADMIN — AMLODIPINE BESYLATE 10 MG: 5 TABLET ORAL at 08:02

## 2024-06-03 RX ADMIN — GUAIFENESIN 600 MG: 600 TABLET, EXTENDED RELEASE ORAL at 20:27

## 2024-06-03 RX ADMIN — WATER 2000 MG: 1 INJECTION INTRAMUSCULAR; INTRAVENOUS; SUBCUTANEOUS at 16:02

## 2024-06-03 RX ADMIN — GABAPENTIN 100 MG: 100 CAPSULE ORAL at 08:03

## 2024-06-03 RX ADMIN — BENZONATATE 100 MG: 100 CAPSULE ORAL at 08:03

## 2024-06-03 RX ADMIN — MORPHINE SULFATE 4 MG: 4 INJECTION, SOLUTION INTRAMUSCULAR; INTRAVENOUS at 07:59

## 2024-06-03 RX ADMIN — MORPHINE SULFATE 4 MG: 4 INJECTION, SOLUTION INTRAMUSCULAR; INTRAVENOUS at 12:05

## 2024-06-03 RX ADMIN — GUAIFENESIN 600 MG: 600 TABLET, EXTENDED RELEASE ORAL at 08:03

## 2024-06-03 RX ADMIN — WATER 2000 MG: 1 INJECTION INTRAMUSCULAR; INTRAVENOUS; SUBCUTANEOUS at 23:00

## 2024-06-03 RX ADMIN — BENZONATATE 100 MG: 100 CAPSULE ORAL at 20:29

## 2024-06-03 RX ADMIN — GABAPENTIN 100 MG: 100 CAPSULE ORAL at 20:27

## 2024-06-03 RX ADMIN — INSULIN GLARGINE 18 UNITS: 100 INJECTION, SOLUTION SUBCUTANEOUS at 20:28

## 2024-06-03 RX ADMIN — GABAPENTIN 100 MG: 100 CAPSULE ORAL at 16:01

## 2024-06-03 RX ADMIN — ENOXAPARIN SODIUM 90 MG: 100 INJECTION SUBCUTANEOUS at 20:27

## 2024-06-03 RX ADMIN — ENOXAPARIN SODIUM 90 MG: 100 INJECTION SUBCUTANEOUS at 16:04

## 2024-06-03 RX ADMIN — MORPHINE SULFATE 4 MG: 4 INJECTION, SOLUTION INTRAMUSCULAR; INTRAVENOUS at 16:11

## 2024-06-03 RX ADMIN — NIFEDIPINE 60 MG: 30 TABLET, EXTENDED RELEASE ORAL at 16:01

## 2024-06-03 RX ADMIN — MORPHINE SULFATE 4 MG: 4 INJECTION, SOLUTION INTRAMUSCULAR; INTRAVENOUS at 02:41

## 2024-06-03 RX ADMIN — PIPERACILLIN AND TAZOBACTAM 3375 MG: 3; .375 INJECTION, POWDER, LYOPHILIZED, FOR SOLUTION INTRAVENOUS at 05:32

## 2024-06-03 ASSESSMENT — PAIN DESCRIPTION - DESCRIPTORS
DESCRIPTORS: ACHING
DESCRIPTORS: ACHING
DESCRIPTORS: ACHING;DISCOMFORT;SHARP
DESCRIPTORS: ACHING
DESCRIPTORS: ACHING;THROBBING;PRESSURE
DESCRIPTORS: ACHING
DESCRIPTORS: POUNDING;PRESSURE
DESCRIPTORS: ACHING

## 2024-06-03 ASSESSMENT — PAIN DESCRIPTION - ORIENTATION
ORIENTATION: RIGHT
ORIENTATION: RIGHT
ORIENTATION: RIGHT;LEFT
ORIENTATION: RIGHT

## 2024-06-03 ASSESSMENT — PAIN SCALES - GENERAL
PAINLEVEL_OUTOF10: 8
PAINLEVEL_OUTOF10: 9
PAINLEVEL_OUTOF10: 7
PAINLEVEL_OUTOF10: 9
PAINLEVEL_OUTOF10: 7
PAINLEVEL_OUTOF10: 7
PAINLEVEL_OUTOF10: 4

## 2024-06-03 ASSESSMENT — PAIN DESCRIPTION - PAIN TYPE
TYPE: ACUTE PAIN
TYPE: ACUTE PAIN

## 2024-06-03 ASSESSMENT — PAIN DESCRIPTION - LOCATION
LOCATION: FOOT
LOCATION: TOE (COMMENT WHICH ONE)
LOCATION: FOOT
LOCATION: TOE (COMMENT WHICH ONE)
LOCATION: TOE (COMMENT WHICH ONE)
LOCATION: FOOT
LOCATION: FOOT

## 2024-06-03 ASSESSMENT — PAIN - FUNCTIONAL ASSESSMENT: PAIN_FUNCTIONAL_ASSESSMENT: PREVENTS OR INTERFERES SOME ACTIVE ACTIVITIES AND ADLS

## 2024-06-03 ASSESSMENT — PAIN DESCRIPTION - FREQUENCY: FREQUENCY: CONTINUOUS

## 2024-06-03 ASSESSMENT — PAIN DESCRIPTION - ONSET: ONSET: ON-GOING

## 2024-06-03 NOTE — CARE COORDINATION
Patient discussed during IDRs. TAN 6/5. Plan to return home. Will need transport arranged.     MONIQUE Singh, CM  [id number]

## 2024-06-03 NOTE — PLAN OF CARE
Problem: Chronic Conditions and Co-morbidities  Goal: Patient's chronic conditions and co-morbidity symptoms are monitored and maintained or improved  Outcome: Progressing     Problem: Discharge Planning  Goal: Discharge to home or other facility with appropriate resources  Outcome: Progressing     Problem: Pain  Goal: Verbalizes/displays adequate comfort level or baseline comfort level  Outcome: Progressing     Problem: Risk for Elopement  Goal: Patient will not exit the unit/facility without proper excort  Outcome: Progressing  Flowsheets (Taken 6/2/2024 1930)  Nursing Interventions for Elopement Risk:   Make sure patient has all necessary personal care items   Reduce environmental triggers     Problem: Safety - Adult  Goal: Free from fall injury  Outcome: Progressing     Problem: Skin/Tissue Integrity  Goal: Absence of new skin breakdown  Description: 1.  Monitor for areas of redness and/or skin breakdown  2.  Assess vascular access sites hourly  3.  Every 4-6 hours minimum:  Change oxygen saturation probe site  4.  Every 4-6 hours:  If on nasal continuous positive airway pressure, respiratory therapy assess nares and determine need for appliance change or resting period.  Outcome: Progressing

## 2024-06-03 NOTE — PROGRESS NOTES
1900H: Bedside shift change report given to TREVOR YA (oncoming nurse) by WILFRIDO YA (offgoing nurse). Report included the following information Nurse Handoff Report, Intake/Output, MAR, Recent Results, Med Rec Status, and Cardiac Rhythm SINUS RHYTHM. .      End of Shift Note    Bedside shift change report given to HARDEEP YA (oncoming nurse) by Trevor Kaur RN (offgoing nurse).  Report included the following information SBAR, Intake/Output, MAR, Recent Results, Med Rec Status, and Cardiac Rhythm SINUS RHYTHM    Shift worked:  1900H-0730H     Shift summary and any significant changes:          Concerns for physician to address:       Zone phone for oncoming shift:          Activity:     Number times ambulated in hallways past shift: 0  Number of times OOB to chair past shift: 0    Cardiac:   Cardiac Monitoring: Yes           Access:  Current line(s): PIV     Genitourinary:   Urinary status: voiding    Respiratory:      Chronic home O2 use?: NO  Incentive spirometer at bedside: N/A       GI:     Current diet:  ADULT ORAL NUTRITION SUPPLEMENT; Lunch; Standard 4 oz Oral Supplement  DIET ONE TIME MESSAGE;  ADULT DIET; Regular; 4 carb choices (60 gm/meal)  Passing flatus: YES  Tolerating current diet: YES       Pain Management:   Patient states pain is manageable on current regimen: YES    Skin:     Interventions: float heels, increase time out of bed, internal/external urinary devices, and nutritional support    Patient Safety:  Fall Score:    Interventions: bed/chair alarm, assistive device (walker, cane. etc), gripper socks, and pt to call before getting OOB       Length of Stay:  Expected LOS: 6  Actual LOS: 5      Trevor Kaur RN

## 2024-06-03 NOTE — PROGRESS NOTES
Changed bandage right foot  Still erythema and moderate edema up to the ankle   Packing shows continued purulent drainage   Will need further washout   Scheduled for tomorrow evening   Keep patient npo after 10:00 AM on 6/4/24  Awaiting path report   Will consult ID

## 2024-06-03 NOTE — PROGRESS NOTES
Hospitalist Progress Note    NAME:   Keaton Van   : 1965   MRN: [id number]     Date/Time: 6/3/2024 2:08 PM  Patient PCP: No primary care provider on file.    Estimated discharge date: 2 days  Barriers: Podiatry recommendations, Cr improvement      Assessment / Plan:    Osteomyelitis of the fifth proximal phalanx:   Early osteomyelitis of the fifth metatarsal head and  first distal phalanx:  Right leg cellulitis  Diabetic ulcers right foot POA  Chronic eschar wound left foot POA  Suspect undiagnosed vascular dz  Hx of prior toe amputations left foot  Patient underwent incision and drainage, resection on 2024.  Patient to go back to the OR.  Follow-up with podiatry. Cultures positive for MSSA. Abx tapered down to cefazolin.     New onset atrial fibrillation:  -EKG reviewed and atrial fibrillation confirmed.  -Patient converted into sinus rhythm.  -Added Coreg for rate control.  -[id number] 3.  -Lovenox 1mg/kg BID. Change to PO anticoagulants on discharge.  -Likely due to underlying anemia.    Hypertensive urgency  208/107, improved to 162/74 after IV hydralazine  Currently well-controlled.  Started on Nifedipine 60 mg daily     Diabetes mellitus type 2, uncontrolled  Hemoglobin A1c 7.6.  Patient is on 23 units long-acting insulin in the hospital.  Will resume oral medications on discharge.     Acute kidney injury on chronic kidney disease 3b  Creatinine trended up.  Hold lisinopril. UA and renal US ordered     Acute on chronic normocytic anemia  Patient agreed to receive blood.  Hemoglobin 6.0.  Ordered 2 PRBC transfusion and Hgb improved to 7.2  Ferritin and iron profile reviewed.  Possible iron deficiency.  Will administer IV iron after surgical procedure.     Bipolar I  Polysubstance abuse  Medication noncompliance, financial and social barriers  Nicotine patch  Case mgmt expertise appreciated  UDS (+) THC, cocaine, opiates     Medical Decision Making:   I personally reviewed labs: CBC, CMP  I

## 2024-06-03 NOTE — DIABETES MGMT
[facility]  [facility]  DIABETES MANAGEMENT CONSULT    Consulted by Provider for advanced nursing evaluation and care for inpatient blood glucose management.    Evaluation and Action Plan   Keaton Van is a 59 year old gentleman with Type 2 Diabetes, Bipolar and depression, peripheral neuropathy s/p left great and second toe amputations, CKD, substance abuse and a history of narcotic dependence, HCV? and suicide attempt who is admitted with right leg cellulitis with foot wounds and HTN urgency. He is on IV antibiotics and awaiting I&D with podiatry tomorrow (6/1).  Wound cx currently with staph aureus.    Mr Van is on disability, living alone.  At some point he tells me he was on insulin and do see that he filled 25mg Januvia and Glipizide 5mg daily back in August 2023 followed by a prescription of metformin 500mg daily that he filled 4/2024.  He shared that he got tired of taking his medications and just stopped them.  His A1C of 7.6% is was suspected to be low s/t anemia but fructosamine resulted in 306 correlating with current A1C.      Glucose initially remained elevated despite starting moderate dose Lantus, 23 units daily.  Low dose mealtime humalog added (4mg daily).  With consistent insulin doses, he has responded very well to insulin therapy.  Will reduce basal today as glucose below target.      Management Rationale Action Plan   Medication   Basal needs Using 0.2 units/kg/D  Reduced to 18 units Lantus daily    Nutritional needs Using low sensitivity 4 units humalog/consumed meal  Advance by 2 units daily for persistent   pre-prandial hyperglycemia   Corrective insulin Using medium sensitivity AC/HS   Additional orders  Pain control per primary.       Diabetes Discharge Plan   Medication  A1C is 7.6% and needs to resume antihyperglycemic agents. Would avoid metformin with elevated AST.  Would recommend a once daily Glimepiride (Amaryl) 4mg daily.   Referral  [x]        Outpatient  rational for insulin strategy while hospitalized     Nursing Diagnosis 78186 Ineffective Health Management   Nursing Intervention Domain 8848 Decision-making Support   Nursing Interventions Identified diabetes self-management practices impeding diabetes control  Discussed diabetes survival skills related to  Healthy Plate eating plan; given handouts  Role of physical activity in improving insulin sensitivity and action  Procedure for blood glucose monitoring & options for low-cost products  Medications plan at discharge     Billing Code(s)   58137     Before making these care recommendations, I personally reviewed the hospitalization record, including notes, laboratory & diagnostic data and current medications, and examined the patient at the bedside.  Total minutes: 25    LOIS Bojorquez - CNS  Diabetes Clinical Nurse Specialist  [facility]  Access via [facility]

## 2024-06-03 NOTE — PROGRESS NOTES
1730- arrived in patients room and requested that he notify me when his dinner tray arrived so I could give him his scheduled meal insulin. Patient was sitting on side of bed visibly agitated stating that he was not going to be eating any more food from the hospital. He had called downstairs and was told that he couldn't order a meal item that he had previously ordered.  I educated him on the process of ordering food, and how, since he is on a carb-controlled diet they are using a diet calculator to adjust how many carbs he is allowed per meal. Sometimes he may be able to order foods while other times those certain foods will bring him over his carb limit. The patient stated he understood, but continued to voice frustration.  I messaged the provider and received an order to change the patient to a regular diet. When I walked back  in the room to ask if he wanted to try to order again the patient was sitting on the side of the bed disconnected from tele box with monitor turned off.  He was frustrated that he hadn't seen the surgeon today . He stated that he was refusing all further care except for surgical care on his foot. MD notified, Charge nurse notified.    18:15 surgeon now at bedside

## 2024-06-04 ENCOUNTER — APPOINTMENT (OUTPATIENT)
Facility: HOSPITAL | Age: 59
DRG: 617 | End: 2024-06-04
Attending: PODIATRIST
Payer: MEDICARE

## 2024-06-04 DIAGNOSIS — L97.412 DIABETIC ULCER OF RIGHT MIDFOOT ASSOCIATED WITH TYPE 2 DIABETES MELLITUS, WITH FAT LAYER EXPOSED (HCC): Primary | ICD-10-CM

## 2024-06-04 DIAGNOSIS — E11.621 DIABETIC ULCER OF RIGHT MIDFOOT ASSOCIATED WITH TYPE 2 DIABETES MELLITUS, WITH FAT LAYER EXPOSED (HCC): Primary | ICD-10-CM

## 2024-06-04 DIAGNOSIS — A49.01 METHICILLIN SUSCEPTIBLE STAPHYLOCOCCUS AUREUS INFECTION: ICD-10-CM

## 2024-06-04 LAB
ANION GAP SERPL CALC-SCNC: 6 MMOL/L (ref 5–15)
BACTERIA SPEC CULT: NORMAL
BACTERIA SPEC CULT: NORMAL
BASOPHILS # BLD: 0.1 K/UL (ref 0–0.1)
BASOPHILS NFR BLD: 1 % (ref 0–1)
BUN SERPL-MCNC: 44 MG/DL (ref 6–20)
BUN/CREAT SERPL: 15 (ref 12–20)
CALCIUM SERPL-MCNC: 8.5 MG/DL (ref 8.5–10.1)
CHLORIDE SERPL-SCNC: 104 MMOL/L (ref 97–108)
CO2 SERPL-SCNC: 22 MMOL/L (ref 21–32)
CREAT SERPL-MCNC: 2.98 MG/DL (ref 0.7–1.3)
DIFFERENTIAL METHOD BLD: ABNORMAL
ECHO AO ROOT DIAM: 3.8 CM
ECHO AO ROOT INDEX: 1.83 CM/M2
ECHO AV PEAK GRADIENT: 10 MMHG
ECHO AV PEAK VELOCITY: 1.6 M/S
ECHO BSA: 2.09 M2
ECHO EST RA PRESSURE: 15 MMHG
ECHO LA DIAMETER INDEX: 2.31 CM/M2
ECHO LA DIAMETER: 4.8 CM
ECHO LA TO AORTIC ROOT RATIO: 1.26
ECHO LV FRACTIONAL SHORTENING: 39 % (ref 28–44)
ECHO LV INTERNAL DIMENSION DIASTOLE INDEX: 2.45 CM/M2
ECHO LV INTERNAL DIMENSION DIASTOLIC: 5.1 CM (ref 4.2–5.9)
ECHO LV INTERNAL DIMENSION SYSTOLIC INDEX: 1.49 CM/M2
ECHO LV INTERNAL DIMENSION SYSTOLIC: 3.1 CM
ECHO LV IVSD: 1.2 CM (ref 0.6–1)
ECHO LV MASS 2D: 241.2 G (ref 88–224)
ECHO LV MASS INDEX 2D: 116 G/M2 (ref 49–115)
ECHO LV POSTERIOR WALL DIASTOLIC: 1.2 CM (ref 0.6–1)
ECHO LV RELATIVE WALL THICKNESS RATIO: 0.47
ECHO LVOT AREA: 4.5 CM2
ECHO LVOT DIAM: 2.4 CM
ECHO PV MAX VELOCITY: 1.1 M/S
ECHO PV PEAK GRADIENT: 5 MMHG
ECHO RIGHT VENTRICULAR SYSTOLIC PRESSURE (RVSP): 47 MMHG
ECHO RV FREE WALL PEAK S': 13 CM/S
ECHO RV TAPSE: 3.3 CM (ref 1.7–?)
ECHO TV REGURGITANT MAX VELOCITY: 2.84 M/S
ECHO TV REGURGITANT PEAK GRADIENT: 32 MMHG
EOSINOPHIL # BLD: 0.1 K/UL (ref 0–0.4)
EOSINOPHIL NFR BLD: 2 % (ref 0–7)
ERYTHROCYTE [DISTWIDTH] IN BLOOD BY AUTOMATED COUNT: 14 % (ref 11.5–14.5)
GLUCOSE BLD STRIP.AUTO-MCNC: 173 MG/DL (ref 65–117)
GLUCOSE BLD STRIP.AUTO-MCNC: 185 MG/DL (ref 65–117)
GLUCOSE BLD STRIP.AUTO-MCNC: 296 MG/DL (ref 65–117)
GLUCOSE BLD STRIP.AUTO-MCNC: 92 MG/DL (ref 65–117)
GLUCOSE SERPL-MCNC: 202 MG/DL (ref 65–100)
HCT VFR BLD AUTO: 21.4 % (ref 36.6–50.3)
HGB BLD-MCNC: 6.6 G/DL (ref 12.1–17)
HISTORY CHECK: NORMAL
IMM GRANULOCYTES # BLD AUTO: 0.1 K/UL (ref 0–0.04)
IMM GRANULOCYTES NFR BLD AUTO: 1 % (ref 0–0.5)
LYMPHOCYTES # BLD: 0.8 K/UL (ref 0.8–3.5)
LYMPHOCYTES NFR BLD: 13 % (ref 12–49)
MCH RBC QN AUTO: 26.4 PG (ref 26–34)
MCHC RBC AUTO-ENTMCNC: 30.8 G/DL (ref 30–36.5)
MCV RBC AUTO: 85.6 FL (ref 80–99)
MONOCYTES # BLD: 0.6 K/UL (ref 0–1)
MONOCYTES NFR BLD: 10 % (ref 5–13)
NEUTS SEG # BLD: 4.6 K/UL (ref 1.8–8)
NEUTS SEG NFR BLD: 73 % (ref 32–75)
NRBC # BLD: 0 K/UL (ref 0–0.01)
NRBC BLD-RTO: 0 PER 100 WBC
PLATELET # BLD AUTO: 189 K/UL (ref 150–400)
PMV BLD AUTO: 9.9 FL (ref 8.9–12.9)
POTASSIUM SERPL-SCNC: 5.2 MMOL/L (ref 3.5–5.1)
RBC # BLD AUTO: 2.5 M/UL (ref 4.1–5.7)
RBC MORPH BLD: ABNORMAL
SERVICE CMNT-IMP: ABNORMAL
SERVICE CMNT-IMP: NORMAL
SODIUM SERPL-SCNC: 132 MMOL/L (ref 136–145)
WBC # BLD AUTO: 6.3 K/UL (ref 4.1–11.1)

## 2024-06-04 PROCEDURE — 6370000000 HC RX 637 (ALT 250 FOR IP): Performed by: PODIATRIST

## 2024-06-04 PROCEDURE — 86923 COMPATIBILITY TEST ELECTRIC: CPT

## 2024-06-04 PROCEDURE — 99223 1ST HOSP IP/OBS HIGH 75: CPT | Performed by: INTERNAL MEDICINE

## 2024-06-04 PROCEDURE — 86850 RBC ANTIBODY SCREEN: CPT

## 2024-06-04 PROCEDURE — 93308 TTE F-UP OR LMTD: CPT

## 2024-06-04 PROCEDURE — 80048 BASIC METABOLIC PNL TOTAL CA: CPT

## 2024-06-04 PROCEDURE — 2060000000 HC ICU INTERMEDIATE R&B

## 2024-06-04 PROCEDURE — 6370000000 HC RX 637 (ALT 250 FOR IP): Performed by: INTERNAL MEDICINE

## 2024-06-04 PROCEDURE — 6360000002 HC RX W HCPCS: Performed by: STUDENT IN AN ORGANIZED HEALTH CARE EDUCATION/TRAINING PROGRAM

## 2024-06-04 PROCEDURE — 2580000003 HC RX 258: Performed by: STUDENT IN AN ORGANIZED HEALTH CARE EDUCATION/TRAINING PROGRAM

## 2024-06-04 PROCEDURE — 85025 COMPLETE CBC W/AUTO DIFF WBC: CPT

## 2024-06-04 PROCEDURE — 86900 BLOOD TYPING SEROLOGIC ABO: CPT

## 2024-06-04 PROCEDURE — 36415 COLL VENOUS BLD VENIPUNCTURE: CPT

## 2024-06-04 PROCEDURE — 99231 SBSQ HOSP IP/OBS SF/LOW 25: CPT | Performed by: INTERNAL MEDICINE

## 2024-06-04 PROCEDURE — 86901 BLOOD TYPING SEROLOGIC RH(D): CPT

## 2024-06-04 PROCEDURE — 82962 GLUCOSE BLOOD TEST: CPT

## 2024-06-04 RX ORDER — CEPHALEXIN 500 MG/1
500 CAPSULE ORAL 2 TIMES DAILY
Qty: 28 CAPSULE | Refills: 0 | Status: SHIPPED | OUTPATIENT
Start: 2024-06-04 | End: 2024-06-05 | Stop reason: HOSPADM

## 2024-06-04 RX ORDER — SODIUM CHLORIDE 9 MG/ML
INJECTION, SOLUTION INTRAVENOUS PRN
Status: DISCONTINUED | OUTPATIENT
Start: 2024-06-04 | End: 2024-06-05 | Stop reason: HOSPADM

## 2024-06-04 RX ORDER — INSULIN GLARGINE 100 [IU]/ML
23 INJECTION, SOLUTION SUBCUTANEOUS NIGHTLY
Status: DISCONTINUED | OUTPATIENT
Start: 2024-06-04 | End: 2024-06-05 | Stop reason: HOSPADM

## 2024-06-04 RX ADMIN — WATER 2000 MG: 1 INJECTION INTRAMUSCULAR; INTRAVENOUS; SUBCUTANEOUS at 20:26

## 2024-06-04 RX ADMIN — WATER 2000 MG: 1 INJECTION INTRAMUSCULAR; INTRAVENOUS; SUBCUTANEOUS at 06:26

## 2024-06-04 RX ADMIN — BENZONATATE 100 MG: 100 CAPSULE ORAL at 09:09

## 2024-06-04 RX ADMIN — GABAPENTIN 100 MG: 100 CAPSULE ORAL at 14:07

## 2024-06-04 RX ADMIN — WATER 2000 MG: 1 INJECTION INTRAMUSCULAR; INTRAVENOUS; SUBCUTANEOUS at 14:08

## 2024-06-04 RX ADMIN — GABAPENTIN 100 MG: 100 CAPSULE ORAL at 20:20

## 2024-06-04 RX ADMIN — MORPHINE SULFATE 4 MG: 4 INJECTION, SOLUTION INTRAMUSCULAR; INTRAVENOUS at 06:35

## 2024-06-04 RX ADMIN — ENOXAPARIN SODIUM 90 MG: 100 INJECTION SUBCUTANEOUS at 20:19

## 2024-06-04 RX ADMIN — GABAPENTIN 100 MG: 100 CAPSULE ORAL at 09:09

## 2024-06-04 RX ADMIN — MORPHINE SULFATE 4 MG: 4 INJECTION, SOLUTION INTRAMUSCULAR; INTRAVENOUS at 15:35

## 2024-06-04 RX ADMIN — INSULIN GLARGINE 23 UNITS: 100 INJECTION, SOLUTION SUBCUTANEOUS at 20:40

## 2024-06-04 RX ADMIN — INSULIN LISPRO 4 UNITS: 100 INJECTION, SOLUTION INTRAVENOUS; SUBCUTANEOUS at 11:31

## 2024-06-04 RX ADMIN — MORPHINE SULFATE 4 MG: 4 INJECTION, SOLUTION INTRAMUSCULAR; INTRAVENOUS at 00:38

## 2024-06-04 RX ADMIN — INSULIN LISPRO 4 UNITS: 100 INJECTION, SOLUTION INTRAVENOUS; SUBCUTANEOUS at 17:37

## 2024-06-04 RX ADMIN — GUAIFENESIN 600 MG: 600 TABLET, EXTENDED RELEASE ORAL at 09:09

## 2024-06-04 RX ADMIN — GUAIFENESIN 600 MG: 600 TABLET, EXTENDED RELEASE ORAL at 20:19

## 2024-06-04 RX ADMIN — MORPHINE SULFATE 4 MG: 4 INJECTION, SOLUTION INTRAMUSCULAR; INTRAVENOUS at 11:08

## 2024-06-04 RX ADMIN — CARVEDILOL 6.25 MG: 6.25 TABLET, FILM COATED ORAL at 09:08

## 2024-06-04 RX ADMIN — BENZONATATE 100 MG: 100 CAPSULE ORAL at 20:19

## 2024-06-04 RX ADMIN — MORPHINE SULFATE 4 MG: 4 INJECTION, SOLUTION INTRAMUSCULAR; INTRAVENOUS at 20:18

## 2024-06-04 RX ADMIN — INSULIN LISPRO 4 UNITS: 100 INJECTION, SOLUTION INTRAVENOUS; SUBCUTANEOUS at 17:38

## 2024-06-04 RX ADMIN — BENZONATATE 100 MG: 100 CAPSULE ORAL at 14:08

## 2024-06-04 ASSESSMENT — PAIN DESCRIPTION - ORIENTATION
ORIENTATION: LEFT;RIGHT

## 2024-06-04 ASSESSMENT — PAIN DESCRIPTION - DESCRIPTORS
DESCRIPTORS: ACHING
DESCRIPTORS: PRESSURE;SHARP
DESCRIPTORS: ACHING
DESCRIPTORS: PRESSURE
DESCRIPTORS: PRESSURE;SHARP

## 2024-06-04 ASSESSMENT — PAIN DESCRIPTION - LOCATION
LOCATION: FOOT
LOCATION: FOOT
LOCATION: LEG
LOCATION: TOE (COMMENT WHICH ONE)
LOCATION: KNEE;LEG
LOCATION: FOOT

## 2024-06-04 ASSESSMENT — PAIN SCALES - GENERAL
PAINLEVEL_OUTOF10: 10
PAINLEVEL_OUTOF10: 0
PAINLEVEL_OUTOF10: 8
PAINLEVEL_OUTOF10: 7
PAINLEVEL_OUTOF10: 7
PAINLEVEL_OUTOF10: 5
PAINLEVEL_OUTOF10: 10
PAINLEVEL_OUTOF10: 3

## 2024-06-04 NOTE — CONSENT
Informed Consent for Blood Component Transfusion Note    I have discussed with the patient the rationale for blood component transfusion; its benefits in treating or preventing fatigue, organ damage, or death; and its risk which includes mild transfusion reactions, rare risk of blood borne infection, or more serious but rare reactions. I have discussed the alternatives to transfusion, including the risk and consequences of not receiving transfusion. The patient had an opportunity to ask questions and had agreed to proceed with transfusion of blood components.    Electronically signed by Nino Alicia MD on 6/4/24 at 9:13 AM EDT

## 2024-06-04 NOTE — PROGRESS NOTES
Cancelled surgery since patient does not want to wait any longer and stay NPO for this evenings scheduled case time of 6:30 PM  Defer further treatment to ID with antibiotics   Still awaiting for the path report as well  Spoke to patient in detail the importance of continued treatment and if not the possibility of infection getting worse resulting in loss of limb or life   Patient needs to sign AMA   Will monitor the chart for changes

## 2024-06-04 NOTE — PROGRESS NOTES
Keaton Van is a 59 year old gentleman with Type 2 Diabetes, Bipolar and depression, peripheral neuropathy s/p left great and second toe amputations, CKD, substance abuse and a history of narcotic dependence, HCV? and suicide attempt who is admitted with right leg cellulitis with foot wounds and HTN urgency. He is on IV antibiotics and awaiting I&D with podiatry tomorrow (6/1).  Wound cx currently with staph aureus.     Mr Van is on disability, living alone.  At some point he tells me he was on insulin and do see that he filled 25mg Januvia and Glipizide 5mg daily back in August 2023 followed by a prescription of metformin 500mg daily that he filled 4/2024.  He shared that he got tired of taking his medications and just stopped them.  His A1C of 7.6% is was suspected to be low s/t anemia but fructosamine resulted in 306 correlating with current A1C.       Glucose initially remained elevated despite starting moderate dose Lantus, 23 units daily.  Low dose mealtime humalog added (4mg daily).  With consistent insulin doses, he has responded very well to insulin therapy.    A recent reduction in his glargine dose has resulted in a modest increase in glucose.  Patient tells me today that he is not going to take insulin when he leaves the hospital and does not much care about his diabetes.  He is very frustrated this morning.    Examination  Blood pressure 112/59  Pulse 64  Afebrile  96% on room air    Recent laboratory data  Glucose 185 (range )  Creatinine 2.98  Potassium 5.3  Hemoglobin 6.6  Hematocrit 21.4    Impression  1.  Type 2 diabetes mellitus with an A1c of 7.6% but with elevated blood sugars during the hospitalization  2.  Osteomyelitis of the right lower extremity now status post I&D on June 1, 2024  3.  Anemia  4.  Chronic kidney disease stage IV    Plan:  1.  For now we will continue the insulin therapy.  I have increased the Lantus back to 23 units given its greater efficacy at this higher dose.  2.

## 2024-06-04 NOTE — CONSULTS
Nephrology Consult Note     [facility]                Phone - [phone]   Patient: Keaton Van   YOB: 1965    Date- 6/4/2024  MRN: [id number]             CONSULTING PHYSICIAN: Dr. Alicia  REASON FOR CONSULTATION: KIN stage I on CKD stage III  ADMIT DATE:5/29/2024 PATIENT PCP:No primary care provider on file.     IMPRESSION & PLAN:   KIN stage I(suspect secondary to labile hemodynamics, IVVD, septic ATN)  CKD stage IIIb(baseline creatinine 1.8-2.1)(secondary to diabetic kidney disease)  Hyponatremia  Hyperkalemia  Acute blood loss anemia  Osteomyelitis of fifth phalanx right foot  Right foot cellulitis  Type 2 diabetes  Diabetic foot ulcer right foot  New onset A-fib  Hypertension uncontrolled  Type 2 diabetes uncontrolled  Proteinuria    PLAN-  -will start on bicarb gtt.  -Suspect KIN from labile hemodynamics from acute blood loss anemia, sepsis  -CT negative for hydronephrosis.  -Check labs daily  -No acute need for  RRT from volume laboratory standpoint  -Ordered one-time dose of Lokelma  -I had a detailed discussion with the patient, he is very resistant to the idea of any more therapies including IV fluids.  I talked to him about possibility of hemodialysis but he is declining and mentions that he will even think about getting IV fluids and will let me know if he changes his mind.  -Check labs daily       Principal Problem:    Type 2 diabetes mellitus with right diabetic foot ulcer (HCC)  Active Problems:    Cellulitis    Stage 4 chronic kidney disease (HCC)  Resolved Problems:    * No resolved hospital problems. *      [x] High complexity decision making was performed  [x] Patient is at high-risk of decompensation with multiple organ involvement    Subjective:   HPI: Keaton Van is a 59 y.o. male who has CKD stage IIIb secondary to advanced and progressive diabetic disease, uncontrolled diabetes, hypertension, history of substance abuse, hypertension, diabetic  normal in caliber.  Inferior vena cava appears normal.  Impression: No significant renal abnormalities.     Prior to Admission Medications   Prescriptions Last Dose Informant Patient Reported? Taking?   QUEtiapine (SEROQUEL) 25 MG tablet   No No   Sig: Take 1 tablet by mouth nightly for 15 days   UNABLE TO FIND   No No   Sig: Dx: Lumbar pain with radiculopathy    PT/OT 3 times a week   acetaminophen (TYLENOL) 325 MG tablet Past Month  Yes No   Sig: Take 2 tablets by mouth every 4 hours as needed   folic acid (FOLVITE) 1 MG tablet Past Month  No No   Sig: Take 1 tablet by mouth daily   Patient not taking: Reported on 5/29/2024   gabapentin (NEURONTIN) 600 MG tablet Past Week  Yes No   Sig: Take 1 tablet by mouth 3 times daily.   ibuprofen (ADVIL;MOTRIN) 400 MG tablet Past Month  Yes No   Sig: Take 1 tablet by mouth every 8 hours as needed   losartan (COZAAR) 50 MG tablet Past Week  Yes No   Sig: Take 1 tablet by mouth daily   metFORMIN (GLUCOPHAGE-XR) 750 MG extended release tablet Past Week  Yes No   Sig: Take 1 tablet by mouth 2 times daily   ondansetron (ZOFRAN) 4 MG tablet Past Month  Yes No   Sig: Take 1 tablet by mouth every 8 hours as needed   oxyCODONE HCl (OXY-IR) 10 MG immediate release tablet Past Month  Yes No   Sig: Take 1 tablet by mouth every 8 hours as needed.      Facility-Administered Medications: None         Imaging:    Medications list Personally Reviewed   [x]      Yes     []               No    Thank you for allowing us to participate in the care this patient.   We will follow patient with you.  Signed By: Virgilio Asif MD  [facility]  [facility] Office  [address], [address]  [place] [address]  Phone - [phone]         Fax - [phone] [facility]  [address], [place] [address]  Phone - [phone]        Fax - [phone]

## 2024-06-04 NOTE — PROGRESS NOTES
11:30 Notified that patient wanted to speak with nurse. When I arrived he was visibly agitated. He felt like food was being withheld from him due to NPO order for IR procedure today.  He is refusing the ordered unit of PRBC, (HGB 6.6) He is also refusing bicarb gtts. Provider and Surgeon are aware. Patient educated on the potential risks associated with low HGB and not receiving transfusion.Surgery was cancelled. He understands, but wants to stay admitted until his riend arrives tomorrow to pick him up.

## 2024-06-04 NOTE — PROGRESS NOTES
noncompliance, financial and social barriers  Nicotine patch  Case mgmt expertise appreciated  UDS (+) THC, cocaine, opiates     Medical Decision Making:   I personally reviewed labs: CBC, CMP  I personally reviewed imaging:right foot XR  I personally reviewed EKG:NSR  Toxic drug monitoring: Vanco  Discussed case with: Patient, nurse, podiatry        Code Status: Full Code  DVT Prophylaxis: Will start eliquis after surgical procedure  Baseline: He is mostly homebound, is supposed to walk with a cane but doesn't always, and says he gets around the house as best he can. He reports he does not see a PCP, does not have transportation to/from or ability to pay for medical care or medications so he has not taken any for months    Subjective:     Patient underwent incision and drainage, surgical resection with podiatry on 6/1/2024.  Antibiotics tapered to cefazolin.  Cultures reviewed.  Await further recommendations from podiatry.  Creatinine trending up. Reviewed renal ultrasound and urinalysis.    Nephrology consulted for uptrending creatinine.  ID consulted for long-term IV antibiotic therapy.  Podiatry recommendations noted.  PRBC transfusions ordered for hemoglobin 6.6.  Patient refusing blood transfusion at this time.  Patient received 2 blood transfusions before.    Objective:     VITALS:   Last 24hrs VS reviewed since prior progress note. Most recent are:  Patient Vitals for the past 24 hrs:   BP Temp Temp src Pulse Resp SpO2   06/04/24 1115 107/71 -- -- 67 -- --   06/04/24 0905 (!) 112/59 -- -- 64 -- --   06/04/24 0725 (!) 108/59 98.8 °F (37.1 °C) Oral 63 18 96 %   06/04/24 0251 (!) 112/59 98.4 °F (36.9 °C) Oral 69 18 97 %   06/03/24 2311 104/61 98.6 °F (37 °C) Oral 69 18 98 %   06/03/24 1945 (!) 158/84 99 °F (37.2 °C) Oral 69 18 98 %           Intake/Output Summary (Last 24 hours) at 6/4/2024 1354  Last data filed at 6/4/2024 0725  Gross per 24 hour   Intake 290 ml   Output 1000 ml   Net -710 ml          I had a

## 2024-06-04 NOTE — PROGRESS NOTES
Reviewed the path reports   Margins are clear  Can be discharged with further care and eval as per ID  Can be sent home with Home health skilled wound care if patient agrees to this  Home health wound care to change dressing twice a week with Auqacell Ag, ABD, Kirlex and loosely applied ace wrap  Referral to wound care center if patient prefers or can follow up in my office in two weeks considering home health wound care dressing changes twice a week  If need further assistance from please reach out to me on perfect serve or my cell [phone]

## 2024-06-04 NOTE — PROGRESS NOTES
Spiritual Care Partner Volunteer visited patient at [facility] in [facility] CARDIOPULMONARY CARE on 6/4/2024   Documented by:  Sandra Storm MPS, BCC, Staff   [facility]     Paging Service [phone] ([phone])

## 2024-06-04 NOTE — CONSULTS
patchy density in the left lung base may represent acute infiltrate.     XR FOOT RIGHT (MIN 3 VIEWS)    Result Date: 5/29/2024  EXAM: XR FOOT RIGHT (MIN 3 VIEWS) ACC#: [id number] INDICATION: infection. COMPARISON: None. TECHNIQUE: 3 view(s) of the right foot. FINDINGS: No acute fractures, dislocations, or radiopaque foreign bodies. No aggressive appearing erosive bony changes. Soft tissue ulceration by the fifth metatarsophalangeal joint is noted. There are vascular calcifications. Calcaneal plantar and Achilles spurs are noted.      No acute bony abnormalities.     Vascular duplex lower extremity venous right    Result Date: 5/29/2024    No evidence of deep vein or superficial vein thrombosis in the right lower extremity. Vessels demonstrate normal compressibility, color filling, and phasic and spontaneous flow. INDICATION: Right foot pain      No evidence of right lower extremity deep venous thrombosis.       Lesa Garvin MD FACP

## 2024-06-04 NOTE — CONSULTS
ID  Patient seen and examined  Full consult to follow  Cultures reviewed  Positive for MSSA  Margins clear.  Patient may be DC on Keflex p.o. x 2 weeks  Patient cannot do IV antibiotics due to history of IVDU.    Outpatient order for Keflex done.    May DC from ID standpoint

## 2024-06-05 VITALS
BODY MASS INDEX: 25.68 KG/M2 | DIASTOLIC BLOOD PRESSURE: 70 MMHG | TEMPERATURE: 97.5 F | RESPIRATION RATE: 16 BRPM | WEIGHT: 189.6 LBS | OXYGEN SATURATION: 96 % | HEART RATE: 68 BPM | SYSTOLIC BLOOD PRESSURE: 137 MMHG | HEIGHT: 72 IN

## 2024-06-05 PROBLEM — F31.9 BIPOLAR DISORDER (HCC): Status: ACTIVE | Noted: 2024-06-05

## 2024-06-05 PROBLEM — A49.01 METHICILLIN SUSCEPTIBLE STAPHYLOCOCCUS AUREUS INFECTION: Status: ACTIVE | Noted: 2024-06-05

## 2024-06-05 PROBLEM — N17.9 AKI (ACUTE KIDNEY INJURY) (HCC): Status: ACTIVE | Noted: 2024-06-05

## 2024-06-05 PROBLEM — E11.65 POORLY CONTROLLED DIABETES MELLITUS (HCC): Status: ACTIVE | Noted: 2018-01-25

## 2024-06-05 PROBLEM — M86.171 ACUTE OSTEOMYELITIS OF RIGHT FOOT (HCC): Status: ACTIVE | Noted: 2024-06-05

## 2024-06-05 PROBLEM — I10 POORLY-CONTROLLED HYPERTENSION: Status: ACTIVE | Noted: 2018-01-25

## 2024-06-05 PROBLEM — N18.31 STAGE 3A CHRONIC KIDNEY DISEASE (HCC): Status: ACTIVE | Noted: 2024-06-05

## 2024-06-05 PROBLEM — L03.115 CELLULITIS OF RIGHT FOOT: Status: ACTIVE | Noted: 2024-06-05

## 2024-06-05 PROCEDURE — 2580000003 HC RX 258: Performed by: STUDENT IN AN ORGANIZED HEALTH CARE EDUCATION/TRAINING PROGRAM

## 2024-06-05 PROCEDURE — 6370000000 HC RX 637 (ALT 250 FOR IP): Performed by: STUDENT IN AN ORGANIZED HEALTH CARE EDUCATION/TRAINING PROGRAM

## 2024-06-05 PROCEDURE — 6360000002 HC RX W HCPCS: Performed by: STUDENT IN AN ORGANIZED HEALTH CARE EDUCATION/TRAINING PROGRAM

## 2024-06-05 RX ADMIN — OXYCODONE AND ACETAMINOPHEN 1 TABLET: 10; 325 TABLET ORAL at 03:00

## 2024-06-05 RX ADMIN — MORPHINE SULFATE 4 MG: 4 INJECTION, SOLUTION INTRAMUSCULAR; INTRAVENOUS at 00:21

## 2024-06-05 RX ADMIN — WATER 2000 MG: 1 INJECTION INTRAMUSCULAR; INTRAVENOUS; SUBCUTANEOUS at 06:19

## 2024-06-05 ASSESSMENT — PAIN DESCRIPTION - LOCATION: LOCATION: ABDOMEN

## 2024-06-05 ASSESSMENT — PAIN DESCRIPTION - ORIENTATION: ORIENTATION: LEFT

## 2024-06-05 NOTE — PROGRESS NOTES
Pt. Left AMA MD notified. Pt. Educated on risk of leaving AMA. He was able to teach back and verbalized understanding of education provided. Pt. Signed AMA form. PIV removed.

## 2024-06-05 NOTE — DISCHARGE SUMMARY
Hospitalist Discharge Summary     Patient ID:  Keaton Van  [id number]  59 y.o.  1965 5/29/2024    PCP on record: No primary care provider on file.    Admit date: 5/29/2024  Discharge date and time: 6/5/2024    DISCHARGE DIAGNOSIS:    Osteomyelitis of the fifth proximal phalanx:   Early osteomyelitis of the fifth metatarsal head and  first distal phalanx:  Right leg cellulitis  Diabetic ulcers right foot POA  Chronic eschar wound left foot POA  Suspect undiagnosed vascular dz  Hx of prior toe amputations left foot  Patient underwent incision and drainage, resection on 6/1/2024.  Patient to go back to the OR.  Follow-up with podiatry. Cultures positive for MSSA. Abx tapered down to cefazolin.     Procedure canceled as patient does not want to stay n.p.o. until the evening.  ID consulted.  Likelihood of patient leaving AMA is high.     New onset atrial fibrillation:  -EKG reviewed and atrial fibrillation confirmed.  -Patient converted into sinus rhythm.  -Added Coreg for rate control.  -[id number] 3.  -Lovenox 1mg/kg BID. Change to PO anticoagulants on discharge.  -Likely due to underlying anemia.     Hypertensive urgency  208/107, improved to 162/74 after IV hydralazine  Currently well-controlled.  Started on Nifedipine 60 mg daily     Diabetes mellitus type 2, uncontrolled  Hemoglobin A1c 7.6.  Patient is on 23 units long-acting insulin in the hospital.  Will resume oral medications on discharge.     Acute kidney injury on chronic kidney disease 3b  Creatinine trended up.  Hold lisinopril. UA and renal US showed no acute pathology  Nephrology consulted.     Acute on chronic normocytic anemia  Patient agreed to receive blood.  Hemoglobin 6.0.  Ordered 2 PRBC transfusion and Hgb improved to 7.2  Ferritin and iron profile reviewed.  Possible iron deficiency.  Will administer IV iron after surgical procedure.     Bipolar I  Polysubstance abuse  Medication noncompliance, financial and social

## 2024-06-07 ENCOUNTER — HOSPITAL ENCOUNTER (INPATIENT)
Facility: HOSPITAL | Age: 59
LOS: 2 days | Discharge: HOME | DRG: 638 | End: 2024-06-09
Attending: EMERGENCY MEDICINE | Admitting: INTERNAL MEDICINE
Payer: MEDICARE

## 2024-06-07 ENCOUNTER — APPOINTMENT (OUTPATIENT)
Facility: HOSPITAL | Age: 59
DRG: 638 | End: 2024-06-07
Payer: MEDICARE

## 2024-06-07 DIAGNOSIS — M86.171 ACUTE OSTEOMYELITIS OF RIGHT FOOT (HCC): ICD-10-CM

## 2024-06-07 DIAGNOSIS — E11.628 DIABETIC FOOT INFECTION (HCC): Primary | ICD-10-CM

## 2024-06-07 DIAGNOSIS — L08.9 DIABETIC FOOT INFECTION (HCC): Primary | ICD-10-CM

## 2024-06-07 LAB
ABO + RH BLD: NORMAL
ALBUMIN SERPL-MCNC: 2.4 G/DL (ref 3.5–5)
ALBUMIN/GLOB SERPL: 0.5 (ref 1.1–2.2)
ALP SERPL-CCNC: 134 U/L (ref 45–117)
ALT SERPL-CCNC: 17 U/L (ref 12–78)
AMPHET UR QL SCN: NEGATIVE
ANION GAP SERPL CALC-SCNC: 6 MMOL/L (ref 5–15)
AST SERPL-CCNC: 58 U/L (ref 15–37)
BARBITURATES UR QL SCN: NEGATIVE
BASOPHILS # BLD: 0.1 K/UL (ref 0–0.1)
BASOPHILS NFR BLD: 1 % (ref 0–1)
BENZODIAZ UR QL: NEGATIVE
BILIRUB SERPL-MCNC: 0.4 MG/DL (ref 0.2–1)
BLD PROD TYP BPU: NORMAL
BLOOD BANK DISPENSE STATUS: NORMAL
BLOOD GROUP ANTIBODIES SERPL: NORMAL
BPU ID: NORMAL
BUN SERPL-MCNC: 45 MG/DL (ref 6–20)
BUN/CREAT SERPL: 17 (ref 12–20)
CALCIUM SERPL-MCNC: 9 MG/DL (ref 8.5–10.1)
CANNABINOIDS UR QL SCN: POSITIVE
CHLORIDE SERPL-SCNC: 106 MMOL/L (ref 97–108)
CO2 SERPL-SCNC: 24 MMOL/L (ref 21–32)
COCAINE UR QL SCN: POSITIVE
CREAT SERPL-MCNC: 2.69 MG/DL (ref 0.7–1.3)
CROSSMATCH RESULT: NORMAL
DIFFERENTIAL METHOD BLD: ABNORMAL
EOSINOPHIL # BLD: 0.1 K/UL (ref 0–0.4)
EOSINOPHIL NFR BLD: 2 % (ref 0–7)
ERYTHROCYTE [DISTWIDTH] IN BLOOD BY AUTOMATED COUNT: 14 % (ref 11.5–14.5)
EST. AVERAGE GLUCOSE BLD GHB EST-MCNC: 148 MG/DL
GLOBULIN SER CALC-MCNC: 4.5 G/DL (ref 2–4)
GLUCOSE BLD STRIP.AUTO-MCNC: 174 MG/DL (ref 65–117)
GLUCOSE BLD STRIP.AUTO-MCNC: 211 MG/DL (ref 65–117)
GLUCOSE BLD STRIP.AUTO-MCNC: 312 MG/DL (ref 65–117)
GLUCOSE SERPL-MCNC: 174 MG/DL (ref 65–100)
HBA1C MFR BLD: 6.8 % (ref 4–5.6)
HCT VFR BLD AUTO: 22.8 % (ref 36.6–50.3)
HGB BLD-MCNC: 6.9 G/DL (ref 12.1–17)
HISTORY CHECK: NORMAL
IMM GRANULOCYTES # BLD AUTO: 0 K/UL (ref 0–0.04)
IMM GRANULOCYTES NFR BLD AUTO: 0 % (ref 0–0.5)
LYMPHOCYTES # BLD: 0.7 K/UL (ref 0.8–3.5)
LYMPHOCYTES NFR BLD: 14 % (ref 12–49)
Lab: ABNORMAL
MAGNESIUM SERPL-MCNC: 2.3 MG/DL (ref 1.6–2.4)
MCH RBC QN AUTO: 26.2 PG (ref 26–34)
MCHC RBC AUTO-ENTMCNC: 30.3 G/DL (ref 30–36.5)
MCV RBC AUTO: 86.7 FL (ref 80–99)
METHADONE UR QL: NEGATIVE
MONOCYTES # BLD: 0.5 K/UL (ref 0–1)
MONOCYTES NFR BLD: 9 % (ref 5–13)
NEUTS SEG # BLD: 3.6 K/UL (ref 1.8–8)
NEUTS SEG NFR BLD: 74 % (ref 32–75)
NRBC # BLD: 0 K/UL (ref 0–0.01)
NRBC BLD-RTO: 0 PER 100 WBC
OPIATES UR QL: POSITIVE
PCP UR QL: NEGATIVE
PLATELET # BLD AUTO: 227 K/UL (ref 150–400)
PMV BLD AUTO: 9.1 FL (ref 8.9–12.9)
POTASSIUM SERPL-SCNC: 4.7 MMOL/L (ref 3.5–5.1)
PROT SERPL-MCNC: 6.9 G/DL (ref 6.4–8.2)
RBC # BLD AUTO: 2.63 M/UL (ref 4.1–5.7)
RBC MORPH BLD: ABNORMAL
SERVICE CMNT-IMP: ABNORMAL
SODIUM SERPL-SCNC: 136 MMOL/L (ref 136–145)
SPECIMEN EXP DATE BLD: NORMAL
UNIT DIVISION: 0
WBC # BLD AUTO: 5 K/UL (ref 4.1–11.1)

## 2024-06-07 PROCEDURE — 80307 DRUG TEST PRSMV CHEM ANLYZR: CPT

## 2024-06-07 PROCEDURE — 86900 BLOOD TYPING SEROLOGIC ABO: CPT

## 2024-06-07 PROCEDURE — 83036 HEMOGLOBIN GLYCOSYLATED A1C: CPT

## 2024-06-07 PROCEDURE — 2500000003 HC RX 250 WO HCPCS: Performed by: EMERGENCY MEDICINE

## 2024-06-07 PROCEDURE — 82962 GLUCOSE BLOOD TEST: CPT

## 2024-06-07 PROCEDURE — 73620 X-RAY EXAM OF FOOT: CPT

## 2024-06-07 PROCEDURE — 86923 COMPATIBILITY TEST ELECTRIC: CPT

## 2024-06-07 PROCEDURE — 1100000003 HC PRIVATE W/ TELEMETRY

## 2024-06-07 PROCEDURE — 6360000002 HC RX W HCPCS: Performed by: INTERNAL MEDICINE

## 2024-06-07 PROCEDURE — 96375 TX/PRO/DX INJ NEW DRUG ADDON: CPT

## 2024-06-07 PROCEDURE — 2580000003 HC RX 258: Performed by: EMERGENCY MEDICINE

## 2024-06-07 PROCEDURE — 83735 ASSAY OF MAGNESIUM: CPT

## 2024-06-07 PROCEDURE — 99223 1ST HOSP IP/OBS HIGH 75: CPT | Performed by: PODIATRIST

## 2024-06-07 PROCEDURE — 2580000003 HC RX 258: Performed by: INTERNAL MEDICINE

## 2024-06-07 PROCEDURE — 96374 THER/PROPH/DIAG INJ IV PUSH: CPT

## 2024-06-07 PROCEDURE — 85025 COMPLETE CBC W/AUTO DIFF WBC: CPT

## 2024-06-07 PROCEDURE — 80053 COMPREHEN METABOLIC PANEL: CPT

## 2024-06-07 PROCEDURE — 6360000002 HC RX W HCPCS: Performed by: EMERGENCY MEDICINE

## 2024-06-07 PROCEDURE — 86901 BLOOD TYPING SEROLOGIC RH(D): CPT

## 2024-06-07 PROCEDURE — 86850 RBC ANTIBODY SCREEN: CPT

## 2024-06-07 PROCEDURE — 71045 X-RAY EXAM CHEST 1 VIEW: CPT

## 2024-06-07 PROCEDURE — 36415 COLL VENOUS BLD VENIPUNCTURE: CPT

## 2024-06-07 PROCEDURE — 99285 EMERGENCY DEPT VISIT HI MDM: CPT

## 2024-06-07 PROCEDURE — 6370000000 HC RX 637 (ALT 250 FOR IP): Performed by: INTERNAL MEDICINE

## 2024-06-07 RX ORDER — ONDANSETRON 2 MG/ML
4 INJECTION INTRAMUSCULAR; INTRAVENOUS EVERY 6 HOURS PRN
Status: DISCONTINUED | OUTPATIENT
Start: 2024-06-07 | End: 2024-06-09 | Stop reason: HOSPADM

## 2024-06-07 RX ORDER — POLYETHYLENE GLYCOL 3350 17 G/17G
17 POWDER, FOR SOLUTION ORAL DAILY PRN
Status: DISCONTINUED | OUTPATIENT
Start: 2024-06-07 | End: 2024-06-09 | Stop reason: HOSPADM

## 2024-06-07 RX ORDER — CEPHALEXIN 250 MG/1
500 CAPSULE ORAL EVERY 12 HOURS SCHEDULED
Status: DISCONTINUED | OUTPATIENT
Start: 2024-06-07 | End: 2024-06-07

## 2024-06-07 RX ORDER — SODIUM CHLORIDE 9 MG/ML
INJECTION, SOLUTION INTRAVENOUS PRN
Status: DISCONTINUED | OUTPATIENT
Start: 2024-06-07 | End: 2024-06-09 | Stop reason: HOSPADM

## 2024-06-07 RX ORDER — SODIUM CHLORIDE 0.9 % (FLUSH) 0.9 %
5-40 SYRINGE (ML) INJECTION EVERY 12 HOURS SCHEDULED
Status: DISCONTINUED | OUTPATIENT
Start: 2024-06-07 | End: 2024-06-09 | Stop reason: HOSPADM

## 2024-06-07 RX ORDER — HYDROMORPHONE HYDROCHLORIDE 1 MG/ML
1 INJECTION, SOLUTION INTRAMUSCULAR; INTRAVENOUS; SUBCUTANEOUS
Status: COMPLETED | OUTPATIENT
Start: 2024-06-07 | End: 2024-06-07

## 2024-06-07 RX ORDER — MORPHINE SULFATE 10 MG/ML
6 INJECTION, SOLUTION INTRAMUSCULAR; INTRAVENOUS
Status: DISCONTINUED | OUTPATIENT
Start: 2024-06-07 | End: 2024-06-07

## 2024-06-07 RX ORDER — INSULIN LISPRO 100 [IU]/ML
0-4 INJECTION, SOLUTION INTRAVENOUS; SUBCUTANEOUS
Status: DISCONTINUED | OUTPATIENT
Start: 2024-06-07 | End: 2024-06-09 | Stop reason: HOSPADM

## 2024-06-07 RX ORDER — INSULIN GLARGINE 100 [IU]/ML
20 INJECTION, SOLUTION SUBCUTANEOUS NIGHTLY
Status: DISCONTINUED | OUTPATIENT
Start: 2024-06-07 | End: 2024-06-09 | Stop reason: HOSPADM

## 2024-06-07 RX ORDER — ACETAMINOPHEN 650 MG/1
650 SUPPOSITORY RECTAL EVERY 6 HOURS PRN
Status: DISCONTINUED | OUTPATIENT
Start: 2024-06-07 | End: 2024-06-09 | Stop reason: HOSPADM

## 2024-06-07 RX ORDER — ACETAMINOPHEN 325 MG/1
650 TABLET ORAL EVERY 6 HOURS PRN
Status: DISCONTINUED | OUTPATIENT
Start: 2024-06-07 | End: 2024-06-09 | Stop reason: HOSPADM

## 2024-06-07 RX ORDER — GABAPENTIN 300 MG/1
600 CAPSULE ORAL 3 TIMES DAILY
Status: DISCONTINUED | OUTPATIENT
Start: 2024-06-07 | End: 2024-06-09 | Stop reason: HOSPADM

## 2024-06-07 RX ORDER — HYDRALAZINE HYDROCHLORIDE 20 MG/ML
20 INJECTION INTRAMUSCULAR; INTRAVENOUS
Status: COMPLETED | OUTPATIENT
Start: 2024-06-07 | End: 2024-06-07

## 2024-06-07 RX ORDER — SODIUM CHLORIDE 0.9 % (FLUSH) 0.9 %
5-40 SYRINGE (ML) INJECTION PRN
Status: DISCONTINUED | OUTPATIENT
Start: 2024-06-07 | End: 2024-06-09 | Stop reason: HOSPADM

## 2024-06-07 RX ORDER — NICOTINE 21 MG/24HR
1 PATCH, TRANSDERMAL 24 HOURS TRANSDERMAL DAILY
Status: DISCONTINUED | OUTPATIENT
Start: 2024-06-07 | End: 2024-06-09 | Stop reason: HOSPADM

## 2024-06-07 RX ORDER — INSULIN LISPRO 100 [IU]/ML
0-4 INJECTION, SOLUTION INTRAVENOUS; SUBCUTANEOUS NIGHTLY
Status: DISCONTINUED | OUTPATIENT
Start: 2024-06-07 | End: 2024-06-09 | Stop reason: HOSPADM

## 2024-06-07 RX ORDER — QUETIAPINE FUMARATE 25 MG/1
25 TABLET, FILM COATED ORAL NIGHTLY
Status: DISCONTINUED | OUTPATIENT
Start: 2024-06-07 | End: 2024-06-09 | Stop reason: HOSPADM

## 2024-06-07 RX ORDER — OXYCODONE HYDROCHLORIDE 5 MG/1
10 TABLET ORAL EVERY 8 HOURS PRN
Status: DISCONTINUED | OUTPATIENT
Start: 2024-06-07 | End: 2024-06-08

## 2024-06-07 RX ORDER — GLUCAGON 1 MG/ML
1 KIT INJECTION PRN
Status: DISCONTINUED | OUTPATIENT
Start: 2024-06-07 | End: 2024-06-09 | Stop reason: HOSPADM

## 2024-06-07 RX ORDER — ENOXAPARIN SODIUM 100 MG/ML
40 INJECTION SUBCUTANEOUS DAILY
Status: DISCONTINUED | OUTPATIENT
Start: 2024-06-08 | End: 2024-06-09 | Stop reason: HOSPADM

## 2024-06-07 RX ORDER — ONDANSETRON 4 MG/1
4 TABLET, ORALLY DISINTEGRATING ORAL EVERY 8 HOURS PRN
Status: DISCONTINUED | OUTPATIENT
Start: 2024-06-07 | End: 2024-06-09 | Stop reason: HOSPADM

## 2024-06-07 RX ORDER — DEXTROSE MONOHYDRATE 100 MG/ML
INJECTION, SOLUTION INTRAVENOUS CONTINUOUS PRN
Status: DISCONTINUED | OUTPATIENT
Start: 2024-06-07 | End: 2024-06-09 | Stop reason: HOSPADM

## 2024-06-07 RX ADMIN — SODIUM CHLORIDE, PRESERVATIVE FREE 10 ML: 5 INJECTION INTRAVENOUS at 11:53

## 2024-06-07 RX ADMIN — HYDRALAZINE HYDROCHLORIDE 20 MG: 20 INJECTION INTRAMUSCULAR; INTRAVENOUS at 06:19

## 2024-06-07 RX ADMIN — GABAPENTIN 600 MG: 300 CAPSULE ORAL at 13:18

## 2024-06-07 RX ADMIN — HYDROMORPHONE HYDROCHLORIDE 1 MG: 1 INJECTION, SOLUTION INTRAMUSCULAR; INTRAVENOUS; SUBCUTANEOUS at 10:09

## 2024-06-07 RX ADMIN — HYDROMORPHONE HYDROCHLORIDE 1 MG: 1 INJECTION, SOLUTION INTRAMUSCULAR; INTRAVENOUS; SUBCUTANEOUS at 17:28

## 2024-06-07 RX ADMIN — CEPHALEXIN 500 MG: 250 CAPSULE ORAL at 11:52

## 2024-06-07 RX ADMIN — WATER 2000 MG: 1 INJECTION INTRAMUSCULAR; INTRAVENOUS; SUBCUTANEOUS at 21:07

## 2024-06-07 RX ADMIN — GABAPENTIN 600 MG: 300 CAPSULE ORAL at 21:07

## 2024-06-07 RX ADMIN — WATER 2000 MG: 1 INJECTION INTRAMUSCULAR; INTRAVENOUS; SUBCUTANEOUS at 06:25

## 2024-06-07 RX ADMIN — WATER 2000 MG: 1 INJECTION INTRAMUSCULAR; INTRAVENOUS; SUBCUTANEOUS at 13:18

## 2024-06-07 RX ADMIN — INSULIN GLARGINE 20 UNITS: 100 INJECTION, SOLUTION SUBCUTANEOUS at 21:07

## 2024-06-07 RX ADMIN — INSULIN LISPRO 4 UNITS: 100 INJECTION, SOLUTION INTRAVENOUS; SUBCUTANEOUS at 21:07

## 2024-06-07 RX ADMIN — SODIUM CHLORIDE, PRESERVATIVE FREE 5 ML: 5 INJECTION INTRAVENOUS at 21:07

## 2024-06-07 RX ADMIN — INSULIN LISPRO 2 UNITS: 100 INJECTION, SOLUTION INTRAVENOUS; SUBCUTANEOUS at 11:52

## 2024-06-07 RX ADMIN — OXYCODONE HYDROCHLORIDE 10 MG: 5 TABLET ORAL at 21:07

## 2024-06-07 RX ADMIN — HYDROMORPHONE HYDROCHLORIDE 1 MG: 1 INJECTION, SOLUTION INTRAMUSCULAR; INTRAVENOUS; SUBCUTANEOUS at 06:41

## 2024-06-07 RX ADMIN — QUETIAPINE FUMARATE 25 MG: 25 TABLET ORAL at 21:07

## 2024-06-07 ASSESSMENT — PAIN SCALES - WONG BAKER: WONGBAKER_NUMERICALRESPONSE: NO HURT

## 2024-06-07 ASSESSMENT — LIFESTYLE VARIABLES
HOW MANY STANDARD DRINKS CONTAINING ALCOHOL DO YOU HAVE ON A TYPICAL DAY: PATIENT DOES NOT DRINK
HOW OFTEN DO YOU HAVE A DRINK CONTAINING ALCOHOL: NEVER

## 2024-06-07 ASSESSMENT — PAIN DESCRIPTION - ORIENTATION
ORIENTATION: RIGHT
ORIENTATION: LEFT
ORIENTATION: LEFT
ORIENTATION: RIGHT

## 2024-06-07 ASSESSMENT — PAIN SCALES - GENERAL
PAINLEVEL_OUTOF10: 7
PAINLEVEL_OUTOF10: 5
PAINLEVEL_OUTOF10: 8
PAINLEVEL_OUTOF10: 5
PAINLEVEL_OUTOF10: 8
PAINLEVEL_OUTOF10: 9
PAINLEVEL_OUTOF10: 0

## 2024-06-07 ASSESSMENT — PAIN DESCRIPTION - LOCATION
LOCATION: LEG
LOCATION: LEG
LOCATION: FOOT;HIP
LOCATION: FOOT

## 2024-06-07 ASSESSMENT — PAIN DESCRIPTION - DESCRIPTORS
DESCRIPTORS: THROBBING
DESCRIPTORS: SHARP
DESCRIPTORS: THROBBING

## 2024-06-07 ASSESSMENT — PAIN DESCRIPTION - PAIN TYPE: TYPE: SURGICAL PAIN

## 2024-06-07 ASSESSMENT — PAIN - FUNCTIONAL ASSESSMENT: PAIN_FUNCTIONAL_ASSESSMENT: PREVENTS OR INTERFERES WITH ALL ACTIVE AND SOME PASSIVE ACTIVITIES

## 2024-06-07 ASSESSMENT — PAIN DESCRIPTION - ONSET: ONSET: PROGRESSIVE

## 2024-06-07 ASSESSMENT — PAIN DESCRIPTION - FREQUENCY: FREQUENCY: CONTINUOUS

## 2024-06-07 NOTE — ED NOTES
Report given to BHAKTI Cavazos. They were informed of patient chief complaint, current status, orders completed (to include IV access/medications/radiology testing), outstanding orders that still need to be completed, and the treatment plan. Ensured no questions or concerns regarding the patient prior to departure.

## 2024-06-07 NOTE — ED NOTES
Report given to BHAKTI Schaefer. Suicide risk, sitter, and oxygen needs relayed. All other questions answered at this time.

## 2024-06-07 NOTE — ED NOTES
Patient to ED by EMS for shortness of breath that woke him up from his sleep. Patient states that he left AMA after being admitted on 5/29 for a procedure; R 5th toe was scheduled to be amputated on 5/30 but was postponed until that Saturday. Patient states he is diabetic and was kept NPO despite the delay in procedure time, reports that he became frustrated with his care and that staff was not listening to him.     Per EMS, patient made multiple comments that suggested suicidal ideation and advised RN to consider suicide precautions. Concerns escalated to provider directly following completion of triage. Patient states: \"I was stupid to leave the hospital, I can't believe I did that to myself. I just got mad. I wish I was just dead so that I wouldn't have to deal with this anymore.\" When answering CSSRS Screening questions, patient endorsed SI. Patient answered: \"Oh, all of the time. Every day.\" when asked the first assessment question. Patient denies any recent attempts but confirms previous attempts. Per CSSRS, patient noted to be a Moderate Risk.    Patient's 5th toe to R foot noted to have an open surgical incision with multiple displaced stitches. No drainage is noted at time of assessment, purulent drainage is observed to the dressing removed prior to assessment. Patient states that his feet are numb at baseline, but that he is still experiences pain that radiates to his hip. Patient's R foot cleaned and dressed with nonadherent dressing following assessment.

## 2024-06-07 NOTE — H&P
proximal phalanx. No cortical erosion in the fifth metatarsal head. Subtle bone marrow edema in the first distal phalanx. No cortical erosion. No fracture or osteonecrosis. Joint fluid: Physiologic. Tendons: Intact. Muscles: Moderate diffuse atrophy. Moderate diffuse edema. Neurovascular bundles: No neuroma. Articular cartilage: Mild first MTP and moderate MTS osteoarthritis. No septic arthritis. Soft tissue mass: None. No drainable fluid collection. Limited perfusion of the forefoot, especially the fifth toe soft tissues.     1. Osteomyelitis of the fifth proximal phalanx. 2. Reactive edema versus early osteomyelitis of the fifth metatarsal head and first distal phalanx. 3. Hypoperfusion of the distal forefoot. No drainable fluid collection.     US ABDOMEN COMPLETE    Result Date: 5/30/2024  ULTRASOUND OF THE ABDOMEN INDICATION: elevated LFTs COMPARISON: CT 8/23/2016. TECHNIQUE: Sonography of the abdomen was performed. FINDINGS: LIVER: Subtle surface nodularity suggest cirrhosis. No focal hepatic mass or intrahepatic biliary ductal dilation is shown. There is trace perihepatic ascites. MAIN PORTAL VEIN: Patent. Appropriate hepatopetal flow.   GALLBLADDER: No dilation, wall thickening, or pericholecystic fluid. Multiple small stones.. COMMON BILE DUCT: 0. 5 cm in diameter. The duct is normal caliber. PANCREAS: The visualized portions of the pancreas are normal. SPLEEN: Enlarged (14.2 x 9.6 x 10.2 cm (690 cc)). RIGHT KIDNEY: 11.9 cm in length. No hydronephrosis or large shadowing calculus. A small hypoechoic lesion is statistically likely to be a cyst. LEFT KIDNEY: 12.3 cm in length. On a single image, there is a questionable exophytic mass from the interpolar region. AORTA: Normal caliber in its visualized portions. INFERIOR VENA CAVA: Normal caliber in its visualized portions. There is a right pleural effusion.     1. Questionable exophytic left renal mass. 2. Probable cirrhosis. Trace ascites. Splenomegaly. 3.

## 2024-06-07 NOTE — BSMART NOTE
years. UDS result: Today +Cocaine, +Phencylidine, +THC BAL: unk    Section V - Medical  Past Medical History:   Diagnosis Date    Adverse effect of anesthesia     breathing diff. with versed    Bipolar 1 disorder, mixed, moderate (HCC) 3/6/2017    Chronic pain     Depression     Pt stated diagnosed years ago    Diabetes ([facility]) 2003    Drug-induced mood disorder(292.84) 5/28/2013    Homicide attempt     HTN (hypertension) 11/3/2011    Narcotic dependence, episodic use ([facility]) 11/3/2011    Non compliance with medical treatment 11/3/2011    Other ill-defined conditions(799.89)     chronic low back pain    Psychiatric disorder     bipolar    Sleep disorder     Substance abuse ([facility])     Suicidal thoughts        Section VI - Living Arrangements  The patient .  The patient lives alone. The patient has 1 children, ages adult son .  The patient does not plan to return home upon discharge. The patient's source of income comes from disability.    The patient's greatest support comes from unknown and this person probably will not be involved with the treatment. The patient has been in an event described as horrible or outside the realm of ordinary life experience either currently or in the past. The patient has not been a victim of sexual/physical abuse.    Section VII - Other Areas of Clinical Concern    The highest grade achieved is 2 years college with the overall quality of school experience being described as unk. The patient is currently disabled and speaks English as a primary language.  The patient has no communication impairments affecting communication. The patient's preference for learning can be described as: can read and write adequately.  The patient's hearing is normal.  The patient's vision is normal.    The patient reports coping skills include: none at present time    Corbin Leo LCSW

## 2024-06-07 NOTE — ED NOTES
Notified Blanca INFANTE of patient's frustrations regarding 1:1 observation, discussed need for suicide precautions with MD and Charge RN. Due to previous attempt and patient's previous comments that suggest SI, patient requires continued constant observation.

## 2024-06-07 NOTE — CONSENT
Informed Consent for Blood Component Transfusion Note    I have discussed with the patient the rationale for blood component transfusion; its benefits in treating or preventing fatigue, organ damage, or death; and its risk which includes mild transfusion reactions, rare risk of blood borne infection, or more serious but rare reactions. I have discussed the alternatives to transfusion, including the risk and consequences of not receiving transfusion. The patient had an opportunity to ask questions and had agreed to proceed with transfusion of blood components.    Electronically signed by Mimi Mello MD on 6/7/24 at 2:51 PM EDT

## 2024-06-07 NOTE — PLAN OF CARE
Problem: Safety - Adult  Goal: Free from fall injury  6/7/2024 1038 by Silvano Calle RN  Outcome: Progressing  6/7/2024 1025 by Silvano Calle RN  Outcome: Progressing     Problem: Discharge Planning  Goal: Discharge to home or other facility with appropriate resources  6/7/2024 1038 by Silvano Calle RN  Outcome: Progressing  6/7/2024 1025 by Silvano Calle RN  Outcome: Progressing     Problem: Pain  Goal: Verbalizes/displays adequate comfort level or baseline comfort level  6/7/2024 1038 by Silvano Calle RN  Outcome: Progressing  6/7/2024 1025 by Silvano Calle RN  Outcome: Progressing     Problem: Risk for Elopement  Goal: Patient will not exit the unit/facility without proper excort  6/7/2024 1038 by Silvano Calle RN  Outcome: Progressing  6/7/2024 1025 by Silvano Calle RN  Outcome: Progressing  Flowsheets (Taken 6/7/2024 0708 by Janet Petersen, RN)  Nursing Interventions for Elopement Risk:   Assist with personal care needs such as toileting, eating, dressing, as needed to reduce the risk of wandering   Collaborate with treatment team for nicotine replacement   Communicate/escalate to charge nurse the risk of elopement   Communicate to physician the risk for elopement   Make sure patient has all necessary personal care items   Place patient in room far away from exits and stairways   Reduce environmental triggers   Shoes and clothing collected and placed in gown attire

## 2024-06-07 NOTE — BH NOTE
Patient seen by telehealth. Mr. Van denies being suicidal and denies multiple times that he wants to hurt himself. He has a chronic passive death wish and depression.     - DC 1:1 sitter    Full note to follow

## 2024-06-07 NOTE — PLAN OF CARE
Problem: Discharge Planning  Goal: Discharge to home or other facility with appropriate resources  Outcome: Progressing     Problem: Safety - Adult  Goal: Free from fall injury  Outcome: Progressing     Problem: Pain  Goal: Verbalizes/displays adequate comfort level or baseline comfort level  Outcome: Progressing     Problem: Risk for Elopement  Goal: Patient will not exit the unit/facility without proper excort  Outcome: Progressing  Flowsheets (Taken 6/7/2024 0708 by Janet Petersen RN)  Nursing Interventions for Elopement Risk:   Assist with personal care needs such as toileting, eating, dressing, as needed to reduce the risk of wandering   Collaborate with treatment team for nicotine replacement   Communicate/escalate to charge nurse the risk of elopement   Communicate to physician the risk for elopement   Make sure patient has all necessary personal care items   Place patient in room far away from exits and stairways   Reduce environmental triggers   Shoes and clothing collected and placed in gown attire

## 2024-06-07 NOTE — ED NOTES
Suicide risk assessment done on pt revealing no risk. Due to pt answering moderate risk previously, 1:1 sitter remains at bedside and charge RN notified. Room is clear of all equipment besides monitoring equipment and oxygen tubing at 4L. Pt remains in paper scrubs. Pt's cell phone at bedside. Pt polite and cooperative upon speaking with this RN.

## 2024-06-07 NOTE — ED PROVIDER NOTES
subcutaneous gas    Final Diagnosis:   1. Diabetic foot infection (HCC)        Additional documentation if relevant for this encounter       Diagnosis and Disposition     Disposition: Admitted 06/07/2024 07:25:54 AM     Final Diagnosis:   1. Diabetic foot infection (HCC)           Medication List        ASK your doctor about these medications      acetaminophen 325 MG tablet  Commonly known as: TYLENOL     gabapentin 600 MG tablet  Commonly known as: NEURONTIN     ondansetron 4 MG tablet  Commonly known as: ZOFRAN     oxyCODONE HCl 10 MG immediate release tablet  Commonly known as: OXY-IR     QUEtiapine 25 MG tablet  Commonly known as: SEROQUEL  Take 1 tablet by mouth nightly for 15 days     UNABLE TO FIND  Dx: Lumbar pain with radiculopathy    PT/OT 3 times a week               Follow up:  No follow-up provider specified.     Disclaimers   I was the first provider for this patient on this visit.  To the best of my ability I reviewed relevant prior medical records, electrocardiograms, laboratories, and radiologic studies.    The patient's presenting problems were discussed, and the patient was in agreement with the care plan formulated and outlined with them.     Please note that this dictation was completed with Dragon voice recognition software. Quite often unanticipated grammatical, syntax, homophones, and other interpretive errors are inadvertently transcribed by the computer software.   Please disregard these errors and excuse any errors that have escaped final proofreading.    Erasto Rios MD    I personally performed the services described in this documentation on this date 6/7/24  for patient Keaton Van.      Erasto Rios MD  7:31 AM         Erasto Rios MD  06/07/24 0736

## 2024-06-07 NOTE — ED NOTES
Pt verbalized suicide intent when describing the extent of his injuries without prompting. Pt reaffirmed intent when told by RN that she was beginning the suicide questionnaire. Pt words were to the effect of: \"I think about killing myself everyday\"

## 2024-06-07 NOTE — CONSULTS
PSYCHIATRY CONSULT NOTE    REASON FOR CONSULT: SI       HISTORY OF PRESENTING COMPLAINT:  Keaton Van is a 59 y.o. White (non-) male who is currently admitted to the medical floor at [facility]. Patient presented to the ED with right foot pain and inability to to complete the wound care at home associated with his foot. He was admitted previously and left AMA on 6/5/24 and then returned to the hospital. Psychiatry was consulted for suicidal ideation. On evaluation, patient was resting in his hospital bed. Mr. Van reports he has a foot infection and has been having complications from a previous surgery. He is very upset and disappointed in the treatment he has received. He reports \"I'm not going to hurt myself\" and repeats this 3 times. He reports his words have been twisted and he never understood why they put a sitter in his room. He currently denies SI, HI, AVH, or paranoid delusions. He endorses a passive death wish. He again states he is not going to hurt himself. His mood is \"okay\". His anxiety is rated as \"horrible\". Sleep is poor, 3 hours nightly. Appetite is \"decent\". Mr. Van states he hates waking up everyday and that he hates life. He has no extra money and people are just bad in nature. He reports he is \"irritated and insulted\" about the care he has received with his foot. Mr. Van states nobody can help him. He is interested in starting medications but reports he has no money to afford them once he leaves the hospital and he has no means to follow up with a psychiatrist after discharge and states \"what is the point\". Mr. Van is irritable and mostly cooperative during the assessment.       PAST PSYCHIATRIC HISTORY and SUBSTANCE ABUSE HISTORY:  Denies current psychiatric treatment. Diagnosed in past for Bipolar disorder and took medications but not currently. Unable to remember details. Denies previous inpatient admissions. Endorses suicide attempts and self harming 
insulin (HCC) 01/28/2018    Tobacco abuse 01/25/2018    Poorly-controlled hypertension 01/25/2018    Poorly controlled diabetes mellitus (HCC) 01/25/2018    Other male erectile dysfunction 01/25/2018    Chronic hepatitis C without hepatic coma (HCC) 01/25/2018    Chronic pain syndrome 01/25/2018    Bipolar 1 disorder, mixed, moderate (HCC) 03/06/2017    Polysubstance abuse (HCC) 10/18/2016    Personality disorder (HCC) 07/26/2013    Non compliance with medical treatment 11/03/2011     Past Medical History:   Diagnosis Date    Adverse effect of anesthesia     breathing diff. with versed    Bipolar 1 disorder, mixed, moderate (HCC) 3/6/2017    Chronic pain     Depression     Pt stated diagnosed years ago    Diabetes (HCC) 2003    Drug-induced mood disorder(292.84) 5/28/2013    Homicide attempt     HTN (hypertension) 11/3/2011    Narcotic dependence, episodic use (HCC) 11/3/2011    Non compliance with medical treatment 11/3/2011    Other ill-defined conditions(799.89)     chronic low back pain    Psychiatric disorder     bipolar    Sleep disorder     Substance abuse (HCC)     Suicidal thoughts       Past Surgical History:   Procedure Laterality Date    COLONOSCOPY N/A 8/31/2016    COLONOSCOPY performed by Keaton Rice Jr., MD at [facility] ENDOSCOPY    COLONOSCOPY,BIOPSY  8/31/2016         ORTHOPEDIC SURGERY  08/2018    right hand    ORTHOPEDIC SURGERY      left knee arthroplasty    ORTHOPEDIC SURGERY      chronic back pain    MA UNLISTED PROCEDURE CARDIAC SURGERY      cardiac stents X2 lad drug eluting    REMV KIDNEY,COMPLICATED  2006    side unknown - kidney stones    TOE AMPUTATION Right 6/1/2024    RIGHT PARTIAL FIFTH RAY AMPUTATION performed by Michael Jama DPM at [facility] MAIN OR    UPPER GI ENDOSCOPY,BIOPSY  8/31/2016           Family History   Problem Relation Age of Onset    Hypertension Father     Heart Disease Father     Hypertension Mother     Stroke Mother     Cancer Maternal Grandmother

## 2024-06-07 NOTE — ED NOTES
Patient resting in stretcher with eyes closed at this time. Patient remains on monitoring equipment due to symptoms and chief complaint, to include current oxygen administration. Environment is free of ligature risks, suicide and safety precautions in place. 1:1 observation in progress, sitter at bedside. Side rails x2 in upright position, call light within reach. Patient receiving oxygen by nasal cannula at 6L at this time, respirations present, independent, and unlabored. Patient belongings with sitter at this time.

## 2024-06-07 NOTE — ED NOTES
RN to bedside to administer medications. Patient verbalizes frustration to Rocky EDT and RN regarding 1:1 observation. Confirmed with patient that CSSRS questions were relayed verbatim from the screening, and that the answers were recorded appropriately. Discussed suicide risk precautions and safety risks to equipment and care. Patient states: \"I don't need someone fucking watching me the entire time I'm here, if they're going to make me stay with someone the whole time I'm just walking out. I don't care, I don't want someone sitting here the whole time.\" Ensured patient that staff were required to maintain a safe environment and that all of his symptoms needed to be assessed and treated appropriately, including verbalization of SI. Patient states: \"I'm not going to kill myself. I never said that, no one's listening to me. No one ever listens, man, it just goes in one ear and out the other. They just do what they want.\" Reviewed current ordered medications with patient in order to treat symptoms and provide care. Patient states that morphine does not improve his pain and that he discussed this with Blanca INFANTE. MD notified, order to be changed. Relayed this information to the patient, patient verbalizes understanding.     Patient apologized to RN and EDT during medication administration and states that he is frustrated because it seems like no one listens to him and his requests for his care. Ensured patient that MD was reviewing his chart, confirmed current orders and plan of care. Patient verbalized understanding.    Patient's dressing to RLE changed at this time. Patient denies pain.

## 2024-06-08 LAB
EKG DIAGNOSIS: NORMAL
EKG Q-T INTERVAL: 344 MS
EKG QRS DURATION: 86 MS
EKG QTC CALCULATION (BAZETT): 469 MS
EKG R AXIS: 20 DEGREES
EKG T AXIS: 42 DEGREES
EKG VENTRICULAR RATE: 112 BPM
GLUCOSE BLD STRIP.AUTO-MCNC: 103 MG/DL (ref 65–117)
GLUCOSE BLD STRIP.AUTO-MCNC: 164 MG/DL (ref 65–117)
GLUCOSE BLD STRIP.AUTO-MCNC: 220 MG/DL (ref 65–117)
GLUCOSE BLD STRIP.AUTO-MCNC: 292 MG/DL (ref 65–117)
HCT VFR BLD AUTO: 24.8 % (ref 36.6–50.3)
HGB BLD-MCNC: 7.7 G/DL (ref 12.1–17)
SERVICE CMNT-IMP: ABNORMAL
SERVICE CMNT-IMP: NORMAL

## 2024-06-08 PROCEDURE — P9016 RBC LEUKOCYTES REDUCED: HCPCS

## 2024-06-08 PROCEDURE — 30233N1 TRANSFUSION OF NONAUTOLOGOUS RED BLOOD CELLS INTO PERIPHERAL VEIN, PERCUTANEOUS APPROACH: ICD-10-PCS | Performed by: INTERNAL MEDICINE

## 2024-06-08 PROCEDURE — 82962 GLUCOSE BLOOD TEST: CPT

## 2024-06-08 PROCEDURE — 1100000003 HC PRIVATE W/ TELEMETRY

## 2024-06-08 PROCEDURE — 85018 HEMOGLOBIN: CPT

## 2024-06-08 PROCEDURE — 85014 HEMATOCRIT: CPT

## 2024-06-08 PROCEDURE — 6370000000 HC RX 637 (ALT 250 FOR IP): Performed by: EMERGENCY MEDICINE

## 2024-06-08 PROCEDURE — 6370000000 HC RX 637 (ALT 250 FOR IP): Performed by: INTERNAL MEDICINE

## 2024-06-08 PROCEDURE — 6360000002 HC RX W HCPCS: Performed by: INTERNAL MEDICINE

## 2024-06-08 PROCEDURE — 2580000003 HC RX 258: Performed by: INTERNAL MEDICINE

## 2024-06-08 PROCEDURE — 36415 COLL VENOUS BLD VENIPUNCTURE: CPT

## 2024-06-08 PROCEDURE — 36430 TRANSFUSION BLD/BLD COMPNT: CPT

## 2024-06-08 RX ORDER — CEPHALEXIN 500 MG/1
500 CAPSULE ORAL EVERY 12 HOURS SCHEDULED
Qty: 28 CAPSULE | Refills: 0 | Status: SHIPPED | OUTPATIENT
Start: 2024-06-08 | End: 2024-06-08

## 2024-06-08 RX ORDER — INSULIN GLARGINE 100 [IU]/ML
20 INJECTION, SOLUTION SUBCUTANEOUS NIGHTLY
Qty: 10 ML | Refills: 0 | Status: SHIPPED
Start: 2024-06-08 | End: 2024-06-08

## 2024-06-08 RX ORDER — BLOOD-GLUCOSE METER
1 KIT MISCELLANEOUS DAILY
Qty: 1 KIT | Refills: 0 | Status: ON HOLD | OUTPATIENT
Start: 2024-06-08

## 2024-06-08 RX ORDER — LANCETS 30 GAUGE
1 EACH MISCELLANEOUS DAILY
Qty: 100 EACH | Refills: 0 | Status: SHIPPED | OUTPATIENT
Start: 2024-06-08 | End: 2024-06-08

## 2024-06-08 RX ORDER — LURASIDONE HYDROCHLORIDE 20 MG/1
20 TABLET, FILM COATED ORAL
Qty: 30 TABLET | Refills: 0 | Status: SHIPPED | OUTPATIENT
Start: 2024-06-08 | End: 2024-06-08

## 2024-06-08 RX ORDER — INSULIN GLARGINE 100 [IU]/ML
20 INJECTION, SOLUTION SUBCUTANEOUS NIGHTLY
Qty: 6 ML | Refills: 0 | Status: SHIPPED | OUTPATIENT
Start: 2024-06-08 | End: 2024-06-08

## 2024-06-08 RX ORDER — OXYCODONE HYDROCHLORIDE 5 MG/1
5 TABLET ORAL EVERY 8 HOURS PRN
Qty: 8 TABLET | Refills: 0 | Status: SHIPPED
Start: 2024-06-08 | End: 2024-06-08 | Stop reason: HOSPADM

## 2024-06-08 RX ORDER — GLUCOSAMINE HCL/CHONDROITIN SU 500-400 MG
CAPSULE ORAL
Qty: 30 STRIP | Refills: 0 | Status: SHIPPED | OUTPATIENT
Start: 2024-06-08 | End: 2024-06-08

## 2024-06-08 RX ORDER — OXYCODONE HYDROCHLORIDE 5 MG/1
5 TABLET ORAL EVERY 8 HOURS PRN
Status: DISCONTINUED | OUTPATIENT
Start: 2024-06-08 | End: 2024-06-09 | Stop reason: HOSPADM

## 2024-06-08 RX ORDER — LANCETS 30 GAUGE
1 EACH MISCELLANEOUS DAILY
Qty: 100 EACH | Refills: 0 | Status: ON HOLD | OUTPATIENT
Start: 2024-06-08

## 2024-06-08 RX ORDER — CEPHALEXIN 250 MG/1
500 CAPSULE ORAL EVERY 12 HOURS SCHEDULED
Status: DISCONTINUED | OUTPATIENT
Start: 2024-06-08 | End: 2024-06-09 | Stop reason: HOSPADM

## 2024-06-08 RX ORDER — BLOOD PRESSURE TEST KIT
KIT MISCELLANEOUS
Qty: 100 EACH | Refills: 0 | Status: ON HOLD | OUTPATIENT
Start: 2024-06-08

## 2024-06-08 RX ORDER — HYDRALAZINE HYDROCHLORIDE 20 MG/ML
10 INJECTION INTRAMUSCULAR; INTRAVENOUS ONCE
Status: COMPLETED | OUTPATIENT
Start: 2024-06-08 | End: 2024-06-08

## 2024-06-08 RX ORDER — LURASIDONE HYDROCHLORIDE 20 MG/1
20 TABLET, FILM COATED ORAL
Qty: 30 TABLET | Refills: 0 | Status: ON HOLD | OUTPATIENT
Start: 2024-06-08

## 2024-06-08 RX ORDER — CEPHALEXIN 500 MG/1
500 CAPSULE ORAL EVERY 12 HOURS SCHEDULED
Qty: 28 CAPSULE | Refills: 0 | Status: SHIPPED
Start: 2024-06-08 | End: 2024-06-08

## 2024-06-08 RX ORDER — CEPHALEXIN 500 MG/1
500 CAPSULE ORAL EVERY 12 HOURS SCHEDULED
Qty: 28 CAPSULE | Refills: 0 | Status: ON HOLD | OUTPATIENT
Start: 2024-06-08 | End: 2024-06-22

## 2024-06-08 RX ORDER — INSULIN GLARGINE 100 [IU]/ML
20 INJECTION, SOLUTION SUBCUTANEOUS NIGHTLY
Qty: 6 ML | Refills: 0 | Status: ON HOLD | OUTPATIENT
Start: 2024-06-08

## 2024-06-08 RX ORDER — BLOOD-GLUCOSE METER
1 KIT MISCELLANEOUS DAILY
Qty: 1 KIT | Refills: 0 | Status: SHIPPED | OUTPATIENT
Start: 2024-06-08 | End: 2024-06-08

## 2024-06-08 RX ORDER — ACETAMINOPHEN 325 MG/1
650 TABLET ORAL EVERY 6 HOURS PRN
Qty: 120 TABLET | Refills: 0 | Status: ON HOLD | OUTPATIENT
Start: 2024-06-08

## 2024-06-08 RX ORDER — GLUCOSAMINE HCL/CHONDROITIN SU 500-400 MG
CAPSULE ORAL
Qty: 100 STRIP | Refills: 0 | Status: ON HOLD | OUTPATIENT
Start: 2024-06-08

## 2024-06-08 RX ORDER — BLOOD PRESSURE TEST KIT
KIT MISCELLANEOUS
Qty: 100 EACH | Refills: 0 | Status: SHIPPED | OUTPATIENT
Start: 2024-06-08 | End: 2024-06-08

## 2024-06-08 RX ADMIN — CEPHALEXIN 500 MG: 250 CAPSULE ORAL at 20:45

## 2024-06-08 RX ADMIN — OXYCODONE HYDROCHLORIDE 10 MG: 5 TABLET ORAL at 09:36

## 2024-06-08 RX ADMIN — INSULIN LISPRO 1 UNITS: 100 INJECTION, SOLUTION INTRAVENOUS; SUBCUTANEOUS at 17:00

## 2024-06-08 RX ADMIN — WATER 2000 MG: 1 INJECTION INTRAMUSCULAR; INTRAVENOUS; SUBCUTANEOUS at 05:20

## 2024-06-08 RX ADMIN — GABAPENTIN 600 MG: 300 CAPSULE ORAL at 09:26

## 2024-06-08 RX ADMIN — GABAPENTIN 600 MG: 300 CAPSULE ORAL at 14:35

## 2024-06-08 RX ADMIN — ENOXAPARIN SODIUM 40 MG: 40 INJECTION SUBCUTANEOUS at 09:27

## 2024-06-08 RX ADMIN — HYDRALAZINE HYDROCHLORIDE 10 MG: 20 INJECTION, SOLUTION INTRAMUSCULAR; INTRAVENOUS at 16:24

## 2024-06-08 RX ADMIN — SODIUM CHLORIDE, PRESERVATIVE FREE 10 ML: 5 INJECTION INTRAVENOUS at 12:14

## 2024-06-08 RX ADMIN — CEPHALEXIN 500 MG: 250 CAPSULE ORAL at 11:26

## 2024-06-08 RX ADMIN — SODIUM CHLORIDE, PRESERVATIVE FREE 10 ML: 5 INJECTION INTRAVENOUS at 09:27

## 2024-06-08 RX ADMIN — GABAPENTIN 600 MG: 300 CAPSULE ORAL at 20:45

## 2024-06-08 RX ADMIN — INSULIN GLARGINE 20 UNITS: 100 INJECTION, SOLUTION SUBCUTANEOUS at 20:46

## 2024-06-08 RX ADMIN — QUETIAPINE FUMARATE 25 MG: 25 TABLET ORAL at 20:45

## 2024-06-08 RX ADMIN — OXYCODONE HYDROCHLORIDE 5 MG: 5 TABLET ORAL at 20:57

## 2024-06-08 RX ADMIN — IRON SUCROSE 300 MG: 20 INJECTION, SOLUTION INTRAVENOUS at 12:15

## 2024-06-08 RX ADMIN — SODIUM CHLORIDE, PRESERVATIVE FREE 10 ML: 5 INJECTION INTRAVENOUS at 20:46

## 2024-06-08 ASSESSMENT — PAIN SCALES - GENERAL
PAINLEVEL_OUTOF10: 3
PAINLEVEL_OUTOF10: 7
PAINLEVEL_OUTOF10: 0

## 2024-06-08 ASSESSMENT — PAIN DESCRIPTION - ORIENTATION: ORIENTATION: RIGHT;LEFT

## 2024-06-08 ASSESSMENT — PAIN DESCRIPTION - DESCRIPTORS: DESCRIPTORS: THROBBING

## 2024-06-08 ASSESSMENT — PAIN DESCRIPTION - LOCATION
LOCATION: FOOT
LOCATION: FOOT

## 2024-06-08 ASSESSMENT — PAIN SCALES - WONG BAKER: WONGBAKER_NUMERICALRESPONSE: NO HURT

## 2024-06-08 NOTE — PLAN OF CARE
Problem: Safety - Adult  Goal: Free from fall injury  Outcome: Progressing  Flowsheets (Taken 6/8/2024 0250)  Free From Fall Injury: Instruct family/caregiver on patient safety     Problem: Discharge Planning  Goal: Discharge to home or other facility with appropriate resources  Outcome: Progressing  Flowsheets (Taken 6/8/2024 0250)  Discharge to home or other facility with appropriate resources: Identify barriers to discharge with patient and caregiver     Problem: Pain  Goal: Verbalizes/displays adequate comfort level or baseline comfort level  Outcome: Progressing  Flowsheets (Taken 6/8/2024 0250)  Verbalizes/displays adequate comfort level or baseline comfort level:   Encourage patient to monitor pain and request assistance   Assess pain using appropriate pain scale   Administer analgesics based on type and severity of pain and evaluate response   Implement non-pharmacological measures as appropriate and evaluate response     Problem: Risk for Elopement  Goal: Patient will not exit the unit/facility without proper excort  Outcome: Progressing  Flowsheets (Taken 6/8/2024 0250)  Nursing Interventions for Elopement Risk: Reduce environmental triggers

## 2024-06-09 VITALS
TEMPERATURE: 97.9 F | RESPIRATION RATE: 18 BRPM | BODY MASS INDEX: 25.06 KG/M2 | DIASTOLIC BLOOD PRESSURE: 89 MMHG | HEIGHT: 72 IN | SYSTOLIC BLOOD PRESSURE: 179 MMHG | OXYGEN SATURATION: 94 % | HEART RATE: 74 BPM | WEIGHT: 185 LBS

## 2024-06-09 LAB
ABO + RH BLD: NORMAL
BLD PROD TYP BPU: NORMAL
BLOOD BANK BLOOD PRODUCT EXPIRATION DATE: NORMAL
BLOOD BANK DISPENSE STATUS: NORMAL
BLOOD BANK ISBT PRODUCT BLOOD TYPE: 5100
BLOOD BANK PRODUCT CODE: NORMAL
BLOOD BANK UNIT TYPE AND RH: NORMAL
BPU ID: NORMAL
CROSSMATCH RESULT: NORMAL
GLUCOSE BLD STRIP.AUTO-MCNC: 182 MG/DL (ref 65–117)
GLUCOSE BLD STRIP.AUTO-MCNC: 235 MG/DL (ref 65–117)
SERVICE CMNT-IMP: ABNORMAL
SERVICE CMNT-IMP: ABNORMAL
SPECIMEN EXP DATE BLD: NORMAL
UNIT DIVISION: 0
UNIT ISSUE DATE/TIME: NORMAL

## 2024-06-09 PROCEDURE — 2580000003 HC RX 258: Performed by: INTERNAL MEDICINE

## 2024-06-09 PROCEDURE — 6370000000 HC RX 637 (ALT 250 FOR IP): Performed by: INTERNAL MEDICINE

## 2024-06-09 PROCEDURE — 6360000002 HC RX W HCPCS: Performed by: INTERNAL MEDICINE

## 2024-06-09 PROCEDURE — 82962 GLUCOSE BLOOD TEST: CPT

## 2024-06-09 RX ADMIN — CEPHALEXIN 500 MG: 250 CAPSULE ORAL at 08:54

## 2024-06-09 RX ADMIN — OXYCODONE HYDROCHLORIDE 5 MG: 5 TABLET ORAL at 10:39

## 2024-06-09 RX ADMIN — SODIUM CHLORIDE, PRESERVATIVE FREE 10 ML: 5 INJECTION INTRAVENOUS at 08:55

## 2024-06-09 RX ADMIN — INSULIN LISPRO 1 UNITS: 100 INJECTION, SOLUTION INTRAVENOUS; SUBCUTANEOUS at 08:54

## 2024-06-09 RX ADMIN — ENOXAPARIN SODIUM 40 MG: 40 INJECTION SUBCUTANEOUS at 08:54

## 2024-06-09 RX ADMIN — GABAPENTIN 600 MG: 300 CAPSULE ORAL at 08:54

## 2024-06-09 ASSESSMENT — PAIN DESCRIPTION - ORIENTATION: ORIENTATION: RIGHT

## 2024-06-09 ASSESSMENT — PAIN DESCRIPTION - LOCATION: LOCATION: FOOT

## 2024-06-09 ASSESSMENT — PAIN SCALES - GENERAL
PAINLEVEL_OUTOF10: 0
PAINLEVEL_OUTOF10: 7

## 2024-06-09 ASSESSMENT — PAIN DESCRIPTION - DESCRIPTORS: DESCRIPTORS: ACHING

## 2024-06-09 NOTE — DISCHARGE SUMMARY
Discharge Summary    Name: Keaton Van  [id number]  YOB: 1965 (Age: 59 y.o.)   Date of Admission: 6/7/2024  Date of Discharge: 6/9/2024  Attending Physician: Mimi Mello MD    Discharge Diagnosis:   Diabetic foot infection  OM Of 5th proximal phalanx  Hx of OM Left foot  Anemia  Psychiatry disorder  New onset afib on last admission  Diabetes mellitus  Hypertension  CKD 3  Tobacco use   Polysubstance abuse  Chronic hepatitis C    Consultations:  IP CONSULT TO PSYCHIATRY  IP CONSULT TO PODIATRY  IP CONSULT TO CASE MANAGEMENT      Brief Admission History/Reason for Admission Per Mimi Mello MD:     Keaton Van is a 59 y.o.  male with PMHx significant for diabetes, bipolar disorder, chronic pain, polysubstance abuse, presented to ED with c/o right foot pain and unable to take care of wound at home.  Patient left against medical advise on 6/5/24. Patient was followed by podiatry and infectious disease.  Patient is fixated to the thought that how he was not allowed to eat in anticipation of surgery on last admission and how surgery got postponed. States that he doesn't want to see the same foot doctor and would like to prefer to see another doctor for foot.     Brief Hospital Course by Main Problems:   Diabetic foot infection  OM Of 5th proximal phalanx:  S/p Right partial fifth ray amputation on 6/1  Change cefazolin to keflex  Appreciate podiatry consult  Casemanager consult for medications     Hx of OM left foot  Cultures + for alpha strep, diphtheroids, CON S 2/2022      Anemia:  No s/s of acute bleeding  S/p 1 unit of PRBC  S/p 1 dose of iv iron on 6/8     Suicidal ideation:  Appreciate psych consult     New onset afib on last admission:  Was on lovenox on last admission.  AC not started due to medication non-compliance, substance abuse and risk of fall, and anemia      Diabetes mellitus:  Continue  lantus 20 units sc HS  Continue lispro sliding scale

## 2024-06-09 NOTE — PROGRESS NOTES
..End of Shift Note    Bedside shift change report given to BHAKTI Bender (oncoming nurse) by Silvano Calle RN (offgoing nurse).  Report included the following information SBAR    Shift worked:  0700 - 1900     Shift summary and any significant changes:    Pt. Tolerated all medication with out issue.  No c/o pain or distress.  Type and screen drawn.  Pt. One to one sitter discontinued.  Type and screen completed. Transfusion not able to be started on day shift.  Will be started on night shift..  Night shift nurse notified.       Concerns for physician to address:  no     Zone phone for oncoming shift:  no       Activity:     Number times ambulated in hallways past shift: 0  Number of times OOB to chair past shift: 0    Cardiac:   Cardiac Monitoring: Yes           Access:  Current line(s): PIV     Genitourinary:   Urinary status: voiding    Respiratory:      Chronic home O2 use?: NO  Incentive spirometer at bedside: NO       GI:     Current diet:  ADULT DIET; Regular; 3 carb choices (45 gm/meal)  Passing flatus: YES  Tolerating current diet: YES       Pain Management:   Patient states pain is manageable on current regimen: YES    Skin:     Interventions: increase time out of bed    Patient Safety:  Fall Score:    Interventions: bed/chair alarm and pt to call before getting OOB       Length of Stay:  Expected LOS: 3  Actual LOS: 0      Silvano Calle RN                            
1545: Patient's BP elevated, 197/96. Notified Dr. Mello and received an order to give hydralazine 10mg IV once.    1600: Patient states he lost his valuables, all of his valuables that he came in with. This nurse checked with the security but they said it's not there.    1624: Hydralazine given. Aimee, the care manager, called patient's room to speak with the patient but the patient is refusing to talk to anybody at this time.    1630: The nursing supervisor, Sara, found patient's valuables in safety closet. All of patient's valuables, including wallet, keys, shoes, shirt and pants, were given back to the patient.    1640: Was told from OMARI Yu, to call the nursing supervisor for assistance with setting up a transport for this patient.    1645: This nurse and BHAKTI Rivera went into patient's room to talk to him regarding setting up a transportation for his discharge. The patient states he didn't know he was going to be discharged today, he stated someone told him he will be discharging on Monday. But the patient does not recall who told him that. Patient also stated if he goes home today, there's nobody to take care of him.    1648: Secure message sent to Dr. Mello to clarify the discharge order. Per Dr. Mello, patient can be discharged only if he has his discharge meds and if CM clears him.    1650: This nurse spoke with the nursing supervisor, Sara, regarding the situation. Patient will be seen by OMARI tomorrow morning to make sure the patient has the medications ready before the discharge so he have it when he goes home. Will pass this on to the on-coming shift.    1655: BP rechecked, it is now 179/88. Updated the patient about the discharge plan.    1900: End of Shift Note    Bedside shift change report given to Fredy (oncoming nurse) by Yeong AE Jenny, RN (offgoing nurse).  Report included the following information SBAR    Shift worked:  11a - 7p     Shift summary and any significant changes:     See 
Blood transfusion started 0057 in 18G L AC   BP 1267/71 P 65 RR 18 O2 96%    Monitored patient 15 minutes after starting transfusion for signs of reaction  NO signs or symptoms of CP, SOB, nausea, or fever  /65 P 67 RR 18 O2 100%    Blood transfusion complete 0358 in 18G L AC  /78 P 55 RR 18 O2 97%  Patient resting with eyes closed  
End of Shift Note    Bedside shift change report given to Ascencion (oncoming nurse) by Yulia Mallory RN (offgoing nurse).  Report included the following information SBAR, ED Summary, Procedure Summary, and Recent Results    Shift worked:  7p-7a     Shift summary and any significant changes:     Patient received x1 PRBC. Last HGB result 7.7     Concerns for physician to address:  None     Zone phone for oncoming shift:          Activity:       Cardiac:   Cardiac Monitoring:  yes - NSR    Access:  Current line(s): PIV     Genitourinary:   Urinary Status: Voiding    Respiratory:   O2 Device: None (Room air)    GI:  Current diet: ADULT DIET; Regular; 3 carb choices (45 gm/meal)    Pain Management:   Patient states pain is manageable on current regimen: YES    Skin:  Kennedy Scale Score: 20  Interventions: Wound Offloading (Prevention Methods): Repositioning, Other (comment) (encouraging ambulation)  Pressure injury: yes - Heels, off loading    Patient Safety:  Fall Score: Vanegas Total Score: 25  Fall Risk Interventions  Nursing Judgement-Fall Risk High(Add Comments): Yes  Toilet Every 2 Hours-In Advance of Need: Yes  Hourly Visual Checks: Awake, In bed  Fall Visual Posted: Socks  Room Door Open: Yes  Alarm On: Other (Comment)  Patient Moved Closer to Nursing Station: No    Active Consults:  IP CONSULT TO PSYCHIATRY  IP CONSULT TO PODIATRY    Length of Stay:  Expected LOS: 3  Actual LOS: 1      Yulia Mallory RN  
End of Shift Note    Bedside shift change report given to BHAKTI Rivera (oncoming nurse) by Fredy Johansen RN (offgoing nurse).  Report included the following information SBAR    Shift worked:  9122-4279     Shift summary and any significant changes:    Pt is calm, cooperative.  A&O x4  Pain controlled by PRN meds gave only once  Probably to be discharged today 06/09/24  Otherwise uneventful shift   Concerns for physician to address:    Zone phone for oncoming shift:             Fredy Johansen RN                            
Occupational Therapy    Attempted OT eval. Pt adamantly declined.     Will discontinue order as this was 2nd attempt at OT eval.     Roxane Caldwell, OT    
Occupational Therapy  6/7/2024    Orders received and chart reviewed. Visited pt for therapy. Nursing in room requesting PM evaluation. Will revisit as able.     1327 Revisited for therapy evaluation and pt cleared by nursing. Pt politely declining and wanted to rest. Will follow up for eval tomorrow.     Thank you,  Carli Acosta, OTR/L    
Physical Therapy     Chart reviewed up to date. Pt currently adamantly refusing skilled PT/OT at this time. Will defer this date and follow up tomorrow as able for PT eval.     Shruti Daniels PT, DPT, NCS  
[phone]  --      Recent Labs     06/07/24  0508      K 4.7      CO2 24   GLUCOSE 174*   BUN 45*   CREATININE 2.69*   CALCIUM 9.0   MG 2.3   BILITOT 0.4   AST 58*   ALT 17       Signed: Mimi Mello MD

## 2024-06-09 NOTE — CARE COORDINATION
6:50 PM  CM offered again to assist nursing team with coordinating Roundtrip ride for this patient as he has d/c orders and refused to speak with CM/nursing staff earlier in the day when ride was offered. Due to these delays, [facility] pharmacy is now closed for business for the evening (closed at 6 PM). Please reference previous CM's notes for extensive efforts to provide support, resources, and attempts to assist with medication costs.     Pt is cleared from CM standpoint. Pt is known to be medically stable, with plans to return home to previous residence. Pt is known to reside alone with very limited support system. Pt has been provided with community resources for outpatient follow-up and assistance.      HOLLIE Alston.  Care Manager, [facility]  [phone]/Available on [employer]     
7:37AM UPDATE  Discharge orders still active for pt. Pt ready for d/c yesterday with medications for pick-up at [facility] (closed at 6:00PM). CM attempted to arrange Roundtrip for patient several times from [facility] to pharmacy then to home address on file, but pt refused to speak with CM about d/c planning and agitated d/t \"belongings being lost.\" Pt was cleared for d/c from CM standpoint yesterday and advised nursing staff to seek assistance from Nursing Supervisor to arrange Roundtrip d/c transportation. Per chart review, most recent nursing note from 4:50PM yesterday stated pt could be seen by CM today to ensure medications were ready. To confirm - Medications were ready for pick-up yesterday afternoon at 4:30PM and just need transportation arranged.     [facility] does not reopen until 11:00AM today, therefore pt will now have to remain until they reopen. CM to arrange transportation via Roundtrip from [facility] to [facility], then to pt's home address on file as discussed. Details to be provided to nursing staff once CM calls [facility] when they reopen today at 11:00AM to confirm prescriptions are still ready for pt to pick-up.     9:24AM UPDATE  CM received call from Nursing requesting assistance with setting up discharge. CM reviewed information noted above and that pt was ready for d/c yesterday afternoon at 4:30PM. Will arrange transportation once pharmacy reopens as noted above.     11:24AM UPDATE  CM called [facility] ([phone]). Spoke with pharmacist Rose). Confirmed they have scripts ready to fill, had them on hold in case. They will proceed with filling prescriptions. Pharmacy closes at 6:00PM today. Nursing reports pt still reporting he cannot afford medications (Advised cost would be $23.70 for insulin/meds and that glucose monitoring kit & supplies would be $25.09). Per discussion with MD yesterday, if pt still having issues 
Please see earlier CM note today for details. Have attempted several times to setup d/c for today. Pt refusing now to speak with writer or anybody. Advised nursing to contact supervisor for further assistance. Pt is medically stable for d/c.     **PLEASE CALL NURSING SUPERVISOR FOR ASSISTANCE WITH SETTING UP ROUNDTRIP**    Aimee Dhillon LCSW  [facility] Care Manager  [phone]  
[facility] to provide update. They are able to fill meds for pt until they close today at 6:00PM, however cannot process voucher at this time. CM attempted to speak with pt and offer to arrange Roundtrip to [facility] (before they close at 6pm) or have scripts sent to [facility] in [place] (close at 7PM). Pt extremely agitated and refused to talk to CM as he is looking for his valuables.     **PLEASE CALL NURSING SUPERVISOR FOR ASSISTANCE WITH SETTING UP ROUNDTRIP**    TRANSPORTATION:   Reports he has no car and no reliable transportation to get to appointments or  prescriptions. Of note, pt previously applied for Medicaid in August 2023 last year but unclear if approved. Pt reports he was denied d/t being over income, however reports he's unclear as to the real reason why (if current reported income is $1000/mo, should be eligible).     HOUSING:   Currently resides in rental apartment but reports he is at risk of homelessness at the end of June. Reports he moved into $1300/month apartment with intention of having roommate/friend move-in, but that person backed out last minute so now he cannot afford it since he's only make $1000/month. Reports his plan is to remain there \"until they beat the door down to kick me out.\"     FOLLOW-UP CARE  No current PCP on file. Pt reports he does not keep appointments d/t transportation issues. CM sending information to CM specialist to potentially assist with setting up new PCP. Reports he is a  (honorably discharged). [facility] but never got [facility], unsure if he is registered for care. CM called [facility] ([phone]), reported that  is not currently registered there fore care. Unclear if  would be eligible but can try contacting [facility] to do so.     RESOURCES PROVIDED  - [facility]: Ozzie Iqbal  - [facility]

## 2024-06-10 VITALS
RESPIRATION RATE: 16 BRPM | OXYGEN SATURATION: 96 % | HEIGHT: 72 IN | TEMPERATURE: 97.5 F | WEIGHT: 189.6 LBS | DIASTOLIC BLOOD PRESSURE: 70 MMHG | HEART RATE: 68 BPM | BODY MASS INDEX: 25.68 KG/M2 | SYSTOLIC BLOOD PRESSURE: 137 MMHG

## 2024-06-12 ENCOUNTER — HOSPITAL ENCOUNTER (INPATIENT)
Facility: HOSPITAL | Age: 59
LOS: 5 days | Discharge: HOME | DRG: 280 | End: 2024-06-17
Attending: EMERGENCY MEDICINE | Admitting: INTERNAL MEDICINE
Payer: MEDICARE

## 2024-06-12 ENCOUNTER — APPOINTMENT (OUTPATIENT)
Facility: HOSPITAL | Age: 59
DRG: 280 | End: 2024-06-12
Payer: MEDICARE

## 2024-06-12 DIAGNOSIS — R09.02 HYPOXIA: ICD-10-CM

## 2024-06-12 DIAGNOSIS — L03.115 CELLULITIS OF RIGHT FOOT: ICD-10-CM

## 2024-06-12 DIAGNOSIS — S20.212A CONTUSION OF LEFT CHEST WALL, INITIAL ENCOUNTER: ICD-10-CM

## 2024-06-12 DIAGNOSIS — E11.65 TYPE 2 DIABETES MELLITUS WITH HYPERGLYCEMIA, WITH LONG-TERM CURRENT USE OF INSULIN (HCC): ICD-10-CM

## 2024-06-12 DIAGNOSIS — Z79.4 TYPE 2 DIABETES MELLITUS WITH HYPERGLYCEMIA, WITH LONG-TERM CURRENT USE OF INSULIN (HCC): ICD-10-CM

## 2024-06-12 DIAGNOSIS — I50.9 ACUTE ON CHRONIC CONGESTIVE HEART FAILURE, UNSPECIFIED HEART FAILURE TYPE (HCC): Primary | ICD-10-CM

## 2024-06-12 DIAGNOSIS — R79.89 ELEVATED TROPONIN: ICD-10-CM

## 2024-06-12 DIAGNOSIS — Z72.0 TOBACCO ABUSE: ICD-10-CM

## 2024-06-12 PROBLEM — I50.43 CHF (CONGESTIVE HEART FAILURE), NYHA CLASS I, ACUTE ON CHRONIC, COMBINED (HCC): Status: ACTIVE | Noted: 2024-06-12

## 2024-06-12 LAB
ALBUMIN SERPL-MCNC: 2.8 G/DL (ref 3.5–5)
ALBUMIN/GLOB SERPL: 0.6 (ref 1.1–2.2)
ALP SERPL-CCNC: 135 U/L (ref 45–117)
ALT SERPL-CCNC: 19 U/L (ref 12–78)
AMPHET UR QL SCN: NEGATIVE
ANION GAP SERPL CALC-SCNC: 3 MMOL/L (ref 5–15)
APPEARANCE UR: CLEAR
APTT PPP: 36.8 SEC (ref 22.1–31)
AST SERPL-CCNC: 26 U/L (ref 15–37)
BACTERIA URNS QL MICRO: NEGATIVE /HPF
BARBITURATES UR QL SCN: NEGATIVE
BASOPHILS # BLD: 0.1 K/UL (ref 0–0.1)
BASOPHILS NFR BLD: 1 % (ref 0–1)
BENZODIAZ UR QL: NEGATIVE
BILIRUB SERPL-MCNC: 0.3 MG/DL (ref 0.2–1)
BILIRUB UR QL: NEGATIVE
BUN SERPL-MCNC: 36 MG/DL (ref 6–20)
BUN/CREAT SERPL: 16 (ref 12–20)
CALCIUM SERPL-MCNC: 8.6 MG/DL (ref 8.5–10.1)
CANNABINOIDS UR QL SCN: POSITIVE
CHLORIDE SERPL-SCNC: 105 MMOL/L (ref 97–108)
CO2 SERPL-SCNC: 28 MMOL/L (ref 21–32)
COCAINE UR QL SCN: POSITIVE
COLOR UR: ABNORMAL
COMMENT:: NORMAL
CREAT SERPL-MCNC: 2.21 MG/DL (ref 0.7–1.3)
DIFFERENTIAL METHOD BLD: ABNORMAL
EKG ATRIAL RATE: 83 BPM
EKG DIAGNOSIS: NORMAL
EKG P AXIS: 83 DEGREES
EKG P-R INTERVAL: 158 MS
EKG Q-T INTERVAL: 372 MS
EKG QRS DURATION: 84 MS
EKG QTC CALCULATION (BAZETT): 437 MS
EKG R AXIS: 58 DEGREES
EKG T AXIS: 31 DEGREES
EKG VENTRICULAR RATE: 83 BPM
EOSINOPHIL # BLD: 0.1 K/UL (ref 0–0.4)
EOSINOPHIL NFR BLD: 1 % (ref 0–7)
EPITH CASTS URNS QL MICRO: ABNORMAL /LPF
ERYTHROCYTE [DISTWIDTH] IN BLOOD BY AUTOMATED COUNT: 15 % (ref 11.5–14.5)
GLOBULIN SER CALC-MCNC: 4.6 G/DL (ref 2–4)
GLUCOSE BLD STRIP.AUTO-MCNC: 143 MG/DL (ref 65–117)
GLUCOSE BLD STRIP.AUTO-MCNC: 193 MG/DL (ref 65–117)
GLUCOSE BLD STRIP.AUTO-MCNC: 203 MG/DL (ref 65–117)
GLUCOSE BLD STRIP.AUTO-MCNC: 271 MG/DL (ref 65–117)
GLUCOSE SERPL-MCNC: 252 MG/DL (ref 65–100)
GLUCOSE UR STRIP.AUTO-MCNC: >1000 MG/DL
HCT VFR BLD AUTO: 27 % (ref 36.6–50.3)
HGB BLD-MCNC: 8.4 G/DL (ref 12.1–17)
HGB UR QL STRIP: ABNORMAL
HYALINE CASTS URNS QL MICRO: ABNORMAL /LPF (ref 0–2)
IMM GRANULOCYTES # BLD AUTO: 0.1 K/UL (ref 0–0.04)
IMM GRANULOCYTES NFR BLD AUTO: 1 % (ref 0–0.5)
INR PPP: 1.1 (ref 0.9–1.1)
KETONES UR QL STRIP.AUTO: NEGATIVE MG/DL
LEUKOCYTE ESTERASE UR QL STRIP.AUTO: NEGATIVE
LYMPHOCYTES # BLD: 0.6 K/UL (ref 0.8–3.5)
LYMPHOCYTES NFR BLD: 8 % (ref 12–49)
Lab: ABNORMAL
MAGNESIUM SERPL-MCNC: 2 MG/DL (ref 1.6–2.4)
MCH RBC QN AUTO: 27.2 PG (ref 26–34)
MCHC RBC AUTO-ENTMCNC: 31.1 G/DL (ref 30–36.5)
MCV RBC AUTO: 87.4 FL (ref 80–99)
METHADONE UR QL: NEGATIVE
MONOCYTES # BLD: 0.4 K/UL (ref 0–1)
MONOCYTES NFR BLD: 5 % (ref 5–13)
NEUTS SEG # BLD: 6.8 K/UL (ref 1.8–8)
NEUTS SEG NFR BLD: 84 % (ref 32–75)
NITRITE UR QL STRIP.AUTO: NEGATIVE
NRBC # BLD: 0 K/UL (ref 0–0.01)
NRBC BLD-RTO: 0 PER 100 WBC
NT PRO BNP: 8573 PG/ML
OPIATES UR QL: POSITIVE
PCP UR QL: NEGATIVE
PH UR STRIP: 5.5 (ref 5–8)
PLATELET # BLD AUTO: 230 K/UL (ref 150–400)
PMV BLD AUTO: 10.1 FL (ref 8.9–12.9)
POTASSIUM SERPL-SCNC: 4.8 MMOL/L (ref 3.5–5.1)
PROCALCITONIN SERPL-MCNC: 0.05 NG/ML
PROT SERPL-MCNC: 7.4 G/DL (ref 6.4–8.2)
PROT UR STRIP-MCNC: 100 MG/DL
PROTHROMBIN TIME: 11.4 SEC (ref 9–11.1)
RBC # BLD AUTO: 3.09 M/UL (ref 4.1–5.7)
RBC #/AREA URNS HPF: ABNORMAL /HPF (ref 0–5)
RBC MORPH BLD: ABNORMAL
RBC MORPH BLD: ABNORMAL
SERVICE CMNT-IMP: ABNORMAL
SODIUM SERPL-SCNC: 136 MMOL/L (ref 136–145)
SP GR UR REFRACTOMETRY: 1.01
SPECIMEN HOLD: NORMAL
THERAPEUTIC RANGE: ABNORMAL SECS (ref 58–77)
TROPONIN I SERPL HS-MCNC: 1824 NG/L (ref 0–76)
TROPONIN I SERPL HS-MCNC: 353 NG/L (ref 0–76)
UFH PPP CHRO-ACNC: 0.23 IU/ML
UFH PPP CHRO-ACNC: 0.51 IU/ML
URINE CULTURE IF INDICATED: ABNORMAL
UROBILINOGEN UR QL STRIP.AUTO: 0.2 EU/DL (ref 0.2–1)
WBC # BLD AUTO: 8.1 K/UL (ref 4.1–11.1)
WBC URNS QL MICRO: ABNORMAL /HPF (ref 0–4)

## 2024-06-12 PROCEDURE — 84145 PROCALCITONIN (PCT): CPT

## 2024-06-12 PROCEDURE — 6360000002 HC RX W HCPCS

## 2024-06-12 PROCEDURE — 96375 TX/PRO/DX INJ NEW DRUG ADDON: CPT

## 2024-06-12 PROCEDURE — 2580000003 HC RX 258

## 2024-06-12 PROCEDURE — 82962 GLUCOSE BLOOD TEST: CPT

## 2024-06-12 PROCEDURE — 85025 COMPLETE CBC W/AUTO DIFF WBC: CPT

## 2024-06-12 PROCEDURE — 6370000000 HC RX 637 (ALT 250 FOR IP)

## 2024-06-12 PROCEDURE — 85520 HEPARIN ASSAY: CPT

## 2024-06-12 PROCEDURE — 6360000002 HC RX W HCPCS: Performed by: EMERGENCY MEDICINE

## 2024-06-12 PROCEDURE — 81001 URINALYSIS AUTO W/SCOPE: CPT

## 2024-06-12 PROCEDURE — 80307 DRUG TEST PRSMV CHEM ANLYZR: CPT

## 2024-06-12 PROCEDURE — 83735 ASSAY OF MAGNESIUM: CPT

## 2024-06-12 PROCEDURE — 85610 PROTHROMBIN TIME: CPT

## 2024-06-12 PROCEDURE — 85730 THROMBOPLASTIN TIME PARTIAL: CPT

## 2024-06-12 PROCEDURE — 83880 ASSAY OF NATRIURETIC PEPTIDE: CPT

## 2024-06-12 PROCEDURE — 71045 X-RAY EXAM CHEST 1 VIEW: CPT

## 2024-06-12 PROCEDURE — 36415 COLL VENOUS BLD VENIPUNCTURE: CPT

## 2024-06-12 PROCEDURE — 6370000000 HC RX 637 (ALT 250 FOR IP): Performed by: EMERGENCY MEDICINE

## 2024-06-12 PROCEDURE — 93005 ELECTROCARDIOGRAM TRACING: CPT | Performed by: EMERGENCY MEDICINE

## 2024-06-12 PROCEDURE — 80053 COMPREHEN METABOLIC PANEL: CPT

## 2024-06-12 PROCEDURE — 96374 THER/PROPH/DIAG INJ IV PUSH: CPT

## 2024-06-12 PROCEDURE — 84484 ASSAY OF TROPONIN QUANT: CPT

## 2024-06-12 PROCEDURE — 99285 EMERGENCY DEPT VISIT HI MDM: CPT

## 2024-06-12 PROCEDURE — 1100000003 HC PRIVATE W/ TELEMETRY

## 2024-06-12 RX ORDER — NICOTINE 21 MG/24HR
1 PATCH, TRANSDERMAL 24 HOURS TRANSDERMAL DAILY
Status: DISCONTINUED | OUTPATIENT
Start: 2024-06-12 | End: 2024-06-12

## 2024-06-12 RX ORDER — LANOLIN ALCOHOL/MO/W.PET/CERES
3 CREAM (GRAM) TOPICAL NIGHTLY PRN
Status: DISCONTINUED | OUTPATIENT
Start: 2024-06-12 | End: 2024-06-17 | Stop reason: HOSPADM

## 2024-06-12 RX ORDER — ONDANSETRON 2 MG/ML
4 INJECTION INTRAMUSCULAR; INTRAVENOUS EVERY 6 HOURS PRN
Status: DISCONTINUED | OUTPATIENT
Start: 2024-06-12 | End: 2024-06-17 | Stop reason: HOSPADM

## 2024-06-12 RX ORDER — FUROSEMIDE 10 MG/ML
40 INJECTION INTRAMUSCULAR; INTRAVENOUS 2 TIMES DAILY
Status: DISCONTINUED | OUTPATIENT
Start: 2024-06-12 | End: 2024-06-15

## 2024-06-12 RX ORDER — FENTANYL CITRATE 50 UG/ML
50 INJECTION, SOLUTION INTRAMUSCULAR; INTRAVENOUS ONCE
Status: COMPLETED | OUTPATIENT
Start: 2024-06-12 | End: 2024-06-12

## 2024-06-12 RX ORDER — HEPARIN SODIUM 10000 [USP'U]/100ML
5-30 INJECTION, SOLUTION INTRAVENOUS CONTINUOUS
Status: DISCONTINUED | OUTPATIENT
Start: 2024-06-12 | End: 2024-06-15

## 2024-06-12 RX ORDER — LURASIDONE HYDROCHLORIDE 20 MG/1
20 TABLET, FILM COATED ORAL
Status: DISCONTINUED | OUTPATIENT
Start: 2024-06-12 | End: 2024-06-17 | Stop reason: HOSPADM

## 2024-06-12 RX ORDER — SODIUM CHLORIDE 0.9 % (FLUSH) 0.9 %
5-40 SYRINGE (ML) INJECTION EVERY 12 HOURS SCHEDULED
Status: DISCONTINUED | OUTPATIENT
Start: 2024-06-12 | End: 2024-06-17 | Stop reason: HOSPADM

## 2024-06-12 RX ORDER — ACETAMINOPHEN 325 MG/1
650 TABLET ORAL EVERY 6 HOURS PRN
Status: DISCONTINUED | OUTPATIENT
Start: 2024-06-12 | End: 2024-06-17 | Stop reason: HOSPADM

## 2024-06-12 RX ORDER — FUROSEMIDE 10 MG/ML
40 INJECTION INTRAMUSCULAR; INTRAVENOUS
Status: COMPLETED | OUTPATIENT
Start: 2024-06-12 | End: 2024-06-12

## 2024-06-12 RX ORDER — CEPHALEXIN 250 MG/1
500 CAPSULE ORAL EVERY 12 HOURS SCHEDULED
Status: DISCONTINUED | OUTPATIENT
Start: 2024-06-12 | End: 2024-06-12

## 2024-06-12 RX ORDER — ONDANSETRON 4 MG/1
4 TABLET, ORALLY DISINTEGRATING ORAL EVERY 8 HOURS PRN
Status: DISCONTINUED | OUTPATIENT
Start: 2024-06-12 | End: 2024-06-17 | Stop reason: HOSPADM

## 2024-06-12 RX ORDER — MORPHINE SULFATE 2 MG/ML
2 INJECTION, SOLUTION INTRAMUSCULAR; INTRAVENOUS EVERY 4 HOURS PRN
Status: DISCONTINUED | OUTPATIENT
Start: 2024-06-12 | End: 2024-06-13

## 2024-06-12 RX ORDER — GLUCAGON 1 MG/ML
1 KIT INJECTION PRN
Status: DISCONTINUED | OUTPATIENT
Start: 2024-06-12 | End: 2024-06-17 | Stop reason: HOSPADM

## 2024-06-12 RX ORDER — HEPARIN SODIUM 1000 [USP'U]/ML
4000 INJECTION, SOLUTION INTRAVENOUS; SUBCUTANEOUS PRN
Status: DISCONTINUED | OUTPATIENT
Start: 2024-06-12 | End: 2024-06-15

## 2024-06-12 RX ORDER — DEXTROSE MONOHYDRATE 100 MG/ML
INJECTION, SOLUTION INTRAVENOUS CONTINUOUS PRN
Status: DISCONTINUED | OUTPATIENT
Start: 2024-06-12 | End: 2024-06-17 | Stop reason: HOSPADM

## 2024-06-12 RX ORDER — BENZONATATE 100 MG/1
100 CAPSULE ORAL 3 TIMES DAILY
Status: DISCONTINUED | OUTPATIENT
Start: 2024-06-12 | End: 2024-06-17 | Stop reason: HOSPADM

## 2024-06-12 RX ORDER — SENNOSIDES A AND B 8.6 MG/1
1 TABLET, FILM COATED ORAL NIGHTLY PRN
Status: DISCONTINUED | OUTPATIENT
Start: 2024-06-12 | End: 2024-06-17 | Stop reason: HOSPADM

## 2024-06-12 RX ORDER — MORPHINE SULFATE 4 MG/ML
4 INJECTION, SOLUTION INTRAMUSCULAR; INTRAVENOUS ONCE
Status: COMPLETED | OUTPATIENT
Start: 2024-06-12 | End: 2024-06-12

## 2024-06-12 RX ORDER — ACETAMINOPHEN 650 MG/1
650 SUPPOSITORY RECTAL EVERY 6 HOURS PRN
Status: DISCONTINUED | OUTPATIENT
Start: 2024-06-12 | End: 2024-06-17 | Stop reason: HOSPADM

## 2024-06-12 RX ORDER — LIDOCAINE 4 G/G
1 PATCH TOPICAL ONCE
Status: COMPLETED | OUTPATIENT
Start: 2024-06-12 | End: 2024-06-12

## 2024-06-12 RX ORDER — NITROGLYCERIN 0.4 MG/1
0.4 TABLET SUBLINGUAL ONCE
Status: COMPLETED | OUTPATIENT
Start: 2024-06-12 | End: 2024-06-12

## 2024-06-12 RX ORDER — INSULIN LISPRO 100 [IU]/ML
0-4 INJECTION, SOLUTION INTRAVENOUS; SUBCUTANEOUS NIGHTLY
Status: DISCONTINUED | OUTPATIENT
Start: 2024-06-12 | End: 2024-06-17 | Stop reason: HOSPADM

## 2024-06-12 RX ORDER — POLYETHYLENE GLYCOL 3350 17 G/17G
17 POWDER, FOR SOLUTION ORAL DAILY PRN
Status: DISCONTINUED | OUTPATIENT
Start: 2024-06-12 | End: 2024-06-17 | Stop reason: HOSPADM

## 2024-06-12 RX ORDER — HEPARIN SODIUM 1000 [USP'U]/ML
4000 INJECTION, SOLUTION INTRAVENOUS; SUBCUTANEOUS ONCE
Status: COMPLETED | OUTPATIENT
Start: 2024-06-12 | End: 2024-06-12

## 2024-06-12 RX ORDER — OXYCODONE HYDROCHLORIDE AND ACETAMINOPHEN 5; 325 MG/1; MG/1
1 TABLET ORAL EVERY 4 HOURS PRN
Status: DISCONTINUED | OUTPATIENT
Start: 2024-06-12 | End: 2024-06-14

## 2024-06-12 RX ORDER — METHOCARBAMOL 500 MG/1
500 TABLET, FILM COATED ORAL 3 TIMES DAILY
Status: DISCONTINUED | OUTPATIENT
Start: 2024-06-12 | End: 2024-06-17 | Stop reason: HOSPADM

## 2024-06-12 RX ORDER — SODIUM CHLORIDE 9 MG/ML
INJECTION, SOLUTION INTRAVENOUS PRN
Status: DISCONTINUED | OUTPATIENT
Start: 2024-06-12 | End: 2024-06-17 | Stop reason: HOSPADM

## 2024-06-12 RX ORDER — INSULIN LISPRO 100 [IU]/ML
0-8 INJECTION, SOLUTION INTRAVENOUS; SUBCUTANEOUS
Status: DISCONTINUED | OUTPATIENT
Start: 2024-06-12 | End: 2024-06-17 | Stop reason: HOSPADM

## 2024-06-12 RX ORDER — BISACODYL 10 MG
10 SUPPOSITORY, RECTAL RECTAL DAILY PRN
Status: DISCONTINUED | OUTPATIENT
Start: 2024-06-12 | End: 2024-06-17 | Stop reason: HOSPADM

## 2024-06-12 RX ORDER — INSULIN GLARGINE 100 [IU]/ML
20 INJECTION, SOLUTION SUBCUTANEOUS NIGHTLY
Status: DISCONTINUED | OUTPATIENT
Start: 2024-06-12 | End: 2024-06-17

## 2024-06-12 RX ORDER — MORPHINE SULFATE 4 MG/ML
4 INJECTION, SOLUTION INTRAMUSCULAR; INTRAVENOUS EVERY 4 HOURS PRN
Status: DISCONTINUED | OUTPATIENT
Start: 2024-06-12 | End: 2024-06-12

## 2024-06-12 RX ORDER — HYDROXYZINE HYDROCHLORIDE 10 MG/1
10 TABLET, FILM COATED ORAL 3 TIMES DAILY PRN
Status: DISCONTINUED | OUTPATIENT
Start: 2024-06-12 | End: 2024-06-17 | Stop reason: HOSPADM

## 2024-06-12 RX ORDER — ENOXAPARIN SODIUM 100 MG/ML
40 INJECTION SUBCUTANEOUS DAILY
Status: DISCONTINUED | OUTPATIENT
Start: 2024-06-12 | End: 2024-06-12

## 2024-06-12 RX ORDER — GUAIFENESIN 200 MG/10ML
200 LIQUID ORAL EVERY 4 HOURS PRN
Status: DISCONTINUED | OUTPATIENT
Start: 2024-06-12 | End: 2024-06-17 | Stop reason: HOSPADM

## 2024-06-12 RX ORDER — HEPARIN SODIUM 1000 [USP'U]/ML
2000 INJECTION, SOLUTION INTRAVENOUS; SUBCUTANEOUS PRN
Status: DISCONTINUED | OUTPATIENT
Start: 2024-06-12 | End: 2024-06-15

## 2024-06-12 RX ORDER — GABAPENTIN 300 MG/1
300 CAPSULE ORAL 3 TIMES DAILY
Status: DISCONTINUED | OUTPATIENT
Start: 2024-06-12 | End: 2024-06-17 | Stop reason: HOSPADM

## 2024-06-12 RX ORDER — SODIUM CHLORIDE 0.9 % (FLUSH) 0.9 %
5-40 SYRINGE (ML) INJECTION PRN
Status: DISCONTINUED | OUTPATIENT
Start: 2024-06-12 | End: 2024-06-17 | Stop reason: HOSPADM

## 2024-06-12 RX ORDER — GUAIFENESIN 600 MG/1
600 TABLET, EXTENDED RELEASE ORAL 2 TIMES DAILY
Status: DISCONTINUED | OUTPATIENT
Start: 2024-06-12 | End: 2024-06-17 | Stop reason: HOSPADM

## 2024-06-12 RX ADMIN — LURASIDONE HYDROCHLORIDE 20 MG: 20 TABLET, FILM COATED ORAL at 15:55

## 2024-06-12 RX ADMIN — GUAIFENESIN 600 MG: 600 TABLET, EXTENDED RELEASE ORAL at 22:21

## 2024-06-12 RX ADMIN — FENTANYL CITRATE 50 MCG: 50 INJECTION INTRAMUSCULAR; INTRAVENOUS at 08:34

## 2024-06-12 RX ADMIN — HEPARIN SODIUM 4000 UNITS: 1000 INJECTION INTRAVENOUS; SUBCUTANEOUS at 15:44

## 2024-06-12 RX ADMIN — FUROSEMIDE 40 MG: 10 INJECTION, SOLUTION INTRAMUSCULAR; INTRAVENOUS at 17:13

## 2024-06-12 RX ADMIN — HEPARIN SODIUM AND DEXTROSE 10 UNITS/KG/HR: 10000; 5 INJECTION INTRAVENOUS at 15:47

## 2024-06-12 RX ADMIN — SODIUM CHLORIDE, PRESERVATIVE FREE 10 ML: 5 INJECTION INTRAVENOUS at 11:27

## 2024-06-12 RX ADMIN — BENZONATATE 100 MG: 100 CAPSULE ORAL at 13:33

## 2024-06-12 RX ADMIN — MORPHINE SULFATE 4 MG: 4 INJECTION INTRAVENOUS at 09:08

## 2024-06-12 RX ADMIN — METHOCARBAMOL TABLETS 500 MG: 500 TABLET, COATED ORAL at 11:25

## 2024-06-12 RX ADMIN — SODIUM CHLORIDE 1000 MG: 900 INJECTION INTRAVENOUS at 14:15

## 2024-06-12 RX ADMIN — NITROGLYCERIN 0.4 MG: 0.4 TABLET, ORALLY DISINTEGRATING SUBLINGUAL at 08:35

## 2024-06-12 RX ADMIN — MORPHINE SULFATE 2 MG: 2 INJECTION, SOLUTION INTRAMUSCULAR; INTRAVENOUS at 11:26

## 2024-06-12 RX ADMIN — ENOXAPARIN SODIUM 40 MG: 100 INJECTION SUBCUTANEOUS at 11:26

## 2024-06-12 RX ADMIN — INSULIN GLARGINE 20 UNITS: 100 INJECTION, SOLUTION SUBCUTANEOUS at 22:21

## 2024-06-12 RX ADMIN — GABAPENTIN 300 MG: 300 CAPSULE ORAL at 22:19

## 2024-06-12 RX ADMIN — GABAPENTIN 300 MG: 300 CAPSULE ORAL at 11:28

## 2024-06-12 RX ADMIN — METHOCARBAMOL TABLETS 500 MG: 500 TABLET, COATED ORAL at 13:33

## 2024-06-12 RX ADMIN — OXYCODONE AND ACETAMINOPHEN 1 TABLET: 5; 325 TABLET ORAL at 15:55

## 2024-06-12 RX ADMIN — SODIUM CHLORIDE: 9 INJECTION, SOLUTION INTRAVENOUS at 17:09

## 2024-06-12 RX ADMIN — CEPHALEXIN 500 MG: 250 CAPSULE ORAL at 11:25

## 2024-06-12 RX ADMIN — FUROSEMIDE 40 MG: 10 INJECTION, SOLUTION INTRAMUSCULAR; INTRAVENOUS at 10:15

## 2024-06-12 RX ADMIN — MORPHINE SULFATE 2 MG: 2 INJECTION, SOLUTION INTRAMUSCULAR; INTRAVENOUS at 22:33

## 2024-06-12 RX ADMIN — AZITHROMYCIN MONOHYDRATE 500 MG: 500 INJECTION, POWDER, LYOPHILIZED, FOR SOLUTION INTRAVENOUS at 17:11

## 2024-06-12 RX ADMIN — BENZONATATE 100 MG: 100 CAPSULE ORAL at 22:20

## 2024-06-12 RX ADMIN — GABAPENTIN 300 MG: 300 CAPSULE ORAL at 13:33

## 2024-06-12 RX ADMIN — GUAIFENESIN 600 MG: 600 TABLET, EXTENDED RELEASE ORAL at 11:26

## 2024-06-12 RX ADMIN — SODIUM CHLORIDE, PRESERVATIVE FREE 10 ML: 5 INJECTION INTRAVENOUS at 22:34

## 2024-06-12 RX ADMIN — INSULIN LISPRO 4 UNITS: 100 INJECTION, SOLUTION INTRAVENOUS; SUBCUTANEOUS at 12:30

## 2024-06-12 RX ADMIN — METHOCARBAMOL TABLETS 500 MG: 500 TABLET, COATED ORAL at 22:19

## 2024-06-12 ASSESSMENT — PAIN DESCRIPTION - DESCRIPTORS
DESCRIPTORS: STABBING

## 2024-06-12 ASSESSMENT — PAIN SCALES - GENERAL
PAINLEVEL_OUTOF10: 7
PAINLEVEL_OUTOF10: 8
PAINLEVEL_OUTOF10: 9
PAINLEVEL_OUTOF10: 8
PAINLEVEL_OUTOF10: 9

## 2024-06-12 ASSESSMENT — PAIN DESCRIPTION - ORIENTATION
ORIENTATION: LEFT
ORIENTATION: LOWER
ORIENTATION: LEFT
ORIENTATION: LOWER
ORIENTATION: LOWER

## 2024-06-12 ASSESSMENT — PAIN DESCRIPTION - LOCATION
LOCATION: RIB CAGE
LOCATION: BACK;RIB CAGE
LOCATION: BACK

## 2024-06-12 ASSESSMENT — PAIN DESCRIPTION - PAIN TYPE: TYPE: ACUTE PAIN

## 2024-06-12 NOTE — ED PROVIDER NOTES
[facility]ospit[facility] EMERGENCY DEPT  EMERGENCY DEPARTMENT ENCOUNTER    Patient Name: Keaton Van  MRN: [id number]  YOB: 1965  Provider: Jean-Claude Rojas MD  PCP: No primary care provider on file.  Time/Date of evaluation: 8:11 AM EDT on 6/12/24    History of Presenting Illness     Chief Complaint   Patient presents with    Shortness of Breath     Pt BIBEMS from home c/o shortness of breath on and off \"for months\" with most recent episode beginning yesterday. Per EMS, pt woke up this morning on the floor, unsure how he got on the floor but pt does report left rib pain. Hx stents and DM noncompliant with meds per EMS. Pt arrives 93% on RA. Pt is A+Ox3, answering all questions appropriately.      History Provided by: Patient   History is limited by: Nothing    HISTORY (Narrative):   Keaton Van is a 59 y.o. male with a PMHX of bipolar disorder, depression, chronic pain, hypertension, diabetes, and medication noncompliance  who presents to the emergency department (room 11) by EMS C/O dyspnea on exertion, orthopnea, and left-sided chest pressure that is progressively worsened over the past month.  Patient also reports dizziness with standing.  He tells me he had a fall versus syncopal event last night and woke up this morning on the floor with left-sided rib pain.  He is unsure what he hit but states that the ED was out of position and he believes he hit the side table that the TV was sitting on.  He describes his left chest pain as \"elephant sitting on his chest\".  Patient reports smoking 6 to 7 cigarettes/day.    Nursing Notes were all reviewed and agreed with or any disagreements were addressed in the HPI.    Past History     PAST MEDICAL HISTORY:  Past Medical History:   Diagnosis Date    Adverse effect of anesthesia     breathing diff. with versed    Bipolar 1 disorder, mixed, moderate (HCC) 3/6/2017    Chronic pain     Depression     Pt stated diagnosed years ago    Diabetes (HCC) 2003    Drug-induced mood  abuse: Denies     Verbal abuse: Denies     Emotional abuse: Denies     Financial abuse: Denies     Sexual abuse: Denies   Utilities: Not on file       Review of Systems     Negative except as listed above in HPI.    Physical Exam     Vitals:    06/12/24 0745 06/12/24 0830 06/12/24 0915   BP: (!) 162/98 (!) 153/102 (!) 157/98   Pulse: 83 82 79   Resp: 22 15 17   Temp: 98.3 °F (36.8 °C)     TempSrc: Oral     SpO2: 93% 92% 92%   Weight: 91.4 kg (201 lb 8 oz)     Height: 1.829 m (6')         Physical Exam  Vitals and nursing note reviewed.   Constitutional:       Appearance: Normal appearance.   HENT:      Head: Normocephalic.   Cardiovascular:      Rate and Rhythm: Normal rate.      Heart sounds: No murmur heard.  Pulmonary:      Effort: Pulmonary effort is normal.      Breath sounds: Decreased air movement present.   Chest:      Chest wall: Tenderness present. No deformity or crepitus.       Abdominal:      General: Abdomen is flat. Bowel sounds are normal.      Tenderness: There is no abdominal tenderness.   Skin:     General: Skin is warm and dry.   Neurological:      General: No focal deficit present.      Mental Status: He is alert.       Diagnostic Study Results     LABS:  Results for orders placed or performed during the hospital encounter of 06/12/24   CBC with Auto Differential   Result Value Ref Range    WBC 8.1 4.1 - 11.1 K/uL    RBC 3.09 (L) 4.10 - 5.70 M/uL    Hemoglobin 8.4 (L) 12.1 - 17.0 g/dL    Hematocrit 27.0 (L) 36.6 - 50.3 %    MCV 87.4 80.0 - 99.0 FL    MCH 27.2 26.0 - 34.0 PG    MCHC 31.1 30.0 - 36.5 g/dL    RDW 15.0 (H) 11.5 - 14.5 %    Platelets 230 150 - 400 K/uL    MPV 10.1 8.9 - 12.9 FL    Nucleated RBCs 0.0 0  WBC    nRBC 0.00 0.00 - 0.01 K/uL    Neutrophils % 84 (H) 32 - 75 %    Lymphocytes % 8 (L) 12 - 49 %    Monocytes % 5 5 - 13 %    Eosinophils % 1 0 - 7 %    Basophils % 1 0 - 1 %    Immature Granulocytes % 1 (H) 0.0 - 0.5 %    Neutrophils Absolute 6.8 1.8 - 8.0 K/UL

## 2024-06-12 NOTE — PROGRESS NOTES
Patient seen and examined  This is a noncompliant patient with history of CAD status post stent, tobacco abuse, social issues who presented to the ED with shortness of breath, chest pain  Patient found to have pulmonary edema, elevated troponin concerning for NSTEMI  Patient was started on IV Lasix, continued on heparin drip due to rising troponin  Patient had a recent 2D echo which showed a EF of 60 to 65%  Cardiology consult pending  Patient was educated on the importance of being compliant as he is high risk of recurrent NSTEMI due to possible catheter complication and death.  Patient reported that he is not able to  his medication from the pharmacy due to him being disabled.  I informed the patient that he can get his medication delivered by the pharmacist or by [facility].  He manifested understanding  Physical exam showed  Gen ; NAD   CVS : RRR, S1-S2 normal limit  Pulm : Bilateral   Abdomen : NT ND     Discussed with NP    Nonbillable round

## 2024-06-12 NOTE — ED NOTES
Verbal (telephone) report given to [facility]enc[facility] (oncoming nurse) by Yoana (offgoing nurse) for transfer of care to inpatient unit. Report included the following information Nurse Handoff Report, ED Encounter Summary, ED SBAR, Intake/Output, MAR, Recent Results, and Neuro Assessment, and outstanding orders to be completed

## 2024-06-12 NOTE — H&P
Hospitalist Admission Note    NAME:   Keaton Vna   : 1965   MRN: [id number]     Date/Time: 2024 10:15 AM    Patient PCP: No primary care provider on file.    ______________________________________________________________________  Given the patient's current clinical presentation, I have a high level of concern for decompensation if discharged from the emergency department.  Complex decision making was performed, which includes reviewing the patient's available past medical records, laboratory results, and x-ray films.       My assessment of this patient's clinical condition and my plan of care is as follows.    Assessment / Plan:  Acute hypoxic respiratory failure requiring 2L O2  Shortness of breath, acute on chronic  Likely 2/2 pulmonary edema  Lifelong heavy smoker  Endorses cough productive of \"black\" sputum x 24h  Supplemental O2 as needed to keep sats >92%  Given report of productive cough, likelihood of underlying COPD, will treat empirically with rocephin and azithromycin for now  Dry CT chest pending to eval for infectious etiology  Sputum cx  Antitussives, mucolytics, nebs PRN  Check procal  Diuresis for pulmonary edema    CP, elevated troponins, concern for NSTEMI  Hx CAD w 2 stents  Troponins rising 353 now 1824  EKG sinus rhythm without ST elevations  pAF - in SR at this time  Not on OAC d/t risk for falls, anemia, and medication noncompliance  Pulmonary edema likely cardiogenic etiology  24 Echo - EF 60-65%, mild MVR, mild pulm HTN  Uncontrolled HTN  Cardiology consulted  CXR mild pulmonary edema, BNP 8000s  Cont diuresis with IV Lasix  Strict I&Os, daily weight, sodium restriction  Trops trending up - continue to trend q90 min until peaked  Initiate heparin gtt pending cardiology eval  Blood pressure control  Defer repeat Echo to cards    Acute on chronic anemia  Required PRBC transfusion on last admission  Suspect multifactorial including CKD, poor dietary intake  Hgb 8.4  Denies  - 49 %    Monocytes % 5 5 - 13 %    Eosinophils % 1 0 - 7 %    Basophils % 1 0 - 1 %    Immature Granulocytes % 1 (H) 0.0 - 0.5 %    Neutrophils Absolute 6.8 1.8 - 8.0 K/UL    Lymphocytes Absolute 0.6 (L) 0.8 - 3.5 K/UL    Monocytes Absolute 0.4 0.0 - 1.0 K/UL    Eosinophils Absolute 0.1 0.0 - 0.4 K/UL    Basophils Absolute 0.1 0.0 - 0.1 K/UL    Immature Granulocytes Absolute 0.1 (H) 0.00 - 0.04 K/UL    Differential Type SMEAR SCANNED      RBC Comment ANISOCYTOSIS  1+        RBC Comment HYPOCHROMIA  PRESENT       Comprehensive Metabolic Panel    Collection Time: 06/12/24  8:07 AM   Result Value Ref Range    Sodium 136 136 - 145 mmol/L    Potassium 4.8 3.5 - 5.1 mmol/L    Chloride 105 97 - 108 mmol/L    CO2 28 21 - 32 mmol/L    Anion Gap 3 (L) 5 - 15 mmol/L    Glucose 252 (H) 65 - 100 mg/dL    BUN 36 (H) 6 - 20 MG/DL    Creatinine 2.21 (H) 0.70 - 1.30 MG/DL    BUN/Creatinine Ratio 16 12 - 20      Est, Glom Filt Rate 33 (L) >60 ml/min/1.73m2    Calcium 8.6 8.5 - 10.1 MG/DL    Total Bilirubin 0.3 0.2 - 1.0 MG/DL    ALT 19 12 - 78 U/L    AST 26 15 - 37 U/L    Alk Phosphatase 135 (H) 45 - 117 U/L    Total Protein 7.4 6.4 - 8.2 g/dL    Albumin 2.8 (L) 3.5 - 5.0 g/dL    Globulin 4.6 (H) 2.0 - 4.0 g/dL    Albumin/Globulin Ratio 0.6 (L) 1.1 - 2.2     Magnesium    Collection Time: 06/12/24  8:07 AM   Result Value Ref Range    Magnesium 2.0 1.6 - 2.4 mg/dL   Extra Tubes Hold    Collection Time: 06/12/24  8:07 AM   Result Value Ref Range    Specimen HOld BLUE, RED, SST, DRK GRN     Comment:        Add-on orders for these samples will be processed based on acceptable specimen integrity and analyte stability, which may vary by analyte.   Troponin    Collection Time: 06/12/24  8:07 AM   Result Value Ref Range    Troponin, High Sensitivity 353 (HH) 0 - 76 ng/L   Brain Natriuretic Peptide    Collection Time: 06/12/24  8:07 AM   Result Value Ref Range    NT Pro-BNP 8,573 (H) <125 PG/ML         XR CHEST PORTABLE    Result Date:

## 2024-06-12 NOTE — PLAN OF CARE
Patient arrived from ED. Alert and oriented. Medicated for pain.  Started on Heparin drip. Abx is in progress.  Educated on safety and reminded to call for help.     Problem: Discharge Planning  Goal: Discharge to home or other facility with appropriate resources  Outcome: Progressing     Problem: Pain  Goal: Verbalizes/displays adequate comfort level or baseline comfort level  Outcome: Progressing     Problem: Chronic Conditions and Co-morbidities  Goal: Patient's chronic conditions and co-morbidity symptoms are monitored and maintained or improved  Outcome: Progressing     Problem: Safety - Adult  Goal: Free from fall injury  Outcome: Progressing     Problem: Skin/Tissue Integrity  Goal: Absence of new skin breakdown  Description: 1.  Monitor for areas of redness and/or skin breakdown  2.  Assess vascular access sites hourly  3.  Every 4-6 hours minimum:  Change oxygen saturation probe site  4.  Every 4-6 hours:  If on nasal continuous positive airway pressure, respiratory therapy assess nares and determine need for appliance change or resting period.  Outcome: Progressing

## 2024-06-12 NOTE — CARE COORDINATION
Care Management Initial Assessment       RUR: 22% HIGH   Readmission? Yes - -24  1st IM letter given? No, to be   1st  letter given: No      Initial note: Chart review completed prior to assessment. CM completed assessment with pt at bedside. CM introduced self, role of CM, verified demographics to ensure accuracy, and discussed transition of care planning. Pt known to CM team from previous admission. Pt resides alone in 2 level apartment with 2 TAN, 13 interior steps to second level with bed/bathroom. Pt owns a cane, no other DME. Pt denies support system. Will need transportation support at time of d/c. Pt has SDOH needs to include: housing insecurity, transportation needs, and financial strain. Pt provided with extensive community resources on previous admission. Discussed the importance of these resources with pt on today, and will provide again.     Pt reports that he has been served an eviction notice, will be evicted from his apartment around 2024 he believes. He has been provided with Homeless Connection Line, and encouraged to begin calling per guidelines when he is 3 days from homelessness. Pt encouraged to consider alternative housing arrangements in the meantime. He plans to return to his apartment once medically cleared at this admission. He also reports that he plans to contact the [facility] to see if there are other resources (pt is not established with [facility] for care, reports that he has tried but cannot get through.) Pt provided with [facility] Registration information at previous d/c. Pt has been living in current apartment since 2024, reports that he is no longer able to afford, as his roommate who was supposed to share costs never moved in. He was previously residing with his parents, who are now .     Pt denies finances to afford medications. He reports that he has used his available funds for the month ($1100/month) and does not have ability to pay for medications.  At previous d/c, CM attempted to use med voucher to assist with medication cost, which was unsuccessful. Pt also reports that his ride did not stop at pharmacy at previous d/c as instructed. Pharm[facility] preference [facility] . Anticipate that pt will have medication recommendations for d/c and reiterated importance of medication compliance. [employer] request has been submitted to begin Medicaid application. Per pt's reported income, anticipated to qualify for Medicaid coverage.    Pt does not have transportation support to attend outpatient follow up. He previously had support of his sisters, which he reports that he no longer has due to reported family dynamics. He reports previously seeing a PCP (Robert Wells) who he stated he would see again if he could get to office.  Medicaid coverage could provide transportation benefits to attend outpatient follow-up for pt.    He declines for his sisters/family to receive any information on his medical care, and has revoked his ACP document on file. Pt declined to complete new AMD, and declined to add emergency contacts to chart. Pt educated on the importance of emergency contacts during hospitalization, pt continued to decline.    CM will update AVS with resources and provide thorough hand-off for unit CM. Care management will continue to be available to assist as transition of care planning needs arise. Full readmission assessment below:         06/12/24 1141   Service Assessment   Patient Orientation Alert and Oriented   Cognition Alert   History Provided By Patient   Primary Caregiver Self   Support Systems None  (Pt denies having additional supports - estranged from family members, reports no dependable friends/alternative supports)   Patient's Healthcare Decision Maker is: Patient Declined (Legal Next of Kin Remains as Decision Maker)  (Pt has requested to rescind ACP document on file, which lists his sister, Evelyn Julio, as healthcare decision

## 2024-06-12 NOTE — PROGRESS NOTES
Patient unable to lay flat long enough to get ct scan because he could not breathe even with oxygen.

## 2024-06-13 ENCOUNTER — APPOINTMENT (OUTPATIENT)
Facility: HOSPITAL | Age: 59
DRG: 280 | End: 2024-06-13
Payer: MEDICARE

## 2024-06-13 PROBLEM — R79.89 ELEVATED TROPONIN: Status: ACTIVE | Noted: 2024-06-13

## 2024-06-13 PROBLEM — E11.65 TYPE 2 DIABETES MELLITUS WITH HYPERGLYCEMIA, WITH LONG-TERM CURRENT USE OF INSULIN (HCC): Status: ACTIVE | Noted: 2018-01-28

## 2024-06-13 PROBLEM — I50.9 ACUTE ON CHRONIC CONGESTIVE HEART FAILURE (HCC): Status: ACTIVE | Noted: 2024-06-13

## 2024-06-13 PROBLEM — Z79.4 TYPE 2 DIABETES MELLITUS WITH HYPERGLYCEMIA, WITH LONG-TERM CURRENT USE OF INSULIN (HCC): Status: ACTIVE | Noted: 2018-01-28

## 2024-06-13 LAB
ANION GAP SERPL CALC-SCNC: 5 MMOL/L (ref 5–15)
APTT PPP: 28.7 SEC (ref 22.1–31)
APTT PPP: 36.5 SEC (ref 22.1–31)
APTT PPP: 36.6 SEC (ref 22.1–31)
BASOPHILS # BLD: 0.1 K/UL (ref 0–0.1)
BASOPHILS NFR BLD: 1 % (ref 0–1)
BUN SERPL-MCNC: 40 MG/DL (ref 6–20)
BUN/CREAT SERPL: 18 (ref 12–20)
CALCIUM SERPL-MCNC: 8.8 MG/DL (ref 8.5–10.1)
CHLORIDE SERPL-SCNC: 105 MMOL/L (ref 97–108)
CO2 SERPL-SCNC: 27 MMOL/L (ref 21–32)
CREAT SERPL-MCNC: 2.19 MG/DL (ref 0.7–1.3)
DIFFERENTIAL METHOD BLD: ABNORMAL
EOSINOPHIL # BLD: 0.3 K/UL (ref 0–0.4)
EOSINOPHIL NFR BLD: 5 % (ref 0–7)
ERYTHROCYTE [DISTWIDTH] IN BLOOD BY AUTOMATED COUNT: 14.9 % (ref 11.5–14.5)
GLUCOSE BLD STRIP.AUTO-MCNC: 102 MG/DL (ref 65–117)
GLUCOSE BLD STRIP.AUTO-MCNC: 134 MG/DL (ref 65–117)
GLUCOSE BLD STRIP.AUTO-MCNC: 193 MG/DL (ref 65–117)
GLUCOSE BLD STRIP.AUTO-MCNC: 259 MG/DL (ref 65–117)
GLUCOSE SERPL-MCNC: 135 MG/DL (ref 65–100)
HCT VFR BLD AUTO: 26.2 % (ref 36.6–50.3)
HGB BLD-MCNC: 8.1 G/DL (ref 12.1–17)
IMM GRANULOCYTES # BLD AUTO: 0 K/UL (ref 0–0.04)
IMM GRANULOCYTES NFR BLD AUTO: 0 % (ref 0–0.5)
LYMPHOCYTES # BLD: 1 K/UL (ref 0.8–3.5)
LYMPHOCYTES NFR BLD: 20 % (ref 12–49)
MCH RBC QN AUTO: 26.8 PG (ref 26–34)
MCHC RBC AUTO-ENTMCNC: 30.9 G/DL (ref 30–36.5)
MCV RBC AUTO: 86.8 FL (ref 80–99)
MONOCYTES # BLD: 0.3 K/UL (ref 0–1)
MONOCYTES NFR BLD: 6 % (ref 5–13)
NEUTS SEG # BLD: 3.3 K/UL (ref 1.8–8)
NEUTS SEG NFR BLD: 68 % (ref 32–75)
NRBC # BLD: 0 K/UL (ref 0–0.01)
NRBC BLD-RTO: 0 PER 100 WBC
PLATELET # BLD AUTO: 191 K/UL (ref 150–400)
PMV BLD AUTO: 9.6 FL (ref 8.9–12.9)
POTASSIUM SERPL-SCNC: 4.7 MMOL/L (ref 3.5–5.1)
RBC # BLD AUTO: 3.02 M/UL (ref 4.1–5.7)
SERVICE CMNT-IMP: ABNORMAL
SERVICE CMNT-IMP: NORMAL
SODIUM SERPL-SCNC: 137 MMOL/L (ref 136–145)
THERAPEUTIC RANGE: ABNORMAL SECS (ref 58–77)
THERAPEUTIC RANGE: ABNORMAL SECS (ref 58–77)
THERAPEUTIC RANGE: NORMAL SECS (ref 58–77)
TROPONIN I SERPL HS-MCNC: 1039 NG/L (ref 0–76)
UFH PPP CHRO-ACNC: 0.15 IU/ML
WBC # BLD AUTO: 5 K/UL (ref 4.1–11.1)

## 2024-06-13 PROCEDURE — 6370000000 HC RX 637 (ALT 250 FOR IP)

## 2024-06-13 PROCEDURE — 70450 CT HEAD/BRAIN W/O DYE: CPT

## 2024-06-13 PROCEDURE — 6360000002 HC RX W HCPCS

## 2024-06-13 PROCEDURE — 97535 SELF CARE MNGMENT TRAINING: CPT | Performed by: OCCUPATIONAL THERAPIST

## 2024-06-13 PROCEDURE — 80048 BASIC METABOLIC PNL TOTAL CA: CPT

## 2024-06-13 PROCEDURE — 97162 PT EVAL MOD COMPLEX 30 MIN: CPT

## 2024-06-13 PROCEDURE — 36415 COLL VENOUS BLD VENIPUNCTURE: CPT

## 2024-06-13 PROCEDURE — 2700000000 HC OXYGEN THERAPY PER DAY

## 2024-06-13 PROCEDURE — 97165 OT EVAL LOW COMPLEX 30 MIN: CPT | Performed by: OCCUPATIONAL THERAPIST

## 2024-06-13 PROCEDURE — 6360000002 HC RX W HCPCS: Performed by: INTERNAL MEDICINE

## 2024-06-13 PROCEDURE — 85025 COMPLETE CBC W/AUTO DIFF WBC: CPT

## 2024-06-13 PROCEDURE — 85730 THROMBOPLASTIN TIME PARTIAL: CPT

## 2024-06-13 PROCEDURE — 97116 GAIT TRAINING THERAPY: CPT

## 2024-06-13 PROCEDURE — 1100000003 HC PRIVATE W/ TELEMETRY

## 2024-06-13 PROCEDURE — 2580000003 HC RX 258

## 2024-06-13 PROCEDURE — 97530 THERAPEUTIC ACTIVITIES: CPT

## 2024-06-13 PROCEDURE — 74176 CT ABD & PELVIS W/O CONTRAST: CPT

## 2024-06-13 PROCEDURE — 82962 GLUCOSE BLOOD TEST: CPT

## 2024-06-13 PROCEDURE — 99231 SBSQ HOSP IP/OBS SF/LOW 25: CPT

## 2024-06-13 RX ORDER — MORPHINE SULFATE 2 MG/ML
2 INJECTION, SOLUTION INTRAMUSCULAR; INTRAVENOUS ONCE
Status: COMPLETED | OUTPATIENT
Start: 2024-06-13 | End: 2024-06-13

## 2024-06-13 RX ORDER — MORPHINE SULFATE 2 MG/ML
2 INJECTION, SOLUTION INTRAMUSCULAR; INTRAVENOUS
Status: DISCONTINUED | OUTPATIENT
Start: 2024-06-13 | End: 2024-06-14

## 2024-06-13 RX ORDER — HYDRALAZINE HYDROCHLORIDE 20 MG/ML
10 INJECTION INTRAMUSCULAR; INTRAVENOUS EVERY 6 HOURS PRN
Status: DISCONTINUED | OUTPATIENT
Start: 2024-06-13 | End: 2024-06-17 | Stop reason: HOSPADM

## 2024-06-13 RX ADMIN — METHOCARBAMOL TABLETS 500 MG: 500 TABLET, COATED ORAL at 08:17

## 2024-06-13 RX ADMIN — GABAPENTIN 300 MG: 300 CAPSULE ORAL at 14:02

## 2024-06-13 RX ADMIN — INSULIN LISPRO 4 UNITS: 100 INJECTION, SOLUTION INTRAVENOUS; SUBCUTANEOUS at 17:07

## 2024-06-13 RX ADMIN — BENZONATATE 100 MG: 100 CAPSULE ORAL at 14:02

## 2024-06-13 RX ADMIN — GUAIFENESIN 600 MG: 600 TABLET, EXTENDED RELEASE ORAL at 08:17

## 2024-06-13 RX ADMIN — GABAPENTIN 300 MG: 300 CAPSULE ORAL at 20:54

## 2024-06-13 RX ADMIN — BENZONATATE 100 MG: 100 CAPSULE ORAL at 08:17

## 2024-06-13 RX ADMIN — MORPHINE SULFATE 2 MG: 2 INJECTION, SOLUTION INTRAMUSCULAR; INTRAVENOUS at 11:50

## 2024-06-13 RX ADMIN — MORPHINE SULFATE 2 MG: 2 INJECTION, SOLUTION INTRAMUSCULAR; INTRAVENOUS at 18:44

## 2024-06-13 RX ADMIN — GABAPENTIN 300 MG: 300 CAPSULE ORAL at 08:17

## 2024-06-13 RX ADMIN — AZITHROMYCIN MONOHYDRATE 500 MG: 500 INJECTION, POWDER, LYOPHILIZED, FOR SOLUTION INTRAVENOUS at 14:09

## 2024-06-13 RX ADMIN — HYDRALAZINE HYDROCHLORIDE 10 MG: 20 INJECTION INTRAMUSCULAR; INTRAVENOUS at 17:14

## 2024-06-13 RX ADMIN — HEPARIN SODIUM 4000 UNITS: 1000 INJECTION INTRAVENOUS; SUBCUTANEOUS at 09:53

## 2024-06-13 RX ADMIN — LURASIDONE HYDROCHLORIDE 20 MG: 20 TABLET, FILM COATED ORAL at 17:07

## 2024-06-13 RX ADMIN — METHOCARBAMOL TABLETS 500 MG: 500 TABLET, COATED ORAL at 14:02

## 2024-06-13 RX ADMIN — MORPHINE SULFATE 2 MG: 2 INJECTION, SOLUTION INTRAMUSCULAR; INTRAVENOUS at 16:24

## 2024-06-13 RX ADMIN — SODIUM CHLORIDE, PRESERVATIVE FREE 10 ML: 5 INJECTION INTRAVENOUS at 20:54

## 2024-06-13 RX ADMIN — MORPHINE SULFATE 2 MG: 2 INJECTION, SOLUTION INTRAMUSCULAR; INTRAVENOUS at 20:57

## 2024-06-13 RX ADMIN — MORPHINE SULFATE 2 MG: 2 INJECTION, SOLUTION INTRAMUSCULAR; INTRAVENOUS at 08:11

## 2024-06-13 RX ADMIN — MORPHINE SULFATE 2 MG: 2 INJECTION, SOLUTION INTRAMUSCULAR; INTRAVENOUS at 03:45

## 2024-06-13 RX ADMIN — SODIUM CHLORIDE, PRESERVATIVE FREE 10 ML: 5 INJECTION INTRAVENOUS at 08:21

## 2024-06-13 RX ADMIN — FUROSEMIDE 40 MG: 10 INJECTION, SOLUTION INTRAMUSCULAR; INTRAVENOUS at 17:07

## 2024-06-13 RX ADMIN — GUAIFENESIN 600 MG: 600 TABLET, EXTENDED RELEASE ORAL at 20:53

## 2024-06-13 RX ADMIN — HEPARIN SODIUM AND DEXTROSE 18 UNITS/KG/HR: 10000; 5 INJECTION INTRAVENOUS at 09:53

## 2024-06-13 RX ADMIN — FUROSEMIDE 40 MG: 10 INJECTION, SOLUTION INTRAMUSCULAR; INTRAVENOUS at 08:18

## 2024-06-13 RX ADMIN — MORPHINE SULFATE 2 MG: 2 INJECTION, SOLUTION INTRAMUSCULAR; INTRAVENOUS at 14:01

## 2024-06-13 RX ADMIN — BENZONATATE 100 MG: 100 CAPSULE ORAL at 20:54

## 2024-06-13 RX ADMIN — SODIUM CHLORIDE 1000 MG: 900 INJECTION INTRAVENOUS at 15:39

## 2024-06-13 RX ADMIN — INSULIN GLARGINE 20 UNITS: 100 INJECTION, SOLUTION SUBCUTANEOUS at 20:53

## 2024-06-13 RX ADMIN — MORPHINE SULFATE 2 MG: 2 INJECTION, SOLUTION INTRAMUSCULAR; INTRAVENOUS at 23:31

## 2024-06-13 RX ADMIN — METHOCARBAMOL TABLETS 500 MG: 500 TABLET, COATED ORAL at 20:53

## 2024-06-13 RX ADMIN — HEPARIN SODIUM 4000 UNITS: 1000 INJECTION INTRAVENOUS; SUBCUTANEOUS at 16:42

## 2024-06-13 RX ADMIN — HYDRALAZINE HYDROCHLORIDE 10 MG: 20 INJECTION INTRAMUSCULAR; INTRAVENOUS at 09:49

## 2024-06-13 ASSESSMENT — PAIN DESCRIPTION - DESCRIPTORS
DESCRIPTORS: ACHING
DESCRIPTORS: STABBING
DESCRIPTORS: ACHING

## 2024-06-13 ASSESSMENT — PAIN SCALES - GENERAL
PAINLEVEL_OUTOF10: 10
PAINLEVEL_OUTOF10: 8
PAINLEVEL_OUTOF10: 7
PAINLEVEL_OUTOF10: 7
PAINLEVEL_OUTOF10: 8
PAINLEVEL_OUTOF10: 10
PAINLEVEL_OUTOF10: 9
PAINLEVEL_OUTOF10: 7
PAINLEVEL_OUTOF10: 10
PAINLEVEL_OUTOF10: 6
PAINLEVEL_OUTOF10: 9
PAINLEVEL_OUTOF10: 7
PAINLEVEL_OUTOF10: 10
PAINLEVEL_OUTOF10: 9
PAINLEVEL_OUTOF10: 10
PAINLEVEL_OUTOF10: 9

## 2024-06-13 ASSESSMENT — PAIN DESCRIPTION - LOCATION
LOCATION: BACK

## 2024-06-13 ASSESSMENT — PAIN DESCRIPTION - ORIENTATION
ORIENTATION: LEFT;MID;LOWER
ORIENTATION: LOWER;LEFT;MID
ORIENTATION: LEFT;LOWER
ORIENTATION: LEFT;LOWER
ORIENTATION: MID;LOWER;LEFT
ORIENTATION: LEFT;LOWER;MID
ORIENTATION: LOWER;LEFT
ORIENTATION: LOWER

## 2024-06-13 ASSESSMENT — PAIN SCALES - WONG BAKER
WONGBAKER_NUMERICALRESPONSE: HURTS WHOLE LOT
WONGBAKER_NUMERICALRESPONSE: HURTS WHOLE LOT

## 2024-06-13 NOTE — PROGRESS NOTES
0700 Bedside and Verbal shift change report given to Misty RN (oncoming nurse) by Anup YA (offgoing nurse). Report included the following information Nurse Handoff Report, Index, ED Encounter Summary, ED SBAR, Intake/Output, MAR, Recent Results, and Cardiac Rhythm NSR .     0820 /92, HR 67. currently nothing ordered for BP control. Notified Nelly INFANTE    0852 verified aPTT result with pharmacy, stated to give 4,000 unit bolus and increase gtt by 4 units/kg/hr    1053 patient wants to know if morphine dose can be increased. Still c/o 10/10 pain and stated that percocet isnt helping. Messaged Nelly INFANTE - one time dose of 2mg morphine IV ordered    1236 Patient has right foot wound. Messaged Nelly INFANTE asking if he would like podiatry or wound care consulted for management - podiatry consult ordered    1342 patient requesting PRN morphine to switched to Q2h PRN instead of Q4h. Notified Nelly INFANTE - no new orders received    1632 verified aPTT result with Nohemy Amaro, stated to give 4,000 unit bolus and increase gtt by 4 units/kg/hr    End of Shift Note    Bedside shift change report given to Leeann YA (oncoming nurse) by Misty Jerez RN (offgoing nurse).  Report included the following information SBAR, Kardex, ED Summary, Intake/Output, MAR, Recent Results, and Cardiac Rhythm NSR    Shift worked: 7a to 7p     Shift summary and any significant changes:     See above    Morphine PRN given  x 5    WC orders in  Patient on 2L NC O2 for comfort.     Podiatry consulted for right foot wound.     Heparin gtt infusing, next aPTT at 2241     Concerns for physician to address:        Zone phone for oncoming shift:           Activity:     Number times ambulated in hallways past shift: 0  Number of times OOB to chair past shift: 1    Cardiac:   Cardiac Monitoring: Yes           Access:  Current line(s): PIV     Genitourinary:   Urinary status: voiding    Respiratory:      Chronic home O2 use?: NO  Incentive
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
## 2024-06-13 NOTE — PROGRESS NOTES
Went to see the patient. He’s out of the floor for CT will try to see his back tomorrow.  But did see him in transit on the elevator advised patient will most likely need local wound care will not need any further surgical treatment as per Podiatry will reevaluate him tomorrow

## 2024-06-13 NOTE — CONSULTS
[facility]  [address] [place] [address]  [phone]  [email or web address]       CARDIOLOGY CONSULTATION       Date of  Admission: 6/12/2024  7:48 AM     Admission type:Emergency   Primary Care Physician:No primary care provider on file.     Attending Provider: Khoi Dumont MD  Cardiology Provider: None  CC/REASON FOR CONSULT: Chest pain, elevated troponins, pulmonary edema, fluid overload     Subjective:     Keaton Van is a 59 y.o. male admitted for Tobacco abuse [Z72.0]  Hypoxia [R09.02]  Elevated troponin [R79.89]  CHF (congestive heart failure), NYHA class I, acute on chronic, combined ([facility]) [I50.43]  Contusion of left chest wall, initial encounter [S20.212A]  Acute on chronic congestive heart failure, unspecified heart failure type (HCC) [I50.9].    The patient was seen and examined at the bedside.  He reports a history of coronary stents in the past, diabetes mellitus, essential hypertension, diabetic neuropathy, lower extremity wounds, lower extremity amputation status of multiple toes and a possible history of heart failure.    The patient presented to the hospital because of shortness of breath for the last 1 to 2 weeks eventually leading to the patient passing out and waking on the floor.  He reports extreme pain over the left side of his chest and stomach.  The patient reports worsening breathing on laying down flat.  Smokes 6 to 7 cigarettes/day.  Quit cocaine about 1 month ago.  Denies any alcohol use.      Patient Active Problem List    Diagnosis Date Noted    Acute on chronic congestive heart failure (HCC) 06/13/2024    Elevated troponin 06/13/2024    CHF (congestive heart failure), NYHA class I, acute on chronic, combined ([facility]) 06/12/2024    Cellulitis of right foot 06/05/2024    Acute osteomyelitis of right foot ([facility]) 06/05/2024    Methicillin susceptible Staphylococcus aureus infection 06/05/2024    KIN (acute kidney injury) ([facility])

## 2024-06-13 NOTE — PROGRESS NOTES
Comprehensive Nutrition Assessment    Type and Reason for Visit:  Initial, Wound, Positive Nutrition Screen    Nutrition Recommendations/Plan:   Continue current diet  Ensure high protein BID  Please document % meals and supplements consumed in flowsheet I/O's under intake      Malnutrition Assessment:  Malnutrition Status:  Insufficient data (06/13/24 1436)        Nutrition Assessment:     Consult received for oral nutrition supplements. MST indicated at at risk. Chart reviewed. Pt medically noted for acute hypoxic respiratory failure, pulmonary edema, medical noncompliance, NSTEMI, cocaine abuse, CAD, PAF, uncontrolled HTN, acute on chronic anemia, CKD4, DM2, chronic bilateral foot wounds with hx of L toe amputation,s recent R 5th toe osteomyelitis s/p resection 6/1, bipolar disorder. Pt unavailable at time of RD visit, receiving nursing care. MST indicated decreased appetite and weight loss of 2-13#. Per EMR, pt has actually been gaining weight. Will add supplements to support adequate intake in light of recent amputation and continue monitoring.     Weight History Weight - Scale Weight - Scale Weight Method   6/13/2024 195 lbs 7 oz  195 lbs 7 oz 88.7 kg  88.7 kg Standing scale  Standing scale;Bedside scale   6/12/2024 201 lbs 8 oz 91.4 kg -   6/7/2024 185 lbs 83.9 kg Stated;Estimated   6/4/2024 189 lbs 10 oz 86 kg -   6/1/2024 189 lbs 10 oz 86 kg Standing scale   5/31/2024 190 lbs 1 oz 86.2 kg Standing scale   5/29/2024 170 lbs 77.1 kg -   7/18/2023 160 lbs 72.6 kg Stated   1/5/2023 161 lbs 6 oz 73.2 kg -   12/5/2022 178 lbs 6 oz 80.9 kg        Nutrition Related Findings:    Labs: Cr 2.19.   Meds: zithromax, rocephin, lasix, lantus.   Edema: 1+ RLE, trace LLE.   BM PTA.   Wound Type: Surgical Incision (R foot)       Current Nutrition Intake & Therapies:    Average Meal Intake: Unable to assess  Average Supplements Intake: None Ordered  ADULT DIET; Regular; 4 carb choices (60 gm/meal); Low Fat/Low Chol/High

## 2024-06-13 NOTE — CARE COORDINATION
CM received call from ACP manager ([place]) reiterating pt's request to complete DDNR prior to d/c. CM sent perfect serve message to attending MD informing him of request. MD agreeable to assisting in completion of DDNR tomorrow (6/14/24).    RA Dong  [facility] CM   [phone]

## 2024-06-13 NOTE — PLAN OF CARE
Problem: Discharge Planning  Goal: Discharge to home or other facility with appropriate resources  6/13/2024 0536 by Anup Tobin RN  Outcome: Progressing  6/13/2024 0304 by Anup Tobin RN  Outcome: Progressing  6/12/2024 1802 by Ascencion Barillas RN  Outcome: Progressing     Problem: Pain  Goal: Verbalizes/displays adequate comfort level or baseline comfort level  6/13/2024 0536 by Anup Tobin RN  Outcome: Progressing  6/13/2024 0304 by Anup Tobin RN  Outcome: Progressing  6/12/2024 1802 by Ascencion Barillas RN  Outcome: Progressing     Problem: Chronic Conditions and Co-morbidities  Goal: Patient's chronic conditions and co-morbidity symptoms are monitored and maintained or improved  6/13/2024 0536 by Anup Tobin RN  Outcome: Progressing  6/13/2024 0304 by Anup Tobin RN  Outcome: Progressing  6/12/2024 1802 by Ascencion Barillas RN  Outcome: Progressing     Problem: Safety - Adult  Goal: Free from fall injury  6/13/2024 0536 by Anup Tobin RN  Outcome: Progressing  6/13/2024 0304 by Anup Tobin RN  Outcome: Progressing  6/12/2024 1802 by Ascencion Barillas RN  Outcome: Progressing     Problem: Skin/Tissue Integrity  Goal: Absence of new skin breakdown  Description: 1.  Monitor for areas of redness and/or skin breakdown  2.  Assess vascular access sites hourly  3.  Every 4-6 hours minimum:  Change oxygen saturation probe site  4.  Every 4-6 hours:  If on nasal continuous positive airway pressure, respiratory therapy assess nares and determine need for appliance change or resting period.  6/13/2024 0536 by Anup Tobin RN  Outcome: Progressing  6/13/2024 0304 by Anup Tobin RN  Outcome: Progressing  6/12/2024 1802 by Ascencion Barillas RN  Outcome: Progressing

## 2024-06-13 NOTE — PLAN OF CARE
Problem: Occupational Therapy - Adult  Goal: By Discharge: Performs self-care activities at highest level of function for planned discharge setting.  See evaluation for individualized goals.  Description: FUNCTIONAL STATUS PRIOR TO ADMISSION:  recent toe amputation, was ambulating with SPC and post op shoe, reports he has limited resources and follow up appointments are useless due to inability to get to appointments, he reports he moved into a apartment and a lady friend was suppose to move in with him to help with rent but she never did and he will be evicted in July, doesn't drive/own vehicle, performed ADLS on his own and was standing to shower, reports having loss of balance and dizziness in standing especially with eyes closed and needs to lean against shower wall, performed ADLS on his own  Receives Help From: Other (comment) (Denies support systems), ADL Assistance: Independent,  ,  ,  ,  ,  , Homemaking Assistance: Independent, Ambulation Assistance: Independent, Transfer Assistance: Independent, Active : No     HOME SUPPORT: Patient lived alone.    Occupational Therapy Goals:  Initiated 6/13/2024  1.  Patient will perform grooming with Modified Cooper within 7 day(s).  2.  Patient will perform upper body dressing and lower body dressing with Modified Cooper within 7 day(s).  3.  Patient will perform toileting with Modified Cooper within 7 day(s).  4.  Patient will perform toilet transfers with Modified Cooper  within 7 day(s).      Outcome: Not Progressing  OCCUPATIONAL THERAPY EVALUATION    Patient: Keaton Van (59 y.o. male)  Date: 6/13/2024  Primary Diagnosis: Tobacco abuse [Z72.0]  Hypoxia [R09.02]  Elevated troponin [R79.89]  CHF (congestive heart failure), NYHA class I, acute on chronic, combined (HCC) [I50.43]  Contusion of left chest wall, initial encounter [S20.212A]  Acute on chronic congestive heart failure, unspecified heart failure type (HCC) [I50.9]

## 2024-06-13 NOTE — PLAN OF CARE
Problem: Discharge Planning  Goal: Discharge to home or other facility with appropriate resources  6/13/2024 0304 by Anup Tobin RN  Outcome: Progressing  6/12/2024 1802 by Ascencion Barillas RN  Outcome: Progressing     Problem: Pain  Goal: Verbalizes/displays adequate comfort level or baseline comfort level  6/13/2024 0304 by Anup Tobin RN  Outcome: Progressing  6/12/2024 1802 by Ascencion Barillas RN  Outcome: Progressing     Problem: Chronic Conditions and Co-morbidities  Goal: Patient's chronic conditions and co-morbidity symptoms are monitored and maintained or improved  6/13/2024 0304 by Anup Tobin RN  Outcome: Progressing  6/12/2024 1802 by Ascencion Barillas RN  Outcome: Progressing     Problem: Safety - Adult  Goal: Free from fall injury  6/13/2024 0304 by Anup Tobin RN  Outcome: Progressing  6/12/2024 1802 by Ascencion Barillas RN  Outcome: Progressing     Problem: Skin/Tissue Integrity  Goal: Absence of new skin breakdown  Description: 1.  Monitor for areas of redness and/or skin breakdown  2.  Assess vascular access sites hourly  3.  Every 4-6 hours minimum:  Change oxygen saturation probe site  4.  Every 4-6 hours:  If on nasal continuous positive airway pressure, respiratory therapy assess nares and determine need for appliance change or resting period.  6/13/2024 0304 by Anup Tobin RN  Outcome: Progressing  6/12/2024 1802 by Ascencion Barillas RN  Outcome: Progressing

## 2024-06-13 NOTE — PROGRESS NOTES
06/13/24 1216 06/13/24 1226 06/13/24 1229   Vital Signs   Pulse 78 74 75   Heart Rate Source Monitor Monitor  --    BP (!) 149/86 (!) 126/59 123/70   MAP (Calculated) 107 81 88   BP Location Right upper arm Right upper arm Right upper arm   BP Method Automatic Automatic Automatic   Patient Position Sitting Standing Standing  (after ambulation to door and back to edge of bed)   Oxygen Therapy   SpO2 92 %  --  (!) 89 %   O2 Device None (Room air)  --  None (Room air)      06/13/24 1232   Vital Signs   Pulse  --    Heart Rate Source  --    BP  --    MAP (Calculated)  --    BP Location  --    BP Method  --    Patient Position  --    Oxygen Therapy   SpO2 95 %   O2 Device None (Room air)

## 2024-06-13 NOTE — PROGRESS NOTES
Spiritual Care Assessment/Progress Note  [facility]    Name: Keaton Van MRN: [id number]    Age: 59 y.o.     Sex: male   Language: English     Date: 6/13/2024            Total Time Calculated: 44 min              Spiritual Assessment begun in [facility] 2 CARDIAC MEDICAL STEP DOWN  Service Provided For: Patient  Referral/Consult From: Nurse  Encounter Overview/Reason: Initial Encounter    Spiritual beliefs:      [x] Involved in a tunde tradition/spiritual practice:      [] Supported by a tunde community:      [] Claims no spiritual orientation:      [] Seeking spiritual identity:           [] Adheres to an individual form of spirituality:      [] Not able to assess:                Identified resources for coping and support system:   Support System: Unknown       [x] Prayer                  [] Devotional reading               [] Music                  [] Guided Imagery     [] Pet visits                                        [] Other: (COMMENT)     Specific area/focus of visit   Encounter:    Crisis:    Spiritual/Emotional needs: Type: Spiritual Support, Emotional Distress  Ritual, Rites and Sacraments:    Grief, Loss, and Adjustments:    Ethics/Mediation:    Behavioral Health:    Palliative Care:    Advance Care Planning:      Plan/Referrals: Continue to visit, (comment), Continue Support (comment)    Narrative:  received a consult order from Ángela YA to visit with Mr. Van. He shared his concerns in reference to transportation, a roof over his head, food and assistance after he is discharged from the hospital. At his request, the  will contact his  to help connect him to resources.  will notify his nurse of his request to meet with his .  affirmed the patient's thoughts and concerns. Patient expressed his appreciation of his nurse Misty YA and the excellent care she has given him.  provided a presence, encouragement, empathetic listening

## 2024-06-13 NOTE — PLAN OF CARE
Problem: Physical Therapy - Adult  Goal: By Discharge: Performs mobility at highest level of function for planned discharge setting.  See evaluation for individualized goals.  Description: FUNCTIONAL STATUS PRIOR TO ADMISSION: Ambulates mod I with use of SPC. Reports history of 3-4 recent falls due to \"dizziness.\" Pt recently left AMA from hospital after 5th toe amputation. Per podiatry note dated 6/7/24, pt to be heel weightbearing RLE with post-op shoe and WBAT LLE.     HOME SUPPORT PRIOR TO ADMISSION: The patient lived alone with no support.     Physical Therapy Goals  Initiated 6/13/2024  1.  Patient will move from supine to sit and sit to supine, scoot up and down, and roll side to side in bed with modified independence within 7 day(s).    2.  Patient will perform sit to stand with modified independence within 7 day(s).  3.  Patient will transfer from bed to chair and chair to bed with modified independence using the least restrictive device within 7 day(s).  4.  Patient will ambulate with modified independence for 100 feet with the least restrictive device within 7 day(s).   5.  Patient will ascend/descend 14 stairs with 1 handrail(s) with modified independence within 7 day(s).   Outcome: Progressing   PHYSICAL THERAPY EVALUATION    Patient: Keaton Van (59 y.o. male)  Date: 6/13/2024  Primary Diagnosis: Tobacco abuse [Z72.0]  Hypoxia [R09.02]  Elevated troponin [R79.89]  CHF (congestive heart failure), NYHA class I, acute on chronic, combined (HCC) [I50.43]  Contusion of left chest wall, initial encounter [S20.212A]  Acute on chronic congestive heart failure, unspecified heart failure type (HCC) [I50.9]       Precautions:                  heel weightbearing RLE with post-op shoe, per podiatry note 6/7/24      ASSESSMENT :   DEFICITS/IMPAIRMENTS:   The patient is limited by decreased insight, impaired safety awareness, dizziness during mobility, impaired activity tolerance, generalized weakness, and overall

## 2024-06-13 NOTE — PROGRESS NOTES
Hospitalist Progress Note    NAME:   Keaton Van   : 1965   MRN: [id number]     Date/Time: 2024 8:14 AM  Patient PCP: No primary care provider on file.    Estimated discharge date: 6/15  Barriers: Clinical improvement, weaning of oxygen, cardiology clearance, hemoglobin stability, CT head, CTA chest abdomen pelvis, podiatry consult      Assessment / Plan:  Acute hypoxic respiratory failure requiring 2L O2  Shortness of breath, acute on chronic  Likely 2/2 pulmonary edema  Lifelong heavy smoker  Endorses cough productive of \"black\" sputum x 24h  Medical noncompliance  Supplemental O2 as needed to keep sats >92%  Given report of productive cough, likelihood of underlying COPD, will treat empirically with rocephin and azithromycin for now  Dry CT chest pending to eval for infectious etiology  Sputum cx  Antitussives, mucolytics, nebs PRN  Check procal  Diuresis for pulmonary edema  : Patient has been evaluated by cardiology and they are planning for more cardiac workup.       Chest pain  NSTEMI  Cocaine abuse  CAD w 2 stents  Troponins rising 353 now 1824  EKG sinus rhythm without ST elevations  pAF - in SR at this time  Not on OAC d/t risk for falls, anemia, and medication noncompliance  Pulmonary edema likely cardiogenic etiology  24 Echo - EF 60-65%, mild MVR, mild pulm HTN  Uncontrolled HTN  Cardiology consulted  CXR mild pulmonary edema, BNP 8000s  Cont diuresis with IV Lasix  Strict I&Os, daily weight, sodium restriction  Trops trending up - continue to trend q90 min until peaked  Initiate heparin gtt pending cardiology eval  Blood pressure control  Defer repeat Echo to cards     Acute on chronic anemia  Required PRBC transfusion on last admission  Suspect multifactorial including CKD, poor dietary intake  Hgb 8.4  Denies melena, hematuria, hematochezia, hematemesis  Trend H&H     Left ribs and flank pain  CXR no fractures following fall w trauma to left chest and flank  CT CAP pending  the left flank/back    Reviewed most current lab test results and cultures  YES  Reviewed most current radiology test results   YES  Review and summation of old records today    NO  Reviewed patient's current orders and MAR    YES  PMH/SH reviewed - no change compared to H&P    Procedures: see electronic medical records for all procedures/Xrays and details which were not copied into this note but were reviewed prior to creation of Plan.      LABS:  I reviewed today's most current labs and imaging studies.  Pertinent labs include:  Recent Labs     06/12/24  0807 06/13/24  0723   WBC 8.1 5.0   HGB 8.4* 8.1*   HCT 27.0* 26.2*    191     Recent Labs     06/12/24  0807 06/12/24  1731 06/13/24  0723     --  137   K 4.8  --  4.7     --  105   CO2 28  --  27   GLUCOSE 252*  --  135*   BUN 36*  --  40*   CREATININE 2.21*  --  2.19*   CALCIUM 8.6  --  8.8   MG 2.0  --   --    BILITOT 0.3  --   --    AST 26  --   --    ALT 19  --   --    INR  --  1.1  --        Signed: Milla Villegas MD

## 2024-06-13 NOTE — PLAN OF CARE
Problem: Discharge Planning  Goal: Discharge to home or other facility with appropriate resources  6/13/2024 1246 by Misty Jerez RN  Outcome: Progressing  6/13/2024 0536 by Anup Tobin RN  Outcome: Progressing  6/13/2024 0304 by Anup Tobin RN  Outcome: Progressing     Problem: Pain  Goal: Verbalizes/displays adequate comfort level or baseline comfort level  6/13/2024 1246 by Misty Jerez RN  Outcome: Progressing  6/13/2024 0536 by Anup Tobin RN  Outcome: Progressing  6/13/2024 0304 by Anup Tobin RN  Outcome: Progressing     Problem: Chronic Conditions and Co-morbidities  Goal: Patient's chronic conditions and co-morbidity symptoms are monitored and maintained or improved  6/13/2024 1246 by Misty Jerez RN  Outcome: Progressing  6/13/2024 0536 by Anup Tobin RN  Outcome: Progressing  6/13/2024 0304 by Anup Tobin RN  Outcome: Progressing     Problem: Safety - Adult  Goal: Free from fall injury  6/13/2024 1246 by Misty Jerez RN  Outcome: Progressing  6/13/2024 0536 by Anup Tobin RN  Outcome: Progressing  6/13/2024 0304 by Anup Tobin RN  Outcome: Progressing     Problem: Skin/Tissue Integrity  Goal: Absence of new skin breakdown  Description: 1.  Monitor for areas of redness and/or skin breakdown  2.  Assess vascular access sites hourly  3.  Every 4-6 hours minimum:  Change oxygen saturation probe site  4.  Every 4-6 hours:  If on nasal continuous positive airway pressure, respiratory therapy assess nares and determine need for appliance change or resting period.  6/13/2024 1246 by Misty Jerez RN  Outcome: Progressing  6/13/2024 0536 by Anup Tobin RN  Outcome: Progressing  6/13/2024 0304 by Anup Tobin RN  Outcome: Progressing

## 2024-06-14 LAB
ANION GAP SERPL CALC-SCNC: 3 MMOL/L (ref 5–15)
APTT PPP: 45.6 SEC (ref 22.1–31)
APTT PPP: 53.4 SEC (ref 22.1–31)
APTT PPP: 57.8 SEC (ref 22.1–31)
BASOPHILS # BLD: 0.1 K/UL (ref 0–0.1)
BASOPHILS NFR BLD: 1 % (ref 0–1)
BUN SERPL-MCNC: 49 MG/DL (ref 6–20)
BUN/CREAT SERPL: 19 (ref 12–20)
CALCIUM SERPL-MCNC: 8.7 MG/DL (ref 8.5–10.1)
CHLORIDE SERPL-SCNC: 104 MMOL/L (ref 97–108)
CO2 SERPL-SCNC: 29 MMOL/L (ref 21–32)
CREAT SERPL-MCNC: 2.56 MG/DL (ref 0.7–1.3)
DIFFERENTIAL METHOD BLD: ABNORMAL
EOSINOPHIL # BLD: 0.2 K/UL (ref 0–0.4)
EOSINOPHIL NFR BLD: 5 % (ref 0–7)
ERYTHROCYTE [DISTWIDTH] IN BLOOD BY AUTOMATED COUNT: 15.2 % (ref 11.5–14.5)
GLUCOSE BLD STRIP.AUTO-MCNC: 119 MG/DL (ref 65–117)
GLUCOSE BLD STRIP.AUTO-MCNC: 184 MG/DL (ref 65–117)
GLUCOSE BLD STRIP.AUTO-MCNC: 190 MG/DL (ref 65–117)
GLUCOSE BLD STRIP.AUTO-MCNC: 248 MG/DL (ref 65–117)
GLUCOSE SERPL-MCNC: 85 MG/DL (ref 65–100)
HCT VFR BLD AUTO: 25.8 % (ref 36.6–50.3)
HGB BLD-MCNC: 7.8 G/DL (ref 12.1–17)
IMM GRANULOCYTES # BLD AUTO: 0 K/UL (ref 0–0.04)
IMM GRANULOCYTES NFR BLD AUTO: 0 % (ref 0–0.5)
LYMPHOCYTES # BLD: 1.1 K/UL (ref 0.8–3.5)
LYMPHOCYTES NFR BLD: 21 % (ref 12–49)
MAGNESIUM SERPL-MCNC: 1.9 MG/DL (ref 1.6–2.4)
MCH RBC QN AUTO: 26.4 PG (ref 26–34)
MCHC RBC AUTO-ENTMCNC: 30.2 G/DL (ref 30–36.5)
MCV RBC AUTO: 87.5 FL (ref 80–99)
MONOCYTES # BLD: 0.5 K/UL (ref 0–1)
MONOCYTES NFR BLD: 9 % (ref 5–13)
NEUTS SEG # BLD: 3.2 K/UL (ref 1.8–8)
NEUTS SEG NFR BLD: 63 % (ref 32–75)
NRBC # BLD: 0 K/UL (ref 0–0.01)
NRBC BLD-RTO: 0 PER 100 WBC
PHOSPHATE SERPL-MCNC: 4.4 MG/DL (ref 2.6–4.7)
PLATELET # BLD AUTO: 221 K/UL (ref 150–400)
PMV BLD AUTO: 10.3 FL (ref 8.9–12.9)
POTASSIUM SERPL-SCNC: 4.6 MMOL/L (ref 3.5–5.1)
RBC # BLD AUTO: 2.95 M/UL (ref 4.1–5.7)
SERVICE CMNT-IMP: ABNORMAL
SODIUM SERPL-SCNC: 136 MMOL/L (ref 136–145)
THERAPEUTIC RANGE: ABNORMAL SECS (ref 58–77)
WBC # BLD AUTO: 5 K/UL (ref 4.1–11.1)

## 2024-06-14 PROCEDURE — 6360000002 HC RX W HCPCS: Performed by: INTERNAL MEDICINE

## 2024-06-14 PROCEDURE — 85025 COMPLETE CBC W/AUTO DIFF WBC: CPT

## 2024-06-14 PROCEDURE — 85730 THROMBOPLASTIN TIME PARTIAL: CPT

## 2024-06-14 PROCEDURE — 36415 COLL VENOUS BLD VENIPUNCTURE: CPT

## 2024-06-14 PROCEDURE — 83735 ASSAY OF MAGNESIUM: CPT

## 2024-06-14 PROCEDURE — 2580000003 HC RX 258

## 2024-06-14 PROCEDURE — 6360000002 HC RX W HCPCS

## 2024-06-14 PROCEDURE — 80048 BASIC METABOLIC PNL TOTAL CA: CPT

## 2024-06-14 PROCEDURE — 1100000003 HC PRIVATE W/ TELEMETRY

## 2024-06-14 PROCEDURE — 6370000000 HC RX 637 (ALT 250 FOR IP)

## 2024-06-14 PROCEDURE — 84100 ASSAY OF PHOSPHORUS: CPT

## 2024-06-14 PROCEDURE — 82962 GLUCOSE BLOOD TEST: CPT

## 2024-06-14 PROCEDURE — 6370000000 HC RX 637 (ALT 250 FOR IP): Performed by: INTERNAL MEDICINE

## 2024-06-14 PROCEDURE — 6370000000 HC RX 637 (ALT 250 FOR IP): Performed by: STUDENT IN AN ORGANIZED HEALTH CARE EDUCATION/TRAINING PROGRAM

## 2024-06-14 RX ORDER — SENNOSIDES A AND B 8.6 MG/1
1 TABLET, FILM COATED ORAL NIGHTLY
Status: DISCONTINUED | OUTPATIENT
Start: 2024-06-14 | End: 2024-06-17 | Stop reason: HOSPADM

## 2024-06-14 RX ORDER — LIDOCAINE 4 G/G
1 PATCH TOPICAL DAILY
Status: DISCONTINUED | OUTPATIENT
Start: 2024-06-14 | End: 2024-06-17 | Stop reason: HOSPADM

## 2024-06-14 RX ORDER — HYDROMORPHONE HYDROCHLORIDE 2 MG/1
2 TABLET ORAL EVERY 4 HOURS PRN
Status: DISCONTINUED | OUTPATIENT
Start: 2024-06-14 | End: 2024-06-15

## 2024-06-14 RX ORDER — ACETAMINOPHEN 500 MG
1000 TABLET ORAL EVERY 8 HOURS
Status: DISCONTINUED | OUTPATIENT
Start: 2024-06-14 | End: 2024-06-17 | Stop reason: HOSPADM

## 2024-06-14 RX ORDER — ASPIRIN 81 MG/1
81 TABLET ORAL DAILY
Status: DISCONTINUED | OUTPATIENT
Start: 2024-06-14 | End: 2024-06-17 | Stop reason: HOSPADM

## 2024-06-14 RX ADMIN — HEPARIN SODIUM 2000 UNITS: 1000 INJECTION INTRAVENOUS; SUBCUTANEOUS at 19:29

## 2024-06-14 RX ADMIN — AZITHROMYCIN MONOHYDRATE 500 MG: 500 INJECTION, POWDER, LYOPHILIZED, FOR SOLUTION INTRAVENOUS at 13:56

## 2024-06-14 RX ADMIN — SODIUM CHLORIDE 1000 MG: 900 INJECTION INTRAVENOUS at 15:09

## 2024-06-14 RX ADMIN — FUROSEMIDE 40 MG: 10 INJECTION, SOLUTION INTRAMUSCULAR; INTRAVENOUS at 17:49

## 2024-06-14 RX ADMIN — METHOCARBAMOL TABLETS 500 MG: 500 TABLET, COATED ORAL at 13:49

## 2024-06-14 RX ADMIN — METHOCARBAMOL TABLETS 500 MG: 500 TABLET, COATED ORAL at 08:45

## 2024-06-14 RX ADMIN — SODIUM CHLORIDE: 9 INJECTION, SOLUTION INTRAVENOUS at 15:08

## 2024-06-14 RX ADMIN — FUROSEMIDE 40 MG: 10 INJECTION, SOLUTION INTRAMUSCULAR; INTRAVENOUS at 08:46

## 2024-06-14 RX ADMIN — BENZONATATE 100 MG: 100 CAPSULE ORAL at 13:49

## 2024-06-14 RX ADMIN — HYDROMORPHONE HYDROCHLORIDE 2 MG: 2 TABLET ORAL at 17:46

## 2024-06-14 RX ADMIN — SODIUM CHLORIDE, PRESERVATIVE FREE 10 ML: 5 INJECTION INTRAVENOUS at 08:50

## 2024-06-14 RX ADMIN — MORPHINE SULFATE 2 MG: 2 INJECTION, SOLUTION INTRAMUSCULAR; INTRAVENOUS at 02:35

## 2024-06-14 RX ADMIN — GABAPENTIN 300 MG: 300 CAPSULE ORAL at 20:44

## 2024-06-14 RX ADMIN — GUAIFENESIN 600 MG: 600 TABLET, EXTENDED RELEASE ORAL at 20:44

## 2024-06-14 RX ADMIN — MORPHINE SULFATE 2 MG: 2 INJECTION, SOLUTION INTRAMUSCULAR; INTRAVENOUS at 07:53

## 2024-06-14 RX ADMIN — BENZONATATE 100 MG: 100 CAPSULE ORAL at 08:45

## 2024-06-14 RX ADMIN — HEPARIN SODIUM AND DEXTROSE 24 UNITS/KG/HR: 10000; 5 INJECTION INTRAVENOUS at 01:33

## 2024-06-14 RX ADMIN — ASPIRIN 81 MG: 81 TABLET, COATED ORAL at 12:15

## 2024-06-14 RX ADMIN — SODIUM CHLORIDE, PRESERVATIVE FREE 10 ML: 5 INJECTION INTRAVENOUS at 20:50

## 2024-06-14 RX ADMIN — INSULIN GLARGINE 20 UNITS: 100 INJECTION, SOLUTION SUBCUTANEOUS at 20:44

## 2024-06-14 RX ADMIN — LURASIDONE HYDROCHLORIDE 20 MG: 20 TABLET, FILM COATED ORAL at 17:46

## 2024-06-14 RX ADMIN — ACETAMINOPHEN 1000 MG: 500 TABLET ORAL at 20:44

## 2024-06-14 RX ADMIN — HEPARIN SODIUM 2000 UNITS: 1000 INJECTION INTRAVENOUS; SUBCUTANEOUS at 01:29

## 2024-06-14 RX ADMIN — GABAPENTIN 300 MG: 300 CAPSULE ORAL at 13:49

## 2024-06-14 RX ADMIN — METHOCARBAMOL TABLETS 500 MG: 500 TABLET, COATED ORAL at 20:44

## 2024-06-14 RX ADMIN — ACETAMINOPHEN 1000 MG: 500 TABLET ORAL at 12:15

## 2024-06-14 RX ADMIN — HEPARIN SODIUM AND DEXTROSE 24 UNITS/KG/HR: 10000; 5 INJECTION INTRAVENOUS at 13:43

## 2024-06-14 RX ADMIN — HYDROMORPHONE HYDROCHLORIDE 2 MG: 2 TABLET ORAL at 12:15

## 2024-06-14 RX ADMIN — MAGNESIUM HYDROXIDE 30 ML: 400 SUSPENSION ORAL at 15:03

## 2024-06-14 RX ADMIN — GUAIFENESIN 600 MG: 600 TABLET, EXTENDED RELEASE ORAL at 08:45

## 2024-06-14 RX ADMIN — SODIUM CHLORIDE: 9 INJECTION, SOLUTION INTRAVENOUS at 13:52

## 2024-06-14 RX ADMIN — BENZONATATE 100 MG: 100 CAPSULE ORAL at 20:44

## 2024-06-14 RX ADMIN — GABAPENTIN 300 MG: 300 CAPSULE ORAL at 08:45

## 2024-06-14 ASSESSMENT — PAIN DESCRIPTION - LOCATION
LOCATION: RIB CAGE;BACK
LOCATION: BACK;RIB CAGE
LOCATION: BACK;RIB CAGE
LOCATION: FLANK;RIB CAGE
LOCATION: FLANK;RIB CAGE

## 2024-06-14 ASSESSMENT — PAIN DESCRIPTION - DESCRIPTORS
DESCRIPTORS: ACHING;STABBING

## 2024-06-14 ASSESSMENT — PAIN DESCRIPTION - ORIENTATION
ORIENTATION: LEFT

## 2024-06-14 ASSESSMENT — PAIN SCALES - WONG BAKER
WONGBAKER_NUMERICALRESPONSE: HURTS WHOLE LOT
WONGBAKER_NUMERICALRESPONSE: HURTS EVEN MORE

## 2024-06-14 ASSESSMENT — PAIN SCALES - GENERAL
PAINLEVEL_OUTOF10: 8
PAINLEVEL_OUTOF10: 9
PAINLEVEL_OUTOF10: 8
PAINLEVEL_OUTOF10: 8
PAINLEVEL_OUTOF10: 10
PAINLEVEL_OUTOF10: 10
PAINLEVEL_OUTOF10: 8
PAINLEVEL_OUTOF10: 7

## 2024-06-14 NOTE — CONSULTS
Palliative Medicine  Patient Name: Keaton Van  YOB: 1965  MRN: [id number]  Age: 59 y.o.  Gender: male    Date of Initial Consult: 6/14/2024  Date of Service: 6/14/2024  Time: 10:53 AM  Provider: June Young MD  Hospital Day: 3  Admit Date: 6/12/2024  Referring Provider: Dr. Dumont       Reasons for Consultation:  Goals of Care and Overwhelming Symptoms    HISTORY OF PRESENT ILLNESS (HPI):   Keaton Van is a 59 y.o. male with a past medical history of Type 2DM, CKD 4 chronic bilateral foot wounds (s/p recent R partial 5th amputation 6/1/24) CAD s/p 2 stents, HFpEF, pAFib, chronic pain, bipolar disorder,  HTN, medication noncompliance, who was admitted on 6/12/2024 from home with a diagnosis of GLF, acute on chronic respiratory failure  CT CHEST: moderate Bilateral pleural effusion  CT head no acute finding.     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   Reports that he no longer uses cocaine for quite some time.    Patient has DNR order on chart. No Active AMD  PALLIATIVE DIAGNOSES:    GLF, frequent falls  Musculoskeletal pain related to fall, Left flank  Anemia of chronic disease  CKD4  CAD, HFpEF, elevated troponins, cardiology consult pending  Acute on chronic respiratory failure, bilateral pleural effusions  Polysubstance abuse (heavy tobacco smoking, denies recent cocaine)   Housing insecurity  constipation  Palliative medicine encounter    ASSESSMENT AND PLAN:   PT/OT notes reviewed  Palliative medicine services introduced to patient, see also Sherron Angel LCSW note.  Hear of recent events.  Patient has fallen 3 times at home recently and is concerned about his safety home alone.    He says he is going to start using his cane  (has rolling walker in hospital)   He is frustrated by  his situation, would like to go to SNF or prison if option.  Pain Control-- denies foot  preferences / practices and a referral made as appropriate to needs (Cultural Services, Patient Advocacy, Ethics, etc.)    Spiritual Affiliation: None    Any spiritual / Hoahaoism concerns:  [] Yes /  [x] No   If \"Yes\" to discuss with pastoral care during IDT     Does caregiver feel burdened by caring for their loved one:   [] Yes /  [x] No /  [] No Caregiver Present/Available [] No Caregiver [] Pt Lives at Facility  If \"Yes\" to discuss with social work during IDT    Anticipatory grief assessment:   [x] Normal  / [] Maladaptive     If \"Maladaptive\" to discuss with social work during IDT    ESAS Anxiety:      ESAS Depression: Depression Score: 5        LAB AND IMAGING FINDINGS:   Objective data reviewed:  labs, images, records, medication use, vitals, and chart     FINAL COMMENTS   Thank you for allowing Palliative Medicine to participate in the care of Keaton Van.    Only check if applicable and billing time based rather than MDM  [x] The total encounter time on this service date was __75__ minutes which was spent performing a face-to-face encounter and personally completing the provider-level activities documented in the note. This includes time spent prior to the visit and after the visit in direct care of the patient. This time does not include time spent in any separately reportable services.    Electronically signed by   June Young MD  Palliative Care Team  on 6/14/2024 at 10:53 AM

## 2024-06-14 NOTE — PLAN OF CARE
Problem: Discharge Planning  Goal: Discharge to home or other facility with appropriate resources  Outcome: Progressing  Flowsheets (Taken 6/14/2024 8704)  Discharge to home or other facility with appropriate resources: Identify barriers to discharge with patient and caregiver     Problem: Pain  Goal: Verbalizes/displays adequate comfort level or baseline comfort level  Outcome: Progressing     Problem: Chronic Conditions and Co-morbidities  Goal: Patient's chronic conditions and co-morbidity symptoms are monitored and maintained or improved  Outcome: Progressing     Problem: Safety - Adult  Goal: Free from fall injury  Outcome: Progressing     Problem: Skin/Tissue Integrity  Goal: Absence of new skin breakdown  Description: 1.  Monitor for areas of redness and/or skin breakdown  2.  Assess vascular access sites hourly  3.  Every 4-6 hours minimum:  Change oxygen saturation probe site  4.  Every 4-6 hours:  If on nasal continuous positive airway pressure, respiratory therapy assess nares and determine need for appliance change or resting period.  Outcome: Progressing     Problem: Nutrition Deficit:  Goal: Optimize nutritional status  Outcome: Progressing

## 2024-06-14 NOTE — PROGRESS NOTES
[facility]  [address] [place] [address]  [phone]  [email or web address]  CARDIOLOGY PROGRESS NOTE    6/14/2024 4:10 PM    Admit Date: 6/12/2024    Admit Diagnosis:   Tobacco abuse [Z72.0]  Hypoxia [R09.02]  Elevated troponin [R79.89]  CHF (congestive heart failure), NYHA class I, acute on chronic, combined (HCC) [I50.43]  Contusion of left chest wall, initial encounter [S20.212A]  Acute on chronic congestive heart failure, unspecified heart failure type (HCC) [I50.9]    Subjective:     Keaton Van was seen and examined at the bedside.  Reports improvement in his breathing.  The pain over the side of his trunk still appears to be unbearable    BP (!) 166/95   Pulse 65   Temp 97.5 °F (36.4 °C) (Oral)   Resp 18   Ht 1.829 m (6')   Wt 86.3 kg (190 lb 4.1 oz)   SpO2 91%   BMI 25.80 kg/m²     Current Facility-Administered Medications   Medication Dose Route Frequency    aspirin EC tablet 81 mg  81 mg Oral Daily    acetaminophen (TYLENOL) tablet 1,000 mg  1,000 mg Oral Q8H    lidocaine 4 % external patch 1 patch  1 patch TransDERmal Daily    HYDROmorphone (DILAUDID) tablet 2 mg  2 mg Oral Q4H PRN    senna (SENOKOT) tablet 8.6 mg  1 tablet Oral Nightly    hydrALAZINE (APRESOLINE) injection 10 mg  10 mg IntraVENous Q6H PRN    sodium chloride flush 0.9 % injection 5-40 mL  5-40 mL IntraVENous 2 times per day    sodium chloride flush 0.9 % injection 5-40 mL  5-40 mL IntraVENous PRN    0.9 % sodium chloride infusion   IntraVENous PRN    ondansetron (ZOFRAN-ODT) disintegrating tablet 4 mg  4 mg Oral Q8H PRN    Or    ondansetron (ZOFRAN) injection 4 mg  4 mg IntraVENous Q6H PRN    polyethylene glycol (GLYCOLAX) packet 17 g  17 g Oral Daily PRN    acetaminophen (TYLENOL) tablet 650 mg  650 mg Oral Q6H PRN    Or    acetaminophen (TYLENOL) suppository 650 mg  650 mg Rectal Q6H PRN    glucose chewable tablet 16 g  4 tablet Oral PRN    dextrose bolus 10% 125 mL  125  mL IntraVENous PRN    Or    dextrose bolus 10% 250 mL  250 mL IntraVENous PRN    glucagon injection 1 mg  1 mg SubCUTAneous PRN    dextrose 10 % infusion   IntraVENous Continuous PRN    insulin lispro (HUMALOG,ADMELOG) injection vial 0-8 Units  0-8 Units SubCUTAneous TID WC    insulin lispro (HUMALOG,ADMELOG) injection vial 0-4 Units  0-4 Units SubCUTAneous Nightly    gabapentin (NEURONTIN) capsule 300 mg  300 mg Oral TID    methocarbamol (ROBAXIN) tablet 500 mg  500 mg Oral TID    hydrOXYzine HCl (ATARAX) tablet 10 mg  10 mg Oral TID PRN    bisacodyl (DULCOLAX) suppository 10 mg  10 mg Rectal Daily PRN    magnesium hydroxide (MILK OF MAGNESIA) 400 MG/5ML suspension 30 mL  30 mL Oral Daily PRN    senna (SENOKOT) tablet 8.6 mg  1 tablet Oral Nightly PRN    melatonin tablet 3 mg  3 mg Oral Nightly PRN    lurasidone (LATUDA) tablet 20 mg  20 mg Oral Dinner    insulin glargine (LANTUS) injection vial 20 Units  20 Units SubCUTAneous Nightly    Benzocaine-Menthol (CEPACOL MAX SORE THROAT) lozenge 1 lozenge  1 lozenge Oral Q2H PRN    guaiFENesin (ROBITUSSIN) 100 MG/5ML liquid 200 mg  200 mg Oral Q4H PRN    benzonatate (TESSALON) capsule 100 mg  100 mg Oral TID    guaiFENesin (MUCINEX) extended release tablet 600 mg  600 mg Oral BID    heparin (porcine) injection 4,000 Units  4,000 Units IntraVENous PRN    heparin (porcine) injection 2,000 Units  2,000 Units IntraVENous PRN    heparin 25,000 units in dextrose 5% 250 mL (premix) infusion  5-30 Units/kg/hr IntraVENous Continuous    furosemide (LASIX) injection 40 mg  40 mg IntraVENous BID    cefTRIAXone (ROCEPHIN) 1,000 mg in sodium chloride 0.9 % 50 mL IVPB (mini-bag)  1,000 mg IntraVENous Q24H         Objective:      Physical Exam  Physical Exam  Constitutional:       General: He is not in acute distress.  HENT:      Head: Normocephalic and atraumatic.   Neck:      Vascular: JVD present.   Cardiovascular:      Rate and Rhythm: Normal rate and regular rhythm.   Pulmonary:

## 2024-06-14 NOTE — PROGRESS NOTES
Palliative Medicine  Per Dr. Young: Keaton Van is a 59 y.o. male with a past medical history of Type 2DM, CKD 4 chronic bilateral foot wounds (s/p recent R partial 5th amputation 24) CAD s/p 2 stents, HFpEF, pAFib, chronic pain, bipolar disorder,  HTN, medication noncompliance, who was admitted on 2024 from home with a diagnosis of GLF, acute on chronic respiratory failure  CT CHEST: moderate Bilateral pleural effusion  CT head no acute finding.      Code Status: DNR (Patient has stated he wants to sign DDNR prior to discharge.)    [facility]/13/2024     5:10 PM   Demographics   Marital Status    Patient revoked his AMD on file, which names his sister as primary HCDM.    Patient / Family Encounter Documentation    Participants (names): Keaton Carlota, Dr. Young, Kavitha Angel, JHONATAN    Narrative: Palliative team met with patient, who was lying flat in bed, alert and oriented and receptive to encounter.  He apologized for being \"grumpy\" but stated his pain has not been controlled since he came up to the floor from the ED. Pain is reported in his left flank and he thinks it's due to his 4 falls, which he denied occurred when he was under the influence of cocaine. He reports feeling sob, light-headed and dizzy and does not feel safe living alone in his apartment. Empathetic listening provided, normalized and validated his feelings of frustration with ongoing pain and anxiety about his living situation. He denied any SI or plans. He is realistic about his physical limitations and needs more supportive housing. Referred him to [facility] for discharge plans and follow up on Medicaid LTC.    This writer left him with a flyer for Homeless  hotline and housing services as well as the application for VA Health Benefits.  LCSW contacted [facility], who was planning to see patient about discharge plans to skilled SNF using his Medicare benefits. This writer returned to patient's room to follow  up on his request to sign DDNR and to offer support/assistance with VA paperwork, however, he was seeing a representative for a skilled SNF and discussing discharge plans with her, then when she exited the room, he was having a BM.  This writer reached out to attending physician, Dr. SHANIQUE Dumont regarding DDNR request.     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 Reports that he no longer uses cocaine for quite some time. His apartment is on the second floor and he has 14 stairs, which he has been doing a \"crabwalk\" to get upstairs due to his fear of falling. Patient has his , but has yet to access VA Health benefits.       Caregiver [place]: No caregiver  Does the caregiver feel confident administering medication?   Does the caregiver need any help connecting with community resources?   Does the caregiver feel confident assisting with activities of daily living?     Goals of Care / Plan:   Patient symptoms will be managed. (See Dr. Young's  note.)  Patient is  who needs to apply for VA Health Benefits and may need assistance with this.  CM assistance with discharge plans is appreciated.  DDNR to be completed prior to discharge.  Palliative team is available for education and support as appropriate through this hospitalization.    Thank you for including Palliative Medicine in the care of Mr. Keaton Van.    Kavitha Angel, [facility]  [phone]

## 2024-06-14 NOTE — PROGRESS NOTES
Physical Therapy Note:    PT treatment deferred. Pt declines participation in PT interventions citing fatigue, feeling generally unwell, and recent administration of medication that causes additional drowsiness. Pt educated regarding importance of assisted mobility and upright positioning. He voices understanding.    Will continue to follow per POC.    Yamilex Moctezuma, PT, DPT, CEEAA

## 2024-06-14 NOTE — PROGRESS NOTES
Hospitalist Progress Note    NAME:   Keaton Van   : 1965   MRN: [id number]     Date/Time: 2024 6:03 PM  Patient PCP: No primary care provider on file.    Estimated discharge date:   Barriers: Clinical improvement, weaning of oxygen, cardiology clearance, hemoglobin stability, CT head, CTA chest abdomen pelvis, podiatry consult      Assessment / Plan:  Acute hypoxic respiratory failure requiring 2L O2  Shortness of breath, acute on chronic  Likely 2/2 pulmonary edema  Lifelong heavy smoker  Endorses cough productive of \"black\" sputum x 24h  Medical noncompliance  Supplemental O2 as needed to keep sats >92%  Given report of productive cough, likelihood of underlying COPD, will treat empirically with rocephin and azithromycin for now  Dry CT chest pending to eval for infectious etiology  Sputum cx  Antitussives, mucolytics, nebs PRN  Check procal  Diuresis for pulmonary edema  : Patient has been evaluated by cardiology and they are planning for more cardiac workup.    - cont lasix 40 bid,check BMP tomorrow     Chest pain  NSTEMI  Cocaine abuse  CAD w 2 stents  Troponins rising 353 now 1824  EKG sinus rhythm without ST elevations  pAF - in SR at this time  Not on OAC d/t risk for falls, anemia, and medication noncompliance  Pulmonary edema likely cardiogenic etiology  24 Echo - EF 60-65%, mild MVR, mild pulm HTN  Uncontrolled HTN  Cardiology consulted  CXR mild pulmonary edema, BNP 8000s  Cont diuresis with IV Lasix  Strict I&Os, daily weight, sodium restriction  Trops trending up - continue to trend q90 min until peaked  Initiate heparin gtt pending cardiology eval  Blood pressure control  Defer repeat Echo to cards     Acute on chronic anemia  Required PRBC transfusion on last admission  Suspect multifactorial including CKD, poor dietary intake  Hgb 8.4  Denies melena, hematuria, hematochezia, hematemesis  Trend H&H     Left ribs and flank pain  CXR no fractures following fall w

## 2024-06-14 NOTE — CARE COORDINATION
Transition of Care Plan:    RUR: 23%  Prior Level of Functioning: independent  Disposition: SNF- mass referral pending, goal to be accepted to [facility]   If SNF or IPR: Date FOC offered: 6/14  Follow up appointments: PCP, speciality   Transportation at discharge: CM will need to arrange transportation   IM/IMM Medicare/ letter given: to be provided   Care Conference needed? Not at this time   Barriers to discharge: medical stability, placement      CM completed extensive chart review. Per previous CM note, a referral was sent to [facility] to be screened for Medicaid. Patient will soon be evicted from his home next month as patient was suppose to be renting this apartment with a roommate but the roommate backed out and now patient does not receive enough monthly income to afford the rent.  Patient is a . Patient has been provided with ample resources for housing insecurity, food insecurity, and additional community resources.     Palliative is inquiring if patient could go to SNF under Medicare as patient has experienced multiple falls recently and for wound care. CM made room visit with patient to discuss SNF. Patient is agreeable to SNF placement and for CM to send mass referral to see which facilities are able to accept. CM provided patient with SNF list. CM very transparent with patient on possibility of some facilities not accepting patient due to living situation. Patient voiced understanding. Goal would be for patient to go to [facility] at d/c if they can accept. No insurance auth is needed.     [facility] provided to patient for review.       If patient ends up d/c'ing home, patient will likely need assistance with affording medications and would benefit from Meds to Beds program.       MONIQUE Singh, CM  [phone]

## 2024-06-15 LAB
ANION GAP SERPL CALC-SCNC: 3 MMOL/L (ref 5–15)
APTT PPP: 26.6 SEC (ref 22.1–31)
APTT PPP: 90.5 SEC (ref 22.1–31)
BASOPHILS # BLD: 0.1 K/UL (ref 0–0.1)
BASOPHILS NFR BLD: 1 % (ref 0–1)
BUN SERPL-MCNC: 54 MG/DL (ref 6–20)
BUN/CREAT SERPL: 24 (ref 12–20)
CALCIUM SERPL-MCNC: 8.7 MG/DL (ref 8.5–10.1)
CHLORIDE SERPL-SCNC: 104 MMOL/L (ref 97–108)
CO2 SERPL-SCNC: 30 MMOL/L (ref 21–32)
CREAT SERPL-MCNC: 2.27 MG/DL (ref 0.7–1.3)
DIFFERENTIAL METHOD BLD: ABNORMAL
EOSINOPHIL # BLD: 0.2 K/UL (ref 0–0.4)
EOSINOPHIL NFR BLD: 4 % (ref 0–7)
ERYTHROCYTE [DISTWIDTH] IN BLOOD BY AUTOMATED COUNT: 15.2 % (ref 11.5–14.5)
GLUCOSE BLD STRIP.AUTO-MCNC: 162 MG/DL (ref 65–117)
GLUCOSE BLD STRIP.AUTO-MCNC: 177 MG/DL (ref 65–117)
GLUCOSE BLD STRIP.AUTO-MCNC: 275 MG/DL (ref 65–117)
GLUCOSE BLD STRIP.AUTO-MCNC: 279 MG/DL (ref 65–117)
GLUCOSE SERPL-MCNC: 154 MG/DL (ref 65–100)
HCT VFR BLD AUTO: 24 % (ref 36.6–50.3)
HGB BLD-MCNC: 7.2 G/DL (ref 12.1–17)
IMM GRANULOCYTES # BLD AUTO: 0 K/UL (ref 0–0.04)
IMM GRANULOCYTES NFR BLD AUTO: 1 % (ref 0–0.5)
LYMPHOCYTES # BLD: 0.9 K/UL (ref 0.8–3.5)
LYMPHOCYTES NFR BLD: 19 % (ref 12–49)
MCH RBC QN AUTO: 26.5 PG (ref 26–34)
MCHC RBC AUTO-ENTMCNC: 30 G/DL (ref 30–36.5)
MCV RBC AUTO: 88.2 FL (ref 80–99)
MONOCYTES # BLD: 0.4 K/UL (ref 0–1)
MONOCYTES NFR BLD: 8 % (ref 5–13)
NEUTS SEG # BLD: 3.3 K/UL (ref 1.8–8)
NEUTS SEG NFR BLD: 67 % (ref 32–75)
NRBC # BLD: 0 K/UL (ref 0–0.01)
NRBC BLD-RTO: 0 PER 100 WBC
PLATELET # BLD AUTO: 186 K/UL (ref 150–400)
PMV BLD AUTO: 10.1 FL (ref 8.9–12.9)
POTASSIUM SERPL-SCNC: 4.7 MMOL/L (ref 3.5–5.1)
RBC # BLD AUTO: 2.72 M/UL (ref 4.1–5.7)
SERVICE CMNT-IMP: ABNORMAL
SODIUM SERPL-SCNC: 137 MMOL/L (ref 136–145)
THERAPEUTIC RANGE: ABNORMAL SECS (ref 58–77)
THERAPEUTIC RANGE: NORMAL SECS (ref 58–77)
WBC # BLD AUTO: 4.9 K/UL (ref 4.1–11.1)

## 2024-06-15 PROCEDURE — 85730 THROMBOPLASTIN TIME PARTIAL: CPT

## 2024-06-15 PROCEDURE — 2580000003 HC RX 258

## 2024-06-15 PROCEDURE — 6370000000 HC RX 637 (ALT 250 FOR IP): Performed by: INTERNAL MEDICINE

## 2024-06-15 PROCEDURE — 1100000000 HC RM PRIVATE

## 2024-06-15 PROCEDURE — 6370000000 HC RX 637 (ALT 250 FOR IP): Performed by: NURSE PRACTITIONER

## 2024-06-15 PROCEDURE — 6370000000 HC RX 637 (ALT 250 FOR IP): Performed by: STUDENT IN AN ORGANIZED HEALTH CARE EDUCATION/TRAINING PROGRAM

## 2024-06-15 PROCEDURE — 6360000002 HC RX W HCPCS

## 2024-06-15 PROCEDURE — 6370000000 HC RX 637 (ALT 250 FOR IP)

## 2024-06-15 PROCEDURE — 6360000002 HC RX W HCPCS: Performed by: INTERNAL MEDICINE

## 2024-06-15 PROCEDURE — 36415 COLL VENOUS BLD VENIPUNCTURE: CPT

## 2024-06-15 PROCEDURE — 80048 BASIC METABOLIC PNL TOTAL CA: CPT

## 2024-06-15 PROCEDURE — 82962 GLUCOSE BLOOD TEST: CPT

## 2024-06-15 PROCEDURE — 85025 COMPLETE CBC W/AUTO DIFF WBC: CPT

## 2024-06-15 PROCEDURE — 2700000000 HC OXYGEN THERAPY PER DAY

## 2024-06-15 RX ORDER — AMLODIPINE BESYLATE 5 MG/1
5 TABLET ORAL DAILY
Status: DISCONTINUED | OUTPATIENT
Start: 2024-06-15 | End: 2024-06-16

## 2024-06-15 RX ORDER — FUROSEMIDE 40 MG/1
40 TABLET ORAL 2 TIMES DAILY
Status: DISCONTINUED | OUTPATIENT
Start: 2024-06-15 | End: 2024-06-17 | Stop reason: HOSPADM

## 2024-06-15 RX ORDER — OXYCODONE HYDROCHLORIDE 5 MG/1
5 TABLET ORAL EVERY 6 HOURS PRN
Status: DISCONTINUED | OUTPATIENT
Start: 2024-06-15 | End: 2024-06-17 | Stop reason: HOSPADM

## 2024-06-15 RX ADMIN — GUAIFENESIN 600 MG: 600 TABLET, EXTENDED RELEASE ORAL at 21:02

## 2024-06-15 RX ADMIN — HYDROMORPHONE HYDROCHLORIDE 2 MG: 2 TABLET ORAL at 08:55

## 2024-06-15 RX ADMIN — MELATONIN 3 MG: at 21:11

## 2024-06-15 RX ADMIN — LURASIDONE HYDROCHLORIDE 20 MG: 20 TABLET, FILM COATED ORAL at 17:46

## 2024-06-15 RX ADMIN — BENZONATATE 100 MG: 100 CAPSULE ORAL at 21:02

## 2024-06-15 RX ADMIN — GABAPENTIN 300 MG: 300 CAPSULE ORAL at 08:56

## 2024-06-15 RX ADMIN — METHOCARBAMOL TABLETS 500 MG: 500 TABLET, COATED ORAL at 21:02

## 2024-06-15 RX ADMIN — HEPARIN SODIUM 4000 UNITS: 1000 INJECTION INTRAVENOUS; SUBCUTANEOUS at 01:55

## 2024-06-15 RX ADMIN — ACETAMINOPHEN 1000 MG: 500 TABLET ORAL at 03:44

## 2024-06-15 RX ADMIN — HEPARIN SODIUM AND DEXTROSE 26 UNITS/KG/HR: 10000; 5 INJECTION INTRAVENOUS at 01:36

## 2024-06-15 RX ADMIN — HYDRALAZINE HYDROCHLORIDE 10 MG: 20 INJECTION INTRAMUSCULAR; INTRAVENOUS at 21:11

## 2024-06-15 RX ADMIN — SODIUM CHLORIDE, PRESERVATIVE FREE 10 ML: 5 INJECTION INTRAVENOUS at 21:30

## 2024-06-15 RX ADMIN — OXYCODONE HYDROCHLORIDE 5 MG: 5 TABLET ORAL at 17:46

## 2024-06-15 RX ADMIN — GABAPENTIN 300 MG: 300 CAPSULE ORAL at 21:02

## 2024-06-15 RX ADMIN — METOPROLOL TARTRATE 25 MG: 25 TABLET, FILM COATED ORAL at 11:35

## 2024-06-15 RX ADMIN — INSULIN LISPRO 4 UNITS: 100 INJECTION, SOLUTION INTRAVENOUS; SUBCUTANEOUS at 11:43

## 2024-06-15 RX ADMIN — ASPIRIN 81 MG: 81 TABLET, COATED ORAL at 08:56

## 2024-06-15 RX ADMIN — SODIUM CHLORIDE, PRESERVATIVE FREE 10 ML: 5 INJECTION INTRAVENOUS at 08:57

## 2024-06-15 RX ADMIN — BENZONATATE 100 MG: 100 CAPSULE ORAL at 08:56

## 2024-06-15 RX ADMIN — GUAIFENESIN 600 MG: 600 TABLET, EXTENDED RELEASE ORAL at 08:56

## 2024-06-15 RX ADMIN — FUROSEMIDE 40 MG: 10 INJECTION, SOLUTION INTRAMUSCULAR; INTRAVENOUS at 08:56

## 2024-06-15 RX ADMIN — METHOCARBAMOL TABLETS 500 MG: 500 TABLET, COATED ORAL at 08:56

## 2024-06-15 RX ADMIN — INSULIN GLARGINE 20 UNITS: 100 INJECTION, SOLUTION SUBCUTANEOUS at 21:13

## 2024-06-15 RX ADMIN — ACETAMINOPHEN 1000 MG: 500 TABLET ORAL at 11:35

## 2024-06-15 RX ADMIN — HYDROXYZINE HYDROCHLORIDE 10 MG: 10 TABLET ORAL at 21:12

## 2024-06-15 RX ADMIN — OXYCODONE HYDROCHLORIDE 5 MG: 5 TABLET ORAL at 23:45

## 2024-06-15 RX ADMIN — METOPROLOL TARTRATE 25 MG: 25 TABLET, FILM COATED ORAL at 21:11

## 2024-06-15 RX ADMIN — AMLODIPINE BESYLATE 5 MG: 5 TABLET ORAL at 11:35

## 2024-06-15 RX ADMIN — FUROSEMIDE 40 MG: 40 TABLET ORAL at 17:46

## 2024-06-15 ASSESSMENT — PAIN SCALES - GENERAL
PAINLEVEL_OUTOF10: 7
PAINLEVEL_OUTOF10: 8
PAINLEVEL_OUTOF10: 9
PAINLEVEL_OUTOF10: 8

## 2024-06-15 ASSESSMENT — PAIN DESCRIPTION - LOCATION
LOCATION: FLANK
LOCATION: FLANK
LOCATION: FLANK;RIB CAGE
LOCATION: FLANK;RIB CAGE

## 2024-06-15 ASSESSMENT — PAIN DESCRIPTION - ORIENTATION
ORIENTATION: LEFT;POSTERIOR
ORIENTATION: LEFT

## 2024-06-15 ASSESSMENT — PAIN DESCRIPTION - DESCRIPTORS
DESCRIPTORS: SHOOTING;STABBING
DESCRIPTORS: ACHING;STABBING
DESCRIPTORS: ACHING;STABBING
DESCRIPTORS: STABBING;BURNING;THROBBING

## 2024-06-15 NOTE — PROGRESS NOTES
Hospitalist Progress Note    NAME:   Keaton Van   : 1965   MRN: [id number]     Date/Time: 6/15/2024 11:40 AM  Patient PCP: No primary care provider on file.    Estimated discharge date:   Barriers: Clinical stability      Assessment / Plan:  Acute hypoxic respiratory failure requiring 2L O2  Shortness of breath, acute on chronic  Likely 2/2 pulmonary edema  Lifelong heavy smoker  Endorses cough productive of \"black\" sputum x 24h  Medical noncompliance  Moderate bilateral pleural effusion  Supplemental O2 as needed to keep sats >92%  Given report of productive cough, likelihood of underlying COPD, will treat empirically with rocephin and azithromycin for now  CT chest showed moderate bilateral effusion  Cardiology on board  Continue Lasix, switch to IV from p.o.       Chest pain  NSTEMI  Cocaine abuse  CAD w 2 stents  Troponins rising 353 now 1824  EKG sinus rhythm without ST elevations  pAF - in SR at this time  Not on OAC d/t risk for falls, anemia, and medication noncompliance  Pulmonary edema likely cardiogenic etiology  24 Echo - EF 60-65%, mild MVR, mild pulm HTN  Uncontrolled HTN  Cardiology consulted  CXR mild pulmonary edema, BNP 8000s  Cont diuresis with IV Lasix, changed to p.o.  Strict I&Os, daily weight, sodium restriction  Initially on heparin gtt., now discontinued by cardiology       Acute on chronic anemia  Required PRBC transfusion on last admission  Suspect multifactorial including CKD, poor dietary intake  Hgb 8.4  Denies melena, hematuria, hematochezia, hematemesis  Trend H&H     Left ribs and flank pain  CXR no fractures following fall w trauma to left chest and flank  CT CAP pending given flank pain rated 10/10, patient c/o pain out of proportion to presentation  Multimodal pain mgmt  DC Dilaudid, changed to oxycodone     CKD4  Cr 2.21 on admit, baseline around 2.1-2.6  Monitor BUN/Creat  Avoid known nephrotoxic agents  Replete electrolytes  Recent renal US 6/3  nl  Recent UA (+) proteinuria  Creatinine improving     Diabetes mellitus type 2  Accuchecks ACHS  Sliding scale insulin  Monitor for s/s hypo/hyperglycemia  Hypoglycemia treatment per protocol  Resume long acting insulin qhs     Hx untreated HCV     Chronic bilateral foot wounds with history of left toe amputations  Right 5th toe osteomyelitis s/p resection 6/1/24  Wound care consulted  DC with RX for keflex - never filled rx  Covered with IV Rocephin  Podiatry consult     Bipolar I  Polysubstance abuse incl heavy crack cocaine use  Tobacco use  Medication noncompliance  Chronic pain  Left flank/back pain    UDS positive for cocaine, marijuana  Refused nicotine patch  Resume Latuda prescribed by psych on last stay  Adamantly denies SI/HI  Does have chronic passive death wish and apathy d/t ongoing medical and psychosocial issues and overall poor quality of life and chronic pain  Seen by psychiatry on last visit who cleared pt of SI precautions  Palliative care consulted        Medical Decision Making:   I personally reviewed labs: CBC, BMP, troponin, proBNP  I personally reviewed imaging: Chest x-ray  I personally reviewed EKG:  Toxic drug monitoring:  Discussed case with: Patient, RN, DM nurse        Code Status: DNR  DVT Prophylaxis: Heparin drip  GI Prophylaxis:  Baseline: Poor mobility at home d/t mutiple amputations and foot wounds.  Not able to leave the home or afford care.  Medically noncompliant.  Psych diagnoses and polysubstance abuse particularly cocaine.     Subjective:     Chief Complaint / Reason for Physician Visit  \" Follow-up for NSTEMI, acute CHF, CAD with stent, active smoker.\".  Discussed with RN events overnight.        Objective:     VITALS:   Last 24hrs VS reviewed since prior progress note. Most recent are:  Patient Vitals for the past 24 hrs:   BP Temp Temp src Pulse Resp SpO2   06/15/24 1117 (!) 164/92 98 °F (36.7 °C) Oral 76 18 90 %   06/15/24 0855 -- -- -- -- 18 --   06/15/24 0719 (!)

## 2024-06-15 NOTE — PROGRESS NOTES
(GLYCOLAX) packet 17 g  17 g Oral Daily PRN    acetaminophen (TYLENOL) tablet 650 mg  650 mg Oral Q6H PRN    Or    acetaminophen (TYLENOL) suppository 650 mg  650 mg Rectal Q6H PRN    glucose chewable tablet 16 g  4 tablet Oral PRN    dextrose bolus 10% 125 mL  125 mL IntraVENous PRN    Or    dextrose bolus 10% 250 mL  250 mL IntraVENous PRN    glucagon injection 1 mg  1 mg SubCUTAneous PRN    dextrose 10 % infusion   IntraVENous Continuous PRN    insulin lispro (HUMALOG,ADMELOG) injection vial 0-8 Units  0-8 Units SubCUTAneous TID WC    insulin lispro (HUMALOG,ADMELOG) injection vial 0-4 Units  0-4 Units SubCUTAneous Nightly    gabapentin (NEURONTIN) capsule 300 mg  300 mg Oral TID    methocarbamol (ROBAXIN) tablet 500 mg  500 mg Oral TID    hydrOXYzine HCl (ATARAX) tablet 10 mg  10 mg Oral TID PRN    bisacodyl (DULCOLAX) suppository 10 mg  10 mg Rectal Daily PRN    magnesium hydroxide (MILK OF MAGNESIA) 400 MG/5ML suspension 30 mL  30 mL Oral Daily PRN    senna (SENOKOT) tablet 8.6 mg  1 tablet Oral Nightly PRN    melatonin tablet 3 mg  3 mg Oral Nightly PRN    lurasidone (LATUDA) tablet 20 mg  20 mg Oral Dinner    insulin glargine (LANTUS) injection vial 20 Units  20 Units SubCUTAneous Nightly    Benzocaine-Menthol (CEPACOL MAX SORE THROAT) lozenge 1 lozenge  1 lozenge Oral Q2H PRN    guaiFENesin (ROBITUSSIN) 100 MG/5ML liquid 200 mg  200 mg Oral Q4H PRN    benzonatate (TESSALON) capsule 100 mg  100 mg Oral TID    guaiFENesin (MUCINEX) extended release tablet 600 mg  600 mg Oral BID    heparin (porcine) injection 4,000 Units  4,000 Units IntraVENous PRN    heparin (porcine) injection 2,000 Units  2,000 Units IntraVENous PRN    heparin 25,000 units in dextrose 5% 250 mL (premix) infusion  5-30 Units/kg/hr IntraVENous Continuous    cefTRIAXone (ROCEPHIN) 1,000 mg in sodium chloride 0.9 % 50 mL IVPB (mini-bag)  1,000 mg IntraVENous Q24H       Objective:      Physical Exam  Constitutional:       Appearance: Normal  as OP though    2.  HTN  BP elevated  Start amlodipine 5 mg daily, metoprolol 25 mg BID    3.  CAD  Remote hx of stents in [place] at least 15 years ago per pt  No active chest pain  Trops peaked at 1824, now downtrending  Continue medical management -- ASA, statin.  Added BB  Can dc heparin gtt if no other indication    4.  PAF  Recently diagnosed during hospital admission in 5/2024, when undergoing I&D for osteomyelitis of the foot  In sinus this admission  BB added  Will need OAC for [id number] (htn/cad/dm).  Can switch from heparin gtt to therapeutic lovenox while inpt.    Would DC with OAC as outpatient.    Elisabeth Blake, APRN - NP

## 2024-06-15 NOTE — PLAN OF CARE
Problem: Discharge Planning  Goal: Discharge to home or other facility with appropriate resources  6/15/2024 1649 by Daniel Barnes RN  Outcome: Progressing  6/15/2024 0406 by Leeann Gonzalez RN  Outcome: Progressing     Problem: Pain  Goal: Verbalizes/displays adequate comfort level or baseline comfort level  6/15/2024 1649 by Danile Barnes RN  Outcome: Progressing  6/15/2024 0406 by Leeann Gonzalez RN  Outcome: Progressing     Problem: Chronic Conditions and Co-morbidities  Goal: Patient's chronic conditions and co-morbidity symptoms are monitored and maintained or improved  6/15/2024 1649 by Daniel Barnes RN  Outcome: Progressing  6/15/2024 0406 by Leeann Gonzalez RN  Outcome: Progressing     Problem: Safety - Adult  Goal: Free from fall injury  6/15/2024 1649 by Daniel Barnes RN  Outcome: Progressing  6/15/2024 0406 by Leeann Gonzalez RN  Outcome: Progressing     Problem: Skin/Tissue Integrity  Goal: Absence of new skin breakdown  Description: 1.  Monitor for areas of redness and/or skin breakdown  2.  Assess vascular access sites hourly  3.  Every 4-6 hours minimum:  Change oxygen saturation probe site  4.  Every 4-6 hours:  If on nasal continuous positive airway pressure, respiratory therapy assess nares and determine need for appliance change or resting period.  6/15/2024 1649 by Daniel Barnes RN  Outcome: Progressing  6/15/2024 0406 by Leeann Gonzalez RN  Outcome: Progressing     Problem: Nutrition Deficit:  Goal: Optimize nutritional status  6/15/2024 1649 by Daniel Barnes RN  Outcome: Progressing  6/15/2024 0406 by Leeann Gonzalez RN  Outcome: Progressing

## 2024-06-15 NOTE — PROGRESS NOTES
Bedside and Verbal shift change report given to BHAKTI Donovan (oncoming nurse) by BHAKTI Bradshaw (offgoing nurse). Report included the following information Nurse Handoff Report, Index, ED SBAR, Adult Overview, Intake/Output, MAR, Recent Results, and Cardiac Rhythm Normal Sinus .     1030: Spoke with cardiology. Heparin drip can be discontinued from a cardiology standpoint per Elisabeth Blake NP if attending agrees and does not need it for any other reason.     1134: Walked to patient room and saw IV pole outside of the door. Patient said he disconnected Heparin drip himself and placed the pole in the hallway because he was told by cardiology that he doesn't need it anymore. Attending Khoi Dumont was contacted to confirm Heparin can be discontinued and he agreed. Heparin no longer running per MD. Patient educated on the importance of not discontinuing his own medication.     1254: Patient upset about incorrect meal tray. Educated on diet order. Two trays allowed per MD. Diet order updated.     1630: Report given to Kiera for transfer to observation unit.

## 2024-06-15 NOTE — PLAN OF CARE
Problem: Pain  Goal: Verbalizes/displays adequate comfort level or baseline comfort level  6/15/2024 0406 by Leeann Gonzalez RN  Outcome: Progressing  6/14/2024 1947 by Daniel Barnes RN  Outcome: Progressing     Problem: Chronic Conditions and Co-morbidities  Goal: Patient's chronic conditions and co-morbidity symptoms are monitored and maintained or improved  6/15/2024 0406 by Leeann Gonzalez RN  Outcome: Progressing  6/14/2024 1947 by Daniel Barnes RN  Outcome: Progressing     Problem: Safety - Adult  Goal: Free from fall injury  6/15/2024 0406 by Leeann Gonzalez RN  Outcome: Progressing  6/14/2024 1947 by Daniel Barnes RN  Outcome: Progressing     Problem: Skin/Tissue Integrity  Goal: Absence of new skin breakdown  Description: 1.  Monitor for areas of redness and/or skin breakdown  2.  Assess vascular access sites hourly  3.  Every 4-6 hours minimum:  Change oxygen saturation probe site  4.  Every 4-6 hours:  If on nasal continuous positive airway pressure, respiratory therapy assess nares and determine need for appliance change or resting period.  6/15/2024 0406 by Leeann Gonzalez RN  Outcome: Progressing  6/14/2024 1947 by Daniel Barnes RN  Outcome: Progressing

## 2024-06-15 NOTE — PROGRESS NOTES
Bedside and Verbal shift change report given to BHAKTI Donovan (oncoming nurse) by BHAKTI Bradshaw (offgoing nurse). Report included the following information Nurse Handoff Report, Index, ED Encounter Summary, Adult Overview, Intake/Output, MAR, Recent Results, and Cardiac Rhythm Normal Sinus .      0805: Critical labs resulted - ptt 90.5. Spoke with pharmacy and decreased Heparin drip to 28 units. MD notified.

## 2024-06-16 LAB
ANION GAP SERPL CALC-SCNC: 1 MMOL/L (ref 5–15)
APTT PPP: 25.5 SEC (ref 22.1–31)
BASOPHILS # BLD: 0.1 K/UL (ref 0–0.1)
BASOPHILS NFR BLD: 1 % (ref 0–1)
BUN SERPL-MCNC: 64 MG/DL (ref 6–20)
BUN/CREAT SERPL: 26 (ref 12–20)
CALCIUM SERPL-MCNC: 8.8 MG/DL (ref 8.5–10.1)
CHLORIDE SERPL-SCNC: 101 MMOL/L (ref 97–108)
CO2 SERPL-SCNC: 32 MMOL/L (ref 21–32)
CREAT SERPL-MCNC: 2.5 MG/DL (ref 0.7–1.3)
DIFFERENTIAL METHOD BLD: ABNORMAL
EOSINOPHIL # BLD: 0.2 K/UL (ref 0–0.4)
EOSINOPHIL NFR BLD: 4 % (ref 0–7)
ERYTHROCYTE [DISTWIDTH] IN BLOOD BY AUTOMATED COUNT: 15.2 % (ref 11.5–14.5)
GLUCOSE BLD STRIP.AUTO-MCNC: 151 MG/DL (ref 65–117)
GLUCOSE BLD STRIP.AUTO-MCNC: 282 MG/DL (ref 65–117)
GLUCOSE BLD STRIP.AUTO-MCNC: 292 MG/DL (ref 65–117)
GLUCOSE SERPL-MCNC: 155 MG/DL (ref 65–100)
HCT VFR BLD AUTO: 25.3 % (ref 36.6–50.3)
HGB BLD-MCNC: 7.7 G/DL (ref 12.1–17)
IMM GRANULOCYTES # BLD AUTO: 0 K/UL (ref 0–0.04)
IMM GRANULOCYTES NFR BLD AUTO: 0 % (ref 0–0.5)
LYMPHOCYTES # BLD: 0.8 K/UL (ref 0.8–3.5)
LYMPHOCYTES NFR BLD: 15 % (ref 12–49)
MCH RBC QN AUTO: 26.6 PG (ref 26–34)
MCHC RBC AUTO-ENTMCNC: 30.4 G/DL (ref 30–36.5)
MCV RBC AUTO: 87.2 FL (ref 80–99)
MONOCYTES # BLD: 0.4 K/UL (ref 0–1)
MONOCYTES NFR BLD: 8 % (ref 5–13)
NEUTS SEG # BLD: 3.8 K/UL (ref 1.8–8)
NEUTS SEG NFR BLD: 72 % (ref 32–75)
NRBC # BLD: 0 K/UL (ref 0–0.01)
NRBC BLD-RTO: 0 PER 100 WBC
PLATELET # BLD AUTO: 210 K/UL (ref 150–400)
PMV BLD AUTO: 10.1 FL (ref 8.9–12.9)
POTASSIUM SERPL-SCNC: 4.9 MMOL/L (ref 3.5–5.1)
RBC # BLD AUTO: 2.9 M/UL (ref 4.1–5.7)
SERVICE CMNT-IMP: ABNORMAL
SODIUM SERPL-SCNC: 134 MMOL/L (ref 136–145)
THERAPEUTIC RANGE: NORMAL SECS (ref 58–77)
WBC # BLD AUTO: 5.4 K/UL (ref 4.1–11.1)

## 2024-06-16 PROCEDURE — 36415 COLL VENOUS BLD VENIPUNCTURE: CPT

## 2024-06-16 PROCEDURE — 85025 COMPLETE CBC W/AUTO DIFF WBC: CPT

## 2024-06-16 PROCEDURE — 6370000000 HC RX 637 (ALT 250 FOR IP): Performed by: INTERNAL MEDICINE

## 2024-06-16 PROCEDURE — 82962 GLUCOSE BLOOD TEST: CPT

## 2024-06-16 PROCEDURE — 1100000000 HC RM PRIVATE

## 2024-06-16 PROCEDURE — 80048 BASIC METABOLIC PNL TOTAL CA: CPT

## 2024-06-16 PROCEDURE — 6370000000 HC RX 637 (ALT 250 FOR IP): Performed by: NURSE PRACTITIONER

## 2024-06-16 PROCEDURE — 6360000002 HC RX W HCPCS

## 2024-06-16 PROCEDURE — 2580000003 HC RX 258

## 2024-06-16 PROCEDURE — 6370000000 HC RX 637 (ALT 250 FOR IP)

## 2024-06-16 PROCEDURE — 85730 THROMBOPLASTIN TIME PARTIAL: CPT

## 2024-06-16 PROCEDURE — 6370000000 HC RX 637 (ALT 250 FOR IP): Performed by: STUDENT IN AN ORGANIZED HEALTH CARE EDUCATION/TRAINING PROGRAM

## 2024-06-16 RX ORDER — CARVEDILOL 12.5 MG/1
12.5 TABLET ORAL 2 TIMES DAILY WITH MEALS
Status: DISCONTINUED | OUTPATIENT
Start: 2024-06-16 | End: 2024-06-17 | Stop reason: HOSPADM

## 2024-06-16 RX ORDER — AMLODIPINE BESYLATE 5 MG/1
10 TABLET ORAL DAILY
Status: DISCONTINUED | OUTPATIENT
Start: 2024-06-16 | End: 2024-06-17 | Stop reason: HOSPADM

## 2024-06-16 RX ORDER — HYDRALAZINE HYDROCHLORIDE 25 MG/1
25 TABLET, FILM COATED ORAL EVERY 12 HOURS SCHEDULED
Status: DISCONTINUED | OUTPATIENT
Start: 2024-06-16 | End: 2024-06-17 | Stop reason: HOSPADM

## 2024-06-16 RX ADMIN — BENZONATATE 100 MG: 100 CAPSULE ORAL at 09:44

## 2024-06-16 RX ADMIN — METHOCARBAMOL TABLETS 500 MG: 500 TABLET, COATED ORAL at 09:45

## 2024-06-16 RX ADMIN — INSULIN LISPRO 4 UNITS: 100 INJECTION, SOLUTION INTRAVENOUS; SUBCUTANEOUS at 17:43

## 2024-06-16 RX ADMIN — SENNOSIDES 8.6 MG: 8.6 TABLET, FILM COATED ORAL at 21:11

## 2024-06-16 RX ADMIN — HYDRALAZINE HYDROCHLORIDE 25 MG: 25 TABLET ORAL at 09:45

## 2024-06-16 RX ADMIN — BENZONATATE 100 MG: 100 CAPSULE ORAL at 21:11

## 2024-06-16 RX ADMIN — CARVEDILOL 12.5 MG: 12.5 TABLET, FILM COATED ORAL at 17:42

## 2024-06-16 RX ADMIN — LURASIDONE HYDROCHLORIDE 20 MG: 20 TABLET, FILM COATED ORAL at 17:42

## 2024-06-16 RX ADMIN — METHOCARBAMOL TABLETS 500 MG: 500 TABLET, COATED ORAL at 14:12

## 2024-06-16 RX ADMIN — AMLODIPINE BESYLATE 10 MG: 5 TABLET ORAL at 09:44

## 2024-06-16 RX ADMIN — CARVEDILOL 12.5 MG: 12.5 TABLET, FILM COATED ORAL at 09:54

## 2024-06-16 RX ADMIN — FUROSEMIDE 40 MG: 40 TABLET ORAL at 09:43

## 2024-06-16 RX ADMIN — MELATONIN 3 MG: at 21:16

## 2024-06-16 RX ADMIN — GABAPENTIN 300 MG: 300 CAPSULE ORAL at 14:13

## 2024-06-16 RX ADMIN — ACETAMINOPHEN 1000 MG: 500 TABLET ORAL at 21:11

## 2024-06-16 RX ADMIN — GABAPENTIN 300 MG: 300 CAPSULE ORAL at 09:44

## 2024-06-16 RX ADMIN — SODIUM CHLORIDE, PRESERVATIVE FREE 10 ML: 5 INJECTION INTRAVENOUS at 21:12

## 2024-06-16 RX ADMIN — METHOCARBAMOL TABLETS 500 MG: 500 TABLET, COATED ORAL at 21:11

## 2024-06-16 RX ADMIN — ACETAMINOPHEN 1000 MG: 500 TABLET ORAL at 09:45

## 2024-06-16 RX ADMIN — BENZONATATE 100 MG: 100 CAPSULE ORAL at 14:13

## 2024-06-16 RX ADMIN — GUAIFENESIN 600 MG: 600 TABLET, EXTENDED RELEASE ORAL at 21:11

## 2024-06-16 RX ADMIN — FUROSEMIDE 40 MG: 40 TABLET ORAL at 17:41

## 2024-06-16 RX ADMIN — SODIUM CHLORIDE, PRESERVATIVE FREE 10 ML: 5 INJECTION INTRAVENOUS at 09:50

## 2024-06-16 RX ADMIN — GUAIFENESIN 600 MG: 600 TABLET, EXTENDED RELEASE ORAL at 09:44

## 2024-06-16 RX ADMIN — OXYCODONE HYDROCHLORIDE 5 MG: 5 TABLET ORAL at 17:41

## 2024-06-16 RX ADMIN — GABAPENTIN 300 MG: 300 CAPSULE ORAL at 21:11

## 2024-06-16 RX ADMIN — HYDRALAZINE HYDROCHLORIDE 25 MG: 25 TABLET ORAL at 21:11

## 2024-06-16 RX ADMIN — ASPIRIN 81 MG: 81 TABLET, COATED ORAL at 09:45

## 2024-06-16 RX ADMIN — INSULIN GLARGINE 20 UNITS: 100 INJECTION, SOLUTION SUBCUTANEOUS at 21:12

## 2024-06-16 RX ADMIN — SODIUM CHLORIDE 1000 MG: 900 INJECTION INTRAVENOUS at 14:18

## 2024-06-16 ASSESSMENT — PAIN DESCRIPTION - LOCATION
LOCATION: BACK
LOCATION: ABDOMEN;FLANK

## 2024-06-16 ASSESSMENT — PAIN DESCRIPTION - ORIENTATION
ORIENTATION: LEFT
ORIENTATION: LEFT;LOWER

## 2024-06-16 ASSESSMENT — PAIN SCALES - GENERAL
PAINLEVEL_OUTOF10: 8

## 2024-06-16 NOTE — PROGRESS NOTES
[facility]  [address] [place] [address]  [phone]  [email or web address]         Cardiology Progress Note      6/16/2024 8:15 AM    Admit Date: 6/12/2024    Admit Diagnosis:   Tobacco abuse [Z72.0]  Hypoxia [R09.02]  Elevated troponin [R79.89]  CHF (congestive heart failure), NYHA class I, acute on chronic, combined (HCC) [I50.43]  Contusion of left chest wall, initial encounter [S20.212A]  Acute on chronic congestive heart failure, unspecified heart failure type (HCC) [I50.9]    Interval History/Subjective:     Keaton Van is back on supplemental O2.  Said he was feeling weak last night and felt like he needed it.  Feels better with Oxygen on.  Concerned about being discharged because he will be unhoused at the end of the month with nowhere to go.  \"You'll be bringing my body into the morgue in another month if I don't find a place to live\".    BP (!) 172/90   Pulse 65   Temp 98.2 °F (36.8 °C) (Oral)   Resp 18   Ht 1.829 m (6')   Wt 86.3 kg (190 lb 4.1 oz)   SpO2 91%   BMI 25.80 kg/m²     Current Facility-Administered Medications   Medication Dose Route Frequency    furosemide (LASIX) tablet 40 mg  40 mg Oral BID    amLODIPine (NORVASC) tablet 5 mg  5 mg Oral Daily    metoprolol tartrate (LOPRESSOR) tablet 25 mg  25 mg Oral BID    oxyCODONE (ROXICODONE) immediate release tablet 5 mg  5 mg Oral Q6H PRN    aspirin EC tablet 81 mg  81 mg Oral Daily    acetaminophen (TYLENOL) tablet 1,000 mg  1,000 mg Oral Q8H    lidocaine 4 % external patch 1 patch  1 patch TransDERmal Daily    senna (SENOKOT) tablet 8.6 mg  1 tablet Oral Nightly    hydrALAZINE (APRESOLINE) injection 10 mg  10 mg IntraVENous Q6H PRN    sodium chloride flush 0.9 % injection 5-40 mL  5-40 mL IntraVENous 2 times per day    sodium chloride flush 0.9 % injection 5-40 mL  5-40 mL IntraVENous PRN    0.9 % sodium chloride infusion   IntraVENous PRN    ondansetron (ZOFRAN-ODT) disintegrating

## 2024-06-16 NOTE — PLAN OF CARE
Problem: Pain  Goal: Verbalizes/displays adequate comfort level or baseline comfort level  6/16/2024 0439 by Km Austin RN  Outcome: Progressing  6/15/2024 1649 by Daniel Barnes RN  Outcome: Progressing     Problem: Chronic Conditions and Co-morbidities  Goal: Patient's chronic conditions and co-morbidity symptoms are monitored and maintained or improved  6/16/2024 0439 by Km Austin RN  Outcome: Progressing  6/15/2024 1649 by Daniel Barnes RN  Outcome: Progressing     Problem: Safety - Adult  Goal: Free from fall injury  6/16/2024 0439 by Km Austin RN  Outcome: Progressing  6/15/2024 1649 by Daniel Barnes RN  Outcome: Progressing     Problem: Skin/Tissue Integrity  Goal: Absence of new skin breakdown  Description: 1.  Monitor for areas of redness and/or skin breakdown  2.  Assess vascular access sites hourly  3.  Every 4-6 hours minimum:  Change oxygen saturation probe site  4.  Every 4-6 hours:  If on nasal continuous positive airway pressure, respiratory therapy assess nares and determine need for appliance change or resting period.  6/16/2024 0439 by Km Austin RN  Outcome: Progressing  6/15/2024 1649 by Daniel Barnes RN  Outcome: Progressing

## 2024-06-16 NOTE — PROGRESS NOTES
Hospitalist Progress Note    NAME:   Keaton Van   : 1965   MRN: [id number]     Date/Time: 2024 7:58 PM  Patient PCP: No primary care provider on file.    Estimated discharge date:   Barriers: Clinical stability  Oxygen challenge tomorrow  May need SNF      Assessment / Plan:  Acute hypoxic respiratory failure requiring 2L O2  Shortness of breath, acute on chronic  Likely 2/2 pulmonary edema  Lifelong heavy smoker  Endorses cough productive of \"black\" sputum x 24h  Medical noncompliance  Moderate bilateral pleural effusion  Supplemental O2 as needed to keep sats >92%  Given report of productive cough, likelihood of underlying COPD, will treat empirically with rocephin and azithromycin for now  CT chest showed moderate bilateral effusion  Cardiology on board  Continue Lasix, switch to IV from p.o.  O2 challenege       Chest pain  NSTEMI  Cocaine abuse  CAD w 2 stents  Troponins rising 353 now 1824  EKG sinus rhythm without ST elevations  pAF - in SR at this time  Not on OAC d/t risk for falls, anemia, and medication noncompliance  Pulmonary edema likely cardiogenic etiology  24 Echo - EF 60-65%, mild MVR, mild pulm HTN  Uncontrolled HTN  Cardiology consulted  CXR mild pulmonary edema, BNP 8000s  Cont diuresis with IV Lasix, changed to p.o.  Strict I&Os, daily weight, sodium restriction  Initially on heparin gtt., now discontinued by cardiology       Acute on chronic anemia  Required PRBC transfusion on last admission  Suspect multifactorial including CKD, poor dietary intake  Hgb 8.4  Denies melena, hematuria, hematochezia, hematemesis  Trend H&H     Left ribs and flank pain  CXR no fractures following fall w trauma to left chest and flank  CT CAP pending given flank pain rated 10/10, patient c/o pain out of proportion to presentation  Multimodal pain mgmt  DC Dilaudid, changed to oxycodone     CKD4  Cr 2.21 on admit, baseline around 2.1-2.6  Monitor BUN/Creat  Avoid known nephrotoxic  agents  Replete electrolytes  Recent renal US 6/3 nl  Recent UA (+) proteinuria  Creatinine improving, recheck tomorrow     Diabetes mellitus type 2  Accuchecks ACHS  Sliding scale insulin  Monitor for s/s hypo/hyperglycemia  Hypoglycemia treatment per protocol  Resume long acting insulin qhs     Hx untreated HCV     Chronic bilateral foot wounds with history of left toe amputations  Right 5th toe osteomyelitis s/p resection 6/1/24  Wound care consulted  DC with RX for keflex - never filled rx  Covered with IV Rocephin  Podiatry consult     Bipolar I  Polysubstance abuse incl heavy crack cocaine use  Tobacco use  Medication noncompliance  Chronic pain  Left flank/back pain    UDS positive for cocaine, marijuana  Refused nicotine patch  Resume Latuda prescribed by psych on last stay  Adamantly denies SI/HI  Does have chronic passive death wish and apathy d/t ongoing medical and psychosocial issues and overall poor quality of life and chronic pain  Seen by psychiatry on last visit who cleared pt of SI precautions  Palliative care consulted        Medical Decision Making:   I personally reviewed labs: CBC, BMP, troponin, proBNP  I personally reviewed imaging: Chest x-ray  I personally reviewed EKG:  Toxic drug monitoring:  Discussed case with: Patient, RN, DM nurse        Code Status: DNR  DVT Prophylaxis: Heparin drip  GI Prophylaxis:  Baseline: Poor mobility at home d/t mutiple amputations and foot wounds.  Not able to leave the home or afford care.  Medically noncompliant.  Psych diagnoses and polysubstance abuse particularly cocaine.     Subjective:     Chief Complaint / Reason for Physician Visit  \" Follow-up for NSTEMI, acute CHF, CAD with stent, active smoker.\".  Discussed with RN events overnight.  On 2 L NC        Objective:     VITALS:   Last 24hrs VS reviewed since prior progress note. Most recent are:  Patient Vitals for the past 24 hrs:   BP Temp Temp src Pulse Resp SpO2   06/16/24 1741 (!) 158/79 -- -- 70

## 2024-06-17 VITALS
RESPIRATION RATE: 16 BRPM | DIASTOLIC BLOOD PRESSURE: 78 MMHG | SYSTOLIC BLOOD PRESSURE: 131 MMHG | TEMPERATURE: 97.5 F | HEIGHT: 72 IN | HEART RATE: 63 BPM | OXYGEN SATURATION: 93 % | WEIGHT: 190.26 LBS | BODY MASS INDEX: 25.77 KG/M2

## 2024-06-17 LAB
ANION GAP SERPL CALC-SCNC: 3 MMOL/L (ref 5–15)
BASOPHILS # BLD: 0 K/UL (ref 0–0.1)
BASOPHILS NFR BLD: 1 % (ref 0–1)
BUN SERPL-MCNC: 65 MG/DL (ref 6–20)
BUN/CREAT SERPL: 26 (ref 12–20)
CALCIUM SERPL-MCNC: 9.2 MG/DL (ref 8.5–10.1)
CHLORIDE SERPL-SCNC: 103 MMOL/L (ref 97–108)
CO2 SERPL-SCNC: 30 MMOL/L (ref 21–32)
CREAT SERPL-MCNC: 2.54 MG/DL (ref 0.7–1.3)
DIFFERENTIAL METHOD BLD: ABNORMAL
EOSINOPHIL # BLD: 0.3 K/UL (ref 0–0.4)
EOSINOPHIL NFR BLD: 4 % (ref 0–7)
ERYTHROCYTE [DISTWIDTH] IN BLOOD BY AUTOMATED COUNT: 15.3 % (ref 11.5–14.5)
GLUCOSE BLD STRIP.AUTO-MCNC: 199 MG/DL (ref 65–117)
GLUCOSE BLD STRIP.AUTO-MCNC: 210 MG/DL (ref 65–117)
GLUCOSE BLD STRIP.AUTO-MCNC: 69 MG/DL (ref 65–117)
GLUCOSE SERPL-MCNC: 211 MG/DL (ref 65–100)
HCT VFR BLD AUTO: 25.5 % (ref 36.6–50.3)
HGB BLD-MCNC: 7.6 G/DL (ref 12.1–17)
IMM GRANULOCYTES # BLD AUTO: 0 K/UL (ref 0–0.04)
IMM GRANULOCYTES NFR BLD AUTO: 1 % (ref 0–0.5)
LYMPHOCYTES # BLD: 0.9 K/UL (ref 0.8–3.5)
LYMPHOCYTES NFR BLD: 14 % (ref 12–49)
MCH RBC QN AUTO: 26.5 PG (ref 26–34)
MCHC RBC AUTO-ENTMCNC: 29.8 G/DL (ref 30–36.5)
MCV RBC AUTO: 88.9 FL (ref 80–99)
MONOCYTES # BLD: 0.5 K/UL (ref 0–1)
MONOCYTES NFR BLD: 8 % (ref 5–13)
NEUTS SEG # BLD: 4.5 K/UL (ref 1.8–8)
NEUTS SEG NFR BLD: 72 % (ref 32–75)
NRBC # BLD: 0 K/UL (ref 0–0.01)
NRBC BLD-RTO: 0 PER 100 WBC
PLATELET # BLD AUTO: 173 K/UL (ref 150–400)
PMV BLD AUTO: 9.7 FL (ref 8.9–12.9)
POTASSIUM SERPL-SCNC: 5 MMOL/L (ref 3.5–5.1)
RBC # BLD AUTO: 2.87 M/UL (ref 4.1–5.7)
SERVICE CMNT-IMP: ABNORMAL
SERVICE CMNT-IMP: ABNORMAL
SERVICE CMNT-IMP: NORMAL
SODIUM SERPL-SCNC: 136 MMOL/L (ref 136–145)
WBC # BLD AUTO: 6.1 K/UL (ref 4.1–11.1)

## 2024-06-17 PROCEDURE — 99231 SBSQ HOSP IP/OBS SF/LOW 25: CPT | Performed by: CLINICAL NURSE SPECIALIST

## 2024-06-17 PROCEDURE — 97116 GAIT TRAINING THERAPY: CPT

## 2024-06-17 PROCEDURE — 97535 SELF CARE MNGMENT TRAINING: CPT

## 2024-06-17 PROCEDURE — 6370000000 HC RX 637 (ALT 250 FOR IP)

## 2024-06-17 PROCEDURE — 6370000000 HC RX 637 (ALT 250 FOR IP): Performed by: NURSE PRACTITIONER

## 2024-06-17 PROCEDURE — 6370000000 HC RX 637 (ALT 250 FOR IP): Performed by: INTERNAL MEDICINE

## 2024-06-17 PROCEDURE — 36415 COLL VENOUS BLD VENIPUNCTURE: CPT

## 2024-06-17 PROCEDURE — 80048 BASIC METABOLIC PNL TOTAL CA: CPT

## 2024-06-17 PROCEDURE — 2700000000 HC OXYGEN THERAPY PER DAY

## 2024-06-17 PROCEDURE — 97530 THERAPEUTIC ACTIVITIES: CPT

## 2024-06-17 PROCEDURE — 94760 N-INVAS EAR/PLS OXIMETRY 1: CPT

## 2024-06-17 PROCEDURE — 6370000000 HC RX 637 (ALT 250 FOR IP): Performed by: CLINICAL NURSE SPECIALIST

## 2024-06-17 PROCEDURE — 2580000003 HC RX 258

## 2024-06-17 PROCEDURE — 97110 THERAPEUTIC EXERCISES: CPT

## 2024-06-17 PROCEDURE — 82962 GLUCOSE BLOOD TEST: CPT

## 2024-06-17 PROCEDURE — 6360000002 HC RX W HCPCS: Performed by: INTERNAL MEDICINE

## 2024-06-17 PROCEDURE — 85025 COMPLETE CBC W/AUTO DIFF WBC: CPT

## 2024-06-17 PROCEDURE — 6370000000 HC RX 637 (ALT 250 FOR IP): Performed by: STUDENT IN AN ORGANIZED HEALTH CARE EDUCATION/TRAINING PROGRAM

## 2024-06-17 RX ORDER — OXYCODONE HYDROCHLORIDE 5 MG/1
5 TABLET ORAL EVERY 8 HOURS PRN
Qty: 8 TABLET | Refills: 0 | Status: ON HOLD | OUTPATIENT
Start: 2024-06-17 | End: 2024-06-24 | Stop reason: HOSPADM

## 2024-06-17 RX ORDER — ISOSORBIDE MONONITRATE 30 MG/1
30 TABLET, EXTENDED RELEASE ORAL DAILY
Qty: 30 TABLET | Refills: 3 | Status: SHIPPED | OUTPATIENT
Start: 2024-06-17

## 2024-06-17 RX ORDER — ISOSORBIDE MONONITRATE 30 MG/1
30 TABLET, EXTENDED RELEASE ORAL DAILY
Status: DISCONTINUED | OUTPATIENT
Start: 2024-06-17 | End: 2024-06-17 | Stop reason: HOSPADM

## 2024-06-17 RX ORDER — TIZANIDINE 2 MG/1
2 TABLET ORAL EVERY 8 HOURS PRN
Qty: 10 TABLET | Refills: 0 | Status: SHIPPED | OUTPATIENT
Start: 2024-06-17

## 2024-06-17 RX ORDER — AMLODIPINE BESYLATE 10 MG/1
10 TABLET ORAL DAILY
Qty: 30 TABLET | Refills: 3 | Status: SHIPPED | OUTPATIENT
Start: 2024-06-18

## 2024-06-17 RX ORDER — CARVEDILOL 12.5 MG/1
12.5 TABLET ORAL 2 TIMES DAILY WITH MEALS
Qty: 60 TABLET | Refills: 3 | Status: ON HOLD | OUTPATIENT
Start: 2024-06-17 | End: 2024-06-24 | Stop reason: HOSPADM

## 2024-06-17 RX ORDER — FUROSEMIDE 40 MG/1
40 TABLET ORAL 2 TIMES DAILY
Qty: 60 TABLET | Refills: 3 | Status: ON HOLD | OUTPATIENT
Start: 2024-06-17 | End: 2024-06-24 | Stop reason: HOSPADM

## 2024-06-17 RX ORDER — INSULIN GLARGINE 100 [IU]/ML
20 INJECTION, SOLUTION SUBCUTANEOUS NIGHTLY
Qty: 6 ML | Refills: 0 | Status: SHIPPED | OUTPATIENT
Start: 2024-06-17

## 2024-06-17 RX ORDER — ASPIRIN 81 MG/1
81 TABLET ORAL DAILY
Qty: 30 TABLET | Refills: 3 | Status: SHIPPED | OUTPATIENT
Start: 2024-06-18

## 2024-06-17 RX ORDER — INSULIN LISPRO 100 [IU]/ML
4 INJECTION, SOLUTION INTRAVENOUS; SUBCUTANEOUS
Status: DISCONTINUED | OUTPATIENT
Start: 2024-06-17 | End: 2024-06-17 | Stop reason: HOSPADM

## 2024-06-17 RX ORDER — LURASIDONE HYDROCHLORIDE 20 MG/1
20 TABLET, FILM COATED ORAL
Qty: 30 TABLET | Refills: 0 | Status: SHIPPED | OUTPATIENT
Start: 2024-06-17

## 2024-06-17 RX ORDER — INSULIN GLARGINE 100 [IU]/ML
18 INJECTION, SOLUTION SUBCUTANEOUS NIGHTLY
Status: DISCONTINUED | OUTPATIENT
Start: 2024-06-17 | End: 2024-06-17 | Stop reason: HOSPADM

## 2024-06-17 RX ADMIN — FUROSEMIDE 40 MG: 40 TABLET ORAL at 08:47

## 2024-06-17 RX ADMIN — CARVEDILOL 12.5 MG: 12.5 TABLET, FILM COATED ORAL at 08:48

## 2024-06-17 RX ADMIN — GABAPENTIN 300 MG: 300 CAPSULE ORAL at 08:48

## 2024-06-17 RX ADMIN — ACETAMINOPHEN 650 MG: 325 TABLET ORAL at 06:06

## 2024-06-17 RX ADMIN — METHOCARBAMOL TABLETS 500 MG: 500 TABLET, COATED ORAL at 13:13

## 2024-06-17 RX ADMIN — HYDROXYZINE HYDROCHLORIDE 10 MG: 10 TABLET ORAL at 01:12

## 2024-06-17 RX ADMIN — INSULIN LISPRO 4 UNITS: 100 INJECTION, SOLUTION INTRAVENOUS; SUBCUTANEOUS at 08:54

## 2024-06-17 RX ADMIN — ACETAMINOPHEN 1000 MG: 500 TABLET ORAL at 13:13

## 2024-06-17 RX ADMIN — GABAPENTIN 300 MG: 300 CAPSULE ORAL at 13:13

## 2024-06-17 RX ADMIN — OXYCODONE HYDROCHLORIDE 5 MG: 5 TABLET ORAL at 08:43

## 2024-06-17 RX ADMIN — HYDRALAZINE HYDROCHLORIDE 10 MG: 20 INJECTION INTRAMUSCULAR; INTRAVENOUS at 11:10

## 2024-06-17 RX ADMIN — AMLODIPINE BESYLATE 10 MG: 5 TABLET ORAL at 08:47

## 2024-06-17 RX ADMIN — HYDRALAZINE HYDROCHLORIDE 25 MG: 25 TABLET ORAL at 08:48

## 2024-06-17 RX ADMIN — GUAIFENESIN 600 MG: 600 TABLET, EXTENDED RELEASE ORAL at 08:48

## 2024-06-17 RX ADMIN — OXYCODONE HYDROCHLORIDE 5 MG: 5 TABLET ORAL at 01:12

## 2024-06-17 RX ADMIN — SODIUM CHLORIDE, PRESERVATIVE FREE 10 ML: 5 INJECTION INTRAVENOUS at 08:48

## 2024-06-17 RX ADMIN — INSULIN LISPRO 2 UNITS: 100 INJECTION, SOLUTION INTRAVENOUS; SUBCUTANEOUS at 08:47

## 2024-06-17 RX ADMIN — BENZONATATE 100 MG: 100 CAPSULE ORAL at 13:13

## 2024-06-17 RX ADMIN — METHOCARBAMOL TABLETS 500 MG: 500 TABLET, COATED ORAL at 08:47

## 2024-06-17 RX ADMIN — BENZONATATE 100 MG: 100 CAPSULE ORAL at 08:47

## 2024-06-17 RX ADMIN — ASPIRIN 81 MG: 81 TABLET, COATED ORAL at 08:47

## 2024-06-17 ASSESSMENT — PAIN SCALES - GENERAL
PAINLEVEL_OUTOF10: 7
PAINLEVEL_OUTOF10: 6
PAINLEVEL_OUTOF10: 2
PAINLEVEL_OUTOF10: 8
PAINLEVEL_OUTOF10: 7

## 2024-06-17 ASSESSMENT — PAIN DESCRIPTION - LOCATION: LOCATION: FLANK

## 2024-06-17 ASSESSMENT — PAIN DESCRIPTION - DESCRIPTORS: DESCRIPTORS: SHARP;BURNING;ACHING

## 2024-06-17 ASSESSMENT — PAIN SCALES - WONG BAKER: WONGBAKER_NUMERICALRESPONSE: HURTS A LITTLE BIT

## 2024-06-17 ASSESSMENT — PAIN DESCRIPTION - ORIENTATION: ORIENTATION: LEFT

## 2024-06-17 NOTE — PROGRESS NOTES
Discharge instructions provided. IV discontinued. Picked up by the taxi.   CONSTITUTIONAL: Well-developed; well-nourished; in no acute distress.   SKIN: warm, dry  HEAD: Normocephalic; atraumatic.  EYES: PERRL, EOMI, normal sclera and conjunctiva   ENT: No nasal discharge; airway clear.  NECK: Supple; non tender.  CARD:  Regular rate and rhythm.   RESP: NO inc WOB   ABD: soft ntnd  EXT: Normal ROM.    L foot :  TTP and edema over base of 5th toe, distal pulses intact   LYMPH: No acute cervical adenopathy.  NEURO: Alert, oriented, grossly unremarkable  PSYCH: Cooperative, appropriate.

## 2024-06-17 NOTE — PLAN OF CARE
Problem: Pain  Goal: Verbalizes/displays adequate comfort level or baseline comfort level  Outcome: Progressing     Problem: Skin/Tissue Integrity  Goal: Absence of new skin breakdown  Description: 1.  Monitor for areas of redness and/or skin breakdown  2.  Assess vascular access sites hourly  3.  Every 4-6 hours minimum:  Change oxygen saturation probe site  4.  Every 4-6 hours:  If on nasal continuous positive airway pressure, respiratory therapy assess nares and determine need for appliance change or resting period.  Outcome: Progressing     Problem: Discharge Planning  Goal: Discharge to home or other facility with appropriate resources  Outcome: Progressing

## 2024-06-17 NOTE — CARE COORDINATION
Pt is clear from a CM standpoint for d/c.    Pt will use round trip.    Trip is schedule for 530 pm.     Transition of Care Plan:    RUR: 26%  Prior Level of Functioning: Independent   Disposition: Home   If SNF or IPR: Date FOC offered:   Date FOC received:   Accepting facility:   Date authorization started with reference number:   Date authorization received and expires:   Follow up appointments: PCP  DME needed: No DME needed   Transportation at discharge: Round trip   IM/IMM Medicare/ letter given: 6/17/24  Is patient a  and connected with VA? No   If yes, was  transfer form completed and VA notified? No  Caregiver Contact:   Discharge Caregiver contacted prior to discharge? Pt was contacted   Care Conference needed? No  Barriers to discharge: None          06/17/24 1422   Services At/After Discharge   Transition of Care Consult (CM Consult) N/A   Services At/After Discharge None    Resource Information Provided? No   Mode of Transport at Discharge Self   Confirm Follow Up Transport Self   Condition of Participation: Discharge Planning   The Plan for Transition of Care is related to the following treatment goals: Goal is to return home with follow up appointments   The Patient and/or Patient Representative was provided with a Choice of Provider? Patient   The Patient and/Or Patient Representative agree with the Discharge Plan? Yes   Freedom of Choice list was provided with basic dialogue that supports the patient's individualized plan of care/goals, treatment preferences, and shares the quality data associated with the providers?  Yes     Amparo Argueta

## 2024-06-17 NOTE — PLAN OF CARE
Problem: Physical Therapy - Adult  Goal: By Discharge: Performs mobility at highest level of function for planned discharge setting.  See evaluation for individualized goals.  Description: FUNCTIONAL STATUS PRIOR TO ADMISSION: Ambulates mod I with use of SPC. Reports history of 3-4 recent falls due to \"dizziness.\" Pt recently left AMA from hospital after 5th toe amputation. Per podiatry note dated 6/7/24, pt to be heel weightbearing RLE with post-op shoe and WBAT LLE.     HOME SUPPORT PRIOR TO ADMISSION: The patient lived alone with no support.     Physical Therapy Goals  Initiated 6/13/2024  1.  Patient will move from supine to sit and sit to supine, scoot up and down, and roll side to side in bed with modified independence within 7 day(s).    2.  Patient will perform sit to stand with modified independence within 7 day(s).  3.  Patient will transfer from bed to chair and chair to bed with modified independence using the least restrictive device within 7 day(s).  4.  Patient will ambulate with modified independence for 100 feet with the least restrictive device within 7 day(s).   5.  Patient will ascend/descend 14 stairs with 1 handrail(s) with modified independence within 7 day(s).   Outcome: Progressing     PHYSICAL THERAPY TREATMENT    Patient: Keaton Van (59 y.o. male)  Date: 6/17/2024  Diagnosis: Tobacco abuse [Z72.0]  Hypoxia [R09.02]  Elevated troponin [R79.89]  CHF (congestive heart failure), NYHA class I, acute on chronic, combined ([facility]) [I50.43]  Contusion of left chest wall, initial encounter [S20.212A]  Acute on chronic congestive heart failure, unspecified heart failure type (HCC) [I50.9] CHF (congestive heart failure), NYHA class I, acute on chronic, combined ([facility])      Precautions:  (falls, RLE heel WB with post op shoe)                      ASSESSMENT:  Pt tolerated PT services well and continues to progress toward PT POC goals. Progress to date less than anticipated and session limited 2/2  Activity Measure for Post-Acute Care \"6-Clicks\" Basic Mobility Scores Predict Discharge Destination After Acute Care Hospitalization in Select Patient Groups: A Retrospective, Observational Study. Arch Rehabil Res Clin Transl. 2022 Jul 16;4(3):085325. doi: 10.1016/j.arrct.2022.843097. PMID: 61024090; PMCID: XIE0941333.  4. Jasmin PALAFOX, Michelle S, Teresa W, Belem MANDUJANO. AM-PAC Short Forms Manual 4.0. Revised 2/2020.                                                                                                                                                                                                                              Pain Rating:  No c/o pain.    Pain Intervention(s):   Not applicable.    Activity Tolerance:   Good    After treatment:   Patient left in no apparent distress in bed, Call bell within reach, and Bed/ chair alarm activated      COMMUNICATION/EDUCATION:   The patient's plan of care was discussed with: occupational therapist and registered nurse    Patient Education  Education Given To: Patient  Education Provided: Role of Therapy;Orientation;Fall Prevention Strategies;Plan of Care;Family Education;Transfer Training;Equipment;Energy Conservation;Precautions  Education Method: Demonstration;Verbal;Teach Back  Barriers to Learning: None  Education Outcome: Verbalized understanding;Demonstrated understanding;Continued education needed      Agnes Cummins, PT, DPT

## 2024-06-17 NOTE — DIABETES MGMT
[facility]  DIABETES MANAGEMENT CONSULT    Consulted by Provider for advanced nursing evaluation and care for inpatient blood glucose management.    Evaluation and Action Plan   Keaton Van is a 59 year old gentleman with Type 2 Diabetes, Bipolar and depression, peripheral neuropathy s/p left great and second toe amputations, CKD, substance abuse and a history of narcotic dependence, HCV? and suicide attempt who is admitted with acute hypoxic respiratory failure.  He was discharged one week prior from a hospitalization for right leg cellulitis with foot wounds s/p I&D with podiatry.    Mr Van is on disability, living alone.  At some point he tells me he was on insulin and do see that he filled 25mg Januvia and Glipizide 5mg daily back in August 2023 followed by a prescription of metformin 500mg daily that he filled 4/2024.  He shared that he got tired of taking his medications and just stopped them.  His A1C of 7.6% is was suspected to be low s/t anemia but fructosamine resulted in 306 correlating with current A1C.  He was discharged with Basaglar.     Last admission, he responded very well to very low dose insulin and will resume those doses.    Management Rationale Action Plan   Medication   Basal needs Using Home dose Reduced to 18 units Lantus daily   Bolus insulin  4 units humalog/meal   Corrective insulin Using medium dose sensitivity based on weight    Additional orders  Carb consistent meals.       Diabetes Discharge Plan   Medication  A1C is 7.6% and needs to resume antihyperglycemic agents. Would avoid metformin with elevated AST.  Prescriptions sent to the outpatient pharmacy for Basaglar 20 units daily and Humalog at [facility].  Needs pen needles, added these on to Rx.   Referral  [x]        Outpatient diabetes education: Order previously placed   Additional orders  Will need a FUV with PCP within 1-2 weeks after hospital discharge for ongoing diabetes management

## 2024-06-17 NOTE — DISCHARGE INSTRUCTIONS
HOSPITALIST DISCHARGE INSTRUCTIONS    NAME: Keaton Van   :  1965   MRN:  [id number]     Date/Time:  2024 9:31 AM    ADMIT DATE: 2024   DISCHARGE DATE: 2024     Acute hypoxic respiratory failure   Likely 2/2 pulmonary edema  Lifelong heavy smoker  Moderate bilateral pleural effusion  Chest pain  NSTEMI  Cocaine abuse  CAD w 2 stents  pAF - in SR at this time  Acute on chronic anemia  Left ribs and flank pain  CKD4  Diabetes mellitus type 2  Hx untreated HCV  Chronic bilateral foot wounds with history of left toe amputations  Right 5th toe osteomyelitis s/p resection 24  Bipolar I  Polysubstance abuse incl heavy crack cocaine use  Tobacco use  Medication noncompliance  Chronic pain    It is important that you take the medication exactly as they are prescribed.   Keep your medication in the bottles provided by the pharmacist and keep a list of the medication names, dosages, and times to be taken in your wallet.   Do not take other medications without consulting your doctor.       What to do at Home    Recommended diet:  cardiac diet    Recommended activity: activity as tolerated      If you have questions regarding the hospital related prescriptions or hospital related issues please call [facility] office at . You can always direct your questions to your primary care doctor if you are unable to reach your hospital physician; your PCP works as an extension of your hospital doctor just like your hospital doctor is an extension of your PCP for your time at the hospital ([facility])    If you experience any of the following symptoms then please call your primary care physician or return to the emergency room if you cannot get hold of your doctor:    Fever, chills, nausea, vomiting, or persistent diarrhea  Worsening weakness or new problems with your speech or balance  Dark stools or visible blood in your stools  New Leg swelling or shortness of breath as these could be

## 2024-06-17 NOTE — PLAN OF CARE
Problem: Occupational Therapy - Adult  Goal: By Discharge: Performs self-care activities at highest level of function for planned discharge setting.  See evaluation for individualized goals.  Description: FUNCTIONAL STATUS PRIOR TO ADMISSION:  recent toe amputation, was ambulating with SPC and post op shoe, reports he has limited resources and follow up appointments are useless due to inability to get to appointments, he reports he moved into a apartment and a lady friend was suppose to move in with him to help with rent but she never did and he will be evicted in July, doesn't drive/own vehicle, performed ADLS on his own and was standing to shower, reports having loss of balance and dizziness in standing especially with eyes closed and needs to lean against shower wall, performed ADLS on his own  Receives Help From: Other (comment) (Denies support systems), ADL Assistance: Independent,  ,  ,  ,  ,  , Homemaking Assistance: Independent, Ambulation Assistance: Independent, Transfer Assistance: Independent, Active : No     HOME SUPPORT: Patient lived alone.    Occupational Therapy Goals:  Initiated 6/13/2024  1.  Patient will perform grooming with Modified [place] within 7 day(s).  2.  Patient will perform upper body dressing and lower body dressing with Modified [place] within 7 day(s).  3.  Patient will perform toileting with Modified [place] within 7 day(s).  4.  Patient will perform toilet transfers with Modified [place]  within 7 day(s).      Outcome: Progressing    OCCUPATIONAL THERAPY TREATMENT  Patient: Keaton Van (59 y.o. male)  Date: 6/17/2024  Primary Diagnosis: Tobacco abuse [Z72.0]  Hypoxia [R09.02]  Elevated troponin [R79.89]  CHF (congestive heart failure), NYHA class I, acute on chronic, combined (HCC) [I50.43]  Contusion of left chest wall, initial encounter [S20.212A]  Acute on chronic congestive heart failure, unspecified heart failure type (HCC) [I50.9]

## 2024-06-17 NOTE — PROGRESS NOTES
Patient seen for treatment session. Patient with marked dizziness and elevated BP. RN informed and addressing at end of session. Full note to follow. Rec home with HHOT and  help for IADLs    Patient Vitals for the past 6 hrs:   Pulse BP Patient Position   06/17/24 1100 59 (!) 183/91 Supine   06/17/24 1054 62 (!) 179/110 Sitting   06/17/24 0840 65 (!) 150/88 Sitting     Thank you  Michelle Arthur MS OTR/L

## 2024-06-17 NOTE — PROGRESS NOTES
End of Shift Note    Bedside shift change report given to Kp YA (oncoming nurse) by Ioana Nevarez RN (offgoing nurse).  Report included the following information SBAR, Kardex, and MAR    Shift worked:  7am-1930pm     Shift summary and any significant changes:     Prescribed medication done. Oxycodone given for pain. Hourly round completed.                  Ioana Nevarez, RN

## 2024-06-17 NOTE — DISCHARGE SUMMARY
Discharge Summary    Name: Keaton Van  [id number]  YOB: 1965 (Age: 59 y.o.)   Date of Admission: 6/12/2024  Date of Discharge: 6/17/2024  Attending Physician: Khoi Dumont MD    Discharge Diagnosis:   Acute hypoxic respiratory failure   Likely 2/2 pulmonary edema  Lifelong heavy smoker  Moderate bilateral pleural effusion  Chest pain  NSTEMI  Cocaine abuse  CAD w 2 stents  pAF - in SR at this time  Acute on chronic anemia  Left ribs and flank pain  CKD4  Diabetes mellitus type 2  Hx untreated HCV  Chronic bilateral foot wounds with history of left toe amputations  Right 5th toe osteomyelitis s/p resection 6/1/24  Bipolar I  Polysubstance abuse incl heavy crack cocaine use  Tobacco use  Medication noncompliance  Chronic pain            Consultations:  IP CONSULT TO HOSPITALIST  IP WOUND CARE NURSE CONSULT TO EVAL  IP CONSULT TO DIETITIAN  IP CONSULT TO CASE MANAGEMENT  IP CONSULT TO PALLIATIVE CARE  IP CONSULT TO CARDIOLOGY  IP CONSULT TO DIABETES MANAGEMENT  IP CONSULT TO SPIRITUAL SERVICES  IP CONSULT TO PODIATRY  IP CONSULT TO CASE MANAGEMENT      Brief Admission History/Reason for Admission Per Koko Villegas MD:       Brief Hospital Course by Main Problems:   Acute hypoxic respiratory failure requiring 2L O2  Shortness of breath, acute on chronic  Likely 2/2 pulmonary edema  Lifelong heavy smoker  Endorses cough productive of \"black\" sputum x 24h  Medical noncompliance  Moderate bilateral pleural effusion  Supplemental O2 as needed to keep sats >92%  Given report of productive cough, likelihood of underlying COPD, will treat empirically with rocephin and azithromycin for now  CT chest showed moderate bilateral effusion  Cardiology on board  Continue Lasix, switch to IV from p.o.  O2 challenege        Chest pain  NSTEMI  Cocaine abuse  CAD w 2 stents  Troponins rising 353 now 1824  EKG sinus rhythm without ST elevations  pAF - in SR at this time  Not on OAC d/t

## 2024-06-19 ENCOUNTER — HOSPITAL ENCOUNTER (INPATIENT)
Facility: HOSPITAL | Age: 59
LOS: 6 days | Discharge: NURSING, REHAB OR LONG-TERM CARE | DRG: 071 | End: 2024-06-25
Attending: EMERGENCY MEDICINE | Admitting: STUDENT IN AN ORGANIZED HEALTH CARE EDUCATION/TRAINING PROGRAM
Payer: MEDICARE

## 2024-06-19 ENCOUNTER — APPOINTMENT (OUTPATIENT)
Facility: HOSPITAL | Age: 59
DRG: 071 | End: 2024-06-19
Payer: MEDICARE

## 2024-06-19 DIAGNOSIS — J81.0 ACUTE PULMONARY EDEMA (HCC): ICD-10-CM

## 2024-06-19 DIAGNOSIS — G89.4 CHRONIC PAIN SYNDROME: ICD-10-CM

## 2024-06-19 DIAGNOSIS — R07.9 CHEST PAIN, UNSPECIFIED TYPE: ICD-10-CM

## 2024-06-19 DIAGNOSIS — R73.9 HYPERGLYCEMIA: ICD-10-CM

## 2024-06-19 DIAGNOSIS — E87.5 HYPERKALEMIA: ICD-10-CM

## 2024-06-19 DIAGNOSIS — G93.41 ACUTE METABOLIC ENCEPHALOPATHY: Primary | ICD-10-CM

## 2024-06-19 LAB
ALBUMIN SERPL-MCNC: 3 G/DL (ref 3.5–5)
ALBUMIN/GLOB SERPL: 0.6 (ref 1.1–2.2)
ALP SERPL-CCNC: 201 U/L (ref 45–117)
ALT SERPL-CCNC: 38 U/L (ref 12–78)
ANION GAP BLD CALC-SCNC: 3 (ref 10–20)
ANION GAP SERPL CALC-SCNC: 4 MMOL/L (ref 5–15)
ANION GAP SERPL CALC-SCNC: 4 MMOL/L (ref 5–15)
ANION GAP SERPL CALC-SCNC: 6 MMOL/L (ref 5–15)
APPEARANCE UR: CLEAR
AST SERPL-CCNC: 48 U/L (ref 15–37)
BACTERIA URNS QL MICRO: NEGATIVE /HPF
BASE EXCESS BLD CALC-SCNC: 1.2 MMOL/L
BASOPHILS # BLD: 0.1 K/UL (ref 0–0.1)
BASOPHILS NFR BLD: 1 % (ref 0–1)
BILIRUB SERPL-MCNC: 0.3 MG/DL (ref 0.2–1)
BILIRUB UR QL: NEGATIVE
BUN SERPL-MCNC: 77 MG/DL (ref 6–20)
BUN SERPL-MCNC: 77 MG/DL (ref 6–20)
BUN SERPL-MCNC: 78 MG/DL (ref 6–20)
BUN/CREAT SERPL: 26 (ref 12–20)
BUN/CREAT SERPL: 28 (ref 12–20)
BUN/CREAT SERPL: 28 (ref 12–20)
CA-I BLD-MCNC: 1.23 MMOL/L (ref 1.12–1.32)
CALCIUM SERPL-MCNC: 9 MG/DL (ref 8.5–10.1)
CALCIUM SERPL-MCNC: 9.1 MG/DL (ref 8.5–10.1)
CALCIUM SERPL-MCNC: 9.2 MG/DL (ref 8.5–10.1)
CHLORIDE BLD-SCNC: 99 MMOL/L (ref 100–108)
CHLORIDE SERPL-SCNC: 100 MMOL/L (ref 97–108)
CHLORIDE SERPL-SCNC: 103 MMOL/L (ref 97–108)
CHLORIDE SERPL-SCNC: 98 MMOL/L (ref 97–108)
CO2 BLD-SCNC: 29 MMOL/L (ref 19–24)
CO2 SERPL-SCNC: 26 MMOL/L (ref 21–32)
CO2 SERPL-SCNC: 26 MMOL/L (ref 21–32)
CO2 SERPL-SCNC: 28 MMOL/L (ref 21–32)
COLOR UR: ABNORMAL
CREAT SERPL-MCNC: 2.74 MG/DL (ref 0.7–1.3)
CREAT SERPL-MCNC: 2.78 MG/DL (ref 0.7–1.3)
CREAT SERPL-MCNC: 2.95 MG/DL (ref 0.7–1.3)
CREAT UR-MCNC: 2.2 MG/DL (ref 0.6–1.3)
DIFFERENTIAL METHOD BLD: ABNORMAL
EKG ATRIAL RATE: 56 BPM
EKG DIAGNOSIS: NORMAL
EKG P AXIS: 63 DEGREES
EKG P-R INTERVAL: 186 MS
EKG Q-T INTERVAL: 474 MS
EKG QRS DURATION: 94 MS
EKG QTC CALCULATION (BAZETT): 457 MS
EKG R AXIS: 23 DEGREES
EKG T AXIS: 86 DEGREES
EKG VENTRICULAR RATE: 56 BPM
EOSINOPHIL # BLD: 0.2 K/UL (ref 0–0.4)
EOSINOPHIL NFR BLD: 2 % (ref 0–7)
EPITH CASTS URNS QL MICRO: ABNORMAL /LPF
ERYTHROCYTE [DISTWIDTH] IN BLOOD BY AUTOMATED COUNT: 15.5 % (ref 11.5–14.5)
ETHANOL SERPL-MCNC: <10 MG/DL (ref 0–0.08)
GLOBULIN SER CALC-MCNC: 4.7 G/DL (ref 2–4)
GLUCOSE BLD STRIP.AUTO-MCNC: 233 MG/DL (ref 65–117)
GLUCOSE BLD STRIP.AUTO-MCNC: 253 MG/DL (ref 65–117)
GLUCOSE BLD STRIP.AUTO-MCNC: 268 MG/DL (ref 65–117)
GLUCOSE BLD STRIP.AUTO-MCNC: 289 MG/DL (ref 65–117)
GLUCOSE BLD STRIP.AUTO-MCNC: 507 MG/DL (ref 65–117)
GLUCOSE BLD STRIP.AUTO-MCNC: 579 MG/DL (ref 65–117)
GLUCOSE BLD STRIP.AUTO-MCNC: 628 MG/DL (ref 74–99)
GLUCOSE SERPL-MCNC: 215 MG/DL (ref 65–100)
GLUCOSE SERPL-MCNC: 447 MG/DL (ref 65–100)
GLUCOSE SERPL-MCNC: 588 MG/DL (ref 65–100)
GLUCOSE UR STRIP.AUTO-MCNC: >1000 MG/DL
HCO3 BLDA-SCNC: 28 MMOL/L
HCT VFR BLD AUTO: 25.1 % (ref 36.6–50.3)
HGB BLD-MCNC: 7.7 G/DL (ref 12.1–17)
HGB UR QL STRIP: NEGATIVE
HYALINE CASTS URNS QL MICRO: ABNORMAL /LPF (ref 0–2)
IMM GRANULOCYTES # BLD AUTO: 0 K/UL (ref 0–0.04)
IMM GRANULOCYTES NFR BLD AUTO: 0 % (ref 0–0.5)
KETONES UR QL STRIP.AUTO: NEGATIVE MG/DL
LACTATE BLD-SCNC: 0.76 MMOL/L (ref 0.4–2)
LEUKOCYTE ESTERASE UR QL STRIP.AUTO: NEGATIVE
LYMPHOCYTES # BLD: 0.7 K/UL (ref 0.8–3.5)
LYMPHOCYTES NFR BLD: 9 % (ref 12–49)
MAGNESIUM SERPL-MCNC: 2.1 MG/DL (ref 1.6–2.4)
MCH RBC QN AUTO: 26.7 PG (ref 26–34)
MCHC RBC AUTO-ENTMCNC: 30.7 G/DL (ref 30–36.5)
MCV RBC AUTO: 87.2 FL (ref 80–99)
MONOCYTES # BLD: 0.5 K/UL (ref 0–1)
MONOCYTES NFR BLD: 6 % (ref 5–13)
NEUTS SEG # BLD: 6.3 K/UL (ref 1.8–8)
NEUTS SEG NFR BLD: 82 % (ref 32–75)
NITRITE UR QL STRIP.AUTO: NEGATIVE
NRBC # BLD: 0 K/UL (ref 0–0.01)
NRBC BLD-RTO: 0 PER 100 WBC
PCO2 BLDV: 56.4 MMHG (ref 41–51)
PH BLDV: 7.31 (ref 7.32–7.42)
PH UR STRIP: 5.5 (ref 5–8)
PLATELET # BLD AUTO: 201 K/UL (ref 150–400)
PMV BLD AUTO: 9.7 FL (ref 8.9–12.9)
PO2 BLDV: <27 MMHG (ref 25–40)
POTASSIUM BLD-SCNC: 6 MMOL/L (ref 3.5–5.5)
POTASSIUM SERPL-SCNC: 5.5 MMOL/L (ref 3.5–5.1)
POTASSIUM SERPL-SCNC: 5.8 MMOL/L (ref 3.5–5.1)
POTASSIUM SERPL-SCNC: 6.1 MMOL/L (ref 3.5–5.1)
PROCALCITONIN SERPL-MCNC: 0.25 NG/ML
PROT SERPL-MCNC: 7.7 G/DL (ref 6.4–8.2)
PROT UR STRIP-MCNC: 300 MG/DL
RBC # BLD AUTO: 2.88 M/UL (ref 4.1–5.7)
RBC #/AREA URNS HPF: ABNORMAL /HPF (ref 0–5)
RBC MORPH BLD: ABNORMAL
SERVICE CMNT-IMP: ABNORMAL
SODIUM BLD-SCNC: 131 MMOL/L (ref 136–145)
SODIUM SERPL-SCNC: 130 MMOL/L (ref 136–145)
SODIUM SERPL-SCNC: 132 MMOL/L (ref 136–145)
SODIUM SERPL-SCNC: 133 MMOL/L (ref 136–145)
SP GR UR REFRACTOMETRY: 1.02
SPECIMEN SITE: ABNORMAL
TROPONIN I SERPL HS-MCNC: 35 NG/L (ref 0–76)
URINE CULTURE IF INDICATED: ABNORMAL
UROBILINOGEN UR QL STRIP.AUTO: 0.2 EU/DL (ref 0.2–1)
WBC # BLD AUTO: 7.8 K/UL (ref 4.1–11.1)
WBC URNS QL MICRO: ABNORMAL /HPF (ref 0–4)

## 2024-06-19 PROCEDURE — 99285 EMERGENCY DEPT VISIT HI MDM: CPT

## 2024-06-19 PROCEDURE — 93005 ELECTROCARDIOGRAM TRACING: CPT

## 2024-06-19 PROCEDURE — 81001 URINALYSIS AUTO W/SCOPE: CPT

## 2024-06-19 PROCEDURE — 6370000000 HC RX 637 (ALT 250 FOR IP): Performed by: CLINICAL NURSE SPECIALIST

## 2024-06-19 PROCEDURE — 80048 BASIC METABOLIC PNL TOTAL CA: CPT

## 2024-06-19 PROCEDURE — 6360000002 HC RX W HCPCS: Performed by: STUDENT IN AN ORGANIZED HEALTH CARE EDUCATION/TRAINING PROGRAM

## 2024-06-19 PROCEDURE — 71045 X-RAY EXAM CHEST 1 VIEW: CPT

## 2024-06-19 PROCEDURE — 87040 BLOOD CULTURE FOR BACTERIA: CPT

## 2024-06-19 PROCEDURE — 84132 ASSAY OF SERUM POTASSIUM: CPT

## 2024-06-19 PROCEDURE — 85025 COMPLETE CBC W/AUTO DIFF WBC: CPT

## 2024-06-19 PROCEDURE — 82947 ASSAY GLUCOSE BLOOD QUANT: CPT

## 2024-06-19 PROCEDURE — 82330 ASSAY OF CALCIUM: CPT

## 2024-06-19 PROCEDURE — 2580000003 HC RX 258: Performed by: EMERGENCY MEDICINE

## 2024-06-19 PROCEDURE — 86900 BLOOD TYPING SEROLOGIC ABO: CPT

## 2024-06-19 PROCEDURE — 2060000000 HC ICU INTERMEDIATE R&B

## 2024-06-19 PROCEDURE — 2580000003 HC RX 258: Performed by: STUDENT IN AN ORGANIZED HEALTH CARE EDUCATION/TRAINING PROGRAM

## 2024-06-19 PROCEDURE — 86850 RBC ANTIBODY SCREEN: CPT

## 2024-06-19 PROCEDURE — 84484 ASSAY OF TROPONIN QUANT: CPT

## 2024-06-19 PROCEDURE — 82077 ASSAY SPEC XCP UR&BREATH IA: CPT

## 2024-06-19 PROCEDURE — 6370000000 HC RX 637 (ALT 250 FOR IP): Performed by: STUDENT IN AN ORGANIZED HEALTH CARE EDUCATION/TRAINING PROGRAM

## 2024-06-19 PROCEDURE — 6370000000 HC RX 637 (ALT 250 FOR IP): Performed by: EMERGENCY MEDICINE

## 2024-06-19 PROCEDURE — 70450 CT HEAD/BRAIN W/O DYE: CPT

## 2024-06-19 PROCEDURE — 82962 GLUCOSE BLOOD TEST: CPT

## 2024-06-19 PROCEDURE — 83735 ASSAY OF MAGNESIUM: CPT

## 2024-06-19 PROCEDURE — 36415 COLL VENOUS BLD VENIPUNCTURE: CPT

## 2024-06-19 PROCEDURE — 84295 ASSAY OF SERUM SODIUM: CPT

## 2024-06-19 PROCEDURE — 80053 COMPREHEN METABOLIC PANEL: CPT

## 2024-06-19 PROCEDURE — 86901 BLOOD TYPING SEROLOGIC RH(D): CPT

## 2024-06-19 PROCEDURE — 82803 BLOOD GASES ANY COMBINATION: CPT

## 2024-06-19 PROCEDURE — 84145 PROCALCITONIN (PCT): CPT

## 2024-06-19 PROCEDURE — 99231 SBSQ HOSP IP/OBS SF/LOW 25: CPT | Performed by: CLINICAL NURSE SPECIALIST

## 2024-06-19 RX ORDER — POLYETHYLENE GLYCOL 3350 17 G/17G
17 POWDER, FOR SOLUTION ORAL DAILY PRN
Status: DISCONTINUED | OUTPATIENT
Start: 2024-06-19 | End: 2024-06-25 | Stop reason: HOSPADM

## 2024-06-19 RX ORDER — INSULIN GLARGINE 100 [IU]/ML
18 INJECTION, SOLUTION SUBCUTANEOUS DAILY
Status: DISCONTINUED | OUTPATIENT
Start: 2024-06-20 | End: 2024-06-25 | Stop reason: HOSPADM

## 2024-06-19 RX ORDER — ACETAMINOPHEN 325 MG/1
650 TABLET ORAL EVERY 4 HOURS PRN
Status: DISCONTINUED | OUTPATIENT
Start: 2024-06-19 | End: 2024-06-23

## 2024-06-19 RX ORDER — ENOXAPARIN SODIUM 100 MG/ML
40 INJECTION SUBCUTANEOUS DAILY
Status: DISCONTINUED | OUTPATIENT
Start: 2024-06-19 | End: 2024-06-21

## 2024-06-19 RX ORDER — FUROSEMIDE 40 MG/1
40 TABLET ORAL 2 TIMES DAILY
Status: DISCONTINUED | OUTPATIENT
Start: 2024-06-19 | End: 2024-06-20

## 2024-06-19 RX ORDER — CARVEDILOL 12.5 MG/1
12.5 TABLET ORAL 2 TIMES DAILY WITH MEALS
Status: DISCONTINUED | OUTPATIENT
Start: 2024-06-19 | End: 2024-06-24

## 2024-06-19 RX ORDER — INSULIN GLARGINE 100 [IU]/ML
8 INJECTION, SOLUTION SUBCUTANEOUS ONCE
Status: COMPLETED | OUTPATIENT
Start: 2024-06-19 | End: 2024-06-19

## 2024-06-19 RX ORDER — INSULIN GLARGINE 100 [IU]/ML
20 INJECTION, SOLUTION SUBCUTANEOUS NIGHTLY
Status: DISCONTINUED | OUTPATIENT
Start: 2024-06-19 | End: 2024-06-19

## 2024-06-19 RX ORDER — ONDANSETRON 2 MG/ML
4 INJECTION INTRAMUSCULAR; INTRAVENOUS EVERY 6 HOURS PRN
Status: DISCONTINUED | OUTPATIENT
Start: 2024-06-19 | End: 2024-06-25 | Stop reason: HOSPADM

## 2024-06-19 RX ORDER — 0.9 % SODIUM CHLORIDE 0.9 %
1000 INTRAVENOUS SOLUTION INTRAVENOUS ONCE
Status: COMPLETED | OUTPATIENT
Start: 2024-06-19 | End: 2024-06-19

## 2024-06-19 RX ORDER — SODIUM CHLORIDE 0.9 % (FLUSH) 0.9 %
5-40 SYRINGE (ML) INJECTION PRN
Status: DISCONTINUED | OUTPATIENT
Start: 2024-06-19 | End: 2024-06-25 | Stop reason: HOSPADM

## 2024-06-19 RX ORDER — SODIUM CHLORIDE 0.9 % (FLUSH) 0.9 %
5-40 SYRINGE (ML) INJECTION EVERY 12 HOURS SCHEDULED
Status: DISCONTINUED | OUTPATIENT
Start: 2024-06-19 | End: 2024-06-25 | Stop reason: HOSPADM

## 2024-06-19 RX ORDER — ACETAMINOPHEN 325 MG/1
650 TABLET ORAL EVERY 6 HOURS PRN
Status: DISCONTINUED | OUTPATIENT
Start: 2024-06-19 | End: 2024-06-25 | Stop reason: HOSPADM

## 2024-06-19 RX ORDER — SODIUM CHLORIDE 9 MG/ML
INJECTION, SOLUTION INTRAVENOUS PRN
Status: DISCONTINUED | OUTPATIENT
Start: 2024-06-19 | End: 2024-06-25 | Stop reason: HOSPADM

## 2024-06-19 RX ORDER — ACETAMINOPHEN 650 MG/1
650 SUPPOSITORY RECTAL EVERY 6 HOURS PRN
Status: DISCONTINUED | OUTPATIENT
Start: 2024-06-19 | End: 2024-06-25 | Stop reason: HOSPADM

## 2024-06-19 RX ORDER — LURASIDONE HYDROCHLORIDE 20 MG/1
20 TABLET, FILM COATED ORAL
Status: DISCONTINUED | OUTPATIENT
Start: 2024-06-19 | End: 2024-06-25 | Stop reason: HOSPADM

## 2024-06-19 RX ORDER — ASPIRIN 81 MG/1
81 TABLET ORAL DAILY
Status: DISCONTINUED | OUTPATIENT
Start: 2024-06-19 | End: 2024-06-25 | Stop reason: HOSPADM

## 2024-06-19 RX ORDER — ISOSORBIDE MONONITRATE 30 MG/1
30 TABLET, EXTENDED RELEASE ORAL DAILY
Status: DISCONTINUED | OUTPATIENT
Start: 2024-06-19 | End: 2024-06-25 | Stop reason: HOSPADM

## 2024-06-19 RX ORDER — AMLODIPINE BESYLATE 5 MG/1
10 TABLET ORAL DAILY
Status: DISCONTINUED | OUTPATIENT
Start: 2024-06-19 | End: 2024-06-20

## 2024-06-19 RX ORDER — ONDANSETRON 4 MG/1
4 TABLET, ORALLY DISINTEGRATING ORAL EVERY 8 HOURS PRN
Status: DISCONTINUED | OUTPATIENT
Start: 2024-06-19 | End: 2024-06-25 | Stop reason: HOSPADM

## 2024-06-19 RX ORDER — INSULIN GLARGINE 100 [IU]/ML
10 INJECTION, SOLUTION SUBCUTANEOUS
Status: COMPLETED | OUTPATIENT
Start: 2024-06-19 | End: 2024-06-19

## 2024-06-19 RX ORDER — INSULIN LISPRO 100 [IU]/ML
0.05 INJECTION, SOLUTION INTRAVENOUS; SUBCUTANEOUS
Status: DISCONTINUED | OUTPATIENT
Start: 2024-06-19 | End: 2024-06-24

## 2024-06-19 RX ADMIN — SODIUM CHLORIDE 1000 ML: 9 INJECTION, SOLUTION INTRAVENOUS at 07:58

## 2024-06-19 RX ADMIN — ENOXAPARIN SODIUM 40 MG: 100 INJECTION SUBCUTANEOUS at 11:28

## 2024-06-19 RX ADMIN — INSULIN GLARGINE 8 UNITS: 100 INJECTION, SOLUTION SUBCUTANEOUS at 13:00

## 2024-06-19 RX ADMIN — INSULIN HUMAN 10 UNITS: 100 INJECTION, SOLUTION PARENTERAL at 11:28

## 2024-06-19 RX ADMIN — INSULIN GLARGINE 10 UNITS: 100 INJECTION, SOLUTION SUBCUTANEOUS at 08:57

## 2024-06-19 RX ADMIN — SODIUM CHLORIDE, PRESERVATIVE FREE 10 ML: 5 INJECTION INTRAVENOUS at 21:05

## 2024-06-19 ASSESSMENT — PAIN SCALES - GENERAL: PAINLEVEL_OUTOF10: 6

## 2024-06-19 ASSESSMENT — LIFESTYLE VARIABLES
HOW OFTEN DO YOU HAVE A DRINK CONTAINING ALCOHOL: NEVER
HOW MANY STANDARD DRINKS CONTAINING ALCOHOL DO YOU HAVE ON A TYPICAL DAY: PATIENT DOES NOT DRINK

## 2024-06-19 NOTE — H&P
Hospitalist Admission Note    NAME:   Keaton Van   : 1965   MRN: [id number]     Date/Time: 2024 10:40 AM    Patient PCP: No primary care provider on file.    ______________________________________________________________________  Given the patient's current clinical presentation, I have a high level of concern for decompensation if discharged from the emergency department.  Complex decision making was performed, which includes reviewing the patient's available past medical records, laboratory results, and x-ray films.       My assessment of this patient's clinical condition and my plan of care is as follows.    Assessment / Plan:    Metabolic encephalopathy- multifactorial , hyperglycemia, electrolyte derangement, possible drug abuse , less likely infection   Uncontrolled diabetes mellitus-likely due to noncompliance  Anion gap normal  Alcohol level<10    Plan:  Admit to medical step down unit   Insulin dose adjusted   Appreciated DM management team recs   Urine toxicology, UA pending   CT head pending   Afebrile, no leucocytosis, CXR no acute findings- will hold antibiotics   Follow up with blood cultures         Chronic diastolic heart failure  HTN   History of cardiac stent remote 15 years ago  H/o cocaine abuse   Paroxysmal Atrial fibrillation on last admission   24 Echo - EF 60-65%, mild MVR, mild pulm HTN   CXR-Mild pulmonary edema and small pleural effusions right greater than left.L: unchanged from prior CXR on   Continue with Lasix 40 Mg twice daily  Monitor electrolytes and renal function  Daily weight, intake and output monitoring  Continue with carvedilol 12.5 Mg twice daily, amlodipine 10 Mg daily, Imdur    Hyperkalemia  Potassium 6.1--> 5.5  EKG sinus bradycardia   Trend BMP   S/p insulin   Lokelma 1 dose       CKD stage IV  Creatinine-2.78  Adjust medications as per renal dose  Recent renal US /3 nl     Chronic anemia   Hb -7.7   Not in range for transfusion   Suspect  - 40 mmHg    HCO3, Arterial 28 mmol/L    Base Excess 1.2 mmol/L    POC Sodium 131 (L) 136 - 145 MMOL/L    POC Potassium 6.0 (H) 3.5 - 5.5 MMOL/L    POC Chloride 99 (L) 100 - 108 MMOL/L    POC TCO2 29 (H) 19 - 24 MMOL/L    Anion Gap, POC 3 (L) 10 - 20      POC Glucose 628 (HH) 74 - 99 MG/DL    POC Creatinine 2.2 (H) 0.6 - 1.3 MG/DL    eGFR, POC 33 (L) >60 ml/min/1.73m2    POC Ionized Calcium 1.23 1.12 - 1.32 mmol/L    POC Lactic Acid 0.76 0.40 - 2.00 mmol/L    Source VENOUS BLOOD      Critical Value Read Back SPIKE          XR CHEST PORTABLE    Result Date: 6/19/2024  INDICATION: Hyperglycemia  EXAM:  AP CHEST RADIOGRAPH COMPARISON: 6/12/2024 FINDINGS: AP portable view of the chest demonstrates a normal cardiomediastinal silhouette. Mild pulmonary edema with small pleural effusions right greater than left. Associated basilar atelectasis. No pneumothorax. The osseous structures are unremarkable.     Mild pulmonary edema and small pleural effusions right greater than left. Electronically signed by Trevor Gramajo    CT HEAD WO CONTRAST    Result Date: 6/14/2024  EXAM: CT HEAD WO CONTRAST ACC#: [id number] INDICATION: passed out, may have hit head, r/o trauma COMPARISON: 7/18/2023 TECHNIQUE: Routine CT of the head was performed without intravenous contrast. Coronal and sagittal reconstructions were generated. CT dose reduction was achieved through use of a standardized protocol tailored for this examination and automatic exposure control for dose modulation. FINDINGS: The ventricles are normal size and configuration. There is no mass, mass effect, or acute hemorrhage. There is no CT evidence of acute ischemia.  There are no acute bony abnormalities. Paranasal sinuses and mastoid air cells appear well-aerated.     No acute intracranial abnormalities. Electronically signed by DESTINY Mayfield    CT CHEST ABDOMEN PELVIS WO CONTRAST Additional Contrast? None    Result Date: 6/14/2024  EXAM: CT CHEST ABDOMEN PELVIS WO CONTRAST
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
## 2024-06-19 NOTE — CARE COORDINATION
Care Management Initial Assessment       RUR: Not listed on chart   Readmission? Yes - 6/12-6/17/24 at [facility] for CHF   1st IM letter given? Yes - 6/19/24  1st  letter given: No      Initial note: Chart review completed prior to assessment. CM completed assessment with pt at bedside. CM introduced self, role of CM, verified demographics to ensure accuracy, and discussed transition of care planning. Pt is known to CM team from previous recent hospitalizations. Pt discharged home on 6/17/24. On today, pt presented as less alert, keeping eyes closed throughout duration of encounter. Pt also noted with slurred speech, difficult to understand pt. MD aware. Due to altered presentation on today, unable to gather many details of return to hospital from pt at this time. Recommend conversation when pt is more alert/able to participate. He does report that he is unable to walk and has been falling \"everywhere\" in apartment. This is a change from pt's previous level of mobility, where he reported being ambulatory, using a cane as needed.    Chart reviewed. Pt known to have limited finances, reporting that he is unable to afford his medications. Noted that CM at previous d/c utilized Medication Voucher program via [employer] to assist pt in obtaining the following medications: Lurasidone, Lantus Solostar, BD Pen Needles, Amlodipine, Furosemide, Tizanidine, Aspirin, and Carvedilol. Pt was discharged with these medications. Pt presented to ED with hyperglycemia, reporting to MD that he had not started taking his insulin. At present time, unable to engage with pt about medication compliance. CM placed follow-up request to [employer] to check on status of requested Medicaid screening on 6/12.    Pt last evaluated by PT/OT on 6/17 and recommended to have home health PT/OT. Pt is agreeable to this when asked on today. Due to pt's reported change in mobility, may benefit from re-evaluation from PT/OT when  Poor   Family able to assist with home care needs: No   Would you like for me to discuss the discharge plan with any other family members/significant others, and if so, who? No   Financial Resources Medicare  (Request to [employer] for Medicaid screening sent on  6/12; follow-up correspondence on today to see if screening was completed and reiterate need for screening.)   Community Resources None   Social/Functional History   Lives With Alone   Type of Home Apartment   Home Layout Two level   Home Access Stairs to enter with rails   Entrance Stairs - Number of Steps 2; 13 interior steps to second level of apartment   Entrance Stairs - Rails None   Bathroom Equipment None   Home Equipment Cane   Receives Help From Other (comment)  (Pt denies support system)   Active  No   Patient's  Info No current transportation means   Discharge Planning   Type of Residence Apartment   Living Arrangements Alone   Current Services Prior To Admission Durable Medical Equipment   Current DME Prior to Arrival Cane   Potential Assistance Needed Other (Comment)  (HH (PT/OT, also would benefit from inclusion of HH MSW for SDOH needs)  - vs SNF/LTC? At present, pt does not have payor source for LTC.)   Patient expects to be discharged to: Apartment   Condition of Participation: Discharge Planning   Freedom of Choice list was provided with basic dialogue that supports the patient's individualized plan of care/goals, treatment preferences, and shares the quality data associated with the providers?  Yes        06/19/24 1013   Readmission Assessment   Number of Days since last admission? 1-7 days   Previous Disposition Home Alone   Who is being Interviewed Patient   What was the patient's/caregiver's perception as to why they think they needed to return back to the hospital? Other (Comment)  (Pt is unable to provide many details due to current presentation and slurred speech. He does endorse having many falls/states he is unable  Perfect Serve
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
## 2024-06-19 NOTE — ED NOTES
Pt. Confused and unable to follow some commands.  Patient also lethargic.  Perfect serve sent to Dr. Acevedo.  Will hold PO medications at this time per MD order.

## 2024-06-19 NOTE — PROGRESS NOTES
Unable to assess per notes is non compliant with medications and insulin. No personal contacts to call for information.

## 2024-06-19 NOTE — ED NOTES
Entered patient room to administer medications.  Patient stating \"I'm hallucinating.\"  Patient mumbling incomprehensibly. Noticed patient is incontinent of urine.  Attempted to clean patient.  Patient became very agitated and refusing at this time.  Attempted to re-orient patient and educate him on importance of incontinence care.  Patient does not verbalize understanding stating \"there is a child in the corner.\"
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
## 2024-06-19 NOTE — ED NOTES
Patient resting in bed with eyes closed at this time.  Attempted to clean patient of soiled linens.  Patient again becoming agitated and yelling at nurse.  Attempted to re-orient patient and educate but patient not understanding.      Patient Blood sugar 289 and has new insulin orders.  Spoke with Catrachita ABREU.  Orders to hold short acting insulin but administer lantus dose.

## 2024-06-20 LAB
ALBUMIN SERPL-MCNC: 2.5 G/DL (ref 3.5–5)
AMPHET UR QL SCN: NEGATIVE
ANION GAP SERPL CALC-SCNC: 4 MMOL/L (ref 5–15)
ANION GAP SERPL CALC-SCNC: 4 MMOL/L (ref 5–15)
BARBITURATES UR QL SCN: NEGATIVE
BASOPHILS # BLD: 0.1 K/UL (ref 0–0.1)
BASOPHILS NFR BLD: 2 % (ref 0–1)
BENZODIAZ UR QL: NEGATIVE
BUN SERPL-MCNC: 86 MG/DL (ref 6–20)
BUN SERPL-MCNC: 89 MG/DL (ref 6–20)
BUN/CREAT SERPL: 27 (ref 12–20)
BUN/CREAT SERPL: 28 (ref 12–20)
CALCIUM SERPL-MCNC: 9.2 MG/DL (ref 8.5–10.1)
CALCIUM SERPL-MCNC: 9.4 MG/DL (ref 8.5–10.1)
CANNABINOIDS UR QL SCN: NEGATIVE
CHLORIDE SERPL-SCNC: 101 MMOL/L (ref 97–108)
CHLORIDE SERPL-SCNC: 101 MMOL/L (ref 97–108)
CO2 SERPL-SCNC: 25 MMOL/L (ref 21–32)
CO2 SERPL-SCNC: 26 MMOL/L (ref 21–32)
COCAINE UR QL SCN: POSITIVE
CREAT SERPL-MCNC: 3.04 MG/DL (ref 0.7–1.3)
CREAT SERPL-MCNC: 3.31 MG/DL (ref 0.7–1.3)
CREAT UR-MCNC: 32.7 MG/DL
DIFFERENTIAL METHOD BLD: ABNORMAL
EOSINOPHIL # BLD: 0.3 K/UL (ref 0–0.4)
EOSINOPHIL #/AREA URNS HPF: NEGATIVE
EOSINOPHIL NFR BLD: 4 % (ref 0–7)
ERYTHROCYTE [DISTWIDTH] IN BLOOD BY AUTOMATED COUNT: 15.7 % (ref 11.5–14.5)
GLUCOSE BLD STRIP.AUTO-MCNC: 107 MG/DL (ref 65–117)
GLUCOSE BLD STRIP.AUTO-MCNC: 123 MG/DL (ref 65–117)
GLUCOSE BLD STRIP.AUTO-MCNC: 126 MG/DL (ref 65–117)
GLUCOSE BLD STRIP.AUTO-MCNC: 151 MG/DL (ref 65–117)
GLUCOSE BLD STRIP.AUTO-MCNC: 163 MG/DL (ref 65–117)
GLUCOSE BLD STRIP.AUTO-MCNC: 317 MG/DL (ref 65–117)
GLUCOSE BLD STRIP.AUTO-MCNC: 86 MG/DL (ref 65–117)
GLUCOSE BLD STRIP.AUTO-MCNC: 95 MG/DL (ref 65–117)
GLUCOSE BLD STRIP.AUTO-MCNC: 98 MG/DL (ref 65–117)
GLUCOSE SERPL-MCNC: 132 MG/DL (ref 65–100)
GLUCOSE SERPL-MCNC: 203 MG/DL (ref 65–100)
HCT VFR BLD AUTO: 24.9 % (ref 36.6–50.3)
HGB BLD-MCNC: 7.4 G/DL (ref 12.1–17)
IMM GRANULOCYTES # BLD AUTO: 0 K/UL (ref 0–0.04)
IMM GRANULOCYTES NFR BLD AUTO: 0 % (ref 0–0.5)
LYMPHOCYTES # BLD: 1.1 K/UL (ref 0.8–3.5)
LYMPHOCYTES NFR BLD: 16 % (ref 12–49)
Lab: ABNORMAL
MCH RBC QN AUTO: 26.5 PG (ref 26–34)
MCHC RBC AUTO-ENTMCNC: 29.7 G/DL (ref 30–36.5)
MCV RBC AUTO: 89.2 FL (ref 80–99)
METHADONE UR QL: NEGATIVE
MONOCYTES # BLD: 0.5 K/UL (ref 0–1)
MONOCYTES NFR BLD: 7 % (ref 5–13)
NEUTS SEG # BLD: 4.8 K/UL (ref 1.8–8)
NEUTS SEG NFR BLD: 71 % (ref 32–75)
NRBC # BLD: 0 K/UL (ref 0–0.01)
NRBC BLD-RTO: 0 PER 100 WBC
NT PRO BNP: 2639 PG/ML
OPIATES UR QL: NEGATIVE
PCP UR QL: NEGATIVE
PHOSPHATE SERPL-MCNC: 6.5 MG/DL (ref 2.6–4.7)
PLATELET # BLD AUTO: 209 K/UL (ref 150–400)
PMV BLD AUTO: 10.4 FL (ref 8.9–12.9)
POTASSIUM SERPL-SCNC: 6 MMOL/L (ref 3.5–5.1)
POTASSIUM SERPL-SCNC: 7.2 MMOL/L (ref 3.5–5.1)
PROT UR-MCNC: 73 MG/DL (ref 0–11.9)
PROT/CREAT UR-RTO: 2.2
RBC # BLD AUTO: 2.79 M/UL (ref 4.1–5.7)
SERVICE CMNT-IMP: ABNORMAL
SERVICE CMNT-IMP: NORMAL
SODIUM SERPL-SCNC: 130 MMOL/L (ref 136–145)
SODIUM SERPL-SCNC: 131 MMOL/L (ref 136–145)
SODIUM UR-SCNC: 55 MMOL/L
UUN UR-MCNC: 381 MG/DL
WBC # BLD AUTO: 6.7 K/UL (ref 4.1–11.1)

## 2024-06-20 PROCEDURE — 84156 ASSAY OF PROTEIN URINE: CPT

## 2024-06-20 PROCEDURE — 83880 ASSAY OF NATRIURETIC PEPTIDE: CPT

## 2024-06-20 PROCEDURE — 84300 ASSAY OF URINE SODIUM: CPT

## 2024-06-20 PROCEDURE — 6370000000 HC RX 637 (ALT 250 FOR IP): Performed by: CLINICAL NURSE SPECIALIST

## 2024-06-20 PROCEDURE — 87205 SMEAR GRAM STAIN: CPT

## 2024-06-20 PROCEDURE — 36415 COLL VENOUS BLD VENIPUNCTURE: CPT

## 2024-06-20 PROCEDURE — 82570 ASSAY OF URINE CREATININE: CPT

## 2024-06-20 PROCEDURE — 80307 DRUG TEST PRSMV CHEM ANLYZR: CPT

## 2024-06-20 PROCEDURE — 80048 BASIC METABOLIC PNL TOTAL CA: CPT

## 2024-06-20 PROCEDURE — 2060000000 HC ICU INTERMEDIATE R&B

## 2024-06-20 PROCEDURE — 2580000003 HC RX 258: Performed by: NURSE PRACTITIONER

## 2024-06-20 PROCEDURE — 80069 RENAL FUNCTION PANEL: CPT

## 2024-06-20 PROCEDURE — 2500000003 HC RX 250 WO HCPCS: Performed by: NURSE PRACTITIONER

## 2024-06-20 PROCEDURE — 97530 THERAPEUTIC ACTIVITIES: CPT

## 2024-06-20 PROCEDURE — 97116 GAIT TRAINING THERAPY: CPT

## 2024-06-20 PROCEDURE — 82962 GLUCOSE BLOOD TEST: CPT

## 2024-06-20 PROCEDURE — 97166 OT EVAL MOD COMPLEX 45 MIN: CPT | Performed by: OCCUPATIONAL THERAPIST

## 2024-06-20 PROCEDURE — 2500000003 HC RX 250 WO HCPCS: Performed by: INTERNAL MEDICINE

## 2024-06-20 PROCEDURE — 93005 ELECTROCARDIOGRAM TRACING: CPT

## 2024-06-20 PROCEDURE — 6360000002 HC RX W HCPCS: Performed by: STUDENT IN AN ORGANIZED HEALTH CARE EDUCATION/TRAINING PROGRAM

## 2024-06-20 PROCEDURE — 6370000000 HC RX 637 (ALT 250 FOR IP): Performed by: STUDENT IN AN ORGANIZED HEALTH CARE EDUCATION/TRAINING PROGRAM

## 2024-06-20 PROCEDURE — 97530 THERAPEUTIC ACTIVITIES: CPT | Performed by: OCCUPATIONAL THERAPIST

## 2024-06-20 PROCEDURE — 97163 PT EVAL HIGH COMPLEX 45 MIN: CPT

## 2024-06-20 PROCEDURE — 85025 COMPLETE CBC W/AUTO DIFF WBC: CPT

## 2024-06-20 PROCEDURE — 6370000000 HC RX 637 (ALT 250 FOR IP): Performed by: INTERNAL MEDICINE

## 2024-06-20 PROCEDURE — 2580000003 HC RX 258: Performed by: STUDENT IN AN ORGANIZED HEALTH CARE EDUCATION/TRAINING PROGRAM

## 2024-06-20 PROCEDURE — 97535 SELF CARE MNGMENT TRAINING: CPT | Performed by: OCCUPATIONAL THERAPIST

## 2024-06-20 PROCEDURE — 84540 ASSAY OF URINE/UREA-N: CPT

## 2024-06-20 PROCEDURE — 6370000000 HC RX 637 (ALT 250 FOR IP): Performed by: NURSE PRACTITIONER

## 2024-06-20 PROCEDURE — 6360000002 HC RX W HCPCS: Performed by: INTERNAL MEDICINE

## 2024-06-20 RX ORDER — MIDODRINE HYDROCHLORIDE 5 MG/1
5 TABLET ORAL 3 TIMES DAILY PRN
Status: DISCONTINUED | OUTPATIENT
Start: 2024-06-20 | End: 2024-06-21

## 2024-06-20 RX ORDER — DEXTROSE MONOHYDRATE 100 MG/ML
INJECTION, SOLUTION INTRAVENOUS CONTINUOUS PRN
Status: DISCONTINUED | OUTPATIENT
Start: 2024-06-20 | End: 2024-06-21

## 2024-06-20 RX ORDER — GLUCAGON 1 MG/ML
1 KIT INJECTION PRN
Status: DISCONTINUED | OUTPATIENT
Start: 2024-06-20 | End: 2024-06-25 | Stop reason: HOSPADM

## 2024-06-20 RX ORDER — INSULIN LISPRO 100 [IU]/ML
0-8 INJECTION, SOLUTION INTRAVENOUS; SUBCUTANEOUS
Status: DISCONTINUED | OUTPATIENT
Start: 2024-06-21 | End: 2024-06-25 | Stop reason: HOSPADM

## 2024-06-20 RX ORDER — FUROSEMIDE 10 MG/ML
80 INJECTION INTRAMUSCULAR; INTRAVENOUS ONCE
Status: COMPLETED | OUTPATIENT
Start: 2024-06-20 | End: 2024-06-20

## 2024-06-20 RX ORDER — GLUCAGON 1 MG/ML
1 KIT INJECTION PRN
Status: DISCONTINUED | OUTPATIENT
Start: 2024-06-20 | End: 2024-06-21

## 2024-06-20 RX ORDER — INSULIN LISPRO 100 [IU]/ML
0-4 INJECTION, SOLUTION INTRAVENOUS; SUBCUTANEOUS NIGHTLY
Status: DISCONTINUED | OUTPATIENT
Start: 2024-06-20 | End: 2024-06-25 | Stop reason: HOSPADM

## 2024-06-20 RX ORDER — OXYCODONE HYDROCHLORIDE 5 MG/1
5 TABLET ORAL EVERY 4 HOURS PRN
Status: DISCONTINUED | OUTPATIENT
Start: 2024-06-20 | End: 2024-06-25 | Stop reason: HOSPADM

## 2024-06-20 RX ORDER — CALCIUM GLUCONATE 20 MG/ML
1000 INJECTION, SOLUTION INTRAVENOUS ONCE
Status: COMPLETED | OUTPATIENT
Start: 2024-06-20 | End: 2024-06-20

## 2024-06-20 RX ORDER — DEXTROSE MONOHYDRATE 25 G/50ML
25 INJECTION, SOLUTION INTRAVENOUS ONCE
Status: COMPLETED | OUTPATIENT
Start: 2024-06-20 | End: 2024-06-20

## 2024-06-20 RX ORDER — DEXTROSE MONOHYDRATE 100 MG/ML
INJECTION, SOLUTION INTRAVENOUS CONTINUOUS PRN
Status: DISCONTINUED | OUTPATIENT
Start: 2024-06-20 | End: 2024-06-25 | Stop reason: HOSPADM

## 2024-06-20 RX ORDER — AMLODIPINE BESYLATE 5 MG/1
5 TABLET ORAL DAILY
Status: DISCONTINUED | OUTPATIENT
Start: 2024-06-21 | End: 2024-06-24

## 2024-06-20 RX ADMIN — FUROSEMIDE 80 MG: 10 INJECTION, SOLUTION INTRAMUSCULAR; INTRAVENOUS at 11:08

## 2024-06-20 RX ADMIN — ISOSORBIDE MONONITRATE 30 MG: 30 TABLET, EXTENDED RELEASE ORAL at 08:45

## 2024-06-20 RX ADMIN — SODIUM ZIRCONIUM CYCLOSILICATE 10 G: 10 POWDER, FOR SUSPENSION ORAL at 11:07

## 2024-06-20 RX ADMIN — CALCIUM GLUCONATE 1000 MG: 20 INJECTION, SOLUTION INTRAVENOUS at 05:56

## 2024-06-20 RX ADMIN — SODIUM CHLORIDE, PRESERVATIVE FREE 10 ML: 5 INJECTION INTRAVENOUS at 08:48

## 2024-06-20 RX ADMIN — INSULIN GLARGINE 18 UNITS: 100 INJECTION, SOLUTION SUBCUTANEOUS at 08:46

## 2024-06-20 RX ADMIN — ENOXAPARIN SODIUM 40 MG: 100 INJECTION SUBCUTANEOUS at 08:45

## 2024-06-20 RX ADMIN — SODIUM CHLORIDE, PRESERVATIVE FREE 10 ML: 5 INJECTION INTRAVENOUS at 20:55

## 2024-06-20 RX ADMIN — INSULIN HUMAN 10 UNITS: 100 INJECTION, SOLUTION PARENTERAL at 05:39

## 2024-06-20 RX ADMIN — AMLODIPINE BESYLATE 10 MG: 5 TABLET ORAL at 08:45

## 2024-06-20 RX ADMIN — INSULIN HUMAN 10 UNITS: 100 INJECTION, SOLUTION PARENTERAL at 11:08

## 2024-06-20 RX ADMIN — DEXTROSE MONOHYDRATE 25 G: 25 INJECTION, SOLUTION INTRAVENOUS at 11:06

## 2024-06-20 RX ADMIN — INSULIN LISPRO 4 UNITS: 100 INJECTION, SOLUTION INTRAVENOUS; SUBCUTANEOUS at 21:01

## 2024-06-20 RX ADMIN — FUROSEMIDE 40 MG: 40 TABLET ORAL at 08:45

## 2024-06-20 RX ADMIN — ASPIRIN 81 MG: 81 TABLET, COATED ORAL at 08:45

## 2024-06-20 RX ADMIN — DEXTROSE MONOHYDRATE 250 ML: 100 INJECTION, SOLUTION INTRAVENOUS at 05:38

## 2024-06-20 RX ADMIN — LURASIDONE HYDROCHLORIDE 20 MG: 20 TABLET, FILM COATED ORAL at 17:33

## 2024-06-20 ASSESSMENT — PAIN SCALES - GENERAL
PAINLEVEL_OUTOF10: 0
PAINLEVEL_OUTOF10: 0

## 2024-06-20 NOTE — PLAN OF CARE
Problem: Physical Therapy - Adult  Goal: By Discharge: Performs mobility at highest level of function for planned discharge setting.  See evaluation for individualized goals.  Description: FUNCTIONAL STATUS PRIOR TO ADMISSION: Patient was modified independent using a single point cane for functional mobility. and The patient  was independent for basic and instrumental ADLs. He admits not following recommended heel only WB precautions on his RLE, but has been wearing the post op shoe.    HOME SUPPORT PRIOR TO ADMISSION: The patient lived alone with no local support. Lives in 2 level apartment with 2 steps to enter and 13 steps to 2nd floor with 1 rail. Per the patient he is expecting to be evicted soon.    Physical Therapy Goals  Initiated 6/20/2024  1.  Patient will move from supine to sit and sit to supine, scoot up and down, and roll side to side in bed with independence within 7 day(s).    2.  Patient will perform sit to stand with modified independence within 7 day(s).  3.  Patient will transfer from bed to chair and chair to bed with modified independence using the least restrictive device within 7 day(s).  4.  Patient will ambulate with modified independence for 150 feet with the least restrictive device within 7 day(s).   5.  Patient will ascend/descend 12 stairs with 1 handrail(s) with standby assistance within 7 day(s).  6.  Patient will comply with R heel WB in post op shoe during transfers and ambulation activities within 7 day(s).  6/20/2024 1540 by Zhang Anne, PT  Outcome: Not Progressing     Problem: Occupational Therapy - Adult  Goal: By Discharge: Performs self-care activities at highest level of function for planned discharge setting.  See evaluation for individualized goals.  Description: FUNCTIONAL STATUS PRIOR TO ADMISSION:  recently at [facility] and was home two days and returned due falls, prior to this pt had recent toe amputation, was ambulating with SPC and post op shoe, reports he has  Independent, Transfer Assistance: Independent, Active Dri     Receives Help From: Other (comment),  ,  ,  ,  ,  ,  ,  ,  ,  , Active : No     HOME SUPPORT: Patient lived alone with no support.    Occupational Therapy Goals:  Initiated 6/20/2024  1.  Patient will perform grooming with Modified [place] within 7 day(s).  2.  Patient will perform lower body dressing with Modified [place] within 7 day(s).  3.  Patient will perform toileting with Modified [place] within 7 day(s).  4.  Patient will perform toilet transfers with Modified [place]  within 7 day(s).    6/20/2024 1615 by Kiera Finley, OTR/L  Outcome: Not Progressing

## 2024-06-20 NOTE — PLAN OF CARE
Problem: Occupational Therapy - Adult  Goal: By Discharge: Performs self-care activities at highest level of function for planned discharge setting.  See evaluation for individualized goals.  Description: FUNCTIONAL STATUS PRIOR TO ADMISSION:  recently at [facility] and was home two days and returned due falls, prior to this pt had recent toe amputation, was ambulating with SPC and post op shoe, reports he has limited resources and follow up appointments are useless due to inability to get to appointments, he reports he moved into a apartment and a lady friend was suppose to move in with him to help with rent but she never did and he will be evicted in July, doesn't drive/own vehicle, performed ADLS on his own and was standing to shower, reports having loss of balance and dizziness in standing especially with eyes closed and needs to lean against shower wall, performed ADLS on his own  Receives Help From: Other (comment) (Denies support systems), ADL Assistance: Independent, , , , , , Homemaking Assistance: Independent, Ambulation Assistance: Independent, Transfer Assistance: Independent, Active Dri     Receives Help From: Other (comment),  ,  ,  ,  ,  ,  ,  ,  ,  , Active : No     HOME SUPPORT: Patient lived alone with no support.    Occupational Therapy Goals:  Initiated 6/20/2024  1.  Patient will perform grooming with Modified [place] within 7 day(s).  2.  Patient will perform lower body dressing with Modified [place] within 7 day(s).  3.  Patient will perform toileting with Modified [place] within 7 day(s).  4.  Patient will perform toilet transfers with Modified [place]  within 7 day(s).    Outcome: Not Progressing  OCCUPATIONAL THERAPY EVALUATION    Patient: Keaton Van (59 y.o. male)  Date: 6/20/2024  Primary Diagnosis: Hyperkalemia [E87.5]  Acute pulmonary edema (HCC) [J81.0]  Hyperglycemia [R73.9]  Acute metabolic encephalopathy [G93.41]         Precautions: Up as Tolerated, Fall Risk,  reading             Range of Motion:   AROM: Generally decreased, functional         Strength:  Strength: Generally decreased, functional      Coordination:  Coordination: Generally decreased, functional     Coordination: Within functional limits      Tone & Sensation:   Tone: Normal  Sensation: Impaired (neuropathy in BLE)          Functional Mobility and Transfers for ADLs:    Bed Mobility:     Bed Mobility Training  Bed Mobility Training: Yes  Interventions: Verbal cues;Safety awareness training  Supine to Sit: Supervision  Scooting: Modified independent    Transfers:      Transfer Training  Transfer Training: Yes  Sit to Stand: Contact-guard assistance  Stand to Sit: Stand-by assistance  Bed to Chair: Adaptive equipment;Minimum assistance (RW)  Toilet Transfer: Contact-guard assistance;Adaptive equipment (grab bar)                     Balance:      Balance  Sitting: Intact  Standing: Impaired  Standing - Static: Constant support;Good  Standing - Dynamic: Good;Constant support (poor unsupported)      ADL Assessment:          Feeding: Independent       Grooming: Setup  Grooming Skilled Clinical Factors: seated, unable to perform standing due balance deficits and dizziness/BP    UE Bathing: Setup            LE Bathing: Minimal assistance       UE Dressing: Setup       LE Dressing: Minimal assistance       Toileting: Minimal assistance                         ADL Intervention and task modifications:    See assessment.          Barthel Index:    Barthel Index   Feeding: Independent, Able to apply any necessary device. Feeds in reasonable time  Bathing: Cannot perform activity  Grooming: Washes face, blanc hair, brushes teeth, shaves (manages plug if electric razor)  Dressing: Needs help, but does at least half of task within reasonable time  Bowel Control: No accidents. Able to use enema or suppository if needed  Bladder Control: No accidents. Able to care for collecting device, if used  Toilet Transfers: Needs help  symptoms of orthostatic hypotension    After treatment:   Patient left in no apparent distress sitting up in chair, Call bell within reach, Bed/ chair alarm activated, Updated patient's board on functional status and mobility recommendations, and reclined in recliner with all lines/leads as prior to session and O2 @ 2L in pts nose    COMMUNICATION/EDUCATION:   The patient's plan of care was discussed with: physical therapist, registered nurse, physician, and patient    Patient Education  Education Given To: Patient  Education Provided: Role of Therapy;Plan of Care;ADL Adaptive Strategies;Mobility Training;Fall Prevention Strategies  Education Method: Verbal  Barriers to Learning: None  Education Outcome: Verbalized understanding;Continued education needed    Thank you for this referral.  Kiera Finley OTR/L  Minutes: 38    Occupational Therapy Evaluation Charge Determination   History Examination Decision-Making   MEDIUM Complexity : Expanded review of history including physical, cognitive and psychocial history  MEDIUM Complexity: 3-5 Performance deficits relating to physical, cognitive, or psychosocial skills that result in activity limitations and/or participation restrictions MEDIUM Complexity: Patient may present with comorbidities that affect occupational performance. Minimal to moderate modifications of tasks or assist (eg. physical or verbal) with assist is necessary to enable pt to complete eval   Based on the above components, the patient evaluation is determined to be of the following complexity level: Medium

## 2024-06-20 NOTE — PROGRESS NOTES
Hospitalist Progress Note    NAME:   Keaton Van   : 1965   MRN: [id number]     Date/Time: 2024    Patient PCP: No primary care provider on file.      Assessment / Plan:      Metabolic encephalopathy- multifactorial , hyperglycemia, electrolyte derangement, possible drug abuse , less likely infection   Uncontrolled diabetes mellitus-likely due to noncompliance  Anion gap normal  Alcohol level<10  Insulin dose adjusted   Appreciated DM management team recs   Urine toxicology + for cocaine  UA neg   CT head neg for acute changes   Afebrile, no leucocytosis, CXR no acute findings- will hold antibiotics   Follow up with blood cultures       Chronic diastolic heart failure  History of cardiac stent remote 15 years ago  H/o cocaine abuse   Paroxysmal Atrial fibrillation on last admission   24 Echo - EF 60-65%, mild MVR, mild pulm HTN   CXR-Mild pulmonary edema and small pleural effusions right greater than left.L: unchanged from prior CXR on   Continue with Lasix 40 Mg twice daily  Monitor electrolytes and renal function  Daily weight, intake and output monitoring  Hold coreg given bradycardia  amlodipine 10 Mg daily, Imdur     Hyperkalemia  Potassium 6.1--> 5.5-->7.2  EKG sinus bradycardia   Trend BMP   S/p insulin   Lokelma 1 dose   Nephro consult  Repeat  K     HTN   Lower norvasc dose  Hold coreg given bradycardia     CKD stage IV, worsening   Creatinine-2.78  Adjust medications as per renal dose  Recent renal US 6/3 nl      Chronic anemia   Hb -7.7   Not in range for transfusion   Suspect multifactorial including CKD, poor dietary intake      Chronic bilateral foot wounds with history of left toe amputations  Right 5th toe osteomyelitis s/p resection 24  Wound care consulted  Unclear if he was discharge on any antibiotics  As per prior Podiatry notes, last day of Abx is   Will consult Podiatry for f/up     Bipolar I  Polysubstance abuse incl heavy crack cocaine use  Tobacco  motion.    Mitral Valve: Mild regurgitation with a posterior directed jet.    Tricuspid Valve: Mildly elevated RVSP, consistent with mild pulmonary hypertension. The estimated RVSP is 47 mmHg.    Left Atrium: Left atrium is dilated.    IVC/SVC: IVC diameter is greater than 21 mm and decreases less than 50% during inspiration; therefore the estimated right atrial pressure is elevated (~15 mmHg).    Image quality is adequate.    Signed by: Anisa Jaquez MD on 6/4/2024  3:24 PM    Procedures: see electronic medical records for all procedures/Xrays and details which were not copied into this note but were reviewed prior to creation of Plan.    Reviewed most current lab test results and cultures  YES  Reviewed most current radiology test results   YES  Review and summation of old records today    NO  Reviewed patient's current orders and MAR    YES  PMH/ reviewed - no change compared to H&P    ________________________________________________________________________      Total NON critical care TIME:  Minutes      Total CRITICAL CARE TIME Spent:   Minutes non procedure based          Comments   >50% of visit spent in counseling and coordination of care x    ________________________________________________________________________        Signed: Stefania Vargas MD

## 2024-06-20 NOTE — PROGRESS NOTES
Nursing contacted Nocturnist/cross cover provider via non-urgent messaging system uniRow and notified patient lab result of k 7.2 up from 5.8. No other concerns reported. No acute distress reported. No other information provided by nurse. VSS. Ordered hyperkalemia protocol, stat EKG- pending. Will defer further evaluation/management to the day shift primary attending care team. Patient denies any further complaints or concerns. Nursing to notify Hospitalist for further/continued concerns. Will remain available overnight for further concerns if nursing/patient needs. Please note, there are RRT systems in this hospital in place that if nursing has acute or critical patient condition change or concern, this is to help facilitate and notify that patient needs immediate bedside evaluation by a provider.     Non-billable note.

## 2024-06-20 NOTE — PLAN OF CARE
Problem: Discharge Planning  Goal: Discharge to home or other facility with appropriate resources  Outcome: Progressing     Problem: Pain  Goal: Verbalizes/displays adequate comfort level or baseline comfort level  Outcome: Progressing     Problem: Safety - Adult  Goal: Free from fall injury  Outcome: Progressing       Problem: Neurosensory - Adult  Goal: Achieves stable or improved neurological status  Outcome: Progressing  Goal: Absence of seizures  Outcome: Progressing  Goal: Remains free of injury related to seizures activity  Outcome: Progressing  Goal: Achieves maximal functionality and self care  Outcome: Progressing     Problem: Respiratory - Adult  Goal: Achieves optimal ventilation and oxygenation  Outcome: Progressing     Problem: Cardiovascular - Adult  Goal: Maintains optimal cardiac output and hemodynamic stability  Outcome: Progressing  Goal: Absence of cardiac dysrhythmias or at baseline  Outcome: Progressing     Problem: Skin/Tissue Integrity - Adult  Goal: Skin integrity remains intact  Outcome: Progressing  Goal: Incisions, wounds, or drain sites healing without S/S of infection  Outcome: Progressing  Goal: Oral mucous membranes remain intact  Outcome: Progressing     Problem: Musculoskeletal - Adult  Goal: Return mobility to safest level of function  Outcome: Progressing  Goal: Maintain proper alignment of affected body part  Outcome: Progressing  Goal: Return ADL status to a safe level of function  Outcome: Progressing     Problem: Gastrointestinal - Adult  Goal: Minimal or absence of nausea and vomiting  Outcome: Progressing  Goal: Maintains or returns to baseline bowel function  Outcome: Progressing  Goal: Maintains adequate nutritional intake  Outcome: Progressing  Goal: Establish and maintain optimal ostomy function  Outcome: Progressing     Problem: Genitourinary - Adult  Goal: Absence of urinary retention  Outcome: Progressing  Goal: Urinary catheter remains patent  Outcome:

## 2024-06-20 NOTE — CONSULTS
Nephrology Consult Note     [facility]                Phone - [phone]   Patient: Keaton Van   YOB: 1965    Date- 6/20/2024  MRN: [id number]             CONSULTING PHYSICIAN:     REASON FOR CONSULTATION: KIN, HYPERKALEMIA  ADMIT DATE:6/19/2024 PATIENT PCP:No primary care provider on file.     IMPRESSION & PLAN:   KIN due to multiple etiology- ABX vs cardiorenal syndrome vs progression of ckd  Hyponatremia due to chf  Hyperkalemia  Ckd 4 - bl cr 2.2  DM 2  Bradycardia  Hypertension  anemia    PLAN-  Iv insulin with d 50  Po lokelma  Check bmp at 3 pm  Low k diet  If k remains high-- he will need RRT   Consent for hd and hd cath obtained from patient  Give iv lasix 80 mg now  Hold coreg  Check bladder scan           Principal Problem:    Acute metabolic encephalopathy  Resolved Problems:    * No resolved hospital problems. *      [x] High complexity decision making was performed  [x] Patient is at high-risk of decompensation with multiple organ involvement    Subjective:   HPI: Keaton Van is a 59 y.o. male is admitted with   Chief Complaint   Patient presents with    Hyperglycemia     Patient with multiple falls this morning and not acting right.  Showed up on neighbors doorstep and said his blood sugar was high.  Reports he was supposed to start insulin but has not.     .  He is admitted with weakness.  He is found to have hyperkalemia and kin  He is poor historian  He is admitted to hospital multiple times in last few weeks  He has been on abx at home  He is eating high k food- banana and tomatoes  His is on lasix at home  His cr 2.2 in may 2024  His cr 2.0 in July 2023  He has bradycardia  He c/o orthopnea  He c/o sob    Review of Systems:    c/o sob,    No c/o chest pain,   No c/o nausea or vomiting, No c/o  fever.     A 11 point review of system was performed today. Pertinent positives and negatives are mentioned in the HPI. The reminder of the

## 2024-06-20 NOTE — PROGRESS NOTES
Attempted OT assessment.  HR 47, O2 sat 92% on room air (out of nose upon arrival).  BP Semi fowlers 130/72.  Pt was occasionally twitching in UE and LE.  Blood sugar 126. With therapist dropping in arm onto the bed pt didn't respond.  Doctor arrived and performed assessment and firm sternal rub and pt startled awake.

## 2024-06-20 NOTE — PROGRESS NOTES
Spiritual Care Assessment/Progress Note  [facility]    Name: Keaton Van MRN: [id number]    Age: 59 y.o.     Sex: male   Language: English     Date: 6/20/2024            Total Time Calculated: 9 min              Spiritual Assessment begun in [facility] 2 CARDIOPULMONARY CARE  Service Provided For: Patient  Referral/Consult From: Rounding  Encounter Overview/Reason: Initial Encounter    Spiritual beliefs:      [] Involved in a tunde tradition/spiritual practice:      [] Supported by a tunde community:      [] Claims no spiritual orientation:      [] Seeking spiritual identity:           [] Adheres to an individual form of spirituality:      [x] Not able to assess:                Identified resources for coping and support system:   Support System: Unknown       [] Prayer                  [] Devotional reading               [] Music                  [] Guided Imagery     [] Pet visits                                        [] Other: (COMMENT)     Specific area/focus of visit   Encounter:    Crisis:    Spiritual/Emotional needs: Type: Spiritual Support  Ritual, Rites and Sacraments:    Grief, Loss, and Adjustments:    Ethics/Mediation:    Behavioral Health:    Palliative Care:    Advance Care Planning:      Plan/Referrals: Continue Support (comment)    Narrative:   Visit was in 2162 for initial spiritual assessment. Patient declined visit when  introduced himself, stating he can't talk at this time. Spiritual health is still available upon referrals.     Visited by: Chaplain Irvin Puga M.Div., [facility].   Paging Service: [phone])

## 2024-06-20 NOTE — PLAN OF CARE
Problem: Physical Therapy - Adult  Goal: By Discharge: Performs mobility at highest level of function for planned discharge setting.  See evaluation for individualized goals.  Description: FUNCTIONAL STATUS PRIOR TO ADMISSION: Patient was modified independent using a single point cane for functional mobility. and The patient  was independent for basic and instrumental ADLs. He admits not following recommended heel only WB precautions on his RLE, but has been wearing the post op shoe.    HOME SUPPORT PRIOR TO ADMISSION: The patient lived alone with no local support. Lives in 2 level apartment with 2 steps to enter and 13 steps to 2nd floor with 1 rail. Per the patient he is expecting to be evicted soon.    Physical Therapy Goals  Initiated 6/20/2024  1.  Patient will move from supine to sit and sit to supine, scoot up and down, and roll side to side in bed with independence within 7 day(s).    2.  Patient will perform sit to stand with modified independence within 7 day(s).  3.  Patient will transfer from bed to chair and chair to bed with modified independence using the least restrictive device within 7 day(s).  4.  Patient will ambulate with modified independence for 150 feet with the least restrictive device within 7 day(s).   5.  Patient will ascend/descend 12 stairs with 1 handrail(s) with standby assistance within 7 day(s).  6.  Patient will comply with R heel WB in post op shoe during transfers and ambulation activities within 7 day(s).  Outcome: Not Progressing   PHYSICAL THERAPY EVALUATION    Patient: Keaton Van (59 y.o. male)  Date: 6/20/2024  Primary Diagnosis: Hyperkalemia [E87.5]  Acute pulmonary edema (HCC) [J81.0]  Hyperglycemia [R73.9]  Acute metabolic encephalopathy [G93.41]       Precautions: Restrictions/Precautions: Up as Tolerated, Fall Risk, Weight Bearing  Required Braces or Orthoses?: Yes Required Braces or Orthoses?: Yes Lower Extremity Weight Bearing Restrictions  Right Lower Extremity  Weight Bearing: Weight Bearing As Tolerated  Partial Weight Bearing Percentage Or Pounds: R heel WB only with post op shoe                  ASSESSMENT :   DEFICITS/IMPAIRMENTS:   The patient is limited by decreased functional mobility, independence in ADLs, high-level IADLs, strength, sensation, activity tolerance, endurance, safety awareness, command following, balance, orthostatic hypotension following admission on 6/19/24 due to hyperkalemia and poorly controlled   Diabetes. 6 admissions in the last 6 months and recent R 5th toe amputation 6/1/24.    Based on the impairments listed above the patient is functioning below his modified independent baseline note above and remains non-compliant with R foot wound precautions. Currently demonstrates supervision supine to sitting with intact sitting balance. Cg sit to stand with RW and min a ambulation bed to bathroom toilet with poor compliance with R heel only WB. Provided a forefoot offloading post op shoe. The patient's gait is characterized by unsafe positioning of the walker too far forward, fluctuating speed and uneven step lengths due to post op shoe increasing the length of the RLE. Verbal cues to slow down and step with smaller step lengths improves his overall stability/safety.  Left up in chair with all needs met when the session ended.    Patient will benefit from skilled intervention to address the above impairments.    Functional Outcome Measure:  The patient scored 18/24 on the [facility] outcome measure.Cutoff score ?171,2,3 had higher odds of discharging home with home health or need of SNF/IPR.          PLAN :  Recommendations and Planned Interventions:   bed mobility training, transfer training, gait training, therapeutic exercises, neuromuscular re-education, edema management/control, patient and family training/education, and therapeutic activities    Frequency/Duration: Patient will be followed by physical therapy to address goals, PT Plan of Care: 3    Tone: Normal  Sensation: Impaired (sensation in feet and hands poor per patient)    Coordination:  Coordination: Within functional limits    Range Of Motion:  AROM: Within functional limits  PROM: Within functional limits    Functional Mobility:  Bed Mobility:     Bed Mobility Training  Bed Mobility Training: Yes  Interventions: Verbal cues;Safety awareness training  Supine to Sit: Supervision  Scooting: Modified independent  Transfers:     Transfer Training  Transfer Training: Yes  Sit to Stand: Contact-guard assistance  Stand to Sit: Stand-by assistance  Bed to Chair: Adaptive equipment;Minimum assistance (RW)  Toilet Transfer: Contact-guard assistance;Adaptive equipment (grab bar)  Balance:      Balance  Sitting: Intact  Standing: Impaired  Standing - Static: Constant support;Good  Standing - Dynamic: Good;Constant support (poor unsupported)  Ambulation/Gait Training:     Gait  Gait Training: Yes  Left Side Weight Bearing: Full  Right Side Weight Bearing: As tolerated;Heel (with post op shoe)  Overall Level of Assistance: Minimum assistance  Distance (ft): 30 Feet (15 x 2)  Assistive Device: Walker, rolling  Interventions: Tactile cues;Verbal cues;Safety awareness training (unsafe position to walker - too far forward)  Base of Support: Shift to left  Speed/Hanny: Fluctuations;Pace decreased (< 100 feet/min)  Step Length: Right shortened;Left shortened  Gait Abnormalities: Antalgic;Path deviations;Trunk sway increased                                                                                                                                                                                                                                     [facility] AM-PAC®      Basic Mobility Inpatient Short Form (6-Clicks) Version 2  How much HELP from another person do you currently need... (If the patient hasn't done an activity recently, how much help from another person do you think they would need if they
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
## 2024-06-20 NOTE — PROGRESS NOTES
End of Shift Note    Bedside shift change report given to RN (oncoming nurse) by Rylee Gongora RN (offgoing nurse).  Report included the following information SBAR, Kardex, MAR, and Recent Results    Shift worked:  7p-7a     Shift summary and any significant changes:     AM labs drawn. Pt potassium 7.2, insulin and d 10 bolus given, calcium gluconate given.      Concerns for physician to address:       Zone phone for oncoming shift:              Rylee Gongora RN

## 2024-06-21 ENCOUNTER — APPOINTMENT (OUTPATIENT)
Facility: HOSPITAL | Age: 59
DRG: 071 | End: 2024-06-21
Payer: MEDICARE

## 2024-06-21 PROBLEM — R73.9 HYPERGLYCEMIA: Status: ACTIVE | Noted: 2024-06-21

## 2024-06-21 LAB
ANION GAP SERPL CALC-SCNC: 7 MMOL/L (ref 5–15)
BASOPHILS # BLD: 0.1 K/UL (ref 0–0.1)
BASOPHILS NFR BLD: 1 % (ref 0–1)
BUN SERPL-MCNC: 94 MG/DL (ref 6–20)
BUN/CREAT SERPL: 26 (ref 12–20)
CALCIUM SERPL-MCNC: 9 MG/DL (ref 8.5–10.1)
CHLORIDE SERPL-SCNC: 101 MMOL/L (ref 97–108)
CO2 SERPL-SCNC: 26 MMOL/L (ref 21–32)
CREAT SERPL-MCNC: 3.65 MG/DL (ref 0.7–1.3)
DIFFERENTIAL METHOD BLD: ABNORMAL
EOSINOPHIL # BLD: 0.2 K/UL (ref 0–0.4)
EOSINOPHIL NFR BLD: 4 % (ref 0–7)
ERYTHROCYTE [DISTWIDTH] IN BLOOD BY AUTOMATED COUNT: 15.8 % (ref 11.5–14.5)
EST. AVERAGE GLUCOSE BLD GHB EST-MCNC: 146 MG/DL
GLUCOSE BLD STRIP.AUTO-MCNC: 124 MG/DL (ref 65–117)
GLUCOSE BLD STRIP.AUTO-MCNC: 126 MG/DL (ref 65–117)
GLUCOSE BLD STRIP.AUTO-MCNC: 146 MG/DL (ref 65–117)
GLUCOSE BLD STRIP.AUTO-MCNC: 181 MG/DL (ref 65–117)
GLUCOSE SERPL-MCNC: 167 MG/DL (ref 65–100)
HBA1C MFR BLD: 6.7 % (ref 4–5.6)
HCT VFR BLD AUTO: 24.4 % (ref 36.6–50.3)
HGB BLD-MCNC: 7.5 G/DL (ref 12.1–17)
IMM GRANULOCYTES # BLD AUTO: 0 K/UL (ref 0–0.04)
IMM GRANULOCYTES NFR BLD AUTO: 0 % (ref 0–0.5)
LYMPHOCYTES # BLD: 0.7 K/UL (ref 0.8–3.5)
LYMPHOCYTES NFR BLD: 12 % (ref 12–49)
MCH RBC QN AUTO: 26.5 PG (ref 26–34)
MCHC RBC AUTO-ENTMCNC: 30.7 G/DL (ref 30–36.5)
MCV RBC AUTO: 86.2 FL (ref 80–99)
MONOCYTES # BLD: 0.6 K/UL (ref 0–1)
MONOCYTES NFR BLD: 10 % (ref 5–13)
NEUTS SEG # BLD: 4.3 K/UL (ref 1.8–8)
NEUTS SEG NFR BLD: 73 % (ref 32–75)
NRBC # BLD: 0 K/UL (ref 0–0.01)
NRBC BLD-RTO: 0 PER 100 WBC
PLATELET # BLD AUTO: 197 K/UL (ref 150–400)
PMV BLD AUTO: 9.9 FL (ref 8.9–12.9)
POTASSIUM SERPL-SCNC: 4.6 MMOL/L (ref 3.5–5.1)
RBC # BLD AUTO: 2.83 M/UL (ref 4.1–5.7)
RBC MORPH BLD: ABNORMAL
SERVICE CMNT-IMP: ABNORMAL
SODIUM SERPL-SCNC: 134 MMOL/L (ref 136–145)
WBC # BLD AUTO: 5.9 K/UL (ref 4.1–11.1)

## 2024-06-21 PROCEDURE — 97530 THERAPEUTIC ACTIVITIES: CPT

## 2024-06-21 PROCEDURE — 6370000000 HC RX 637 (ALT 250 FOR IP): Performed by: GENERAL ACUTE CARE HOSPITAL

## 2024-06-21 PROCEDURE — 99231 SBSQ HOSP IP/OBS SF/LOW 25: CPT

## 2024-06-21 PROCEDURE — 6360000002 HC RX W HCPCS: Performed by: INTERNAL MEDICINE

## 2024-06-21 PROCEDURE — 92610 EVALUATE SWALLOWING FUNCTION: CPT | Performed by: SPEECH-LANGUAGE PATHOLOGIST

## 2024-06-21 PROCEDURE — 6370000000 HC RX 637 (ALT 250 FOR IP): Performed by: CLINICAL NURSE SPECIALIST

## 2024-06-21 PROCEDURE — 6370000000 HC RX 637 (ALT 250 FOR IP): Performed by: STUDENT IN AN ORGANIZED HEALTH CARE EDUCATION/TRAINING PROGRAM

## 2024-06-21 PROCEDURE — 6360000002 HC RX W HCPCS: Performed by: STUDENT IN AN ORGANIZED HEALTH CARE EDUCATION/TRAINING PROGRAM

## 2024-06-21 PROCEDURE — 83036 HEMOGLOBIN GLYCOSYLATED A1C: CPT

## 2024-06-21 PROCEDURE — 2580000003 HC RX 258: Performed by: STUDENT IN AN ORGANIZED HEALTH CARE EDUCATION/TRAINING PROGRAM

## 2024-06-21 PROCEDURE — 80048 BASIC METABOLIC PNL TOTAL CA: CPT

## 2024-06-21 PROCEDURE — 97535 SELF CARE MNGMENT TRAINING: CPT

## 2024-06-21 PROCEDURE — 85025 COMPLETE CBC W/AUTO DIFF WBC: CPT

## 2024-06-21 PROCEDURE — 2060000000 HC ICU INTERMEDIATE R&B

## 2024-06-21 PROCEDURE — 82962 GLUCOSE BLOOD TEST: CPT

## 2024-06-21 PROCEDURE — 36415 COLL VENOUS BLD VENIPUNCTURE: CPT

## 2024-06-21 PROCEDURE — 76770 US EXAM ABDO BACK WALL COMP: CPT

## 2024-06-21 RX ORDER — ENOXAPARIN SODIUM 100 MG/ML
30 INJECTION SUBCUTANEOUS DAILY
Status: DISCONTINUED | OUTPATIENT
Start: 2024-06-22 | End: 2024-06-25 | Stop reason: HOSPADM

## 2024-06-21 RX ORDER — GABAPENTIN 300 MG/1
300 CAPSULE ORAL 2 TIMES DAILY
Status: DISCONTINUED | OUTPATIENT
Start: 2024-06-21 | End: 2024-06-25 | Stop reason: HOSPADM

## 2024-06-21 RX ORDER — GABAPENTIN 600 MG/1
600 TABLET ORAL 3 TIMES DAILY
Status: DISCONTINUED | OUTPATIENT
Start: 2024-06-21 | End: 2024-06-21 | Stop reason: DRUGHIGH

## 2024-06-21 RX ADMIN — ENOXAPARIN SODIUM 40 MG: 100 INJECTION SUBCUTANEOUS at 09:32

## 2024-06-21 RX ADMIN — AMLODIPINE BESYLATE 5 MG: 5 TABLET ORAL at 09:31

## 2024-06-21 RX ADMIN — EPOETIN ALFA-EPBX 10000 UNITS: 10000 INJECTION, SOLUTION INTRAVENOUS; SUBCUTANEOUS at 21:44

## 2024-06-21 RX ADMIN — ISOSORBIDE MONONITRATE 30 MG: 30 TABLET, EXTENDED RELEASE ORAL at 09:24

## 2024-06-21 RX ADMIN — LURASIDONE HYDROCHLORIDE 20 MG: 20 TABLET, FILM COATED ORAL at 17:58

## 2024-06-21 RX ADMIN — OXYCODONE HYDROCHLORIDE 5 MG: 5 TABLET ORAL at 14:33

## 2024-06-21 RX ADMIN — SODIUM CHLORIDE, PRESERVATIVE FREE 10 ML: 5 INJECTION INTRAVENOUS at 20:39

## 2024-06-21 RX ADMIN — SODIUM CHLORIDE, PRESERVATIVE FREE 10 ML: 5 INJECTION INTRAVENOUS at 09:23

## 2024-06-21 RX ADMIN — OXYCODONE HYDROCHLORIDE 5 MG: 5 TABLET ORAL at 20:32

## 2024-06-21 RX ADMIN — INSULIN LISPRO 4 UNITS: 100 INJECTION, SOLUTION INTRAVENOUS; SUBCUTANEOUS at 10:04

## 2024-06-21 RX ADMIN — INSULIN LISPRO 4 UNITS: 100 INJECTION, SOLUTION INTRAVENOUS; SUBCUTANEOUS at 17:58

## 2024-06-21 RX ADMIN — GABAPENTIN 300 MG: 300 CAPSULE ORAL at 13:14

## 2024-06-21 RX ADMIN — INSULIN GLARGINE 18 UNITS: 100 INJECTION, SOLUTION SUBCUTANEOUS at 09:32

## 2024-06-21 RX ADMIN — GABAPENTIN 300 MG: 300 CAPSULE ORAL at 20:32

## 2024-06-21 RX ADMIN — INSULIN LISPRO 4 UNITS: 100 INJECTION, SOLUTION INTRAVENOUS; SUBCUTANEOUS at 12:49

## 2024-06-21 RX ADMIN — ASPIRIN 81 MG: 81 TABLET, COATED ORAL at 09:30

## 2024-06-21 ASSESSMENT — PAIN SCALES - GENERAL
PAINLEVEL_OUTOF10: 0
PAINLEVEL_OUTOF10: 8
PAINLEVEL_OUTOF10: 0
PAINLEVEL_OUTOF10: 0
PAINLEVEL_OUTOF10: 6
PAINLEVEL_OUTOF10: 0

## 2024-06-21 ASSESSMENT — PAIN DESCRIPTION - LOCATION
LOCATION: LEG
LOCATION: BACK

## 2024-06-21 ASSESSMENT — PAIN DESCRIPTION - DESCRIPTORS
DESCRIPTORS: ACHING
DESCRIPTORS: ACHING

## 2024-06-21 ASSESSMENT — PAIN DESCRIPTION - ORIENTATION
ORIENTATION: RIGHT
ORIENTATION: PROXIMAL;RIGHT

## 2024-06-21 ASSESSMENT — PAIN - FUNCTIONAL ASSESSMENT: PAIN_FUNCTIONAL_ASSESSMENT: ACTIVITIES ARE NOT PREVENTED

## 2024-06-21 ASSESSMENT — PAIN SCALES - WONG BAKER: WONGBAKER_NUMERICALRESPONSE: NO HURT

## 2024-06-21 NOTE — DIABETES MGMT
[facility]  DIABETES MANAGEMENT CONSULT    Consulted by Provider for advanced nursing evaluation and care for inpatient blood glucose management.    Evaluation and Action Plan   Keaton Van is a 59 year old gentleman with Type 2 Diabetes, Bipolar and depression, peripheral neuropathy s/p left great and second toe amputations, CKD, substance abuse and a history of narcotic dependence, HCV? and suicide attempt who is admitted with AMS and severe hyperglycemia.  This is two days after being discharged from an impatient stay for acute hypoxic respiratory failure (6/17) and a week after another hospital stay for a right leg cellulitis with foot wounds s/p I&D with podiatry.    Mr Van is on disability, living alone.  At some point he tells me he was on insulin and do see that he filled 25mg Januvia and Glipizide 5mg daily back in August 2023 followed by a prescription of metformin 500mg daily that he filled 4/2024.  He shared that he got tired of taking his medications and just stopped them.  His A1C of 7.6% is was suspected to be low s/t anemia but fructosamine resulted in 306 correlating with current A1C.  He was discharged with Basaglar and insulin pens this week, filled at the [facility] outpatient pharmacy but he shares that he didn't take these.     Last admission, he responded very well to very low dose insulin and will resume those doses.    Bg's stable today. The patient received additional regular insulin yesterday, along with dextrose to treat hyperkalemia. No changes to insulin regimen for now.     Management Rationale Action Plan   Medication   Basal needs Using Home dose Reduced to 18 units Lantus daily   Bolus insulin  4 units humalog/consumed meal   Corrective insulin Using medium dose sensitivity based on weight    Additional orders  Carb consistent meals.  Consider sending tox screen       Diabetes Discharge Plan   Medication  A1C is 7.6% and needs to resume antihyperglycemic

## 2024-06-21 NOTE — PROGRESS NOTES
Speech LAnguage Pathology EVALUATION/DISCHARGE    Patient: Keaton Van (59 y.o. male)  Date: 6/21/2024  Primary Diagnosis: Hyperkalemia [E87.5]  Acute pulmonary edema (HCC) [J81.0]  Hyperglycemia [R73.9]  Acute metabolic encephalopathy [G93.41]       Precautions:   Up as Tolerated, Fall Risk, Weight Bearing Right Lower Extremity Weight Bearing: Weight Bearing As Tolerated                ASSESSMENT :  Patient presents with adequate oropharyngeal swallow function based on clinical assessment. Patient awake and alert. Intact bolus acceptance and manipulation, no oral residue. No overt s/s of aspiration noted throughout. Of note, patient reports feeling as if he was drowning earlier during his admission, when drinking thins from large bore straw. He reports not using straws at home. His sensation of drowning is described as feeling like there is too much in his throat that won't go down, globus sensation. He points to his lower neck. Given his medical history and patient report of being diagnosed with a \"narrow throat\", suspect these symptoms are due to an esophageal issue rather than pharyngeal. No further SLP needs. At this juncture, given patient is tolerating a regular diet with thin liquids, would not recommend further workup.     Patient will be discharged from skilled speech-language pathology services at this time.     PLAN :  Recommendations and Planned Interventions:  Diet: Regular and thin liquids            Acute SLP Services: No, patient will be discharged from acute skilled speech-language pathology at this time.    Discharge Recommendations: No, additional SLP treatment not indicated at discharge     SUBJECTIVE:   Patient stated, “I feel like it gets stuck here.”    OBJECTIVE:     Past Medical History:   Diagnosis Date    Adverse effect of anesthesia     breathing diff. with versed    Bipolar 1 disorder, mixed, moderate (HCC) 3/6/2017    Chronic pain     Depression     Pt stated diagnosed years ago           Functional Oral Intake Scale (FOIS): 7--Total oral diet with no restrictions    After treatment:   Patient left in no apparent distress in bed, Call bell left within reach, and Nursing notified    COMMUNICATION/EDUCATION:   Patient was educated regarding purpose of SLP visit. He participated.     The patient's plan of care including recommendations, planned interventions, and recommended diet changes were discussed with: Registered nurse    Patient/family have participated as able in goal setting and plan of care    Thank you,  Eulalia David SLP  Minutes: 13

## 2024-06-21 NOTE — PLAN OF CARE
Problem: Occupational Therapy - Adult  Goal: By Discharge: Performs self-care activities at highest level of function for planned discharge setting.  See evaluation for individualized goals.  Description: FUNCTIONAL STATUS PRIOR TO ADMISSION:  recently at [facility] and was home two days and returned due falls, prior to this pt had recent toe amputation, was ambulating with SPC and post op shoe, reports he has limited resources and follow up appointments are useless due to inability to get to appointments, he reports he moved into a apartment and a lady friend was suppose to move in with him to help with rent but she never did and he will be evicted in July, doesn't drive/own vehicle, performed ADLS on his own and was standing to shower, reports having loss of balance and dizziness in standing especially with eyes closed and needs to lean against shower wall, performed ADLS on his own  Receives Help From: Other (comment) (Denies support systems), ADL Assistance: Independent, , , , , , Homemaking Assistance: Independent, Ambulation Assistance: Independent, Transfer Assistance: Independent, Active Dri     Receives Help From: Other (comment),  ,  ,  ,  ,  ,  ,  ,  ,  , Active : No     HOME SUPPORT: Patient lived alone with no support.    Occupational Therapy Goals:  Initiated 6/20/2024  1.  Patient will perform grooming with Modified [place] within 7 day(s).  2.  Patient will perform lower body dressing with Modified [place] within 7 day(s).  3.  Patient will perform toileting with Modified [place] within 7 day(s).  4.  Patient will perform toilet transfers with Modified [place]  within 7 day(s).    Outcome: Progressing   OCCUPATIONAL THERAPY TREATMENT  Patient: Keaton Van (59 y.o. male)  Date: 6/21/2024  Primary Diagnosis: Hyperkalemia [E87.5]  Acute pulmonary edema (HCC) [J81.0]  Hyperglycemia [R73.9]  Acute metabolic encephalopathy [G93.41]       Precautions: Up as Tolerated, Fall Risk, Weight

## 2024-06-21 NOTE — PLAN OF CARE
Problem: Discharge Planning  Goal: Discharge to home or other facility with appropriate resources  6/21/2024 0830 by Betty Durand RN  Outcome: Progressing  6/21/2024 0327 by Kassidy Dumont RN  Outcome: Progressing  Flowsheets (Taken 6/21/2024 0327)  Discharge to home or other facility with appropriate resources: Identify barriers to discharge with patient and caregiver  6/20/2024 1929 by Radha Bingham RN  Outcome: Progressing     Problem: Pain  Goal: Verbalizes/displays adequate comfort level or baseline comfort level  6/21/2024 0830 by Betty Durand RN  Outcome: Progressing  6/21/2024 0327 by Kassidy Dumont RN  Outcome: Progressing  Flowsheets (Taken 6/21/2024 0327)  Verbalizes/displays adequate comfort level or baseline comfort level:   Encourage patient to monitor pain and request assistance   Assess pain using appropriate pain scale  6/20/2024 1929 by Radha Bingham RN  Outcome: Progressing     Problem: Safety - Adult  Goal: Free from fall injury  6/21/2024 0830 by Betty Durand RN  Outcome: Progressing  6/21/2024 0327 by Kassidy Dumont RN  Outcome: Progressing  Flowsheets (Taken 6/21/2024 0327)  Free From Fall Injury: Instruct family/caregiver on patient safety  6/20/2024 1929 by Radha Bingham RN  Outcome: Progressing     Problem: Neurosensory - Adult  Goal: Achieves stable or improved neurological status  6/21/2024 0830 by Betty Durand RN  Outcome: Progressing  6/21/2024 0327 by Kassidy Dumont RN  Outcome: Progressing  Flowsheets (Taken 6/21/2024 0327)  Achieves stable or improved neurological status:   Initiate measures to prevent increased intracranial pressure   Assess for and report changes in neurological status  6/20/2024 1929 by Radha Bingham RN  Outcome: Progressing  Goal: Absence of seizures  6/21/2024 0830 by Betty Durand RN  Outcome: Progressing  6/21/2024 0327 by Kassidy Dumont RN  Outcome: Progressing  Flowsheets (Taken 6/21/2024 0327)  Absence of seizures: Monitor for  of affected body part  6/21/2024 0830 by Betyt Durand RN  Outcome: Progressing  6/21/2024 0327 by Kassidy Dumont RN  Outcome: Progressing  Flowsheets (Taken 6/21/2024 0327)  Maintain proper alignment of affected body part: Support and protect limb and body alignment per provider's orders  6/20/2024 1929 by Radha Bingham RN  Outcome: Progressing  Goal: Return ADL status to a safe level of function  6/21/2024 0830 by Betty Durand RN  Outcome: Progressing  6/21/2024 0327 by Kassidy Dumont RN  Outcome: Progressing  Flowsheets (Taken 6/21/2024 0327)  Return ADL Status to a Safe Level of Function: Administer medication as ordered  6/20/2024 1929 by Radha Bingham RN  Outcome: Progressing     Problem: Gastrointestinal - Adult  Goal: Minimal or absence of nausea and vomiting  6/21/2024 0830 by Betty Durand RN  Outcome: Progressing  6/21/2024 0327 by Kassidy Dumont RN  Outcome: Progressing  Flowsheets (Taken 6/21/2024 0327)  Minimal or absence of nausea and vomiting: Administer IV fluids as ordered to ensure adequate hydration  6/20/2024 1929 by Radha Bingham RN  Outcome: Progressing  Goal: Maintains or returns to baseline bowel function  6/21/2024 0830 by Betty Durand RN  Outcome: Progressing  6/21/2024 0327 by Kassidy Dumont RN  Outcome: Progressing  Flowsheets (Taken 6/21/2024 0327)  Maintains or returns to baseline bowel function: Assess bowel function  6/20/2024 1929 by Radha Bingham RN  Outcome: Progressing  Goal: Maintains adequate nutritional intake  6/21/2024 0830 by Betty Durand RN  Outcome: Progressing  6/21/2024 0327 by Kassidy Dumont RN  Outcome: Progressing  Flowsheets (Taken 6/21/2024 0327)  Maintains adequate nutritional intake: Monitor percentage of each meal consumed  6/20/2024 1929 by Radha Bingham RN  Outcome: Progressing  Goal: Establish and maintain optimal ostomy function  6/21/2024 0830 by Ladarius, Betty, RN  Outcome: Progressing  6/21/2024 0327 by Kassidy Dumont,  positive airway pressure, respiratory therapy assess nares and determine need for appliance change or resting period.  Outcome: Progressing

## 2024-06-21 NOTE — PLAN OF CARE
Problem: Physical Therapy - Adult  Goal: By Discharge: Performs mobility at highest level of function for planned discharge setting.  See evaluation for individualized goals.  Description: FUNCTIONAL STATUS PRIOR TO ADMISSION: Patient was modified independent using a single point cane for functional mobility. and The patient  was independent for basic and instrumental ADLs. He admits not following recommended heel only WB precautions on his RLE, but has been wearing the post op shoe.    HOME SUPPORT PRIOR TO ADMISSION: The patient lived alone with no local support. Lives in 2 level apartment with 2 steps to enter and 13 steps to 2nd floor with 1 rail. Per the patient he is expecting to be evicted soon.    Physical Therapy Goals  Initiated 6/20/2024  1.  Patient will move from supine to sit and sit to supine, scoot up and down, and roll side to side in bed with independence within 7 day(s).    2.  Patient will perform sit to stand with modified independence within 7 day(s).  3.  Patient will transfer from bed to chair and chair to bed with modified independence using the least restrictive device within 7 day(s).  4.  Patient will ambulate with modified independence for 150 feet with the least restrictive device within 7 day(s).   5.  Patient will ascend/descend 12 stairs with 1 handrail(s) with standby assistance within 7 day(s).  6.  Patient will comply with R heel WB in post op shoe during transfers and ambulation activities within 7 day(s).  6/20/2024 1540 by Zhang Anne, PT  Outcome: Not Progressing     Problem: Occupational Therapy - Adult  Goal: By Discharge: Performs self-care activities at highest level of function for planned discharge setting.  See evaluation for individualized goals.  Description: FUNCTIONAL STATUS PRIOR TO ADMISSION:  recently at [facility] and was home two days and returned due falls, prior to this pt had recent toe amputation, was ambulating with SPC and post op shoe, reports he has  limited resources and follow up appointments are useless due to inability to get to appointments, he reports he moved into a apartment and a lady friend was suppose to move in with him to help with rent but she never did and he will be evicted in July, doesn't drive/own vehicle, performed ADLS on his own and was standing to shower, reports having loss of balance and dizziness in standing especially with eyes closed and needs to lean against shower wall, performed ADLS on his own  Receives Help From: Other (comment) (Denies support systems), ADL Assistance: Independent, , , , , , Homemaking Assistance: Independent, Ambulation Assistance: Independent, Transfer Assistance: Independent, Active Dri     Receives Help From: Other (comment),  ,  ,  ,  ,  ,  ,  ,  ,  , Active : No     HOME SUPPORT: Patient lived alone with no support.    Occupational Therapy Goals:  Initiated 6/20/2024  1.  Patient will perform grooming with Modified [place] within 7 day(s).  2.  Patient will perform lower body dressing with Modified [place] within 7 day(s).  3.  Patient will perform toileting with Modified [place] within 7 day(s).  4.  Patient will perform toilet transfers with Modified [place]  within 7 day(s).    6/20/2024 1615 by Kiera Finley, OTR/L  Outcome: Not Progressing     Problem: Physical Therapy - Adult  Goal: By Discharge: Performs mobility at highest level of function for planned discharge setting.  See evaluation for individualized goals.  Description: FUNCTIONAL STATUS PRIOR TO ADMISSION: Patient was modified independent using a single point cane for functional mobility. and The patient  was independent for basic and instrumental ADLs. He admits not following recommended heel only WB precautions on his RLE, but has been wearing the post op shoe.    HOME SUPPORT PRIOR TO ADMISSION: The patient lived alone with no local support. Lives in 2 level apartment with 2 steps to enter and 13 steps to

## 2024-06-21 NOTE — CONSULTS
[facility]  [address] [place] [address]  [phone]  [email or web address]       CARDIOLOGY CONSULTATION       Date of  Admission: 6/19/2024  7:13 AM     Admission type:Emergency   Primary Care Physician:No primary care provider on file.     Attending Provider: Joesph Hernandez MD  Cardiology Provider:     PLEASE NOTE THAT WE CONFIRMED WITH THE REFERRING PHYSICIAN PRIOR TO SEEING THE PATIENT THAT THE PATIENT IS BEING REFERRED FOR INITIAL HOSPITAL EVALUATION AND FOR LONG-TERM ONGOING CARDIAC CARE    CC/REASON FOR CONSULT: bradycardia      Subjective:     Keaton Van is a 59 y.o. male admitted for Hyperkalemia [E87.5]  Acute pulmonary edema (HCC) [J81.0]  Hyperglycemia [R73.9]  Acute metabolic encephalopathy [G93.41].  Patient is a 59-year-old male past medical history of systolic/diastolic heart failure, resolved systolic heart failure, hypertension, atherosclerotic heart disease and additional past medical history as reported below.  He is admitted for acute on chronic diastolic heart failure, metabolic encephalopathy.  Medication noncompliance, housing and social support poses a challenge for this patient.  He was evaluated at bedside no acute distress.  Heart rate 52 bpm sinus bradycardia no heart blocks.  Denies orthopnea or paroxysmal nocturnal dyspnea, states he does breathe better with nasal cannula oxygen.  Right foot has wound wrap.    Patient Active Problem List    Diagnosis Date Noted    Hyperglycemia 06/21/2024    Acute metabolic encephalopathy 06/19/2024    Acute on chronic congestive heart failure (HCC) 06/13/2024    Elevated troponin 06/13/2024    CHF (congestive heart failure), NYHA class I, acute on chronic, combined (HCC) 06/12/2024    Cellulitis of right foot 06/05/2024    Acute osteomyelitis of right foot ([facility]) 06/05/2024    Methicillin susceptible Staphylococcus aureus infection 06/05/2024    KIN (acute kidney injury) ([facility]) 06/05/2024

## 2024-06-21 NOTE — PROGRESS NOTES
Nephrology Progress Note  [facility] [place] [facility]  [address], [address]  [place] [address]  Phone - [phone]  Fax - [phone]                 Patient: Keaton Van                     YOB: 1965        Date- 6/21/2024                                     Admit Date: 6/19/2024   CC: Follow up for kin          IMPRESSION & PLAN:   KIN -I due to multiple etiology- ABX - ATN OR AIN vs cardiorenal syndrome --   Hyponatremia due to chf  Hyperkalemia  Ckd 4 - bl cr 2.2  DM 2  Bradycardia  Hypertension  Anemia  Proteinuria- urine pr/cr 2.2 g/g      PLAN-  Hold lasix today  Follow bmp  No RRT indicated today  Epogen for anemia  Check bladder scan  Check renal usg  Consult cardiology for bradycardia     Subjective:  Interval History:   -  k improved-- bradycardia persist  Sob improved  Cr worse  No c/o sob,  No c/o chest pain,   No c/o nausea or vomiting, No c/o  fever.      Objective:   Vitals:    06/21/24 0300 06/21/24 0740 06/21/24 0924 06/21/24 0931   BP: 128/68 128/77 124/77 124/77   Pulse: (!) 45 (!) 46     Resp: 18 16     Temp: 98 °F (36.7 °C) 97.9 °F (36.6 °C)     TempSrc: Oral Oral     SpO2: 96%      Weight:       Height:          I/O last 3 completed shifts:  In: -   Out: 1450 [Urine:1450]  No intake/output data recorded.      Physical exam:    GEN:  NAD  NECK:  Supple, no thyromegaly  RESP:  no  wheezing, decreased bs b/l  CVS- s1,s2, ysl  NEURO: non focal, normal speech  EXT: Edema +nt         Chart reviewed.         Pertinent Notes reviewed.     Data Review :  Lab Results   Component Value Date/Time     06/21/2024 03:40 AM    K 4.6 06/21/2024 03:40 AM     06/21/2024 03:40 AM    CO2 26 06/21/2024 03:40 AM    BUN 94 06/21/2024 03:40 AM    CREATININE 3.65 06/21/2024 03:40 AM    GLUCOSE 167 06/21/2024 03:40 AM    CALCIUM 9.0 06/21/2024 03:40 AM       Lab Results   Component Value Date    WBC 5.9 06/21/2024

## 2024-06-21 NOTE — PROGRESS NOTES
OT note:  OT reviewed chart and pt was cleared to be seen and he politely asked if therapy would come back after he eats his breakfast to see him.  Will defer and continue to follow

## 2024-06-21 NOTE — CARE COORDINATION
Transition of Care Plan:    RUR: 29%   Prior Level of Functioning: independent  Disposition: SNF/LTC at [facility] vs home with HH and MSW  If SNF or IPR: Date FOC offered:   Date FOC received:   Accepting facility:   Date authorization started with reference number:   Date authorization received and expires:   Follow up appointments: PCP, Specialists  DME needed: Pt will need RW and/or w/c if he decides to go home.   Transportation at discharge: BLS   IM/IMM Medicare/ letter given: needed  Is patient a [place] and connected with VA? Pt is a  but is not connected.   If yes, was [place] transfer form completed and VA notified?   Caregiver Contact: Evelyn Guallpa (Brother/Sister)  [phone] (Mobile)   Discharge Caregiver contacted prior to discharge?   Care Conference needed?   Barriers to discharge:     CM reviewed chart and therapy's recommendations.  CM met with pt to discuss his readmission, noted recent falls at home, as well as an upcoming eviction.  Pt reports he is agreeable to SNF/LTC.  CM noted previous CM sent referral to [employer] for pt to be screened for Medicaid.  CM will send another to follow up.  FOC offered and pt agreeable to CM checking with previous facilities that accepted him to include:  [facility], [facility], [facility], [facility], [facility] and [facility].  Pt will need a [facility] if not previously done.     12:10 p.m.  Pt agreeable to moving forward with [facility] due to proximity to his current residence.  CM updated in cclink. If pt changes his mind and goes home, pt could benefit from HH with MSW, rolling walker and possible w/c.    Lola Whelan, VAN  Supervisee in Social Work  Care Management, [facility]  [phone]

## 2024-06-21 NOTE — PROGRESS NOTES
Pharmacy Note - Renal dose adjustment made per P/T protocol    Original order:  Gabapentin 600 mg TID    Estimated Creatinine Clearance: 24 mL/min (A) (based on SCr of 3.65 mg/dL (H)).    Recent Labs     06/20/24  0321 06/20/24  1636 06/21/24  0340   BUN 86* 89* 94*   CREATININE 3.04* 3.31* 3.65*       Renally adjusted order:  Gabapentin 300 mg bid    Please call pharmacy with any questions.    Thank you,  Miriam Flores formerly [facility]  6/21/2024 1:04 PM

## 2024-06-21 NOTE — PROGRESS NOTES
End of Shift Note    Bedside shift change report given to BHAKTI Hernández (oncoming nurse) by Kassidy Dumont RN (offgoing nurse).  Report included the following information SBAR, ED Summary, Intake/Output, MAR, Recent Results, Cardiac Rhythm sinus syl, and Alarm Parameters     Shift worked:  [id number]     Shift summary and any significant changes:     Pt is agitated and impulsive. Vital signs are stable. Tolerating current regimen.     Concerns for physician to address:       Zone phone for oncoming shift:          Activity:     Number times ambulated in hallways past shift: 0  Number of times OOB to chair past shift: 0    Cardiac:   Cardiac Monitoring: Yes           Access:  Current line(s): PIV     Genitourinary:   Urinary status: voiding    Respiratory:      Chronic home O2 use?: NO  Incentive spirometer at bedside: NO       GI:     Current diet:  ADULT DIET; Regular  Passing flatus: YES  Tolerating current diet: YES       Pain Management:   Patient states pain is manageable on current regimen: YES    Skin:     Interventions: float heels, PT/OT consult, limit briefs, and nutritional support    Patient Safety:  Fall Score:    Interventions: bed/chair alarm, assistive device (walker, cane. etc), gripper socks, pt to call before getting OOB, and stay with me (per policy)       Length of Stay:  Expected LOS: 3  Actual LOS: 2      Kassidy Dumont RN

## 2024-06-21 NOTE — PROGRESS NOTES
Nursing contacted Nocturnist/cross cover provider via non-urgent messaging system [employer] and notified patient lab result of accucheck glucose 317, states no ssi ordered. No other concerns reported. No acute distress reported. No other information provided by nurse. VSS. Appears on basal insulin in the AM. Ordered achs accuchecks, hypoglycemia protocol, A1c in the am, and also ssi. Will defer further evaluation/management to the day shift primary attending care team. Patient denies any further complaints or concerns. Nursing to notify Hospitalist for further/continued concerns. Will remain available overnight for further concerns if nursing/patient needs. Please note, there are RRT systems in this hospital in place that if nursing has acute or critical patient condition change or concern, this is to help facilitate and notify that patient needs immediate bedside evaluation by a provider.     Non-billable note.

## 2024-06-21 NOTE — PLAN OF CARE
Problem: Physical Therapy - Adult  Goal: By Discharge: Performs mobility at highest level of function for planned discharge setting.  See evaluation for individualized goals.  Description: FUNCTIONAL STATUS PRIOR TO ADMISSION: Patient was modified independent using a single point cane for functional mobility. and The patient  was independent for basic and instrumental ADLs. He admits not following recommended heel only WB precautions on his RLE, but has been wearing the post op shoe.    HOME SUPPORT PRIOR TO ADMISSION: The patient lived alone with no local support. Lives in 2 level apartment with 2 steps to enter and 13 steps to 2nd floor with 1 rail. Per the patient he is expecting to be evicted soon.    Physical Therapy Goals  Initiated 6/20/2024  1.  Patient will move from supine to sit and sit to supine, scoot up and down, and roll side to side in bed with independence within 7 day(s).    2.  Patient will perform sit to stand with modified independence within 7 day(s).  3.  Patient will transfer from bed to chair and chair to bed with modified independence using the least restrictive device within 7 day(s).  4.  Patient will ambulate with modified independence for 150 feet with the least restrictive device within 7 day(s).   5.  Patient will ascend/descend 12 stairs with 1 handrail(s) with standby assistance within 7 day(s).  6.  Patient will comply with R heel WB in post op shoe during transfers and ambulation activities within 7 day(s).  Outcome: Progressing   PHYSICAL THERAPY TREATMENT    Patient: Keaton Van (59 y.o. male)  Date: 6/21/2024  Diagnosis: Hyperkalemia [E87.5]  Acute pulmonary edema (HCC) [J81.0]  Hyperglycemia [R73.9]  Acute metabolic encephalopathy [G93.41] Acute metabolic encephalopathy      Precautions: Up as Tolerated, Fall Risk, Weight Bearing Right Lower Extremity Weight Bearing: Weight Bearing As Tolerated                    ASSESSMENT:  Patient continues to benefit from skilled PT  evaluated today and a DME order was entered for a wheeled walker because he requires this to successfully complete daily living tasks of toileting, personal cares, ambulating, hygiene, and meal preparation.  A wheeled walker is necessary due to the patient's unsteady gait, upper body weakness, and inability to  an ambulation device; and he can ambulate only by pushing a walker instead of a lesser assistive device such as a cane, crutch, or standard walker.  The need for this equipment was discussed with the patient and he understands and is in agreement.        SUBJECTIVE:   Patient stated, \" I yelled at a nurse earlier and feel bad about it.\"    OBJECTIVE DATA SUMMARY:   Critical Behavior:  Orientation  Overall Orientation Status: Within Normal Limits  Orientation Level: Oriented X4  Cognition  Cognition Comment: calm and cooperative    Functional Mobility Training:  Bed Mobility:  Bed Mobility Training  Bed Mobility Training: Yes  Interventions: Verbal cues  Supine to Sit: Supervision  Scooting: Modified independent  Transfers:  Transfer Training  Interventions: Verbal cues;Safety awareness training  Sit to Stand: Contact-guard assistance;Stand-by assistance  Stand to Sit: Stand-by assistance  Bed to Chair: Contact-guard assistance;Adaptive equipment (RW with forefoot offloading shoe on R foot)  Balance:  Balance  Sitting: Intact  Standing: Impaired  Standing - Static: Constant support;Good  Standing - Dynamic: Good;Constant support   Ambulation/Gait Training:     Gait  Gait Training: Yes  Left Side Weight Bearing: Full  Right Side Weight Bearing: As tolerated;Heel  Distance (ft): 6 Feet  Assistive Device: Walker, rolling  Interventions: Tactile cues;Verbal cues;Safety awareness training  Base of Support: Shift to left  Speed/Hanny: Slow  Step Length: Right shortened;Left shortened  Gait Abnormalities: Decreased step clearance;Step to gait  Wheelchair Management  Wheelchair Management: No       Pain

## 2024-06-21 NOTE — PROGRESS NOTES
Hospitalist Progress Note    NAME:   Keaton Van   : 1965   MRN: [id number]     Date/Time: 2024 1:46 PM  Patient PCP: No primary care provider on file.    Estimated discharge date: 2024  Barriers: Needs cardiac clearance, needs psychiatry evaluation, echo    Assessment / Plan:  Metabolic encephalopathy- multifactorial , hyperglycemia, electrolyte derangement, possible drug abuse , less likely infection   Uncontrolled diabetes mellitus-likely due to noncompliance  Rule out acute tomasa versus psychosis  Anion gap normal  Alcohol level<10  Insulin dose adjusted   Appreciated DM management team recs   UA neg   CT head neg for acute changes   Afebrile, no leucocytosis, CXR no acute findings- will hold antibiotics   Blood cultures negative in 2 days  Psychiatry consulted will follow-up recommendation      Chronic diastolic heart failure  History of cardiac stent remote 15 years ago  H/o cocaine abuse   Paroxysmal Atrial fibrillation on last admission   24 Echo - EF 60-65%, mild MVR, mild pulm HTN   CXR-Mild pulmonary edema and small pleural effusions right greater than left.L: unchanged from prior CXR on   Continue with Lasix 40 Mg twice daily  Monitor electrolytes and renal function  Daily weight, intake and output monitoring  Hold coreg given bradycardia  Continue amlodipine and Imdur.  Will get cardiology eval for sinus bradycardia.    Hyperkalemia  Potassium 6.1--> 5.5-->7.2  EKG sinus bradycardia   Trend BMP   Status post Lokelma and insulin.  Potassium has improved.     HTN   Lower norvasc dose-5 mg daily  Hold coreg given bradycardia      CKD stage IV, worsening   Creatinine-2.78  Adjust medications as per renal dose  Recent renal US 6/3 nl   Nephrology would like to hold Lasix today.  Renal ultrasound ordered by nephrology today.     Chronic anemia  Hb -7.7   Not in range for transfusion   Suspect multifactorial including CKD, poor dietary intake      Chronic bilateral foot wounds  with history of left toe amputations  Right 5th toe osteomyelitis s/p resection 6/1/24  Multiple cuts and bruises in bilateral hands  Wound care consulted-wound appear healing.  Unclear if he was discharge on any antibiotics  As per prior Podiatry notes, last day of Abx is 6/18  Podiatry was consulted however the attending was out of town.  Will consult the other attending Dr. Ham if needed     Bipolar I  Polysubstance abuse incl heavy crack cocaine use  Tobacco use  Medication noncompliance  Chronic pain  Left flank/back pain  Seen by psychiatry on last visit who cleared pt of SI precautions   PT/OT ordered   CM for possible need for placement   Reconsulted psychiatry at this time as well as patient was not on any medication for bipolar disorder.        Medical Decision Making:   I personally reviewed labs:  CBC BMP  I personally reviewed imaging reports: CT head negative for acute pathology  Toxic drug monitoring: Renal function while on diuretics  Discussed case with: Pt, RN, d/w CM for placement,    Code Status: DNR     DVT Prophylaxis: Enoxaparin  GI Prophylaxis: None    Subjective:     Chief Complaint / Reason for Physician Visit  Patient currently admitted and treated for metabolic encephalopathy likely in the background of electrolyte disturbance increased blood sugar versus psychiatric illness.    Labs and chart reviewed patient examined overnight events noted.  Patient appeared to be comfortable and in no apparent distress.  He kept on mentioning that he has to take his medications which she has not been doing on a regular basis.      Objective:     VITALS:   Last 24hrs VS reviewed since prior progress note. Most recent are:  Patient Vitals for the past 24 hrs:   BP Temp Temp src Pulse Resp SpO2   06/21/24 1159 122/73 97.8 °F (36.6 °C) Oral (!) 49 -- 93 %   06/21/24 0931 124/77 -- -- -- -- --   06/21/24 0924 124/77 -- -- -- -- --   06/21/24 0740 128/77 97.9 °F (36.6 °C) Oral (!) 46 16 --   06/21/24

## 2024-06-21 NOTE — PROGRESS NOTES
End of Shift Note    Bedside shift change report given to Jacquelyn AY   (oncoming nurse) by Betty Durand RN (offgoing nurse).  Report included the following information SBAR    Shift worked:  Days      Shift summary and any significant changes:       0700 Assumed care  Several consults initiated Echo to be scheduled      Concerns for physician to address:         Zone phone for oncoming shift:     [phone]       Activity:     Number times ambulated in hallways past shift: 0  Number of times OOB to chair past shift: 1    Cardiac:   Cardiac Monitoring: Yes           Access:  Current line(s): PIV     Genitourinary:   Urinary status: voiding    Respiratory:      Chronic home O2 use?: NO  Incentive spirometer at bedside: NO       GI:     Current diet:  ADULT DIET; Regular  Passing flatus: YES  Tolerating current diet: YES       Pain Management:   Patient states pain is manageable on current regimen: YES    Skin:     Interventions: float heels, increase time out of bed, and nutritional support    Patient Safety:  Fall Score:    Interventions: bed/chair alarm, gripper socks, pt to call before getting OOB, and stay with me (per policy)       Length of Stay:  Expected LOS: 5  Actual LOS: 2      Betty Durand RN

## 2024-06-22 ENCOUNTER — APPOINTMENT (OUTPATIENT)
Facility: HOSPITAL | Age: 59
DRG: 071 | End: 2024-06-22
Payer: MEDICARE

## 2024-06-22 LAB
ABO + RH BLD: NORMAL
ALBUMIN SERPL-MCNC: 2.4 G/DL (ref 3.5–5)
ANION GAP SERPL CALC-SCNC: 5 MMOL/L (ref 5–15)
BASOPHILS # BLD: 0.1 K/UL (ref 0–0.1)
BASOPHILS NFR BLD: 1 % (ref 0–1)
BLOOD GROUP ANTIBODIES SERPL: NORMAL
BUN SERPL-MCNC: 87 MG/DL (ref 6–20)
BUN/CREAT SERPL: 26 (ref 12–20)
CALCIUM SERPL-MCNC: 8.3 MG/DL (ref 8.5–10.1)
CHLORIDE SERPL-SCNC: 104 MMOL/L (ref 97–108)
CO2 SERPL-SCNC: 25 MMOL/L (ref 21–32)
CREAT SERPL-MCNC: 3.4 MG/DL (ref 0.7–1.3)
DIFFERENTIAL METHOD BLD: ABNORMAL
EOSINOPHIL # BLD: 0.2 K/UL (ref 0–0.4)
EOSINOPHIL NFR BLD: 5 % (ref 0–7)
ERYTHROCYTE [DISTWIDTH] IN BLOOD BY AUTOMATED COUNT: 15.9 % (ref 11.5–14.5)
GLUCOSE BLD STRIP.AUTO-MCNC: 203 MG/DL (ref 65–117)
GLUCOSE BLD STRIP.AUTO-MCNC: 225 MG/DL (ref 65–117)
GLUCOSE BLD STRIP.AUTO-MCNC: 238 MG/DL (ref 65–117)
GLUCOSE BLD STRIP.AUTO-MCNC: 284 MG/DL (ref 65–117)
GLUCOSE SERPL-MCNC: 160 MG/DL (ref 65–100)
HCT VFR BLD AUTO: 22.7 % (ref 36.6–50.3)
HCT VFR BLD AUTO: 22.9 % (ref 36.6–50.3)
HEMOCCULT STL QL: NEGATIVE
HGB BLD-MCNC: 7 G/DL (ref 12.1–17)
HGB BLD-MCNC: 7.1 G/DL (ref 12.1–17)
HISTORY CHECK: NORMAL
IMM GRANULOCYTES # BLD AUTO: 0 K/UL (ref 0–0.04)
IMM GRANULOCYTES NFR BLD AUTO: 0 % (ref 0–0.5)
LYMPHOCYTES # BLD: 1 K/UL (ref 0.8–3.5)
LYMPHOCYTES NFR BLD: 21 % (ref 12–49)
MCH RBC QN AUTO: 26.9 PG (ref 26–34)
MCHC RBC AUTO-ENTMCNC: 31.3 G/DL (ref 30–36.5)
MCV RBC AUTO: 86 FL (ref 80–99)
MONOCYTES # BLD: 0.5 K/UL (ref 0–1)
MONOCYTES NFR BLD: 10 % (ref 5–13)
NEUTS SEG # BLD: 2.9 K/UL (ref 1.8–8)
NEUTS SEG NFR BLD: 63 % (ref 32–75)
NRBC # BLD: 0 K/UL (ref 0–0.01)
NRBC BLD-RTO: 0 PER 100 WBC
PHOSPHATE SERPL-MCNC: 5.7 MG/DL (ref 2.6–4.7)
PLATELET # BLD AUTO: 191 K/UL (ref 150–400)
PMV BLD AUTO: 9.9 FL (ref 8.9–12.9)
POTASSIUM SERPL-SCNC: 5.1 MMOL/L (ref 3.5–5.1)
RBC # BLD AUTO: 2.64 M/UL (ref 4.1–5.7)
SERVICE CMNT-IMP: ABNORMAL
SODIUM SERPL-SCNC: 134 MMOL/L (ref 136–145)
SPECIMEN EXP DATE BLD: NORMAL
WBC # BLD AUTO: 4.6 K/UL (ref 4.1–11.1)

## 2024-06-22 PROCEDURE — 86900 BLOOD TYPING SEROLOGIC ABO: CPT

## 2024-06-22 PROCEDURE — 6370000000 HC RX 637 (ALT 250 FOR IP): Performed by: STUDENT IN AN ORGANIZED HEALTH CARE EDUCATION/TRAINING PROGRAM

## 2024-06-22 PROCEDURE — 86923 COMPATIBILITY TEST ELECTRIC: CPT

## 2024-06-22 PROCEDURE — 36415 COLL VENOUS BLD VENIPUNCTURE: CPT

## 2024-06-22 PROCEDURE — 86901 BLOOD TYPING SEROLOGIC RH(D): CPT

## 2024-06-22 PROCEDURE — 82962 GLUCOSE BLOOD TEST: CPT

## 2024-06-22 PROCEDURE — 6370000000 HC RX 637 (ALT 250 FOR IP): Performed by: NURSE PRACTITIONER

## 2024-06-22 PROCEDURE — 80069 RENAL FUNCTION PANEL: CPT

## 2024-06-22 PROCEDURE — 36430 TRANSFUSION BLD/BLD COMPNT: CPT

## 2024-06-22 PROCEDURE — 85025 COMPLETE CBC W/AUTO DIFF WBC: CPT

## 2024-06-22 PROCEDURE — 85018 HEMOGLOBIN: CPT

## 2024-06-22 PROCEDURE — 6370000000 HC RX 637 (ALT 250 FOR IP): Performed by: CLINICAL NURSE SPECIALIST

## 2024-06-22 PROCEDURE — 6360000002 HC RX W HCPCS: Performed by: STUDENT IN AN ORGANIZED HEALTH CARE EDUCATION/TRAINING PROGRAM

## 2024-06-22 PROCEDURE — 82272 OCCULT BLD FECES 1-3 TESTS: CPT

## 2024-06-22 PROCEDURE — 2580000003 HC RX 258: Performed by: STUDENT IN AN ORGANIZED HEALTH CARE EDUCATION/TRAINING PROGRAM

## 2024-06-22 PROCEDURE — 73630 X-RAY EXAM OF FOOT: CPT

## 2024-06-22 PROCEDURE — P9016 RBC LEUKOCYTES REDUCED: HCPCS

## 2024-06-22 PROCEDURE — 6370000000 HC RX 637 (ALT 250 FOR IP): Performed by: GENERAL ACUTE CARE HOSPITAL

## 2024-06-22 PROCEDURE — 6370000000 HC RX 637 (ALT 250 FOR IP): Performed by: INTERNAL MEDICINE

## 2024-06-22 PROCEDURE — 2060000000 HC ICU INTERMEDIATE R&B

## 2024-06-22 PROCEDURE — 85014 HEMATOCRIT: CPT

## 2024-06-22 PROCEDURE — 86850 RBC ANTIBODY SCREEN: CPT

## 2024-06-22 RX ORDER — SODIUM CHLORIDE 9 MG/ML
INJECTION, SOLUTION INTRAVENOUS PRN
Status: DISCONTINUED | OUTPATIENT
Start: 2024-06-22 | End: 2024-06-25 | Stop reason: HOSPADM

## 2024-06-22 RX ORDER — BUMETANIDE 1 MG/1
1 TABLET ORAL 2 TIMES DAILY
Status: DISCONTINUED | OUTPATIENT
Start: 2024-06-22 | End: 2024-06-25 | Stop reason: HOSPADM

## 2024-06-22 RX ORDER — FERROUS SULFATE 325(65) MG
325 TABLET ORAL 2 TIMES DAILY WITH MEALS
Status: DISCONTINUED | OUTPATIENT
Start: 2024-06-22 | End: 2024-06-25 | Stop reason: HOSPADM

## 2024-06-22 RX ADMIN — INSULIN LISPRO 4 UNITS: 100 INJECTION, SOLUTION INTRAVENOUS; SUBCUTANEOUS at 18:03

## 2024-06-22 RX ADMIN — GABAPENTIN 300 MG: 300 CAPSULE ORAL at 08:38

## 2024-06-22 RX ADMIN — FERROUS SULFATE TAB 325 MG (65 MG ELEMENTAL FE) 325 MG: 325 (65 FE) TAB at 12:07

## 2024-06-22 RX ADMIN — INSULIN LISPRO 2 UNITS: 100 INJECTION, SOLUTION INTRAVENOUS; SUBCUTANEOUS at 08:46

## 2024-06-22 RX ADMIN — INSULIN LISPRO 4 UNITS: 100 INJECTION, SOLUTION INTRAVENOUS; SUBCUTANEOUS at 12:10

## 2024-06-22 RX ADMIN — INSULIN LISPRO 4 UNITS: 100 INJECTION, SOLUTION INTRAVENOUS; SUBCUTANEOUS at 18:05

## 2024-06-22 RX ADMIN — FERROUS SULFATE TAB 325 MG (65 MG ELEMENTAL FE) 325 MG: 325 (65 FE) TAB at 18:03

## 2024-06-22 RX ADMIN — GABAPENTIN 300 MG: 300 CAPSULE ORAL at 20:38

## 2024-06-22 RX ADMIN — BUMETANIDE 1 MG: 1 TABLET ORAL at 12:07

## 2024-06-22 RX ADMIN — SODIUM CHLORIDE, PRESERVATIVE FREE 10 ML: 5 INJECTION INTRAVENOUS at 20:38

## 2024-06-22 RX ADMIN — ISOSORBIDE MONONITRATE 30 MG: 30 TABLET, EXTENDED RELEASE ORAL at 08:37

## 2024-06-22 RX ADMIN — OXYCODONE HYDROCHLORIDE 5 MG: 5 TABLET ORAL at 08:39

## 2024-06-22 RX ADMIN — ENOXAPARIN SODIUM 30 MG: 100 INJECTION SUBCUTANEOUS at 08:39

## 2024-06-22 RX ADMIN — INSULIN LISPRO 2 UNITS: 100 INJECTION, SOLUTION INTRAVENOUS; SUBCUTANEOUS at 12:10

## 2024-06-22 RX ADMIN — AMLODIPINE BESYLATE 5 MG: 5 TABLET ORAL at 08:37

## 2024-06-22 RX ADMIN — INSULIN LISPRO 4 UNITS: 100 INJECTION, SOLUTION INTRAVENOUS; SUBCUTANEOUS at 08:46

## 2024-06-22 RX ADMIN — SODIUM CHLORIDE, PRESERVATIVE FREE 10 ML: 5 INJECTION INTRAVENOUS at 08:47

## 2024-06-22 RX ADMIN — INSULIN GLARGINE 18 UNITS: 100 INJECTION, SOLUTION SUBCUTANEOUS at 08:39

## 2024-06-22 RX ADMIN — ASPIRIN 81 MG: 81 TABLET, COATED ORAL at 08:37

## 2024-06-22 RX ADMIN — LURASIDONE HYDROCHLORIDE 20 MG: 20 TABLET, FILM COATED ORAL at 18:31

## 2024-06-22 RX ADMIN — OXYCODONE HYDROCHLORIDE 5 MG: 5 TABLET ORAL at 14:01

## 2024-06-22 RX ADMIN — BUMETANIDE 1 MG: 1 TABLET ORAL at 18:03

## 2024-06-22 RX ADMIN — OXYCODONE HYDROCHLORIDE 5 MG: 5 TABLET ORAL at 20:38

## 2024-06-22 ASSESSMENT — PAIN DESCRIPTION - FREQUENCY: FREQUENCY: INTERMITTENT

## 2024-06-22 ASSESSMENT — PAIN DESCRIPTION - PAIN TYPE: TYPE: CHRONIC PAIN

## 2024-06-22 ASSESSMENT — PAIN DESCRIPTION - ONSET: ONSET: ON-GOING

## 2024-06-22 ASSESSMENT — PAIN SCALES - GENERAL
PAINLEVEL_OUTOF10: 7
PAINLEVEL_OUTOF10: 0
PAINLEVEL_OUTOF10: 0
PAINLEVEL_OUTOF10: 8
PAINLEVEL_OUTOF10: 0
PAINLEVEL_OUTOF10: 0
PAINLEVEL_OUTOF10: 8
PAINLEVEL_OUTOF10: 0

## 2024-06-22 ASSESSMENT — PAIN DESCRIPTION - DESCRIPTORS
DESCRIPTORS: ACHING
DESCRIPTORS: ACHING;DULL
DESCRIPTORS: ACHING

## 2024-06-22 ASSESSMENT — PAIN DESCRIPTION - LOCATION
LOCATION: BACK;LEG
LOCATION: FOOT
LOCATION: LEG;BACK

## 2024-06-22 ASSESSMENT — PAIN DESCRIPTION - ORIENTATION
ORIENTATION: RIGHT;MID;LOWER
ORIENTATION: RIGHT
ORIENTATION: RIGHT;LEFT

## 2024-06-22 ASSESSMENT — PAIN SCALES - WONG BAKER: WONGBAKER_NUMERICALRESPONSE: NO HURT
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
## 2024-06-22 NOTE — PROGRESS NOTES
Bedside shift change report given to Parviz YA (oncoming nurse) by Betty YA (offgoing nurse). Report included the following information Nurse Handoff Report, Index, Intake/Output, MAR, Cardiac Rhythm \, Neuro Assessment, and Event Log.          End of Shift Note    Bedside shift change report given to Betty YA (oncoming nurse) by Parviz Scott RN (offgoing nurse).  Report included the following information SBAR, Kardex, Intake/Output, MAR, Recent Results, Med Rec Status, Cardiac Rhythm  , Procedure Verification, Quality Measures, and Dual Neuro Assessment    Shift worked:  7pm-7am     Shift summary and any significant changes:     lab works done, for transfer to onology in the AM     Concerns for physician to address:  xxx     Zone phone for oncoming shift:   xxx       Activity:     Number times ambulated in hallways past shift: 0  Number of times OOB to chair past shift: 0    Cardiac:   Cardiac Monitoring: Yes           Access:  Current line(s): PIV     Genitourinary:   Urinary status: voiding    Respiratory:      Chronic home O2 use?: NO  Incentive spirometer at bedside: NO       GI:     Current diet:  ADULT DIET; Regular  Passing flatus: YES  Tolerating current diet: YES       Pain Management:   Patient states pain is manageable on current regimen: YES    Skin:     Interventions: turn team, specialty bed, float heels, increase time out of bed, PT/OT consult, limit briefs, internal/external urinary devices, and nutritional support    Patient Safety:  Fall Score:    Interventions: bed/chair alarm, assistive device (walker, cane. etc), gripper socks, pt to call before getting OOB, and stay with me (per policy)       Length of Stay:  Expected LOS: 5  Actual LOS: 3      Parviz Scott RN

## 2024-06-22 NOTE — PROGRESS NOTES
End of Shift Note    Bedside shift change report given to Beryl YA  (oncoming nurse) by Betty Durand RN (offgoing nurse).  Report included the following information SBAR    Shift worked:  Days     Shift summary and any significant changes:     700 assumed care   1435 went to xray      Concerns for physician to address:       Zone phone for oncoming shift:     [phone]         Activity:     Number times ambulated in hallways past shift: 0  Number of times OOB to chair past shift: 2    Cardiac:   Cardiac Monitoring: Yes           Access:  Current line(s): PIV     Genitourinary:   Urinary status: voiding    Respiratory:      Chronic home O2 use?: NO  Incentive spirometer at bedside: NO       GI:     Current diet:  ADULT DIET; Regular  DIET ONE TIME MESSAGE;  Passing flatus: YES  Tolerating current diet: YES       Pain Management:   Patient states pain is manageable on current regimen: YES    Skin:     Interventions: increase time out of bed and nutritional support    Patient Safety:  Fall Score:    Interventions: bed/chair alarm, assistive device (walker, cane. etc), gripper socks, pt to call before getting OOB, and stay with me (per policy)       Length of Stay:  Expected LOS: 5  Actual LOS: 3      Betty Durand RN

## 2024-06-22 NOTE — CONSENT
Informed Consent for Blood Component Transfusion Note    I have discussed with the patient the rationale for blood component transfusion; its benefits in treating or preventing fatigue, organ damage, or death; and its risk which includes mild transfusion reactions, rare risk of blood borne infection, or more serious but rare reactions. I have discussed the alternatives to transfusion, including the risk and consequences of not receiving transfusion. The patient had an opportunity to ask questions and had agreed to proceed with transfusion of blood components.    Electronically signed by Joesph Hernandez MD on 6/22/24 at 3:00 PM EDT

## 2024-06-22 NOTE — PLAN OF CARE
Problem: Discharge Planning  Goal: Discharge to home or other facility with appropriate resources  Outcome: Progressing     Problem: Pain  Goal: Verbalizes/displays adequate comfort level or baseline comfort level  Outcome: Progressing     Problem: Safety - Adult  Goal: Free from fall injury  Outcome: Progressing     Problem: Neurosensory - Adult  Goal: Achieves stable or improved neurological status  Outcome: Progressing  Flowsheets (Taken 6/21/2024 1930)  Achieves stable or improved neurological status:   Assess for and report changes in neurological status   Maintain blood pressure and fluid volume within ordered parameters to optimize cerebral perfusion and minimize risk of hemorrhage     Problem: Physical Therapy - Adult  Goal: By Discharge: Performs mobility at highest level of function for planned discharge setting.  See evaluation for individualized goals.  Description: FUNCTIONAL STATUS PRIOR TO ADMISSION: Patient was modified independent using a single point cane for functional mobility. and The patient  was independent for basic and instrumental ADLs. He admits not following recommended heel only WB precautions on his RLE, but has been wearing the post op shoe.    HOME SUPPORT PRIOR TO ADMISSION: The patient lived alone with no local support. Lives in 2 level apartment with 2 steps to enter and 13 steps to 2nd floor with 1 rail. Per the patient he is expecting to be evicted soon.    Physical Therapy Goals  Initiated 6/20/2024  1.  Patient will move from supine to sit and sit to supine, scoot up and down, and roll side to side in bed with independence within 7 day(s).    2.  Patient will perform sit to stand with modified independence within 7 day(s).  3.  Patient will transfer from bed to chair and chair to bed with modified independence using the least restrictive device within 7 day(s).  4.  Patient will ambulate with modified independence for 150 feet with the least restrictive device within 7

## 2024-06-22 NOTE — PROGRESS NOTES
Hospitalist Progress Note    NAME:   Keaton Van   : 1965   MRN: [id number]     Date/Time: 2024 11:41 AM  Patient PCP: No primary care provider on file.    Estimated discharge date: 2024  Barriers: needs psychiatry evaluation, stable hemoglobin, needs SNF    Assessment / Plan:  Metabolic encephalopathy- multifactorial , hyperglycemia, electrolyte derangement, possible drug abuse , less likely infection   Uncontrolled diabetes mellitus-likely due to noncompliance  Rule out acute tomasa versus psychosis  Anion gap normal  Alcohol level<10  Insulin dose adjusted   Appreciated DM management team recs   UA neg   CT head neg for acute changes   Afebrile, no leucocytosis, CXR no acute findings-discontinued antibiotics  Blood cultures negative in 2 days  Psychiatry consulted will follow-up recommendation      Chronic diastolic heart failure  Right pleural effusion.  History of cardiac stent remote 15 years ago  H/o cocaine abuse   Paroxysmal Atrial fibrillation on last admission   24 Echo - EF 60-65%, mild MVR, mild pulm HTN   CXR-Mild pulmonary edema and small pleural effusions right greater than left.L: unchanged from prior CXR on   Continue with Lasix 40 Mg twice daily  Monitor electrolytes and renal function  Daily weight, intake and output monitoring  Hold coreg given bradycardia  Continue amlodipine and Imdur.  Cardiology was consulted who advised to start low-dose metoprolol.    Hyperkalemia  Potassium 6.1--> 5.5-->7.2  EKG sinus bradycardia   Trend BMP   Status post Lokelma and insulin.  Potassium has improved.     HTN   Lower norvasc dose-5 mg daily  Hold coreg given bradycardia      CKD stage IV, worsening   Creatinine-today pending  Adjust medications as per renal dose  Recent renal US /3 nl   Renal ultrasound done on 2024 no hydronephrosis.  Large right pleural effusion.  Nephrology held his diuretics yesterday because of worsening creatinine.  Will follow-up nephrology recs  studies.  Pertinent labs include:  Recent Labs     06/20/24  0321 06/21/24  0340 06/22/24  0604   WBC 6.7 5.9 4.6   HGB 7.4* 7.5* 7.1*   HCT 24.9* 24.4* 22.7*    197 191     Recent Labs     06/20/24  0321 06/20/24  1636 06/21/24  0340   * 131* 134*   K 7.2* 6.0* 4.6    101 101   CO2 25 26 26   GLUCOSE 203* 132* 167*   BUN 86* 89* 94*   CREATININE 3.04* 3.31* 3.65*   CALCIUM 9.4 9.2 9.0   PHOS  --  6.5*  --        Signed: Joesph Hernandez MD

## 2024-06-22 NOTE — PLAN OF CARE
Problem: Discharge Planning  Goal: Discharge to home or other facility with appropriate resources  6/22/2024 1959 by Quiana Figueroa RN  Outcome: Progressing  6/22/2024 0944 by Betty Durand RN  Outcome: Progressing     Problem: Pain  Goal: Verbalizes/displays adequate comfort level or baseline comfort level  6/22/2024 1959 by Quiana Figueroa RN  Outcome: Progressing  6/22/2024 0944 by Betty Durand RN  Outcome: Progressing     Problem: Safety - Adult  Goal: Free from fall injury  6/22/2024 0944 by Betty Durand RN  Outcome: Progressing     Problem: Neurosensory - Adult  Goal: Achieves stable or improved neurological status  6/22/2024 0944 by Betty Durand RN  Outcome: Progressing  Goal: Absence of seizures  6/22/2024 0944 by Betty Durand RN  Outcome: Progressing  Goal: Remains free of injury related to seizures activity  6/22/2024 0944 by Betty Durand RN  Outcome: Progressing  Goal: Achieves maximal functionality and self care  6/22/2024 0944 by Betty Durand RN  Outcome: Progressing     Problem: Respiratory - Adult  Goal: Achieves optimal ventilation and oxygenation  6/22/2024 0944 by Betty Durand RN  Outcome: Progressing     Problem: Cardiovascular - Adult  Goal: Maintains optimal cardiac output and hemodynamic stability  6/22/2024 0944 by Betty Durand RN  Outcome: Progressing  Goal: Absence of cardiac dysrhythmias or at baseline  6/22/2024 0944 by Betty Durand RN  Outcome: Progressing     Problem: Skin/Tissue Integrity - Adult  Goal: Skin integrity remains intact  6/22/2024 0944 by Betty Durand RN  Outcome: Progressing  Goal: Incisions, wounds, or drain sites healing without S/S of infection  6/22/2024 0944 by Betty Durand RN  Outcome: Progressing  Goal: Oral mucous membranes remain intact  6/22/2024 0944 by Betty Durand RN  Outcome: Progressing     Problem: Musculoskeletal - Adult  Goal: Return mobility to safest level of function  6/22/2024 0944 by Betty Durand RN  Outcome:
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
## 2024-06-22 NOTE — PLAN OF CARE
Problem: Discharge Planning  Goal: Discharge to home or other facility with appropriate resources  6/22/2024 0944 by Betty Durand RN  Outcome: Progressing  6/21/2024 2326 by Parviz Scott RN  Outcome: Progressing     Problem: Pain  Goal: Verbalizes/displays adequate comfort level or baseline comfort level  6/22/2024 0944 by Betty Durand RN  Outcome: Progressing  6/21/2024 2326 by Parviz Scott RN  Outcome: Progressing     Problem: Safety - Adult  Goal: Free from fall injury  6/22/2024 0944 by Betty Durand RN  Outcome: Progressing  6/21/2024 2326 by Parviz Scott RN  Outcome: Progressing     Problem: Neurosensory - Adult  Goal: Achieves stable or improved neurological status  6/22/2024 0944 by Betty Durand RN  Outcome: Progressing  6/21/2024 2326 by Parviz Scott RN  Outcome: Progressing  Flowsheets (Taken 6/21/2024 1930)  Achieves stable or improved neurological status:   Assess for and report changes in neurological status   Maintain blood pressure and fluid volume within ordered parameters to optimize cerebral perfusion and minimize risk of hemorrhage  Goal: Absence of seizures  Outcome: Progressing  Goal: Remains free of injury related to seizures activity  Outcome: Progressing  Goal: Achieves maximal functionality and self care  Outcome: Progressing     Problem: Respiratory - Adult  Goal: Achieves optimal ventilation and oxygenation  Outcome: Progressing     Problem: Cardiovascular - Adult  Goal: Maintains optimal cardiac output and hemodynamic stability  Outcome: Progressing  Goal: Absence of cardiac dysrhythmias or at baseline  Outcome: Progressing     Problem: Skin/Tissue Integrity - Adult  Goal: Skin integrity remains intact  Outcome: Progressing  Goal: Incisions, wounds, or drain sites healing without S/S of infection  Outcome: Progressing  Goal: Oral mucous membranes remain intact  Outcome: Progressing     Problem: Musculoskeletal - Adult  Goal: Return mobility to safest level of

## 2024-06-23 LAB
ABO + RH BLD: NORMAL
ANION GAP SERPL CALC-SCNC: 5 MMOL/L (ref 5–15)
BLD PROD TYP BPU: NORMAL
BLOOD BANK BLOOD PRODUCT EXPIRATION DATE: NORMAL
BLOOD BANK DISPENSE STATUS: NORMAL
BLOOD BANK ISBT PRODUCT BLOOD TYPE: 5100
BLOOD BANK PRODUCT CODE: NORMAL
BLOOD BANK UNIT TYPE AND RH: NORMAL
BLOOD GROUP ANTIBODIES SERPL: NORMAL
BPU ID: NORMAL
BUN SERPL-MCNC: 87 MG/DL (ref 6–20)
BUN/CREAT SERPL: 25 (ref 12–20)
CALCIUM SERPL-MCNC: 8 MG/DL (ref 8.5–10.1)
CHLORIDE SERPL-SCNC: 103 MMOL/L (ref 97–108)
CO2 SERPL-SCNC: 26 MMOL/L (ref 21–32)
CREAT SERPL-MCNC: 3.54 MG/DL (ref 0.7–1.3)
CROSSMATCH RESULT: NORMAL
EKG ATRIAL RATE: 50 BPM
EKG DIAGNOSIS: NORMAL
EKG P AXIS: 47 DEGREES
EKG P-R INTERVAL: 188 MS
EKG Q-T INTERVAL: 480 MS
EKG QRS DURATION: 102 MS
EKG QTC CALCULATION (BAZETT): 437 MS
EKG R AXIS: 8 DEGREES
EKG T AXIS: 74 DEGREES
EKG VENTRICULAR RATE: 50 BPM
GLUCOSE BLD STRIP.AUTO-MCNC: 215 MG/DL (ref 65–117)
GLUCOSE BLD STRIP.AUTO-MCNC: 217 MG/DL (ref 65–117)
GLUCOSE BLD STRIP.AUTO-MCNC: 233 MG/DL (ref 65–117)
GLUCOSE BLD STRIP.AUTO-MCNC: 248 MG/DL (ref 65–117)
GLUCOSE SERPL-MCNC: 277 MG/DL (ref 65–100)
HCT VFR BLD AUTO: 23.7 % (ref 36.6–50.3)
HGB BLD-MCNC: 7.2 G/DL (ref 12.1–17)
POTASSIUM SERPL-SCNC: 5.7 MMOL/L (ref 3.5–5.1)
SERVICE CMNT-IMP: ABNORMAL
SODIUM SERPL-SCNC: 134 MMOL/L (ref 136–145)
SPECIMEN EXP DATE BLD: NORMAL
UNIT DIVISION: 0
UNIT ISSUE DATE/TIME: NORMAL

## 2024-06-23 PROCEDURE — 6370000000 HC RX 637 (ALT 250 FOR IP): Performed by: STUDENT IN AN ORGANIZED HEALTH CARE EDUCATION/TRAINING PROGRAM

## 2024-06-23 PROCEDURE — 82962 GLUCOSE BLOOD TEST: CPT

## 2024-06-23 PROCEDURE — 6370000000 HC RX 637 (ALT 250 FOR IP): Performed by: NURSE PRACTITIONER

## 2024-06-23 PROCEDURE — 6370000000 HC RX 637 (ALT 250 FOR IP): Performed by: CLINICAL NURSE SPECIALIST

## 2024-06-23 PROCEDURE — 36415 COLL VENOUS BLD VENIPUNCTURE: CPT

## 2024-06-23 PROCEDURE — 6370000000 HC RX 637 (ALT 250 FOR IP): Performed by: INTERNAL MEDICINE

## 2024-06-23 PROCEDURE — 6370000000 HC RX 637 (ALT 250 FOR IP): Performed by: GENERAL ACUTE CARE HOSPITAL

## 2024-06-23 PROCEDURE — 6360000002 HC RX W HCPCS: Performed by: STUDENT IN AN ORGANIZED HEALTH CARE EDUCATION/TRAINING PROGRAM

## 2024-06-23 PROCEDURE — 80048 BASIC METABOLIC PNL TOTAL CA: CPT

## 2024-06-23 PROCEDURE — 2580000003 HC RX 258: Performed by: STUDENT IN AN ORGANIZED HEALTH CARE EDUCATION/TRAINING PROGRAM

## 2024-06-23 PROCEDURE — 1100000003 HC PRIVATE W/ TELEMETRY

## 2024-06-23 PROCEDURE — 85018 HEMOGLOBIN: CPT

## 2024-06-23 PROCEDURE — 2700000000 HC OXYGEN THERAPY PER DAY

## 2024-06-23 PROCEDURE — 94760 N-INVAS EAR/PLS OXIMETRY 1: CPT

## 2024-06-23 PROCEDURE — 85014 HEMATOCRIT: CPT

## 2024-06-23 RX ADMIN — ENOXAPARIN SODIUM 30 MG: 100 INJECTION SUBCUTANEOUS at 09:10

## 2024-06-23 RX ADMIN — GABAPENTIN 300 MG: 300 CAPSULE ORAL at 09:09

## 2024-06-23 RX ADMIN — OXYCODONE HYDROCHLORIDE 5 MG: 5 TABLET ORAL at 13:29

## 2024-06-23 RX ADMIN — INSULIN GLARGINE 18 UNITS: 100 INJECTION, SOLUTION SUBCUTANEOUS at 09:08

## 2024-06-23 RX ADMIN — GABAPENTIN 300 MG: 300 CAPSULE ORAL at 20:41

## 2024-06-23 RX ADMIN — INSULIN LISPRO 2 UNITS: 100 INJECTION, SOLUTION INTRAVENOUS; SUBCUTANEOUS at 11:51

## 2024-06-23 RX ADMIN — FERROUS SULFATE TAB 325 MG (65 MG ELEMENTAL FE) 325 MG: 325 (65 FE) TAB at 16:48

## 2024-06-23 RX ADMIN — SODIUM ZIRCONIUM CYCLOSILICATE 10 G: 10 POWDER, FOR SUSPENSION ORAL at 13:30

## 2024-06-23 RX ADMIN — OXYCODONE HYDROCHLORIDE 5 MG: 5 TABLET ORAL at 09:09

## 2024-06-23 RX ADMIN — ISOSORBIDE MONONITRATE 30 MG: 30 TABLET, EXTENDED RELEASE ORAL at 09:09

## 2024-06-23 RX ADMIN — SODIUM CHLORIDE, PRESERVATIVE FREE 10 ML: 5 INJECTION INTRAVENOUS at 09:09

## 2024-06-23 RX ADMIN — BUMETANIDE 1 MG: 1 TABLET ORAL at 09:09

## 2024-06-23 RX ADMIN — INSULIN LISPRO 4 UNITS: 100 INJECTION, SOLUTION INTRAVENOUS; SUBCUTANEOUS at 16:48

## 2024-06-23 RX ADMIN — ASPIRIN 81 MG: 81 TABLET, COATED ORAL at 09:08

## 2024-06-23 RX ADMIN — OXYCODONE HYDROCHLORIDE 5 MG: 5 TABLET ORAL at 20:40

## 2024-06-23 RX ADMIN — SODIUM ZIRCONIUM CYCLOSILICATE 10 G: 10 POWDER, FOR SUSPENSION ORAL at 18:14

## 2024-06-23 RX ADMIN — AMLODIPINE BESYLATE 5 MG: 5 TABLET ORAL at 09:09

## 2024-06-23 RX ADMIN — INSULIN LISPRO 2 UNITS: 100 INJECTION, SOLUTION INTRAVENOUS; SUBCUTANEOUS at 16:48

## 2024-06-23 RX ADMIN — SODIUM CHLORIDE, PRESERVATIVE FREE 5 ML: 5 INJECTION INTRAVENOUS at 20:41

## 2024-06-23 RX ADMIN — INSULIN LISPRO 2 UNITS: 100 INJECTION, SOLUTION INTRAVENOUS; SUBCUTANEOUS at 09:08

## 2024-06-23 RX ADMIN — BUMETANIDE 1 MG: 1 TABLET ORAL at 16:48

## 2024-06-23 RX ADMIN — INSULIN LISPRO 4 UNITS: 100 INJECTION, SOLUTION INTRAVENOUS; SUBCUTANEOUS at 09:08

## 2024-06-23 RX ADMIN — LURASIDONE HYDROCHLORIDE 20 MG: 20 TABLET, FILM COATED ORAL at 18:13

## 2024-06-23 RX ADMIN — FERROUS SULFATE TAB 325 MG (65 MG ELEMENTAL FE) 325 MG: 325 (65 FE) TAB at 09:09

## 2024-06-23 RX ADMIN — INSULIN LISPRO 4 UNITS: 100 INJECTION, SOLUTION INTRAVENOUS; SUBCUTANEOUS at 11:51

## 2024-06-23 ASSESSMENT — PAIN DESCRIPTION - PAIN TYPE: TYPE: CHRONIC PAIN

## 2024-06-23 ASSESSMENT — PAIN DESCRIPTION - LOCATION
LOCATION: BACK
LOCATION: FOOT
LOCATION: LEG

## 2024-06-23 ASSESSMENT — PAIN DESCRIPTION - ONSET: ONSET: GRADUAL

## 2024-06-23 ASSESSMENT — PAIN DESCRIPTION - ORIENTATION
ORIENTATION: RIGHT;LEFT
ORIENTATION: RIGHT

## 2024-06-23 ASSESSMENT — PAIN DESCRIPTION - DESCRIPTORS
DESCRIPTORS: ACHING
DESCRIPTORS: ACHING

## 2024-06-23 ASSESSMENT — PAIN SCALES - GENERAL
PAINLEVEL_OUTOF10: 3
PAINLEVEL_OUTOF10: 0
PAINLEVEL_OUTOF10: 7
PAINLEVEL_OUTOF10: 5
PAINLEVEL_OUTOF10: 7

## 2024-06-23 ASSESSMENT — PAIN DESCRIPTION - FREQUENCY: FREQUENCY: INTERMITTENT

## 2024-06-23 NOTE — PROGRESS NOTES
End of Shift Note    Bedside shift change report given to Gustavo YA (oncoming nurse) by Jean-Claude Hahn RN (offgoing nurse).  Report included the following information SBAR, Kardex, Intake/Output, and MAR    Shift worked:  11- 7pm.     Shift summary and any significant changes:     Patient is alert oriented times 4, on 2liters nc. Patient given lokelma due to high potassium and will get another dose this evening. Patient cyst in the back will be manage outpatient but no urgert procedure to be done as of the moment. Patient can talk to surgeon as per Dr. Aguiar it its open ,leaking or bleeding and painful. Patient given due medications , needs attended. Patient diet is change from regular to 4 carb diabetic diet. Patient understand the instruction regarding the choices of food intake.      Jean-Claude Hahn RN

## 2024-06-23 NOTE — PROGRESS NOTES
Nephrology Progress Note  [facility] / [place] Office  [address], [address]  [place] [address]  Phone - [phone]  Fax - [phone]                 Patient: Keaton Van                     YOB: 1965        Date- 6/23/2024                                     Admit Date: 6/19/2024   CC: Follow up for KIN, HYPERKALEMIA         IMPRESSION & PLAN:   Kin  - due to multiple etiology- ABX - ATN OR AIN vs cardiorenal syndrome --   HYPERKALEMIA  Hyponatremia due to chf  Ckd 4 - bl cr 2.2  DM 2  Bradycardia  Hypertension  Anemia  Proteinuria- urine pr/cr 2.2 g/g      PLAN-  Lokelma 10 gram po SECOND DOSE THIS EVENING  Continue bumex  No RRT indicated today  Epogen for anemia  Check bmp in am  His social situation is challenging.    Subjective:  Interval History:   - k increased to 5.7- patient is on regular diet- he ordered orange juice for his breakfast ??????????  Cr high- stable  Sob improved      Objective:   Vitals:    06/23/24 0724 06/23/24 1024 06/23/24 1111 06/23/24 1329   BP: (!) 145/70 139/68 (!) 152/69    Pulse: 55 63 60    Resp: 18 16 18 17   Temp: 98.4 °F (36.9 °C) 98.8 °F (37.1 °C) 98.4 °F (36.9 °C)    TempSrc: Oral Oral Oral    SpO2: 96% 96% 93%    Weight:       Height:          I/O last 3 completed shifts:  In: 2409 [P.O.:2160; Blood:249]  Out: 1450 [Urine:1450]  No intake/output data recorded.      Physical exam:    GEN:  NAD  NECK:  Supple, no thyromegaly  RESP:  no wheezing  NEURO: non focal, normal speech  EXT: Edema +nt         Chart reviewed.         Pertinent Notes reviewed.     Data Review :  Lab Results   Component Value Date/Time     06/23/2024 01:18 AM    K 5.7 06/23/2024 01:18 AM     06/23/2024 01:18 AM    CO2 26 06/23/2024 01:18 AM    BUN 87 06/23/2024 01:18 AM    CREATININE 3.54 06/23/2024 01:18 AM    GLUCOSE 277 06/23/2024 01:18 AM    CALCIUM 8.0 06/23/2024 01:18 AM       Lab Results   Component     oxyCODONE (ROXICODONE) immediate release tablet 5 mg  5 mg Oral Q4H PRN    amLODIPine (NORVASC) tablet 5 mg  5 mg Oral Daily    glucose chewable tablet 16 g  4 tablet Oral PRN    dextrose bolus 10% 125 mL  125 mL IntraVENous PRN    Or    dextrose bolus 10% 250 mL  250 mL IntraVENous PRN    glucagon injection 1 mg  1 mg SubCUTAneous PRN    dextrose 10 % infusion   IntraVENous Continuous PRN    insulin lispro (HUMALOG,ADMELOG) injection vial 0-8 Units  0-8 Units SubCUTAneous TID     insulin lispro (HUMALOG,ADMELOG) injection vial 0-4 Units  0-4 Units SubCUTAneous Nightly    sodium chloride flush 0.9 % injection 5-40 mL  5-40 mL IntraVENous 2 times per day    sodium chloride flush 0.9 % injection 5-40 mL  5-40 mL IntraVENous PRN    0.9 % sodium chloride infusion   IntraVENous PRN    ondansetron (ZOFRAN-ODT) disintegrating tablet 4 mg  4 mg Oral Q8H PRN    Or    ondansetron (ZOFRAN) injection 4 mg  4 mg IntraVENous Q6H PRN    polyethylene glycol (GLYCOLAX) packet 17 g  17 g Oral Daily PRN    acetaminophen (TYLENOL) tablet 650 mg  650 mg Oral Q6H PRN    Or    acetaminophen (TYLENOL) suppository 650 mg  650 mg Rectal Q6H PRN    aspirin EC tablet 81 mg  81 mg Oral Daily    [Held by provider] carvedilol (COREG) tablet 12.5 mg  12.5 mg Oral BID     isosorbide mononitrate (IMDUR) extended release tablet 30 mg  30 mg Oral Daily    lurasidone (LATUDA) tablet 20 mg  20 mg Oral Dinner    insulin glargine (LANTUS) injection vial 18 Units  18 Units SubCUTAneous Daily    insulin lispro (HUMALOG,ADMELOG) injection vial 4 Units  0.05 Units/kg SubCUTAneous TID         Eulogio Dumont MD  6/23/2024

## 2024-06-23 NOTE — PROGRESS NOTES
Hospitalist Progress Note    NAME:   Keaton Van   : 1965   MRN: [id number]     Date/Time: 2024 11:48 AM  Patient PCP: No primary care provider on file.    Estimated discharge date: 1-2 days  Barriers: needs psychiatry evaluation, stable hemoglobin, needs SNF, stable renal function/K     Assessment / Plan:  Metabolic encephalopathy- multifactorial , hyperglycemia, electrolyte derangement, possible drug abuse , less likely infection   Uncontrolled diabetes mellitus-likely due to noncompliance  Rule out acute tomasa versus psychosis  Anion gap normal  Alcohol level<10  Insulin dose adjusted   Appreciated DM management team recs   UA neg   CT head neg for acute changes   Afebrile, no leucocytosis, CXR no acute findings-discontinued antibiotics  Blood cultures negative  Psychiatry recs appreciated   Plan:   SNF on DC      Chronic diastolic heart failure  Right pleural effusion.  History of cardiac stent remote 15 years ago  H/o cocaine abuse   Paroxysmal Atrial fibrillation on last admission   24 Echo - EF 60-65%, mild MVR, mild pulm HTN   CXR-Mild pulmonary edema and small pleural effusions right greater than left.L: unchanged from prior CXR on   Monitor electrolytes and renal function  Daily weight, intake and output monitoring  Plan:   Continue diuretics as per Nephro  Hold coreg given bradycardia on admission, Cardiology was consulted who advised to start low-dose metoprolol.  Continue amlodipine and Imdur.     HTN   Lower norvasc dose-5 mg daily  Hold coreg given bradycardia      CKD stage IV, worsening   Hyperkalemia  EKG sinus bradycardia   Trend BMP   Adjust medications as per renal dose  Renal ultrasound done on 2024 no hydronephrosis.  Large right pleural effusion.  Resume diuretics   Status post hyperkalemia cocktail  Plan:   Lokelma and monitor renal function  No indication for HD as per nephro     Chronic anemia  Hb -7.1 -->7  Suspect multifactorial including CKD, poor dietary    06/23/24 0724 (!) 145/70 98.4 °F (36.9 °C) Oral 55 18 96 %   06/23/24 0345 133/67 98.2 °F (36.8 °C) Oral 53 18 95 %   06/22/24 2340 138/62 97.9 °F (36.6 °C) Oral -- 16 95 %   06/22/24 2145 (!) 145/69 97.9 °F (36.6 °C) Oral 61 18 94 %   06/22/24 2000 131/72 97.6 °F (36.4 °C) -- 57 16 93 %   06/22/24 1955 -- 97.7 °F (36.5 °C) -- -- 18 --   06/22/24 1930 130/68 97.6 °F (36.4 °C) Oral 58 18 96 %   06/22/24 1906 129/72 97.7 °F (36.5 °C) Oral 58 16 96 %   06/22/24 1807 124/71 -- -- 53 -- --   06/22/24 1457 114/61 97.5 °F (36.4 °C) Oral 55 18 95 %   06/22/24 1401 -- -- -- -- 18 --   06/22/24 1207 112/67 -- -- -- -- --           Intake/Output Summary (Last 24 hours) at 6/23/2024 1148  Last data filed at 6/23/2024 0341  Gross per 24 hour   Intake 1989 ml   Output 1000 ml   Net 989 ml          I had a face to face encounter and independently examined this patient on 6/23/2024, as outlined below:  PHYSICAL EXAM:  General: Alert, cooperative, pale   EENT:  EOMI. Anicteric sclerae.  Resp:  CTA bilaterally, no wheezing or rales.  No accessory muscle use  CV:  Regular  rhythm,  No edema, bradycardia  GI:  Soft, Non distended, Non tender.  +Bowel sounds  Neurologic:  Alert and oriented X 3, normal speech  Psych:   Good insight. Not anxious nor agitated  Skin:  No rashes.  No jaundice.  Multiple abrasions in bilateral hands.  Right foot in boot, in dressing     Reviewed most current lab test results and cultures  YES  Reviewed most current radiology test results   YES  Review and summation of old records today    NO  Reviewed patient's current orders and MAR    YES  PMH/SH reviewed - no change compared to H&P    Procedures: see electronic medical records for all procedures/Xrays and details which were not copied into this note but were reviewed prior to creation of Plan.      LABS:  I reviewed today's most current labs and imaging studies.  Pertinent labs include:  Recent Labs     06/21/24  0340 06/22/24  0604 06/22/24  1223

## 2024-06-24 LAB
ALBUMIN SERPL-MCNC: 2.6 G/DL (ref 3.5–5)
ANION GAP SERPL CALC-SCNC: 6 MMOL/L (ref 5–15)
BUN SERPL-MCNC: 72 MG/DL (ref 6–20)
BUN/CREAT SERPL: 21 (ref 12–20)
CALCIUM SERPL-MCNC: 8.9 MG/DL (ref 8.5–10.1)
CHLORIDE SERPL-SCNC: 107 MMOL/L (ref 97–108)
CO2 SERPL-SCNC: 24 MMOL/L (ref 21–32)
CREAT SERPL-MCNC: 3.38 MG/DL (ref 0.7–1.3)
ERYTHROCYTE [DISTWIDTH] IN BLOOD BY AUTOMATED COUNT: 15.5 % (ref 11.5–14.5)
GLUCOSE BLD STRIP.AUTO-MCNC: 109 MG/DL (ref 65–117)
GLUCOSE BLD STRIP.AUTO-MCNC: 126 MG/DL (ref 65–117)
GLUCOSE BLD STRIP.AUTO-MCNC: 150 MG/DL (ref 65–117)
GLUCOSE BLD STRIP.AUTO-MCNC: 239 MG/DL (ref 65–117)
GLUCOSE SERPL-MCNC: 170 MG/DL (ref 65–100)
HCT VFR BLD AUTO: 27.3 % (ref 36.6–50.3)
HGB BLD-MCNC: 8.2 G/DL (ref 12.1–17)
MCH RBC QN AUTO: 26.1 PG (ref 26–34)
MCHC RBC AUTO-ENTMCNC: 30 G/DL (ref 30–36.5)
MCV RBC AUTO: 86.9 FL (ref 80–99)
NRBC # BLD: 0 K/UL (ref 0–0.01)
NRBC BLD-RTO: 0 PER 100 WBC
PHOSPHATE SERPL-MCNC: 4.5 MG/DL (ref 2.6–4.7)
PLATELET # BLD AUTO: 211 K/UL (ref 150–400)
PMV BLD AUTO: 9.5 FL (ref 8.9–12.9)
POTASSIUM SERPL-SCNC: 4.9 MMOL/L (ref 3.5–5.1)
RBC # BLD AUTO: 3.14 M/UL (ref 4.1–5.7)
SERVICE CMNT-IMP: ABNORMAL
SERVICE CMNT-IMP: NORMAL
SODIUM SERPL-SCNC: 137 MMOL/L (ref 136–145)
WBC # BLD AUTO: 4.3 K/UL (ref 4.1–11.1)

## 2024-06-24 PROCEDURE — 82962 GLUCOSE BLOOD TEST: CPT

## 2024-06-24 PROCEDURE — 80069 RENAL FUNCTION PANEL: CPT

## 2024-06-24 PROCEDURE — 85027 COMPLETE CBC AUTOMATED: CPT

## 2024-06-24 PROCEDURE — 36415 COLL VENOUS BLD VENIPUNCTURE: CPT

## 2024-06-24 PROCEDURE — 6370000000 HC RX 637 (ALT 250 FOR IP): Performed by: INTERNAL MEDICINE

## 2024-06-24 PROCEDURE — 94760 N-INVAS EAR/PLS OXIMETRY 1: CPT

## 2024-06-24 PROCEDURE — 6360000002 HC RX W HCPCS: Performed by: STUDENT IN AN ORGANIZED HEALTH CARE EDUCATION/TRAINING PROGRAM

## 2024-06-24 PROCEDURE — 6370000000 HC RX 637 (ALT 250 FOR IP): Performed by: GENERAL ACUTE CARE HOSPITAL

## 2024-06-24 PROCEDURE — 1100000003 HC PRIVATE W/ TELEMETRY

## 2024-06-24 PROCEDURE — 6360000002 HC RX W HCPCS: Performed by: INTERNAL MEDICINE

## 2024-06-24 PROCEDURE — 6370000000 HC RX 637 (ALT 250 FOR IP): Performed by: STUDENT IN AN ORGANIZED HEALTH CARE EDUCATION/TRAINING PROGRAM

## 2024-06-24 PROCEDURE — 6370000000 HC RX 637 (ALT 250 FOR IP): Performed by: CLINICAL NURSE SPECIALIST

## 2024-06-24 PROCEDURE — 6370000000 HC RX 637 (ALT 250 FOR IP): Performed by: NURSE PRACTITIONER

## 2024-06-24 PROCEDURE — 99231 SBSQ HOSP IP/OBS SF/LOW 25: CPT | Performed by: CLINICAL NURSE SPECIALIST

## 2024-06-24 PROCEDURE — 2580000003 HC RX 258: Performed by: STUDENT IN AN ORGANIZED HEALTH CARE EDUCATION/TRAINING PROGRAM

## 2024-06-24 PROCEDURE — 2700000000 HC OXYGEN THERAPY PER DAY

## 2024-06-24 RX ORDER — HYDROXYZINE HYDROCHLORIDE 10 MG/1
10 TABLET, FILM COATED ORAL 3 TIMES DAILY PRN
Status: DISCONTINUED | OUTPATIENT
Start: 2024-06-24 | End: 2024-06-25 | Stop reason: HOSPADM

## 2024-06-24 RX ORDER — BUMETANIDE 1 MG/1
1 TABLET ORAL 2 TIMES DAILY
Qty: 30 TABLET | Refills: 3 | Status: ON HOLD | OUTPATIENT
Start: 2024-06-24 | End: 2024-07-26 | Stop reason: HOSPADM

## 2024-06-24 RX ORDER — CLONIDINE HYDROCHLORIDE 0.1 MG/1
0.2 TABLET ORAL EVERY 8 HOURS PRN
Status: DISCONTINUED | OUTPATIENT
Start: 2024-06-24 | End: 2024-06-25 | Stop reason: HOSPADM

## 2024-06-24 RX ORDER — GABAPENTIN 600 MG/1
300 TABLET ORAL 3 TIMES DAILY
Qty: 45 TABLET | Refills: 0 | Status: SHIPPED
Start: 2024-06-24 | End: 2024-07-24

## 2024-06-24 RX ORDER — CLONIDINE HYDROCHLORIDE 0.1 MG/1
0.2 TABLET ORAL ONCE
Status: COMPLETED | OUTPATIENT
Start: 2024-06-24 | End: 2024-06-24

## 2024-06-24 RX ORDER — AMLODIPINE BESYLATE 5 MG/1
5 TABLET ORAL ONCE
Status: COMPLETED | OUTPATIENT
Start: 2024-06-24 | End: 2024-06-24

## 2024-06-24 RX ORDER — CARVEDILOL 3.12 MG/1
1.56 TABLET ORAL 2 TIMES DAILY
Qty: 30 TABLET | Refills: 3 | Status: ON HOLD | OUTPATIENT
Start: 2024-06-24 | End: 2024-07-26 | Stop reason: HOSPADM

## 2024-06-24 RX ORDER — FERROUS SULFATE 325(65) MG
325 TABLET ORAL 2 TIMES DAILY WITH MEALS
Qty: 30 TABLET | Refills: 3 | Status: ON HOLD | OUTPATIENT
Start: 2024-06-24 | End: 2024-07-26 | Stop reason: HOSPADM

## 2024-06-24 RX ORDER — INSULIN LISPRO 100 [IU]/ML
6 INJECTION, SOLUTION INTRAVENOUS; SUBCUTANEOUS
Status: DISCONTINUED | OUTPATIENT
Start: 2024-06-24 | End: 2024-06-25 | Stop reason: HOSPADM

## 2024-06-24 RX ORDER — CARVEDILOL 3.12 MG/1
1.65 TABLET ORAL 2 TIMES DAILY WITH MEALS
Status: DISCONTINUED | OUTPATIENT
Start: 2024-06-24 | End: 2024-06-25 | Stop reason: HOSPADM

## 2024-06-24 RX ORDER — AMLODIPINE BESYLATE 5 MG/1
10 TABLET ORAL DAILY
Status: DISCONTINUED | OUTPATIENT
Start: 2024-06-25 | End: 2024-06-25 | Stop reason: HOSPADM

## 2024-06-24 RX ADMIN — INSULIN LISPRO 6 UNITS: 100 INJECTION, SOLUTION INTRAVENOUS; SUBCUTANEOUS at 12:08

## 2024-06-24 RX ADMIN — ASPIRIN 81 MG: 81 TABLET, COATED ORAL at 09:04

## 2024-06-24 RX ADMIN — AMLODIPINE BESYLATE 5 MG: 5 TABLET ORAL at 09:03

## 2024-06-24 RX ADMIN — GABAPENTIN 300 MG: 300 CAPSULE ORAL at 09:03

## 2024-06-24 RX ADMIN — INSULIN GLARGINE 18 UNITS: 100 INJECTION, SOLUTION SUBCUTANEOUS at 09:03

## 2024-06-24 RX ADMIN — INSULIN LISPRO 4 UNITS: 100 INJECTION, SOLUTION INTRAVENOUS; SUBCUTANEOUS at 09:03

## 2024-06-24 RX ADMIN — EPOETIN ALFA-EPBX 10000 UNITS: 10000 INJECTION, SOLUTION INTRAVENOUS; SUBCUTANEOUS at 20:49

## 2024-06-24 RX ADMIN — BUMETANIDE 1 MG: 1 TABLET ORAL at 09:04

## 2024-06-24 RX ADMIN — HYDROXYZINE HYDROCHLORIDE 10 MG: 10 TABLET ORAL at 15:01

## 2024-06-24 RX ADMIN — AMLODIPINE BESYLATE 5 MG: 5 TABLET ORAL at 17:53

## 2024-06-24 RX ADMIN — INSULIN LISPRO 2 UNITS: 100 INJECTION, SOLUTION INTRAVENOUS; SUBCUTANEOUS at 17:27

## 2024-06-24 RX ADMIN — CARVEDILOL 1.56 MG: 3.12 TABLET, FILM COATED ORAL at 17:53

## 2024-06-24 RX ADMIN — OXYCODONE HYDROCHLORIDE 5 MG: 5 TABLET ORAL at 12:10

## 2024-06-24 RX ADMIN — SODIUM CHLORIDE, PRESERVATIVE FREE 10 ML: 5 INJECTION INTRAVENOUS at 09:04

## 2024-06-24 RX ADMIN — OXYCODONE HYDROCHLORIDE 5 MG: 5 TABLET ORAL at 18:44

## 2024-06-24 RX ADMIN — CLONIDINE HYDROCHLORIDE 0.2 MG: 0.1 TABLET ORAL at 15:01

## 2024-06-24 RX ADMIN — LURASIDONE HYDROCHLORIDE 20 MG: 20 TABLET, FILM COATED ORAL at 17:28

## 2024-06-24 RX ADMIN — INSULIN LISPRO 6 UNITS: 100 INJECTION, SOLUTION INTRAVENOUS; SUBCUTANEOUS at 17:26

## 2024-06-24 RX ADMIN — BUMETANIDE 1 MG: 1 TABLET ORAL at 17:28

## 2024-06-24 RX ADMIN — ISOSORBIDE MONONITRATE 30 MG: 30 TABLET, EXTENDED RELEASE ORAL at 09:04

## 2024-06-24 RX ADMIN — FERROUS SULFATE TAB 325 MG (65 MG ELEMENTAL FE) 325 MG: 325 (65 FE) TAB at 17:28

## 2024-06-24 RX ADMIN — OXYCODONE HYDROCHLORIDE 5 MG: 5 TABLET ORAL at 06:40

## 2024-06-24 RX ADMIN — ENOXAPARIN SODIUM 30 MG: 100 INJECTION SUBCUTANEOUS at 09:03

## 2024-06-24 RX ADMIN — GABAPENTIN 300 MG: 300 CAPSULE ORAL at 20:49

## 2024-06-24 RX ADMIN — FERROUS SULFATE TAB 325 MG (65 MG ELEMENTAL FE) 325 MG: 325 (65 FE) TAB at 09:04

## 2024-06-24 ASSESSMENT — PAIN DESCRIPTION - ORIENTATION
ORIENTATION: LEFT;LOWER
ORIENTATION: RIGHT;LEFT
ORIENTATION: POSTERIOR
ORIENTATION: LEFT;LOWER

## 2024-06-24 ASSESSMENT — PAIN SCALES - WONG BAKER
WONGBAKER_NUMERICALRESPONSE: HURTS A LITTLE BIT
WONGBAKER_NUMERICALRESPONSE: NO HURT

## 2024-06-24 ASSESSMENT — PAIN DESCRIPTION - LOCATION
LOCATION: BACK
LOCATION: BACK
LOCATION: FOOT
LOCATION: BACK

## 2024-06-24 ASSESSMENT — PAIN SCALES - GENERAL
PAINLEVEL_OUTOF10: 7
PAINLEVEL_OUTOF10: 7
PAINLEVEL_OUTOF10: 6
PAINLEVEL_OUTOF10: 6
PAINLEVEL_OUTOF10: 7
PAINLEVEL_OUTOF10: 5
PAINLEVEL_OUTOF10: 5

## 2024-06-24 ASSESSMENT — PAIN DESCRIPTION - FREQUENCY
FREQUENCY: CONTINUOUS
FREQUENCY: INTERMITTENT

## 2024-06-24 ASSESSMENT — PAIN DESCRIPTION - DESCRIPTORS
DESCRIPTORS: ACHING
DESCRIPTORS: ACHING;DISCOMFORT

## 2024-06-24 ASSESSMENT — PAIN DESCRIPTION - PAIN TYPE
TYPE: CHRONIC PAIN
TYPE: CHRONIC PAIN

## 2024-06-24 ASSESSMENT — PAIN - FUNCTIONAL ASSESSMENT
PAIN_FUNCTIONAL_ASSESSMENT: ACTIVITIES ARE NOT PREVENTED
PAIN_FUNCTIONAL_ASSESSMENT: ACTIVITIES ARE NOT PREVENTED

## 2024-06-24 ASSESSMENT — PAIN DESCRIPTION - ONSET
ONSET: ON-GOING
ONSET: GRADUAL

## 2024-06-24 NOTE — PROGRESS NOTES
Nephrology Progress Note  [facility] / [place] Office  [address], [address]  [place] [address]  Phone - [phone]  Fax - [phone]                 Patient: Keaton Van                     YOB: 1965        Date- 6/24/2024                                     Admit Date: 6/19/2024   CC: Follow up for KIN, HYPERKALEMIA         IMPRESSION & PLAN:   Kin  - due to multiple etiology- ABX - ATN OR AIN vs cardiorenal syndrome --   HYPERKALEMIA  Hyponatremia due to chf  Ckd 4 - bl cr 2.2  DM 2  Bradycardia  Hypertension  Anemia  Proteinuria- urine pr/cr 2.2 g/g      PLAN-  Creatinine remains labile  Spoke to the patient in detail about possible need for RRT in future.  He very clearly declines to be on any form of RRT going forward.  He understands the risks and benefits of declining hemodialysis.  Continue on diuretics for now  Anticipate labile creatinine secondary to diuresis  Continue Epogen for anemia  His social status is very challenging.  Again he will be very poor candidate for RRT because of history of noncompliance in the past.  Continue Epogen for now  Low potassium diet  We will follow with you   Subjective:  Interval History:   -Seen and examined today  -Awake and alert, conversation  -Potassium better today  -Counseled again about low potassium diet      Objective:   Vitals:    06/23/24 2028 06/23/24 2040 06/24/24 0640 06/24/24 0805   BP: (!) 173/83   (!) 190/95   Pulse: 72   79   Resp: 18 16 16 18   Temp: 97.9 °F (36.6 °C)   98.8 °F (37.1 °C)   TempSrc:       SpO2: 98%   95%   Weight:       Height:          I/O last 3 completed shifts:  In: 1209 [P.O.:960; Blood:249]  Out: 1000 [Urine:1000]  No intake/output data recorded.      Physical exam:    GEN:  NAD  NECK:  Supple, no thyromegaly  RESP:  no wheezing  NEURO: non focal, normal speech  EXT: Edema +nt         Chart reviewed.         Pertinent Notes reviewed.     Data  bumetanide (BUMEX) tablet 1 mg  1 mg Oral BID    0.9 % sodium chloride infusion   IntraVENous PRN    enoxaparin Sodium (LOVENOX) injection 30 mg  30 mg SubCUTAneous Daily    epoetin tomas-epbx (RETACRIT) injection 10,000 Units  10,000 Units SubCUTAneous Once per day on Mon Wed Fri    gabapentin (NEURONTIN) capsule 300 mg  300 mg Oral BID    oxyCODONE (ROXICODONE) immediate release tablet 5 mg  5 mg Oral Q4H PRN    amLODIPine (NORVASC) tablet 5 mg  5 mg Oral Daily    glucose chewable tablet 16 g  4 tablet Oral PRN    dextrose bolus 10% 125 mL  125 mL IntraVENous PRN    Or    dextrose bolus 10% 250 mL  250 mL IntraVENous PRN    glucagon injection 1 mg  1 mg SubCUTAneous PRN    dextrose 10 % infusion   IntraVENous Continuous PRN    insulin lispro (HUMALOG,ADMELOG) injection vial 0-8 Units  0-8 Units SubCUTAneous TID     insulin lispro (HUMALOG,ADMELOG) injection vial 0-4 Units  0-4 Units SubCUTAneous Nightly    sodium chloride flush 0.9 % injection 5-40 mL  5-40 mL IntraVENous 2 times per day    sodium chloride flush 0.9 % injection 5-40 mL  5-40 mL IntraVENous PRN    0.9 % sodium chloride infusion   IntraVENous PRN    ondansetron (ZOFRAN-ODT) disintegrating tablet 4 mg  4 mg Oral Q8H PRN    Or    ondansetron (ZOFRAN) injection 4 mg  4 mg IntraVENous Q6H PRN    polyethylene glycol (GLYCOLAX) packet 17 g  17 g Oral Daily PRN    acetaminophen (TYLENOL) tablet 650 mg  650 mg Oral Q6H PRN    Or    acetaminophen (TYLENOL) suppository 650 mg  650 mg Rectal Q6H PRN    aspirin EC tablet 81 mg  81 mg Oral Daily    [Held by provider] carvedilol (COREG) tablet 12.5 mg  12.5 mg Oral BID     isosorbide mononitrate (IMDUR) extended release tablet 30 mg  30 mg Oral Daily    lurasidone (LATUDA) tablet 20 mg  20 mg Oral Dinner    insulin glargine (LANTUS) injection vial 18 Units  18 Units SubCUTAneous Daily    insulin lispro (HUMALOG,ADMELOG) injection vial 4 Units  0.05 Units/kg SubCUTAneous TID         Virgilio Asif,

## 2024-06-24 NOTE — PROGRESS NOTES
Spiritual Care Assessment/Progress Note  [facility]    Name: Keaton Van MRN: [id number]    Age: 59 y.o.     Sex: male   Language: English     Date: 6/24/2024            Total Time Calculated: 50 min              Spiritual Assessment begun in [facility] [employer]LE[employer]  Service Provided For: Patient  Referral/Consult From: Rounding  Encounter Overview/Reason: Follow-up    Spiritual beliefs:      [x] Involved in a tunde tradition/spiritual practice: Zoroastrianism     [] Supported by a tunde community:      [] Claims no spiritual orientation:      [] Seeking spiritual identity:           [] Adheres to an individual form of spirituality:      [] Not able to assess:                Identified resources for coping and support system:   Support System: Family members       [x] Prayer                  [] Devotional reading               [] Music                  [] Guided Imagery     [] Pet visits                                        [] Other: (COMMENT)     Specific area/focus of visit   Encounter:    Crisis:    Spiritual/Emotional needs: Type: Spiritual Support  Ritual, Rites and Sacraments:    Grief, Loss, and Adjustments:    Ethics/Mediation:    Behavioral Health:    Palliative Care:    Advance Care Planning: Type: ACP conversation    Plan/Referrals: Continue Support (comment)    Narrative:   Reviewed chart prior to visit. Spoke with patient who was interested in a  visit. Patient shared about his situation in the hospital, his health-care concerns and issues. He was open and shared his challenges and thoughts about his health-care moving forward. He has specific desire about his care - given his current situation. I offered listening presence, and spoke with him about his concerns. I mentioned that he may wish to complete an Advanced Medical Directive (A.M.D.) before leaving the hospital to put his wishes in writing. I provided a blank A.M.D. form for him to read should he want to move  forward with completing one. He thanked me for the visit, conversation and prayer.     TANNA Blackmon.  PRN    paging service [phone]

## 2024-06-24 NOTE — CARE COORDINATION
Pt is clear from CM standpoint for d/c.    [employer] will transport at 3 pm.    Report number to [facility] is [phone]    Going to room 49 A.    Transition of Care Plan:    RUR: 28%  Prior Level of Functioning: Independent   Disposition: SNF  If SNF or IPR: Date FOC offered:   Date FOC received:   Accepting facility:   Date authorization started with reference number:   Date authorization received and expires:   Follow up appointments: Defer to facility   DME needed: Defer to facility   Transportation at discharge: [employer]   IM/IMM Medicare/ letter given: 6/24/24  Is patient a  and connected with VA? No   If yes, was  transfer form completed and VA notified? No  Caregiver Contact: Pt's sister   Discharge Caregiver contacted prior to discharge? Pt was contacted   Care Conference needed? No  Barriers to discharge: None     CM spoke to pt and pt is going to [facility] for Skilled services and not pursing long term care at [facility].   06/24/24 1155   Services At/After Discharge   Transition of Care Consult (CM Consult) SNF   Services At/After Discharge Skilled Nursing Facility (SNF)   [place] Resource Information Provided? No   Mode of Transport at Discharge Self   Confirm Follow Up Transport Self   Condition of Participation: Discharge Planning   The Plan for Transition of Care is related to the following treatment goals: Goal is to go to [facility] for skilled services   The Patient and/or Patient Representative was provided with a Choice of Provider? Patient   The Patient and/Or Patient Representative agree with the Discharge Plan? Yes   Freedom of Choice list was provided with basic dialogue that supports the patient's individualized plan of care/goals, treatment preferences, and shares the quality data associated with the providers?  Yes     Transition of Care Plan to SNF/Rehab    Communication to Patient/Family:  Met with patient and

## 2024-06-24 NOTE — DIABETES MGMT
[facility]  PROGRAM FOR DIABETES HEALTH  DIABETES MANAGEMENT CONSULT    Consulted by Provider for advanced nursing evaluation and care for inpatient blood glucose management.    Evaluation and Action Plan   Keaton Van is a 59 year old gentleman with Type 2 Diabetes, diastolic heart failure, Bipolar and depression, peripheral neuropathy s/p left great and second toe amputations, CKD, substance abuse and a history of narcotic dependence, HCV? and suicide attempt who is admitted with AMS and severe hyperglycemia.  This is two days after being discharged from an impatient stay for acute hypoxic respiratory failure (6/17) and a week after another hospital stay for a right leg cellulitis with foot wounds s/p I&D with podiatry.    Mr Van is on disability, living alone.  At some point he tells me he was on insulin and do see that he filled 25mg Januvia and Glipizide 5mg daily back in August 2023 followed by a prescription of metformin 500mg daily that he filled 4/2024.  He shared that he got tired of taking his medications and just stopped them.  His A1C of 7.6% is was suspected to be low s/t anemia but fructosamine resulted in 306 correlating with current A1C.  He was discharged with Basaglar and insulin pens this week, filled at the [facility] outpatient pharmacy but he shares that he didn't take these.     Last admission, he responded very well to very low dose insulin and doses resumed- 18 units Lantus daily and 4 units humalog/meal with good response.  Will increase mealtime humalog due to pre-prandial hyperglycemia.     Management Rationale Action Plan   Medication   Basal needs Using Home dose Reduced to 18 units Lantus daily   Bolus insulin  Increased to 6 units humalog/consumed meal   Corrective insulin Using medium dose sensitivity based on weight    Additional orders  He is demanding regular diet despite recommendation for carb consistent meals.  Consider sending tox screen       Diabetes Discharge Plan  practices impeding diabetes control  Discussed diabetes survival skills related to  Healthy Plate eating plan; given handouts  Role of physical activity in improving insulin sensitivity and action  Procedure for blood glucose monitoring & options for low-cost products  Medications plan at discharge     Billing Code(s)   53524    Before making these care recommendations, I personally reviewed the hospitalization record, including notes, laboratory & diagnostic data and current medications, and examined the patient at the bedside.  Patient was seen in the ED but under inpatient hospitalist service.   Total minutes: 25 minutes    LOIS Bojorquez - CNS  Diabetes Clinical Nurse Specialist  Program for Diabetes Health  Access via [employer]

## 2024-06-24 NOTE — DISCHARGE SUMMARY
Discharge Summary    Name: Keaton Van  [id number]  YOB: 1965 (Age: 59 y.o.)   Date of Admission: 6/19/2024  Date of Discharge: 6/24/2024  Attending Physician: Stefania Vargas MD      Discharge Diagnosis:   Metabolic encephalopathy- multifactorial , hyperglycemia, electrolyte derangement, possible drug abuse , less likely infection   Uncontrolled diabetes mellitus-likely due to noncompliance  Rule out acute tomasa versus psychosis   Chronic diastolic heart failure  Right pleural effusion.  History of cardiac stent remote 15 years ago  H/o cocaine abuse   Paroxysmal Atrial fibrillation on last admission   HTN   CKD stage IV, worsening   Hyperkalemia  Chronic anemia  Chronic bilateral foot wounds with history of left toe amputations  Right 5th toe osteomyelitis s/p resection 6/1/24  Multiple cuts and bruises in bilateral hands  Bipolar I  Depression / anxiety   Polysubstance abuse incl heavy crack cocaine use  Tobacco use  Medication noncompliance  Chronic pain  Left flank/back pain  DM    Consultations:  IP CONSULT TO HOSPITALIST  IP CONSULT TO DIABETES MANAGEMENT  IP WOUND CARE NURSE CONSULT TO EVAL  IP CONSULT TO NEPHROLOGY  IP CONSULT TO PODIATRY  IP CONSULT TO PSYCHIATRY  IP CONSULT TO CARDIOLOGY  IP CONSULT TO PSYCHIATRY  IP CONSULT TO CARDIOLOGY  IP CONSULT TO CASE MANAGEMENT      Brief Admission History/Reason for Admission Per Inga Acevedo MD:   Keaton Van is a 59 y.o.  male with PMHx as mentioned below brought to the ED via EMS.  Patient is poor historian, could not say why he came to ED.  As discussed with ED attending-patient presents to ED after his neighbor called EMS.  Per EMS patient reported inability to walk well with multiple fall this morning.  EMS also stated that there were multiple things knocked over in his home.  Patient stated them that he had stopped his metformin and has not been taking insulin.  As per ED note upon arrival patient

## 2024-06-24 NOTE — BSMART NOTE
Initial BSMART Liaison Assessment Form     Section I - Integrated Summary    Chief Complaint is history of Bipolar Disorder, mood stabilization.    LOS:  5 days     Presenting problem/Summary:  Patient is a 59 year old male that was seen on the medical floor at [facility]. This writer met with patient face to face. Introduced self and role. Pt was alert and oriented x4. Pt reported a history of Bipolar Disorder and working with psychiatric providers in the past. Pt unable to recall the name of the medication he has been on or when he last saw one, but described his current assessment with inpatient psychiatry to be \"interesting.\" Pt shared he has never met with a provider virtually. Pt asked clinician about Xanax as he has heard in the past that it is helpful for anxiety. Notified pt to defer medication questions to the medical team/psychiatric provider as this is beyond this clinician's scope of practice. Pt shared in the past he was \"so into drugs\" and so his current perception of seeking mental health treatment is different now. Pt reported a different train of thought, where he is open to stabilizing his moods. Pt reported intense mood swings. He denied current suicidal thoughts, homicidal thoughts, auditory or visual hallucinations. Pt admits to a history of using pain pills when he moved [place] and began using heroin but has been clean for the past 7-8 years ago. He then started smoking crack around that time. Pt shared an occasional use of marijuana. Denied alcohol use. Pt was living alone but is going to be evicted in the first 2 weeks of July due to inability to pay his rent. Per pt, his girlfriend was supposed to help him with rent. Pt reported the plan is to discharge to rehab from the hospital, and have his  at the [facility] find him housing. Pt reported he cannot be on his own anymore or independent and this is difficult for him. He also talked about dialysis treatment and stated he does not  patient's speech shows no evidence of impairment.  The patient's mood is depressed.  The range of affect shows no evidence of impairment.  The patient's thought content demonstrates no evidence of impairment.  The thought process shows no evidence of impairment.  The patient's perception shows no evidence of impairment. The patient's memory shows no evidence of impairment.  The patient's appetite shows no evidence of impairment.  The patient's sleep shows no evidence of impairment. The patient's insight shows no evidence of impairment.  The patient's judgement shows no evidence of impairment.      The patient has demonstrated mental capacity to provide informed consent.    Section IV - Substance Abuse  The patient is  using substances.  The patient is using {cannabis by inhalation for unknown amount of years with last use on unknown and cocaine by inhalation for 5-10 years with last use on 1 month ago. Hx of heroin use.     Section V - Medical  Past Medical History:   Diagnosis Date    Adverse effect of anesthesia     breathing diff. with versed    Bipolar 1 disorder, mixed, moderate (HCC) 3/6/2017    Chronic pain     Depression     Pt stated diagnosed years ago    Diabetes (HCC) 2003    Drug-induced mood disorder(292.84) 5/28/2013    Homicide attempt     HTN (hypertension) 11/3/2011    Narcotic dependence, episodic use (HCC) 11/3/2011    Non compliance with medical treatment 11/3/2011    Other ill-defined conditions(799.89)     chronic low back pain    Psychiatric disorder     bipolar    Sleep disorder     Substance abuse (HCC)     Suicidal thoughts        Section VI - Living Arrangements  The patient Single.  The patient lives alone. The patient has one child, age 36 years old .  The patient does plan to return home upon discharge. The patient's source of income comes from disability.    The patient's greatest support comes from [place] and this person will not be involved with the treatment. The patient has not

## 2024-06-24 NOTE — PROGRESS NOTES
Notified by RN that SBO in 180-190 after prn clonidine  Will hold off DC  Increase norvasc to 10 daily  Add small dose Coreg  Prn Clonidine    Stefania Vargas MD

## 2024-06-24 NOTE — PROGRESS NOTES
All labs have been reviewed.  Continue with current wound care of betadine and gauze to wounds both feet.  At this time no surgical intervention planned at this time; he is to follow with myself or the podiatrist of his choice within a week of discharge

## 2024-06-25 VITALS
TEMPERATURE: 98.1 F | WEIGHT: 180.78 LBS | SYSTOLIC BLOOD PRESSURE: 180 MMHG | HEART RATE: 82 BPM | RESPIRATION RATE: 18 BRPM | BODY MASS INDEX: 23.96 KG/M2 | DIASTOLIC BLOOD PRESSURE: 81 MMHG | HEIGHT: 73 IN | OXYGEN SATURATION: 94 %

## 2024-06-25 LAB
ALBUMIN SERPL-MCNC: 2.6 G/DL (ref 3.5–5)
ANION GAP SERPL CALC-SCNC: 4 MMOL/L (ref 5–15)
BACTERIA SPEC CULT: NORMAL
BACTERIA SPEC CULT: NORMAL
BUN SERPL-MCNC: 67 MG/DL (ref 6–20)
BUN/CREAT SERPL: 18 (ref 12–20)
CALCIUM SERPL-MCNC: 9 MG/DL (ref 8.5–10.1)
CHLORIDE SERPL-SCNC: 106 MMOL/L (ref 97–108)
CO2 SERPL-SCNC: 29 MMOL/L (ref 21–32)
CREAT SERPL-MCNC: 3.77 MG/DL (ref 0.7–1.3)
GLUCOSE BLD STRIP.AUTO-MCNC: 166 MG/DL (ref 65–117)
GLUCOSE BLD STRIP.AUTO-MCNC: 211 MG/DL (ref 65–117)
GLUCOSE SERPL-MCNC: 189 MG/DL (ref 65–100)
PHOSPHATE SERPL-MCNC: 4 MG/DL (ref 2.6–4.7)
POTASSIUM SERPL-SCNC: 5.2 MMOL/L (ref 3.5–5.1)
SERVICE CMNT-IMP: ABNORMAL
SERVICE CMNT-IMP: ABNORMAL
SERVICE CMNT-IMP: NORMAL
SERVICE CMNT-IMP: NORMAL
SODIUM SERPL-SCNC: 139 MMOL/L (ref 136–145)

## 2024-06-25 PROCEDURE — 6370000000 HC RX 637 (ALT 250 FOR IP): Performed by: STUDENT IN AN ORGANIZED HEALTH CARE EDUCATION/TRAINING PROGRAM

## 2024-06-25 PROCEDURE — 97116 GAIT TRAINING THERAPY: CPT

## 2024-06-25 PROCEDURE — 6370000000 HC RX 637 (ALT 250 FOR IP): Performed by: INTERNAL MEDICINE

## 2024-06-25 PROCEDURE — 80069 RENAL FUNCTION PANEL: CPT

## 2024-06-25 PROCEDURE — 82962 GLUCOSE BLOOD TEST: CPT

## 2024-06-25 PROCEDURE — 6370000000 HC RX 637 (ALT 250 FOR IP): Performed by: GENERAL ACUTE CARE HOSPITAL

## 2024-06-25 PROCEDURE — 36415 COLL VENOUS BLD VENIPUNCTURE: CPT

## 2024-06-25 PROCEDURE — 6370000000 HC RX 637 (ALT 250 FOR IP): Performed by: NURSE PRACTITIONER

## 2024-06-25 PROCEDURE — 6370000000 HC RX 637 (ALT 250 FOR IP): Performed by: CLINICAL NURSE SPECIALIST

## 2024-06-25 PROCEDURE — 6360000002 HC RX W HCPCS: Performed by: STUDENT IN AN ORGANIZED HEALTH CARE EDUCATION/TRAINING PROGRAM

## 2024-06-25 RX ORDER — HYDRALAZINE HYDROCHLORIDE 25 MG/1
25 TABLET, FILM COATED ORAL EVERY 8 HOURS SCHEDULED
Status: DISCONTINUED | OUTPATIENT
Start: 2024-06-25 | End: 2024-06-25 | Stop reason: HOSPADM

## 2024-06-25 RX ORDER — HYDRALAZINE HYDROCHLORIDE 25 MG/1
50 TABLET, FILM COATED ORAL EVERY 8 HOURS SCHEDULED
Qty: 90 TABLET | Refills: 3 | Status: SHIPPED | OUTPATIENT
Start: 2024-06-25

## 2024-06-25 RX ADMIN — ENOXAPARIN SODIUM 30 MG: 100 INJECTION SUBCUTANEOUS at 09:05

## 2024-06-25 RX ADMIN — CARVEDILOL 1.56 MG: 3.12 TABLET, FILM COATED ORAL at 09:05

## 2024-06-25 RX ADMIN — HYDROXYZINE HYDROCHLORIDE 10 MG: 10 TABLET ORAL at 01:06

## 2024-06-25 RX ADMIN — SODIUM ZIRCONIUM CYCLOSILICATE 10 G: 10 POWDER, FOR SUSPENSION ORAL at 10:46

## 2024-06-25 RX ADMIN — FERROUS SULFATE TAB 325 MG (65 MG ELEMENTAL FE) 325 MG: 325 (65 FE) TAB at 09:05

## 2024-06-25 RX ADMIN — BUMETANIDE 1 MG: 1 TABLET ORAL at 09:05

## 2024-06-25 RX ADMIN — OXYCODONE HYDROCHLORIDE 5 MG: 5 TABLET ORAL at 14:12

## 2024-06-25 RX ADMIN — INSULIN LISPRO 6 UNITS: 100 INJECTION, SOLUTION INTRAVENOUS; SUBCUTANEOUS at 07:56

## 2024-06-25 RX ADMIN — OXYCODONE HYDROCHLORIDE 5 MG: 5 TABLET ORAL at 07:43

## 2024-06-25 RX ADMIN — HYDROXYZINE HYDROCHLORIDE 10 MG: 10 TABLET ORAL at 09:06

## 2024-06-25 RX ADMIN — OXYCODONE HYDROCHLORIDE 5 MG: 5 TABLET ORAL at 01:06

## 2024-06-25 RX ADMIN — ISOSORBIDE MONONITRATE 30 MG: 30 TABLET, EXTENDED RELEASE ORAL at 09:06

## 2024-06-25 RX ADMIN — HYDRALAZINE HYDROCHLORIDE 25 MG: 25 TABLET ORAL at 10:46

## 2024-06-25 RX ADMIN — INSULIN LISPRO 2 UNITS: 100 INJECTION, SOLUTION INTRAVENOUS; SUBCUTANEOUS at 07:56

## 2024-06-25 RX ADMIN — INSULIN GLARGINE 18 UNITS: 100 INJECTION, SOLUTION SUBCUTANEOUS at 09:05

## 2024-06-25 RX ADMIN — ASPIRIN 81 MG: 81 TABLET, COATED ORAL at 09:05

## 2024-06-25 RX ADMIN — INSULIN LISPRO 6 UNITS: 100 INJECTION, SOLUTION INTRAVENOUS; SUBCUTANEOUS at 11:24

## 2024-06-25 RX ADMIN — GABAPENTIN 300 MG: 300 CAPSULE ORAL at 09:05

## 2024-06-25 RX ADMIN — AMLODIPINE BESYLATE 10 MG: 5 TABLET ORAL at 09:05

## 2024-06-25 ASSESSMENT — PAIN DESCRIPTION - LOCATION
LOCATION: BACK
LOCATION: BACK;FOOT
LOCATION: BACK

## 2024-06-25 ASSESSMENT — PAIN SCALES - GENERAL
PAINLEVEL_OUTOF10: 7
PAINLEVEL_OUTOF10: 5
PAINLEVEL_OUTOF10: 8
PAINLEVEL_OUTOF10: 8
PAINLEVEL_OUTOF10: 5

## 2024-06-25 ASSESSMENT — PAIN DESCRIPTION - DESCRIPTORS
DESCRIPTORS: SORE
DESCRIPTORS: ACHING
DESCRIPTORS: ACHING

## 2024-06-25 ASSESSMENT — PAIN DESCRIPTION - ORIENTATION
ORIENTATION: POSTERIOR
ORIENTATION: LEFT;LOWER;RIGHT
ORIENTATION: POSTERIOR

## 2024-06-25 ASSESSMENT — PAIN SCALES - WONG BAKER
WONGBAKER_NUMERICALRESPONSE: HURTS A LITTLE BIT
WONGBAKER_NUMERICALRESPONSE: HURTS A LITTLE BIT
WONGBAKER_NUMERICALRESPONSE: NO HURT
WONGBAKER_NUMERICALRESPONSE: HURTS A LITTLE BIT
WONGBAKER_NUMERICALRESPONSE: HURTS A LITTLE BIT

## 2024-06-25 NOTE — PROGRESS NOTES
End of Shift Note    Bedside shift change report given to BHAKTI Dorantes (oncoming nurse) by NALLELY PINEDA RN (offgoing nurse).  Report included the following information SBAR, Kardex, MAR, and Recent Results    Shift worked:  9681-0107     Shift summary and any significant changes:     Patient A&O X4, VSS, RA. Pain managament as needed, pt resting overnight, K up to 5.2mmol/L, md made aware. will continue to monitor.      Concerns for physician to address:  N/A     Zone phone for oncoming shift:          Activity:  Level of Assistance: Independent after set-up    Cardiac:   Cardiac Monitoring:  yes -     Access:  Current line(s): N/A    Genitourinary:   Urinary Status: Voiding, Bathroom privileges    Respiratory:   O2 Device: None (Room air)    GI:  Current diet: DIET ONE TIME MESSAGE;  DIET ONE TIME MESSAGE;  ADULT DIET; Regular; Low Potassium (Less than 3000 mg/day); Low Phosphorus (Less than 1000 mg)    Pain Management:   Patient states pain is manageable on current regimen: YES    Skin:  Kennedy Scale Score: 20  Interventions: Wound Offloading (Prevention Methods): Turning, Repositioning  Pressure injury: no    Patient Safety:  Fall Score: Vanegas Total Score: 40  Fall Risk Interventions  Nursing Judgement-Fall Risk High(Add Comments): Yes  Toilet Every 2 Hours-In Advance of Need: Yes  Hourly Visual Checks: Awake, In bed  Fall Visual Posted: Armband, Socks  Room Door Open: Deferred to promote rest  Alarm On: Bed  Patient Moved Closer to Nursing Station: No    Active Consults:  IP CONSULT TO HOSPITALIST  IP CONSULT TO DIABETES MANAGEMENT  IP WOUND CARE NURSE CONSULT TO EVAL  IP CONSULT TO NEPHROLOGY  IP CONSULT TO PODIATRY  IP CONSULT TO PSYCHIATRY  IP CONSULT TO CARDIOLOGY  IP CONSULT TO PSYCHIATRY  IP CONSULT TO CARDIOLOGY  IP CONSULT TO CASE MANAGEMENT    Length of Stay:  Expected LOS: 5  Actual LOS: 6      NALLELY PINEDA RN

## 2024-06-25 NOTE — DISCHARGE INSTRUCTIONS
Hyperkalemia: Care Instructions  Your Care Instructions     Hyperkalemia is too much potassium in the blood. Potassium helps keep the right mix of fluids in your body. It also helps your nerves and muscles work as they should. And it keeps your heartbeat in a normal rhythm. Some things can raise potassium levels. These include some health problems, medicines, and kidney problems. (Normally, your kidneys remove extra potassium.)  Too much potassium can cause nausea. It also can cause a heartbeat that isn't normal. But you may not have any symptoms. Too much potassium can be dangerous. That's why it's important to treat it. If you are taking any of the medicines that can raise your levels, your doctor will ask you to stop. You may get medicines to lower your levels. And you may have to limit or not eat foods that have a lot of potassium.  Follow-up care is a key part of your treatment and safety. Be sure to make and go to all appointments, and call your doctor if you are having problems. It's also a good idea to know your test results and keep a list of the medicines you take.  How can you care for yourself at home?  Take your medicines exactly as prescribed. Call your doctor if you think you are having a problem with your medicine.  Stop taking certain medicines if your doctor asks you to. They may be causing your high potassium levels. If you have concerns about stopping medicine, talk with your doctor.  If you have kidney, heart, or liver disease and have to limit fluids, talk with your doctor before you increase the amount of fluids you drink. If the doctor says it's okay, drink plenty of fluids.  Avoid strenuous exercise until your doctor tells you it is okay.  Be aware of potassium in your diet. Potassium is in many foods, including vegetables, fruits, and milk products.  Foods high in potassium include bananas, cantaloupe, broccoli, milk, potatoes, and tomatoes.  Low-potassium foods include blueberries,  control how much potassium you get in your diet.  Your doctor or dietitian can help you plan a diet that gives you the right amount of potassium. There is no diet that is right for everyone. Your diet will be based on how well your kidneys are working and whether you are on dialysis.  Your diet may change as your disease changes. See your doctor for regular testing. Testing helps you know when to change your diet. Your doctor or dietitian can help you do this.  Changing your diet can be hard. You may have to give up many foods you like. But it is very important to make the recommended changes. They will help you stay healthy for as long as possible.  What foods and products have potassium?  You can control the amount of potassium in your diet if you know which foods are low or high in potassium.  Foods low in potassium  Blueberries and raspberries  White or brown rice, pasta, and noodles  Cucumbers and radishes  Foods high in potassium  Apricots, oranges, prunes, and bananas  Broccoli, spinach, and potatoes  Milk and yogurt  Other products that may have potassium  Diet or protein drinks and diet bars often have potassium. It is also in sports drinks, such as Gatorade. These are meant to replace potassium you lose during exercise.  Do not use a salt substitute or \"lite\" salt without talking to your doctor first. These often are very high in potassium.  Be sure to tell your doctor about any prescription or over-the-counter medicines you take. Some medicines can raise your level of potassium.  Where can you learn more?  Go to https://www.[employer].net/patientEd and enter Y438 to learn more about \"Learning About Chronic Kidney Disease and Potassium.\"  Current as of: October 11, 2023  Content Version: 14.1  © 4503-3601 [employer], [employer].   Care instructions adapted under license by [employer]. If you have questions about a medical condition or this instruction, always ask your healthcare professional.

## 2024-06-25 NOTE — PROGRESS NOTES
End of Shift Note    Bedside shift change report given to Yisel (oncoming nurse) by Yeong AE Jenny, RN (offgoing nurse).  Report included the following information SBAR    Shift worked:  7a - 3p     Shift summary and any significant changes:     Uneventful shift. All of scheduled meds given. Patient has a discharge order. Scheduled to be picked up at 4pm to [facility]. Report given to Antonette Clarke at [facility].     Concerns for physician to address:  none     Zone phone for oncoming shift:          Activity:     Number times ambulated in hallways past shift: 0  Number of times OOB to chair past shift: 0    Cardiac:   Cardiac Monitoring: Yes           Access:  Current line(s): no access     Genitourinary:   Urinary status: voiding    Respiratory:      Chronic home O2 use?: NO  Incentive spirometer at bedside: N/A       GI:     Current diet:  DIET ONE TIME MESSAGE;  DIET ONE TIME MESSAGE;  ADULT DIET; Regular; Low Potassium (Less than 3000 mg/day); Low Phosphorus (Less than 1000 mg)  Passing flatus: YES  Tolerating current diet: YES       Pain Management:   Patient states pain is manageable on current regimen: YES    Skin:     Interventions: float heels, increase time out of bed, and PT/OT consult    Patient Safety:  Fall Score:    Interventions: assistive device (walker, cane. etc), gripper socks, pt to call before getting OOB, and stay with me (per policy)       Length of Stay:  Expected LOS: 6  Actual LOS: 6      Yeong AE Jenny, RN

## 2024-06-25 NOTE — PROGRESS NOTES
Pt called out to his room c/o of no been able to eat due to diet restriction, refusing his blood sugar check this AM.

## 2024-06-25 NOTE — PLAN OF CARE
Problem: Physical Therapy - Adult  Goal: By Discharge: Performs mobility at highest level of function for planned discharge setting.  See evaluation for individualized goals.  Description: FUNCTIONAL STATUS PRIOR TO ADMISSION: Patient was modified independent using a single point cane for functional mobility. and The patient  was independent for basic and instrumental ADLs. He admits not following recommended heel only WB precautions on his RLE, but has been wearing the post op shoe.    HOME SUPPORT PRIOR TO ADMISSION: The patient lived alone with no local support. Lives in 2 level apartment with 2 steps to enter and 13 steps to 2nd floor with 1 rail. Per the patient he is expecting to be evicted soon.    Physical Therapy Goals  Initiated 6/20/2024  1.  Patient will move from supine to sit and sit to supine, scoot up and down, and roll side to side in bed with independence within 7 day(s).    2.  Patient will perform sit to stand with modified independence within 7 day(s).  3.  Patient will transfer from bed to chair and chair to bed with modified independence using the least restrictive device within 7 day(s).  4.  Patient will ambulate with modified independence for 150 feet with the least restrictive device within 7 day(s).   5.  Patient will ascend/descend 12 stairs with 1 handrail(s) with standby assistance within 7 day(s).  6.  Patient will comply with R heel WB in post op shoe during transfers and ambulation activities within 7 day(s).  Outcome: Progressing       PHYSICAL THERAPY TREATMENT    Patient: Keaton Van (59 y.o. male)  Date: 6/25/2024  Diagnosis: Hyperkalemia [E87.5]  Acute pulmonary edema (HCC) [J81.0]  Hyperglycemia [R73.9]  Acute metabolic encephalopathy [G93.41] Acute metabolic encephalopathy      Precautions: Up as Tolerated, Fall Risk, Weight Bearing Right Lower Extremity Weight Bearing: Weight Bearing As Tolerated                    ASSESSMENT:  Pt tolerated PT services well and continues to

## 2024-06-25 NOTE — CARE COORDINATION
CM needs completed - roundtrip confirmed for 4 PM    PRECIOUS Plan: SNF - [facility]    Report: [phone]  Room: 49 A  Transport: Roundtrip confirmed for 4 PM; RN aware  IM letter: 2nd IM received 6/24/24; copy on chart    Update - 3:30 PM: Roundtrip confirmed for pick-up at 4:00 PM. CM [facility]; efforts unsuccessful, pt does not qualify for LTC services based on current level of function/independence.    Update - 12:45 PM: MD completed follow up with pt at bedside; questions/concerns addressed. CM met with pt & confirmed he's agreeable to proceeding with [facility]. CM reviewed the following resources and provided an opportunity for clarifying questions as needed: [facility]. No additional CM needs identified. CM actively working to arrange roundtrip for d/c this afternoon; pick-up time to be reported out once available.    Update - 11:38 AM: CM met with pt at bedside, introduced role, & informed him of TAN for this afternoon to SNF. Pt reported not feeling well and requested to speak with the MD prior to proceeding with the d/c plan; pt informed CM would relay request. CM provided pt with an index card detailing his room assignment at the facility. Pt informed CM would return after he's able to speak with the MD. Pt also shared he would need a ride secured for d/c; request acknowledged. MD informed of pt's request for follow up.    Initial note: CM acknowledged d/c. Chart reviewed. Plan is for pt to transition to [facility] this afternoon. Admission liaison (Francisco: [phone]) confirmed facility has bed available; number for report/room assignment detailed above. CM will meet with pt at bedside to provide room assignment and confirm method of transportation to the facility.     06/25/24 1247   Services At/After Discharge   Transition of Care  Consult (CM Consult) SNF   Partner SNF Yes  ([facility])   Services At/After Discharge Skilled Nursing Facility (SNF)   Mode of Transport at Discharge Other (see comment)  (Roundtrip)   Hospital Transport Time of Discharge 1600   Confirm Follow Up Transport Self   Condition of Participation: Discharge Planning   The Plan for Transition of Care is related to the following treatment goals: SNF - [facility]   The Patient and/or Patient Representative was provided with a Choice of Provider? Patient   The Patient and/Or Patient Representative agree with the Discharge Plan? Yes   Freedom of Choice list was provided with basic dialogue that supports the patient's individualized plan of care/goals, treatment preferences, and shares the quality data associated with the providers?  Yes     RA Dong  [facility] CM   [phone]

## 2024-06-25 NOTE — DISCHARGE SUMMARY
Discharge Summary    Name: Keaton Van  [id number]  YOB: 1965 (Age: 59 y.o.)   Date of Admission: 6/19/2024  Date of Discharge: 6/25/2024  Attending Physician: Stefania Vargas MD      Discharge Diagnosis:   Metabolic encephalopathy- multifactorial , hyperglycemia, electrolyte derangement, possible drug abuse , less likely infection   Uncontrolled diabetes mellitus-likely due to noncompliance  Rule out acute tomasa versus psychosis   Chronic diastolic heart failure  Right pleural effusion.  History of cardiac stent remote 15 years ago  H/o cocaine abuse   Paroxysmal Atrial fibrillation on last admission   HTN   CKD stage IV, worsening   Hyperkalemia  Chronic anemia  Chronic bilateral foot wounds with history of left toe amputations  Right 5th toe osteomyelitis s/p resection 6/1/24  Multiple cuts and bruises in bilateral hands  Bipolar I  Depression / anxiety   Polysubstance abuse incl heavy crack cocaine use  Tobacco use  Medication noncompliance  Chronic pain  Left flank/back pain  DM    Consultations:  IP CONSULT TO HOSPITALIST  IP CONSULT TO DIABETES MANAGEMENT  IP WOUND CARE NURSE CONSULT TO EVAL  IP CONSULT TO NEPHROLOGY  IP CONSULT TO PODIATRY  IP CONSULT TO PSYCHIATRY  IP CONSULT TO CARDIOLOGY  IP CONSULT TO PSYCHIATRY  IP CONSULT TO CARDIOLOGY  IP CONSULT TO CASE MANAGEMENT      Brief Admission History/Reason for Admission Per Inga Acevedo MD:   Keaton Van is a 59 y.o.  male with PMHx as mentioned below brought to the ED via EMS.  Patient is poor historian, could not say why he came to ED.  As discussed with ED attending-patient presents to ED after his neighbor called EMS.  Per EMS patient reported inability to walk well with multiple fall this morning.  EMS also stated that there were multiple things knocked over in his home.  Patient stated them that he had stopped his metformin and has not been taking insulin.  As per ED note upon arrival patient  RIGHT  Antecubital       Culture NO GROWTH 6 DAYS       Culture, Blood 1 [[id number]] Collected: 06/19/24 0730    Order Status: Completed Specimen: Blood Updated: 06/25/24 0731     Special Requests --        RIGHT  Antecubital       Culture NO GROWTH 6 DAYS       Culture, Respiratory [[id number]] Collected: 06/12/24 1400    Order Status: Canceled     Culture, Respiratory [[id number]] Collected: 06/12/24 1216    Order Status: Canceled Specimen: Sputum Expectorated                 Discharge Medications:     Medication List        START taking these medications      bumetanide 1 MG tablet  Commonly known as: BUMEX  Take 1 tablet by mouth in the morning and 1 tablet in the evening.     epoetin tomas-epbx 56260 UNIT/ML Soln injection  Commonly known as: RETACRIT  Inject 1 mL into the skin once a week for 1 dose     ferrous sulfate 325 (65 Fe) MG tablet  Commonly known as: IRON 325  Take 1 tablet by mouth 2 times daily (with meals)     hydrALAZINE 25 MG tablet  Commonly known as: APRESOLINE  Take 2 tablets by mouth every 8 hours     sodium zirconium cyclosilicate 10 g Pack oral suspension  Commonly known as: Lokelma  Take 1 packet by mouth three times a week  Start taking on: June 26, 2024            CHANGE how you take these medications      carvedilol 3.125 MG tablet  Commonly known as: Coreg  Take 0.5 tablets by mouth 2 times daily  What changed:   medication strength  how much to take  when to take this     gabapentin 600 MG tablet  Commonly known as: NEURONTIN  Take 0.5 tablets by mouth 3 times daily for 30 days. Max Daily Amount: 900 mg  What changed: how much to take            CONTINUE taking these medications      acetaminophen 325 MG tablet  Commonly known as: TYLENOL  Take 2 tablets by mouth every 6 hours as needed for Pain     amLODIPine 10 MG tablet  Commonly known as: NORVASC  Take 1 tablet by mouth daily     aspirin 81 MG EC tablet  Take 1 tablet by mouth daily     Basaglar KwikPen 100 UNIT/ML injection  [facility] (LINK)  [address] [place] [address]  [phone]        Virgilio Asif MD  [address]  [address]  [facility] [address] [phone]    Follow up      Valeri Ham DPM  [address]  [facility] [address]  [facility] [address] [phone]    Follow up            Total time in minutes spent coordinating this discharge (includes going over instructions, follow-up, prescriptions, and preparing report for sign off to her PCP) :  40 minutes    Stefania Vargas MD

## 2024-06-25 NOTE — PROGRESS NOTES
Per cursory review of medical chart, Pt pending dc to SNF setting this afternoon. PT Team will follow.    Agnes Cummins, PT, DPT

## 2024-06-26 NOTE — PROGRESS NOTES
Physician Progress Note      PATIENT:               KASEY TREJO  [facility] #:                  [id number]  :                       1965  ADMIT DATE:       2024 7:48 AM  DISCH DATE:        2024 4:50 PM  RESPONDING  PROVIDER #:        Khoi Hernandez MD          QUERY TEXT:    Dr Dumont  Pt admitted with SOB, pulmonary edema.  Noted documentation of acute diastolic   heart failure on  by Cardiology consultant.  If possible, please document   in progress notes and discharge summary.  The medical record reflects the following:  Risk Factors: HTN, DM  Clinical Indicators:  Cariology consult notes; \" Acute diastolic heart   failure: Appears to have lost about 5 pounds body weight.  Noted creatinine   rise\"; proBNP 8573, chest xray; Increase in the pulmonary edema pattern.  Treatment: Lasix iv, daily weights, strict I/Os,  Options provided:  -- acute diastolic heart failure confirmed present on admission  -- acute diastolic heart failure ruled out  -- Other - I will add my own diagnosis  -- Disagree - Not applicable / Not valid  -- Disagree - Clinically unable to determine / Unknown  -- Refer to Clinical Documentation Reviewer    PROVIDER RESPONSE TEXT:    The diagnosis of acute diastolic heart failure was confirmed as present on   admission.    Query created by: Micheline Jaramillo on 2024 8:58 AM      QUERY TEXT:    Patient admitted with chest pain. Cariology consult noted on : \"appears to   by type 2 MI\" with NSTEMI in your notes.  If possible, please document in the   progress notes and discharge summary if you are evaluating and/or treating   any of the following:    The medical record reflects the following:  Risk Factors: HTN, DM, acute on chronic anemia, CAD/stents, Cocaine abuse  Clinical Indicators: concern for STEMI in progress notes, with Cardiology   stating: \"appears to be type 2MI\", chest pain, EKG: Nonspecific ST and T wave   abnormality when compared with prior ECG, THS

## 2024-07-12 LAB
EKG ATRIAL RATE: 56 BPM
EKG DIAGNOSIS: NORMAL
EKG P AXIS: 63 DEGREES
EKG P-R INTERVAL: 186 MS
EKG Q-T INTERVAL: 474 MS
EKG QRS DURATION: 94 MS
EKG QTC CALCULATION (BAZETT): 457 MS
EKG R AXIS: 23 DEGREES
EKG T AXIS: 86 DEGREES
EKG VENTRICULAR RATE: 56 BPM

## 2024-07-13 ENCOUNTER — APPOINTMENT (OUTPATIENT)
Facility: HOSPITAL | Age: 59
End: 2024-07-13
Payer: MEDICARE

## 2024-07-13 ENCOUNTER — HOSPITAL ENCOUNTER (INPATIENT)
Facility: HOSPITAL | Age: 59
LOS: 13 days | Discharge: NURSING, REHAB OR LONG-TERM CARE | End: 2024-07-26
Attending: EMERGENCY MEDICINE | Admitting: STUDENT IN AN ORGANIZED HEALTH CARE EDUCATION/TRAINING PROGRAM
Payer: MEDICARE

## 2024-07-13 DIAGNOSIS — S88.111A BELOW-KNEE AMPUTATION OF RIGHT LOWER EXTREMITY, INITIAL ENCOUNTER (HCC): ICD-10-CM

## 2024-07-13 DIAGNOSIS — M86.171 OTHER ACUTE OSTEOMYELITIS OF RIGHT FOOT (HCC): Primary | ICD-10-CM

## 2024-07-13 PROBLEM — R79.89 ELEVATED TROPONIN: Status: RESOLVED | Noted: 2024-06-13 | Resolved: 2024-07-13

## 2024-07-13 LAB
ALBUMIN SERPL-MCNC: 3 G/DL (ref 3.5–5)
ALBUMIN/GLOB SERPL: 0.7 (ref 1.1–2.2)
ALP SERPL-CCNC: 134 U/L (ref 45–117)
ALT SERPL-CCNC: 19 U/L (ref 12–78)
ANION GAP SERPL CALC-SCNC: 5 MMOL/L (ref 5–15)
AST SERPL-CCNC: 21 U/L (ref 15–37)
BASOPHILS # BLD: 0.1 K/UL (ref 0–0.1)
BASOPHILS NFR BLD: 1 % (ref 0–1)
BILIRUB SERPL-MCNC: 0.5 MG/DL (ref 0.2–1)
BUN SERPL-MCNC: 36 MG/DL (ref 6–20)
BUN/CREAT SERPL: 15 (ref 12–20)
CALCIUM SERPL-MCNC: 9.3 MG/DL (ref 8.5–10.1)
CHLORIDE SERPL-SCNC: 100 MMOL/L (ref 97–108)
CO2 SERPL-SCNC: 27 MMOL/L (ref 21–32)
CREAT SERPL-MCNC: 2.33 MG/DL (ref 0.7–1.3)
CRP SERPL-MCNC: 7.89 MG/DL (ref 0–0.3)
DIFFERENTIAL METHOD BLD: ABNORMAL
EOSINOPHIL # BLD: 0.2 K/UL (ref 0–0.4)
EOSINOPHIL NFR BLD: 2 % (ref 0–7)
ERYTHROCYTE [DISTWIDTH] IN BLOOD BY AUTOMATED COUNT: 16.3 % (ref 11.5–14.5)
ERYTHROCYTE [SEDIMENTATION RATE] IN BLOOD: 66 MM/HR (ref 0–20)
EST. AVERAGE GLUCOSE BLD GHB EST-MCNC: 140 MG/DL
GLOBULIN SER CALC-MCNC: 4.6 G/DL (ref 2–4)
GLUCOSE BLD STRIP.AUTO-MCNC: 250 MG/DL (ref 65–117)
GLUCOSE BLD STRIP.AUTO-MCNC: 298 MG/DL (ref 65–117)
GLUCOSE BLD-MCNC: 340 MG/DL (ref 70–110)
GLUCOSE SERPL-MCNC: 306 MG/DL (ref 65–100)
HBA1C MFR BLD: 6.5 % (ref 4–5.6)
HCT VFR BLD AUTO: 28.9 % (ref 36.6–50.3)
HGB BLD-MCNC: 9.6 G/DL (ref 12.1–17)
IMM GRANULOCYTES # BLD AUTO: 0.1 K/UL (ref 0–0.04)
IMM GRANULOCYTES NFR BLD AUTO: 1 % (ref 0–0.5)
LYMPHOCYTES # BLD: 0.8 K/UL (ref 0.8–3.5)
LYMPHOCYTES NFR BLD: 9 % (ref 12–49)
MCH RBC QN AUTO: 26.3 PG (ref 26–34)
MCHC RBC AUTO-ENTMCNC: 33.2 G/DL (ref 30–36.5)
MCV RBC AUTO: 79.2 FL (ref 80–99)
MONOCYTES # BLD: 0.4 K/UL (ref 0–1)
MONOCYTES NFR BLD: 5 % (ref 5–13)
NEUTS SEG # BLD: 7.2 K/UL (ref 1.8–8)
NEUTS SEG NFR BLD: 82 % (ref 32–75)
NRBC # BLD: 0 K/UL (ref 0–0.01)
NRBC BLD-RTO: 0 PER 100 WBC
PLATELET # BLD AUTO: 158 K/UL (ref 150–400)
PMV BLD AUTO: 9.9 FL (ref 8.9–12.9)
POTASSIUM SERPL-SCNC: 4.3 MMOL/L (ref 3.5–5.1)
PROT SERPL-MCNC: 7.6 G/DL (ref 6.4–8.2)
RBC # BLD AUTO: 3.65 M/UL (ref 4.1–5.7)
RBC MORPH BLD: ABNORMAL
SERVICE CMNT-IMP: ABNORMAL
SODIUM SERPL-SCNC: 132 MMOL/L (ref 136–145)
WBC # BLD AUTO: 8.8 K/UL (ref 4.1–11.1)

## 2024-07-13 PROCEDURE — 36415 COLL VENOUS BLD VENIPUNCTURE: CPT

## 2024-07-13 PROCEDURE — 6360000002 HC RX W HCPCS

## 2024-07-13 PROCEDURE — 6370000000 HC RX 637 (ALT 250 FOR IP)

## 2024-07-13 PROCEDURE — 85025 COMPLETE CBC W/AUTO DIFF WBC: CPT

## 2024-07-13 PROCEDURE — 2580000003 HC RX 258: Performed by: EMERGENCY MEDICINE

## 2024-07-13 PROCEDURE — 83036 HEMOGLOBIN GLYCOSYLATED A1C: CPT

## 2024-07-13 PROCEDURE — 2060000000 HC ICU INTERMEDIATE R&B

## 2024-07-13 PROCEDURE — 2580000003 HC RX 258

## 2024-07-13 PROCEDURE — 99285 EMERGENCY DEPT VISIT HI MDM: CPT

## 2024-07-13 PROCEDURE — 96374 THER/PROPH/DIAG INJ IV PUSH: CPT

## 2024-07-13 PROCEDURE — 6360000002 HC RX W HCPCS: Performed by: EMERGENCY MEDICINE

## 2024-07-13 PROCEDURE — 87040 BLOOD CULTURE FOR BACTERIA: CPT

## 2024-07-13 PROCEDURE — 73630 X-RAY EXAM OF FOOT: CPT

## 2024-07-13 PROCEDURE — 82962 GLUCOSE BLOOD TEST: CPT

## 2024-07-13 PROCEDURE — 80053 COMPREHEN METABOLIC PANEL: CPT

## 2024-07-13 PROCEDURE — 86140 C-REACTIVE PROTEIN: CPT

## 2024-07-13 PROCEDURE — 2500000003 HC RX 250 WO HCPCS

## 2024-07-13 PROCEDURE — 85652 RBC SED RATE AUTOMATED: CPT

## 2024-07-13 RX ORDER — HYDROXYZINE HYDROCHLORIDE 10 MG/1
10 TABLET, FILM COATED ORAL 3 TIMES DAILY PRN
Status: DISCONTINUED | OUTPATIENT
Start: 2024-07-13 | End: 2024-07-26 | Stop reason: HOSPADM

## 2024-07-13 RX ORDER — AMLODIPINE BESYLATE 5 MG/1
10 TABLET ORAL DAILY
Status: DISCONTINUED | OUTPATIENT
Start: 2024-07-13 | End: 2024-07-26 | Stop reason: HOSPADM

## 2024-07-13 RX ORDER — TIZANIDINE 2 MG/1
2 TABLET ORAL EVERY 8 HOURS PRN
Status: DISCONTINUED | OUTPATIENT
Start: 2024-07-13 | End: 2024-07-26 | Stop reason: HOSPADM

## 2024-07-13 RX ORDER — INSULIN LISPRO 100 [IU]/ML
0-4 INJECTION, SOLUTION INTRAVENOUS; SUBCUTANEOUS NIGHTLY
Status: DISCONTINUED | OUTPATIENT
Start: 2024-07-13 | End: 2024-07-26 | Stop reason: HOSPADM

## 2024-07-13 RX ORDER — LURASIDONE HYDROCHLORIDE 20 MG/1
20 TABLET, FILM COATED ORAL
Status: DISCONTINUED | OUTPATIENT
Start: 2024-07-13 | End: 2024-07-26 | Stop reason: HOSPADM

## 2024-07-13 RX ORDER — ONDANSETRON 2 MG/ML
4 INJECTION INTRAMUSCULAR; INTRAVENOUS EVERY 4 HOURS PRN
Status: DISCONTINUED | OUTPATIENT
Start: 2024-07-13 | End: 2024-07-13

## 2024-07-13 RX ORDER — ACETAMINOPHEN 325 MG/1
650 TABLET ORAL EVERY 4 HOURS PRN
Status: DISCONTINUED | OUTPATIENT
Start: 2024-07-13 | End: 2024-07-13

## 2024-07-13 RX ORDER — BUMETANIDE 1 MG/1
1 TABLET ORAL 2 TIMES DAILY
Status: DISCONTINUED | OUTPATIENT
Start: 2024-07-13 | End: 2024-07-23

## 2024-07-13 RX ORDER — SODIUM CHLORIDE 0.9 % (FLUSH) 0.9 %
5-40 SYRINGE (ML) INJECTION PRN
Status: DISCONTINUED | OUTPATIENT
Start: 2024-07-13 | End: 2024-07-26 | Stop reason: HOSPADM

## 2024-07-13 RX ORDER — ACETAMINOPHEN 650 MG/1
650 SUPPOSITORY RECTAL EVERY 6 HOURS PRN
Status: DISCONTINUED | OUTPATIENT
Start: 2024-07-13 | End: 2024-07-15

## 2024-07-13 RX ORDER — 0.9 % SODIUM CHLORIDE 0.9 %
1000 INTRAVENOUS SOLUTION INTRAVENOUS ONCE
Status: COMPLETED | OUTPATIENT
Start: 2024-07-13 | End: 2024-07-13

## 2024-07-13 RX ORDER — POLYETHYLENE GLYCOL 3350 17 G/17G
17 POWDER, FOR SOLUTION ORAL DAILY PRN
Status: DISCONTINUED | OUTPATIENT
Start: 2024-07-13 | End: 2024-07-17

## 2024-07-13 RX ORDER — SODIUM CHLORIDE 0.9 % (FLUSH) 0.9 %
5-40 SYRINGE (ML) INJECTION EVERY 12 HOURS SCHEDULED
Status: DISCONTINUED | OUTPATIENT
Start: 2024-07-13 | End: 2024-07-15

## 2024-07-13 RX ORDER — SODIUM CHLORIDE 9 MG/ML
INJECTION, SOLUTION INTRAVENOUS PRN
Status: DISCONTINUED | OUTPATIENT
Start: 2024-07-13 | End: 2024-07-15

## 2024-07-13 RX ORDER — ONDANSETRON 2 MG/ML
4 INJECTION INTRAMUSCULAR; INTRAVENOUS EVERY 6 HOURS PRN
Status: DISCONTINUED | OUTPATIENT
Start: 2024-07-13 | End: 2024-07-26 | Stop reason: HOSPADM

## 2024-07-13 RX ORDER — INSULIN LISPRO 100 [IU]/ML
0-8 INJECTION, SOLUTION INTRAVENOUS; SUBCUTANEOUS
Status: DISCONTINUED | OUTPATIENT
Start: 2024-07-13 | End: 2024-07-26 | Stop reason: HOSPADM

## 2024-07-13 RX ORDER — HYDROMORPHONE HYDROCHLORIDE 1 MG/ML
0.5 INJECTION, SOLUTION INTRAMUSCULAR; INTRAVENOUS; SUBCUTANEOUS EVERY 4 HOURS PRN
Status: DISCONTINUED | OUTPATIENT
Start: 2024-07-13 | End: 2024-07-16

## 2024-07-13 RX ORDER — ISOSORBIDE MONONITRATE 30 MG/1
30 TABLET, EXTENDED RELEASE ORAL DAILY
Status: DISCONTINUED | OUTPATIENT
Start: 2024-07-13 | End: 2024-07-26 | Stop reason: HOSPADM

## 2024-07-13 RX ORDER — GLUCAGON 1 MG/ML
1 KIT INJECTION PRN
Status: DISCONTINUED | OUTPATIENT
Start: 2024-07-13 | End: 2024-07-26 | Stop reason: HOSPADM

## 2024-07-13 RX ORDER — HYDRALAZINE HYDROCHLORIDE 20 MG/ML
5 INJECTION INTRAMUSCULAR; INTRAVENOUS EVERY 6 HOURS PRN
Status: DISCONTINUED | OUTPATIENT
Start: 2024-07-13 | End: 2024-07-26 | Stop reason: HOSPADM

## 2024-07-13 RX ORDER — HEPARIN SODIUM 5000 [USP'U]/ML
5000 INJECTION, SOLUTION INTRAVENOUS; SUBCUTANEOUS EVERY 8 HOURS SCHEDULED
Status: DISCONTINUED | OUTPATIENT
Start: 2024-07-13 | End: 2024-07-23

## 2024-07-13 RX ORDER — ACETAMINOPHEN 325 MG/1
650 TABLET ORAL EVERY 6 HOURS PRN
Status: DISCONTINUED | OUTPATIENT
Start: 2024-07-13 | End: 2024-07-15

## 2024-07-13 RX ORDER — LANOLIN ALCOHOL/MO/W.PET/CERES
3 CREAM (GRAM) TOPICAL NIGHTLY PRN
Status: DISCONTINUED | OUTPATIENT
Start: 2024-07-13 | End: 2024-07-26 | Stop reason: HOSPADM

## 2024-07-13 RX ORDER — MORPHINE SULFATE 4 MG/ML
4 INJECTION, SOLUTION INTRAMUSCULAR; INTRAVENOUS EVERY 4 HOURS PRN
Status: DISCONTINUED | OUTPATIENT
Start: 2024-07-13 | End: 2024-07-13

## 2024-07-13 RX ORDER — DEXTROSE MONOHYDRATE 100 MG/ML
INJECTION, SOLUTION INTRAVENOUS CONTINUOUS PRN
Status: DISCONTINUED | OUTPATIENT
Start: 2024-07-13 | End: 2024-07-26 | Stop reason: HOSPADM

## 2024-07-13 RX ORDER — INSULIN GLARGINE 100 [IU]/ML
20 INJECTION, SOLUTION SUBCUTANEOUS NIGHTLY
Status: DISCONTINUED | OUTPATIENT
Start: 2024-07-13 | End: 2024-07-19

## 2024-07-13 RX ORDER — CARVEDILOL 3.12 MG/1
1.56 TABLET ORAL 2 TIMES DAILY
Status: DISCONTINUED | OUTPATIENT
Start: 2024-07-13 | End: 2024-07-16

## 2024-07-13 RX ORDER — ASPIRIN 81 MG/1
81 TABLET ORAL DAILY
Status: DISCONTINUED | OUTPATIENT
Start: 2024-07-13 | End: 2024-07-26 | Stop reason: HOSPADM

## 2024-07-13 RX ORDER — BISACODYL 10 MG
10 SUPPOSITORY, RECTAL RECTAL DAILY PRN
Status: DISCONTINUED | OUTPATIENT
Start: 2024-07-13 | End: 2024-07-26 | Stop reason: HOSPADM

## 2024-07-13 RX ORDER — GABAPENTIN 300 MG/1
300 CAPSULE ORAL 3 TIMES DAILY
Status: DISCONTINUED | OUTPATIENT
Start: 2024-07-13 | End: 2024-07-25

## 2024-07-13 RX ORDER — HYDRALAZINE HYDROCHLORIDE 50 MG/1
50 TABLET, FILM COATED ORAL EVERY 8 HOURS SCHEDULED
Status: DISCONTINUED | OUTPATIENT
Start: 2024-07-13 | End: 2024-07-26 | Stop reason: HOSPADM

## 2024-07-13 RX ORDER — MORPHINE SULFATE 2 MG/ML
2 INJECTION, SOLUTION INTRAMUSCULAR; INTRAVENOUS EVERY 4 HOURS PRN
Status: DISCONTINUED | OUTPATIENT
Start: 2024-07-13 | End: 2024-07-13

## 2024-07-13 RX ORDER — MORPHINE SULFATE 4 MG/ML
4 INJECTION, SOLUTION INTRAMUSCULAR; INTRAVENOUS
Status: COMPLETED | OUTPATIENT
Start: 2024-07-13 | End: 2024-07-13

## 2024-07-13 RX ORDER — FERROUS SULFATE 325(65) MG
325 TABLET ORAL 2 TIMES DAILY WITH MEALS
Status: DISCONTINUED | OUTPATIENT
Start: 2024-07-13 | End: 2024-07-23

## 2024-07-13 RX ORDER — SENNOSIDES A AND B 8.6 MG/1
1 TABLET, FILM COATED ORAL NIGHTLY PRN
Status: DISCONTINUED | OUTPATIENT
Start: 2024-07-13 | End: 2024-07-17

## 2024-07-13 RX ORDER — OXYCODONE HYDROCHLORIDE AND ACETAMINOPHEN 5; 325 MG/1; MG/1
1 TABLET ORAL EVERY 4 HOURS PRN
Status: DISCONTINUED | OUTPATIENT
Start: 2024-07-13 | End: 2024-07-15

## 2024-07-13 RX ORDER — ONDANSETRON 4 MG/1
4 TABLET, ORALLY DISINTEGRATING ORAL EVERY 8 HOURS PRN
Status: DISCONTINUED | OUTPATIENT
Start: 2024-07-13 | End: 2024-07-26 | Stop reason: HOSPADM

## 2024-07-13 RX ORDER — CLONIDINE HYDROCHLORIDE 0.1 MG/1
0.2 TABLET ORAL EVERY 8 HOURS PRN
Status: DISCONTINUED | OUTPATIENT
Start: 2024-07-13 | End: 2024-07-26 | Stop reason: HOSPADM

## 2024-07-13 RX ADMIN — GABAPENTIN 300 MG: 300 CAPSULE ORAL at 21:35

## 2024-07-13 RX ADMIN — HEPARIN SODIUM 5000 UNITS: 5000 INJECTION INTRAVENOUS; SUBCUTANEOUS at 15:23

## 2024-07-13 RX ADMIN — BUMETANIDE 1 MG: 1 TABLET ORAL at 19:06

## 2024-07-13 RX ADMIN — SODIUM CHLORIDE: 9 INJECTION, SOLUTION INTRAVENOUS at 19:14

## 2024-07-13 RX ADMIN — ASPIRIN 81 MG: 81 TABLET, COATED ORAL at 15:18

## 2024-07-13 RX ADMIN — VANCOMYCIN HYDROCHLORIDE 2000 MG: 10 INJECTION, POWDER, LYOPHILIZED, FOR SOLUTION INTRAVENOUS at 15:20

## 2024-07-13 RX ADMIN — ISOSORBIDE MONONITRATE 30 MG: 30 TABLET, EXTENDED RELEASE ORAL at 15:17

## 2024-07-13 RX ADMIN — MORPHINE SULFATE 4 MG: 4 INJECTION INTRAVENOUS at 12:56

## 2024-07-13 RX ADMIN — LURASIDONE HYDROCHLORIDE 20 MG: 20 TABLET, FILM COATED ORAL at 21:53

## 2024-07-13 RX ADMIN — SODIUM CHLORIDE 1000 ML: 9 INJECTION, SOLUTION INTRAVENOUS at 12:54

## 2024-07-13 RX ADMIN — INSULIN LISPRO 4 UNITS: 100 INJECTION, SOLUTION INTRAVENOUS; SUBCUTANEOUS at 19:05

## 2024-07-13 RX ADMIN — HYDROMORPHONE HYDROCHLORIDE 0.5 MG: 1 INJECTION, SOLUTION INTRAMUSCULAR; INTRAVENOUS; SUBCUTANEOUS at 20:05

## 2024-07-13 RX ADMIN — PIPERACILLIN AND TAZOBACTAM 3375 MG: 3; .375 INJECTION, POWDER, LYOPHILIZED, FOR SOLUTION INTRAVENOUS at 19:15

## 2024-07-13 RX ADMIN — CARVEDILOL 1.56 MG: 3.12 TABLET, FILM COATED ORAL at 21:35

## 2024-07-13 RX ADMIN — HYDRALAZINE HYDROCHLORIDE 50 MG: 50 TABLET ORAL at 21:35

## 2024-07-13 RX ADMIN — AMLODIPINE BESYLATE 10 MG: 5 TABLET ORAL at 15:18

## 2024-07-13 RX ADMIN — HEPARIN SODIUM 5000 UNITS: 5000 INJECTION INTRAVENOUS; SUBCUTANEOUS at 21:35

## 2024-07-13 RX ADMIN — PIPERACILLIN AND TAZOBACTAM 3375 MG: 3; .375 INJECTION, POWDER, LYOPHILIZED, FOR SOLUTION INTRAVENOUS at 14:29

## 2024-07-13 RX ADMIN — GABAPENTIN 300 MG: 300 CAPSULE ORAL at 15:18

## 2024-07-13 RX ADMIN — OXYCODONE HYDROCHLORIDE AND ACETAMINOPHEN 1 TABLET: 5; 325 TABLET ORAL at 15:16

## 2024-07-13 RX ADMIN — INSULIN GLARGINE 20 UNITS: 100 INJECTION, SOLUTION SUBCUTANEOUS at 21:36

## 2024-07-13 RX ADMIN — FERROUS SULFATE TAB 325 MG (65 MG ELEMENTAL FE) 325 MG: 325 (65 FE) TAB at 19:06

## 2024-07-13 RX ADMIN — CARVEDILOL 1.56 MG: 3.12 TABLET, FILM COATED ORAL at 15:18

## 2024-07-13 RX ADMIN — SODIUM CHLORIDE, PRESERVATIVE FREE 10 ML: 5 INJECTION INTRAVENOUS at 21:36

## 2024-07-13 RX ADMIN — HYDRALAZINE HYDROCHLORIDE 50 MG: 50 TABLET ORAL at 15:17

## 2024-07-13 RX ADMIN — HYDRALAZINE HYDROCHLORIDE 5 MG: 20 INJECTION INTRAMUSCULAR; INTRAVENOUS at 15:22

## 2024-07-13 ASSESSMENT — PAIN DESCRIPTION - DESCRIPTORS
DESCRIPTORS: ACHING

## 2024-07-13 ASSESSMENT — PAIN DESCRIPTION - ORIENTATION
ORIENTATION: RIGHT
ORIENTATION: RIGHT
ORIENTATION: POSTERIOR
ORIENTATION: RIGHT
ORIENTATION: RIGHT

## 2024-07-13 ASSESSMENT — PAIN DESCRIPTION - FREQUENCY: FREQUENCY: CONTINUOUS

## 2024-07-13 ASSESSMENT — PAIN DESCRIPTION - LOCATION
LOCATION: FOOT

## 2024-07-13 ASSESSMENT — PAIN SCALES - GENERAL
PAINLEVEL_OUTOF10: 10
PAINLEVEL_OUTOF10: 6
PAINLEVEL_OUTOF10: 9
PAINLEVEL_OUTOF10: 6

## 2024-07-13 ASSESSMENT — PAIN DESCRIPTION - ONSET: ONSET: ON-GOING

## 2024-07-13 ASSESSMENT — PAIN DESCRIPTION - PAIN TYPE: TYPE: CHRONIC PAIN

## 2024-07-13 ASSESSMENT — PAIN - FUNCTIONAL ASSESSMENT: PAIN_FUNCTIONAL_ASSESSMENT: 0-10

## 2024-07-13 NOTE — CONSULTS
Known patient to me from previous encounters   Consulted for non healing recurrently infected right post op wound   Confirmed osteo of the stump 5th metatarsal with an open wound deep tracking infection in to the plantar left foot with a visible abscess mid foot  Spoke to patient in detail of local surgical treatment with further resection and I&D of the plantar foot with leaving part of the wound open for post op drainage and packing which will need his compliance with follow ups and care   Patient with hx of non compliance and aggressive attitude has a poor prognosis of complete healing     After discussion with him patient stated that he is tired of dealing with the foot \"I just want it gone and don't want to deal with the foot anymore and want it gone\"!    Advised patient that it is radical decision but I respect his decision and there is no more role for Podiatry.    Will consult vascular and sign off on this patient for now    Thank you for the consult

## 2024-07-13 NOTE — PROGRESS NOTES
1900: Bedside and Verbal shift change report given to BHAKTI Manzanares (oncoming nurse) by BHAKTI Ruiz (offgoing nurse). Report included the following information Nurse Handoff Report, Index, Adult Overview, Intake/Output, MAR, Recent Results, and Cardiac Rhythm NSR .     2005: PRN Dilaudid administered for R foot pain. See MAR.     0128: PRN Dilaudid administered for R foot pain. See MAR.     0626: PRN Dilaudid administered for R foot pain. See MAR  End of Shift Note    Bedside shift change report given to BHAKTI Ruiz (oncoming nurse) by Glenna Dumont RN (offgoing nurse).  Report included the following information SBAR, Kardex, Intake/Output, MAR, Recent Results, and Cardiac Rhythm NSR    Shift worked:  7p-7a     Shift summary and any significant changes:          Concerns for physician to address:       Zone phone for oncoming shift:          Glenna Dumont RN

## 2024-07-13 NOTE — ED PROVIDER NOTES
XR FOOT RIGHT (MIN 3 VIEWS)   Final Result   Concern for osteomyelitis involving the stump of the fifth   metatarsal amputation site.      Electronically signed by Fozia Fang           PROCEDURES   Unless otherwise noted below, none  Procedures     CRITICAL CARE TIME   none    EMERGENCY DEPARTMENT COURSE and DIFFERENTIAL DIAGNOSIS/MDM   Vitals:    Vitals:    07/13/24 1906 07/13/24 1930 07/13/24 2005 07/13/24 2135   BP: (!) 121/93 121/68  98/65   Pulse:  72  68   Resp:  19 19    Temp:  98.4 °F (36.9 °C)     TempSrc:  Oral     SpO2:  98%     Weight:       Height:            Patient was given the following medications:  Medications   sodium chloride flush 0.9 % injection 5-40 mL (10 mLs IntraVENous Given 7/13/24 2136)   sodium chloride flush 0.9 % injection 5-40 mL (has no administration in time range)   0.9 % sodium chloride infusion ( IntraVENous New Bag 7/13/24 1914)   ondansetron (ZOFRAN-ODT) disintegrating tablet 4 mg (has no administration in time range)     Or   ondansetron (ZOFRAN) injection 4 mg (has no administration in time range)   polyethylene glycol (GLYCOLAX) packet 17 g (has no administration in time range)   acetaminophen (TYLENOL) tablet 650 mg (has no administration in time range)     Or   acetaminophen (TYLENOL) suppository 650 mg (has no administration in time range)   heparin (porcine) injection 5,000 Units (5,000 Units SubCUTAneous Given 7/13/24 2135)   piperacillin-tazobactam (ZOSYN) 3,375 mg in sodium chloride 0.9 % 50 mL IVPB (mini-bag) (3,375 mg IntraVENous New Bag 7/13/24 1915)   oxyCODONE-acetaminophen (PERCOCET) 5-325 MG per tablet 1 tablet (1 tablet Oral Given 7/13/24 1516)   glucose chewable tablet 16 g (has no administration in time range)   dextrose bolus 10% 125 mL (has no administration in time range)     Or   dextrose bolus 10% 250 mL (has no administration in time range)   glucagon injection 1 mg (has no administration in time range)   dextrose 10 % infusion (has no  7/13/24 1325)   vancomycin (VANCOCIN) 2000 mg in 0.9% sodium chloride 500 mL IVPB (0 mg IntraVENous Stopped 7/13/24 1725)   piperacillin-tazobactam (ZOSYN) 3,375 mg in sodium chloride 0.9 % 50 mL IVPB (mini-bag) (0 mg IntraVENous Stopped 7/13/24 1459)       Medical Decision Making  59-year-old male with a history of bipolar disorder, diabetes, hypertension, chronic pain, who presents with a chief complaint of progressively worsening pain in his right foot as well as a wound to the right foot.  Patient states he had amputation of the toe about a month ago but has been unable to follow-up because he does not have any access to transportation.  He reports the last 2 weeks the pain has gotten progressively worse.  He has not called his podiatrist about this.  He states that he is concerned that the foot is now infected again.  He also has had some intermittent nausea and vomiting for the last 2 weeks.  He does have a history of diabetes and states that his blood sugar has been in the 300s.  No chest pain, shortness of breath, fever    Podiatry: Wiley    On exam patient is nontoxic-appearing, afebrile, hypertensive but otherwise hemodynamically stable.  He has a wound over the right lateral foot at the site of fifth toe amputation that appears dehisced with sutures still in place.  Surrounding erythema and necrotic tissue with mild foot swelling and erythema noted.  Concern for worsening infection, osteomyelitis at site of wound dehiscence.    Will check basic lab work to rule out severe electrolyte derangements or anemia.   Will check inflammatory markers  Will check XR to r/o osteomyelitis    Problems Addressed:  Other acute osteomyelitis of right foot (HCC): acute illness or injury    Amount and/or Complexity of Data Reviewed  Labs: ordered. Decision-making details documented in ED Course.  Radiology: ordered. Decision-making details documented in ED Course.    Risk  Prescription drug management.  Decision

## 2024-07-13 NOTE — PROGRESS NOTES
Pharmacy Antimicrobial Kinetic Dosing    Indication for Antimicrobials: Bone and joint infection (right foot with unstagable wound)    Current Regimen of Each Antimicrobial:  Vancomycin 2g X1, then 750 mg q 18 hours; Start Date ; Day # 1  Zosyn 3.375 g q 8 h (Start Date ; Day # 1    Previous Antimicrobial Therapy:        Goal Level: Vancomycin -600    Date Dose & Interval Measured (mcg/mL) Predicted AUC                       Significant Cultures:   : Blood cx: pending    Labs:  Recent Labs     Units 24  1247   CREATININE MG/DL 2.33*   BUN MG/DL 36*   WBC K/uL 8.8     Temp (24hrs), Av.4 °F (36.9 °C), Min:98.4 °F (36.9 °C), Max:98.4 °F (36.9 °C)      Conditions for Dosing Consideration:  KIN    Creatinine Clearance (mL/min): Estimated Creatinine Clearance: 37 mL/min (A) (based on SCr of 2.33 mg/dL (H)).       Impression/Plan:   Vancomycin dosed for an estimated AUC of 507/16.1  Continue with zosyn  Vancomycin level   Antimicrobial stop date pending     Pharmacy will follow daily and adjust medications as appropriate for renal function and/or serum levels.    Thank you,  Dominguez Simpson [facility]

## 2024-07-13 NOTE — H&P
Hospitalist Admission Note    NAME:   Keaton Van   : 1965   MRN: [id number]     Date/Time: 2024 1:55 PM    Patient PCP: Robert Wells PA-C    ______________________________________________________________________  Given the patient's current clinical presentation, I have a high level of concern for decompensation if discharged from the emergency department.  Complex decision making was performed, which includes reviewing the patient's available past medical records, laboratory results, and x-ray films.       My assessment of this patient's clinical condition and my plan of care is as follows.    Assessment / Plan:    Right foot 5th toe stump osteomyelitis via XR  S/p resection w Dr Jama on 24  Wound with necrosis, dehiscence  Right leg w edema, redness, suspect cellulitis  Hx prior toe amputations left foot.  PVD.  Admit to stepdown level of care  Continue vanco and zosyn  Pharmacy assistance with vanco dosing appreciated  Follow blood cultures  Does not meet sepsis criteria  Podiatry consulted  Wound care consulted  Pain management--PRN percocet for mod pain, dilaudid for severe pain  Resuming home gabapentin also    Hypertensive urgency -- asymptomatic  2/2 medication noncompliance, pain also likely a factor  Chronic diastolic HF not in exacerbation  Paroxysmal Atrial fibrillation on last admission, currently SR  175/99, up to 225/110 in ER, improved to 181/110 after IV hydralazine  Pain mgmt  Resume home amlodipine, carvedilol (low dose d/t cocaine abuse), hydralazine, imdur, bumex  PRN IV hydralazine for BP > 170/100  24 Echo - EF 60-65%, mild MVR, mild pulm HTN     Uncontrolled DM2  Accuchecks ACHS  Resume home basal insulin  Sliding scale insulin  Monitor for s/s hypo/hyperglycemia  Hypoglycemia treatment per protocol  Diabetes management consulted   A1C 6.7%    CKD4  Cr 2.33, this is improved from prior 3.7  Monitor BUN/Creat  Avoid known nephrotoxic agents  Replete  RETROPERITONEAL COMPLETE    Result Date: 6/21/2024  EXAM:  US RETROPERITONEAL COMPLETE INDICATION: arf COMPARISON: CT chest abdomen pelvis 6/13/2024 TECHNIQUE: Real-time sonography of the kidneys, retroperitoneum and bladder was performed with multiple static images obtained. FINDINGS: RIGHT KIDNEY: The right kidney measures 11.5 cm. The right kidney has normal echogenicity. No evidence of contour deforming mass. No hydronephrosis. LEFT KIDNEY: The left kidney measures 12.2 cm. The left kidney has normal echogenicity. No evidence of contour deforming mass. No hydronephrosis. RETROPERITONEUM: The aorta is not well seen due to overlying bowel gas. The IVC is normal. BLADDER: The urinary bladder is normal. Other: Partially visualized large right pleural effusion.     1. No hydronephrosis. 2. Partially visualized large right pleural effusion. Electronically signed by Vaibhav Esparza    CT HEAD WO CONTRAST    Result Date: 6/19/2024  EXAM:  CT HEAD WO CONTRAST INDICATION:   to assess change in mental status COMPARISON: CT head 6/13/2024. TECHNIQUE: Unenhanced CT of the head was performed using 5 mm images. Brain and bone windows were generated.  CT dose reduction was achieved through use of a standardized protocol tailored for this examination and automatic exposure control for dose modulation. FINDINGS: The ventricles are normal in size and position. Basilar cisterns are patent. No midline shift. There is no evidence of acute infarct, hemorrhage, or extraaxial fluid collection. Mild mucosal thickening in the right posterior ethmoidal air cells. The mastoid air cells and middle ears are clear. The orbital contents are within normal limits.  There are no significant osseous or extracranial soft tissue lesions.     1. No evidence of acute intracranial abnormality. Electronically signed by Vaibhav Esparza    XR CHEST PORTABLE    Result Date: 6/19/2024  INDICATION: Hyperglycemia  EXAM:  AP CHEST RADIOGRAPH COMPARISON: 6/12/2024

## 2024-07-13 NOTE — ED NOTES
Patient was seen in pre-op. I have reviewed the history and physical.  I have examined the patient today. There are no changes noted from the H & P available in chart. I have reviewed the notes by Dr. Isabela Paris and spoken with the patient in detail. He complains of right-sided pain. He has no ulcers. The patient is very minimally mobile. As per discussion with Dr. Isabela Paris we will proceed with angiogram via left percutaneous approach to evaluate the right side. Risks and benefits of angiogram and possible intervention discussed in detail. The risks include but are not limited to need for open surgery, dissection, pseudoaneurysm. RN assumed care of pt, per triage note:     Foot Pain (Pt BIBEMS after pt had R pinkie toe amputated one month ago and has not followed up. Pt states he doesn't have means to get out and get it looked it. R foot has unstagable wound to the side of right foot. )

## 2024-07-13 NOTE — PROGRESS NOTES
1530: TRANSFER - IN REPORT:    Verbal report received from BHAKTI Soler on Keaton Van  being received from ED for routine progression of patient care      Report consisted of patient's Situation, Background, Assessment and   Recommendations(SBAR).     Information from the following report(s) Nurse Handoff Report, ED Encounter Summary, ED SBAR, Adult Overview, Surgery Report, Intake/Output, MAR, and Recent Results was reviewed with the receiving nurse.    Opportunity for questions and clarification was provided.      Assessment completed upon patient's arrival to unit and care assumed.     1900: Bedside and Verbal shift change report given to BHAKTI Manzanares (oncoming nurse) by Sara Barton RN (offgoing nurse). Report included the following information Nurse Handoff Report, ED SBAR, Adult Overview, Intake/Output, MAR, and Recent Results.

## 2024-07-14 LAB
AMORPH CRY URNS QL MICRO: ABNORMAL
AMPHET UR QL SCN: NEGATIVE
ANION GAP SERPL CALC-SCNC: 3 MMOL/L (ref 5–15)
APPEARANCE UR: ABNORMAL
BACTERIA URNS QL MICRO: NEGATIVE /HPF
BARBITURATES UR QL SCN: NEGATIVE
BASOPHILS # BLD: 0 K/UL (ref 0–0.1)
BASOPHILS NFR BLD: 1 % (ref 0–1)
BENZODIAZ UR QL: NEGATIVE
BILIRUB UR QL: NEGATIVE
BUN SERPL-MCNC: 40 MG/DL (ref 6–20)
BUN/CREAT SERPL: 15 (ref 12–20)
CALCIUM SERPL-MCNC: 8.1 MG/DL (ref 8.5–10.1)
CANNABINOIDS UR QL SCN: NEGATIVE
CHLORIDE SERPL-SCNC: 101 MMOL/L (ref 97–108)
CO2 SERPL-SCNC: 28 MMOL/L (ref 21–32)
COCAINE UR QL SCN: POSITIVE
COLOR UR: ABNORMAL
CREAT SERPL-MCNC: 2.72 MG/DL (ref 0.7–1.3)
DIFFERENTIAL METHOD BLD: ABNORMAL
EOSINOPHIL # BLD: 0.2 K/UL (ref 0–0.4)
EOSINOPHIL NFR BLD: 4 % (ref 0–7)
EPITH CASTS URNS QL MICRO: ABNORMAL /LPF
ERYTHROCYTE [DISTWIDTH] IN BLOOD BY AUTOMATED COUNT: 16.5 % (ref 11.5–14.5)
GLUCOSE BLD STRIP.AUTO-MCNC: 140 MG/DL (ref 65–117)
GLUCOSE BLD STRIP.AUTO-MCNC: 280 MG/DL (ref 65–117)
GLUCOSE BLD STRIP.AUTO-MCNC: 313 MG/DL (ref 65–117)
GLUCOSE BLD STRIP.AUTO-MCNC: 376 MG/DL (ref 65–117)
GLUCOSE SERPL-MCNC: 290 MG/DL (ref 65–100)
GLUCOSE UR STRIP.AUTO-MCNC: 500 MG/DL
GRAN CASTS URNS QL MICRO: ABNORMAL /LPF
HCT VFR BLD AUTO: 23.1 % (ref 36.6–50.3)
HGB BLD-MCNC: 7.3 G/DL (ref 12.1–17)
HGB UR QL STRIP: ABNORMAL
HYALINE CASTS URNS QL MICRO: ABNORMAL /LPF (ref 0–5)
IMM GRANULOCYTES # BLD AUTO: 0 K/UL (ref 0–0.04)
IMM GRANULOCYTES NFR BLD AUTO: 1 % (ref 0–0.5)
KETONES UR QL STRIP.AUTO: NEGATIVE MG/DL
LEUKOCYTE ESTERASE UR QL STRIP.AUTO: ABNORMAL
LYMPHOCYTES # BLD: 0.6 K/UL (ref 0.8–3.5)
LYMPHOCYTES NFR BLD: 12 % (ref 12–49)
Lab: ABNORMAL
MCH RBC QN AUTO: 25.9 PG (ref 26–34)
MCHC RBC AUTO-ENTMCNC: 31.6 G/DL (ref 30–36.5)
MCV RBC AUTO: 81.9 FL (ref 80–99)
METHADONE UR QL: NEGATIVE
MONOCYTES # BLD: 0.4 K/UL (ref 0–1)
MONOCYTES NFR BLD: 9 % (ref 5–13)
NEUTS SEG # BLD: 3.5 K/UL (ref 1.8–8)
NEUTS SEG NFR BLD: 73 % (ref 32–75)
NITRITE UR QL STRIP.AUTO: NEGATIVE
NRBC # BLD: 0 K/UL (ref 0–0.01)
NRBC BLD-RTO: 0 PER 100 WBC
OPIATES UR QL: POSITIVE
PCP UR QL: NEGATIVE
PH UR STRIP: 5 (ref 5–8)
PLATELET # BLD AUTO: 119 K/UL (ref 150–400)
PMV BLD AUTO: 9.2 FL (ref 8.9–12.9)
POTASSIUM SERPL-SCNC: 4.3 MMOL/L (ref 3.5–5.1)
PROT UR STRIP-MCNC: 300 MG/DL
RBC # BLD AUTO: 2.82 M/UL (ref 4.1–5.7)
RBC #/AREA URNS HPF: ABNORMAL /HPF (ref 0–5)
RBC MORPH BLD: ABNORMAL
SERVICE CMNT-IMP: ABNORMAL
SODIUM SERPL-SCNC: 132 MMOL/L (ref 136–145)
SP GR UR REFRACTOMETRY: 1.02
URINE CULTURE IF INDICATED: ABNORMAL
UROBILINOGEN UR QL STRIP.AUTO: 0.2 EU/DL (ref 0.2–1)
WBC # BLD AUTO: 4.7 K/UL (ref 4.1–11.1)
WBC URNS QL MICRO: >100 /HPF (ref 0–4)
YEAST URNS QL MICRO: PRESENT

## 2024-07-14 PROCEDURE — 2500000003 HC RX 250 WO HCPCS

## 2024-07-14 PROCEDURE — 6370000000 HC RX 637 (ALT 250 FOR IP): Performed by: STUDENT IN AN ORGANIZED HEALTH CARE EDUCATION/TRAINING PROGRAM

## 2024-07-14 PROCEDURE — 2580000003 HC RX 258: Performed by: STUDENT IN AN ORGANIZED HEALTH CARE EDUCATION/TRAINING PROGRAM

## 2024-07-14 PROCEDURE — 6360000002 HC RX W HCPCS

## 2024-07-14 PROCEDURE — 81001 URINALYSIS AUTO W/SCOPE: CPT

## 2024-07-14 PROCEDURE — 6360000002 HC RX W HCPCS: Performed by: STUDENT IN AN ORGANIZED HEALTH CARE EDUCATION/TRAINING PROGRAM

## 2024-07-14 PROCEDURE — 80307 DRUG TEST PRSMV CHEM ANLYZR: CPT

## 2024-07-14 PROCEDURE — 87086 URINE CULTURE/COLONY COUNT: CPT

## 2024-07-14 PROCEDURE — 2580000003 HC RX 258

## 2024-07-14 PROCEDURE — 6370000000 HC RX 637 (ALT 250 FOR IP)

## 2024-07-14 PROCEDURE — 2060000000 HC ICU INTERMEDIATE R&B

## 2024-07-14 PROCEDURE — 36415 COLL VENOUS BLD VENIPUNCTURE: CPT

## 2024-07-14 PROCEDURE — 85025 COMPLETE CBC W/AUTO DIFF WBC: CPT

## 2024-07-14 PROCEDURE — 82962 GLUCOSE BLOOD TEST: CPT

## 2024-07-14 PROCEDURE — 80048 BASIC METABOLIC PNL TOTAL CA: CPT

## 2024-07-14 RX ORDER — INSULIN GLARGINE 100 [IU]/ML
10 INJECTION, SOLUTION SUBCUTANEOUS DAILY
Status: DISCONTINUED | OUTPATIENT
Start: 2024-07-14 | End: 2024-07-19

## 2024-07-14 RX ADMIN — HYDROMORPHONE HYDROCHLORIDE 0.5 MG: 1 INJECTION, SOLUTION INTRAMUSCULAR; INTRAVENOUS; SUBCUTANEOUS at 22:45

## 2024-07-14 RX ADMIN — HYDRALAZINE HYDROCHLORIDE 50 MG: 50 TABLET ORAL at 21:59

## 2024-07-14 RX ADMIN — HYDROMORPHONE HYDROCHLORIDE 0.5 MG: 1 INJECTION, SOLUTION INTRAMUSCULAR; INTRAVENOUS; SUBCUTANEOUS at 18:36

## 2024-07-14 RX ADMIN — HYDRALAZINE HYDROCHLORIDE 50 MG: 50 TABLET ORAL at 06:16

## 2024-07-14 RX ADMIN — ASPIRIN 81 MG: 81 TABLET, COATED ORAL at 09:38

## 2024-07-14 RX ADMIN — SODIUM CHLORIDE, PRESERVATIVE FREE 10 ML: 5 INJECTION INTRAVENOUS at 10:39

## 2024-07-14 RX ADMIN — SODIUM CHLORIDE: 9 INJECTION, SOLUTION INTRAVENOUS at 14:46

## 2024-07-14 RX ADMIN — GABAPENTIN 300 MG: 300 CAPSULE ORAL at 21:59

## 2024-07-14 RX ADMIN — GABAPENTIN 300 MG: 300 CAPSULE ORAL at 14:27

## 2024-07-14 RX ADMIN — CARVEDILOL 1.56 MG: 3.12 TABLET, FILM COATED ORAL at 21:59

## 2024-07-14 RX ADMIN — VANCOMYCIN HYDROCHLORIDE 750 MG: 750 INJECTION, POWDER, LYOPHILIZED, FOR SOLUTION INTRAVENOUS at 10:40

## 2024-07-14 RX ADMIN — INSULIN LISPRO 4 UNITS: 100 INJECTION, SOLUTION INTRAVENOUS; SUBCUTANEOUS at 22:46

## 2024-07-14 RX ADMIN — SODIUM CHLORIDE, PRESERVATIVE FREE 10 ML: 5 INJECTION INTRAVENOUS at 22:05

## 2024-07-14 RX ADMIN — INSULIN GLARGINE 10 UNITS: 100 INJECTION, SOLUTION SUBCUTANEOUS at 09:39

## 2024-07-14 RX ADMIN — PIPERACILLIN AND TAZOBACTAM 3375 MG: 3; .375 INJECTION, POWDER, LYOPHILIZED, FOR SOLUTION INTRAVENOUS at 04:14

## 2024-07-14 RX ADMIN — INSULIN LISPRO 4 UNITS: 100 INJECTION, SOLUTION INTRAVENOUS; SUBCUTANEOUS at 16:56

## 2024-07-14 RX ADMIN — CARVEDILOL 1.56 MG: 3.12 TABLET, FILM COATED ORAL at 09:40

## 2024-07-14 RX ADMIN — PIPERACILLIN AND TAZOBACTAM 3375 MG: 3; .375 INJECTION, POWDER, LYOPHILIZED, FOR SOLUTION INTRAVENOUS at 14:50

## 2024-07-14 RX ADMIN — HYDRALAZINE HYDROCHLORIDE 50 MG: 50 TABLET ORAL at 14:24

## 2024-07-14 RX ADMIN — INSULIN GLARGINE 20 UNITS: 100 INJECTION, SOLUTION SUBCUTANEOUS at 22:00

## 2024-07-14 RX ADMIN — AMLODIPINE BESYLATE 10 MG: 5 TABLET ORAL at 09:40

## 2024-07-14 RX ADMIN — BUMETANIDE 1 MG: 1 TABLET ORAL at 09:39

## 2024-07-14 RX ADMIN — BUMETANIDE 1 MG: 1 TABLET ORAL at 16:55

## 2024-07-14 RX ADMIN — HEPARIN SODIUM 5000 UNITS: 5000 INJECTION INTRAVENOUS; SUBCUTANEOUS at 06:16

## 2024-07-14 RX ADMIN — HYDROMORPHONE HYDROCHLORIDE 0.5 MG: 1 INJECTION, SOLUTION INTRAMUSCULAR; INTRAVENOUS; SUBCUTANEOUS at 06:26

## 2024-07-14 RX ADMIN — PIPERACILLIN AND TAZOBACTAM 3375 MG: 3; .375 INJECTION, POWDER, LYOPHILIZED, FOR SOLUTION INTRAVENOUS at 22:47

## 2024-07-14 RX ADMIN — ISOSORBIDE MONONITRATE 30 MG: 30 TABLET, EXTENDED RELEASE ORAL at 09:39

## 2024-07-14 RX ADMIN — HYDROMORPHONE HYDROCHLORIDE 0.5 MG: 1 INJECTION, SOLUTION INTRAMUSCULAR; INTRAVENOUS; SUBCUTANEOUS at 01:28

## 2024-07-14 RX ADMIN — HEPARIN SODIUM 5000 UNITS: 5000 INJECTION INTRAVENOUS; SUBCUTANEOUS at 21:59

## 2024-07-14 RX ADMIN — HEPARIN SODIUM 5000 UNITS: 5000 INJECTION INTRAVENOUS; SUBCUTANEOUS at 14:26

## 2024-07-14 RX ADMIN — SODIUM CHLORIDE: 9 INJECTION, SOLUTION INTRAVENOUS at 10:39

## 2024-07-14 RX ADMIN — FERROUS SULFATE TAB 325 MG (65 MG ELEMENTAL FE) 325 MG: 325 (65 FE) TAB at 16:56

## 2024-07-14 RX ADMIN — INSULIN LISPRO 8 UNITS: 100 INJECTION, SOLUTION INTRAVENOUS; SUBCUTANEOUS at 09:38

## 2024-07-14 RX ADMIN — GABAPENTIN 300 MG: 300 CAPSULE ORAL at 09:39

## 2024-07-14 RX ADMIN — FERROUS SULFATE TAB 325 MG (65 MG ELEMENTAL FE) 325 MG: 325 (65 FE) TAB at 09:40

## 2024-07-14 RX ADMIN — LURASIDONE HYDROCHLORIDE 20 MG: 20 TABLET, FILM COATED ORAL at 16:55

## 2024-07-14 RX ADMIN — HYDROMORPHONE HYDROCHLORIDE 0.5 MG: 1 INJECTION, SOLUTION INTRAMUSCULAR; INTRAVENOUS; SUBCUTANEOUS at 10:27

## 2024-07-14 ASSESSMENT — PAIN DESCRIPTION - ORIENTATION
ORIENTATION: LEFT
ORIENTATION: RIGHT
ORIENTATION: LOWER

## 2024-07-14 ASSESSMENT — PAIN DESCRIPTION - LOCATION
LOCATION: LEG
LOCATION: FOOT
LOCATION: LEG
LOCATION: FOOT
LOCATION: FOOT

## 2024-07-14 ASSESSMENT — PAIN SCALES - GENERAL
PAINLEVEL_OUTOF10: 9
PAINLEVEL_OUTOF10: 9
PAINLEVEL_OUTOF10: 7
PAINLEVEL_OUTOF10: 3
PAINLEVEL_OUTOF10: 9
PAINLEVEL_OUTOF10: 6
PAINLEVEL_OUTOF10: 6
PAINLEVEL_OUTOF10: 9
PAINLEVEL_OUTOF10: 8
PAINLEVEL_OUTOF10: 6

## 2024-07-14 ASSESSMENT — PAIN DESCRIPTION - DESCRIPTORS
DESCRIPTORS: ACHING

## 2024-07-14 ASSESSMENT — PAIN SCALES - WONG BAKER: WONGBAKER_NUMERICALRESPONSE: HURTS A LITTLE BIT
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
## 2024-07-14 NOTE — PLAN OF CARE
Problem: Discharge Planning  Goal: Discharge to home or other facility with appropriate resources  7/13/2024 2219 by Glenna Dumont, RN  Outcome: Progressing  7/13/2024 1707 by Sara Barton RN  Outcome: Progressing     Problem: Pain  Goal: Verbalizes/displays adequate comfort level or baseline comfort level  7/13/2024 2219 by Glenna Dumont, RN  Outcome: Progressing  7/13/2024 1707 by Sara Barton RN  Outcome: Progressing     Problem: Safety - Adult  Goal: Free from fall injury  Outcome: Progressing

## 2024-07-14 NOTE — CONSULTS
Comprehensive Nutrition Assessment    Type and Reason for Visit:  Initial, Consult    Nutrition Recommendations/Plan:   Continue current diet. RD to add Glucerna and Elder 1x/day.  Please monitor and record intake in flowsheets.  Consider adding daily MVI.     Malnutrition Assessment:  Malnutrition Status:  At risk for malnutrition (Comment) (07/14/24 1013)    Context:  Acute Illness     Findings of the 6 clinical characteristics of malnutrition:  Energy Intake:  Unable to assess  Weight Loss:  5% over 1 month     Body Fat Loss:  Unable to assess     Muscle Mass Loss:  Unable to assess    Fluid Accumulation:  Mild     Strength:  Not Performed    Nutrition Assessment:    Keaton Van is a 59 y.o. male with PMHx significant for bipolar I, depression/anxiety, polysubstance abuse particularly crack cocaine, medication noncompliance, uncontrolled DM2, HCV, progressive CKD4, chronic bilateral foot wounds. He has been admitted at [facility] prior times this year (5/29-6/5, 6/7-6/9, 6/12-6/17, and 6/19-6/24). Review of these records reveals he has been treated for right 5th toe osteomyelitis s/p resection on 6/1 by Dr Jama, with hospitalizations complicated by pAF, worsening CKD, chronic anemia, acute tomasa vs psychosis, metabolic encephalopathy. He is also known to leave AMA, and does not follow up as directed.  Pt lives alone, doesn't drive, reports he has no money or way to get his prescriptions or to get to his follow up appointments.  Per CM notes, does not qualify for LTC services.  He was most recently Dc'd to [facility] on 6/24, left AMA 6/30. Consult received for poor intake and wounds. Receiving a lower extremity amputation on tuesday. RD attempted to speak to the patient this morning but didn't recieve an answer. Per EMR he's had a 13% wt loss in 1 month (recent wt method not stated). Will add glucerna and Elder 1x per day to supplement wound healing. Encourage good protein intake. Would consider

## 2024-07-14 NOTE — PROGRESS NOTES
0700: Bedside and Verbal shift change report given to Sara Barton RN (oncoming nurse) by BHAKTI Manzanares (offgoing nurse). Report included the following information Nurse Handoff Report, Adult Overview, Intake/Output, MAR, and Recent Results.     0834:  MD Ravin notified    0938: 8 units of lispro administered along with 10 of Lantus per MD Ravin.    1900: Bedside and Verbal shift change report given to BHAKTI Lees (oncoming nurse) by Sara Barton RN (offgoing nurse). Report included the following information Nurse Handoff Report, Adult Overview, Intake/Output, MAR, and Recent Results.

## 2024-07-14 NOTE — PLAN OF CARE
Hospitalist Progress Note    NAME:   Keaton Van   : 1965   MRN: [id number]     Date/Time: 2024 1:43 PM  Patient PCP: Robert Wells PA-C    Estimated discharge date:   Barriers: Right BKA      Assessment / Plan:    Right foot 5th toe stump osteomyelitis via XR  S/p resection w Dr Jama on 24  Wound with necrosis, dehiscence  Right leg w edema, redness, suspect cellulitis  Hx prior toe amputations left foot.  PVD.  Continue vanco and zosyn  Follow blood cultures  Does not meet sepsis criteria  Podiatry consulted.  Patient refused debridement.  Vascular surgery consulted.  Patient is scheduled to undergo right below-knee amputation on 2024.  Wound care consulted       Hypertensive urgency -- asymptomatic  2/2 medication noncompliance, pain also likely a factor  Chronic diastolic HF not in exacerbation  Paroxysmal Atrial fibrillation on last admission, currently SR  Resume home amlodipine, carvedilol (low dose d/t cocaine abuse), hydralazine, imdur, bumex     Uncontrolled DM2  Patient is on long-acting insulin 10 units in the morning and 20 units at bedtime.  Continue sliding scale insulin.     CKD4  Cr 2.33, this is improved from prior 3.7  Appears to be at baseline.  Continue to monitor.     Chronic anemia, likely YENNY +/- anemia of CKD  Hemoglobin 7.3.  Continue to monitor.  Transfuse for hemoglobin less than 7.     Bipolar I  Polysubstance abuse incl heavy crack cocaine use  Chornic pain  Medication noncompliance, financial and social barriers  Hx untreated HCV    Medical Decision Making:   I personally reviewed labs: cbc, bmp  I personally reviewed imaging: XR  I personally reviewed EKG:  Toxic drug monitoring: Vanco  Discussed case with:     Code Status: DNR  DVT Prophylaxis:   GI Prophylaxis:    Subjective:     Vascular surgery recommendations noted.  Podiatry recommendations noted.  Adjustment made to insulin regimen for better blood glucose control.  Continue broad-spectrum  summation of old records today    NO  Reviewed patient's current orders and MAR    YES  PMH/SH reviewed - no change compared to H&P    Procedures: see electronic medical records for all procedures/Xrays and details which were not copied into this note but were reviewed prior to creation of Plan.      LABS:  I reviewed today's most current labs and imaging studies.  Pertinent labs include:  Recent Labs     07/13/24  1247 07/14/24  0420   WBC 8.8 4.7   HGB 9.6* 7.3*   HCT 28.9* 23.1*    119*     Recent Labs     07/13/24  1247 07/14/24  0420   * 132*   K 4.3 4.3    101   CO2 27 28   GLUCOSE 306* 290*   BUN 36* 40*   CREATININE 2.33* 2.72*   CALCIUM 9.3 8.1*   BILITOT 0.5  --    AST 21  --    ALT 19  --        Signed: Nino Alicia MD

## 2024-07-15 ENCOUNTER — ANESTHESIA EVENT (OUTPATIENT)
Facility: HOSPITAL | Age: 59
End: 2024-07-15
Payer: MEDICARE

## 2024-07-15 LAB
ANION GAP SERPL CALC-SCNC: 4 MMOL/L (ref 5–15)
BACTERIA SPEC CULT: NORMAL
BASOPHILS # BLD: 0 K/UL (ref 0–0.1)
BASOPHILS NFR BLD: 1 % (ref 0–1)
BUN SERPL-MCNC: 55 MG/DL (ref 6–20)
BUN/CREAT SERPL: 19 (ref 12–20)
CALCIUM SERPL-MCNC: 8.1 MG/DL (ref 8.5–10.1)
CHLORIDE SERPL-SCNC: 102 MMOL/L (ref 97–108)
CO2 SERPL-SCNC: 29 MMOL/L (ref 21–32)
CREAT SERPL-MCNC: 2.92 MG/DL (ref 0.7–1.3)
DIFFERENTIAL METHOD BLD: ABNORMAL
EOSINOPHIL # BLD: 0.2 K/UL (ref 0–0.4)
EOSINOPHIL NFR BLD: 5 % (ref 0–7)
ERYTHROCYTE [DISTWIDTH] IN BLOOD BY AUTOMATED COUNT: 16.4 % (ref 11.5–14.5)
GLUCOSE BLD STRIP.AUTO-MCNC: 138 MG/DL (ref 65–117)
GLUCOSE BLD STRIP.AUTO-MCNC: 162 MG/DL (ref 65–117)
GLUCOSE BLD STRIP.AUTO-MCNC: 210 MG/DL (ref 65–117)
GLUCOSE BLD STRIP.AUTO-MCNC: 314 MG/DL (ref 65–117)
GLUCOSE SERPL-MCNC: 187 MG/DL (ref 65–100)
HCT VFR BLD AUTO: 22.3 % (ref 36.6–50.3)
HGB BLD-MCNC: 7 G/DL (ref 12.1–17)
HISTORY CHECK: NORMAL
IMM GRANULOCYTES # BLD AUTO: 0 K/UL (ref 0–0.04)
IMM GRANULOCYTES NFR BLD AUTO: 0 % (ref 0–0.5)
LYMPHOCYTES # BLD: 0.7 K/UL (ref 0.8–3.5)
LYMPHOCYTES NFR BLD: 15 % (ref 12–49)
MCH RBC QN AUTO: 25.5 PG (ref 26–34)
MCHC RBC AUTO-ENTMCNC: 31.4 G/DL (ref 30–36.5)
MCV RBC AUTO: 81.4 FL (ref 80–99)
MONOCYTES # BLD: 0.5 K/UL (ref 0–1)
MONOCYTES NFR BLD: 10 % (ref 5–13)
NEUTS SEG # BLD: 3.2 K/UL (ref 1.8–8)
NEUTS SEG NFR BLD: 69 % (ref 32–75)
NRBC # BLD: 0 K/UL (ref 0–0.01)
NRBC BLD-RTO: 0 PER 100 WBC
PLATELET # BLD AUTO: 126 K/UL (ref 150–400)
PMV BLD AUTO: 9.1 FL (ref 8.9–12.9)
POTASSIUM SERPL-SCNC: 4.7 MMOL/L (ref 3.5–5.1)
RBC # BLD AUTO: 2.74 M/UL (ref 4.1–5.7)
SERVICE CMNT-IMP: ABNORMAL
SERVICE CMNT-IMP: NORMAL
SODIUM SERPL-SCNC: 135 MMOL/L (ref 136–145)
VANCOMYCIN SERPL-MCNC: 18.4 UG/ML
WBC # BLD AUTO: 4.6 K/UL (ref 4.1–11.1)

## 2024-07-15 PROCEDURE — 2500000003 HC RX 250 WO HCPCS

## 2024-07-15 PROCEDURE — 6370000000 HC RX 637 (ALT 250 FOR IP)

## 2024-07-15 PROCEDURE — 86901 BLOOD TYPING SEROLOGIC RH(D): CPT

## 2024-07-15 PROCEDURE — 86923 COMPATIBILITY TEST ELECTRIC: CPT

## 2024-07-15 PROCEDURE — 99221 1ST HOSP IP/OBS SF/LOW 40: CPT

## 2024-07-15 PROCEDURE — 2580000003 HC RX 258: Performed by: STUDENT IN AN ORGANIZED HEALTH CARE EDUCATION/TRAINING PROGRAM

## 2024-07-15 PROCEDURE — 82962 GLUCOSE BLOOD TEST: CPT

## 2024-07-15 PROCEDURE — 6360000002 HC RX W HCPCS: Performed by: STUDENT IN AN ORGANIZED HEALTH CARE EDUCATION/TRAINING PROGRAM

## 2024-07-15 PROCEDURE — 6370000000 HC RX 637 (ALT 250 FOR IP): Performed by: NURSE PRACTITIONER

## 2024-07-15 PROCEDURE — 1100000003 HC PRIVATE W/ TELEMETRY

## 2024-07-15 PROCEDURE — 6370000000 HC RX 637 (ALT 250 FOR IP): Performed by: STUDENT IN AN ORGANIZED HEALTH CARE EDUCATION/TRAINING PROGRAM

## 2024-07-15 PROCEDURE — 36415 COLL VENOUS BLD VENIPUNCTURE: CPT

## 2024-07-15 PROCEDURE — 80048 BASIC METABOLIC PNL TOTAL CA: CPT

## 2024-07-15 PROCEDURE — 85025 COMPLETE CBC W/AUTO DIFF WBC: CPT

## 2024-07-15 PROCEDURE — 80202 ASSAY OF VANCOMYCIN: CPT

## 2024-07-15 PROCEDURE — 6360000002 HC RX W HCPCS

## 2024-07-15 PROCEDURE — 86900 BLOOD TYPING SEROLOGIC ABO: CPT

## 2024-07-15 PROCEDURE — 86850 RBC ANTIBODY SCREEN: CPT

## 2024-07-15 PROCEDURE — 2580000003 HC RX 258

## 2024-07-15 RX ORDER — SODIUM CHLORIDE 0.9 % (FLUSH) 0.9 %
5-40 SYRINGE (ML) INJECTION PRN
Status: CANCELLED | OUTPATIENT
Start: 2024-07-15

## 2024-07-15 RX ORDER — HYDROMORPHONE HYDROCHLORIDE 2 MG/1
2 TABLET ORAL EVERY 4 HOURS PRN
Status: DISCONTINUED | OUTPATIENT
Start: 2024-07-15 | End: 2024-07-16

## 2024-07-15 RX ORDER — SODIUM CHLORIDE 9 MG/ML
INJECTION, SOLUTION INTRAVENOUS PRN
Status: CANCELLED | OUTPATIENT
Start: 2024-07-15

## 2024-07-15 RX ORDER — SODIUM CHLORIDE 0.9 % (FLUSH) 0.9 %
5-40 SYRINGE (ML) INJECTION EVERY 12 HOURS SCHEDULED
Status: CANCELLED | OUTPATIENT
Start: 2024-07-15

## 2024-07-15 RX ORDER — ACETAMINOPHEN 500 MG
1000 TABLET ORAL EVERY 8 HOURS SCHEDULED
Status: DISCONTINUED | OUTPATIENT
Start: 2024-07-15 | End: 2024-07-26 | Stop reason: HOSPADM

## 2024-07-15 RX ADMIN — CARVEDILOL 1.56 MG: 3.12 TABLET, FILM COATED ORAL at 08:46

## 2024-07-15 RX ADMIN — HYDROMORPHONE HYDROCHLORIDE 0.5 MG: 1 INJECTION, SOLUTION INTRAMUSCULAR; INTRAVENOUS; SUBCUTANEOUS at 18:50

## 2024-07-15 RX ADMIN — FERROUS SULFATE TAB 325 MG (65 MG ELEMENTAL FE) 325 MG: 325 (65 FE) TAB at 18:24

## 2024-07-15 RX ADMIN — ACETAMINOPHEN 1000 MG: 500 TABLET ORAL at 10:32

## 2024-07-15 RX ADMIN — ACETAMINOPHEN 1000 MG: 500 TABLET ORAL at 18:24

## 2024-07-15 RX ADMIN — HYDROMORPHONE HYDROCHLORIDE 0.5 MG: 1 INJECTION, SOLUTION INTRAMUSCULAR; INTRAVENOUS; SUBCUTANEOUS at 06:52

## 2024-07-15 RX ADMIN — HYDROMORPHONE HYDROCHLORIDE 0.5 MG: 1 INJECTION, SOLUTION INTRAMUSCULAR; INTRAVENOUS; SUBCUTANEOUS at 23:08

## 2024-07-15 RX ADMIN — PIPERACILLIN AND TAZOBACTAM 3375 MG: 3; .375 INJECTION, POWDER, LYOPHILIZED, FOR SOLUTION INTRAVENOUS at 11:57

## 2024-07-15 RX ADMIN — CARVEDILOL 1.56 MG: 3.12 TABLET, FILM COATED ORAL at 21:21

## 2024-07-15 RX ADMIN — GABAPENTIN 300 MG: 300 CAPSULE ORAL at 08:46

## 2024-07-15 RX ADMIN — SODIUM CHLORIDE, PRESERVATIVE FREE 10 ML: 5 INJECTION INTRAVENOUS at 08:50

## 2024-07-15 RX ADMIN — HYDROMORPHONE HYDROCHLORIDE 0.5 MG: 1 INJECTION, SOLUTION INTRAMUSCULAR; INTRAVENOUS; SUBCUTANEOUS at 10:55

## 2024-07-15 RX ADMIN — HYDRALAZINE HYDROCHLORIDE 50 MG: 50 TABLET ORAL at 06:52

## 2024-07-15 RX ADMIN — HYDRALAZINE HYDROCHLORIDE 50 MG: 50 TABLET ORAL at 14:24

## 2024-07-15 RX ADMIN — GABAPENTIN 300 MG: 300 CAPSULE ORAL at 14:24

## 2024-07-15 RX ADMIN — AMLODIPINE BESYLATE 10 MG: 5 TABLET ORAL at 08:47

## 2024-07-15 RX ADMIN — INSULIN LISPRO 2 UNITS: 100 INJECTION, SOLUTION INTRAVENOUS; SUBCUTANEOUS at 08:47

## 2024-07-15 RX ADMIN — FERROUS SULFATE TAB 325 MG (65 MG ELEMENTAL FE) 325 MG: 325 (65 FE) TAB at 08:46

## 2024-07-15 RX ADMIN — PIPERACILLIN AND TAZOBACTAM 3375 MG: 3; .375 INJECTION, POWDER, LYOPHILIZED, FOR SOLUTION INTRAVENOUS at 06:58

## 2024-07-15 RX ADMIN — HYDROMORPHONE HYDROCHLORIDE 0.5 MG: 1 INJECTION, SOLUTION INTRAMUSCULAR; INTRAVENOUS; SUBCUTANEOUS at 02:29

## 2024-07-15 RX ADMIN — HYDROMORPHONE HYDROCHLORIDE 0.5 MG: 1 INJECTION, SOLUTION INTRAMUSCULAR; INTRAVENOUS; SUBCUTANEOUS at 14:56

## 2024-07-15 RX ADMIN — ISOSORBIDE MONONITRATE 30 MG: 30 TABLET, EXTENDED RELEASE ORAL at 08:46

## 2024-07-15 RX ADMIN — GABAPENTIN 300 MG: 300 CAPSULE ORAL at 21:21

## 2024-07-15 RX ADMIN — INSULIN LISPRO 6 UNITS: 100 INJECTION, SOLUTION INTRAVENOUS; SUBCUTANEOUS at 18:23

## 2024-07-15 RX ADMIN — LURASIDONE HYDROCHLORIDE 20 MG: 20 TABLET, FILM COATED ORAL at 18:24

## 2024-07-15 RX ADMIN — HEPARIN SODIUM 5000 UNITS: 5000 INJECTION INTRAVENOUS; SUBCUTANEOUS at 06:51

## 2024-07-15 RX ADMIN — VANCOMYCIN HYDROCHLORIDE 750 MG: 750 INJECTION, POWDER, LYOPHILIZED, FOR SOLUTION INTRAVENOUS at 04:53

## 2024-07-15 RX ADMIN — HYDRALAZINE HYDROCHLORIDE 50 MG: 50 TABLET ORAL at 21:21

## 2024-07-15 RX ADMIN — PIPERACILLIN AND TAZOBACTAM 3375 MG: 3; .375 INJECTION, POWDER, LYOPHILIZED, FOR SOLUTION INTRAVENOUS at 21:30

## 2024-07-15 RX ADMIN — INSULIN GLARGINE 10 UNITS: 100 INJECTION, SOLUTION SUBCUTANEOUS at 08:47

## 2024-07-15 RX ADMIN — INSULIN GLARGINE 20 UNITS: 100 INJECTION, SOLUTION SUBCUTANEOUS at 21:21

## 2024-07-15 RX ADMIN — ASPIRIN 81 MG: 81 TABLET, COATED ORAL at 08:46

## 2024-07-15 RX ADMIN — BUMETANIDE 1 MG: 1 TABLET ORAL at 08:46

## 2024-07-15 RX ADMIN — BUMETANIDE 1 MG: 1 TABLET ORAL at 18:24

## 2024-07-15 ASSESSMENT — PAIN DESCRIPTION - ORIENTATION
ORIENTATION: RIGHT

## 2024-07-15 ASSESSMENT — PAIN SCALES - GENERAL
PAINLEVEL_OUTOF10: 9
PAINLEVEL_OUTOF10: 9
PAINLEVEL_OUTOF10: 8
PAINLEVEL_OUTOF10: 9
PAINLEVEL_OUTOF10: 8
PAINLEVEL_OUTOF10: 9
PAINLEVEL_OUTOF10: 9
PAINLEVEL_OUTOF10: 0

## 2024-07-15 ASSESSMENT — PAIN DESCRIPTION - LOCATION
LOCATION: LEG

## 2024-07-15 ASSESSMENT — PAIN DESCRIPTION - DESCRIPTORS
DESCRIPTORS: ACHING

## 2024-07-15 ASSESSMENT — LIFESTYLE VARIABLES: SMOKING_STATUS: 1

## 2024-07-15 ASSESSMENT — PAIN SCALES - WONG BAKER: WONGBAKER_NUMERICALRESPONSE: HURTS A LITTLE BIT

## 2024-07-15 NOTE — PROGRESS NOTES
Pharmacy Antimicrobial Kinetic Dosing    Indication for Antimicrobials: Bone and joint infection (right foot with unstagable wound)    Current Regimen of Each Antimicrobial:  Vancomycin 2g X1, then 750 mg q 18 hours; Start Date 7/13; Day # 3  Zosyn 3.375 g q 8 h (Start Date 7/13; Day # 3    Previous Antimicrobial Therapy:        Goal Level: Vancomycin -600      Impression/Plan:   Vancomycin level 18.4 mcg/ml on  750mg q18h is Supratherapeutic  Will reduce Vanc to 750mg iv q24h for predicted AUC  593  Continue  zosyn 3375mg iv q8h  BMP daily  Antimicrobial stop date pending     Pharmacy will follow daily and adjust medications as appropriate for renal function and/or serum levels.    Thank you,  MARTHA STILES, [facility]

## 2024-07-15 NOTE — PROGRESS NOTES
Spiritual Care Assessment/Progress Note  [facility]    Name: Keaton Van MRN: [id number]    Age: 59 y.o.     Sex: male   Language: English     Date: 7/15/2024            Total Time Calculated: 64 min              Spiritual Assessment begun in [facility] 2 CARDIAC MEDICAL STEP DOWN  Service Provided For: Patient  Referral/Consult From: Rounding  Encounter Overview/Reason: Initial Encounter    Spiritual beliefs:      [] Involved in a tunde tradition/spiritual practice:      [] Supported by a tunde community:      [] Claims no spiritual orientation:      [] Seeking spiritual identity:           [x] Adheres to an individual form of spirituality:      [] Not able to assess:                Identified resources for coping and support system:   Support System: Family members, Friends/neighbors       [x] Prayer                  [] Devotional reading               [] Music                  [] Guided Imagery     [] Pet visits                                        [] Other: (COMMENT)     Specific area/focus of visit   Encounter:    Crisis:    Spiritual/Emotional needs: Type: Spiritual Support, Emotional Distress  Ritual, Rites and Sacraments:    Grief, Loss, and Adjustments:    Ethics/Mediation:    Behavioral Health:    Palliative Care:    Advance Care Planning:      Plan/Referrals: Continue Support (comment)    Narrative:   Reviewed chart prior to visit on CMSD unit for spiritual assessment and support.  No family/friends present. Mr Van initally indicated he wasn't up to a visit, but then began speaking and invited  in.   offered empathic listening presence as he spoke about upcoming surgery to remove part of his leg.  He shared that he is scared of what may lie ahead for him. 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  He wonders what it might be like to try to relearn playing with his left foot.   He shared he has been in and out of hospitals for the last year or  two and is sick and tired of being sick and tired.  He also shared he doesn't always have the best mindset, rather dwelling on negative thoughts.  Explored coping resources with him; he has two sisters and a brother but he added his family has never been very demonstrative of love/support.  He has a son who lives in [place], but they are not in close touch.  He spoke of a former  who seems to have made a deep impression on him.  He also spoke of Grandmihaela Izquierdo, his ex-wife's grandmother, who was a role model and inspiration.   He attended a Buddhism [employer], but no longer is connected to a Hoah[employer]ism.  He does believe in God and knows God is with him; he expressed feeling distant from God.  He seems to desire a closer walk with God.  Explored how God is able to break into our lives in times of darkness and fear.  Acknowledged and affirmed his concerns and fears as well as his tunde.  Visit ended when a friend called.  Offered assurance of prayer.   available upon referral by staff or by patient/family request.      ALISTAIR Mitchell, BCC, Staff [facility]     Paging Service [phone] ([phone])

## 2024-07-15 NOTE — CARE COORDINATION
Care Management Initial Assessment       RUR: 32% (high RUR, readmission)  Readmission? Yes - patient was last at [facility] from 6/19/2024 to 6/25/2024  1st IM letter given? Yes - IM letter provided on 7/13/2024  1st  letter given: No     Patient verified that his previous AD was revoked and he confirmed that he is not  and has a living one (born in 1986) that he has no been in contact with and does not wish to list at this time.  Patient declined to list any additional family but confirmed his sisters' contact information in the chart:    Evelyn Guallpa - sister - [phone]  Anuradha Brown - sister - [phone]    Patient verified his home address of [address] [place] [address] but said he has been to court and must leave by 8/8/2024 as he has received and eviction notice.  He stated that he anticipates his sister letting him stay at her address if discharged from the hospital/rehab.  CM encouraged him to begin speaking with his sister regarding this now as a discharge location is necessary, even if he goes to a SNF/IPR on discharge.  HH/IPR/SNF lists provided and patient encouraged to choose at least 2 IPRs and 3-5 SNFs.  He does not wish to return to [facility] at this time.  Patient stated he was at home prior to coming in on this admission.    Patient was provided with housing resources and encouraged to call, if needed.    Patient said that [employer] team has not reached out to him regarding Medicaid screening and that he has not been in contact with [facility].  CM sent referral to [employer] regarding need for Medicaid screening and previous request placed.  [facility] AVS contact information listed on AVS.      CM performed chart review:    7/20/23 - Patient was living with father.  5/31/24 - Patient did not want medical updates to be provided to family.  He was having issues with transportation to a PCP office.  He was independent with  transportation is too far away for him to walk to)   Discharge Planning   Type of Residence Apartment;Other (Comment)  (Patient will likely need rehab after discharge)   Living Arrangements Other (Comment)  (Roommate)   Current Services Prior To Admission None   Current DME Prior to Arrival Cane   Potential Assistance Needed N/A   DME Ordered? No   Potential Assistance Purchasing Medications No   Type of Home Care Services None   Patient expects to be discharged to: Unknown   Services At/After Discharge   Transition of Care Consult (CM Consult) Other  (SNF vs. IPR?)   Services At/After Discharge PT;OT;Nursing services   Mode of Transport at Discharge Other (see comment)  (Stretcher anticipated)   Confirm Follow Up Transport Family   Condition of Participation: Discharge Planning   The Plan for Transition of Care is related to the following treatment goals: Return to apartment or home with sister after rehab; housing resources provided to patient and CM encouraged patient to contact immediately if he feels services are needed   The Patient and/or Patient Representative was provided with a Choice of Provider? Patient   The Patient and/Or Patient Representative agree with the Discharge Plan? Yes   Freedom of Choice list was provided with basic dialogue that supports the patient's individualized plan of care/goals, treatment preferences, and shares the quality data associated with the providers?  Yes  (SNF/IPR/HH list provided to patient)     Advance Care Planning     General Advance Care Planning (ACP) Conversation    Date of Conversation: 7/15/2024  Conducted with: Patient with Decision Making Capacity  Other persons present: None    Healthcare Decision Maker: No healthcare decision makers have been documented.  Click here to complete HealthCare Decision Makers including selection of the Healthcare Decision Maker Relationship (ie \"Primary\")   Today we documented Decision Maker(s) consistent with Legal Next of Kin

## 2024-07-15 NOTE — PROGRESS NOTES
Bedside and Verbal shift change report given to Zion (oncoming nurse) by Yoana (offgoing nurse). Report included the following information Nurse Handoff Report, Index, ED SBAR, Adult Overview, Intake/Output, MAR, Recent Results, and Cardiac Rhythm NSR .       0907: Bed alarm refusal signed.

## 2024-07-15 NOTE — ANESTHESIA PRE PROCEDURE
Department of Anesthesiology  Preprocedure Note       Name:  Keaton Van   Age:  59 y.o.  :  1965                                          MRN:  [id number]         Date:  7/15/2024      Surgeon: Surgeon(s):  James Schaeffer MD    Procedure: Procedure(s):  RIGHT BELOW KNEE LEG AMPUTATION    Medications prior to admission:   Prior to Admission medications    Medication Sig Start Date End Date Taking? Authorizing Provider   hydrALAZINE (APRESOLINE) 25 MG tablet Take 2 tablets by mouth every 8 hours 24   Stefania Vargas MD   sodium zirconium cyclosilicate (LOKELMA) 10 g PACK oral suspension Take 1 packet by mouth three times a week 24   Stefania Vargas MD   gabapentin (NEURONTIN) 600 MG tablet Take 0.5 tablets by mouth 3 times daily for 30 days. Max Daily Amount: 900 mg 24  Stefania Vargas MD   bumetanide (BUMEX) 1 MG tablet Take 1 tablet by mouth in the morning and 1 tablet in the evening. 24   Stefania Vargas MD   ferrous sulfate (IRON 325) 325 (65 Fe) MG tablet Take 1 tablet by mouth 2 times daily (with meals) 24   Stefania Vargas MD   carvedilol (COREG) 3.125 MG tablet Take 0.5 tablets by mouth 2 times daily 24   Stefania Vargas MD   amLODIPine (NORVASC) 10 MG tablet Take 1 tablet by mouth daily 24   Khoi Dumont MD   aspirin 81 MG EC tablet Take 1 tablet by mouth daily 24   Khoi Dumont MD   tiZANidine (ZANAFLEX) 2 MG tablet Take 1 tablet by mouth every 8 hours as needed (muscle spasm) 24   Khoi Dumont MD   insulin glargine (BASAGLAR KWIKPEN) 100 UNIT/ML injection pen Inject 20 Units into the skin nightly 24   Khoi Dumont MD   lurasidone (LATUDA) 20 MG TABS tablet Take 1 tablet by mouth Daily with supper 24   Khoi Dumont MD   isosorbide mononitrate (IMDUR) 30 MG extended release tablet Take 1 tablet by mouth daily 24   Khoi Dumont MD   acetaminophen (TYLENOL) 325 MG tablet Take

## 2024-07-15 NOTE — DIABETES MGMT
[facility]  DIABETES MANAGEMENT CONSULT    Consulted by Provider for advanced nursing evaluation and care for inpatient blood glucose management.    Evaluation and Action Plan   Keaton Van is a 59 year old male with a hx of Type 2 diabetes. He is admitted for pain and osteomyelitis of the right foot. The patient had a previous admission related to right foot infection. He is reportedly anticipated for right BKA. BG's are stabilizing he currently receiving 30 total units of basal insulin per day. I recommend continuing on the current insulin regimen for now. He is eating and britta benefit from scheduled meal time insulin dosing. Since he is NPO this evening, it may start the following day.     Management Rationale Action Plan   Medication   Basal needs Using 0.4 units/kg/D based on weight  Continue 20 units Lantus daily and 10 units Lantus nightly   Corrective insulin Using medium dose  sensitivity based on weight    Additional orders          Diabetes Discharge Plan   Medication  TBD    Referral  []        Outpatient diabetes education   Additional orders            Initial Presentation   Keaton Van is a 59 y.o. male admitted  after experiencing right foot infection. .  LAB: Glucose 306. Creatine 2.33. GFR 31. A1c 6.5%  Narrative & Impression  EXAM: XR FOOT RIGHT (MIN 3 VIEWS)     INDICATION: wound over lateral foot, r/o osteo.     COMPARISON: 6/22/2024     FINDINGS: Three views of the right foot demonstrate interval partial osteolysis  at the mid fifth metatarsal amputation site with asymmetric periostitis. Less  swelling at the amputation site. No gas gangrene.        IMPRESSION:  Concern for osteomyelitis involving the stump of the fifth  metatarsal amputation site.    HX:   Past Medical History:   Diagnosis Date    Adverse effect of anesthesia     breathing diff. with versed    Bipolar 1 disorder, mixed, moderate (HCC) 3/6/2017    Chronic pain     Depression     Pt stated diagnosed

## 2024-07-15 NOTE — PROGRESS NOTES
Hospitalist Progress Note    NAME:   Keaton Vna   : 1965   MRN: [id number]     Date/Time: 7/15/2024 2:41 PM  Patient PCP: Robert Wells PA-C    Estimated discharge date:   Barriers: Right BKA      Assessment / Plan:    Right foot 5th toe stump osteomyelitis via XR  S/p resection w Dr Jama on 24  Wound with necrosis, dehiscence  Right leg w edema, redness, suspect cellulitis  Hx prior toe amputations left foot.  PVD.  Continue vanco and zosyn  Follow blood cultures  Does not meet sepsis criteria  Podiatry consulted.  Patient refused debridement.  Vascular surgery consulted.  Patient is scheduled to undergo right below-knee amputation on 2024.  Wound care consulted       Hypertensive urgency -- asymptomatic  2/2 medication noncompliance, pain also likely a factor  Chronic diastolic HF not in exacerbation  Paroxysmal Atrial fibrillation on last admission, currently SR  Resume home amlodipine, carvedilol (low dose d/t cocaine abuse), hydralazine, imdur, bumex     Uncontrolled DM2  Patient is on long-acting insulin 10 units in the morning and 20 units at bedtime.  Continue sliding scale insulin.     CKD4  Cr 2.33, this is improved from prior 3.7  Appears to be at baseline.  Continue to monitor.     Chronic anemia, likely YENNY +/- anemia of CKD  Hemoglobin 7.3.  Continue to monitor.  Transfuse for hemoglobin less than 7.     Bipolar I  Polysubstance abuse incl heavy crack cocaine use  Chornic pain  Medication noncompliance, financial and social barriers  Hx untreated HCV    Medical Decision Making:   I personally reviewed labs: cbc, bmp  I personally reviewed imaging: XR  I personally reviewed EKG:  Toxic drug monitoring: Vanco  Discussed case with:     Code Status: DNR  DVT Prophylaxis:   GI Prophylaxis:    Subjective:     Vascular surgery recommendations noted.  Podiatry recommendations noted.  Adjustment made to insulin regimen for better blood glucose control.  Continue broad-spectrum

## 2024-07-15 NOTE — PROGRESS NOTES
Vascular Surgery Progress Note  Quiana Forte ACNP-BC      Date:7/15/2024       Room:[address] [place] [address]  Patient Name:Keaton Van     YOB: 1965     Age:59 y.o.    Subjective      Mr. Keaton Van is a 59 y.o. male with a pmhx significant for diabetes mellitus, hypertension, heart failure, chronic renal disease, bipolar disorder, hepatitis C, and polysubstance abuse.  He is a current smoker.    He is admitted to the hospital with acute on chronic osteomyelitis of the right foot secondary to nonhealing diabetic wound.  Plan is for below the knee amputation in AM.    Objective           Vitals Last 24 Hours:  TEMPERATURE:  Temp  Av.3 °F (36.8 °C)  Min: 97.6 °F (36.4 °C)  Max: 98.7 °F (37.1 °C)  RESPIRATIONS RANGE: Resp  Av.4  Min: 14  Max: 19  PULSE OXIMETRY RANGE: SpO2  Av %  Min: 95 %  Max: 98 %  PULSE RANGE: Pulse  Av.9  Min: 72  Max: 87  BLOOD PRESSURE RANGE: Systolic (24hrs), Av , Min:130 , Max:180   ; Diastolic (24hrs), Av, Min:75, Max:92    I/O (24Hr):    Intake/Output Summary (Last 24 hours) at 7/15/2024 1024  Last data filed at 2024 1834  Gross per 24 hour   Intake --   Output 500 ml   Net -500 ml     Objective:  General Appearance:  Comfortable.    Vital signs: (most recent): Blood pressure (!) 166/78, pulse 81, temperature 98.3 °F (36.8 °C), temperature source Oral, resp. rate 18, height 1.88 m (6' 2.02\"), weight 77.1 kg (170 lb), SpO2 95 %.  Vital signs are normal.  No fever.    Lungs:  Normal effort and normal respiratory rate.    Heart: Normal rate.  Regular rhythm.    Abdomen: Abdomen is soft and non-distended.    Extremities: Normal range of motion.    Neurological: Patient is alert and oriented to person, place and time.    Skin:  (Bulky dressing to the right foot)      Labs    Labs:  CBC:  Recent Labs     24  1247 24  0420 07/15/24  0420   WBC 8.8 4.7 4.6   RBC 3.65* 2.82* 2.74*   HGB 9.6* 7.3* 7.0*   HCT 28.9* 23.1* 22.3*   MCV 79.2* 81.9 81.4   RDW

## 2024-07-16 ENCOUNTER — ANESTHESIA (OUTPATIENT)
Facility: HOSPITAL | Age: 59
End: 2024-07-16
Payer: MEDICARE

## 2024-07-16 ENCOUNTER — APPOINTMENT (OUTPATIENT)
Facility: HOSPITAL | Age: 59
End: 2024-07-16
Payer: MEDICARE

## 2024-07-16 LAB
ANION GAP SERPL CALC-SCNC: 5 MMOL/L (ref 5–15)
BASOPHILS # BLD: 0 K/UL (ref 0–0.1)
BASOPHILS NFR BLD: 1 % (ref 0–1)
BUN SERPL-MCNC: 60 MG/DL (ref 6–20)
BUN/CREAT SERPL: 18 (ref 12–20)
CALCIUM SERPL-MCNC: 8.2 MG/DL (ref 8.5–10.1)
CHLORIDE SERPL-SCNC: 106 MMOL/L (ref 97–108)
CO2 SERPL-SCNC: 26 MMOL/L (ref 21–32)
CREAT SERPL-MCNC: 3.29 MG/DL (ref 0.7–1.3)
CREAT UR-MCNC: 36.9 MG/DL
DIFFERENTIAL METHOD BLD: ABNORMAL
EOSINOPHIL # BLD: 0.3 K/UL (ref 0–0.4)
EOSINOPHIL NFR BLD: 6 % (ref 0–7)
ERYTHROCYTE [DISTWIDTH] IN BLOOD BY AUTOMATED COUNT: 16.6 % (ref 11.5–14.5)
GLUCOSE BLD STRIP.AUTO-MCNC: 203 MG/DL (ref 65–117)
GLUCOSE BLD STRIP.AUTO-MCNC: 242 MG/DL (ref 65–117)
GLUCOSE BLD STRIP.AUTO-MCNC: 312 MG/DL (ref 65–117)
GLUCOSE BLD STRIP.AUTO-MCNC: 387 MG/DL (ref 65–117)
GLUCOSE BLD STRIP.AUTO-MCNC: 406 MG/DL (ref 65–117)
GLUCOSE SERPL-MCNC: 301 MG/DL (ref 65–100)
HCT VFR BLD AUTO: 22 % (ref 36.6–50.3)
HGB BLD-MCNC: 6.9 G/DL (ref 12.1–17)
IMM GRANULOCYTES # BLD AUTO: 0 K/UL (ref 0–0.04)
IMM GRANULOCYTES NFR BLD AUTO: 1 % (ref 0–0.5)
LYMPHOCYTES # BLD: 0.7 K/UL (ref 0.8–3.5)
LYMPHOCYTES NFR BLD: 17 % (ref 12–49)
MCH RBC QN AUTO: 26 PG (ref 26–34)
MCHC RBC AUTO-ENTMCNC: 31.4 G/DL (ref 30–36.5)
MCV RBC AUTO: 83 FL (ref 80–99)
MONOCYTES # BLD: 0.4 K/UL (ref 0–1)
MONOCYTES NFR BLD: 9 % (ref 5–13)
NEUTS SEG # BLD: 2.8 K/UL (ref 1.8–8)
NEUTS SEG NFR BLD: 66 % (ref 32–75)
NRBC # BLD: 0 K/UL (ref 0–0.01)
NRBC BLD-RTO: 0 PER 100 WBC
PLATELET # BLD AUTO: 143 K/UL (ref 150–400)
PMV BLD AUTO: 9.2 FL (ref 8.9–12.9)
POTASSIUM SERPL-SCNC: 4.3 MMOL/L (ref 3.5–5.1)
PROT UR-MCNC: 139 MG/DL (ref 0–11.9)
PROT/CREAT UR-RTO: 3.8
RBC # BLD AUTO: 2.65 M/UL (ref 4.1–5.7)
RBC MORPH BLD: ABNORMAL
SERVICE CMNT-IMP: ABNORMAL
SODIUM SERPL-SCNC: 137 MMOL/L (ref 136–145)
WBC # BLD AUTO: 4.2 K/UL (ref 4.1–11.1)

## 2024-07-16 PROCEDURE — 6360000002 HC RX W HCPCS: Performed by: NURSE ANESTHETIST, CERTIFIED REGISTERED

## 2024-07-16 PROCEDURE — 88311 DECALCIFY TISSUE: CPT

## 2024-07-16 PROCEDURE — 3600000003 HC SURGERY LEVEL 3 BASE: Performed by: SURGERY

## 2024-07-16 PROCEDURE — 6370000000 HC RX 637 (ALT 250 FOR IP): Performed by: NURSE PRACTITIONER

## 2024-07-16 PROCEDURE — L1830 KO IMMOB CANVAS LONG PRE OTS: HCPCS | Performed by: SURGERY

## 2024-07-16 PROCEDURE — 82962 GLUCOSE BLOOD TEST: CPT

## 2024-07-16 PROCEDURE — 6370000000 HC RX 637 (ALT 250 FOR IP): Performed by: SURGERY

## 2024-07-16 PROCEDURE — 80048 BASIC METABOLIC PNL TOTAL CA: CPT

## 2024-07-16 PROCEDURE — 6360000002 HC RX W HCPCS: Performed by: STUDENT IN AN ORGANIZED HEALTH CARE EDUCATION/TRAINING PROGRAM

## 2024-07-16 PROCEDURE — 6360000002 HC RX W HCPCS: Performed by: ANESTHESIOLOGY

## 2024-07-16 PROCEDURE — 6370000000 HC RX 637 (ALT 250 FOR IP)

## 2024-07-16 PROCEDURE — 2500000003 HC RX 250 WO HCPCS

## 2024-07-16 PROCEDURE — 85025 COMPLETE CBC W/AUTO DIFF WBC: CPT

## 2024-07-16 PROCEDURE — 6360000002 HC RX W HCPCS

## 2024-07-16 PROCEDURE — 3700000001 HC ADD 15 MINUTES (ANESTHESIA): Performed by: SURGERY

## 2024-07-16 PROCEDURE — 2580000003 HC RX 258: Performed by: STUDENT IN AN ORGANIZED HEALTH CARE EDUCATION/TRAINING PROGRAM

## 2024-07-16 PROCEDURE — 2580000003 HC RX 258

## 2024-07-16 PROCEDURE — 2580000003 HC RX 258: Performed by: NURSE ANESTHETIST, CERTIFIED REGISTERED

## 2024-07-16 PROCEDURE — 3600000013 HC SURGERY LEVEL 3 ADDTL 15MIN: Performed by: SURGERY

## 2024-07-16 PROCEDURE — 82570 ASSAY OF URINE CREATININE: CPT

## 2024-07-16 PROCEDURE — 88307 TISSUE EXAM BY PATHOLOGIST: CPT

## 2024-07-16 PROCEDURE — P9045 ALBUMIN (HUMAN), 5%, 250 ML: HCPCS | Performed by: NURSE ANESTHETIST, CERTIFIED REGISTERED

## 2024-07-16 PROCEDURE — 2709999900 HC NON-CHARGEABLE SUPPLY: Performed by: SURGERY

## 2024-07-16 PROCEDURE — 1100000000 HC RM PRIVATE

## 2024-07-16 PROCEDURE — 36415 COLL VENOUS BLD VENIPUNCTURE: CPT

## 2024-07-16 PROCEDURE — 6370000000 HC RX 637 (ALT 250 FOR IP): Performed by: ANESTHESIOLOGY

## 2024-07-16 PROCEDURE — 3700000000 HC ANESTHESIA ATTENDED CARE: Performed by: SURGERY

## 2024-07-16 PROCEDURE — 7100000000 HC PACU RECOVERY - FIRST 15 MIN: Performed by: SURGERY

## 2024-07-16 PROCEDURE — 76770 US EXAM ABDO BACK WALL COMP: CPT

## 2024-07-16 PROCEDURE — 2500000003 HC RX 250 WO HCPCS: Performed by: ANESTHESIOLOGY

## 2024-07-16 PROCEDURE — 6370000000 HC RX 637 (ALT 250 FOR IP): Performed by: STUDENT IN AN ORGANIZED HEALTH CARE EDUCATION/TRAINING PROGRAM

## 2024-07-16 PROCEDURE — 0Y6H0Z3 DETACHMENT AT RIGHT LOWER LEG, LOW, OPEN APPROACH: ICD-10-PCS | Performed by: SURGERY

## 2024-07-16 PROCEDURE — 2580000003 HC RX 258: Performed by: PHYSICIAN ASSISTANT

## 2024-07-16 PROCEDURE — P9016 RBC LEUKOCYTES REDUCED: HCPCS

## 2024-07-16 PROCEDURE — 84156 ASSAY OF PROTEIN URINE: CPT

## 2024-07-16 PROCEDURE — 2500000003 HC RX 250 WO HCPCS: Performed by: NURSE ANESTHETIST, CERTIFIED REGISTERED

## 2024-07-16 PROCEDURE — 7100000001 HC PACU RECOVERY - ADDTL 15 MIN: Performed by: SURGERY

## 2024-07-16 RX ORDER — GLYCOPYRROLATE 0.2 MG/ML
INJECTION INTRAMUSCULAR; INTRAVENOUS PRN
Status: DISCONTINUED | OUTPATIENT
Start: 2024-07-16 | End: 2024-07-16 | Stop reason: SDUPTHER

## 2024-07-16 RX ORDER — 0.9 % SODIUM CHLORIDE 0.9 %
INTRAVENOUS SOLUTION INTRAVENOUS PRN
Status: DISCONTINUED | OUTPATIENT
Start: 2024-07-16 | End: 2024-07-16 | Stop reason: SDUPTHER

## 2024-07-16 RX ORDER — HYDROMORPHONE HYDROCHLORIDE 2 MG/1
2 TABLET ORAL
Status: DISCONTINUED | OUTPATIENT
Start: 2024-07-16 | End: 2024-07-16

## 2024-07-16 RX ORDER — SODIUM CHLORIDE 0.9 % (FLUSH) 0.9 %
5-40 SYRINGE (ML) INJECTION PRN
Status: DISCONTINUED | OUTPATIENT
Start: 2024-07-16 | End: 2024-07-16 | Stop reason: HOSPADM

## 2024-07-16 RX ORDER — SODIUM CHLORIDE 450 MG/100ML
INJECTION, SOLUTION INTRAVENOUS CONTINUOUS
Status: ACTIVE | OUTPATIENT
Start: 2024-07-16 | End: 2024-07-17

## 2024-07-16 RX ORDER — HYDROMORPHONE HYDROCHLORIDE 1 MG/ML
1 INJECTION, SOLUTION INTRAMUSCULAR; INTRAVENOUS; SUBCUTANEOUS
Status: CANCELLED | OUTPATIENT
Start: 2024-07-16

## 2024-07-16 RX ORDER — OXYCODONE HYDROCHLORIDE 5 MG/1
10 TABLET ORAL EVERY 4 HOURS PRN
Status: DISCONTINUED | OUTPATIENT
Start: 2024-07-16 | End: 2024-07-26 | Stop reason: HOSPADM

## 2024-07-16 RX ORDER — SODIUM CHLORIDE 0.9 % (FLUSH) 0.9 %
5-40 SYRINGE (ML) INJECTION EVERY 12 HOURS SCHEDULED
Status: DISCONTINUED | OUTPATIENT
Start: 2024-07-16 | End: 2024-07-16 | Stop reason: HOSPADM

## 2024-07-16 RX ORDER — HYDROMORPHONE HYDROCHLORIDE 2 MG/ML
INJECTION, SOLUTION INTRAMUSCULAR; INTRAVENOUS; SUBCUTANEOUS PRN
Status: DISCONTINUED | OUTPATIENT
Start: 2024-07-16 | End: 2024-07-16 | Stop reason: SDUPTHER

## 2024-07-16 RX ORDER — AMOXICILLIN AND CLAVULANATE POTASSIUM 500; 125 MG/1; MG/1
1 TABLET, FILM COATED ORAL 2 TIMES DAILY
Status: COMPLETED | OUTPATIENT
Start: 2024-07-16 | End: 2024-07-25

## 2024-07-16 RX ORDER — FENTANYL CITRATE 50 UG/ML
50 INJECTION, SOLUTION INTRAMUSCULAR; INTRAVENOUS EVERY 5 MIN PRN
Status: DISCONTINUED | OUTPATIENT
Start: 2024-07-16 | End: 2024-07-16 | Stop reason: HOSPADM

## 2024-07-16 RX ORDER — CALCIUM CHLORIDE 100 MG/ML
INJECTION INTRAVENOUS; INTRAVENTRICULAR PRN
Status: DISCONTINUED | OUTPATIENT
Start: 2024-07-16 | End: 2024-07-16 | Stop reason: SDUPTHER

## 2024-07-16 RX ORDER — ONDANSETRON 2 MG/ML
INJECTION INTRAMUSCULAR; INTRAVENOUS PRN
Status: DISCONTINUED | OUTPATIENT
Start: 2024-07-16 | End: 2024-07-16 | Stop reason: SDUPTHER

## 2024-07-16 RX ORDER — MORPHINE SULFATE 2 MG/ML
2 INJECTION, SOLUTION INTRAMUSCULAR; INTRAVENOUS EVERY 4 HOURS PRN
Status: DISCONTINUED | OUTPATIENT
Start: 2024-07-16 | End: 2024-07-17

## 2024-07-16 RX ORDER — SODIUM CHLORIDE 9 MG/ML
INJECTION, SOLUTION INTRAVENOUS PRN
Status: DISCONTINUED | OUTPATIENT
Start: 2024-07-16 | End: 2024-07-16 | Stop reason: HOSPADM

## 2024-07-16 RX ORDER — DEXAMETHASONE SODIUM PHOSPHATE 4 MG/ML
INJECTION, SOLUTION INTRA-ARTICULAR; INTRALESIONAL; INTRAMUSCULAR; INTRAVENOUS; SOFT TISSUE PRN
Status: DISCONTINUED | OUTPATIENT
Start: 2024-07-16 | End: 2024-07-16 | Stop reason: SDUPTHER

## 2024-07-16 RX ORDER — CARVEDILOL 3.12 MG/1
1.56 TABLET ORAL 2 TIMES DAILY
Status: DISCONTINUED | OUTPATIENT
Start: 2024-07-16 | End: 2024-07-22

## 2024-07-16 RX ORDER — HYDROMORPHONE HYDROCHLORIDE 1 MG/ML
0.5 INJECTION, SOLUTION INTRAMUSCULAR; INTRAVENOUS; SUBCUTANEOUS ONCE
Status: COMPLETED | OUTPATIENT
Start: 2024-07-16 | End: 2024-07-16

## 2024-07-16 RX ORDER — SCOLOPAMINE TRANSDERMAL SYSTEM 1 MG/1
PATCH, EXTENDED RELEASE TRANSDERMAL PRN
Status: DISCONTINUED | OUTPATIENT
Start: 2024-07-16 | End: 2024-07-16 | Stop reason: SDUPTHER

## 2024-07-16 RX ORDER — PROCHLORPERAZINE EDISYLATE 5 MG/ML
5 INJECTION INTRAMUSCULAR; INTRAVENOUS
Status: COMPLETED | OUTPATIENT
Start: 2024-07-16 | End: 2024-07-16

## 2024-07-16 RX ORDER — LIDOCAINE HYDROCHLORIDE 20 MG/ML
INJECTION, SOLUTION EPIDURAL; INFILTRATION; INTRACAUDAL; PERINEURAL PRN
Status: DISCONTINUED | OUTPATIENT
Start: 2024-07-16 | End: 2024-07-16 | Stop reason: SDUPTHER

## 2024-07-16 RX ORDER — PROPOFOL 10 MG/ML
INJECTION, EMULSION INTRAVENOUS PRN
Status: DISCONTINUED | OUTPATIENT
Start: 2024-07-16 | End: 2024-07-16 | Stop reason: SDUPTHER

## 2024-07-16 RX ORDER — NALOXONE HYDROCHLORIDE 0.4 MG/ML
INJECTION, SOLUTION INTRAMUSCULAR; INTRAVENOUS; SUBCUTANEOUS PRN
Status: DISCONTINUED | OUTPATIENT
Start: 2024-07-16 | End: 2024-07-16 | Stop reason: HOSPADM

## 2024-07-16 RX ORDER — ROCURONIUM BROMIDE 10 MG/ML
INJECTION, SOLUTION INTRAVENOUS PRN
Status: DISCONTINUED | OUTPATIENT
Start: 2024-07-16 | End: 2024-07-16 | Stop reason: SDUPTHER

## 2024-07-16 RX ORDER — PHENYLEPHRINE HCL IN 0.9% NACL 0.4MG/10ML
SYRINGE (ML) INTRAVENOUS PRN
Status: DISCONTINUED | OUTPATIENT
Start: 2024-07-16 | End: 2024-07-16 | Stop reason: SDUPTHER

## 2024-07-16 RX ORDER — ALBUMIN, HUMAN INJ 5% 5 %
SOLUTION INTRAVENOUS PRN
Status: DISCONTINUED | OUTPATIENT
Start: 2024-07-16 | End: 2024-07-16 | Stop reason: SDUPTHER

## 2024-07-16 RX ORDER — NEOSTIGMINE METHYLSULFATE 1 MG/ML
INJECTION, SOLUTION INTRAVENOUS PRN
Status: DISCONTINUED | OUTPATIENT
Start: 2024-07-16 | End: 2024-07-16 | Stop reason: SDUPTHER

## 2024-07-16 RX ORDER — HYDROMORPHONE HYDROCHLORIDE 1 MG/ML
0.25 INJECTION, SOLUTION INTRAMUSCULAR; INTRAVENOUS; SUBCUTANEOUS EVERY 5 MIN PRN
Status: COMPLETED | OUTPATIENT
Start: 2024-07-16 | End: 2024-07-16

## 2024-07-16 RX ORDER — HYDROMORPHONE HYDROCHLORIDE 1 MG/ML
0.25 INJECTION, SOLUTION INTRAMUSCULAR; INTRAVENOUS; SUBCUTANEOUS ONCE
Status: COMPLETED | OUTPATIENT
Start: 2024-07-16 | End: 2024-07-16

## 2024-07-16 RX ORDER — PROCHLORPERAZINE EDISYLATE 5 MG/ML
5 INJECTION INTRAMUSCULAR; INTRAVENOUS
Status: DISCONTINUED | OUTPATIENT
Start: 2024-07-16 | End: 2024-07-16 | Stop reason: HOSPADM

## 2024-07-16 RX ORDER — HYDRALAZINE HYDROCHLORIDE 20 MG/ML
10 INJECTION INTRAMUSCULAR; INTRAVENOUS ONCE
Status: COMPLETED | OUTPATIENT
Start: 2024-07-16 | End: 2024-07-16

## 2024-07-16 RX ADMIN — Medication 3 MG: at 08:36

## 2024-07-16 RX ADMIN — HYDROMORPHONE HYDROCHLORIDE 0.5 MG: 1 INJECTION, SOLUTION INTRAMUSCULAR; INTRAVENOUS; SUBCUTANEOUS at 11:00

## 2024-07-16 RX ADMIN — Medication 80 MCG: at 07:52

## 2024-07-16 RX ADMIN — HYDROMORPHONE HYDROCHLORIDE 0.5 MG: 1 INJECTION, SOLUTION INTRAMUSCULAR; INTRAVENOUS; SUBCUTANEOUS at 12:53

## 2024-07-16 RX ADMIN — SODIUM CHLORIDE 750 ML: 9 INJECTION, SOLUTION INTRAVENOUS at 08:36

## 2024-07-16 RX ADMIN — HYDROMORPHONE HYDROCHLORIDE 0.25 MG: 1 INJECTION, SOLUTION INTRAMUSCULAR; INTRAVENOUS; SUBCUTANEOUS at 09:18

## 2024-07-16 RX ADMIN — INSULIN GLARGINE 10 UNITS: 100 INJECTION, SOLUTION SUBCUTANEOUS at 12:54

## 2024-07-16 RX ADMIN — HYDROMORPHONE HYDROCHLORIDE 0.5 MG: 1 INJECTION, SOLUTION INTRAMUSCULAR; INTRAVENOUS; SUBCUTANEOUS at 17:02

## 2024-07-16 RX ADMIN — INSULIN LISPRO 4 UNITS: 100 INJECTION, SOLUTION INTRAVENOUS; SUBCUTANEOUS at 20:34

## 2024-07-16 RX ADMIN — DEXAMETHASONE SODIUM PHOSPHATE 4 MG: 4 INJECTION INTRA-ARTICULAR; INTRALESIONAL; INTRAMUSCULAR; INTRAVENOUS; SOFT TISSUE at 07:56

## 2024-07-16 RX ADMIN — LURASIDONE HYDROCHLORIDE 20 MG: 20 TABLET, FILM COATED ORAL at 18:43

## 2024-07-16 RX ADMIN — SODIUM CHLORIDE: 4.5 INJECTION, SOLUTION INTRAVENOUS at 17:09

## 2024-07-16 RX ADMIN — HYDROMORPHONE HYDROCHLORIDE 0.5 MG: 2 INJECTION INTRAMUSCULAR; INTRAVENOUS; SUBCUTANEOUS at 07:30

## 2024-07-16 RX ADMIN — HYDRALAZINE HYDROCHLORIDE 50 MG: 50 TABLET ORAL at 20:31

## 2024-07-16 RX ADMIN — ACETAMINOPHEN 1000 MG: 500 TABLET ORAL at 12:55

## 2024-07-16 RX ADMIN — HYDROMORPHONE HYDROCHLORIDE 0.5 MG: 1 INJECTION, SOLUTION INTRAMUSCULAR; INTRAVENOUS; SUBCUTANEOUS at 03:23

## 2024-07-16 RX ADMIN — HYDROMORPHONE HYDROCHLORIDE 0.5 MG: 2 INJECTION INTRAMUSCULAR; INTRAVENOUS; SUBCUTANEOUS at 07:38

## 2024-07-16 RX ADMIN — AMOXICILLIN AND CLAVULANATE POTASSIUM 1 TABLET: 500; 125 TABLET, FILM COATED ORAL at 14:23

## 2024-07-16 RX ADMIN — HYDRALAZINE HYDROCHLORIDE 50 MG: 50 TABLET ORAL at 06:28

## 2024-07-16 RX ADMIN — ACETAMINOPHEN 1000 MG: 500 TABLET ORAL at 03:23

## 2024-07-16 RX ADMIN — HYDROMORPHONE HYDROCHLORIDE 0.25 MG: 1 INJECTION, SOLUTION INTRAMUSCULAR; INTRAVENOUS; SUBCUTANEOUS at 11:55

## 2024-07-16 RX ADMIN — FERROUS SULFATE TAB 325 MG (65 MG ELEMENTAL FE) 325 MG: 325 (65 FE) TAB at 12:55

## 2024-07-16 RX ADMIN — GABAPENTIN 300 MG: 300 CAPSULE ORAL at 20:31

## 2024-07-16 RX ADMIN — SODIUM CHLORIDE, PRESERVATIVE FREE 10 ML: 5 INJECTION INTRAVENOUS at 20:35

## 2024-07-16 RX ADMIN — PIPERACILLIN AND TAZOBACTAM 3375 MG: 3; .375 INJECTION, POWDER, LYOPHILIZED, FOR SOLUTION INTRAVENOUS at 03:26

## 2024-07-16 RX ADMIN — OXYCODONE 10 MG: 5 TABLET ORAL at 22:27

## 2024-07-16 RX ADMIN — ASPIRIN 81 MG: 81 TABLET, COATED ORAL at 12:55

## 2024-07-16 RX ADMIN — INSULIN LISPRO 2 UNITS: 100 INJECTION, SOLUTION INTRAVENOUS; SUBCUTANEOUS at 11:08

## 2024-07-16 RX ADMIN — GLYCOPYRROLATE 0.3 MG: 0.2 INJECTION INTRAMUSCULAR; INTRAVENOUS at 08:36

## 2024-07-16 RX ADMIN — Medication 120 MCG: at 08:19

## 2024-07-16 RX ADMIN — CALCIUM CHLORIDE 0.5 G: 100 INJECTION INTRAVENOUS; INTRAVENTRICULAR at 08:19

## 2024-07-16 RX ADMIN — VANCOMYCIN HYDROCHLORIDE 750 MG: 750 INJECTION, POWDER, LYOPHILIZED, FOR SOLUTION INTRAVENOUS at 06:30

## 2024-07-16 RX ADMIN — HYDRALAZINE HYDROCHLORIDE 10 MG: 20 INJECTION INTRAMUSCULAR; INTRAVENOUS at 11:42

## 2024-07-16 RX ADMIN — SODIUM CHLORIDE, PRESERVATIVE FREE 10 ML: 5 INJECTION INTRAVENOUS at 12:57

## 2024-07-16 RX ADMIN — MORPHINE SULFATE 2 MG: 2 INJECTION, SOLUTION INTRAMUSCULAR; INTRAVENOUS at 20:27

## 2024-07-16 RX ADMIN — Medication 120 MCG: at 07:56

## 2024-07-16 RX ADMIN — CLONIDINE HYDROCHLORIDE 0.2 MG: 0.1 TABLET ORAL at 21:31

## 2024-07-16 RX ADMIN — AMOXICILLIN AND CLAVULANATE POTASSIUM 1 TABLET: 500; 125 TABLET, FILM COATED ORAL at 20:31

## 2024-07-16 RX ADMIN — PROCHLORPERAZINE EDISYLATE 2.5 MG: 5 INJECTION INTRAMUSCULAR; INTRAVENOUS at 09:29

## 2024-07-16 RX ADMIN — HYDRALAZINE HYDROCHLORIDE 50 MG: 50 TABLET ORAL at 12:55

## 2024-07-16 RX ADMIN — INSULIN GLARGINE 20 UNITS: 100 INJECTION, SOLUTION SUBCUTANEOUS at 20:34

## 2024-07-16 RX ADMIN — FERROUS SULFATE TAB 325 MG (65 MG ELEMENTAL FE) 325 MG: 325 (65 FE) TAB at 17:02

## 2024-07-16 RX ADMIN — ROCURONIUM BROMIDE 30 MG: 10 INJECTION INTRAVENOUS at 07:38

## 2024-07-16 RX ADMIN — CARVEDILOL 1.56 MG: 3.12 TABLET, FILM COATED ORAL at 12:55

## 2024-07-16 RX ADMIN — CARVEDILOL 1.56 MG: 3.12 TABLET, FILM COATED ORAL at 20:31

## 2024-07-16 RX ADMIN — ONDANSETRON 4 MG: 2 INJECTION INTRAMUSCULAR; INTRAVENOUS at 07:56

## 2024-07-16 RX ADMIN — LIDOCAINE HYDROCHLORIDE 100 MG: 20 INJECTION, SOLUTION EPIDURAL; INFILTRATION; INTRACAUDAL; PERINEURAL at 07:38

## 2024-07-16 RX ADMIN — CARVEDILOL 1.56 MG: 3.12 TABLET, FILM COATED ORAL at 13:24

## 2024-07-16 RX ADMIN — GABAPENTIN 300 MG: 300 CAPSULE ORAL at 12:55

## 2024-07-16 RX ADMIN — CALCIUM CHLORIDE 0.5 G: 100 INJECTION INTRAVENOUS; INTRAVENTRICULAR at 08:02

## 2024-07-16 RX ADMIN — SCOPALAMINE 1 PATCH: 1 PATCH, EXTENDED RELEASE TRANSDERMAL at 06:59

## 2024-07-16 RX ADMIN — FENTANYL CITRATE 50 MCG: 50 INJECTION INTRAMUSCULAR; INTRAVENOUS at 09:00

## 2024-07-16 RX ADMIN — HYDROMORPHONE HYDROCHLORIDE 0.25 MG: 1 INJECTION, SOLUTION INTRAMUSCULAR; INTRAVENOUS; SUBCUTANEOUS at 09:23

## 2024-07-16 RX ADMIN — Medication 80 MCG: at 08:04

## 2024-07-16 RX ADMIN — HYDROMORPHONE HYDROCHLORIDE 1 MG: 2 INJECTION INTRAMUSCULAR; INTRAVENOUS; SUBCUTANEOUS at 08:13

## 2024-07-16 RX ADMIN — HYDROMORPHONE HYDROCHLORIDE 2 MG: 2 TABLET ORAL at 09:45

## 2024-07-16 RX ADMIN — INSULIN LISPRO 6 UNITS: 100 INJECTION, SOLUTION INTRAVENOUS; SUBCUTANEOUS at 18:05

## 2024-07-16 RX ADMIN — INSULIN LISPRO 2 UNITS: 100 INJECTION, SOLUTION INTRAVENOUS; SUBCUTANEOUS at 12:54

## 2024-07-16 RX ADMIN — ALBUMIN (HUMAN) 250 ML: 12.5 INJECTION, SOLUTION INTRAVENOUS at 08:02

## 2024-07-16 RX ADMIN — PROPOFOL 200 MG: 10 INJECTION, EMULSION INTRAVENOUS at 07:38

## 2024-07-16 ASSESSMENT — PAIN DESCRIPTION - ORIENTATION
ORIENTATION: RIGHT

## 2024-07-16 ASSESSMENT — PAIN DESCRIPTION - LOCATION
LOCATION: LEG

## 2024-07-16 ASSESSMENT — PAIN SCALES - GENERAL
PAINLEVEL_OUTOF10: 8
PAINLEVEL_OUTOF10: 9
PAINLEVEL_OUTOF10: 8
PAINLEVEL_OUTOF10: 9
PAINLEVEL_OUTOF10: 10
PAINLEVEL_OUTOF10: 9
PAINLEVEL_OUTOF10: 9
PAINLEVEL_OUTOF10: 8
PAINLEVEL_OUTOF10: 8

## 2024-07-16 ASSESSMENT — PAIN DESCRIPTION - DESCRIPTORS
DESCRIPTORS: THROBBING
DESCRIPTORS: SHOOTING
DESCRIPTORS: ACHING
DESCRIPTORS: ACHING
DESCRIPTORS: STABBING;THROBBING

## 2024-07-16 ASSESSMENT — PAIN - FUNCTIONAL ASSESSMENT: PAIN_FUNCTIONAL_ASSESSMENT: 0-10

## 2024-07-16 NOTE — ANESTHESIA POSTPROCEDURE EVALUATION
Department of Anesthesiology  Postprocedure Note    Patient: Keaton Van  MRN: [id number]  YOB: 1965  Date of evaluation: 7/16/2024    Procedure Summary       Date: 07/16/24 Room / Location: [facility] MAIN OR M1 / [facility] MAIN OR    Anesthesia Start: 0730 Anesthesia Stop: 0854    Procedure: RIGHT BELOW KNEE LEG AMPUTATION (Right: Leg Lower) Diagnosis:       Ulcer of right foot, unspecified ulcer stage (HCC)      (Ulcer of right foot, unspecified ulcer stage (HCC) [L97.519])    Providers: James Schaeffer MD Responsible Provider: Carl Rosen MD    Anesthesia Type: General ASA Status: 3            Anesthesia Type: General    Agustin Phase I: Agustin Score: 8    Agustin Phase II:      Anesthesia Post Evaluation    Patient location during evaluation: PACU  Patient participation: complete - patient participated  Level of consciousness: sleepy but conscious and responsive to verbal stimuli  Airway patency: patent  Nausea & Vomiting: no vomiting and no nausea  Cardiovascular status: blood pressure returned to baseline and hemodynamically stable  Respiratory status: acceptable  Hydration status: stable  Pain management: adequate    No notable events documented.

## 2024-07-16 NOTE — PROGRESS NOTES
Pt declined bed alarm. Verbalizes understanding of safety measures. Says he will not make any attempts to get OOB without calling first.

## 2024-07-16 NOTE — CARE COORDINATION
Transition of Care Plan:    RUR: 33% (high RUR, readmission)  Prior Level of Functioning: Needing assistance with mobility  Disposition: Pending clinical progress - may need rehab after surgery, then return home or to sister's home on discharge.  Patient provided with housing insecurity resources and [facility] contact information listed on AVS.  If SNF or IPR: Date FOC offered: 7/16 HH/IPR/SNF lists provided; CM requested that patient chooses 2 IPRs and 3-5 SNF choices.  Patient does not want to return to [facility]  Date FOC received: TBD  Accepting facility: [facility]  Date authorization started with reference number: N/A - three night stay will be needed if going to SNF  Date authorization received and expires: N/A  Follow up appointments: defer to rehab  DME needed: defer to rehab.  Patient has a cane at home.  Transportation at discharge: Transportation will be needed.  IM/IMM Medicare/ letter given: 2nd IM needed prior to discharge.  Is patient a [place] and connected with [facility]? No, per previous CM conversation with [facility].   If yes, was  transfer form completed and VA notified? N/A  Caregiver Contact: Patient has adult son that he does not wish to list at this time.  Patient did not want to complete ACP documents.   Evelyn Guallpa - sister - [phone]; Anuradha Brown - sister - [phone]  Discharge Caregiver contacted prior to discharge? Patient to contact.  Care Conference needed? No.  Barriers to discharge: 7/16 BKA, PT/OT/nephro to see, DM/vascular/podiatry clearance    7126 - Debbi Gongora with [employer] verified that this patient was screened in June of 2024 and MARJAN was submitted to [facility].  She said she has reached out to  for updates.    0068 - CM contacted [facility] who confirmed that the following medications were provided to the patient (Meds to Beds) through jed coverage with their pharmacy on 6/17/2024: amlodipine, aspirin

## 2024-07-16 NOTE — OP NOTE
[facility]  OPERATIVE REPORT     Name: Keaton Van  MR#: [id number]  : 1965  DATE OF SERVICE:  24     PREOPERATIVE DIAGNOSIS:  Right foot gangrene     POSTOPERATIVE DIAGNOSIS:  Right foot gangrene     PROCEDURE PERFORMED:  1. Right Below knee amputation     SURGEON:  James Schaeffer MD    ASSISTANT: Shayy     ANESTHESIA:  General.     COMPLICATIONS:  None.     SPECIMENS REMOVED:  Right foot     IMPLANTS:  None.     ESTIMATED BLOOD LOSS:  200 mL.     INDICATION FOR PROCEDURE:  The patient is a 59-year-old male with non-healing right foot wound for several years that wants to proceed with below knee amputation. Risks and benefits were discussed with the patient and he wished to proceed.     DESCRIPTION OF PROCEDURE IN DETAIL:  The patient was brought to the operating room and prepped and draped in the usual sterile fashion.  The lower extremity was prepped and draped in the usual aseptic fashion. The intended incision site was marked. The anterior aspect of the incision was made four fingerbreadths distal to the tibial tuberosity. The incision was continued through the fascia to create a long posterior flap. The anterior compartment muscles were divided using electrosurgical dissection. The tibia and fibula were cleared. Periosteal elevator was used to clear the periosteum from the tibia. The tibia was transected with a power reciprocating saw. The fibula was transected 1 cm proximal to the tibial transection site using the saw. The amputation   was then completed with an amputation knife. Hemostasis was achieved with a combination of silk sutures and bovie electrocautery. The flap was debulked using  scissors. The nerve was placed on traction, ligated and divided sharply and   allowed to retract. The fascia was then closed using 0 Vicryl sutures. Staples were placed in the skin. A sterile dressing was applied. The patient tolerated the procedure well. There were no apparent

## 2024-07-16 NOTE — FLOWSHEET NOTE
07/16/24 0850   Handoff   Communication Given Periop Handoff/Relief   Handoff phase Phase I receiving   Handoff Given To Jackie PACU RN   Handoff Received From Joy OR RN/ Monika CRNA   Handoff Communication Face to Face;At bedside   Time Handoff Given 0850     1150: TRANSFER - OUT REPORT:    Verbal report given to Mis Lion on Keaton Van being transferred to  for routine progression of care       Report consisted of patient’s Situation, Background, Assessment and   Recommendations(SBAR).     Opportunity for questions and clarification was provided.

## 2024-07-16 NOTE — PERIOP NOTE
TRANSFER - IN REPORT:    Verbal report received from Yoana YA on Keaton Van  being received from [facility] for ordered procedure      Report consisted of patient's Situation, Background, Assessment and   Recommendations(SBAR).     Information from the following report(s) Adult Overview, Intake/Output, Recent Results, Cardiac Rhythm  , Pre Procedure Checklist, Procedure Verification, and Neuro Assessment was reviewed with the receiving nurse.    Opportunity for questions and clarification was provided.      Assessment completed upon patient's arrival to unit and care assumed.     0702 patient awake alert, respirations even, unlabored.   Complains pain entire right leg, from foot to hip, constant throbbing. Left lateral foot with quarter sized wound, no drainage. 3-5 toes amputated    Right lateral foot, distal. 5th metatarsal amputated.  Sacrum without redness, tenderness or sign of breakdown.

## 2024-07-16 NOTE — CONSULTS
1 MG tablet Take 1 tablet by mouth in the morning and 1 tablet in the evening. 6/24/24   Stefania Vargas MD   ferrous sulfate (IRON 325) 325 (65 Fe) MG tablet Take 1 tablet by mouth 2 times daily (with meals) 6/24/24   Stefania Vargas MD   carvedilol (COREG) 3.125 MG tablet Take 0.5 tablets by mouth 2 times daily 6/24/24   Stefania Vargas MD   amLODIPine (NORVASC) 10 MG tablet Take 1 tablet by mouth daily 6/18/24   Khoi Dumont MD   aspirin 81 MG EC tablet Take 1 tablet by mouth daily 6/18/24   Khoi Dumont MD   tiZANidine (ZANAFLEX) 2 MG tablet Take 1 tablet by mouth every 8 hours as needed (muscle spasm) 6/17/24   Khoi Dumont MD   insulin glargine (BASAGLAR KWIKPEN) 100 UNIT/ML injection pen Inject 20 Units into the skin nightly 6/17/24   Khoi Dumont MD   lurasidone (LATUDA) 20 MG TABS tablet Take 1 tablet by mouth Daily with supper 6/17/24   Khoi Dumont MD   isosorbide mononitrate (IMDUR) 30 MG extended release tablet Take 1 tablet by mouth daily 6/17/24   Khoi Dumont MD   acetaminophen (TYLENOL) 325 MG tablet Take 2 tablets by mouth every 6 hours as needed for Pain 6/8/24   Mimi Mello MD       Allergies   Allergen Reactions    Midazolam Shortness Of Breath     Other reaction(s): Unknown (comments)  Numbness, with shortness of breath  Weakness          Social Hx:  reports that he has been smoking cigarettes. He started smoking about 2 months ago. He has a 0.1 pack-year smoking history. He has never used smokeless tobacco. He reports current drug use. Frequency: 1.00 time per week. Drug: Cocaine. He reports that he does not drink alcohol.     Family History   Problem Relation Age of Onset    Hypertension Father     Heart Disease Father     Hypertension Mother     Stroke Mother     Cancer Maternal Grandmother         unknown type    Diabetes Maternal Grandfather        Review of Systems:  A twelve point review of system was performed today. Pertinent positives and  negatives are mentioned in the HPI. The reminder of the ROS is negative and noncontributory.     Objective:    Vitals:    Vitals:    07/16/24 1155 07/16/24 1200 07/16/24 1206 07/16/24 1231   BP:   (!) 161/87 (!) 154/83   Pulse:   79 78   Resp:   25 16   Temp:   97.6 °F (36.4 °C) 97.3 °F (36.3 °C)   TempSrc:   Oral Axillary   SpO2: 96% 97% 98% 96%   Weight:       Height:         I&O's:  07/15 0701 - 07/16 0700  In: -   Out: 200 [Urine:200]  BP (!) 154/83   Pulse 78   Temp 97.3 °F (36.3 °C) (Axillary)   Resp 16   Ht 1.88 m (6' 2.02\")   Wt 77.1 kg (170 lb)   SpO2 96%   BMI 21.82 kg/m²     Physical Exam:  General: NAD,Conversant   Neck:  Supple, no mass  Resp:  normal respiratory effort, symmetrical expansion  CV:  Regular Rate,  no LE edema  GI:  Soft, nontender  Extremities: RBKA, LLE w/o edema  Neurologic:  Non focal  Psych:             AAO x 3 appropriate affect   Skin:  No Rash  :  Carbone -    LABS:  Recent Labs     07/16/24  0608 07/15/24  0420 07/14/24  0420 07/13/24  1247 06/25/24  0108 06/24/24  0627 06/23/24  0118 06/22/24  1232 06/19/24  0815 06/19/24  0746    135* 132*   < > 139 137   < > 134*   < > 130*   K 4.3 4.7 4.3   < > 5.2* 4.9   < > 5.1   < > 6.1*    102 101   < > 106 107   < > 104   < > 98   CO2 26 29 28   < > 29 24   < > 25   < > 26   BUN 60* 55* 40*   < > 67* 72*   < > 87*   < > 77*   CREATININE 3.29* 2.92* 2.72*   < > 3.77* 3.38*   < > 3.40*   < > 2.95*   CALCIUM 8.2* 8.1* 8.1*   < > 9.0 8.9   < > 8.3*   < > 9.2   PHOS  --   --   --   --  4.0 4.5  --  5.7*   < >  --    MG  --   --   --   --   --   --   --   --   --  2.1    < > = values in this interval not displayed.     Recent Labs     07/16/24  0608 07/15/24  0420 07/14/24  0420   WBC 4.2 4.6 4.7   HGB 6.9* 7.0* 7.3*   HCT 22.0* 22.3* 23.1*   * 126* 119*     No results for input(s): \"KU\", \"CLU\" in the last 720 hours.    Invalid input(s): \"EMORY\", \"CREAU\", \"PROU\"    Urine dipstick:   No results found for: \"COLOR\",

## 2024-07-16 NOTE — PLAN OF CARE
Problem: Discharge Planning  Goal: Discharge to home or other facility with appropriate resources  Outcome: Progressing     Problem: Pain  Goal: Verbalizes/displays adequate comfort level or baseline comfort level  Outcome: Progressing     Problem: Safety - Adult  Goal: Free from fall injury  Outcome: Progressing  Flowsheets (Taken 7/16/2024 9293)  Free From Fall Injury: Instruct family/caregiver on patient safety     Problem: Chronic Conditions and Co-morbidities  Goal: Patient's chronic conditions and co-morbidity symptoms are monitored and maintained or improved  Outcome: Progressing     Problem: Nutrition Deficit:  Goal: Optimize nutritional status  Outcome: Progressing

## 2024-07-16 NOTE — PROGRESS NOTES
Hospitalist Progress Note    NAME:   Keaton Van   : 1965   MRN: [id number]     Date/Time: 2024 1:27 PM  Patient PCP: Robert eWlls PA-C    Estimated discharge date:   Barriers: PT/OT, Placement      Assessment / Plan:    S/p right below-knee amputation on 2024.  Right foot 5th toe stump osteomyelitis via XR  S/p resection w Dr Jama on 24  Wound with necrosis, dehiscence  Right leg w edema, redness, suspect cellulitis  Hx prior toe amputations left foot.  PVD.  Will discontinue vanco and zosyn  Blood cultures NGTD x 3 days  Does not meet sepsis criteria     Hypertensive urgency -- asymptomatic  2/2 medication noncompliance, pain also likely a factor  Chronic diastolic HF not in exacerbation  Paroxysmal Atrial fibrillation on last admission, currently SR  Resume home amlodipine, carvedilol (low dose d/t cocaine abuse), hydralazine, imdur, bumex     Uncontrolled DM2  Patient is on long-acting insulin 10 units in the morning and 20 units at bedtime.  Continue sliding scale insulin.     KIN on CKD4  Cr 2.33, this is improved from prior 3.7  Holding Bumex.  Adjusting insulin for better blood glucose control.  Nephrology consulted.     Chronic anemia, likely YENNY +/- anemia of CKD  Hemoglobin 7.3.  Continue to monitor.  Transfuse for hemoglobin less than 7.  Patient received 2 PRBC transfusions on 2024.     Bipolar I  Polysubstance abuse incl heavy crack cocaine use  Chornic pain  Medication noncompliance, financial and social barriers  Hx untreated HCV    Medical Decision Making:   I personally reviewed labs: cbc, bmp  I personally reviewed imaging: XR  I personally reviewed EKG:  Toxic drug monitoring: Vanco  Discussed case with:     Code Status: DNR  DVT Prophylaxis:   GI Prophylaxis:    Subjective:     Patient underwent right below-knee amputation.  Will discontinue vancomycin and Zosyn.  Will start Augmentin for 10 days.  PT/OT consulted.  Blood cultures negative growth to  encounter and independently examined this patient on 7/16/2024, as outlined below:  PHYSICAL EXAM:  General: Alert, cooperative  EENT:  EOMI. Anicteric sclerae.  Resp:  CTA bilaterally, no wheezing or rales.  No accessory muscle use  CV:  Regular  rhythm,  No edema  GI:  Soft, Non distended, Non tender.  +Bowel sounds  Neurologic:  Alert and oriented X 3, normal speech,   Psych:   Good insight. Not anxious nor agitated  Skin:  No rashes.  No jaundice    Reviewed most current lab test results and cultures  YES  Reviewed most current radiology test results   YES  Review and summation of old records today    NO  Reviewed patient's current orders and MAR    YES  PMH/SH reviewed - no change compared to H&P    Procedures: see electronic medical records for all procedures/Xrays and details which were not copied into this note but were reviewed prior to creation of Plan.      LABS:  I reviewed today's most current labs and imaging studies.  Pertinent labs include:  Recent Labs     07/14/24  0420 07/15/24  0420 07/16/24  0608   WBC 4.7 4.6 4.2   HGB 7.3* 7.0* 6.9*   HCT 23.1* 22.3* 22.0*   * 126* 143*       Recent Labs     07/14/24  0420 07/15/24  0420 07/16/24  0608   * 135* 137   K 4.3 4.7 4.3    102 106   CO2 28 29 26   GLUCOSE 290* 187* 301*   BUN 40* 55* 60*   CREATININE 2.72* 2.92* 3.29*   CALCIUM 8.1* 8.1* 8.2*         Signed: Nino Alicia MD

## 2024-07-17 LAB
ABO + RH BLD: NORMAL
ANION GAP SERPL CALC-SCNC: 5 MMOL/L (ref 5–15)
BASOPHILS # BLD: 0.1 K/UL (ref 0–0.1)
BASOPHILS NFR BLD: 1 % (ref 0–1)
BLD PROD TYP BPU: NORMAL
BLD PROD TYP BPU: NORMAL
BLOOD BANK BLOOD PRODUCT EXPIRATION DATE: NORMAL
BLOOD BANK BLOOD PRODUCT EXPIRATION DATE: NORMAL
BLOOD BANK DISPENSE STATUS: NORMAL
BLOOD BANK DISPENSE STATUS: NORMAL
BLOOD BANK ISBT PRODUCT BLOOD TYPE: 5100
BLOOD BANK ISBT PRODUCT BLOOD TYPE: 5100
BLOOD BANK PRODUCT CODE: NORMAL
BLOOD BANK PRODUCT CODE: NORMAL
BLOOD BANK UNIT TYPE AND RH: NORMAL
BLOOD BANK UNIT TYPE AND RH: NORMAL
BLOOD GROUP ANTIBODIES SERPL: NORMAL
BPU ID: NORMAL
BPU ID: NORMAL
BUN SERPL-MCNC: 58 MG/DL (ref 6–20)
BUN/CREAT SERPL: 19 (ref 12–20)
CALCIUM SERPL-MCNC: 8.6 MG/DL (ref 8.5–10.1)
CHLORIDE SERPL-SCNC: 104 MMOL/L (ref 97–108)
CO2 SERPL-SCNC: 24 MMOL/L (ref 21–32)
CREAT SERPL-MCNC: 3.08 MG/DL (ref 0.7–1.3)
CROSSMATCH RESULT: NORMAL
CROSSMATCH RESULT: NORMAL
DIFFERENTIAL METHOD BLD: ABNORMAL
EOSINOPHIL # BLD: 0.1 K/UL (ref 0–0.4)
EOSINOPHIL NFR BLD: 1 % (ref 0–7)
ERYTHROCYTE [DISTWIDTH] IN BLOOD BY AUTOMATED COUNT: 16.5 % (ref 11.5–14.5)
GLUCOSE BLD STRIP.AUTO-MCNC: 128 MG/DL (ref 65–117)
GLUCOSE BLD STRIP.AUTO-MCNC: 217 MG/DL (ref 65–117)
GLUCOSE BLD STRIP.AUTO-MCNC: 248 MG/DL (ref 65–117)
GLUCOSE BLD STRIP.AUTO-MCNC: 298 MG/DL (ref 65–117)
GLUCOSE SERPL-MCNC: 281 MG/DL (ref 65–100)
HCT VFR BLD AUTO: 23.8 % (ref 36.6–50.3)
HGB BLD-MCNC: 7.6 G/DL (ref 12.1–17)
IMM GRANULOCYTES # BLD AUTO: 0 K/UL (ref 0–0.04)
IMM GRANULOCYTES NFR BLD AUTO: 0 % (ref 0–0.5)
LYMPHOCYTES # BLD: 1 K/UL (ref 0.8–3.5)
LYMPHOCYTES NFR BLD: 13 % (ref 12–49)
MCH RBC QN AUTO: 27.2 PG (ref 26–34)
MCHC RBC AUTO-ENTMCNC: 31.9 G/DL (ref 30–36.5)
MCV RBC AUTO: 85.3 FL (ref 80–99)
MONOCYTES # BLD: 0.6 K/UL (ref 0–1)
MONOCYTES NFR BLD: 8 % (ref 5–13)
NEUTS SEG # BLD: 6 K/UL (ref 1.8–8)
NEUTS SEG NFR BLD: 77 % (ref 32–75)
NRBC # BLD: 0 K/UL (ref 0–0.01)
NRBC BLD-RTO: 0 PER 100 WBC
PLATELET # BLD AUTO: 140 K/UL (ref 150–400)
PMV BLD AUTO: 9.7 FL (ref 8.9–12.9)
POTASSIUM SERPL-SCNC: 5.4 MMOL/L (ref 3.5–5.1)
RBC # BLD AUTO: 2.79 M/UL (ref 4.1–5.7)
SERVICE CMNT-IMP: ABNORMAL
SODIUM SERPL-SCNC: 133 MMOL/L (ref 136–145)
SPECIMEN EXP DATE BLD: NORMAL
UNIT DIVISION: 0
UNIT DIVISION: 0
UNIT ISSUE DATE/TIME: NORMAL
UNIT ISSUE DATE/TIME: NORMAL
WBC # BLD AUTO: 7.8 K/UL (ref 4.1–11.1)

## 2024-07-17 PROCEDURE — 6370000000 HC RX 637 (ALT 250 FOR IP): Performed by: STUDENT IN AN ORGANIZED HEALTH CARE EDUCATION/TRAINING PROGRAM

## 2024-07-17 PROCEDURE — 6370000000 HC RX 637 (ALT 250 FOR IP)

## 2024-07-17 PROCEDURE — 82962 GLUCOSE BLOOD TEST: CPT

## 2024-07-17 PROCEDURE — 97530 THERAPEUTIC ACTIVITIES: CPT

## 2024-07-17 PROCEDURE — 6370000000 HC RX 637 (ALT 250 FOR IP): Performed by: PHYSICIAN ASSISTANT

## 2024-07-17 PROCEDURE — 97110 THERAPEUTIC EXERCISES: CPT

## 2024-07-17 PROCEDURE — 6360000002 HC RX W HCPCS

## 2024-07-17 PROCEDURE — 80048 BASIC METABOLIC PNL TOTAL CA: CPT

## 2024-07-17 PROCEDURE — 2580000003 HC RX 258: Performed by: NURSE PRACTITIONER

## 2024-07-17 PROCEDURE — 85025 COMPLETE CBC W/AUTO DIFF WBC: CPT

## 2024-07-17 PROCEDURE — 36415 COLL VENOUS BLD VENIPUNCTURE: CPT

## 2024-07-17 PROCEDURE — 6360000002 HC RX W HCPCS: Performed by: NURSE PRACTITIONER

## 2024-07-17 PROCEDURE — 2580000003 HC RX 258

## 2024-07-17 PROCEDURE — 6360000002 HC RX W HCPCS: Performed by: STUDENT IN AN ORGANIZED HEALTH CARE EDUCATION/TRAINING PROGRAM

## 2024-07-17 PROCEDURE — 2500000003 HC RX 250 WO HCPCS: Performed by: NURSE PRACTITIONER

## 2024-07-17 PROCEDURE — 6370000000 HC RX 637 (ALT 250 FOR IP): Performed by: NURSE PRACTITIONER

## 2024-07-17 PROCEDURE — 97165 OT EVAL LOW COMPLEX 30 MIN: CPT

## 2024-07-17 PROCEDURE — 97535 SELF CARE MNGMENT TRAINING: CPT

## 2024-07-17 PROCEDURE — 1100000000 HC RM PRIVATE

## 2024-07-17 PROCEDURE — 6370000000 HC RX 637 (ALT 250 FOR IP): Performed by: INTERNAL MEDICINE

## 2024-07-17 PROCEDURE — 99223 1ST HOSP IP/OBS HIGH 75: CPT | Performed by: INTERNAL MEDICINE

## 2024-07-17 PROCEDURE — 97161 PT EVAL LOW COMPLEX 20 MIN: CPT

## 2024-07-17 RX ORDER — SODIUM CHLORIDE 9 MG/ML
INJECTION, SOLUTION INTRAVENOUS CONTINUOUS
Status: DISCONTINUED | OUTPATIENT
Start: 2024-07-17 | End: 2024-07-21

## 2024-07-17 RX ORDER — NALOXONE HYDROCHLORIDE 0.4 MG/ML
INJECTION, SOLUTION INTRAMUSCULAR; INTRAVENOUS; SUBCUTANEOUS PRN
Status: DISCONTINUED | OUTPATIENT
Start: 2024-07-17 | End: 2024-07-20

## 2024-07-17 RX ORDER — HYDROMORPHONE HYDROCHLORIDE 1 MG/ML
0.5 INJECTION, SOLUTION INTRAMUSCULAR; INTRAVENOUS; SUBCUTANEOUS EVERY 4 HOURS PRN
Status: DISCONTINUED | OUTPATIENT
Start: 2024-07-17 | End: 2024-07-17

## 2024-07-17 RX ORDER — SENNA AND DOCUSATE SODIUM 50; 8.6 MG/1; MG/1
2 TABLET, FILM COATED ORAL 2 TIMES DAILY
Status: DISCONTINUED | OUTPATIENT
Start: 2024-07-17 | End: 2024-07-26 | Stop reason: HOSPADM

## 2024-07-17 RX ORDER — POLYETHYLENE GLYCOL 3350 17 G/17G
17 POWDER, FOR SOLUTION ORAL DAILY PRN
Status: DISCONTINUED | OUTPATIENT
Start: 2024-07-17 | End: 2024-07-20

## 2024-07-17 RX ADMIN — OXYCODONE 10 MG: 5 TABLET ORAL at 13:04

## 2024-07-17 RX ADMIN — MORPHINE SULFATE 2 MG: 2 INJECTION, SOLUTION INTRAMUSCULAR; INTRAVENOUS at 00:27

## 2024-07-17 RX ADMIN — SODIUM ZIRCONIUM CYCLOSILICATE 10 G: 10 POWDER, FOR SUSPENSION ORAL at 15:36

## 2024-07-17 RX ADMIN — ISOSORBIDE MONONITRATE 30 MG: 30 TABLET, EXTENDED RELEASE ORAL at 08:00

## 2024-07-17 RX ADMIN — FERROUS SULFATE TAB 325 MG (65 MG ELEMENTAL FE) 325 MG: 325 (65 FE) TAB at 08:00

## 2024-07-17 RX ADMIN — HYDRALAZINE HYDROCHLORIDE 50 MG: 50 TABLET ORAL at 13:04

## 2024-07-17 RX ADMIN — HEPARIN SODIUM 5000 UNITS: 5000 INJECTION INTRAVENOUS; SUBCUTANEOUS at 13:04

## 2024-07-17 RX ADMIN — Medication: at 09:10

## 2024-07-17 RX ADMIN — SODIUM CHLORIDE, PRESERVATIVE FREE 10 ML: 5 INJECTION INTRAVENOUS at 08:02

## 2024-07-17 RX ADMIN — AMLODIPINE BESYLATE 10 MG: 5 TABLET ORAL at 08:00

## 2024-07-17 RX ADMIN — SODIUM CHLORIDE: 9 INJECTION, SOLUTION INTRAVENOUS at 09:08

## 2024-07-17 RX ADMIN — CARVEDILOL 1.56 MG: 3.12 TABLET, FILM COATED ORAL at 08:00

## 2024-07-17 RX ADMIN — SENNOSIDES AND DOCUSATE SODIUM 2 TABLET: 50; 8.6 TABLET ORAL at 21:26

## 2024-07-17 RX ADMIN — GABAPENTIN 300 MG: 300 CAPSULE ORAL at 08:00

## 2024-07-17 RX ADMIN — INSULIN LISPRO 4 UNITS: 100 INJECTION, SOLUTION INTRAVENOUS; SUBCUTANEOUS at 08:51

## 2024-07-17 RX ADMIN — FERROUS SULFATE TAB 325 MG (65 MG ELEMENTAL FE) 325 MG: 325 (65 FE) TAB at 16:58

## 2024-07-17 RX ADMIN — ACETAMINOPHEN 1000 MG: 500 TABLET ORAL at 04:01

## 2024-07-17 RX ADMIN — GABAPENTIN 300 MG: 300 CAPSULE ORAL at 21:27

## 2024-07-17 RX ADMIN — SENNOSIDES AND DOCUSATE SODIUM 2 TABLET: 50; 8.6 TABLET ORAL at 15:24

## 2024-07-17 RX ADMIN — AMOXICILLIN AND CLAVULANATE POTASSIUM 1 TABLET: 500; 125 TABLET, FILM COATED ORAL at 08:51

## 2024-07-17 RX ADMIN — ACETAMINOPHEN 1000 MG: 500 TABLET ORAL at 16:58

## 2024-07-17 RX ADMIN — HEPARIN SODIUM 5000 UNITS: 5000 INJECTION INTRAVENOUS; SUBCUTANEOUS at 21:32

## 2024-07-17 RX ADMIN — HYDRALAZINE HYDROCHLORIDE 50 MG: 50 TABLET ORAL at 04:33

## 2024-07-17 RX ADMIN — CARVEDILOL 1.56 MG: 3.12 TABLET, FILM COATED ORAL at 21:26

## 2024-07-17 RX ADMIN — OXYCODONE 10 MG: 5 TABLET ORAL at 21:25

## 2024-07-17 RX ADMIN — INSULIN GLARGINE 20 UNITS: 100 INJECTION, SOLUTION SUBCUTANEOUS at 21:31

## 2024-07-17 RX ADMIN — GABAPENTIN 300 MG: 300 CAPSULE ORAL at 13:05

## 2024-07-17 RX ADMIN — HYDROMORPHONE HYDROCHLORIDE 0.5 MG: 1 INJECTION, SOLUTION INTRAMUSCULAR; INTRAVENOUS; SUBCUTANEOUS at 08:01

## 2024-07-17 RX ADMIN — HYDROMORPHONE HYDROCHLORIDE 0.5 MG: 1 INJECTION, SOLUTION INTRAMUSCULAR; INTRAVENOUS; SUBCUTANEOUS at 04:00

## 2024-07-17 RX ADMIN — INSULIN LISPRO 4 UNITS: 100 INJECTION, SOLUTION INTRAVENOUS; SUBCUTANEOUS at 17:02

## 2024-07-17 RX ADMIN — AMOXICILLIN AND CLAVULANATE POTASSIUM 1 TABLET: 500; 125 TABLET, FILM COATED ORAL at 21:27

## 2024-07-17 RX ADMIN — HYDRALAZINE HYDROCHLORIDE 50 MG: 50 TABLET ORAL at 21:27

## 2024-07-17 RX ADMIN — ACETAMINOPHEN 1000 MG: 500 TABLET ORAL at 08:51

## 2024-07-17 RX ADMIN — INSULIN GLARGINE 10 UNITS: 100 INJECTION, SOLUTION SUBCUTANEOUS at 08:00

## 2024-07-17 RX ADMIN — OXYCODONE 10 MG: 5 TABLET ORAL at 16:58

## 2024-07-17 RX ADMIN — ASPIRIN 81 MG: 81 TABLET, COATED ORAL at 08:00

## 2024-07-17 ASSESSMENT — PAIN DESCRIPTION - PAIN TYPE
TYPE: SURGICAL PAIN

## 2024-07-17 ASSESSMENT — PAIN SCALES - GENERAL
PAINLEVEL_OUTOF10: 10
PAINLEVEL_OUTOF10: 8
PAINLEVEL_OUTOF10: 9
PAINLEVEL_OUTOF10: 8
PAINLEVEL_OUTOF10: 10
PAINLEVEL_OUTOF10: 10
PAINLEVEL_OUTOF10: 8
PAINLEVEL_OUTOF10: 10
PAINLEVEL_OUTOF10: 8
PAINLEVEL_OUTOF10: 9
PAINLEVEL_OUTOF10: 6
PAINLEVEL_OUTOF10: 10

## 2024-07-17 ASSESSMENT — PAIN DESCRIPTION - ORIENTATION
ORIENTATION: RIGHT

## 2024-07-17 ASSESSMENT — PAIN DESCRIPTION - DESCRIPTORS
DESCRIPTORS: ACHING
DESCRIPTORS: ACHING
DESCRIPTORS: THROBBING
DESCRIPTORS: ACHING;THROBBING

## 2024-07-17 ASSESSMENT — PAIN DESCRIPTION - LOCATION
LOCATION: LEG
LOCATION: LEG
LOCATION: LEG;HIP
LOCATION: LEG

## 2024-07-17 ASSESSMENT — PAIN - FUNCTIONAL ASSESSMENT
PAIN_FUNCTIONAL_ASSESSMENT: ACTIVITIES ARE NOT PREVENTED

## 2024-07-17 ASSESSMENT — PAIN DESCRIPTION - FREQUENCY: FREQUENCY: CONTINUOUS

## 2024-07-17 ASSESSMENT — PAIN DESCRIPTION - ONSET: ONSET: ON-GOING

## 2024-07-17 NOTE — PLAN OF CARE
Problem: Physical Therapy - Adult  Goal: By Discharge: Performs mobility at highest level of function for planned discharge setting.  See evaluation for individualized goals.  Description: FUNCTIONAL STATUS PRIOR TO ADMISSION: Patient was modified independent using a single point cane for functional mobility due to RLE pain.    HOME SUPPORT PRIOR TO ADMISSION: The patient lived with roommates but did not require assistance.    Physical Therapy Goals  Initiated 7/17/2024  1.  Patient will move from supine to sit and sit to supine, scoot up and down, and roll side to side in bed with modified independence within 7 day(s).    2.  Patient will perform sit to stand with minimal assistance within 7 day(s).  3.  Patient will transfer from bed to chair and chair to bed with minimal assistance using the least restrictive device within 7 day(s).  4.  Patient will ambulate with minimal assistance for 20 feet with the least restrictive device within 7 day(s).   Outcome: Progressing   PHYSICAL THERAPY EVALUATION    Patient: Keaton Van (59 y.o. male)  Date: 7/17/2024  Primary Diagnosis: Osteomyelitis (HCC) [M86.9]  Acute osteomyelitis of right foot (HCC) [M86.171]  Other acute osteomyelitis of right foot (HCC) [M86.171]  Procedure(s) (LRB):  RIGHT BELOW KNEE LEG AMPUTATION (Right) 1 Day Post-Op   Precautions:                        ASSESSMENT :   DEFICITS/IMPAIRMENTS:   The patient is limited by decreased functional mobility, independence in ADLs, ROM, strength, sensation, activity tolerance, endurance, safety awareness, balance, increased pain levels, following admission for acute osteomyelitis of R foot with resultant RBKA.  Pt is s/p 1 day post op.     Based on the impairments listed above pt is functioning below his baseline.  Pt received in bed, immobilizer in place, agreeable to PT evaluation.  Education provided regarding new amputation, desensitization, HEP, transfer technique, and importance of OOB activity.  S with

## 2024-07-17 NOTE — PROGRESS NOTES
Palliative Medicine  Per Dr. Young, prior admission: Keaton Van is a 59 y.o. male with a past medical history of Type 2DM, CKD 4 chronic bilateral foot wounds (s/p recent R partial 5th amputation 24) CAD s/p 2 stents, HFpEF, pAFib, chronic pain, bipolar disorder,  HTN, medication noncompliance, who was admitted on 2024 from home with a diagnosis of GLF, acute on chronic respiratory failure  CT CHEST: moderate Bilateral pleural effusion  CT head no acute finding.   Per Dr. Rudolph this admission:  Keaton Van is a 59 y.o. male with a past medical history of Type 2DM, CKD 4 chronic bilateral foot wounds (s/p recent R partial 5th amputation 24) CAD s/p 2 stents, HFpEF, pAFib, chronic pain, bipolar disorder,  HTN, medication noncompliance, who was admitted on 2024 from home with a diagnosis of acute on chronic osteomyelitis of the right foot secondary to non healing diabetic wound, s/p BKA on      Code Status: DNR (DDNR signed with Dr. Dumont 2024)     [facility]/13/2024     5:10 PM   Demographics   Marital Status    Patient revoked his AMD on file, which names his sister as primary HCDM.     Patient / Family Encounter Documentation     Participants (names): Keaton VanKavitha, JHONATAN     Narrative: Palliative SW reviewed chart and met with patient, who was received alert and oriented, pleasant, smiling, receptive to encounter and easily engaged in conversation, sharing his goals are to go to [facility] for his leg and expressed a sense of relief that he no longer has to deal with the wound on his foot, which has been an issue for the past 2 years. He expressed optimism that he will work an aggressive rehab to improve his strength and get a prosthesis for his leg and get back to [facility], which he enjoys.  LCSW provided calm, caring presence, encouraged his self expression, provided reflective, empathetic listening with words of comfort and encouragement to set
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
## 2024-07-17 NOTE — PROGRESS NOTES
Nursing contacted Nocturnist/cross cover provider via non-urgent messaging system [email or web address] and notified patient asking for morphine to be d/c and dilaudid resumed, he is on oxy and tylenol and getting those, stated yesterday dilaudid wasn't helping so was switched I am told by nurse, to morphine, now states morphine not helping and asking to switch back to dilaudid.. No other concerns reported. No acute distress reported. No other information provided by nurse. VSS. Ordered d/c morphine, and dilaudid 0.5mg prn resumed,, keep oxy, tylenol prn as already ordered. Called nurse to d/w her further pt concerns, instructed to pls contact dayshift md in the am to d/w them pain regimen further, has gabapentin already being given also with cret 3.29, is s/p amputation yesterday, vascular also following this pt. Nurse may contact me overnight if additional x1 dosing is needed for pain as d/w her. Will defer further evaluation/management to the day shift primary attending care team. Patient denies any further complaints or concerns. Nursing to notify Hospitalist for further/continued concerns. Will remain available overnight for further concerns if nursing/patient needs. Please note, there are RRT systems in this hospital in place that if nursing has acute or critical patient condition change or concern, this is to help facilitate and notify that patient needs immediate bedside evaluation by a provider.     Non-billable note.

## 2024-07-17 NOTE — CONSULTS
Palliative Medicine  Patient Name: Keaton Van  YOB: 1965  MRN: [id number]  Age: 59 y.o.  Gender: male    Date of Initial Consult: 6/14/2024  Date of Service: 7/17/2024  Time: 1:22 PM  Provider: Doris Rudolph MD  Hospital Day: 5  Admit Date: 7/13/2024  Referring Provider: Dr. Dumont       Reasons for Consultation:  Goals of Care and Overwhelming Symptoms    HISTORY OF PRESENT ILLNESS (HPI):   Keaton Van is a 59 y.o. 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   Reports that he no longer uses cocaine for quite some time.    Patient has DNR order on chart. No Active AMD  PALLIATIVE DIAGNOSES:    Status post right BKA on 7/16/2024  Musculoskeletal pain related to fall, Left flank  Anemia of chronic disease  CKD4  CAD, HFpEF,  Acute on chronic respiratory failure, bilateral pleural effusions  Polysubstance abuse (heavy tobacco smoking, denies recent cocaine)   Housing insecurity  constipation  Palliative medicine encounter    ASSESSMENT AND PLAN:   Right thigh and stump pain-patient tells me that he has \"phantom limb pain \".  Explained that phantom limb pain is a chronic pain syndrome that may occur weeks after surgery, current pain management should be focused on postoperative pain only.  Agree with appropriate and aggressive pain control which has shown some decrease incidence and future development of phantom limb pain.  Encourage patient to take oxycodone orally to help with better and longer

## 2024-07-17 NOTE — PLAN OF CARE
Predictive Model Details          26 (Normal)  Factor Value    Calculated 7/17/2024 08:14 40% Age 59 years old    Deterioration Index Model 22% Potassium abnormal (5.4 mmol/L)     11% Hematocrit abnormal (23.8 %)     9% Systolic 148     8% Sodium abnormal (133 mmol/L)     4% BUN abnormal (58 MG/DL)     3% Respiratory rate 17     2% Platelet count abnormal (140 K/uL)     1% Pulse 66     1% WBC count 7.8 K/uL     0% Temperature 98.8 °F (37.1 °C)     0% Pulse oximetry 96 %       Problem: Discharge Planning  Goal: Discharge to home or other facility with appropriate resources  7/17/2024 0814 by Linnette Toro RN  Outcome: Progressing  7/16/2024 1943 by Alejandro Orosco RN  Outcome: Progressing     Problem: Pain  Goal: Verbalizes/displays adequate comfort level or baseline comfort level  7/17/2024 0814 by Linnette Toro RN  Outcome: Progressing  7/16/2024 1943 by Alejandro Orosco RN  Outcome: Progressing     Problem: Safety - Adult  Goal: Free from fall injury  7/17/2024 0814 by Linnette Toro RN  Outcome: Progressing  7/16/2024 1943 by Alejandro Orosco RN  Outcome: Progressing  Flowsheets (Taken 7/16/2024 1423)  Free From Fall Injury: Instruct family/caregiver on patient safety     Problem: Chronic Conditions and Co-morbidities  Goal: Patient's chronic conditions and co-morbidity symptoms are monitored and maintained or improved  7/17/2024 0814 by Linnette Toro RN  Flowsheets (Taken 7/17/2024 0814)  Care Plan - Patient's Chronic Conditions and Co-Morbidity Symptoms are Monitored and Maintained or Improved:   Monitor and assess patient's chronic conditions and comorbid symptoms for stability, deterioration, or improvement   Collaborate with multidisciplinary team to address chronic and comorbid conditions and prevent exacerbation or deterioration   Update acute care plan with appropriate goals if chronic or comorbid symptoms are exacerbated and prevent overall improvement and  discharge  7/16/2024 1943 by Alejandro Orosco, RN  Outcome: Progressing

## 2024-07-17 NOTE — PROGRESS NOTES
Hospitalist Progress Note    NAME:   Keaton Van   : 1965   MRN: [id number]     Date/Time: 2024    Patient PCP: Robert Wells PA-C      Assessment / Plan:      S/p right below-knee amputation on 2024.  Right foot 5th toe stump osteomyelitis via XR  S/p resection w Dr Jama on 24  Wound with necrosis, dehiscence  Right leg w edema, redness, suspect cellulitis  Hx prior toe amputations left foot.  PVD.  Will discontinue vanco and zosyn  Blood cultures NGTD x 3 days  Does not meet sepsis criteria     Hypertensive urgency -- asymptomatic  2/2 medication noncompliance, pain also likely a factor  Chronic diastolic HF not in exacerbation  Paroxysmal Atrial fibrillation on last admission, currently SR  Resume home amlodipine, carvedilol (low dose d/t cocaine abuse), hydralazine, imdur, bumex     Uncontrolled DM2  Patient is on long-acting insulin 10 units in the morning and 20 units at bedtime.  Continue sliding scale insulin.     KIN on CKD4  Cr 2.33, this is improved from prior 3.7  Holding Bumex.  Adjusting insulin for better blood glucose control.  Nephrology consulted.     Chronic anemia, likely YENNY +/- anemia of CKD  Hemoglobin 7.3.  Continue to monitor.  Transfuse for hemoglobin less than 7.  Patient received 2 PRBC transfusions on 2024.     Bipolar I  Polysubstance abuse incl heavy crack cocaine use  Chornic pain  Medication noncompliance, financial and social barriers  Hx untreated HCV    Medical Decision Making:   I personally reviewed labs: yes, as below   I personally reviewed imaging reports: yes, as below   Toxic drug monitoring:   Discussed case with: Pt, RN, d/w CM for placement,    Code Status: DNR       Subjective:  Assessment / Plan:      Episodes of pain  No fever  No SOB  No CP  Discussed with RN events overnight.       Objective:      VITALS:   Last 24hrs VS reviewed since prior progress note. Most recent are:  Patient Vitals for the past 24 hrs:   BP Temp Temp src

## 2024-07-17 NOTE — PROGRESS NOTES
Vascular Surgery Progress Note  Quiana Forte ACNP-BC      Date:2024       Room:[address] [place] [address]  Patient Name:Keaton Van     YOB: 1965     Age:59 y.o.    Subjective      Mr. Keaton Van is a 59 y.o. male with a pmhx significant for diabetes mellitus, hypertension, heart failure, chronic renal disease, bipolar disorder, hepatitis C, and polysubstance abuse.  He is a current smoker.    He is admitted to the hospital with acute on chronic osteomyelitis of the right foot secondary to nonhealing diabetic wound.  He is s/p right BKA on .      He reports uncontrolled pain this am.      Objective           Vitals Last 24 Hours:  TEMPERATURE:  Temp  Av.8 °F (36.6 °C)  Min: 97 °F (36.1 °C)  Max: 98.8 °F (37.1 °C)  RESPIRATIONS RANGE: Resp  Av.3  Min: 11  Max: 25  PULSE OXIMETRY RANGE: SpO2  Av.6 %  Min: 96 %  Max: 100 %  PULSE RANGE: Pulse  Av.8  Min: 64  Max: 89  BLOOD PRESSURE RANGE: Systolic (24hrs), Av , Min:120 , Max:199   ; Diastolic (24hrs), Av, Min:60, Max:101    I/O (24Hr):    Intake/Output Summary (Last 24 hours) at 2024 0842  Last data filed at 2024 0620  Gross per 24 hour   Intake 1100 ml   Output 3520 ml   Net -2420 ml       Objective:  General Appearance:  Comfortable.    Vital signs: (most recent): Blood pressure (!) 148/80, pulse 66, temperature 98.8 °F (37.1 °C), temperature source Oral, resp. rate 17, height 1.88 m (6' 2.02\"), weight 77.1 kg (170 lb), SpO2 96 %.  Vital signs are normal.  No fever.    Lungs:  Normal effort and normal respiratory rate.    Heart: Normal rate.  Regular rhythm.    Abdomen: Abdomen is soft and non-distended.    Extremities: Normal range of motion.    Neurological: Patient is alert and oriented to person, place and time.    Skin:  (Dressing to right stump is dry and intact with immobilizer in place. )      Labs    Labs:  CBC:  Recent Labs     07/15/24  0420 24  0608 24  0625   WBC 4.6 4.2 7.8   RBC 2.74* 2.65* 2.79*

## 2024-07-17 NOTE — PROGRESS NOTES
[facility]      Nephrology Progress Note  Keaton Van  Date of Admission : 7/13/2024    CC:  Follow up for KIN    Assessment and Plan     KIN 2/2 possible ATN from relative hypotension, AIN, obstruction  - Presenting Creatinine: 2.33     Baseline creatinine: 2.0-2.2       - Renal US w/ medical renal disease  - UA: protein, glucose, blood, leukocyte esterase, granular casts, WBC, and yeast  - reviewed previous nephrology notes, d/w pt RRT. 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 Denies N/V LE swelling. Recorded UOP non oliguric. Endorses SOB.       Current Medications: all current  Medications have been eviewed in EPIC  Review of Systems: Pertinent items are noted in HPI.    Objective:  Vitals:    Vitals:    07/17/24 0741 07/17/24 0900 07/17/24 0915 07/17/24 0930   BP: (!) 148/80 137/71     Pulse: 66 71     Resp: 17 18 18 18   Temp: 98.8 °F (37.1 °C) 98.5 °F (36.9 °C)     TempSrc: Oral Oral     SpO2: 96% 96%     Weight:       Height:         Intake and Output:  No intake/output data recorded.  07/15 1901 - 07/17 0700  In: 1600 [P.O.:1100]  Out: 3720 [Urine:3520]    Physical Examination:  General: NAD,Conversant   Neck:  Supple, no mass  Resp:  Lungs CTA B/L, no wheezing , normal respiratory effort  CV:  Regular Rate/ Rhythm,  no murmur or rub,- LE edema  GI:  Soft, NT, + Bowel sounds  Neurologic:  Non focal  Psych:             AAO x 3 appropriate affect   Extremities: RBKA  Skin:  No Rash    LABS:  Recent Labs     07/17/24  0625 07/16/24  0608 07/15/24  0420 07/13/24  1247 06/25/24  0108 06/24/24  0627 06/23/24  0118 06/22/24  1232 06/19/24  0815 06/19/24  0746   * 137 135*   < > 139 137   <

## 2024-07-18 PROBLEM — S88.111A AMPUTATION OF RIGHT LOWER EXTREMITY BELOW KNEE (HCC): Status: ACTIVE | Noted: 2024-07-18

## 2024-07-18 PROBLEM — Z51.5 PALLIATIVE CARE ENCOUNTER: Status: ACTIVE | Noted: 2024-07-18

## 2024-07-18 PROBLEM — R52 PAIN: Status: ACTIVE | Noted: 2024-07-18

## 2024-07-18 LAB
ANION GAP SERPL CALC-SCNC: 7 MMOL/L (ref 5–15)
BASOPHILS # BLD: 0.1 K/UL (ref 0–0.1)
BASOPHILS NFR BLD: 1 % (ref 0–1)
BUN SERPL-MCNC: 68 MG/DL (ref 6–20)
BUN/CREAT SERPL: 22 (ref 12–20)
CALCIUM SERPL-MCNC: 8.5 MG/DL (ref 8.5–10.1)
CHLORIDE SERPL-SCNC: 104 MMOL/L (ref 97–108)
CO2 SERPL-SCNC: 24 MMOL/L (ref 21–32)
CREAT SERPL-MCNC: 3.08 MG/DL (ref 0.7–1.3)
DIFFERENTIAL METHOD BLD: ABNORMAL
EOSINOPHIL # BLD: 0.3 K/UL (ref 0–0.4)
EOSINOPHIL NFR BLD: 4 % (ref 0–7)
ERYTHROCYTE [DISTWIDTH] IN BLOOD BY AUTOMATED COUNT: 16.5 % (ref 11.5–14.5)
GLUCOSE BLD STRIP.AUTO-MCNC: 112 MG/DL (ref 65–117)
GLUCOSE BLD STRIP.AUTO-MCNC: 130 MG/DL (ref 65–117)
GLUCOSE BLD STRIP.AUTO-MCNC: 206 MG/DL (ref 65–117)
GLUCOSE BLD STRIP.AUTO-MCNC: 206 MG/DL (ref 65–117)
GLUCOSE SERPL-MCNC: 71 MG/DL (ref 65–100)
HCT VFR BLD AUTO: 25.7 % (ref 36.6–50.3)
HGB BLD-MCNC: 8.1 G/DL (ref 12.1–17)
IMM GRANULOCYTES # BLD AUTO: 0.1 K/UL (ref 0–0.04)
IMM GRANULOCYTES NFR BLD AUTO: 1 % (ref 0–0.5)
LYMPHOCYTES # BLD: 1.4 K/UL (ref 0.8–3.5)
LYMPHOCYTES NFR BLD: 16 % (ref 12–49)
MCH RBC QN AUTO: 26.9 PG (ref 26–34)
MCHC RBC AUTO-ENTMCNC: 31.5 G/DL (ref 30–36.5)
MCV RBC AUTO: 85.4 FL (ref 80–99)
MONOCYTES # BLD: 0.8 K/UL (ref 0–1)
MONOCYTES NFR BLD: 9 % (ref 5–13)
NEUTS SEG # BLD: 6.6 K/UL (ref 1.8–8)
NEUTS SEG NFR BLD: 69 % (ref 32–75)
NRBC # BLD: 0 K/UL (ref 0–0.01)
NRBC BLD-RTO: 0 PER 100 WBC
PLATELET # BLD AUTO: 170 K/UL (ref 150–400)
PMV BLD AUTO: 9.2 FL (ref 8.9–12.9)
POTASSIUM SERPL-SCNC: 4.9 MMOL/L (ref 3.5–5.1)
RBC # BLD AUTO: 3.01 M/UL (ref 4.1–5.7)
SERVICE CMNT-IMP: ABNORMAL
SERVICE CMNT-IMP: NORMAL
SODIUM SERPL-SCNC: 135 MMOL/L (ref 136–145)
WBC # BLD AUTO: 9.3 K/UL (ref 4.1–11.1)

## 2024-07-18 PROCEDURE — 6360000002 HC RX W HCPCS

## 2024-07-18 PROCEDURE — 97116 GAIT TRAINING THERAPY: CPT | Performed by: PHYSICAL THERAPIST

## 2024-07-18 PROCEDURE — 82962 GLUCOSE BLOOD TEST: CPT

## 2024-07-18 PROCEDURE — 6370000000 HC RX 637 (ALT 250 FOR IP)

## 2024-07-18 PROCEDURE — 36415 COLL VENOUS BLD VENIPUNCTURE: CPT

## 2024-07-18 PROCEDURE — 6370000000 HC RX 637 (ALT 250 FOR IP): Performed by: INTERNAL MEDICINE

## 2024-07-18 PROCEDURE — 6370000000 HC RX 637 (ALT 250 FOR IP): Performed by: NURSE PRACTITIONER

## 2024-07-18 PROCEDURE — 1100000000 HC RM PRIVATE

## 2024-07-18 PROCEDURE — 99233 SBSQ HOSP IP/OBS HIGH 50: CPT | Performed by: NURSE PRACTITIONER

## 2024-07-18 PROCEDURE — 97110 THERAPEUTIC EXERCISES: CPT

## 2024-07-18 PROCEDURE — 97530 THERAPEUTIC ACTIVITIES: CPT | Performed by: PHYSICAL THERAPIST

## 2024-07-18 PROCEDURE — 97530 THERAPEUTIC ACTIVITIES: CPT

## 2024-07-18 PROCEDURE — 6370000000 HC RX 637 (ALT 250 FOR IP): Performed by: STUDENT IN AN ORGANIZED HEALTH CARE EDUCATION/TRAINING PROGRAM

## 2024-07-18 PROCEDURE — 80048 BASIC METABOLIC PNL TOTAL CA: CPT

## 2024-07-18 PROCEDURE — 85025 COMPLETE CBC W/AUTO DIFF WBC: CPT

## 2024-07-18 PROCEDURE — 99232 SBSQ HOSP IP/OBS MODERATE 35: CPT | Performed by: INTERNAL MEDICINE

## 2024-07-18 RX ORDER — CALCITONIN SALMON 200 [IU]/.09ML
1 SPRAY, METERED NASAL DAILY
Status: DISCONTINUED | OUTPATIENT
Start: 2024-07-19 | End: 2024-07-26 | Stop reason: HOSPADM

## 2024-07-18 RX ORDER — DULOXETIN HYDROCHLORIDE 20 MG/1
20 CAPSULE, DELAYED RELEASE ORAL DAILY
Status: DISCONTINUED | OUTPATIENT
Start: 2024-07-19 | End: 2024-07-26 | Stop reason: HOSPADM

## 2024-07-18 RX ADMIN — INSULIN GLARGINE 20 UNITS: 100 INJECTION, SOLUTION SUBCUTANEOUS at 21:28

## 2024-07-18 RX ADMIN — OXYCODONE 10 MG: 5 TABLET ORAL at 08:03

## 2024-07-18 RX ADMIN — FERROUS SULFATE TAB 325 MG (65 MG ELEMENTAL FE) 325 MG: 325 (65 FE) TAB at 08:50

## 2024-07-18 RX ADMIN — HYDRALAZINE HYDROCHLORIDE 50 MG: 50 TABLET ORAL at 06:07

## 2024-07-18 RX ADMIN — AMOXICILLIN AND CLAVULANATE POTASSIUM 1 TABLET: 500; 125 TABLET, FILM COATED ORAL at 08:48

## 2024-07-18 RX ADMIN — ACETAMINOPHEN 1000 MG: 500 TABLET ORAL at 18:45

## 2024-07-18 RX ADMIN — GABAPENTIN 300 MG: 300 CAPSULE ORAL at 08:50

## 2024-07-18 RX ADMIN — INSULIN LISPRO 2 UNITS: 100 INJECTION, SOLUTION INTRAVENOUS; SUBCUTANEOUS at 18:45

## 2024-07-18 RX ADMIN — LURASIDONE HYDROCHLORIDE 20 MG: 20 TABLET, FILM COATED ORAL at 18:47

## 2024-07-18 RX ADMIN — CARVEDILOL 1.56 MG: 3.12 TABLET, FILM COATED ORAL at 08:48

## 2024-07-18 RX ADMIN — HYDRALAZINE HYDROCHLORIDE 50 MG: 50 TABLET ORAL at 15:08

## 2024-07-18 RX ADMIN — SENNOSIDES AND DOCUSATE SODIUM 2 TABLET: 50; 8.6 TABLET ORAL at 21:27

## 2024-07-18 RX ADMIN — HEPARIN SODIUM 5000 UNITS: 5000 INJECTION INTRAVENOUS; SUBCUTANEOUS at 15:10

## 2024-07-18 RX ADMIN — GABAPENTIN 300 MG: 300 CAPSULE ORAL at 21:27

## 2024-07-18 RX ADMIN — FERROUS SULFATE TAB 325 MG (65 MG ELEMENTAL FE) 325 MG: 325 (65 FE) TAB at 18:45

## 2024-07-18 RX ADMIN — HYDRALAZINE HYDROCHLORIDE 50 MG: 50 TABLET ORAL at 21:27

## 2024-07-18 RX ADMIN — INSULIN GLARGINE 10 UNITS: 100 INJECTION, SOLUTION SUBCUTANEOUS at 08:46

## 2024-07-18 RX ADMIN — OXYCODONE 10 MG: 5 TABLET ORAL at 03:26

## 2024-07-18 RX ADMIN — ACETAMINOPHEN 1000 MG: 500 TABLET ORAL at 08:50

## 2024-07-18 RX ADMIN — AMOXICILLIN AND CLAVULANATE POTASSIUM 1 TABLET: 500; 125 TABLET, FILM COATED ORAL at 21:27

## 2024-07-18 RX ADMIN — OXYCODONE 10 MG: 5 TABLET ORAL at 15:05

## 2024-07-18 RX ADMIN — SENNOSIDES AND DOCUSATE SODIUM 2 TABLET: 50; 8.6 TABLET ORAL at 08:48

## 2024-07-18 RX ADMIN — CARVEDILOL 1.56 MG: 3.12 TABLET, FILM COATED ORAL at 21:27

## 2024-07-18 RX ADMIN — ACETAMINOPHEN 1000 MG: 500 TABLET ORAL at 03:26

## 2024-07-18 RX ADMIN — ISOSORBIDE MONONITRATE 30 MG: 30 TABLET, EXTENDED RELEASE ORAL at 08:50

## 2024-07-18 RX ADMIN — OXYCODONE 10 MG: 5 TABLET ORAL at 19:50

## 2024-07-18 RX ADMIN — HEPARIN SODIUM 5000 UNITS: 5000 INJECTION INTRAVENOUS; SUBCUTANEOUS at 21:30

## 2024-07-18 RX ADMIN — AMLODIPINE BESYLATE 10 MG: 5 TABLET ORAL at 08:50

## 2024-07-18 RX ADMIN — GABAPENTIN 300 MG: 300 CAPSULE ORAL at 15:08

## 2024-07-18 RX ADMIN — HEPARIN SODIUM 5000 UNITS: 5000 INJECTION INTRAVENOUS; SUBCUTANEOUS at 06:07

## 2024-07-18 RX ADMIN — ASPIRIN 81 MG: 81 TABLET, COATED ORAL at 08:48

## 2024-07-18 ASSESSMENT — PAIN DESCRIPTION - LOCATION
LOCATION: LEG
LOCATION: LEG;HIP
LOCATION: LEG

## 2024-07-18 ASSESSMENT — PAIN SCALES - GENERAL
PAINLEVEL_OUTOF10: 6
PAINLEVEL_OUTOF10: 10
PAINLEVEL_OUTOF10: 6
PAINLEVEL_OUTOF10: 10
PAINLEVEL_OUTOF10: 8

## 2024-07-18 ASSESSMENT — PAIN DESCRIPTION - ORIENTATION
ORIENTATION: RIGHT

## 2024-07-18 ASSESSMENT — PAIN - FUNCTIONAL ASSESSMENT
PAIN_FUNCTIONAL_ASSESSMENT: PREVENTS OR INTERFERES SOME ACTIVE ACTIVITIES AND ADLS

## 2024-07-18 ASSESSMENT — PAIN DESCRIPTION - DESCRIPTORS
DESCRIPTORS: ACHING;SHARP;SHOOTING
DESCRIPTORS: ACHING;SHARP
DESCRIPTORS: ACHING;SHOOTING;SHARP
DESCRIPTORS: ACHING;SHARP

## 2024-07-18 ASSESSMENT — PAIN DESCRIPTION - PAIN TYPE: TYPE: SURGICAL PAIN

## 2024-07-18 ASSESSMENT — PAIN DESCRIPTION - FREQUENCY: FREQUENCY: CONTINUOUS

## 2024-07-18 ASSESSMENT — PAIN DESCRIPTION - ONSET: ONSET: ON-GOING

## 2024-07-18 NOTE — PLAN OF CARE
Problem: Occupational Therapy - Adult  Goal: By Discharge: Performs self-care activities at highest level of function for planned discharge setting.  See evaluation for individualized goals.  Description: FUNCTIONAL STATUS PRIOR TO ADMISSION:  Patient was ambulatory using SPC within home.  Reports he was Mod Indep with ADLs and light IADLs.   , ADL Assistance: Independent,  ,  ,  ,  ,  , Homemaking Assistance: Independent, Ambulation Assistance: Independent, Transfer Assistance: Independent, Active : No (Patient has no access to transportation at this time and states public transportation is too far away for him to walk to)     HOME SUPPORT: Patient lived alone with good PRN assist from local friends.    Occupational Therapy Goals:  Initiated 7/17/2024  1.  Patient will perform RW grooming with Supervision within 7 day(s).  2.  Patient will perform bathing with Supervision within 7 day(s).  3.  Patient will perform RW lower body dressing with Supervision within 7 day(s).  4.  Patient will perform RW toilet transfers with Supervision  within 7 day(s).  5.  Patient will perform all aspects of RW toileting with Supervision within 7 day(s).    Outcome: Progressing    OCCUPATIONAL THERAPY TREATMENT  Patient: Keaton Van (59 y.o. male)  Date: 7/18/2024  Primary Diagnosis: Osteomyelitis (HCC) [M86.9]  Acute osteomyelitis of right foot (HCC) [M86.171]  Other acute osteomyelitis of right foot (HCC) [M86.171]  Procedure(s) (LRB):  RIGHT BELOW KNEE LEG AMPUTATION (Right) 2 Days Post-Op   Precautions:                  Chart, occupational therapy assessment, plan of care, and goals were reviewed.    ASSESSMENT  Patient continues to benefit from skilled OT services and is progressing towards goals. Pt remains highly pleasant and agreeable to therapy, with great engagement with presented therapeutic challenges.  Pt noted to progress to Min Ax1 for RW in-room mobility during ADL item gathering; however, heavily benefits from

## 2024-07-18 NOTE — CARE COORDINATION
Transition of Care Plan:     RUR: 33% (high RUR, readmission)  Prior Level of Functioning: Needing assistance with mobility  Disposition: IPR. Referrals sent to [facility] and [facility]. Return home or to sister's home after rehab.  Patient provided with housing insecurity resources and [facility] contact information listed on AVS.  If SNF or IPR: Date FOC offered: 7/18/24 IPR  Date FOC received: 7/18  Accepting facility: [facility]  Date authorization started with reference number: N/A   Date authorization received and expires: N/A  Follow up appointments: defer to rehab  DME needed: defer to rehab.  Patient has a cane at home.  Transportation at discharge: Transportation will be needed.  IM/IMM Medicare/ letter given: 2nd IM needed prior to discharge.  Is patient a [place] and connected with [facility]? No, per previous CM conversation with [facility].              If yes, was [place] transfer form completed and [facility] notified? N/A  Caregiver Contact: Patient has adult son that he does not wish to list at this time.  Patient did not want to complete ACP documents.   Evelyn Daniellakaye - sister - [phone]; Anuradha Brown - sister - [phone]  Discharge Caregiver contacted prior to discharge? Patient to contact.  Care Conference needed? No.  Barriers to discharge: 7/16 BKA, nephro to see, DM/vascular/podiatry clearance     Chart reviewed. Patient will need IPR set up after dc. Not ready to dc.    CM met with patient to discuss discharge planning. Agreed with going to IPR. Agreed to send referrals to [facility] and [facility].    CM sent referrals and will follow up to see who accepts.    Yulia Vidales, RN  Care Manager

## 2024-07-18 NOTE — PROGRESS NOTES
Physician Progress Note      PATIENT:               KASEY TREJO  [facility] #:                  [id number]  :                       1965  ADMIT DATE:       2024 12:05 PM  DISCH DATE:  RESPONDING  PROVIDER #:        Stefania Swanson MD          QUERY TEXT:    Patient admitted with toe stump osteomyelitis. Noted documentation of acute   respiratory failure in Palliative care note. In order to support the   diagnosis of acute respiratory failure, please include additional clinical   indicators in your documentation.  Or please document if the diagnosis of   acute respiratory failure has been ruled out after further study.    The medical record reflects the following:  Risk Factors: HTN, Anemia, Bipolar  Clinical Indicators: respiratory rates 26-15-18-9-23, no WOB/SOB noted in   record, sats 98-96% on room air  Treatment: monitor VS,    Acute Respiratory Failure Clinical Indicators per  MS-DRG Training Guide and   Quick Reference Guide:  pO2 < 60 mmHg or SpO2 (pulse oximetry) < 91% breathing room air  pCO2 > 50 and pH < 7.35  P/F ratio (pO2 / FIO2) < 300  pO2 decrease or pCO2 increase by 10 mmHg from baseline (if known)  Supplemental oxygen of 40% or more  Presence of respiratory distress, tachypnea, dyspnea, shortness of breath,   wheezing  Unable to speak in complete sentences  Use of accessory muscles to breathe  Extreme anxiety and feeling of impending doom  Tripod position  Confusion/altered mental status/obtunded  Options provided:  -- Acute Respiratory Failure as evidenced by, Please document evidence.  -- Acute Respiratory Failure ruled out after study  -- Other - I will add my own diagnosis  -- Disagree - Not applicable / Not valid  -- Disagree - Clinically unable to determine / Unknown  -- Refer to Clinical Documentation Reviewer    PROVIDER RESPONSE TEXT:    Acute Respiratory Failure has been ruled out after study.    Query created by: Micheline Jaramillo on 2024 1:47

## 2024-07-18 NOTE — PROGRESS NOTES
[facility]      Nephrology Progress Note  Keaton Van  Date of Admission : 7/13/2024    CC:  Follow up for KIN    Assessment and Plan     KIN 2/2 possible ATN from relative hypotension, AIN,continuation of CKD - stable  - Presenting Creatinine: 2.33     Baseline creatinine: 2.0-2.2     Current Cr: 3.1  - Renal US w/ medical renal disease  - UA: protein, glucose, blood, leukocyte esterase, granular casts, WBC, and yeast  - Pt is not a good HD candidate due to medical non compliance  - Continue renal diet    CKD 4  - baseline Cr 2-2.2    Hyperkalemia  - on low K diet  - Given 1 dose of lokelma  - Bumex held    Hyponatremia  S/p R BKA - 7/15/2024  CHF  HTN  DM  Anemia   - oral iron    Plan:  - Daily labs  - Continue I&O's       Interval History: Seen and examined at bedside. Endorses LE swelling.  Denies N/V,SOB. Recorded UOP non oliguric.      Current Medications: all current  Medications have been eviewed in EPIC  Review of Systems: Pertinent items are noted in HPI.    Objective:  Vitals:    Vitals:    07/17/24 1922 07/18/24 0333 07/18/24 0752 07/18/24 0848   BP: (!) 144/79 (!) 153/76 (!) 142/76 (!) 142/76   Pulse: 76 77 70 70   Resp: 18 17 17    Temp: 98.4 °F (36.9 °C) 98.4 °F (36.9 °C) 98.8 °F (37.1 °C)    TempSrc:   Oral    SpO2: 96% 92% 95%    Weight:       Height:         Intake and Output:  No intake/output data recorded.  07/16 1901 - 07/18 0700  In: -   Out: 4900 [Urine:4900]    Physical Examination:  General: NAD,Conversant   Neck:  Supple, no mass  Resp:  Lungs CTA B/L, no wheezing , normal respiratory effort  CV:  Regular Rate/ Rhythm,  no murmur or rub,+ LE edema  GI:  Soft, NT, + Bowel sounds  Neurologic:  Non focal  Psych:             AAO x 3 appropriate affect   Extremities: RBKA  Skin:  No Rash    LABS:  Recent Labs     07/18/24  0330 07/17/24  0625 07/16/24  0608 07/13/24  1247 06/25/24  0108 06/24/24  0627 06/23/24  0118 06/22/24  1232 06/19/24  0815 06/19/24  0746

## 2024-07-18 NOTE — PROGRESS NOTES
Comprehensive Nutrition Assessment    Type and Reason for Visit:  Reassess    Nutrition Recommendations/Plan:   Continue current diet  Encourage PO intakes, honor preferences as able, provide assistance PRN  Elder 2x/day in drink of choice, glucerna 1x/day  Monitor and record PO intakes, supplement acceptance, and Bms in I/Os     Malnutrition Assessment:  Malnutrition Status:  Moderate malnutrition (07/18/24 1151)    Context:  Acute Illness     Findings of the 6 clinical characteristics of malnutrition:  Energy Intake:  No significant decrease in energy intake  Weight Loss:  Greater than 5% over 1 month (weights used prior to BKA = -13% x 1 month)     Body Fat Loss:  No significant body fat loss     Muscle Mass Loss:  Mild muscle mass loss Temples (temporalis), Clavicles (pectoralis & deltoids)  Fluid Accumulation:  No significant fluid accumulation     Strength:  Not Performed    Nutrition Assessment:    Seen for follow-up. S/p BKA 7/16. Pt reports good appetite/intakes, >76%. Good elder acceptance, has not recieved glucerna. Continue to rec carbohydrate consistent diet at this time given elevated BG. Plan to continue diet, ONS. Labs: POC glucose 130, 112, 217, 248, Na 135, K 4.9, BUN 68, Creat 3.08. Meds: bumetanide, ferrous sulfate, insulin glargine, insulin lispro, sennosides-docusate sodium    Nutrition Related Findings:    NFPE with muscle wasting. No n/v, d/c, or problems chewing/swallowing. No edema. BM 7/15. Wound Type: Surgical Incision       Current Nutrition Intake & Therapies:    Average Supplements Intake: %  DIET ONE TIME MESSAGE;  ADULT ORAL NUTRITION SUPPLEMENT; Lunch; Diabetic Oral Supplement  ADULT ORAL NUTRITION SUPPLEMENT; Breakfast, Dinner; Wound Healing Oral Supplement  ADULT DIET; Regular; 4 carb choices (60 gm/meal); Low Potassium (Less than 3000 mg/day); 60 to 80 gm    Anthropometric Measures:  Height: 188 cm (6' 2.02\")  Ideal Body Weight (IBW): 190 lbs (86 kg)    Current Body  Weight: 77.1 kg (169 lb 15.6 oz), 89.5 % IBW. Weight Source: Not Specified  Current BMI (kg/m2): 21.8  Weight Adjustment For: No Adjustment  BMI Categories: Normal Weight (BMI 18.5-24.9)    Estimated Daily Nutrient Needs:  Energy Requirements Based On: Kcal/kg  Weight Used for Energy Requirements: Current  Energy (kcal/day): 1927 - 2313 (25-30 kcal/kg)  Weight Used for Protein Requirements: Current  Protein (g/day): 61-77 (0.8-1g/kg)  Method Used for Fluid Requirements: 1 ml/kcal  Fluid (ml/day): 1900 mL    Nutrition Diagnosis:   Moderate malnutrition, In context of acute illness or injury related to catabolic illness as evidenced by Criteria as identified in malnutrition assessment    Nutrition Interventions:   Food and/or Nutrient Delivery: Continue Current Diet, Continue Oral Nutrition Supplement  Nutrition Education/Counseling: No recommendation at this time  Coordination of Nutrition Care: Continue to monitor while inpatient    Goals:  Previous Goal Met: Goal(s) Achieved  Goals: PO intake 75% or greater, by next RD assessment    Nutrition Monitoring and Evaluation:   Behavioral-Environmental Outcomes: None Identified  Food/Nutrient Intake Outcomes: Food and Nutrient Intake, Supplement Intake  Physical Signs/Symptoms Outcomes: Meal Time Behavior, Weight, Biochemical Data, Skin, Nutrition Focused Physical Findings    Discharge Planning:    Continue current diet, Continue Oral Nutrition Supplement     Ani Rader RD  Contact: [phone]

## 2024-07-18 NOTE — CONSULTS
This is a 59-year-old gentleman well-known to the program for diabetes health with multiple comorbidities including uncontrolled type 2 diabetes mellitus, coronary artery disease, polysubstance abuse, chronic kidney disease and bilateral foot wounds with osteomyelitis who was admitted for right BKA which was performed July 16, 2024.  Admission labs notable for a glucose of 290, creatinine 2.72, positive drug screen, hemoglobin 7.3, platelets 119, A1c 6.5%.  The patient reports that he takes 20 units of Lantus at night and 10 units of Lantus in the morning.  We are asked to assist in diabetes management    This morning the patient is sitting up in bed eating breakfast but complaining of significant leg pain    Examination  Blood pressure 142/76  Pulse 70  Afebrile  95% on room air    Recent laboratory data  Glucose 130 (range )  Creatinine 3.08  Hemoglobin 8.1    Impression  1.  Type 2 diabetes mellitus with a very reasonable A1c on 30 units of basal insulin daily which the patient apparently takes in split doses  2.  Osteomyelitis status post right BKA  3.  Chronic kidney disease stage IV  4.  History of substance abuse    Plan:  1.  Given the overall reasonable glucose profile on the current insulin dosing, we did not change it  2.  We will follow with you  3.  Rest per team    Before making these care recommendations, I personally reviewed the hospitalization record, including notes, laboratory & diagnostic data and current medications, and examined the patient at the bedside. Total time  35   minutes,.     Please note that this dictation was completed with [employer], the computer voice recognition software.  Quite often unanticipated grammatical, syntax, homophones, and other interpretive errors are inadvertently transcribed by the computer software.  Please disregard these errors.  Please excuse any errors that have escaped final proofreading.

## 2024-07-18 NOTE — PROGRESS NOTES
End of Shift Note    Bedside shift change report given to BHAKTI Chen (oncoming nurse) by Sandra Hedrick RN (offgoing nurse).  Report included the following information SBAR and Intake/Output    Shift worked:  [id number]     Shift summary and any significant changes:     Patient treated for breakthrough pain with PRN oxycodone twice.      Pt using PCA pump PRN.        PT/OT worked with pt.  Pt tolerated well. Up to chair for several hours today.  Pt transfers from bed to chair with standby assist and walker.       Concerns for physician to address:       Zone phone for oncoming shift:          Activity:     Number times ambulated in hallways past shift: 0  Number of times OOB to chair past shift: 1    Cardiac:   Cardiac Monitoring: No           Access:  Current line(s): PIV     Genitourinary:   Urinary status: voiding in urinal    Respiratory:      Chronic home O2 use?: NO  Incentive spirometer at bedside: YES       GI:     Current diet:  DIET ONE TIME MESSAGE;  ADULT ORAL NUTRITION SUPPLEMENT; Lunch; Diabetic Oral Supplement  ADULT ORAL NUTRITION SUPPLEMENT; Breakfast, Dinner; Wound Healing Oral Supplement  ADULT DIET; Regular; 4 carb choices (60 gm/meal); Low Potassium (Less than 3000 mg/day); 60 to 80 gm  Passing flatus: YES  Tolerating current diet: YES       Pain Management:   Patient states pain is manageable on current regimen: YES    Skin:     Interventions: increase time out of bed, PT/OT consult, and nutritional support    Patient Safety:  Fall Score:    Interventions: assistive device (walker, cane. etc), gripper socks, and pt to call before getting OOB       Length of Stay:  Expected LOS: 9  Actual LOS: 5      Sandra Hedrick RN

## 2024-07-18 NOTE — PROGRESS NOTES
Physician Progress Note      PATIENT:               KASEY TREJO  CSN #:                  [id number]  :                       1965  ADMIT DATE:       2024 7:13 AM  DISCH DATE:        2024 4:00 PM  RESPONDING  PROVIDER #:        Stefania Swanson MD          QUERY TEXT:    Pt admitted with Hyperglycemia and Weakness. Pt noted to have \"Metabolic   encephalopathy- multifactorial, hyperglycemia, electrolyte derangement,   possible drug abuse, less likely infection\" per H&P dated . If possible,   please clarify in progress notes and discharge summary if you are evaluating   and/or treating any of the following:    The medical record reflects the following:  Risk Factors: Hx:  DM; HTN; Bipolar; Substance Abuse;  Clinical Indicators: H/H: 7.7/25.1, 7.4/24.9, 7.5/24.4.......8.2/27.3;   Na+:130; K+: 6.1; Gluc: 588; Bun/Cr: 77/2.95; Procal: 0.25; POC Lac acid:   0.76; Trop. hs: 35; Alk phos: 201; Ethanol:<10; BCx: NG x 2 days       AP: \"Metabolic encephalopathy- multifactorial , hyperglycemia,   electrolyte derangement, possible drug abuse , less likely infection\".  ...Noted: \" Confused, agitated; Polysubstance abuse incl heavy crack cocaine   use\".     Nephro PN: \"KIN due to multiple etiology- ABX vs cardiorenal syndrome vs   progression of ckd  ? Hyponatremia due to chf  ? Hyperkalemia\".     IM PN: \"Rule out acute tomasa versus psychosis\".     IM PN: \"CKD stage IV, worsening\".  ....Noted: \"Renal ultrasound done on 2024 no hydronephrosis.  Large right   pleural effusion.  Nephrology held his diuretics yesterday because of worsening creatinine\".     DC Summary: \"Metabolic encephalopathy- multifactorial , hyperglycemia,   electrolyte derangement, possible drug abuse , less likely infection\".    Treatment: CT Head; IP Step Down Admit; Labs; Insulin dose adjusted; UA; BCx;   UDS: CXR; Nephrology consult; Cards consult; US Retroperitoneal; Transfused 1   unit PRBCs    Thank

## 2024-07-18 NOTE — PLAN OF CARE
Problem: Physical Therapy - Adult  Goal: By Discharge: Performs mobility at highest level of function for planned discharge setting.  See evaluation for individualized goals.  Description: FUNCTIONAL STATUS PRIOR TO ADMISSION: Patient was modified independent using a single point cane for functional mobility due to RLE pain.    HOME SUPPORT PRIOR TO ADMISSION: The patient lived with roommates but did not require assistance.    Physical Therapy Goals  Initiated 7/17/2024  1.  Patient will move from supine to sit and sit to supine, scoot up and down, and roll side to side in bed with modified independence within 7 day(s).    2.  Patient will perform sit to stand with minimal assistance within 7 day(s).  3.  Patient will transfer from bed to chair and chair to bed with minimal assistance using the least restrictive device within 7 day(s).  4.  Patient will ambulate with minimal assistance for 20 feet with the least restrictive device within 7 day(s).   Outcome: Progressing   PHYSICAL THERAPY TREATMENT    Patient: Keaton Van (59 y.o. male)  Date: 7/18/2024  Diagnosis: Osteomyelitis (HCC) [M86.9]  Acute osteomyelitis of right foot (HCC) [M86.171]  Other acute osteomyelitis of right foot (HCC) [M86.171] Osteomyelitis (HCC)  Procedure(s) (LRB):  RIGHT BELOW KNEE LEG AMPUTATION (Right) 2 Days Post-Op  Precautions:                        ASSESSMENT:  Patient continues to benefit from skilled PT services and is progressing towards goals. Patient is very motivated with excellent participation in therapy. He demonstrates improving overall mobility, balance and endurance. He requires min A for tranfers and short distance ambulation in room using RW for support. Gait is mildly unsteady demonstrating fluctuating step length on L with frequent decreased step clearance on L causing minor LOB. Functional endurance is decreased requiring frequent rest breaks.  Patient remains an excellent inpatient rehab candidate to regain his

## 2024-07-19 LAB
ANION GAP SERPL CALC-SCNC: 7 MMOL/L (ref 5–15)
BACTERIA SPEC CULT: NORMAL
BACTERIA SPEC CULT: NORMAL
BASOPHILS # BLD: 0 K/UL (ref 0–0.1)
BASOPHILS # BLD: 0 K/UL (ref 0–0.1)
BASOPHILS NFR BLD: 0 % (ref 0–1)
BASOPHILS NFR BLD: 0 % (ref 0–1)
BUN SERPL-MCNC: 76 MG/DL (ref 6–20)
BUN/CREAT SERPL: 24 (ref 12–20)
CALCIUM SERPL-MCNC: 8.8 MG/DL (ref 8.5–10.1)
CHLORIDE SERPL-SCNC: 103 MMOL/L (ref 97–108)
CO2 SERPL-SCNC: 22 MMOL/L (ref 21–32)
CREAT SERPL-MCNC: 3.13 MG/DL (ref 0.7–1.3)
DIFFERENTIAL METHOD BLD: ABNORMAL
DIFFERENTIAL METHOD BLD: ABNORMAL
EOSINOPHIL # BLD: 0.2 K/UL (ref 0–0.4)
EOSINOPHIL # BLD: 0.2 K/UL (ref 0–0.4)
EOSINOPHIL NFR BLD: 3 % (ref 0–7)
EOSINOPHIL NFR BLD: 4 % (ref 0–7)
ERYTHROCYTE [DISTWIDTH] IN BLOOD BY AUTOMATED COUNT: 16.3 % (ref 11.5–14.5)
ERYTHROCYTE [DISTWIDTH] IN BLOOD BY AUTOMATED COUNT: 16.3 % (ref 11.5–14.5)
GLUCOSE BLD STRIP.AUTO-MCNC: 121 MG/DL (ref 65–117)
GLUCOSE BLD STRIP.AUTO-MCNC: 122 MG/DL (ref 65–117)
GLUCOSE BLD STRIP.AUTO-MCNC: 190 MG/DL (ref 65–117)
GLUCOSE BLD STRIP.AUTO-MCNC: 197 MG/DL (ref 65–117)
GLUCOSE SERPL-MCNC: 168 MG/DL (ref 65–100)
HCT VFR BLD AUTO: 23.8 % (ref 36.6–50.3)
HCT VFR BLD AUTO: 24.9 % (ref 36.6–50.3)
HGB BLD-MCNC: 7.5 G/DL (ref 12.1–17)
HGB BLD-MCNC: 7.9 G/DL (ref 12.1–17)
IMM GRANULOCYTES # BLD AUTO: 0 K/UL (ref 0–0.04)
IMM GRANULOCYTES # BLD AUTO: 0.1 K/UL (ref 0–0.04)
IMM GRANULOCYTES NFR BLD AUTO: 1 % (ref 0–0.5)
IMM GRANULOCYTES NFR BLD AUTO: 1 % (ref 0–0.5)
LYMPHOCYTES # BLD: 0.7 K/UL (ref 0.8–3.5)
LYMPHOCYTES # BLD: 0.9 K/UL (ref 0.8–3.5)
LYMPHOCYTES NFR BLD: 10 % (ref 12–49)
LYMPHOCYTES NFR BLD: 16 % (ref 12–49)
MCH RBC QN AUTO: 26.8 PG (ref 26–34)
MCH RBC QN AUTO: 27 PG (ref 26–34)
MCHC RBC AUTO-ENTMCNC: 31.5 G/DL (ref 30–36.5)
MCHC RBC AUTO-ENTMCNC: 31.7 G/DL (ref 30–36.5)
MCV RBC AUTO: 85 FL (ref 80–99)
MCV RBC AUTO: 85 FL (ref 80–99)
MONOCYTES # BLD: 0.6 K/UL (ref 0–1)
MONOCYTES # BLD: 0.6 K/UL (ref 0–1)
MONOCYTES NFR BLD: 11 % (ref 5–13)
MONOCYTES NFR BLD: 9 % (ref 5–13)
NEUTS SEG # BLD: 3.8 K/UL (ref 1.8–8)
NEUTS SEG # BLD: 5.2 K/UL (ref 1.8–8)
NEUTS SEG NFR BLD: 68 % (ref 32–75)
NEUTS SEG NFR BLD: 77 % (ref 32–75)
NRBC # BLD: 0 K/UL (ref 0–0.01)
NRBC # BLD: 0 K/UL (ref 0–0.01)
NRBC BLD-RTO: 0 PER 100 WBC
NRBC BLD-RTO: 0 PER 100 WBC
PLATELET # BLD AUTO: 160 K/UL (ref 150–400)
PLATELET # BLD AUTO: 168 K/UL (ref 150–400)
PMV BLD AUTO: 9.6 FL (ref 8.9–12.9)
PMV BLD AUTO: 9.8 FL (ref 8.9–12.9)
POTASSIUM SERPL-SCNC: 5.3 MMOL/L (ref 3.5–5.1)
RBC # BLD AUTO: 2.8 M/UL (ref 4.1–5.7)
RBC # BLD AUTO: 2.93 M/UL (ref 4.1–5.7)
RBC MORPH BLD: ABNORMAL
SERVICE CMNT-IMP: ABNORMAL
SERVICE CMNT-IMP: NORMAL
SERVICE CMNT-IMP: NORMAL
SODIUM SERPL-SCNC: 132 MMOL/L (ref 136–145)
WBC # BLD AUTO: 5.6 K/UL (ref 4.1–11.1)
WBC # BLD AUTO: 6.8 K/UL (ref 4.1–11.1)

## 2024-07-19 PROCEDURE — 6370000000 HC RX 637 (ALT 250 FOR IP): Performed by: INTERNAL MEDICINE

## 2024-07-19 PROCEDURE — 1100000000 HC RM PRIVATE

## 2024-07-19 PROCEDURE — 6360000002 HC RX W HCPCS

## 2024-07-19 PROCEDURE — 6370000000 HC RX 637 (ALT 250 FOR IP): Performed by: NURSE PRACTITIONER

## 2024-07-19 PROCEDURE — 80048 BASIC METABOLIC PNL TOTAL CA: CPT

## 2024-07-19 PROCEDURE — 85025 COMPLETE CBC W/AUTO DIFF WBC: CPT

## 2024-07-19 PROCEDURE — 36415 COLL VENOUS BLD VENIPUNCTURE: CPT

## 2024-07-19 PROCEDURE — 6370000000 HC RX 637 (ALT 250 FOR IP): Performed by: STUDENT IN AN ORGANIZED HEALTH CARE EDUCATION/TRAINING PROGRAM

## 2024-07-19 PROCEDURE — 99232 SBSQ HOSP IP/OBS MODERATE 35: CPT | Performed by: CLINICAL NURSE SPECIALIST

## 2024-07-19 PROCEDURE — 99233 SBSQ HOSP IP/OBS HIGH 50: CPT | Performed by: NURSE PRACTITIONER

## 2024-07-19 PROCEDURE — 97535 SELF CARE MNGMENT TRAINING: CPT | Performed by: OCCUPATIONAL THERAPIST

## 2024-07-19 PROCEDURE — 6370000000 HC RX 637 (ALT 250 FOR IP): Performed by: CLINICAL NURSE SPECIALIST

## 2024-07-19 PROCEDURE — 6370000000 HC RX 637 (ALT 250 FOR IP)

## 2024-07-19 PROCEDURE — 82962 GLUCOSE BLOOD TEST: CPT

## 2024-07-19 PROCEDURE — 97116 GAIT TRAINING THERAPY: CPT | Performed by: PHYSICAL THERAPIST

## 2024-07-19 PROCEDURE — 6370000000 HC RX 637 (ALT 250 FOR IP): Performed by: PHYSICIAN ASSISTANT

## 2024-07-19 RX ORDER — INSULIN GLARGINE 100 [IU]/ML
30 INJECTION, SOLUTION SUBCUTANEOUS DAILY
Status: DISCONTINUED | OUTPATIENT
Start: 2024-07-20 | End: 2024-07-24

## 2024-07-19 RX ORDER — INSULIN LISPRO 100 [IU]/ML
0.05 INJECTION, SOLUTION INTRAVENOUS; SUBCUTANEOUS
Status: DISCONTINUED | OUTPATIENT
Start: 2024-07-19 | End: 2024-07-23

## 2024-07-19 RX ADMIN — AMLODIPINE BESYLATE 10 MG: 5 TABLET ORAL at 08:52

## 2024-07-19 RX ADMIN — SODIUM ZIRCONIUM CYCLOSILICATE 10 G: 10 POWDER, FOR SUSPENSION ORAL at 12:17

## 2024-07-19 RX ADMIN — ASPIRIN 81 MG: 81 TABLET, COATED ORAL at 08:52

## 2024-07-19 RX ADMIN — INSULIN LISPRO 4 UNITS: 100 INJECTION, SOLUTION INTRAVENOUS; SUBCUTANEOUS at 13:37

## 2024-07-19 RX ADMIN — OXYCODONE 10 MG: 5 TABLET ORAL at 13:35

## 2024-07-19 RX ADMIN — ACETAMINOPHEN 1000 MG: 500 TABLET ORAL at 18:30

## 2024-07-19 RX ADMIN — ISOSORBIDE MONONITRATE 30 MG: 30 TABLET, EXTENDED RELEASE ORAL at 08:51

## 2024-07-19 RX ADMIN — INSULIN LISPRO 4 UNITS: 100 INJECTION, SOLUTION INTRAVENOUS; SUBCUTANEOUS at 18:31

## 2024-07-19 RX ADMIN — SENNOSIDES AND DOCUSATE SODIUM 2 TABLET: 50; 8.6 TABLET ORAL at 08:52

## 2024-07-19 RX ADMIN — ACETAMINOPHEN 1000 MG: 500 TABLET ORAL at 08:54

## 2024-07-19 RX ADMIN — HYDRALAZINE HYDROCHLORIDE 50 MG: 50 TABLET ORAL at 22:31

## 2024-07-19 RX ADMIN — HEPARIN SODIUM 5000 UNITS: 5000 INJECTION INTRAVENOUS; SUBCUTANEOUS at 14:30

## 2024-07-19 RX ADMIN — ACETAMINOPHEN 1000 MG: 500 TABLET ORAL at 00:31

## 2024-07-19 RX ADMIN — FERROUS SULFATE TAB 325 MG (65 MG ELEMENTAL FE) 325 MG: 325 (65 FE) TAB at 08:52

## 2024-07-19 RX ADMIN — HYDRALAZINE HYDROCHLORIDE 50 MG: 50 TABLET ORAL at 14:29

## 2024-07-19 RX ADMIN — INSULIN HUMAN 15 UNITS: 100 INJECTION, SUSPENSION SUBCUTANEOUS at 20:57

## 2024-07-19 RX ADMIN — CARVEDILOL 1.56 MG: 3.12 TABLET, FILM COATED ORAL at 20:57

## 2024-07-19 RX ADMIN — GABAPENTIN 300 MG: 300 CAPSULE ORAL at 20:57

## 2024-07-19 RX ADMIN — OXYCODONE 10 MG: 5 TABLET ORAL at 00:32

## 2024-07-19 RX ADMIN — HEPARIN SODIUM 5000 UNITS: 5000 INJECTION INTRAVENOUS; SUBCUTANEOUS at 05:43

## 2024-07-19 RX ADMIN — OXYCODONE 10 MG: 5 TABLET ORAL at 05:43

## 2024-07-19 RX ADMIN — INSULIN GLARGINE 10 UNITS: 100 INJECTION, SOLUTION SUBCUTANEOUS at 08:51

## 2024-07-19 RX ADMIN — CARVEDILOL 1.56 MG: 3.12 TABLET, FILM COATED ORAL at 08:52

## 2024-07-19 RX ADMIN — HYDRALAZINE HYDROCHLORIDE 50 MG: 50 TABLET ORAL at 05:43

## 2024-07-19 RX ADMIN — AMOXICILLIN AND CLAVULANATE POTASSIUM 1 TABLET: 500; 125 TABLET, FILM COATED ORAL at 20:57

## 2024-07-19 RX ADMIN — HEPARIN SODIUM 5000 UNITS: 5000 INJECTION INTRAVENOUS; SUBCUTANEOUS at 22:30

## 2024-07-19 RX ADMIN — GABAPENTIN 300 MG: 300 CAPSULE ORAL at 14:30

## 2024-07-19 RX ADMIN — DULOXETINE HYDROCHLORIDE 20 MG: 20 CAPSULE, DELAYED RELEASE ORAL at 08:51

## 2024-07-19 RX ADMIN — GABAPENTIN 300 MG: 300 CAPSULE ORAL at 08:52

## 2024-07-19 RX ADMIN — LURASIDONE HYDROCHLORIDE 20 MG: 20 TABLET, FILM COATED ORAL at 18:30

## 2024-07-19 RX ADMIN — FERROUS SULFATE TAB 325 MG (65 MG ELEMENTAL FE) 325 MG: 325 (65 FE) TAB at 18:30

## 2024-07-19 RX ADMIN — AMOXICILLIN AND CLAVULANATE POTASSIUM 1 TABLET: 500; 125 TABLET, FILM COATED ORAL at 08:54

## 2024-07-19 RX ADMIN — CALCITONIN SALMON 1 SPRAY: 200 SPRAY, METERED NASAL at 12:19

## 2024-07-19 ASSESSMENT — PAIN SCALES - GENERAL
PAINLEVEL_OUTOF10: 6
PAINLEVEL_OUTOF10: 9
PAINLEVEL_OUTOF10: 10
PAINLEVEL_OUTOF10: 8
PAINLEVEL_OUTOF10: 9
PAINLEVEL_OUTOF10: 10

## 2024-07-19 ASSESSMENT — PAIN DESCRIPTION - ORIENTATION
ORIENTATION: RIGHT

## 2024-07-19 ASSESSMENT — PAIN DESCRIPTION - ONSET
ONSET: GRADUAL
ONSET: ON-GOING

## 2024-07-19 ASSESSMENT — PAIN DESCRIPTION - DESCRIPTORS
DESCRIPTORS: ACHING;SHARP
DESCRIPTORS: ACHING;THROBBING
DESCRIPTORS: ACHING;CRAMPING
DESCRIPTORS: ACHING;CRAMPING
DESCRIPTORS: ACHING;DISCOMFORT;THROBBING

## 2024-07-19 ASSESSMENT — PAIN DESCRIPTION - LOCATION
LOCATION: LEG

## 2024-07-19 ASSESSMENT — PAIN - FUNCTIONAL ASSESSMENT
PAIN_FUNCTIONAL_ASSESSMENT: PREVENTS OR INTERFERES SOME ACTIVE ACTIVITIES AND ADLS
PAIN_FUNCTIONAL_ASSESSMENT: PREVENTS OR INTERFERES SOME ACTIVE ACTIVITIES AND ADLS

## 2024-07-19 ASSESSMENT — PAIN DESCRIPTION - FREQUENCY
FREQUENCY: CONTINUOUS
FREQUENCY: CONTINUOUS

## 2024-07-19 ASSESSMENT — PAIN DESCRIPTION - PAIN TYPE
TYPE: SURGICAL PAIN
TYPE: NEUROPATHIC PAIN;SURGICAL PAIN

## 2024-07-19 NOTE — PROGRESS NOTES
[facility]      Nephrology Progress Note  Keaton Van  Date of Admission : 7/13/2024    CC:  Follow up for KIN    Assessment and Plan     KIN 2/2 possible ATN from relative hypotension, AIN,continuation of CKD - stable  - Presenting Creatinine: 2.33     Baseline creatinine: 2.0-2.2     Current Cr: 3.1  - Renal US w/ medical renal disease  - UA: protein, glucose, blood, leukocyte esterase, granular casts, WBC, and yeast  - Pt is not a good HD candidate due to history of medical non compliance  - Continue renal diet    CKD 4  - baseline Cr 2-2.2    Hyperkalemia  - on low K diet  - Ordered Lokelma QD  - Bumex held    Hyponatremia - resolved  S/p R BKA - 7/15/2024  CHF  HTN  DM  Anemia     Plan:  - Daily labs  - Continue I&O's  - Ordered QD Lokelma       Interval History: Seen and examined at bedside. Endorses RLE pain, and SOB.  Denies N/V. Pt states he has had 2 BM this morning. Recorded UOP non oliguric.      Current Medications: all current  Medications have been eviewed in EPIC  Review of Systems: Pertinent items are noted in HPI.    Objective:  Vitals:    Vitals:    07/18/24 1934 07/19/24 0233 07/19/24 0746 07/19/24 0851   BP: (!) 151/78 (!) 140/71 (!) 159/85 (!) 159/85   Pulse: 83 68 79 79   Resp: 18 17 18    Temp: 98.2 °F (36.8 °C) 98.2 °F (36.8 °C) 98.4 °F (36.9 °C)    TempSrc: Oral  Oral    SpO2: 93% 94% 92%    Weight:       Height:         Intake and Output:  No intake/output data recorded.  07/17 1901 - 07/19 0700  In: 2.4 [I.V.:2.4]  Out: 1350 [Urine:1350]    Physical Examination:  General: NAD,Conversant   Neck:  Supple, no mass  Resp:  no wheezing , normal respiratory effort  GI:  Soft, NT, + Bowel sounds  Neurologic:  Non focal  Psych:             AAO x 3 appropriate affect   Extremities: RBKA  Skin:  No Rash    LABS:  Recent Labs     07/19/24  0538 07/18/24  0330 07/17/24  0625 07/13/24  1247 06/25/24  0108 06/24/24  0627 06/23/24  0118 06/22/24  1232   * 135*  3.5 K/UL    Monocytes Absolute 0.6 0.0 - 1.0 K/UL    Eosinophils Absolute 0.2 0.0 - 0.4 K/UL    Basophils Absolute 0.0 0.0 - 0.1 K/UL    Immature Granulocytes Absolute 0.0 0.00 - 0.04 K/UL    Differential Type AUTOMATED     Basic Metabolic Panel w/ Reflex to MG    Collection Time: 07/19/24  5:38 AM   Result Value Ref Range    Sodium 132 (L) 136 - 145 mmol/L    Potassium 5.3 (H) 3.5 - 5.1 mmol/L    Chloride 103 97 - 108 mmol/L    CO2 22 21 - 32 mmol/L    Anion Gap 7 5 - 15 mmol/L    Glucose 168 (H) 65 - 100 mg/dL    BUN 76 (H) 6 - 20 MG/DL    Creatinine 3.13 (H) 0.70 - 1.30 MG/DL    BUN/Creatinine Ratio 24 (H) 12 - 20      Est, Glom Filt Rate 22 (L) >60 ml/min/1.73m2    Calcium 8.8 8.5 - 10.1 MG/DL   POCT Glucose    Collection Time: 07/19/24  6:15 AM   Result Value Ref Range    POC Glucose 197 (H) 65 - 117 mg/dL    Performed by: Jp Kwon PCT        Review of Medical Records: I have reviewed all pertinent labs, chart notes, microbiology data, radiology imaging this patient.  Medications list personally reviewed.  No change in PMH ,family and social history from Consult note.      Henry Oakes PA-C  [facility] [address], [place] [address]  Phone: [phone]   [phone]

## 2024-07-19 NOTE — PROGRESS NOTES
Hospitalist Progress Note    NAME:   Keaton Van   : 1965   MRN: [id number]     Patient PCP: Robert Wells PA-C      Assessment / Plan:      S/p right below-knee amputation on 2024.  Right foot 5th toe stump osteomyelitis via XR  S/p resection w Dr Jama on 24  Wound with necrosis, dehiscence  Right leg w edema, redness, suspect cellulitis  Hx prior toe amputations left foot.  PVD.  Will discontinue vanco and zosyn  Blood cultures NGTD    Does not meet sepsis criteria  Continue Dilaudid PCA  Palliative recs appreciated  On Augmentin for few days     Hypertensive urgency -- asymptomatic  2/2 medication noncompliance, pain also likely a factor  Chronic diastolic HF not in exacerbation  Paroxysmal Atrial fibrillation on last admission, currently SR  Resume home amlodipine, carvedilol (low dose d/t cocaine abuse), hydralazine, imdur, bumex     Uncontrolled DM2  Patient is on long-acting insulin 10 units in the morning and 20 units at bedtime.  Continue sliding scale insulin.     KIN on CKD4  Cr 2.33, this is improved from prior 3.7  Holding Bumex.  Adjusting insulin for better blood glucose control.  Nephrology consulted.     Chronic anemia, likely YENNY +/- anemia of CKD  Hemoglobin 7.3.  Continue to monitor.  Transfuse for hemoglobin less than 7.  Patient received 2 PRBC transfusions on 2024.     Bipolar I  Polysubstance abuse incl heavy crack cocaine use  Chornic pain  Medication noncompliance, financial and social barriers  Hx untreated HCV    Medical Decision Making:   I personally reviewed labs: yes, as below   I personally reviewed imaging reports: yes, as below   Toxic drug monitoring:   Discussed case with: Pt, RN, d/w CM for placement,    Code Status: DNR       Subjective:  Assessment / Plan:      Episodes of pain, 8 out of 10 in severity  No fever  No SOB  No CP  Discussed with RN events overnight.       Objective:      VITALS:   Last 24hrs VS reviewed since prior progress note.

## 2024-07-19 NOTE — PROGRESS NOTES
End of Shift Note    Bedside shift change report given to Earl (oncoming nurse) by April Fierro RN (offgoing nurse).  Report included the following information SBAR, Kardex, MAR, Recent Results, and Med Rec Status    Shift worked:  7p-7a     Shift summary and any significant changes:     uneventful     Concerns for physician to address:  none     Zone phone for oncoming shift:   [phone]         Length of Stay:  Expected LOS: 9  Actual LOS: 6      April Fierro RN

## 2024-07-19 NOTE — CONSULTS
This is a 59-year-old gentleman well-known to the program for diabetes health with multiple comorbidities including uncontrolled type 2 diabetes mellitus, coronary artery disease, polysubstance abuse, chronic kidney disease and bilateral foot wounds with osteomyelitis who was admitted for right BKA which was performed July 16, 2024.  Admission labs notable for a glucose of 290, creatinine 2.72, positive drug screen, hemoglobin 7.3, platelets 119, A1c 6.5%.  The patient reports that he takes 20 units of Lantus at night and 10 units of Lantus in the morning.  We are asked to assist in diabetes management    This morning the patient is sitting up in bed eating breakfast but complaining of significant leg pain.    Examination  Blood pressure 142/76  Pulse 70  Afebrile  95% on room air    Recent laboratory data  Glucose 168 (range 130-206)  Creatinine 3.08  Hemoglobin 8.1    Impression  1.  Type 2 diabetes mellitus with a very reasonable A1c on 30 units of basal insulin daily which the patient apparently takes in split doses.  2.  Osteomyelitis status post right BKA  3.  Chronic kidney disease stage IV  4.  History of substance abuse    Plan:  1. He wises to switch to once daily Lantus.  Will need to bridge with NPH tonight.  15 units NPH x1 then 30 units Lantus starting tomorrow am.  Will add mealtime humalog, 4 units/consumed meal to address pre-prandial hyperglycemia.   2. He is very hungry.  Can have double protein/non-starchy vegetables  3.  We will follow with you  4.  Rest per team    Before making these care recommendations, I personally reviewed the hospitalization record, including notes, laboratory & diagnostic data and current medications, and examined the patient at the bedside. Total time  35 minutes,.     Please note that this dictation was completed with [employer], the [employer] voice recognition software.  Quite often unanticipated grammatical, syntax, homophones, and other interpretive errors are

## 2024-07-19 NOTE — PROGRESS NOTES
Music Therapy Assessment  [facility]    Keaton Van [id number]     1965  59 y.o.  male    Patient Telephone Number: [phone] (home)   Orthodoxy Affiliation: None   Language: English   Patient Active Problem List    Diagnosis Date Noted    Amputation of right lower extremity below knee ([facility]) 07/18/2024    Palliative care encounter 07/18/2024    Pain 07/18/2024    Hyperglycemia 06/21/2024    Acute metabolic encephalopathy 06/19/2024    Acute on chronic congestive heart failure ([facility]) 06/13/2024    CHF (congestive heart failure), NYHA class I, acute on chronic, combined ([facility]) 06/12/2024    Cellulitis of right foot 06/05/2024    Acute osteomyelitis of right foot ([facility]) 06/05/2024    Methicillin susceptible Staphylococcus aureus infection 06/05/2024    KIN (acute kidney injury) ([facility]) 06/05/2024    Stage 3a chronic kidney disease ([facility]) 06/05/2024    Bipolar disorder ([facility]) 06/05/2024    Type 2 diabetes mellitus with right diabetic foot ulcer ([facility]) 05/29/2024    Cellulitis 05/29/2024    Stage 4 chronic kidney disease ([facility]) 05/29/2024    Lumbar back pain with radiculopathy affecting lower extremity 07/18/2023    Osteomyelitis ([facility]) 12/05/2022    Adjustment disorder 05/12/2022    Diabetic foot ulcer ([facility]) 02/12/2022    Closed fracture of right hand 09/18/2018    Cellulitis of left ankle 04/22/2018    Spinal stenosis of lumbar region with neurogenic claudication 03/26/2018    Type 2 diabetes mellitus with hyperglycemia, with long-term current use of insulin ([facility]) 01/28/2018    Tobacco abuse 01/25/2018    Poorly-controlled hypertension 01/25/2018    Poorly controlled diabetes mellitus ([facility]) 01/25/2018    Other male erectile dysfunction 01/25/2018    Chronic hepatitis C without hepatic coma ([facility]) 01/25/2018    Chronic pain syndrome 01/25/2018    Bipolar 1 disorder, mixed, moderate ([facility]) 03/06/2017    Polysubstance abuse ([facility]) 10/18/2016    Personality disorder ([facility]) 07/26/2013    Non compliance with

## 2024-07-19 NOTE — PLAN OF CARE
Problem: Physical Therapy - Adult  Goal: By Discharge: Performs mobility at highest level of function for planned discharge setting.  See evaluation for individualized goals.  Description: FUNCTIONAL STATUS PRIOR TO ADMISSION: Patient was modified independent using a single point cane for functional mobility due to RLE pain.    HOME SUPPORT PRIOR TO ADMISSION: The patient lived with roommates but did not require assistance.    Physical Therapy Goals  Initiated 7/17/2024  1.  Patient will move from supine to sit and sit to supine, scoot up and down, and roll side to side in bed with modified independence within 7 day(s).    2.  Patient will perform sit to stand with minimal assistance within 7 day(s).  3.  Patient will transfer from bed to chair and chair to bed with minimal assistance using the least restrictive device within 7 day(s).  4.  Patient will ambulate with minimal assistance for 20 feet with the least restrictive device within 7 day(s).   Outcome: Progressing   PHYSICAL THERAPY TREATMENT    Patient: Keaton Van (59 y.o. male)  Date: 7/19/2024  Diagnosis: Osteomyelitis (HCC) [M86.9]  Acute osteomyelitis of right foot (HCC) [M86.171]  Other acute osteomyelitis of right foot (HCC) [M86.171] Osteomyelitis (HCC)  Procedure(s) (LRB):  RIGHT BELOW KNEE LEG AMPUTATION (Right) 3 Days Post-Op  Precautions:                        ASSESSMENT:  Patient continues to benefit from skilled PT services and is progressing towards goals. Patient agreeable to therapy. Reporting he is feeling down today secondary to his bipolar but remains motivated in therapy. Patient continues to demonstrate steady progress in therapy. He is able to complete bed mobility with modified independence. He requires min A with VC for safety for transfers and was able to ambulate to/from bathroom (10 feet x 2) using RW with min A. Gait is unsteady with several mild balance checks especially when turning. Patient remained in chair at end of session

## 2024-07-19 NOTE — CONSULTS
Palliative Medicine  Patient Name: Keaton Van  YOB: 1965  MRN: [id number]  Age: 59 y.o.  Gender: male    Date of Initial Consult: 7/13/2024  Date of Service: 7/18/2024  Time: 11:27 PM  Provider: LOIS Shaver NP  Hospital Day: 6  Admit Date: 7/13/2024  Referring Provider: Dr. Dumont       Reasons for Consultation:  Goals of Care and Overwhelming Symptoms    HISTORY OF PRESENT ILLNESS (HPI):   Keaton Van is a 59 y.o. male with a past medical history of Type 2DM, CKD 4 chronic bilateral foot wounds (s/p recent R partial 5th amputation 6/1/24) CAD s/p 2 stents, HFpEF, pAFib, chronic pain, bipolar disorder,  HTN, medication noncompliance, who was admitted on 7/13/2024 from home with a diagnosis of acute on chronic osteomyelitis of the right foot secondary to non healing diabetic wound, s/p BKA on 7/16    Psychosocial: patient is not , lives alone (concerned about upcoming eviction)  Heavy smoker  Was in the [email or web address], stationed in [place] for 4 years in the 80s.   Reports that he no longer uses cocaine, has not for quite some time.    Patient has DNR order on chart. No Active AMD  PALLIATIVE DIAGNOSES:    2 days Status post right BKA on 7/16/2024  Musculoskeletal pain related to fall, Left flank  CKD4  Acute on chronic respiratory failure, bilateral pleural effusions  Polysubstance abuse (heavy tobacco smoking, denies recent cocaine)   constipation  Palliative medicine encounter    ASSESSMENT AND PLAN:   Prior to visit completed chart review for updated information and discussed with patients nurse Flores.   Per documentation and Hero observations, Mr. Van was somewhat irritable/impatient early am, but the rest of the day was really good, and he did great working with PT/OT,   I met with Mr. Van  this evening at approximately 6 pm, no family at bedside. He had finished his dinner and was in bed, presumably for the night.  While Sandra YA interrogated the dilaudid pca pump, to find out  how many bolus doses he received over past 11+/- hours, , Mr. Van asked if we could turn off the IV pump's alarm, telling us that \"its driving me crazy, the constant beeping makes me want to smash it\", he quickly followed up with \"I wouldn't really smash it\". Unfortunately, we were unable to find a way to lower the alarms volume.     Mr. Van still with significant post op pain, stating that pain is severe, that the medicine is not doing anything, but later, when I offered to d/c the ineffective pain meds, he declined the offer, stating that they do help some, but not as well as he had hoped for. After visit nurse stated that this was the first time he really complained about the pain. Not surmising that pain feels worse when he is alone, without distractions..    Pain- Mr. Van reports severe pain in Right thigh and stump pain, he reports pain stays between 8-10. BKA post op pain involves both nociceptive and neuropathic pain, requiring a multimodal approach to to treat both types.    1. Will continue Oxy 10mg every 4   hours PRN,  he has received X5   doses / 24 hours.     2. For now, recommend continuing    Dilaudid PCA w/ 0.2 mg bolus with   10 minute lock out.   Mr. Van  received  4.6 mg /24 hours   (23 demands/24 hrs).    3. Continue gabapentin 300 mg TID,   neuropathy.     4. He may also benefit from    Duloxetine daily, as an adjunct   treatment for neuropathic pain .  Order placed for Duloxetine 20 mg   daily.    4. Continue Acetaminophen 1000 mg  TID for time being.    5. Will trial Calcitonin Nasal Spray, 1   spray to alternating Nare daily, for     post op bone pain. (Can take up   weeks before realize analgesic effects)        Bowel regimen while on opioids to prevent constipation    Patient is at very high risk for needing chronic opioids, will need to taper his opioids very quickly.  We will follow daily for now.    Please call with any palliative questions or concerns.  Palliative Care Team is

## 2024-07-19 NOTE — PLAN OF CARE
on adjusting straps to prevent slippage of device with standing/mobility.  Min assist overall to hop from edge of bed to bathroom sink.  With standing with on or both hands off walker pt needed to lean on wall on left for stability with min assist for balance to wash and dry hands.  On return trip from bathroom using RW hopping pt reported feeling weak and needed increased effort and assist to fully mobilize to chair but remained min assist.  Continue to recommend aggressive rehab at discharge.              PLAN :  Patient continues to benefit from skilled intervention to address the above impairments.  Continue treatment per established plan of care to address goals.    Recommend with staff: out of bed to chair and to bedside commode with RW and assist, prop residulal limb with limb guard on in sitting with left LE foot on floor in chair    Recommend next OT session: balance, mobility and ADLS    Recommendation for discharge: (in order for the patient to meet his/her long term goals): Therapy 3 hours/day 5-7 days/week    Other factors to consider for discharge: high risk for falls and highly motivated to regain his independence, making gains with therapy    IF patient discharges home will need the following DME: bedside commode, shower chair, rolling walker, and wheelchair 18 inch       SUBJECTIVE:   Patient stated “So good to see you!” pt and therapist know each other from previous admits    OBJECTIVE DATA SUMMARY:   Cognitive/Behavioral Status:  Orientation  Orientation Level: Oriented X4       Functional Mobility and Transfers for ADLs:  Bed Mobility:  Bed Mobility Training  Bed Mobility Training: Yes  Rolling: Supervision  Supine to Sit: Modified independent  Scooting: Modified independent     Transfers:   Transfer Training  Transfer Training: Yes  Interventions: Safety awareness training;Verbal cues  Sit to Stand: Minimum assistance  Stand to Sit: Contact-guard assistance  Bed to Chair: Minimum assistance            Balance:     Balance  Sitting: Intact  Standing: Impaired  Standing - Static: Fair;Constant support  Standing - Dynamic: Fair;Constant support      ADL Intervention:                   Grooming: Minimal assistance   Grooming Skilled Clinical Factors: standing to wash and dry hands at sink with need to lean on wall on left for stability with on and both hands off walker    Donned slip on shoe with back LLE tailor sitting in bed with set up.,      Activity Tolerance:   Fair  and requires rest breaks  Please refer to the flowsheet for vital signs taken during this treatment.    After treatment:   Patient left in no apparent distress sitting up in chair, Call bell within reach, Bed/ chair alarm activated, and right LE elevated on chair with limb guard on    COMMUNICATION/EDUCATION:   The patient's plan of care was discussed with: physical therapist, registered nurse, and patient    Patient Education  Education Given To: Patient  Education Provided: ADL Adaptive Strategies;Fall Prevention Strategies;Energy Conservation  Education Method: Verbal;Demonstration;Teach Back  Barriers to Learning: None  Education Outcome: Verbalized understanding;Continued education needed    Thank you for this referral.  Kiera Finley OTR/L  Minutes: 17

## 2024-07-19 NOTE — PROGRESS NOTES
Reviewed chart prior to follow up visit on Surg Tele for ongoing pastoral support post surgery.  Mr Carlota was laying in bed appearing a little tired,  welcomed visit, but noted he is feeling a little out of it this afternoon.  He didn't sleep well last night and the pain medication may be contributing to how he is feeling.  He was able to participate in OT/PT and seems motivated to do what is necessary to regain mobility.  He shared that he is thankful for the staff working with him.  He expressed tunde that God is working in the healing process.   offered listening presence, affirmation of motivation and focus on the end goal.  Offered assurance of continued prayer for his healing.   available upon referral by staff or by patient/family request.      Sandra Storm MPS, BCC, Staff [facility]     Paging Service [phone])

## 2024-07-19 NOTE — CONSULTS
Palliative Medicine  Patient Name: Keaton Van  YOB: 1965  MRN: [id number]  Age: 59 y.o.  Gender: male    Date of Initial Consult: 7/13/2024  Date of Service: 7/19/2024  Time: 2:10 PM  Provider: LOIS Shaver NP  Hospital Day: 7  Admit Date: 7/13/2024  Referring Provider: Dr. Dumont       Reasons for Consultation:  Goals of Care and Overwhelming Symptoms    HISTORY OF PRESENT ILLNESS (HPI):   Keaton Van is a 59 y.o. male with a past medical history of Type 2DM, CKD 4 chronic bilateral foot wounds (s/p recent R partial 5th amputation 6/1/24) CAD s/p 2 stents, HFpEF, pAFib, chronic pain, bipolar disorder,  HTN, medication noncompliance, who was admitted on 7/13/2024 from home with a diagnosis of acute on chronic osteomyelitis of the right foot secondary to non healing diabetic wound, s/p BKA on 7/16    Psychosocial: patient is not , lives alone (concerned about upcoming eviction)  Heavy smoker  Was in the [employer], stationed in [employer] for 4 years in the 80s.   Reports that he no longer uses cocaine, has not for quite some time.    Patient has DNR order on chart. No Active AMD  PALLIATIVE DIAGNOSES:      Musculoskeletal pain related to fall, Left flank  KIN on CKD IV, Cr 3.13 and BUN 76  Post OP Pain  Polysubstance abuse (heavy tobacco smoking, denies recent cocaine)   constipation  Palliative medicine encounter    ASSESSMENT AND PLAN:   Prior to visit completed chart review for updated information.    Mr. Van is now 3 days Post Op.  KIN on CKD IV, Cr  up over past couple of days. Nephrology following.   He continues to work with PT/OT, noted to be making progress, recommending that he be discharged to inpatient rehab.  Per PT's documentation, I do not see that pain has been a barrier to his participation.     Pain- Mr. Van still reporting severe pain in Right thigh and stump pain, still between 8-10. BKA post op pain involves both nociceptive and neuropathic pain, requiring a

## 2024-07-20 PROBLEM — K59.03 DRUG-INDUCED CONSTIPATION: Status: ACTIVE | Noted: 2024-07-20

## 2024-07-20 LAB
ANION GAP SERPL CALC-SCNC: 8 MMOL/L (ref 5–15)
BASOPHILS # BLD: 0 K/UL (ref 0–0.1)
BASOPHILS NFR BLD: 0 % (ref 0–1)
BUN SERPL-MCNC: 88 MG/DL (ref 6–20)
BUN/CREAT SERPL: 29 (ref 12–20)
CALCIUM SERPL-MCNC: 8.9 MG/DL (ref 8.5–10.1)
CHLORIDE SERPL-SCNC: 104 MMOL/L (ref 97–108)
CO2 SERPL-SCNC: 22 MMOL/L (ref 21–32)
CREAT SERPL-MCNC: 3.01 MG/DL (ref 0.7–1.3)
DIFFERENTIAL METHOD BLD: ABNORMAL
EOSINOPHIL # BLD: 0.2 K/UL (ref 0–0.4)
EOSINOPHIL NFR BLD: 3 % (ref 0–7)
ERYTHROCYTE [DISTWIDTH] IN BLOOD BY AUTOMATED COUNT: 16.2 % (ref 11.5–14.5)
GLUCOSE BLD STRIP.AUTO-MCNC: 175 MG/DL (ref 65–117)
GLUCOSE BLD STRIP.AUTO-MCNC: 201 MG/DL (ref 65–117)
GLUCOSE BLD STRIP.AUTO-MCNC: 248 MG/DL (ref 65–117)
GLUCOSE BLD STRIP.AUTO-MCNC: 92 MG/DL (ref 65–117)
GLUCOSE SERPL-MCNC: 97 MG/DL (ref 65–100)
HCT VFR BLD AUTO: 24.3 % (ref 36.6–50.3)
HGB BLD-MCNC: 7.7 G/DL (ref 12.1–17)
IMM GRANULOCYTES # BLD AUTO: 0 K/UL (ref 0–0.04)
IMM GRANULOCYTES NFR BLD AUTO: 1 % (ref 0–0.5)
LYMPHOCYTES # BLD: 0.9 K/UL (ref 0.8–3.5)
LYMPHOCYTES NFR BLD: 16 % (ref 12–49)
MCH RBC QN AUTO: 27.1 PG (ref 26–34)
MCHC RBC AUTO-ENTMCNC: 31.7 G/DL (ref 30–36.5)
MCV RBC AUTO: 85.6 FL (ref 80–99)
MONOCYTES # BLD: 0.6 K/UL (ref 0–1)
MONOCYTES NFR BLD: 10 % (ref 5–13)
NEUTS SEG # BLD: 4.1 K/UL (ref 1.8–8)
NEUTS SEG NFR BLD: 70 % (ref 32–75)
NRBC # BLD: 0 K/UL (ref 0–0.01)
NRBC BLD-RTO: 0 PER 100 WBC
PLATELET # BLD AUTO: 158 K/UL (ref 150–400)
PMV BLD AUTO: 9.6 FL (ref 8.9–12.9)
POTASSIUM SERPL-SCNC: 5.2 MMOL/L (ref 3.5–5.1)
RBC # BLD AUTO: 2.84 M/UL (ref 4.1–5.7)
SERVICE CMNT-IMP: ABNORMAL
SERVICE CMNT-IMP: NORMAL
SODIUM SERPL-SCNC: 134 MMOL/L (ref 136–145)
WBC # BLD AUTO: 5.8 K/UL (ref 4.1–11.1)

## 2024-07-20 PROCEDURE — 36415 COLL VENOUS BLD VENIPUNCTURE: CPT

## 2024-07-20 PROCEDURE — 86900 BLOOD TYPING SEROLOGIC ABO: CPT

## 2024-07-20 PROCEDURE — 6360000002 HC RX W HCPCS

## 2024-07-20 PROCEDURE — 6370000000 HC RX 637 (ALT 250 FOR IP): Performed by: NURSE PRACTITIONER

## 2024-07-20 PROCEDURE — 6370000000 HC RX 637 (ALT 250 FOR IP): Performed by: PHYSICIAN ASSISTANT

## 2024-07-20 PROCEDURE — 6370000000 HC RX 637 (ALT 250 FOR IP)

## 2024-07-20 PROCEDURE — 86850 RBC ANTIBODY SCREEN: CPT

## 2024-07-20 PROCEDURE — 1100000000 HC RM PRIVATE

## 2024-07-20 PROCEDURE — 86901 BLOOD TYPING SEROLOGIC RH(D): CPT

## 2024-07-20 PROCEDURE — 6370000000 HC RX 637 (ALT 250 FOR IP): Performed by: CLINICAL NURSE SPECIALIST

## 2024-07-20 PROCEDURE — 86923 COMPATIBILITY TEST ELECTRIC: CPT

## 2024-07-20 PROCEDURE — 85025 COMPLETE CBC W/AUTO DIFF WBC: CPT

## 2024-07-20 PROCEDURE — 80048 BASIC METABOLIC PNL TOTAL CA: CPT

## 2024-07-20 PROCEDURE — 6370000000 HC RX 637 (ALT 250 FOR IP): Performed by: STUDENT IN AN ORGANIZED HEALTH CARE EDUCATION/TRAINING PROGRAM

## 2024-07-20 PROCEDURE — 82962 GLUCOSE BLOOD TEST: CPT

## 2024-07-20 PROCEDURE — 2500000003 HC RX 250 WO HCPCS: Performed by: GENERAL ACUTE CARE HOSPITAL

## 2024-07-20 PROCEDURE — 6370000000 HC RX 637 (ALT 250 FOR IP): Performed by: INTERNAL MEDICINE

## 2024-07-20 RX ORDER — HYDROMORPHONE HYDROCHLORIDE 1 MG/ML
1 INJECTION, SOLUTION INTRAMUSCULAR; INTRAVENOUS; SUBCUTANEOUS
Status: DISCONTINUED | OUTPATIENT
Start: 2024-07-20 | End: 2024-07-21

## 2024-07-20 RX ADMIN — FERROUS SULFATE TAB 325 MG (65 MG ELEMENTAL FE) 325 MG: 325 (65 FE) TAB at 09:46

## 2024-07-20 RX ADMIN — INSULIN GLARGINE 30 UNITS: 100 INJECTION, SOLUTION SUBCUTANEOUS at 09:46

## 2024-07-20 RX ADMIN — ACETAMINOPHEN 1000 MG: 500 TABLET ORAL at 04:01

## 2024-07-20 RX ADMIN — ASPIRIN 81 MG: 81 TABLET, COATED ORAL at 09:46

## 2024-07-20 RX ADMIN — SENNOSIDES AND DOCUSATE SODIUM 2 TABLET: 50; 8.6 TABLET ORAL at 20:39

## 2024-07-20 RX ADMIN — OXYCODONE 10 MG: 5 TABLET ORAL at 04:01

## 2024-07-20 RX ADMIN — HYDRALAZINE HYDROCHLORIDE 50 MG: 50 TABLET ORAL at 05:57

## 2024-07-20 RX ADMIN — INSULIN LISPRO 4 UNITS: 100 INJECTION, SOLUTION INTRAVENOUS; SUBCUTANEOUS at 14:17

## 2024-07-20 RX ADMIN — GABAPENTIN 300 MG: 300 CAPSULE ORAL at 09:46

## 2024-07-20 RX ADMIN — GABAPENTIN 300 MG: 300 CAPSULE ORAL at 20:39

## 2024-07-20 RX ADMIN — HEPARIN SODIUM 5000 UNITS: 5000 INJECTION INTRAVENOUS; SUBCUTANEOUS at 22:29

## 2024-07-20 RX ADMIN — INSULIN LISPRO 2 UNITS: 100 INJECTION, SOLUTION INTRAVENOUS; SUBCUTANEOUS at 18:07

## 2024-07-20 RX ADMIN — AMOXICILLIN AND CLAVULANATE POTASSIUM 1 TABLET: 500; 125 TABLET, FILM COATED ORAL at 09:46

## 2024-07-20 RX ADMIN — CALCITONIN SALMON 1 SPRAY: 200 SPRAY, METERED NASAL at 09:48

## 2024-07-20 RX ADMIN — SODIUM ZIRCONIUM CYCLOSILICATE 10 G: 10 POWDER, FOR SUSPENSION ORAL at 09:47

## 2024-07-20 RX ADMIN — HYDROMORPHONE HYDROCHLORIDE 1 MG: 1 INJECTION, SOLUTION INTRAMUSCULAR; INTRAVENOUS; SUBCUTANEOUS at 17:50

## 2024-07-20 RX ADMIN — DULOXETINE HYDROCHLORIDE 20 MG: 20 CAPSULE, DELAYED RELEASE ORAL at 09:46

## 2024-07-20 RX ADMIN — AMLODIPINE BESYLATE 10 MG: 5 TABLET ORAL at 09:46

## 2024-07-20 RX ADMIN — ACETAMINOPHEN 1000 MG: 500 TABLET ORAL at 09:46

## 2024-07-20 RX ADMIN — FERROUS SULFATE TAB 325 MG (65 MG ELEMENTAL FE) 325 MG: 325 (65 FE) TAB at 18:06

## 2024-07-20 RX ADMIN — OXYCODONE 10 MG: 5 TABLET ORAL at 14:12

## 2024-07-20 RX ADMIN — CARVEDILOL 1.56 MG: 3.12 TABLET, FILM COATED ORAL at 20:39

## 2024-07-20 RX ADMIN — OXYCODONE 10 MG: 5 TABLET ORAL at 09:53

## 2024-07-20 RX ADMIN — HYDRALAZINE HYDROCHLORIDE 50 MG: 50 TABLET ORAL at 14:13

## 2024-07-20 RX ADMIN — HYDRALAZINE HYDROCHLORIDE 50 MG: 50 TABLET ORAL at 20:39

## 2024-07-20 RX ADMIN — GABAPENTIN 300 MG: 300 CAPSULE ORAL at 14:12

## 2024-07-20 RX ADMIN — HEPARIN SODIUM 5000 UNITS: 5000 INJECTION INTRAVENOUS; SUBCUTANEOUS at 05:57

## 2024-07-20 RX ADMIN — ACETAMINOPHEN 1000 MG: 500 TABLET ORAL at 18:06

## 2024-07-20 RX ADMIN — INSULIN LISPRO 4 UNITS: 100 INJECTION, SOLUTION INTRAVENOUS; SUBCUTANEOUS at 18:07

## 2024-07-20 RX ADMIN — HEPARIN SODIUM 5000 UNITS: 5000 INJECTION INTRAVENOUS; SUBCUTANEOUS at 14:12

## 2024-07-20 RX ADMIN — SENNOSIDES AND DOCUSATE SODIUM 2 TABLET: 50; 8.6 TABLET ORAL at 09:46

## 2024-07-20 RX ADMIN — ISOSORBIDE MONONITRATE 30 MG: 30 TABLET, EXTENDED RELEASE ORAL at 09:46

## 2024-07-20 RX ADMIN — LURASIDONE HYDROCHLORIDE 20 MG: 20 TABLET, FILM COATED ORAL at 18:06

## 2024-07-20 RX ADMIN — CARVEDILOL 1.56 MG: 3.12 TABLET, FILM COATED ORAL at 09:46

## 2024-07-20 RX ADMIN — HYDROMORPHONE HYDROCHLORIDE 1 MG: 1 INJECTION, SOLUTION INTRAMUSCULAR; INTRAVENOUS; SUBCUTANEOUS at 20:38

## 2024-07-20 RX ADMIN — AMOXICILLIN AND CLAVULANATE POTASSIUM 1 TABLET: 500; 125 TABLET, FILM COATED ORAL at 20:39

## 2024-07-20 ASSESSMENT — PAIN DESCRIPTION - ORIENTATION
ORIENTATION: RIGHT;LEFT
ORIENTATION: RIGHT

## 2024-07-20 ASSESSMENT — PAIN SCALES - GENERAL
PAINLEVEL_OUTOF10: 9
PAINLEVEL_OUTOF10: 8
PAINLEVEL_OUTOF10: 9
PAINLEVEL_OUTOF10: 9
PAINLEVEL_OUTOF10: 7
PAINLEVEL_OUTOF10: 5
PAINLEVEL_OUTOF10: 10

## 2024-07-20 ASSESSMENT — PAIN DESCRIPTION - DESCRIPTORS
DESCRIPTORS: ACHING
DESCRIPTORS: ACHING;THROBBING
DESCRIPTORS: ACHING;THROBBING
DESCRIPTORS: ACHING
DESCRIPTORS: ACHING
DESCRIPTORS: ACHING;THROBBING

## 2024-07-20 ASSESSMENT — PAIN DESCRIPTION - LOCATION
LOCATION: LEG

## 2024-07-20 NOTE — PROGRESS NOTES
End of Shift Note    Bedside shift change report given to Brenda (oncoming nurse) by April Fierro RN (offgoing nurse).  Report included the following information SBAR, Kardex, MAR, Recent Results, and Med Rec Status    Shift worked:  7p-7a     Shift summary and any significant changes:     uneventful     Concerns for physician to address:  None       Zone phone for oncoming shift:   [phone]              Length of Stay:  Expected LOS: 9  Actual LOS: 7      April Fierro RN

## 2024-07-20 NOTE — PROGRESS NOTES
Nursing contacted Nocturnist/cross cover provider via non-urgent messaging system [employer] and notified patient having new bleeding to the stump, looking \"pretty fresh\", states was passed on dayshift nurse noticed around 1830, bp 153/76, hr 78, on asa daily and heparin sq tid for dvt ppx, on pca pump, asymptomatic. States was having some epistaxis night prior but not at present. No other concerns reported. No acute distress reported. No other information provided by nurse. Awaiting clarification via [employer] as I instructed nurse April BAY That she needs to contact the surgeon to notify them and see if there are further recommendations, as appears this was just done on 7/16 bka.  VSS. Ordered stat cbc, t&C- pending, would need blood consent likely, I don't see electronic, but likely a paper consent was done for blood products given recent surgery. Will defer further evaluation/management to the day shift primary attending care team. Patient denies any further complaints or concerns. Nursing to notify Hospitalist for further/continued concerns. Will remain available overnight for further concerns if nursing/patient needs. Please note, there are RRT systems in this hospital in place that if nursing has acute or critical patient condition change or concern, this is to help facilitate and notify that patient needs immediate bedside evaluation by a provider.     Non-billable note.

## 2024-07-20 NOTE — PROGRESS NOTES
End of Shift Note    Bedside shift change report given to BHAKTI Alegria (oncoming nurse) by Christine Merlos LPN (offgoing nurse).  Report included the following information SBAR    Shift worked:  7a-7p     Shift summary and any significant changes:    Pt rested in bed today, Pt sat up on side of bed for all meals, tolerated well. PCA pump discontinued this AM. Pt given prn pain medication, with little effect, Provider notifed, IV pain med's were ordered. Wound care completed with vasc.Uneventful day     Concerns for physician to address:  none     Zone phone for oncoming shift:   [phone]           Christine Merlos LPN

## 2024-07-20 NOTE — PROGRESS NOTES
End of Shift Note    Bedside shift change report given to BHAKTI Chen (oncoming nurse) by Sandra Hedrick RN (offgoing nurse).  Report included the following information SBAR    Shift worked:  5299-9062     Shift summary and any significant changes:     Pt continues to have pain and takes PRN medications with the PCA pump.      Pt had 3 large bowel movements during the shift.      Pt pulled out IV at  1830 when using bedside commode.  New IV placed by oncoming RN.      Insulin orders changed to 4units with meals and NPH insulin.     Calcitonin nasal spray added for bone pain.  Lokelma suspension given for high potassium.    Pt reports some confusion at end of the shift.  Oncoming RN sent message to MD.  Vitals stable.       Concerns for physician to address:       Zone phone for oncoming shift:   [phone]           Sandra Hedrick RN

## 2024-07-20 NOTE — PROGRESS NOTES
Hospitalist Progress Note    NAME:   Keaton Van   : 1965   MRN: [id number]     Patient PCP: Robert Wells PA-C      Assessment / Plan:      S/p right below-knee amputation on 2024.  Right foot 5th toe stump osteomyelitis via XR  S/p resection w Dr Jama on 24  Wound with necrosis, dehiscence  Right leg w edema, redness, suspect cellulitis  Hx prior toe amputations left foot.  PVD.  Will discontinue vanco and zosyn  Blood cultures NGTD    Does not meet sepsis criteria  DC Dilaudid PCA and start IV dilaudid prn +Lorena PRN  Palliative recs appreciated  On Augmentin for few days     Hypertensive urgency -- asymptomatic  2/2 medication noncompliance, pain also likely a factor  Chronic diastolic HF not in exacerbation  Paroxysmal Atrial fibrillation on last admission, currently SR  Resume home amlodipine, carvedilol (low dose d/t cocaine abuse), hydralazine, imdur, bumex     Uncontrolled DM2  Patient is on long-acting insulin 10 units in the morning and 20 units at bedtime.  Continue sliding scale insulin.     KIN on CKD4  Cr 2.33, this is improved from prior 3.7  Holding Bumex.  Adjusting insulin for better blood glucose control.  Nephrology consulted.     Chronic anemia, likely YENNY +/- anemia of CKD  Hemoglobin 7.3.  Continue to monitor.  Transfuse for hemoglobin less than 7.  Patient received 2 PRBC transfusions on 2024.     Bipolar I  Polysubstance abuse incl heavy crack cocaine use  Chornic pain  Medication noncompliance, financial and social barriers  Hx untreated HCV    Medical Decision Making:   I personally reviewed labs: yes, as below   I personally reviewed imaging reports: yes, as below   Toxic drug monitoring:   Discussed case with: Pt, RN, d/w CM for placement,    Code Status: DNR       Subjective:  Assessment / Plan:      Episodes of pain, 8 out of 10 in severity  No fever  No SOB  No CP  Discussed with RN events overnight.       Objective:      VITALS:   Last 24hrs VS

## 2024-07-20 NOTE — PROGRESS NOTES
On call vascular surgeon made aware of bleeding from surgical site, he states to remove the guard and replace the dressing tonight, the vascular team will reapply the guard at some point over the weekend

## 2024-07-20 NOTE — PROGRESS NOTES
Hospitalist Progress Note    NAME:   Keaton Van   : 1965   MRN: [id number]     Patient PCP: Robert Wells PA-C      Assessment / Plan:      S/p right below-knee amputation on 2024.  Right foot 5th toe stump osteomyelitis via XR  S/p resection w Dr Jama on 24  Wound with necrosis, dehiscence  Right leg w edema, redness, suspect cellulitis  Hx prior toe amputations left foot.  PVD.  Will discontinue vanco and zosyn  Blood cultures NGTD    Does not meet sepsis criteria  Continue Dilaudid PCA  Palliative recs appreciated  On Augmentin for few days     Hypertensive urgency -- asymptomatic  2/2 medication noncompliance, pain also likely a factor  Chronic diastolic HF not in exacerbation  Paroxysmal Atrial fibrillation on last admission, currently SR  Resume home amlodipine, carvedilol (low dose d/t cocaine abuse), hydralazine, imdur, bumex     Uncontrolled DM2  Patient is on long-acting insulin 10 units in the morning and 20 units at bedtime.  Continue sliding scale insulin.     KIN on CKD4  Cr 2.33, this is improved from prior 3.7  Holding Bumex.  Adjusting insulin for better blood glucose control.  Nephrology consulted.     Chronic anemia, likely YENNY +/- anemia of CKD  Hemoglobin 7.3.  Continue to monitor.  Transfuse for hemoglobin less than 7.  Patient received 2 PRBC transfusions on 2024.     Bipolar I  Polysubstance abuse incl heavy crack cocaine use  Chornic pain  Medication noncompliance, financial and social barriers  Hx untreated HCV    Medical Decision Making:   I personally reviewed labs: yes, as below   I personally reviewed imaging reports: yes, as below   Toxic drug monitoring:   Discussed case with: Pt, RN, d/w CM for placement,    Code Status: DNR       Subjective:  Assessment / Plan:      Episodes of pain, 8 out of 10 in severity  No fever  No SOB  No CP  Discussed with RN events overnight.       Objective:      VITALS:   Last 24hrs VS reviewed since prior progress note.  Collected: 07/13/24 1247    Order Status: Completed Specimen: Blood Updated: 07/19/24 0833     Special Requests --        RIGHT  Antecubital       Culture NO GROWTH 6 DAYS             ECHO:   05/29/24    ECHO (TTE) LIMITED (PRN CONTRAST/BUBBLE/STRAIN/3D) 06/04/2024  3:24 PM (Final)    Interpretation Summary    Left Ventricle: Normal left ventricular systolic function with a visually estimated EF of 60 - 65%. Left ventricle size is normal. Mildly increased wall thickness. Normal wall motion.    Mitral Valve: Mild regurgitation with a posterior directed jet.    Tricuspid Valve: Mildly elevated RVSP, consistent with mild pulmonary hypertension. The estimated RVSP is 47 mmHg.    Left Atrium: Left atrium is dilated.    IVC/SVC: IVC diameter is greater than 21 mm and decreases less than 50% during inspiration; therefore the estimated right atrial pressure is elevated (~15 mmHg).    Image quality is adequate.    Signed by: Anisa Jaquez MD on 6/4/2024  3:24 PM    Procedures: see electronic medical records for all procedures/Xrays and details which were not copied into this note but were reviewed prior to creation of Plan.    Reviewed most current lab test results and cultures  YES  Reviewed most current radiology test results   YES  Review and summation of old records today    NO  Reviewed patient's current orders and MAR    YES  PMH/ reviewed - no change compared to H&P    ________________________________________________________________________      Total NON critical care TIME:  Minutes      Total CRITICAL CARE TIME Spent:   Minutes non procedure based          Comments   >50% of visit spent in counseling and coordination of care x    ________________________________________________________________________        Signed: Stefania Vargas MD

## 2024-07-20 NOTE — PROGRESS NOTES
Reviewed labs- Cr 3.0 unchanged with good UOP >1L- K 5.2 stable on Lokelmia. Can restart diuretic tomorrow if overt volume overload. Hgb stable/improved- transfuse for Hgb <7

## 2024-07-20 NOTE — PROGRESS NOTES
Drainage from BKA site. Just stopped PCA.  Vitals stable  BKA site clean, oozing from lateral side of incision    58 y/o WM s/p BKA  - Continue daily dressing changes, I placed an ACE wrap for compression.   - dispo planning

## 2024-07-21 LAB
ANION GAP SERPL CALC-SCNC: 3 MMOL/L (ref 5–15)
BASOPHILS # BLD: 0 K/UL (ref 0–0.1)
BASOPHILS NFR BLD: 1 % (ref 0–1)
BUN SERPL-MCNC: 96 MG/DL (ref 6–20)
BUN/CREAT SERPL: 29 (ref 12–20)
CALCIUM SERPL-MCNC: 8.4 MG/DL (ref 8.5–10.1)
CHLORIDE SERPL-SCNC: 103 MMOL/L (ref 97–108)
CO2 SERPL-SCNC: 26 MMOL/L (ref 21–32)
CREAT SERPL-MCNC: 3.26 MG/DL (ref 0.7–1.3)
DIFFERENTIAL METHOD BLD: ABNORMAL
EOSINOPHIL # BLD: 0.2 K/UL (ref 0–0.4)
EOSINOPHIL NFR BLD: 4 % (ref 0–7)
ERYTHROCYTE [DISTWIDTH] IN BLOOD BY AUTOMATED COUNT: 16.2 % (ref 11.5–14.5)
ERYTHROCYTE [DISTWIDTH] IN BLOOD BY AUTOMATED COUNT: 16.4 % (ref 11.5–14.5)
GLUCOSE BLD STRIP.AUTO-MCNC: 133 MG/DL (ref 65–117)
GLUCOSE BLD STRIP.AUTO-MCNC: 165 MG/DL (ref 65–117)
GLUCOSE BLD STRIP.AUTO-MCNC: 178 MG/DL (ref 65–117)
GLUCOSE BLD STRIP.AUTO-MCNC: 70 MG/DL (ref 65–117)
GLUCOSE SERPL-MCNC: 185 MG/DL (ref 65–100)
HCT VFR BLD AUTO: 21.4 % (ref 36.6–50.3)
HCT VFR BLD AUTO: 22.6 % (ref 36.6–50.3)
HGB BLD-MCNC: 6.8 G/DL (ref 12.1–17)
HGB BLD-MCNC: 7.2 G/DL (ref 12.1–17)
IMM GRANULOCYTES # BLD AUTO: 0 K/UL (ref 0–0.04)
IMM GRANULOCYTES NFR BLD AUTO: 1 % (ref 0–0.5)
LYMPHOCYTES # BLD: 0.7 K/UL (ref 0.8–3.5)
LYMPHOCYTES NFR BLD: 11 % (ref 12–49)
MCH RBC QN AUTO: 27 PG (ref 26–34)
MCH RBC QN AUTO: 27.2 PG (ref 26–34)
MCHC RBC AUTO-ENTMCNC: 31.8 G/DL (ref 30–36.5)
MCHC RBC AUTO-ENTMCNC: 31.9 G/DL (ref 30–36.5)
MCV RBC AUTO: 84.9 FL (ref 80–99)
MCV RBC AUTO: 85.3 FL (ref 80–99)
MONOCYTES # BLD: 0.4 K/UL (ref 0–1)
MONOCYTES NFR BLD: 7 % (ref 5–13)
NEUTS SEG # BLD: 4.7 K/UL (ref 1.8–8)
NEUTS SEG NFR BLD: 77 % (ref 32–75)
NRBC # BLD: 0 K/UL (ref 0–0.01)
NRBC # BLD: 0 K/UL (ref 0–0.01)
NRBC BLD-RTO: 0 PER 100 WBC
NRBC BLD-RTO: 0 PER 100 WBC
PLATELET # BLD AUTO: 172 K/UL (ref 150–400)
PLATELET # BLD AUTO: 181 K/UL (ref 150–400)
PMV BLD AUTO: 9.7 FL (ref 8.9–12.9)
PMV BLD AUTO: 9.9 FL (ref 8.9–12.9)
POTASSIUM SERPL-SCNC: 5.3 MMOL/L (ref 3.5–5.1)
RBC # BLD AUTO: 2.52 M/UL (ref 4.1–5.7)
RBC # BLD AUTO: 2.65 M/UL (ref 4.1–5.7)
SERVICE CMNT-IMP: ABNORMAL
SERVICE CMNT-IMP: NORMAL
SODIUM SERPL-SCNC: 132 MMOL/L (ref 136–145)
WBC # BLD AUTO: 5.8 K/UL (ref 4.1–11.1)
WBC # BLD AUTO: 6.1 K/UL (ref 4.1–11.1)

## 2024-07-21 PROCEDURE — 36415 COLL VENOUS BLD VENIPUNCTURE: CPT

## 2024-07-21 PROCEDURE — 6360000002 HC RX W HCPCS

## 2024-07-21 PROCEDURE — 6370000000 HC RX 637 (ALT 250 FOR IP): Performed by: PHYSICIAN ASSISTANT

## 2024-07-21 PROCEDURE — 6370000000 HC RX 637 (ALT 250 FOR IP): Performed by: INTERNAL MEDICINE

## 2024-07-21 PROCEDURE — 6370000000 HC RX 637 (ALT 250 FOR IP)

## 2024-07-21 PROCEDURE — 6370000000 HC RX 637 (ALT 250 FOR IP): Performed by: NURSE PRACTITIONER

## 2024-07-21 PROCEDURE — 80048 BASIC METABOLIC PNL TOTAL CA: CPT

## 2024-07-21 PROCEDURE — 6370000000 HC RX 637 (ALT 250 FOR IP): Performed by: STUDENT IN AN ORGANIZED HEALTH CARE EDUCATION/TRAINING PROGRAM

## 2024-07-21 PROCEDURE — 6370000000 HC RX 637 (ALT 250 FOR IP): Performed by: GENERAL ACUTE CARE HOSPITAL

## 2024-07-21 PROCEDURE — 85027 COMPLETE CBC AUTOMATED: CPT

## 2024-07-21 PROCEDURE — 82962 GLUCOSE BLOOD TEST: CPT

## 2024-07-21 PROCEDURE — 2500000003 HC RX 250 WO HCPCS: Performed by: GENERAL ACUTE CARE HOSPITAL

## 2024-07-21 PROCEDURE — 85025 COMPLETE CBC W/AUTO DIFF WBC: CPT

## 2024-07-21 PROCEDURE — 1100000000 HC RM PRIVATE

## 2024-07-21 PROCEDURE — 6370000000 HC RX 637 (ALT 250 FOR IP): Performed by: CLINICAL NURSE SPECIALIST

## 2024-07-21 RX ORDER — HYDROMORPHONE HYDROCHLORIDE 1 MG/ML
0.5 INJECTION, SOLUTION INTRAMUSCULAR; INTRAVENOUS; SUBCUTANEOUS
Status: DISCONTINUED | OUTPATIENT
Start: 2024-07-21 | End: 2024-07-26 | Stop reason: HOSPADM

## 2024-07-21 RX ADMIN — CARVEDILOL 1.56 MG: 3.12 TABLET, FILM COATED ORAL at 20:51

## 2024-07-21 RX ADMIN — OXYCODONE 10 MG: 5 TABLET ORAL at 09:59

## 2024-07-21 RX ADMIN — SODIUM ZIRCONIUM CYCLOSILICATE 5 G: 5 POWDER, FOR SUSPENSION ORAL at 14:36

## 2024-07-21 RX ADMIN — ACETAMINOPHEN 1000 MG: 500 TABLET ORAL at 00:57

## 2024-07-21 RX ADMIN — AMLODIPINE BESYLATE 10 MG: 5 TABLET ORAL at 10:01

## 2024-07-21 RX ADMIN — AMOXICILLIN AND CLAVULANATE POTASSIUM 1 TABLET: 500; 125 TABLET, FILM COATED ORAL at 10:00

## 2024-07-21 RX ADMIN — HYDRALAZINE HYDROCHLORIDE 50 MG: 50 TABLET ORAL at 20:51

## 2024-07-21 RX ADMIN — CALCITONIN SALMON 1 SPRAY: 200 SPRAY, METERED NASAL at 10:00

## 2024-07-21 RX ADMIN — FERROUS SULFATE TAB 325 MG (65 MG ELEMENTAL FE) 325 MG: 325 (65 FE) TAB at 17:33

## 2024-07-21 RX ADMIN — SENNOSIDES AND DOCUSATE SODIUM 2 TABLET: 50; 8.6 TABLET ORAL at 20:51

## 2024-07-21 RX ADMIN — GABAPENTIN 300 MG: 300 CAPSULE ORAL at 10:00

## 2024-07-21 RX ADMIN — INSULIN LISPRO 4 UNITS: 100 INJECTION, SOLUTION INTRAVENOUS; SUBCUTANEOUS at 10:03

## 2024-07-21 RX ADMIN — INSULIN LISPRO 4 UNITS: 100 INJECTION, SOLUTION INTRAVENOUS; SUBCUTANEOUS at 14:37

## 2024-07-21 RX ADMIN — HYDROMORPHONE HYDROCHLORIDE 1 MG: 1 INJECTION, SOLUTION INTRAMUSCULAR; INTRAVENOUS; SUBCUTANEOUS at 06:39

## 2024-07-21 RX ADMIN — AMOXICILLIN AND CLAVULANATE POTASSIUM 1 TABLET: 500; 125 TABLET, FILM COATED ORAL at 21:00

## 2024-07-21 RX ADMIN — INSULIN GLARGINE 30 UNITS: 100 INJECTION, SOLUTION SUBCUTANEOUS at 10:03

## 2024-07-21 RX ADMIN — ACETAMINOPHEN 1000 MG: 500 TABLET ORAL at 10:00

## 2024-07-21 RX ADMIN — ISOSORBIDE MONONITRATE 30 MG: 30 TABLET, EXTENDED RELEASE ORAL at 10:01

## 2024-07-21 RX ADMIN — HYDROMORPHONE HYDROCHLORIDE 0.5 MG: 1 INJECTION, SOLUTION INTRAMUSCULAR; INTRAVENOUS; SUBCUTANEOUS at 12:47

## 2024-07-21 RX ADMIN — OXYCODONE 10 MG: 5 TABLET ORAL at 02:00

## 2024-07-21 RX ADMIN — OXYCODONE 10 MG: 5 TABLET ORAL at 20:51

## 2024-07-21 RX ADMIN — HEPARIN SODIUM 5000 UNITS: 5000 INJECTION INTRAVENOUS; SUBCUTANEOUS at 06:21

## 2024-07-21 RX ADMIN — HYDROMORPHONE HYDROCHLORIDE 1 MG: 1 INJECTION, SOLUTION INTRAMUSCULAR; INTRAVENOUS; SUBCUTANEOUS at 00:45

## 2024-07-21 RX ADMIN — ASPIRIN 81 MG: 81 TABLET, COATED ORAL at 10:00

## 2024-07-21 RX ADMIN — HEPARIN SODIUM 5000 UNITS: 5000 INJECTION INTRAVENOUS; SUBCUTANEOUS at 14:37

## 2024-07-21 RX ADMIN — FERROUS SULFATE TAB 325 MG (65 MG ELEMENTAL FE) 325 MG: 325 (65 FE) TAB at 10:01

## 2024-07-21 RX ADMIN — CARVEDILOL 1.56 MG: 3.12 TABLET, FILM COATED ORAL at 10:01

## 2024-07-21 RX ADMIN — GABAPENTIN 300 MG: 300 CAPSULE ORAL at 20:51

## 2024-07-21 RX ADMIN — SODIUM ZIRCONIUM CYCLOSILICATE 10 G: 10 POWDER, FOR SUSPENSION ORAL at 10:01

## 2024-07-21 RX ADMIN — HYDRALAZINE HYDROCHLORIDE 50 MG: 50 TABLET ORAL at 06:21

## 2024-07-21 RX ADMIN — GABAPENTIN 300 MG: 300 CAPSULE ORAL at 14:37

## 2024-07-21 RX ADMIN — ACETAMINOPHEN 1000 MG: 500 TABLET ORAL at 17:33

## 2024-07-21 RX ADMIN — HEPARIN SODIUM 5000 UNITS: 5000 INJECTION INTRAVENOUS; SUBCUTANEOUS at 20:51

## 2024-07-21 RX ADMIN — HYDROMORPHONE HYDROCHLORIDE 0.5 MG: 1 INJECTION, SOLUTION INTRAMUSCULAR; INTRAVENOUS; SUBCUTANEOUS at 16:10

## 2024-07-21 RX ADMIN — LURASIDONE HYDROCHLORIDE 20 MG: 20 TABLET, FILM COATED ORAL at 17:33

## 2024-07-21 RX ADMIN — HYDRALAZINE HYDROCHLORIDE 50 MG: 50 TABLET ORAL at 14:37

## 2024-07-21 RX ADMIN — DULOXETINE HYDROCHLORIDE 20 MG: 20 CAPSULE, DELAYED RELEASE ORAL at 10:01

## 2024-07-21 RX ADMIN — SENNOSIDES AND DOCUSATE SODIUM 2 TABLET: 50; 8.6 TABLET ORAL at 10:01

## 2024-07-21 ASSESSMENT — PAIN DESCRIPTION - LOCATION
LOCATION: LEG

## 2024-07-21 ASSESSMENT — PAIN SCALES - GENERAL
PAINLEVEL_OUTOF10: 2
PAINLEVEL_OUTOF10: 10
PAINLEVEL_OUTOF10: 8
PAINLEVEL_OUTOF10: 6
PAINLEVEL_OUTOF10: 7
PAINLEVEL_OUTOF10: 2
PAINLEVEL_OUTOF10: 2
PAINLEVEL_OUTOF10: 6
PAINLEVEL_OUTOF10: 7
PAINLEVEL_OUTOF10: 9
PAINLEVEL_OUTOF10: 9
PAINLEVEL_OUTOF10: 8
PAINLEVEL_OUTOF10: 9

## 2024-07-21 ASSESSMENT — PAIN DESCRIPTION - ORIENTATION
ORIENTATION: RIGHT

## 2024-07-21 ASSESSMENT — PAIN DESCRIPTION - FREQUENCY: FREQUENCY: CONTINUOUS

## 2024-07-21 ASSESSMENT — PAIN DESCRIPTION - DESCRIPTORS
DESCRIPTORS: ACHING;DISCOMFORT
DESCRIPTORS: ACHING
DESCRIPTORS: ACHING;THROBBING

## 2024-07-21 ASSESSMENT — PAIN - FUNCTIONAL ASSESSMENT
PAIN_FUNCTIONAL_ASSESSMENT: PREVENTS OR INTERFERES SOME ACTIVE ACTIVITIES AND ADLS

## 2024-07-21 ASSESSMENT — PAIN SCALES - WONG BAKER
WONGBAKER_NUMERICALRESPONSE: HURTS A LITTLE BIT
WONGBAKER_NUMERICALRESPONSE: NO HURT

## 2024-07-21 ASSESSMENT — PAIN DESCRIPTION - ONSET: ONSET: ON-GOING

## 2024-07-21 ASSESSMENT — PAIN DESCRIPTION - PAIN TYPE: TYPE: NEUROPATHIC PAIN

## 2024-07-21 NOTE — PROGRESS NOTES
systolic function with a visually estimated EF of 60 - 65%. Left ventricle size is normal. Mildly increased wall thickness. Normal wall motion.    Mitral Valve: Mild regurgitation with a posterior directed jet.    Tricuspid Valve: Mildly elevated RVSP, consistent with mild pulmonary hypertension. The estimated RVSP is 47 mmHg.    Left Atrium: Left atrium is dilated.    IVC/SVC: IVC diameter is greater than 21 mm and decreases less than 50% during inspiration; therefore the estimated right atrial pressure is elevated (~15 mmHg).    Image quality is adequate.    Signed by: Anisa Jaquez MD on 6/4/2024  3:24 PM    Procedures: see electronic medical records for all procedures/Xrays and details which were not copied into this note but were reviewed prior to creation of Plan.    Reviewed most current lab test results and cultures  YES  Reviewed most current radiology test results   YES  Review and summation of old records today    NO  Reviewed patient's current orders and MAR    YES  PMH/ reviewed - no change compared to H&P    ________________________________________________________________________      Total NON critical care TIME:  Minutes      Total CRITICAL CARE TIME Spent:   Minutes non procedure based          Comments   >50% of visit spent in counseling and coordination of care x    ________________________________________________________________________        Signed: Stefania Vargas MD

## 2024-07-21 NOTE — PROGRESS NOTES
Nursing contacted Nocturnist/cross cover provider via non-urgent messaging system [employer] and notified patient hemoglobin 6.8 down from 7.7 yesterday, no active bleeding reported, hemodynamically stable. No other concerns reported. No acute distress reported. No other information provided by nurse. VSS. Ordered stat recheck CBC-results pending, consider transfuse if hemoglobin remains below 7.0, awaiting confirmation from nurse via [email or web address] patient already has active written blood consent. Will defer further evaluation/management to the day shift primary attending care team. Patient denies any further complaints or concerns. Nursing to notify Hospitalist for further/continued concerns. Will remain available overnight for further concerns if nursing/patient needs. Please note, there are RRT systems in this hospital in place that if nursing has acute or critical patient condition change or concern, this is to help facilitate and notify that patient needs immediate bedside evaluation by a provider.     Update:  0203 nurse reported patient was noted to have after removal of the dressing a small clot at the tip of the stump from recent surgery.  No active bleeding reported.  Nurse reported hemoglobin came back 7.2.  Vital signs stable.  No acute distress reported.  No other concerns reported this time.  Nurse continue to monitor.    Non-billable note.

## 2024-07-21 NOTE — PROGRESS NOTES
End of Shift Note    Bedside shift change report given to . (oncoming nurse) by Christine Merlos LPN (offgoing nurse).  Report included the following information SBAR    Shift worked:  7a-7p     Shift summary and any significant changes:     Pt ambulated to the bedside commode 1x BM noted. Pt tolerating meals. Pt had some complaints of pain, prn pain medication given with positive effect. Pt sat on side of the bed for all meals. Uneventful day.      Concerns for physician to address:  none     Zone phone for oncoming shift:   [phone]           Christine Merlos LPN

## 2024-07-21 NOTE — PROGRESS NOTES
End of Shift Note    Bedside shift change report given to JADEN King (oncoming nurse) by Raúl Null RN (offgoing nurse).  Report included the following information SBAR, Kardex, Procedure Summary, Intake/Output, MAR, and Recent Results    Shift worked:  7p-730a     Shift summary and any significant changes:     Rested in bed through shift. Pain treated prn-see MAR. AM labs drawn, initial HGB was 6.8, repeat was 7.2. Voiding w/o issue. Tolerating diet.      Concerns for physician to address:       Zone phone for oncoming shift:              Length of Stay:  Expected LOS: 9  Actual LOS: 8      Raúl Null, RN

## 2024-07-22 LAB
ANION GAP SERPL CALC-SCNC: 5 MMOL/L (ref 5–15)
BUN SERPL-MCNC: 88 MG/DL (ref 6–20)
BUN/CREAT SERPL: 30 (ref 12–20)
CALCIUM SERPL-MCNC: 7.9 MG/DL (ref 8.5–10.1)
CHLORIDE SERPL-SCNC: 108 MMOL/L (ref 97–108)
CO2 SERPL-SCNC: 22 MMOL/L (ref 21–32)
CREAT SERPL-MCNC: 2.98 MG/DL (ref 0.7–1.3)
ERYTHROCYTE [DISTWIDTH] IN BLOOD BY AUTOMATED COUNT: 16.1 % (ref 11.5–14.5)
GLUCOSE BLD STRIP.AUTO-MCNC: 154 MG/DL (ref 65–117)
GLUCOSE BLD STRIP.AUTO-MCNC: 161 MG/DL (ref 65–117)
GLUCOSE BLD STRIP.AUTO-MCNC: 191 MG/DL (ref 65–117)
GLUCOSE BLD STRIP.AUTO-MCNC: 195 MG/DL (ref 65–117)
GLUCOSE SERPL-MCNC: 140 MG/DL (ref 65–100)
HCT VFR BLD AUTO: 22.2 % (ref 36.6–50.3)
HGB BLD-MCNC: 6.8 G/DL (ref 12.1–17)
HISTORY CHECK: NORMAL
MCH RBC QN AUTO: 26.4 PG (ref 26–34)
MCHC RBC AUTO-ENTMCNC: 30.6 G/DL (ref 30–36.5)
MCV RBC AUTO: 86 FL (ref 80–99)
NRBC # BLD: 0 K/UL (ref 0–0.01)
NRBC BLD-RTO: 0 PER 100 WBC
PLATELET # BLD AUTO: 195 K/UL (ref 150–400)
PMV BLD AUTO: 9.9 FL (ref 8.9–12.9)
POTASSIUM SERPL-SCNC: 4.9 MMOL/L (ref 3.5–5.1)
RBC # BLD AUTO: 2.58 M/UL (ref 4.1–5.7)
SERVICE CMNT-IMP: ABNORMAL
SODIUM SERPL-SCNC: 135 MMOL/L (ref 136–145)
WBC # BLD AUTO: 5.5 K/UL (ref 4.1–11.1)

## 2024-07-22 PROCEDURE — 6370000000 HC RX 637 (ALT 250 FOR IP): Performed by: STUDENT IN AN ORGANIZED HEALTH CARE EDUCATION/TRAINING PROGRAM

## 2024-07-22 PROCEDURE — 6370000000 HC RX 637 (ALT 250 FOR IP)

## 2024-07-22 PROCEDURE — 2500000003 HC RX 250 WO HCPCS: Performed by: GENERAL ACUTE CARE HOSPITAL

## 2024-07-22 PROCEDURE — 6370000000 HC RX 637 (ALT 250 FOR IP): Performed by: INTERNAL MEDICINE

## 2024-07-22 PROCEDURE — 85027 COMPLETE CBC AUTOMATED: CPT

## 2024-07-22 PROCEDURE — 6370000000 HC RX 637 (ALT 250 FOR IP): Performed by: NURSE PRACTITIONER

## 2024-07-22 PROCEDURE — 36430 TRANSFUSION BLD/BLD COMPNT: CPT

## 2024-07-22 PROCEDURE — 36415 COLL VENOUS BLD VENIPUNCTURE: CPT

## 2024-07-22 PROCEDURE — 6360000002 HC RX W HCPCS

## 2024-07-22 PROCEDURE — 1100000000 HC RM PRIVATE

## 2024-07-22 PROCEDURE — 99231 SBSQ HOSP IP/OBS SF/LOW 25: CPT | Performed by: CLINICAL NURSE SPECIALIST

## 2024-07-22 PROCEDURE — P9016 RBC LEUKOCYTES REDUCED: HCPCS

## 2024-07-22 PROCEDURE — 80048 BASIC METABOLIC PNL TOTAL CA: CPT

## 2024-07-22 PROCEDURE — 6370000000 HC RX 637 (ALT 250 FOR IP): Performed by: GENERAL ACUTE CARE HOSPITAL

## 2024-07-22 PROCEDURE — 82962 GLUCOSE BLOOD TEST: CPT

## 2024-07-22 PROCEDURE — 6370000000 HC RX 637 (ALT 250 FOR IP): Performed by: CLINICAL NURSE SPECIALIST

## 2024-07-22 PROCEDURE — 2580000003 HC RX 258: Performed by: INTERNAL MEDICINE

## 2024-07-22 RX ORDER — SODIUM CHLORIDE 9 MG/ML
INJECTION, SOLUTION INTRAVENOUS CONTINUOUS
Status: ACTIVE | OUTPATIENT
Start: 2024-07-22 | End: 2024-07-22

## 2024-07-22 RX ORDER — CARVEDILOL 6.25 MG/1
6.25 TABLET ORAL 2 TIMES DAILY WITH MEALS
Status: DISCONTINUED | OUTPATIENT
Start: 2024-07-22 | End: 2024-07-26 | Stop reason: HOSPADM

## 2024-07-22 RX ORDER — SODIUM CHLORIDE 9 MG/ML
INJECTION, SOLUTION INTRAVENOUS PRN
Status: DISCONTINUED | OUTPATIENT
Start: 2024-07-22 | End: 2024-07-26 | Stop reason: HOSPADM

## 2024-07-22 RX ADMIN — FERROUS SULFATE TAB 325 MG (65 MG ELEMENTAL FE) 325 MG: 325 (65 FE) TAB at 11:47

## 2024-07-22 RX ADMIN — HYDROMORPHONE HYDROCHLORIDE 0.5 MG: 1 INJECTION, SOLUTION INTRAMUSCULAR; INTRAVENOUS; SUBCUTANEOUS at 08:29

## 2024-07-22 RX ADMIN — SODIUM ZIRCONIUM CYCLOSILICATE 15 G: 10 POWDER, FOR SUSPENSION ORAL at 11:47

## 2024-07-22 RX ADMIN — AMOXICILLIN AND CLAVULANATE POTASSIUM 1 TABLET: 500; 125 TABLET, FILM COATED ORAL at 11:45

## 2024-07-22 RX ADMIN — SENNOSIDES AND DOCUSATE SODIUM 2 TABLET: 50; 8.6 TABLET ORAL at 11:45

## 2024-07-22 RX ADMIN — DULOXETINE HYDROCHLORIDE 20 MG: 20 CAPSULE, DELAYED RELEASE ORAL at 11:45

## 2024-07-22 RX ADMIN — GABAPENTIN 300 MG: 300 CAPSULE ORAL at 18:06

## 2024-07-22 RX ADMIN — LURASIDONE HYDROCHLORIDE 20 MG: 20 TABLET, FILM COATED ORAL at 18:06

## 2024-07-22 RX ADMIN — CARVEDILOL 6.25 MG: 6.25 TABLET, FILM COATED ORAL at 18:06

## 2024-07-22 RX ADMIN — SODIUM CHLORIDE: 9 INJECTION, SOLUTION INTRAVENOUS at 18:05

## 2024-07-22 RX ADMIN — GABAPENTIN 300 MG: 300 CAPSULE ORAL at 11:45

## 2024-07-22 RX ADMIN — CARVEDILOL 1.56 MG: 3.12 TABLET, FILM COATED ORAL at 11:45

## 2024-07-22 RX ADMIN — SENNOSIDES AND DOCUSATE SODIUM 2 TABLET: 50; 8.6 TABLET ORAL at 21:05

## 2024-07-22 RX ADMIN — ISOSORBIDE MONONITRATE 30 MG: 30 TABLET, EXTENDED RELEASE ORAL at 11:45

## 2024-07-22 RX ADMIN — ASPIRIN 81 MG: 81 TABLET, COATED ORAL at 11:45

## 2024-07-22 RX ADMIN — FERROUS SULFATE TAB 325 MG (65 MG ELEMENTAL FE) 325 MG: 325 (65 FE) TAB at 18:06

## 2024-07-22 RX ADMIN — AMLODIPINE BESYLATE 10 MG: 5 TABLET ORAL at 11:45

## 2024-07-22 RX ADMIN — OXYCODONE 10 MG: 5 TABLET ORAL at 11:45

## 2024-07-22 RX ADMIN — GABAPENTIN 300 MG: 300 CAPSULE ORAL at 21:05

## 2024-07-22 RX ADMIN — ACETAMINOPHEN 1000 MG: 500 TABLET ORAL at 18:06

## 2024-07-22 RX ADMIN — HYDRALAZINE HYDROCHLORIDE 50 MG: 50 TABLET ORAL at 21:05

## 2024-07-22 RX ADMIN — HEPARIN SODIUM 5000 UNITS: 5000 INJECTION INTRAVENOUS; SUBCUTANEOUS at 05:22

## 2024-07-22 RX ADMIN — OXYCODONE 10 MG: 5 TABLET ORAL at 05:23

## 2024-07-22 RX ADMIN — HYDRALAZINE HYDROCHLORIDE 50 MG: 50 TABLET ORAL at 05:50

## 2024-07-22 RX ADMIN — INSULIN GLARGINE 30 UNITS: 100 INJECTION, SOLUTION SUBCUTANEOUS at 11:47

## 2024-07-22 RX ADMIN — INSULIN LISPRO 4 UNITS: 100 INJECTION, SOLUTION INTRAVENOUS; SUBCUTANEOUS at 11:46

## 2024-07-22 RX ADMIN — CALCITONIN SALMON 1 SPRAY: 200 SPRAY, METERED NASAL at 11:47

## 2024-07-22 RX ADMIN — AMOXICILLIN AND CLAVULANATE POTASSIUM 1 TABLET: 500; 125 TABLET, FILM COATED ORAL at 21:05

## 2024-07-22 RX ADMIN — HYDRALAZINE HYDROCHLORIDE 50 MG: 50 TABLET ORAL at 18:06

## 2024-07-22 RX ADMIN — OXYCODONE 10 MG: 5 TABLET ORAL at 18:10

## 2024-07-22 RX ADMIN — INSULIN LISPRO 4 UNITS: 100 INJECTION, SOLUTION INTRAVENOUS; SUBCUTANEOUS at 18:06

## 2024-07-22 RX ADMIN — ACETAMINOPHEN 1000 MG: 500 TABLET ORAL at 11:44

## 2024-07-22 ASSESSMENT — PAIN SCALES - GENERAL
PAINLEVEL_OUTOF10: 1
PAINLEVEL_OUTOF10: 9
PAINLEVEL_OUTOF10: 7
PAINLEVEL_OUTOF10: 6
PAINLEVEL_OUTOF10: 9
PAINLEVEL_OUTOF10: 8
PAINLEVEL_OUTOF10: 8

## 2024-07-22 ASSESSMENT — PAIN DESCRIPTION - LOCATION
LOCATION: LEG
LOCATION: LEG

## 2024-07-22 ASSESSMENT — PAIN DESCRIPTION - ORIENTATION: ORIENTATION: RIGHT

## 2024-07-22 ASSESSMENT — PAIN - FUNCTIONAL ASSESSMENT: PAIN_FUNCTIONAL_ASSESSMENT: PREVENTS OR INTERFERES SOME ACTIVE ACTIVITIES AND ADLS

## 2024-07-22 ASSESSMENT — PAIN DESCRIPTION - DESCRIPTORS: DESCRIPTORS: ACHING

## 2024-07-22 ASSESSMENT — PAIN SCALES - WONG BAKER: WONGBAKER_NUMERICALRESPONSE: NO HURT

## 2024-07-22 NOTE — CONSULTS
This is a 59-year-old gentleman well-known to the program for diabetes health with multiple comorbidities including uncontrolled type 2 diabetes mellitus, coronary artery disease, polysubstance abuse, chronic kidney disease and bilateral foot wounds with osteomyelitis who was admitted for right BKA which was performed July 16, 2024.  Admission labs notable for a glucose of 290, creatinine 2.72, positive drug screen, hemoglobin 7.3, platelets 119, A1c 6.5%.  The patient reports that he takes 20 units of Lantus at night and 10 units of Lantus in the morning.  We are asked to assist in diabetes management    This morning the patient is sitting up in bed eating breakfast but complaining of significant leg pain.    Examination  Blood pressure 142/76  Pulse 70  Afebrile  95% on room air    Recent laboratory data  Glucose 154 fasting,   Creatinine 3.08  Hemoglobin 8.1    Impression  1.  Type 2 diabetes mellitus with a very reasonable A1c on 30 units of basal insulin daily which the patient apparently takes in split doses.  2.  Osteomyelitis status post right BKA  3.  Chronic kidney disease stage IV  4.  History of substance abuse    Plan:  1.  Continue 30 units Lantus daily and mealtime humalog, 4 units/consumed meal.  2. He is very hungry.  Can have double protein/non-starchy vegetables  3.  We will follow with you  4.  Rest per team    Before making these care recommendations, I personally reviewed the hospitalization record, including notes, laboratory & diagnostic data and current medications, and examined the patient at the bedside. Total time 25 minutes,.     Please note that this dictation was completed with [employer], the computer voice recognition software.  Quite often unanticipated grammatical, syntax, homophones, and other interpretive errors are inadvertently transcribed by the computer software.  Please disregard these errors.  Please excuse any errors that have escaped final proofreading.

## 2024-07-22 NOTE — CARE COORDINATION
Transition of Care Plan:     RUR: 33% (high RUR, readmission)  Prior Level of Functioning: Needing assistance with mobility  Disposition: IPR. Referrals sent to [facility] and [facility]. Return home or to sister's home after rehab.  Patient provided with housing insecurity resources and [place] [facility], [facility] contact information listed on AVS.  If SNF or IPR: Date FOC offered: 7/18/24 IPR  Date FOC received: 7/18  Accepting facility: [facility]  Date authorization started with reference number: N/A   Date authorization received and expires: N/A  Follow up appointments: defer to rehab  DME needed: defer to rehab.  Patient has a cane at home.  Transportation at discharge: Transportation will be needed.  IM/IMM Medicare/ letter given: 2nd IM needed prior to discharge.  Is patient a [place] and connected with [facility]? No, per previous CM conversation with [facility].              If yes, was [place] transfer form completed and [facility] notified? N/A  Caregiver Contact: Patient has adult son that he does not wish to list at this time.  Patient did not want to complete ACP documents.   Evelyn Guallpa - sister - [phone]; Anuradha Brown - sister - [phone]  Discharge Caregiver contacted prior to discharge? Patient to contact.  Care Conference needed? No.  Barriers to discharge: DM/vascular/podiatry clearance     Updated Note: 2:33PM CM spoke with Nikolay @[facility] and she stated pt must be off IV pain meds 24 hours prior to admission @ [facility]. MD has been informed. CM will continue to follow.    Initial Note: Chart Reviewed: Pt pending DM/vascular/podiatry clearance for d/c. Pt has been accepted to [facility] for IPR intervention. CM will continue to follow.     MONIQUE Alcaraz,OMARI  [phone]

## 2024-07-22 NOTE — PROGRESS NOTES
End of Shift Note    Bedside shift change report given to BHAKTI King (oncoming nurse) by Marianna Sheppard RN (offgoing nurse).  Report included the following information SBAR    Shift worked:  Night shift     Shift summary and any significant changes:     Pt has been voiding throughout the shift.  Pt has complained of pain X2 and was given the PO PRN, both administrations were effective.      Concerns for physician to address:  See above     Zone phone for oncoming shift:   [phone]       Activity:     Number times ambulated in hallways past shift: 0  Number of times OOB to chair past shift: 1    Cardiac:   Cardiac Monitoring: No           Access:  Current line(s): PIV     Genitourinary:   Urinary status: voiding    Respiratory:      Chronic home O2 use?: NO  Incentive spirometer at bedside: NO       GI:     Current diet:  DIET ONE TIME MESSAGE;  ADULT ORAL NUTRITION SUPPLEMENT; Lunch; Diabetic Oral Supplement  ADULT ORAL NUTRITION SUPPLEMENT; Breakfast, Dinner; Wound Healing Oral Supplement  ADULT DIET; Regular; 4 carb choices (60 gm/meal); Low Potassium (Less than 3000 mg/day); 60 to 80 gm; Double Protein Portions; Can have double protein, double non-starchy vegetables  Passing flatus: YES  Tolerating current diet: YES       Pain Management:   Patient states pain is manageable on current regimen: YES    Skin:     Interventions: float heels    Patient Safety:  Fall Score:    Interventions: assistive device (walker, cane. etc) and pt to call before getting OOB       Length of Stay:  Expected LOS: 9  Actual LOS: 9      Marianna Sheppard RN

## 2024-07-22 NOTE — PROGRESS NOTES
3.26* 2.98*   CALCIUM 8.9 8.4* 7.9*       Recent Labs     07/21/24  0050 07/21/24  0146 07/22/24  0329   WBC 5.8 6.1 5.5   RBC 2.52* 2.65* 2.58*   HGB 6.8* 7.2* 6.8*   HCT 21.4* 22.6* 22.2*   MCV 84.9 85.3 86.0   MCH 27.0 27.2 26.4   MCHC 31.8 31.9 30.6   RDW 16.2* 16.4* 16.1*    181 195   MPV 9.7 9.9 9.9     Lab Results   Component Value Date/Time    IRON 16 (L) 05/30/2024 02:44 AM    TIBC 281 05/30/2024 02:44 AM     No results found for: \"PTH\"  Lab Results   Component Value Date/Time    LABA1C 6.5 (H) 07/13/2024 03:41 PM     Lab Results   Component Value Date/Time    COLORU YELLOW/STRAW 07/14/2024 06:55 AM    CLARITYU CLEAR 01/06/2023 02:18 AM    GLUCOSEU 500 (A) 07/14/2024 06:55 AM    BILIRUBINUR Negative 07/14/2024 06:55 AM    KETUA Negative 07/14/2024 06:55 AM    BLOODU TRACE (A) 07/14/2024 06:55 AM    PHUR 5.0 07/14/2024 06:55 AM    PHUR 7.0 01/06/2023 02:18 AM    PROTEINU 300 (A) 07/14/2024 06:55 AM    NITRU Negative 07/14/2024 06:55 AM    LEUKOCYTESUR MODERATE (A) 07/14/2024 06:55 AM     US Results (most recent):  Medication list  reviewed  Current Facility-Administered Medications   Medication Dose Route Frequency    0.9 % sodium chloride infusion   IntraVENous PRN    0.9 % sodium chloride infusion   IntraVENous Continuous    sodium zirconium cyclosilicate (LOKELMA) oral suspension 15 g  15 g Oral Daily    HYDROmorphone HCl PF (DILAUDID) injection 0.5 mg  0.5 mg IntraVENous Q3H PRN    insulin lispro (HUMALOG,ADMELOG) injection vial 4 Units  0.05 Units/kg SubCUTAneous TID WC    insulin glargine (LANTUS) injection vial 30 Units  30 Units SubCUTAneous Daily    calcitonin (MIACALCIN) nasal spray 1 spray  1 spray Alternating Nares Daily    DULoxetine (CYMBALTA) extended release capsule 20 mg  20 mg Oral Daily    sennosides-docusate sodium (SENOKOT-S) 8.6-50 MG tablet 2 tablet  2 tablet Oral BID    carvedilol (COREG) tablet 1.563 mg  1.563 mg Oral BID    amoxicillin-clavulanate (AUGMENTIN) 500-125 MG per

## 2024-07-22 NOTE — PROGRESS NOTES
Vascular Surgery Progress Note  Quiana Forte ACNP-BC      Date:2024       Room:[address] [place] [address]  Patient Name:Keaton Van     YOB: 1965     Age:59 y.o.    Subjective      Mr. Keaton Van is a 59 y.o. male with a pmhx significant for diabetes mellitus, hypertension, heart failure, chronic renal disease, bipolar disorder, hepatitis C, and polysubstance abuse.  He is a current smoker.    He is admitted to the hospital with acute on chronic osteomyelitis of the right foot secondary to nonhealing diabetic wound.  He is s/p right BKA on .      Family is up to the side of the bed.  He is noncompliant with his limb guard.  His hemoglobin and hematocrit are unstable.  Per the EMR he was missing from his incision.    Objective           Vitals Last 24 Hours:  TEMPERATURE:  Temp  Av.9 °F (36.6 °C)  Min: 97.3 °F (36.3 °C)  Max: 98.2 °F (36.8 °C)  RESPIRATIONS RANGE: Resp  Av  Min: 18  Max: 18  PULSE OXIMETRY RANGE: SpO2  Av.7 %  Min: 90 %  Max: 94 %  PULSE RANGE: Pulse  Av  Min: 72  Max: 81  BLOOD PRESSURE RANGE: Systolic (24hrs), Av , Min:123 , Max:157   ; Diastolic (24hrs), Av, Min:74, Max:85    I/O (24Hr):  No intake or output data in the 24 hours ending 24 1218    Objective:  General Appearance:  Comfortable.    Vital signs: (most recent): Blood pressure (!) 155/77, pulse 81, temperature 98.2 °F (36.8 °C), temperature source Oral, resp. rate 18, height 1.88 m (6' 2.02\"), weight 77.1 kg (170 lb), SpO2 94 %.  Vital signs are normal.  No fever.    Lungs:  Normal effort and normal respiratory rate.    Heart: Normal rate.  Regular rhythm.    Abdomen: Abdomen is soft and non-distended.    Extremities: Normal range of motion.    Neurological: Patient is alert and oriented to person, place and time.    Skin:  (Dressing to right stump is dry and intact.)      Labs    Labs:  CBC:  Recent Labs     24  0050 24  0146 24  0329   WBC 5.8 6.1 5.5   RBC 2.52* 2.65* 2.58*

## 2024-07-22 NOTE — PROGRESS NOTES
Occupational Therapy    Chart reviewed. Noted pt with hgb 6.8 and awaiting blood transfusion. Will defer and follow-up later as able and medically appropriate. Thanks.    Miriam Mendez MS, OTR/L

## 2024-07-22 NOTE — PROGRESS NOTES
Comprehensive Nutrition Assessment    Type and Reason for Visit:  Reassess    Nutrition Recommendations/Plan:   Continue diet as tolerated  RD to adjust ONS to Nepro due to hyperkalemia  Continue Elder BID  Allow double meats and non-starchy veggies  Please document % meals and supplements consumed in flowsheet I/O's under intake      Malnutrition Assessment:  Malnutrition Status:  Moderate malnutrition (07/18/24 1151)    Context:  Acute Illness     Findings of the 6 clinical characteristics of malnutrition:  Energy Intake:  No significant decrease in energy intake  Weight Loss:  Greater than 5% over 1 month (weights used prior to BKA = -13% x 1 month)     Body Fat Loss:  No significant body fat loss     Muscle Mass Loss:  Mild muscle mass loss Temples (temporalis), Clavicles (pectoralis & deltoids)  Fluid Accumulation:  No significant fluid accumulation     Strength:  Not Performed    Nutrition Assessment:  Chart reviewed, pt sitting up in bed awake and alert.  He reports an excellent appetite and that he feels he is always hungry.  Pt is allowed double meat portions and double non-starchy vegetables.  BG have been fairly well controlled () diabetes team continues to follow and adjust medication regimen prn.  K 4.9 today, was 5.2 and he is on scheduled lokelma.  We discussed his glucerna shake is high in K and he is open to trying Nepro instead (higher in kcals and protein and may help keep him more satiated as well).  Pt is agreeable.  Pt reports he is craving chocolate.  This RD explained that chocolate is a naturally high K food, he can have a small piece here and there but should not consume it in excess.  Pt verbalized understanding.  No further questions at this time.   Patient Vitals for the past 120 hrs:   PO Meals Eaten (%)   07/20/24 1037 51 - 75%       Nutrition Related Findings:    Meds: augmentin, iron, lantus, lispro, senokot, lokelma, NS@100mL/h.    BM: 7/21   Wound Type: Surgical Incision

## 2024-07-22 NOTE — PROGRESS NOTES
LABS:    I reviewed today's most current labs and imaging studies.    Pertinent labs include:  Recent Labs     07/21/24  0050 07/21/24  0146 07/22/24  0329   WBC 5.8 6.1 5.5   HGB 6.8* 7.2* 6.8*   HCT 21.4* 22.6* 22.2*    181 195       Recent Labs     07/20/24  0350 07/21/24  0050 07/22/24  0329   * 132* 135*   K 5.2* 5.3* 4.9    103 108   CO2 22 26 22   GLUCOSE 97 185* 140*   BUN 88* 96* 88*   CREATININE 3.01* 3.26* 2.98*   CALCIUM 8.9 8.4* 7.9*       Xray Result (most recent):  US RETROPERITONEAL COMPLETE  Narrative: EXAM: US RETROPERITONEAL COMPLETE     INDICATION: Acute renal failure.    COMPARISON: None.    TECHNIQUE:  Real-time sonography of the kidneys, retroperitoneum and bladder was performed  with multiple static images obtained.    FINDINGS:  RIGHT KIDNEY:  The right kidney has increased echogenicity with no mass, stone or  hydronephrosis. The right kidney measures 11.6 cm in length.    LEFT KIDNEY:   The left kidney has normal echogenicity with  no mass, stone or hydronephrosis.  The left kidney measures 13.1 cm in length.    RETROPERITONEUM:  The abdominal aorta, common iliac artery bifurcation, and IVC are not visualized  due to bowel gas.    BLADDER:  The urinary bladder is normal.    Trace free fluid is noted.  Impression: Echogenic kidneys compatible with medical renal disease. No hydronephrosis.    Electronically signed by CARLOS TORREZ     Microbiology:  Results       Procedure Component Value Units Date/Time    Culture, Urine [[id number]] Collected: 07/14/24 0655    Order Status: Completed Specimen: Urine Updated: 07/15/24 0704     Special Requests --        NO SPECIAL REQUESTS  Reflexed from [id number]       Culture       No significant growth, <10,000 CFU/mL          Blood Culture 1 [[id number]] Collected: 07/13/24 1247    Order Status: Completed Specimen: Blood Updated: 07/19/24 0833     Special Requests --        RIGHT  Antecubital       Culture NO GROWTH 6 DAYS        Blood Culture 2 [[id number]] Collected: 07/13/24 1247    Order Status: Completed Specimen: Blood Updated: 07/19/24 0833     Special Requests --        RIGHT  Antecubital       Culture NO GROWTH 6 DAYS             ECHO:   05/29/24    ECHO (TTE) LIMITED (PRN CONTRAST/BUBBLE/STRAIN/3D) 06/04/2024  3:24 PM (Final)    Interpretation Summary    Left Ventricle: Normal left ventricular systolic function with a visually estimated EF of 60 - 65%. Left ventricle size is normal. Mildly increased wall thickness. Normal wall motion.    Mitral Valve: Mild regurgitation with a posterior directed jet.    Tricuspid Valve: Mildly elevated RVSP, consistent with mild pulmonary hypertension. The estimated RVSP is 47 mmHg.    Left Atrium: Left atrium is dilated.    IVC/SVC: IVC diameter is greater than 21 mm and decreases less than 50% during inspiration; therefore the estimated right atrial pressure is elevated (~15 mmHg).    Image quality is adequate.    Signed by: Anisa Jaquez MD on 6/4/2024  3:24 PM    Procedures: see electronic medical records for all procedures/Xrays and details which were not copied into this note but were reviewed prior to creation of Plan.    Reviewed most current lab test results and cultures  YES  Reviewed most current radiology test results   YES  Review and summation of old records today    NO  Reviewed patient's current orders and MAR    YES  PMH/ reviewed - no change compared to H&P    ________________________________________________________________________      Total NON critical care TIME:  Minutes      Total CRITICAL CARE TIME Spent:   Minutes non procedure based          Comments   >50% of visit spent in counseling and coordination of care x    ________________________________________________________________________        Signed: Koko Villegas MD

## 2024-07-22 NOTE — CONSENT
Informed Consent for Blood Component Transfusion Note    I have discussed with the patient the rationale for blood component transfusion; its benefits in treating or preventing fatigue, organ damage, or death; and its risk which includes mild transfusion reactions, rare risk of blood borne infection, or more serious but rare reactions. I have discussed the alternatives to transfusion, including the risk and consequences of not receiving transfusion. The patient had an opportunity to ask questions and had agreed to proceed with transfusion of blood components.    Electronically signed by Koko Villegas MD on 7/22/24 at 1:40 PM EDT   regular

## 2024-07-22 NOTE — PROGRESS NOTES
Physical Therapy  Chart reviewed. Patient noted to have Hgb of 6.8 and is awaiting blood transfusion.  Will defer therapy at this time and continue to follow.  Thank you,  Anuradha Ignacio, PT

## 2024-07-23 ENCOUNTER — APPOINTMENT (OUTPATIENT)
Facility: HOSPITAL | Age: 59
End: 2024-07-23
Payer: MEDICARE

## 2024-07-23 LAB
ABO + RH BLD: NORMAL
ANION GAP SERPL CALC-SCNC: 5 MMOL/L (ref 5–15)
BLD PROD TYP BPU: NORMAL
BLOOD BANK BLOOD PRODUCT EXPIRATION DATE: NORMAL
BLOOD BANK DISPENSE STATUS: NORMAL
BLOOD BANK ISBT PRODUCT BLOOD TYPE: 5100
BLOOD BANK PRODUCT CODE: NORMAL
BLOOD BANK UNIT TYPE AND RH: NORMAL
BLOOD GROUP ANTIBODIES SERPL: NORMAL
BPU ID: NORMAL
BUN SERPL-MCNC: 99 MG/DL (ref 6–20)
BUN/CREAT SERPL: 29 (ref 12–20)
CALCIUM SERPL-MCNC: 8.7 MG/DL (ref 8.5–10.1)
CHLORIDE SERPL-SCNC: 105 MMOL/L (ref 97–108)
CO2 SERPL-SCNC: 22 MMOL/L (ref 21–32)
CREAT SERPL-MCNC: 3.42 MG/DL (ref 0.7–1.3)
CROSSMATCH RESULT: NORMAL
ERYTHROCYTE [DISTWIDTH] IN BLOOD BY AUTOMATED COUNT: 16 % (ref 11.5–14.5)
GLUCOSE BLD STRIP.AUTO-MCNC: 118 MG/DL (ref 65–117)
GLUCOSE BLD STRIP.AUTO-MCNC: 209 MG/DL (ref 65–117)
GLUCOSE BLD STRIP.AUTO-MCNC: 229 MG/DL (ref 65–117)
GLUCOSE BLD STRIP.AUTO-MCNC: 44 MG/DL (ref 65–117)
GLUCOSE BLD STRIP.AUTO-MCNC: 45 MG/DL (ref 65–117)
GLUCOSE BLD STRIP.AUTO-MCNC: 45 MG/DL (ref 65–117)
GLUCOSE BLD STRIP.AUTO-MCNC: 47 MG/DL (ref 65–117)
GLUCOSE SERPL-MCNC: 207 MG/DL (ref 65–100)
HCT VFR BLD AUTO: 24.2 % (ref 36.6–50.3)
HGB BLD-MCNC: 7.5 G/DL (ref 12.1–17)
MCH RBC QN AUTO: 26.9 PG (ref 26–34)
MCHC RBC AUTO-ENTMCNC: 31 G/DL (ref 30–36.5)
MCV RBC AUTO: 86.7 FL (ref 80–99)
NRBC # BLD: 0 K/UL (ref 0–0.01)
NRBC BLD-RTO: 0 PER 100 WBC
PLATELET # BLD AUTO: 165 K/UL (ref 150–400)
PMV BLD AUTO: 9.1 FL (ref 8.9–12.9)
POTASSIUM SERPL-SCNC: 6.3 MMOL/L (ref 3.5–5.1)
RBC # BLD AUTO: 2.79 M/UL (ref 4.1–5.7)
SERVICE CMNT-IMP: ABNORMAL
SODIUM SERPL-SCNC: 132 MMOL/L (ref 136–145)
SPECIMEN EXP DATE BLD: NORMAL
UNIT DIVISION: 0
UNIT ISSUE DATE/TIME: NORMAL
WBC # BLD AUTO: 6.4 K/UL (ref 4.1–11.1)

## 2024-07-23 PROCEDURE — 6370000000 HC RX 637 (ALT 250 FOR IP): Performed by: SURGERY

## 2024-07-23 PROCEDURE — 99231 SBSQ HOSP IP/OBS SF/LOW 25: CPT | Performed by: INTERNAL MEDICINE

## 2024-07-23 PROCEDURE — 6370000000 HC RX 637 (ALT 250 FOR IP): Performed by: NURSE PRACTITIONER

## 2024-07-23 PROCEDURE — 85027 COMPLETE CBC AUTOMATED: CPT

## 2024-07-23 PROCEDURE — 6370000000 HC RX 637 (ALT 250 FOR IP): Performed by: INTERNAL MEDICINE

## 2024-07-23 PROCEDURE — 83540 ASSAY OF IRON: CPT

## 2024-07-23 PROCEDURE — 82962 GLUCOSE BLOOD TEST: CPT

## 2024-07-23 PROCEDURE — 2500000003 HC RX 250 WO HCPCS: Performed by: STUDENT IN AN ORGANIZED HEALTH CARE EDUCATION/TRAINING PROGRAM

## 2024-07-23 PROCEDURE — 36556 INSERT NON-TUNNEL CV CATH: CPT

## 2024-07-23 PROCEDURE — 83550 IRON BINDING TEST: CPT

## 2024-07-23 PROCEDURE — 6360000002 HC RX W HCPCS: Performed by: INTERNAL MEDICINE

## 2024-07-23 PROCEDURE — 2580000003 HC RX 258: Performed by: GENERAL ACUTE CARE HOSPITAL

## 2024-07-23 PROCEDURE — 90935 HEMODIALYSIS ONE EVALUATION: CPT

## 2024-07-23 PROCEDURE — 6360000002 HC RX W HCPCS

## 2024-07-23 PROCEDURE — 6360000002 HC RX W HCPCS: Performed by: STUDENT IN AN ORGANIZED HEALTH CARE EDUCATION/TRAINING PROGRAM

## 2024-07-23 PROCEDURE — 87340 HEPATITIS B SURFACE AG IA: CPT

## 2024-07-23 PROCEDURE — 6370000000 HC RX 637 (ALT 250 FOR IP)

## 2024-07-23 PROCEDURE — 80048 BASIC METABOLIC PNL TOTAL CA: CPT

## 2024-07-23 PROCEDURE — 5A1D70Z PERFORMANCE OF URINARY FILTRATION, INTERMITTENT, LESS THAN 6 HOURS PER DAY: ICD-10-PCS | Performed by: STUDENT IN AN ORGANIZED HEALTH CARE EDUCATION/TRAINING PROGRAM

## 2024-07-23 PROCEDURE — 36415 COLL VENOUS BLD VENIPUNCTURE: CPT

## 2024-07-23 PROCEDURE — 6370000000 HC RX 637 (ALT 250 FOR IP): Performed by: CLINICAL NURSE SPECIALIST

## 2024-07-23 PROCEDURE — 82728 ASSAY OF FERRITIN: CPT

## 2024-07-23 PROCEDURE — 1100000000 HC RM PRIVATE

## 2024-07-23 PROCEDURE — 2500000003 HC RX 250 WO HCPCS: Performed by: GENERAL ACUTE CARE HOSPITAL

## 2024-07-23 PROCEDURE — 6370000000 HC RX 637 (ALT 250 FOR IP): Performed by: STUDENT IN AN ORGANIZED HEALTH CARE EDUCATION/TRAINING PROGRAM

## 2024-07-23 PROCEDURE — 86706 HEP B SURFACE ANTIBODY: CPT

## 2024-07-23 PROCEDURE — 76937 US GUIDE VASCULAR ACCESS: CPT

## 2024-07-23 PROCEDURE — 30233N1 TRANSFUSION OF NONAUTOLOGOUS RED BLOOD CELLS INTO PERIPHERAL VEIN, PERCUTANEOUS APPROACH: ICD-10-PCS | Performed by: STUDENT IN AN ORGANIZED HEALTH CARE EDUCATION/TRAINING PROGRAM

## 2024-07-23 PROCEDURE — 6370000000 HC RX 637 (ALT 250 FOR IP): Performed by: GENERAL ACUTE CARE HOSPITAL

## 2024-07-23 RX ORDER — HEPARIN 100 UNIT/ML
300 SYRINGE INTRAVENOUS ONCE
Status: COMPLETED | OUTPATIENT
Start: 2024-07-23 | End: 2024-07-23

## 2024-07-23 RX ORDER — INSULIN LISPRO 100 [IU]/ML
6 INJECTION, SOLUTION INTRAVENOUS; SUBCUTANEOUS
Status: DISCONTINUED | OUTPATIENT
Start: 2024-07-23 | End: 2024-07-25

## 2024-07-23 RX ORDER — PANTOPRAZOLE SODIUM 40 MG/1
40 TABLET, DELAYED RELEASE ORAL
Status: DISCONTINUED | OUTPATIENT
Start: 2024-07-24 | End: 2024-07-26 | Stop reason: HOSPADM

## 2024-07-23 RX ORDER — LIDOCAINE HYDROCHLORIDE 20 MG/ML
20 INJECTION, SOLUTION INFILTRATION; PERINEURAL ONCE
Status: COMPLETED | OUTPATIENT
Start: 2024-07-23 | End: 2024-07-23

## 2024-07-23 RX ORDER — GLUCAGON 1 MG/ML
1 KIT INJECTION PRN
Status: DISCONTINUED | OUTPATIENT
Start: 2024-07-23 | End: 2024-07-26 | Stop reason: HOSPADM

## 2024-07-23 RX ORDER — DEXTROSE MONOHYDRATE 100 MG/ML
INJECTION, SOLUTION INTRAVENOUS CONTINUOUS PRN
Status: DISCONTINUED | OUTPATIENT
Start: 2024-07-23 | End: 2024-07-26 | Stop reason: HOSPADM

## 2024-07-23 RX ORDER — CALCIUM GLUCONATE 20 MG/ML
1000 INJECTION, SOLUTION INTRAVENOUS ONCE
Status: COMPLETED | OUTPATIENT
Start: 2024-07-23 | End: 2024-07-23

## 2024-07-23 RX ORDER — HEPARIN SODIUM 200 [USP'U]/100ML
200 INJECTION, SOLUTION INTRAVENOUS ONCE
Status: DISCONTINUED | OUTPATIENT
Start: 2024-07-23 | End: 2024-07-26 | Stop reason: HOSPADM

## 2024-07-23 RX ADMIN — INSULIN GLARGINE 30 UNITS: 100 INJECTION, SOLUTION SUBCUTANEOUS at 11:07

## 2024-07-23 RX ADMIN — GABAPENTIN 300 MG: 300 CAPSULE ORAL at 10:32

## 2024-07-23 RX ADMIN — ASPIRIN 81 MG: 81 TABLET, COATED ORAL at 10:35

## 2024-07-23 RX ADMIN — GABAPENTIN 300 MG: 300 CAPSULE ORAL at 21:21

## 2024-07-23 RX ADMIN — DULOXETINE HYDROCHLORIDE 20 MG: 20 CAPSULE, DELAYED RELEASE ORAL at 10:33

## 2024-07-23 RX ADMIN — ACETAMINOPHEN 1000 MG: 500 TABLET ORAL at 18:36

## 2024-07-23 RX ADMIN — CALCIUM GLUCONATE 1000 MG: 20 INJECTION, SOLUTION INTRAVENOUS at 11:15

## 2024-07-23 RX ADMIN — Medication 260 UNITS: at 14:21

## 2024-07-23 RX ADMIN — ACETAMINOPHEN 1000 MG: 500 TABLET ORAL at 03:05

## 2024-07-23 RX ADMIN — EPOETIN ALFA-EPBX 10000 UNITS: 10000 INJECTION, SOLUTION INTRAVENOUS; SUBCUTANEOUS at 18:36

## 2024-07-23 RX ADMIN — HEPARIN SODIUM 50 UNITS: 200 INJECTION, SOLUTION INTRAVENOUS at 14:22

## 2024-07-23 RX ADMIN — LIDOCAINE HYDROCHLORIDE 5 ML: 20 INJECTION, SOLUTION INFILTRATION; PERINEURAL at 14:21

## 2024-07-23 RX ADMIN — DEXTROSE MONOHYDRATE 250 ML: 100 INJECTION, SOLUTION INTRAVENOUS at 11:33

## 2024-07-23 RX ADMIN — ISOSORBIDE MONONITRATE 30 MG: 30 TABLET, EXTENDED RELEASE ORAL at 10:33

## 2024-07-23 RX ADMIN — HYDRALAZINE HYDROCHLORIDE 50 MG: 50 TABLET ORAL at 05:17

## 2024-07-23 RX ADMIN — AMLODIPINE BESYLATE 10 MG: 5 TABLET ORAL at 10:34

## 2024-07-23 RX ADMIN — HYDROMORPHONE HYDROCHLORIDE 0.5 MG: 1 INJECTION, SOLUTION INTRAMUSCULAR; INTRAVENOUS; SUBCUTANEOUS at 18:57

## 2024-07-23 RX ADMIN — INSULIN HUMAN 10 UNITS: 100 INJECTION, SOLUTION PARENTERAL at 11:10

## 2024-07-23 RX ADMIN — SODIUM BICARBONATE 50 MEQ: 84 INJECTION, SOLUTION INTRAVENOUS at 11:10

## 2024-07-23 RX ADMIN — LURASIDONE HYDROCHLORIDE 20 MG: 20 TABLET, FILM COATED ORAL at 21:21

## 2024-07-23 RX ADMIN — SENNOSIDES AND DOCUSATE SODIUM 2 TABLET: 50; 8.6 TABLET ORAL at 10:30

## 2024-07-23 RX ADMIN — SENNOSIDES AND DOCUSATE SODIUM 2 TABLET: 50; 8.6 TABLET ORAL at 21:28

## 2024-07-23 RX ADMIN — CALCITONIN SALMON 1 SPRAY: 200 SPRAY, METERED NASAL at 10:40

## 2024-07-23 RX ADMIN — Medication 16 G: at 18:42

## 2024-07-23 RX ADMIN — AMOXICILLIN AND CLAVULANATE POTASSIUM 1 TABLET: 500; 125 TABLET, FILM COATED ORAL at 21:21

## 2024-07-23 RX ADMIN — AMOXICILLIN AND CLAVULANATE POTASSIUM 1 TABLET: 500; 125 TABLET, FILM COATED ORAL at 10:37

## 2024-07-23 RX ADMIN — ACETAMINOPHEN 1000 MG: 500 TABLET ORAL at 10:35

## 2024-07-23 RX ADMIN — OXYCODONE 10 MG: 5 TABLET ORAL at 03:05

## 2024-07-23 RX ADMIN — ONDANSETRON 4 MG: 2 INJECTION INTRAMUSCULAR; INTRAVENOUS at 03:29

## 2024-07-23 RX ADMIN — CARVEDILOL 6.25 MG: 6.25 TABLET, FILM COATED ORAL at 10:34

## 2024-07-23 RX ADMIN — FERROUS SULFATE TAB 325 MG (65 MG ELEMENTAL FE) 325 MG: 325 (65 FE) TAB at 10:39

## 2024-07-23 RX ADMIN — SODIUM ZIRCONIUM CYCLOSILICATE 15 G: 10 POWDER, FOR SUSPENSION ORAL at 10:40

## 2024-07-23 ASSESSMENT — PAIN DESCRIPTION - LOCATION
LOCATION: LEG
LOCATION: LEG

## 2024-07-23 ASSESSMENT — PAIN SCALES - GENERAL
PAINLEVEL_OUTOF10: 9
PAINLEVEL_OUTOF10: 9

## 2024-07-23 ASSESSMENT — PAIN DESCRIPTION - ORIENTATION: ORIENTATION: RIGHT

## 2024-07-23 ASSESSMENT — PAIN DESCRIPTION - DESCRIPTORS
DESCRIPTORS: ACHING
DESCRIPTORS: ACHING

## 2024-07-23 NOTE — PROGRESS NOTES
Occupational Therapy    Chart reviewed. Pt cleared by RN for therapy. Pt received drowsy in bed, able to open eyes and briefly follow commands, however, quickly drifting back to sleep and lethargic. Pt requesting therapist to return later due to \"feeling like it's a circus in here\" and too drowsy to participate at this time. RN at bedside and aware. Will follow-up later as able and medically appropriate. Thanks.    Miriam Mendez MS, OTR/L

## 2024-07-23 NOTE — PROGRESS NOTES
This is a 59-year-old gentleman well-known to the program for diabetes health with multiple comorbidities including uncontrolled type 2 diabetes mellitus, coronary artery disease, polysubstance abuse, chronic kidney disease and bilateral foot wounds with osteomyelitis who was admitted for right BKA which was performed July 16, 2024. Admission labs notable for a glucose of 290, creatinine 2.72, positive drug screen, hemoglobin 7.3, platelets 119, A1c 6.5%. The patient reports that he takes 20 units of Lantus at night and 10 units of Lantus in the morning.  We have sent switched this to 30 units once daily.      This morning the patient is asleep.  Blood pressure 135/69  Pulse 60  Tmax 99 3  93% on room air  The right stump was undressed and appears to be healing well    Recent laboratory data    Glucose 209 (range 140-209)  Creatinine 3.42  Bicarb 22  Hemoglobin 7.5  White count 6.4    Impression  1.  Type 2 diabetes mellitus with a very reasonable A1c on 30 units of basal insulin daily   2.  Osteomyelitis status post right BKA  3.  Chronic kidney disease stage IV  4.  History of substance abuse     Plan:  1.  Continue 30 units Lantus daily.  We have increased the mealtime insulin to 6 units given the increasing portions.   2.  We will follow with you  3.  Rest per team    Before making these care recommendations, I personally reviewed the hospitalization record, including notes, laboratory & diagnostic data and current medications, and examined the patient at the bedside. Total time  25   minutes,.     Please note that this dictation was completed with [employer], the computer voice recognition software.  Quite often unanticipated grammatical, syntax, homophones, and other interpretive errors are inadvertently transcribed by the computer software.  Please disregard these errors.  Please excuse any errors that have escaped final proofreading.

## 2024-07-23 NOTE — PROGRESS NOTES
Nephrology Progress Note  [facility] [place] [facility]  [address], [address]  [place] [address]  Phone - [phone]  Fax - [phone]                 Patient: Keaton Van                     YOB: 1965        Date- 7/23/2024                                     Admit Date: 7/13/2024   CC: Follow up for KIN        IMPRESSION & PLAN:   KIN- ATN  CKD 4  hyperkalemia  HYPONATREMIA  S/p R BKA - 7/15/2024  CHF  HTN  DM  Anemia       PLAN-  Iv calcium  Iv insulin with d 50  Place [place] cath  Start hd today-- CONSENT FOR HD AND HD CATH  obtained from sister via phone  Continue norvasc, coreg, hydralazine  Avoid acei or arb       Subjective:  Interval History:   K high  He is confused  Cr worse    Objective:   Vitals:    07/22/24 1940 07/22/24 2000 07/23/24 0308 07/23/24 0736   BP: 126/62  132/64 135/69   Pulse: 74  86 60   Resp: 20  20 18   Temp: 98.2 °F (36.8 °C)  98.4 °F (36.9 °C) 99.3 °F (37.4 °C)   TempSrc:       SpO2: (!) 82% 94% 94% 93%   Weight:       Height:          I/O last 3 completed shifts:  In: -   Out: 800 [Urine:800]  No intake/output data recorded.      Physical exam:    GEN: NAD  NECK- no mass  RESP: no wheezing  NEURO: Can't eval due to patient's current condition   PSYCH: Can't eval due to patient's current condition       Chart reviewed.         Pertinent Notes reviewed.     Data Review :  Lab Results   Component Value Date/Time     07/23/2024 05:20 AM    K 6.3 07/23/2024 05:20 AM     07/23/2024 05:20 AM    CO2 22 07/23/2024 05:20 AM    BUN 99 07/23/2024 05:20 AM    CREATININE 3.42 07/23/2024 05:20 AM    GLUCOSE 207 07/23/2024 05:20 AM    CALCIUM 8.7 07/23/2024 05:20 AM       Lab Results   Component Value Date    WBC 6.4 07/23/2024    HGB 7.5 (L) 07/23/2024    HCT 24.2 (L) 07/23/2024    MCV 86.7 07/23/2024     07/23/2024      Recent Labs     07/21/24  0050 07/22/24  0329 07/23/24  0520   *

## 2024-07-23 NOTE — FLOWSHEET NOTE
Primary RN SBAR: Trisha Amador RN  Patient Education provided: consent; procedure  Incapacitated Nurse edu. provided: yes  Preferred Education method and Primary language: verbal; English  Hospital associated wait time; reason: 15min from IR  Hep B out of date; labs drawn and sent to [facility]  Pre-HD   07/23/24 1443   Observations & Evaluations   Level of Consciousness 1   Oriented X 1   Heart Rhythm Regular   Respiratory Quality/Effort Unlabored   O2 Device Nasal cannula  (5L)   Bilateral Breath Sounds Clear   Skin Condition/Temp Warm;Dry   RLE Edema +1   LLE Edema +1   Vital Signs   /67   Temp 98.6 °F (37 °C)   Pulse 65   Respirations 20   SpO2 95 %   Pain Assessment   Pain Assessment None - Denies Pain   Technical Checks   Dialysis Machine No. 10   RO Machine Number [id number]   Dialyzer Lot No. [id number]   Tubing Lot Number [id number]   All Connections Secure Yes   NS Bag Yes   Saline Line Double Clamped Yes   Dialyzer Revaclear 300   Prime Volume (mL) 200 mL   ICEBOAT I;C;E;B;O;A;T   RO Machine Log Sheet Completed Yes   Machine Alarm Self Test Completed;Passed   Air Foam Detector Tested;Proper Function   Extracorporeal Circuit Tested for Integrity Yes   Machine Conductivity 13.7   Machine Ph 7.4   Bleach Test (Neg) Yes   Bath Temperature 96.8 °F (36 °C)   Dialysis Bath   K+ (Potassium) 1  (1K for 1hr; then 2K for 1hr)   Ca+ (Calcium) 2.5   Na+ (Sodium) 138   HCO3 (Bicarb) 40     Start of HD     07/23/24 1451   Treatment   Time On 1451   Vital Signs   /67   Pulse 66   Treatment Initiation   Dialyze Hours 2   Treatment  Initiation Universal Precautions maintained;Lines secured to patient;Connections secured;Prime given;Venous Parameters set;Arterial Parameters set;Saline line double clamped;Air foam detector engaged   During Hemodialysis Assessment   Blood Flow Rate (ml/min) 400 ml/min   Arterial Pressure (mmHg) -120 mmHg   Venous Pressure (mmHg) 130   TMP 50      Access Visible Yes   Ultrafiltration Rate  Connections checked and tightened;Ports disinfected   Dressing Type Bacteriocidal;Transparent  (surgifoam)   Date of Last Dressing Change 07/23/24   Dressing Status New dressing applied;Clean, dry & intact   Dressing Intervention New;Dressing changed   Dressing Change Due 07/26/24   Post-Hemodialysis Assessment   Post-Treatment Procedures Blood returned;Catheter Capped, clamped with Saline x2 ports   Machine Disinfection Process Exterior Machine Disinfection;Acid/Vinegar Clean;Bleach   Rinseback Volume (ml) 300 ml   Blood Volume Processed (Liters) 39.9 L   Dialyzer Clearance Clear   Duration of Treatment (minutes) 120 minutes   Hemodialysis Intake (ml) 700 ml   Hemodialysis Output (ml) 224 ml   NET Removed (ml) -476   Tolerated Treatment Fair   Physician Notified Yes   Patient Disposition Return to room     Primary RN SBAR: Trisha Amador RN  Comments: no issues with HD

## 2024-07-23 NOTE — PROGRESS NOTES
Attempted to call both of pt.'s sisters to obtain new consent-no answer-left message for them to return call as soon as they could in reference to their brother and a procedure.

## 2024-07-23 NOTE — PROGRESS NOTES
Name of procedure: Imaging guided non-tunneled catheter placement    Vital Signs:  see flowsheet    Any complications related to procedure:  none noted    Post Procedure Care Needed/order sets placed in [facility] care.     Patient is at increased fall risk due to medication given.

## 2024-07-23 NOTE — PROGRESS NOTES
Pt transferred to xray recovery via stretcher accomp. By transport.  Pt appears drowsy - awakens to repeat name calling.  Pt oriented to person, year only.  Pt without c/o pain at this time per pt.

## 2024-07-23 NOTE — PROGRESS NOTES
Perfectserneal Vargas r/t emergent consent for pt.'s order for a stacy catheter placement - no response.    Called pt.'s inpt. Nurse, BHAKTI Rico., to inquire about consent and informed her we needed a new consent r/t the consent sent to unit was incorrectly filled out.  Trisha stating she will fill out another one after instructions given and tube to station 14 as soon as she can.
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
## 2024-07-23 NOTE — CARE COORDINATION
Transition of Care Plan:     RUR: 33% (high RUR, readmission)  Prior Level of Functioning: Needing assistance with mobility  Disposition: IPR. Referrals sent to [facility] and [facility]. Return home or to sister's home after rehab.  Patient provided with housing insecurity resources and [place] [facility], [place] [facility] contact information listed on AVS.  If SNF or IPR: Date FOC offered: 7/18/24 IPR  Date FOC received: 7/18  Accepting facility: [facility]  Date authorization started with reference number: N/A   Date authorization received and expires: N/A  Follow up appointments: defer to rehab  DME needed: defer to rehab.  Patient has a cane at home.  Transportation at discharge: Transportation will be needed.  IM/IMM Medicare/ letter given: 2nd IM needed prior to discharge.  Is patient a [place] and connected with [facility]? No, per previous CM conversation with [facility].              If yes, was [place] transfer form completed and [facility] notified? N/A  Caregiver Contact: Patient has adult son that he does not wish to list at this time.  Patient did not want to complete ACP documents.   Evelyn Daniellakaye - sister - [phone]; Anuradha Brown - sister - [phone]  Discharge Caregiver contacted prior to discharge? Patient to contact.  Care Conference needed? No.  Barriers to discharge: Medical Clearance    Initial Note: Chart Reviewed: Pt pending medical clearance for d/c. Pt will d/c to [facility] once medically stable. Pt 2 IM Given 7/22/24. Pt will need transport. CM will continue to follow.     MONIQUE Alcaraz,  [phone]

## 2024-07-23 NOTE — PROGRESS NOTES
Vascular Surgery Progress Note  Quiana Forte ACNP-BC      Date:2024       Room:[address] [place] [address]  Patient Name:Keaton Van     YOB: 1965     Age:59 y.o.    Subjective      Mr. Keaton Van is a 59 y.o. male with a pmhx significant for diabetes mellitus, hypertension, heart failure, chronic renal disease, bipolar disorder, hepatitis C, and polysubstance abuse.  He is a current smoker.    He is admitted to the hospital with acute on chronic osteomyelitis of the right foot secondary to nonhealing diabetic wound.  He is s/p right BKA on .      Patient examined this am by Dr. Mcmahan.  No evidence of active bleeding.      Objective           Vitals Last 24 Hours:  TEMPERATURE:  Temp  Av.3 °F (36.8 °C)  Min: 97.5 °F (36.4 °C)  Max: 99.3 °F (37.4 °C)  RESPIRATIONS RANGE: Resp  Av.3  Min: 15  Max: 20  PULSE OXIMETRY RANGE: SpO2  Av.5 %  Min: 82 %  Max: 94 %  PULSE RANGE: Pulse  Av.8  Min: 60  Max: 86  BLOOD PRESSURE RANGE: Systolic (24hrs), Av , Min:126 , Max:147   ; Diastolic (24hrs), Av, Min:62, Max:83    I/O (24Hr):    Intake/Output Summary (Last 24 hours) at 2024 0843  Last data filed at 2024 1523  Gross per 24 hour   Intake --   Output 800 ml   Net -800 ml       Objective:  General Appearance:  Comfortable.    Vital signs: (most recent): Blood pressure 135/69, pulse 60, temperature 99.3 °F (37.4 °C), resp. rate 18, height 1.88 m (6' 2.02\"), weight 77.1 kg (170 lb), SpO2 93 %.  Vital signs are normal.  No fever.    Lungs:  Normal effort and normal respiratory rate.    Heart: Normal rate.  Regular rhythm.    Abdomen: Abdomen is soft and non-distended.    Extremities: Normal range of motion.    Neurological: Patient is alert and oriented to person, place and time.    Skin:  (Dressing to right stump is dry and intact.)      Labs    Labs:  CBC:  Recent Labs     24  0146 24  0329 24  0520   WBC 6.1 5.5 6.4   RBC 2.65* 2.58* 2.79*   HGB 7.2* 6.8* 7.5*   HCT  22.6* 22.2* 24.2*   MCV 85.3 86.0 86.7   RDW 16.4* 16.1* 16.0*    195 165       CHEMISTRIES:  Recent Labs     07/21/24  0050 07/22/24  0329 07/23/24  0520   * 135* 132*   K 5.3* 4.9 6.3*    108 105   CO2 26 22 22   BUN 96* 88* 99*   CREATININE 3.26* 2.98* 3.42*   GLUCOSE 185* 140* 207*         Assessment//Plan           Acute on chronic osteomyelitis of the right foot secondary to a nonhealing diabetic wound  -s/p right BKA 7/16   -Appreciate palliative care input.  No evidence of active bleeding from stump.  This is not the source of patient's labile H/H.  Continue daily wound care.  Limb guard at all times.   Continue to encourage OOB and I-S.   Continue oral antibxs to complete a 10 day course.  Vascular surgery will continue to see patient intermittently while admitted.  Please call with any urgent vascular issues.      Anemia of acute blood loss  Anemia of chronic disease   -Ferrous sulfate  -Transfusions per primary team.  Patient's surgery is not the source of labile H/H.  Investigation of alternative sources per primary team.     Progressive acute kidney injury  Hyperkalemia   Hyponatremia   Chronic renal disease stage IV  -Baseline creatinine 2.69  -Bumex held  Recommend Nephrology consult    Diabetes mellitus with hyperglycemia   -Ha1c 6.5   -uncontrolled    Hypertension  -Stable on Norvasc, Coreg, and Imdur  Heart failure w/ preserved EF  -Bumex held for KIN    Hx of hepatitis C     Bipolar disorder  -Latuda  Polysubstance abuse  Tobacco abuse    Management of comorbid conditions per primary team.    VTE prophylaxis: Count includes the [facility]-helld for unstable anemia. Resume at the discretion of the primary team. Encourage out of bed.    Disposition: IPR.  Case management following.     Electronically signed by Quiana BO-BC

## 2024-07-23 NOTE — PROGRESS NOTES
Pt.'s sister, Evelyn Guallpa, returned call from earlier - explained to her procedure was completed and he is currently in dialysis.  Evelyn apologetic \"I am so sorry this has taken me so long to call you back but I was out of the house\".  Reassured Evelyn procedure was completed with Trisha's assistance on the consent.

## 2024-07-23 NOTE — PROGRESS NOTES
End of Shift Note    Bedside shift change report given to Trisha (oncoming nurse) by April Fierro RN (offgoing nurse).  Report included the following information SBAR, Kardex, MAR, Recent Results, and Med Rec Status    Shift worked:  7p-7a     Shift summary and any significant changes:     Uneventful, zofran x1, hgb 7.5     Concerns for physician to address:  None      Zone phone for oncoming shift:   [phone]         Length of Stay:  Expected LOS: 10  Actual LOS: 10      April Fierro RN

## 2024-07-23 NOTE — PROGRESS NOTES
Physical Therapy  Chart reviewed and patient discussed in IDRs. Patient noted to have elevated potassium and worsening creatinine. Drowsy with increased confusion noted today. Per nephrology, patient to initiate dialysis today.   Will defer therapy at this time and continue to follow as appropriate.  Thank you,  Anuradha Ignacio, PT

## 2024-07-23 NOTE — PROGRESS NOTES
End of Shift Note    Bedside shift change report given to BHAKTI Olivarez (oncoming nurse) by Christine Merlos LPN (offgoing nurse).  Report included the following information SBAR    Shift worked:  7a-7p     Shift summary and any significant changes:     Pt up to side of bed for meals, tolerating diet well. Pt had some complaints of pain, prn pain med given with positive effect. 1 unit of blood administered, tolerated well. Fluids started. Uneventful day.      Concerns for physician to address:  none     Zone phone for oncoming shift:   [phone]         Christine Merlos LPN

## 2024-07-24 LAB
ALBUMIN SERPL-MCNC: 2.2 G/DL (ref 3.5–5)
ANION GAP SERPL CALC-SCNC: 1 MMOL/L (ref 5–15)
BUN SERPL-MCNC: 76 MG/DL (ref 6–20)
BUN/CREAT SERPL: 25 (ref 12–20)
CALCIUM SERPL-MCNC: 8.8 MG/DL (ref 8.5–10.1)
CHLORIDE SERPL-SCNC: 102 MMOL/L (ref 97–108)
CO2 SERPL-SCNC: 30 MMOL/L (ref 21–32)
CREAT SERPL-MCNC: 3 MG/DL (ref 0.7–1.3)
ERYTHROCYTE [DISTWIDTH] IN BLOOD BY AUTOMATED COUNT: 16.4 % (ref 11.5–14.5)
FERRITIN SERPL-MCNC: 67 NG/ML (ref 26–388)
GLUCOSE BLD STRIP.AUTO-MCNC: 159 MG/DL (ref 65–117)
GLUCOSE BLD STRIP.AUTO-MCNC: 186 MG/DL (ref 65–117)
GLUCOSE BLD STRIP.AUTO-MCNC: 189 MG/DL (ref 65–117)
GLUCOSE BLD STRIP.AUTO-MCNC: 292 MG/DL (ref 65–117)
GLUCOSE SERPL-MCNC: 208 MG/DL (ref 65–100)
HBV SURFACE AB SER QL: NONREACTIVE
HBV SURFACE AB SER-ACNC: 7.84 MIU/ML
HBV SURFACE AG SER QL: <0.1 INDEX
HBV SURFACE AG SER QL: NEGATIVE
HCT VFR BLD AUTO: 23.4 % (ref 36.6–50.3)
HCV AB SER IA-ACNC: >11 INDEX
HCV AB SERPL QL IA: REACTIVE
HGB BLD-MCNC: 7.2 G/DL (ref 12.1–17)
IRON SATN MFR SERPL: 21 % (ref 20–50)
IRON SERPL-MCNC: 50 UG/DL (ref 35–150)
MCH RBC QN AUTO: 27 PG (ref 26–34)
MCHC RBC AUTO-ENTMCNC: 30.8 G/DL (ref 30–36.5)
MCV RBC AUTO: 87.6 FL (ref 80–99)
NRBC # BLD: 0 K/UL (ref 0–0.01)
NRBC BLD-RTO: 0 PER 100 WBC
PHOSPHATE SERPL-MCNC: 4.6 MG/DL (ref 2.6–4.7)
PLATELET # BLD AUTO: 195 K/UL (ref 150–400)
PMV BLD AUTO: 9.4 FL (ref 8.9–12.9)
POTASSIUM SERPL-SCNC: 4.7 MMOL/L (ref 3.5–5.1)
RBC # BLD AUTO: 2.67 M/UL (ref 4.1–5.7)
SERVICE CMNT-IMP: ABNORMAL
SODIUM SERPL-SCNC: 133 MMOL/L (ref 136–145)
TIBC SERPL-MCNC: 234 UG/DL (ref 250–450)
WBC # BLD AUTO: 4.7 K/UL (ref 4.1–11.1)

## 2024-07-24 PROCEDURE — 6370000000 HC RX 637 (ALT 250 FOR IP): Performed by: INTERNAL MEDICINE

## 2024-07-24 PROCEDURE — 2500000003 HC RX 250 WO HCPCS: Performed by: GENERAL ACUTE CARE HOSPITAL

## 2024-07-24 PROCEDURE — 82962 GLUCOSE BLOOD TEST: CPT

## 2024-07-24 PROCEDURE — 6370000000 HC RX 637 (ALT 250 FOR IP): Performed by: STUDENT IN AN ORGANIZED HEALTH CARE EDUCATION/TRAINING PROGRAM

## 2024-07-24 PROCEDURE — 6370000000 HC RX 637 (ALT 250 FOR IP): Performed by: NURSE PRACTITIONER

## 2024-07-24 PROCEDURE — 86704 HEP B CORE ANTIBODY TOTAL: CPT

## 2024-07-24 PROCEDURE — 6370000000 HC RX 637 (ALT 250 FOR IP): Performed by: SURGERY

## 2024-07-24 PROCEDURE — 99231 SBSQ HOSP IP/OBS SF/LOW 25: CPT | Performed by: INTERNAL MEDICINE

## 2024-07-24 PROCEDURE — 94760 N-INVAS EAR/PLS OXIMETRY 1: CPT

## 2024-07-24 PROCEDURE — 90935 HEMODIALYSIS ONE EVALUATION: CPT

## 2024-07-24 PROCEDURE — 1100000000 HC RM PRIVATE

## 2024-07-24 PROCEDURE — 36415 COLL VENOUS BLD VENIPUNCTURE: CPT

## 2024-07-24 PROCEDURE — 80069 RENAL FUNCTION PANEL: CPT

## 2024-07-24 PROCEDURE — 86803 HEPATITIS C AB TEST: CPT

## 2024-07-24 PROCEDURE — 6370000000 HC RX 637 (ALT 250 FOR IP): Performed by: GENERAL ACUTE CARE HOSPITAL

## 2024-07-24 PROCEDURE — 85027 COMPLETE CBC AUTOMATED: CPT

## 2024-07-24 PROCEDURE — 2700000000 HC OXYGEN THERAPY PER DAY

## 2024-07-24 RX ORDER — INSULIN GLARGINE 100 [IU]/ML
20 INJECTION, SOLUTION SUBCUTANEOUS DAILY
Status: DISCONTINUED | OUTPATIENT
Start: 2024-07-24 | End: 2024-07-25

## 2024-07-24 RX ADMIN — SODIUM ZIRCONIUM CYCLOSILICATE 15 G: 10 POWDER, FOR SUSPENSION ORAL at 13:20

## 2024-07-24 RX ADMIN — HYDROMORPHONE HYDROCHLORIDE 0.5 MG: 1 INJECTION, SOLUTION INTRAMUSCULAR; INTRAVENOUS; SUBCUTANEOUS at 13:16

## 2024-07-24 RX ADMIN — ASPIRIN 81 MG: 81 TABLET, COATED ORAL at 13:20

## 2024-07-24 RX ADMIN — HYDRALAZINE HYDROCHLORIDE 50 MG: 50 TABLET ORAL at 21:29

## 2024-07-24 RX ADMIN — AMOXICILLIN AND CLAVULANATE POTASSIUM 1 TABLET: 500; 125 TABLET, FILM COATED ORAL at 13:24

## 2024-07-24 RX ADMIN — INSULIN LISPRO 4 UNITS: 100 INJECTION, SOLUTION INTRAVENOUS; SUBCUTANEOUS at 17:11

## 2024-07-24 RX ADMIN — ACETAMINOPHEN 1000 MG: 500 TABLET ORAL at 17:11

## 2024-07-24 RX ADMIN — GABAPENTIN 300 MG: 300 CAPSULE ORAL at 21:29

## 2024-07-24 RX ADMIN — HYDRALAZINE HYDROCHLORIDE 50 MG: 50 TABLET ORAL at 06:07

## 2024-07-24 RX ADMIN — HYDROXYZINE HYDROCHLORIDE 10 MG: 10 TABLET ORAL at 21:29

## 2024-07-24 RX ADMIN — CARVEDILOL 6.25 MG: 6.25 TABLET, FILM COATED ORAL at 13:17

## 2024-07-24 RX ADMIN — ACETAMINOPHEN 1000 MG: 500 TABLET ORAL at 13:24

## 2024-07-24 RX ADMIN — HYDRALAZINE HYDROCHLORIDE 50 MG: 50 TABLET ORAL at 13:20

## 2024-07-24 RX ADMIN — GABAPENTIN 300 MG: 300 CAPSULE ORAL at 13:20

## 2024-07-24 RX ADMIN — SENNOSIDES AND DOCUSATE SODIUM 2 TABLET: 50; 8.6 TABLET ORAL at 13:20

## 2024-07-24 RX ADMIN — HYDROMORPHONE HYDROCHLORIDE 0.5 MG: 1 INJECTION, SOLUTION INTRAMUSCULAR; INTRAVENOUS; SUBCUTANEOUS at 21:29

## 2024-07-24 RX ADMIN — INSULIN LISPRO 6 UNITS: 100 INJECTION, SOLUTION INTRAVENOUS; SUBCUTANEOUS at 17:12

## 2024-07-24 RX ADMIN — DULOXETINE HYDROCHLORIDE 20 MG: 20 CAPSULE, DELAYED RELEASE ORAL at 13:20

## 2024-07-24 RX ADMIN — LURASIDONE HYDROCHLORIDE 20 MG: 20 TABLET, FILM COATED ORAL at 17:12

## 2024-07-24 RX ADMIN — AMLODIPINE BESYLATE 10 MG: 5 TABLET ORAL at 13:20

## 2024-07-24 RX ADMIN — CARVEDILOL 6.25 MG: 6.25 TABLET, FILM COATED ORAL at 17:12

## 2024-07-24 RX ADMIN — HYDROMORPHONE HYDROCHLORIDE 0.5 MG: 1 INJECTION, SOLUTION INTRAMUSCULAR; INTRAVENOUS; SUBCUTANEOUS at 08:48

## 2024-07-24 RX ADMIN — HYDROMORPHONE HYDROCHLORIDE 0.5 MG: 1 INJECTION, SOLUTION INTRAMUSCULAR; INTRAVENOUS; SUBCUTANEOUS at 01:10

## 2024-07-24 RX ADMIN — INSULIN GLARGINE 20 UNITS: 100 INJECTION, SOLUTION SUBCUTANEOUS at 13:21

## 2024-07-24 RX ADMIN — PANTOPRAZOLE SODIUM 40 MG: 40 TABLET, DELAYED RELEASE ORAL at 06:07

## 2024-07-24 RX ADMIN — ISOSORBIDE MONONITRATE 30 MG: 30 TABLET, EXTENDED RELEASE ORAL at 13:20

## 2024-07-24 RX ADMIN — OXYCODONE 10 MG: 5 TABLET ORAL at 06:07

## 2024-07-24 RX ADMIN — OXYCODONE 10 MG: 5 TABLET ORAL at 17:11

## 2024-07-24 RX ADMIN — CALCITONIN SALMON 1 SPRAY: 200 SPRAY, METERED NASAL at 13:25

## 2024-07-24 RX ADMIN — AMOXICILLIN AND CLAVULANATE POTASSIUM 1 TABLET: 500; 125 TABLET, FILM COATED ORAL at 21:29

## 2024-07-24 ASSESSMENT — PAIN DESCRIPTION - ORIENTATION
ORIENTATION: RIGHT

## 2024-07-24 ASSESSMENT — PAIN DESCRIPTION - DESCRIPTORS
DESCRIPTORS: THROBBING
DESCRIPTORS: ACHING
DESCRIPTORS: ACHING
DESCRIPTORS: ACHING;DISCOMFORT;GNAWING

## 2024-07-24 ASSESSMENT — PAIN DESCRIPTION - LOCATION
LOCATION: LEG
LOCATION: LEG
LOCATION: FOOT;HIP
LOCATION: FOOT;HIP
LOCATION: LEG
LOCATION: LEG
LOCATION: ABDOMEN

## 2024-07-24 ASSESSMENT — PAIN SCALES - GENERAL
PAINLEVEL_OUTOF10: 9
PAINLEVEL_OUTOF10: 9
PAINLEVEL_OUTOF10: 8
PAINLEVEL_OUTOF10: 10
PAINLEVEL_OUTOF10: 8
PAINLEVEL_OUTOF10: 8
PAINLEVEL_OUTOF10: 6

## 2024-07-24 NOTE — PROGRESS NOTES
End of Shift Note    Bedside shift change report given to Trisha (oncoming nurse) by April Fierro RN (offgoing nurse).  Report included the following information SBAR, Kardex, MAR, Recent Results, and Med Rec Status    Shift worked:  7p-7a     Shift summary and any significant changes:     Uneventful, no am labs      Concerns for physician to address:  None      Zone phone for oncoming shift:   [phone]       Length of Stay:  Expected LOS: 11  Actual LOS: 11      April Fierro RN

## 2024-07-24 NOTE — PROGRESS NOTES
Nephrology Progress Note  [facility] / [place] Office  [address], [address]  [place] [address]  Phone - [phone]  Fax - [phone]                 Patient: Keaton Van                     YOB: 1965        Date- 7/24/2024                                     Admit Date: 7/13/2024   CC: Follow up for KIN        IMPRESSION & PLAN:   KIN LIKELY Due to multiple etiology-- ATN vs progression of ckd  CKD 4 likely due to DM nephropathy  hyperkalemia  HYPONATREMIA  S/p R BKA - 7/15/2024  CHF  HTN  DM  Anemia       PLAN-  Seen on hd today  Plan for hd MWF schedule  Place permacath tomorrow  Continue norvasc, coreg, hydralazine  Avoid acei or arb  Set up out pt hd unit close to home     Subjective:  Interval History:   K 4.7  Seen on hd  Confusion improving    Objective:   Vitals:    07/24/24 0935 07/24/24 0945 07/24/24 1000 07/24/24 1015   BP: (!) 147/76 (!) 147/76 (!) 153/83 (!) 145/79   Pulse: 71 71 72 72   Resp: 18      Temp: 98.4 °F (36.9 °C)      TempSrc:       SpO2: 96%      Weight:       Height:          I/O last 3 completed shifts:  In: 700   Out: 224   No intake/output data recorded.      Physical exam:    GEN:  NAD  NECK:  Supple, no thyromegaly  RESP:  no  wheezing, decreased bs b/l  NEURO: non focal  EXT: right BKA +   SKIN: No Rash  Right stacy cath +        Pertinent Notes reviewed.     Data Review :  Lab Results   Component Value Date/Time     07/24/2024 09:31 AM    K 4.7 07/24/2024 09:31 AM     07/24/2024 09:31 AM    CO2 30 07/24/2024 09:31 AM    BUN 76 07/24/2024 09:31 AM    CREATININE 3.00 07/24/2024 09:31 AM    GLUCOSE 208 07/24/2024 09:31 AM    CALCIUM 8.8 07/24/2024 09:31 AM       Lab Results   Component Value Date    WBC 4.7 07/24/2024    HGB 7.2 (L) 07/24/2024    HCT 23.4 (L) 07/24/2024    MCV 87.6 07/24/2024     07/24/2024      Recent Labs     07/22/24  0329 07/23/24  0520 07/24/24  0931   NA

## 2024-07-24 NOTE — PROGRESS NOTES
Hospitalist Progress Note    NAME:   Keaton Van   : 1965   MRN: [id number]     Patient PCP: Robert Wells PA-C  TAN: 24  Barrier: perm cath tomorrow.     Assessment / Plan:      S/p right below-knee amputation on 2024.  Right foot 5th toe stump osteomyelitis via XR  S/p resection w Dr Jama on 24  Wound with necrosis, dehiscence  Right leg w edema, redness, suspect cellulitis  Hx prior toe amputations left foot.  PVD.  S/p vanco and zosyn  Blood cultures NGTD    Does not meet sepsis criteria  Palliative recs appreciated  Plan:   S/p  Dilaudid PCA and started pm IV dilaudid 0.5 mg iv prn +Lorena PRN.  On Augmentin for 10 days total      Hypertensive urgency 2/2 medication noncompliance, pain also likely a factor  Chronic diastolic HF not in exacerbation  Paroxysmal Atrial fibrillation on last admission, currently SR  Resume home amlodipine, carvedilol (low dose d/t cocaine abuse), hydralazine, imdur, bumex     Uncontrolled DM2  Cont current dose of lantus   Continue sliding scale insulin.  Endocrine following     KIN on CKD4  Cr 2.33, this is improved from prior 3.7  Hyperkalemia   ?  Uremia  Holding Bumex.  Adjusting insulin for better blood glucose control.  Nephrology consulted.  continue lokelma to 15 mg daily  Mickey catheter inserted and started on dialysis on   Plan for perm cath tomorrow.     Chronic anemia, likely YENNY +/- anemia of CKD  Continue to monitor.  Transfuse for hemoglobin less than 7.  Patient received 2 PRBC transfusions on 2024.  And 1 PRBC unit on   SCD for DVT prophylaxis for now     Bipolar I  Polysubstance abuse incl heavy crack cocaine use  Chronic pain  Medication noncompliance, financial and social barriers  Hx untreated HCV    Medical Decision Making:   I personally reviewed labs: cbc: hb stable at 7.2  BMP     I personally reviewed imaging reports: xray right foot  Toxic drug monitoring:   Type II DM  -continue ISS  -monitor for hypoglycemia  Oral, Daily, James Schaeffer MD, 10 mg at 07/24/24 1320    aspirin EC tablet 81 mg, 81 mg, Oral, Daily, James Schaeffer MD, 81 mg at 07/24/24 1320    gabapentin (NEURONTIN) capsule 300 mg, 300 mg, Oral, TID, James Schaeffer MD, 300 mg at 07/24/24 1320    hydrALAZINE (APRESOLINE) tablet 50 mg, 50 mg, Oral, 3 times per day, James Schaeffer MD, 50 mg at 07/24/24 1320    isosorbide mononitrate (IMDUR) extended release tablet 30 mg, 30 mg, Oral, Daily, James Schaeffer MD, 30 mg at 07/24/24 1320    lurasidone (LATUDA) tablet 20 mg, 20 mg, Oral, Dinner, James Schaeffer MD, 20 mg at 07/23/24 2121    tiZANidine (ZANAFLEX) tablet 2 mg, 2 mg, Oral, Q8H PRN, James Schaeffer MD    bisacodyl (DULCOLAX) suppository 10 mg, 10 mg, Rectal, Daily PRN, James Schaeffer MD    magnesium hydroxide (MILK OF MAGNESIA) 400 MG/5ML suspension 30 mL, 30 mL, Oral, Daily PRN, James Schaeffer MD    melatonin tablet 3 mg, 3 mg, Oral, Nightly PRN, James Schaeffer MD    hydrALAZINE (APRESOLINE) injection 5 mg, 5 mg, IntraVENous, Q6H PRN, James Schaeffer MD, 5 mg at 07/13/24 1522    hydrOXYzine HCl (ATARAX) tablet 10 mg, 10 mg, Oral, TID PRN, James Schaeffer MD    cloNIDine (CATAPRES) tablet 0.2 mg, 0.2 mg, Oral, Q8H PRN, James Schaeffer MD, 0.2 mg at 07/16/24 2131       LABS:    I reviewed today's most current labs and imaging studies.    Pertinent labs include:  Recent Labs     07/22/24  0329 07/23/24  0520 07/24/24  0931   WBC 5.5 6.4 4.7   HGB 6.8* 7.5* 7.2*   HCT 22.2* 24.2* 23.4*    165 195       Recent Labs     07/22/24  0329 07/23/24  0520 07/24/24  0931   * 132* 133*   K 4.9 6.3* 4.7    105 102   CO2 22 22 30   GLUCOSE 140* 207* 208*   BUN 88* 99* 76*   CREATININE 2.98* 3.42* 3.00*   CALCIUM 7.9* 8.7 8.8   PHOS  --   --  4.6       Xray Result (most recent):  IR NONTUNNELED VASCULAR CATHETER > 5 YEARS  Narrative: CLINICAL DATA: 59-year-old male presents for temporary dialysis catheter  placement.    DATE:

## 2024-07-24 NOTE — PROGRESS NOTES
Occupational Therapy    Chart reviewed. Pt currently off floor for HD. Will defer and follow-up later as able and medically stable. Thanks.    Miriam Mendez MS, OTR/L

## 2024-07-24 NOTE — FLOWSHEET NOTE
Primary RN SBAR: Trisha Amador RN  Patient Education provided: procedural; CVC infection control  Incapacitated Nurse edu. provided: yes  Preferred Education method and Primary language: verbal/ English  Hospital associated wait time; reason: 35min transport to suite  Hepatitis B Surface Ag   Date/Time Value Ref Range Status   07/23/2024 03:16 PM <0.10 Index Final     Hep B S Ag Interp   Date/Time Value Ref Range Status   07/23/2024 03:16 PM Negative NEG   Final     Hep B S Ab   Date/Time Value Ref Range Status   07/23/2024 03:16 PM 7.84 mIU/mL Final     Hep B S Ab Interp   Date/Time Value Ref Range Status   07/23/2024 03:16 PM NONREACTIVE NR   Final     Comment:     (NOTE)  The [employer] Centaur Anti-HBs2 assay is traceable to the World Health   Organization (WHO) Hepatitis B Immunoglobulin 1st International   Reference Preparation (1977). Samples with a calculated value of 10   mIU/mL or greater are considered reactive (protective) in accordance   with the CDC guidelines. The accepted criteria for immunity to HBV is   anti-HBs activity greater than or equal to 10 mIU/mL, as defined by   the WHO International Reference Preparation.  Assay performance has not been established in pregnant women,   patients who are immunosuppressed or immunocompromised, nor have   performance characteristics been established in conjunction with   other 's assays for specific HBV serologic markers. This   assay does not differentiate between vaccine induced immune response   and a response due to infection with HBV. Passively acquired anti-HBs   may be identified following patient transfusion, receipt of   immunoglobulin products, etc.       Pre-HD   07/24/24 0930   Observations & Evaluations   Level of Consciousness 0   Oriented X 3-4   Heart Rhythm Regular   Respiratory Quality/Effort Unlabored   O2 Device Nasal cannula  (4L )2)   Bilateral Breath Sounds Clear   LLE Edema None   Vital Signs   BP (!) 145/76   Temp 98.4 °F  (36.9 °C)   Pulse 72   Respirations 18   SpO2 95 %   Pain Assessment   Pain Assessment None - Denies Pain   Technical Checks   Dialysis Machine No. 06   RO Machine Number [id number]   Dialyzer Lot No. [id number]   Tubing Lot Number [id number]   All Connections Secure Yes   NS Bag Yes   Saline Line Double Clamped Yes   Dialyzer Revaclear 300   Prime Volume (mL) 200 mL   ICEBOAT I;C;E;B;O;A;T   RO Machine Log Sheet Completed Yes   Machine Alarm Self Test Completed;Passed   Air Foam Detector Tested;Proper Function   Extracorporeal Circuit Tested for Integrity Yes   Machine Conductivity 14.1   Machine Ph 7.4   Bleach Test (Neg) Yes   Bath Temperature 97.7 °F (36.5 °C)   Dialysis Bath   K+ (Potassium) 2   Ca+ (Calcium) 2.5   Na+ (Sodium) 140   HCO3 (Bicarb) 40     Start of HD   07/24/24 0935   Treatment   Time On 0935   Treatment Goal 0kg in 2.5hr   Vital Signs   BP (!) 147/76   Temp 98.4 °F (36.9 °C)   Pulse 71   Respirations 18   SpO2 96 %   Treatment Initiation   Dialyze Hours 2.5   Treatment  Initiation Universal Precautions maintained;Lines secured to patient;Connections secured;Prime given;Venous Parameters set;Arterial Parameters set;Air foam detector engaged;Saline line double clamped   During Hemodialysis Assessment   Blood Flow Rate (ml/min) 300 ml/min   Arterial Pressure (mmHg) -90 mmHg   Venous Pressure (mmHg) 90   TMP 60      Comments Treatment iniated   Access Visible Yes   Ultrafiltration Rate (ml/hr) 180 ml/hr   Ultrafiltration Removed (ml) 0 ml   Hemodialysis Central Access Right Neck   Placement Date/Time: 07/23/24 1419   Present on Admission/Arrival: No  Inserted by: Dr. German Rdz  Insertion Practices: Chlorohexadine skin antisepsis;Hand hygiene;Maximal barrier precautions;Optimal catheter site selection;Sterile ultrasound t...   Continued need for line? Yes   Site Assessment Clean, dry & intact   Venous Lumen Status Brisk blood return;Flushed;Infusing   Arterial Lumen Status Brisk blood

## 2024-07-24 NOTE — PROGRESS NOTES
This is a 59-year-old gentleman well-known to the program for diabetes health with multiple comorbidities including uncontrolled type 2 diabetes mellitus, coronary artery disease, polysubstance abuse, chronic kidney disease and bilateral foot wounds with osteomyelitis who was admitted for right BKA which was performed July 16, 2024. Admission labs notable for a glucose of 290, creatinine 2.72, positive drug screen, hemoglobin 7.3, platelets 119, A1c 6.5%. The patient reports that he takes 20 units of Lantus at night and 10 units of Lantus in the morning.  We have since switched this to 30 units once daily.      Patient is now started hemodialysis.    Examination  Blood pressure 148/82  Pulse 74  Afebrile  96%    Recent laboratory data  Glucose 208 (range )  Creatinine 3.0  Bicarb 30    Impression  1.  Type 2 diabetes mellitus with poor blood sugar control now much improved with basal insulin  2.  Hypoglycemia after 10 units of regular insulin to correct hyperkalemia now resolved  3.  End-stage renal disease now on hemodialysis Monday Wednesday Friday  4.  Status post right BKA for osteomyelitis    Plan:  1.  We recommend continue 30 units of basal insulin once daily  2.  I believe he would benefit from 6 units of mealtime insulin once he is eating regularly  3.  Rest per team    Before making these care recommendations, I personally reviewed the hospitalization record, including notes, laboratory & diagnostic data and current medications, and examined the patient at the bedside. Total time  25   minutes,.     Please note that this dictation was completed with [employer], the computer voice recognition software.  Quite often unanticipated grammatical, syntax, homophones, and other interpretive errors are inadvertently transcribed by the computer software.  Please disregard these errors.  Please excuse any errors that have escaped final proofreading.

## 2024-07-24 NOTE — PROGRESS NOTES
Physical Therapy  Attempted PT session.  Per RN patient is about to go off the floor to dialysis.  Will defer and continue to follow.  Betty Gamboa, PT, DPT

## 2024-07-24 NOTE — PLAN OF CARE
Problem: Discharge Planning  Goal: Discharge to home or other facility with appropriate resources  7/24/2024 0844 by Trisha Amador RN  Outcome: Progressing  7/23/2024 2250 by April Fierro RN  Outcome: Progressing     Problem: Pain  Goal: Verbalizes/displays adequate comfort level or baseline comfort level  7/24/2024 0844 by Trisha Amador RN  Outcome: Progressing  7/23/2024 2250 by April Fierro RN  Outcome: Progressing     Problem: Safety - Adult  Goal: Free from fall injury  7/24/2024 0844 by Trisha Amador RN  Outcome: Progressing  7/23/2024 2250 by April Fierro RN  Outcome: Progressing  Flowsheets (Taken 7/23/2024 2000)  Free From Fall Injury: Instruct family/caregiver on patient safety     Problem: Chronic Conditions and Co-morbidities  Goal: Patient's chronic conditions and co-morbidity symptoms are monitored and maintained or improved  7/24/2024 0844 by Trisha Amador RN  Outcome: Progressing  7/23/2024 2250 by April Fierro RN  Outcome: Progressing     Problem: Nutrition Deficit:  Goal: Optimize nutritional status  7/24/2024 0844 by Trisha Amador RN  Outcome: Progressing  7/23/2024 2250 by April Fierro RN  Outcome: Progressing     Problem: Skin/Tissue Integrity  Goal: Absence of new skin breakdown  Description: 1.  Monitor for areas of redness and/or skin breakdown  2.  Assess vascular access sites hourly  3.  Every 4-6 hours minimum:  Change oxygen saturation probe site  4.  Every 4-6 hours:  If on nasal continuous positive airway pressure, respiratory therapy assess nares and determine need for appliance change or resting period.  7/24/2024 0844 by Trisha Amador RN  Outcome: Progressing  7/23/2024 2250 by April Fierro RN  Outcome: Progressing

## 2024-07-24 NOTE — PROGRESS NOTES
Hospitalist Progress Note    NAME:   Keaton Van   : 1965   MRN: [id number]     Patient PCP: Robert Wells PA-C      Assessment / Plan:      S/p right below-knee amputation on 2024.  Right foot 5th toe stump osteomyelitis via XR  S/p resection w Dr Jama on 24  Wound with necrosis, dehiscence  Right leg w edema, redness, suspect cellulitis  Hx prior toe amputations left foot.  PVD.  Will discontinue vanco and zosyn  Blood cultures NGTD    Does not meet sepsis criteria  Palliative recs appreciated  Plan:   DC Dilaudid PCA and start IV dilaudid prn +Lorena PRN.  Lower PRN dilaudid to 0.5 mg, given concern for drug seeking behaviour  On Augmentin for 10 days total      Hypertensive urgency 2/2 medication noncompliance, pain also likely a factor  Chronic diastolic HF not in exacerbation  Paroxysmal Atrial fibrillation on last admission, currently SR  Resume home amlodipine, carvedilol (low dose d/t cocaine abuse), hydralazine, imdur, bumex     Uncontrolled DM2  Cont current dose of lantus   Continue sliding scale insulin.     KIN on CKD4  Cr 2.33, this is improved from prior 3.7  Hyperkalemia   ?  Uremia  Holding Bumex.  Adjusting insulin for better blood glucose control.  Nephrology consulted.  Plan:   Increase daily lokelma to 15 mg daily  Mickey catheter inserted and started on dialysis on      Chronic anemia, likely YENNY +/- anemia of CKD  Continue to monitor.  Transfuse for hemoglobin less than 7.  Patient received 2 PRBC transfusions on 2024.  And 1 PRBC unit on   Urine is worsening gradually, today 99, no reported melena or GI bleed.  No active bleeding from stump site.  SCD for DVT prophylaxis for now     Bipolar I  Polysubstance abuse incl heavy crack cocaine use  Chornic pain  Medication noncompliance, financial and social barriers  Hx untreated HCV    Medical Decision Making:   I personally reviewed labs: yes, as below   I personally reviewed imaging reports: yes, as below  medical records for all procedures/Xrays and details which were not copied into this note but were reviewed prior to creation of Plan.    Reviewed most current lab test results and cultures  YES  Reviewed most current radiology test results   YES  Review and summation of old records today    NO  Reviewed patient's current orders and MAR    YES  PMH/ reviewed - no change compared to H&P    ________________________________________________________________________      Total NON critical care TIME:  Minutes      Total CRITICAL CARE TIME Spent:   Minutes non procedure based          Comments   >50% of visit spent in counseling and coordination of care x    ________________________________________________________________________        Signed: Stefania Vargas MD

## 2024-07-24 NOTE — PROGRESS NOTES
End of Shift Note    Bedside shift change report given to BHAKTI Olivarez (oncoming nurse) by Trisha Amador RN (offgoing nurse).  Report included the following information SBAR    Shift worked:  7a-7p     Shift summary and any significant changes:     Mr. Van was hard to arouse this morning. He is alert and oriented to self and location. His oxygen on room air was 85, he was placed on 3L NC. Patient was emotional and complained that he did not know what was going on with him. He was educated multiple times and all his questions were answered. Patient has a new  stacy catheter and received his first HD. After patient returned from HD, patient was bleeding from catheter site and new dressing was placed.  Pt's BG was 44, patient was alert and had started eating his dinner. Patient ate all his dinner, and drank 16oz of orange juice but BG was 47 on repeat. Patient received glucose tablets and BG is up to 118. Patient had 2 BM today. Night RN notified.      Concerns for physician to address:  BG, bleeding on stacy catherter site     Zone phone for oncoming shift:   [phone]       Activity:     Number times ambulated in hallways past shift: 0  Number of times OOB to chair past shift: 2    Cardiac:   Cardiac Monitoring: Yes           Access:  Current line(s): HD access     Genitourinary:   Urinary status: voiding    Respiratory:      Chronic home O2 use?: NO  Incentive spirometer at bedside: YES       GI:     Current diet:  ADULT ORAL NUTRITION SUPPLEMENT; Breakfast, Dinner; Wound Healing Oral Supplement  ADULT DIET; Regular; 4 carb choices (60 gm/meal); Low Potassium (Less than 3000 mg/day); Double Protein Portions; Can have double protein, double non-starchy vegetables  ADULT ORAL NUTRITION SUPPLEMENT; Dinner, Breakfast; Renal Oral Supplement  Passing flatus: YES  Tolerating current diet: YES       Pain Management:   Patient states pain is manageable on current regimen: YES    Skin:     Interventions: PT/OT consult    Patient

## 2024-07-25 ENCOUNTER — HOSPITAL ENCOUNTER (INPATIENT)
Facility: HOSPITAL | Age: 59
End: 2024-07-25
Payer: MEDICARE

## 2024-07-25 VITALS
HEART RATE: 74 BPM | RESPIRATION RATE: 18 BRPM | OXYGEN SATURATION: 100 % | SYSTOLIC BLOOD PRESSURE: 176 MMHG | DIASTOLIC BLOOD PRESSURE: 91 MMHG

## 2024-07-25 PROBLEM — N18.6 TYPE 2 DIABETES MELLITUS WITH ESRD (END-STAGE RENAL DISEASE) (HCC): Status: ACTIVE | Noted: 2024-05-29

## 2024-07-25 PROBLEM — E11.22 TYPE 2 DIABETES MELLITUS WITH ESRD (END-STAGE RENAL DISEASE) (HCC): Status: ACTIVE | Noted: 2024-05-29

## 2024-07-25 LAB
ALBUMIN SERPL-MCNC: 2.2 G/DL (ref 3.5–5)
ANION GAP SERPL CALC-SCNC: 5 MMOL/L (ref 5–15)
BUN SERPL-MCNC: 61 MG/DL (ref 6–20)
BUN/CREAT SERPL: 25 (ref 12–20)
CALCIUM SERPL-MCNC: 8.5 MG/DL (ref 8.5–10.1)
CHLORIDE SERPL-SCNC: 101 MMOL/L (ref 97–108)
CO2 SERPL-SCNC: 30 MMOL/L (ref 21–32)
CREAT SERPL-MCNC: 2.41 MG/DL (ref 0.7–1.3)
GLUCOSE BLD STRIP.AUTO-MCNC: 101 MG/DL (ref 65–117)
GLUCOSE BLD STRIP.AUTO-MCNC: 138 MG/DL (ref 65–117)
GLUCOSE BLD STRIP.AUTO-MCNC: 192 MG/DL (ref 65–117)
GLUCOSE BLD STRIP.AUTO-MCNC: 236 MG/DL (ref 65–117)
GLUCOSE BLD STRIP.AUTO-MCNC: 58 MG/DL (ref 65–117)
GLUCOSE BLD STRIP.AUTO-MCNC: 64 MG/DL (ref 65–117)
GLUCOSE BLD STRIP.AUTO-MCNC: 72 MG/DL (ref 65–117)
GLUCOSE BLD STRIP.AUTO-MCNC: 78 MG/DL (ref 65–117)
GLUCOSE BLD STRIP.AUTO-MCNC: 95 MG/DL (ref 65–117)
GLUCOSE SERPL-MCNC: 155 MG/DL (ref 65–100)
PHOSPHATE SERPL-MCNC: 3 MG/DL (ref 2.6–4.7)
POTASSIUM SERPL-SCNC: 4.1 MMOL/L (ref 3.5–5.1)
SERVICE CMNT-IMP: ABNORMAL
SERVICE CMNT-IMP: NORMAL
SODIUM SERPL-SCNC: 136 MMOL/L (ref 136–145)

## 2024-07-25 PROCEDURE — 6370000000 HC RX 637 (ALT 250 FOR IP): Performed by: STUDENT IN AN ORGANIZED HEALTH CARE EDUCATION/TRAINING PROGRAM

## 2024-07-25 PROCEDURE — 6370000000 HC RX 637 (ALT 250 FOR IP): Performed by: NURSE PRACTITIONER

## 2024-07-25 PROCEDURE — 6370000000 HC RX 637 (ALT 250 FOR IP): Performed by: SURGERY

## 2024-07-25 PROCEDURE — 2500000003 HC RX 250 WO HCPCS: Performed by: STUDENT IN AN ORGANIZED HEALTH CARE EDUCATION/TRAINING PROGRAM

## 2024-07-25 PROCEDURE — 6370000000 HC RX 637 (ALT 250 FOR IP): Performed by: INTERNAL MEDICINE

## 2024-07-25 PROCEDURE — 1100000000 HC RM PRIVATE

## 2024-07-25 PROCEDURE — 2580000003 HC RX 258: Performed by: GENERAL ACUTE CARE HOSPITAL

## 2024-07-25 PROCEDURE — 6360000002 HC RX W HCPCS: Performed by: STUDENT IN AN ORGANIZED HEALTH CARE EDUCATION/TRAINING PROGRAM

## 2024-07-25 PROCEDURE — 6370000000 HC RX 637 (ALT 250 FOR IP): Performed by: GENERAL ACUTE CARE HOSPITAL

## 2024-07-25 PROCEDURE — 2580000003 HC RX 258: Performed by: STUDENT IN AN ORGANIZED HEALTH CARE EDUCATION/TRAINING PROGRAM

## 2024-07-25 PROCEDURE — 2500000003 HC RX 250 WO HCPCS: Performed by: GENERAL ACUTE CARE HOSPITAL

## 2024-07-25 PROCEDURE — 99231 SBSQ HOSP IP/OBS SF/LOW 25: CPT

## 2024-07-25 PROCEDURE — 6370000000 HC RX 637 (ALT 250 FOR IP)

## 2024-07-25 PROCEDURE — 82962 GLUCOSE BLOOD TEST: CPT

## 2024-07-25 PROCEDURE — 36415 COLL VENOUS BLD VENIPUNCTURE: CPT

## 2024-07-25 PROCEDURE — 36557 INSERT TUNNELED CV CATH: CPT

## 2024-07-25 PROCEDURE — 80069 RENAL FUNCTION PANEL: CPT

## 2024-07-25 RX ORDER — INSULIN GLARGINE 100 [IU]/ML
15 INJECTION, SOLUTION SUBCUTANEOUS DAILY
Status: DISCONTINUED | OUTPATIENT
Start: 2024-07-26 | End: 2024-07-26 | Stop reason: HOSPADM

## 2024-07-25 RX ORDER — HEPARIN SODIUM 5000 [USP'U]/ML
10000 INJECTION, SOLUTION INTRAVENOUS; SUBCUTANEOUS ONCE
Status: COMPLETED | OUTPATIENT
Start: 2024-07-25 | End: 2024-07-25

## 2024-07-25 RX ORDER — INSULIN LISPRO 100 [IU]/ML
3 INJECTION, SOLUTION INTRAVENOUS; SUBCUTANEOUS
Status: DISCONTINUED | OUTPATIENT
Start: 2024-07-25 | End: 2024-07-26 | Stop reason: HOSPADM

## 2024-07-25 RX ORDER — GABAPENTIN 100 MG/1
200 CAPSULE ORAL 3 TIMES DAILY
Status: DISCONTINUED | OUTPATIENT
Start: 2024-07-25 | End: 2024-07-26 | Stop reason: HOSPADM

## 2024-07-25 RX ORDER — DEXTROSE MONOHYDRATE 100 MG/ML
INJECTION, SOLUTION INTRAVENOUS CONTINUOUS
Status: DISCONTINUED | OUTPATIENT
Start: 2024-07-25 | End: 2024-07-26 | Stop reason: HOSPADM

## 2024-07-25 RX ORDER — BUMETANIDE 1 MG/1
2 TABLET ORAL 2 TIMES DAILY
Status: DISCONTINUED | OUTPATIENT
Start: 2024-07-25 | End: 2024-07-26 | Stop reason: HOSPADM

## 2024-07-25 RX ORDER — LIDOCAINE HCL/EPINEPHRINE/PF 2%-1:200K
20 VIAL (ML) INJECTION ONCE
Status: COMPLETED | OUTPATIENT
Start: 2024-07-25 | End: 2024-07-25

## 2024-07-25 RX ORDER — HEPARIN SODIUM 200 [USP'U]/100ML
200 INJECTION, SOLUTION INTRAVENOUS ONCE
Status: DISCONTINUED | OUTPATIENT
Start: 2024-07-25 | End: 2024-07-29 | Stop reason: HOSPADM

## 2024-07-25 RX ADMIN — AMOXICILLIN AND CLAVULANATE POTASSIUM 1 TABLET: 500; 125 TABLET, FILM COATED ORAL at 21:42

## 2024-07-25 RX ADMIN — OXYCODONE 10 MG: 5 TABLET ORAL at 22:27

## 2024-07-25 RX ADMIN — DEXTROSE MONOHYDRATE: 100 INJECTION, SOLUTION INTRAVENOUS at 12:01

## 2024-07-25 RX ADMIN — HYDROMORPHONE HYDROCHLORIDE 0.5 MG: 1 INJECTION, SOLUTION INTRAMUSCULAR; INTRAVENOUS; SUBCUTANEOUS at 06:15

## 2024-07-25 RX ADMIN — ACETAMINOPHEN 1000 MG: 500 TABLET ORAL at 18:08

## 2024-07-25 RX ADMIN — BUMETANIDE 2 MG: 1 TABLET ORAL at 18:08

## 2024-07-25 RX ADMIN — OXYCODONE 10 MG: 5 TABLET ORAL at 08:58

## 2024-07-25 RX ADMIN — PANTOPRAZOLE SODIUM 40 MG: 40 TABLET, DELAYED RELEASE ORAL at 06:16

## 2024-07-25 RX ADMIN — AMLODIPINE BESYLATE 10 MG: 5 TABLET ORAL at 08:58

## 2024-07-25 RX ADMIN — LIDOCAINE HYDROCHLORIDE,EPINEPHRINE BITARTRATE 20 ML: 20; .005 INJECTION, SOLUTION EPIDURAL; INFILTRATION; INTRACAUDAL; PERINEURAL at 15:09

## 2024-07-25 RX ADMIN — CALCITONIN SALMON 1 SPRAY: 200 SPRAY, METERED NASAL at 09:01

## 2024-07-25 RX ADMIN — HEPARIN SODIUM 3200 UNITS: 5000 INJECTION, SOLUTION INTRAVENOUS; SUBCUTANEOUS at 15:09

## 2024-07-25 RX ADMIN — DEXTROSE MONOHYDRATE 125 ML: 100 INJECTION, SOLUTION INTRAVENOUS at 12:06

## 2024-07-25 RX ADMIN — DEXTROSE MONOHYDRATE 125 ML: 100 INJECTION, SOLUTION INTRAVENOUS at 11:32

## 2024-07-25 RX ADMIN — INSULIN GLARGINE 20 UNITS: 100 INJECTION, SOLUTION SUBCUTANEOUS at 08:59

## 2024-07-25 RX ADMIN — HYDROMORPHONE HYDROCHLORIDE 0.5 MG: 1 INJECTION, SOLUTION INTRAMUSCULAR; INTRAVENOUS; SUBCUTANEOUS at 03:29

## 2024-07-25 RX ADMIN — CARVEDILOL 6.25 MG: 6.25 TABLET, FILM COATED ORAL at 08:58

## 2024-07-25 RX ADMIN — DULOXETINE HYDROCHLORIDE 20 MG: 20 CAPSULE, DELAYED RELEASE ORAL at 08:58

## 2024-07-25 RX ADMIN — HYDRALAZINE HYDROCHLORIDE 50 MG: 50 TABLET ORAL at 06:16

## 2024-07-25 RX ADMIN — AMOXICILLIN AND CLAVULANATE POTASSIUM 1 TABLET: 500; 125 TABLET, FILM COATED ORAL at 08:59

## 2024-07-25 RX ADMIN — HYDROMORPHONE HYDROCHLORIDE 0.5 MG: 1 INJECTION, SOLUTION INTRAMUSCULAR; INTRAVENOUS; SUBCUTANEOUS at 10:07

## 2024-07-25 RX ADMIN — MELATONIN 3 MG: at 21:42

## 2024-07-25 RX ADMIN — GABAPENTIN 300 MG: 300 CAPSULE ORAL at 08:59

## 2024-07-25 RX ADMIN — INSULIN LISPRO 3 UNITS: 100 INJECTION, SOLUTION INTRAVENOUS; SUBCUTANEOUS at 18:08

## 2024-07-25 RX ADMIN — GABAPENTIN 200 MG: 100 CAPSULE ORAL at 21:42

## 2024-07-25 RX ADMIN — ASPIRIN 81 MG: 81 TABLET, COATED ORAL at 08:58

## 2024-07-25 RX ADMIN — CARVEDILOL 6.25 MG: 6.25 TABLET, FILM COATED ORAL at 18:08

## 2024-07-25 RX ADMIN — HYDRALAZINE HYDROCHLORIDE 50 MG: 50 TABLET ORAL at 21:42

## 2024-07-25 RX ADMIN — ACETAMINOPHEN 1000 MG: 500 TABLET ORAL at 08:59

## 2024-07-25 RX ADMIN — OXYCODONE 10 MG: 5 TABLET ORAL at 18:08

## 2024-07-25 RX ADMIN — ISOSORBIDE MONONITRATE 30 MG: 30 TABLET, EXTENDED RELEASE ORAL at 08:59

## 2024-07-25 RX ADMIN — LURASIDONE HYDROCHLORIDE 20 MG: 20 TABLET, FILM COATED ORAL at 18:08

## 2024-07-25 ASSESSMENT — PAIN DESCRIPTION - DESCRIPTORS
DESCRIPTORS: ACHING;THROBBING
DESCRIPTORS: THROBBING
DESCRIPTORS: ACHING;THROBBING
DESCRIPTORS: THROBBING
DESCRIPTORS: STABBING
DESCRIPTORS: THROBBING;ACHING
DESCRIPTORS: THROBBING
DESCRIPTORS: TIGHTNESS

## 2024-07-25 ASSESSMENT — PAIN DESCRIPTION - LOCATION
LOCATION: HIP;LEG
LOCATION: HIP;FOOT
LOCATION: HIP;LEG
LOCATION: LEG;HIP;KNEE
LOCATION: HIP;LEG
LOCATION: HIP;LEG
LOCATION: LEG;KNEE
LOCATION: LEG;KNEE;HIP

## 2024-07-25 ASSESSMENT — PAIN SCALES - GENERAL
PAINLEVEL_OUTOF10: 6
PAINLEVEL_OUTOF10: 5
PAINLEVEL_OUTOF10: 5
PAINLEVEL_OUTOF10: 8
PAINLEVEL_OUTOF10: 6
PAINLEVEL_OUTOF10: 8
PAINLEVEL_OUTOF10: 7
PAINLEVEL_OUTOF10: 8

## 2024-07-25 ASSESSMENT — PAIN DESCRIPTION - ORIENTATION
ORIENTATION: RIGHT

## 2024-07-25 NOTE — PROGRESS NOTES
Nephrology Progress Note  [facility] / [place] Office  [address], [address]  [place] [address]  Phone - [phone]  Fax - [phone]                 Patient: Keaton Van                     YOB: 1965        Date- 7/25/2024                                     Admit Date: 7/13/2024   CC: Follow up for KIN    IMPRESSION & PLAN:   KIN LIKELY Due to multiple etiology-- ATN vs progression of ckd  CKD 4 likely due to DM nephropathy  hyperkalemia  HYPONATREMIA  S/p R BKA - 7/15/2024  CHF  HTN  DM  Anemia       PLAN-  Consider lowering gabapentin dose  NO HD TODAY  Plan for hd MWF schedule  Place permacath  Start bumex  Stop daily lokelma  Continue norvasc, coreg, hydralazine  Avoid acei or arb  Set up out pt hd unit close to home        Subjective:  Interval History:   K stable  S/p hd yesterday  No sob    Objective:   Vitals:    07/25/24 0858 07/25/24 0928 07/25/24 1007 07/25/24 1037   BP:       Pulse:       Resp: 15 15 16 16   Temp:       TempSrc:       SpO2:       Weight:       Height:          I/O last 3 completed shifts:  In: 700 [P.O.:200]  Out: 1500 [Urine:1000]  No intake/output data recorded.      Physical exam:    GEN:  nad  NECK:  Supple, no thyromegaly  RESP:  no wheezing  NEURO: non focal  EXT: right BKA +   SKIN: No Rash  Right stacy cath +        Pertinent Notes reviewed.     Data Review :  Lab Results   Component Value Date/Time     07/25/2024 06:07 AM    K 4.1 07/25/2024 06:07 AM     07/25/2024 06:07 AM    CO2 30 07/25/2024 06:07 AM    BUN 61 07/25/2024 06:07 AM    CREATININE 2.41 07/25/2024 06:07 AM    GLUCOSE 155 07/25/2024 06:07 AM    CALCIUM 8.5 07/25/2024 06:07 AM       Lab Results   Component Value Date    WBC 4.7 07/24/2024    HGB 7.2 (L) 07/24/2024    HCT 23.4 (L) 07/24/2024    MCV 87.6 07/24/2024     07/24/2024      Recent Labs     07/23/24  0520 07/24/24  0931 07/25/24  0607   * 133*  Irrigation Once    0.9 % sodium chloride infusion   IntraVENous PRN    carvedilol (COREG) tablet 6.25 mg  6.25 mg Oral BID WC    HYDROmorphone HCl PF (DILAUDID) injection 0.5 mg  0.5 mg IntraVENous Q3H PRN    calcitonin (MIACALCIN) nasal spray 1 spray  1 spray Alternating Nares Daily    DULoxetine (CYMBALTA) extended release capsule 20 mg  20 mg Oral Daily    sennosides-docusate sodium (SENOKOT-S) 8.6-50 MG tablet 2 tablet  2 tablet Oral BID    amoxicillin-clavulanate (AUGMENTIN) 500-125 MG per tablet 1 tablet  1 tablet Oral BID    oxyCODONE (ROXICODONE) immediate release tablet 10 mg  10 mg Oral Q4H PRN    acetaminophen (TYLENOL) tablet 1,000 mg  1,000 mg Oral 3 times per day    sodium chloride flush 0.9 % injection 5-40 mL  5-40 mL IntraVENous PRN    ondansetron (ZOFRAN-ODT) disintegrating tablet 4 mg  4 mg Oral Q8H PRN    Or    ondansetron (ZOFRAN) injection 4 mg  4 mg IntraVENous Q6H PRN    glucose chewable tablet 16 g  4 tablet Oral PRN    dextrose bolus 10% 125 mL  125 mL IntraVENous PRN    Or    dextrose bolus 10% 250 mL  250 mL IntraVENous PRN    glucagon injection 1 mg  1 mg SubCUTAneous PRN    dextrose 10 % infusion   IntraVENous Continuous PRN    insulin lispro (HUMALOG,ADMELOG) injection vial 0-8 Units  0-8 Units SubCUTAneous TID WC    insulin lispro (HUMALOG,ADMELOG) injection vial 0-4 Units  0-4 Units SubCUTAneous Nightly    amLODIPine (NORVASC) tablet 10 mg  10 mg Oral Daily    aspirin EC tablet 81 mg  81 mg Oral Daily    gabapentin (NEURONTIN) capsule 300 mg  300 mg Oral TID    hydrALAZINE (APRESOLINE) tablet 50 mg  50 mg Oral 3 times per day    isosorbide mononitrate (IMDUR) extended release tablet 30 mg  30 mg Oral Daily    lurasidone (LATUDA) tablet 20 mg  20 mg Oral Dinner    tiZANidine (ZANAFLEX) tablet 2 mg  2 mg Oral Q8H PRN    bisacodyl (DULCOLAX) suppository 10 mg  10 mg Rectal Daily PRN    magnesium hydroxide (MILK OF MAGNESIA) 400 MG/5ML suspension 30 mL  30 mL Oral Daily PRN    melatonin

## 2024-07-25 NOTE — PROGRESS NOTES
Attempted to call report x 2    Permacath placed without complication.     Patient tolerated the procedure well.    Verbal report given to patient's primary nurse.

## 2024-07-25 NOTE — PLAN OF CARE
Problem: Discharge Planning  Goal: Discharge to home or other facility with appropriate resources  7/25/2024 1122 by Vicky Lovell LPN  Outcome: Progressing  Flowsheets (Taken 7/25/2024 0840)  Discharge to home or other facility with appropriate resources:   Identify barriers to discharge with patient and caregiver   Arrange for needed discharge resources and transportation as appropriate   Identify discharge learning needs (meds, wound care, etc)   Arrange for interpreters to assist at discharge as needed   Refer to discharge planning if patient needs post-hospital services based on physician order or complex needs related to functional status, cognitive ability or social support system  7/25/2024 0138 by Fredy Johansen, RN  Outcome: Progressing     Problem: Pain  Goal: Verbalizes/displays adequate comfort level or baseline comfort level  7/25/2024 1122 by Vicky Lovell LPN  Outcome: Progressing  7/25/2024 0138 by Fredy Johansen, RN  Outcome: Progressing     Problem: Safety - Adult  Goal: Free from fall injury  7/25/2024 1122 by Vicky Lovell LPN  Outcome: Progressing  Flowsheets (Taken 7/25/2024 0840)  Free From Fall Injury: Instruct family/caregiver on patient safety  7/25/2024 0138 by Fredy Johansen, RN  Outcome: Progressing     Problem: Chronic Conditions and Co-morbidities  Goal: Patient's chronic conditions and co-morbidity symptoms are monitored and maintained or improved  7/25/2024 1122 by Vicky Lovell LPN  Outcome: Progressing  Flowsheets (Taken 7/25/2024 0840)  Care Plan - Patient's Chronic Conditions and Co-Morbidity Symptoms are Monitored and Maintained or Improved:   Monitor and assess patient's chronic conditions and comorbid symptoms for stability, deterioration, or improvement   Collaborate with multidisciplinary team to address chronic and comorbid conditions and prevent exacerbation or deterioration   Update acute care plan with appropriate goals if

## 2024-07-25 NOTE — PROGRESS NOTES
Physical Therapy  Attempting to see patient for PT this am. PCT exiting room upon arrival and reports patient's blood sugar is 56 and patient is symptomatic. Will defer therapy at this time and follow back later as appropriate.  Thank you,  Anuradha Ignacio, PT

## 2024-07-25 NOTE — PROGRESS NOTES
Occupational Therapy  7/25/2024    Visited pt for therapy. Pt PARIS for permCath placement. Will continue to follow.     Thank you,   Carli Acosta, OTR/L

## 2024-07-25 NOTE — PROGRESS NOTES
End of Shift Note    Bedside shift change report given to BHAKTI Daniel (oncoming nurse) by Vicky Lovell LPN (offgoing nurse).  Report included the following information SBAR, ED Summary, OR Summary, Procedure Summary, Intake/Output, MAR, and Recent Results    Shift worked:  7a-7p     Shift summary and any significant changes:     Hypoglycemic episode this morning (see note)  Permacath placed  Daily dressing changed (see note)  Pt stated he did not wish to be a DNR anymore, now full code (see note)  Oxycodone 10mg given twice  IV dilaudid given once, needs to go 24hrs without to be able to go to rehab   Concerns for physician to address:  Barriers for discharge     Zone phone for oncoming shift:   [phone]       Activity:     Number times ambulated in hallways past shift: 0  Number of times OOB to chair past shift: 0    Cardiac:   Cardiac Monitoring: No           Access:  Current line(s): PIV and central line     Genitourinary:   Urinary status: voiding    Respiratory:      Chronic home O2 use?: NO  Incentive spirometer at bedside: YES       GI:     Current diet:  ADULT DIET; Regular; 5 carb choices (75 gm/meal); Low Fat/Low Chol/High Fiber/2 gm Na; Low Phosphorus (Less than 1000 mg)  Passing flatus: YES  Tolerating current diet: YES       Pain Management:   Patient states pain is manageable on current regimen: NO    Skin:     Interventions: float heels, increase time out of bed, PT/OT consult, and limit briefs    Patient Safety:  Fall Score:    Interventions: bed/chair alarm, assistive device (walker, cane. etc), gripper socks, pt to call before getting OOB, stay with me (per policy), and gait belt       Length of Stay:  Expected LOS: 13  Actual LOS: 12      Vicky Lovell LPN

## 2024-07-25 NOTE — PROGRESS NOTES
1356 - While receiving report from X-ray recovery, nurse mentioned that pt stated he may not want to be a DNR anymore. Stated pt said he only signed it because he \"felt like no one wanted him around\". Alerted MD Kristina. This nurse discussed with pt what full code status means and what is included in changing code status. Pt confirmed changing of code status. Code status changed to full code. Unit CCL and Charge nurse aware. MD to discuss with pt tomorrow morning

## 2024-07-25 NOTE — PROGRESS NOTES
Daily BKA dressing changed performed this morning. Xeroform ABD pad and gauze placed and secured with tape. Dressing dated and initialed.

## 2024-07-25 NOTE — PROGRESS NOTES
1126 - PCT alerted nurse to blood sugar result of 58. Pt symptomatic, states he feels weak and dizzy. Pt skin was cool and clammy. Pt awake but drowsy. D-10 Bolus started for 125mL at 937.5mL/hour.     1142 - Once bolus was complete, blood sugar was rechecked and resulted at 95. Pt continues to be symptomatic (drowsy and states he feels \"half-drunk and not feeling good\". MD alerted and order placed for D10 to be running at 75mL continously.    1205 - Pt continued to be symptomatic and blood sugar was rechecked, resulting at 64. Pt not responding to verbal commands at this time. Rapid Response called. Unit CCL and Charge nurse at bedside. Rapid response nurse and MD Kristina at bedside as well. D10 bolus given again for 125mL at 937.5mL/hour.     1216 - Second Bolus complete and blood sugar was rechecked, resulting at 101. D10 to continuously run at 100mL/hour until pt to come back from procedure.     1246 - Blood sugar rechecked, resulting at 78, D10 to continue running at 100mL/hour.

## 2024-07-25 NOTE — PROGRESS NOTES
End of Shift Note    Bedside shift change report given to BHAKTI Perry (oncoming nurse) by Fredy Johansen RN (offgoing nurse).  Report included the following information SBAR    Shift worked:  7073-2080     Shift summary and any significant changes:    Pt's pain controlled by prn meds 3x,  Instructed pt of his diet (NPO), patients understand.  Otherwise uneventful shift   Concerns for physician to address:      Zone phone for oncoming shift:           Fredy Johansen RN

## 2024-07-25 NOTE — PROGRESS NOTES
Hospitalist Progress Note    NAME:   Keaton Van   : 1965   MRN: [id number]     Date/Time: 2024 12:00 PM  Patient PCP: Robert Wells PA-C    Estimated discharge date:   Barriers: Placement ,perm Cath placement on        Assessment / Plan:  RRT on   for symptomatic hypoglycemia in diabetic patient   -Patient remained NPO for permCath procedure today   - BG in 6)s   - States feeling dizzy and would like to eat   -S/p D10 bolus and started on D10 @100ml/hr until the procedure, resume diet post procedure  -Continue monitoring BG       S/p right below-knee amputation on 2024.  Acute on chronic osteomyelitis  and necrotic wound of right foot secondary to non healing Diabetic wound   S/p Right foot 5th toeresection w Dr Jama on 24  Hx prior toe amputations left foot.  PVD.  S/p vanco and zosyn  Blood cultures NGTD    Palliative recs appreciated: adjusted pain medications   S/p  Dilaudid PCA and started pm IV dilaudid 0.5 mg iv prn +Lorena PRN.    Discussed with patient about discharge planning and plan to continue with oral pain medications- plan to avoid iv pain medications   Completes  Augmentin for 10 days total on      Hypertensive urgency 2/2 medication noncompliance, pain also likely a factor  Chronic diastolic HF not in exacerbation  Paroxysmal Atrial fibrillation on last admission, currently SR  Continue  home amlodipine, carvedilol (low dose d/t cocaine abuse), hydralazine, imdur, bumex  BG at acceptable range      DM with HbA1c- 6.5  Cont current dose of lantus   Continue sliding scale insulin.  Endocrine recommended - 30 units of lantus- however due to hypoglycemic episode continued with 20 units for now.        KIN - multiple etiology - ATN vs progression of CKD  CKD 4    Hyperkalemia - resolved   Mickey catheter inserted and started on dialysis on    perm cath on   HD as per renal team   Lowered dose of Gabapentin as per renal recs to 200mg tid   Lokelma

## 2024-07-25 NOTE — SIGNIFICANT EVENT
RAPID RESPONSE TEAM    Overhead rapid response paged to room #3109 at 1207    Reason for rapid response:  Symptomatic hypoglycemia    Background:  Admit 7/13 w/ acute on chronic osteomyelitis and necrotic wound of right foot s/p right BKA 7/16. NPO pending permcath procedure today.     Assessment/Interventions:  Patient alert and oriented, warm, pink and diaphoretic, breathing normally upon RRT arrival. Per primary RN, patient administered D10 bolus, D10 gtt initiated per provider order and patient is improving in mental status. Initial FSBS 58 preceding RRT.     Dr. Acevedo at bedside, D10 gtt ordered prior to her arrival. No further orders. Pt to remain on D10 @ 100ml/hr until after planned procedure.     Outcome:  pt to remain in room #3109     Please call with any questions or concerns    Sriram Li RN  Rapid Response Team  Ext [phone]    Recent Results (from the past 8 hour(s))   POCT Glucose    Collection Time: 07/25/24  8:41 AM   Result Value Ref Range    POC Glucose 138 (H) 65 - 117 mg/dL    Performed by: Liliya Perry LPN    POCT Glucose    Collection Time: 07/25/24 11:26 AM   Result Value Ref Range    POC Glucose 58 (L) 65 - 117 mg/dL    Performed by: Juan Carlos ROSS    POCT Glucose    Collection Time: 07/25/24 11:42 AM   Result Value Ref Range    POC Glucose 95 65 - 117 mg/dL    Performed by: Liliya Perry LPN    POCT Glucose    Collection Time: 07/25/24 12:05 PM   Result Value Ref Range    POC Glucose 64 (L) 65 - 117 mg/dL    Performed by: Liliya Perry LPN    POCT Glucose    Collection Time: 07/25/24 12:16 PM   Result Value Ref Range    POC Glucose 101 65 - 117 mg/dL    Performed by: Liliya Perry LPN    POCT Glucose    Collection Time: 07/25/24 12:46 PM   Result Value Ref Range    POC Glucose 78 65 - 117 mg/dL    Performed by: Liliya Perry LPN    POCT Glucose    Collection Time: 07/25/24  1:07 PM   Result Value Ref Range    POC Glucose 72 65 - 117 mg/dL    Performed by: Modesto Burrows RN

## 2024-07-25 NOTE — PROGRESS NOTES
Physical Therapy  Attempting again this pm to see patient for PT. Patient is currently PARIS for procedure.  Will defer therapy today and follow back tomorrow as appropriate.  Thank you,  Anuradha Ignacio, PT

## 2024-07-25 NOTE — CONSULTS
This is a 59-year-old gentleman well-known to the program for diabetes health with multiple comorbidities including uncontrolled type 2 diabetes mellitus, coronary artery disease, polysubstance abuse, chronic kidney disease and bilateral foot wounds with osteomyelitis who was admitted for right BKA which was performed July 16, 2024.  Admission labs notable for a glucose of 290, creatinine 2.72, positive drug screen, hemoglobin 7.3, platelets 119, A1c 6.5%.  The patient reports that he takes 20 units of Lantus at night and 10 units of Lantus in the morning.  We are asked to assist in diabetes management   FBG within goal this morning. However, the patient with hypoglycemic episode by the afternoon. The insulin dosing ahs been reduced.     Examination  Blood pressure 142/76  Pulse 70  Afebrile  95% on room air    Recent laboratory data  Glucose 155 fasting,   Creatinine 2.41  Hemoglobin 8.1    Impression  1.  Type 2 diabetes mellitus with a very reasonable A1c on 30 units of basal insulin daily which the patient apparently takes in split doses at home.   2.  Osteomyelitis status post right BKA  3.  Chronic kidney disease stage IV  4.  History of substance abuse    Plan:  1.  Reduce to 15 units Lantus daily and mealtime humalog, 3 units/consumed meal.  2.  We will follow with you  3.  Rest per team    Before making these care recommendations, I personally reviewed the hospitalization record, including notes, laboratory & diagnostic data and current medications, and examined the patient at the bedside. Total time 25 minutes,.     Please note that this dictation was completed with [employer], the computer voice recognition software.  Quite often unanticipated grammatical, syntax, homophones, and other interpretive errors are inadvertently transcribed by the computer software.  Please disregard these errors.  Please excuse any errors that have escaped final proofreading.

## 2024-07-25 NOTE — PROGRESS NOTES
Obtained Blood sugar with Accu-check, blood sugar resulted at 138. Accu-check not docking so result was unable to transmit. Unit CCL aware

## 2024-07-26 VITALS
HEART RATE: 80 BPM | OXYGEN SATURATION: 94 % | BODY MASS INDEX: 21.82 KG/M2 | SYSTOLIC BLOOD PRESSURE: 178 MMHG | WEIGHT: 170 LBS | DIASTOLIC BLOOD PRESSURE: 87 MMHG | TEMPERATURE: 97.9 F | RESPIRATION RATE: 17 BRPM | HEIGHT: 74 IN

## 2024-07-26 PROBLEM — M86.171 ACUTE OSTEOMYELITIS OF RIGHT FOOT (HCC): Status: RESOLVED | Noted: 2024-06-05 | Resolved: 2024-07-26

## 2024-07-26 PROBLEM — M86.9 OSTEOMYELITIS (HCC): Status: RESOLVED | Noted: 2022-12-05 | Resolved: 2024-07-26

## 2024-07-26 PROBLEM — K59.03 DRUG-INDUCED CONSTIPATION: Status: RESOLVED | Noted: 2024-07-20 | Resolved: 2024-07-26

## 2024-07-26 PROBLEM — Z51.5 PALLIATIVE CARE ENCOUNTER: Status: RESOLVED | Noted: 2024-07-18 | Resolved: 2024-07-26

## 2024-07-26 PROBLEM — R52 PAIN: Status: RESOLVED | Noted: 2024-07-18 | Resolved: 2024-07-26

## 2024-07-26 LAB
ALBUMIN SERPL-MCNC: 2.3 G/DL (ref 3.5–5)
ANION GAP SERPL CALC-SCNC: 5 MMOL/L (ref 5–15)
BUN SERPL-MCNC: 62 MG/DL (ref 6–20)
BUN/CREAT SERPL: 24 (ref 12–20)
CALCIUM SERPL-MCNC: 8.8 MG/DL (ref 8.5–10.1)
CHLORIDE SERPL-SCNC: 102 MMOL/L (ref 97–108)
CO2 SERPL-SCNC: 28 MMOL/L (ref 21–32)
CREAT SERPL-MCNC: 2.58 MG/DL (ref 0.7–1.3)
ERYTHROCYTE [DISTWIDTH] IN BLOOD BY AUTOMATED COUNT: 16.3 % (ref 11.5–14.5)
GLUCOSE BLD STRIP.AUTO-MCNC: 158 MG/DL (ref 65–117)
GLUCOSE BLD STRIP.AUTO-MCNC: 207 MG/DL (ref 65–117)
GLUCOSE SERPL-MCNC: 209 MG/DL (ref 65–100)
HBV CORE AB SERPL QL IA: NEGATIVE
HCT VFR BLD AUTO: 24.6 % (ref 36.6–50.3)
HGB BLD-MCNC: 7.7 G/DL (ref 12.1–17)
MCH RBC QN AUTO: 27.4 PG (ref 26–34)
MCHC RBC AUTO-ENTMCNC: 31.3 G/DL (ref 30–36.5)
MCV RBC AUTO: 87.5 FL (ref 80–99)
NRBC # BLD: 0 K/UL (ref 0–0.01)
NRBC BLD-RTO: 0 PER 100 WBC
PHOSPHATE SERPL-MCNC: 4.1 MG/DL (ref 2.6–4.7)
PLATELET # BLD AUTO: 217 K/UL (ref 150–400)
PMV BLD AUTO: 8.9 FL (ref 8.9–12.9)
POTASSIUM SERPL-SCNC: 4.6 MMOL/L (ref 3.5–5.1)
RBC # BLD AUTO: 2.81 M/UL (ref 4.1–5.7)
SERVICE CMNT-IMP: ABNORMAL
SERVICE CMNT-IMP: ABNORMAL
SODIUM SERPL-SCNC: 135 MMOL/L (ref 136–145)
WBC # BLD AUTO: 5.4 K/UL (ref 4.1–11.1)

## 2024-07-26 PROCEDURE — 82962 GLUCOSE BLOOD TEST: CPT

## 2024-07-26 PROCEDURE — 80069 RENAL FUNCTION PANEL: CPT

## 2024-07-26 PROCEDURE — 6370000000 HC RX 637 (ALT 250 FOR IP): Performed by: SURGERY

## 2024-07-26 PROCEDURE — 6370000000 HC RX 637 (ALT 250 FOR IP): Performed by: STUDENT IN AN ORGANIZED HEALTH CARE EDUCATION/TRAINING PROGRAM

## 2024-07-26 PROCEDURE — 6370000000 HC RX 637 (ALT 250 FOR IP)

## 2024-07-26 PROCEDURE — 36556 INSERT NON-TUNNEL CV CATH: CPT

## 2024-07-26 PROCEDURE — 6360000002 HC RX W HCPCS: Performed by: INTERNAL MEDICINE

## 2024-07-26 PROCEDURE — 90935 HEMODIALYSIS ONE EVALUATION: CPT

## 2024-07-26 PROCEDURE — 2700000000 HC OXYGEN THERAPY PER DAY

## 2024-07-26 PROCEDURE — 6370000000 HC RX 637 (ALT 250 FOR IP): Performed by: INTERNAL MEDICINE

## 2024-07-26 PROCEDURE — 6370000000 HC RX 637 (ALT 250 FOR IP): Performed by: NURSE PRACTITIONER

## 2024-07-26 PROCEDURE — 36415 COLL VENOUS BLD VENIPUNCTURE: CPT

## 2024-07-26 PROCEDURE — 94760 N-INVAS EAR/PLS OXIMETRY 1: CPT

## 2024-07-26 PROCEDURE — 85027 COMPLETE CBC AUTOMATED: CPT

## 2024-07-26 PROCEDURE — 6370000000 HC RX 637 (ALT 250 FOR IP): Performed by: GENERAL ACUTE CARE HOSPITAL

## 2024-07-26 RX ORDER — PANTOPRAZOLE SODIUM 40 MG/1
40 TABLET, DELAYED RELEASE ORAL
Qty: 30 TABLET | Refills: 3 | Status: SHIPPED | OUTPATIENT
Start: 2024-07-27

## 2024-07-26 RX ORDER — CARVEDILOL 6.25 MG/1
6.25 TABLET ORAL 2 TIMES DAILY WITH MEALS
Qty: 60 TABLET | Refills: 3 | Status: SHIPPED | OUTPATIENT
Start: 2024-07-26

## 2024-07-26 RX ORDER — INSULIN GLARGINE 100 [IU]/ML
15 INJECTION, SOLUTION SUBCUTANEOUS DAILY
Qty: 10 ML | Refills: 3 | Status: SHIPPED | OUTPATIENT
Start: 2024-07-26

## 2024-07-26 RX ORDER — OXYCODONE HYDROCHLORIDE 10 MG/1
10 TABLET ORAL EVERY 4 HOURS PRN
Qty: 4 TABLET | Refills: 0 | Status: SHIPPED | OUTPATIENT
Start: 2024-07-26 | End: 2024-07-27

## 2024-07-26 RX ORDER — BUMETANIDE 2 MG/1
2 TABLET ORAL 2 TIMES DAILY
Qty: 30 TABLET | Refills: 3 | Status: SHIPPED | OUTPATIENT
Start: 2024-07-26

## 2024-07-26 RX ORDER — CLONIDINE HYDROCHLORIDE 0.2 MG/1
0.2 TABLET ORAL EVERY 8 HOURS PRN
Qty: 60 TABLET | Refills: 3 | Status: SHIPPED | OUTPATIENT
Start: 2024-07-26

## 2024-07-26 RX ORDER — DULOXETIN HYDROCHLORIDE 20 MG/1
20 CAPSULE, DELAYED RELEASE ORAL DAILY
Qty: 30 CAPSULE | Refills: 3 | Status: SHIPPED | OUTPATIENT
Start: 2024-07-26

## 2024-07-26 RX ORDER — INSULIN LISPRO 100 [IU]/ML
0-8 INJECTION, SOLUTION INTRAVENOUS; SUBCUTANEOUS
Qty: 1 EACH | Refills: 0 | Status: SHIPPED | OUTPATIENT
Start: 2024-07-26

## 2024-07-26 RX ADMIN — ASPIRIN 81 MG: 81 TABLET, COATED ORAL at 14:20

## 2024-07-26 RX ADMIN — HYDRALAZINE HYDROCHLORIDE 50 MG: 50 TABLET ORAL at 06:19

## 2024-07-26 RX ADMIN — ISOSORBIDE MONONITRATE 30 MG: 30 TABLET, EXTENDED RELEASE ORAL at 14:20

## 2024-07-26 RX ADMIN — HEPARIN SODIUM 1800 UNITS: 1000 INJECTION INTRAVENOUS; SUBCUTANEOUS at 12:15

## 2024-07-26 RX ADMIN — DULOXETINE HYDROCHLORIDE 20 MG: 20 CAPSULE, DELAYED RELEASE ORAL at 14:20

## 2024-07-26 RX ADMIN — PANTOPRAZOLE SODIUM 40 MG: 40 TABLET, DELAYED RELEASE ORAL at 06:18

## 2024-07-26 RX ADMIN — OXYCODONE 10 MG: 5 TABLET ORAL at 06:19

## 2024-07-26 RX ADMIN — OXYCODONE 10 MG: 5 TABLET ORAL at 14:19

## 2024-07-26 RX ADMIN — SENNOSIDES AND DOCUSATE SODIUM 2 TABLET: 50; 8.6 TABLET ORAL at 14:20

## 2024-07-26 RX ADMIN — INSULIN GLARGINE 15 UNITS: 100 INJECTION, SOLUTION SUBCUTANEOUS at 14:19

## 2024-07-26 RX ADMIN — HYDRALAZINE HYDROCHLORIDE 50 MG: 50 TABLET ORAL at 14:20

## 2024-07-26 RX ADMIN — GABAPENTIN 200 MG: 100 CAPSULE ORAL at 14:20

## 2024-07-26 ASSESSMENT — PAIN SCALES - GENERAL
PAINLEVEL_OUTOF10: 8

## 2024-07-26 ASSESSMENT — PAIN DESCRIPTION - ORIENTATION
ORIENTATION: RIGHT
ORIENTATION: RIGHT

## 2024-07-26 ASSESSMENT — PAIN DESCRIPTION - DESCRIPTORS
DESCRIPTORS: THROBBING
DESCRIPTORS: THROBBING

## 2024-07-26 ASSESSMENT — PAIN DESCRIPTION - LOCATION
LOCATION: FOOT;LEG
LOCATION: FOOT;LEG

## 2024-07-26 NOTE — PROGRESS NOTES
Chart reviewed for PCP hospital follow up. Patient's current TAN is 7/26/24. Patient is currently recommended for IPR. Pending patient discharge.

## 2024-07-26 NOTE — PROGRESS NOTES
Occupational Therapy    Chart reviewed. Pt is currently off floor. Will defer therapy and follow-up later as able and medically appropriate. Thanks.    Miriam Mendez MS, OTR/L

## 2024-07-26 NOTE — DISCHARGE SUMMARY
Discharge Summary    Name: Keaton Van  [id number]  YOB: 1965 (Age: 59 y.o.)   Date of Admission: 7/13/2024  Date of Discharge: 7/26/2024  Attending Physician: Inga Acevedo MD    Discharge Diagnosis:   S/p right below-knee amputation on 7/16/2024.  Acute on chronic osteomyelitis  and necrotic wound of right foot secondary to non healing Diabetic wound   S/p Right foot 5th toeresection w Dr Jama on 6/1/24  Hx prior toe amputations left foot.  PVD.  Hypertensive urgency 2/2 medication noncompliance, pain also likely a factor  Chronic diastolic HF not in exacerbation  Paroxysmal Atrial fibrillation on last admission, currently SR  DM with HbA1c- 6.5   Transient Hypoglycemic episodes on 7/25 due to NPO  KIN - multiple etiology - ATN vs progression of CKD  CKD 4    Hyperkalemia - resolved   Chronic anemia, likely YENNY +/- anemia of CKD   Bipolar I  Polysubstance abuse incl heavy crack cocaine use  Chronic pain  Medication noncompliance, financial and social barriers  Hx untreated HCV      Consultations:  IP CONSULT TO HOSPITALIST  IP CONSULT TO PODIATRY  IP CONSULT TO CASE MANAGEMENT  IP CONSULT TO DIETITIAN  IP CONSULT TO DIABETES MANAGEMENT  IP CONSULT TO VASCULAR SURGERY  IP CONSULT TO NEPHROLOGY  IP CONSULT TO CASE MANAGEMENT  IP CONSULT TO CASE MANAGEMENT      Brief Admission History/Reason for Admission Per Nino Alicia MD:   Keaton Van is a 59 y.o.  male with PMHx significant for bipolar I, depression/anxiety, polysubstance abuse particularly crack cocaine, medication noncompliance, uncontrolled DM2, HCV, progressive CKD4, chronic bilateral foot wounds.  He has been admitted at [facility] prior times this year (5/29-6/5, 6/7-6/9, 6/12-6/17, and 6/19-6/24).  Review of these records reveals he has been treated for right 5th toe osteomyelitis s/p resection on 6/1 by Dr Jama, with hospitalizations complicated by pAF, worsening CKD, chronic anemia, acute  MG tablet  DULoxetine 20 MG extended release capsule  insulin glargine 100 UNIT/ML injection vial  insulin lispro 100 UNIT/ML Soln injection vial  pantoprazole 40 MG tablet       Information about where to get these medications is not yet available    Ask your nurse or doctor about these medications  oxyCODONE HCl 10 MG immediate release tablet             DISPOSITION:    Home with Family:       Home with HH/PT/OT/RN:    SNF/LTC: X   KHURRAM:    OTHER:            Code status: Full   Recommended diet: diabetic diet  Recommended activity: activity as tolerated  Wound care: See surgical/procedure care instructions      Follow up with:   PCP : Robert Wells PA-C    [facility]   Address: [address], [place] [address]    Phone: [phone]  Follow up  Please contact for assistance with social service needs or Medicaid screenings.    [facility]  Address: [address], [place] [address]    Phone: [phone]  Follow up  Please contact for assistance with substance use needs.    [facility]  Phone: [phone]  Fax: [phone]  Follow up  Please contact for assistance with substance use needs.    [facility]  Phone: [phone]  Follow up  Please contact to have a provider see you at home, if needed.          Total time in minutes spent coordinating this discharge (includes going over instructions, follow-up, prescriptions, and preparing report for sign off to her PCP) :  35 minutes

## 2024-07-26 NOTE — PROGRESS NOTES
[facility] transport at bedside requesting a copy of the AVS. Provided the AVS discharge paperwork & facesheet for transporter. Brenda YA calling report to facility. Notified Dr. Acevedo of need for signing EMTALA.   1715 - Fadumo called from facility looking for Latuda medication. Per the AVS, pt was prescribed this prior to admission. Fadumo states this medication was not a home med. Attempted to page physician but unable to page since pt is discharged.

## 2024-07-26 NOTE — PROGRESS NOTES
End of Shift Note    Bedside shift change report given to BHAKTI Hirsch (oncoming nurse) by Fredy Johansen RN (offgoing nurse).  Report included the following information SBAR    Shift worked:  7868-7679     Shift summary and any significant changes:    Pt complained of pain, prn meds given  Was able to rest this shift  1 episode of BM this morning  voiding  Uneventful shift     Concerns for physician to address:      Zone phone for oncoming shift:               Fredy Johansen RN

## 2024-07-26 NOTE — FLOWSHEET NOTE
Post hd note   07/26/24 0845   Vital Signs   BP (!) 155/84   Temp 98.4 °F (36.9 °C)   Pulse 68   Respirations 16   SpO2 93 %   Pain Assessment   Pain Level 8   Pain Location Foot;Leg   Pain Orientation Right   Pain Descriptors Throbbing   Patient's Stated Pain Goal 0 - No pain   Treatment   Time On 0845   Treatment Goal 2500   Observations & Evaluations   Level of Consciousness 0   Oriented X 4   Heart Rhythm Regular   Respiratory Quality/Effort Unlabored   O2 Device Nasal cannula   Bilateral Breath Sounds Diminished   Skin Color Pale   Skin Condition/Temp Dry;Fragile;Warm   Edema Right upper extremity   RUE Edema +1   LUE Edema +1   RLE Edema +1   LLE Edema +2   Perineal Edema +2   Technical Checks   Dialysis Machine No. 09   RO Machine Number [id number]   Dialyzer Lot No. [id number]   Tubing Lot Number [id number]   All Connections Secure Yes   NS Bag Yes   Saline Line Double Clamped Yes   Dialyzer Revaclear 300   Prime Volume (mL) 300 mL   ICEBOAT I;C;E;B;A;O;T   RO Machine Log Sheet Completed Yes   Machine Alarm Self Test Completed;Passed   Air Foam Detector Tested;Proper Function   Extracorporeal Circuit Tested for Integrity Yes   Machine Conductivity 13.8   Manual Conductivity 14   Machine Ph 7.4   Bleach Test (Neg) Yes   Bath Temperature 97.5 °F (36.4 °C)   Dialysis Bath   K+ (Potassium) 2   Ca+ (Calcium) 2.5   Na+ (Sodium) 138   HCO3 (Bicarb) 40   Bicarbonate Concentrate Lot No. [id number]   Acid Concentrate Lot No. [id number]   Treatment Initiation   Dialyze Hours 3.5   Treatment  Initiation Universal Precautions maintained;Lines secured to patient;Connections secured;Prime given;Venous Parameters set;Arterial Parameters set;Air foam detector engaged;Dialysate;Saline line double clamped;Hemo-Safe Applied;Dialyzer;Revaclear Dialyzer;Kidney;REV-300   Hemodialysis Central Access Right Neck   Placement Date/Time: 07/23/24 1419   Present on Admission/Arrival: No  Inserted by: Dr. German Rdz  Insertion Practices:    the WHO International Reference Preparation.  Assay performance has not been established in pregnant women,   patients who are immunosuppressed or immunocompromised, nor have   performance characteristics been established in conjunction with   other 's assays for specific HBV serologic markers. This   assay does not differentiate between vaccine induced immune response   and a response due to infection with HBV. Passively acquired anti-HBs   may be identified following patient transfusion, receipt of   immunoglobulin products, etc.         Post hd note    07/26/24 1215   Vital Signs   BP (!) 185/99   Pulse 73   Respirations 16   SpO2 96 %   Pain Assessment   Pain Assessment None - Denies Pain   Treatment   Time Off 1215   Observations & Evaluations   Level of Consciousness 0   Oriented X 4   Heart Rhythm Regular   Respiratory Quality/Effort Unlabored   O2 Device Nasal cannula   Bilateral Breath Sounds Clear   Edema Left lower extremity   LLE Edema +1   Hemodialysis Central Access Right Neck   Placement Date/Time: 07/23/24 1419   Present on Admission/Arrival: No  Inserted by: Dr. German Rdz  Insertion Practices: Chlorohexadine skin antisepsis;Hand hygiene;Maximal barrier precautions;Optimal catheter site selection;Sterile ultrasound t...   Venous Lumen Status Alcohol cap applied;Flushed;Heparin locked   Arterial Lumen Status Alcohol cap present;Flushed;Heparin locked   Alcohol Cap Used Yes   Line Care Cap changed;Connections checked and tightened;Chlorhexidine wipes;Ports disinfected   Dressing Type Bacteriocidal;Sterile dressing, transparent   Date of Last Dressing Change 07/25/24   Dressing Status New dressing applied;Clean, dry & intact   Dressing Change Due 07/30/24      RN completed patient assessment. RN reviewed technicians vital signs and procedure note. Tx completed. Reviewed by BHAKTI Crane  Pt. Aurelio hd without incident, no s/s of distress. All possible blood returned to pt.

## 2024-07-26 NOTE — PROGRESS NOTES
Nephrology Progress Note  [facility] [place] Office  [address], [address]  [place] [address]  Phone - [phone]  Fax - [phone]                 Patient: Keaton Van                     YOB: 1965        Date- 7/26/2024                                     Admit Date: 7/13/2024   CC: Follow up for kin , hyperkalemia    IMPRESSION & PLAN:   KIN  LIKELY Due to multiple etiology-- ATN vs progression of ckd  CKD 4 likely due to DM nephropathy  hyperkalemia  HYPONATREMIA  S/p R BKA - 7/15/2024  CHF  HTN  DM  Anemia       PLAN-  Seen on hd today  Plan for hd MWF schedule  S/p permacath placement  Continue norvasc, coreg, hydralazine  Avoid acei or arb  Set up out pt hd unit close to home      Subjective:  Interval History:   Seen on hd  Bp high  No sob    Objective:   Vitals:    07/26/24 1030 07/26/24 1045 07/26/24 1100 07/26/24 1115   BP: (!) 151/79 (!) 153/84 (!) 163/87 (!) 166/88   Pulse: 64 66 64 68   Resp: 18 16 16 16   Temp:       TempSrc:       SpO2: 96% 95% 95% 94%   Weight:       Height:          I/O last 3 completed shifts:  In: 200 [P.O.:200]  Out: 1000 [Urine:1000]  No intake/output data recorded.      Physical exam:    GEN: NAD  NECK:  Supple, no thyromegaly  CVS: S1,S2,RRR  RESP: no wheezing  NEURO:non focal  EXT: right BKA +   SKIN: No Rash  Right  pearmacath cath +        Pertinent Notes reviewed.     Data Review :  Lab Results   Component Value Date/Time     07/26/2024 06:41 AM    K 4.6 07/26/2024 06:41 AM     07/26/2024 06:41 AM    CO2 28 07/26/2024 06:41 AM    BUN 62 07/26/2024 06:41 AM    CREATININE 2.58 07/26/2024 06:41 AM    GLUCOSE 209 07/26/2024 06:41 AM    CALCIUM 8.8 07/26/2024 06:41 AM       Lab Results   Component Value Date    WBC 5.4 07/26/2024    HGB 7.7 (L) 07/26/2024    HCT 24.6 (L) 07/26/2024    MCV 87.5 07/26/2024     07/26/2024      Recent Labs     07/24/24  0931 07/25/24  0607

## 2024-07-26 NOTE — CARE COORDINATION
Transition of Care Plan:     RUR: 33% (high RUR, readmission)  Prior Level of Functioning: Needing assistance with mobility  Disposition: IPR.   If SNF or IPR: Date FOC offered: 7/18/24 IPR  Date FOC received: 7/18  Accepting facility:   [facility]@4:30PM  Report# [phone]  Accepting MD: Dr. Del Castillo  Date authorization started with reference number: N/A   Date authorization received and expires: N/A  Follow up appointments: defer to rehab  DME needed: defer to rehab.  Patient has a cane at home.  Transportation at discharge: H2H @4:30PM   IM/IMM Medicare/ letter given: 2nd IM given 7/25  Is patient a  and connected with [facility]? No, per previous CM conversation with VA.              If yes, was  transfer form completed and VA notified? N/A  Caregiver Contact: Patient has adult son that he does not wish to list at this time.  Patient did not want to complete ACP documents.   Evelyn Guallpa - sister - [phone]; Anuradha Brown - sister - [phone]  Discharge Caregiver contacted prior to discharge? Patient to contact.  Care Conference needed? No.  Barriers to discharge: N/A              CM acknowledged d/c. Chart reviewed.  Pt will d/c to [facility] for IPR intervention. [facility] will transport pt @4:30PM. Pt and nurse notified. CM spoke with Susan @[facility] and she stated pt would go there or [facility] for HD. Makayla Gongora @[facility] stated she would contact pts sister. Pt stated he would call his sister to notify her of d/c plan. Pts transportation documents left in pts chart. CM will remain accessible if any needs arise prior to discharge.      MONIQUE Alcaraz,CM  [phone]

## 2024-08-01 ENCOUNTER — HOSPITAL ENCOUNTER (OUTPATIENT)
Facility: HOSPITAL | Age: 59
Setting detail: SPECIMEN
Discharge: HOME | End: 2024-08-04

## 2024-08-06 ENCOUNTER — HOSPITAL ENCOUNTER (OUTPATIENT)
Facility: HOSPITAL | Age: 59
Setting detail: SPECIMEN
Discharge: HOME | End: 2024-08-09

## 2024-08-06 PROCEDURE — 87070 CULTURE OTHR SPECIMN AEROBIC: CPT

## 2024-08-06 PROCEDURE — 87205 SMEAR GRAM STAIN: CPT

## 2024-08-08 LAB
BACTERIA SPEC CULT: ABNORMAL
GRAM STN SPEC: ABNORMAL
GRAM STN SPEC: ABNORMAL
Lab: ABNORMAL

## 2024-09-15 ENCOUNTER — HOSPITAL ENCOUNTER (INPATIENT)
Facility: HOSPITAL | Age: 59
LOS: 2 days | Discharge: HOME | DRG: 917 | End: 2024-09-17
Attending: STUDENT IN AN ORGANIZED HEALTH CARE EDUCATION/TRAINING PROGRAM | Admitting: INTERNAL MEDICINE
Payer: MEDICARE

## 2024-09-15 ENCOUNTER — APPOINTMENT (OUTPATIENT)
Facility: HOSPITAL | Age: 59
DRG: 917 | End: 2024-09-15
Payer: MEDICARE

## 2024-09-15 DIAGNOSIS — N18.6 TYPE 2 DIABETES MELLITUS WITH ESRD (END-STAGE RENAL DISEASE) (HCC): ICD-10-CM

## 2024-09-15 DIAGNOSIS — N17.9 AKI (ACUTE KIDNEY INJURY) (HCC): Primary | ICD-10-CM

## 2024-09-15 DIAGNOSIS — J96.01 ACUTE HYPOXIC RESPIRATORY FAILURE (HCC): ICD-10-CM

## 2024-09-15 DIAGNOSIS — T40.601A OPIATE OVERDOSE, ACCIDENTAL OR UNINTENTIONAL, INITIAL ENCOUNTER (HCC): ICD-10-CM

## 2024-09-15 DIAGNOSIS — E11.22 TYPE 2 DIABETES MELLITUS WITH ESRD (END-STAGE RENAL DISEASE) (HCC): ICD-10-CM

## 2024-09-15 PROBLEM — T40.2X1A OPIOID OVERDOSE, ACCIDENTAL OR UNINTENTIONAL, INITIAL ENCOUNTER (HCC): Status: ACTIVE | Noted: 2024-09-15

## 2024-09-15 PROBLEM — T50.901A DRUG OVERDOSE, ACCIDENTAL OR UNINTENTIONAL, INITIAL ENCOUNTER: Status: ACTIVE | Noted: 2024-09-15

## 2024-09-15 LAB
ANION GAP SERPL CALC-SCNC: 12 MMOL/L (ref 2–12)
APAP SERPL-MCNC: <2 UG/ML (ref 10–30)
BASE DEFICIT BLDV-SCNC: 4.3 MMOL/L
BASOPHILS # BLD: 0.2 K/UL (ref 0–0.1)
BASOPHILS NFR BLD: 2 % (ref 0–1)
BUN SERPL-MCNC: 30 MG/DL (ref 6–20)
BUN/CREAT SERPL: 9 (ref 12–20)
CALCIUM SERPL-MCNC: 9 MG/DL (ref 8.5–10.1)
CHLORIDE SERPL-SCNC: 96 MMOL/L (ref 97–108)
CO2 SERPL-SCNC: 24 MMOL/L (ref 21–32)
CREAT SERPL-MCNC: 3.52 MG/DL (ref 0.7–1.3)
DIFFERENTIAL METHOD BLD: ABNORMAL
EOSINOPHIL # BLD: 0.1 K/UL (ref 0–0.4)
EOSINOPHIL NFR BLD: 1 % (ref 0–7)
ERYTHROCYTE [DISTWIDTH] IN BLOOD BY AUTOMATED COUNT: 15.4 % (ref 11.5–14.5)
ETHANOL SERPL-MCNC: <10 MG/DL (ref 0–0.08)
GLUCOSE BLD STRIP.AUTO-MCNC: 124 MG/DL (ref 65–117)
GLUCOSE BLD STRIP.AUTO-MCNC: 342 MG/DL (ref 65–117)
GLUCOSE BLD STRIP.AUTO-MCNC: 370 MG/DL (ref 65–117)
GLUCOSE BLD STRIP.AUTO-MCNC: 407 MG/DL (ref 65–117)
GLUCOSE BLD STRIP.AUTO-MCNC: 505 MG/DL (ref 65–117)
GLUCOSE SERPL-MCNC: 590 MG/DL (ref 65–100)
HCO3 BLDV-SCNC: 25 MMOL/L (ref 23–28)
HCT VFR BLD AUTO: 39 % (ref 36.6–50.3)
HGB BLD-MCNC: 12.3 G/DL (ref 12.1–17)
IMM GRANULOCYTES # BLD AUTO: 0 K/UL (ref 0–0.04)
IMM GRANULOCYTES NFR BLD AUTO: 0 % (ref 0–0.5)
LYMPHOCYTES # BLD: 0.2 K/UL (ref 0.8–3.5)
LYMPHOCYTES NFR BLD: 2 % (ref 12–49)
MCH RBC QN AUTO: 27.8 PG (ref 26–34)
MCHC RBC AUTO-ENTMCNC: 31.5 G/DL (ref 30–36.5)
MCV RBC AUTO: 88 FL (ref 80–99)
METAMYELOCYTES NFR BLD MANUAL: 2 %
MONOCYTES # BLD: 0.4 K/UL (ref 0–1)
MONOCYTES NFR BLD: 5 % (ref 5–13)
MYELOCYTES NFR BLD MANUAL: 1 %
NEUTS BAND NFR BLD MANUAL: 1 %
NEUTS SEG # BLD: 6.9 K/UL (ref 1.8–8)
NEUTS SEG NFR BLD: 86 % (ref 32–75)
NRBC # BLD: 0 K/UL (ref 0–0.01)
NRBC BLD-RTO: 0 PER 100 WBC
PCO2 BLDV: 63.6 MMHG (ref 41–51)
PH BLDV: 7.2 (ref 7.32–7.42)
PLATELET # BLD AUTO: 129 K/UL (ref 150–400)
PMV BLD AUTO: 9.8 FL (ref 8.9–12.9)
PO2 BLDV: 31 MMHG (ref 25–40)
POTASSIUM SERPL-SCNC: 5.1 MMOL/L (ref 3.5–5.1)
RBC # BLD AUTO: 4.43 M/UL (ref 4.1–5.7)
RBC MORPH BLD: ABNORMAL
SALICYLATES SERPL-MCNC: 4.3 MG/DL (ref 2.8–20)
SAO2 % BLDV: 45.3 % (ref 65–88)
SERVICE CMNT-IMP: ABNORMAL
SODIUM SERPL-SCNC: 132 MMOL/L (ref 136–145)
SPECIMEN TYPE: ABNORMAL
TROPONIN I SERPL HS-MCNC: 19 NG/L (ref 0–76)
WBC # BLD AUTO: 7.9 K/UL (ref 4.1–11.1)

## 2024-09-15 PROCEDURE — 93005 ELECTROCARDIOGRAM TRACING: CPT | Performed by: EMERGENCY MEDICINE

## 2024-09-15 PROCEDURE — 99285 EMERGENCY DEPT VISIT HI MDM: CPT

## 2024-09-15 PROCEDURE — 36415 COLL VENOUS BLD VENIPUNCTURE: CPT

## 2024-09-15 PROCEDURE — 71045 X-RAY EXAM CHEST 1 VIEW: CPT

## 2024-09-15 PROCEDURE — 82803 BLOOD GASES ANY COMBINATION: CPT

## 2024-09-15 PROCEDURE — 6370000000 HC RX 637 (ALT 250 FOR IP): Performed by: INTERNAL MEDICINE

## 2024-09-15 PROCEDURE — 2060000000 HC ICU INTERMEDIATE R&B

## 2024-09-15 PROCEDURE — 80143 DRUG ASSAY ACETAMINOPHEN: CPT

## 2024-09-15 PROCEDURE — 80048 BASIC METABOLIC PNL TOTAL CA: CPT

## 2024-09-15 PROCEDURE — 96374 THER/PROPH/DIAG INJ IV PUSH: CPT

## 2024-09-15 PROCEDURE — 84484 ASSAY OF TROPONIN QUANT: CPT

## 2024-09-15 PROCEDURE — 2580000003 HC RX 258

## 2024-09-15 PROCEDURE — 85025 COMPLETE CBC W/AUTO DIFF WBC: CPT

## 2024-09-15 PROCEDURE — 82962 GLUCOSE BLOOD TEST: CPT

## 2024-09-15 PROCEDURE — 82077 ASSAY SPEC XCP UR&BREATH IA: CPT

## 2024-09-15 PROCEDURE — 6360000002 HC RX W HCPCS: Performed by: NURSE PRACTITIONER

## 2024-09-15 PROCEDURE — 6370000000 HC RX 637 (ALT 250 FOR IP)

## 2024-09-15 PROCEDURE — 6370000000 HC RX 637 (ALT 250 FOR IP): Performed by: NURSE PRACTITIONER

## 2024-09-15 PROCEDURE — P9047 ALBUMIN (HUMAN), 25%, 50ML: HCPCS | Performed by: NURSE PRACTITIONER

## 2024-09-15 PROCEDURE — 80179 DRUG ASSAY SALICYLATE: CPT

## 2024-09-15 PROCEDURE — 6360000002 HC RX W HCPCS

## 2024-09-15 PROCEDURE — 51798 US URINE CAPACITY MEASURE: CPT

## 2024-09-15 PROCEDURE — 2580000003 HC RX 258: Performed by: INTERNAL MEDICINE

## 2024-09-15 PROCEDURE — 51701 INSERT BLADDER CATHETER: CPT

## 2024-09-15 RX ORDER — DULOXETIN HYDROCHLORIDE 20 MG/1
20 CAPSULE, DELAYED RELEASE ORAL DAILY
Status: DISCONTINUED | OUTPATIENT
Start: 2024-09-15 | End: 2024-09-15

## 2024-09-15 RX ORDER — HYDRALAZINE HYDROCHLORIDE 50 MG/1
50 TABLET, FILM COATED ORAL EVERY 8 HOURS SCHEDULED
Status: DISCONTINUED | OUTPATIENT
Start: 2024-09-15 | End: 2024-09-17

## 2024-09-15 RX ORDER — DEXTROSE MONOHYDRATE 100 MG/ML
INJECTION, SOLUTION INTRAVENOUS CONTINUOUS PRN
Status: DISCONTINUED | OUTPATIENT
Start: 2024-09-15 | End: 2024-09-17 | Stop reason: HOSPADM

## 2024-09-15 RX ORDER — GABAPENTIN 300 MG/1
300 CAPSULE ORAL 2 TIMES DAILY
Status: DISCONTINUED | OUTPATIENT
Start: 2024-09-15 | End: 2024-09-17 | Stop reason: HOSPADM

## 2024-09-15 RX ORDER — LANOLIN ALCOHOL/MO/W.PET/CERES
6 CREAM (GRAM) TOPICAL NIGHTLY PRN
Status: DISCONTINUED | OUTPATIENT
Start: 2024-09-15 | End: 2024-09-17 | Stop reason: HOSPADM

## 2024-09-15 RX ORDER — GLUCAGON 1 MG/ML
1 KIT INJECTION PRN
Status: DISCONTINUED | OUTPATIENT
Start: 2024-09-15 | End: 2024-09-17 | Stop reason: HOSPADM

## 2024-09-15 RX ORDER — ACETAMINOPHEN 325 MG/1
650 TABLET ORAL EVERY 6 HOURS PRN
Status: DISCONTINUED | OUTPATIENT
Start: 2024-09-15 | End: 2024-09-17 | Stop reason: HOSPADM

## 2024-09-15 RX ORDER — LORAZEPAM 2 MG/ML
1 INJECTION INTRAMUSCULAR EVERY 6 HOURS PRN
Status: DISCONTINUED | OUTPATIENT
Start: 2024-09-15 | End: 2024-09-17 | Stop reason: HOSPADM

## 2024-09-15 RX ORDER — INSULIN LISPRO 100 [IU]/ML
5 INJECTION, SOLUTION INTRAVENOUS; SUBCUTANEOUS ONCE
Status: COMPLETED | OUTPATIENT
Start: 2024-09-15 | End: 2024-09-15

## 2024-09-15 RX ORDER — SODIUM CHLORIDE 9 MG/ML
INJECTION, SOLUTION INTRAVENOUS PRN
Status: DISCONTINUED | OUTPATIENT
Start: 2024-09-15 | End: 2024-09-17 | Stop reason: HOSPADM

## 2024-09-15 RX ORDER — ENOXAPARIN SODIUM 100 MG/ML
30 INJECTION SUBCUTANEOUS DAILY
Status: DISCONTINUED | OUTPATIENT
Start: 2024-09-16 | End: 2024-09-17

## 2024-09-15 RX ORDER — SODIUM CHLORIDE 0.9 % (FLUSH) 0.9 %
5-40 SYRINGE (ML) INJECTION PRN
Status: DISCONTINUED | OUTPATIENT
Start: 2024-09-15 | End: 2024-09-17 | Stop reason: HOSPADM

## 2024-09-15 RX ORDER — BISACODYL 10 MG
10 SUPPOSITORY, RECTAL RECTAL DAILY PRN
Status: DISCONTINUED | OUTPATIENT
Start: 2024-09-15 | End: 2024-09-17 | Stop reason: HOSPADM

## 2024-09-15 RX ORDER — INSULIN GLARGINE 100 [IU]/ML
20 INJECTION, SOLUTION SUBCUTANEOUS NIGHTLY
Status: DISCONTINUED | OUTPATIENT
Start: 2024-09-15 | End: 2024-09-15

## 2024-09-15 RX ORDER — ASPIRIN 81 MG/1
81 TABLET ORAL DAILY
Status: DISCONTINUED | OUTPATIENT
Start: 2024-09-16 | End: 2024-09-17 | Stop reason: HOSPADM

## 2024-09-15 RX ORDER — ONDANSETRON 2 MG/ML
4 INJECTION INTRAMUSCULAR; INTRAVENOUS EVERY 6 HOURS PRN
Status: DISCONTINUED | OUTPATIENT
Start: 2024-09-15 | End: 2024-09-17 | Stop reason: HOSPADM

## 2024-09-15 RX ORDER — INSULIN LISPRO 100 [IU]/ML
0-5 INJECTION, SOLUTION INTRAVENOUS; SUBCUTANEOUS NIGHTLY
Status: DISCONTINUED | OUTPATIENT
Start: 2024-09-15 | End: 2024-09-17 | Stop reason: HOSPADM

## 2024-09-15 RX ORDER — ALBUMIN (HUMAN) 12.5 G/50ML
25 SOLUTION INTRAVENOUS ONCE
Status: COMPLETED | OUTPATIENT
Start: 2024-09-16 | End: 2024-09-16

## 2024-09-15 RX ORDER — INSULIN LISPRO 100 [IU]/ML
0-8 INJECTION, SOLUTION INTRAVENOUS; SUBCUTANEOUS
Status: DISCONTINUED | OUTPATIENT
Start: 2024-09-15 | End: 2024-09-17 | Stop reason: HOSPADM

## 2024-09-15 RX ORDER — ACETAMINOPHEN 325 MG/1
650 TABLET ORAL EVERY 4 HOURS PRN
Status: DISCONTINUED | OUTPATIENT
Start: 2024-09-15 | End: 2024-09-15 | Stop reason: SDUPTHER

## 2024-09-15 RX ORDER — 0.9 % SODIUM CHLORIDE 0.9 %
1000 INTRAVENOUS SOLUTION INTRAVENOUS ONCE
Status: COMPLETED | OUTPATIENT
Start: 2024-09-15 | End: 2024-09-15

## 2024-09-15 RX ORDER — ACETAMINOPHEN 650 MG/1
650 SUPPOSITORY RECTAL EVERY 6 HOURS PRN
Status: DISCONTINUED | OUTPATIENT
Start: 2024-09-15 | End: 2024-09-17 | Stop reason: HOSPADM

## 2024-09-15 RX ORDER — BUMETANIDE 1 MG/1
2 TABLET ORAL DAILY
Status: DISCONTINUED | OUTPATIENT
Start: 2024-09-15 | End: 2024-09-17 | Stop reason: HOSPADM

## 2024-09-15 RX ORDER — INSULIN GLARGINE 100 [IU]/ML
14 INJECTION, SOLUTION SUBCUTANEOUS NIGHTLY
Status: DISCONTINUED | OUTPATIENT
Start: 2024-09-16 | End: 2024-09-17 | Stop reason: HOSPADM

## 2024-09-15 RX ORDER — AMLODIPINE BESYLATE 5 MG/1
10 TABLET ORAL DAILY
Status: DISCONTINUED | OUTPATIENT
Start: 2024-09-16 | End: 2024-09-17

## 2024-09-15 RX ORDER — SODIUM CHLORIDE 0.9 % (FLUSH) 0.9 %
5-40 SYRINGE (ML) INJECTION EVERY 12 HOURS SCHEDULED
Status: DISCONTINUED | OUTPATIENT
Start: 2024-09-15 | End: 2024-09-17 | Stop reason: HOSPADM

## 2024-09-15 RX ORDER — ONDANSETRON 2 MG/ML
4 INJECTION INTRAMUSCULAR; INTRAVENOUS EVERY 4 HOURS PRN
Status: DISCONTINUED | OUTPATIENT
Start: 2024-09-15 | End: 2024-09-15 | Stop reason: SDUPTHER

## 2024-09-15 RX ORDER — ISOSORBIDE MONONITRATE 30 MG/1
30 TABLET, EXTENDED RELEASE ORAL DAILY
Status: DISCONTINUED | OUTPATIENT
Start: 2024-09-16 | End: 2024-09-17 | Stop reason: HOSPADM

## 2024-09-15 RX ORDER — POLYETHYLENE GLYCOL 3350 17 G/17G
17 POWDER, FOR SOLUTION ORAL DAILY
Status: DISCONTINUED | OUTPATIENT
Start: 2024-09-16 | End: 2024-09-17 | Stop reason: HOSPADM

## 2024-09-15 RX ORDER — PANTOPRAZOLE SODIUM 40 MG/1
40 TABLET, DELAYED RELEASE ORAL
Status: DISCONTINUED | OUTPATIENT
Start: 2024-09-16 | End: 2024-09-17 | Stop reason: HOSPADM

## 2024-09-15 RX ORDER — SODIUM CHLORIDE 9 MG/ML
INJECTION, SOLUTION INTRAVENOUS CONTINUOUS
Status: DISCONTINUED | OUTPATIENT
Start: 2024-09-15 | End: 2024-09-16

## 2024-09-15 RX ORDER — HYDRALAZINE HYDROCHLORIDE 20 MG/ML
20 INJECTION INTRAMUSCULAR; INTRAVENOUS EVERY 6 HOURS PRN
Status: DISCONTINUED | OUTPATIENT
Start: 2024-09-15 | End: 2024-09-17 | Stop reason: HOSPADM

## 2024-09-15 RX ORDER — MIDODRINE HYDROCHLORIDE 5 MG/1
5 TABLET ORAL ONCE
Status: COMPLETED | OUTPATIENT
Start: 2024-09-16 | End: 2024-09-15

## 2024-09-15 RX ORDER — ONDANSETRON 4 MG/1
4 TABLET, ORALLY DISINTEGRATING ORAL EVERY 8 HOURS PRN
Status: DISCONTINUED | OUTPATIENT
Start: 2024-09-15 | End: 2024-09-17 | Stop reason: HOSPADM

## 2024-09-15 RX ADMIN — MIDODRINE HYDROCHLORIDE 5 MG: 5 TABLET ORAL at 23:51

## 2024-09-15 RX ADMIN — INSULIN GLARGINE 20 UNITS: 100 INJECTION, SOLUTION SUBCUTANEOUS at 18:27

## 2024-09-15 RX ADMIN — SODIUM CHLORIDE 1000 ML: 9 INJECTION, SOLUTION INTRAVENOUS at 12:24

## 2024-09-15 RX ADMIN — INSULIN LISPRO 6 UNITS: 100 INJECTION, SOLUTION INTRAVENOUS; SUBCUTANEOUS at 17:07

## 2024-09-15 RX ADMIN — BUMETANIDE 2 MG: 1 TABLET ORAL at 18:27

## 2024-09-15 RX ADMIN — SODIUM CHLORIDE: 9 INJECTION, SOLUTION INTRAVENOUS at 17:07

## 2024-09-15 RX ADMIN — LORAZEPAM 1 MG: 2 INJECTION INTRAMUSCULAR; INTRAVENOUS at 11:29

## 2024-09-15 RX ADMIN — ALBUMIN (HUMAN) 25 G: 0.25 INJECTION, SOLUTION INTRAVENOUS at 23:59

## 2024-09-15 RX ADMIN — SODIUM CHLORIDE, PRESERVATIVE FREE 10 ML: 5 INJECTION INTRAVENOUS at 21:39

## 2024-09-15 RX ADMIN — INSULIN LISPRO 5 UNITS: 100 INJECTION, SOLUTION INTRAVENOUS; SUBCUTANEOUS at 12:24

## 2024-09-15 RX ADMIN — GABAPENTIN 300 MG: 300 CAPSULE ORAL at 21:39

## 2024-09-15 RX ADMIN — LORAZEPAM 1 MG: 2 INJECTION INTRAMUSCULAR; INTRAVENOUS at 18:01

## 2024-09-15 ASSESSMENT — PAIN - FUNCTIONAL ASSESSMENT: PAIN_FUNCTIONAL_ASSESSMENT: NONE - DENIES PAIN

## 2024-09-16 LAB
ALBUMIN SERPL-MCNC: 3.4 G/DL (ref 3.5–5)
ALBUMIN/GLOB SERPL: 1 (ref 1.1–2.2)
ALP SERPL-CCNC: 148 U/L (ref 45–117)
ALT SERPL-CCNC: 38 U/L (ref 12–78)
ANION GAP SERPL CALC-SCNC: 8 MMOL/L (ref 2–12)
AST SERPL-CCNC: 29 U/L (ref 15–37)
BASOPHILS # BLD: 0 K/UL (ref 0–0.1)
BASOPHILS NFR BLD: 0 % (ref 0–1)
BILIRUB SERPL-MCNC: 0.3 MG/DL (ref 0.2–1)
BUN SERPL-MCNC: 39 MG/DL (ref 6–20)
BUN/CREAT SERPL: 11 (ref 12–20)
CALCIUM SERPL-MCNC: 8.8 MG/DL (ref 8.5–10.1)
CHLORIDE SERPL-SCNC: 103 MMOL/L (ref 97–108)
CK SERPL-CCNC: 43 U/L (ref 39–308)
CO2 SERPL-SCNC: 25 MMOL/L (ref 21–32)
CREAT SERPL-MCNC: 3.49 MG/DL (ref 0.7–1.3)
DIFFERENTIAL METHOD BLD: ABNORMAL
EKG ATRIAL RATE: 89 BPM
EKG DIAGNOSIS: NORMAL
EKG P AXIS: 64 DEGREES
EKG P-R INTERVAL: 152 MS
EKG Q-T INTERVAL: 392 MS
EKG QRS DURATION: 102 MS
EKG QTC CALCULATION (BAZETT): 476 MS
EKG R AXIS: 55 DEGREES
EKG T AXIS: 63 DEGREES
EKG VENTRICULAR RATE: 89 BPM
EOSINOPHIL # BLD: 0.1 K/UL (ref 0–0.4)
EOSINOPHIL NFR BLD: 2 % (ref 0–7)
ERYTHROCYTE [DISTWIDTH] IN BLOOD BY AUTOMATED COUNT: 15.5 % (ref 11.5–14.5)
EST. AVERAGE GLUCOSE BLD GHB EST-MCNC: 134 MG/DL
GLOBULIN SER CALC-MCNC: 3.5 G/DL (ref 2–4)
GLUCOSE BLD STRIP.AUTO-MCNC: 130 MG/DL (ref 65–117)
GLUCOSE BLD STRIP.AUTO-MCNC: 174 MG/DL (ref 65–117)
GLUCOSE BLD STRIP.AUTO-MCNC: 178 MG/DL (ref 65–117)
GLUCOSE BLD STRIP.AUTO-MCNC: 181 MG/DL (ref 65–117)
GLUCOSE SERPL-MCNC: 144 MG/DL (ref 65–100)
HBA1C MFR BLD: 6.3 % (ref 4–5.6)
HCT VFR BLD AUTO: 34.5 % (ref 36.6–50.3)
HGB BLD-MCNC: 10.6 G/DL (ref 12.1–17)
IMM GRANULOCYTES # BLD AUTO: 0 K/UL (ref 0–0.04)
IMM GRANULOCYTES NFR BLD AUTO: 0 % (ref 0–0.5)
LACTATE SERPL-SCNC: 0.9 MMOL/L (ref 0.4–2)
LYMPHOCYTES # BLD: 0.8 K/UL (ref 0.8–3.5)
LYMPHOCYTES NFR BLD: 11 % (ref 12–49)
MCH RBC QN AUTO: 27.1 PG (ref 26–34)
MCHC RBC AUTO-ENTMCNC: 30.7 G/DL (ref 30–36.5)
MCV RBC AUTO: 88.2 FL (ref 80–99)
MONOCYTES # BLD: 0.6 K/UL (ref 0–1)
MONOCYTES NFR BLD: 7 % (ref 5–13)
NEUTS SEG # BLD: 6.2 K/UL (ref 1.8–8)
NEUTS SEG NFR BLD: 80 % (ref 32–75)
NRBC # BLD: 0 K/UL (ref 0–0.01)
NRBC BLD-RTO: 0 PER 100 WBC
PLATELET # BLD AUTO: 116 K/UL (ref 150–400)
PMV BLD AUTO: 10.1 FL (ref 8.9–12.9)
POTASSIUM SERPL-SCNC: 4.1 MMOL/L (ref 3.5–5.1)
PROT SERPL-MCNC: 6.9 G/DL (ref 6.4–8.2)
RBC # BLD AUTO: 3.91 M/UL (ref 4.1–5.7)
SERVICE CMNT-IMP: ABNORMAL
SODIUM SERPL-SCNC: 136 MMOL/L (ref 136–145)
TROPONIN I SERPL HS-MCNC: 119 NG/L (ref 0–76)
WBC # BLD AUTO: 7.8 K/UL (ref 4.1–11.1)

## 2024-09-16 PROCEDURE — 2060000000 HC ICU INTERMEDIATE R&B

## 2024-09-16 PROCEDURE — 2700000000 HC OXYGEN THERAPY PER DAY

## 2024-09-16 PROCEDURE — 84484 ASSAY OF TROPONIN QUANT: CPT

## 2024-09-16 PROCEDURE — 2580000003 HC RX 258: Performed by: INTERNAL MEDICINE

## 2024-09-16 PROCEDURE — 80053 COMPREHEN METABOLIC PANEL: CPT

## 2024-09-16 PROCEDURE — 83036 HEMOGLOBIN GLYCOSYLATED A1C: CPT

## 2024-09-16 PROCEDURE — 97530 THERAPEUTIC ACTIVITIES: CPT

## 2024-09-16 PROCEDURE — 6360000002 HC RX W HCPCS: Performed by: INTERNAL MEDICINE

## 2024-09-16 PROCEDURE — 6360000002 HC RX W HCPCS

## 2024-09-16 PROCEDURE — 83605 ASSAY OF LACTIC ACID: CPT

## 2024-09-16 PROCEDURE — 97535 SELF CARE MNGMENT TRAINING: CPT

## 2024-09-16 PROCEDURE — 5A1D70Z PERFORMANCE OF URINARY FILTRATION, INTERMITTENT, LESS THAN 6 HOURS PER DAY: ICD-10-PCS | Performed by: ORTHOPAEDIC SURGERY

## 2024-09-16 PROCEDURE — 6370000000 HC RX 637 (ALT 250 FOR IP): Performed by: INTERNAL MEDICINE

## 2024-09-16 PROCEDURE — 82550 ASSAY OF CK (CPK): CPT

## 2024-09-16 PROCEDURE — 51798 US URINE CAPACITY MEASURE: CPT

## 2024-09-16 PROCEDURE — 97116 GAIT TRAINING THERAPY: CPT

## 2024-09-16 PROCEDURE — 85025 COMPLETE CBC W/AUTO DIFF WBC: CPT

## 2024-09-16 PROCEDURE — 36415 COLL VENOUS BLD VENIPUNCTURE: CPT

## 2024-09-16 PROCEDURE — 97165 OT EVAL LOW COMPLEX 30 MIN: CPT

## 2024-09-16 PROCEDURE — 97161 PT EVAL LOW COMPLEX 20 MIN: CPT

## 2024-09-16 PROCEDURE — 82962 GLUCOSE BLOOD TEST: CPT

## 2024-09-16 RX ADMIN — SODIUM CHLORIDE, PRESERVATIVE FREE 10 ML: 5 INJECTION INTRAVENOUS at 21:42

## 2024-09-16 RX ADMIN — INSULIN GLARGINE 14 UNITS: 100 INJECTION, SOLUTION SUBCUTANEOUS at 21:42

## 2024-09-16 RX ADMIN — GABAPENTIN 300 MG: 300 CAPSULE ORAL at 08:59

## 2024-09-16 RX ADMIN — MELATONIN 6 MG: at 21:41

## 2024-09-16 RX ADMIN — GABAPENTIN 300 MG: 300 CAPSULE ORAL at 21:41

## 2024-09-16 RX ADMIN — ENOXAPARIN SODIUM 30 MG: 100 INJECTION SUBCUTANEOUS at 08:59

## 2024-09-16 RX ADMIN — LORAZEPAM 1 MG: 2 INJECTION INTRAMUSCULAR; INTRAVENOUS at 21:41

## 2024-09-16 RX ADMIN — SODIUM CHLORIDE, PRESERVATIVE FREE 10 ML: 5 INJECTION INTRAVENOUS at 09:00

## 2024-09-16 RX ADMIN — ASPIRIN 81 MG: 81 TABLET, COATED ORAL at 08:59

## 2024-09-16 RX ADMIN — PANTOPRAZOLE SODIUM 40 MG: 40 TABLET, DELAYED RELEASE ORAL at 06:33

## 2024-09-16 RX ADMIN — LORAZEPAM 1 MG: 2 INJECTION INTRAMUSCULAR; INTRAVENOUS at 15:07

## 2024-09-16 RX ADMIN — HYDRALAZINE HYDROCHLORIDE 20 MG: 20 INJECTION, SOLUTION INTRAMUSCULAR; INTRAVENOUS at 17:20

## 2024-09-16 RX ADMIN — BUMETANIDE 2 MG: 1 TABLET ORAL at 09:00

## 2024-09-17 VITALS
HEART RATE: 61 BPM | SYSTOLIC BLOOD PRESSURE: 176 MMHG | WEIGHT: 194.45 LBS | DIASTOLIC BLOOD PRESSURE: 79 MMHG | RESPIRATION RATE: 18 BRPM | OXYGEN SATURATION: 92 % | HEIGHT: 72 IN | TEMPERATURE: 98 F | BODY MASS INDEX: 26.34 KG/M2

## 2024-09-17 LAB
ALBUMIN SERPL-MCNC: 3.2 G/DL (ref 3.5–5)
ANION GAP SERPL CALC-SCNC: 6 MMOL/L (ref 2–12)
BASOPHILS # BLD: 0.1 K/UL (ref 0–0.1)
BASOPHILS NFR BLD: 1 % (ref 0–1)
BUN SERPL-MCNC: 50 MG/DL (ref 6–20)
BUN/CREAT SERPL: 15 (ref 12–20)
CALCIUM SERPL-MCNC: 9 MG/DL (ref 8.5–10.1)
CHLORIDE SERPL-SCNC: 103 MMOL/L (ref 97–108)
CO2 SERPL-SCNC: 26 MMOL/L (ref 21–32)
CREAT SERPL-MCNC: 3.39 MG/DL (ref 0.7–1.3)
DIFFERENTIAL METHOD BLD: ABNORMAL
EOSINOPHIL # BLD: 0.3 K/UL (ref 0–0.4)
EOSINOPHIL NFR BLD: 6 % (ref 0–7)
ERYTHROCYTE [DISTWIDTH] IN BLOOD BY AUTOMATED COUNT: 15.1 % (ref 11.5–14.5)
GLUCOSE BLD STRIP.AUTO-MCNC: 113 MG/DL (ref 65–117)
GLUCOSE SERPL-MCNC: 117 MG/DL (ref 65–100)
HBV SURFACE AB SER QL: NONREACTIVE
HBV SURFACE AB SER-ACNC: <3.1 MIU/ML
HBV SURFACE AG SER QL: <0.1 INDEX
HBV SURFACE AG SER QL: NEGATIVE
HCT VFR BLD AUTO: 33 % (ref 36.6–50.3)
HGB BLD-MCNC: 10.5 G/DL (ref 12.1–17)
IMM GRANULOCYTES # BLD AUTO: 0 K/UL (ref 0–0.04)
IMM GRANULOCYTES NFR BLD AUTO: 0 % (ref 0–0.5)
LYMPHOCYTES # BLD: 0.8 K/UL (ref 0.8–3.5)
LYMPHOCYTES NFR BLD: 16 % (ref 12–49)
MCH RBC QN AUTO: 27.5 PG (ref 26–34)
MCHC RBC AUTO-ENTMCNC: 31.8 G/DL (ref 30–36.5)
MCV RBC AUTO: 86.4 FL (ref 80–99)
MONOCYTES # BLD: 0.5 K/UL (ref 0–1)
MONOCYTES NFR BLD: 10 % (ref 5–13)
NEUTS SEG # BLD: 3.6 K/UL (ref 1.8–8)
NEUTS SEG NFR BLD: 67 % (ref 32–75)
NRBC # BLD: 0 K/UL (ref 0–0.01)
NRBC BLD-RTO: 0 PER 100 WBC
PHOSPHATE SERPL-MCNC: 4.6 MG/DL (ref 2.6–4.7)
PLATELET # BLD AUTO: 126 K/UL (ref 150–400)
PMV BLD AUTO: 10.1 FL (ref 8.9–12.9)
POTASSIUM SERPL-SCNC: 4.3 MMOL/L (ref 3.5–5.1)
RBC # BLD AUTO: 3.82 M/UL (ref 4.1–5.7)
SERVICE CMNT-IMP: NORMAL
SODIUM SERPL-SCNC: 135 MMOL/L (ref 136–145)
WBC # BLD AUTO: 5.4 K/UL (ref 4.1–11.1)

## 2024-09-17 PROCEDURE — 80069 RENAL FUNCTION PANEL: CPT

## 2024-09-17 PROCEDURE — 86706 HEP B SURFACE ANTIBODY: CPT

## 2024-09-17 PROCEDURE — 6370000000 HC RX 637 (ALT 250 FOR IP): Performed by: INTERNAL MEDICINE

## 2024-09-17 PROCEDURE — 87340 HEPATITIS B SURFACE AG IA: CPT

## 2024-09-17 PROCEDURE — 85025 COMPLETE CBC W/AUTO DIFF WBC: CPT

## 2024-09-17 PROCEDURE — 6360000002 HC RX W HCPCS: Performed by: INTERNAL MEDICINE

## 2024-09-17 PROCEDURE — 2700000000 HC OXYGEN THERAPY PER DAY

## 2024-09-17 PROCEDURE — 90935 HEMODIALYSIS ONE EVALUATION: CPT

## 2024-09-17 PROCEDURE — 82962 GLUCOSE BLOOD TEST: CPT

## 2024-09-17 PROCEDURE — 2580000003 HC RX 258: Performed by: INTERNAL MEDICINE

## 2024-09-17 PROCEDURE — 6360000002 HC RX W HCPCS

## 2024-09-17 PROCEDURE — 36415 COLL VENOUS BLD VENIPUNCTURE: CPT

## 2024-09-17 RX ORDER — INSULIN DEGLUDEC 100 U/ML
14 INJECTION, SOLUTION SUBCUTANEOUS NIGHTLY
Qty: 15 ML | Refills: 0 | Status: SHIPPED | OUTPATIENT
Start: 2024-09-17

## 2024-09-17 RX ORDER — ASPIRIN 81 MG/1
81 TABLET ORAL DAILY
Qty: 30 TABLET | Refills: 0 | Status: SHIPPED | OUTPATIENT
Start: 2024-09-17 | End: 2024-09-17

## 2024-09-17 RX ORDER — HEPARIN SODIUM 5000 [USP'U]/ML
5000 INJECTION, SOLUTION INTRAVENOUS; SUBCUTANEOUS 2 TIMES DAILY
Status: DISCONTINUED | OUTPATIENT
Start: 2024-09-17 | End: 2024-09-17 | Stop reason: HOSPADM

## 2024-09-17 RX ORDER — LANCETS 30 GAUGE
1 EACH MISCELLANEOUS DAILY
Qty: 30 EACH | Refills: 0 | Status: SHIPPED | OUTPATIENT
Start: 2024-09-17 | End: 2024-09-17

## 2024-09-17 RX ORDER — BLOOD-GLUCOSE METER
1 KIT MISCELLANEOUS DAILY
Qty: 1 KIT | Refills: 0 | Status: SHIPPED | OUTPATIENT
Start: 2024-09-17

## 2024-09-17 RX ORDER — LANCETS 30 GAUGE
1 EACH MISCELLANEOUS DAILY
Qty: 30 EACH | Refills: 0 | Status: SHIPPED | OUTPATIENT
Start: 2024-09-17

## 2024-09-17 RX ORDER — GLUCOSAMINE HCL/CHONDROITIN SU 500-400 MG
CAPSULE ORAL
Qty: 30 STRIP | Refills: 0 | Status: SHIPPED | OUTPATIENT
Start: 2024-09-17

## 2024-09-17 RX ORDER — AMLODIPINE BESYLATE 10 MG/1
10 TABLET ORAL DAILY
Qty: 30 TABLET | Refills: 0 | Status: SHIPPED | OUTPATIENT
Start: 2024-09-17 | End: 2024-09-17

## 2024-09-17 RX ORDER — HYDRALAZINE HYDROCHLORIDE 50 MG/1
50 TABLET, FILM COATED ORAL EVERY 8 HOURS SCHEDULED
Qty: 90 TABLET | Refills: 0 | Status: SHIPPED | OUTPATIENT
Start: 2024-09-17

## 2024-09-17 RX ORDER — HYDRALAZINE HYDROCHLORIDE 50 MG/1
50 TABLET, FILM COATED ORAL EVERY 8 HOURS SCHEDULED
Qty: 90 TABLET | Refills: 0 | Status: SHIPPED | OUTPATIENT
Start: 2024-09-17 | End: 2024-09-17

## 2024-09-17 RX ORDER — INSULIN GLARGINE 100 [IU]/ML
14 INJECTION, SOLUTION SUBCUTANEOUS NIGHTLY
Qty: 10 ML | Refills: 3 | Status: SHIPPED | OUTPATIENT
Start: 2024-09-17 | End: 2024-09-17 | Stop reason: HOSPADM

## 2024-09-17 RX ORDER — INSULIN DEGLUDEC 100 U/ML
14 INJECTION, SOLUTION SUBCUTANEOUS
Qty: 4 ADJUSTABLE DOSE PRE-FILLED PEN SYRINGE | Refills: 0 | Status: SHIPPED | OUTPATIENT
Start: 2024-09-17 | End: 2024-09-17 | Stop reason: HOSPADM

## 2024-09-17 RX ORDER — HYDRALAZINE HYDROCHLORIDE 50 MG/1
50 TABLET, FILM COATED ORAL EVERY 8 HOURS SCHEDULED
Status: DISCONTINUED | OUTPATIENT
Start: 2024-09-17 | End: 2024-09-17 | Stop reason: HOSPADM

## 2024-09-17 RX ORDER — BLOOD-GLUCOSE METER
1 KIT MISCELLANEOUS DAILY
Qty: 1 KIT | Refills: 0 | Status: SHIPPED | OUTPATIENT
Start: 2024-09-17 | End: 2024-09-17

## 2024-09-17 RX ORDER — GLUCOSAMINE HCL/CHONDROITIN SU 500-400 MG
CAPSULE ORAL
Qty: 30 STRIP | Refills: 0 | Status: SHIPPED | OUTPATIENT
Start: 2024-09-17 | End: 2024-09-17

## 2024-09-17 RX ORDER — AMLODIPINE BESYLATE 5 MG/1
10 TABLET ORAL DAILY
Status: DISCONTINUED | OUTPATIENT
Start: 2024-09-17 | End: 2024-09-17 | Stop reason: HOSPADM

## 2024-09-17 RX ORDER — AMLODIPINE BESYLATE 10 MG/1
10 TABLET ORAL DAILY
Qty: 30 TABLET | Refills: 0 | Status: SHIPPED | OUTPATIENT
Start: 2024-09-17

## 2024-09-17 RX ORDER — ASPIRIN 81 MG/1
81 TABLET ORAL DAILY
Qty: 30 TABLET | Refills: 0 | Status: SHIPPED | OUTPATIENT
Start: 2024-09-17

## 2024-09-17 RX ADMIN — AMLODIPINE BESYLATE 10 MG: 5 TABLET ORAL at 13:07

## 2024-09-17 RX ADMIN — SODIUM CHLORIDE, PRESERVATIVE FREE 10 ML: 5 INJECTION INTRAVENOUS at 13:21

## 2024-09-17 RX ADMIN — ASPIRIN 81 MG: 81 TABLET, COATED ORAL at 13:04

## 2024-09-17 RX ADMIN — HEPARIN SODIUM 5000 UNITS: 5000 INJECTION INTRAVENOUS; SUBCUTANEOUS at 13:04

## 2024-09-17 RX ADMIN — BUMETANIDE 2 MG: 1 TABLET ORAL at 13:07

## 2024-09-17 RX ADMIN — GABAPENTIN 300 MG: 300 CAPSULE ORAL at 13:04

## 2024-09-17 RX ADMIN — LORAZEPAM 1 MG: 2 INJECTION INTRAMUSCULAR; INTRAVENOUS at 13:20

## 2024-11-12 NOTE — PROGRESS NOTES
Problem: Chronic Conditions and Co-morbidities  Goal: Patient's chronic conditions and co-morbidity symptoms are monitored and maintained or improved  Flowsheets (Taken 11/12/2024 1033)  Care Plan - Patient's Chronic Conditions and Co-Morbidity Symptoms are Monitored and Maintained or Improved: Monitor and assess patient's chronic conditions and comorbid symptoms for stability, deterioration, or improvement      1945 Pt inquiring about when he is being taken down for biopsy. Primary nurse called OR, said it would be closer to 9pm. Pt informed.

## 2024-12-02 ENCOUNTER — APPOINTMENT (OUTPATIENT)
Facility: HOSPITAL | Age: 59
DRG: 981 | End: 2024-12-02
Payer: MEDICARE

## 2024-12-02 ENCOUNTER — HOSPITAL ENCOUNTER (INPATIENT)
Facility: HOSPITAL | Age: 59
LOS: 7 days | Discharge: HOME | DRG: 981 | End: 2024-12-11
Attending: STUDENT IN AN ORGANIZED HEALTH CARE EDUCATION/TRAINING PROGRAM | Admitting: INTERNAL MEDICINE
Payer: MEDICARE

## 2024-12-02 DIAGNOSIS — L08.9 ANIMAL BITE OF LEFT HAND WITH INFECTION, INITIAL ENCOUNTER: ICD-10-CM

## 2024-12-02 DIAGNOSIS — M79.642 LEFT HAND PAIN: ICD-10-CM
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
## 2024-12-02 DIAGNOSIS — S61.452A ANIMAL BITE OF LEFT HAND WITH INFECTION, INITIAL ENCOUNTER: ICD-10-CM

## 2024-12-02 LAB
ALBUMIN SERPL-MCNC: 3.7 G/DL (ref 3.5–5)
ALBUMIN/GLOB SERPL: 0.8 (ref 1.1–2.2)
ALP SERPL-CCNC: 145 U/L (ref 45–117)
ALT SERPL-CCNC: 37 U/L (ref 12–78)
ANION GAP SERPL CALC-SCNC: 4 MMOL/L (ref 2–12)
APPEARANCE UR: CLEAR
AST SERPL-CCNC: 29 U/L (ref 15–37)
BACTERIA URNS QL MICRO: NEGATIVE /HPF
BASOPHILS # BLD: 0.1 K/UL (ref 0–0.1)
BASOPHILS NFR BLD: 1 % (ref 0–1)
BILIRUB SERPL-MCNC: 0.5 MG/DL (ref 0.2–1)
BILIRUB UR QL: NEGATIVE
BUN SERPL-MCNC: 69 MG/DL (ref 6–20)
BUN/CREAT SERPL: 21 (ref 12–20)
CALCIUM SERPL-MCNC: 9.6 MG/DL (ref 8.5–10.1)
CHLORIDE SERPL-SCNC: 106 MMOL/L (ref 97–108)
CO2 SERPL-SCNC: 27 MMOL/L (ref 21–32)
COLOR UR: ABNORMAL
CREAT SERPL-MCNC: 3.22 MG/DL (ref 0.7–1.3)
CRP SERPL-MCNC: 4.28 MG/DL (ref 0–0.3)
DIFFERENTIAL METHOD BLD: ABNORMAL
EOSINOPHIL # BLD: 0.2 K/UL (ref 0–0.4)
EOSINOPHIL NFR BLD: 4 % (ref 0–7)
EPITH CASTS URNS QL MICRO: ABNORMAL /LPF
ERYTHROCYTE [DISTWIDTH] IN BLOOD BY AUTOMATED COUNT: 15.5 % (ref 11.5–14.5)
FLUAV RNA SPEC QL NAA+PROBE: NOT DETECTED
FLUBV RNA SPEC QL NAA+PROBE: NOT DETECTED
GLOBULIN SER CALC-MCNC: 4.9 G/DL (ref 2–4)
GLUCOSE BLD STRIP.AUTO-MCNC: 154 MG/DL (ref 65–117)
GLUCOSE BLD STRIP.AUTO-MCNC: 184 MG/DL (ref 65–117)
GLUCOSE SERPL-MCNC: 235 MG/DL (ref 65–100)
GLUCOSE UR STRIP.AUTO-MCNC: 500 MG/DL
HCT VFR BLD AUTO: 40 % (ref 36.6–50.3)
HGB BLD-MCNC: 13.6 G/DL (ref 12.1–17)
HGB UR QL STRIP: ABNORMAL
HYALINE CASTS URNS QL MICRO: ABNORMAL /LPF (ref 0–2)
IMM GRANULOCYTES # BLD AUTO: 0 K/UL (ref 0–0.04)
IMM GRANULOCYTES NFR BLD AUTO: 0 % (ref 0–0.5)
KETONES UR QL STRIP.AUTO: NEGATIVE MG/DL
LACTATE BLD-SCNC: 1.11 MMOL/L (ref 0.4–2)
LACTATE SERPL-SCNC: 0.9 MMOL/L (ref 0.4–2)
LEUKOCYTE ESTERASE UR QL STRIP.AUTO: NEGATIVE
LYMPHOCYTES # BLD: 0.7 K/UL (ref 0.8–3.5)
LYMPHOCYTES NFR BLD: 14 % (ref 12–49)
MCH RBC QN AUTO: 29.4 PG (ref 26–34)
MCHC RBC AUTO-ENTMCNC: 34 G/DL (ref 30–36.5)
MCV RBC AUTO: 86.6 FL (ref 80–99)
MONOCYTES # BLD: 0.5 K/UL (ref 0–1)
MONOCYTES NFR BLD: 9 % (ref 5–13)
NEUTS SEG # BLD: 3.5 K/UL (ref 1.8–8)
NEUTS SEG NFR BLD: 72 % (ref 32–75)
NITRITE UR QL STRIP.AUTO: NEGATIVE
NRBC # BLD: 0 K/UL (ref 0–0.01)
NRBC BLD-RTO: 0 PER 100 WBC
NT PRO BNP: 261 PG/ML
PH UR STRIP: 5.5 (ref 5–8)
PLATELET # BLD AUTO: 162 K/UL (ref 150–400)
PMV BLD AUTO: 9.4 FL (ref 8.9–12.9)
POTASSIUM SERPL-SCNC: 5.2 MMOL/L (ref 3.5–5.1)
PROT SERPL-MCNC: 8.6 G/DL (ref 6.4–8.2)
PROT UR STRIP-MCNC: 100 MG/DL
RBC # BLD AUTO: 4.62 M/UL (ref 4.1–5.7)
RBC #/AREA URNS HPF: ABNORMAL /HPF (ref 0–5)
RBC MORPH BLD: ABNORMAL
SARS-COV-2 RNA RESP QL NAA+PROBE: NOT DETECTED
SERVICE CMNT-IMP: ABNORMAL
SERVICE CMNT-IMP: ABNORMAL
SODIUM SERPL-SCNC: 137 MMOL/L (ref 136–145)
SOURCE: NORMAL
SP GR UR REFRACTOMETRY: 1.01
URINE CULTURE IF INDICATED: ABNORMAL
UROBILINOGEN UR QL STRIP.AUTO: 0.2 EU/DL (ref 0.2–1)
WBC # BLD AUTO: 5 K/UL (ref 4.1–11.1)
WBC MORPH BLD: ABNORMAL
WBC URNS QL MICRO: ABNORMAL /HPF (ref 0–4)

## 2024-12-02 PROCEDURE — 96365 THER/PROPH/DIAG IV INF INIT: CPT

## 2024-12-02 PROCEDURE — 93005 ELECTROCARDIOGRAM TRACING: CPT | Performed by: STUDENT IN AN ORGANIZED HEALTH CARE EDUCATION/TRAINING PROGRAM

## 2024-12-02 PROCEDURE — 2580000003 HC RX 258: Performed by: STUDENT IN AN ORGANIZED HEALTH CARE EDUCATION/TRAINING PROGRAM

## 2024-12-02 PROCEDURE — 96375 TX/PRO/DX INJ NEW DRUG ADDON: CPT

## 2024-12-02 PROCEDURE — G0378 HOSPITAL OBSERVATION PER HR: HCPCS | Performed by: INTERNAL MEDICINE

## 2024-12-02 PROCEDURE — 99285 EMERGENCY DEPT VISIT HI MDM: CPT

## 2024-12-02 PROCEDURE — 90675 RABIES VACCINE IM: CPT | Performed by: STUDENT IN AN ORGANIZED HEALTH CARE EDUCATION/TRAINING PROGRAM

## 2024-12-02 PROCEDURE — 80053 COMPREHEN METABOLIC PANEL: CPT

## 2024-12-02 PROCEDURE — 87636 SARSCOV2 & INF A&B AMP PRB: CPT

## 2024-12-02 PROCEDURE — G0378 HOSPITAL OBSERVATION PER HR: HCPCS

## 2024-12-02 PROCEDURE — 90714 TD VACC NO PRESV 7 YRS+ IM: CPT | Performed by: STUDENT IN AN ORGANIZED HEALTH CARE EDUCATION/TRAINING PROGRAM

## 2024-12-02 PROCEDURE — 90375 RABIES IG IM/SC: CPT | Performed by: STUDENT IN AN ORGANIZED HEALTH CARE EDUCATION/TRAINING PROGRAM

## 2024-12-02 PROCEDURE — 85025 COMPLETE CBC W/AUTO DIFF WBC: CPT

## 2024-12-02 PROCEDURE — 82962 GLUCOSE BLOOD TEST: CPT

## 2024-12-02 PROCEDURE — 96376 TX/PRO/DX INJ SAME DRUG ADON: CPT

## 2024-12-02 PROCEDURE — 6370000000 HC RX 637 (ALT 250 FOR IP): Performed by: STUDENT IN AN ORGANIZED HEALTH CARE EDUCATION/TRAINING PROGRAM

## 2024-12-02 PROCEDURE — 2500000003 HC RX 250 WO HCPCS: Performed by: STUDENT IN AN ORGANIZED HEALTH CARE EDUCATION/TRAINING PROGRAM

## 2024-12-02 PROCEDURE — 90472 IMMUNIZATION ADMIN EACH ADD: CPT | Performed by: STUDENT IN AN ORGANIZED HEALTH CARE EDUCATION/TRAINING PROGRAM

## 2024-12-02 PROCEDURE — 96372 THER/PROPH/DIAG INJ SC/IM: CPT

## 2024-12-02 PROCEDURE — 90471 IMMUNIZATION ADMIN: CPT | Performed by: STUDENT IN AN ORGANIZED HEALTH CARE EDUCATION/TRAINING PROGRAM

## 2024-12-02 PROCEDURE — 81001 URINALYSIS AUTO W/SCOPE: CPT

## 2024-12-02 PROCEDURE — 83880 ASSAY OF NATRIURETIC PEPTIDE: CPT

## 2024-12-02 PROCEDURE — 73130 X-RAY EXAM OF HAND: CPT

## 2024-12-02 PROCEDURE — 87040 BLOOD CULTURE FOR BACTERIA: CPT

## 2024-12-02 PROCEDURE — 87154 CUL TYP ID BLD PTHGN 6+ TRGT: CPT

## 2024-12-02 PROCEDURE — 6360000002 HC RX W HCPCS: Performed by: STUDENT IN AN ORGANIZED HEALTH CARE EDUCATION/TRAINING PROGRAM

## 2024-12-02 PROCEDURE — 71046 X-RAY EXAM CHEST 2 VIEWS: CPT

## 2024-12-02 PROCEDURE — 86140 C-REACTIVE PROTEIN: CPT

## 2024-12-02 PROCEDURE — 83605 ASSAY OF LACTIC ACID: CPT

## 2024-12-02 PROCEDURE — 36415 COLL VENOUS BLD VENIPUNCTURE: CPT

## 2024-12-02 RX ORDER — HYDRALAZINE HYDROCHLORIDE 25 MG/1
50 TABLET, FILM COATED ORAL EVERY 8 HOURS SCHEDULED
Status: DISCONTINUED | OUTPATIENT
Start: 2024-12-02 | End: 2024-12-11 | Stop reason: HOSPADM

## 2024-12-02 RX ORDER — SODIUM CHLORIDE 0.9 % (FLUSH) 0.9 %
5-40 SYRINGE (ML) INJECTION EVERY 12 HOURS SCHEDULED
Status: DISCONTINUED | OUTPATIENT
Start: 2024-12-02 | End: 2024-12-11 | Stop reason: HOSPADM

## 2024-12-02 RX ORDER — HYDROMORPHONE HYDROCHLORIDE 1 MG/ML
0.25 INJECTION, SOLUTION INTRAMUSCULAR; INTRAVENOUS; SUBCUTANEOUS EVERY 4 HOURS PRN
Status: DISCONTINUED | OUTPATIENT
Start: 2024-12-02 | End: 2024-12-03

## 2024-12-02 RX ORDER — ACETAMINOPHEN 325 MG/1
650 TABLET ORAL EVERY 6 HOURS PRN
Status: DISCONTINUED | OUTPATIENT
Start: 2024-12-02 | End: 2024-12-11 | Stop reason: HOSPADM

## 2024-12-02 RX ORDER — INSULIN LISPRO 100 [IU]/ML
0-8 INJECTION, SOLUTION INTRAVENOUS; SUBCUTANEOUS
Status: DISCONTINUED | OUTPATIENT
Start: 2024-12-02 | End: 2024-12-11 | Stop reason: HOSPADM

## 2024-12-02 RX ORDER — SODIUM CHLORIDE 0.9 % (FLUSH) 0.9 %
5-40 SYRINGE (ML) INJECTION PRN
Status: DISCONTINUED | OUTPATIENT
Start: 2024-12-02 | End: 2024-12-11 | Stop reason: HOSPADM

## 2024-12-02 RX ORDER — AMLODIPINE BESYLATE 5 MG/1
10 TABLET ORAL DAILY
Status: DISCONTINUED | OUTPATIENT
Start: 2024-12-02 | End: 2024-12-11 | Stop reason: HOSPADM

## 2024-12-02 RX ORDER — ACETAMINOPHEN 650 MG/1
650 SUPPOSITORY RECTAL EVERY 6 HOURS PRN
Status: DISCONTINUED | OUTPATIENT
Start: 2024-12-02 | End: 2024-12-11 | Stop reason: HOSPADM

## 2024-12-02 RX ORDER — KETOROLAC TROMETHAMINE 30 MG/ML
30 INJECTION, SOLUTION INTRAMUSCULAR; INTRAVENOUS ONCE
Status: COMPLETED | OUTPATIENT
Start: 2024-12-02 | End: 2024-12-02

## 2024-12-02 RX ORDER — DEXTROSE MONOHYDRATE 100 MG/ML
INJECTION, SOLUTION INTRAVENOUS CONTINUOUS PRN
Status: DISCONTINUED | OUTPATIENT
Start: 2024-12-02 | End: 2024-12-11 | Stop reason: HOSPADM

## 2024-12-02 RX ORDER — 0.9 % SODIUM CHLORIDE 0.9 %
1000 INTRAVENOUS SOLUTION INTRAVENOUS ONCE
Status: COMPLETED | OUTPATIENT
Start: 2024-12-02 | End: 2024-12-02

## 2024-12-02 RX ORDER — ONDANSETRON 2 MG/ML
4 INJECTION INTRAMUSCULAR; INTRAVENOUS ONCE
Status: COMPLETED | OUTPATIENT
Start: 2024-12-02 | End: 2024-12-02

## 2024-12-02 RX ORDER — HYDROMORPHONE HYDROCHLORIDE 1 MG/ML
1 INJECTION, SOLUTION INTRAMUSCULAR; INTRAVENOUS; SUBCUTANEOUS
Status: COMPLETED | OUTPATIENT
Start: 2024-12-02 | End: 2024-12-02

## 2024-12-02 RX ORDER — GLUCAGON 1 MG/ML
1 KIT INJECTION PRN
Status: DISCONTINUED | OUTPATIENT
Start: 2024-12-02 | End: 2024-12-11 | Stop reason: HOSPADM

## 2024-12-02 RX ORDER — SODIUM CHLORIDE 9 MG/ML
INJECTION, SOLUTION INTRAVENOUS PRN
Status: DISCONTINUED | OUTPATIENT
Start: 2024-12-02 | End: 2024-12-11 | Stop reason: HOSPADM

## 2024-12-02 RX ORDER — HYDROMORPHONE HYDROCHLORIDE 1 MG/ML
0.5 INJECTION, SOLUTION INTRAMUSCULAR; INTRAVENOUS; SUBCUTANEOUS EVERY 4 HOURS PRN
Status: DISCONTINUED | OUTPATIENT
Start: 2024-12-02 | End: 2024-12-03

## 2024-12-02 RX ORDER — CLONIDINE HYDROCHLORIDE 0.1 MG/1
0.2 TABLET ORAL EVERY 8 HOURS PRN
Status: DISCONTINUED | OUTPATIENT
Start: 2024-12-02 | End: 2024-12-11 | Stop reason: HOSPADM

## 2024-12-02 RX ORDER — LABETALOL HYDROCHLORIDE 5 MG/ML
10 INJECTION, SOLUTION INTRAVENOUS EVERY 6 HOURS PRN
Status: DISCONTINUED | OUTPATIENT
Start: 2024-12-02 | End: 2024-12-11 | Stop reason: HOSPADM

## 2024-12-02 RX ORDER — INSULIN GLARGINE 100 [IU]/ML
0.15 INJECTION, SOLUTION SUBCUTANEOUS DAILY
Status: DISCONTINUED | OUTPATIENT
Start: 2024-12-02 | End: 2024-12-11 | Stop reason: HOSPADM

## 2024-12-02 RX ORDER — LURASIDONE HYDROCHLORIDE 20 MG/1
20 TABLET, FILM COATED ORAL
Status: DISCONTINUED | OUTPATIENT
Start: 2024-12-02 | End: 2024-12-11 | Stop reason: HOSPADM

## 2024-12-02 RX ORDER — DULOXETIN HYDROCHLORIDE 20 MG/1
20 CAPSULE, DELAYED RELEASE ORAL DAILY
Status: DISCONTINUED | OUTPATIENT
Start: 2024-12-02 | End: 2024-12-11 | Stop reason: HOSPADM

## 2024-12-02 RX ORDER — HEPARIN SODIUM 5000 [USP'U]/ML
5000 INJECTION, SOLUTION INTRAVENOUS; SUBCUTANEOUS EVERY 8 HOURS SCHEDULED
Status: DISCONTINUED | OUTPATIENT
Start: 2024-12-02 | End: 2024-12-11 | Stop reason: HOSPADM

## 2024-12-02 RX ADMIN — ONDANSETRON 4 MG: 2 INJECTION INTRAMUSCULAR; INTRAVENOUS at 16:50

## 2024-12-02 RX ADMIN — HYDROMORPHONE HYDROCHLORIDE 1 MG: 1 INJECTION, SOLUTION INTRAMUSCULAR; INTRAVENOUS; SUBCUTANEOUS at 16:51

## 2024-12-02 RX ADMIN — SODIUM CHLORIDE 1000 ML: 9 INJECTION, SOLUTION INTRAVENOUS at 16:52

## 2024-12-02 RX ADMIN — KETOROLAC TROMETHAMINE 30 MG: 30 INJECTION, SOLUTION INTRAMUSCULAR at 18:25

## 2024-12-02 RX ADMIN — SODIUM CHLORIDE, PRESERVATIVE FREE 10 ML: 5 INJECTION INTRAVENOUS at 22:04

## 2024-12-02 RX ADMIN — LURASIDONE HYDROCHLORIDE 20 MG: 20 TABLET ORAL at 20:52

## 2024-12-02 RX ADMIN — INSULIN LISPRO 2 UNITS: 100 INJECTION, SOLUTION INTRAVENOUS; SUBCUTANEOUS at 22:02

## 2024-12-02 RX ADMIN — RABIES IMMUNE GLOBULIN (HUMAN) 1845 UNITS: 300 INJECTION, SOLUTION INFILTRATION; INTRAMUSCULAR at 17:41

## 2024-12-02 RX ADMIN — INSULIN GLARGINE 14 UNITS: 100 INJECTION, SOLUTION SUBCUTANEOUS at 18:44

## 2024-12-02 RX ADMIN — HYDRALAZINE HYDROCHLORIDE 50 MG: 25 TABLET ORAL at 22:03

## 2024-12-02 RX ADMIN — RABIES VACCINE 1 ML: KIT at 16:55

## 2024-12-02 RX ADMIN — CLOSTRIDIUM TETANI TOXOID ANTIGEN (FORMALDEHYDE INACTIVATED) AND CORYNEBACTERIUM DIPHTHERIAE TOXOID ANTIGEN (FORMALDEHYDE INACTIVATED) 0.5 ML: 5; 2 INJECTION, SUSPENSION INTRAMUSCULAR at 16:52

## 2024-12-02 RX ADMIN — AMLODIPINE BESYLATE 10 MG: 5 TABLET ORAL at 18:26

## 2024-12-02 RX ADMIN — HYDROMORPHONE HYDROCHLORIDE 0.5 MG: 1 INJECTION, SOLUTION INTRAMUSCULAR; INTRAVENOUS; SUBCUTANEOUS at 18:26

## 2024-12-02 RX ADMIN — HEPARIN SODIUM 5000 UNITS: 5000 INJECTION INTRAVENOUS; SUBCUTANEOUS at 22:04

## 2024-12-02 RX ADMIN — HYDROMORPHONE HYDROCHLORIDE 0.5 MG: 1 INJECTION, SOLUTION INTRAMUSCULAR; INTRAVENOUS; SUBCUTANEOUS at 22:32

## 2024-12-02 RX ADMIN — AMPICILLIN SODIUM AND SULBACTAM SODIUM 3000 MG: 2; 1 INJECTION, POWDER, FOR SOLUTION INTRAMUSCULAR; INTRAVENOUS at 16:50

## 2024-12-02 RX ADMIN — DULOXETINE HYDROCHLORIDE 20 MG: 20 CAPSULE, DELAYED RELEASE ORAL at 20:52

## 2024-12-02 ASSESSMENT — PAIN DESCRIPTION - ORIENTATION
ORIENTATION: LEFT

## 2024-12-02 ASSESSMENT — PAIN SCALES - GENERAL
PAINLEVEL_OUTOF10: 9
PAINLEVEL_OUTOF10: 9
PAINLEVEL_OUTOF10: 2
PAINLEVEL_OUTOF10: 4
PAINLEVEL_OUTOF10: 9
PAINLEVEL_OUTOF10: 9
PAINLEVEL_OUTOF10: 8
PAINLEVEL_OUTOF10: 9

## 2024-12-02 ASSESSMENT — PAIN - FUNCTIONAL ASSESSMENT

## 2024-12-02 ASSESSMENT — PAIN DESCRIPTION - DESCRIPTORS
DESCRIPTORS: ACHING;SHARP
DESCRIPTORS: SHARP
DESCRIPTORS: ACHING
DESCRIPTORS: SHARP
DESCRIPTORS: STABBING
DESCRIPTORS: SHARP
DESCRIPTORS: ACHING

## 2024-12-02 ASSESSMENT — PAIN DESCRIPTION - PAIN TYPE
TYPE: ACUTE PAIN
TYPE: ACUTE PAIN

## 2024-12-02 ASSESSMENT — PAIN DESCRIPTION - LOCATION
LOCATION: HAND

## 2024-12-02 ASSESSMENT — PAIN DESCRIPTION - ONSET: ONSET: ON-GOING

## 2024-12-02 ASSESSMENT — PAIN DESCRIPTION - FREQUENCY: FREQUENCY: CONTINUOUS

## 2024-12-02 NOTE — H&P
Hospitalist Admission Note    NAME:   Keaton Van   : 1965   MRN: [id number]     Date/Time: 2024 5:38 PM    Patient PCP: Robert Wells PA-C    ______________________________________________________________________  Given the patient's current clinical presentation, I have a high level of concern for decompensation if discharged from the emergency department.  Complex decision making was performed, which includes reviewing the patient's available past medical records, laboratory results, and x-ray films.       My assessment of this patient's clinical condition and my plan of care is as follows.    Assessment / Plan:    Left hand cellulitis secondary to cat bite  Left hand swelling tenderness  - Admitted to medicine  - Continue Unasyn  - Follow-up blood cultures  - Pain management with hydromorphone,, Tylenol    ESRD on dialysis  -  dialysis  - Current creatinine is 3.2  -Nephro consult for dialysis needs    DM2  - On Tresiba at home  - Will start Lantus insulin and sliding scale    HTN  - On amlodipine, hydralazine, Bumex at home  -Will resume home medications    Peripheral neuropathy  - On gabapentin    Mood disorder  - Continue home medication duloxetine    Muscle spasm  - Continue taking as a tizandine        Medical Decision Making:   I personally reviewed labs: cbc, bmp    I personally reviewed imaging:  I personally reviewed EKG:  Toxic drug monitoring:    Discussed case with: ED provider. After discussion I am in agreement that acuity of patient's medical condition necessitates hospital stay.      Code Status: DNR  DVT Prophylaxis: heparin  Baseline:     Subjective:   CHIEF COMPLAINT:  left hand swelling    HISTORY OF PRESENT ILLNESS:     Keaton Van is a 59 y.o.  male with PMHx significant for DM, HTN, esrd on HD TTS, who comes in with left hand swelling and pain. Patient reports that he had a bitten by a wild cat 10 days ago. Patient reports that he wash his hand and
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
## 2024-12-02 NOTE — PROGRESS NOTES
Renal Dosing/Monitoring  Medication: Unasyn   Current regimen:  3gm every 6 hours    Recent Labs     12/02/24  1527   BUN 69*     Estimated Creatinine Clearance: 27 mL/min (A) (based on SCr of 3.22 mg/dL (H)).    Plan: Change to Unasyn 3gm IV q12h renal dosing  per [facility] P&T Committee Protocol with respect to renal function. Pharmacy will continue to monitor patient daily and will make dosage adjustments based upon changing renal function.

## 2024-12-02 NOTE — PROGRESS NOTES
Advanced care planning    Demographics    Patient Name  Keaton Van   Date of Birth 1965   Medical Record Number  [id number]      Age  59 y.o.   PCP Robert Wells PA-C   Admit date:  12/2/2024  4:00 PM     Room Number  ER26/26  @ [facility]       Patient is capable of making informed decision about their healthcare and end of life care     We discussed the patient's current medical conditions, risks, benefits, outcomes and goals of care on the basis of patient's past medical history, including but not limited to His chronic medical condition(s).    Within the range and scope of current medical situation I answered all the questions from the patient/family.     This was a face-to-face encounter.     Outcome of the discussion.   Do NOT do CPR, intubation    30 minutes were spent for the above discussion around advanced care plan decision.      Cole Esqueda MD  12/2/2024

## 2024-12-03 LAB
ACB COMPLEX DNA BLD POS QL NAA+NON-PROBE: NOT DETECTED
ACCESSION NUMBER, LLC1M: ABNORMAL
B FRAGILIS DNA BLD POS QL NAA+NON-PROBE: NOT DETECTED
BIOFIRE TEST COMMENT: ABNORMAL
C ALBICANS DNA BLD POS QL NAA+NON-PROBE: NOT DETECTED
C AURIS DNA BLD POS QL NAA+NON-PROBE: NOT DETECTED
C GATTII+NEOFOR DNA BLD POS QL NAA+N-PRB: NOT DETECTED
C GLABRATA DNA BLD POS QL NAA+NON-PROBE: NOT DETECTED
C KRUSEI DNA BLD POS QL NAA+NON-PROBE: NOT DETECTED
C PARAP DNA BLD POS QL NAA+NON-PROBE: NOT DETECTED
C TROPICLS DNA BLD POS QL NAA+NON-PROBE: NOT DETECTED
COMMENT:: NORMAL
E CLOAC COMP DNA BLD POS NAA+NON-PROBE: NOT DETECTED
E COLI DNA BLD POS QL NAA+NON-PROBE: NOT DETECTED
E FAECALIS DNA BLD POS QL NAA+NON-PROBE: NOT DETECTED
E FAECIUM DNA BLD POS QL NAA+NON-PROBE: NOT DETECTED
ENTEROBACTERALES DNA BLD POS NAA+N-PRB: NOT DETECTED
EST. AVERAGE GLUCOSE BLD GHB EST-MCNC: 166 MG/DL
GLUCOSE BLD STRIP.AUTO-MCNC: 163 MG/DL (ref 65–117)
GLUCOSE BLD STRIP.AUTO-MCNC: 211 MG/DL (ref 65–117)
GLUCOSE BLD STRIP.AUTO-MCNC: 211 MG/DL (ref 65–117)
GP B STREP DNA BLD POS QL NAA+NON-PROBE: NOT DETECTED
HAEM INFLU DNA BLD POS QL NAA+NON-PROBE: NOT DETECTED
HBA1C MFR BLD: 7.4 % (ref 4–5.6)
HBV SURFACE AB SER QL: NONREACTIVE
HBV SURFACE AB SER-ACNC: 5.73 MIU/ML
HBV SURFACE AG SER QL: 0.3 INDEX
HBV SURFACE AG SER QL: NEGATIVE
K OXYTOCA DNA BLD POS QL NAA+NON-PROBE: NOT DETECTED
KLEBSIELLA SP DNA BLD POS QL NAA+NON-PRB: NOT DETECTED
KLEBSIELLA SP DNA BLD POS QL NAA+NON-PRB: NOT DETECTED
L MONOCYTOG DNA BLD POS QL NAA+NON-PROBE: NOT DETECTED
N MEN DNA BLD POS QL NAA+NON-PROBE: NOT DETECTED
P AERUGINOSA DNA BLD POS NAA+NON-PROBE: NOT DETECTED
PROTEUS SP DNA BLD POS QL NAA+NON-PROBE: NOT DETECTED
RESISTANT GENE TARGETS: ABNORMAL
S AUREUS DNA BLD POS QL NAA+NON-PROBE: NOT DETECTED
S AUREUS+CONS DNA BLD POS NAA+NON-PROBE: DETECTED
S EPIDERMIDIS DNA BLD POS QL NAA+NON-PRB: NOT DETECTED
S LUGDUNENSIS DNA BLD POS QL NAA+NON-PRB: NOT DETECTED
S MALTOPHILIA DNA BLD POS QL NAA+NON-PRB: NOT DETECTED
S MARCESCENS DNA BLD POS NAA+NON-PROBE: NOT DETECTED
S PNEUM DNA BLD POS QL NAA+NON-PROBE: NOT DETECTED
S PYO DNA BLD POS QL NAA+NON-PROBE: NOT DETECTED
SALMONELLA DNA BLD POS QL NAA+NON-PROBE: NOT DETECTED
SERVICE CMNT-IMP: ABNORMAL
SPECIMEN HOLD: NORMAL
STREPTOCOCCUS DNA BLD POS NAA+NON-PROBE: NOT DETECTED

## 2024-12-03 PROCEDURE — G0378 HOSPITAL OBSERVATION PER HR: HCPCS

## 2024-12-03 PROCEDURE — 2500000003 HC RX 250 WO HCPCS: Performed by: STUDENT IN AN ORGANIZED HEALTH CARE EDUCATION/TRAINING PROGRAM

## 2024-12-03 PROCEDURE — 2580000003 HC RX 258: Performed by: STUDENT IN AN ORGANIZED HEALTH CARE EDUCATION/TRAINING PROGRAM

## 2024-12-03 PROCEDURE — 87340 HEPATITIS B SURFACE AG IA: CPT

## 2024-12-03 PROCEDURE — 6360000002 HC RX W HCPCS: Performed by: STUDENT IN AN ORGANIZED HEALTH CARE EDUCATION/TRAINING PROGRAM

## 2024-12-03 PROCEDURE — 82962 GLUCOSE BLOOD TEST: CPT

## 2024-12-03 PROCEDURE — 83036 HEMOGLOBIN GLYCOSYLATED A1C: CPT

## 2024-12-03 PROCEDURE — 36415 COLL VENOUS BLD VENIPUNCTURE: CPT

## 2024-12-03 PROCEDURE — 86706 HEP B SURFACE ANTIBODY: CPT

## 2024-12-03 PROCEDURE — 96366 THER/PROPH/DIAG IV INF ADDON: CPT

## 2024-12-03 PROCEDURE — 90935 HEMODIALYSIS ONE EVALUATION: CPT

## 2024-12-03 PROCEDURE — 2500000003 HC RX 250 WO HCPCS: Performed by: NURSE PRACTITIONER

## 2024-12-03 PROCEDURE — 6370000000 HC RX 637 (ALT 250 FOR IP): Performed by: STUDENT IN AN ORGANIZED HEALTH CARE EDUCATION/TRAINING PROGRAM

## 2024-12-03 PROCEDURE — 96376 TX/PRO/DX INJ SAME DRUG ADON: CPT

## 2024-12-03 PROCEDURE — 6360000002 HC RX W HCPCS

## 2024-12-03 PROCEDURE — 6360000002 HC RX W HCPCS: Performed by: INTERNAL MEDICINE

## 2024-12-03 PROCEDURE — 5A1D70Z PERFORMANCE OF URINARY FILTRATION, INTERMITTENT, LESS THAN 6 HOURS PER DAY: ICD-10-PCS | Performed by: ORTHOPAEDIC SURGERY

## 2024-12-03 PROCEDURE — 96372 THER/PROPH/DIAG INJ SC/IM: CPT

## 2024-12-03 RX ORDER — SILVER SULFADIAZINE 10 MG/G
CREAM TOPICAL DAILY
Status: DISCONTINUED | OUTPATIENT
Start: 2024-12-03 | End: 2024-12-11 | Stop reason: HOSPADM

## 2024-12-03 RX ORDER — HYDROMORPHONE HYDROCHLORIDE 1 MG/ML
0.5 INJECTION, SOLUTION INTRAMUSCULAR; INTRAVENOUS; SUBCUTANEOUS EVERY 4 HOURS PRN
Status: DISCONTINUED | OUTPATIENT
Start: 2024-12-03 | End: 2024-12-04

## 2024-12-03 RX ORDER — SILVER SULFADIAZINE 10 MG/G
CREAM TOPICAL
Qty: 1 G | Refills: 0 | Status: ON HOLD
Start: 2024-12-03

## 2024-12-03 RX ORDER — KETOROLAC TROMETHAMINE 30 MG/ML
30 INJECTION, SOLUTION INTRAMUSCULAR; INTRAVENOUS EVERY 6 HOURS PRN
Status: DISCONTINUED | OUTPATIENT
Start: 2024-12-03 | End: 2024-12-03

## 2024-12-03 RX ORDER — HEPARIN SODIUM 1000 [USP'U]/ML
1800 INJECTION, SOLUTION INTRAVENOUS; SUBCUTANEOUS AS NEEDED
Status: DISCONTINUED | OUTPATIENT
Start: 2024-12-03 | End: 2024-12-11 | Stop reason: HOSPADM

## 2024-12-03 RX ORDER — KETOROLAC TROMETHAMINE 30 MG/ML
15 INJECTION, SOLUTION INTRAMUSCULAR; INTRAVENOUS EVERY 6 HOURS PRN
Status: DISPENSED | OUTPATIENT
Start: 2024-12-03 | End: 2024-12-04

## 2024-12-03 RX ORDER — HEPARIN SODIUM 1000 [USP'U]/ML
INJECTION, SOLUTION INTRAVENOUS; SUBCUTANEOUS
Status: COMPLETED
Start: 2024-12-03 | End: 2024-12-03

## 2024-12-03 RX ORDER — OXYCODONE HYDROCHLORIDE 5 MG/1
5 TABLET ORAL EVERY 6 HOURS PRN
Status: DISCONTINUED | OUTPATIENT
Start: 2024-12-03 | End: 2024-12-04

## 2024-12-03 RX ADMIN — OXYCODONE 5 MG: 5 TABLET ORAL at 09:42

## 2024-12-03 RX ADMIN — HYDRALAZINE HYDROCHLORIDE 50 MG: 25 TABLET ORAL at 20:16

## 2024-12-03 RX ADMIN — AMPICILLIN SODIUM AND SULBACTAM SODIUM 3000 MG: 2; 1 INJECTION, POWDER, FOR SOLUTION INTRAMUSCULAR; INTRAVENOUS at 03:38

## 2024-12-03 RX ADMIN — INSULIN LISPRO 2 UNITS: 100 INJECTION, SOLUTION INTRAVENOUS; SUBCUTANEOUS at 21:05

## 2024-12-03 RX ADMIN — SILVER SULFADIAZINE: 10 CREAM TOPICAL at 14:14

## 2024-12-03 RX ADMIN — HEPARIN SODIUM 1800 UNITS: 1000 INJECTION INTRAVENOUS; SUBCUTANEOUS at 13:30

## 2024-12-03 RX ADMIN — HEPARIN SODIUM 5000 UNITS: 5000 INJECTION INTRAVENOUS; SUBCUTANEOUS at 20:16

## 2024-12-03 RX ADMIN — KETOROLAC TROMETHAMINE 15 MG: 30 INJECTION, SOLUTION INTRAMUSCULAR at 17:41

## 2024-12-03 RX ADMIN — INSULIN LISPRO 2 UNITS: 100 INJECTION, SOLUTION INTRAVENOUS; SUBCUTANEOUS at 17:09

## 2024-12-03 RX ADMIN — LURASIDONE HYDROCHLORIDE 20 MG: 20 TABLET ORAL at 16:22

## 2024-12-03 RX ADMIN — KETOROLAC TROMETHAMINE 15 MG: 30 INJECTION, SOLUTION INTRAMUSCULAR at 09:42

## 2024-12-03 RX ADMIN — OXYCODONE 5 MG: 5 TABLET ORAL at 17:41

## 2024-12-03 RX ADMIN — HEPARIN SODIUM 5000 UNITS: 5000 INJECTION INTRAVENOUS; SUBCUTANEOUS at 14:13

## 2024-12-03 RX ADMIN — HYDROMORPHONE HYDROCHLORIDE 0.5 MG: 1 INJECTION, SOLUTION INTRAMUSCULAR; INTRAVENOUS; SUBCUTANEOUS at 07:42

## 2024-12-03 RX ADMIN — AMPICILLIN SODIUM AND SULBACTAM SODIUM 3000 MG: 2; 1 INJECTION, POWDER, FOR SOLUTION INTRAMUSCULAR; INTRAVENOUS at 16:31

## 2024-12-03 RX ADMIN — SODIUM CHLORIDE, PRESERVATIVE FREE 10 ML: 5 INJECTION INTRAVENOUS at 20:15

## 2024-12-03 RX ADMIN — INSULIN GLARGINE 14 UNITS: 100 INJECTION, SOLUTION SUBCUTANEOUS at 09:19

## 2024-12-03 RX ADMIN — HYDRALAZINE HYDROCHLORIDE 50 MG: 25 TABLET ORAL at 14:13

## 2024-12-03 RX ADMIN — HEPARIN SODIUM 5000 UNITS: 5000 INJECTION INTRAVENOUS; SUBCUTANEOUS at 05:59

## 2024-12-03 RX ADMIN — HYDROMORPHONE HYDROCHLORIDE 0.5 MG: 1 INJECTION, SOLUTION INTRAMUSCULAR; INTRAVENOUS; SUBCUTANEOUS at 14:13

## 2024-12-03 RX ADMIN — HYDROMORPHONE HYDROCHLORIDE 0.5 MG: 1 INJECTION, SOLUTION INTRAMUSCULAR; INTRAVENOUS; SUBCUTANEOUS at 03:33

## 2024-12-03 RX ADMIN — HYDROMORPHONE HYDROCHLORIDE 0.5 MG: 1 INJECTION, SOLUTION INTRAMUSCULAR; INTRAVENOUS; SUBCUTANEOUS at 20:13

## 2024-12-03 ASSESSMENT — PAIN DESCRIPTION - FREQUENCY
FREQUENCY: INTERMITTENT
FREQUENCY: CONTINUOUS

## 2024-12-03 ASSESSMENT — PAIN DESCRIPTION - DESCRIPTORS
DESCRIPTORS: ACHING
DESCRIPTORS: ACHING

## 2024-12-03 ASSESSMENT — PAIN SCALES - GENERAL
PAINLEVEL_OUTOF10: 6
PAINLEVEL_OUTOF10: 8
PAINLEVEL_OUTOF10: 7
PAINLEVEL_OUTOF10: 8
PAINLEVEL_OUTOF10: 6
PAINLEVEL_OUTOF10: 0
PAINLEVEL_OUTOF10: 2
PAINLEVEL_OUTOF10: 8
PAINLEVEL_OUTOF10: 7

## 2024-12-03 ASSESSMENT — PAIN DESCRIPTION - LOCATION
LOCATION: HAND

## 2024-12-03 ASSESSMENT — PAIN DESCRIPTION - ORIENTATION
ORIENTATION: LEFT

## 2024-12-03 ASSESSMENT — PAIN DESCRIPTION - ONSET
ONSET: ON-GOING
ONSET: GRADUAL

## 2024-12-03 ASSESSMENT — PAIN DESCRIPTION - PAIN TYPE
TYPE: ACUTE PAIN
TYPE: ACUTE PAIN

## 2024-12-03 NOTE — PROGRESS NOTES
Nutrition Note    Pt requested to speak with RD. Familiar to RD team from prior admissions. Requested fruit to be sent in lieu of bread, also asked if diet could be liberalized. RD liberalized diet from 3CHO to 4CHO, which is more appropriate for 200lb male. Reported strong appetite; agreeable to nepro 2x/day. In setting of excellent appetite (+nourished), will follow per LOS policy, please consult otherwise PRN.    Electronically signed by Ani Rader RD on 12/3/24 at 11:20 AM EST    Contact: [phone]

## 2024-12-03 NOTE — PROGRESS NOTES
BUN 69*   CREATININE 3.22*   CALCIUM 9.6       Recent Labs     12/02/24  1527   WBC 5.0   RBC 4.62   HGB 13.6   HCT 40.0   MCV 86.6   MCH 29.4   MCHC 34.0   RDW 15.5*      MPV 9.4     Lab Results   Component Value Date/Time    IRON 50 07/23/2024 05:20 AM    TIBC 234 (L) 07/23/2024 05:20 AM     No results found for: \"PTH\"  Lab Results   Component Value Date/Time    LABA1C 6.3 (H) 09/16/2024 04:15 AM     Lab Results   Component Value Date/Time    COLORU YELLOW/STRAW 12/02/2024 06:49 PM    CLARITYU CLEAR 01/06/2023 02:18 AM    GLUCOSEU 500 (A) 12/02/2024 06:49 PM    BILIRUBINUR Negative 12/02/2024 06:49 PM    KETUA Negative 12/02/2024 06:49 PM    BLOODU MODERATE (A) 12/02/2024 06:49 PM    PHUR 5.5 12/02/2024 06:49 PM    PHUR 7.0 01/06/2023 02:18 AM    PROTEINU 100 (A) 12/02/2024 06:49 PM    NITRU Negative 12/02/2024 06:49 PM    LEUKOCYTESUR Negative 12/02/2024 06:49 PM     US Results (most recent):  Medication list  reviewed  Current Facility-Administered Medications   Medication Dose Route Frequency    HYDROmorphone HCl PF (DILAUDID) injection 0.5 mg  0.5 mg IntraVENous Q4H PRN    oxyCODONE (ROXICODONE) immediate release tablet 5 mg  5 mg Oral Q6H PRN    ketorolac (TORADOL) injection 15 mg  15 mg IntraVENous Q6H PRN    amLODIPine (NORVASC) tablet 10 mg  10 mg Oral Daily    DULoxetine (CYMBALTA) extended release capsule 20 mg  20 mg Oral Daily    cloNIDine (CATAPRES) tablet 0.2 mg  0.2 mg Oral Q8H PRN    hydrALAZINE (APRESOLINE) tablet 50 mg  50 mg Oral 3 times per day    lurasidone (LATUDA) tablet 20 mg  20 mg Oral Dinner    tiZANidine (ZANAFLEX) tablet 2 mg  2 mg Oral Q8H PRN    sodium chloride flush 0.9 % injection 5-40 mL  5-40 mL IntraVENous 2 times per day    sodium chloride flush 0.9 % injection 5-40 mL  5-40 mL IntraVENous PRN    0.9 % sodium chloride infusion   IntraVENous PRN    acetaminophen (TYLENOL) tablet 650 mg  650 mg Oral Q6H PRN    Or    acetaminophen (TYLENOL) suppository 650 mg  650 mg  Rectal Q6H PRN    heparin (porcine) injection 5,000 Units  5,000 Units SubCUTAneous 3 times per day    ampicillin-sulbactam (UNASYN) 3,000 mg in sodium chloride 0.9 % 100 mL IVPB (mini-bag)  3,000 mg IntraVENous Q12H    glucose chewable tablet 16 g  4 tablet Oral PRN    dextrose bolus 10% 125 mL  125 mL IntraVENous PRN    Or    dextrose bolus 10% 250 mL  250 mL IntraVENous PRN    glucagon injection 1 mg  1 mg SubCUTAneous PRN    dextrose 10 % infusion   IntraVENous Continuous PRN    insulin glargine (LANTUS) injection vial 14 Units  0.15 Units/kg SubCUTAneous Daily    insulin lispro (HUMALOG,ADMELOG) injection vial 0-8 Units  0-8 Units SubCUTAneous 4x Daily AC & HS    labetalol (NORMODYNE;TRANDATE) injection 10 mg  10 mg IntraVENous Q6H PRN        Virgilio Asif MD  12/3/2024

## 2024-12-03 NOTE — DISCHARGE INSTRUCTIONS
Please follow-up with PCP within 1 week of hospital discharge    Follow the antibiotic regimen as below, as per infectious disease recommendations    Start taking losartan 25 mg for blood pressure control     Can use Tylenol, Motrin for pain, oxycodone for severe pain.  Follow-up with PCP    Continue taking home medications as prescribed    Infectious disease recommendations:  -Vancomycin 500 mg IV after HD 3 times weekly end date 1/16/2025  -Cefepime 2/2/2 g IV after HD 3 times weekly end date 1/16/2025  -Flagyl 500 mg p.o. twice daily end date 12/19  -No alcohol while on Flagyl  -Weekly CBC, CMP, vancomycin trough-  -Fax reports to [phone], call with critical labs  at [phone]  -Encourage adequate fluids, daily probiotic/yogurt  -If line malfunction occurs and home health cannot reposition  please send patient to ED immediately  -ID follow-up -1/21 at 230-in person or virtual  - If persistent side effects occur stop antibiotic and call-ID/PCP    Wound care right stump    Remove dressing, cleanse wound with soap and water, pat dry, apply thin layer of Silvadene, cover with absorbant dressing, change daily, and as needed for soiling.    Do not wear prosthesis or bear weight on stump until healed.

## 2024-12-03 NOTE — PROGRESS NOTES
End of Shift Note    Bedside shift change report given to BHAKTI Scruggs (oncoming nurse) by CARSON ROLLINS RN (offgoing nurse).  Report included the following information SBAR, Kardex, and MAR    Shift worked:  7p-7a     Shift summary and any significant changes:     AAOx4, VSS, pain medication given per MAR, safety rounding completed.     Concerns for physician to address:  no     Zone phone for oncoming shift:   [phone]       Activity:  Level of Assistance: Independent    Cardiac:   Cardiac Monitoring:  yes     Access:  Current line(s): PIV     Genitourinary:   Urinary Status: Voiding, Bathroom privileges    Respiratory:   O2 Device: None (Room air)    GI:  Current diet: ADULT DIET; Regular; 3 carb choices (45 gm/meal)    Pain Management:   Patient states pain is manageable on current regimen: YES    Skin:  Kennedy Scale Score: 20  Interventions:    Pressure injury: no    Patient Safety:  Fall Score: Vanegas Total Score: 50  Fall Risk Interventions  Nursing Judgement-Fall Risk High(Add Comments): No  Toilet Every 2 Hours-In Advance of Need: Yes  Hourly Visual Checks: Awake, In bed  Room Door Open: Yes  Alarm On: Bed  Patient Moved Closer to Nursing Station: Yes    Active Consults:  IP CONSULT TO NEPHROLOGY    Length of Stay:  Expected LOS: 2  Actual LOS: 0      CARSON ROLLINS RN

## 2024-12-03 NOTE — PROGRESS NOTES
Hospitalist Progress Note    NAME:   Keaton Van   : 1965   MRN: [id number]     Date/Time: 12/3/2024 10:45 AM  Patient PCP: Robert Wells PA-C    Estimated discharge date:   Barriers: clinic improv, pain mangment, clx       Assessment / Plan:    Left hand cellulitis secondary to cat bite  Left hand swelling tenderness, improving  - Continue Unasyn  - Follow-up blood cultures  - Pain management with hydromorphone, Toradol, Tylenol     ESRD on dialysis  -  dialysis  - Current creatinine is 3.2  -Nephro will do   dialysis today     Right BKA  -Positive for edema on examination, slight open wound, patient wanted Ortho to evaluate need for stitches  -Consult Ortho for eval    DM2  - On Tresiba at home  - Will start Lantus insulin and sliding scale     HTN  -c/w home medications amlodipine, hydralazine, Bumex     Peripheral neuropathy  - On gabapentin     Mood disorder  - Continue home medication duloxetine     Muscle spasm  - Continue taking as a tizandine     Medical Decision Making:   I personally reviewed labs: cbc, bmp     I personally reviewed imaging:     Discussed case with: pt , nurse staff        Code Status: DNR  DVT Prophylaxis: heparin  Baseline:         Subjective:       Seen and evaluated the patient at bedside, patient reports hand swelling is better, pain slightly improved, complains about his right BKA amputation, but it feels swollen and slight open wound.  Denies fevers, chills.    Objective:     VITALS:   Last 24hrs VS reviewed since prior progress note. Most recent are:  Patient Vitals for the past 24 hrs:   BP Temp Temp src Pulse Resp SpO2 Weight   24 1030 124/76 -- -- 59 -- -- --   24 1015 (!) 166/96 -- -- 59 -- -- --   24 0958 (!) 144/82 -- -- 60 -- -- --   24 0953 (!) 156/88 -- -- 63 -- -- --   24 0745 127/74 97.5 °F (36.4 °C) Oral 59 18 97 % --   24 0403 -- -- -- -- 17 -- --   24 0348 101/63 98.1 °F (36.7 °C) --  test results   YES  Review and summation of old records today    NO  Reviewed patient's current orders and MAR    YES  PMH/SH reviewed - no change compared to H&P    Procedures: see electronic medical records for all procedures/Xrays and details which were not copied into this note but were reviewed prior to creation of Plan.      LABS:  I reviewed today's most current labs and imaging studies.  Pertinent labs include:  Recent Labs     12/02/24  1527   WBC 5.0   HGB 13.6   HCT 40.0        Recent Labs     12/02/24  1527      K 5.2*      CO2 27   GLUCOSE 235*   BUN 69*   CREATININE 3.22*   CALCIUM 9.6   BILITOT 0.5   AST 29   ALT 37       Signed: Cole Esqueda MD

## 2024-12-04 ENCOUNTER — APPOINTMENT (OUTPATIENT)
Facility: HOSPITAL | Age: 59
DRG: 981 | End: 2024-12-04
Payer: MEDICARE

## 2024-12-04 PROBLEM — L03.114 CELLULITIS OF HAND, LEFT: Status: ACTIVE | Noted: 2024-12-04

## 2024-12-04 PROBLEM — R78.81 GRAM-POSITIVE BACTEREMIA: Status: ACTIVE | Noted: 2024-12-04

## 2024-12-04 LAB
ANION GAP SERPL CALC-SCNC: 6 MMOL/L (ref 2–12)
BACTERIA SPEC CULT: ABNORMAL
BASOPHILS # BLD: 0.1 K/UL (ref 0–0.1)
BASOPHILS NFR BLD: 2 % (ref 0–1)
BUN SERPL-MCNC: 36 MG/DL (ref 6–20)
BUN/CREAT SERPL: 14 (ref 12–20)
CALCIUM SERPL-MCNC: 8.9 MG/DL (ref 8.5–10.1)
CHLORIDE SERPL-SCNC: 98 MMOL/L (ref 97–108)
CO2 SERPL-SCNC: 28 MMOL/L (ref 21–32)
CREAT SERPL-MCNC: 2.56 MG/DL (ref 0.7–1.3)
DIFFERENTIAL METHOD BLD: ABNORMAL
EKG ATRIAL RATE: 73 BPM
EKG DIAGNOSIS: NORMAL
EKG P AXIS: 41 DEGREES
EKG P-R INTERVAL: 132 MS
EKG Q-T INTERVAL: 376 MS
EKG QRS DURATION: 96 MS
EKG QTC CALCULATION (BAZETT): 414 MS
EKG R AXIS: 90 DEGREES
EKG T AXIS: 59 DEGREES
EKG VENTRICULAR RATE: 73 BPM
EOSINOPHIL # BLD: 0.1 K/UL (ref 0–0.4)
EOSINOPHIL NFR BLD: 3 % (ref 0–7)
ERYTHROCYTE [DISTWIDTH] IN BLOOD BY AUTOMATED COUNT: 15.3 % (ref 11.5–14.5)
GLUCOSE BLD STRIP.AUTO-MCNC: 121 MG/DL (ref 65–117)
GLUCOSE BLD STRIP.AUTO-MCNC: 150 MG/DL (ref 65–117)
GLUCOSE BLD STRIP.AUTO-MCNC: 211 MG/DL (ref 65–117)
GLUCOSE BLD STRIP.AUTO-MCNC: 216 MG/DL (ref 65–117)
GLUCOSE SERPL-MCNC: 151 MG/DL (ref 65–100)
HCT VFR BLD AUTO: 35.3 % (ref 36.6–50.3)
HGB BLD-MCNC: 12 G/DL (ref 12.1–17)
IMM GRANULOCYTES # BLD AUTO: 0.1 K/UL (ref 0–0.04)
IMM GRANULOCYTES NFR BLD AUTO: 2 % (ref 0–0.5)
LYMPHOCYTES # BLD: 0.4 K/UL (ref 0.8–3.5)
LYMPHOCYTES NFR BLD: 9 % (ref 12–49)
MCH RBC QN AUTO: 29.1 PG (ref 26–34)
MCHC RBC AUTO-ENTMCNC: 34 G/DL (ref 30–36.5)
MCV RBC AUTO: 85.5 FL (ref 80–99)
MONOCYTES # BLD: 0.4 K/UL (ref 0–1)
MONOCYTES NFR BLD: 9 % (ref 5–13)
NEUTS SEG # BLD: 3.4 K/UL (ref 1.8–8)
NEUTS SEG NFR BLD: 75 % (ref 32–75)
NRBC # BLD: 0 K/UL (ref 0–0.01)
NRBC BLD-RTO: 0 PER 100 WBC
PLATELET # BLD AUTO: 146 K/UL (ref 150–400)
PMV BLD AUTO: 9.5 FL (ref 8.9–12.9)
POTASSIUM SERPL-SCNC: 4.7 MMOL/L (ref 3.5–5.1)
RBC # BLD AUTO: 4.13 M/UL (ref 4.1–5.7)
RBC MORPH BLD: ABNORMAL
SERVICE CMNT-IMP: ABNORMAL
SODIUM SERPL-SCNC: 132 MMOL/L (ref 136–145)
WBC # BLD AUTO: 4.5 K/UL (ref 4.1–11.1)

## 2024-12-04 PROCEDURE — 6360000002 HC RX W HCPCS: Performed by: STUDENT IN AN ORGANIZED HEALTH CARE EDUCATION/TRAINING PROGRAM

## 2024-12-04 PROCEDURE — 6360000004 HC RX CONTRAST MEDICATION: Performed by: PHYSICIAN ASSISTANT

## 2024-12-04 PROCEDURE — 6370000000 HC RX 637 (ALT 250 FOR IP): Performed by: STUDENT IN AN ORGANIZED HEALTH CARE EDUCATION/TRAINING PROGRAM

## 2024-12-04 PROCEDURE — 85025 COMPLETE CBC W/AUTO DIFF WBC: CPT

## 2024-12-04 PROCEDURE — 80048 BASIC METABOLIC PNL TOTAL CA: CPT

## 2024-12-04 PROCEDURE — 82962 GLUCOSE BLOOD TEST: CPT

## 2024-12-04 PROCEDURE — 73201 CT UPPER EXTREMITY W/DYE: CPT

## 2024-12-04 PROCEDURE — 96376 TX/PRO/DX INJ SAME DRUG ADON: CPT

## 2024-12-04 PROCEDURE — 2500000003 HC RX 250 WO HCPCS: Performed by: STUDENT IN AN ORGANIZED HEALTH CARE EDUCATION/TRAINING PROGRAM

## 2024-12-04 PROCEDURE — 96372 THER/PROPH/DIAG INJ SC/IM: CPT

## 2024-12-04 PROCEDURE — 1100000003 HC PRIVATE W/ TELEMETRY

## 2024-12-04 PROCEDURE — G0378 HOSPITAL OBSERVATION PER HR: HCPCS

## 2024-12-04 PROCEDURE — 6360000002 HC RX W HCPCS

## 2024-12-04 PROCEDURE — APPNB30 APP NON BILLABLE TIME 0-30 MINS: Performed by: PHYSICIAN ASSISTANT

## 2024-12-04 PROCEDURE — 2580000003 HC RX 258: Performed by: STUDENT IN AN ORGANIZED HEALTH CARE EDUCATION/TRAINING PROGRAM

## 2024-12-04 PROCEDURE — 36415 COLL VENOUS BLD VENIPUNCTURE: CPT

## 2024-12-04 PROCEDURE — 2500000003 HC RX 250 WO HCPCS: Performed by: PHYSICIAN ASSISTANT

## 2024-12-04 RX ORDER — ONDANSETRON 4 MG/1
4 TABLET, ORALLY DISINTEGRATING ORAL EVERY 8 HOURS PRN
Status: DISCONTINUED | OUTPATIENT
Start: 2024-12-04 | End: 2024-12-11 | Stop reason: HOSPADM

## 2024-12-04 RX ORDER — IOPAMIDOL 755 MG/ML
100 INJECTION, SOLUTION INTRAVASCULAR
Status: COMPLETED | OUTPATIENT
Start: 2024-12-04 | End: 2024-12-04

## 2024-12-04 RX ORDER — HYDROMORPHONE HYDROCHLORIDE 1 MG/ML
1 INJECTION, SOLUTION INTRAMUSCULAR; INTRAVENOUS; SUBCUTANEOUS EVERY 4 HOURS PRN
Status: DISCONTINUED | OUTPATIENT
Start: 2024-12-04 | End: 2024-12-06

## 2024-12-04 RX ORDER — VANCOMYCIN 2 GRAM/500 ML IN 0.9 % SODIUM CHLORIDE INTRAVENOUS
2000 ONCE
Status: COMPLETED | OUTPATIENT
Start: 2024-12-04 | End: 2024-12-04

## 2024-12-04 RX ORDER — HYDROMORPHONE HYDROCHLORIDE 2 MG/1
2 TABLET ORAL EVERY 4 HOURS PRN
Status: DISCONTINUED | OUTPATIENT
Start: 2024-12-04 | End: 2024-12-10

## 2024-12-04 RX ADMIN — INSULIN LISPRO 2 UNITS: 100 INJECTION, SOLUTION INTRAVENOUS; SUBCUTANEOUS at 12:02

## 2024-12-04 RX ADMIN — VANCOMYCIN 2 GRAM/500 ML IN 0.9 % SODIUM CHLORIDE INTRAVENOUS 2000 MG: at 01:57

## 2024-12-04 RX ADMIN — DULOXETINE HYDROCHLORIDE 20 MG: 20 CAPSULE, DELAYED RELEASE ORAL at 08:48

## 2024-12-04 RX ADMIN — INSULIN GLARGINE 14 UNITS: 100 INJECTION, SOLUTION SUBCUTANEOUS at 08:45

## 2024-12-04 RX ADMIN — HYDROMORPHONE HYDROCHLORIDE 0.5 MG: 1 INJECTION, SOLUTION INTRAMUSCULAR; INTRAVENOUS; SUBCUTANEOUS at 00:15

## 2024-12-04 RX ADMIN — HEPARIN SODIUM 5000 UNITS: 5000 INJECTION INTRAVENOUS; SUBCUTANEOUS at 05:42

## 2024-12-04 RX ADMIN — SODIUM CHLORIDE, PRESERVATIVE FREE 10 ML: 5 INJECTION INTRAVENOUS at 08:53

## 2024-12-04 RX ADMIN — INSULIN LISPRO 2 UNITS: 100 INJECTION, SOLUTION INTRAVENOUS; SUBCUTANEOUS at 20:56

## 2024-12-04 RX ADMIN — HYDROMORPHONE HYDROCHLORIDE 1 MG: 1 INJECTION, SOLUTION INTRAMUSCULAR; INTRAVENOUS; SUBCUTANEOUS at 16:02

## 2024-12-04 RX ADMIN — CLONIDINE HYDROCHLORIDE 0.2 MG: 0.1 TABLET ORAL at 17:08

## 2024-12-04 RX ADMIN — HYDRALAZINE HYDROCHLORIDE 50 MG: 25 TABLET ORAL at 05:41

## 2024-12-04 RX ADMIN — AMPICILLIN SODIUM AND SULBACTAM SODIUM 3000 MG: 2; 1 INJECTION, POWDER, FOR SOLUTION INTRAMUSCULAR; INTRAVENOUS at 16:08

## 2024-12-04 RX ADMIN — OXYCODONE 5 MG: 5 TABLET ORAL at 02:03

## 2024-12-04 RX ADMIN — IOPAMIDOL 100 ML: 755 INJECTION, SOLUTION INTRAVENOUS at 17:56

## 2024-12-04 RX ADMIN — HEPARIN SODIUM 5000 UNITS: 5000 INJECTION INTRAVENOUS; SUBCUTANEOUS at 21:25

## 2024-12-04 RX ADMIN — HYDRALAZINE HYDROCHLORIDE 50 MG: 25 TABLET ORAL at 20:56

## 2024-12-04 RX ADMIN — HYDROMORPHONE HYDROCHLORIDE 0.5 MG: 1 INJECTION, SOLUTION INTRAMUSCULAR; INTRAVENOUS; SUBCUTANEOUS at 04:23

## 2024-12-04 RX ADMIN — HYDROMORPHONE HYDROCHLORIDE 0.5 MG: 1 INJECTION, SOLUTION INTRAMUSCULAR; INTRAVENOUS; SUBCUTANEOUS at 08:46

## 2024-12-04 RX ADMIN — AMLODIPINE BESYLATE 10 MG: 5 TABLET ORAL at 08:48

## 2024-12-04 RX ADMIN — SILVER SULFADIAZINE: 10 CREAM TOPICAL at 08:53

## 2024-12-04 RX ADMIN — AMPICILLIN SODIUM AND SULBACTAM SODIUM 3000 MG: 2; 1 INJECTION, POWDER, FOR SOLUTION INTRAMUSCULAR; INTRAVENOUS at 04:26

## 2024-12-04 RX ADMIN — SODIUM CHLORIDE: 9 INJECTION, SOLUTION INTRAVENOUS at 01:55

## 2024-12-04 RX ADMIN — HYDROMORPHONE HYDROCHLORIDE 1 MG: 1 INJECTION, SOLUTION INTRAMUSCULAR; INTRAVENOUS; SUBCUTANEOUS at 12:02

## 2024-12-04 RX ADMIN — HYDRALAZINE HYDROCHLORIDE 50 MG: 25 TABLET ORAL at 14:34

## 2024-12-04 RX ADMIN — HYDROMORPHONE HYDROCHLORIDE 1 MG: 1 INJECTION, SOLUTION INTRAMUSCULAR; INTRAVENOUS; SUBCUTANEOUS at 20:18

## 2024-12-04 RX ADMIN — LURASIDONE HYDROCHLORIDE 20 MG: 20 TABLET ORAL at 16:04

## 2024-12-04 RX ADMIN — SODIUM CHLORIDE: 9 INJECTION, SOLUTION INTRAVENOUS at 16:06

## 2024-12-04 RX ADMIN — SODIUM CHLORIDE, PRESERVATIVE FREE 10 ML: 5 INJECTION INTRAVENOUS at 20:22

## 2024-12-04 RX ADMIN — HEPARIN SODIUM 5000 UNITS: 5000 INJECTION INTRAVENOUS; SUBCUTANEOUS at 14:34

## 2024-12-04 ASSESSMENT — PAIN DESCRIPTION - LOCATION
LOCATION: HAND

## 2024-12-04 ASSESSMENT — PAIN SCALES - GENERAL
PAINLEVEL_OUTOF10: 0
PAINLEVEL_OUTOF10: 9
PAINLEVEL_OUTOF10: 7
PAINLEVEL_OUTOF10: 8
PAINLEVEL_OUTOF10: 2
PAINLEVEL_OUTOF10: 5
PAINLEVEL_OUTOF10: 3
PAINLEVEL_OUTOF10: 8

## 2024-12-04 ASSESSMENT — PAIN - FUNCTIONAL ASSESSMENT
PAIN_FUNCTIONAL_ASSESSMENT: PREVENTS OR INTERFERES SOME ACTIVE ACTIVITIES AND ADLS

## 2024-12-04 ASSESSMENT — PAIN DESCRIPTION - ONSET: ONSET: ON-GOING

## 2024-12-04 ASSESSMENT — PAIN DESCRIPTION - ORIENTATION
ORIENTATION: LEFT

## 2024-12-04 ASSESSMENT — PAIN DESCRIPTION - FREQUENCY: FREQUENCY: CONTINUOUS

## 2024-12-04 ASSESSMENT — PAIN DESCRIPTION - DESCRIPTORS
DESCRIPTORS: ACHING;THROBBING
DESCRIPTORS: ACHING;THROBBING
DESCRIPTORS: ACHING
DESCRIPTORS: ACHING;POUNDING

## 2024-12-04 ASSESSMENT — PAIN DESCRIPTION - PAIN TYPE: TYPE: ACUTE PAIN

## 2024-12-04 NOTE — PROGRESS NOTES
End of Shift Note    Bedside shift change report given to BHAKTI Tai (oncoming nurse) by CARSON ROLLINS RN (offgoing nurse).  Report included the following information SBAR, Kardex, and MAR    Shift worked:  7p-7a     Shift summary and any significant changes:     AAOx4, VSS, pain medication given per MAR, safety rounding completed.     Concerns for physician to address:  no     Zone phone for oncoming shift:   [phone]       Activity:  Level of Assistance: Independent    Cardiac:   Cardiac Monitoring:  yes     Access:  Current line(s): PIV     Genitourinary:   Urinary Status: Voiding, Bathroom privileges    Respiratory:   O2 Device: None (Room air)    GI:  Current diet: ADULT ORAL NUTRITION SUPPLEMENT; Breakfast, Lunch; Renal Oral Supplement  ADULT DIET; Regular; 4 carb choices (60 gm/meal)    Pain Management:   Patient states pain is manageable on current regimen: YES    Skin:  Kennedy Scale Score: 23  Interventions: Wound Offloading (Prevention Methods): Elevate heels  Pressure injury: no    Patient Safety:  Fall Score: Vanegas Total Score: 50  Fall Risk Interventions  Nursing Judgement-Fall Risk High(Add Comments): Yes  Toilet Every 2 Hours-In Advance of Need: Yes  Hourly Visual Checks: Awake, In bed  Room Door Open: Deferred to promote rest  Alarm On: Bed  Patient Moved Closer to Nursing Station: No    Active Consults:  IP CONSULT TO NEPHROLOGY  IP CONSULT TO VASCULAR SURGERY  IP CONSULT TO PHARMACY    Length of Stay:  Expected LOS: 2  Actual LOS: 0      CARSON ROLLINS RN

## 2024-12-04 NOTE — PROGRESS NOTES
Nephrology Progress Note  [facility] [place] [facility] / [place] [facility]  [address], [address]  [place] [address]  Phone - [phone]  Fax - [phone]                 Patient: Keaton Van                     YOB: 1965        Date- 12/4/2024                                     Admit Date: 12/2/2024   CC: Follow up for ESRD          IMPRESSION & PLAN:   KIN  (multifactorial suspect secondary to ATN vs progression of ckd)(now hemodialysis dependent, TTS, [facility], right chest PermCath)  CKD 4 likely due to DM nephropathy  Surgical wound dehiscence  Cat bite/left upper extremity cellulitis  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  CHF  HTN  DM  Anemia       PLAN-  Plan for hemodialysis tomorrow  We will keep on hemodialysis at TTS schedule  Antibiotics per primary team  Hemoglobin stable no need for Epogen     Subjective:  Interval History:   -Seen and examined today  -Mentions that left hand swelling is much better    Objective:   Vitals:    12/04/24 0423 12/04/24 0453 12/04/24 0541 12/04/24 0845   BP:   (!) 185/87 (!) 172/99   Pulse:    (!) 121   Resp: 18 18  16   Temp:    97.9 °F (36.6 °C)   TempSrc:       SpO2:    93%   Weight:          I/O last 3 completed shifts:  In: -   Out: 800 [Urine:800]  No intake/output data recorded.      Physical exam:    GEN: NAD  NECK- no mass  RESP: No wheezing, decreased BS b/l  CVS: S1,S2  RRR  NEURO: Normal speech, Non focal  EXT: Right BKA, left hand swelling improving  PSYCH: Normal Mood    Chart reviewed.         Pertinent Notes reviewed.     Data Review :  Lab Results   Component Value Date/Time     12/02/2024 03:27 PM    K 5.2 12/02/2024 03:27 PM     12/02/2024 03:27 PM    CO2 27 12/02/2024 03:27 PM    BUN 69 12/02/2024 03:27 PM    CREATININE 3.22 12/02/2024 03:27 PM    GLUCOSE 235 12/02/2024 03:27 PM    CALCIUM 9.6 12/02/2024 03:27 PM       Lab Results   Component Value Date    WBC 5.0

## 2024-12-04 NOTE — CARE COORDINATION
Care Management Initial Assessment       RUR: N/A   Readmission? No  1st IM letter given? No-Observation   1st  letter given: No      CM reviewed pt's chart prior to completing initial assessment. CM introduce self, explain role and confirmed demographics with pt.    Pt lives with his sister in a two story home with four steps to enter and 16 steps from the first floor to the second floor.    Pt has a hx of home health but was unable to recall the name of the agency.    Pt has a hx of inpatient rehab-[facility] in [place].      Also pt has a hx of SNF-[facility].    Pt goes to [facility] on T/Th/Sat at 1030 am.    Pt uses [facility] on [address].    At the time of d/c pt's sister will transport.    CM will follow and assist with d/c planning.       12/04/24 0805   Service Assessment   Patient Orientation Alert and Oriented   Cognition Alert   History Provided By Patient   Primary Caregiver Self   Support Systems Family Members;/;Other (Comment)  (Staff at [facility])   Patient's Healthcare Decision Maker is: Legal Next of Kin  (Pt's sister Anuradha Brown)   PCP Verified by CM Yes   Last Visit to PCP Within last 6 months   Prior Functional Level Independent in ADLs/IADLs   Current Functional Level Independent in ADLs/IADLs   Can patient return to prior living arrangement Yes   Family able to assist with home care needs: Yes   Would you like for me to discuss the discharge plan with any other family members/significant others, and if so, who? No   Financial Resources Medicare   Community Resources None   Social/Functional History   Lives With Family   Type of Home House   Home Equipment Walker - Rolling   Active  No   Patient's  Info Pt's sister   Discharge Planning   Type of Residence House   Current Services Prior To Admission Other (Comment)  (Dialysis)   Patient expects to be discharged to: House     [facility]

## 2024-12-04 NOTE — PROGRESS NOTES
Hospitalist Progress Note        Demographics    Patient Name  Keaton Van   Date of Birth 1965   Medical Record Number  [id number]      Age  59 y.o.   PCP Robert Wells PA-C   Admit date:  12/2/2024  4:00 PM     Room Number  1139/01  @ [facility]           Admission Diagnoses:  Cellulitis   Admission Summary:  \" Keaton Van is a 59 y.o.  male with PMHx significant for DM, HTN, esrd on HD TTS, who comes in with left hand swelling and pain. Patient reports that he had a bitten by a wild cat 10 days ago. Patient reports that he wash his hand and monitored it . But it started swelling and pain increased, patient reports that he started to use tylenol and motrin with out relief. More recently he started feeling unwell, fatigue , chills. His hand swelling worsened as well, so he decided to come in to the ED.  \"     Assessment and plan:     Left hand cellulitis secondary to cat bite, POA  Left hand swelling tenderness, improving  Staph bacteremia, POA  - XR of left hand showed no bone involvement.  -- Continue Unasyn and vancomycin  - Initial blood cultures growing staph in 1/2 bottles. Final susceptibility pending.  -- CT left hand w contrast to r/o drainable abscess.  -- Discussed case with ortho; agrees with CT vs MRI imaging to r/o focal abscess- re-consult if positive for I&D  - Pain management with hydromorphone, Toradol, Tylenol. Zofran PRN.     ESRD on dialysis  Hyperkalemia, POA, resolved  Hyponatremia, not POA  - Tuesday Thursday Saturday dialysis  - Current creatinine is 2.56. Baseline appears to be around 3.0  - Nephro consulted and will follow along for routine HD     Hx of right BKA  - Positive for edema on examination, slight open wound, patient wanted Ortho to evaluate need for stitches  - Vascular sx consulted. No acute intervention needed. Local wound care ordered.     DM type 2, chronic  - On Tresiba at home  - Will start Lantus insulin 14 units daily + sliding scale. Fs  tolerant- needs higher dosing for pain control given acute infection of hand. Pending final blood cultures. Continue broad spectrum empiric antibiotics and symptomatic meds for now.     Patient Vitals for the past 24 hrs:   BP   12/04/24 0845 (!) 172/99   12/04/24 0541 (!) 185/87   12/04/24 0238 (!) 185/87   12/04/24 0235 (!) 169/86   12/03/24 2000 118/77   12/03/24 1514 (!) 163/81   12/03/24 1412 (!) 137/97   12/03/24 1330 133/83   12/03/24 1315 122/78   12/03/24 1300 122/78   12/03/24 1245 133/79   12/03/24 1230 139/78   12/03/24 1215 138/83   12/03/24 1200 125/76   12/03/24 1145 139/80      Patient Vitals for the past 24 hrs:   Pulse   12/04/24 0845 (!) 121   12/04/24 0238 63   12/04/24 0235 66   12/03/24 2000 58   12/03/24 1514 62   12/03/24 1412 64   12/03/24 1330 61   12/03/24 1315 61   12/03/24 1300 61   12/03/24 1245 62   12/03/24 1230 61   12/03/24 1215 61   12/03/24 1200 66   12/03/24 1145 60      Patient Vitals for the past 24 hrs:   Resp   12/04/24 0845 16   12/04/24 0453 18   12/04/24 0423 18   12/04/24 0235 16   12/04/24 0233 16   12/04/24 0203 16   12/04/24 0045 16   12/04/24 0015 16   12/03/24 2043 18   12/03/24 2000 18   12/03/24 1412 18   12/03/24 1330 16      Patient Vitals for the past 24 hrs:   Temp   12/04/24 0845 97.9 °F (36.6 °C)   12/04/24 0235 97.5 °F (36.4 °C)   12/03/24 2000 98.2 °F (36.8 °C)   12/03/24 1514 97.7 °F (36.5 °C)   12/03/24 1412 97.9 °F (36.6 °C)   12/03/24 1330 98.6 °F (37 °C)        SpO2 Readings from Last 6 Encounters:   12/04/24 93%   09/17/24 92%   07/25/24 100%   07/26/24 94%   06/25/24 94%   06/17/24 93%       PreHospital Care  Analgesics Taken or Received PTA?: No    Body mass index is 27.51 kg/m².   Wt Readings from Last 10 Encounters:   12/02/24 92 kg (202 lb 13.2 oz)   09/16/24 88.2 kg (194 lb 7.1 oz)   07/13/24 77.1 kg (170 lb)   06/22/24 82 kg (180 lb 12.4 oz)   06/14/24 86.3 kg (190 lb 4.1 oz)   06/07/24 83.9 kg (185 lb)   06/04/24 86 kg (189 lb 9.5 oz)

## 2024-12-04 NOTE — PLAN OF CARE
Problem: Chronic Conditions and Co-morbidities  Goal: Patient's chronic conditions and co-morbidity symptoms are monitored and maintained or improved  12/4/2024 1414 by Bebe Brian, RN  Outcome: Progressing  12/4/2024 0527 by Walter Cuevas, RN  Outcome: Progressing  Flowsheets (Taken 12/3/2024 2233)  Care Plan - Patient's Chronic Conditions and Co-Morbidity Symptoms are Monitored and Maintained or Improved: Monitor and assess patient's chronic conditions and comorbid symptoms for stability, deterioration, or improvement

## 2024-12-04 NOTE — PLAN OF CARE
Problem: Chronic Conditions and Co-morbidities  Goal: Patient's chronic conditions and co-morbidity symptoms are monitored and maintained or improved  Outcome: Progressing  Flowsheets (Taken 12/3/2024 2233)  Care Plan - Patient's Chronic Conditions and Co-Morbidity Symptoms are Monitored and Maintained or Improved: Monitor and assess patient's chronic conditions and comorbid symptoms for stability, deterioration, or improvement     Problem: Discharge Planning  Goal: Discharge to home or other facility with appropriate resources  Outcome: Progressing  Flowsheets (Taken 12/3/2024 2233)  Discharge to home or other facility with appropriate resources: Identify barriers to discharge with patient and caregiver     Problem: Pain  Goal: Verbalizes/displays adequate comfort level or baseline comfort level  Outcome: Progressing     Problem: Safety - Adult  Goal: Free from fall injury  Outcome: Progressing     Problem: ABCDS Injury Assessment  Goal: Absence of physical injury  Outcome: Progressing

## 2024-12-05 ENCOUNTER — ANESTHESIA EVENT (OUTPATIENT)
Facility: HOSPITAL | Age: 59
End: 2024-12-05
Payer: MEDICARE

## 2024-12-05 ENCOUNTER — ANESTHESIA (OUTPATIENT)
Facility: HOSPITAL | Age: 59
End: 2024-12-05
Payer: MEDICARE

## 2024-12-05 LAB
ANION GAP SERPL CALC-SCNC: 9 MMOL/L (ref 2–12)
BASOPHILS # BLD: 0.1 K/UL (ref 0–0.1)
BASOPHILS NFR BLD: 1 % (ref 0–1)
BUN SERPL-MCNC: 57 MG/DL (ref 6–20)
BUN/CREAT SERPL: 17 (ref 12–20)
CALCIUM SERPL-MCNC: 8.7 MG/DL (ref 8.5–10.1)
CHLORIDE SERPL-SCNC: 96 MMOL/L (ref 97–108)
CO2 SERPL-SCNC: 26 MMOL/L (ref 21–32)
CREAT SERPL-MCNC: 3.43 MG/DL (ref 0.7–1.3)
DIFFERENTIAL METHOD BLD: ABNORMAL
EOSINOPHIL # BLD: 0.2 K/UL (ref 0–0.4)
EOSINOPHIL NFR BLD: 5 % (ref 0–7)
ERYTHROCYTE [DISTWIDTH] IN BLOOD BY AUTOMATED COUNT: 15.4 % (ref 11.5–14.5)
GLUCOSE BLD STRIP.AUTO-MCNC: 106 MG/DL (ref 65–117)
GLUCOSE BLD STRIP.AUTO-MCNC: 107 MG/DL (ref 65–117)
GLUCOSE BLD STRIP.AUTO-MCNC: 111 MG/DL (ref 65–117)
GLUCOSE BLD STRIP.AUTO-MCNC: 144 MG/DL (ref 65–117)
GLUCOSE BLD STRIP.AUTO-MCNC: 183 MG/DL (ref 65–117)
GLUCOSE SERPL-MCNC: 129 MG/DL (ref 65–100)
HCT VFR BLD AUTO: 29 % (ref 36.6–50.3)
HGB BLD-MCNC: 10.1 G/DL (ref 12.1–17)
IMM GRANULOCYTES # BLD AUTO: 0 K/UL (ref 0–0.04)
IMM GRANULOCYTES NFR BLD AUTO: 0 % (ref 0–0.5)
LYMPHOCYTES # BLD: 0.8 K/UL (ref 0.8–3.5)
LYMPHOCYTES NFR BLD: 21 % (ref 12–49)
MCH RBC QN AUTO: 29.6 PG (ref 26–34)
MCHC RBC AUTO-ENTMCNC: 34.8 G/DL (ref 30–36.5)
MCV RBC AUTO: 85 FL (ref 80–99)
MONOCYTES # BLD: 0.4 K/UL (ref 0–1)
MONOCYTES NFR BLD: 12 % (ref 5–13)
NEUTS SEG # BLD: 2.2 K/UL (ref 1.8–8)
NEUTS SEG NFR BLD: 61 % (ref 32–75)
NRBC # BLD: 0 K/UL (ref 0–0.01)
NRBC BLD-RTO: 0 PER 100 WBC
PLATELET # BLD AUTO: 125 K/UL (ref 150–400)
PMV BLD AUTO: 9.5 FL (ref 8.9–12.9)
POTASSIUM SERPL-SCNC: 4.9 MMOL/L (ref 3.5–5.1)
RBC # BLD AUTO: 3.41 M/UL (ref 4.1–5.7)
SERVICE CMNT-IMP: ABNORMAL
SERVICE CMNT-IMP: ABNORMAL
SERVICE CMNT-IMP: NORMAL
SODIUM SERPL-SCNC: 131 MMOL/L (ref 136–145)
WBC # BLD AUTO: 3.6 K/UL (ref 4.1–11.1)

## 2024-12-05 PROCEDURE — 0LD80ZZ EXTRACTION OF LEFT HAND TENDON, OPEN APPROACH: ICD-10-PCS | Performed by: ORTHOPAEDIC SURGERY

## 2024-12-05 PROCEDURE — 6370000000 HC RX 637 (ALT 250 FOR IP): Performed by: STUDENT IN AN ORGANIZED HEALTH CARE EDUCATION/TRAINING PROGRAM

## 2024-12-05 PROCEDURE — 0LB80ZZ EXCISION OF LEFT HAND TENDON, OPEN APPROACH: ICD-10-PCS | Performed by: ORTHOPAEDIC SURGERY

## 2024-12-05 PROCEDURE — 2500000003 HC RX 250 WO HCPCS: Performed by: PHYSICIAN ASSISTANT

## 2024-12-05 PROCEDURE — 7100000000 HC PACU RECOVERY - FIRST 15 MIN: Performed by: ORTHOPAEDIC SURGERY

## 2024-12-05 PROCEDURE — 1100000000 HC RM PRIVATE

## 2024-12-05 PROCEDURE — 6360000002 HC RX W HCPCS: Performed by: STUDENT IN AN ORGANIZED HEALTH CARE EDUCATION/TRAINING PROGRAM

## 2024-12-05 PROCEDURE — 87070 CULTURE OTHR SPECIMN AEROBIC: CPT

## 2024-12-05 PROCEDURE — 87075 CULTR BACTERIA EXCEPT BLOOD: CPT

## 2024-12-05 PROCEDURE — 6360000002 HC RX W HCPCS: Performed by: NURSE ANESTHETIST, CERTIFIED REGISTERED

## 2024-12-05 PROCEDURE — 3700000000 HC ANESTHESIA ATTENDED CARE: Performed by: ORTHOPAEDIC SURGERY

## 2024-12-05 PROCEDURE — 7100000001 HC PACU RECOVERY - ADDTL 15 MIN: Performed by: ORTHOPAEDIC SURGERY

## 2024-12-05 PROCEDURE — 2700000000 HC OXYGEN THERAPY PER DAY

## 2024-12-05 PROCEDURE — 80048 BASIC METABOLIC PNL TOTAL CA: CPT

## 2024-12-05 PROCEDURE — 0L980ZZ DRAINAGE OF LEFT HAND TENDON, OPEN APPROACH: ICD-10-PCS | Performed by: ORTHOPAEDIC SURGERY

## 2024-12-05 PROCEDURE — 36415 COLL VENOUS BLD VENIPUNCTURE: CPT

## 2024-12-05 PROCEDURE — 2709999900 HC NON-CHARGEABLE SUPPLY: Performed by: ORTHOPAEDIC SURGERY

## 2024-12-05 PROCEDURE — 2580000003 HC RX 258: Performed by: NURSE ANESTHETIST, CERTIFIED REGISTERED

## 2024-12-05 PROCEDURE — 2500000003 HC RX 250 WO HCPCS: Performed by: NURSE ANESTHETIST, CERTIFIED REGISTERED

## 2024-12-05 PROCEDURE — 94761 N-INVAS EAR/PLS OXIMETRY MLT: CPT

## 2024-12-05 PROCEDURE — 82962 GLUCOSE BLOOD TEST: CPT

## 2024-12-05 PROCEDURE — 3600000002 HC SURGERY LEVEL 2 BASE: Performed by: ORTHOPAEDIC SURGERY

## 2024-12-05 PROCEDURE — 6370000000 HC RX 637 (ALT 250 FOR IP): Performed by: PHYSICIAN ASSISTANT

## 2024-12-05 PROCEDURE — 87205 SMEAR GRAM STAIN: CPT

## 2024-12-05 PROCEDURE — APPNB15 APP NON BILLABLE TIME 0-15 MINS: Performed by: PHYSICIAN ASSISTANT

## 2024-12-05 PROCEDURE — 2580000003 HC RX 258: Performed by: STUDENT IN AN ORGANIZED HEALTH CARE EDUCATION/TRAINING PROGRAM

## 2024-12-05 PROCEDURE — 3700000001 HC ADD 15 MINUTES (ANESTHESIA): Performed by: ORTHOPAEDIC SURGERY

## 2024-12-05 PROCEDURE — 85025 COMPLETE CBC W/AUTO DIFF WBC: CPT

## 2024-12-05 PROCEDURE — 3600000012 HC SURGERY LEVEL 2 ADDTL 15MIN: Performed by: ORTHOPAEDIC SURGERY

## 2024-12-05 PROCEDURE — 6360000002 HC RX W HCPCS: Performed by: ORTHOPAEDIC SURGERY

## 2024-12-05 PROCEDURE — 0R9V0ZZ DRAINAGE OF LEFT METACARPOPHALANGEAL JOINT, OPEN APPROACH: ICD-10-PCS | Performed by: ORTHOPAEDIC SURGERY

## 2024-12-05 RX ORDER — ONDANSETRON 2 MG/ML
4 INJECTION INTRAMUSCULAR; INTRAVENOUS
Status: DISCONTINUED | OUTPATIENT
Start: 2024-12-05 | End: 2024-12-05 | Stop reason: HOSPADM

## 2024-12-05 RX ORDER — LIDOCAINE HYDROCHLORIDE 20 MG/ML
INJECTION, SOLUTION EPIDURAL; INFILTRATION; INTRACAUDAL; PERINEURAL
Status: DISCONTINUED | OUTPATIENT
Start: 2024-12-05 | End: 2024-12-05 | Stop reason: SDUPTHER

## 2024-12-05 RX ORDER — DEXAMETHASONE SODIUM PHOSPHATE 4 MG/ML
INJECTION, SOLUTION INTRA-ARTICULAR; INTRALESIONAL; INTRAMUSCULAR; INTRAVENOUS; SOFT TISSUE
Status: DISCONTINUED | OUTPATIENT
Start: 2024-12-05 | End: 2024-12-05 | Stop reason: SDUPTHER

## 2024-12-05 RX ORDER — OXYCODONE HYDROCHLORIDE 5 MG/1
5 TABLET ORAL
Status: DISCONTINUED | OUTPATIENT
Start: 2024-12-05 | End: 2024-12-05 | Stop reason: HOSPADM

## 2024-12-05 RX ORDER — SODIUM CHLORIDE 0.9 % (FLUSH) 0.9 %
5-40 SYRINGE (ML) INJECTION PRN
Status: DISCONTINUED | OUTPATIENT
Start: 2024-12-05 | End: 2024-12-05 | Stop reason: HOSPADM

## 2024-12-05 RX ORDER — PROPOFOL 10 MG/ML
INJECTION, EMULSION INTRAVENOUS
Status: DISCONTINUED | OUTPATIENT
Start: 2024-12-05 | End: 2024-12-05 | Stop reason: SDUPTHER

## 2024-12-05 RX ORDER — FENTANYL CITRATE 50 UG/ML
25 INJECTION, SOLUTION INTRAMUSCULAR; INTRAVENOUS EVERY 5 MIN PRN
Status: DISCONTINUED | OUTPATIENT
Start: 2024-12-05 | End: 2024-12-05 | Stop reason: HOSPADM

## 2024-12-05 RX ORDER — HYDROMORPHONE HYDROCHLORIDE 1 MG/ML
0.5 INJECTION, SOLUTION INTRAMUSCULAR; INTRAVENOUS; SUBCUTANEOUS EVERY 5 MIN PRN
Status: DISCONTINUED | OUTPATIENT
Start: 2024-12-05 | End: 2024-12-05 | Stop reason: HOSPADM

## 2024-12-05 RX ORDER — SODIUM CHLORIDE 0.9 % (FLUSH) 0.9 %
5-40 SYRINGE (ML) INJECTION EVERY 12 HOURS SCHEDULED
Status: DISCONTINUED | OUTPATIENT
Start: 2024-12-05 | End: 2024-12-05 | Stop reason: HOSPADM

## 2024-12-05 RX ORDER — DEXMEDETOMIDINE HYDROCHLORIDE 100 UG/ML
INJECTION, SOLUTION INTRAVENOUS
Status: DISCONTINUED | OUTPATIENT
Start: 2024-12-05 | End: 2024-12-05 | Stop reason: SDUPTHER

## 2024-12-05 RX ORDER — PROCHLORPERAZINE EDISYLATE 5 MG/ML
5 INJECTION INTRAMUSCULAR; INTRAVENOUS
Status: DISCONTINUED | OUTPATIENT
Start: 2024-12-05 | End: 2024-12-05 | Stop reason: HOSPADM

## 2024-12-05 RX ORDER — FENTANYL CITRATE 50 UG/ML
INJECTION, SOLUTION INTRAMUSCULAR; INTRAVENOUS
Status: DISCONTINUED | OUTPATIENT
Start: 2024-12-05 | End: 2024-12-05 | Stop reason: SDUPTHER

## 2024-12-05 RX ORDER — ONDANSETRON 2 MG/ML
INJECTION INTRAMUSCULAR; INTRAVENOUS
Status: DISCONTINUED | OUTPATIENT
Start: 2024-12-05 | End: 2024-12-05 | Stop reason: SDUPTHER

## 2024-12-05 RX ORDER — EPHEDRINE SULFATE/0.9% NACL/PF 50 MG/5 ML
SYRINGE (ML) INTRAVENOUS
Status: DISCONTINUED | OUTPATIENT
Start: 2024-12-05 | End: 2024-12-05 | Stop reason: SDUPTHER

## 2024-12-05 RX ORDER — SODIUM CHLORIDE 9 MG/ML
INJECTION, SOLUTION INTRAVENOUS PRN
Status: DISCONTINUED | OUTPATIENT
Start: 2024-12-05 | End: 2024-12-05 | Stop reason: HOSPADM

## 2024-12-05 RX ORDER — NALOXONE HYDROCHLORIDE 0.4 MG/ML
INJECTION, SOLUTION INTRAMUSCULAR; INTRAVENOUS; SUBCUTANEOUS PRN
Status: DISCONTINUED | OUTPATIENT
Start: 2024-12-05 | End: 2024-12-05 | Stop reason: HOSPADM

## 2024-12-05 RX ADMIN — HEPARIN SODIUM 5000 UNITS: 5000 INJECTION INTRAVENOUS; SUBCUTANEOUS at 15:36

## 2024-12-05 RX ADMIN — HYDROMORPHONE HYDROCHLORIDE 2 MG: 2 TABLET ORAL at 08:22

## 2024-12-05 RX ADMIN — HEPARIN SODIUM 5000 UNITS: 5000 INJECTION INTRAVENOUS; SUBCUTANEOUS at 05:51

## 2024-12-05 RX ADMIN — SODIUM CHLORIDE: 9 INJECTION, SOLUTION INTRAVENOUS at 15:39

## 2024-12-05 RX ADMIN — Medication 5 MG: at 12:52

## 2024-12-05 RX ADMIN — SODIUM CHLORIDE, PRESERVATIVE FREE 10 ML: 5 INJECTION INTRAVENOUS at 20:15

## 2024-12-05 RX ADMIN — FENTANYL CITRATE 25 MCG: 50 INJECTION, SOLUTION INTRAMUSCULAR; INTRAVENOUS at 12:57

## 2024-12-05 RX ADMIN — DEXMEDETOMIDINE 4 MCG: 100 INJECTION, SOLUTION INTRAVENOUS at 12:58

## 2024-12-05 RX ADMIN — FENTANYL CITRATE 25 MCG: 50 INJECTION, SOLUTION INTRAMUSCULAR; INTRAVENOUS at 12:39

## 2024-12-05 RX ADMIN — FENTANYL CITRATE 25 MCG: 50 INJECTION, SOLUTION INTRAMUSCULAR; INTRAVENOUS at 12:37

## 2024-12-05 RX ADMIN — SODIUM CHLORIDE: 9 INJECTION, SOLUTION INTRAVENOUS at 03:37

## 2024-12-05 RX ADMIN — HYDRALAZINE HYDROCHLORIDE 50 MG: 25 TABLET ORAL at 22:16

## 2024-12-05 RX ADMIN — DEXMEDETOMIDINE 4 MCG: 100 INJECTION, SOLUTION INTRAVENOUS at 12:14

## 2024-12-05 RX ADMIN — AMLODIPINE BESYLATE 10 MG: 5 TABLET ORAL at 08:22

## 2024-12-05 RX ADMIN — HEPARIN SODIUM 5000 UNITS: 5000 INJECTION INTRAVENOUS; SUBCUTANEOUS at 22:16

## 2024-12-05 RX ADMIN — ONDANSETRON 4 MG: 2 INJECTION INTRAMUSCULAR; INTRAVENOUS at 12:53

## 2024-12-05 RX ADMIN — PROPOFOL 200 MG: 10 INJECTION, EMULSION INTRAVENOUS at 12:18

## 2024-12-05 RX ADMIN — SODIUM CHLORIDE, PRESERVATIVE FREE 10 ML: 5 INJECTION INTRAVENOUS at 08:25

## 2024-12-05 RX ADMIN — LURASIDONE HYDROCHLORIDE 20 MG: 20 TABLET ORAL at 15:35

## 2024-12-05 RX ADMIN — DEXAMETHASONE SODIUM PHOSPHATE 4 MG: 4 INJECTION INTRA-ARTICULAR; INTRALESIONAL; INTRAMUSCULAR; INTRAVENOUS; SOFT TISSUE at 12:46

## 2024-12-05 RX ADMIN — HYDROMORPHONE HYDROCHLORIDE 1 MG: 1 INJECTION, SOLUTION INTRAMUSCULAR; INTRAVENOUS; SUBCUTANEOUS at 20:13

## 2024-12-05 RX ADMIN — HYDRALAZINE HYDROCHLORIDE 50 MG: 25 TABLET ORAL at 15:35

## 2024-12-05 RX ADMIN — PHENYLEPHRINE HYDROCHLORIDE 40 MCG: 10 INJECTION INTRAVENOUS at 12:28

## 2024-12-05 RX ADMIN — AMPICILLIN SODIUM AND SULBACTAM SODIUM 3000 MG: 2; 1 INJECTION, POWDER, FOR SOLUTION INTRAMUSCULAR; INTRAVENOUS at 15:42

## 2024-12-05 RX ADMIN — HYDROMORPHONE HYDROCHLORIDE 1 MG: 1 INJECTION, SOLUTION INTRAMUSCULAR; INTRAVENOUS; SUBCUTANEOUS at 04:11

## 2024-12-05 RX ADMIN — HYDROMORPHONE HYDROCHLORIDE 2 MG: 2 TABLET ORAL at 22:24

## 2024-12-05 RX ADMIN — DEXMEDETOMIDINE 2 MCG: 100 INJECTION, SOLUTION INTRAVENOUS at 12:16

## 2024-12-05 RX ADMIN — LIDOCAINE HYDROCHLORIDE 60 MG: 20 INJECTION, SOLUTION EPIDURAL; INFILTRATION; INTRACAUDAL; PERINEURAL at 12:18

## 2024-12-05 RX ADMIN — AMPICILLIN SODIUM AND SULBACTAM SODIUM 3000 MG: 2; 1 INJECTION, POWDER, FOR SOLUTION INTRAMUSCULAR; INTRAVENOUS at 03:37

## 2024-12-05 RX ADMIN — HYDROMORPHONE HYDROCHLORIDE 1 MG: 1 INJECTION, SOLUTION INTRAMUSCULAR; INTRAVENOUS; SUBCUTANEOUS at 00:18

## 2024-12-05 RX ADMIN — FENTANYL CITRATE 25 MCG: 50 INJECTION, SOLUTION INTRAMUSCULAR; INTRAVENOUS at 12:50

## 2024-12-05 RX ADMIN — HYDROMORPHONE HYDROCHLORIDE 1 MG: 1 INJECTION, SOLUTION INTRAMUSCULAR; INTRAVENOUS; SUBCUTANEOUS at 15:33

## 2024-12-05 RX ADMIN — DULOXETINE HYDROCHLORIDE 20 MG: 20 CAPSULE, DELAYED RELEASE ORAL at 08:22

## 2024-12-05 RX ADMIN — HYDRALAZINE HYDROCHLORIDE 50 MG: 25 TABLET ORAL at 05:51

## 2024-12-05 ASSESSMENT — PAIN DESCRIPTION - DESCRIPTORS
DESCRIPTORS: ACHING;DISCOMFORT
DESCRIPTORS: ACHING
DESCRIPTORS: ACHING;NAGGING
DESCRIPTORS: ACHING

## 2024-12-05 ASSESSMENT — PAIN SCALES - GENERAL
PAINLEVEL_OUTOF10: 9
PAINLEVEL_OUTOF10: 9
PAINLEVEL_OUTOF10: 7
PAINLEVEL_OUTOF10: 10
PAINLEVEL_OUTOF10: 2
PAINLEVEL_OUTOF10: 7
PAINLEVEL_OUTOF10: 9
PAINLEVEL_OUTOF10: 8
PAINLEVEL_OUTOF10: 9

## 2024-12-05 ASSESSMENT — PAIN DESCRIPTION - LOCATION
LOCATION: HAND

## 2024-12-05 ASSESSMENT — PAIN - FUNCTIONAL ASSESSMENT
PAIN_FUNCTIONAL_ASSESSMENT: PREVENTS OR INTERFERES SOME ACTIVE ACTIVITIES AND ADLS
PAIN_FUNCTIONAL_ASSESSMENT: 0-10

## 2024-12-05 ASSESSMENT — PAIN DESCRIPTION - ORIENTATION
ORIENTATION: LEFT

## 2024-12-05 ASSESSMENT — PAIN DESCRIPTION - FREQUENCY: FREQUENCY: CONTINUOUS

## 2024-12-05 ASSESSMENT — PAIN DESCRIPTION - ONSET: ONSET: ON-GOING

## 2024-12-05 ASSESSMENT — PAIN DESCRIPTION - PAIN TYPE: TYPE: ACUTE PAIN

## 2024-12-05 ASSESSMENT — LIFESTYLE VARIABLES: SMOKING_STATUS: 1

## 2024-12-05 NOTE — BRIEF OP NOTE
Brief Postoperative Note      Patient: Keaton Van  YOB: 1965  MRN: [id number]    Date of Procedure: 12/5/2024    Pre-Op Diagnosis Codes:      * Abscess [L02.91]    Post-Op Diagnosis: Same       Procedure(s):  LEFT HAND INCISION AND DRAINAGE    Surgeon(s):  Henry Owens MD    Assistant:  Physician Assistant: Patricia Freire PA-C    Anesthesia: General    Estimated Blood Loss (mL): Minimal    Complications: None    Specimens:   ID Type Source Tests Collected by Time Destination   A : Left Hand Culture Swab Hand CULTURE, ANAEROBIC, CULTURE, WOUND Henry Owens MD 12/5/2024 1147        Implants:  * No implants in log *      Drains: * No LDAs found *    Findings:  Infection Present At Time Of Surgery (PATOS)  - Left index finger MCP joint and flexor tenosynovitis  - Deep Infection (muscle/fascia) present as evidenced by purulent fluid  Other Findings: see full op note to follow    Electronically signed by Patricia Freire PA-C on 12/5/2024 at 1:23 PM

## 2024-12-05 NOTE — ANESTHESIA POST-OP
TRANSFER - OUT REPORT:    Verbal report given to Megan RN (name) on Keaton Van  being transferred to 1139(unit) for routine post-op       Report consisted of patient’s Situation, Background, Assessment and   Recommendations(SBAR).     Information from the following report(s) Surgery Report, Intake/Output, MAR, and Recent Results was reviewed with the receiving nurse.    Opportunity for questions and clarification was provided.      Patient transported with:   O2 @ 2 liters  Tech

## 2024-12-05 NOTE — PLAN OF CARE
Problem: Chronic Conditions and Co-morbidities  Goal: Patient's chronic conditions and co-morbidity symptoms are monitored and maintained or improved  12/5/2024 1038 by Bebe Brian RN  Outcome: Progressing  12/5/2024 0008 by Nakia Murrieta RN  Outcome: Progressing

## 2024-12-05 NOTE — PROGRESS NOTES
Nephrology Progress Note  [facility] / [place] [facility]  [address], [address]  [place] [address]  Phone - [phone]  Fax - [phone]                 Patient: Keaton Van                     YOB: 1965        Date- 12/5/2024                                     Admit Date: 12/2/2024   CC: Follow up for ESRD          IMPRESSION & PLAN:   KIN  (multifactorial suspect secondary to ATN vs progression of ckd)(now hemodialysis dependent, TTS, [facility], right chest PermCath)  CKD 4 likely due to DM nephropathy  Surgical wound dehiscence  Cat bite/left upper extremity cellulitis  Index finger cellulitis, MCP joint septic arthritis, juxta articular abscess  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  N.p.o. for I&D for left hand abscess per Ortho  We will keep on hemodialysis at TTS schedule  Antibiotics per primary team  Hemoglobin stable no need for Epogen  Gregory has been notified     Subjective:  Interval History:   -Seen and examined today  -Mentions that left hand swelling is much better  -N.p.o. for surgery today    Objective:   Vitals:    12/04/24 1957 12/05/24 0329 12/05/24 0821 12/05/24 1050   BP: 129/74 137/76 (!) 153/74 (!) 154/77   Pulse: 65 56 61 61   Resp: 17  16 11   Temp: 98.1 °F (36.7 °C) 98.1 °F (36.7 °C) 97.7 °F (36.5 °C) 98.2 °F (36.8 °C)   TempSrc: Oral   Oral   SpO2: 96% 95% 97% 94%   Weight:    92 kg (202 lb 13.2 oz)   Height:    1.829 m (6')      I/O last 3 completed shifts:  In: 540 [P.O.:540]  Out: 800 [Urine:800]  No intake/output data recorded.      Physical exam:    GEN: NAD  NECK- no mass  RESP: No wheezing, decreased BS b/l  CVS: S1,S2  RRR  NEURO: Normal speech, Non focal  EXT: Right BKA, left hand swelling improving  PSYCH: Normal Mood    Chart reviewed.         Pertinent Notes reviewed.     Data Review :  Lab

## 2024-12-05 NOTE — PROGRESS NOTES
Hospitalist Progress Note        Demographics    Patient Name  Keaton Van   Date of Birth 1965   Medical Record Number  [id number]      Age  59 y.o.   PCP Robert Wells PA-C   Admit date:  12/2/2024  4:00 PM     Room Number  1139/01  @ [facility]           Admission Diagnoses:  Cellulitis   Admission Summary:  \" Keaton Van is a 59 y.o.  male with PMHx significant for DM, HTN, esrd on HD TTS, who comes in with left hand swelling and pain. Patient reports that he had a bitten by a wild cat 10 days ago. Patient reports that he wash his hand and monitored it . But it started swelling and pain increased, patient reports that he started to use tylenol and motrin with out relief. More recently he started feeling unwell, fatigue , chills. His hand swelling worsened as well, so he decided to come in to the ED.  \"      Assessment and plan:      Left hand cellulitis secondary to cat bite, POA  Left hand swelling tenderness, improving  Staph bacteremia, POA  XR of left hand showed no bone involvement.  Initial blood cultures growing coag-neg staph and bacillus species (not anthracis).  CT left hand w contrast reveals evidence of MCP joint septic arthritis and adjacent articular abscess.  --Continue Unasyn and vancomycin  -- Ortho consulted and following. Taking to OR today for washout/I&D. OR wound cultures collected.  - Pain management with hydromorphone, Toradol, Tylenol. Zofran PRN.     ESRD on Hemodialysis onTuesday Thursday Saturday dialysis  Hyperkalemia, POA, resolved  Hyponatremia, not POA  - Current creatinine is 2.56. Baseline appears to be around 3.0  - Nephrology services consulted  - appreciate guidance from Dr. Asif      Hx of right BKA  - Positive for edema on examination, slight open wound, patient wanted Ortho to evaluate need for stitches  - Vascular sx consulted. No acute intervention needed. Local wound care ordered.     T2DM , chronic  - On Tresiba (degludec) at home  -

## 2024-12-05 NOTE — ANESTHESIA PRE PROCEDURE
Department of Anesthesiology  Preprocedure Note       Name:  Keaton Van   Age:  59 y.o.  :  1965                                          MRN:  [id number]         Date:  2024      Surgeon: Surgeon(s):  Henry Owens MD    Procedure: Procedure(s):  LEFT HAND INCISION AND DRAINAGE    Medications prior to admission:   Prior to Admission medications    Medication Sig Start Date End Date Taking? Authorizing Provider   silver sulfADIAZINE (SILVADENE) 1 % cream Apply topically daily.  Patient may take home with him. 12/3/24  Yes Quiana Forte, APRN - NP   Insulin Degludec (TRESIBA FLEXTOUCH) 100 UNIT/ML SOPN Inject 14 Units into the skin at bedtime 24  Yes Mimi Mello MD   hydrALAZINE (APRESOLINE) 50 MG tablet Take 1 tablet by mouth every 8 hours 24  Yes Mimi Mello MD   amLODIPine (NORVASC) 10 MG tablet Take 1 tablet by mouth daily 24  Yes Mimi Mello MD   cloNIDine (CATAPRES) 0.2 MG tablet Take 1 tablet by mouth every 8 hours as needed for High Blood Pressure (SBP >170 or DBP >100) 24  Yes Inga Acevedo MD   bumetanide (BUMEX) 2 MG tablet Take 1 tablet by mouth in the morning and 1 tablet in the evening. 24  Yes Inga Acevedo MD   pantoprazole (PROTONIX) 40 MG tablet Take 1 tablet by mouth every morning (before breakfast) 24  Yes Inga Acevedo MD   tiZANidine (ZANAFLEX) 2 MG tablet Take 1 tablet by mouth every 8 hours as needed (muscle spasm) 24  Yes Khoi Dumont MD   lurasidone (LATUDA) 20 MG TABS tablet Take 1 tablet by mouth Daily with supper 24  Yes Khoi Dumont MD   Insulin Pen Needle 31G X 5 MM MISC 1 each by Does not apply route daily 24   Mimi Mello MD   aspirin 81 MG EC tablet Take 1 tablet by mouth daily  Patient not taking: Reported on 2024   Mimi Mello MD   blood glucose monitor strips Check once daily 24   Mimi Mello MD   Lancets MISC 1 each by Does not apply route

## 2024-12-05 NOTE — H&P
Orthopedic Surgery H&P       2024 8:03 AM     Patient Name: Keaton Van      Subjective:      Keaton Van is a 59 y.o. male who presented to the ED 24 10 days after cat bite to the left hand. He reports 1 week ago swelling started. He has tried elevation and ice without relief. He has a history of DM and has been missing doses because of side effects of his new insulin.      He reports a cat bite was from a wild cat unsure of vaccination status.   Received tetanus and rabies vaccinations in ED. He is on unasyn and vancomycin. Blood cx 24 + coag neg staph aureus /, non anthracis bacillis species /    Pmhx significant for diabetes mellitus, hypertension, heart failure, end-stage renal disease, bipolar disorder, hepatitis C, and polysubstance abuse.  He is a current smoker.   He is s/p right BKA on 24 secondary to a non-healing diabetic wound.  His last Ha1c was 6.3 (2024).     Procedure:  Procedure(s):  LEFT HAND INCISION AND DRAINAGE      Objective:    General: Alert, cooperative, no distress.   Gastrointestinal:  Soft, non-tender.   Musculoskeletal:     Left hand exam demonstrates swelling and cellulitis dorsally. There is index finger MCPJ swelling and fluctuance. Pain with attempting motion. Fingers warm and well perfused.     Vital Signs:    Patient Vitals for the past 8 hrs:   BP Temp Pulse SpO2   24 0329 137/76 98.1 °F (36.7 °C) 56 95 %     Temp (24hrs), Av.2 °F (36.8 °C), Min:97.9 °F (36.6 °C), Max:98.6 °F (37 °C)      Intake/Output:  No intake/output data recorded.   1901 -  0700  In: 540 [P.O.:540]  Out: 800 [Urine:800]    Pain Control:        LAB:    Recent Labs     24  0446   HCT 29.0*   HGB 10.1*     Lab Results   Component Value Date/Time     2024 04:46 AM    K 4.9 2024 04:46 AM    CL 96 2024 04:46 AM    CO2 26 2024 04:46 AM    BUN 57 2024 04:46 AM        Narrative & Impression  EXAM: CT HAND LEFT W CONTRAST

## 2024-12-05 NOTE — PLAN OF CARE
Problem: Chronic Conditions and Co-morbidities  Goal: Patient's chronic conditions and co-morbidity symptoms are monitored and maintained or improved  12/5/2024 0008 by Nakia Murrieta RN  Outcome: Progressing  12/4/2024 1414 by Bebe Brian RN  Outcome: Progressing     Problem: Discharge Planning  Goal: Discharge to home or other facility with appropriate resources  12/5/2024 0008 by Nakia Murrieta RN  Outcome: Progressing  12/4/2024 1414 by Bebe Brian RN  Outcome: Progressing     Problem: Pain  Goal: Verbalizes/displays adequate comfort level or baseline comfort level  12/5/2024 0008 by Nakia Murrieta RN  Outcome: Progressing  12/4/2024 1414 by Bebe Brian RN  Outcome: Progressing     Problem: Safety - Adult  Goal: Free from fall injury  12/5/2024 0008 by Nakia Murrieta RN  Outcome: Progressing  12/4/2024 1414 by Bebe Brian RN  Outcome: Progressing     Problem: ABCDS Injury Assessment  Goal: Absence of physical injury  12/5/2024 0008 by Nakia Murrieta RN  Outcome: Progressing  12/4/2024 1414 by Bebe Brian RN  Outcome: Progressing

## 2024-12-05 NOTE — FLOWSHEET NOTE
12/05/24 1326   Handoff   Communication Given Periop Handoff/Relief   Handoff phase Phase I receiving   Handoff Given To Jean Pierre Harris RN   Handoff Received From Júnior Bauer RN/Werner Lomeli CRNA   Handoff Communication Face to Face;At bedside   Time Handoff Given 1326

## 2024-12-05 NOTE — PERIOP NOTE
1005 - TRANSFER - IN REPORT:    Verbal report received from mague rn on Keaton Van  being received from 1139 for ordered procedure      Report consisted of patient's Situation, Background, Assessment and   Recommendations(SBAR).     Information from the following report(s) Alarm Parameters and Pre Procedure Checklist was reviewed with the receiving nurse.    Opportunity for questions and clarification was provided.      Assessment completed upon patient's arrival to unit and care assumed.   1050 - PT INTO PRE-OP HOLDING - A&OX4, DIXON SPON AND TO COMMAND.  RESP EVEN AND UNLABORED.  POX ON RA > 94%.  BSB AND CLEAR ON AUSC.  LEFT INDEX FINGER REDDENED AND EDEMATOUS.  PT C/O 8/10 - LEFT HAND.INDEX FINGER.  ABLE TO REST.  PRE-OP TCHING DONE- PT VERBALIZES UNDERSTANDING.  SR UP X4, CB IN PLACE.  WAITING SURGERY.

## 2024-12-06 PROBLEM — M00.041: Status: ACTIVE | Noted: 2024-12-06

## 2024-12-06 PROBLEM — M65.949 FLEXOR TENOSYNOVITIS OF FINGER: Status: ACTIVE | Noted: 2024-12-06

## 2024-12-06 LAB
ANION GAP SERPL CALC-SCNC: 11 MMOL/L (ref 2–12)
BASOPHILS # BLD: 0 K/UL (ref 0–0.1)
BASOPHILS NFR BLD: 0 % (ref 0–1)
BUN SERPL-MCNC: 73 MG/DL (ref 6–20)
BUN/CREAT SERPL: 14 (ref 12–20)
CALCIUM SERPL-MCNC: 8.6 MG/DL (ref 8.5–10.1)
CHLORIDE SERPL-SCNC: 98 MMOL/L (ref 97–108)
CO2 SERPL-SCNC: 23 MMOL/L (ref 21–32)
CREAT SERPL-MCNC: 5.19 MG/DL (ref 0.7–1.3)
DIFFERENTIAL METHOD BLD: ABNORMAL
EOSINOPHIL # BLD: 0 K/UL (ref 0–0.4)
EOSINOPHIL NFR BLD: 0 % (ref 0–7)
ERYTHROCYTE [DISTWIDTH] IN BLOOD BY AUTOMATED COUNT: 15 % (ref 11.5–14.5)
GLUCOSE BLD STRIP.AUTO-MCNC: 147 MG/DL (ref 65–117)
GLUCOSE BLD STRIP.AUTO-MCNC: 186 MG/DL (ref 65–117)
GLUCOSE BLD STRIP.AUTO-MCNC: 191 MG/DL (ref 65–117)
GLUCOSE SERPL-MCNC: 154 MG/DL (ref 65–100)
HCT VFR BLD AUTO: 30.1 % (ref 36.6–50.3)
HGB BLD-MCNC: 10.6 G/DL (ref 12.1–17)
IMM GRANULOCYTES # BLD AUTO: 0 K/UL (ref 0–0.04)
IMM GRANULOCYTES NFR BLD AUTO: 0 % (ref 0–0.5)
LYMPHOCYTES # BLD: 0.4 K/UL (ref 0.8–3.5)
LYMPHOCYTES NFR BLD: 7 % (ref 12–49)
MCH RBC QN AUTO: 29.8 PG (ref 26–34)
MCHC RBC AUTO-ENTMCNC: 35.2 G/DL (ref 30–36.5)
MCV RBC AUTO: 84.6 FL (ref 80–99)
MONOCYTES # BLD: 0.2 K/UL (ref 0–1)
MONOCYTES NFR BLD: 4 % (ref 5–13)
NEUTS SEG # BLD: 5.1 K/UL (ref 1.8–8)
NEUTS SEG NFR BLD: 89 % (ref 32–75)
NRBC # BLD: 0 K/UL (ref 0–0.01)
NRBC BLD-RTO: 0 PER 100 WBC
PLATELET # BLD AUTO: 116 K/UL (ref 150–400)
PMV BLD AUTO: 9.2 FL (ref 8.9–12.9)
POTASSIUM SERPL-SCNC: 5.3 MMOL/L (ref 3.5–5.1)
RBC # BLD AUTO: 3.56 M/UL (ref 4.1–5.7)
RBC MORPH BLD: ABNORMAL
SERVICE CMNT-IMP: ABNORMAL
SODIUM SERPL-SCNC: 132 MMOL/L (ref 136–145)
WBC # BLD AUTO: 5.7 K/UL (ref 4.1–11.1)

## 2024-12-06 PROCEDURE — 2500000003 HC RX 250 WO HCPCS: Performed by: INTERNAL MEDICINE

## 2024-12-06 PROCEDURE — 6360000002 HC RX W HCPCS: Performed by: STUDENT IN AN ORGANIZED HEALTH CARE EDUCATION/TRAINING PROGRAM

## 2024-12-06 PROCEDURE — 6360000002 HC RX W HCPCS: Performed by: PHYSICIAN ASSISTANT

## 2024-12-06 PROCEDURE — 6370000000 HC RX 637 (ALT 250 FOR IP): Performed by: STUDENT IN AN ORGANIZED HEALTH CARE EDUCATION/TRAINING PROGRAM

## 2024-12-06 PROCEDURE — 6370000000 HC RX 637 (ALT 250 FOR IP): Performed by: PHYSICIAN ASSISTANT

## 2024-12-06 PROCEDURE — 36415 COLL VENOUS BLD VENIPUNCTURE: CPT

## 2024-12-06 PROCEDURE — 90935 HEMODIALYSIS ONE EVALUATION: CPT

## 2024-12-06 PROCEDURE — 80048 BASIC METABOLIC PNL TOTAL CA: CPT

## 2024-12-06 PROCEDURE — 82962 GLUCOSE BLOOD TEST: CPT

## 2024-12-06 PROCEDURE — 2500000003 HC RX 250 WO HCPCS: Performed by: PHYSICIAN ASSISTANT

## 2024-12-06 PROCEDURE — 1100000000 HC RM PRIVATE

## 2024-12-06 PROCEDURE — 94761 N-INVAS EAR/PLS OXIMETRY MLT: CPT

## 2024-12-06 PROCEDURE — 2580000003 HC RX 258: Performed by: PHYSICIAN ASSISTANT

## 2024-12-06 PROCEDURE — 85025 COMPLETE CBC W/AUTO DIFF WBC: CPT

## 2024-12-06 PROCEDURE — 99024 POSTOP FOLLOW-UP VISIT: CPT | Performed by: PHYSICIAN ASSISTANT

## 2024-12-06 PROCEDURE — 2700000000 HC OXYGEN THERAPY PER DAY

## 2024-12-06 PROCEDURE — 2580000003 HC RX 258: Performed by: STUDENT IN AN ORGANIZED HEALTH CARE EDUCATION/TRAINING PROGRAM

## 2024-12-06 RX ORDER — KETOROLAC TROMETHAMINE 30 MG/ML
15 INJECTION, SOLUTION INTRAMUSCULAR; INTRAVENOUS EVERY 6 HOURS PRN
Status: DISCONTINUED | OUTPATIENT
Start: 2024-12-06 | End: 2024-12-08

## 2024-12-06 RX ORDER — HEPARIN SODIUM 1000 [USP'U]/ML
INJECTION, SOLUTION INTRAVENOUS; SUBCUTANEOUS
Status: DISPENSED
Start: 2024-12-06 | End: 2024-12-07

## 2024-12-06 RX ORDER — HYDROMORPHONE HYDROCHLORIDE 1 MG/ML
0.5 INJECTION, SOLUTION INTRAMUSCULAR; INTRAVENOUS; SUBCUTANEOUS EVERY 6 HOURS PRN
Status: DISCONTINUED | OUTPATIENT
Start: 2024-12-06 | End: 2024-12-08

## 2024-12-06 RX ADMIN — HEPARIN SODIUM 1800 UNITS: 1000 INJECTION INTRAVENOUS; SUBCUTANEOUS at 13:42

## 2024-12-06 RX ADMIN — INSULIN GLARGINE 14 UNITS: 100 INJECTION, SOLUTION SUBCUTANEOUS at 08:53

## 2024-12-06 RX ADMIN — HEPARIN SODIUM 5000 UNITS: 5000 INJECTION INTRAVENOUS; SUBCUTANEOUS at 06:06

## 2024-12-06 RX ADMIN — HYDRALAZINE HYDROCHLORIDE 50 MG: 25 TABLET ORAL at 21:06

## 2024-12-06 RX ADMIN — INSULIN LISPRO 2 UNITS: 100 INJECTION, SOLUTION INTRAVENOUS; SUBCUTANEOUS at 21:04

## 2024-12-06 RX ADMIN — HEPARIN SODIUM 5000 UNITS: 5000 INJECTION INTRAVENOUS; SUBCUTANEOUS at 14:16

## 2024-12-06 RX ADMIN — HYDROMORPHONE HYDROCHLORIDE 1 MG: 1 INJECTION, SOLUTION INTRAMUSCULAR; INTRAVENOUS; SUBCUTANEOUS at 00:30

## 2024-12-06 RX ADMIN — SILVER SULFADIAZINE: 10 CREAM TOPICAL at 14:20

## 2024-12-06 RX ADMIN — HYDROMORPHONE HYDROCHLORIDE 2 MG: 2 TABLET ORAL at 18:18

## 2024-12-06 RX ADMIN — HYDROMORPHONE HYDROCHLORIDE 2 MG: 2 TABLET ORAL at 14:16

## 2024-12-06 RX ADMIN — DULOXETINE HYDROCHLORIDE 20 MG: 20 CAPSULE, DELAYED RELEASE ORAL at 08:53

## 2024-12-06 RX ADMIN — SODIUM CHLORIDE, PRESERVATIVE FREE 10 ML: 5 INJECTION INTRAVENOUS at 08:56

## 2024-12-06 RX ADMIN — HYDROMORPHONE HYDROCHLORIDE 1 MG: 1 INJECTION, SOLUTION INTRAMUSCULAR; INTRAVENOUS; SUBCUTANEOUS at 06:07

## 2024-12-06 RX ADMIN — SODIUM CHLORIDE, PRESERVATIVE FREE 10 ML: 5 INJECTION INTRAVENOUS at 20:20

## 2024-12-06 RX ADMIN — AMPICILLIN SODIUM AND SULBACTAM SODIUM 3000 MG: 2; 1 INJECTION, POWDER, FOR SOLUTION INTRAMUSCULAR; INTRAVENOUS at 04:34

## 2024-12-06 RX ADMIN — HYDRALAZINE HYDROCHLORIDE 50 MG: 25 TABLET ORAL at 06:07

## 2024-12-06 RX ADMIN — HYDROMORPHONE HYDROCHLORIDE 2 MG: 2 TABLET ORAL at 08:53

## 2024-12-06 RX ADMIN — HEPARIN SODIUM 1800 UNITS: 1000 INJECTION INTRAVENOUS; SUBCUTANEOUS at 13:48

## 2024-12-06 RX ADMIN — HYDROMORPHONE HYDROCHLORIDE 2 MG: 2 TABLET ORAL at 04:28

## 2024-12-06 RX ADMIN — LURASIDONE HYDROCHLORIDE 20 MG: 20 TABLET ORAL at 17:32

## 2024-12-06 RX ADMIN — AMLODIPINE BESYLATE 10 MG: 5 TABLET ORAL at 08:53

## 2024-12-06 RX ADMIN — HYDRALAZINE HYDROCHLORIDE 50 MG: 25 TABLET ORAL at 14:16

## 2024-12-06 RX ADMIN — HEPARIN SODIUM 5000 UNITS: 5000 INJECTION INTRAVENOUS; SUBCUTANEOUS at 21:05

## 2024-12-06 RX ADMIN — AMPICILLIN SODIUM AND SULBACTAM SODIUM 3000 MG: 2; 1 INJECTION, POWDER, FOR SOLUTION INTRAMUSCULAR; INTRAVENOUS at 15:53

## 2024-12-06 RX ADMIN — HYDROMORPHONE HYDROCHLORIDE 1 MG: 1 INJECTION, SOLUTION INTRAMUSCULAR; INTRAVENOUS; SUBCUTANEOUS at 12:15

## 2024-12-06 RX ADMIN — INSULIN LISPRO 2 UNITS: 100 INJECTION, SOLUTION INTRAVENOUS; SUBCUTANEOUS at 17:31

## 2024-12-06 RX ADMIN — KETOROLAC TROMETHAMINE 15 MG: 30 INJECTION, SOLUTION INTRAMUSCULAR at 15:44

## 2024-12-06 RX ADMIN — HYDROMORPHONE HYDROCHLORIDE 0.5 MG: 1 INJECTION, SOLUTION INTRAMUSCULAR; INTRAVENOUS; SUBCUTANEOUS at 20:19

## 2024-12-06 ASSESSMENT — PAIN SCALES - GENERAL
PAINLEVEL_OUTOF10: 7
PAINLEVEL_OUTOF10: 8
PAINLEVEL_OUTOF10: 5
PAINLEVEL_OUTOF10: 8
PAINLEVEL_OUTOF10: 7
PAINLEVEL_OUTOF10: 5
PAINLEVEL_OUTOF10: 7
PAINLEVEL_OUTOF10: 8
PAINLEVEL_OUTOF10: 5
PAINLEVEL_OUTOF10: 8
PAINLEVEL_OUTOF10: 7
PAINLEVEL_OUTOF10: 6
PAINLEVEL_OUTOF10: 3
PAINLEVEL_OUTOF10: 4
PAINLEVEL_OUTOF10: 9
PAINLEVEL_OUTOF10: 8
PAINLEVEL_OUTOF10: 8

## 2024-12-06 ASSESSMENT — PAIN DESCRIPTION - DESCRIPTORS
DESCRIPTORS: ACHING;THROBBING

## 2024-12-06 ASSESSMENT — PAIN DESCRIPTION - ORIENTATION
ORIENTATION: LEFT

## 2024-12-06 ASSESSMENT — PAIN DESCRIPTION - LOCATION
LOCATION: HAND

## 2024-12-06 ASSESSMENT — PAIN DESCRIPTION - PAIN TYPE: TYPE: ACUTE PAIN

## 2024-12-06 ASSESSMENT — PAIN DESCRIPTION - ONSET: ONSET: ON-GOING

## 2024-12-06 ASSESSMENT — PAIN DESCRIPTION - FREQUENCY: FREQUENCY: CONTINUOUS

## 2024-12-06 ASSESSMENT — PAIN - FUNCTIONAL ASSESSMENT: PAIN_FUNCTIONAL_ASSESSMENT: PREVENTS OR INTERFERES SOME ACTIVE ACTIVITIES AND ADLS

## 2024-12-06 NOTE — PROGRESS NOTES
ORTHO - Progress Note  Post Op day: 1 Day Post-Op    Keaton Van     [id number]  male    59 y.o.    1965    Admit date:12/2/2024  Date of Surgery:12/6  Procedures:Procedure(s):  LEFT HAND INCISION AND DRAINAGE  Surgeon:Surgeons and Role:   * Henry Owens MD - Primary        SUBJECTIVE:     Keaton Van is a 59 y.o. male resting in the bed-- dialysis unit.  Patient has complaints of appropriate post-op pain, tolerating PO pain medications Dilaudid-- 8/10  Denies F/C, nausea, vomiting, dizziness, lightheadedness, chest pain, or shortness of breath.    OBJECTIVE:       Physical Exam:  General: alert, cooperative, no distress.   Gastrointestinal:  non-distended .    Cardiovascular:  Brisk cap refill in all distal extremities   Genitourinary: Carbone  Voiding independently   Respiratory: No respiratory distress   Neurological:Neurovascular exam within normal limits.     Senstion intact: LUE   Motor: + finger flex/ext- passive 2nd digit motion w minimal pain   Musculoskeletal: Odessa's sign negative in bilateral lower extremities.  Calves soft, supple, non-tender upon palpation or with passive stretch.    Dressing/Wound:  dry bloody dressing removed. No significant erythema, small pocket of swelling with expressible thin dark blood       Vital Signs:       Patient Vitals for the past 8 hrs:   BP Temp Temp src Pulse Resp SpO2   12/06/24 1416 -- -- -- -- 16 --   12/06/24 1335 (!) 160/83 98.3 °F (36.8 °C) -- 72 18 95 %   12/06/24 1330 (!) 151/87 -- -- 73 -- --   12/06/24 1315 (!) 156/85 -- -- 71 -- --   12/06/24 1300 (!) 162/88 -- -- 71 -- --   12/06/24 1245 (!) 170/91 -- -- 78 -- --   12/06/24 1230 139/80 -- -- 70 -- --   12/06/24 1215 (!) 150/86 -- -- 76 -- --   12/06/24 1200 (!) 137/108 -- -- 73 -- --   12/06/24 1145 (!) 152/87 -- -- 71 -- --   12/06/24 1130 (!) 168/90 -- -- 71 -- --   12/06/24 1115 (!) 158/87 -- -- 71 -- --   12/06/24 1100 (!) 163/88 -- -- 70 -- --   12/06/24 1045 (!) 148/84 -- -- 70 -- --

## 2024-12-06 NOTE — ANESTHESIA POSTPROCEDURE EVALUATION
Department of Anesthesiology  Postprocedure Note    Patient: Keaton Van  MRN: [id number]  YOB: 1965  Date of evaluation: 12/6/2024    Procedure Summary       Date: 12/05/24 Room / Location: [facility] MAIN OR  / [facility] MAIN OR    Anesthesia Start: 1214 Anesthesia Stop: 1328    Procedure: LEFT HAND INCISION AND DRAINAGE (Left: Hand) Diagnosis:       Abscess      (Abscess [L02.91])    Providers: Henry Owens MD Responsible Provider: Kendall Ramos MD    Anesthesia Type: General ASA Status: 3            Anesthesia Type: General    Agustin Phase I: Agustin Score: 8    Agustin Phase II:      Anesthesia Post Evaluation    Patient location during evaluation: PACU  Patient participation: complete - patient participated  Level of consciousness: awake  Pain score: 0  Airway patency: patent  Nausea & Vomiting: no nausea and no vomiting  Cardiovascular status: hemodynamically stable  Respiratory status: acceptable  Hydration status: stable  Multimodal analgesia pain management approach  Pain management: adequate    No notable events documented.

## 2024-12-06 NOTE — OP NOTE
PATIENT NAME:  Keaton Van    SURGEON:  Henry Owens MD    DATE OF SURGERY:  12/5/2024    LOCATION: [facility]    PREOPERATIVE DIAGNOSIS:   Left hand infection from cat bite    POSTOPERATIVE DIAGNOSIS:  Same    PROCEDURE: Left hand irrigation and debridement including irrigation and debridement of left index finger flexor tenosynovitis and left index finger MCP joint septic arthritis           ANESTHESIA: GETA    BLOOD LOSS:  Minimal    Assistant: Patricia Freire PA-C     OPERATIVE INDICATIONS:    The patient is a 59 y.o. old male who has developed progressive infection of the right hand from a cat bite, unresponsive to all conservative treatment. Symptoms have failed to respond consistently to conservative treatment such that patient necessitated surgical management.  Have discussed risks benefits and alternatives as well as postoperative course.  He consented to proceed.      DESCRIPTION  OF PROCEDURE: Patient identified correctly in the pre-operative holding area and correct extremity marked. Was then taken stable to the operating room and placed supine with the operative extremity on a hand table. After general anesthesia was administered by the anesthesia team, the left hand and forearm were prepped and draped in a sterile field. A timeout was taken and the operative site was confirmed. The operative extremity was then elevated and exsanguinated and an forearm tourniquet was inflated to 200 mm of mercury.  A Surya incision was made overlying the volar index finger beginning in the palm and tracking into the digit in an oblique fashion across the flexor creases.  Skin subtenons tissue was taken down sharply.  Deeper dissection was newly performed.  It was noted that there was copious infectious fluid noted here.  This did track to the flexor tendons creating infectious flexor tenosynovitis.  An A1 pulley release was performed in order to fully debride the flexor mechanism.  Blunt

## 2024-12-06 NOTE — CARE COORDINATION
Transition of Care Plan:    RUR: 23%  Prior Level of Functioning: Independent   Disposition: Home with sister   TAN: 12/9/24  If SNF or IPR: Date FOC offered:   Date FOC received:   Accepting facility:   Date authorization started with reference number:   Date authorization received and expires:   Follow up appointments: PCP   DME needed: No DME needed   Transportation at discharge: Pt's sister   IM/IMM Medicare/ letter given: 2nd IMM needs to be given   Is patient a [place] and connected with [facility]? No   If yes, was [place] transfer form completed and [facility] notified? No  Caregiver Contact: pt's sister   Discharge Caregiver contacted prior to discharge? Pt will be contacted   Care Conference needed? No  Barriers to discharge: Medical clearance       CM reviewed pt's chart and pt is not medically stable for d/c due to pain control, ortho and cultures.    CM will continue to follow and assist with d/c planning.    Amparo Argueta

## 2024-12-06 NOTE — PROGRESS NOTES
Nephrology Progress Note  [facility]  [address], [address]  [place] [address]  Phone - [phone]  Fax - [phone]                 Patient: Keaton Van                     YOB: 1965        Date- 12/6/2024                                     Admit Date: 12/2/2024   CC: Follow up for KIN requiring dialysis       IMPRESSION & PLAN:   Acute kidney injury (multifactorial suspect secondary to ATN vs progression of ckd)(now hemodialysis dependent, TTS, [facility], right chest PermCath)  CKD 4 likely due to DM nephropathy  Hyperkalemia   surgical wound dehiscence  Cat bite/left upper extremity cellulitis  Index finger cellulitis, MCP joint septic arthritis, juxta articular abscess  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  CHF  HTN  DM  Anemia       PLAN-  Seen on Hemodialysis today   Continue amlodipine, hydralazine   Dialysis again tomorrow  Dialysis Tuesday Thursday Saturday schedule     Subjective:  Interval History:   -Patient refused dialysis last night after the surgery he is on dialysis at present  Potassium 5.3    Objective:   Vitals:    12/06/24 0100 12/06/24 0744 12/06/24 1000 12/06/24 1015   BP:  (!) 156/76 (!) 148/83 (!) 164/89   Pulse:  73 72 69   Resp: 15  18    Temp:  97.7 °F (36.5 °C) 98.3 °F (36.8 °C)    TempSrc:       SpO2:  95% 95%    Weight:       Height:          I/O last 3 completed shifts:  In: 740 [P.O.:540; I.V.:200]  Out: -   No intake/output data recorded.      Physical exam:    GEN: NAD   NECK- no mass  RESP: No wheezing, decreased BS b/l  CVS: S1,S2  RRR  NEURO: Normal speech, nonfocal  EXT: Right BKA, left hand swelling improving  PSYCH: Normal Mood  Permacath present  Chart reviewed.         Pertinent Notes reviewed.     Data Review :  Lab Results   Component Value Date/Time     12/06/2024 05:42 AM    K 5.3 12/06/2024 05:42 AM    CL 98 12/06/2024 05:42 AM    CO2 23 12/06/2024 05:42

## 2024-12-06 NOTE — PLAN OF CARE
Problem: Chronic Conditions and Co-morbidities  Goal: Patient's chronic conditions and co-morbidity symptoms are monitored and maintained or improved  Outcome: Progressing  Flowsheets (Taken 12/6/2024 1027)  Care Plan - Patient's Chronic Conditions and Co-Morbidity Symptoms are Monitored and Maintained or Improved:   Monitor and assess patient's chronic conditions and comorbid symptoms for stability, deterioration, or improvement   Collaborate with multidisciplinary team to address chronic and comorbid conditions and prevent exacerbation or deterioration   Update acute care plan with appropriate goals if chronic or comorbid symptoms are exacerbated and prevent overall improvement and discharge     Problem: Discharge Planning  Goal: Discharge to home or other facility with appropriate resources  Outcome: Progressing  Flowsheets (Taken 12/6/2024 1027)  Discharge to home or other facility with appropriate resources:   Identify barriers to discharge with patient and caregiver   Arrange for needed discharge resources and transportation as appropriate   Identify discharge learning needs (meds, wound care, etc)     Problem: Pain  Goal: Verbalizes/displays adequate comfort level or baseline comfort level  Outcome: Progressing  Flowsheets (Taken 12/6/2024 1027)  Verbalizes/displays adequate comfort level or baseline comfort level:   Encourage patient to monitor pain and request assistance   Assess pain using appropriate pain scale   Administer analgesics based on type and severity of pain and evaluate response     Problem: Safety - Adult  Goal: Free from fall injury  Outcome: Progressing  Flowsheets (Taken 12/6/2024 1027)  Free From Fall Injury:   Instruct family/caregiver on patient safety   Based on caregiver fall risk screen, instruct family/caregiver to ask for assistance with transferring infant if caregiver noted to have fall risk factors     Problem: ABCDS Injury Assessment  Goal: Absence of physical

## 2024-12-06 NOTE — PROGRESS NOTES
Hospitalist Progress Note        Demographics    Patient Name  Keaton Van   Date of Birth 1965   Medical Record Number  [id number]      Age  59 y.o.   PCP Robert Wells PA-C   Admit date:  12/2/2024  4:00 PM     Room Number  1139/01  @ [facility]           Admission Diagnoses:  Cellulitis/abscess left hand/flexor tenosynovitis   Admission Summary:  \" Keaton Van is a 59 y.o.  male with PMHx significant for DM, HTN, esrd on HD TTS, who comes in with left hand swelling and pain. Patient reports that he had a bitten by a wild cat 10 days ago. Patient reports that he wash his hand and monitored it . But it started swelling and pain increased, patient reports that he started to use tylenol and motrin with out relief. More recently he started feeling unwell, fatigue , chills. His hand swelling worsened as well, so he decided to come in to the ED.  \"      Assessment and plan:      Left hand cellulitis/abscess/flexor tenosynovitis, secondary to cat bite, POA  Staph bacteremia, POA  XR of left hand showed no bone involvement.  Initial blood cultures growing coag-neg staph and bacillus species (not anthracis).  CT left hand w contrast reveals evidence of MCP joint septic arthritis and adjacent articular abscess.  -- Continue Unasyn and vancomycin  -- Ortho consulted and following. Taking to OR 12/5 for washout/I&D. OR wound cultures collected and pending.  - Pain management with hydromorphone, Toradol, Tylenol. Zofran PRN.     ESRD on Hemodialysis on Tuesday Thursday Saturday dialysis  Hyperkalemia, POA, resolved / Hx of hyperkalemia  Hyponatremia, not POA / Hx of hyponatremia  - Current creatinine is 2.56. Baseline appears to be around 3.0  - Nephrology services consulted  - appreciate guidance from Dr. Asif      Hx of right BKA  - Positive for edema on examination, slight open wound, patient wanted Ortho to evaluate need for stitches  - Vascular sx consulted. No acute intervention needed.  admission process.   High complexity decision making was performed for this patient who is at high risk for decompensation with multiple organ involvement.   Today total floor/unit time was 40 minutes while caring for this patient and greater than 50% of that time was spent with patient (and/or family/responsible party) coordinating patient's clinical issues; this includes time spent during multidisciplinary rounds.        Melissa Leonard PA-C  12/6/2024

## 2024-12-06 NOTE — FLOWSHEET NOTE
Primary RN SBAR: Jaylin Huang RN  Incapacitated Nurse [facility]. provided: Yes  Patient Education provided: Access management   Preferred Education method and Primary language: English/ Verbal  Hospital General Consent Verified: Yes  Hospital associated wait time; reason: none    Hepatitis B Surface Ag   Date/Time Value Ref Range Status   12/03/2024 08:37 AM 0.30 Index Final     Hep B S Ag Interp   Date/Time Value Ref Range Status   12/03/2024 08:37 AM Negative NEG   Final     Hep B S Ab   Date/Time Value Ref Range Status   12/03/2024 08:37 AM 5.73 mIU/mL Final     Hep B S Ab Interp   Date/Time Value Ref Range Status   12/03/2024 08:37 AM NONREACTIVE NR   Final     Comment:     (NOTE)  The [employer] Centaur Anti-HBs2 assay is traceable to the World Health   Organization (WHO) Hepatitis B Immunoglobulin 1st International   Reference Preparation (1977). Samples with a calculated value of 10   mIU/mL or greater are considered reactive (protective) in accordance   with the CDC guidelines. The accepted criteria for immunity to HBV is   anti-HBs activity greater than or equal to 10 mIU/mL, as defined by   the WHO International Reference Preparation.  Assay performance has not been established in pregnant women,   patients who are immunosuppressed or immunocompromised, nor have   performance characteristics been established in conjunction with   other 's assays for specific HBV serologic markers. This   assay does not differentiate between vaccine induced immune response   and a response due to infection with HBV. Passively acquired anti-HBs   may be identified following patient transfusion, receipt of   immunoglobulin products, etc.         Time out done, order parameters checked, Dialysis related medications and consent have been reviewed.  Treatment started with slow flow, gradually increased to ordered BF. Monitored closely.  Lines visible and secured at all time.  Extracorporeal lines flushed with 10CC NS to  Output (ml) 3000 ml   NET Removed (ml) 2500   Tolerated Treatment Good   Patient Response to Treatment Tolerated well   Patient Disposition Return to room         Primary RN SBAR: Jaylin Huang, RN  Comment:  Total UF removed: 3000 ml  Net UF removed: 2500 ml    Treatment completed, all blood returned.  Each dialysis catheter limb disinfected per p&p,  post dialysis catheter dwell instilled per order, and caps applied.  Dressing kept intact and dated.  Treatment tolerated well.  Comfort and care provided, needs attended, questions answered.  Call bell within reach, bed to lowest position.  Reports given to Primary RN      CATHERINE ASCENCIO, BSN, RN

## 2024-12-07 LAB
ANION GAP SERPL CALC-SCNC: 8 MMOL/L (ref 2–12)
BACTERIA SPEC CULT: NORMAL
BASOPHILS # BLD: 0 K/UL (ref 0–0.1)
BASOPHILS NFR BLD: 1 % (ref 0–1)
BUN SERPL-MCNC: 43 MG/DL (ref 6–20)
BUN/CREAT SERPL: 10 (ref 12–20)
CALCIUM SERPL-MCNC: 8.4 MG/DL (ref 8.5–10.1)
CHLORIDE SERPL-SCNC: 99 MMOL/L (ref 97–108)
CO2 SERPL-SCNC: 26 MMOL/L (ref 21–32)
CREAT SERPL-MCNC: 4.23 MG/DL (ref 0.7–1.3)
DIFFERENTIAL METHOD BLD: ABNORMAL
EOSINOPHIL # BLD: 0.2 K/UL (ref 0–0.4)
EOSINOPHIL NFR BLD: 3 % (ref 0–7)
ERYTHROCYTE [DISTWIDTH] IN BLOOD BY AUTOMATED COUNT: 15.3 % (ref 11.5–14.5)
GLUCOSE BLD STRIP.AUTO-MCNC: 118 MG/DL (ref 65–117)
GLUCOSE BLD STRIP.AUTO-MCNC: 151 MG/DL (ref 65–117)
GLUCOSE BLD STRIP.AUTO-MCNC: 229 MG/DL (ref 65–117)
GLUCOSE BLD STRIP.AUTO-MCNC: 233 MG/DL (ref 65–117)
GLUCOSE SERPL-MCNC: 90 MG/DL (ref 65–100)
HCT VFR BLD AUTO: 28.8 % (ref 36.6–50.3)
HGB BLD-MCNC: 9.9 G/DL (ref 12.1–17)
IMM GRANULOCYTES # BLD AUTO: 0 K/UL (ref 0–0.04)
IMM GRANULOCYTES NFR BLD AUTO: 0 % (ref 0–0.5)
LYMPHOCYTES # BLD: 1.1 K/UL (ref 0.8–3.5)
LYMPHOCYTES NFR BLD: 19 % (ref 12–49)
MCH RBC QN AUTO: 28.8 PG (ref 26–34)
MCHC RBC AUTO-ENTMCNC: 34.4 G/DL (ref 30–36.5)
MCV RBC AUTO: 83.7 FL (ref 80–99)
MONOCYTES # BLD: 0.5 K/UL (ref 0–1)
MONOCYTES NFR BLD: 9 % (ref 5–13)
NEUTS SEG # BLD: 3.8 K/UL (ref 1.8–8)
NEUTS SEG NFR BLD: 68 % (ref 32–75)
NRBC # BLD: 0 K/UL (ref 0–0.01)
NRBC BLD-RTO: 0 PER 100 WBC
PLATELET # BLD AUTO: 130 K/UL (ref 150–400)
PMV BLD AUTO: 9.8 FL (ref 8.9–12.9)
POTASSIUM SERPL-SCNC: 4.4 MMOL/L (ref 3.5–5.1)
RBC # BLD AUTO: 3.44 M/UL (ref 4.1–5.7)
SERVICE CMNT-IMP: ABNORMAL
SERVICE CMNT-IMP: NORMAL
SODIUM SERPL-SCNC: 133 MMOL/L (ref 136–145)
VANCOMYCIN SERPL-MCNC: 10.3 UG/ML
WBC # BLD AUTO: 5.6 K/UL (ref 4.1–11.1)

## 2024-12-07 PROCEDURE — 2580000003 HC RX 258: Performed by: PHYSICIAN ASSISTANT

## 2024-12-07 PROCEDURE — 6370000000 HC RX 637 (ALT 250 FOR IP): Performed by: PHYSICIAN ASSISTANT

## 2024-12-07 PROCEDURE — 80202 ASSAY OF VANCOMYCIN: CPT

## 2024-12-07 PROCEDURE — 2500000003 HC RX 250 WO HCPCS: Performed by: INTERNAL MEDICINE

## 2024-12-07 PROCEDURE — 1100000000 HC RM PRIVATE

## 2024-12-07 PROCEDURE — 6360000002 HC RX W HCPCS: Performed by: PHYSICIAN ASSISTANT

## 2024-12-07 PROCEDURE — 85025 COMPLETE CBC W/AUTO DIFF WBC: CPT

## 2024-12-07 PROCEDURE — 80048 BASIC METABOLIC PNL TOTAL CA: CPT

## 2024-12-07 PROCEDURE — 6360000002 HC RX W HCPCS: Performed by: INTERNAL MEDICINE

## 2024-12-07 PROCEDURE — 99024 POSTOP FOLLOW-UP VISIT: CPT | Performed by: ORTHOPAEDIC SURGERY

## 2024-12-07 PROCEDURE — 36415 COLL VENOUS BLD VENIPUNCTURE: CPT

## 2024-12-07 PROCEDURE — 90935 HEMODIALYSIS ONE EVALUATION: CPT

## 2024-12-07 PROCEDURE — 82962 GLUCOSE BLOOD TEST: CPT

## 2024-12-07 RX ORDER — VANCOMYCIN 1 G/200ML
1000 INJECTION, SOLUTION INTRAVENOUS ONCE
Status: COMPLETED | OUTPATIENT
Start: 2024-12-07 | End: 2024-12-07

## 2024-12-07 RX ADMIN — HYDRALAZINE HYDROCHLORIDE 50 MG: 25 TABLET ORAL at 16:37

## 2024-12-07 RX ADMIN — HYDROMORPHONE HYDROCHLORIDE 0.5 MG: 1 INJECTION, SOLUTION INTRAMUSCULAR; INTRAVENOUS; SUBCUTANEOUS at 13:43

## 2024-12-07 RX ADMIN — HEPARIN SODIUM 5000 UNITS: 5000 INJECTION INTRAVENOUS; SUBCUTANEOUS at 20:49

## 2024-12-07 RX ADMIN — HYDRALAZINE HYDROCHLORIDE 50 MG: 25 TABLET ORAL at 20:45

## 2024-12-07 RX ADMIN — HEPARIN SODIUM 5000 UNITS: 5000 INJECTION INTRAVENOUS; SUBCUTANEOUS at 16:36

## 2024-12-07 RX ADMIN — HEPARIN SODIUM 1800 UNITS: 1000 INJECTION INTRAVENOUS; SUBCUTANEOUS at 15:15

## 2024-12-07 RX ADMIN — VANCOMYCIN 1000 MG: 1 INJECTION, SOLUTION INTRAVENOUS at 17:24

## 2024-12-07 RX ADMIN — HYDROMORPHONE HYDROCHLORIDE 2 MG: 2 TABLET ORAL at 02:34

## 2024-12-07 RX ADMIN — INSULIN GLARGINE 14 UNITS: 100 INJECTION, SOLUTION SUBCUTANEOUS at 09:52

## 2024-12-07 RX ADMIN — INSULIN LISPRO 2 UNITS: 100 INJECTION, SOLUTION INTRAVENOUS; SUBCUTANEOUS at 11:19

## 2024-12-07 RX ADMIN — HYDROMORPHONE HYDROCHLORIDE 0.5 MG: 1 INJECTION, SOLUTION INTRAMUSCULAR; INTRAVENOUS; SUBCUTANEOUS at 23:26

## 2024-12-07 RX ADMIN — SODIUM CHLORIDE: 9 INJECTION, SOLUTION INTRAVENOUS at 04:39

## 2024-12-07 RX ADMIN — HYDRALAZINE HYDROCHLORIDE 50 MG: 25 TABLET ORAL at 06:08

## 2024-12-07 RX ADMIN — AMPICILLIN SODIUM AND SULBACTAM SODIUM 3000 MG: 2; 1 INJECTION, POWDER, FOR SOLUTION INTRAMUSCULAR; INTRAVENOUS at 16:42

## 2024-12-07 RX ADMIN — HYDROMORPHONE HYDROCHLORIDE 0.5 MG: 1 INJECTION, SOLUTION INTRAMUSCULAR; INTRAVENOUS; SUBCUTANEOUS at 04:35

## 2024-12-07 RX ADMIN — HYDROMORPHONE HYDROCHLORIDE 2 MG: 2 TABLET ORAL at 06:43

## 2024-12-07 RX ADMIN — AMLODIPINE BESYLATE 10 MG: 5 TABLET ORAL at 09:53

## 2024-12-07 RX ADMIN — HYDROMORPHONE HYDROCHLORIDE 2 MG: 2 TABLET ORAL at 20:44

## 2024-12-07 RX ADMIN — AMPICILLIN SODIUM AND SULBACTAM SODIUM 3000 MG: 2; 1 INJECTION, POWDER, FOR SOLUTION INTRAMUSCULAR; INTRAVENOUS at 04:40

## 2024-12-07 RX ADMIN — HEPARIN SODIUM 1800 UNITS: 1000 INJECTION INTRAVENOUS; SUBCUTANEOUS at 15:16

## 2024-12-07 RX ADMIN — LURASIDONE HYDROCHLORIDE 20 MG: 20 TABLET ORAL at 16:37

## 2024-12-07 RX ADMIN — DULOXETINE HYDROCHLORIDE 20 MG: 20 CAPSULE, DELAYED RELEASE ORAL at 09:52

## 2024-12-07 RX ADMIN — KETOROLAC TROMETHAMINE 15 MG: 30 INJECTION, SOLUTION INTRAMUSCULAR at 11:18

## 2024-12-07 RX ADMIN — HEPARIN SODIUM 5000 UNITS: 5000 INJECTION INTRAVENOUS; SUBCUTANEOUS at 06:08

## 2024-12-07 RX ADMIN — INSULIN LISPRO 2 UNITS: 100 INJECTION, SOLUTION INTRAVENOUS; SUBCUTANEOUS at 20:46

## 2024-12-07 RX ADMIN — HYDROMORPHONE HYDROCHLORIDE 2 MG: 2 TABLET ORAL at 11:18

## 2024-12-07 RX ADMIN — HYDROMORPHONE HYDROCHLORIDE 2 MG: 2 TABLET ORAL at 16:36

## 2024-12-07 RX ADMIN — SILVER SULFADIAZINE: 10 CREAM TOPICAL at 09:56

## 2024-12-07 RX ADMIN — SODIUM CHLORIDE, PRESERVATIVE FREE 10 ML: 5 INJECTION INTRAVENOUS at 20:46

## 2024-12-07 ASSESSMENT — PAIN DESCRIPTION - PAIN TYPE
TYPE: SURGICAL PAIN
TYPE: ACUTE PAIN
TYPE: ACUTE PAIN
TYPE: SURGICAL PAIN

## 2024-12-07 ASSESSMENT — PAIN DESCRIPTION - DESCRIPTORS
DESCRIPTORS: ACHING;THROBBING

## 2024-12-07 ASSESSMENT — PAIN DESCRIPTION - LOCATION
LOCATION: HAND

## 2024-12-07 ASSESSMENT — PAIN SCALES - GENERAL
PAINLEVEL_OUTOF10: 7
PAINLEVEL_OUTOF10: 5
PAINLEVEL_OUTOF10: 6
PAINLEVEL_OUTOF10: 5
PAINLEVEL_OUTOF10: 5
PAINLEVEL_OUTOF10: 7
PAINLEVEL_OUTOF10: 7
PAINLEVEL_OUTOF10: 6
PAINLEVEL_OUTOF10: 7
PAINLEVEL_OUTOF10: 6
PAINLEVEL_OUTOF10: 7
PAINLEVEL_OUTOF10: 7

## 2024-12-07 ASSESSMENT — PAIN DESCRIPTION - ORIENTATION
ORIENTATION: LEFT

## 2024-12-07 ASSESSMENT — PAIN DESCRIPTION - FREQUENCY
FREQUENCY: INTERMITTENT
FREQUENCY: CONTINUOUS
FREQUENCY: INTERMITTENT

## 2024-12-07 ASSESSMENT — PAIN - FUNCTIONAL ASSESSMENT
PAIN_FUNCTIONAL_ASSESSMENT: ACTIVITIES ARE NOT PREVENTED
PAIN_FUNCTIONAL_ASSESSMENT: PREVENTS OR INTERFERES SOME ACTIVE ACTIVITIES AND ADLS
PAIN_FUNCTIONAL_ASSESSMENT: ACTIVITIES ARE NOT PREVENTED
PAIN_FUNCTIONAL_ASSESSMENT: PREVENTS OR INTERFERES SOME ACTIVE ACTIVITIES AND ADLS

## 2024-12-07 ASSESSMENT — PAIN DESCRIPTION - ONSET
ONSET: GRADUAL
ONSET: ON-GOING
ONSET: GRADUAL

## 2024-12-07 ASSESSMENT — PAIN DESCRIPTION - DIRECTION
RADIATING_TOWARDS: NO
RADIATING_TOWARDS: NO

## 2024-12-07 NOTE — PROGRESS NOTES
End of Shift Note    Bedside shift change report given to JADEN Perry (oncoming nurse) by CARSON ROLLINS RN (offgoing nurse).  Report included the following information SBAR, Kardex, and MAR    Shift worked:  7p-7a     Shift summary and any significant changes:     AAOx4, VSS, pain medication given per MAR, safety rounding completed.     Concerns for physician to address:  no     Zone phone for oncoming shift:   [phone]       Activity:  Level of Assistance: Independent    Cardiac:   Cardiac Monitoring:  no    Access:  Current line(s): PIV     Genitourinary:   Urinary Status: Has not voided    Respiratory:   O2 Device: None (Room air)    GI:  Current diet: ADULT DIET; Regular    Pain Management:   Patient states pain is manageable on current regimen: YES    Skin:  Kennedy Scale Score: 20  Interventions: Wound Offloading (Prevention Methods): Elevate heels  Pressure injury: no    Patient Safety:  Fall Score: Vanegas Total Score: 50  Fall Risk Interventions  Nursing Judgement-Fall Risk High(Add Comments): Yes  Toilet Every 2 Hours-In Advance of Need: No (Comment)  Hourly Visual Checks: Awake, In bed  Fall Visual Posted: Armband, Socks  Room Door Open: Deferred to promote rest  Alarm On: Bed  Patient Moved Closer to Nursing Station: No    Active Consults:  IP CONSULT TO NEPHROLOGY  IP CONSULT TO VASCULAR SURGERY  IP CONSULT TO PHARMACY  IP CONSULT TO ORTHOPEDIC SURGERY  IP CONSULT TO INFECTIOUS DISEASES    Length of Stay:  Expected LOS: 7  Actual LOS: 3      CARSON ROLLINS RN

## 2024-12-07 NOTE — PROGRESS NOTES
End of Shift Note    Bedside shift change report given to BHAKTI Watson (oncoming nurse) by Vicky Lovell LPN (offgoing nurse).  Report included the following information SBAR, ED Summary, OR Summary, Intake/Output, MAR, and Recent Results    Shift worked:  7a-7p     Shift summary and any significant changes:     Pt resting comfortably in bed, vital signs stable, Pain managed with PO Dilaudid x2, IV Dilaudid x1, and IV Toradol x1, 2 liters removed during dialysis today, up ad davin, dressing changed today by ortho, IV vancomycin started     Concerns for physician to address:  Barriers for discharge     Zone phone for oncoming shift:   [phone]       Activity:  Level of Assistance: Independent  Number times ambulated in hallways past shift: 0  Number of times OOB to chair past shift: 3    Cardiac:   Cardiac Monitoring: No      Cardiac Rhythm: Sinus rhythm    Access:  Current line(s): PIV     Genitourinary:   Urinary Status: Voiding, Bathroom privileges    Respiratory:   O2 Device: None (Room air)  Chronic home O2 use?: NO  Incentive spirometer at bedside: YES    GI:  Last BM (including prior to admit): 12/03/24  Current diet:  ADULT DIET; Regular  Passing flatus: YES    Pain Management:   Patient states pain is manageable on current regimen: YES    Skin:  Kennedy Scale Score: 20  Interventions: Wound Offloading (Prevention Methods): Bed, pressure reduction mattress, Elevate heels, Pillows, Repositioning, Turning    Patient Safety:  Fall Risk: Nursing Judgement-Fall Risk High(Add Comments): Yes  Fall Risk Interventions  Nursing Judgement-Fall Risk High(Add Comments): Yes  Toilet Every 2 Hours-In Advance of Need: Yes  Hourly Visual Checks: Awake  Fall Visual Posted: Armband  Room Door Open: Yes  Alarm On: Bed  Patient Moved Closer to Nursing Station: No    Active Consults:   IP CONSULT TO NEPHROLOGY  IP CONSULT TO VASCULAR SURGERY  IP CONSULT TO PHARMACY  IP CONSULT TO ORTHOPEDIC SURGERY  IP CONSULT TO INFECTIOUS

## 2024-12-07 NOTE — PLAN OF CARE
Problem: Chronic Conditions and Co-morbidities  Goal: Patient's chronic conditions and co-morbidity symptoms are monitored and maintained or improved  12/7/2024 1214 by Vicky Lovell LPN  Outcome: Progressing  Flowsheets (Taken 12/7/2024 0732)  Care Plan - Patient's Chronic Conditions and Co-Morbidity Symptoms are Monitored and Maintained or Improved:   Monitor and assess patient's chronic conditions and comorbid symptoms for stability, deterioration, or improvement   Collaborate with multidisciplinary team to address chronic and comorbid conditions and prevent exacerbation or deterioration   Update acute care plan with appropriate goals if chronic or comorbid symptoms are exacerbated and prevent overall improvement and discharge  12/7/2024 0150 by Walter Cuevas, RN  Outcome: Progressing  Flowsheets (Taken 12/6/2024 2110)  Care Plan - Patient's Chronic Conditions and Co-Morbidity Symptoms are Monitored and Maintained or Improved: Monitor and assess patient's chronic conditions and comorbid symptoms for stability, deterioration, or improvement     Problem: Discharge Planning  Goal: Discharge to home or other facility with appropriate resources  Outcome: Progressing  Flowsheets (Taken 12/7/2024 0732)  Discharge to home or other facility with appropriate resources:   Identify barriers to discharge with patient and caregiver   Arrange for needed discharge resources and transportation as appropriate   Identify discharge learning needs (meds, wound care, etc)   Arrange for interpreters to assist at discharge as needed   Refer to discharge planning if patient needs post-hospital services based on physician order or complex needs related to functional status, cognitive ability or social support system     Problem: Pain  Goal: Verbalizes/displays adequate comfort level or baseline comfort level  Outcome: Progressing     Problem: Safety - Adult  Goal: Free from fall injury  Outcome: Progressing  Flowsheets (Taken

## 2024-12-07 NOTE — PROGRESS NOTES
.ORTHO - Progress Note  Post Op day: 2 Days Post-Op    Keaton Van     [id number]  male    59 y.o.    1965    Admit date:12/2/2024  Date of Surgery:12/6  Procedures:Procedure(s):  LEFT HAND INCISION AND DRAINAGE  Surgeon:Surgeons and Role:   * Henry Owens MD - Primary        SUBJECTIVE:     Keaton Van is a 59 y.o. male resting in the bed.  Patient has complaints of appropriate post-op pain, tolerating PO pain medications.  Denies F/C, nausea, vomiting, dizziness, lightheadedness, chest pain, or shortness of breath.    OBJECTIVE:       Physical Exam:  General: alert, cooperative, no distress.   Gastrointestinal:  non-distended .    Cardiovascular:  Brisk cap refill in all distal extremities   Genitourinary:  Voiding independently   Respiratory: No respiratory distress   Neurological:Neurovascular exam within normal limits.     Senstion intact: LUE   Motor: + finger flex/ext- passive 2nd digit motion w minimal pain. Tolerating palpation and passive extension of the digit.  Musculoskeletal: Odessa's sign negative in bilateral lower extremities.  Calves soft, supple, non-tender upon palpation or with passive stretch.    Dressing/Wound:  dry bloody dressing removed. No significant erythema, small pocket of swelling with no drainage. Dry blood noted on bandages.       Vital Signs:       Patient Vitals for the past 8 hrs:   BP Temp Pulse Resp SpO2   12/07/24 1636 -- -- -- 16 --   12/07/24 1547 130/68 98.1 °F (36.7 °C) 63 20 99 %   12/07/24 1545 115/67 -- 62 -- --   12/07/24 1530 (!) 141/73 -- 63 -- --   12/07/24 1515 133/73 -- 63 -- --   12/07/24 1500 123/70 -- 63 -- --   12/07/24 1445 119/62 -- 61 -- --   12/07/24 1430 120/61 -- 62 -- --   12/07/24 1415 (!) 106/55 -- 60 -- --   12/07/24 1400 (!) 109/59 -- 60 -- --   12/07/24 1345 (!) 92/46 -- 68 -- --   12/07/24 1343 -- -- -- 16 --   12/07/24 1330 (!) 114/58 -- 83 -- --   12/07/24 1315 119/68 -- 62 -- --   12/07/24 1300 131/72 -- 63 -- --   12/07/24 1245 128/69

## 2024-12-07 NOTE — PROGRESS NOTES
Hospitalist Progress Note        Demographics    Patient Name  Keaton Van   Date of Birth 1965   Medical Record Number  [id number]      Age  59 y.o.   PCP Robert Wells PA-C   Admit date:  12/2/2024  4:00 PM     Room Number  1139/01  @ [facility]           Admission Diagnoses:  Cellulitis/abscess left hand/flexor tenosynovitis   Admission Summary:  \" Keaton Van is a 59 y.o.  male with PMHx significant for DM, HTN, esrd on HD TTS, who comes in with left hand swelling and pain. Patient reports that he had a bitten by a wild cat 10 days ago. Patient reports that he wash his hand and monitored it . But it started swelling and pain increased, patient reports that he started to use tylenol and motrin with out relief. More recently he started feeling unwell, fatigue , chills. His hand swelling worsened as well, so he decided to come in to the ED.  \"      Assessment and plan:      Left hand cellulitis/abscess/flexor tenosynovitis, secondary to cat bite, POA  Staph bacteremia, POA  XR of left hand showed no bone involvement.  Initial blood cultures growing coag-neg staph and bacillus species (not anthracis).  CT left hand w contrast reveals evidence of MCP joint septic arthritis and adjacent articular abscess.  -- Continue Unasyn and vancomycin  -- Ortho consulted and following. Taking to OR 12/5 for washout/I&D. OR wound cultures collected and pending- no growth thus far. Recommends ID consult and possible dc on IV abx.  -- ID consulted. Appreciate recommendations.  - Pain management with hydromorphone, Toradol, Tylenol. Zofran PRN.     ESRD on Hemodialysis on Tuesday Thursday Saturday dialysis  Hyperkalemia, POA, resolved / Hx of hyperkalemia  Hyponatremia, not POA / Hx of hyponatremia  - Current creatinine is 2.56. Baseline appears to be around 3.0  - Nephrology services consulted  - appreciate guidance from Dr. Asif      Hx of right BKA  - Positive for edema on examination, slight open  PCR Not detected        Candida tropicalis by PCR Not detected        Cryptococcus neoformans/gattii by PCR Not detected        Resistant gene targets          [employer] test comment       False positive results may rarely occur. Correlate with clinical,epidemiologic, and other laboratory findings           Comment: Please see BCID Interpretation Guide in EPIC Links       Culture, Blood 1 [[id number]] Collected: 12/02/24 1527    Order Status: Completed Specimen: Blood Updated: 12/06/24 2119     Special Requests NO SPECIAL REQUESTS        Culture NO GROWTH 4 DAYS               Recent Labs     12/05/24 0446 12/06/24 0542 12/07/24 0432   WBC 3.6* 5.7 5.6   HGB 10.1* 10.6* 9.9*   HCT 29.0* 30.1* 28.8*   * 116* 130*     Recent Labs     12/05/24 0446 12/06/24 0542 12/07/24 0432   * 132* 133*   K 4.9 5.3* 4.4   CL 96* 98 99   CO2 26 23 26   BUN 57* 73* 43*      Lab Results   Component Value Date/Time    TSH 2.080 05/30/2024 02:44 AM         Other medical conditions are listed in the active hospital problem list section; these and other pertinent data were taken into consideration when the treatment plan was developed and customized to this patient's unique overall circumstances and needs.  We have reviewed relevant medical records within the constraints of this admission process.   High complexity decision making was performed for this patient who is at high risk for decompensation with multiple organ involvement.   Today total floor/unit time was 40 minutes while caring for this patient and greater than 50% of that time was spent with patient (and/or family/responsible party) coordinating patient's clinical issues; this includes time spent during multidisciplinary rounds.        Melissa Leonard PA-C  12/7/2024

## 2024-12-07 NOTE — FLOWSHEET NOTE
Date/Time Value Ref Range Status   12/03/2024 08:37 AM 5.73 mIU/mL Final     Hep B S Ab Interp   Date/Time Value Ref Range Status   12/03/2024 08:37 AM NONREACTIVE NR   Final     Comment:     (NOTE)  The [employer] Centaur Anti-HBs2 assay is traceable to the World Health   Organization (WHO) Hepatitis B Immunoglobulin 1st International   Reference Preparation (1977). Samples with a calculated value of 10   mIU/mL or greater are considered reactive (protective) in accordance   with the CDC guidelines. The accepted criteria for immunity to HBV is   anti-HBs activity greater than or equal to 10 mIU/mL, as defined by   the WHO International Reference Preparation.  Assay performance has not been established in pregnant women,   patients who are immunosuppressed or immunocompromised, nor have   performance characteristics been established in conjunction with   other 's assays for specific HBV serologic markers. This   assay does not differentiate between vaccine induced immune response   and a response due to infection with HBV. Passively acquired anti-HBs   may be identified following patient transfusion, receipt of   immunoglobulin products, etc.     BHAKTI Corbin completed patient assessment. BHAKTI Corbin reviewed vital signs and Hepatitis status interpretation for LPN. Tx completed and reviewed by BHAKTI Corbin            12/07/24 1547   Vital Signs   /68   Temp 98.1 °F (36.7 °C)   Pulse 63   Respirations 20   SpO2 99 %   Pain Assessment   Pain Assessment 0-10   Pain Level 5   Post-Hemodialysis Assessment   Post-Treatment Procedures Blood returned;Catheter capped, clamped and heparinized x 2 ports   Machine Disinfection Process Acid/Vinegar Clean;Heat Disinfect;Exterior Machine Disinfection   Rinseback Volume (ml) 300 ml   Blood Volume Processed (Liters) 77.7 L   Dialyzer Clearance Clear   Duration of Treatment (minutes) 210 minutes   Hemodialysis Intake (ml) 500 ml   Hemodialysis Output (ml) 2500 ml   NET  Removed (ml) 2000   Tolerated Treatment Good   Patient Response to Treatment tolerated well   Bilateral Breath Sounds Clear   LUE Edema +1   RLE Edema Unable to Assess   LLE Edema Trace   Physician Notified No   Time Off 1547   Patient Disposition Return to room   Observations & Evaluations   Level of Consciousness 0   Oriented X 4   Heart Rhythm Regular   Respiratory Quality/Effort Unlabored   O2 Device None (Room air)   Skin Condition/Temp Dry;Warm   Abdomen Inspection Soft   Primary RN SBAR: Vicky Arcer,JADEN  Comments: No issues during treatment. All blood returned at end. Pt report given to primary nurse and pt returned to room.  BHAKTI Darby completed patient assessment. BHAKTI Darby reviewed vital signs and Hepatitis status interpretation for LPN. Tx completed and reviewed by BHAKTI Darby

## 2024-12-07 NOTE — PLAN OF CARE
Problem: Chronic Conditions and Co-morbidities  Goal: Patient's chronic conditions and co-morbidity symptoms are monitored and maintained or improved  Outcome: Progressing  Flowsheets (Taken 12/6/2024 2110)  Care Plan - Patient's Chronic Conditions and Co-Morbidity Symptoms are Monitored and Maintained or Improved: Monitor and assess patient's chronic conditions and comorbid symptoms for stability, deterioration, or improvement

## 2024-12-08 LAB
ANION GAP SERPL CALC-SCNC: 6 MMOL/L (ref 2–12)
BASOPHILS # BLD: 0.1 K/UL (ref 0–0.1)
BASOPHILS NFR BLD: 1 % (ref 0–1)
BUN SERPL-MCNC: 31 MG/DL (ref 6–20)
BUN/CREAT SERPL: 10 (ref 12–20)
CALCIUM SERPL-MCNC: 8.5 MG/DL (ref 8.5–10.1)
CHLORIDE SERPL-SCNC: 99 MMOL/L (ref 97–108)
CO2 SERPL-SCNC: 31 MMOL/L (ref 21–32)
CREAT SERPL-MCNC: 3.1 MG/DL (ref 0.7–1.3)
DIFFERENTIAL METHOD BLD: ABNORMAL
EOSINOPHIL # BLD: 0.1 K/UL (ref 0–0.4)
EOSINOPHIL NFR BLD: 3 % (ref 0–7)
ERYTHROCYTE [DISTWIDTH] IN BLOOD BY AUTOMATED COUNT: 14.9 % (ref 11.5–14.5)
GLUCOSE BLD STRIP.AUTO-MCNC: 144 MG/DL (ref 65–117)
GLUCOSE BLD STRIP.AUTO-MCNC: 221 MG/DL (ref 65–117)
GLUCOSE BLD STRIP.AUTO-MCNC: 225 MG/DL (ref 65–117)
GLUCOSE BLD STRIP.AUTO-MCNC: 236 MG/DL (ref 65–117)
GLUCOSE SERPL-MCNC: 174 MG/DL (ref 65–100)
HCT VFR BLD AUTO: 30.8 % (ref 36.6–50.3)
HGB BLD-MCNC: 10.5 G/DL (ref 12.1–17)
IMM GRANULOCYTES # BLD AUTO: 0 K/UL (ref 0–0.04)
IMM GRANULOCYTES NFR BLD AUTO: 0 % (ref 0–0.5)
LYMPHOCYTES # BLD: 0.9 K/UL (ref 0.8–3.5)
LYMPHOCYTES NFR BLD: 18 % (ref 12–49)
MCH RBC QN AUTO: 29.2 PG (ref 26–34)
MCHC RBC AUTO-ENTMCNC: 34.1 G/DL (ref 30–36.5)
MCV RBC AUTO: 85.6 FL (ref 80–99)
MONOCYTES # BLD: 0.4 K/UL (ref 0–1)
MONOCYTES NFR BLD: 9 % (ref 5–13)
NEUTS SEG # BLD: 3.3 K/UL (ref 1.8–8)
NEUTS SEG NFR BLD: 69 % (ref 32–75)
NRBC # BLD: 0 K/UL (ref 0–0.01)
NRBC BLD-RTO: 0 PER 100 WBC
PHOSPHATE SERPL-MCNC: 5.2 MG/DL (ref 2.6–4.7)
PLATELET # BLD AUTO: 151 K/UL (ref 150–400)
PMV BLD AUTO: 9.8 FL (ref 8.9–12.9)
POTASSIUM SERPL-SCNC: 4.1 MMOL/L (ref 3.5–5.1)
RBC # BLD AUTO: 3.6 M/UL (ref 4.1–5.7)
SERVICE CMNT-IMP: ABNORMAL
SODIUM SERPL-SCNC: 136 MMOL/L (ref 136–145)
WBC # BLD AUTO: 4.8 K/UL (ref 4.1–11.1)

## 2024-12-08 PROCEDURE — 6360000002 HC RX W HCPCS: Performed by: INTERNAL MEDICINE

## 2024-12-08 PROCEDURE — 6360000002 HC RX W HCPCS: Performed by: PHYSICIAN ASSISTANT

## 2024-12-08 PROCEDURE — 6370000000 HC RX 637 (ALT 250 FOR IP): Performed by: PHYSICIAN ASSISTANT

## 2024-12-08 PROCEDURE — 36415 COLL VENOUS BLD VENIPUNCTURE: CPT

## 2024-12-08 PROCEDURE — 1100000000 HC RM PRIVATE

## 2024-12-08 PROCEDURE — 84100 ASSAY OF PHOSPHORUS: CPT

## 2024-12-08 PROCEDURE — 85025 COMPLETE CBC W/AUTO DIFF WBC: CPT

## 2024-12-08 PROCEDURE — 2500000003 HC RX 250 WO HCPCS: Performed by: INTERNAL MEDICINE

## 2024-12-08 PROCEDURE — 2580000003 HC RX 258: Performed by: PHYSICIAN ASSISTANT

## 2024-12-08 PROCEDURE — 2580000003 HC RX 258: Performed by: INTERNAL MEDICINE

## 2024-12-08 PROCEDURE — 2500000003 HC RX 250 WO HCPCS: Performed by: PHYSICIAN ASSISTANT

## 2024-12-08 PROCEDURE — 82962 GLUCOSE BLOOD TEST: CPT

## 2024-12-08 PROCEDURE — 99024 POSTOP FOLLOW-UP VISIT: CPT | Performed by: ORTHOPAEDIC SURGERY

## 2024-12-08 PROCEDURE — 80048 BASIC METABOLIC PNL TOTAL CA: CPT

## 2024-12-08 RX ORDER — GABAPENTIN 100 MG/1
100 CAPSULE ORAL 3 TIMES DAILY
Status: DISCONTINUED | OUTPATIENT
Start: 2024-12-08 | End: 2024-12-09

## 2024-12-08 RX ORDER — HYDROMORPHONE HYDROCHLORIDE 1 MG/ML
1 INJECTION, SOLUTION INTRAMUSCULAR; INTRAVENOUS; SUBCUTANEOUS EVERY 6 HOURS PRN
Status: COMPLETED | OUTPATIENT
Start: 2024-12-08 | End: 2024-12-09

## 2024-12-08 RX ORDER — KETOROLAC TROMETHAMINE 30 MG/ML
30 INJECTION, SOLUTION INTRAMUSCULAR; INTRAVENOUS EVERY 6 HOURS PRN
Status: ACTIVE | OUTPATIENT
Start: 2024-12-08 | End: 2024-12-08

## 2024-12-08 RX ADMIN — AMPICILLIN SODIUM AND SULBACTAM SODIUM 3000 MG: 2; 1 INJECTION, POWDER, FOR SOLUTION INTRAMUSCULAR; INTRAVENOUS at 16:23

## 2024-12-08 RX ADMIN — HYDRALAZINE HYDROCHLORIDE 50 MG: 25 TABLET ORAL at 05:42

## 2024-12-08 RX ADMIN — HYDROMORPHONE HYDROCHLORIDE 0.5 MG: 1 INJECTION, SOLUTION INTRAMUSCULAR; INTRAVENOUS; SUBCUTANEOUS at 08:09

## 2024-12-08 RX ADMIN — GABAPENTIN 100 MG: 100 CAPSULE ORAL at 13:07

## 2024-12-08 RX ADMIN — INSULIN LISPRO 2 UNITS: 100 INJECTION, SOLUTION INTRAVENOUS; SUBCUTANEOUS at 11:07

## 2024-12-08 RX ADMIN — HEPARIN SODIUM 5000 UNITS: 5000 INJECTION INTRAVENOUS; SUBCUTANEOUS at 21:56

## 2024-12-08 RX ADMIN — AMLODIPINE BESYLATE 10 MG: 5 TABLET ORAL at 08:09

## 2024-12-08 RX ADMIN — HYDRALAZINE HYDROCHLORIDE 50 MG: 25 TABLET ORAL at 21:56

## 2024-12-08 RX ADMIN — SODIUM CHLORIDE, PRESERVATIVE FREE 10 ML: 5 INJECTION INTRAVENOUS at 08:10

## 2024-12-08 RX ADMIN — INSULIN LISPRO 2 UNITS: 100 INJECTION, SOLUTION INTRAVENOUS; SUBCUTANEOUS at 16:21

## 2024-12-08 RX ADMIN — HEPARIN SODIUM 5000 UNITS: 5000 INJECTION INTRAVENOUS; SUBCUTANEOUS at 13:06

## 2024-12-08 RX ADMIN — HYDRALAZINE HYDROCHLORIDE 50 MG: 25 TABLET ORAL at 13:07

## 2024-12-08 RX ADMIN — HYDROMORPHONE HYDROCHLORIDE 1 MG: 1 INJECTION, SOLUTION INTRAMUSCULAR; INTRAVENOUS; SUBCUTANEOUS at 12:55

## 2024-12-08 RX ADMIN — LURASIDONE HYDROCHLORIDE 20 MG: 20 TABLET ORAL at 16:19

## 2024-12-08 RX ADMIN — AMPICILLIN SODIUM AND SULBACTAM SODIUM 3000 MG: 2; 1 INJECTION, POWDER, FOR SOLUTION INTRAMUSCULAR; INTRAVENOUS at 04:10

## 2024-12-08 RX ADMIN — HYDROMORPHONE HYDROCHLORIDE 2 MG: 2 TABLET ORAL at 05:39

## 2024-12-08 RX ADMIN — GABAPENTIN 100 MG: 100 CAPSULE ORAL at 21:56

## 2024-12-08 RX ADMIN — HYDROMORPHONE HYDROCHLORIDE 2 MG: 2 TABLET ORAL at 11:03

## 2024-12-08 RX ADMIN — INSULIN LISPRO 2 UNITS: 100 INJECTION, SOLUTION INTRAVENOUS; SUBCUTANEOUS at 22:04

## 2024-12-08 RX ADMIN — DULOXETINE HYDROCHLORIDE 20 MG: 20 CAPSULE, DELAYED RELEASE ORAL at 08:09

## 2024-12-08 RX ADMIN — SODIUM CHLORIDE, PRESERVATIVE FREE 10 ML: 5 INJECTION INTRAVENOUS at 21:59

## 2024-12-08 RX ADMIN — INSULIN GLARGINE 14 UNITS: 100 INJECTION, SOLUTION SUBCUTANEOUS at 08:09

## 2024-12-08 RX ADMIN — HYDROMORPHONE HYDROCHLORIDE 2 MG: 2 TABLET ORAL at 16:19

## 2024-12-08 RX ADMIN — HEPARIN SODIUM 5000 UNITS: 5000 INJECTION INTRAVENOUS; SUBCUTANEOUS at 05:43

## 2024-12-08 RX ADMIN — HYDROMORPHONE HYDROCHLORIDE 1 MG: 1 INJECTION, SOLUTION INTRAMUSCULAR; INTRAVENOUS; SUBCUTANEOUS at 19:28

## 2024-12-08 RX ADMIN — HYDROMORPHONE HYDROCHLORIDE 2 MG: 2 TABLET ORAL at 22:06

## 2024-12-08 ASSESSMENT — PAIN DESCRIPTION - ORIENTATION
ORIENTATION: LEFT
ORIENTATION: LEFT

## 2024-12-08 ASSESSMENT — PAIN SCALES - GENERAL
PAINLEVEL_OUTOF10: 6
PAINLEVEL_OUTOF10: 7
PAINLEVEL_OUTOF10: 5
PAINLEVEL_OUTOF10: 7

## 2024-12-08 ASSESSMENT — PAIN DESCRIPTION - LOCATION
LOCATION: HAND
LOCATION: HAND

## 2024-12-08 ASSESSMENT — PAIN DESCRIPTION - ONSET: ONSET: GRADUAL

## 2024-12-08 ASSESSMENT — PAIN DESCRIPTION - DESCRIPTORS: DESCRIPTORS: ACHING;THROBBING

## 2024-12-08 ASSESSMENT — PAIN DESCRIPTION - DIRECTION: RADIATING_TOWARDS: NO

## 2024-12-08 ASSESSMENT — PAIN - FUNCTIONAL ASSESSMENT: PAIN_FUNCTIONAL_ASSESSMENT: PREVENTS OR INTERFERES SOME ACTIVE ACTIVITIES AND ADLS

## 2024-12-08 ASSESSMENT — PAIN DESCRIPTION - FREQUENCY: FREQUENCY: INTERMITTENT

## 2024-12-08 ASSESSMENT — PAIN DESCRIPTION - PAIN TYPE: TYPE: SURGICAL PAIN

## 2024-12-08 NOTE — PLAN OF CARE
Problem: Chronic Conditions and Co-morbidities  Goal: Patient's chronic conditions and co-morbidity symptoms are monitored and maintained or improved  Outcome: Progressing  Flowsheets (Taken 12/7/2024 2029)  Care Plan - Patient's Chronic Conditions and Co-Morbidity Symptoms are Monitored and Maintained or Improved: Monitor and assess patient's chronic conditions and comorbid symptoms for stability, deterioration, or improvement     Problem: Discharge Planning  Goal: Discharge to home or other facility with appropriate resources  Outcome: Progressing  Flowsheets (Taken 12/7/2024 2029)  Discharge to home or other facility with appropriate resources: Identify barriers to discharge with patient and caregiver     Problem: Pain  Goal: Verbalizes/displays adequate comfort level or baseline comfort level  Outcome: Progressing     Problem: Safety - Adult  Goal: Free from fall injury  Outcome: Progressing

## 2024-12-08 NOTE — PROGRESS NOTES
.ORTHO - Progress Note  Post Op day: 3 Days Post-Op    Keaton Van     [id number]  male    59 y.o.    1965    Admit date:2024  Date of Surgery:  Procedures:Procedure(s):  LEFT HAND INCISION AND DRAINAGE  Surgeon:Surgeons and Role:   * Henry Owens MD - Primary        SUBJECTIVE:     Keaton Van is a 59 y.o. male resting in the bed.  Patient has complaints of appropriate post-op pain, tolerating PO pain medications.  Denies F/C, nausea, vomiting, dizziness, lightheadedness, chest pain, or shortness of breath.    OBJECTIVE:       Physical Exam:  General: alert, cooperative, no distress.   Gastrointestinal:  non-distended .    Cardiovascular:  Brisk cap refill in all distal extremities   Genitourinary:  Voiding independently   Respiratory: No respiratory distress   Neurological:Neurovascular exam within normal limits.     Senstion intact: LUE   Motor: + finger flex/ext- passive 2nd digit motion Tolerating palpation and passive extension of the digit with some mild pain today.  Musculoskeletal: Odessa's sign negative in bilateral lower extremities.  Calves soft, supple, non-tender upon palpation or with passive stretch.    Dressing/Wound:bloody drainage on dressing noted similar to yesterday. No significant erythema, small pocket of swelling with no drainage. Dry blood noted on bandages. Some mild maceration of the incision today       Vital Signs:       Patient Vitals for the past 8 hrs:   BP Temp Temp src Pulse Resp SpO2   24 0751 (!) 154/77 98.2 °F (36.8 °C) Oral 71 18 96 %   24 0609 -- -- -- -- 16 --   24 0542 (!) 146/74 -- -- 64 -- --   24 0539 -- -- -- -- 16 --                                          Temp (24hrs), Av.4 °F (36.9 °C), Min:98.1 °F (36.7 °C), Max:99 °F (37.2 °C)        Labs:        Recent Labs     24  0451   HCT 30.8*   HGB 10.5*     PT/OT:              ASSESSMENT / PLAN:   Principal Problem:    Cellulitis  Active Problems:    Gram-positive

## 2024-12-08 NOTE — PROGRESS NOTES
Hospitalist Progress Note        Demographics    Patient Name  Keaton Van   Date of Birth 1965   Medical Record Number  [id number]      Age  59 y.o.   PCP Robert Wells PA-C   Admit date:  12/2/2024  4:00 PM     Room Number  1139/01  @ [facility]           Admission Diagnoses:  Cellulitis/abscess left hand/flexor tenosynovitis   Admission Summary:  \" Keaton Van is a 59 y.o.  male with PMHx significant for DM, HTN, esrd on HD TTS, who comes in with left hand swelling and pain. Patient reports that he had a bitten by a wild cat 10 days ago. Patient reports that he wash his hand and monitored it . But it started swelling and pain increased, patient reports that he started to use tylenol and motrin with out relief. More recently he started feeling unwell, fatigue , chills. His hand swelling worsened as well, so he decided to come in to the ED.  \"      Assessment and plan:      Left hand cellulitis/abscess/flexor tenosynovitis, secondary to cat bite, POA  Staph bacteremia, POA  XR of left hand showed no bone involvement.  Initial blood cultures growing coag-neg staph and bacillus species (not anthracis).  CT left hand w contrast reveals evidence of MCP joint septic arthritis and adjacent articular abscess.  -- Continue Unasyn and vancomycin  -- Ortho consulted and following. Taking to OR 12/5 for washout/I&D. OR wound cultures collected and pending- no growth thus far. Recommends ID consult and possible dc on IV abx.  -- ID consulted. Appreciate recommendations.  - Pain management with hydromorphone, Toradol, Tylenol. Add scheduled gabapentin and increase toradol to try to decrease dependence on IV narcotic for pain control. Zofran PRN.     ESRD on Hemodialysis on Tuesday Thursday Saturday dialysis  Hyperkalemia, POA, resolved / Hx of hyperkalemia  Hyponatremia, not POA / Hx of hyponatremia  - Current creatinine is 2.56. Baseline appears to be around 3.0  - Nephrology services consulted     * 130* 151     Recent Labs     12/06/24  0542 12/07/24  0432 12/08/24  0451   * 133* 136   K 5.3* 4.4 4.1   CL 98 99 99   CO2 23 26 31   BUN 73* 43* 31*   PHOS  --   --  5.2*      Lab Results   Component Value Date/Time    TSH 2.080 05/30/2024 02:44 AM         Other medical conditions are listed in the active hospital problem list section; these and other pertinent data were taken into consideration when the treatment plan was developed and customized to this patient's unique overall circumstances and needs.  We have reviewed relevant medical records within the constraints of this admission process.   High complexity decision making was performed for this patient who is at high risk for decompensation with multiple organ involvement.   Today total floor/unit time was 30 minutes while caring for this patient and greater than 50% of that time was spent with patient (and/or family/responsible party) coordinating patient's clinical issues; this includes time spent during multidisciplinary rounds.        Melissa Leonard PA-C  12/8/2024

## 2024-12-08 NOTE — PROGRESS NOTES
End of Shift Note    Bedside shift change report given to BHAKTI Scruggs (oncoming nurse) by CARSON ROLLINS RN (offgoing nurse).  Report included the following information SBAR, Kardex, and MAR    Shift worked:  7p-7a     Shift summary and any significant changes:     AAOx4, VSS, pain medication given per MAR, safety rounding completed.     Concerns for physician to address:  no     Zone phone for oncoming shift:   [phone]       Activity:  Level of Assistance: Independent    Cardiac:   Cardiac Monitoring:  no    Access:  Current line(s): PIV     Genitourinary:   Urinary Status: Voiding, Bathroom privileges    Respiratory:   O2 Device: None (Room air)    GI:  Current diet: ADULT DIET; Regular    Pain Management:   Patient states pain is manageable on current regimen: YES    Skin:  Kennedy Scale Score: 20  Interventions: Wound Offloading (Prevention Methods): Bed, pressure reduction mattress  Pressure injury: no    Patient Safety:  Fall Score: Vanegas Total Score: 35  Fall Risk Interventions  Nursing Judgement-Fall Risk High(Add Comments): Yes  Toilet Every 2 Hours-In Advance of Need: Yes  Hourly Visual Checks: Awake, In bed  Fall Visual Posted: Armband  Room Door Open: Yes  Alarm On: Bed  Patient Moved Closer to Nursing Station: No    Active Consults:  IP CONSULT TO NEPHROLOGY  IP CONSULT TO VASCULAR SURGERY  IP CONSULT TO PHARMACY  IP CONSULT TO ORTHOPEDIC SURGERY  IP CONSULT TO INFECTIOUS DISEASES    Length of Stay:  Expected LOS: 7  Actual LOS: 4      CARSON ROLLINS RN

## 2024-12-09 LAB
ALBUMIN SERPL-MCNC: 3.3 G/DL (ref 3.5–5)
ALBUMIN/GLOB SERPL: 0.8 (ref 1.1–2.2)
ALP SERPL-CCNC: 118 U/L (ref 45–117)
ALT SERPL-CCNC: 40 U/L (ref 12–78)
ANION GAP SERPL CALC-SCNC: 7 MMOL/L (ref 2–12)
AST SERPL-CCNC: 34 U/L (ref 15–37)
BACTERIA SPEC CULT: NORMAL
BACTERIA SPEC CULT: NORMAL
BASOPHILS # BLD: 0.1 K/UL (ref 0–0.1)
BASOPHILS NFR BLD: 1 % (ref 0–1)
BILIRUB SERPL-MCNC: 0.4 MG/DL (ref 0.2–1)
BUN SERPL-MCNC: 42 MG/DL (ref 6–20)
BUN/CREAT SERPL: 12 (ref 12–20)
CALCIUM SERPL-MCNC: 9.2 MG/DL (ref 8.5–10.1)
CHLORIDE SERPL-SCNC: 101 MMOL/L (ref 97–108)
CO2 SERPL-SCNC: 26 MMOL/L (ref 21–32)
CREAT SERPL-MCNC: 3.51 MG/DL (ref 0.7–1.3)
DIFFERENTIAL METHOD BLD: ABNORMAL
EOSINOPHIL # BLD: 0.3 K/UL (ref 0–0.4)
EOSINOPHIL NFR BLD: 6 % (ref 0–7)
ERYTHROCYTE [DISTWIDTH] IN BLOOD BY AUTOMATED COUNT: 14.8 % (ref 11.5–14.5)
ERYTHROCYTE [SEDIMENTATION RATE] IN BLOOD: 60 MM/HR (ref 0–20)
GLOBULIN SER CALC-MCNC: 4.1 G/DL (ref 2–4)
GLUCOSE BLD STRIP.AUTO-MCNC: 188 MG/DL (ref 65–117)
GLUCOSE BLD STRIP.AUTO-MCNC: 205 MG/DL (ref 65–117)
GLUCOSE BLD STRIP.AUTO-MCNC: 229 MG/DL (ref 65–117)
GLUCOSE BLD STRIP.AUTO-MCNC: 237 MG/DL (ref 65–117)
GLUCOSE SERPL-MCNC: 143 MG/DL (ref 65–100)
GRAM STN SPEC: NORMAL
GRAM STN SPEC: NORMAL
HCT VFR BLD AUTO: 34.5 % (ref 36.6–50.3)
HGB BLD-MCNC: 11.7 G/DL (ref 12.1–17)
IMM GRANULOCYTES # BLD AUTO: 0 K/UL (ref 0–0.04)
IMM GRANULOCYTES NFR BLD AUTO: 0 % (ref 0–0.5)
LYMPHOCYTES # BLD: 0.9 K/UL (ref 0.8–3.5)
LYMPHOCYTES NFR BLD: 18 % (ref 12–49)
MCH RBC QN AUTO: 29 PG (ref 26–34)
MCHC RBC AUTO-ENTMCNC: 33.9 G/DL (ref 30–36.5)
MCV RBC AUTO: 85.6 FL (ref 80–99)
MONOCYTES # BLD: 0.4 K/UL (ref 0–1)
MONOCYTES NFR BLD: 9 % (ref 5–13)
NEUTS SEG # BLD: 3.2 K/UL (ref 1.8–8)
NEUTS SEG NFR BLD: 66 % (ref 32–75)
NRBC # BLD: 0 K/UL (ref 0–0.01)
NRBC BLD-RTO: 0 PER 100 WBC
PLATELET # BLD AUTO: 159 K/UL (ref 150–400)
PMV BLD AUTO: 9.7 FL (ref 8.9–12.9)
POTASSIUM SERPL-SCNC: 4.3 MMOL/L (ref 3.5–5.1)
PROT SERPL-MCNC: 7.4 G/DL (ref 6.4–8.2)
RBC # BLD AUTO: 4.03 M/UL (ref 4.1–5.7)
SERVICE CMNT-IMP: ABNORMAL
SERVICE CMNT-IMP: NORMAL
SERVICE CMNT-IMP: NORMAL
SODIUM SERPL-SCNC: 134 MMOL/L (ref 136–145)
WBC # BLD AUTO: 4.8 K/UL (ref 4.1–11.1)

## 2024-12-09 PROCEDURE — 2580000003 HC RX 258: Performed by: PHYSICIAN ASSISTANT

## 2024-12-09 PROCEDURE — 82962 GLUCOSE BLOOD TEST: CPT

## 2024-12-09 PROCEDURE — 85652 RBC SED RATE AUTOMATED: CPT

## 2024-12-09 PROCEDURE — 87040 BLOOD CULTURE FOR BACTERIA: CPT

## 2024-12-09 PROCEDURE — 2500000003 HC RX 250 WO HCPCS: Performed by: PHYSICIAN ASSISTANT

## 2024-12-09 PROCEDURE — 6370000000 HC RX 637 (ALT 250 FOR IP): Performed by: PHYSICIAN ASSISTANT

## 2024-12-09 PROCEDURE — 36415 COLL VENOUS BLD VENIPUNCTURE: CPT

## 2024-12-09 PROCEDURE — 6370000000 HC RX 637 (ALT 250 FOR IP): Performed by: INTERNAL MEDICINE

## 2024-12-09 PROCEDURE — 2580000003 HC RX 258: Performed by: INTERNAL MEDICINE

## 2024-12-09 PROCEDURE — 80053 COMPREHEN METABOLIC PANEL: CPT

## 2024-12-09 PROCEDURE — 85025 COMPLETE CBC W/AUTO DIFF WBC: CPT

## 2024-12-09 PROCEDURE — 1100000000 HC RM PRIVATE

## 2024-12-09 PROCEDURE — 99223 1ST HOSP IP/OBS HIGH 75: CPT | Performed by: INTERNAL MEDICINE

## 2024-12-09 PROCEDURE — 6360000002 HC RX W HCPCS: Performed by: INTERNAL MEDICINE

## 2024-12-09 PROCEDURE — 6360000002 HC RX W HCPCS: Performed by: PHYSICIAN ASSISTANT

## 2024-12-09 RX ORDER — LOSARTAN POTASSIUM 25 MG/1
25 TABLET ORAL DAILY
Status: DISCONTINUED | OUTPATIENT
Start: 2024-12-09 | End: 2024-12-11 | Stop reason: HOSPADM

## 2024-12-09 RX ORDER — NEOMYCIN SULFATE, POLYMYXIN B SULFATE AND DEXAMETHASONE 3.5; 10000; 1 MG/ML; [USP'U]/ML; MG/ML
1 SUSPENSION/ DROPS OPHTHALMIC EVERY 6 HOURS SCHEDULED
Status: DISCONTINUED | OUTPATIENT
Start: 2024-12-09 | End: 2024-12-11 | Stop reason: HOSPADM

## 2024-12-09 RX ORDER — HYDROMORPHONE HYDROCHLORIDE 1 MG/ML
1 INJECTION, SOLUTION INTRAMUSCULAR; INTRAVENOUS; SUBCUTANEOUS EVERY 6 HOURS PRN
Status: COMPLETED | OUTPATIENT
Start: 2024-12-09 | End: 2024-12-09

## 2024-12-09 RX ORDER — GABAPENTIN 300 MG/1
300 CAPSULE ORAL 3 TIMES DAILY
Status: DISCONTINUED | OUTPATIENT
Start: 2024-12-09 | End: 2024-12-11 | Stop reason: HOSPADM

## 2024-12-09 RX ADMIN — LURASIDONE HYDROCHLORIDE 20 MG: 20 TABLET ORAL at 16:53

## 2024-12-09 RX ADMIN — INSULIN LISPRO 2 UNITS: 100 INJECTION, SOLUTION INTRAVENOUS; SUBCUTANEOUS at 21:57

## 2024-12-09 RX ADMIN — INSULIN GLARGINE 14 UNITS: 100 INJECTION, SOLUTION SUBCUTANEOUS at 08:14

## 2024-12-09 RX ADMIN — INSULIN LISPRO 2 UNITS: 100 INJECTION, SOLUTION INTRAVENOUS; SUBCUTANEOUS at 08:14

## 2024-12-09 RX ADMIN — HYDROMORPHONE HYDROCHLORIDE 2 MG: 2 TABLET ORAL at 13:27

## 2024-12-09 RX ADMIN — HYDROMORPHONE HYDROCHLORIDE 1 MG: 1 INJECTION, SOLUTION INTRAMUSCULAR; INTRAVENOUS; SUBCUTANEOUS at 23:00

## 2024-12-09 RX ADMIN — HYDRALAZINE HYDROCHLORIDE 50 MG: 25 TABLET ORAL at 13:28

## 2024-12-09 RX ADMIN — HYDRALAZINE HYDROCHLORIDE 50 MG: 25 TABLET ORAL at 05:43

## 2024-12-09 RX ADMIN — HEPARIN SODIUM 5000 UNITS: 5000 INJECTION INTRAVENOUS; SUBCUTANEOUS at 13:27

## 2024-12-09 RX ADMIN — SILVER SULFADIAZINE: 10 CREAM TOPICAL at 10:16

## 2024-12-09 RX ADMIN — INSULIN LISPRO 0 UNITS: 100 INJECTION, SOLUTION INTRAVENOUS; SUBCUTANEOUS at 16:57

## 2024-12-09 RX ADMIN — NEOMYCIN SULFATE, POLYMYXIN B SULFATE AND DEXAMETHASONE 1 DROP: 3.5; 10000; 1 SUSPENSION/ DROPS OPHTHALMIC at 16:39

## 2024-12-09 RX ADMIN — AMPICILLIN SODIUM AND SULBACTAM SODIUM 3000 MG: 2; 1 INJECTION, POWDER, FOR SOLUTION INTRAMUSCULAR; INTRAVENOUS at 03:42

## 2024-12-09 RX ADMIN — AMPICILLIN SODIUM AND SULBACTAM SODIUM 3000 MG: 2; 1 INJECTION, POWDER, FOR SOLUTION INTRAMUSCULAR; INTRAVENOUS at 16:53

## 2024-12-09 RX ADMIN — HYDROMORPHONE HYDROCHLORIDE 1 MG: 1 INJECTION, SOLUTION INTRAMUSCULAR; INTRAVENOUS; SUBCUTANEOUS at 03:45

## 2024-12-09 RX ADMIN — INSULIN LISPRO 2 UNITS: 100 INJECTION, SOLUTION INTRAVENOUS; SUBCUTANEOUS at 16:53

## 2024-12-09 RX ADMIN — SODIUM CHLORIDE, PRESERVATIVE FREE 10 ML: 5 INJECTION INTRAVENOUS at 22:00

## 2024-12-09 RX ADMIN — HYDROMORPHONE HYDROCHLORIDE 1 MG: 1 INJECTION, SOLUTION INTRAMUSCULAR; INTRAVENOUS; SUBCUTANEOUS at 10:43

## 2024-12-09 RX ADMIN — HYDROMORPHONE HYDROCHLORIDE 2 MG: 2 TABLET ORAL at 08:14

## 2024-12-09 RX ADMIN — GABAPENTIN 300 MG: 300 CAPSULE ORAL at 21:57

## 2024-12-09 RX ADMIN — HEPARIN SODIUM 5000 UNITS: 5000 INJECTION INTRAVENOUS; SUBCUTANEOUS at 05:46

## 2024-12-09 RX ADMIN — AMLODIPINE BESYLATE 10 MG: 5 TABLET ORAL at 08:14

## 2024-12-09 RX ADMIN — HYDROMORPHONE HYDROCHLORIDE 1 MG: 1 INJECTION, SOLUTION INTRAMUSCULAR; INTRAVENOUS; SUBCUTANEOUS at 16:53

## 2024-12-09 RX ADMIN — HYDROMORPHONE HYDROCHLORIDE 2 MG: 2 TABLET ORAL at 02:13

## 2024-12-09 RX ADMIN — DULOXETINE HYDROCHLORIDE 20 MG: 20 CAPSULE, DELAYED RELEASE ORAL at 08:14

## 2024-12-09 RX ADMIN — LOSARTAN POTASSIUM 25 MG: 25 TABLET, FILM COATED ORAL at 13:31

## 2024-12-09 RX ADMIN — GABAPENTIN 100 MG: 100 CAPSULE ORAL at 08:14

## 2024-12-09 RX ADMIN — HYDRALAZINE HYDROCHLORIDE 50 MG: 25 TABLET ORAL at 21:57

## 2024-12-09 RX ADMIN — INSULIN LISPRO 2 UNITS: 100 INJECTION, SOLUTION INTRAVENOUS; SUBCUTANEOUS at 13:28

## 2024-12-09 RX ADMIN — GABAPENTIN 100 MG: 100 CAPSULE ORAL at 13:27

## 2024-12-09 RX ADMIN — HEPARIN SODIUM 5000 UNITS: 5000 INJECTION INTRAVENOUS; SUBCUTANEOUS at 22:00

## 2024-12-09 ASSESSMENT — PAIN DESCRIPTION - LOCATION
LOCATION: HAND
LOCATION: ARM
LOCATION: HAND
LOCATION: HAND

## 2024-12-09 ASSESSMENT — PAIN DESCRIPTION - ORIENTATION
ORIENTATION: LEFT

## 2024-12-09 ASSESSMENT — PAIN SCALES - WONG BAKER: WONGBAKER_NUMERICALRESPONSE: NO HURT

## 2024-12-09 ASSESSMENT — PAIN SCALES - GENERAL
PAINLEVEL_OUTOF10: 7
PAINLEVEL_OUTOF10: 8
PAINLEVEL_OUTOF10: 6
PAINLEVEL_OUTOF10: 7
PAINLEVEL_OUTOF10: 10
PAINLEVEL_OUTOF10: 7
PAINLEVEL_OUTOF10: 2
PAINLEVEL_OUTOF10: 7

## 2024-12-09 ASSESSMENT — PAIN DESCRIPTION - DESCRIPTORS: DESCRIPTORS: ACHING

## 2024-12-09 NOTE — H&P
Orthopedic Surgery Progress Note  Post Op Day: 4 Days Post-Op    2024 11:51 AM     Patient Name: Keaton Van      Subjective:      Keaton Van is a 59 y.o. male resting in bed. Pain managed with dilaudid,  requesting another dose.  Tolerating diet.    Procedure:  Procedure(s):  LEFT HAND INCISION AND DRAINAGE      Objective:    General: Alert, cooperative, no distress.   Gastrointestinal:  Soft, non-tender.   Musculoskeletal:     Left hand exam demonstrates well healing incision. There is dried blood drainage on dressing. No serous/purulent drainage or active bleeding. Wound central edge macerated but well approximated still.  Wound otherwise clean and intact. Ttp at the index finger and at the palm surrounding incision. No fluid collection. +1 swelling at the index proximal phalanx. Can wiggle fingers. NVID.    Vital Signs:    Patient Vitals for the past 8 hrs:   BP Temp Temp src Pulse Resp SpO2   24 0800 (!) 170/87 98.1 °F (36.7 °C) Oral 75 -- 97 %   24 0543 (!) 159/77 -- -- -- -- --   24 0415 -- -- -- -- 18 --     Temp (24hrs), Av.9 °F (36.6 °C), Min:97.4 °F (36.3 °C), Max:98.1 °F (36.7 °C)      Intake/Output:  No intake/output data recorded.   1901 -  0700  In: 120 [P.O.:120]  Out: -     Pain Control:        LAB:    Recent Labs     24  0939   HCT 34.5*   HGB 11.7*     Lab Results   Component Value Date/Time     2024 04:53 AM    K 4.3 2024 04:53 AM     2024 04:53 AM    CO2 26 2024 04:53 AM    BUN 42 2024 04:53 AM         Assessment:    s/p Procedure(s):  LEFT HAND INCISION AND DRAINAGE    Principal Problem:    Cellulitis  Active Problems:    Gram-positive bacteremia    Cellulitis of hand, left    Flexor tenosynovitis of finger    Staphylococcal arthritis of right hand  Resolved Problems:    * No resolved hospital problems. *       Plan:   S/p left hand I&D following cat bite  -intra-op cx NGTD  -on unasyn    Dressing/Wound

## 2024-12-09 NOTE — PROGRESS NOTES
Hospitalist Progress Note        Demographics    Patient Name  Keaton Van   Date of Birth 1965   Medical Record Number  [id number]      Age  59 y.o.   PCP Robert Wells PA-C   Admit date:  12/2/2024  4:00 PM     Room Number  1139/01  @ [facility]           Admission Diagnoses:  Cellulitis/abscess left hand/flexor tenosynovitis   Admission Summary:  \" Keaton Van is a 59 y.o.  male with PMHx significant for DM, HTN, esrd on HD TTS, who comes in with left hand swelling and pain. Patient reports that he had a bitten by a wild cat 10 days ago. Patient reports that he wash his hand and monitored it . But it started swelling and pain increased, patient reports that he started to use tylenol and motrin with out relief. More recently he started feeling unwell, fatigue , chills. His hand swelling worsened as well, so he decided to come in to the ED.  \"      Assessment and plan:      Left hand cellulitis/abscess/flexor tenosynovitis, secondary to cat bite, POA  Staph bacteremia, POA  XR of left hand showed no bone involvement.  Initial blood cultures growing coag-neg staph and bacillus species (not anthracis).  CT left hand w contrast reveals evidence of MCP joint septic arthritis and adjacent articular abscess.  -- Continue Unasyn and vancomycin  -- Ortho consulted and following. Taking to OR 12/5 for washout/I&D. OR wound cultures collected and pending- no growth thus far. Recommends ID consult and possible dc on IV abx.  -- ID consulted. Appreciate recommendations. Repeat cultures pending (12/9)  - Pain management with hydromorphone, Toradol, Tylenol. Increase scheduled gabapentin and toradol to try to decrease dependence on IV narcotic for pain control. Zofran PRN.     ESRD on Hemodialysis on Tuesday Thursday Saturday dialysis  Hyperkalemia, POA, resolved / Hx of hyperkalemia  Hyponatremia, not POA / Hx of hyponatremia  - Current creatinine is 2.56. Baseline appears to be around 3.0  -  Alert, cooperative,   well noursished,   well developed,   appears stated age    Ears/Eyes:  Hearing intact  Mild conjunctival injection right eye.  PERRL   Mouth/Throat:  Mucous membranes normal pink and moist  Oral pharynx clear        Lungs:  No respiratory distress. Currently on RA. Symmetric chest rise.  Lungs CTAB   CVS:  Regular rate and rhythm  No  murmur. No click, rub or gallop    Palpable pedal pulses  bilaterally   Abdomen:  Soft, non-tender. No rebound or guarding.  Bowel sounds normal     Extremities:  Right BKA.  Left hand s/p I&D- dressing CDI. Fingers NV intact.  No edema noted   No calf tenderness   Skin:  No rash.  No cyanosis, jaundice   Lymph nodes:  Not examined    Musculoskeletal Muscle bulk nml   Neuro Face symmetrical. No slurred speech. Moving all extremities. A&O x3.      Psych:  Alert and oriented,   normal mood & affect        Medications reviewed   Scheduled Meds:   losartan  25 mg Oral Daily    neomycin-polymyxin-dexameth  1 drop Right Eye 4 times per day    gabapentin  100 mg Oral TID    vancomycin (VANCOCIN) intermittent dosing (placeholder)   Other RX Placeholder    silver sulfADIAZINE   Topical Daily    amLODIPine  10 mg Oral Daily    DULoxetine  20 mg Oral Daily    hydrALAZINE  50 mg Oral 3 times per day    lurasidone  20 mg Oral Dinner    sodium chloride flush  5-40 mL IntraVENous 2 times per day    heparin (porcine)  5,000 Units SubCUTAneous 3 times per day    ampicillin-sulbactam  3,000 mg IntraVENous Q12H    insulin glargine  0.15 Units/kg SubCUTAneous Daily    insulin lispro  0-8 Units SubCUTAneous 4x Daily AC & HS     Continuous Infusions:   sodium chloride 10 mL/hr at 12/07/24 0439    dextrose       PRN Meds:.HYDROmorphone, HYDROmorphone, ondansetron, heparin (porcine) **AND** heparin (porcine), cloNIDine, tiZANidine, sodium chloride flush, sodium chloride, acetaminophen **OR** acetaminophen, glucose, dextrose bolus **OR** dextrose bolus, glucagon (rDNA), dextrose,

## 2024-12-09 NOTE — PROGRESS NOTES
PCP hospital follow-up transitional care appointment has been scheduled with Dr. Miracle Licea on 12/17/24 6743. This is the first available appt due to limited provider availability. [facility] information on AVS for patient resource.   Pending patient discharge.

## 2024-12-09 NOTE — PROGRESS NOTES
Pharmacy Antimicrobial Kinetic Dosing    Indication for Antimicrobials: bloodstream     Current Regimen of Each Antimicrobial:  Unasyn 3000 mg iv every 12 hr; Start Date 12/3; Day # 6  Vancomycin 2000 mg iv once then HD dosing; Day 5    Previous Antimicrobial Therapy:       Goal Level: Vancomycin random level < 20     Date Dose & Interval Measured (mcg/mL) Predicted AUC 24-48 Predicted AUC 24,ss    2000 x 1 10.3     12/10 1000 mg                Significant Cultures:    blood - /2 CoNS,  bacillus    Wound - NG    Labs:  Recent Labs     Units 24  0451 24  0432 24  0542   CREATININE MG/DL 3.10* 4.23* 5.19*   BUN MG/DL 31* 43* 73*   WBC K/uL 4.8 5.6 5.7     Temp (24hrs), Av.2 °F (36.8 °C), Min:98.2 °F (36.8 °C), Max:98.2 °F (36.8 °C)      Conditions for Dosing Consideration: Hemodialysis    Creatinine Clearance (mL/min): Estimated Creatinine Clearance: 28 mL/min (A) (based on SCr of 3.1 mg/dL (H)).       Impression/Plan:   Reviewed  C/s with EARLE Ruffin this evening, appreciate his help.  Izzy in C/s with final pending; no Ecoli noted; I called Ortho Oncall to clarify Cefepime order which was previously changed to HD dosing then reordered as 1gm IV q12h again, however on micro review, no Ecoli noted in results.  Per BEATRICE Lam to continue Unasyn and Vanc HD regimens for tonight; discontinue Cefepime; if changes needed the team can make changes   Will plan to give vancomycin 1000mg IV post HD. Recommend level prior to HD Tuesday  Antimicrobial stop date pending     Pharmacy will follow daily and adjust medications as appropriate for renal function and/or serum levels.    Thank you,  HENRRY SCOTT [facility]

## 2024-12-09 NOTE — PROGRESS NOTES
Pharmacy Antimicrobial Kinetic Dosing    Indication for Antimicrobials: bloodstream     Current Regimen of Each Antimicrobial:  Unasyn 3000 mg iv every 12 hr; Start Date 12/3; Day # 7  Vancomycin 2000 mg iv once then HD dosing; Day 6    Previous Antimicrobial Therapy:       Goal Level: Vancomycin random level < 20     Date Dose & Interval Measured (mcg/mL) Predicted AUC 24-48 Predicted AUC 24,ss    2000 x 1 10.3     12/10 1000 mg                Significant Cultures:    blood - 1/2 CoNS, 2 bacillus    Wound - NG    Labs:  Recent Labs     Units 24  0939 24  0453 24  0451 24  0432   CREATININE MG/DL  --  3.51* 3.10* 4.23*   BUN MG/DL  --  42* 31* 43*   WBC K/uL 4.8  --  4.8 5.6     Temp (24hrs), Av.9 °F (36.6 °C), Min:97.4 °F (36.3 °C), Max:98.1 °F (36.7 °C)      Conditions for Dosing Consideration: Hemodialysis    Creatinine Clearance (mL/min): Estimated Creatinine Clearance: 25 mL/min (A) (based on SCr of 3.51 mg/dL (H)).       Impression/Plan:   Reviewed  C/s with EARLE Ruffin this evening, appreciate his help.  Izzy in C/s with final pending; no Ecoli noted; I called Ortho Preeti to clarify Cefepime order which was previously changed to HD dosing then reordered as 1gm IV q12h again, however on micro review, no Ecoli noted in results.  Per BEATRICE Lam to continue Unasyn and Vanc HD regimens for tonight; discontinue Cefepime; if changes needed the team can make changes   Will plan to give vancomycin 1000mg IV post HD. level prior to HD Tuesday  Antimicrobial stop date pending     Pharmacy will follow daily and adjust medications as appropriate for renal function and/or serum levels.    Thank you,  Aimee Spears [facility]

## 2024-12-09 NOTE — PLAN OF CARE
Problem: Chronic Conditions and Co-morbidities  Goal: Patient's chronic conditions and co-morbidity symptoms are monitored and maintained or improved  12/9/2024 1237 by Kael Archer RN  Outcome: Progressing  12/9/2024 0112 by Zuri Kang RN  Outcome: Progressing     Problem: Discharge Planning  Goal: Discharge to home or other facility with appropriate resources  12/9/2024 1237 by Kael Archer RN  Outcome: Progressing  12/9/2024 0112 by Zuri Kang RN  Outcome: Progressing     Problem: Pain  Goal: Verbalizes/displays adequate comfort level or baseline comfort level  12/9/2024 1237 by Kael Archer RN  Outcome: Progressing  12/9/2024 0112 by Zuri Kang RN  Outcome: Progressing     Problem: Safety - Adult  Goal: Free from fall injury  12/9/2024 1237 by Kael Archer RN  Outcome: Progressing  12/9/2024 0112 by Zuri Kang RN  Outcome: Progressing     Problem: ABCDS Injury Assessment  Goal: Absence of physical injury  12/9/2024 1237 by Kael Archer RN  Outcome: Progressing  12/9/2024 0112 by Zuri Kang RN  Outcome: Progressing

## 2024-12-09 NOTE — PROGRESS NOTES
Nephrology Progress Note  [facility] [place] Office  [address], [address]  [place] [address]  Phone - [phone]  Fax - [phone]                 Patient: Keaton Van                     YOB: 1965        Date- 12/9/2024                                     Admit Date: 12/2/2024   CC: Follow up for piper requiring dialysis       IMPRESSION & PLAN:   Esrd-  (multifactorial suspect secondary to ATN vs progression of ckd)(now hemodialysis dependent, TTS, [facility] [place], right chest PermCath)  Hyperkalemia   surgical wound dehiscence  Cat bite/left upper extremity cellulitis  Index finger cellulitis, MCP joint septic arthritis, juxta articular abscess  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  CHF  HTN  DM  Anemia       PLAN-  No hd today  Start losartan  Continue amlodipine, hydralazine   Dialysis again tomorrow  Dialysis Tuesday Thursday Saturday schedule     Subjective:  Interval History:   12-9-24-- bp high , no sob  12-6-24---Patient refused dialysis last night after the surgery he is on dialysis at present  Potassium 5.3    Objective:   Vitals:    12/09/24 0345 12/09/24 0415 12/09/24 0543 12/09/24 0800   BP:   (!) 159/77 (!) 170/87   Pulse:    75   Resp: 18 18     Temp:    98.1 °F (36.7 °C)   TempSrc:    Oral   SpO2:    97%   Weight:       Height:          I/O last 3 completed shifts:  In: 120 [P.O.:120]  Out: -   No intake/output data recorded.      Physical exam:    GEN: NAD  NECK- no mass  RESP:no wheezing  NEURO: Normal speech, non focal  EXT: Right BKA, left hand dressing +  PSYCH: Normal Mood  Permacath present  Chart reviewed.         Pertinent Notes reviewed.     Data Review :  Lab Results   Component Value Date/Time     12/09/2024 04:53 AM    K 4.3 12/09/2024 04:53 AM     12/09/2024 04:53 AM    CO2 26 12/09/2024 04:53 AM    BUN 42 12/09/2024 04:53 AM    CREATININE 3.51 12/09/2024 04:53 AM    GLUCOSE 143  1,800 Units IntraCATHeter PRN    And    heparin (porcine) injection 1,800 Units  1,800 Units IntraCATHeter PRN    silver sulfADIAZINE (SILVADENE) 1 % cream   Topical Daily    amLODIPine (NORVASC) tablet 10 mg  10 mg Oral Daily    DULoxetine (CYMBALTA) extended release capsule 20 mg  20 mg Oral Daily    cloNIDine (CATAPRES) tablet 0.2 mg  0.2 mg Oral Q8H PRN    hydrALAZINE (APRESOLINE) tablet 50 mg  50 mg Oral 3 times per day    lurasidone (LATUDA) tablet 20 mg  20 mg Oral Dinner    tiZANidine (ZANAFLEX) tablet 2 mg  2 mg Oral Q8H PRN    sodium chloride flush 0.9 % injection 5-40 mL  5-40 mL IntraVENous 2 times per day    sodium chloride flush 0.9 % injection 5-40 mL  5-40 mL IntraVENous PRN    0.9 % sodium chloride infusion   IntraVENous PRN    acetaminophen (TYLENOL) tablet 650 mg  650 mg Oral Q6H PRN    Or    acetaminophen (TYLENOL) suppository 650 mg  650 mg Rectal Q6H PRN    heparin (porcine) injection 5,000 Units  5,000 Units SubCUTAneous 3 times per day    ampicillin-sulbactam (UNASYN) 3,000 mg in sodium chloride 0.9 % 100 mL IVPB (mini-bag)  3,000 mg IntraVENous Q12H    glucose chewable tablet 16 g  4 tablet Oral PRN    dextrose bolus 10% 125 mL  125 mL IntraVENous PRN    Or    dextrose bolus 10% 250 mL  250 mL IntraVENous PRN    glucagon injection 1 mg  1 mg SubCUTAneous PRN    dextrose 10 % infusion   IntraVENous Continuous PRN    insulin glargine (LANTUS) injection vial 14 Units  0.15 Units/kg SubCUTAneous Daily    insulin lispro (HUMALOG,ADMELOG) injection vial 0-8 Units  0-8 Units SubCUTAneous 4x Daily AC & HS    labetalol (NORMODYNE;TRANDATE) injection 10 mg  10 mg IntraVENous Q6H PRN        Eulogio Dumont MD  12/9/2024

## 2024-12-09 NOTE — CARE COORDINATION
Transition of Care Plan:     RUR: 23%  Prior Level of Functioning: Independent   Disposition: Home with sister   TAN: 12/9/24  If SNF or IPR: Date FOC offered:   Date FOC received:   Accepting facility:   Date authorization started with reference number:   Date authorization received and expires:   Follow up appointments: PCP   DME needed: No DME needed   Transportation at discharge: Pt's sister   IM/IMM Medicare/ letter given: 2nd IMM needs to be given   Is patient a [place] and connected with VA? No              If yes, was  transfer form completed and VA notified? No  Caregiver Contact: pt's sister   Discharge Caregiver contacted prior to discharge? Pt will be contacted   Care Conference needed? No  Barriers to discharge: Medical clearance        CM reviewed pt's chart and pt is not medically stable for d/c due to ID clearance and cultures and pain control.    CM will continue to follow and assist with d/c planning.    Amparo Argueta

## 2024-12-09 NOTE — CONSULTS
Left hand infection  CT shows abscess and possible septic arthritis  NPO after midnight    Narrative & Impression  EXAM: CT HAND LEFT W CONTRAST     INDICATION: left hand cellulitis, cat bite, persistent pain, r/o abscess/fluid  collection       COMPARISON: Left hand radiographs 12/2/2024     TECHNIQUE: Helical CT of the left hand with coronal and sagittal reformats.  Images reviewed in soft tissue and bone windows.  CT dose reduction was achieved  through the use of a standardized protocol tailored for this examination and  automatic exposure control for dose modulation.     CONTRAST: Post IV contrast 100 mL of Isovue-370     FINDINGS: Bones: No fracture, dislocation, or periosteal reaction.     Joint fluid: Moderate size index finger MCP joint effusion with synovitis     Articulations: No significant osteoarthritis. No evidence of inflammatory  arthritis.     Tendons: No full-thickness tendon tear.     Muscles: Mild to moderate osteoarthritis throughout.     Soft tissues: 1.9 cm x 1.6 cm x 0.9 cm rim-enhancing fluid collection lateral to  the index finger MCP joint (302-62, 305-25), concerning for juxta articular  abscess. Index finger skin thickening, soft tissue edema, and swelling,  concerning for cellulitis..     IMPRESSION:  Findings concerning for index finger cellulitis with MCP joint septic arthritis  and juxta articular abscess.    Consent for left hand Incision and drainage.  Surgery tomorrow with DR Owens, aware and agrees with above.  
See ortho notes  
See progress note  
Vascular Surgery Consult Note  12/3/2024    Subjective:     Mr. Keaton Van is a 59 y.o. male with a pmhx significant for diabetes mellitus, hypertension, heart failure, end-stage renal disease, bipolar disorder, hepatitis C, and polysubstance abuse.  He is a current smoker.  He is taking aspirin.  His podiatrist is Dr. Michael Gerber.      He is admitted to the hospital with left upper extremity cellulitis following a cat bite.  While admitted he was noted to have an open wound of his right stump and we have been asked to evaluate.  He is s/p right BKA on 7/16/24 secondary to a non-healing diabetic wound.  His last Ha1c was 6.3 (9/2024).  He has intermittent serous bloody drainage.  He denies stacie pus.  He does not have any surrounding erythema. The patient recently had creation of a percutaneous AVF on 11/25/24.    Past medical history  Insulin dependent diabetes mellitus   Hypertension  Heart failure w/ preserved EF  End stage renal failure   Dialysis dependent TTS  -via chest wall catheter   Gastroesophageal reflux disease   Bipolar disorder  Personality disorder   -w/ hx of suicidal ideation   Chronic hepatitis C  Polysubstance abuse   -including tobacco, opioids   -w/ hx of overdose   DDD of the lumbar spine  Chronic pain syndrome   Renal calculi    Past procedural history  Creation of percutaneous AVF 11/25/24  Right partial fifth ray amputation   Right below the knee amputation 7/16/24  Right hand surgery 8/2018  Left knee arthroplasty  Cardiac stenting   Lithotripsy     Family history   Father: Hypertension and heart disease  Mother: Hypertension and stroke     Social history  He is a current smoker  He has a hx of opioid abuse and overdose  He denies alcohol use.      Home medication   Insulin Degludec (TRESIBA FLEXTOUCH) 100 UNIT/ML SOPN Inject 14 Units into the skin at bedtime   hydrALAZINE (APRESOLINE) 50 MG tablet Take 1 tablet by mouth every 8 hours   amLODIPine (NORVASC) 10 MG tablet Take 1 tablet 
INDICATION: Left swelling, eval for infection. COMPARISON: None. FINDINGS: Three views of the left hand demonstrate no fracture, dislocation or other acute osseous or articular abnormality. Mild soft tissue swelling.    Nonspecific soft tissue swelling Electronically signed by Fozia Fang    XR CHEST (2 VW)    Result Date: 12/2/2024  INDICATION: Suspected infection. Portable AP view of the chest. Direct comparison made to prior chest x-ray dated September 2024. Cardiomediastinal silhouette is stable. Dual-lumen central venous catheter extends to the distal SVC. Lungs are clear bilaterally. Pleural spaces are normal and there is no pneumothorax. Osseous structures are intact.     No acute cardiopulmonary disease. Electronically signed by MD Lesa Nix MD FACP

## 2024-12-10 ENCOUNTER — TELEPHONE (OUTPATIENT)
Age: 59
End: 2024-12-10

## 2024-12-10 PROBLEM — B95.7 COAGULASE NEGATIVE STAPHYLOCOCCUS BACTEREMIA: Status: ACTIVE | Noted: 2024-12-04

## 2024-12-10 PROBLEM — T87.43 RIGHT BKA INFECTION (HCC): Status: ACTIVE | Noted: 2024-12-10

## 2024-12-10 PROBLEM — F39 MOOD DISORDER (HCC): Status: ACTIVE | Noted: 2024-06-05
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
## 2024-12-10 PROBLEM — Z99.2 END-STAGE RENAL DISEASE ON HEMODIALYSIS (HCC): Status: ACTIVE | Noted: 2024-12-10

## 2024-12-10 PROBLEM — N18.6 END-STAGE RENAL DISEASE ON HEMODIALYSIS (HCC): Status: ACTIVE | Noted: 2024-12-10

## 2024-12-10 PROBLEM — I77.0 A-V FISTULA (HCC): Status: ACTIVE | Noted: 2024-12-10

## 2024-12-10 PROBLEM — M00.9: Status: ACTIVE | Noted: 2024-12-10

## 2024-12-10 PROBLEM — L02.519 ABSCESS OF HAND: Status: ACTIVE | Noted: 2024-12-10

## 2024-12-10 PROBLEM — A49.9: Status: ACTIVE | Noted: 2024-12-10

## 2024-12-10 LAB
ALBUMIN SERPL-MCNC: 3.1 G/DL (ref 3.5–5)
ALBUMIN/GLOB SERPL: 0.7 (ref 1.1–2.2)
ALP SERPL-CCNC: 131 U/L (ref 45–117)
ALT SERPL-CCNC: 41 U/L (ref 12–78)
ANION GAP SERPL CALC-SCNC: 6 MMOL/L (ref 2–12)
AST SERPL-CCNC: 33 U/L (ref 15–37)
BASOPHILS # BLD: 0.1 K/UL (ref 0–0.1)
BASOPHILS NFR BLD: 1 % (ref 0–1)
BILIRUB SERPL-MCNC: 0.5 MG/DL (ref 0.2–1)
BUN SERPL-MCNC: 41 MG/DL (ref 6–20)
BUN/CREAT SERPL: 12 (ref 12–20)
CALCIUM SERPL-MCNC: 9.2 MG/DL (ref 8.5–10.1)
CHLORIDE SERPL-SCNC: 101 MMOL/L (ref 97–108)
CO2 SERPL-SCNC: 25 MMOL/L (ref 21–32)
CREAT SERPL-MCNC: 3.55 MG/DL (ref 0.7–1.3)
DIFFERENTIAL METHOD BLD: ABNORMAL
EOSINOPHIL # BLD: 0.3 K/UL (ref 0–0.4)
EOSINOPHIL NFR BLD: 6 % (ref 0–7)
ERYTHROCYTE [DISTWIDTH] IN BLOOD BY AUTOMATED COUNT: 14.9 % (ref 11.5–14.5)
GLOBULIN SER CALC-MCNC: 4.4 G/DL (ref 2–4)
GLUCOSE BLD STRIP.AUTO-MCNC: 141 MG/DL (ref 65–117)
GLUCOSE BLD STRIP.AUTO-MCNC: 160 MG/DL (ref 65–117)
GLUCOSE BLD STRIP.AUTO-MCNC: 193 MG/DL (ref 65–117)
GLUCOSE BLD STRIP.AUTO-MCNC: 221 MG/DL (ref 65–117)
GLUCOSE SERPL-MCNC: 161 MG/DL (ref 65–100)
HCT VFR BLD AUTO: 32.3 % (ref 36.6–50.3)
HGB BLD-MCNC: 11.2 G/DL (ref 12.1–17)
IMM GRANULOCYTES # BLD AUTO: 0 K/UL (ref 0–0.04)
IMM GRANULOCYTES NFR BLD AUTO: 0 % (ref 0–0.5)
LYMPHOCYTES # BLD: 1 K/UL (ref 0.8–3.5)
LYMPHOCYTES NFR BLD: 21 % (ref 12–49)
MCH RBC QN AUTO: 29.6 PG (ref 26–34)
MCHC RBC AUTO-ENTMCNC: 34.7 G/DL (ref 30–36.5)
MCV RBC AUTO: 85.2 FL (ref 80–99)
MONOCYTES # BLD: 0.4 K/UL (ref 0–1)
MONOCYTES NFR BLD: 9 % (ref 5–13)
NEUTS SEG # BLD: 2.9 K/UL (ref 1.8–8)
NEUTS SEG NFR BLD: 63 % (ref 32–75)
NRBC # BLD: 0 K/UL (ref 0–0.01)
NRBC BLD-RTO: 0 PER 100 WBC
PHOSPHATE SERPL-MCNC: 4.2 MG/DL (ref 2.6–4.7)
PLATELET # BLD AUTO: 177 K/UL (ref 150–400)
PMV BLD AUTO: 9.3 FL (ref 8.9–12.9)
POTASSIUM SERPL-SCNC: 4.8 MMOL/L (ref 3.5–5.1)
PROT SERPL-MCNC: 7.5 G/DL (ref 6.4–8.2)
RBC # BLD AUTO: 3.79 M/UL (ref 4.1–5.7)
SERVICE CMNT-IMP: ABNORMAL
SODIUM SERPL-SCNC: 132 MMOL/L (ref 136–145)
VANCOMYCIN SERPL-MCNC: 9.1 UG/ML
WBC # BLD AUTO: 4.6 K/UL (ref 4.1–11.1)

## 2024-12-10 PROCEDURE — 1100000000 HC RM PRIVATE

## 2024-12-10 PROCEDURE — 99024 POSTOP FOLLOW-UP VISIT: CPT | Performed by: PHYSICIAN ASSISTANT

## 2024-12-10 PROCEDURE — 80202 ASSAY OF VANCOMYCIN: CPT

## 2024-12-10 PROCEDURE — 6360000002 HC RX W HCPCS: Performed by: INTERNAL MEDICINE

## 2024-12-10 PROCEDURE — 6370000000 HC RX 637 (ALT 250 FOR IP): Performed by: PHYSICIAN ASSISTANT

## 2024-12-10 PROCEDURE — 82962 GLUCOSE BLOOD TEST: CPT

## 2024-12-10 PROCEDURE — 6360000002 HC RX W HCPCS: Performed by: PHYSICIAN ASSISTANT

## 2024-12-10 PROCEDURE — 84100 ASSAY OF PHOSPHORUS: CPT

## 2024-12-10 PROCEDURE — 36415 COLL VENOUS BLD VENIPUNCTURE: CPT

## 2024-12-10 PROCEDURE — 80053 COMPREHEN METABOLIC PANEL: CPT

## 2024-12-10 PROCEDURE — 85025 COMPLETE CBC W/AUTO DIFF WBC: CPT

## 2024-12-10 PROCEDURE — 2580000003 HC RX 258: Performed by: INTERNAL MEDICINE

## 2024-12-10 PROCEDURE — 6370000000 HC RX 637 (ALT 250 FOR IP): Performed by: INTERNAL MEDICINE

## 2024-12-10 PROCEDURE — 2580000003 HC RX 258: Performed by: PHYSICIAN ASSISTANT

## 2024-12-10 RX ORDER — HYDROMORPHONE HYDROCHLORIDE 2 MG/1
3 TABLET ORAL EVERY 4 HOURS PRN
Status: DISCONTINUED | OUTPATIENT
Start: 2024-12-10 | End: 2024-12-10

## 2024-12-10 RX ORDER — METRONIDAZOLE 500 MG/100ML
500 INJECTION, SOLUTION INTRAVENOUS EVERY 12 HOURS
Status: DISCONTINUED | OUTPATIENT
Start: 2024-12-11 | End: 2024-12-11 | Stop reason: HOSPADM

## 2024-12-10 RX ORDER — HYDROMORPHONE HYDROCHLORIDE 2 MG/1
2 TABLET ORAL EVERY 4 HOURS PRN
Status: DISCONTINUED | OUTPATIENT
Start: 2024-12-10 | End: 2024-12-11

## 2024-12-10 RX ADMIN — SODIUM CHLORIDE, PRESERVATIVE FREE 10 ML: 5 INJECTION INTRAVENOUS at 20:37

## 2024-12-10 RX ADMIN — AMPICILLIN SODIUM AND SULBACTAM SODIUM 3000 MG: 2; 1 INJECTION, POWDER, FOR SOLUTION INTRAMUSCULAR; INTRAVENOUS at 04:43

## 2024-12-10 RX ADMIN — DULOXETINE HYDROCHLORIDE 20 MG: 20 CAPSULE, DELAYED RELEASE ORAL at 09:00

## 2024-12-10 RX ADMIN — SODIUM CHLORIDE, PRESERVATIVE FREE 10 ML: 5 INJECTION INTRAVENOUS at 09:01

## 2024-12-10 RX ADMIN — NEOMYCIN SULFATE, POLYMYXIN B SULFATE AND DEXAMETHASONE 1 DROP: 3.5; 10000; 1 SUSPENSION/ DROPS OPHTHALMIC at 00:51

## 2024-12-10 RX ADMIN — HYDROMORPHONE HYDROCHLORIDE 2 MG: 2 TABLET ORAL at 09:00

## 2024-12-10 RX ADMIN — HEPARIN SODIUM 5000 UNITS: 5000 INJECTION INTRAVENOUS; SUBCUTANEOUS at 14:30

## 2024-12-10 RX ADMIN — HYDROMORPHONE HYDROCHLORIDE 2 MG: 2 TABLET ORAL at 20:38

## 2024-12-10 RX ADMIN — INSULIN LISPRO 2 UNITS: 100 INJECTION, SOLUTION INTRAVENOUS; SUBCUTANEOUS at 12:11

## 2024-12-10 RX ADMIN — HYDROMORPHONE HYDROCHLORIDE 2 MG: 2 TABLET ORAL at 14:30

## 2024-12-10 RX ADMIN — HYDRALAZINE HYDROCHLORIDE 50 MG: 25 TABLET ORAL at 20:37

## 2024-12-10 RX ADMIN — AMPICILLIN SODIUM AND SULBACTAM SODIUM 3000 MG: 2; 1 INJECTION, POWDER, FOR SOLUTION INTRAMUSCULAR; INTRAVENOUS at 16:48

## 2024-12-10 RX ADMIN — HEPARIN SODIUM 5000 UNITS: 5000 INJECTION INTRAVENOUS; SUBCUTANEOUS at 20:44

## 2024-12-10 RX ADMIN — NEOMYCIN SULFATE, POLYMYXIN B SULFATE AND DEXAMETHASONE 1 DROP: 3.5; 10000; 1 SUSPENSION/ DROPS OPHTHALMIC at 17:08

## 2024-12-10 RX ADMIN — NEOMYCIN SULFATE, POLYMYXIN B SULFATE AND DEXAMETHASONE 1 DROP: 3.5; 10000; 1 SUSPENSION/ DROPS OPHTHALMIC at 12:11

## 2024-12-10 RX ADMIN — INSULIN LISPRO 2 UNITS: 100 INJECTION, SOLUTION INTRAVENOUS; SUBCUTANEOUS at 17:01

## 2024-12-10 RX ADMIN — INSULIN GLARGINE 14 UNITS: 100 INJECTION, SOLUTION SUBCUTANEOUS at 09:01

## 2024-12-10 RX ADMIN — HYDRALAZINE HYDROCHLORIDE 50 MG: 25 TABLET ORAL at 04:36

## 2024-12-10 RX ADMIN — HEPARIN SODIUM 5000 UNITS: 5000 INJECTION INTRAVENOUS; SUBCUTANEOUS at 04:36

## 2024-12-10 RX ADMIN — NEOMYCIN SULFATE, POLYMYXIN B SULFATE AND DEXAMETHASONE 1 DROP: 3.5; 10000; 1 SUSPENSION/ DROPS OPHTHALMIC at 04:36

## 2024-12-10 RX ADMIN — LOSARTAN POTASSIUM 25 MG: 25 TABLET, FILM COATED ORAL at 09:00

## 2024-12-10 RX ADMIN — NEOMYCIN SULFATE, POLYMYXIN B SULFATE AND DEXAMETHASONE 1 DROP: 3.5; 10000; 1 SUSPENSION/ DROPS OPHTHALMIC at 22:55

## 2024-12-10 RX ADMIN — SILVER SULFADIAZINE: 10 CREAM TOPICAL at 12:11

## 2024-12-10 RX ADMIN — HYDROMORPHONE HYDROCHLORIDE 2 MG: 2 TABLET ORAL at 04:36

## 2024-12-10 RX ADMIN — GABAPENTIN 300 MG: 300 CAPSULE ORAL at 20:37

## 2024-12-10 RX ADMIN — LURASIDONE HYDROCHLORIDE 20 MG: 20 TABLET ORAL at 16:46

## 2024-12-10 RX ADMIN — GABAPENTIN 300 MG: 300 CAPSULE ORAL at 09:01

## 2024-12-10 RX ADMIN — GABAPENTIN 300 MG: 300 CAPSULE ORAL at 14:30

## 2024-12-10 RX ADMIN — AMLODIPINE BESYLATE 10 MG: 5 TABLET ORAL at 09:00

## 2024-12-10 RX ADMIN — HYDRALAZINE HYDROCHLORIDE 50 MG: 25 TABLET ORAL at 14:30

## 2024-12-10 ASSESSMENT — PAIN DESCRIPTION - ORIENTATION
ORIENTATION: LEFT
ORIENTATION: LEFT

## 2024-12-10 ASSESSMENT — PAIN SCALES - GENERAL
PAINLEVEL_OUTOF10: 7
PAINLEVEL_OUTOF10: 2
PAINLEVEL_OUTOF10: 4
PAINLEVEL_OUTOF10: 0

## 2024-12-10 ASSESSMENT — PAIN DESCRIPTION - LOCATION
LOCATION: HAND
LOCATION: ARM

## 2024-12-10 ASSESSMENT — PAIN DESCRIPTION - DESCRIPTORS
DESCRIPTORS: ACHING
DESCRIPTORS: ACHING

## 2024-12-10 ASSESSMENT — PAIN SCALES - WONG BAKER: WONGBAKER_NUMERICALRESPONSE: NO HURT

## 2024-12-10 NOTE — PROGRESS NOTES
observations, patient history, and epidemiological information.        Rapid Influenza A By PCR Not detected        Rapid Influenza B By PCR Not detected        Comment: Testing was performed using christian Niyah SARS-CoV-2 and Influenza A/B nucleic acid assay.  This test is a multiplex Real-Time Reverse Transcriptase Polymerase Chain Reaction (RT-PCR) based in vitro diagnostic test intended for the qualitative detection of nucleic acids from SARS-CoV-2, Influenza A, and Influenza B in nasopharyngeal and nasal swab specimens for use under the FDA's Emergency Use Authorization (EUA) only.     Fact sheet for Patients: https://www.fda.gov/media/850132/download  Fact sheet for Healthcare Providers: https://www.fda.fov/media/528668/download         Culture, Blood 2 [[id number]]  (Abnormal) Collected: 12/02/24 1620    Order Status: Completed Specimen: Blood Updated: 12/04/24 1516     Special Requests --        LEFT  Antecubital       Culture       STAPHYLOCOCCUS SPECIES, COAGULASE NEGATIVE GROWING IN 1 OF 2 BOTTLES DRAWN L AC SITE            (NOTE) [facility] IN [facility] GROWING IN 1 OF 2 BOTTLES DRAWN CALLED TO AND READ BACK BY BHAKTI LEO ON 12/3/24 AT 2027. MS    Culture, Blood, PCR ID Panel [[id number]]  (Abnormal) Collected: 12/02/24 1620    Order Status: Completed Specimen: Blood Updated: 12/03/24 2102     Accession Number [id number]     Enterococcus faecalis by PCR Not detected        Enterococcus faecium by PCR Not detected        Listeria monocytogenes by PCR Not detected        STAPHYLOCOCCUS Detected        Staphylococcus Aureus Not detected        Staphylococcus epidermidis by PCR Not detected        Staphylococcus lugdunensis by PCR Not detected        STREPTOCOCCUS Not detected        Streptococcus agalactiae (Group B) Not detected        Strep pneumoniae Not detected        Strep pyogenes,(Grp. A) Not detected        Acinetobacter calcoac baumannii complex by PCR Not detected        Bacteroides fragilis by PCR Not  consideration when the treatment plan was developed and customized to this patient's unique overall circumstances and needs.  We have reviewed relevant medical records within the constraints of this admission process.   Moderate complexity decision making was performed for this patient who is at high risk for decompensation with multiple organ involvement.   Today total floor/unit time was 30 minutes while caring for this patient and greater than 50% of that time was spent with patient (and/or family/responsible party) coordinating patient's clinical issues; this includes time spent during multidisciplinary rounds.        Melissa Leonard PA-C  12/10/2024

## 2024-12-10 NOTE — PLAN OF CARE
Problem: Chronic Conditions and Co-morbidities  Goal: Patient's chronic conditions and co-morbidity symptoms are monitored and maintained or improved  12/10/2024 0906 by Jaylin Jules RN  Outcome: Progressing  12/10/2024 0403 by Ivanna Willard RN  Outcome: Progressing     Problem: Discharge Planning  Goal: Discharge to home or other facility with appropriate resources  12/10/2024 0906 by Jaylin Jules RN  Outcome: Progressing  12/10/2024 0403 by Ivanna Willard RN  Outcome: Progressing     Problem: Pain  Goal: Verbalizes/displays adequate comfort level or baseline comfort level  12/10/2024 0906 by Jaylin Jules RN  Outcome: Progressing  12/10/2024 0403 by Ivanna Willard RN  Outcome: Progressing     Problem: Safety - Adult  Goal: Free from fall injury  12/10/2024 0906 by Jaylin Jules RN  Outcome: Progressing  12/10/2024 0403 by Ivanna Willard RN  Outcome: Progressing     Problem: ABCDS Injury Assessment  Goal: Absence of physical injury  12/10/2024 0906 by Jaylin Jules RN  Outcome: Progressing  12/10/2024 0403 by Ivanna Willard RN  Outcome: Progressing

## 2024-12-10 NOTE — PROGRESS NOTES
Infectious Disease progress        Impression    Cellulitis and abscess of left hand  Flexor tenosynovitis  Septic arthritis  Secondary to cat bite.  CT hand 12/4+ for 1.9 x 1.6 x 0.9 cm rim-enhancing  fluid collection lateral to  Index finger MCP joint concerning for juxta-articular abscess  MCP joint septic arthritis  S/p I&D by hand surgery 12/5  Findings of copious infectious fluid. No gross purulence.  Fluid around joint noted.  IntraOp culture-NG, GS-no organisms    Coagulase-negative staph  Bacillus bacteremia  Blood cultures 12/2+ for CoNS, bacillus species  Given presence of wound infection cannot assume it is a contaminant.  Repeat blood cultures 12/9-NGTD.      ESRD  On HD    AVF present LUE  Intact    Diabetes type 2  A1c 7.4    R/BKA     H/o diabetic right foot foot infection  Cellulitis, OM 6/2024    H/o mood disorder  Continue home meds    ESR 60  CRP 4.28    Plan  DC on 6 weeks of antibiotics via HD.  Vancomycin & Cefepime x 6 weeks  12/5 to1/16  Flagyl p.o. x 2 weeks  Keep LUE elevated  Blood sugar control  Adequate fluids, daily probiotic.      Antimicrobial orders for discharge  -Vancomycin 500 mg IV after HD 3 times weekly end date 1/16/2025  -Cefepime 2/2/2 g IV after HD 3 times weekly end date 1/16/2025  -Flagyl 500 mg p.o. twice daily end date 12/19  -No alcohol while on Flagyl  -Weekly CBC, CMP, vancomycin trough-  -Fax reports to [phone], call with critical labs  at [phone]  -Encourage adequate fluids, daily probiotic/yogurt  -If line malfunction occurs and home health cannot reposition  please send patient to ED immediately  -ID follow-up -1/21 at 230-in person or virtual  - If persistent side effects occur stop antibiotic and call-ID/PCP    Possible adverse effects of long term antibiotics are inclusive of but not limited to following  BM suppression, neutropenia , cytopenias , aplastic anemia hemorrhage liver & renal dysfunction/ liver , renal failure  , GI dysfunction- N,

## 2024-12-10 NOTE — DIALYSIS
Spoke with pt. And primary nurse Jorge A, Jaylin, RN. Pt. Refused HD tx. Today.   Dr. Salazar notified.

## 2024-12-10 NOTE — PROGRESS NOTES
Pharmacy Antimicrobial Kinetic Dosing    Indication for Antimicrobials: bloodstream     Current Regimen of Each Antimicrobial:  Unasyn 3000 mg iv every 12 hr; Start Date 12/3; Day # 8  Vancomycin 2000 mg iv once then HD dosing; Day 7    Previous Antimicrobial Therapy:       Goal Level: Vancomycin random level < 20     Date Dose & Interval Measured (mcg/mL) Predicted AUC 24-48 Predicted AUC 24,ss    2000 x 1 10.3     12/10 1000 mg  9.1              Significant Cultures:    blood - /2 CoNS,  bacillus    Wound - NG    Labs:  Recent Labs     Units 12/10/24  0451 24  0939 24  0453 24  0451   CREATININE MG/DL 3.55*  --  3.51* 3.10*   BUN MG/DL 41*  --  42* 31*   WBC K/uL 4.6 4.8  --  4.8     Temp (24hrs), Av.3 °F (36.8 °C), Min:98.2 °F (36.8 °C), Max:98.4 °F (36.9 °C)      Conditions for Dosing Consideration: Hemodialysis    Creatinine Clearance (mL/min): Estimated Creatinine Clearance: 24 mL/min (A) (based on SCr of 3.55 mg/dL (H)).       Impression/Plan:   Vancomycin level this am = 9.1.   Will plan to give vancomycin 1250mg IV post HD.  ID consult   Antimicrobial stop date pending     Pharmacy will follow daily and adjust medications as appropriate for renal function and/or serum levels.    Thank you,  Aimee Spears [facility]

## 2024-12-10 NOTE — PROGRESS NOTES
Nephrology Progress Note  [facility] / [place] Office  [address], [address]  [place] [address]  Phone - [phone]  Fax - [phone]                 Patient: Keaton Van                     YOB: 1965        Date- 12/10/2024                                     Admit Date: 12/2/2024   CC: Follow up for esrd     IMPRESSION & PLAN:   ESRD-  (multifactorial suspect secondary to ATN vs progression of ckd)(now hemodialysis dependent, TTS, [facility], right chest PermCath)  Hyperkalemia   surgical wound dehiscence  Cat bite/left upper extremity cellulitis  Index finger cellulitis, MCP joint septic arthritis, juxta articular abscess  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  CHF  HTN  DM  Anemia       PLAN-  Dialysis today - if he agrees to do it  Continue amlodipine, hydralazine ,losartan  Dialysis Tuesday Thursday Saturday schedule     Subjective:  Interval History:   12-10-24--he feels very good , he is ready to go home- HE DOESN'T WANT HD TODAY  12-9-24-- bp high , no sob  12-6-24---Patient refused dialysis last night after the surgery he is on dialysis at present  Potassium 5.3    Objective:   Vitals:    12/09/24 1945 12/09/24 2330 12/10/24 0506 12/10/24 0800   BP: (!) 147/72   (!) 150/84   Pulse: 66   70   Resp: 18 18 18 18   Temp: 98.4 °F (36.9 °C)   98.2 °F (36.8 °C)   TempSrc: Oral   Oral   SpO2: 94%   97%   Weight:       Height:          I/O last 3 completed shifts:  In: 120 [P.O.:120]  Out: -   No intake/output data recorded.      Physical exam:    GEN: nad  NECK- no mass  RESP:no wheezing  NEURO: Normal speech,NON FOCAL  EXT: Right BKA, left hand dressing +  PSYCH: Normal Mood  PERMACATH  present  Chart reviewed.         Pertinent Notes reviewed.     Data Review :  Lab Results   Component Value Date/Time     12/10/2024 04:51 AM    K 4.8 12/10/2024 04:51 AM     12/10/2024 04:51 AM    CO2 25 12/10/2024 04:51 AM

## 2024-12-11 VITALS
RESPIRATION RATE: 17 BRPM | HEIGHT: 72 IN | BODY MASS INDEX: 27.47 KG/M2 | HEART RATE: 64 BPM | OXYGEN SATURATION: 98 % | TEMPERATURE: 97.7 F | SYSTOLIC BLOOD PRESSURE: 147 MMHG | WEIGHT: 202.82 LBS | DIASTOLIC BLOOD PRESSURE: 77 MMHG

## 2024-12-11 PROBLEM — L03.114 CELLULITIS OF LEFT HAND: Status: RESOLVED | Noted: 2024-12-04 | Resolved: 2024-12-11

## 2024-12-11 PROBLEM — L03.90 CELLULITIS: Status: RESOLVED | Noted: 2024-05-29 | Resolved: 2024-12-11

## 2024-12-11 PROBLEM — M65.949 FLEXOR TENOSYNOVITIS OF FINGER: Status: RESOLVED | Noted: 2024-12-06 | Resolved: 2024-12-11

## 2024-12-11 LAB
ALBUMIN SERPL-MCNC: 3.2 G/DL (ref 3.5–5)
ALBUMIN/GLOB SERPL: 0.8 (ref 1.1–2.2)
ALP SERPL-CCNC: 135 U/L (ref 45–117)
ALT SERPL-CCNC: 38 U/L (ref 12–78)
ANION GAP SERPL CALC-SCNC: 5 MMOL/L (ref 2–12)
AST SERPL-CCNC: 29 U/L (ref 15–37)
BASOPHILS # BLD: 0.1 K/UL (ref 0–0.1)
BASOPHILS NFR BLD: 1 % (ref 0–1)
BILIRUB SERPL-MCNC: 0.6 MG/DL (ref 0.2–1)
BUN SERPL-MCNC: 47 MG/DL (ref 6–20)
BUN/CREAT SERPL: 12 (ref 12–20)
CALCIUM SERPL-MCNC: 9.3 MG/DL (ref 8.5–10.1)
CHLORIDE SERPL-SCNC: 102 MMOL/L (ref 97–108)
CO2 SERPL-SCNC: 27 MMOL/L (ref 21–32)
CREAT SERPL-MCNC: 3.8 MG/DL (ref 0.7–1.3)
DIFFERENTIAL METHOD BLD: ABNORMAL
EOSINOPHIL # BLD: 0.3 K/UL (ref 0–0.4)
EOSINOPHIL NFR BLD: 7 % (ref 0–7)
ERYTHROCYTE [DISTWIDTH] IN BLOOD BY AUTOMATED COUNT: 14.8 % (ref 11.5–14.5)
GLOBULIN SER CALC-MCNC: 4.2 G/DL (ref 2–4)
GLUCOSE BLD STRIP.AUTO-MCNC: 149 MG/DL (ref 65–117)
GLUCOSE BLD STRIP.AUTO-MCNC: 182 MG/DL (ref 65–117)
GLUCOSE SERPL-MCNC: 167 MG/DL (ref 65–100)
HCT VFR BLD AUTO: 32.3 % (ref 36.6–50.3)
HGB BLD-MCNC: 10.7 G/DL (ref 12.1–17)
IMM GRANULOCYTES # BLD AUTO: 0 K/UL (ref 0–0.04)
IMM GRANULOCYTES NFR BLD AUTO: 1 % (ref 0–0.5)
LYMPHOCYTES # BLD: 1.1 K/UL (ref 0.8–3.5)
LYMPHOCYTES NFR BLD: 21 % (ref 12–49)
MCH RBC QN AUTO: 28.8 PG (ref 26–34)
MCHC RBC AUTO-ENTMCNC: 33.1 G/DL (ref 30–36.5)
MCV RBC AUTO: 86.8 FL (ref 80–99)
MONOCYTES # BLD: 0.4 K/UL (ref 0–1)
MONOCYTES NFR BLD: 8 % (ref 5–13)
NEUTS SEG # BLD: 3.2 K/UL (ref 1.8–8)
NEUTS SEG NFR BLD: 62 % (ref 32–75)
NRBC # BLD: 0 K/UL (ref 0–0.01)
NRBC BLD-RTO: 0 PER 100 WBC
PHOSPHATE SERPL-MCNC: 4.1 MG/DL (ref 2.6–4.7)
PLATELET # BLD AUTO: 172 K/UL (ref 150–400)
PMV BLD AUTO: 9.3 FL (ref 8.9–12.9)
POTASSIUM SERPL-SCNC: 4.8 MMOL/L (ref 3.5–5.1)
PROT SERPL-MCNC: 7.4 G/DL (ref 6.4–8.2)
RBC # BLD AUTO: 3.72 M/UL (ref 4.1–5.7)
SERVICE CMNT-IMP: ABNORMAL
SERVICE CMNT-IMP: ABNORMAL
SODIUM SERPL-SCNC: 134 MMOL/L (ref 136–145)
WBC # BLD AUTO: 5.2 K/UL (ref 4.1–11.1)

## 2024-12-11 PROCEDURE — 6360000002 HC RX W HCPCS: Performed by: INTERNAL MEDICINE

## 2024-12-11 PROCEDURE — 6360000002 HC RX W HCPCS: Performed by: PHYSICIAN ASSISTANT

## 2024-12-11 PROCEDURE — 80053 COMPREHEN METABOLIC PANEL: CPT

## 2024-12-11 PROCEDURE — 6370000000 HC RX 637 (ALT 250 FOR IP): Performed by: INTERNAL MEDICINE

## 2024-12-11 PROCEDURE — 2580000003 HC RX 258: Performed by: INTERNAL MEDICINE

## 2024-12-11 PROCEDURE — 85025 COMPLETE CBC W/AUTO DIFF WBC: CPT

## 2024-12-11 PROCEDURE — 36415 COLL VENOUS BLD VENIPUNCTURE: CPT

## 2024-12-11 PROCEDURE — 99024 POSTOP FOLLOW-UP VISIT: CPT | Performed by: PHYSICIAN ASSISTANT

## 2024-12-11 PROCEDURE — 2580000003 HC RX 258: Performed by: PHYSICIAN ASSISTANT

## 2024-12-11 PROCEDURE — 6370000000 HC RX 637 (ALT 250 FOR IP): Performed by: PHYSICIAN ASSISTANT

## 2024-12-11 PROCEDURE — 84100 ASSAY OF PHOSPHORUS: CPT

## 2024-12-11 PROCEDURE — 82962 GLUCOSE BLOOD TEST: CPT

## 2024-12-11 RX ORDER — KETOROLAC TROMETHAMINE 30 MG/ML
15 INJECTION, SOLUTION INTRAMUSCULAR; INTRAVENOUS ONCE
Status: COMPLETED | OUTPATIENT
Start: 2024-12-11 | End: 2024-12-11

## 2024-12-11 RX ORDER — NEOMYCIN SULFATE, POLYMYXIN B SULFATE AND DEXAMETHASONE 3.5; 10000; 1 MG/ML; [USP'U]/ML; MG/ML
1 SUSPENSION/ DROPS OPHTHALMIC 4 TIMES DAILY
Qty: 1 EACH | Refills: 0 | Status: SHIPPED | OUTPATIENT
Start: 2024-12-11 | End: 2024-12-11

## 2024-12-11 RX ORDER — LOSARTAN POTASSIUM 25 MG/1
25 TABLET ORAL DAILY
Qty: 30 TABLET | Refills: 3 | Status: ON HOLD | OUTPATIENT
Start: 2024-12-12

## 2024-12-11 RX ORDER — METRONIDAZOLE 500 MG/1
500 TABLET ORAL 2 TIMES DAILY
Qty: 16 TABLET | Refills: 0 | Status: ON HOLD | OUTPATIENT
Start: 2024-12-11 | End: 2024-12-19

## 2024-12-11 RX ORDER — HYDROMORPHONE HYDROCHLORIDE 2 MG/1
2 TABLET ORAL EVERY 6 HOURS PRN
Status: DISCONTINUED | OUTPATIENT
Start: 2024-12-11 | End: 2024-12-11 | Stop reason: HOSPADM

## 2024-12-11 RX ORDER — OXYCODONE HYDROCHLORIDE 5 MG/1
5 TABLET ORAL EVERY 8 HOURS PRN
Qty: 9 TABLET | Refills: 0 | Status: SHIPPED | OUTPATIENT
Start: 2024-12-11 | End: 2024-12-14

## 2024-12-11 RX ORDER — NEOMYCIN SULFATE, POLYMYXIN B SULFATE AND DEXAMETHASONE 3.5; 10000; 1 MG/ML; [USP'U]/ML; MG/ML
1 SUSPENSION/ DROPS OPHTHALMIC 4 TIMES DAILY
Qty: 1 EACH | Refills: 0 | Status: ON HOLD | OUTPATIENT
Start: 2024-12-11 | End: 2024-12-17

## 2024-12-11 RX ADMIN — NEOMYCIN SULFATE, POLYMYXIN B SULFATE AND DEXAMETHASONE 1 DROP: 3.5; 10000; 1 SUSPENSION/ DROPS OPHTHALMIC at 13:19

## 2024-12-11 RX ADMIN — HEPARIN SODIUM 5000 UNITS: 5000 INJECTION INTRAVENOUS; SUBCUTANEOUS at 14:48

## 2024-12-11 RX ADMIN — KETOROLAC TROMETHAMINE 15 MG: 30 INJECTION, SOLUTION INTRAMUSCULAR at 13:09

## 2024-12-11 RX ADMIN — HYDRALAZINE HYDROCHLORIDE 50 MG: 25 TABLET ORAL at 14:48

## 2024-12-11 RX ADMIN — METRONIDAZOLE 500 MG: 500 INJECTION, SOLUTION INTRAVENOUS at 01:03

## 2024-12-11 RX ADMIN — HYDROMORPHONE HYDROCHLORIDE 2 MG: 2 TABLET ORAL at 08:53

## 2024-12-11 RX ADMIN — INSULIN LISPRO 2 UNITS: 100 INJECTION, SOLUTION INTRAVENOUS; SUBCUTANEOUS at 08:51

## 2024-12-11 RX ADMIN — HYDRALAZINE HYDROCHLORIDE 50 MG: 25 TABLET ORAL at 06:09

## 2024-12-11 RX ADMIN — HYDROMORPHONE HYDROCHLORIDE 2 MG: 2 TABLET ORAL at 02:02

## 2024-12-11 RX ADMIN — GABAPENTIN 300 MG: 300 CAPSULE ORAL at 08:52

## 2024-12-11 RX ADMIN — ACETAMINOPHEN 650 MG: 325 TABLET ORAL at 11:30

## 2024-12-11 RX ADMIN — SILVER SULFADIAZINE: 10 CREAM TOPICAL at 08:55

## 2024-12-11 RX ADMIN — LOSARTAN POTASSIUM 25 MG: 25 TABLET, FILM COATED ORAL at 08:53

## 2024-12-11 RX ADMIN — NEOMYCIN SULFATE, POLYMYXIN B SULFATE AND DEXAMETHASONE 1 DROP: 3.5; 10000; 1 SUSPENSION/ DROPS OPHTHALMIC at 06:09

## 2024-12-11 RX ADMIN — DULOXETINE HYDROCHLORIDE 20 MG: 20 CAPSULE, DELAYED RELEASE ORAL at 08:53

## 2024-12-11 RX ADMIN — AMLODIPINE BESYLATE 10 MG: 5 TABLET ORAL at 08:52

## 2024-12-11 RX ADMIN — SODIUM CHLORIDE, PRESERVATIVE FREE 10 ML: 5 INJECTION INTRAVENOUS at 08:55

## 2024-12-11 RX ADMIN — INSULIN GLARGINE 14 UNITS: 100 INJECTION, SOLUTION SUBCUTANEOUS at 08:52

## 2024-12-11 RX ADMIN — CEFEPIME 1000 MG: 1 INJECTION, POWDER, FOR SOLUTION INTRAMUSCULAR; INTRAVENOUS at 13:14

## 2024-12-11 RX ADMIN — HEPARIN SODIUM 5000 UNITS: 5000 INJECTION INTRAVENOUS; SUBCUTANEOUS at 06:09

## 2024-12-11 RX ADMIN — METRONIDAZOLE 500 MG: 500 INJECTION, SOLUTION INTRAVENOUS at 13:18

## 2024-12-11 RX ADMIN — GABAPENTIN 300 MG: 300 CAPSULE ORAL at 14:47

## 2024-12-11 ASSESSMENT — PAIN DESCRIPTION - DESCRIPTORS
DESCRIPTORS: ACHING;THROBBING
DESCRIPTORS: THROBBING
DESCRIPTORS: THROBBING
DESCRIPTORS: ACHING

## 2024-12-11 ASSESSMENT — PAIN DESCRIPTION - LOCATION
LOCATION: HAND

## 2024-12-11 ASSESSMENT — PAIN SCALES - GENERAL
PAINLEVEL_OUTOF10: 4
PAINLEVEL_OUTOF10: 7
PAINLEVEL_OUTOF10: 6
PAINLEVEL_OUTOF10: 5
PAINLEVEL_OUTOF10: 7
PAINLEVEL_OUTOF10: 6

## 2024-12-11 ASSESSMENT — PAIN DESCRIPTION - ORIENTATION
ORIENTATION: LEFT

## 2024-12-11 NOTE — CARE COORDINATION
Pt is clear from CM standpoint for d/c.    Pt's sister will transport.    Transition of Care Plan:     RUR: 23%  Prior Level of Functioning: Independent   Disposition: Home with sister   TAN: 12/11/24  If SNF or IPR: Date FOC offered:   Date FOC received:   Accepting facility:   Date authorization started with reference number:   Date authorization received and expires:   Follow up appointments: PCP   DME needed: No DME needed   Transportation at discharge: Pt's sister   IM/IMM Medicare/ letter given: 12/11/24  Is patient a  and connected with VA? No              If yes, was [place] transfer form completed and VA notified? No  Caregiver Contact: pt's sister   Discharge Caregiver contacted prior to discharge? Pt was contacted   Care Conference needed? No  Barriers to discharge: None        12/11/24 1145   Services At/After Discharge   Transition of Care Consult (CM Consult) N/A   Services At/After Discharge None    Resource Information Provided? No   Mode of Transport at Discharge Self   Confirm Follow Up Transport Self   Condition of Participation: Discharge Planning   The Plan for Transition of Care is related to the following treatment goals: Goal is to return home with sister and follow up appointments   The Patient and/or Patient Representative was provided with a Choice of Provider? Patient   The Patient and/Or Patient Representative agree with the Discharge Plan? Yes   Freedom of Choice list was provided with basic dialogue that supports the patient's individualized plan of care/goals, treatment preferences, and shares the quality data associated with the providers?  Yes     Amparo Argueta

## 2024-12-11 NOTE — PROGRESS NOTES
Spiritual Health History and Assessment/Progress Note  [facility]    Advance Care Planning,  ,  ,      Name: Keaton Van MRN: [id number]    Age: 59 y.o.     Sex: male   Language: English   Tenriism: None   Cellulitis     Date: 12/11/2024            Total Time Calculated: 60 min              Spiritual Assessment began in [facility] [employer]        Referral/Consult From: Nurse   Encounter Overview/Reason: Advance Care Planning  Service Provided For: Patient    Keisha, Belief, Meaning:   Patient identifies as spiritual, is connected with a keisha tradition or spiritual practice, and has beliefs or practices that help with coping during difficult times  Family/Friends No family/friends present      Importance and Influence:  Patient has spiritual/personal beliefs that influence decisions regarding their health  Family/Friends No family/friends present    Community:  Patient is connected with a spiritual community and feels well-supported. Support system includes: Extended family  Family/Friends No family/friends present    Assessment and Plan of Care:      received a consult order from the staff in reference to an Advance Medical Directive. Patient received the original and copies. A copy was placed in his chart.     Primary Agent- Evelyn Guallpa [phone] (sister)    Patient Interventions include: Facilitated expression of thoughts and feelings, Explored spiritual coping/struggle/distress, and Affirmed coping skills/support systems  Family/Friends Interventions include: No family/friends present    Patient Plan of Care: Spiritual Care available upon further referral  Family/Friends Plan of Care: Spiritual Care available upon further referral    Electronically signed by JASON Ramos on 12/11/2024 at 12:23 PM   Rev. JESSIE Ramos  [facility]   Paging Service [phone])

## 2024-12-11 NOTE — PROGRESS NOTES
End of Shift Note    Bedside shift change report given to BHAKTI Guzman (oncoming nurse) by Uli Herrera RN (offgoing nurse).  Report included the following information SBAR, Kardex, MAR, and Recent Results    Shift worked:  7p-7a     Shift summary and any significant changes:     Patient tolerated care. Scheduled medication given and PRN Dilaudid given x2. VSS Patient was able to rest over shift. Wound care provided. Labs drawn. Caring rounds completed. CHG bath and linin changed.     Concerns for physician to address:       Zone phone for oncoming shift:          Activity:  Level of Assistance: Independent  Number times ambulated in hallways past shift: 0  Number of times OOB to chair past shift: 0    Cardiac:   Cardiac Monitoring: No      Cardiac Rhythm: Sinus rhythm, Sinus syl    Access:  Current line(s): PIV     Genitourinary:   Urinary Status: Voiding, Bathroom privileges    Respiratory:   O2 Device: None (Room air)  Chronic home O2 use?: NO  Incentive spirometer at bedside: NO    GI:  Last BM (including prior to admit): 12/09/24  Current diet:  ADULT DIET; Regular  Passing flatus: YES    Pain Management:   Patient states pain is manageable on current regimen: YES    Skin:  Kennedy Scale Score: 21  Interventions: Wound Offloading (Prevention Methods): Repositioning, Turning    Patient Safety:  Fall Risk: Nursing Judgement-Fall Risk High(Add Comments): No  Fall Risk Interventions  Nursing Judgement-Fall Risk High(Add Comments): No  Toilet Every 2 Hours-In Advance of Need: Yes  Hourly Visual Checks: Awake, In bed  Fall Visual Posted: Socks  Room Door Open: Deferred to promote rest  Alarm On: Bed  Patient Moved Closer to Nursing Station: No    Active Consults:   IP CONSULT TO NEPHROLOGY  IP CONSULT TO VASCULAR SURGERY  IP CONSULT TO PHARMACY  IP CONSULT TO ORTHOPEDIC SURGERY  IP CONSULT TO INFECTIOUS DISEASES  IP CONSULT TO SPIRITUAL CARE  IP CONSULT TO CASE MANAGEMENT    Length of Stay:  Expected LOS: 9  Actual

## 2024-12-11 NOTE — PROGRESS NOTES
ORTHO - Progress Note  Post Op day: 6 Days Post-Op    Keaton Van     [id number]  male    59 y.o.    1965    Admit date:2024  Date of Surgery:   Procedures:Procedure(s):  LEFT HAND INCISION AND DRAINAGE  Surgeon:Surgeons and Role:   * Henry Owens MD - Primary        SUBJECTIVE:     Keaton Van is a 59 y.o. male resting in the bed.  \"I think I'm going home today\".  Patient has complaints of appropriate post-op pain, about the 2nd MP joint-- tolerating PO pain medications-- PO dilaudid  Denies F/C, nausea, vomiting, dizziness, lightheadedness, chest pain, or shortness of breath.    OBJECTIVE:       Physical Exam:  General: alert, cooperative, no distress.   Gastrointestinal:  non-distended .    Cardiovascular: Brisk cap refill in all distal extremities   Respiratory: No respiratory distress   Neurological:Neurovascular exam within normal limits.     Senstion intact: LE bilat.    Motor: + DF/PF/EHL.   Musculoskeletal: Odessa's sign negative in bilateral lower extremities.  Calves soft, supple, non-tender upon palpation or with passive stretch.    Dressing/Wound:  trace drainage noted of dressing and intact. No significant erythema  Appearance similar to yesterday- dorsal swelling improved. Unable to express  drainage from palmar tissues.  Pain worse with passive ext of the 2nd digit   Vital Signs:       Patient Vitals for the past 8 hrs:   BP Temp Temp src Pulse Resp SpO2   24 0830 (!) 147/77 97.7 °F (36.5 °C) Oral 64 17 98 %   24 0607 133/76 -- -- 66 -- 97 %                                          Temp (24hrs), Av °F (36.7 °C), Min:97.7 °F (36.5 °C), Max:98.2 °F (36.8 °C)      Labs:        Recent Labs     24  0424   HCT 32.3*   HGB 10.7*     PT/OT:              ASSESSMENT / PLAN:   Principal Problem (Resolved):    Cellulitis  Active Problems:    Mood disorder (HCC)    Coagulase negative Staphylococcus bacteremia    Staphylococcal arthritis of right hand    Cat bite    Abscess  of hand    Septic arthritis of interphalangeal joint of finger    Infection due to Bacillus species    End-stage renal disease on hemodialysis (HCC)    A-V fistula ([facility])    Right BKA infection (HCC)  Resolved Problems:    Cellulitis of left hand    Flexor tenosynovitis of finger     Blood cultures 12/2+ for CoagNeg Staph, bacillus species     S/p left hand I&D following cat bite  -intra-op cx NGTD  Per ID---  Plan  DC on 6 weeks of antibiotics via HD.  Vancomycin & Cefepime x 6 weeks  12/5 to1/16  Flagyl p.o. x 2 weeks     Dressing/Wound Care:  -TWICE DAILY DRESSING CHANGES to include betadine swab applied to incision, allow to air dry x 5 minutes. Then dress with 4x4 gauze, kerflix or kimberley, and ace wrap     Immobilization:  -Continue to elevate above the level of the heart as much as possible     PT/OT:  -avoid gripping pushing pulling lifting with the left hand, can wiggle fingers     VTE Prophylaxis:  -ELDON and/or SCDs     Pain Control:  -Continue established methods of pain control     -D/c planning:  Continue to improve-- monitor for any drainage recurrent swelling/erythema      Out pt FU in approx one week-- contact office for worsening symptoms    Signed By: Carlos Masterson PA-C

## 2024-12-11 NOTE — PROGRESS NOTES
bite    Abscess of hand    Septic arthritis of interphalangeal joint of finger    Infection due to Bacillus species    End-stage renal disease on hemodialysis (HCC)    A-V fistula ([facility])    Right BKA infection ([facility])  Resolved Problems:    * No resolved hospital problems. *     S/p left hand I&D following cat bite  -intra-op cx NGTD  -on unasyn     Dressing/Wound Care:  -TWICE DAILY DRESSING CHANGES to include betadine swab applied to incision, allow to air dry x 5 minutes. Then dress with 4x4 gauze, kerflix or kimberley, and ace wrap     Immobilization:  -Continue to elevate above the level of the heart as much as possible     PT/OT:  -avoid gripping pushing pulling lifting with the left hand, can wiggle fingers     VTE Prophylaxis:  -ELDON and/or SCDs     Pain Control:  -Continue established methods of pain control     -D/c planning:  Continue to improve-- monitor for any drainage recurrent swelling/erythema     Signed By: Carlos Masterson PA-C

## 2024-12-11 NOTE — PROGRESS NOTES
Nephrology Progress Note  [facility] / [place] Office  [address], [address]  [place] [address]  Phone - [phone]  Fax - [phone]                 Patient: Keaton Van                     YOB: 1965        Date- 12/11/2024                                     Admit Date: 12/2/2024   CC: Follow up for esrd     IMPRESSION & PLAN:   ESRD-  (multifactorial suspect secondary to ATN vs progression of ckd)(now hemodialysis dependent, TTS, [facility], right chest PermCath)  Hyperkalemia   surgical wound dehiscence  Cat bite/left upper extremity cellulitis  Index finger cellulitis, MCP joint septic arthritis, juxta articular abscess  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  CHF  HTN  DM  Anemia       PLAN-  Patient did not want to get dialyzed today  We will plan for HD tomorrow  Gregory has been notified  Continue amlodipine, hydralazine ,losartan  Dialysis Tuesday Thursday Saturday schedule     Subjective:  Interval History:   12/11/24: Seen and examined today, denies any shortness of breath labs have been reviewed  12-10-24--he feels very good , he is ready to go home- HE DOESN'T WANT HD TODAY  12-9-24-- bp high , no sob  12-6-24---Patient refused dialysis last night after the surgery he is on dialysis at present  Potassium 5.3    Objective:   Vitals:    12/10/24 2000 12/10/24 2037 12/11/24 0607 12/11/24 0830   BP: (!) 150/86 (!) 150/80 133/76 (!) 147/77   Pulse: 68  66 64   Resp: 16 16  17   Temp: 98.1 °F (36.7 °C)   97.7 °F (36.5 °C)   TempSrc: Oral   Oral   SpO2: 96%  97% 98%   Weight:       Height:          No intake/output data recorded.  No intake/output data recorded.      Physical exam:    GEN: nad  NECK- no mass  RESP:no wheezing  NEURO: Normal speech,NON FOCAL  EXT: Right BKA, left hand dressing +  PSYCH: Normal Mood  PERMACATH  present  Chart reviewed.         Pertinent Notes reviewed.     Data Review :  Lab Results  IntraVENous Q24H    metroNIDAZOLE (FLAGYL) 500 mg in 0.9% NaCl 100 mL IVPB premix  500 mg IntraVENous Q12H    losartan (COZAAR) tablet 25 mg  25 mg Oral Daily    neomycin-polymyxin-dexameth (MAXITROL) 3.5-71097-3.1 ophthalmic suspension 1 drop  1 drop Right Eye 4 times per day    gabapentin (NEURONTIN) capsule 300 mg  300 mg Oral TID    vancomycin (VANCOCIN) intermittent dosing (placeholder)   Other RX Placeholder    ondansetron (ZOFRAN-ODT) disintegrating tablet 4 mg  4 mg Oral Q8H PRN    heparin (porcine) injection 1,800 Units  1,800 Units IntraCATHeter PRN    And    heparin (porcine) injection 1,800 Units  1,800 Units IntraCATHeter PRN    silver sulfADIAZINE (SILVADENE) 1 % cream   Topical Daily    amLODIPine (NORVASC) tablet 10 mg  10 mg Oral Daily    DULoxetine (CYMBALTA) extended release capsule 20 mg  20 mg Oral Daily    cloNIDine (CATAPRES) tablet 0.2 mg  0.2 mg Oral Q8H PRN    hydrALAZINE (APRESOLINE) tablet 50 mg  50 mg Oral 3 times per day    lurasidone (LATUDA) tablet 20 mg  20 mg Oral Dinner    tiZANidine (ZANAFLEX) tablet 2 mg  2 mg Oral Q8H PRN    sodium chloride flush 0.9 % injection 5-40 mL  5-40 mL IntraVENous 2 times per day    sodium chloride flush 0.9 % injection 5-40 mL  5-40 mL IntraVENous PRN    0.9 % sodium chloride infusion   IntraVENous PRN    acetaminophen (TYLENOL) tablet 650 mg  650 mg Oral Q6H PRN    Or    acetaminophen (TYLENOL) suppository 650 mg  650 mg Rectal Q6H PRN    heparin (porcine) injection 5,000 Units  5,000 Units SubCUTAneous 3 times per day    glucose chewable tablet 16 g  4 tablet Oral PRN    dextrose bolus 10% 125 mL  125 mL IntraVENous PRN    Or    dextrose bolus 10% 250 mL  250 mL IntraVENous PRN    glucagon injection 1 mg  1 mg SubCUTAneous PRN    dextrose 10 % infusion   IntraVENous Continuous PRN    insulin glargine (LANTUS) injection vial 14 Units  0.15 Units/kg SubCUTAneous Daily    insulin lispro (HUMALOG,ADMELOG) injection vial 0-8 Units  0-8 Units SubCUTAneous

## 2024-12-11 NOTE — ACP (ADVANCE CARE PLANNING)
Advance Care Planning     Advance Care Planning Inpatient Note  [facility] Department    Today's Date: 12/11/2024  Unit: [facility] 1 MULTI-SPECIALTY TELEMETRY    Received request from HealthCare Provider.  Upon review of chart and communication with care team, patient's decision making abilities are not in question.. Patient was/were present in the room during visit.    Goals of ACP Conversation:  Discuss advance care planning documents  Facilitate a discussion related to patient's goals of care as they align with the patient's values and beliefs.    Health Care Decision Makers:       Primary Decision Maker: Evelyn Guallpa - Brother/Sister - [phone]  Summary:  Completed New Documents      Advance Care Planning Documents (Patient Wishes):  Living Will/Advance Directive     Assessment:     received a consult order from the staff in reference to an Advance Medical Directive. Patient received the original and copies. A copy was placed in his chart.     Primary Agent- Evelyn Guallpa [phone] (sister)      Interventions:  Provided education on documents for clarity and greater understanding  Discussed and provided education on state decision maker hierarchy  Assisted in the completion of documents according to patient's wishes at this time  Encouraged ongoing ACP conversation with future decision makers and loved ones    Care Preferences Communicated:   No    Outcomes/Plan:  ACP Discussion: Completed  New advance directive completed.  Returned original document(s) to patient, as well as copies for distribution to appointed agents  Copy of advance directive given to staff to scan into medical record.    Electronically signed by JASON Ramos on 12/11/2024 at 12:11 PM    Rev. JESSIE Ramos  [facility]   Paging Service [phone])

## 2024-12-13 NOTE — DISCHARGE SUMMARY
bite    Abscess of hand    Septic arthritis of interphalangeal joint of finger    Infection due to Bacillus species    End-stage renal disease on hemodialysis (HCC)    A-V fistula ([facility])    Right BKA infection ([facility])       Consultations:  IP CONSULT TO NEPHROLOGY  IP CONSULT TO VASCULAR SURGERY  IP CONSULT TO PHARMACY  IP CONSULT TO ORTHOPEDIC SURGERY  IP CONSULT TO INFECTIOUS DISEASES  IP CONSULT TO SPIRITUAL CARE  IP CONSULT TO CASE MANAGEMENT      Brief Admission History/Reason for Admission Per Krista Mejía MD:  Carlota is a 59 y.o.  male with PMHx significant for DM, HTN, esrd on HD TTS, who comes in with left hand swelling and pain. Patient reports that he had a bitten by a wild cat 10 days ago. Patient reports that he wash his hand and monitored it . But it started swelling and pain increased, patient reports that he started to use tylenol and motrin with out relief. More recently he started feeling unwell, fatigue , chills. His hand swelling worsened as well, so he decided to come in to the ED.     Patient was admitted with left hand cellulitis complicated by an abscess and flexor tenosynovitis after a cat bite at home.  Patient was started on initially on Unasyn, Flagyl and vancomycin orthopedic evaluated the patient and did washout I&D.  Patient was seen by infectious disease, who recommended vancomycin, cefepime, and Flagyl.  Patient was optimized and hemodynamically stable for discharge.     Antimicrobial orders for discharge as per ID recs:  -Vancomycin 500 mg IV after HD 3 times weekly end date 1/16/2025  -Cefepime 2/2/2 g IV after HD 3 times weekly end date 1/16/2025  -Flagyl 500 mg p.o. twice daily end date 12/19  -No alcohol while on Flagyl  -Weekly CBC, CMP, vancomycin trough-  -Fax reports to [phone], call with critical labs  at [phone]  -Encourage adequate fluids, daily probiotic/yogurt  -If line malfunction occurs and home health cannot reposition  please send patient to ED immediately  -ID  NEURONTIN  Take 0.5 tablets by mouth 3 times daily for 30 days. Max Daily Amount: 900 mg     glucose monitoring kit  1 kit by Does not apply route daily Test blood glucose daily in the morning     hydrALAZINE 50 MG tablet  Commonly known as: APRESOLINE  Take 1 tablet by mouth every 8 hours     Insulin Pen Needle 31G X 5 MM Misc  1 each by Does not apply route daily     Lancets Misc  1 each by Does not apply route daily     lurasidone 20 MG Tabs tablet  Commonly known as: Latuda  Take 1 tablet by mouth Daily with supper     pantoprazole 40 MG tablet  Commonly known as: PROTONIX  Take 1 tablet by mouth every morning (before breakfast)     tiZANidine 2 MG tablet  Commonly known as: ZANAFLEX  Take 1 tablet by mouth every 8 hours as needed (muscle spasm)     Tresiba FlexTouch 100 UNIT/ML Sopn  Generic drug: Insulin Degludec  Inject 14 Units into the skin at bedtime            ASK your doctor about these medications      acetaminophen 325 MG tablet  Commonly known as: TYLENOL  Take 2 tablets by mouth every 6 hours as needed for Pain     aspirin 81 MG EC tablet  Take 1 tablet by mouth daily     DULoxetine 20 MG extended release capsule  Commonly known as: CYMBALTA  Take 1 capsule by mouth daily               Where to Get Your Medications        These medications were sent to [facility]/pharmacy #[id number] - [place], [place] - [address] - P [phone] - F [phone]  [address] [address]      Hours: 24-hours Phone: [phone]   neomycin-polymyxin-dexameth 3.5-94880-3.1 ophthalmic suspension       These medications were sent to [place], [place] - [address] - P [phone] - F [phone]  [address] [address]      Phone: [phone]   losartan 25 MG tablet  metroNIDAZOLE 500 MG tablet  oxyCODONE 5 MG immediate release tablet       Information about where to get these medications is not yet available    Ask your

## 2024-12-14 LAB
BACTERIA SPEC CULT: NORMAL
SERVICE CMNT-IMP: NORMAL

## 2024-12-17 ENCOUNTER — HOSPITAL ENCOUNTER (INPATIENT)
Facility: HOSPITAL | Age: 59
LOS: 14 days | Discharge: HOME | DRG: 513 | End: 2024-12-31
Attending: EMERGENCY MEDICINE | Admitting: STUDENT IN AN ORGANIZED HEALTH CARE EDUCATION/TRAINING PROGRAM
Payer: MEDICARE

## 2024-12-17 DIAGNOSIS — N18.6 ESRD ON HEMODIALYSIS (HCC): ICD-10-CM

## 2024-12-17 DIAGNOSIS — Z99.2 ESRD ON HEMODIALYSIS (HCC): ICD-10-CM

## 2024-12-17 DIAGNOSIS — M65.949 FLEXOR TENOSYNOVITIS OF FINGER: Primary | ICD-10-CM
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
## 2024-12-17 PROBLEM — L08.9 WOUND INFECTION: Status: ACTIVE | Noted: 2024-12-17
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
## 2024-12-17 PROBLEM — S61.452D: Status: ACTIVE | Noted: 2024-12-17

## 2024-12-17 PROBLEM — L08.9: Status: ACTIVE | Noted: 2024-12-17

## 2024-12-17 PROBLEM — T14.8XXA WOUND INFECTION: Status: ACTIVE | Noted: 2024-12-17

## 2024-12-17 LAB
ALBUMIN SERPL-MCNC: 3.9 G/DL (ref 3.5–5)
ALBUMIN/GLOB SERPL: 0.8 (ref 1.1–2.2)
ALP SERPL-CCNC: 150 U/L (ref 45–117)
ALT SERPL-CCNC: 40 U/L (ref 12–78)
ANION GAP SERPL CALC-SCNC: 6 MMOL/L (ref 2–12)
AST SERPL-CCNC: 35 U/L (ref 15–37)
BASOPHILS # BLD: 0.1 K/UL (ref 0–0.1)
BASOPHILS NFR BLD: 1 % (ref 0–1)
BILIRUB SERPL-MCNC: 0.5 MG/DL (ref 0.2–1)
BUN SERPL-MCNC: 51 MG/DL (ref 6–20)
BUN/CREAT SERPL: 19 (ref 12–20)
CALCIUM SERPL-MCNC: 9.7 MG/DL (ref 8.5–10.1)
CHLORIDE SERPL-SCNC: 108 MMOL/L (ref 97–108)
CO2 SERPL-SCNC: 25 MMOL/L (ref 21–32)
CREAT SERPL-MCNC: 2.75 MG/DL (ref 0.7–1.3)
CRP SERPL-MCNC: 0.86 MG/DL (ref 0–0.3)
DIFFERENTIAL METHOD BLD: ABNORMAL
EOSINOPHIL # BLD: 0.3 K/UL (ref 0–0.4)
EOSINOPHIL NFR BLD: 4 % (ref 0–7)
ERYTHROCYTE [DISTWIDTH] IN BLOOD BY AUTOMATED COUNT: 14.7 % (ref 11.5–14.5)
ERYTHROCYTE [SEDIMENTATION RATE] IN BLOOD: 63 MM/HR (ref 0–20)
GLOBULIN SER CALC-MCNC: 4.7 G/DL (ref 2–4)
GLUCOSE BLD STRIP.AUTO-MCNC: 162 MG/DL (ref 65–117)
GLUCOSE BLD STRIP.AUTO-MCNC: 167 MG/DL (ref 65–117)
GLUCOSE SERPL-MCNC: 181 MG/DL (ref 65–100)
HCT VFR BLD AUTO: 33.7 % (ref 36.6–50.3)
HGB BLD-MCNC: 11.5 G/DL (ref 12.1–17)
IMM GRANULOCYTES # BLD AUTO: 0 K/UL (ref 0–0.04)
IMM GRANULOCYTES NFR BLD AUTO: 1 % (ref 0–0.5)
INR PPP: 1 (ref 0.9–1.1)
LYMPHOCYTES # BLD: 1 K/UL (ref 0.8–3.5)
LYMPHOCYTES NFR BLD: 17 % (ref 12–49)
MAGNESIUM SERPL-MCNC: 2.2 MG/DL (ref 1.6–2.4)
MCH RBC QN AUTO: 29 PG (ref 26–34)
MCHC RBC AUTO-ENTMCNC: 34.1 G/DL (ref 30–36.5)
MCV RBC AUTO: 84.9 FL (ref 80–99)
MONOCYTES # BLD: 0.3 K/UL (ref 0–1)
MONOCYTES NFR BLD: 5 % (ref 5–13)
NEUTS SEG # BLD: 4 K/UL (ref 1.8–8)
NEUTS SEG NFR BLD: 72 % (ref 32–75)
NRBC # BLD: 0 K/UL (ref 0–0.01)
NRBC BLD-RTO: 0 PER 100 WBC
PHOSPHATE SERPL-MCNC: 4 MG/DL (ref 2.6–4.7)
PLATELET # BLD AUTO: 179 K/UL (ref 150–400)
PMV BLD AUTO: 9.2 FL (ref 8.9–12.9)
POTASSIUM SERPL-SCNC: 4.7 MMOL/L (ref 3.5–5.1)
PROCALCITONIN SERPL-MCNC: 0.07 NG/ML
PROT SERPL-MCNC: 8.6 G/DL (ref 6.4–8.2)
PROTHROMBIN TIME: 10.1 SEC (ref 9–11.1)
RBC # BLD AUTO: 3.97 M/UL (ref 4.1–5.7)
SERVICE CMNT-IMP: ABNORMAL
SERVICE CMNT-IMP: ABNORMAL
SODIUM SERPL-SCNC: 139 MMOL/L (ref 136–145)
WBC # BLD AUTO: 5.6 K/UL (ref 4.1–11.1)

## 2024-12-17 PROCEDURE — APPNB180 APP NON BILLABLE TIME > 60 MINS: Performed by: ORTHOPAEDIC SURGERY

## 2024-12-17 PROCEDURE — 84145 PROCALCITONIN (PCT): CPT

## 2024-12-17 PROCEDURE — 2580000003 HC RX 258: Performed by: STUDENT IN AN ORGANIZED HEALTH CARE EDUCATION/TRAINING PROGRAM

## 2024-12-17 PROCEDURE — 80053 COMPREHEN METABOLIC PANEL: CPT

## 2024-12-17 PROCEDURE — 99285 EMERGENCY DEPT VISIT HI MDM: CPT

## 2024-12-17 PROCEDURE — 2500000003 HC RX 250 WO HCPCS: Performed by: STUDENT IN AN ORGANIZED HEALTH CARE EDUCATION/TRAINING PROGRAM

## 2024-12-17 PROCEDURE — 83735 ASSAY OF MAGNESIUM: CPT

## 2024-12-17 PROCEDURE — 86140 C-REACTIVE PROTEIN: CPT

## 2024-12-17 PROCEDURE — 6360000002 HC RX W HCPCS: Performed by: EMERGENCY MEDICINE

## 2024-12-17 PROCEDURE — 82962 GLUCOSE BLOOD TEST: CPT

## 2024-12-17 PROCEDURE — 87040 BLOOD CULTURE FOR BACTERIA: CPT

## 2024-12-17 PROCEDURE — 96374 THER/PROPH/DIAG INJ IV PUSH: CPT

## 2024-12-17 PROCEDURE — 6360000002 HC RX W HCPCS: Performed by: STUDENT IN AN ORGANIZED HEALTH CARE EDUCATION/TRAINING PROGRAM

## 2024-12-17 PROCEDURE — 36415 COLL VENOUS BLD VENIPUNCTURE: CPT

## 2024-12-17 PROCEDURE — 6370000000 HC RX 637 (ALT 250 FOR IP): Performed by: STUDENT IN AN ORGANIZED HEALTH CARE EDUCATION/TRAINING PROGRAM

## 2024-12-17 PROCEDURE — 96375 TX/PRO/DX INJ NEW DRUG ADDON: CPT

## 2024-12-17 PROCEDURE — 85610 PROTHROMBIN TIME: CPT

## 2024-12-17 PROCEDURE — 85652 RBC SED RATE AUTOMATED: CPT

## 2024-12-17 PROCEDURE — 1100000003 HC PRIVATE W/ TELEMETRY

## 2024-12-17 PROCEDURE — 85025 COMPLETE CBC W/AUTO DIFF WBC: CPT

## 2024-12-17 PROCEDURE — 84100 ASSAY OF PHOSPHORUS: CPT

## 2024-12-17 RX ORDER — INSULIN LISPRO 100 [IU]/ML
0-8 INJECTION, SOLUTION INTRAVENOUS; SUBCUTANEOUS
Status: DISCONTINUED | OUTPATIENT
Start: 2024-12-17 | End: 2024-12-31 | Stop reason: HOSPADM

## 2024-12-17 RX ORDER — PANTOPRAZOLE SODIUM 40 MG/1
40 TABLET, DELAYED RELEASE ORAL
Status: DISCONTINUED | OUTPATIENT
Start: 2024-12-18 | End: 2024-12-31 | Stop reason: HOSPADM

## 2024-12-17 RX ORDER — METRONIDAZOLE 500 MG/100ML
500 INJECTION, SOLUTION INTRAVENOUS EVERY 8 HOURS
Status: DISCONTINUED | OUTPATIENT
Start: 2024-12-17 | End: 2024-12-18

## 2024-12-17 RX ORDER — NEOMYCIN SULFATE, POLYMYXIN B SULFATE AND DEXAMETHASONE 3.5; 10000; 1 MG/ML; [USP'U]/ML; MG/ML
1 SUSPENSION/ DROPS OPHTHALMIC 4 TIMES DAILY
Status: DISCONTINUED | OUTPATIENT
Start: 2024-12-17 | End: 2024-12-31 | Stop reason: HOSPADM

## 2024-12-17 RX ORDER — DEXTROSE MONOHYDRATE 100 MG/ML
INJECTION, SOLUTION INTRAVENOUS CONTINUOUS PRN
Status: DISCONTINUED | OUTPATIENT
Start: 2024-12-17 | End: 2024-12-31 | Stop reason: HOSPADM

## 2024-12-17 RX ORDER — SODIUM CHLORIDE 0.9 % (FLUSH) 0.9 %
5-40 SYRINGE (ML) INJECTION PRN
Status: DISCONTINUED | OUTPATIENT
Start: 2024-12-17 | End: 2024-12-31 | Stop reason: HOSPADM

## 2024-12-17 RX ORDER — CLONIDINE HYDROCHLORIDE 0.1 MG/1
0.2 TABLET ORAL EVERY 8 HOURS PRN
Status: DISCONTINUED | OUTPATIENT
Start: 2024-12-17 | End: 2024-12-31 | Stop reason: HOSPADM

## 2024-12-17 RX ORDER — ACETAMINOPHEN 650 MG/1
650 SUPPOSITORY RECTAL EVERY 6 HOURS PRN
Status: DISCONTINUED | OUTPATIENT
Start: 2024-12-17 | End: 2024-12-31 | Stop reason: HOSPADM

## 2024-12-17 RX ORDER — POTASSIUM CHLORIDE 1500 MG/1
40 TABLET, EXTENDED RELEASE ORAL PRN
Status: DISCONTINUED | OUTPATIENT
Start: 2024-12-17 | End: 2024-12-31 | Stop reason: HOSPADM

## 2024-12-17 RX ORDER — HYDROMORPHONE HYDROCHLORIDE 1 MG/ML
0.25 INJECTION, SOLUTION INTRAMUSCULAR; INTRAVENOUS; SUBCUTANEOUS EVERY 4 HOURS PRN
Status: DISCONTINUED | OUTPATIENT
Start: 2024-12-17 | End: 2024-12-18

## 2024-12-17 RX ORDER — AMLODIPINE BESYLATE 5 MG/1
10 TABLET ORAL DAILY
Status: DISCONTINUED | OUTPATIENT
Start: 2024-12-17 | End: 2024-12-31 | Stop reason: HOSPADM

## 2024-12-17 RX ORDER — ACETAMINOPHEN 325 MG/1
650 TABLET ORAL EVERY 6 HOURS PRN
Status: DISCONTINUED | OUTPATIENT
Start: 2024-12-17 | End: 2024-12-31 | Stop reason: HOSPADM

## 2024-12-17 RX ORDER — FENTANYL CITRATE 50 UG/ML
100 INJECTION, SOLUTION INTRAMUSCULAR; INTRAVENOUS
Status: COMPLETED | OUTPATIENT
Start: 2024-12-17 | End: 2024-12-17

## 2024-12-17 RX ORDER — LOSARTAN POTASSIUM 25 MG/1
25 TABLET ORAL DAILY
Status: DISCONTINUED | OUTPATIENT
Start: 2024-12-17 | End: 2024-12-29

## 2024-12-17 RX ORDER — VANCOMYCIN 2 GRAM/500 ML IN 0.9 % SODIUM CHLORIDE INTRAVENOUS
2000 ONCE
Status: DISCONTINUED | OUTPATIENT
Start: 2024-12-17 | End: 2024-12-17 | Stop reason: CLARIF

## 2024-12-17 RX ORDER — GABAPENTIN 300 MG/1
300 CAPSULE ORAL 3 TIMES DAILY
Status: DISCONTINUED | OUTPATIENT
Start: 2024-12-17 | End: 2024-12-31 | Stop reason: HOSPADM

## 2024-12-17 RX ORDER — HYDROMORPHONE HYDROCHLORIDE 1 MG/ML
1 INJECTION, SOLUTION INTRAMUSCULAR; INTRAVENOUS; SUBCUTANEOUS
Status: COMPLETED | OUTPATIENT
Start: 2024-12-17 | End: 2024-12-17

## 2024-12-17 RX ORDER — BUMETANIDE 1 MG/1
2 TABLET ORAL 2 TIMES DAILY
Status: DISCONTINUED | OUTPATIENT
Start: 2024-12-17 | End: 2024-12-31 | Stop reason: HOSPADM

## 2024-12-17 RX ORDER — ACETAMINOPHEN 325 MG/1
650 TABLET ORAL EVERY 4 HOURS PRN
Status: DISCONTINUED | OUTPATIENT
Start: 2024-12-17 | End: 2024-12-29

## 2024-12-17 RX ORDER — SODIUM CHLORIDE 9 MG/ML
INJECTION, SOLUTION INTRAVENOUS PRN
Status: DISCONTINUED | OUTPATIENT
Start: 2024-12-17 | End: 2024-12-31 | Stop reason: HOSPADM

## 2024-12-17 RX ORDER — ONDANSETRON 2 MG/ML
4 INJECTION INTRAMUSCULAR; INTRAVENOUS EVERY 6 HOURS PRN
Status: DISCONTINUED | OUTPATIENT
Start: 2024-12-17 | End: 2024-12-31 | Stop reason: HOSPADM

## 2024-12-17 RX ORDER — INSULIN GLARGINE 100 [IU]/ML
0.15 INJECTION, SOLUTION SUBCUTANEOUS DAILY
Status: DISCONTINUED | OUTPATIENT
Start: 2024-12-17 | End: 2024-12-29

## 2024-12-17 RX ORDER — LURASIDONE HYDROCHLORIDE 20 MG/1
20 TABLET, FILM COATED ORAL
Status: DISCONTINUED | OUTPATIENT
Start: 2024-12-17 | End: 2024-12-31 | Stop reason: HOSPADM

## 2024-12-17 RX ORDER — POLYETHYLENE GLYCOL 3350 17 G/17G
17 POWDER, FOR SOLUTION ORAL DAILY PRN
Status: DISCONTINUED | OUTPATIENT
Start: 2024-12-17 | End: 2024-12-31 | Stop reason: HOSPADM

## 2024-12-17 RX ORDER — ONDANSETRON 4 MG/1
4 TABLET, ORALLY DISINTEGRATING ORAL EVERY 8 HOURS PRN
Status: DISCONTINUED | OUTPATIENT
Start: 2024-12-17 | End: 2024-12-31 | Stop reason: HOSPADM

## 2024-12-17 RX ORDER — ONDANSETRON 2 MG/ML
4 INJECTION INTRAMUSCULAR; INTRAVENOUS ONCE
Status: DISCONTINUED | OUTPATIENT
Start: 2024-12-17 | End: 2024-12-31 | Stop reason: HOSPADM

## 2024-12-17 RX ORDER — POTASSIUM CHLORIDE 7.45 MG/ML
10 INJECTION INTRAVENOUS PRN
Status: DISCONTINUED | OUTPATIENT
Start: 2024-12-17 | End: 2024-12-31 | Stop reason: HOSPADM

## 2024-12-17 RX ORDER — SODIUM CHLORIDE 0.9 % (FLUSH) 0.9 %
5-40 SYRINGE (ML) INJECTION EVERY 12 HOURS SCHEDULED
Status: DISCONTINUED | OUTPATIENT
Start: 2024-12-17 | End: 2024-12-31 | Stop reason: HOSPADM

## 2024-12-17 RX ORDER — MAGNESIUM SULFATE IN WATER 40 MG/ML
2000 INJECTION, SOLUTION INTRAVENOUS PRN
Status: DISCONTINUED | OUTPATIENT
Start: 2024-12-17 | End: 2024-12-31 | Stop reason: HOSPADM

## 2024-12-17 RX ORDER — GLUCAGON 1 MG/ML
1 KIT INJECTION PRN
Status: DISCONTINUED | OUTPATIENT
Start: 2024-12-17 | End: 2024-12-31 | Stop reason: HOSPADM

## 2024-12-17 RX ORDER — HEPARIN SODIUM 5000 [USP'U]/ML
5000 INJECTION, SOLUTION INTRAVENOUS; SUBCUTANEOUS EVERY 8 HOURS SCHEDULED
Status: DISCONTINUED | OUTPATIENT
Start: 2024-12-17 | End: 2024-12-30

## 2024-12-17 RX ORDER — HYDROMORPHONE HYDROCHLORIDE 1 MG/ML
0.5 INJECTION, SOLUTION INTRAMUSCULAR; INTRAVENOUS; SUBCUTANEOUS EVERY 6 HOURS PRN
Status: DISCONTINUED | OUTPATIENT
Start: 2024-12-17 | End: 2024-12-18

## 2024-12-17 RX ORDER — ONDANSETRON 2 MG/ML
4 INJECTION INTRAMUSCULAR; INTRAVENOUS ONCE
Status: COMPLETED | OUTPATIENT
Start: 2024-12-17 | End: 2024-12-17

## 2024-12-17 RX ORDER — HYDRALAZINE HYDROCHLORIDE 25 MG/1
50 TABLET, FILM COATED ORAL EVERY 8 HOURS SCHEDULED
Status: DISCONTINUED | OUTPATIENT
Start: 2024-12-17 | End: 2024-12-31 | Stop reason: HOSPADM

## 2024-12-17 RX ORDER — IBUPROFEN 400 MG/1
800 TABLET, FILM COATED ORAL EVERY 8 HOURS PRN
Status: DISCONTINUED | OUTPATIENT
Start: 2024-12-17 | End: 2024-12-31 | Stop reason: HOSPADM

## 2024-12-17 RX ADMIN — NEOMYCIN SULFATE, POLYMYXIN B SULFATE AND DEXAMETHASONE 1 DROP: 3.5; 10000; 1 SUSPENSION/ DROPS OPHTHALMIC at 15:50

## 2024-12-17 RX ADMIN — METRONIDAZOLE 500 MG: 500 INJECTION, SOLUTION INTRAVENOUS at 22:20

## 2024-12-17 RX ADMIN — NEOMYCIN SULFATE, POLYMYXIN B SULFATE AND DEXAMETHASONE 1 DROP: 3.5; 10000; 1 SUSPENSION/ DROPS OPHTHALMIC at 18:04

## 2024-12-17 RX ADMIN — HYDROMORPHONE HYDROCHLORIDE 0.5 MG: 1 INJECTION, SOLUTION INTRAMUSCULAR; INTRAVENOUS; SUBCUTANEOUS at 15:51

## 2024-12-17 RX ADMIN — HYDROMORPHONE HYDROCHLORIDE 0.5 MG: 1 INJECTION, SOLUTION INTRAMUSCULAR; INTRAVENOUS; SUBCUTANEOUS at 22:08

## 2024-12-17 RX ADMIN — HEPARIN SODIUM 5000 UNITS: 5000 INJECTION INTRAVENOUS; SUBCUTANEOUS at 22:10

## 2024-12-17 RX ADMIN — ONDANSETRON 4 MG: 2 INJECTION INTRAMUSCULAR; INTRAVENOUS at 09:12

## 2024-12-17 RX ADMIN — METRONIDAZOLE 500 MG: 500 INJECTION, SOLUTION INTRAVENOUS at 16:05

## 2024-12-17 RX ADMIN — VANCOMYCIN HYDROCHLORIDE 2000 MG: 10 INJECTION, POWDER, LYOPHILIZED, FOR SOLUTION INTRAVENOUS at 18:21

## 2024-12-17 RX ADMIN — NEOMYCIN SULFATE, POLYMYXIN B SULFATE AND DEXAMETHASONE 1 DROP: 3.5; 10000; 1 SUSPENSION/ DROPS OPHTHALMIC at 22:21

## 2024-12-17 RX ADMIN — FENTANYL CITRATE 100 MCG: 50 INJECTION INTRAMUSCULAR; INTRAVENOUS at 09:15

## 2024-12-17 RX ADMIN — INSULIN GLARGINE 14 UNITS: 100 INJECTION, SOLUTION SUBCUTANEOUS at 15:48

## 2024-12-17 RX ADMIN — HYDROMORPHONE HYDROCHLORIDE 1 MG: 1 INJECTION, SOLUTION INTRAMUSCULAR; INTRAVENOUS; SUBCUTANEOUS at 10:16

## 2024-12-17 RX ADMIN — SODIUM CHLORIDE, PRESERVATIVE FREE 10 ML: 5 INJECTION INTRAVENOUS at 22:20

## 2024-12-17 RX ADMIN — BUMETANIDE 2 MG: 1 TABLET ORAL at 18:14

## 2024-12-17 RX ADMIN — GABAPENTIN 300 MG: 300 CAPSULE ORAL at 15:49

## 2024-12-17 RX ADMIN — WATER 2000 MG: 1 INJECTION INTRAMUSCULAR; INTRAVENOUS; SUBCUTANEOUS at 15:57

## 2024-12-17 RX ADMIN — HEPARIN SODIUM 5000 UNITS: 5000 INJECTION INTRAVENOUS; SUBCUTANEOUS at 15:53

## 2024-12-17 RX ADMIN — HYDRALAZINE HYDROCHLORIDE 50 MG: 25 TABLET, FILM COATED ORAL at 22:39

## 2024-12-17 RX ADMIN — LURASIDONE HYDROCHLORIDE 20 MG: 20 TABLET ORAL at 15:49

## 2024-12-17 RX ADMIN — GABAPENTIN 300 MG: 300 CAPSULE ORAL at 22:10

## 2024-12-17 ASSESSMENT — PAIN DESCRIPTION - ORIENTATION
ORIENTATION: LEFT

## 2024-12-17 ASSESSMENT — PAIN SCALES - GENERAL
PAINLEVEL_OUTOF10: 7
PAINLEVEL_OUTOF10: 8
PAINLEVEL_OUTOF10: 5
PAINLEVEL_OUTOF10: 10
PAINLEVEL_OUTOF10: 7

## 2024-12-17 ASSESSMENT — PAIN - FUNCTIONAL ASSESSMENT
PAIN_FUNCTIONAL_ASSESSMENT: 0-10
PAIN_FUNCTIONAL_ASSESSMENT: ACTIVITIES ARE NOT PREVENTED

## 2024-12-17 ASSESSMENT — PAIN DESCRIPTION - PAIN TYPE: TYPE: ACUTE PAIN

## 2024-12-17 ASSESSMENT — PAIN DESCRIPTION - DESCRIPTORS
DESCRIPTORS: SHARP
DESCRIPTORS: ACHING;DISCOMFORT
DESCRIPTORS: ACHING

## 2024-12-17 ASSESSMENT — PAIN DESCRIPTION - FREQUENCY: FREQUENCY: CONTINUOUS

## 2024-12-17 ASSESSMENT — PAIN DESCRIPTION - LOCATION
LOCATION: HAND

## 2024-12-17 ASSESSMENT — PAIN DESCRIPTION - ONSET: ONSET: ON-GOING

## 2024-12-17 NOTE — ED NOTES
Dialysis remains at bedside. Rounded on patient. NAD. Physiological needs met. Patient resting in stretcher. Call bell within reach. Patient remains on continuous monitoring x3

## 2024-12-17 NOTE — PROGRESS NOTES
Nephrology Progress Note  [facility] / [place] Office  [address], [address]  [place] [address]  Phone - [phone]  Fax - [phone]                 Patient: Keaton Van                     YOB: 1965        Date- 12/17/2024                                     Admit Date: 12/17/2024   CC: Follow up for ESRD          IMPRESSION & PLAN:   ESRD-  (multifactorial suspect secondary to ATN vs progression of ckd)(now hemodialysis dependent, TTS, [facility], right chest PermCath)  Surgical wound dehiscence  Cat bite/left upper extremity cellulitis,recurrent  Index finger cellulitis, MCP joint septic arthritis, juxta articular abscess  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  [place] notified.  Plan for admission with IV antibiotics.  Spoke to ED physician.       Subjective:  Interval History:   -  Seen and examined today  -  Last HD was on Saturday.  - Came to Ed for wound dehiscence  -Renal consult requested for hemodialysis needs    Objective:   Vitals:    12/17/24 0836 12/17/24 0839 12/17/24 1044 12/17/24 1100   BP:  (!) 212/107 (!) 190/89 (!) 158/94   Pulse:  86 74 72   Resp:  16 10 14   Temp:  97.7 °F (36.5 °C)     TempSrc:  Oral     SpO2:  96% 93% 97%   Weight: 92.7 kg (204 lb 5.9 oz)      Height: 1.829 m (6')         No intake/output data recorded.  No intake/output data recorded.      Physical exam:    GEN: NAD  NECK- no mass  RESP: No wheezing, decreased BS b/l  CVS: S1,S2  RRR  NEURO: Normal speech, Non focal  EXT: Left hand wound dehiscence noted  PSYCH: Normal Mood    Chart reviewed.         Pertinent Notes reviewed.     Data Review :  Lab Results   Component Value Date/Time     12/17/2024 09:00 AM    K 4.7 12/17/2024 09:00 AM     12/17/2024 09:00 AM    CO2 25 12/17/2024 09:00 AM    BUN 51 12/17/2024 09:00 AM    CREATININE 2.75 12/17/2024 09:00 AM    GLUCOSE 181

## 2024-12-17 NOTE — CARE COORDINATION
Care Management Initial Assessment       RUR: 31% high risk   Readmission? Yes - 12/2-12/11/24 at [facility] for cat bite  1st IM letter given? Yes - 12/17/24  1st  letter given: No    Initial note: Chart review completed prior to assessment. CM completed assessment with pt at bedside. CM introduced self, role of CM, verified demographics to ensure accuracy, and discussed transition of care planning. Pt resides with his sister, Anuradha, in 2 level home with 4 TAN. Pt's bedroom is on second level of home, 16 interior steps upstairs. Pt voices that he can manage his ambulation and ADLs at present without assistive devices. His sisters are his main support system, and alternate driving him to/from HD. Pt is known to attend HD Tues/Thurs/Sat at [facility], chair time is 10:30 AM. Pt previously receiving his IV ABX following HD (Vancomycin end date of 1/16/25, Cefepime end date of 1/16/25, Flagyl end date of 12/19/24 per ID). Pt endorses no concerns with transition of care plans at this time. Pt reports previous IPR hx at [facility] and SNF hx at [facility]. Pt reports positive experience at [facility], and voices this as top preference should IPR recommendation occur during hospitalization.    Care management will continue to be available to assist as transition of care planning needs arise. Full readmission assessment below:       12/17/24 1203   Service Assessment   Patient Orientation Alert and Oriented   Cognition Alert   History Provided By Patient   Primary Caregiver Self   Accompanied By/Relationship N/A   Support Systems Family Members  (Identifies his sisters, Anuradha and Evelyn, as main support systems)   Patient's Healthcare Decision Maker is: Named in Scanned ACP Document   PCP Verified by CM Yes  (Robert Wells PA-C)   Last Visit to PCP Within last 6 months   Prior Functional Level Independent in ADLs/IADLs   Current Functional Level Independent in ADLs/IADLs   Can patient
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
## 2024-12-17 NOTE — CONSULTS
Orthopedic consult note:    59-year-old male readmitted for worsening infection of left hand.  Patient underwent I&D of the left hand on 12/5/2024 by Dr. Owens after suffering a cat bite which led to an infection.  Patient was subsequently on IV antibiotics and discharged home.  The patient presented to office this morning for follow-up with Dr. Owens and his symptoms appear to be significantly worse.  Patient was not receiving IV antibiotics at home and likely was not taking oral antibiotics due to GI upset.  Our services were consulted to evaluate the patient in the hospital.  Patient complains of worsening swelling and pain in the left hand primarily at the base of the index finger.  Patient is states he is only able to perform minimal range of motion.  He denies any new drainage from the area.  He denies any new injuries.  He denies any sensation changes or other loss of function.  He denies any other complaints this time.    Exam: Inspection of left hand reveals erythema and moderate swelling primarily at the MCP joint of the index finger.  Previous surgical incision over the anterior aspect of the base of the index finger intact with no signs of dehiscence.  Palpation elicits pain diffusely for the patient throughout the index finger but most notably at the MCP.  No purulence or discharge noted to palpation.  Patient is able to perform minimal active range of motion of the index finger secondary to pain.  No pain to palpation in the palm at the proximal aspect of the tendon of the index finger.  Sensation to light touch intact.  Strength testing limited secondary to pain.  No new open wounds appreciated.  Capillary fill less than 3 seconds.      Plan:  -Readmit for IV antibiotics.  -Will place consult for infectious disease given recurrence of infection for assistance with antibiotic recommendations.  -Patient pending possible dialysis prior to antibiotics administration after discussion with ED doc.

## 2024-12-17 NOTE — FLOWSHEET NOTE
Pre Hd note   12/17/24 1215   Treatment   Time On 1215   Treatment Goal 2500   Observations & Evaluations   Level of Consciousness 0   Oriented X 4   Heart Rhythm Regular   Respiratory Quality/Effort Unlabored   O2 Device None (Room air)   Bilateral Breath Sounds Diminished   Abdomen Inspection Rounded   Vital Signs   Temp 97.8 °F (36.6 °C)   Pain Assessment   Pain Assessment 0-10   Pain Level 8   Pain Location Hand   Pain Orientation Left   Pain Descriptors Sharp   Technical Checks   Dialysis Machine No. 08   RO Machine Number [id number]   Dialyzer Lot No. [id number]   Tubing Lot Number [id number]   All Connections Secure Yes   NS Bag Yes   Saline Line Double Clamped Yes   Dialyzer Nipro ELISIO   Prime Volume (mL) 300 mL   ICEBOAT I;C;E;B;O;A;T   RO Machine Log Sheet Completed Yes   Machine Alarm Self Test Completed;Passed   Air Foam Detector Tested;Proper Function;pH Reading   Extracorporeal Circuit Tested for Integrity Yes   Machine Conductivity 13.8   Machine Ph 7.4   Manual Ph 7.4   Bleach Test (Neg) Yes   Bath Temperature 98.2 °F (36.8 °C)   Hemodialysis Central Access - Permanent/Tunneled Right Subclavian   No placement date or time found.   Present on Admission/Arrival: Yes  Orientation: Right  Access Location: Subclavian   $Tunneled Hemodialysis Catheter $Yes   Continued need for line? Yes   Site Assessment Clean, dry & intact   Blue Lumen Status Alcohol cap present;Brisk blood return;Flushed   Red Lumen Status Brisk blood return;Alcohol cap present;Flushed   Line Care Cap changed;Connections checked and tightened;Chlorhexidine wipes;Line pulled back;Ports disinfected   Dressing Type Bacteriocidal;Sterile dressing, transparent   Date of Last Dressing Change   (no time,date nor initial present upon arrival)   Dressing Status Other (Comment)  (tape and gauze falling off cvc)   Dressing Intervention Removed;Dressing changed;New   Treatment Initiation   Dialyze Hours 3.5   Treatment  Initiation [place] Precautions

## 2024-12-17 NOTE — H&P
Hospitalist Admission Note    NAME:   Keaton Van   : 1965   MRN: [id number]     Date/Time: 2024 4:10 PM    Patient PCP: Robert Wells PA-C    ______________________________________________________________________  Given the patient's current clinical presentation, I have a high level of concern for decompensation if discharged from the emergency department.  Complex decision making was performed, which includes reviewing the patient's available past medical records, laboratory results, and x-ray films.       My assessment of this patient's clinical condition and my plan of care is as follows.    Assessment / Plan:    Worsening Left hand cellulitis/abscess/flexor tenosynovitis, secondary to cat bite, POA  Hx Staph bacteremia, POA  Medication non-adherence  -Staph bacteremia blood cultures from the last admission  --CT left hand w contrast reveals evidence of MCP joint septic arthritis and adjacent articular abscess, last admission  --Left hand incision and drainage on 2024  - Was discharged on cefepime IV, vancomycin IV, to be given after dialysis, also Flagyl po until 1/15/2025  -Missed his medications since Saturday  - Will admit to medicine telemetry  - Resume antibiotics that were recommended by ID on the last admissions cefepime, Flagyl, vancomycin  - Follow repeat cultures  - Consider consulting ID  - Follow Ortho recommendations  - Pain management with Dilaudid        ESRD on Hemodialysis  --on  dialysis,  neprho following, received HD in ED today    - Current creatinine is 2.75. Baseline appears to be around 3.0        Right eye conjunctivitis.   c/w moxitrol     Hx of right BKA  - Positive for edema on examination, slight open wound, patient wanted Ortho to evaluate need for stitches  - Vascular sx consulted. No acute intervention needed. Local wound care ordered.     T2DM , chronic  - On Tresiba (degludec) at home  - Will continue with hospital

## 2024-12-18 ENCOUNTER — TELEPHONE (OUTPATIENT)
Age: 59
End: 2024-12-18

## 2024-12-18 LAB
EST. AVERAGE GLUCOSE BLD GHB EST-MCNC: 169 MG/DL
GLUCOSE BLD STRIP.AUTO-MCNC: 109 MG/DL (ref 65–117)
GLUCOSE BLD STRIP.AUTO-MCNC: 140 MG/DL (ref 65–117)
GLUCOSE BLD STRIP.AUTO-MCNC: 215 MG/DL (ref 65–117)
GLUCOSE BLD STRIP.AUTO-MCNC: 236 MG/DL (ref 65–117)
HBA1C MFR BLD: 7.5 % (ref 4–5.6)
SERVICE CMNT-IMP: ABNORMAL
SERVICE CMNT-IMP: NORMAL
VANCOMYCIN SERPL-MCNC: 20.4 UG/ML

## 2024-12-18 PROCEDURE — 6360000002 HC RX W HCPCS: Performed by: INTERNAL MEDICINE

## 2024-12-18 PROCEDURE — 2500000003 HC RX 250 WO HCPCS: Performed by: STUDENT IN AN ORGANIZED HEALTH CARE EDUCATION/TRAINING PROGRAM

## 2024-12-18 PROCEDURE — 2500000003 HC RX 250 WO HCPCS: Performed by: FAMILY MEDICINE

## 2024-12-18 PROCEDURE — 83036 HEMOGLOBIN GLYCOSYLATED A1C: CPT

## 2024-12-18 PROCEDURE — 99223 1ST HOSP IP/OBS HIGH 75: CPT | Performed by: INTERNAL MEDICINE

## 2024-12-18 PROCEDURE — 80202 ASSAY OF VANCOMYCIN: CPT

## 2024-12-18 PROCEDURE — 6360000002 HC RX W HCPCS: Performed by: STUDENT IN AN ORGANIZED HEALTH CARE EDUCATION/TRAINING PROGRAM

## 2024-12-18 PROCEDURE — 82962 GLUCOSE BLOOD TEST: CPT

## 2024-12-18 PROCEDURE — 36415 COLL VENOUS BLD VENIPUNCTURE: CPT

## 2024-12-18 PROCEDURE — 2580000003 HC RX 258: Performed by: STUDENT IN AN ORGANIZED HEALTH CARE EDUCATION/TRAINING PROGRAM

## 2024-12-18 PROCEDURE — 1100000003 HC PRIVATE W/ TELEMETRY

## 2024-12-18 PROCEDURE — 6370000000 HC RX 637 (ALT 250 FOR IP): Performed by: STUDENT IN AN ORGANIZED HEALTH CARE EDUCATION/TRAINING PROGRAM

## 2024-12-18 PROCEDURE — 2580000003 HC RX 258: Performed by: INTERNAL MEDICINE

## 2024-12-18 RX ORDER — HYDROMORPHONE HYDROCHLORIDE 1 MG/ML
1 INJECTION, SOLUTION INTRAMUSCULAR; INTRAVENOUS; SUBCUTANEOUS EVERY 4 HOURS PRN
Status: DISCONTINUED | OUTPATIENT
Start: 2024-12-18 | End: 2024-12-28

## 2024-12-18 RX ADMIN — NEOMYCIN SULFATE, POLYMYXIN B SULFATE AND DEXAMETHASONE 1 DROP: 3.5; 10000; 1 SUSPENSION/ DROPS OPHTHALMIC at 16:54

## 2024-12-18 RX ADMIN — LOSARTAN POTASSIUM 25 MG: 25 TABLET, FILM COATED ORAL at 07:57

## 2024-12-18 RX ADMIN — PIPERACILLIN AND TAZOBACTAM 3375 MG: 3; .375 INJECTION, POWDER, LYOPHILIZED, FOR SOLUTION INTRAVENOUS at 14:01

## 2024-12-18 RX ADMIN — BUMETANIDE 2 MG: 1 TABLET ORAL at 07:57

## 2024-12-18 RX ADMIN — NEOMYCIN SULFATE, POLYMYXIN B SULFATE AND DEXAMETHASONE 1 DROP: 3.5; 10000; 1 SUSPENSION/ DROPS OPHTHALMIC at 08:01

## 2024-12-18 RX ADMIN — HYDRALAZINE HYDROCHLORIDE 50 MG: 25 TABLET, FILM COATED ORAL at 06:33

## 2024-12-18 RX ADMIN — HYDROMORPHONE HYDROCHLORIDE 1 MG: 1 INJECTION, SOLUTION INTRAMUSCULAR; INTRAVENOUS; SUBCUTANEOUS at 21:33

## 2024-12-18 RX ADMIN — HYDROMORPHONE HYDROCHLORIDE 1 MG: 1 INJECTION, SOLUTION INTRAMUSCULAR; INTRAVENOUS; SUBCUTANEOUS at 09:46

## 2024-12-18 RX ADMIN — LURASIDONE HYDROCHLORIDE 20 MG: 20 TABLET ORAL at 16:54

## 2024-12-18 RX ADMIN — AMLODIPINE BESYLATE 10 MG: 5 TABLET ORAL at 07:56

## 2024-12-18 RX ADMIN — HYDROMORPHONE HYDROCHLORIDE 1 MG: 1 INJECTION, SOLUTION INTRAMUSCULAR; INTRAVENOUS; SUBCUTANEOUS at 17:51

## 2024-12-18 RX ADMIN — NEOMYCIN SULFATE, POLYMYXIN B SULFATE AND DEXAMETHASONE 1 DROP: 3.5; 10000; 1 SUSPENSION/ DROPS OPHTHALMIC at 21:35

## 2024-12-18 RX ADMIN — HEPARIN SODIUM 5000 UNITS: 5000 INJECTION INTRAVENOUS; SUBCUTANEOUS at 13:54

## 2024-12-18 RX ADMIN — HYDRALAZINE HYDROCHLORIDE 50 MG: 25 TABLET, FILM COATED ORAL at 22:36

## 2024-12-18 RX ADMIN — INSULIN GLARGINE 14 UNITS: 100 INJECTION, SOLUTION SUBCUTANEOUS at 07:57

## 2024-12-18 RX ADMIN — SODIUM CHLORIDE, PRESERVATIVE FREE 10 ML: 5 INJECTION INTRAVENOUS at 07:59

## 2024-12-18 RX ADMIN — SODIUM CHLORIDE, PRESERVATIVE FREE 10 ML: 5 INJECTION INTRAVENOUS at 21:34

## 2024-12-18 RX ADMIN — GABAPENTIN 300 MG: 300 CAPSULE ORAL at 13:54

## 2024-12-18 RX ADMIN — BUMETANIDE 2 MG: 1 TABLET ORAL at 16:57

## 2024-12-18 RX ADMIN — ACETAMINOPHEN 650 MG: 325 TABLET ORAL at 07:57

## 2024-12-18 RX ADMIN — HEPARIN SODIUM 5000 UNITS: 5000 INJECTION INTRAVENOUS; SUBCUTANEOUS at 05:53

## 2024-12-18 RX ADMIN — HYDROMORPHONE HYDROCHLORIDE 0.5 MG: 1 INJECTION, SOLUTION INTRAMUSCULAR; INTRAVENOUS; SUBCUTANEOUS at 04:14

## 2024-12-18 RX ADMIN — PANTOPRAZOLE SODIUM 40 MG: 40 TABLET, DELAYED RELEASE ORAL at 05:53

## 2024-12-18 RX ADMIN — CEFEPIME 500 MG: 1 INJECTION, POWDER, FOR SOLUTION INTRAMUSCULAR; INTRAVENOUS at 09:50

## 2024-12-18 RX ADMIN — INSULIN LISPRO 2 UNITS: 100 INJECTION, SOLUTION INTRAVENOUS; SUBCUTANEOUS at 07:57

## 2024-12-18 RX ADMIN — HYDROMORPHONE HYDROCHLORIDE 1 MG: 1 INJECTION, SOLUTION INTRAMUSCULAR; INTRAVENOUS; SUBCUTANEOUS at 13:55

## 2024-12-18 RX ADMIN — HEPARIN SODIUM 5000 UNITS: 5000 INJECTION INTRAVENOUS; SUBCUTANEOUS at 21:33

## 2024-12-18 RX ADMIN — GABAPENTIN 300 MG: 300 CAPSULE ORAL at 21:33

## 2024-12-18 RX ADMIN — INSULIN LISPRO 2 UNITS: 100 INJECTION, SOLUTION INTRAVENOUS; SUBCUTANEOUS at 16:53

## 2024-12-18 RX ADMIN — METRONIDAZOLE 500 MG: 500 INJECTION, SOLUTION INTRAVENOUS at 05:58

## 2024-12-18 RX ADMIN — GABAPENTIN 300 MG: 300 CAPSULE ORAL at 07:57

## 2024-12-18 ASSESSMENT — PAIN SCALES - GENERAL
PAINLEVEL_OUTOF10: 8
PAINLEVEL_OUTOF10: 10
PAINLEVEL_OUTOF10: 7
PAINLEVEL_OUTOF10: 4
PAINLEVEL_OUTOF10: 8
PAINLEVEL_OUTOF10: 6
PAINLEVEL_OUTOF10: 6
PAINLEVEL_OUTOF10: 0
PAINLEVEL_OUTOF10: 10

## 2024-12-18 NOTE — PLAN OF CARE
Problem: Safety - Adult  Goal: Free from fall injury  12/18/2024 1109 by Bebe Brian, RN  Outcome: Progressing  12/18/2024 0657 by Abida Gongora, RN  Outcome: Progressing  Flowsheets (Taken 12/17/2024 2000)  Free From Fall Injury: Based on caregiver fall risk screen, instruct family/caregiver to ask for assistance with transferring infant if caregiver noted to have fall risk factors

## 2024-12-18 NOTE — PLAN OF CARE
Problem: Safety - Adult  Goal: Free from fall injury  12/18/2024 0657 by Abida Gongora RN  Outcome: Progressing  Flowsheets (Taken 12/17/2024 2000)  Free From Fall Injury: Based on caregiver fall risk screen, instruct family/caregiver to ask for assistance with transferring infant if caregiver noted to have fall risk factors  12/17/2024 1853 by Bebe Brian RN  Outcome: Progressing     Problem: Chronic Conditions and Co-morbidities  Goal: Patient's chronic conditions and co-morbidity symptoms are monitored and maintained or improved  12/18/2024 0657 by Abida Gongora RN  Outcome: Progressing  12/17/2024 1853 by Bebe Brian RN  Outcome: Progressing     Problem: Discharge Planning  Goal: Discharge to home or other facility with appropriate resources  12/18/2024 0657 by Abida Gongora RN  Outcome: Progressing  12/17/2024 1853 by Bebe Brian RN  Outcome: Progressing     Problem: Pain  Goal: Verbalizes/displays adequate comfort level or baseline comfort level  12/18/2024 0657 by Abida Gongora RN  Outcome: Progressing  Flowsheets (Taken 12/17/2024 2208)  Verbalizes/displays adequate comfort level or baseline comfort level: Assess pain using appropriate pain scale  12/17/2024 1853 by Bebe Brian RN  Outcome: Progressing

## 2024-12-18 NOTE — CARE COORDINATION
Transition of Care Plan:    RUR: 31%  Prior Level of Functioning: Independent   Disposition: Home with sister   TAN: 12/23/24  If SNF or IPR: Date FOC offered:   Date FOC received:   Accepting facility:   Date authorization started with reference number:   Date authorization received and expires:   Follow up appointments: PCP   DME needed: No DME needed   Transportation at discharge: Pt's sister   IM/IMM Medicare/ letter given: 2nd IMM letter   Is patient a [place] and connected with [facility]? No   If yes, was  transfer form completed and VA notified? No  Caregiver Contact: Pt's sister   Discharge Caregiver contacted prior to discharge? Pt will be contacted   Care Conference needed? No  Barriers to discharge: Medical clearance       CM reviewed pt's chart and pt is not medically stable for d/c due to  IV abx, ortho and ID clearance and pt is schedule to go to the OR on Thursday.    CM will continue to follow and assist with d/c planning.    Amparo Argueta

## 2024-12-18 NOTE — TELEPHONE ENCOUNTER
Please check with dialysis unit if patient was receiving vancomycin and cefepime IV with hemodialysis?  Planned end date was 1/16/2025

## 2024-12-18 NOTE — CONSULTS
Infectious Disease Consult    Date of Consultation:  December 18, 2024  Reason for Consultation: H/o catbite  Referring Physician: Kristofer OG  Date of Admission:12/17/2024      Impression    Cellulitis and abscess of left hand  Flexor tenosynovitis  Septic arthritis  Secondary to cat bite.  CT hand 12/4+ for 1.9 x 1.6 x 0.9 cm rim-enhancing  fluid collection lateral to  Index finger MCP joint concerning for juxta-articular abscess  MCP joint septic arthritis  S/p I&D by hand surgery 12/5  Findings of copious infectious fluid. No gross purulence.  Fluid around joint noted.  IntraOp culture-NG, GS-no organisms.  Patient readmitted for concerns for worsening cellulitis, flexor tenosynovitis  Patient for repeat I&D.  On exam appears to be progressing as expected.( see photo below)     Coagulase-negative staph  Bacillus bacteremia  Blood cultures 12/2+ for CoNS, bacillus species  Given presence of wound infection cannot assume it is a contaminant.  Repeat blood cultures 12/9-NGTD.        ESRD  On HD     AVF present LUE  Intact     Diabetes type 2  A1c 7.4     R/BKA      H/o diabetic right foot foot infection  Cellulitis, OM 6/2024     H/o mood disorder  Continue home meds     ESR 60, repeat 63  CRP 4.28      Plan  Plan was for 6 weeks of antibiotics vancomycin and cefepime IV 12/5-1/16  Flagyl p.o. x 2 weeks end date 12/19  Hemodialysis center contacted-patient has been receiving vancomycin and cefepime  as scheduled with hemodialysis.  Unfortunately patient did not  prescription for Flagyl.    Change to vancomycin Zosyn IV at this time  Await intraoperative cultures  D/w patient and family members at length  ID service to follow    Abx  Vancomycin, cefepime, Flagyl 12/17-12/18  Vancomycin, Zosyn-12/18.      Extensive review of chart notes, labs, imaging, cultures done  Additionally review of done: Recent reports-Labs, cultures, imaging  D/w -hospitalist, BHAKTI Van is seen for Cat bite.  Patient is known  silhouette is stable. Dual-lumen central venous catheter extends to the distal SVC. Lungs are clear bilaterally. Pleural spaces are normal and there is no pneumothorax. Osseous structures are intact.     No acute cardiopulmonary disease. Electronically signed by MD Lesa Nix MD FACP

## 2024-12-18 NOTE — PROGRESS NOTES
Orthopedic Surgery Progress Note     2024 4:02 PM     Patient Name: Keaton Van      Subjective:      Keaton Van is a 59 y.o. male is resting comfortably in bed. Pain is controlled. Tolerating diet. Reports pain seems slightly better today.        Objective:    General: Alert, cooperative, no distress.   Gastrointestinal:  Soft, non-tender.   Musculoskeletal:     Examination of the left hand demonstrates improving erythema and swelling at the volar and dorsal hand overlying the second MP joint. Incision is dry and intact today.   Unable to express any drainage. Does have significant but improving tenderness at the joint. Can wiggle the finger today, PIPJ to 100 flexion and -10 extension, MCPJ 70 degrees flexion and -10 extension. Digit is warm and well-perfused. Cap refill < 2 sec.    Vital Signs:    Patient Vitals for the past 8 hrs:   BP Temp Temp src Pulse Resp SpO2   24 1535 105/64 98.1 °F (36.7 °C) Oral 69 17 98 %     Temp (24hrs), Av.2 °F (36.8 °C), Min:98.1 °F (36.7 °C), Max:98.4 °F (36.9 °C)      Intake/Output:  No intake/output data recorded.   1901 -  0700  In: 500   Out: 3000     Pain Control:        LAB:    Recent Labs     24  0900   HCT 33.7*   HGB 11.5*     Lab Results   Component Value Date/Time     2024 09:00 AM    K 4.7 2024 09:00 AM     2024 09:00 AM    CO2 25 2024 09:00 AM    BUN 51 2024 09:00 AM         Assessment:      Principal Problem:    Wound infection  Active Problems:    Cat bite of left hand including fingers with infection, subsequent encounter  Resolved Problems:    * No resolved hospital problems. *       Plan:   Swelling and erythema have improved since yesterday. Continues to have tenderness around incision and the MCPJ. Incision is dry and intact today which is an improvement from macerated and early dehiscence yesterday. There is hyperemia around the incision and MCPJ which does seem partially related

## 2024-12-18 NOTE — PROGRESS NOTES
Pharmacy Antimicrobial Kinetic Dosing    Indication for Antimicrobials: ssti     Current Regimen of Each Antimicrobial:  Vancomycin IV pharmacy to dose by levels; Start Date ; Day # 2  Cefepime IV 2gm x1 then 500mg iv q24h;  Start date ; Day #  2    Previous Antimicrobial Therapy:      Goal Level: Vancomycin -600 and Vancomycin trough 15-20    Date Dose & Interval Measured (mcg/mL) Predicted AUC 24-48 Predicted AUC 24,ss    AM random Vanc 2000mg iv x1 20.4 N/A N/A                   Significant Cultures:    blood- paired- NGTD    Labs:  Recent Labs     Units 24  0900   CREATININE MG/DL 2.75*   BUN MG/DL 51*   PROCAL ng/mL 0.07   WBC K/uL 5.6     Temp (24hrs), Av.2 °F (36.8 °C), Min:97.7 °F (36.5 °C), Max:98.8 °F (37.1 °C)      Conditions for Dosing Consideration: Hemodialysis    Creatinine Clearance (mL/min): Estimated Creatinine Clearance: 31 mL/min (A) (based on SCr of 2.75 mg/dL (H)).       Impression/Plan:   HD patient  HD yesterday; level ~11hrs post dose; MD likely ok to give post HD, pending plan  Cefepime 2gm iv x1 then 500mg iv q24h  Vanc random level in AM  Antimicrobial stop date 7 days     Pharmacy will follow daily and adjust medications as appropriate for renal function and/or serum levels.    Thank you,  Mine Rodriguez [facility]

## 2024-12-18 NOTE — PROGRESS NOTES
Hospitalist Progress Note    NAME:   Keaton Van   : 1965   MRN: [id number]     Date/Time: 2024 4:52 PM  Patient PCP: Robert Wells PA-C    Estimated discharge date: TBD  Barriers: IV antibiotics, orthopedic clearance, ID recommendation on discharge      Assessment / Plan:      Worsening Left hand cell, orthopedic and ID ulitis/abscess/flexor tenosynovitis, secondary to cat bite, POA  Hx Staph bacteremia, POA  Medication non-adherence  -Staph bacteremia blood cultures from the last admission  --CT left hand w contrast reveals evidence of MCP joint septic arthritis and adjacent articular abscess, last admission  --Left hand incision and drainage on 2024  - Was discharged on cefepime IV, vancomycin IV, to be given after dialysis, also Flagyl po until 1/15/2025  -Continue vancomycin, cefepime and Flagyl   -Continue pain control with IV Dilaudid 1 mg every 4 hours as needed  -Infectious disease and orthopedic surgery on board.  Appreciate their inputs          ESRD on Hemodialysis  --on  dialysis,  estela renae, received HD in ED today    - Current creatinine is 2.75. Baseline appears to be around 3.0        Right eye conjunctivitis.   c/w moxitrol     Hx of right BKA  - Positive for edema on examination, slight open wound, patient wanted Ortho to evaluate need for stitches  - Vascular sx consulted. No acute intervention needed. Local wound care ordered.     T2DM , chronic  - On Tresiba (degludec) at home  - Will continue with hospital substitution with Lantus insulin 14 units daily + sliding scale.   --Fs qachs.     HTN, chronic  -c/w home medications amlodipine, hydralazine, Bumex     Peripheral neuropathy, chronic  - continue home med; gabapentin     Mood disorder, chronic  - Continue home medication; duloxetine        Medical Decision Making:   I personally reviewed labs:  I personally reviewed imaging:  I personally reviewed EKG:  Toxic drug monitoring:

## 2024-12-18 NOTE — TELEPHONE ENCOUNTER
Spoke to nurse at [facility]. Patient did receive his treatment but she stated that it also depended on if he decided to stay at treatment. She did verify that patient was getting Vancomycin 500 mg, Cefepime 2 g when he would allow them to finish treatment. Last antibiotic was Thursday. Ended treatment early on Saturday per nurse at [facility].

## 2024-12-18 NOTE — PROGRESS NOTES
ID  Consult received  Chart and labs reviewed  Continue vancomycin, cefepime, Flagyl IV  ID service to see patient.

## 2024-12-19 PROBLEM — E11.8 CONTROLLED DIABETES MELLITUS TYPE 2 WITH COMPLICATIONS (HCC): Status: ACTIVE | Noted: 2024-05-29

## 2024-12-19 PROBLEM — Z89.511 HX OF BKA, RIGHT (HCC): Status: ACTIVE | Noted: 2024-12-19

## 2024-12-19 PROBLEM — L02.512 ABSCESS OF LEFT HAND: Status: ACTIVE | Noted: 2024-12-10

## 2024-12-19 LAB
ALBUMIN SERPL-MCNC: 3.1 G/DL (ref 3.5–5)
ANION GAP SERPL CALC-SCNC: 6 MMOL/L (ref 2–12)
BUN SERPL-MCNC: 45 MG/DL (ref 6–20)
BUN/CREAT SERPL: 18 (ref 12–20)
CALCIUM SERPL-MCNC: 8.5 MG/DL (ref 8.5–10.1)
CHLORIDE SERPL-SCNC: 97 MMOL/L (ref 97–108)
CO2 SERPL-SCNC: 31 MMOL/L (ref 21–32)
COMMENT:: NORMAL
CREAT SERPL-MCNC: 2.45 MG/DL (ref 0.7–1.3)
CRP SERPL-MCNC: 0.64 MG/DL (ref 0–0.3)
ERYTHROCYTE [DISTWIDTH] IN BLOOD BY AUTOMATED COUNT: 14.3 % (ref 11.5–14.5)
GLUCOSE BLD STRIP.AUTO-MCNC: 122 MG/DL (ref 65–117)
GLUCOSE BLD STRIP.AUTO-MCNC: 152 MG/DL (ref 65–117)
GLUCOSE BLD STRIP.AUTO-MCNC: 179 MG/DL (ref 65–117)
GLUCOSE BLD STRIP.AUTO-MCNC: 201 MG/DL (ref 65–117)
GLUCOSE SERPL-MCNC: 120 MG/DL (ref 65–100)
HCT VFR BLD AUTO: 26.9 % (ref 36.6–50.3)
HGB BLD-MCNC: 9.3 G/DL (ref 12.1–17)
MCH RBC QN AUTO: 29.4 PG (ref 26–34)
MCHC RBC AUTO-ENTMCNC: 34.6 G/DL (ref 30–36.5)
MCV RBC AUTO: 85.1 FL (ref 80–99)
NRBC # BLD: 0 K/UL (ref 0–0.01)
NRBC BLD-RTO: 0 PER 100 WBC
PHOSPHATE SERPL-MCNC: 2.7 MG/DL (ref 2.6–4.7)
PLATELET # BLD AUTO: 153 K/UL (ref 150–400)
PMV BLD AUTO: 10 FL (ref 8.9–12.9)
POTASSIUM SERPL-SCNC: 3.7 MMOL/L (ref 3.5–5.1)
RBC # BLD AUTO: 3.16 M/UL (ref 4.1–5.7)
SERVICE CMNT-IMP: ABNORMAL
SODIUM SERPL-SCNC: 134 MMOL/L (ref 136–145)
SPECIMEN HOLD: NORMAL
WBC # BLD AUTO: 6.6 K/UL (ref 4.1–11.1)

## 2024-12-19 PROCEDURE — 2580000003 HC RX 258: Performed by: INTERNAL MEDICINE

## 2024-12-19 PROCEDURE — 99024 POSTOP FOLLOW-UP VISIT: CPT | Performed by: ORTHOPAEDIC SURGERY

## 2024-12-19 PROCEDURE — 2580000003 HC RX 258: Performed by: STUDENT IN AN ORGANIZED HEALTH CARE EDUCATION/TRAINING PROGRAM

## 2024-12-19 PROCEDURE — 6360000002 HC RX W HCPCS: Performed by: INTERNAL MEDICINE

## 2024-12-19 PROCEDURE — 36415 COLL VENOUS BLD VENIPUNCTURE: CPT

## 2024-12-19 PROCEDURE — 80069 RENAL FUNCTION PANEL: CPT

## 2024-12-19 PROCEDURE — 99233 SBSQ HOSP IP/OBS HIGH 50: CPT | Performed by: INTERNAL MEDICINE

## 2024-12-19 PROCEDURE — 6370000000 HC RX 637 (ALT 250 FOR IP): Performed by: INTERNAL MEDICINE

## 2024-12-19 PROCEDURE — 6370000000 HC RX 637 (ALT 250 FOR IP): Performed by: STUDENT IN AN ORGANIZED HEALTH CARE EDUCATION/TRAINING PROGRAM

## 2024-12-19 PROCEDURE — 2500000003 HC RX 250 WO HCPCS: Performed by: STUDENT IN AN ORGANIZED HEALTH CARE EDUCATION/TRAINING PROGRAM

## 2024-12-19 PROCEDURE — 85027 COMPLETE CBC AUTOMATED: CPT

## 2024-12-19 PROCEDURE — 5A1D70Z PERFORMANCE OF URINARY FILTRATION, INTERMITTENT, LESS THAN 6 HOURS PER DAY: ICD-10-PCS | Performed by: INTERNAL MEDICINE

## 2024-12-19 PROCEDURE — 86140 C-REACTIVE PROTEIN: CPT

## 2024-12-19 PROCEDURE — 36556 INSERT NON-TUNNEL CV CATH: CPT

## 2024-12-19 PROCEDURE — 82962 GLUCOSE BLOOD TEST: CPT

## 2024-12-19 PROCEDURE — 90935 HEMODIALYSIS ONE EVALUATION: CPT

## 2024-12-19 PROCEDURE — 2500000003 HC RX 250 WO HCPCS: Performed by: FAMILY MEDICINE

## 2024-12-19 PROCEDURE — 6360000002 HC RX W HCPCS: Performed by: STUDENT IN AN ORGANIZED HEALTH CARE EDUCATION/TRAINING PROGRAM

## 2024-12-19 PROCEDURE — 1100000003 HC PRIVATE W/ TELEMETRY

## 2024-12-19 RX ORDER — LACTOBACILLUS RHAMNOSUS GG 10B CELL
1 CAPSULE ORAL
Status: DISCONTINUED | OUTPATIENT
Start: 2024-12-20 | End: 2024-12-19

## 2024-12-19 RX ORDER — LACTOBACILLUS RHAMNOSUS GG 10B CELL
1 CAPSULE ORAL
Status: DISCONTINUED | OUTPATIENT
Start: 2024-12-19 | End: 2024-12-31 | Stop reason: HOSPADM

## 2024-12-19 RX ORDER — VANCOMYCIN 1 G/200ML
1000 INJECTION, SOLUTION INTRAVENOUS ONCE
Status: COMPLETED | OUTPATIENT
Start: 2024-12-19 | End: 2024-12-19

## 2024-12-19 RX ADMIN — HYDRALAZINE HYDROCHLORIDE 50 MG: 25 TABLET, FILM COATED ORAL at 05:29

## 2024-12-19 RX ADMIN — GABAPENTIN 300 MG: 300 CAPSULE ORAL at 17:17

## 2024-12-19 RX ADMIN — HEPARIN SODIUM 5000 UNITS: 5000 INJECTION INTRAVENOUS; SUBCUTANEOUS at 05:28

## 2024-12-19 RX ADMIN — HYDROMORPHONE HYDROCHLORIDE 1 MG: 1 INJECTION, SOLUTION INTRAMUSCULAR; INTRAVENOUS; SUBCUTANEOUS at 09:36

## 2024-12-19 RX ADMIN — HYDRALAZINE HYDROCHLORIDE 50 MG: 25 TABLET, FILM COATED ORAL at 17:17

## 2024-12-19 RX ADMIN — HYDROMORPHONE HYDROCHLORIDE 1 MG: 1 INJECTION, SOLUTION INTRAMUSCULAR; INTRAVENOUS; SUBCUTANEOUS at 17:33

## 2024-12-19 RX ADMIN — HEPARIN SODIUM 5000 UNITS: 5000 INJECTION INTRAVENOUS; SUBCUTANEOUS at 21:33

## 2024-12-19 RX ADMIN — NEOMYCIN SULFATE, POLYMYXIN B SULFATE AND DEXAMETHASONE 1 DROP: 3.5; 10000; 1 SUSPENSION/ DROPS OPHTHALMIC at 17:28

## 2024-12-19 RX ADMIN — SODIUM CHLORIDE, PRESERVATIVE FREE 10 ML: 5 INJECTION INTRAVENOUS at 08:50

## 2024-12-19 RX ADMIN — INSULIN GLARGINE 14 UNITS: 100 INJECTION, SOLUTION SUBCUTANEOUS at 08:50

## 2024-12-19 RX ADMIN — NEOMYCIN SULFATE, POLYMYXIN B SULFATE AND DEXAMETHASONE 1 DROP: 3.5; 10000; 1 SUSPENSION/ DROPS OPHTHALMIC at 08:54

## 2024-12-19 RX ADMIN — GABAPENTIN 300 MG: 300 CAPSULE ORAL at 21:35

## 2024-12-19 RX ADMIN — HEPARIN SODIUM 5000 UNITS: 5000 INJECTION INTRAVENOUS; SUBCUTANEOUS at 17:16

## 2024-12-19 RX ADMIN — NEOMYCIN SULFATE, POLYMYXIN B SULFATE AND DEXAMETHASONE 1 DROP: 3.5; 10000; 1 SUSPENSION/ DROPS OPHTHALMIC at 21:38

## 2024-12-19 RX ADMIN — HYDROMORPHONE HYDROCHLORIDE 1 MG: 1 INJECTION, SOLUTION INTRAMUSCULAR; INTRAVENOUS; SUBCUTANEOUS at 01:36

## 2024-12-19 RX ADMIN — GABAPENTIN 300 MG: 300 CAPSULE ORAL at 08:50

## 2024-12-19 RX ADMIN — PANTOPRAZOLE SODIUM 40 MG: 40 TABLET, DELAYED RELEASE ORAL at 05:29

## 2024-12-19 RX ADMIN — HYDROMORPHONE HYDROCHLORIDE 1 MG: 1 INJECTION, SOLUTION INTRAMUSCULAR; INTRAVENOUS; SUBCUTANEOUS at 05:28

## 2024-12-19 RX ADMIN — Medication 1 CAPSULE: at 17:32

## 2024-12-19 RX ADMIN — SODIUM CHLORIDE: 9 INJECTION, SOLUTION INTRAVENOUS at 17:19

## 2024-12-19 RX ADMIN — LURASIDONE HYDROCHLORIDE 20 MG: 20 TABLET ORAL at 17:17

## 2024-12-19 RX ADMIN — BUMETANIDE 2 MG: 1 TABLET ORAL at 17:16

## 2024-12-19 RX ADMIN — BUMETANIDE 2 MG: 1 TABLET ORAL at 08:50

## 2024-12-19 RX ADMIN — PIPERACILLIN AND TAZOBACTAM 3375 MG: 3; .375 INJECTION, POWDER, LYOPHILIZED, FOR SOLUTION INTRAVENOUS at 00:18

## 2024-12-19 RX ADMIN — VANCOMYCIN 1000 MG: 1 INJECTION, SOLUTION INTRAVENOUS at 17:53

## 2024-12-19 RX ADMIN — NEOMYCIN SULFATE, POLYMYXIN B SULFATE AND DEXAMETHASONE 1 DROP: 3.5; 10000; 1 SUSPENSION/ DROPS OPHTHALMIC at 13:00

## 2024-12-19 RX ADMIN — PIPERACILLIN AND TAZOBACTAM 3375 MG: 3; .375 INJECTION, POWDER, LYOPHILIZED, FOR SOLUTION INTRAVENOUS at 17:20

## 2024-12-19 RX ADMIN — HYDROMORPHONE HYDROCHLORIDE 1 MG: 1 INJECTION, SOLUTION INTRAMUSCULAR; INTRAVENOUS; SUBCUTANEOUS at 21:33

## 2024-12-19 RX ADMIN — INSULIN LISPRO 2 UNITS: 100 INJECTION, SOLUTION INTRAVENOUS; SUBCUTANEOUS at 12:22

## 2024-12-19 RX ADMIN — SODIUM CHLORIDE: 9 INJECTION, SOLUTION INTRAVENOUS at 17:53

## 2024-12-19 RX ADMIN — SODIUM CHLORIDE, PRESERVATIVE FREE 10 ML: 5 INJECTION INTRAVENOUS at 21:38

## 2024-12-19 RX ADMIN — HYDRALAZINE HYDROCHLORIDE 50 MG: 25 TABLET, FILM COATED ORAL at 21:35

## 2024-12-19 RX ADMIN — HYDROMORPHONE HYDROCHLORIDE 1 MG: 1 INJECTION, SOLUTION INTRAMUSCULAR; INTRAVENOUS; SUBCUTANEOUS at 13:30

## 2024-12-19 ASSESSMENT — PAIN SCALES - GENERAL
PAINLEVEL_OUTOF10: 0
PAINLEVEL_OUTOF10: 8
PAINLEVEL_OUTOF10: 5
PAINLEVEL_OUTOF10: 7

## 2024-12-19 NOTE — DIALYSIS
Primary RN SBAR: TANMAY Brian Rn  Incapacitated Nurse [facility]. provided: yes  Patient Education provided: fluid monitoring  Preferred Education method and Primary language: verbal,english  Hospital General Consent Verified: yes  Hospital associated wait time; reason: 15 minutes, orders incomplete    Hepatitis B Surface Ag   Date/Time Value Ref Range Status   12/03/2024 08:37 AM 0.30 Index Final     Hep B S Ag Interp   Date/Time Value Ref Range Status   12/03/2024 08:37 AM Negative NEG   Final     Hep B S Ab   Date/Time Value Ref Range Status   12/03/2024 08:37 AM 5.73 mIU/mL Final     Hep B S Ab Interp   Date/Time Value Ref Range Status   12/03/2024 08:37 AM NONREACTIVE NR   Final     Comment:     (NOTE)  The [employer] Anti-HBs2 assay is traceable to the World Health   Organization (WHO) Hepatitis B Immunoglobulin 1st International   Reference Preparation (1977). Samples with a calculated value of 10   mIU/mL or greater are considered reactive (protective) in accordance   with the CDC guidelines. The accepted criteria for immunity to HBV is   anti-HBs activity greater than or equal to 10 mIU/mL, as defined by   the WHO International Reference Preparation.  Assay performance has not been established in pregnant women,   patients who are immunosuppressed or immunocompromised, nor have   performance characteristics been established in conjunction with   other 's assays for specific HBV serologic markers. This   assay does not differentiate between vaccine induced immune response   and a response due to infection with HBV. Passively acquired anti-HBs   may be identified following patient transfusion, receipt of   immunoglobulin products, etc.        12/19/24 1150   Observations & Evaluations   Level of Consciousness 0   Oriented X 4   Heart Rhythm Regular   Respiratory Quality/Effort Unlabored   O2 Device None (Room air)   Bilateral Breath Sounds Clear   Skin Color Pink   Skin Condition/Temp Dry;Warm

## 2024-12-19 NOTE — PROGRESS NOTES
Nephrology Progress Note  [facility] / [place] [facility]  [address], [address], [address]  [place] [address]  Phone - [phone]  Fax - [phone]                 Patient: Keaton Van                     YOB: 1965        Date- 12/19/2024                                     Admit Date: 12/17/2024   CC: Follow up for esrd   IMPRESSION & PLAN:   esrd  - TTS, [facility], right chest PermCath)  Surgical wound dehiscence  Cat bite/left upper extremity cellulitis,recurrent  Index finger cellulitis, MCP joint septic arthritis, juxta articular abscess  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  CHF  HTN  DM  Anemia      PLAN-  SEEN ON HD TODAY  D/w patient- missing dialysis as out pt is making situation difficult. He didn't get his antibiotics last week.  Continue norvasc, hydralazine , losartan  Abx per primary team.       Subjective:  Interval History:   Seen on hd  Bp stable    Objective:   Vitals:    12/19/24 0847 12/19/24 1150 12/19/24 1210 12/19/24 1225   BP: (!) 150/87 (!) 156/81 (!) 149/84 (!) 148/72   Pulse: 81 76 79 84   Resp: 17 18     Temp: 98.2 °F (36.8 °C) 98.1 °F (36.7 °C)     TempSrc:       SpO2: 94% 94%     Weight:       Height:          No intake/output data recorded.  No intake/output data recorded.      Physical exam:    GEN: nad  NECK- no mass  RESP: no wheezing  NEURO: Normal speech, non focal  EXT: Left hand wound dehiscence noted  PSYCH: Normal Mood    Chart reviewed.         Pertinent Notes reviewed.     Data Review :  Lab Results   Component Value Date/Time     12/17/2024 09:00 AM    K 4.7 12/17/2024 09:00 AM     12/17/2024 09:00 AM    CO2 25 12/17/2024 09:00 AM    BUN 51 12/17/2024 09:00 AM    CREATININE 2.75 12/17/2024 09:00 AM    GLUCOSE 181 12/17/2024 09:00 AM    CALCIUM 9.7 12/17/2024 09:00 AM       Lab Results   Component Value Date    WBC 5.6 12/17/2024    HGB 11.5 (L) 12/17/2024    HCT 33.7 (L) 12/17/2024

## 2024-12-19 NOTE — PROGRESS NOTES
Hospitalist Progress Note    NAME:   Keaton Van   : 1965   MRN: [id number]     Date/Time: 2024 4:16 PM  Patient PCP: Robert Wells PA-C    Estimated discharge date: TBD  Barriers: IV antibiotics, orthopedic clearance, ID recommendation on discharge      Assessment / Plan:      Worsening Left hand cell, orthopedic and ID ulitis/abscess/flexor tenosynovitis, secondary to cat bite, POA  Hx Staph bacteremia, POA  Medication non-adherence  -Staph bacteremia blood cultures from the last admission  --CT left hand w contrast reveals evidence of MCP joint septic arthritis and adjacent articular abscess, last admission  --Left hand incision and drainage on 2024  - Was discharged on cefepime IV, vancomycin IV, to be given after dialysis, also Flagyl po x 2 weeks end date   -Change vancomycin to Zosyn  -Continue pain control with IV Dilaudid 1 mg every 4 hours as needed  -Infectious disease and orthopedic surgery on board.  Appreciate their inputs  -ID recommending intraoperative culture  -Ortho plans to watch for a few days on IV antibiotics.  All inputs appreciated          ESRD on Hemodialysis  --on  dialysis,  neprho following, received HD in ED today    - Current creatinine is 2.75. Baseline appears to be around 3.0        Right eye conjunctivitis.   c/w moxitrol     Hx of right BKA  - Positive for edema on examination, slight open wound, patient wanted Ortho to evaluate need for stitches  - Vascular sx consulted. No acute intervention needed. Local wound care ordered.     T2DM , chronic  - On Tresiba (degludec) at home  - Will continue with hospital substitution with Lantus insulin 14 units daily + sliding scale.   --Fs qachs.     HTN, chronic  -c/w home medications amlodipine, hydralazine, Bumex     Peripheral neuropathy, chronic  - continue home med; gabapentin     Mood disorder, chronic  - Continue home medication; duloxetine        Medical Decision Making:

## 2024-12-19 NOTE — PROGRESS NOTES
Orthopedic Surgery Progress Note     2024 2:26 PM     Patient Name: Keaton Van      Subjective:      Keaton Van is a 59 y.o. male is resting comfortably in bed. Pain is controlled. Tolerating diet. Reports pain seems slightly worse today, but states he is able to move his fingers more.       Objective:    General: Alert, cooperative, no distress.   Gastrointestinal:  Soft, non-tender.   Musculoskeletal:     Examination of the left hand demonstrates improving erythema and swelling at the volar and dorsal hand overlying the second MP joint. Incision is dry and intact today.   Unable to express any drainage. Does have slight increase in pain to palpation of the volar aspect of the MCP joint of the index finger.  Can wiggle the finger today, PIPJ to 100 flexion and -10 extension, MCPJ 70 degrees flexion and -10 extension. Digit is warm and well-perfused. Cap refill < 2 sec.    Vital Signs:    Patient Vitals for the past 8 hrs:   BP Temp Pulse Resp SpO2   24 1325 127/71 -- 77 -- --   24 1310 (!) 153/72 -- 83 -- --   24 1255 136/75 -- 83 -- --   24 1240 110/65 -- 74 -- --   24 1225 (!) 148/72 -- 84 -- --   24 1210 (!) 149/84 -- 79 -- --   24 1150 (!) 156/81 98.1 °F (36.7 °C) 76 18 94 %   24 0847 (!) 150/87 98.2 °F (36.8 °C) 81 17 94 %     Temp (24hrs), Av.3 °F (30.7 °C), Min:33.8 °F (1 °C), Max:98.2 °F (36.8 °C)      Intake/Output:  No intake/output data recorded.  No intake/output data recorded.    Pain Control:        LAB:    Recent Labs     24  1337   HCT 26.9*   HGB 9.3*     Lab Results   Component Value Date/Time     2024 09:00 AM    K 4.7 2024 09:00 AM     2024 09:00 AM    CO2 25 2024 09:00 AM    BUN 51 2024 09:00 AM         Assessment:      Principal Problem:    Wound infection  Active Problems:    Controlled diabetes mellitus type 2 with complications (HCC)    Cellulitis of left hand    Flexor

## 2024-12-19 NOTE — PROGRESS NOTES
Infectious Disease progress        Impression    Cellulitis and abscess of left hand  Flexor tenosynovitis  Septic arthritis  Secondary to cat bite.  CT hand 12/4+ for 1.9 x 1.6 x 0.9 cm rim-enhancing  fluid collection lateral to  Index finger MCP joint concerning for juxta-articular abscess  MCP joint septic arthritis  S/p I&D by hand surgery 12/5  Findings of copious infectious fluid. No gross purulence.  Fluid around joint noted.  IntraOp culture-NG, GS-no organisms.  Patient readmitted for concerns for worsening cellulitis, flexor tenosynovitis  Patient for repeat I&D.  On exam appears to be progressing as expected.     Coagulase-negative staph  Bacillus bacteremia  Blood cultures 12/2+ for CoNS, bacillus species  Given presence of wound infection cannot assume it is a contaminant.  Repeat blood cultures 12/9-NGTD.        ESRD  On HD     AVF present LUE  Intact     Diabetes type 2  A1c 7.4     R/BKA      H/o diabetic right foot foot infection  Cellulitis, OM 6/2024     H/o mood disorder  Continue home meds     ESR 60, repeat 63  CRP 4.28      Plan  Plan was for 6 weeks of antibiotics vancomycin and cefepime IV 12/5-1/16  Flagyl p.o. x 2 weeks end date 12/19  Hemodialysis center contacted-patient has been receiving vancomycin and cefepime  as scheduled with hemodialysis.  Unfortunately patient did not  prescription for Flagyl.    Continue Vancomycin , Zosyn IV at this time  Keep the hand elevated  Will discuss with hand surgery if debridement is planned  ID service to follow.      Abx  Vancomycin, cefepime, Flagyl 12/17-12/18  Vancomycin, Zosyn-12/18.      Extensive review of chart notes, labs, imaging, cultures done  Additionally review of done: Recent reports-Labs, cultures, imaging  D/w -hospitalist, BHAKTI Van is seen for Cat bite.  Patient is known to ID service.  Keaton Van is a 59-year-old male with past medical history significant for end-stage renal disease on hemo  Hypertension, right BKA,

## 2024-12-20 ENCOUNTER — APPOINTMENT (OUTPATIENT)
Facility: HOSPITAL | Age: 59
DRG: 513 | End: 2024-12-20
Payer: MEDICARE

## 2024-12-20 LAB
ANION GAP SERPL CALC-SCNC: 6 MMOL/L (ref 2–12)
BASOPHILS # BLD: 0.1 K/UL (ref 0–0.1)
BASOPHILS NFR BLD: 1 % (ref 0–1)
BUN SERPL-MCNC: 42 MG/DL (ref 6–20)
BUN/CREAT SERPL: 15 (ref 12–20)
CALCIUM SERPL-MCNC: 8.9 MG/DL (ref 8.5–10.1)
CHLORIDE SERPL-SCNC: 99 MMOL/L (ref 97–108)
CO2 SERPL-SCNC: 29 MMOL/L (ref 21–32)
CREAT SERPL-MCNC: 2.73 MG/DL (ref 0.7–1.3)
DIFFERENTIAL METHOD BLD: ABNORMAL
EOSINOPHIL # BLD: 0.2 K/UL (ref 0–0.4)
EOSINOPHIL NFR BLD: 4 % (ref 0–7)
ERYTHROCYTE [DISTWIDTH] IN BLOOD BY AUTOMATED COUNT: 14.3 % (ref 11.5–14.5)
GLUCOSE BLD STRIP.AUTO-MCNC: 111 MG/DL (ref 65–117)
GLUCOSE BLD STRIP.AUTO-MCNC: 118 MG/DL (ref 65–117)
GLUCOSE BLD STRIP.AUTO-MCNC: 174 MG/DL (ref 65–117)
GLUCOSE BLD STRIP.AUTO-MCNC: 188 MG/DL (ref 65–117)
GLUCOSE SERPL-MCNC: 112 MG/DL (ref 65–100)
HCT VFR BLD AUTO: 27.9 % (ref 36.6–50.3)
HGB BLD-MCNC: 9.5 G/DL (ref 12.1–17)
IMM GRANULOCYTES # BLD AUTO: 0.1 K/UL (ref 0–0.04)
IMM GRANULOCYTES NFR BLD AUTO: 1 % (ref 0–0.5)
LYMPHOCYTES # BLD: 0.8 K/UL (ref 0.8–3.5)
LYMPHOCYTES NFR BLD: 14 % (ref 12–49)
MCH RBC QN AUTO: 29.4 PG (ref 26–34)
MCHC RBC AUTO-ENTMCNC: 34.1 G/DL (ref 30–36.5)
MCV RBC AUTO: 86.4 FL (ref 80–99)
MONOCYTES # BLD: 0.6 K/UL (ref 0–1)
MONOCYTES NFR BLD: 10 % (ref 5–13)
NEUTS SEG # BLD: 3.7 K/UL (ref 1.8–8)
NEUTS SEG NFR BLD: 70 % (ref 32–75)
NRBC # BLD: 0 K/UL (ref 0–0.01)
NRBC BLD-RTO: 0 PER 100 WBC
PLATELET # BLD AUTO: 150 K/UL (ref 150–400)
PMV BLD AUTO: 9.6 FL (ref 8.9–12.9)
POTASSIUM SERPL-SCNC: 4 MMOL/L (ref 3.5–5.1)
RBC # BLD AUTO: 3.23 M/UL (ref 4.1–5.7)
RBC MORPH BLD: ABNORMAL
SERVICE CMNT-IMP: ABNORMAL
SERVICE CMNT-IMP: NORMAL
SODIUM SERPL-SCNC: 134 MMOL/L (ref 136–145)
WBC # BLD AUTO: 5.5 K/UL (ref 4.1–11.1)

## 2024-12-20 PROCEDURE — 36415 COLL VENOUS BLD VENIPUNCTURE: CPT

## 2024-12-20 PROCEDURE — 6360000002 HC RX W HCPCS: Performed by: INTERNAL MEDICINE

## 2024-12-20 PROCEDURE — 6370000000 HC RX 637 (ALT 250 FOR IP): Performed by: STUDENT IN AN ORGANIZED HEALTH CARE EDUCATION/TRAINING PROGRAM

## 2024-12-20 PROCEDURE — 80048 BASIC METABOLIC PNL TOTAL CA: CPT

## 2024-12-20 PROCEDURE — 99024 POSTOP FOLLOW-UP VISIT: CPT | Performed by: ORTHOPAEDIC SURGERY

## 2024-12-20 PROCEDURE — A9579 GAD-BASE MR CONTRAST NOS,1ML: HCPCS | Performed by: FAMILY MEDICINE

## 2024-12-20 PROCEDURE — 1100000003 HC PRIVATE W/ TELEMETRY

## 2024-12-20 PROCEDURE — 2500000003 HC RX 250 WO HCPCS: Performed by: FAMILY MEDICINE

## 2024-12-20 PROCEDURE — 2500000003 HC RX 250 WO HCPCS: Performed by: STUDENT IN AN ORGANIZED HEALTH CARE EDUCATION/TRAINING PROGRAM

## 2024-12-20 PROCEDURE — 73220 MRI UPPR EXTREMITY W/O&W/DYE: CPT

## 2024-12-20 PROCEDURE — 6360000004 HC RX CONTRAST MEDICATION: Performed by: FAMILY MEDICINE

## 2024-12-20 PROCEDURE — 2580000003 HC RX 258: Performed by: STUDENT IN AN ORGANIZED HEALTH CARE EDUCATION/TRAINING PROGRAM

## 2024-12-20 PROCEDURE — 85025 COMPLETE CBC W/AUTO DIFF WBC: CPT

## 2024-12-20 PROCEDURE — 82962 GLUCOSE BLOOD TEST: CPT

## 2024-12-20 PROCEDURE — 94761 N-INVAS EAR/PLS OXIMETRY MLT: CPT

## 2024-12-20 PROCEDURE — 36556 INSERT NON-TUNNEL CV CATH: CPT

## 2024-12-20 PROCEDURE — 2580000003 HC RX 258: Performed by: INTERNAL MEDICINE

## 2024-12-20 PROCEDURE — 6370000000 HC RX 637 (ALT 250 FOR IP): Performed by: INTERNAL MEDICINE

## 2024-12-20 PROCEDURE — 6360000002 HC RX W HCPCS: Performed by: STUDENT IN AN ORGANIZED HEALTH CARE EDUCATION/TRAINING PROGRAM

## 2024-12-20 PROCEDURE — 99233 SBSQ HOSP IP/OBS HIGH 50: CPT | Performed by: INTERNAL MEDICINE

## 2024-12-20 RX ADMIN — HYDROMORPHONE HYDROCHLORIDE 1 MG: 1 INJECTION, SOLUTION INTRAMUSCULAR; INTRAVENOUS; SUBCUTANEOUS at 01:26

## 2024-12-20 RX ADMIN — HEPARIN SODIUM 5000 UNITS: 5000 INJECTION INTRAVENOUS; SUBCUTANEOUS at 13:31

## 2024-12-20 RX ADMIN — HYDROMORPHONE HYDROCHLORIDE 1 MG: 1 INJECTION, SOLUTION INTRAMUSCULAR; INTRAVENOUS; SUBCUTANEOUS at 21:33

## 2024-12-20 RX ADMIN — NEOMYCIN SULFATE, POLYMYXIN B SULFATE AND DEXAMETHASONE 1 DROP: 3.5; 10000; 1 SUSPENSION/ DROPS OPHTHALMIC at 21:41

## 2024-12-20 RX ADMIN — HYDRALAZINE HYDROCHLORIDE 50 MG: 25 TABLET, FILM COATED ORAL at 21:34

## 2024-12-20 RX ADMIN — LOSARTAN POTASSIUM 25 MG: 25 TABLET, FILM COATED ORAL at 09:33

## 2024-12-20 RX ADMIN — HEPARIN SODIUM 5000 UNITS: 5000 INJECTION INTRAVENOUS; SUBCUTANEOUS at 06:09

## 2024-12-20 RX ADMIN — AMLODIPINE BESYLATE 10 MG: 5 TABLET ORAL at 09:33

## 2024-12-20 RX ADMIN — Medication 1 CAPSULE: at 09:34

## 2024-12-20 RX ADMIN — HYDROMORPHONE HYDROCHLORIDE 1 MG: 1 INJECTION, SOLUTION INTRAMUSCULAR; INTRAVENOUS; SUBCUTANEOUS at 05:41

## 2024-12-20 RX ADMIN — INSULIN GLARGINE 14 UNITS: 100 INJECTION, SOLUTION SUBCUTANEOUS at 09:34

## 2024-12-20 RX ADMIN — BUMETANIDE 2 MG: 1 TABLET ORAL at 09:33

## 2024-12-20 RX ADMIN — GADOTERIDOL 19 ML: 279.3 INJECTION, SOLUTION INTRAVENOUS at 17:02

## 2024-12-20 RX ADMIN — GABAPENTIN 300 MG: 300 CAPSULE ORAL at 13:31

## 2024-12-20 RX ADMIN — HYDRALAZINE HYDROCHLORIDE 50 MG: 25 TABLET, FILM COATED ORAL at 06:09

## 2024-12-20 RX ADMIN — HYDROMORPHONE HYDROCHLORIDE 1 MG: 1 INJECTION, SOLUTION INTRAMUSCULAR; INTRAVENOUS; SUBCUTANEOUS at 13:44

## 2024-12-20 RX ADMIN — BUMETANIDE 2 MG: 1 TABLET ORAL at 17:45

## 2024-12-20 RX ADMIN — HYDROMORPHONE HYDROCHLORIDE 1 MG: 1 INJECTION, SOLUTION INTRAMUSCULAR; INTRAVENOUS; SUBCUTANEOUS at 09:37

## 2024-12-20 RX ADMIN — PANTOPRAZOLE SODIUM 40 MG: 40 TABLET, DELAYED RELEASE ORAL at 06:09

## 2024-12-20 RX ADMIN — PIPERACILLIN AND TAZOBACTAM 3375 MG: 3; .375 INJECTION, POWDER, LYOPHILIZED, FOR SOLUTION INTRAVENOUS at 00:56

## 2024-12-20 RX ADMIN — GABAPENTIN 300 MG: 300 CAPSULE ORAL at 21:34

## 2024-12-20 RX ADMIN — SODIUM CHLORIDE, PRESERVATIVE FREE 10 ML: 5 INJECTION INTRAVENOUS at 21:42

## 2024-12-20 RX ADMIN — NEOMYCIN SULFATE, POLYMYXIN B SULFATE AND DEXAMETHASONE 1 DROP: 3.5; 10000; 1 SUSPENSION/ DROPS OPHTHALMIC at 13:57

## 2024-12-20 RX ADMIN — GABAPENTIN 300 MG: 300 CAPSULE ORAL at 09:33

## 2024-12-20 RX ADMIN — NEOMYCIN SULFATE, POLYMYXIN B SULFATE AND DEXAMETHASONE 1 DROP: 3.5; 10000; 1 SUSPENSION/ DROPS OPHTHALMIC at 18:23

## 2024-12-20 RX ADMIN — HYDROMORPHONE HYDROCHLORIDE 1 MG: 1 INJECTION, SOLUTION INTRAMUSCULAR; INTRAVENOUS; SUBCUTANEOUS at 17:45

## 2024-12-20 RX ADMIN — LURASIDONE HYDROCHLORIDE 20 MG: 20 TABLET ORAL at 17:45

## 2024-12-20 RX ADMIN — PIPERACILLIN AND TAZOBACTAM 3375 MG: 3; .375 INJECTION, POWDER, LYOPHILIZED, FOR SOLUTION INTRAVENOUS at 13:38

## 2024-12-20 RX ADMIN — SODIUM CHLORIDE, PRESERVATIVE FREE 10 ML: 5 INJECTION INTRAVENOUS at 09:30

## 2024-12-20 RX ADMIN — SODIUM CHLORIDE 200 ML: 9 INJECTION, SOLUTION INTRAVENOUS at 00:55

## 2024-12-20 RX ADMIN — HEPARIN SODIUM 5000 UNITS: 5000 INJECTION INTRAVENOUS; SUBCUTANEOUS at 21:43

## 2024-12-20 RX ADMIN — HYDRALAZINE HYDROCHLORIDE 50 MG: 25 TABLET, FILM COATED ORAL at 13:31

## 2024-12-20 ASSESSMENT — PAIN SCALES - GENERAL
PAINLEVEL_OUTOF10: 1
PAINLEVEL_OUTOF10: 8
PAINLEVEL_OUTOF10: 9
PAINLEVEL_OUTOF10: 7
PAINLEVEL_OUTOF10: 8
PAINLEVEL_OUTOF10: 9
PAINLEVEL_OUTOF10: 1
PAINLEVEL_OUTOF10: 1
PAINLEVEL_OUTOF10: 7
PAINLEVEL_OUTOF10: 0
PAINLEVEL_OUTOF10: 6

## 2024-12-20 ASSESSMENT — PAIN DESCRIPTION - LOCATION
LOCATION: FINGER (COMMENT WHICH ONE)
LOCATION: HAND

## 2024-12-20 ASSESSMENT — PAIN DESCRIPTION - DESCRIPTORS
DESCRIPTORS: PINS AND NEEDLES
DESCRIPTORS: CRAMPING;ACHING
DESCRIPTORS: CRAMPING
DESCRIPTORS: BURNING

## 2024-12-20 ASSESSMENT — PAIN SCALES - WONG BAKER
WONGBAKER_NUMERICALRESPONSE: NO HURT

## 2024-12-20 ASSESSMENT — PAIN DESCRIPTION - ORIENTATION
ORIENTATION: LEFT

## 2024-12-20 NOTE — PROGRESS NOTES
Infectious Disease progress        Impression    Cellulitis and abscess of left hand  Flexor tenosynovitis  Septic arthritis  Secondary to cat bite.  CT hand 12/4+ for 1.9 x 1.6 x 0.9 cm rim-enhancing  fluid collection lateral to  Index finger MCP joint concerning for juxta-articular abscess  MCP joint septic arthritis  S/p I&D by hand surgery 12/5  Findings of copious infectious fluid. No gross purulence.  Fluid around joint noted.  IntraOp culture-NG, GS-no organisms.  Patient readmitted for concerns for worsening cellulitis, flexor tenosynovitis  Patient for repeat I&D.  On exam localized erythema and swelling unchanged(see photo below).     Coagulase-negative staph  Bacillus bacteremia  Blood cultures 12/2+ for CoNS, bacillus species  Given presence of wound infection cannot assume it is a contaminant.  Repeat blood cultures 12/9-NGTD.        ESRD  On HD     AVF present LUE  Intact     Diabetes type 2  A1c 7.4     R/BKA     H/o diabetic right foot foot infection  Cellulitis, OM 6/2024     H/o mood disorder  Continue home meds     ESR 60, repeat 63  CRP 4.28      Plan  Plan was for 6 weeks of antibiotics vancomycin and cefepime IV 12/5-1/16  Flagyl p.o. x 2 weeks end date 12/19-  Hemodialysis center contacted-patient has been receiving vancomycin and cefepime  as scheduled with hemodialysis.  Unfortunately patient did not  prescription for Flagyl.    Continue Vancomycin , Zosyn IV x 6 weeks 12/18 -1/29  Keep the hand elevated  MRI planned hand surgery   Anticipate discharge on vancomycin and Zosyn  Patient is agreeable to having a Luly catheter placed    Antimicrobial orders for discharge  -Vancomycin 750 mg IV after HD 3 times weekly end date 1/29 2025  -Zosyn 3.375G IV every 12h  end date 1/29/2025  -Weekly CBC, CMP, vancomycin trough-and ESR/CRP every other week  -Fax reports to [phone], call with critical labs  at [phone]  -Encourage adequate fluids, daily probiotic/yogurt  -If line malfunction    Result Value Ref Range    POC Glucose 179 (H) 65 - 117 mg/dL    Performed by: Yanelis Dsouza PCT    CBC with Auto Differential    Collection Time: 12/20/24  5:17 AM   Result Value Ref Range    WBC 5.5 4.1 - 11.1 K/uL    RBC 3.23 (L) 4.10 - 5.70 M/uL    Hemoglobin 9.5 (L) 12.1 - 17.0 g/dL    Hematocrit 27.9 (L) 36.6 - 50.3 %    MCV 86.4 80.0 - 99.0 FL    MCH 29.4 26.0 - 34.0 PG    MCHC 34.1 30.0 - 36.5 g/dL    RDW 14.3 11.5 - 14.5 %    Platelets 150 150 - 400 K/uL    MPV 9.6 8.9 - 12.9 FL    Nucleated RBCs 0.0 0  WBC    nRBC 0.00 0.00 - 0.01 K/uL    Neutrophils % 70 32 - 75 %    Lymphocytes % 14 12 - 49 %    Monocytes % 10 5 - 13 %    Eosinophils % 4 0 - 7 %    Basophils % 1 0 - 1 %    Immature Granulocytes % 1 (H) 0.0 - 0.5 %    Neutrophils Absolute 3.7 1.8 - 8.0 K/UL    Lymphocytes Absolute 0.8 0.8 - 3.5 K/UL    Monocytes Absolute 0.6 0.0 - 1.0 K/UL    Eosinophils Absolute 0.2 0.0 - 0.4 K/UL    Basophils Absolute 0.1 0.0 - 0.1 K/UL    Immature Granulocytes Absolute 0.1 (H) 0.00 - 0.04 K/UL    Differential Type AUTOMATED      RBC Comment NORMOCYTIC, NORMOCHROMIC     Basic Metabolic Panel    Collection Time: 12/20/24  5:17 AM   Result Value Ref Range    Sodium 134 (L) 136 - 145 mmol/L    Potassium 4.0 3.5 - 5.1 mmol/L    Chloride 99 97 - 108 mmol/L    CO2 29 21 - 32 mmol/L    Anion Gap 6 2 - 12 mmol/L    Glucose 112 (H) 65 - 100 mg/dL    BUN 42 (H) 6 - 20 MG/DL    Creatinine 2.73 (H) 0.70 - 1.30 MG/DL    BUN/Creatinine Ratio 15 12 - 20      Est, Glom Filt Rate 26 (L) >60 ml/min/1.73m2    Calcium 8.9 8.5 - 10.1 MG/DL   POCT Glucose    Collection Time: 12/20/24  6:36 AM   Result Value Ref Range    POC Glucose 118 (H) 65 - 117 mg/dL    Performed by: Yanelis Dsouza PCT        Results       Procedure Component Value Units Date/Time    Blood Culture 2 [[id number]] Collected: 12/17/24 0910    Order Status: Completed Specimen: Blood Updated: 12/19/24 1236     Special Requests --        NO SPECIAL

## 2024-12-20 NOTE — CARE COORDINATION
Transition of Care Plan:     RUR: 31%  Prior Level of Functioning: Independent   Disposition: Home with sister   TAN: 12/23/24  If SNF or IPR: Date FOC offered:   Date FOC received:   Accepting facility:   Date authorization started with reference number:   Date authorization received and expires:   Follow up appointments: PCP   DME needed: No DME needed   Transportation at discharge: Pt's sister   IM/IMM Medicare/ letter given: 2nd IMM letter   Is patient a  and connected with [facility]? No              If yes, was  transfer form completed and [facility] notified? No  Caregiver Contact: Pt's sister   Discharge Caregiver contacted prior to discharge? Pt will be contacted   Care Conference needed? No  Barriers to discharge: Medical clearance        358 pm: CM follow up with [facility] to see if they received the IV abx order. CM was informed that they can do the Vancomycin but can't provide the Zosyn.     CM sent referral to [facility] to see if they could assist with getting pt the Zosyn.      329 pm: CM faxed ([phone]) the IV Abx order to [facility]. [facility] number to [facility] is [phone].     Initial Note: CM reviewed pt's chart and pt is not medically stable for d/c due to IV abx and ortho and ID clearance.    CM will continue to follow and assist with d/c planning.    Amparo Argueta

## 2024-12-20 NOTE — PLAN OF CARE
Problem: Safety - Adult  Goal: Free from fall injury  12/20/2024 1627 by Sunitha Rosa RN  Outcome: Progressing  12/20/2024 0239 by Lillian Doshi RN  Outcome: Progressing     Problem: Chronic Conditions and Co-morbidities  Goal: Patient's chronic conditions and co-morbidity symptoms are monitored and maintained or improved  12/20/2024 1627 by Sunitha Rosa RN  Outcome: Progressing  12/20/2024 0239 by Lillian Doshi RN  Outcome: Progressing     Problem: Discharge Planning  Goal: Discharge to home or other facility with appropriate resources  12/20/2024 1627 by Sunitha Rosa RN  Outcome: Progressing  12/20/2024 0239 by Lillian Doshi RN  Outcome: Progressing     Problem: Pain  Goal: Verbalizes/displays adequate comfort level or baseline comfort level  12/20/2024 1627 by Sunitha Rosa RN  Outcome: Progressing  12/20/2024 0239 by Lillian Doshi RN  Outcome: Progressing     Problem: ABCDS Injury Assessment  Goal: Absence of physical injury  12/20/2024 1627 by Sunitha Rosa RN  Outcome: Progressing  12/20/2024 0239 by Lillian Doshi RN  Outcome: Progressing

## 2024-12-20 NOTE — PROGRESS NOTES
Addendum:  - MRI reviewed with Dr Owens  - Patient will need repeat I&D of left hand  - Plan for left hand I&D tomorrow with Dr Owens around 10:30 am   - NPO at midnight  - Hold heparin after evening dose today.  - Obtain consent   - Discussed plans with medical service.       Agree with above plan. MRI concerning for recurrent abscess. Will plan for irrigation and debridment 24.     [facility]      Orthopedic Surgery Progress Note     2024 11:13 AM     Patient Name: Keaton Van      Subjective:      Keaton Van is a 59 y.o. male is resting comfortably in bed. Pain is controlled. Tolerating diet. \"  This pain just is not getting much better\"       Objective:    General: Alert, cooperative, no distress.   Gastrointestinal:  Soft, non-tender.   Musculoskeletal:     Examination of the left hand demonstrates  erythema and swelling at the volar and dorsal hand overlying the second MP joint similar to yesterday. Incision is dry and intact today.   Unable to express any drainage. Does have slight increase in pain to palpation of the volar aspect of the MCP joint of the index finger.  Can wiggle the finger today, PIPJ to 100 flexion and -10 extension, MCPJ 70 degrees flexion and -10 extension. Digit is warm and well-perfused. Cap refill < 2 sec.    Vital Signs:    Patient Vitals for the past 8 hrs:   BP Temp Temp src Pulse Resp SpO2   24 1007 -- -- -- -- 18 --   24 0937 -- -- -- -- 19 --   24 0748 (!) 149/80 98.6 °F (37 °C) -- 82 17 96 %   24 0332 130/71 98.1 °F (36.7 °C) Tympanic 67 18 97 %     Temp (24hrs), Av.3 °F (36.8 °C), Min:98.1 °F (36.7 °C), Max:98.8 °F (37.1 °C)      Intake/Output:  No intake/output data recorded.   1901 -  0700  In: 300   Out: 2800     Pain Control:        LAB:    Recent Labs     24  0517   HCT 27.9*   HGB 9.5*     Lab Results   Component Value Date/Time     2024 05:17 AM    K 4.0 2024 05:17 AM    CL 99 2024

## 2024-12-20 NOTE — PROGRESS NOTES
MRI ON HOLD - SCREENING SHEET    MRI screening must be completed and signed before patient is considered eligible for MRI.    Please complete and sign electronically or fax to [phone].    Please call [phone] when complete.    Thank You

## 2024-12-20 NOTE — PROGRESS NOTES
End of Shift Note    Bedside shift change report given to BHAKTI Helton (oncoming nurse) by Lillian Doshi RN (offgoing nurse).  Report included the following information SBAR REPORTS LIST: SBAR    Shift worked:  [id number]     Shift summary and any significant changes:     Pt received his pain medication q4h, he provided his own self care and cleaned his room. He feels like the pain in his hand is unbearable and wants a procedure to clean it out.      Concerns for physician to address:  Wants to have a clean out of his infx     Zone phone for oncoming shift:   1248       Activity:  Around room and down hallway a few times    Cardiac:   Cardiac Monitoring: YES / NO: Yes    Sinus rhythm    Access:  20gR a/c, R stacy    Genitourinary:   Voiding, bathroom privileges    Respiratory:   Room air    GI:  Current diet:  Reg, low sodium      Pain Management:   Dilaudid q4h    Skin:  Wound on L hand      Patient Safety:  Bed/Chair alarm on; Bed/Chair-Wheels locked; Bed in low position; Call light within reach, gripper socks, bedside table within reach      Length of Stay:  Expected LOS: 6  Actual LOS: 3      Lillian Doshi RN

## 2024-12-20 NOTE — PROGRESS NOTES
(KLOR-CON M) extended release tablet 40 mEq  40 mEq Oral PRN    Or    potassium bicarb-citric acid (EFFER-K) effervescent tablet 40 mEq  40 mEq Oral PRN    Or    potassium chloride 10 mEq/100 mL IVPB (Peripheral Line)  10 mEq IntraVENous PRN    magnesium sulfate 2000 mg in 50 mL IVPB premix  2,000 mg IntraVENous PRN    ondansetron (ZOFRAN-ODT) disintegrating tablet 4 mg  4 mg Oral Q8H PRN    Or    ondansetron (ZOFRAN) injection 4 mg  4 mg IntraVENous Q6H PRN    polyethylene glycol (GLYCOLAX) packet 17 g  17 g Oral Daily PRN    acetaminophen (TYLENOL) tablet 650 mg  650 mg Oral Q6H PRN    Or    acetaminophen (TYLENOL) suppository 650 mg  650 mg Rectal Q6H PRN    heparin (porcine) injection 5,000 Units  5,000 Units SubCUTAneous 3 times per day    glucose chewable tablet 16 g  4 tablet Oral PRN    dextrose bolus 10% 125 mL  125 mL IntraVENous PRN    Or    dextrose bolus 10% 250 mL  250 mL IntraVENous PRN    glucagon injection 1 mg  1 mg SubCUTAneous PRN    dextrose 10 % infusion   IntraVENous Continuous PRN    insulin glargine (LANTUS) injection vial 14 Units  0.15 Units/kg SubCUTAneous Daily    insulin lispro (HUMALOG,ADMELOG) injection vial 0-8 Units  0-8 Units SubCUTAneous 4x Daily AC & HS    amLODIPine (NORVASC) tablet 10 mg  10 mg Oral Daily    bumetanide (BUMEX) tablet 2 mg  2 mg Oral BID    cloNIDine (CATAPRES) tablet 0.2 mg  0.2 mg Oral Q8H PRN    gabapentin (NEURONTIN) capsule 300 mg  300 mg Oral TID    hydrALAZINE (APRESOLINE) tablet 50 mg  50 mg Oral 3 times per day    losartan (COZAAR) tablet 25 mg  25 mg Oral Daily    lurasidone (LATUDA) tablet 20 mg  20 mg Oral Dinner    neomycin-polymyxin-dexameth (MAXITROL) 3.5-73196-3.1 ophthalmic suspension 1 drop  1 drop Right Eye 4x daily    pantoprazole (PROTONIX) tablet 40 mg  40 mg Oral QAM AC    tiZANidine (ZANAFLEX) tablet 2 mg  2 mg Oral Q8H PRN    ibuprofen (ADVIL;MOTRIN) tablet 800 mg  800 mg Oral Q8H PRN    Vancomycin Random level with AM labs on 12/18

## 2024-12-20 NOTE — PROGRESS NOTES
Hospitalist Progress Note    NAME:   Keaton Van   : 1965   MRN: [id number]     Date/Time: 2024 5:52 PM  Patient PCP: Robert Wells PA-C    Estimated discharge date: TBD  Barriers: IV antibiotics, orthopedic clearance, ID recommendation on discharge      Assessment / Plan:      Worsening Left hand cell, orthopedic and ID ulitis/abscess/flexor tenosynovitis, secondary to cat bite, POA  Hx Staph bacteremia, POA  Medication non-adherence  -Staph bacteremia blood cultures from the last admission  --CT left hand w contrast reveals evidence of MCP joint septic arthritis and adjacent articular abscess, last admission  --Left hand incision and drainage on 2024  - Was discharged on cefepime IV, vancomycin IV, to be given after dialysis, also Flagyl po x 2 weeks end date   -Change vancomycin to Zosyn  -Continue pain control with IV Dilaudid 1 mg every 4 hours as needed  -Infectious disease and orthopedic surgery on board.  Appreciate their inputs  -ID recommending intraoperative culture  -Ortho plans to watch for a few days on IV antibiotics.  All inputs appreciated  -MRI left hand pending   ID rec:  Plan was for 6 weeks of antibiotics vancomycin and cefepime IV -  Flagyl p.o. x 2 weeks end date -  Hemodialysis center contacted-patient has been receiving vancomycin and cefepime  as scheduled with hemodialysis.  Unfortunately patient did not  prescription for Flagyl.     Continue Vancomycin , Zosyn IV x 6 weeks  -  Keep the hand elevated  MRI planned hand surgery   Anticipate discharge on vancomycin and Zosyn  Patient is agreeable to having a Luly catheter placed          ESRD on Hemodialysis  --on  dialysis,  neprho following, received HD in ED today    - Current creatinine is 2.75. Baseline appears to be around 3.0        Right eye conjunctivitis.   c/w moxitrol     Hx of right BKA  - Positive for edema on examination, slight open wound,

## 2024-12-20 NOTE — PROGRESS NOTES
Spiritual Health History and Assessment/Progress Note  [facility]    Attempted Encounter,  ,  ,      Name: Keaton Van MRN: [id number]    Age: 59 y.o.     Sex: male   Language: English   Jew: None   Wound infection     Date: 12/20/2024            Total Time Calculated: 9 min              Spiritual Assessment began in [facility] [employer]        Referral/Consult From: Rounding   Encounter Overview/Reason: Attempted Encounter  Service Provided For: Patient not available    Keisha, Belief, Meaning:   Patient unable to assess at this time  Family/Friends No family/friends present      Importance and Influence:  Patient has no beliefs influential to healthcare decision-making identified during this visit  Family/Friends No family/friends present    Community:  Patient Other: unable to assess  Family/Friends No family/friends present    Assessment and Plan of Care:     Patient Interventions include: Other: patient was unavailable   Family/Friends Interventions include: No family/friends present    Patient Plan of Care: Spiritual Care available upon further referral  Family/Friends Plan of Care: No family/friends present    Electronically signed by Chaplain JASON NAVARRO on 12/20/2024 at 10:49 AM

## 2024-12-21 ENCOUNTER — ANESTHESIA EVENT (OUTPATIENT)
Facility: HOSPITAL | Age: 59
End: 2024-12-21
Payer: MEDICARE

## 2024-12-21 ENCOUNTER — ANESTHESIA (OUTPATIENT)
Facility: HOSPITAL | Age: 59
End: 2024-12-21
Payer: MEDICARE

## 2024-12-21 LAB
ANION GAP SERPL CALC-SCNC: 6 MMOL/L (ref 2–12)
BASOPHILS # BLD: 0 K/UL (ref 0–0.1)
BASOPHILS NFR BLD: 1 % (ref 0–1)
BUN SERPL-MCNC: 57 MG/DL (ref 6–20)
BUN/CREAT SERPL: 16 (ref 12–20)
CALCIUM SERPL-MCNC: 8.8 MG/DL (ref 8.5–10.1)
CHLORIDE SERPL-SCNC: 100 MMOL/L (ref 97–108)
CO2 SERPL-SCNC: 27 MMOL/L (ref 21–32)
CREAT SERPL-MCNC: 3.67 MG/DL (ref 0.7–1.3)
DIFFERENTIAL METHOD BLD: ABNORMAL
EOSINOPHIL # BLD: 0.2 K/UL (ref 0–0.4)
EOSINOPHIL NFR BLD: 5 % (ref 0–7)
ERYTHROCYTE [DISTWIDTH] IN BLOOD BY AUTOMATED COUNT: 14.7 % (ref 11.5–14.5)
GLUCOSE BLD STRIP.AUTO-MCNC: 129 MG/DL (ref 65–117)
GLUCOSE BLD STRIP.AUTO-MCNC: 131 MG/DL (ref 65–117)
GLUCOSE BLD STRIP.AUTO-MCNC: 257 MG/DL (ref 65–117)
GLUCOSE BLD STRIP.AUTO-MCNC: 281 MG/DL (ref 65–117)
GLUCOSE SERPL-MCNC: 147 MG/DL (ref 65–100)
HCT VFR BLD AUTO: 26.6 % (ref 36.6–50.3)
HGB BLD-MCNC: 8.9 G/DL (ref 12.1–17)
IMM GRANULOCYTES # BLD AUTO: 0 K/UL (ref 0–0.04)
IMM GRANULOCYTES NFR BLD AUTO: 0 % (ref 0–0.5)
LYMPHOCYTES # BLD: 0.9 K/UL (ref 0.8–3.5)
LYMPHOCYTES NFR BLD: 18 % (ref 12–49)
MCH RBC QN AUTO: 29.2 PG (ref 26–34)
MCHC RBC AUTO-ENTMCNC: 33.5 G/DL (ref 30–36.5)
MCV RBC AUTO: 87.2 FL (ref 80–99)
MONOCYTES # BLD: 0.5 K/UL (ref 0–1)
MONOCYTES NFR BLD: 10 % (ref 5–13)
NEUTS SEG # BLD: 3.1 K/UL (ref 1.8–8)
NEUTS SEG NFR BLD: 66 % (ref 32–75)
NRBC # BLD: 0 K/UL (ref 0–0.01)
NRBC BLD-RTO: 0 PER 100 WBC
PLATELET # BLD AUTO: 126 K/UL (ref 150–400)
PMV BLD AUTO: 9.6 FL (ref 8.9–12.9)
POTASSIUM SERPL-SCNC: 4.5 MMOL/L (ref 3.5–5.1)
RBC # BLD AUTO: 3.05 M/UL (ref 4.1–5.7)
SERVICE CMNT-IMP: ABNORMAL
SODIUM SERPL-SCNC: 133 MMOL/L (ref 136–145)
VANCOMYCIN SERPL-MCNC: 15.5 UG/ML
WBC # BLD AUTO: 4.7 K/UL (ref 4.1–11.1)

## 2024-12-21 PROCEDURE — 2709999900 HC NON-CHARGEABLE SUPPLY: Performed by: ORTHOPAEDIC SURGERY

## 2024-12-21 PROCEDURE — 7100000001 HC PACU RECOVERY - ADDTL 15 MIN: Performed by: ORTHOPAEDIC SURGERY

## 2024-12-21 PROCEDURE — 6370000000 HC RX 637 (ALT 250 FOR IP): Performed by: PHYSICIAN ASSISTANT

## 2024-12-21 PROCEDURE — 6360000002 HC RX W HCPCS: Performed by: INTERNAL MEDICINE

## 2024-12-21 PROCEDURE — 36556 INSERT NON-TUNNEL CV CATH: CPT

## 2024-12-21 PROCEDURE — 87075 CULTR BACTERIA EXCEPT BLOOD: CPT

## 2024-12-21 PROCEDURE — 1100000003 HC PRIVATE W/ TELEMETRY

## 2024-12-21 PROCEDURE — 2580000003 HC RX 258: Performed by: STUDENT IN AN ORGANIZED HEALTH CARE EDUCATION/TRAINING PROGRAM

## 2024-12-21 PROCEDURE — 6370000000 HC RX 637 (ALT 250 FOR IP): Performed by: ANESTHESIOLOGY

## 2024-12-21 PROCEDURE — 87070 CULTURE OTHR SPECIMN AEROBIC: CPT

## 2024-12-21 PROCEDURE — 6360000002 HC RX W HCPCS: Performed by: ANESTHESIOLOGY

## 2024-12-21 PROCEDURE — 85025 COMPLETE CBC W/AUTO DIFF WBC: CPT

## 2024-12-21 PROCEDURE — 87205 SMEAR GRAM STAIN: CPT

## 2024-12-21 PROCEDURE — 6370000000 HC RX 637 (ALT 250 FOR IP): Performed by: INTERNAL MEDICINE

## 2024-12-21 PROCEDURE — 3700000000 HC ANESTHESIA ATTENDED CARE: Performed by: ORTHOPAEDIC SURGERY

## 2024-12-21 PROCEDURE — 7100000000 HC PACU RECOVERY - FIRST 15 MIN: Performed by: ORTHOPAEDIC SURGERY

## 2024-12-21 PROCEDURE — 3600000002 HC SURGERY LEVEL 2 BASE: Performed by: ORTHOPAEDIC SURGERY

## 2024-12-21 PROCEDURE — 6370000000 HC RX 637 (ALT 250 FOR IP): Performed by: STUDENT IN AN ORGANIZED HEALTH CARE EDUCATION/TRAINING PROGRAM

## 2024-12-21 PROCEDURE — 2580000003 HC RX 258: Performed by: INTERNAL MEDICINE

## 2024-12-21 PROCEDURE — 2500000003 HC RX 250 WO HCPCS: Performed by: FAMILY MEDICINE

## 2024-12-21 PROCEDURE — 80048 BASIC METABOLIC PNL TOTAL CA: CPT

## 2024-12-21 PROCEDURE — 2500000003 HC RX 250 WO HCPCS: Performed by: STUDENT IN AN ORGANIZED HEALTH CARE EDUCATION/TRAINING PROGRAM

## 2024-12-21 PROCEDURE — 6360000002 HC RX W HCPCS: Performed by: NURSE ANESTHETIST, CERTIFIED REGISTERED

## 2024-12-21 PROCEDURE — 36415 COLL VENOUS BLD VENIPUNCTURE: CPT

## 2024-12-21 PROCEDURE — 80202 ASSAY OF VANCOMYCIN: CPT

## 2024-12-21 PROCEDURE — 2500000003 HC RX 250 WO HCPCS: Performed by: NURSE ANESTHETIST, CERTIFIED REGISTERED

## 2024-12-21 PROCEDURE — 6360000002 HC RX W HCPCS: Performed by: ORTHOPAEDIC SURGERY

## 2024-12-21 PROCEDURE — 3700000001 HC ADD 15 MINUTES (ANESTHESIA): Performed by: ORTHOPAEDIC SURGERY

## 2024-12-21 PROCEDURE — 3600000012 HC SURGERY LEVEL 2 ADDTL 15MIN: Performed by: ORTHOPAEDIC SURGERY

## 2024-12-21 PROCEDURE — 82962 GLUCOSE BLOOD TEST: CPT

## 2024-12-21 PROCEDURE — 0LD80ZZ EXTRACTION OF LEFT HAND TENDON, OPEN APPROACH: ICD-10-PCS | Performed by: ORTHOPAEDIC SURGERY

## 2024-12-21 PROCEDURE — 2500000003 HC RX 250 WO HCPCS: Performed by: ANESTHESIOLOGY

## 2024-12-21 RX ORDER — PROCHLORPERAZINE EDISYLATE 5 MG/ML
5 INJECTION INTRAMUSCULAR; INTRAVENOUS
Status: COMPLETED | OUTPATIENT
Start: 2024-12-21 | End: 2024-12-21

## 2024-12-21 RX ORDER — GLYCOPYRROLATE 0.2 MG/ML
INJECTION INTRAMUSCULAR; INTRAVENOUS
Status: DISCONTINUED | OUTPATIENT
Start: 2024-12-21 | End: 2024-12-21 | Stop reason: SDUPTHER

## 2024-12-21 RX ORDER — HYDROMORPHONE HYDROCHLORIDE 1 MG/ML
0.25 INJECTION, SOLUTION INTRAMUSCULAR; INTRAVENOUS; SUBCUTANEOUS EVERY 5 MIN PRN
Status: COMPLETED | OUTPATIENT
Start: 2024-12-21 | End: 2024-12-21

## 2024-12-21 RX ORDER — OXYCODONE HYDROCHLORIDE 5 MG/1
5 TABLET ORAL
Status: COMPLETED | OUTPATIENT
Start: 2024-12-21 | End: 2024-12-21

## 2024-12-21 RX ORDER — DEXAMETHASONE SODIUM PHOSPHATE 4 MG/ML
4 INJECTION, SOLUTION INTRA-ARTICULAR; INTRALESIONAL; INTRAMUSCULAR; INTRAVENOUS; SOFT TISSUE
Status: DISCONTINUED | OUTPATIENT
Start: 2024-12-21 | End: 2024-12-21 | Stop reason: HOSPADM

## 2024-12-21 RX ORDER — SODIUM CHLORIDE 9 MG/ML
INJECTION, SOLUTION INTRAVENOUS PRN
Status: DISCONTINUED | OUTPATIENT
Start: 2024-12-21 | End: 2024-12-21 | Stop reason: HOSPADM

## 2024-12-21 RX ORDER — PHENYLEPHRINE HCL IN 0.9% NACL 0.4MG/10ML
SYRINGE (ML) INTRAVENOUS
Status: DISCONTINUED | OUTPATIENT
Start: 2024-12-21 | End: 2024-12-21 | Stop reason: SDUPTHER

## 2024-12-21 RX ORDER — ONDANSETRON 2 MG/ML
INJECTION INTRAMUSCULAR; INTRAVENOUS
Status: DISCONTINUED | OUTPATIENT
Start: 2024-12-21 | End: 2024-12-21 | Stop reason: SDUPTHER

## 2024-12-21 RX ORDER — HYDRALAZINE HYDROCHLORIDE 20 MG/ML
10 INJECTION INTRAMUSCULAR; INTRAVENOUS
Status: DISCONTINUED | OUTPATIENT
Start: 2024-12-21 | End: 2024-12-21 | Stop reason: HOSPADM

## 2024-12-21 RX ORDER — FENTANYL CITRATE 50 UG/ML
INJECTION, SOLUTION INTRAMUSCULAR; INTRAVENOUS
Status: DISCONTINUED | OUTPATIENT
Start: 2024-12-21 | End: 2024-12-21 | Stop reason: SDUPTHER

## 2024-12-21 RX ORDER — SODIUM CHLORIDE 0.9 % (FLUSH) 0.9 %
5-40 SYRINGE (ML) INJECTION EVERY 12 HOURS SCHEDULED
Status: DISCONTINUED | OUTPATIENT
Start: 2024-12-21 | End: 2024-12-21 | Stop reason: HOSPADM

## 2024-12-21 RX ORDER — NALOXONE HYDROCHLORIDE 0.4 MG/ML
INJECTION, SOLUTION INTRAMUSCULAR; INTRAVENOUS; SUBCUTANEOUS PRN
Status: DISCONTINUED | OUTPATIENT
Start: 2024-12-21 | End: 2024-12-21 | Stop reason: HOSPADM

## 2024-12-21 RX ORDER — EPHEDRINE SULFATE/0.9% NACL/PF 50 MG/5 ML
SYRINGE (ML) INTRAVENOUS
Status: DISCONTINUED | OUTPATIENT
Start: 2024-12-21 | End: 2024-12-21 | Stop reason: SDUPTHER

## 2024-12-21 RX ORDER — SODIUM CHLORIDE 0.9 % (FLUSH) 0.9 %
5-40 SYRINGE (ML) INJECTION PRN
Status: DISCONTINUED | OUTPATIENT
Start: 2024-12-21 | End: 2024-12-21 | Stop reason: HOSPADM

## 2024-12-21 RX ORDER — ACETAMINOPHEN 325 MG/1
650 TABLET ORAL
Status: DISCONTINUED | OUTPATIENT
Start: 2024-12-21 | End: 2024-12-21 | Stop reason: HOSPADM

## 2024-12-21 RX ORDER — PROPOFOL 10 MG/ML
INJECTION, EMULSION INTRAVENOUS
Status: DISCONTINUED | OUTPATIENT
Start: 2024-12-21 | End: 2024-12-21 | Stop reason: SDUPTHER

## 2024-12-21 RX ORDER — DEXAMETHASONE SODIUM PHOSPHATE 4 MG/ML
INJECTION, SOLUTION INTRA-ARTICULAR; INTRALESIONAL; INTRAMUSCULAR; INTRAVENOUS; SOFT TISSUE
Status: DISCONTINUED | OUTPATIENT
Start: 2024-12-21 | End: 2024-12-21 | Stop reason: SDUPTHER

## 2024-12-21 RX ORDER — OXYCODONE HYDROCHLORIDE 5 MG/1
5 TABLET ORAL EVERY 4 HOURS PRN
Status: DISCONTINUED | OUTPATIENT
Start: 2024-12-21 | End: 2024-12-31 | Stop reason: HOSPADM

## 2024-12-21 RX ORDER — LIDOCAINE HYDROCHLORIDE 20 MG/ML
INJECTION, SOLUTION EPIDURAL; INFILTRATION; INTRACAUDAL; PERINEURAL
Status: DISCONTINUED | OUTPATIENT
Start: 2024-12-21 | End: 2024-12-21 | Stop reason: SDUPTHER

## 2024-12-21 RX ORDER — FENTANYL CITRATE 50 UG/ML
25 INJECTION, SOLUTION INTRAMUSCULAR; INTRAVENOUS EVERY 5 MIN PRN
Status: COMPLETED | OUTPATIENT
Start: 2024-12-21 | End: 2024-12-21

## 2024-12-21 RX ADMIN — FENTANYL CITRATE 25 MCG: 50 INJECTION INTRAMUSCULAR; INTRAVENOUS at 14:05

## 2024-12-21 RX ADMIN — HYDROMORPHONE HYDROCHLORIDE 0.25 MG: 1 INJECTION, SOLUTION INTRAMUSCULAR; INTRAVENOUS; SUBCUTANEOUS at 13:55

## 2024-12-21 RX ADMIN — Medication 80 MCG: at 12:53

## 2024-12-21 RX ADMIN — FENTANYL CITRATE 25 MCG: 50 INJECTION INTRAMUSCULAR; INTRAVENOUS at 14:00

## 2024-12-21 RX ADMIN — FENTANYL CITRATE 50 MCG: 50 INJECTION, SOLUTION INTRAMUSCULAR; INTRAVENOUS at 13:31

## 2024-12-21 RX ADMIN — PROCHLORPERAZINE EDISYLATE 5 MG: 5 INJECTION INTRAMUSCULAR; INTRAVENOUS at 13:53

## 2024-12-21 RX ADMIN — HYDROMORPHONE HYDROCHLORIDE 0.25 MG: 1 INJECTION, SOLUTION INTRAMUSCULAR; INTRAVENOUS; SUBCUTANEOUS at 13:40

## 2024-12-21 RX ADMIN — ONDANSETRON 4 MG: 2 INJECTION INTRAMUSCULAR; INTRAVENOUS at 12:41

## 2024-12-21 RX ADMIN — LOSARTAN POTASSIUM 25 MG: 25 TABLET, FILM COATED ORAL at 09:41

## 2024-12-21 RX ADMIN — PIPERACILLIN AND TAZOBACTAM 3375 MG: 3; .375 INJECTION, POWDER, LYOPHILIZED, FOR SOLUTION INTRAVENOUS at 01:04

## 2024-12-21 RX ADMIN — INSULIN LISPRO 4 UNITS: 100 INJECTION, SOLUTION INTRAVENOUS; SUBCUTANEOUS at 17:57

## 2024-12-21 RX ADMIN — TIZANIDINE 2 MG: 4 TABLET ORAL at 14:32

## 2024-12-21 RX ADMIN — GABAPENTIN 300 MG: 300 CAPSULE ORAL at 14:30

## 2024-12-21 RX ADMIN — HYDRALAZINE HYDROCHLORIDE 50 MG: 25 TABLET, FILM COATED ORAL at 21:30

## 2024-12-21 RX ADMIN — HYDROMORPHONE HYDROCHLORIDE 1 MG: 1 INJECTION, SOLUTION INTRAMUSCULAR; INTRAVENOUS; SUBCUTANEOUS at 17:54

## 2024-12-21 RX ADMIN — PROPOFOL 150 MG: 10 INJECTION, EMULSION INTRAVENOUS at 12:27

## 2024-12-21 RX ADMIN — HYDROMORPHONE HYDROCHLORIDE 1 MG: 1 INJECTION, SOLUTION INTRAMUSCULAR; INTRAVENOUS; SUBCUTANEOUS at 05:34

## 2024-12-21 RX ADMIN — FENTANYL CITRATE 50 MCG: 50 INJECTION, SOLUTION INTRAMUSCULAR; INTRAVENOUS at 12:33

## 2024-12-21 RX ADMIN — BUMETANIDE 2 MG: 1 TABLET ORAL at 09:41

## 2024-12-21 RX ADMIN — Medication 80 MCG: at 13:07

## 2024-12-21 RX ADMIN — NEOMYCIN SULFATE, POLYMYXIN B SULFATE AND DEXAMETHASONE 1 DROP: 3.5; 10000; 1 SUSPENSION/ DROPS OPHTHALMIC at 17:58

## 2024-12-21 RX ADMIN — OXYCODONE 5 MG: 5 TABLET ORAL at 15:52

## 2024-12-21 RX ADMIN — BUMETANIDE 2 MG: 1 TABLET ORAL at 17:53

## 2024-12-21 RX ADMIN — Medication 80 MCG: at 13:05

## 2024-12-21 RX ADMIN — GABAPENTIN 300 MG: 300 CAPSULE ORAL at 21:30

## 2024-12-21 RX ADMIN — Medication 1 CAPSULE: at 09:41

## 2024-12-21 RX ADMIN — OXYCODONE 5 MG: 5 TABLET ORAL at 13:48

## 2024-12-21 RX ADMIN — HYDRALAZINE HYDROCHLORIDE 50 MG: 25 TABLET, FILM COATED ORAL at 07:24

## 2024-12-21 RX ADMIN — NEOMYCIN SULFATE, POLYMYXIN B SULFATE AND DEXAMETHASONE 1 DROP: 3.5; 10000; 1 SUSPENSION/ DROPS OPHTHALMIC at 21:32

## 2024-12-21 RX ADMIN — HYDROMORPHONE HYDROCHLORIDE 0.25 MG: 1 INJECTION, SOLUTION INTRAMUSCULAR; INTRAVENOUS; SUBCUTANEOUS at 13:50

## 2024-12-21 RX ADMIN — HYDROMORPHONE HYDROCHLORIDE 1 MG: 1 INJECTION, SOLUTION INTRAMUSCULAR; INTRAVENOUS; SUBCUTANEOUS at 22:08

## 2024-12-21 RX ADMIN — PANTOPRAZOLE SODIUM 40 MG: 40 TABLET, DELAYED RELEASE ORAL at 07:24

## 2024-12-21 RX ADMIN — Medication 80 MCG: at 12:43

## 2024-12-21 RX ADMIN — NEOMYCIN SULFATE, POLYMYXIN B SULFATE AND DEXAMETHASONE 1 DROP: 3.5; 10000; 1 SUSPENSION/ DROPS OPHTHALMIC at 09:41

## 2024-12-21 RX ADMIN — HYDROMORPHONE HYDROCHLORIDE 0.25 MG: 1 INJECTION, SOLUTION INTRAMUSCULAR; INTRAVENOUS; SUBCUTANEOUS at 13:45

## 2024-12-21 RX ADMIN — Medication 80 MCG: at 12:44

## 2024-12-21 RX ADMIN — SODIUM CHLORIDE, PRESERVATIVE FREE 10 ML: 5 INJECTION INTRAVENOUS at 21:31

## 2024-12-21 RX ADMIN — INSULIN LISPRO 4 UNITS: 100 INJECTION, SOLUTION INTRAVENOUS; SUBCUTANEOUS at 21:31

## 2024-12-21 RX ADMIN — PIPERACILLIN AND TAZOBACTAM 3375 MG: 3; .375 INJECTION, POWDER, LYOPHILIZED, FOR SOLUTION INTRAVENOUS at 12:33

## 2024-12-21 RX ADMIN — AMLODIPINE BESYLATE 10 MG: 5 TABLET ORAL at 09:41

## 2024-12-21 RX ADMIN — FENTANYL CITRATE 25 MCG: 50 INJECTION INTRAMUSCULAR; INTRAVENOUS at 13:50

## 2024-12-21 RX ADMIN — GLYCOPYRROLATE 0.2 MG: 0.2 INJECTION INTRAMUSCULAR; INTRAVENOUS at 12:41

## 2024-12-21 RX ADMIN — HYDRALAZINE HYDROCHLORIDE 50 MG: 25 TABLET, FILM COATED ORAL at 14:31

## 2024-12-21 RX ADMIN — GABAPENTIN 300 MG: 300 CAPSULE ORAL at 09:41

## 2024-12-21 RX ADMIN — DEXAMETHASONE SODIUM PHOSPHATE 8 MG: 4 INJECTION INTRA-ARTICULAR; INTRALESIONAL; INTRAMUSCULAR; INTRAVENOUS; SOFT TISSUE at 12:33

## 2024-12-21 RX ADMIN — HYDROMORPHONE HYDROCHLORIDE 1 MG: 1 INJECTION, SOLUTION INTRAMUSCULAR; INTRAVENOUS; SUBCUTANEOUS at 01:32

## 2024-12-21 RX ADMIN — HYDROMORPHONE HYDROCHLORIDE 1 MG: 1 INJECTION, SOLUTION INTRAMUSCULAR; INTRAVENOUS; SUBCUTANEOUS at 09:47

## 2024-12-21 RX ADMIN — Medication 80 MCG: at 12:55

## 2024-12-21 RX ADMIN — SODIUM CHLORIDE 200 ML: 9 INJECTION, SOLUTION INTRAVENOUS at 01:03

## 2024-12-21 RX ADMIN — LURASIDONE HYDROCHLORIDE 20 MG: 20 TABLET ORAL at 17:53

## 2024-12-21 RX ADMIN — LIDOCAINE HYDROCHLORIDE 100 MG: 20 INJECTION, SOLUTION EPIDURAL; INFILTRATION; INTRACAUDAL; PERINEURAL at 12:24

## 2024-12-21 RX ADMIN — SODIUM CHLORIDE, PRESERVATIVE FREE 10 ML: 5 INJECTION INTRAVENOUS at 09:43

## 2024-12-21 RX ADMIN — FENTANYL CITRATE 25 MCG: 50 INJECTION INTRAMUSCULAR; INTRAVENOUS at 13:55

## 2024-12-21 RX ADMIN — Medication 5 MG: at 12:53

## 2024-12-21 ASSESSMENT — PAIN DESCRIPTION - PAIN TYPE
TYPE: ACUTE PAIN

## 2024-12-21 ASSESSMENT — LIFESTYLE VARIABLES: SMOKING_STATUS: 1

## 2024-12-21 ASSESSMENT — PAIN DESCRIPTION - LOCATION
LOCATION: HAND
LOCATION: HIP
LOCATION: HAND

## 2024-12-21 ASSESSMENT — PAIN DESCRIPTION - ORIENTATION
ORIENTATION: LEFT

## 2024-12-21 ASSESSMENT — PAIN DESCRIPTION - FREQUENCY
FREQUENCY: INTERMITTENT

## 2024-12-21 ASSESSMENT — PAIN - FUNCTIONAL ASSESSMENT
PAIN_FUNCTIONAL_ASSESSMENT: PREVENTS OR INTERFERES SOME ACTIVE ACTIVITIES AND ADLS

## 2024-12-21 ASSESSMENT — PAIN DESCRIPTION - DESCRIPTORS
DESCRIPTORS: PINS AND NEEDLES

## 2024-12-21 ASSESSMENT — PAIN SCALES - GENERAL
PAINLEVEL_OUTOF10: 7
PAINLEVEL_OUTOF10: 6
PAINLEVEL_OUTOF10: 4
PAINLEVEL_OUTOF10: 7
PAINLEVEL_OUTOF10: 8
PAINLEVEL_OUTOF10: 5
PAINLEVEL_OUTOF10: 7
PAINLEVEL_OUTOF10: 7
PAINLEVEL_OUTOF10: 8
PAINLEVEL_OUTOF10: 8
PAINLEVEL_OUTOF10: 7
PAINLEVEL_OUTOF10: 0
PAINLEVEL_OUTOF10: 7
PAINLEVEL_OUTOF10: 7

## 2024-12-21 ASSESSMENT — PAIN DESCRIPTION - ONSET
ONSET: GRADUAL

## 2024-12-21 NOTE — DIALYSIS
Called primary RN to receive SBAR for patient prior to placing transport to dialysis suite for ordered dialysis treatment. Primary RN stated that patient told her this morning that he didn't think he was going to run today, primary RN followed up with patient as he recently returned to his room from PACU, pt stated \"I don't want to do dialysis today, I've had surgery, I'm having pain, and I've been NPO all day, I'd like to do dialysis tomorrow\"; called and informed Dr. Hebert of patient's dialysis refusal for today, reason, and request for treatment tomorrow. Telephone orders received for patient to receive treatment tomorrow. Informed [facility] ronald.

## 2024-12-21 NOTE — PROGRESS NOTES
Hospitalist Progress Note    NAME:   Keaton Van   : 1965   MRN: [id number]     Date/Time: 2024 3:51 PM  Patient PCP: Robert Wells PA-C    Estimated discharge date: TBD  Barriers: IV antibiotics, orthopedic clearance, ID recommendation on discharge, wound culture, I&D left hand      Assessment / Plan:      Worsening Left hand cell, orthopedic and ID ulitis/abscess/flexor tenosynovitis, secondary to cat bite, POA  Hx Staph bacteremia, POA  Medication non-adherence  -Staph bacteremia blood cultures from the last admission  --CT left hand w contrast reveals evidence of MCP joint septic arthritis and adjacent articular abscess, last admission  --Left hand incision and drainage on 2024  - Was discharged on cefepime IV, vancomycin IV, to be given after dialysis, also Flagyl po x 2 weeks end date   -Change vancomycin to Zosyn  -Continue pain control with IV Dilaudid 1 mg every 4 hours as needed  -Infectious disease and orthopedic surgery on board.  Appreciate their inputs  -ID recommending intraoperative culture  -Ortho plans to watch for a few days on IV antibiotics.  All inputs appreciated  -MRI left hand revieweD:    1. Findings concerning for osteomyelitis of the base proximal phalanx of the  index finger and possible early osteomyelitis head second metacarpal head. There  is slight enhancement of this joint which is concerning for septic arthritis  with nonspecific linear enhancement of the radial collateral ligament. Please  correlate with operative history for possible aspiration of the joint through  the ligament     2. Soft tissue abscess along the palmar surface of this joint which communicates  with the skin through a large defect with enhancing synovitis of the flexor  tendons to the index finger  -S/p I&D left hand today  with hand surgery  -Continue pain management  -Follow-up wound culture  -Hand surgery on board.  Appreciate input    ID rec:  Plan was for 6 weeks of

## 2024-12-21 NOTE — PROGRESS NOTES
End of Shift Note    Bedside shift change report given to BHAKTI Barnes (oncoming nurse) by Lillian Doshi RN (offgoing nurse).  Report included the following information SBAR REPORTS LIST: SBAR    Shift worked:  7260-4615     Shift summary and any significant changes:     Slept only 20 min, came out room several times, wiped down his room completely and was cleaning due to being anxious for surgery      Concerns for physician to address:  Not sleeping     Zone phone for oncoming shift:   [phone]       Activity:  Back in forth down the caballero all night        Patient Safety:  Bed/Chair alarm on; Bed/Chair-Wheels locked; Bed in low position; Call light within reach, gripper socks, bedside table within reach      Length of Stay:  Expected LOS: 6  Actual LOS: 4      Lillian Doshi RN

## 2024-12-21 NOTE — BRIEF OP NOTE
Brief Postoperative Note      Patient: Keaton aVn  YOB: 1965  MRN: [id number]    Date of Procedure: 12/21/2024    Pre-Op Diagnosis Codes:      * Abscess [L02.91]    Post-Op Diagnosis: Same       Procedure(s):  LEFT HAND INCISION AND DRAINAGE    Surgeon(s):  Henry Owens MD    Assistant:  Surgical Assistant: Sahil Costello    Anesthesia: General    Estimated Blood Loss (mL): Minimal (21 m TT)    Complications: None    Specimens:   ID Type Source Tests Collected by Time Destination   A : left hand culture Swab Hand CULTURE, ANAEROBIC, CULTURE, WOUND (WITH GRAM STAIN) Henry Owens MD 12/21/2024 1243        Implants:  * No implants in log *      Drains: * No LDAs found *    Findings:  Infection Present At Time Of Surgery (PATOS) (choose all levels that have infection present):  - Deep Infection (muscle/fascia) present as evidenced by fluid consistent with infection  Other Findings: copious serous fluid into joint    Electronically signed by Henry Owens MD on 12/21/2024 at 1:18 PM

## 2024-12-21 NOTE — PROGRESS NOTES
Pharmacy Antimicrobial Kinetic Dosing    Indication for Antimicrobials: ssti     Current Regimen of Each Antimicrobial:  Vancomycin IV pharmacy to dose by levels; Start Date ; Day # 5  Zosyn 3.375gm q 8; Start ; Day #4    Previous Antimicrobial Therapy:  Cefepime -   Metronidazole   -     Goal Level: Vancomycin -600 and Vancomycin trough 15-20    Date Dose & Interval Measured (mcg/mL) Predicted AUC 24-48 Predicted AUC 24,ss    AM random Vanc 2000mg iv x1 20.4 N/A N/A     15.5 NA NA            Significant Cultures:    blood- paired- NGTD    Labs:  Recent Labs     Units 24  0506 24  0517 24  1337   CREATININE MG/DL 3.67* 2.73* 2.45*   BUN MG/DL 57* 42* 45*   WBC K/uL 4.7 5.5 6.6     Temp (24hrs), Av.2 °F (36.8 °C), Min:98.2 °F (36.8 °C), Max:98.2 °F (36.8 °C)      Conditions for Dosing Consideration: Hemodialysis    Creatinine Clearance (mL/min): Estimated Creatinine Clearance: 23 mL/min (A) (based on SCr of 3.67 mg/dL (H)).       Impression/Plan:   HD patient //S  Vancomycin level was 15.5.  Vancomycin 750 mg after HD  Zosyn   Antimicrobial stop date 7 days     Pharmacy will follow daily and adjust medications as appropriate for renal function and/or serum levels.    Thank you,  Dominguez Simpson [facility]

## 2024-12-21 NOTE — PROGRESS NOTES
End of Shift Note    Bedside shift change report given to Lillian YA (oncoming nurse) by Sunitha Rosa, BHAKTI (offgoing nurse).  Report included the following information SBAR, Kardex, and MAR    Shift worked:  1375-2592     Shift summary and any significant changes:     Seen by primary doctor  and for MRI, MRI screening sent, sent to MRI, for OR tomorrow, all needs attended,hourly rounds done     Concerns for physician to address:  None     Activity:  Level of Assistance: Independent  Number times ambulated in hallways past shift: 4  Number of times OOB to chair past shift: 4    Cardiac:   Cardiac Monitoring: No      Cardiac Rhythm: Sinus rhythm    Access:  Current line(s): PIV     Genitourinary:   Urinary Status: Voiding, Bathroom privileges    Respiratory:   O2 Device: None (Room air)  Chronic home O2 use?: YES  Incentive spirometer at bedside: NO    GI:  Last BM (including prior to admit): 12/17/24  Current diet:  ADULT DIET; Regular; Low Sodium (2 gm); Low Potassium (Less than 3000 mg/day)  Diet NPO  Passing flatus: YES    Pain Management:   Patient states pain is manageable on current regimen: YES    Skin:  Kennedy Scale Score: 20  Interventions: Wound Offloading (Prevention Methods): Pillows, Repositioning    Patient Safety:  Fall Risk: Nursing Judgement-Fall Risk High(Add Comments): No  Fall Risk Interventions  Nursing Judgement-Fall Risk High(Add Comments): No  Toilet Every 2 Hours-In Advance of Need: Yes  Hourly Visual Checks: In bed, Awake  Fall Visual Posted: Socks  Room Door Open: Yes  Alarm On: Personal  Patient Moved Closer to Nursing Station: No    Active Consults:   IP CONSULT TO NEPHROLOGY  IP CONSULT TO HOSPITALIST  IP CONSULT TO ORTHOPEDIC SURGERY  IP CONSULT TO INFECTIOUS DISEASES  PHARMACY TO DOSE VANCOMYCIN  IP CONSULT TO CASE MANAGEMENT    Length of Stay:  Expected LOS: 6  Actual LOS: 3    Sunitha Rosa, RN

## 2024-12-21 NOTE — ANESTHESIA PRE PROCEDURE
Department of Anesthesiology  Preprocedure Note       Name:  Keaton Van   Age:  59 y.o.  :  1965                                          MRN:  [id number]         Date:  2024      Surgeon: Surgeon(s):  Henry Owens MD    Procedure: Procedure(s):  LEFT HAND INCISION AND DRAINAGE    Medications prior to admission:   Prior to Admission medications    Medication Sig Start Date End Date Taking? Authorizing Provider   losartan (COZAAR) 25 MG tablet Take 1 tablet by mouth daily 24   Cole Esqueda MD   silver sulfADIAZINE (SILVADENE) 1 % cream Apply topically daily.  Patient may take home with him. 12/3/24   Quiana Forte, APRN - NP   Insulin Degludec (TRESIBA FLEXTOUCH) 100 UNIT/ML SOPN Inject 14 Units into the skin at bedtime 24   Mimi Mello MD   Insulin Pen Needle 31G X 5 MM MISC 1 each by Does not apply route daily 24   Mimi Mello MD   aspirin 81 MG EC tablet Take 1 tablet by mouth daily  Patient not taking: Reported on 2024   Mimi Mello MD   hydrALAZINE (APRESOLINE) 50 MG tablet Take 1 tablet by mouth every 8 hours 24   Mimi Mello MD   blood glucose monitor strips Check once daily 24   Mimi Mello MD   Lancets MISC 1 each by Does not apply route daily 24   Mimi Mello MD   glucose monitoring kit 1 kit by Does not apply route daily Test blood glucose daily in the morning 24   Mimi Mello MD   amLODIPine (NORVASC) 10 MG tablet Take 1 tablet by mouth daily 24   Mimi Mello MD   DULoxetine (CYMBALTA) 20 MG extended release capsule Take 1 capsule by mouth daily  Patient not taking: Reported on 2024   Inga Acevedo MD   cloNIDine (CATAPRES) 0.2 MG tablet Take 1 tablet by mouth every 8 hours as needed for High Blood Pressure (SBP >170 or DBP >100) 24   Inga Acevedo MD   bumetanide (BUMEX) 2 MG tablet Take 1 tablet by mouth in the morning and 1 tablet in the evening. 24

## 2024-12-21 NOTE — ANESTHESIA POSTPROCEDURE EVALUATION
Department of Anesthesiology  Postprocedure Note    Patient: Keaton Van  MRN: [id number]  YOB: 1965  Date of evaluation: 12/21/2024    Procedure Summary       Date: 12/21/24 Room / Location: [facility] / [facility]    Anesthesia Start: 1221 Anesthesia Stop: 1331    Procedure: LEFT HAND INCISION AND DRAINAGE (Left: Hand) Diagnosis:       Abscess      (Abscess [L02.91])    Providers: Henry Owens MD Responsible Provider: Derek Lima MD    Anesthesia Type: General ASA Status: 3            Anesthesia Type: General    Agustin Phase I: Agustin Score: 8    Agustin Phase II:      Anesthesia Post Evaluation    Patient location during evaluation: PACU  Patient participation: complete - patient participated  Level of consciousness: sleepy but conscious and responsive to verbal stimuli  Pain score: 2  Airway patency: patent  Nausea & Vomiting: no vomiting and no nausea  Cardiovascular status: blood pressure returned to baseline and hemodynamically stable  Respiratory status: acceptable  Hydration status: stable  Multimodal analgesia pain management approach  Pain management: adequate    No notable events documented.

## 2024-12-22 LAB
ANION GAP SERPL CALC-SCNC: 12 MMOL/L (ref 2–12)
BACTERIA SPEC CULT: NORMAL
BACTERIA SPEC CULT: NORMAL
BASOPHILS # BLD: 0 K/UL (ref 0–0.1)
BASOPHILS NFR BLD: 0 % (ref 0–1)
BUN SERPL-MCNC: 76 MG/DL (ref 6–20)
BUN/CREAT SERPL: 16 (ref 12–20)
CALCIUM SERPL-MCNC: 9.5 MG/DL (ref 8.5–10.1)
CHLORIDE SERPL-SCNC: 95 MMOL/L (ref 97–108)
CO2 SERPL-SCNC: 23 MMOL/L (ref 21–32)
CREAT SERPL-MCNC: 4.88 MG/DL (ref 0.7–1.3)
DIFFERENTIAL METHOD BLD: ABNORMAL
EOSINOPHIL # BLD: 0 K/UL (ref 0–0.4)
EOSINOPHIL NFR BLD: 0 % (ref 0–7)
ERYTHROCYTE [DISTWIDTH] IN BLOOD BY AUTOMATED COUNT: 14.6 % (ref 11.5–14.5)
GLUCOSE BLD STRIP.AUTO-MCNC: 155 MG/DL (ref 65–117)
GLUCOSE BLD STRIP.AUTO-MCNC: 238 MG/DL (ref 65–117)
GLUCOSE BLD STRIP.AUTO-MCNC: 253 MG/DL (ref 65–117)
GLUCOSE SERPL-MCNC: 299 MG/DL (ref 65–100)
HCT VFR BLD AUTO: 28.1 % (ref 36.6–50.3)
HGB BLD-MCNC: 9.5 G/DL (ref 12.1–17)
IMM GRANULOCYTES # BLD AUTO: 0.1 K/UL (ref 0–0.04)
IMM GRANULOCYTES NFR BLD AUTO: 1 % (ref 0–0.5)
LYMPHOCYTES # BLD: 0.4 K/UL (ref 0.8–3.5)
LYMPHOCYTES NFR BLD: 4 % (ref 12–49)
MCH RBC QN AUTO: 29.1 PG (ref 26–34)
MCHC RBC AUTO-ENTMCNC: 33.8 G/DL (ref 30–36.5)
MCV RBC AUTO: 85.9 FL (ref 80–99)
MONOCYTES # BLD: 0.4 K/UL (ref 0–1)
MONOCYTES NFR BLD: 4 % (ref 5–13)
NEUTS SEG # BLD: 8.6 K/UL (ref 1.8–8)
NEUTS SEG NFR BLD: 91 % (ref 32–75)
NRBC # BLD: 0 K/UL (ref 0–0.01)
NRBC BLD-RTO: 0 PER 100 WBC
PLATELET # BLD AUTO: 153 K/UL (ref 150–400)
PMV BLD AUTO: 10.1 FL (ref 8.9–12.9)
POTASSIUM SERPL-SCNC: 5.4 MMOL/L (ref 3.5–5.1)
RBC # BLD AUTO: 3.27 M/UL (ref 4.1–5.7)
RBC MORPH BLD: ABNORMAL
SERVICE CMNT-IMP: ABNORMAL
SERVICE CMNT-IMP: NORMAL
SERVICE CMNT-IMP: NORMAL
SODIUM SERPL-SCNC: 130 MMOL/L (ref 136–145)
WBC # BLD AUTO: 9.5 K/UL (ref 4.1–11.1)

## 2024-12-22 PROCEDURE — 6370000000 HC RX 637 (ALT 250 FOR IP): Performed by: INTERNAL MEDICINE

## 2024-12-22 PROCEDURE — 1100000003 HC PRIVATE W/ TELEMETRY

## 2024-12-22 PROCEDURE — 2580000003 HC RX 258: Performed by: STUDENT IN AN ORGANIZED HEALTH CARE EDUCATION/TRAINING PROGRAM

## 2024-12-22 PROCEDURE — 6360000002 HC RX W HCPCS: Performed by: INTERNAL MEDICINE

## 2024-12-22 PROCEDURE — 80048 BASIC METABOLIC PNL TOTAL CA: CPT

## 2024-12-22 PROCEDURE — 85025 COMPLETE CBC W/AUTO DIFF WBC: CPT

## 2024-12-22 PROCEDURE — 2580000003 HC RX 258: Performed by: FAMILY MEDICINE

## 2024-12-22 PROCEDURE — 6370000000 HC RX 637 (ALT 250 FOR IP): Performed by: STUDENT IN AN ORGANIZED HEALTH CARE EDUCATION/TRAINING PROGRAM

## 2024-12-22 PROCEDURE — 82962 GLUCOSE BLOOD TEST: CPT

## 2024-12-22 PROCEDURE — 6360000002 HC RX W HCPCS: Performed by: FAMILY MEDICINE

## 2024-12-22 PROCEDURE — 2500000003 HC RX 250 WO HCPCS: Performed by: FAMILY MEDICINE

## 2024-12-22 PROCEDURE — 90935 HEMODIALYSIS ONE EVALUATION: CPT

## 2024-12-22 PROCEDURE — 2500000003 HC RX 250 WO HCPCS: Performed by: STUDENT IN AN ORGANIZED HEALTH CARE EDUCATION/TRAINING PROGRAM

## 2024-12-22 PROCEDURE — 6370000000 HC RX 637 (ALT 250 FOR IP): Performed by: FAMILY MEDICINE

## 2024-12-22 PROCEDURE — 2580000003 HC RX 258: Performed by: INTERNAL MEDICINE

## 2024-12-22 PROCEDURE — 36415 COLL VENOUS BLD VENIPUNCTURE: CPT

## 2024-12-22 PROCEDURE — 99024 POSTOP FOLLOW-UP VISIT: CPT | Performed by: PHYSICIAN ASSISTANT

## 2024-12-22 RX ORDER — VANCOMYCIN 750 MG/150ML
750 INJECTION, SOLUTION INTRAVENOUS ONCE
Status: DISCONTINUED | OUTPATIENT
Start: 2024-12-22 | End: 2024-12-22

## 2024-12-22 RX ORDER — LORAZEPAM 1 MG/1
1 TABLET ORAL 2 TIMES DAILY
Status: DISCONTINUED | OUTPATIENT
Start: 2024-12-22 | End: 2024-12-31 | Stop reason: HOSPADM

## 2024-12-22 RX ORDER — ALBUMIN (HUMAN) 12.5 G/50ML
25 SOLUTION INTRAVENOUS AS NEEDED
Status: DISCONTINUED | OUTPATIENT
Start: 2024-12-22 | End: 2024-12-31 | Stop reason: HOSPADM

## 2024-12-22 RX ORDER — VANCOMYCIN 750 MG/150ML
750 INJECTION, SOLUTION INTRAVENOUS ONCE
Status: DISCONTINUED | OUTPATIENT
Start: 2024-12-22 | End: 2024-12-22 | Stop reason: CLARIF

## 2024-12-22 RX ADMIN — Medication 1 CAPSULE: at 12:29

## 2024-12-22 RX ADMIN — NEOMYCIN SULFATE, POLYMYXIN B SULFATE AND DEXAMETHASONE 1 DROP: 3.5; 10000; 1 SUSPENSION/ DROPS OPHTHALMIC at 21:47

## 2024-12-22 RX ADMIN — INSULIN LISPRO 4 UNITS: 100 INJECTION, SOLUTION INTRAVENOUS; SUBCUTANEOUS at 18:02

## 2024-12-22 RX ADMIN — GABAPENTIN 300 MG: 300 CAPSULE ORAL at 12:30

## 2024-12-22 RX ADMIN — PANTOPRAZOLE SODIUM 40 MG: 40 TABLET, DELAYED RELEASE ORAL at 06:03

## 2024-12-22 RX ADMIN — HYDROMORPHONE HYDROCHLORIDE 1 MG: 1 INJECTION, SOLUTION INTRAMUSCULAR; INTRAVENOUS; SUBCUTANEOUS at 06:03

## 2024-12-22 RX ADMIN — HYDRALAZINE HYDROCHLORIDE 50 MG: 25 TABLET, FILM COATED ORAL at 14:08

## 2024-12-22 RX ADMIN — SODIUM CHLORIDE 100 ML: 9 INJECTION, SOLUTION INTRAVENOUS at 02:23

## 2024-12-22 RX ADMIN — GABAPENTIN 300 MG: 300 CAPSULE ORAL at 21:42

## 2024-12-22 RX ADMIN — NEOMYCIN SULFATE, POLYMYXIN B SULFATE AND DEXAMETHASONE 1 DROP: 3.5; 10000; 1 SUSPENSION/ DROPS OPHTHALMIC at 18:11

## 2024-12-22 RX ADMIN — LORAZEPAM 1 MG: 1 TABLET ORAL at 10:05

## 2024-12-22 RX ADMIN — INSULIN GLARGINE 14 UNITS: 100 INJECTION, SOLUTION SUBCUTANEOUS at 12:32

## 2024-12-22 RX ADMIN — LORAZEPAM 1 MG: 1 TABLET ORAL at 21:42

## 2024-12-22 RX ADMIN — HYDROMORPHONE HYDROCHLORIDE 1 MG: 1 INJECTION, SOLUTION INTRAMUSCULAR; INTRAVENOUS; SUBCUTANEOUS at 02:12

## 2024-12-22 RX ADMIN — BUMETANIDE 2 MG: 1 TABLET ORAL at 18:03

## 2024-12-22 RX ADMIN — HYDRALAZINE HYDROCHLORIDE 50 MG: 25 TABLET, FILM COATED ORAL at 06:03

## 2024-12-22 RX ADMIN — HYDROMORPHONE HYDROCHLORIDE 1 MG: 1 INJECTION, SOLUTION INTRAMUSCULAR; INTRAVENOUS; SUBCUTANEOUS at 22:16

## 2024-12-22 RX ADMIN — HYDROMORPHONE HYDROCHLORIDE 1 MG: 1 INJECTION, SOLUTION INTRAMUSCULAR; INTRAVENOUS; SUBCUTANEOUS at 10:03

## 2024-12-22 RX ADMIN — PIPERACILLIN AND TAZOBACTAM 3375 MG: 3; .375 INJECTION, POWDER, LYOPHILIZED, FOR SOLUTION INTRAVENOUS at 14:06

## 2024-12-22 RX ADMIN — TIZANIDINE 2 MG: 4 TABLET ORAL at 02:12

## 2024-12-22 RX ADMIN — HYDROMORPHONE HYDROCHLORIDE 1 MG: 1 INJECTION, SOLUTION INTRAMUSCULAR; INTRAVENOUS; SUBCUTANEOUS at 14:02

## 2024-12-22 RX ADMIN — PIPERACILLIN AND TAZOBACTAM 3375 MG: 3; .375 INJECTION, POWDER, LYOPHILIZED, FOR SOLUTION INTRAVENOUS at 02:28

## 2024-12-22 RX ADMIN — BUMETANIDE 2 MG: 1 TABLET ORAL at 12:29

## 2024-12-22 RX ADMIN — INSULIN LISPRO 4 UNITS: 100 INJECTION, SOLUTION INTRAVENOUS; SUBCUTANEOUS at 12:28

## 2024-12-22 RX ADMIN — LURASIDONE HYDROCHLORIDE 20 MG: 20 TABLET ORAL at 18:01

## 2024-12-22 RX ADMIN — NEOMYCIN SULFATE, POLYMYXIN B SULFATE AND DEXAMETHASONE 1 DROP: 3.5; 10000; 1 SUSPENSION/ DROPS OPHTHALMIC at 10:39

## 2024-12-22 RX ADMIN — HYDRALAZINE HYDROCHLORIDE 50 MG: 25 TABLET, FILM COATED ORAL at 21:45

## 2024-12-22 RX ADMIN — HYDROMORPHONE HYDROCHLORIDE 1 MG: 1 INJECTION, SOLUTION INTRAMUSCULAR; INTRAVENOUS; SUBCUTANEOUS at 18:02

## 2024-12-22 RX ADMIN — SODIUM CHLORIDE, PRESERVATIVE FREE 10 ML: 5 INJECTION INTRAVENOUS at 21:40

## 2024-12-22 RX ADMIN — LOSARTAN POTASSIUM 25 MG: 25 TABLET, FILM COATED ORAL at 12:29

## 2024-12-22 RX ADMIN — AMLODIPINE BESYLATE 10 MG: 5 TABLET ORAL at 12:30

## 2024-12-22 RX ADMIN — VANCOMYCIN HYDROCHLORIDE 750 MG: 750 INJECTION, POWDER, LYOPHILIZED, FOR SOLUTION INTRAVENOUS at 18:09

## 2024-12-22 ASSESSMENT — PAIN DESCRIPTION - ORIENTATION
ORIENTATION: LEFT
ORIENTATION: RIGHT

## 2024-12-22 ASSESSMENT — PAIN DESCRIPTION - LOCATION
LOCATION: ARM
LOCATION: HAND

## 2024-12-22 ASSESSMENT — PAIN SCALES - GENERAL
PAINLEVEL_OUTOF10: 9
PAINLEVEL_OUTOF10: 6
PAINLEVEL_OUTOF10: 7
PAINLEVEL_OUTOF10: 2
PAINLEVEL_OUTOF10: 7
PAINLEVEL_OUTOF10: 7
PAINLEVEL_OUTOF10: 8

## 2024-12-22 ASSESSMENT — PAIN DESCRIPTION - DESCRIPTORS: DESCRIPTORS: THROBBING;SHOOTING;ACHING

## 2024-12-22 NOTE — PROGRESS NOTES
ORTHO - Progress Note  Post Op day: 1 Day Post-Op    Keaton Van     [id number]  male    59 y.o.    1965    Admit date:2024  Date of Surgery:  Procedures:Procedure(s):  LEFT HAND INCISION AND DRAINAGE  Surgeon:Surgeons and Role:   * Henry Owens MD - Primary        SUBJECTIVE:     Keaton Van is a 59 y.o. male resting in the bed/chair.  Patient has complaints of appropriate post-op pain, tolerating PO/IV pain meds  Denies F/C, nausea, vomiting, dizziness, lightheadedness, chest pain, or shortness of breath.    OBJECTIVE:       Physical Exam:  General: alert, cooperative, no distress.   Gastrointestinal:  non-distended .    Cardiovascular:   Brisk cap refill in all distal extremities   Respiratory: No respiratory distress   Neurological:Neurovascular exam within normal limits.     Senstion intact: LUE   Motor: + finger flex/ext.   Musculoskeletal: Od[place]'s sign negative in bilateral lower extremities.  Calves soft, supple, non-tender upon palpation or with passive stretch.    Dressing/Wound:   No significant erythema,. Packing pulled,  minimal fluid expressed from the palmar aspect over 2nd mp.          Vital Signs:       Patient Vitals for the past 8 hrs:   BP Temp Pulse Resp SpO2   24 1100 (!) 166/123 -- 86 -- --   24 1045 (!) 165/90 -- 83 -- --   24 1030 134/80 -- 84 -- --   24 1015 (!) 146/80 -- 78 -- --   24 1000 (!) 151/92 -- 80 -- --   24 0945 (!) 150/87 -- 83 -- --   24 0930 (!) 167/87 -- 84 -- --   24 0915 (!) 162/86 -- 81 -- --   24 0900 (!) 163/84 -- 80 -- --   24 0849 (!) 158/87 98.2 °F (36.8 °C) 80 18 95 %                                          Temp (24hrs), Av.9 °F (36.6 °C), Min:97.5 °F (36.4 °C), Max:98.2 °F (36.8 °C)      Labs:        Recent Labs     24  0502   HCT 28.1*   HGB 9.5*     PT/OT:              ASSESSMENT / PLAN:   Principal Problem:    Wound infection  Active Problems:    Controlled diabetes

## 2024-12-22 NOTE — PLAN OF CARE
Problem: Safety - Adult  Goal: Free from fall injury  Flowsheets (Taken 12/17/2024 2000 by Abida Gongora RN)  Free From Fall Injury: Based on caregiver fall risk screen, instruct family/caregiver to ask for assistance with transferring infant if caregiver noted to have fall risk factors     Problem: Pain  Goal: Verbalizes/displays adequate comfort level or baseline comfort level  Flowsheets (Taken 12/22/2024 1326)  Verbalizes/displays adequate comfort level or baseline comfort level:   Encourage patient to monitor pain and request assistance   Assess pain using appropriate pain scale   Administer analgesics based on type and severity of pain and evaluate response   Implement non-pharmacological measures as appropriate and evaluate response   Consider cultural and social influences on pain and pain management   Notify Licensed Independent Practitioner if interventions unsuccessful or patient reports new pain     Problem: ABCDS Injury Assessment  Goal: Absence of physical injury  Flowsheets (Taken 12/22/2024 1326)  Absence of Physical Injury: Implement safety measures based on patient assessment

## 2024-12-22 NOTE — PLAN OF CARE
Problem: Safety - Adult  Goal: Free from fall injury  12/21/2024 1912 by Cameron Carroll RN  Outcome: Progressing  12/21/2024 0650 by Lillian Doshi RN  Outcome: Progressing     Problem: Chronic Conditions and Co-morbidities  Goal: Patient's chronic conditions and co-morbidity symptoms are monitored and maintained or improved  12/21/2024 1912 by Cameron Carroll RN  Outcome: Progressing  12/21/2024 0650 by Lillian Doshi RN  Outcome: Progressing     Problem: Discharge Planning  Goal: Discharge to home or other facility with appropriate resources  12/21/2024 1912 by Cameron Carroll RN  Outcome: Progressing  12/21/2024 0650 by Lillian Doshi RN  Outcome: Progressing     Problem: Pain  Goal: Verbalizes/displays adequate comfort level or baseline comfort level  12/21/2024 1912 by Cameron Carroll RN  Outcome: Progressing  12/21/2024 0650 by Lillian Doshi RN  Outcome: Progressing     Problem: ABCDS Injury Assessment  Goal: Absence of physical injury  12/21/2024 1912 by Cameron Carroll RN  Outcome: Progressing  12/21/2024 0650 by Lillian Doshi RN  Outcome: Progressing

## 2024-12-22 NOTE — PROGRESS NOTES
End of Shift Note    Bedside shift change report given to BHAKTI Wang (oncoming nurse) by Lillian Doshi RN (offgoing nurse).  Report included the following information SBAR REPORTS LIST: SBAR    Shift worked:  2145-3273     Shift summary and any significant changes:     Did not sleep again tonight. Still refusing dialysis, has mentioned multiple times that he is considering quitting dialysis questioning end of life care. I think he needs to have something to help him sleep and he has asked if there is anything that can help. He may need a psych consult to handle the stress of recent medical problems.     Concerns for physician to address:  Please see above, also spoke to nursing supervisor. 2 days of no sleeping and restless     Zone phone for oncoming shift:   [phone]       Activity:  Back and forth in the halls    Access:  Mickey on R subclavian temporary, R 20g         Patient Safety:  Bed/Chair alarm on; Bed/Chair-Wheels locked; Bed in low position; Call light within reach, gripper socks, bedside table within reach      Length of Stay:  Expected LOS: 6  Actual LOS: 5      Lillian Doshi RN
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
## 2024-12-22 NOTE — FLOWSHEET NOTE
Primary RN SBAR: Ania Zafar RN  Incapacitated Nurse [facility]. provided: Yes  Patient Education provided: Procedural  Preferred Education method and Primary language: English/ Verbal  Hospital General Consent Verified: Yes  Hospital associated wait time; reason: 45 mins, patient wants to eat breakfast first    Hepatitis B Surface Ag   Date/Time Value Ref Range Status   12/03/2024 08:37 AM 0.30 Index Final     Hep B S Ag Interp   Date/Time Value Ref Range Status   12/03/2024 08:37 AM Negative NEG   Final     Hep B S Ab   Date/Time Value Ref Range Status   12/03/2024 08:37 AM 5.73 mIU/mL Final     Hep B S Ab Interp   Date/Time Value Ref Range Status   12/03/2024 08:37 AM NONREACTIVE NR   Final     Comment:     (NOTE)  The [employer] Anti-HBs2 assay is traceable to the World Health   Organization (WHO) Hepatitis B Immunoglobulin 1st International   Reference Preparation (1977). Samples with a calculated value of 10   mIU/mL or greater are considered reactive (protective) in accordance   with the CDC guidelines. The accepted criteria for immunity to HBV is   anti-HBs activity greater than or equal to 10 mIU/mL, as defined by   the WHO International Reference Preparation.  Assay performance has not been established in pregnant women,   patients who are immunosuppressed or immunocompromised, nor have   performance characteristics been established in conjunction with   other 's assays for specific HBV serologic markers. This   assay does not differentiate between vaccine induced immune response   and a response due to infection with HBV. Passively acquired anti-HBs   may be identified following patient transfusion, receipt of   immunoglobulin products, etc.         Time out done, order parameters checked, Dialysis related medications and consent have been reviewed. Phone the Primary RN to get report, but patient said he wants to do dialysis after lunchtime. Went to bedside and attempted to talk the patient

## 2024-12-22 NOTE — PROGRESS NOTES
rec:  Plan was for 6 weeks of antibiotics vancomycin and cefepime IV 12/5-1/16  Flagyl p.o. x 2 weeks end date 12/19-  Hemodialysis center contacted-patient has been receiving vancomycin and cefepime  as scheduled with hemodialysis.  Unfortunately patient did not  prescription for Flagyl.     Continue Vancomycin , Zosyn IV x 6 weeks 12/18 -1/29  Keep the hand elevated  MRI planned hand surgery   Anticipate discharge on vancomycin and Zosyn  Patient is agreeable to having a Luly catheter placed          ESRD on Hemodialysis  --on Tuesday Thursday Saturday dialysis,  neprho following, received HD in ED today 12/17  --Nephrology on board.  Hemodialysis schedule deferred        Right eye conjunctivitis.   c/w moxitrol     Hx of right BKA  - Positive for edema on examination, slight open wound, patient wanted Ortho to evaluate need for stitches  - Vascular sx consulted. No acute intervention needed. Local wound care ordered.     T2DM , chronic  - On Tresiba (degludec) at home  - Will continue with hospital substitution with Lantus insulin 14 units daily + sliding scale.   --Fs qachs.     HTN, chronic  -c/w home medications amlodipine, hydralazine, Bumex     Peripheral neuropathy, chronic  - continue home med; gabapentin     Mood disorder, chronic  - Patient feeling depressed, denies suicidal or homicidal ideation  -- Ativan as needed for anxiety 1 mg p.o. twice daily  -- Consult psychiatry for further recommendations        Medical Decision Making:   I personally reviewed labs:  I personally reviewed imaging:  I personally reviewed EKG:  Toxic drug monitoring:   Discussed case with:         Code Status: DNR  DVT Prophylaxis: Heparin  GI Prophylaxis:    Subjective:     Chief Complaint / Reason for Physician Visit  \" Wound infection follow-up\".  Patient seen today having mood swings feeling depressed and initially refusing dialysis.  He denies any suicidal homicidal ideation.  Discussed with RN events  overnight.    Objective:     VITALS:   Last 24hrs VS reviewed since prior progress note. Most recent are:  Patient Vitals for the past 24 hrs:   BP Temp Temp src Pulse Resp SpO2   12/22/24 1400 (!) 155/85 -- -- 85 18 --   12/22/24 1213 (!) 163/87 98.3 °F (36.8 °C) Oral 85 18 95 %   12/22/24 1200 136/77 -- -- 83 -- --   12/22/24 1145 (!) 162/80 -- -- 78 -- --   12/22/24 1130 (!) 135/90 -- -- 83 -- --   12/22/24 1115 (!) 165/92 -- -- 81 -- --   12/22/24 1100 (!) 166/123 -- -- 86 -- --   12/22/24 1045 (!) 165/90 -- -- 83 -- --   12/22/24 1030 134/80 -- -- 84 -- --   12/22/24 1015 (!) 146/80 -- -- 78 -- --   12/22/24 1000 (!) 151/92 -- -- 80 -- --   12/22/24 0945 (!) 150/87 -- -- 83 -- --   12/22/24 0930 (!) 167/87 -- -- 84 -- --   12/22/24 0915 (!) 162/86 -- -- 81 -- --   12/22/24 0900 (!) 163/84 -- -- 80 -- --   12/22/24 0849 (!) 158/87 98.2 °F (36.8 °C) -- 80 18 95 %   12/21/24 2024 125/80 98.1 °F (36.7 °C) Oral 76 18 94 %   12/21/24 1552 130/70 98 °F (36.7 °C) Oral 80 18 95 %         Intake/Output Summary (Last 24 hours) at 12/22/2024 1448  Last data filed at 12/22/2024 1213  Gross per 24 hour   Intake 500 ml   Output 4500 ml   Net -4000 ml          I had a face to face encounter and independently examined this patient on 12/22/2024, as outlined below:  PHYSICAL EXAM:  General: Alert, cooperative  EENT:  EOMI. Anicteric sclerae.  Resp:  CTA bilaterally, no wheezing or rales.  No accessory muscle use  CV:  Regular  rhythm,  No edema  GI:  Soft, Non distended, Non tender.  +Bowel sounds  Neurologic:  Alert and oriented X 3, normal speech,   Psych:   Good insight. Not anxious nor agitated  Skin:  No rashes.  No jaundice    Reviewed most current lab test results and cultures  YES  Reviewed most current radiology test results   YES  Review and summation of old records today    NO  Reviewed patient's current orders and MAR    YES  PMH/SH reviewed - no change compared to H&P    Procedures: see electronic medical records

## 2024-12-23 LAB
ANION GAP SERPL CALC-SCNC: 6 MMOL/L (ref 2–12)
BASOPHILS # BLD: 0 K/UL (ref 0–0.1)
BASOPHILS NFR BLD: 0 % (ref 0–1)
BUN SERPL-MCNC: 50 MG/DL (ref 6–20)
BUN/CREAT SERPL: 13 (ref 12–20)
CALCIUM SERPL-MCNC: 8.8 MG/DL (ref 8.5–10.1)
CHLORIDE SERPL-SCNC: 99 MMOL/L (ref 97–108)
CO2 SERPL-SCNC: 28 MMOL/L (ref 21–32)
CREAT SERPL-MCNC: 3.81 MG/DL (ref 0.7–1.3)
DIFFERENTIAL METHOD BLD: ABNORMAL
EOSINOPHIL # BLD: 0.2 K/UL (ref 0–0.4)
EOSINOPHIL NFR BLD: 2 % (ref 0–7)
ERYTHROCYTE [DISTWIDTH] IN BLOOD BY AUTOMATED COUNT: 14.9 % (ref 11.5–14.5)
GLUCOSE BLD STRIP.AUTO-MCNC: 141 MG/DL (ref 65–117)
GLUCOSE BLD STRIP.AUTO-MCNC: 144 MG/DL (ref 65–117)
GLUCOSE BLD STRIP.AUTO-MCNC: 184 MG/DL (ref 65–117)
GLUCOSE BLD STRIP.AUTO-MCNC: 230 MG/DL (ref 65–117)
GLUCOSE SERPL-MCNC: 318 MG/DL (ref 65–100)
HCT VFR BLD AUTO: 25.6 % (ref 36.6–50.3)
HGB BLD-MCNC: 8.7 G/DL (ref 12.1–17)
IMM GRANULOCYTES # BLD AUTO: 0 K/UL (ref 0–0.04)
IMM GRANULOCYTES NFR BLD AUTO: 0 % (ref 0–0.5)
LYMPHOCYTES # BLD: 1.2 K/UL (ref 0.8–3.5)
LYMPHOCYTES NFR BLD: 13 % (ref 12–49)
MCH RBC QN AUTO: 29.8 PG (ref 26–34)
MCHC RBC AUTO-ENTMCNC: 34 G/DL (ref 30–36.5)
MCV RBC AUTO: 87.7 FL (ref 80–99)
MONOCYTES # BLD: 0.6 K/UL (ref 0–1)
MONOCYTES NFR BLD: 7 % (ref 5–13)
NEUTS SEG # BLD: 6.9 K/UL (ref 1.8–8)
NEUTS SEG NFR BLD: 78 % (ref 32–75)
NRBC # BLD: 0 K/UL (ref 0–0.01)
NRBC BLD-RTO: 0 PER 100 WBC
PLATELET # BLD AUTO: 132 K/UL (ref 150–400)
PMV BLD AUTO: 9.8 FL (ref 8.9–12.9)
POTASSIUM SERPL-SCNC: 4.6 MMOL/L (ref 3.5–5.1)
RBC # BLD AUTO: 2.92 M/UL (ref 4.1–5.7)
SERVICE CMNT-IMP: ABNORMAL
SODIUM SERPL-SCNC: 133 MMOL/L (ref 136–145)
WBC # BLD AUTO: 9 K/UL (ref 4.1–11.1)

## 2024-12-23 PROCEDURE — 6360000002 HC RX W HCPCS: Performed by: INTERNAL MEDICINE

## 2024-12-23 PROCEDURE — 6370000000 HC RX 637 (ALT 250 FOR IP): Performed by: STUDENT IN AN ORGANIZED HEALTH CARE EDUCATION/TRAINING PROGRAM

## 2024-12-23 PROCEDURE — 6370000000 HC RX 637 (ALT 250 FOR IP): Performed by: INTERNAL MEDICINE

## 2024-12-23 PROCEDURE — 85025 COMPLETE CBC W/AUTO DIFF WBC: CPT

## 2024-12-23 PROCEDURE — 36556 INSERT NON-TUNNEL CV CATH: CPT

## 2024-12-23 PROCEDURE — 2500000003 HC RX 250 WO HCPCS: Performed by: STUDENT IN AN ORGANIZED HEALTH CARE EDUCATION/TRAINING PROGRAM

## 2024-12-23 PROCEDURE — 82962 GLUCOSE BLOOD TEST: CPT

## 2024-12-23 PROCEDURE — 36415 COLL VENOUS BLD VENIPUNCTURE: CPT

## 2024-12-23 PROCEDURE — 6370000000 HC RX 637 (ALT 250 FOR IP): Performed by: FAMILY MEDICINE

## 2024-12-23 PROCEDURE — 80048 BASIC METABOLIC PNL TOTAL CA: CPT

## 2024-12-23 PROCEDURE — 1100000003 HC PRIVATE W/ TELEMETRY

## 2024-12-23 PROCEDURE — 2500000003 HC RX 250 WO HCPCS: Performed by: FAMILY MEDICINE

## 2024-12-23 PROCEDURE — 2580000003 HC RX 258: Performed by: INTERNAL MEDICINE

## 2024-12-23 RX ORDER — MORPHINE SULFATE 4 MG/ML
4 INJECTION, SOLUTION INTRAMUSCULAR; INTRAVENOUS
Status: DISCONTINUED | OUTPATIENT
Start: 2024-12-23 | End: 2024-12-24

## 2024-12-23 RX ORDER — MORPHINE SULFATE 2 MG/ML
2 INJECTION, SOLUTION INTRAMUSCULAR; INTRAVENOUS
Status: DISCONTINUED | OUTPATIENT
Start: 2024-12-23 | End: 2024-12-24

## 2024-12-23 RX ADMIN — INSULIN LISPRO 2 UNITS: 100 INJECTION, SOLUTION INTRAVENOUS; SUBCUTANEOUS at 08:55

## 2024-12-23 RX ADMIN — PANTOPRAZOLE SODIUM 40 MG: 40 TABLET, DELAYED RELEASE ORAL at 06:32

## 2024-12-23 RX ADMIN — SODIUM CHLORIDE, PRESERVATIVE FREE 10 ML: 5 INJECTION INTRAVENOUS at 08:56

## 2024-12-23 RX ADMIN — LOSARTAN POTASSIUM 25 MG: 25 TABLET, FILM COATED ORAL at 08:56

## 2024-12-23 RX ADMIN — HYDROMORPHONE HYDROCHLORIDE 1 MG: 1 INJECTION, SOLUTION INTRAMUSCULAR; INTRAVENOUS; SUBCUTANEOUS at 06:32

## 2024-12-23 RX ADMIN — BUMETANIDE 2 MG: 1 TABLET ORAL at 17:57

## 2024-12-23 RX ADMIN — GABAPENTIN 300 MG: 300 CAPSULE ORAL at 08:56

## 2024-12-23 RX ADMIN — HYDRALAZINE HYDROCHLORIDE 50 MG: 25 TABLET, FILM COATED ORAL at 22:53

## 2024-12-23 RX ADMIN — SODIUM CHLORIDE, PRESERVATIVE FREE 10 ML: 5 INJECTION INTRAVENOUS at 22:55

## 2024-12-23 RX ADMIN — Medication 1 CAPSULE: at 08:56

## 2024-12-23 RX ADMIN — INSULIN GLARGINE 14 UNITS: 100 INJECTION, SOLUTION SUBCUTANEOUS at 08:55

## 2024-12-23 RX ADMIN — NEOMYCIN SULFATE, POLYMYXIN B SULFATE AND DEXAMETHASONE 1 DROP: 3.5; 10000; 1 SUSPENSION/ DROPS OPHTHALMIC at 18:00

## 2024-12-23 RX ADMIN — LURASIDONE HYDROCHLORIDE 20 MG: 20 TABLET ORAL at 17:57

## 2024-12-23 RX ADMIN — HYDROMORPHONE HYDROCHLORIDE 1 MG: 1 INJECTION, SOLUTION INTRAMUSCULAR; INTRAVENOUS; SUBCUTANEOUS at 20:53

## 2024-12-23 RX ADMIN — MORPHINE SULFATE 4 MG: 4 INJECTION, SOLUTION INTRAMUSCULAR; INTRAVENOUS at 13:58

## 2024-12-23 RX ADMIN — PIPERACILLIN AND TAZOBACTAM 3375 MG: 3; .375 INJECTION, POWDER, LYOPHILIZED, FOR SOLUTION INTRAVENOUS at 12:01

## 2024-12-23 RX ADMIN — AMLODIPINE BESYLATE 10 MG: 5 TABLET ORAL at 08:56

## 2024-12-23 RX ADMIN — LORAZEPAM 1 MG: 1 TABLET ORAL at 22:53

## 2024-12-23 RX ADMIN — HYDRALAZINE HYDROCHLORIDE 50 MG: 25 TABLET, FILM COATED ORAL at 06:32

## 2024-12-23 RX ADMIN — GABAPENTIN 300 MG: 300 CAPSULE ORAL at 22:53

## 2024-12-23 RX ADMIN — HYDROMORPHONE HYDROCHLORIDE 1 MG: 1 INJECTION, SOLUTION INTRAMUSCULAR; INTRAVENOUS; SUBCUTANEOUS at 11:16

## 2024-12-23 RX ADMIN — PIPERACILLIN AND TAZOBACTAM 3375 MG: 3; .375 INJECTION, POWDER, LYOPHILIZED, FOR SOLUTION INTRAVENOUS at 00:11

## 2024-12-23 RX ADMIN — GABAPENTIN 300 MG: 300 CAPSULE ORAL at 13:58

## 2024-12-23 RX ADMIN — NEOMYCIN SULFATE, POLYMYXIN B SULFATE AND DEXAMETHASONE 1 DROP: 3.5; 10000; 1 SUSPENSION/ DROPS OPHTHALMIC at 22:53

## 2024-12-23 RX ADMIN — HYDROMORPHONE HYDROCHLORIDE 1 MG: 1 INJECTION, SOLUTION INTRAMUSCULAR; INTRAVENOUS; SUBCUTANEOUS at 02:26

## 2024-12-23 RX ADMIN — MORPHINE SULFATE 4 MG: 4 INJECTION, SOLUTION INTRAMUSCULAR; INTRAVENOUS at 17:58

## 2024-12-23 RX ADMIN — MORPHINE SULFATE 4 MG: 4 INJECTION, SOLUTION INTRAMUSCULAR; INTRAVENOUS at 09:40

## 2024-12-23 RX ADMIN — LORAZEPAM 1 MG: 1 TABLET ORAL at 08:56

## 2024-12-23 RX ADMIN — HYDRALAZINE HYDROCHLORIDE 50 MG: 25 TABLET, FILM COATED ORAL at 14:01

## 2024-12-23 RX ADMIN — INSULIN LISPRO 4 UNITS: 100 INJECTION, SOLUTION INTRAVENOUS; SUBCUTANEOUS at 11:53

## 2024-12-23 RX ADMIN — BUMETANIDE 2 MG: 1 TABLET ORAL at 08:56

## 2024-12-23 ASSESSMENT — PAIN SCALES - GENERAL
PAINLEVEL_OUTOF10: 8
PAINLEVEL_OUTOF10: 8
PAINLEVEL_OUTOF10: 10
PAINLEVEL_OUTOF10: 7
PAINLEVEL_OUTOF10: 10
PAINLEVEL_OUTOF10: 8
PAINLEVEL_OUTOF10: 8
PAINLEVEL_OUTOF10: 10

## 2024-12-23 ASSESSMENT — PAIN DESCRIPTION - DESCRIPTORS
DESCRIPTORS: SHOOTING;ACHING;THROBBING
DESCRIPTORS: ACHING;SHOOTING;THROBBING
DESCRIPTORS: SHARP
DESCRIPTORS: JABBING
DESCRIPTORS: SHARP
DESCRIPTORS: ACHING;THROBBING

## 2024-12-23 ASSESSMENT — PAIN DESCRIPTION - LOCATION
LOCATION: HAND
LOCATION: ARM;HAND
LOCATION: HAND;ELBOW
LOCATION: HAND
LOCATION: HAND

## 2024-12-23 ASSESSMENT — PAIN DESCRIPTION - ORIENTATION
ORIENTATION: LEFT;ANTERIOR
ORIENTATION: LEFT
ORIENTATION: LEFT
ORIENTATION: LEFT;OUTER
ORIENTATION: LEFT
ORIENTATION: LEFT

## 2024-12-23 ASSESSMENT — PAIN - FUNCTIONAL ASSESSMENT: PAIN_FUNCTIONAL_ASSESSMENT: PREVENTS OR INTERFERES SOME ACTIVE ACTIVITIES AND ADLS

## 2024-12-23 NOTE — CONSULTS
PALLIATIVE MEDICINE      Palliative Medicine was consulted for goals of care. At this time pt is struggling with poorly controlled pain, which is not a good time to have goals of care discussion.  Will follow peripherally and see pt when his pain is under control.    Thank you for including Palliative Medicine in this patient's care.   THOM Ramirez

## 2024-12-23 NOTE — PROGRESS NOTES
End of Shift Note    Bedside shift change report given to BHAKTI Ellison (oncoming nurse) by ELVIA SPEARS LPN (offgoing nurse).  Report included the following information SBAR    Shift worked:  4540-1941     Shift summary and any significant changes:    Medications given per Mar. Care and safety rounds complete. C/o pain in L hand overnight PRN given x3. Scratched his left hand , sandra blood. Nosebleed, he states it happens when he sneezes the air is dry in the hospital.  Restless overnight, very little sleep. Walking up and down the caballero trying to change hallway trash, wipes to clean bathroom.     Concerns for physician to address:       Zone phone for oncoming shift:   [phone]       Activity:  Level of Assistance: Independent    Cardiac:   Cardiac Monitoring:  no    Access:  Current line(s): PIV - 20g RAC    Genitourinary:   Urinary Status: Voiding, Bathroom privileges    Respiratory:   O2 Device: None (Room air)    GI:  Current diet: ADULT DIET; Regular; Low Sodium (2 gm); Low Potassium (Less than 3000 mg/day)    Pain Management:   Patient states pain is manageable on current regimen: YES    Skin:  Kennedy Scale Score: 23  Interventions: Wound Offloading (Prevention Methods): Other (comment) (walking,)  Pressure injury: no    Patient Safety:  Fall Score: Vanegas Total Score: 80  Fall Risk Interventions  Nursing Judgement-Fall Risk High(Add Comments): No  Toilet Every 2 Hours-In Advance of Need: No (Comment) (independent)  Hourly Visual Checks: Awake, In bed, Other (Comment) (walking hallway)  Fall Visual Posted: Fall sign posted, Socks  Room Door Open: Yes  Alarm On: Bed  Patient Moved Closer to Nursing Station: No    Active Consults:  IP CONSULT TO NEPHROLOGY  IP CONSULT TO HOSPITALIST  IP CONSULT TO ORTHOPEDIC SURGERY  IP CONSULT TO INFECTIOUS DISEASES  PHARMACY TO DOSE VANCOMYCIN  IP CONSULT TO CASE MANAGEMENT  IP CONSULT TO PALLIATIVE CARE  IP CONSULT TO PSYCHIATRY  IP CONSULT TO SPIRITUAL CARE    Length of

## 2024-12-23 NOTE — OP NOTE
PATIENT NAME:  Keaton Van     SURGEON:  Henry Owens MD     DATE OF SURGERY:  12/21/2024     LOCATION: [facility]     PREOPERATIVE DIAGNOSIS:   Left hand infection from cat bite, recurrent     POSTOPERATIVE DIAGNOSIS:  Same     PROCEDURE: Left hand irrigation and debridement including irrigation and debridement of left index and long finger flexor tenosynovitis and left index finger MCP joint            ANESTHESIA: GETA     BLOOD LOSS:  Minimal        OPERATIVE INDICATIONS:    The patient is a 59 y.o. old male who has developed recurrent progressive infection of the left hand from a cat bite, unresponsive to all conservative treatment. Symptoms have failed to respond consistently to conservative treatment such that patient necessitated surgical management.  Have discussed risks benefits and alternatives as well as postoperative course.  He consented to proceed.        DESCRIPTION  OF PROCEDURE: Patient identified correctly in the pre-operative holding area and correct extremity marked. Was then taken stable to the operating room and placed supine with the operative extremity on a hand table. After general anesthesia was administered by the anesthesia team, the left hand and forearm were prepped and draped in a sterile field. A timeout was taken and the operative site was confirmed. The operative extremity was then elevated and exsanguinated and an forearm tourniquet was inflated to 200 mm of mercury.  A Surya incision was made overlying the volar index finger beginning in the palm and tracking into the digit in an oblique fashion across the flexor creases.  His previous incision was utilized. Skin and subcutaneous tissue was taken down sharply.  Deeper dissection was carefully performed.  It was noted that there was only serous fluid noted here.  This did track to the flexor tendons of the index and long finger. This was evacuated at both the index and long finger level.  Blunt  debridement was performed with a rongeur.  Due to concerns for septic arthritis I then elevated the flexor mechanism out of the pulley system and made a counterincision in the MCP joint.  It was noted that minimal serous fluid was present and here as well.  Cultures were taken.  Copious irrigation was then performed first with Irrisept solution and followed by sterile saline of both the flexor mechanism, soft tissue in the subcutaneous region as well as the MCP joint.  The tourniquet was then released. Hemostasis was obtained with bipolar cautery and the wound was closed with 3-0 nylon sutures with intermittent packing.  A sterile dressing was then applied.  The patient tolerated the procedure well and was discharged to the recovery area uneventfully.  All instrument, needle and lap counts were correct at the end of the case.         During the procedure, a physician assistant was vital to the outcome the case providing stability to the arm, retraction of crucial structures, assistance with wound closure, assistance with handling soft tissue and dressing application.
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
## 2024-12-23 NOTE — PROGRESS NOTES
0903: Patient is complaining of 10 out of 10 pain on the incision site on left hand. Pt also took off the dressing on left hand, states it is too tight and making it more painful. This nurse offered PRN oxycodone since it's not time for IV pain med but pt refused stating he cannot wait for an hour for it to kick in. Notified Dr. Gonzalez.     0918: Received order for PRN IV morphine.    0940: PRN morphine given by MARY Delgadillo.    1045: Dr. Owens at bedside.    1100: Pt speaking with VALENTÍN Lopez from [facility].    1116: PRN IV dilaudid given for 10 out of 10 pain.    1300: Betadine/water soak and dressing change completed.    1358: PRN morphine given.       1500: End of Shift Note    Bedside shift change report given to Cabrera (oncoming nurse) by Yeong AE Jenny, RN (offgoing nurse).  Report included the following information SBAR    Shift worked:  7a - 3p     Shift summary and any significant changes:     See above. PRN meds given multiple times for severe pain on left hand, pt refusing to take oral pain meds, only wants IV pain med, MD aware. Scheduled meds given.     Concerns for physician to address:  Pt complaining of severe pain on left hand     Zone phone for oncoming shift:   [phone]       Activity:  Level of Assistance: Independent  Number times ambulated in hallways past shift: 5  Number of times OOB to chair past shift: 0    Cardiac:   Cardiac Monitoring: No      Cardiac Rhythm: Sinus rhythm    Access:  Current line(s): PIV     Genitourinary:   Urinary Status: Bathroom privileges    Respiratory:   O2 Device: None (Room air)  Chronic home O2 use?: NO  Incentive spirometer at bedside: N/A    GI:  Last BM (including prior to admit): 12/21/24  Current diet:  ADULT DIET; Regular; Low Sodium (2 gm); Low Potassium (Less than 3000 mg/day)  Passing flatus: YES    Pain Management:   Patient states pain is manageable on current regimen: YES    Skin:  Kennedy Scale Score: 23  Interventions: Wound Offloading (Prevention
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
## 2024-12-23 NOTE — PROGRESS NOTES
Orthopedic Surgery Progress Note  Post Op Day: 2 Days Post-Op    2024 10:51 AM     Patient Name: Keaton Van      Subjective:      Keaton Van is a 59 y.o. male sitting in bed. In a lot of pain. Received dilaudid at 10:15 without improvement.  Has a history of opioid use disorder. Sitting in the room holding his hand without a dressing in place.     Procedure:  Procedure(s):  LEFT HAND INCISION AND DRAINAGE      Objective:    General: Alert, cooperative, no distress.   Gastrointestinal:  Soft, non-tender.   Musculoskeletal:     Physical examination of the left hand reveals an incision at the palm at the base of the index with sutures approximating the tissue. Area overall well healing, small area of maceration in center of incision. Can wiggle the finger. There is +2 swelling at the  base of the index finger. Mild ttp at the MCPJ and significant ttp at the base of the index. Warm and well perfused, good cap refill.     Vital Signs:    Patient Vitals for the past 8 hrs:   BP Temp Temp src Pulse Resp SpO2 Weight   24 0811 (!) 152/87 98.4 °F (36.9 °C) Oral 82 16 97 % --   24 0600 -- -- -- -- -- -- 97.9 kg (215 lb 13.3 oz)   24 0256 -- -- -- -- 16 -- --     Temp (24hrs), Av.4 °F (36.9 °C), Min:98.3 °F (36.8 °C), Max:98.6 °F (37 °C)      Intake/Output:  No intake/output data recorded.   1901 -  0700  In: 504 [I.V.:4]  Out: 4500     Pain Control:        LAB:    Recent Labs     24  0220   HCT 25.6*   HGB 8.7*     Lab Results   Component Value Date/Time     2024 02:20 AM    K 4.6 2024 02:20 AM    CL 99 2024 02:20 AM    CO2 28 2024 02:20 AM    BUN 50 2024 02:20 AM         Assessment:    s/p Procedure(s):  LEFT HAND INCISION AND DRAINAGE    Principal Problem:    Wound infection  Active Problems:    Controlled diabetes mellitus type 2 with complications (HCC)    Cellulitis of left hand    Flexor tenosynovitis of finger    Abscess of left

## 2024-12-23 NOTE — CARE COORDINATION
1806 - CM sent perfect serve to Dr. Kerr ID regarding abx order. Amy has order for vancomycin and zosyn but per last ID note from Dr. Garvin pt to receive vancomycin and cefepime. CM to follow up.      1100 - Patient to receive Harshad catheter and do dialysis via SNF.  Order for harshad to be placed by hospitalist.      1230 - CM spoke with pt about going to SNF and pt agreeable. PT open to going to [facility] and [facility] but wanted to talk with his sisters about it. He was agreeable to CM sending referrals to 'get the ball rolling'. CM sent referrals to said facilities and left SNF list for patient and family. CM will continue to follow.    Vera Pardo, KATELINN, RN, [facility]  [phone]

## 2024-12-23 NOTE — CONSULTS
with medical treatment 11/3/2011    Other ill-defined conditions(799.89)     chronic low back pain    Psychiatric disorder     bipolar    Sleep disorder     Substance abuse (HCC)     Suicidal thoughts      Prior to Admission medications    Medication Sig Start Date End Date Taking? Authorizing Provider   losartan (COZAAR) 25 MG tablet Take 1 tablet by mouth daily 12/12/24  Yes Cole Esqueda MD   hydrALAZINE (APRESOLINE) 50 MG tablet Take 1 tablet by mouth every 8 hours 9/17/24  Yes Mimi Mello MD   amLODIPine (NORVASC) 10 MG tablet Take 1 tablet by mouth daily 9/17/24  Yes Mimi Mello MD   bumetanide (BUMEX) 2 MG tablet Take 1 tablet by mouth in the morning and 1 tablet in the evening. 7/26/24  Yes Inga Acevedo MD   pantoprazole (PROTONIX) 40 MG tablet Take 1 tablet by mouth every morning (before breakfast) 7/27/24  Yes Inga Acevedo MD   lurasidone (LATUDA) 20 MG TABS tablet Take 1 tablet by mouth Daily with supper 6/17/24  Yes Khoi Dumont MD   silver sulfADIAZINE (SILVADENE) 1 % cream Apply topically daily.  Patient may take home with him. 12/3/24   Quiana Forte, LOIS - NP   Insulin Degludec (TRESIBA FLEXTOUCH) 100 UNIT/ML SOPN Inject 14 Units into the skin at bedtime 9/17/24   Mimi Mello MD   Insulin Pen Needle 31G X 5 MM MISC 1 each by Does not apply route daily 9/17/24   Mimi Mello MD   aspirin 81 MG EC tablet Take 1 tablet by mouth daily  Patient not taking: Reported on 12/2/2024 9/17/24   Mimi Mello MD   blood glucose monitor strips Check once daily 9/17/24   Mimi Mello MD   Lancets MISC 1 each by Does not apply route daily 9/17/24   Mimi Mello MD   glucose monitoring kit 1 kit by Does not apply route daily Test blood glucose daily in the morning 9/17/24   Mimi Mello MD   DULoxetine (CYMBALTA) 20 MG extended release capsule Take 1 capsule by mouth daily  Patient not taking: Reported on 12/2/2024 7/26/24   Inga Acevedo MD   cloNIDine  (CATAPRES) 0.2 MG tablet Take 1 tablet by mouth every 8 hours as needed for High Blood Pressure (SBP >170 or DBP >100) 7/26/24   Inga Acevedo MD   gabapentin (NEURONTIN) 600 MG tablet Take 0.5 tablets by mouth 3 times daily for 30 days. Max Daily Amount: 900 mg 6/24/24 7/24/24  Stefania Vargas MD   tiZANidine (ZANAFLEX) 2 MG tablet Take 1 tablet by mouth every 8 hours as needed (muscle spasm) 6/17/24   Khoi Dumont MD   acetaminophen (TYLENOL) 325 MG tablet Take 2 tablets by mouth every 6 hours as needed for Pain  Patient not taking: Reported on 12/2/2024 6/8/24   Mimi Mello MD     [unfilled]  Lab Results   Component Value Date    WBC 9.0 12/23/2024    HGB 8.7 (L) 12/23/2024    HCT 25.6 (L) 12/23/2024    MCV 87.7 12/23/2024     (L) 12/23/2024     Lab Results   Component Value Date     (L) 12/23/2024    K 4.6 12/23/2024    CL 99 12/23/2024    CO2 28 12/23/2024    BUN 50 (H) 12/23/2024    CREATININE 3.81 (H) 12/23/2024    GLUCOSE 318 (H) 12/23/2024    CALCIUM 8.8 12/23/2024    BILITOT 0.5 12/17/2024    ALKPHOS 150 (H) 12/17/2024    AST 35 12/17/2024    ALT 40 12/17/2024    LABGLOM 17 (L) 12/23/2024    GFRAA >60 05/21/2022    AGRATIO 0.9 (L) 01/05/2023    GLOB 4.7 (H) 12/17/2024         No results found for: \"VALAC\", \"VALP\", \"CARB2\"  No results found for: \"LITHM\"  RADIOLOGY REPORTS:(reviewed/updated 12/23/2024)  [unfilled]  @[email or web address]@    PSYCHOSOCIAL HISTORY:  Two sisters are supportive, resides in [place] with his older sister      MENTAL STATUS EXAM:  General appearance:  well-groomed, psychomotor activity is appropriate  Eye contact: good eye contact  Speech: Spontaneous, soft, reg rate and rhythm  Affect : tearful, depressed  Mood: \" okay\"  Thought Process: Logical, goal directed  Perception: Denies AH or VH.   Thought Content: Denies SI or Plan  Insight: Fair  Judgement: Fair  Cognition: Intact grossly.     ASSESSMENT AND PLAN:  Keaton Van meets criteria for a diagnosis of

## 2024-12-24 ENCOUNTER — HOSPITAL ENCOUNTER (INPATIENT)
Facility: HOSPITAL | Age: 59
Discharge: HOME | DRG: 513 | End: 2024-12-27
Payer: MEDICARE

## 2024-12-24 VITALS
TEMPERATURE: 98.6 F | OXYGEN SATURATION: 91 % | SYSTOLIC BLOOD PRESSURE: 162 MMHG | RESPIRATION RATE: 16 BRPM | HEART RATE: 80 BPM | DIASTOLIC BLOOD PRESSURE: 78 MMHG

## 2024-12-24 LAB
GLUCOSE BLD STRIP.AUTO-MCNC: 164 MG/DL (ref 65–117)
GLUCOSE BLD STRIP.AUTO-MCNC: 202 MG/DL (ref 65–117)
GLUCOSE BLD STRIP.AUTO-MCNC: 225 MG/DL (ref 65–117)
GLUCOSE BLD STRIP.AUTO-MCNC: 86 MG/DL (ref 65–117)
SERVICE CMNT-IMP: ABNORMAL
SERVICE CMNT-IMP: NORMAL
VANCOMYCIN SERPL-MCNC: 11.8 UG/ML

## 2024-12-24 PROCEDURE — 94760 N-INVAS EAR/PLS OXIMETRY 1: CPT

## 2024-12-24 PROCEDURE — 6360000002 HC RX W HCPCS: Performed by: INTERNAL MEDICINE

## 2024-12-24 PROCEDURE — 82962 GLUCOSE BLOOD TEST: CPT

## 2024-12-24 PROCEDURE — 2580000003 HC RX 258: Performed by: STUDENT IN AN ORGANIZED HEALTH CARE EDUCATION/TRAINING PROGRAM

## 2024-12-24 PROCEDURE — 6370000000 HC RX 637 (ALT 250 FOR IP): Performed by: INTERNAL MEDICINE

## 2024-12-24 PROCEDURE — 2500000003 HC RX 250 WO HCPCS: Performed by: FAMILY MEDICINE

## 2024-12-24 PROCEDURE — 6370000000 HC RX 637 (ALT 250 FOR IP): Performed by: FAMILY MEDICINE

## 2024-12-24 PROCEDURE — 80202 ASSAY OF VANCOMYCIN: CPT

## 2024-12-24 PROCEDURE — 6360000002 HC RX W HCPCS: Performed by: STUDENT IN AN ORGANIZED HEALTH CARE EDUCATION/TRAINING PROGRAM

## 2024-12-24 PROCEDURE — 77001 FLUOROGUIDE FOR VEIN DEVICE: CPT

## 2024-12-24 PROCEDURE — 6370000000 HC RX 637 (ALT 250 FOR IP): Performed by: PHYSICIAN ASSISTANT

## 2024-12-24 PROCEDURE — 02HV33Z INSERTION OF INFUSION DEVICE INTO SUPERIOR VENA CAVA, PERCUTANEOUS APPROACH: ICD-10-PCS | Performed by: STUDENT IN AN ORGANIZED HEALTH CARE EDUCATION/TRAINING PROGRAM

## 2024-12-24 PROCEDURE — 36556 INSERT NON-TUNNEL CV CATH: CPT

## 2024-12-24 PROCEDURE — 6370000000 HC RX 637 (ALT 250 FOR IP): Performed by: STUDENT IN AN ORGANIZED HEALTH CARE EDUCATION/TRAINING PROGRAM

## 2024-12-24 PROCEDURE — 2500000003 HC RX 250 WO HCPCS: Performed by: STUDENT IN AN ORGANIZED HEALTH CARE EDUCATION/TRAINING PROGRAM

## 2024-12-24 PROCEDURE — 2700000000 HC OXYGEN THERAPY PER DAY

## 2024-12-24 PROCEDURE — 36415 COLL VENOUS BLD VENIPUNCTURE: CPT

## 2024-12-24 PROCEDURE — 2580000003 HC RX 258: Performed by: INTERNAL MEDICINE

## 2024-12-24 PROCEDURE — 29125 APPL SHORT ARM SPLINT STATIC: CPT | Performed by: PHYSICIAN ASSISTANT

## 2024-12-24 PROCEDURE — 1100000003 HC PRIVATE W/ TELEMETRY

## 2024-12-24 PROCEDURE — 99024 POSTOP FOLLOW-UP VISIT: CPT | Performed by: PHYSICIAN ASSISTANT

## 2024-12-24 PROCEDURE — 6360000002 HC RX W HCPCS: Performed by: FAMILY MEDICINE

## 2024-12-24 RX ORDER — HEPARIN SODIUM 200 [USP'U]/100ML
200 INJECTION, SOLUTION INTRAVENOUS ONCE
Status: COMPLETED | OUTPATIENT
Start: 2024-12-24 | End: 2024-12-24

## 2024-12-24 RX ORDER — LIDOCAINE HYDROCHLORIDE 20 MG/ML
20 INJECTION, SOLUTION INFILTRATION; PERINEURAL ONCE
Status: COMPLETED | OUTPATIENT
Start: 2024-12-24 | End: 2024-12-24

## 2024-12-24 RX ADMIN — HYDRALAZINE HYDROCHLORIDE 50 MG: 25 TABLET, FILM COATED ORAL at 22:36

## 2024-12-24 RX ADMIN — HYDROMORPHONE HYDROCHLORIDE 1 MG: 1 INJECTION, SOLUTION INTRAMUSCULAR; INTRAVENOUS; SUBCUTANEOUS at 00:57

## 2024-12-24 RX ADMIN — Medication 1 CAPSULE: at 14:19

## 2024-12-24 RX ADMIN — HYDROMORPHONE HYDROCHLORIDE 1 MG: 1 INJECTION, SOLUTION INTRAMUSCULAR; INTRAVENOUS; SUBCUTANEOUS at 20:23

## 2024-12-24 RX ADMIN — LOSARTAN POTASSIUM 25 MG: 25 TABLET, FILM COATED ORAL at 14:18

## 2024-12-24 RX ADMIN — HYDROMORPHONE HYDROCHLORIDE 1 MG: 1 INJECTION, SOLUTION INTRAMUSCULAR; INTRAVENOUS; SUBCUTANEOUS at 12:16

## 2024-12-24 RX ADMIN — SODIUM CHLORIDE, PRESERVATIVE FREE 10 ML: 5 INJECTION INTRAVENOUS at 22:37

## 2024-12-24 RX ADMIN — PIPERACILLIN AND TAZOBACTAM 3375 MG: 3; .375 INJECTION, POWDER, LYOPHILIZED, FOR SOLUTION INTRAVENOUS at 12:30

## 2024-12-24 RX ADMIN — LORAZEPAM 1 MG: 1 TABLET ORAL at 22:36

## 2024-12-24 RX ADMIN — HYDRALAZINE HYDROCHLORIDE 50 MG: 25 TABLET, FILM COATED ORAL at 05:07

## 2024-12-24 RX ADMIN — HYDROMORPHONE HYDROCHLORIDE 1 MG: 1 INJECTION, SOLUTION INTRAMUSCULAR; INTRAVENOUS; SUBCUTANEOUS at 08:57

## 2024-12-24 RX ADMIN — SODIUM CHLORIDE 100 ML: 9 INJECTION, SOLUTION INTRAVENOUS at 00:45

## 2024-12-24 RX ADMIN — LIDOCAINE HYDROCHLORIDE 10 ML: 20 INJECTION, SOLUTION INFILTRATION; PERINEURAL at 12:46

## 2024-12-24 RX ADMIN — GABAPENTIN 300 MG: 300 CAPSULE ORAL at 14:18

## 2024-12-24 RX ADMIN — LURASIDONE HYDROCHLORIDE 20 MG: 20 TABLET ORAL at 18:24

## 2024-12-24 RX ADMIN — INSULIN GLARGINE 14 UNITS: 100 INJECTION, SOLUTION SUBCUTANEOUS at 14:25

## 2024-12-24 RX ADMIN — SODIUM CHLORIDE, PRESERVATIVE FREE 10 ML: 5 INJECTION INTRAVENOUS at 08:58

## 2024-12-24 RX ADMIN — OXYCODONE 5 MG: 5 TABLET ORAL at 22:59

## 2024-12-24 RX ADMIN — NEOMYCIN SULFATE, POLYMYXIN B SULFATE AND DEXAMETHASONE 1 DROP: 3.5; 10000; 1 SUSPENSION/ DROPS OPHTHALMIC at 09:01

## 2024-12-24 RX ADMIN — GABAPENTIN 300 MG: 300 CAPSULE ORAL at 22:36

## 2024-12-24 RX ADMIN — NEOMYCIN SULFATE, POLYMYXIN B SULFATE AND DEXAMETHASONE 1 DROP: 3.5; 10000; 1 SUSPENSION/ DROPS OPHTHALMIC at 18:27

## 2024-12-24 RX ADMIN — HEPARIN SODIUM IN SODIUM CHLORIDE 500 ML: 200 INJECTION INTRAVENOUS at 12:46

## 2024-12-24 RX ADMIN — EPOETIN ALFA-EPBX 10000 UNITS: 10000 INJECTION, SOLUTION INTRAVENOUS; SUBCUTANEOUS at 18:24

## 2024-12-24 RX ADMIN — AMLODIPINE BESYLATE 10 MG: 5 TABLET ORAL at 14:18

## 2024-12-24 RX ADMIN — HYDROMORPHONE HYDROCHLORIDE 1 MG: 1 INJECTION, SOLUTION INTRAMUSCULAR; INTRAVENOUS; SUBCUTANEOUS at 16:02

## 2024-12-24 RX ADMIN — BUMETANIDE 2 MG: 1 TABLET ORAL at 14:18

## 2024-12-24 RX ADMIN — PIPERACILLIN AND TAZOBACTAM 3375 MG: 3; .375 INJECTION, POWDER, LYOPHILIZED, FOR SOLUTION INTRAVENOUS at 00:46

## 2024-12-24 RX ADMIN — HYDROMORPHONE HYDROCHLORIDE 1 MG: 1 INJECTION, SOLUTION INTRAMUSCULAR; INTRAVENOUS; SUBCUTANEOUS at 05:04

## 2024-12-24 RX ADMIN — NEOMYCIN SULFATE, POLYMYXIN B SULFATE AND DEXAMETHASONE 1 DROP: 3.5; 10000; 1 SUSPENSION/ DROPS OPHTHALMIC at 22:39

## 2024-12-24 RX ADMIN — LORAZEPAM 1 MG: 1 TABLET ORAL at 14:19

## 2024-12-24 RX ADMIN — INSULIN LISPRO 2 UNITS: 100 INJECTION, SOLUTION INTRAVENOUS; SUBCUTANEOUS at 18:34

## 2024-12-24 RX ADMIN — PANTOPRAZOLE SODIUM 40 MG: 40 TABLET, DELAYED RELEASE ORAL at 14:35

## 2024-12-24 ASSESSMENT — PAIN - FUNCTIONAL ASSESSMENT
PAIN_FUNCTIONAL_ASSESSMENT: ADULT NONVERBAL PAIN SCALE (NPVS)
PAIN_FUNCTIONAL_ASSESSMENT: 0-10
PAIN_FUNCTIONAL_ASSESSMENT: ADULT NONVERBAL PAIN SCALE (NPVS)
PAIN_FUNCTIONAL_ASSESSMENT: NONE - DENIES PAIN
PAIN_FUNCTIONAL_ASSESSMENT: ADULT NONVERBAL PAIN SCALE (NPVS)
PAIN_FUNCTIONAL_ASSESSMENT: PREVENTS OR INTERFERES SOME ACTIVE ACTIVITIES AND ADLS
PAIN_FUNCTIONAL_ASSESSMENT: NONE - DENIES PAIN
PAIN_FUNCTIONAL_ASSESSMENT: ADULT NONVERBAL PAIN SCALE (NPVS)
PAIN_FUNCTIONAL_ASSESSMENT: PREVENTS OR INTERFERES SOME ACTIVE ACTIVITIES AND ADLS
PAIN_FUNCTIONAL_ASSESSMENT: ADULT NONVERBAL PAIN SCALE (NPVS)

## 2024-12-24 ASSESSMENT — PAIN SCALES - GENERAL
PAINLEVEL_OUTOF10: 1
PAINLEVEL_OUTOF10: 8
PAINLEVEL_OUTOF10: 6
PAINLEVEL_OUTOF10: 8
PAINLEVEL_OUTOF10: 10
PAINLEVEL_OUTOF10: 1
PAINLEVEL_OUTOF10: 9
PAINLEVEL_OUTOF10: 9
PAINLEVEL_OUTOF10: 8
PAINLEVEL_OUTOF10: 1

## 2024-12-24 ASSESSMENT — PAIN DESCRIPTION - LOCATION
LOCATION: HAND

## 2024-12-24 ASSESSMENT — PAIN DESCRIPTION - DESCRIPTORS
DESCRIPTORS: DISCOMFORT
DESCRIPTORS: CRAMPING;DISCOMFORT
DESCRIPTORS: CRAMPING;DISCOMFORT

## 2024-12-24 ASSESSMENT — PAIN DESCRIPTION - ORIENTATION
ORIENTATION: LEFT

## 2024-12-24 ASSESSMENT — PAIN SCALES - WONG BAKER
WONGBAKER_NUMERICALRESPONSE: HURTS A LITTLE BIT
WONGBAKER_NUMERICALRESPONSE: NO HURT
WONGBAKER_NUMERICALRESPONSE: NO HURT

## 2024-12-24 NOTE — PROGRESS NOTES
Patient was also seen and evaluated by myself with EARLE Freire. Please see her note for full detail. No expressible drainage with deep palpation. He does have swelling and stiffness. He has taken his own dressing off. He complains of severe pain. I discussed with him in detail that I did not see any purulence on repeat irrigation and debridement on Saturday. I do not see any further indication for surgical management. I recommended to him elevation, rest, possible splint if he continues to move it freely. Can start betadine soaks but if he continues to be too aggressive with motion, it will continue to be swollen and painful and we should splint it. He can continue IV antibiotics per ID. He does have likely high tolerance to pain medications and low threshold for pain due to history of drug abuse. Recommend pain management consult. Appreciate medicine and ID input.     [facility]

## 2024-12-24 NOTE — PROGRESS NOTES
End of Shift Note    Bedside shift change report given to BHAKTI Helton (oncoming nurse) by Lillian Doshi RN (offgoing nurse).  Report included the following information SBAR REPORTS LIST: SBAR    Shift worked:  0928-4310     Shift summary and any significant changes:     Pt slept a few hours and was in a pleasant mood. Is concerned about the healing of hand, feels like he may be getting worse not better.      Concerns for physician to address:  None     Zone phone for oncoming shift:   [phone]       Activity:  Up and down the caballero, around the room        Length of Stay:  Expected LOS: 8  Actual LOS: 7      Lillian Doshi RN

## 2024-12-24 NOTE — PROGRESS NOTES
End of Shift Note    Bedside shift change report given to Lillian YA (oncoming nurse) by Sunitha Rosa RN (offgoing nurse).  Report included the following information SBAR, Kardex, and MAR    Shift worked:  9610-0808     Shift summary and any significant changes:     Seen by ortho dressing done,seen by primary doctor waiting for IR to insert PICC line, noted patient removed dressing, sent to IR for PICC line, back from IR diet served, seen by Dr Mejía, pt complaint of pain but willing to wait for his due time, dressing done,all needs attended, hourly rounds done     Concerns for physician to address:  None           Activity:  Level of Assistance: Independent  Number times ambulated in hallways past shift: 2  Number of times OOB to chair past shift: 3    Cardiac:   Cardiac Monitoring: No      Cardiac Rhythm: Sinus rhythm    Access:  Current line(s): PIV     Genitourinary:   Urinary Status: Voiding, Bathroom privileges    Respiratory:   O2 Device: None (Room air)  Chronic home O2 use?: NO  Incentive spirometer at bedside: NO    GI:  Last BM (including prior to admit): 12/21/24  Current diet:  ADULT DIET; Regular; Low Sodium (2 gm); Low Potassium (Less than 3000 mg/day); Low Phosphorus (Less than 1000 mg)  Passing flatus: YES    Pain Management:   Patient states pain is manageable on current regimen: NO    Skin:  Kennedy Scale Score: 21  Interventions: Wound Offloading (Prevention Methods): Bed, pressure reduction mattress    Patient Safety:  Fall Risk: Nursing Judgement-Fall Risk High(Add Comments): No  Fall Risk Interventions  Nursing Judgement-Fall Risk High(Add Comments): No  Toilet Every 2 Hours-In Advance of Need: Yes  Hourly Visual Checks: Awake, Rooming in  Fall Visual Posted: Mat, Socks  Room Door Open: Yes  Alarm On: Other (Comment)  Patient Moved Closer to Nursing Station: No    Active Consults:   IP CONSULT TO NEPHROLOGY  IP CONSULT TO HOSPITALIST  IP CONSULT TO ORTHOPEDIC SURGERY  IP CONSULT TO

## 2024-12-24 NOTE — PLAN OF CARE
Problem: Safety - Adult  Goal: Free from fall injury  12/24/2024 1146 by Sunitha Rosa RN  Outcome: Progressing  12/24/2024 0213 by Lillian Doshi RN  Outcome: Progressing     Problem: Chronic Conditions and Co-morbidities  Goal: Patient's chronic conditions and co-morbidity symptoms are monitored and maintained or improved  12/24/2024 1146 by Sunitha Rosa RN  Outcome: Progressing  12/24/2024 0213 by Lillian Doshi RN  Outcome: Progressing     Problem: Discharge Planning  Goal: Discharge to home or other facility with appropriate resources  12/24/2024 1146 by Sunitha Rosa RN  Outcome: Progressing  12/24/2024 0213 by Lillian Doshi RN  Outcome: Progressing     Problem: Pain  Goal: Verbalizes/displays adequate comfort level or baseline comfort level  12/24/2024 1146 by Sunitha Rosa RN  Outcome: Progressing  12/24/2024 0213 by Lillian Doshi RN  Outcome: Progressing     Problem: ABCDS Injury Assessment  Goal: Absence of physical injury  12/24/2024 1146 by Sunitha Rosa RN  Outcome: Progressing  12/24/2024 0213 by Lillian Doshi RN  Outcome: Progressing     Problem: Anxiety  Goal: Will report anxiety at manageable levels  Description: INTERVENTIONS:  1. Administer medication as ordered  2. Teach and rehearse alternative coping skills  3. Provide emotional support with 1:1 interaction with staff  12/24/2024 1146 by Sunitha Rosa RN  Outcome: Progressing  12/24/2024 0213 by Lillian Doshi RN  Outcome: Progressing     Problem: Infection - Adult  Goal: Absence of infection during hospitalization  12/24/2024 1146 by Sunitha Rosa RN  Outcome: Progressing  12/24/2024 0213 by Lillian Doshi RN  Outcome: Progressing

## 2024-12-24 NOTE — PROGRESS NOTES
ORTHO - Progress Note  Post Op day: 3 Days Post-Op    Keaton Van     [id number]  male    59 y.o.    1965    Admit date:2024  Date of Surgery:  Procedures:Procedure(s):  LEFT HAND INCISION AND DRAINAGE  Surgeon:Surgeons and Role:   * Henry Owens MD - Primary        SUBJECTIVE:     Keaton Van is a 59 y.o. male resting in the bed/chair.  Having pain not typical for 3 days PO. C/o swelling.  Not elevating as directed.   Feels he needs another I and D.  Looking into second opinion    OBJECTIVE:       Physical Exam:  General: alert, cooperative, no distress.       Dressing/Wound:   No significant erythema,.  no fluid expressed from the palmar aspect over 2nd mp.   Some swelling franklin over 2nd MCP     Vital Signs:       Patient Vitals for the past 8 hrs:   BP Temp Temp src Pulse Resp SpO2 Weight   24 0857 (!) 149/89 97.5 °F (36.4 °C) Oral 79 19 99 % --   24 0550 -- -- -- -- -- -- 98.3 kg (216 lb 11.4 oz)                                          Temp (24hrs), Av.2 °F (36.8 °C), Min:97.5 °F (36.4 °C), Max:98.6 °F (37 °C)      Labs:        Recent Labs     24  0220   HCT 25.6*   HGB 8.7*     PT/OT:              ASSESSMENT / PLAN:   Principal Problem:    Wound infection  Active Problems:    Controlled diabetes mellitus type 2 with complications (HCC)    Cellulitis of left hand    Flexor tenosynovitis of finger    Abscess of left hand    Pyogenic arthritis of left hand    Cat bite of left hand including fingers with infection, subsequent encounter    Hx of BKA, right (HCC)  Resolved Problems:    * No resolved hospital problems. *     -  Dry dressing secure w ACE-  volar splint placed  -  elevation of left hand above elbow  - Gentle ROM of the finger  -  Avoid lifting pushin pulling with the JONATHAN Pugh dc for second opinion after pic and antibiotic treatment in place     No growth from prelim sx cultures   ABX per ID    Signed By: Edi Salinas PA-C

## 2024-12-24 NOTE — PROGRESS NOTES
Nephrology Progress Note  [facility] / [place] [facility]  [address], [address]  [place] [address]  Phone - [phone]  Fax - [phone]                 Patient: Keaton Van                     YOB: 1965        Date- 12/24/2024                                     Admit Date: 12/17/2024   CC: Follow up for esrd   IMPRESSION & PLAN:   ESRD - TTS, [facility], right chest PermCath)  Surgical wound dehiscence  Cat bite/left upper extremity cellulitis,recurrent  Index finger cellulitis, MCP joint septic arthritis, juxta articular abscess  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  CHF  HTN  DM  Anemia      PLAN-  Dialysis today  CONTINUE HD tts Schedule  Check labs with hd  Continue norvasc, hydralazine , losartan  Abx per primary team.       Subjective:  Interval History:   Bp stable  No labs done today    Objective:   Vitals:    12/23/24 1700 12/23/24 2053 12/24/24 0550 12/24/24 0857   BP: (!) 142/77 (!) 157/77     Pulse: 80 86     Resp: 18 16  18   Temp: 98.2 °F (36.8 °C) 98.6 °F (37 °C)     TempSrc: Oral Oral     SpO2: 94%      Weight:   98.3 kg (216 lb 11.4 oz)    Height:          I/O last 3 completed shifts:  In: 244 [P.O.:240; I.V.:4]  Out: -   No intake/output data recorded.      Physical exam:    GEN: NAD  NECK- no mass  RESP: no wheezing  NEURO: Normal speech, non focal  EXT: Left hand wound dehiscence noted  PSYCH: Normal Mood    Chart reviewed.         Pertinent Notes reviewed.     Data Review :  Lab Results   Component Value Date/Time     12/23/2024 02:20 AM    K 4.6 12/23/2024 02:20 AM    CL 99 12/23/2024 02:20 AM    CO2 28 12/23/2024 02:20 AM    BUN 50 12/23/2024 02:20 AM    CREATININE 3.81 12/23/2024 02:20 AM    GLUCOSE 318 12/23/2024 02:20 AM    CALCIUM 8.8 12/23/2024 02:20 AM       Lab Results   Component Value Date    WBC 9.0 12/23/2024    HGB 8.7 (L) 12/23/2024    HCT 25.6 (L) 12/23/2024    MCV 87.7 12/23/2024      (L) 12/23/2024      Recent Labs     12/22/24  0502 12/23/24  0220   * 133*   K 5.4* 4.6   CL 95* 99   CO2 23 28   GLUCOSE 299* 318*   BUN 76* 50*   CREATININE 4.88* 3.81*   CALCIUM 9.5 8.8       Recent Labs     12/22/24  0502 12/23/24  0220   WBC 9.5 9.0   RBC 3.27* 2.92*   HGB 9.5* 8.7*   HCT 28.1* 25.6*   MCV 85.9 87.7   MCH 29.1 29.8   MCHC 33.8 34.0   RDW 14.6* 14.9*    132*   MPV 10.1 9.8     Lab Results   Component Value Date/Time    IRON 50 07/23/2024 05:20 AM    TIBC 234 (L) 07/23/2024 05:20 AM     No results found for: \"PTH\"  Lab Results   Component Value Date/Time    LABA1C 7.5 (H) 12/18/2024 05:23 AM     Lab Results   Component Value Date/Time    COLORU YELLOW/STRAW 12/02/2024 06:49 PM    CLARITYU CLEAR 01/06/2023 02:18 AM    GLUCOSEU 500 (A) 12/02/2024 06:49 PM    BILIRUBINUR Negative 12/02/2024 06:49 PM    KETUA Negative 12/02/2024 06:49 PM    BLOODU MODERATE (A) 12/02/2024 06:49 PM    PHUR 5.5 12/02/2024 06:49 PM    PHUR 7.0 01/06/2023 02:18 AM    PROTEINU 100 (A) 12/02/2024 06:49 PM    NITRU Negative 12/02/2024 06:49 PM    LEUKOCYTESUR Negative 12/02/2024 06:49 PM     US Results (most recent):  Medication list  reviewed  Current Facility-Administered Medications   Medication Dose Route Frequency    vancomycin (VANCOCIN) intermittent dosing (placeholder)   Other RX Placeholder    epoetin tomas-epbx (RETACRIT) injection 10,000 Units  10,000 Units SubCUTAneous Once per day on Tuesday Thursday Saturday    albumin human 25% IV solution 25 g  25 g IntraVENous PRN    LORazepam (ATIVAN) tablet 1 mg  1 mg Oral BID    oxyCODONE (ROXICODONE) immediate release tablet 5 mg  5 mg Oral Q4H PRN    piperacillin-tazobactam (ZOSYN) 3,375 mg in sodium chloride 0.9 % 50 mL IVPB (mini-bag)  3,375 mg IntraVENous Q12H    lactobacillus (CULTURELLE) capsule 1 capsule  1 capsule Oral Daily with breakfast    HYDROmorphone HCl PF (DILAUDID) injection 1 mg  1 mg IntraVENous Q4H PRN    ondansetron (ZOFRAN)

## 2024-12-24 NOTE — PLAN OF CARE
Problem: Safety - Adult  Goal: Free from fall injury  12/24/2024 0213 by Lillian Doshi RN  Outcome: Progressing  12/23/2024 1939 by Jean-Claude Moore RN  Outcome: Progressing     Problem: Chronic Conditions and Co-morbidities  Goal: Patient's chronic conditions and co-morbidity symptoms are monitored and maintained or improved  12/24/2024 0213 by Lillian Doshi RN  Outcome: Progressing  12/23/2024 1939 by Jean-Claude Moore RN  Outcome: Progressing     Problem: Discharge Planning  Goal: Discharge to home or other facility with appropriate resources  12/24/2024 0213 by Lillian Doshi RN  Outcome: Progressing  12/23/2024 1939 by Jean-Claude Moore RN  Outcome: Progressing     Problem: Pain  Goal: Verbalizes/displays adequate comfort level or baseline comfort level  12/24/2024 0213 by Lillian Doshi RN  Outcome: Progressing  12/23/2024 1939 by Jean-Claude Moore RN  Outcome: Progressing     Problem: ABCDS Injury Assessment  Goal: Absence of physical injury  12/24/2024 0213 by Lillian Doshi RN  Outcome: Progressing  12/23/2024 1939 by Jean-Claude Moore RN  Outcome: Progressing     Problem: Anxiety  Goal: Will report anxiety at manageable levels  Description: INTERVENTIONS:  1. Administer medication as ordered  2. Teach and rehearse alternative coping skills  3. Provide emotional support with 1:1 interaction with staff  Outcome: Progressing     Problem: Infection - Adult  Goal: Absence of infection during hospitalization  Outcome: Progressing

## 2024-12-24 NOTE — PROGRESS NOTES
Attempted to schedule hospital follow up PCP appointment. Office is closed for the holiday. Pending patient discharge.

## 2024-12-24 NOTE — CARE COORDINATION
Transition of Care Plan:     RUR: 31%  Prior Level of Functioning: Independent   Disposition: SNF  TAN: 12/26/24  If SNF or IPR: Date FOC offered: 12/23/24  Date FOC received: 12/23/24  Accepting facility: Pending  Date authorization started with reference number:   Date authorization received and expires:   Follow up appointments: PCP   DME needed: No DME needed   Transportation at discharge: Pt's sister   IM/IMM Medicare/ letter given: 2nd IMM letter   Is patient a [place] and connected with [facility]? No              If yes, was  transfer form completed and VA notified? No  Caregiver Contact: Pt's sister   Discharge Caregiver contacted prior to discharge? Pt will be contacted   Care Conference needed? No  Barriers to discharge: Medical clearance     CM spoke to pt to inform him that [facility] and [facility] has accept pt for skilled services.    Pt wants to speak to his sister before making a decision on placement.    CM informed him that both facilities will be able to do his dialysis onsite.    CM will continue to follow and assist with d/c planning.    Amparo Argueta

## 2024-12-24 NOTE — PROGRESS NOTES
Hospitalist Progress Note    NAME:   Keaton Van   : 1965   MRN: [id number]     Date/Time: 2024 5:49 PM  Patient PCP: Robert Wells PA-C    Estimated discharge date: TBD  Barriers: IV antibiotics, orthopedic clearance, ID recommendation on discharge, wound culture, psychiatric consult, palliative consult      Assessment / Plan:      Worsening Left hand cell, orthopedic and ID ulitis/abscess/flexor tenosynovitis, secondary to cat bite, POA  Hx Staph bacteremia, POA  Medication non-adherence  -Staph bacteremia blood cultures from the last admission  --CT left hand w contrast reveals evidence of MCP joint septic arthritis and adjacent articular abscess, last admission  --Left hand incision and drainage on 2024  - Was discharged on cefepime IV, vancomycin IV, to be given after dialysis, also Flagyl po x 2 weeks end date   -  -Possible drug-seeking behavior, switch to oral narcotics stop IV narcotics  -Infectious disease and orthopedic surgery on board.  Appreciate their inputs  -ID recommending intraoperative culture  -Ortho plans to watch for a few days on IV antibiotics.  All inputs appreciated  -MRI left hand revieweD:    1. Findings concerning for osteomyelitis of the base proximal phalanx of the  index finger and possible early osteomyelitis head second metacarpal head. There  is slight enhancement of this joint which is concerning for septic arthritis  with nonspecific linear enhancement of the radial collateral ligament. Please  correlate with operative history for possible aspiration of the joint through  the ligament     2. Soft tissue abscess along the palmar surface of this joint which communicates  with the skin through a large defect with enhancing synovitis of the flexor  tendons to the index finger  -S/p I&D left hand today  with hand surgery  -Continue pain management  -Follow-up wound culture  -Hand surgery on board.  Appreciate input    ID rec:     Continue Vancomycin

## 2024-12-24 NOTE — PROGRESS NOTES
Hospitalist Progress Note    NAME:   Keaton Van   : 1965   MRN: [id number]     Date/Time: 2024 12:32 AM  Patient PCP: Robert Wells PA-C    Estimated discharge date: TBD  Barriers: IV antibiotics, orthopedic clearance, ID recommendation on discharge, wound culture, psychiatric consult, palliative consult      Assessment / Plan:      Worsening Left hand cell, orthopedic and ID ulitis/abscess/flexor tenosynovitis, secondary to cat bite, POA  Hx Staph bacteremia, POA  Medication non-adherence  -Staph bacteremia blood cultures from the last admission  --CT left hand w contrast reveals evidence of MCP joint septic arthritis and adjacent articular abscess, last admission  --Left hand incision and drainage on 2024  - Was discharged on cefepime IV, vancomycin IV, to be given after dialysis, also Flagyl po x 2 weeks end date   -Change vancomycin to Zosyn  -Possible drug-seeking behavior, switch to oral narcotics stop IV narcotics  -Infectious disease and orthopedic surgery on board.  Appreciate their inputs  -ID recommending intraoperative culture  -Ortho plans to watch for a few days on IV antibiotics.  All inputs appreciated  -MRI left hand revieweD:    1. Findings concerning for osteomyelitis of the base proximal phalanx of the  index finger and possible early osteomyelitis head second metacarpal head. There  is slight enhancement of this joint which is concerning for septic arthritis  with nonspecific linear enhancement of the radial collateral ligament. Please  correlate with operative history for possible aspiration of the joint through  the ligament     2. Soft tissue abscess along the palmar surface of this joint which communicates  with the skin through a large defect with enhancing synovitis of the flexor  tendons to the index finger  -S/p I&D left hand today  with hand surgery  -Continue pain management  -Follow-up wound culture  -Hand surgery on board.  Appreciate input    ID  rec:     Continue Vancomycin , Zosyn IV x 6 weeks 12/18 -1/29  Keep the hand elevated  Patient is agreeable to having a Luly catheter placed          ESRD on Hemodialysis  --on Tuesday Thursday Saturday dialysis,  estela following, received HD in ED today 12/17  --Nephrology on board.  Hemodialysis schedule deferred        Right eye conjunctivitis.   c/w moxitrol     Hx of right BKA  - Positive for edema on examination, slight open wound, patient wanted Ortho to evaluate need for stitches  - Vascular sx consulted. No acute intervention needed. Local wound care ordered.     T2DM , chronic  - On Tresiba (degludec) at home  - Will continue with hospital substitution with Lantus insulin 14 units daily + sliding scale.   --Fs qachs.     HTN, chronic  -c/w home medications amlodipine, hydralazine, Bumex     Peripheral neuropathy, chronic  - continue home med; gabapentin     Mood disorder, chronic  - Patient feeling depressed, denies suicidal or homicidal ideation  -- Ativan as needed for anxiety 1 mg p.o. twice daily  -- Consult psychiatry for further recommendations    Disposition: Pending Luly catheter placement, consult placed for radiology, patient can be discharged possibly tomorrow    Medical Decision Making:   I personally reviewed labs:  I personally reviewed imaging:  I personally reviewed EKG:  Toxic drug monitoring:   Discussed case with:         Code Status: DNR  DVT Prophylaxis: Heparin  GI Prophylaxis:    Subjective:     Chief Complaint / Reason for Physician Visit  \" Wound infection follow-up\".  Patient seen today having mood swings feeling depressed and initially refusing dialysis.  He denies any suicidal homicidal ideation.  Discussed with RN events overnight.    Objective:     VITALS:   Last 24hrs VS reviewed since prior progress note. Most recent are:  Patient Vitals for the past 24 hrs:   BP Temp Temp src Pulse Resp SpO2 Weight   12/23/24 1700 (!) 142/77 98.2 °F (36.8 °C) Oral 80 18 94 % --   12/23/24

## 2024-12-24 NOTE — CONSULTS
PALLIATIVE MEDICINE      Palliative Medicine was consulted for goals of care.  Per chart review ACP note dated 12/11/24, AMD completed.  Will cancel consult, please re-consult if Palliative Medicine can be of further assistance.     Thank you for including Palliative Medicine in this patient's care.   THOM Ramirez

## 2024-12-24 NOTE — PROGRESS NOTES
Comprehensive Nutrition Assessment     Type and Reason for Visit:  Initial, LOS     Nutrition Recommendations/Plan:   As medically appropriate, adv diet back to CC with renal restrictions.       Malnutrition Assessment:  Malnutrition Status:  No malnutrition (12/24/24 0210)       Nutrition Assessment:    Pt admitted for L hand infection after cat bite; worsening after I&D, cont IV abx.  NPO for possible intervention.  Patient medically noted for acute respiratory failure, opioid overdose, chronic pain, right BKA, depression/bipolar, DM, CHF, and ESRD on HD, substance abuse/drug seeking behavior. Current weight only indicates recent weight gain. Potassium and phos WNL. Encourage intake of meals.          Wt Readings from Last 10 Encounters:   09/16/24 88.2 kg (194 lb 7.1 oz)   07/13/24 77.1 kg (170 lb)   06/22/24 82 kg (180 lb 12.4 oz)   06/14/24 86.3 kg (190 lb 4.1 oz)   06/07/24 83.9 kg (185 lb)   06/04/24 86 kg (189 lb 9.5 oz)   07/18/23 72.6 kg (160 lb)   05/21/22 74.8 kg (164 lb 14.5 oz)      Nutrition Related Findings:    [phone]   Bumex, Lantus, Humlaog, Protonix, Glycolax          Current Nutrition Intake & Therapies:    ADULT DIET; NPO x sips w meds     Anthropometric Measures:  Height: 182.9 cm (6')  Ideal Body Weight (IBW): 178 lbs (81 kg)    Current Body Weight: 98.3 kg (216 lb 11.4 oz),       Current BMI (kg/m2): 29.39  Weight Adjustment For: Amputation  Total Adjusted Percentage (Calculated): 5.9  Adjusted BMI (kg/m2) (Calculated): 28  BMI Categories: Overweight (BMI 25.0-29.9)     Estimated Daily Nutrient Needs:  Energy Requirements Based On: Formula  Weight Used for Energy Requirements: Current  Energy (kcal/day): 2077 kcals (BMR x 1.2AF)  Weight Used for Protein Requirements: Current  Protein (g/day): 106g (1.2 g/kg bw)  Method Used for Fluid Requirements: Standard Renal  Fluid (ml/day): 1800 mL or per MD     Nutrition Diagnosis:   No nutrition diagnosis at this time      Nutrition

## 2024-12-24 NOTE — PROGRESS NOTES
1150    Pt arrived to XR recovery for Luly Catheter Placement. Pt is A/O x4 with 10/10 left hand pain. VS to be collected and consent to be obtained. Pt bed is locked and in lowest position and call bell is within reach.    1210    Per Dr. Tim perkins for pt to get prn dose of IV dilaudid    1250    Name of procedure: Luly Catheter    Vital Signs: Stable    Any complications related to procedure: None    Post Procedure Care Needed/order sets placed in connect care.     Patient is at increased fall risk due to medication given.    1344    TRANSFER - OUT REPORT:    Verbal report given to BHAKTI Helton on Keaton Van  being transferred to [facility] for routine post-op       Report consisted of patient's Situation, Background, Assessment and   Recommendations(SBAR).     Information from the following report(s) Nurse Handoff Report was reviewed with the receiving nurse.     Opportunity for questions and clarification was provided.      Patient transported with site [facility]

## 2024-12-24 NOTE — PROGRESS NOTES
Pharmacy Antimicrobial Kinetic Dosing    Indication for Antimicrobials: ssti     Current Regimen of Each Antimicrobial:  Vancomycin IV pharmacy to dose by levels; Start Date ; Day # 8  Zosyn 3.375gm q 8; Start ; Day #7    Previous Antimicrobial Therapy:  Cefepime -   Metronidazole   -     Goal Level: Vancomycin -600 and Vancomycin trough 15-20    Date Dose & Interval Measured (mcg/mL) Predicted AUC 24-48 Predicted AUC 24,ss    AM random Vanc 2000mg iv x1 20.4 N/A N/A     15.5 NA NA     750mg x 1  11.8       Significant Cultures:    blood- paired- NGTD    Labs:  Recent Labs     Units 24  0220 24  0502   CREATININE MG/DL 3.81* 4.88*   BUN MG/DL 50* 76*   WBC K/uL 9.0 9.5     Temp (24hrs), Av.4 °F (36.9 °C), Min:98.2 °F (36.8 °C), Max:98.6 °F (37 °C)      Conditions for Dosing Consideration: Hemodialysis    Creatinine Clearance (mL/min): Estimated Creatinine Clearance: 25 mL/min (A) (based on SCr of 3.81 mg/dL (H)).       Impression/Plan:   HD patient //S  Vancomycin level = 11.8    ID recommendation of 750mg might be a little low of a dose as level is only 11.8 and not within 15-20 range. Recommend dose increase to 1000mg post HD   Vancomycin 1000 mg after HD today   Zosyn   Antimicrobial stop date 7 days     Pharmacy will follow daily and adjust medications as appropriate for renal function and/or serum levels.    Thank you,  Aimee Spears [facility]

## 2024-12-25 LAB
BACTERIA SPEC CULT: NORMAL
BACTERIA SPEC CULT: NORMAL
GLUCOSE BLD STRIP.AUTO-MCNC: 183 MG/DL (ref 65–117)
GLUCOSE BLD STRIP.AUTO-MCNC: 209 MG/DL (ref 65–117)
GLUCOSE BLD STRIP.AUTO-MCNC: 210 MG/DL (ref 65–117)
GLUCOSE BLD STRIP.AUTO-MCNC: 93 MG/DL (ref 65–117)
GRAM STN SPEC: NORMAL
GRAM STN SPEC: NORMAL
SERVICE CMNT-IMP: ABNORMAL
SERVICE CMNT-IMP: NORMAL

## 2024-12-25 PROCEDURE — 1100000003 HC PRIVATE W/ TELEMETRY

## 2024-12-25 PROCEDURE — 6370000000 HC RX 637 (ALT 250 FOR IP): Performed by: INTERNAL MEDICINE

## 2024-12-25 PROCEDURE — 99024 POSTOP FOLLOW-UP VISIT: CPT | Performed by: PHYSICIAN ASSISTANT

## 2024-12-25 PROCEDURE — 6360000002 HC RX W HCPCS: Performed by: INTERNAL MEDICINE

## 2024-12-25 PROCEDURE — 82962 GLUCOSE BLOOD TEST: CPT

## 2024-12-25 PROCEDURE — 6370000000 HC RX 637 (ALT 250 FOR IP): Performed by: FAMILY MEDICINE

## 2024-12-25 PROCEDURE — 2500000003 HC RX 250 WO HCPCS: Performed by: FAMILY MEDICINE

## 2024-12-25 PROCEDURE — 6370000000 HC RX 637 (ALT 250 FOR IP): Performed by: STUDENT IN AN ORGANIZED HEALTH CARE EDUCATION/TRAINING PROGRAM

## 2024-12-25 PROCEDURE — 2580000003 HC RX 258: Performed by: INTERNAL MEDICINE

## 2024-12-25 PROCEDURE — 94760 N-INVAS EAR/PLS OXIMETRY 1: CPT

## 2024-12-25 PROCEDURE — 2500000003 HC RX 250 WO HCPCS: Performed by: STUDENT IN AN ORGANIZED HEALTH CARE EDUCATION/TRAINING PROGRAM

## 2024-12-25 PROCEDURE — 2700000000 HC OXYGEN THERAPY PER DAY

## 2024-12-25 RX ADMIN — PANTOPRAZOLE SODIUM 40 MG: 40 TABLET, DELAYED RELEASE ORAL at 06:52

## 2024-12-25 RX ADMIN — SODIUM CHLORIDE, PRESERVATIVE FREE 10 ML: 5 INJECTION INTRAVENOUS at 08:20

## 2024-12-25 RX ADMIN — INSULIN GLARGINE 14 UNITS: 100 INJECTION, SOLUTION SUBCUTANEOUS at 08:19

## 2024-12-25 RX ADMIN — GABAPENTIN 300 MG: 300 CAPSULE ORAL at 13:26

## 2024-12-25 RX ADMIN — NEOMYCIN SULFATE, POLYMYXIN B SULFATE AND DEXAMETHASONE 1 DROP: 3.5; 10000; 1 SUSPENSION/ DROPS OPHTHALMIC at 22:26

## 2024-12-25 RX ADMIN — INSULIN LISPRO 2 UNITS: 100 INJECTION, SOLUTION INTRAVENOUS; SUBCUTANEOUS at 16:26

## 2024-12-25 RX ADMIN — HYDRALAZINE HYDROCHLORIDE 50 MG: 25 TABLET, FILM COATED ORAL at 06:53

## 2024-12-25 RX ADMIN — INSULIN LISPRO 2 UNITS: 100 INJECTION, SOLUTION INTRAVENOUS; SUBCUTANEOUS at 09:16

## 2024-12-25 RX ADMIN — SODIUM CHLORIDE, PRESERVATIVE FREE 10 ML: 5 INJECTION INTRAVENOUS at 22:30

## 2024-12-25 RX ADMIN — HYDRALAZINE HYDROCHLORIDE 50 MG: 25 TABLET, FILM COATED ORAL at 13:25

## 2024-12-25 RX ADMIN — BUMETANIDE 2 MG: 1 TABLET ORAL at 08:18

## 2024-12-25 RX ADMIN — HYDRALAZINE HYDROCHLORIDE 50 MG: 25 TABLET, FILM COATED ORAL at 22:26

## 2024-12-25 RX ADMIN — NEOMYCIN SULFATE, POLYMYXIN B SULFATE AND DEXAMETHASONE 1 DROP: 3.5; 10000; 1 SUSPENSION/ DROPS OPHTHALMIC at 11:04

## 2024-12-25 RX ADMIN — LORAZEPAM 1 MG: 1 TABLET ORAL at 22:26

## 2024-12-25 RX ADMIN — HYDROMORPHONE HYDROCHLORIDE 1 MG: 1 INJECTION, SOLUTION INTRAMUSCULAR; INTRAVENOUS; SUBCUTANEOUS at 20:34

## 2024-12-25 RX ADMIN — PIPERACILLIN AND TAZOBACTAM 3375 MG: 3; .375 INJECTION, POWDER, LYOPHILIZED, FOR SOLUTION INTRAVENOUS at 12:12

## 2024-12-25 RX ADMIN — PIPERACILLIN AND TAZOBACTAM 3375 MG: 3; .375 INJECTION, POWDER, LYOPHILIZED, FOR SOLUTION INTRAVENOUS at 00:34

## 2024-12-25 RX ADMIN — LOSARTAN POTASSIUM 25 MG: 25 TABLET, FILM COATED ORAL at 08:18

## 2024-12-25 RX ADMIN — HYDROMORPHONE HYDROCHLORIDE 1 MG: 1 INJECTION, SOLUTION INTRAMUSCULAR; INTRAVENOUS; SUBCUTANEOUS at 00:27

## 2024-12-25 RX ADMIN — GABAPENTIN 300 MG: 300 CAPSULE ORAL at 22:26

## 2024-12-25 RX ADMIN — HYDROMORPHONE HYDROCHLORIDE 1 MG: 1 INJECTION, SOLUTION INTRAMUSCULAR; INTRAVENOUS; SUBCUTANEOUS at 16:27

## 2024-12-25 RX ADMIN — BUMETANIDE 2 MG: 1 TABLET ORAL at 17:20

## 2024-12-25 RX ADMIN — HYDROMORPHONE HYDROCHLORIDE 1 MG: 1 INJECTION, SOLUTION INTRAMUSCULAR; INTRAVENOUS; SUBCUTANEOUS at 08:37

## 2024-12-25 RX ADMIN — NEOMYCIN SULFATE, POLYMYXIN B SULFATE AND DEXAMETHASONE 1 DROP: 3.5; 10000; 1 SUSPENSION/ DROPS OPHTHALMIC at 17:20

## 2024-12-25 RX ADMIN — LURASIDONE HYDROCHLORIDE 20 MG: 20 TABLET ORAL at 16:26

## 2024-12-25 RX ADMIN — HYDROMORPHONE HYDROCHLORIDE 1 MG: 1 INJECTION, SOLUTION INTRAMUSCULAR; INTRAVENOUS; SUBCUTANEOUS at 12:26

## 2024-12-25 RX ADMIN — AMLODIPINE BESYLATE 10 MG: 5 TABLET ORAL at 08:18

## 2024-12-25 RX ADMIN — HYDROMORPHONE HYDROCHLORIDE 1 MG: 1 INJECTION, SOLUTION INTRAMUSCULAR; INTRAVENOUS; SUBCUTANEOUS at 04:26

## 2024-12-25 RX ADMIN — LORAZEPAM 1 MG: 1 TABLET ORAL at 08:19

## 2024-12-25 RX ADMIN — GABAPENTIN 300 MG: 300 CAPSULE ORAL at 08:19

## 2024-12-25 RX ADMIN — NEOMYCIN SULFATE, POLYMYXIN B SULFATE AND DEXAMETHASONE 1 DROP: 3.5; 10000; 1 SUSPENSION/ DROPS OPHTHALMIC at 13:24

## 2024-12-25 RX ADMIN — Medication 1 CAPSULE: at 08:18

## 2024-12-25 ASSESSMENT — PAIN SCALES - WONG BAKER
WONGBAKER_NUMERICALRESPONSE: NO HURT

## 2024-12-25 ASSESSMENT — PAIN DESCRIPTION - ORIENTATION
ORIENTATION: LEFT

## 2024-12-25 ASSESSMENT — PAIN DESCRIPTION - DESCRIPTORS
DESCRIPTORS: CRAMPING;ACHING
DESCRIPTORS: CRAMPING;ACHING

## 2024-12-25 ASSESSMENT — PAIN SCALES - GENERAL
PAINLEVEL_OUTOF10: 0
PAINLEVEL_OUTOF10: 9
PAINLEVEL_OUTOF10: 1
PAINLEVEL_OUTOF10: 7
PAINLEVEL_OUTOF10: 1
PAINLEVEL_OUTOF10: 9
PAINLEVEL_OUTOF10: 1
PAINLEVEL_OUTOF10: 8

## 2024-12-25 ASSESSMENT — PAIN DESCRIPTION - LOCATION
LOCATION: HAND

## 2024-12-25 ASSESSMENT — PAIN - FUNCTIONAL ASSESSMENT
PAIN_FUNCTIONAL_ASSESSMENT: PREVENTS OR INTERFERES SOME ACTIVE ACTIVITIES AND ADLS

## 2024-12-25 NOTE — PROGRESS NOTES
Hospitalist Progress Note    NAME:   Keaton Van   : 1965   MRN: [id number]     Date/Time: 2024 5:37 PM  Patient PCP: Robert Wells PA-C    Estimated discharge date: TBD  Barriers: IV antibiotics, orthopedic clearance, ID recommendation on discharge, wound culture, psychiatric consult, palliative consult      Assessment / Plan:    Epistaxis today most likely secondary to dry nasal mucosa, for now we applied Vaseline and pressure    Worsening Left hand cell, orthopedic and ID ulitis/abscess/flexor tenosynovitis, secondary to cat bite, POA  Hx Staph bacteremia, POA  Medication non-adherence  -Staph bacteremia blood cultures from the last admission  --CT left hand w contrast reveals evidence of MCP joint septic arthritis and adjacent articular abscess, last admission  --Left hand incision and drainage on 2024  - Was discharged on cefepime IV, vancomycin IV, to be given after dialysis, also Flagyl po x 2 weeks end date   -Change vancomycin to Zosyn  -Possible drug-seeking behavior, switch to oral narcotics stop IV narcotics  -Infectious disease and orthopedic surgery on board.  Appreciate their inputs  -intraoperative culture so far negative  -MRI left hand revieweD:    1. Findings concerning for osteomyelitis of the base proximal phalanx of the  index finger and possible early osteomyelitis head second metacarpal head. There  is slight enhancement of this joint which is concerning for septic arthritis  with nonspecific linear enhancement of the radial collateral ligament. Please  correlate with operative history for possible aspiration of the joint through  the ligament     2. Soft tissue abscess along the palmar surface of this joint which communicates  with the skin through a large defect with enhancing synovitis of the flexor  tendons to the index finger  -S/p I&D left hand today  with hand surgery  -Continue pain management  -Follow-up wound culture  -Hand surgery on board.

## 2024-12-25 NOTE — PROGRESS NOTES
End of Shift Note    Bedside shift change report given to BHAKTI Helton (oncoming nurse) by Lillian Doshi RN (offgoing nurse).  Report included the following information SBAR REPORTS LIST: SBAR    Shift worked:  [id number]     Shift summary and any significant changes:     Pt slept a good part of the night, he did wake up and wander some but his sleep has time has improved     Concerns for physician to address:  None     Zone phone for oncoming shift:   [phone]       Activity:  Back and forth down hallway and around room    Cardiac:   Cardiac Monitoring: YES / NO: No        Access:  Hemodialysis Central Access - Temporary Right Subclavian   CVC 12/24/24 Right Internal jugular   Peripheral IV 12/17/24 Right Antecubital     Genitourinary:       Respiratory:   Room air    GI:  Current diet:      ADULT DIET; Regular; Low Sodium (2 gm); Low Potassium (Less than 3000 mg/day); Low Phosphorus (Less than 1000 mg)            Pain Management:   HYDROmorphone HCl PF (DILAUDID) injection 1 mg     Skin:  Incision 12/21/24 Finger (Comment which one) Left   Wound 12/05/24 Pretibial Proximal;Right DIME SIZE WOUND FROM RUBBNING OF THE PROSTHESIS     Patient Safety:  Bed/Chair alarm on; Bed/Chair-Wheels locked; Bed in low position; Call light within reach, gripper socks, bedside table within reach      Length of Stay:  Expected LOS: 8  Actual LOS: 8      Lillian Doshi RN

## 2024-12-25 NOTE — PROGRESS NOTES
End of Shift Note    Bedside shift change report given to Lillian YA (oncoming nurse) by Sunitha Rosa RN (offgoing nurse).  Report included the following information SBAR, Kardex, and MAR    Shift worked:  [id number]     Shift summary and any significant changes:     Noted left hand wound expose, dressing applied,Seen by ortho, seen by Dr Gonzalez for mild nasal bleeding, ordered to apply Vaseline to both nares and apply light pressure,all needs attended, hourly rounds done     Concerns for physician to address:  None     Zone phone for oncoming shift:   [phone]       Activity:  Level of Assistance: Independent  Number times ambulated in hallways past shift: 5  Number of times OOB to chair past shift: 5    Cardiac:   Cardiac Monitoring: No      Cardiac Rhythm: Sinus rhythm    Access:  Current line(s): PIV     Genitourinary:   Urinary Status: Voiding, Bathroom privileges    Respiratory:   O2 Device: None (Room air)  Chronic home O2 use?: NO  Incentive spirometer at bedside: NO    GI:  Last BM (including prior to admit): 12/21/24  Current diet:  ADULT DIET; Regular; Low Sodium (2 gm); Low Potassium (Less than 3000 mg/day); Low Phosphorus (Less than 1000 mg)  Passing flatus: YES    Pain Management:   Patient states pain is manageable on current regimen: YES    Skin:  Kennedy Scale Score: 21  Interventions: Wound Offloading (Prevention Methods): Bed, pressure reduction mattress    Patient Safety:  Fall Risk: Nursing Judgement-Fall Risk High(Add Comments): Yes  Fall Risk Interventions  Nursing Judgement-Fall Risk High(Add Comments): Yes  Toilet Every 2 Hours-In Advance of Need: Yes  Hourly Visual Checks: Awake  Fall Visual Posted: Socks, Fall cloth/blanket  Room Door Open: Yes  Alarm On: Personal  Patient Moved Closer to Nursing Station: No    Active Consults:   IP CONSULT TO NEPHROLOGY  IP CONSULT TO HOSPITALIST  IP CONSULT TO ORTHOPEDIC SURGERY  IP CONSULT TO INFECTIOUS DISEASES  PHARMACY TO DOSE VANCOMYCIN  IP CONSULT  TO CASE MANAGEMENT  IP CONSULT TO PALLIATIVE CARE  IP CONSULT TO PSYCHIATRY    Length of Stay:  Expected LOS: 8  Actual LOS: 8    Sunitah Rosa RN

## 2024-12-25 NOTE — PROGRESS NOTES
left hand above elbow  - Gentle ROM of the finger  -  Avoid lifting pushin pulling with the LUE  May dc for second opinion after pic and antibiotic treatment in place     No growth from prelim sx cultures 12/20  ABX per ID    Signed By: Edi Salinas PA-C

## 2024-12-25 NOTE — PLAN OF CARE
Problem: Safety - Adult  Goal: Free from fall injury  12/25/2024 0000 by Lillian Doshi RN  Outcome: Progressing  12/24/2024 1146 by Sunitha Rosa RN  Outcome: Progressing     Problem: Chronic Conditions and Co-morbidities  Goal: Patient's chronic conditions and co-morbidity symptoms are monitored and maintained or improved  12/25/2024 0000 by Lillian Doshi RN  Outcome: Progressing  12/24/2024 1146 by Sunitha Rosa RN  Outcome: Progressing     Problem: Discharge Planning  Goal: Discharge to home or other facility with appropriate resources  12/25/2024 0000 by Lillian Doshi RN  Outcome: Progressing  12/24/2024 1146 by Sunitha Rosa RN  Outcome: Progressing     Problem: Pain  Goal: Verbalizes/displays adequate comfort level or baseline comfort level  12/25/2024 0000 by Lillian Doshi RN  Outcome: Progressing  12/24/2024 1146 by Sunitha Rosa RN  Outcome: Progressing     Problem: ABCDS Injury Assessment  Goal: Absence of physical injury  12/25/2024 0000 by Lillian Doshi RN  Outcome: Progressing  12/24/2024 1146 by Sunitha Rosa RN  Outcome: Progressing     Problem: Anxiety  Goal: Will report anxiety at manageable levels  Description: INTERVENTIONS:  1. Administer medication as ordered  2. Teach and rehearse alternative coping skills  3. Provide emotional support with 1:1 interaction with staff  12/25/2024 0000 by Lillian Doshi RN  Outcome: Progressing  12/24/2024 1146 by Sunitha Rosa RN  Outcome: Progressing     Problem: Infection - Adult  Goal: Absence of infection during hospitalization  12/25/2024 0000 by Lillian Doshi RN  Outcome: Progressing  12/24/2024 1146 by Sunitha Rosa RN  Outcome: Progressing

## 2024-12-25 NOTE — PLAN OF CARE
Problem: Safety - Adult  Goal: Free from fall injury  12/25/2024 0937 by Sunitha Rosa RN  Outcome: Progressing  12/25/2024 0000 by Lillian Doshi RN  Outcome: Progressing     Problem: Chronic Conditions and Co-morbidities  Goal: Patient's chronic conditions and co-morbidity symptoms are monitored and maintained or improved  12/25/2024 0937 by Sunitha Rosa RN  Outcome: Progressing  12/25/2024 0000 by Lillian Doshi RN  Outcome: Progressing     Problem: Discharge Planning  Goal: Discharge to home or other facility with appropriate resources  12/25/2024 0937 by Sunitha Rosa RN  Outcome: Progressing  12/25/2024 0000 by Lillian Doshi RN  Outcome: Progressing     Problem: Pain  Goal: Verbalizes/displays adequate comfort level or baseline comfort level  12/25/2024 0937 by Sunitha Rosa RN  Outcome: Progressing  12/25/2024 0000 by Lillian Doshi RN  Outcome: Progressing     Problem: ABCDS Injury Assessment  Goal: Absence of physical injury  12/25/2024 0937 by Sunitha Rosa RN  Outcome: Progressing  12/25/2024 0000 by Lillian Doshi RN  Outcome: Progressing     Problem: Anxiety  Goal: Will report anxiety at manageable levels  Description: INTERVENTIONS:  1. Administer medication as ordered  2. Teach and rehearse alternative coping skills  3. Provide emotional support with 1:1 interaction with staff  12/25/2024 0937 by Sunitha Rosa RN  Outcome: Progressing  12/25/2024 0000 by Lillian Doshi RN  Outcome: Progressing     Problem: Infection - Adult  Goal: Absence of infection during hospitalization  12/25/2024 0937 by Sunitha Rosa RN  Outcome: Progressing  12/25/2024 0000 by Lillian Doshi RN  Outcome: Progressing

## 2024-12-26 LAB
GLUCOSE BLD STRIP.AUTO-MCNC: 184 MG/DL (ref 65–117)
GLUCOSE BLD STRIP.AUTO-MCNC: 199 MG/DL (ref 65–117)
GLUCOSE BLD STRIP.AUTO-MCNC: 205 MG/DL (ref 65–117)
GLUCOSE BLD STRIP.AUTO-MCNC: 213 MG/DL (ref 65–117)
SERVICE CMNT-IMP: ABNORMAL

## 2024-12-26 PROCEDURE — 6360000002 HC RX W HCPCS: Performed by: INTERNAL MEDICINE

## 2024-12-26 PROCEDURE — 6370000000 HC RX 637 (ALT 250 FOR IP): Performed by: INTERNAL MEDICINE

## 2024-12-26 PROCEDURE — 2500000003 HC RX 250 WO HCPCS: Performed by: STUDENT IN AN ORGANIZED HEALTH CARE EDUCATION/TRAINING PROGRAM

## 2024-12-26 PROCEDURE — 1100000003 HC PRIVATE W/ TELEMETRY

## 2024-12-26 PROCEDURE — 82962 GLUCOSE BLOOD TEST: CPT

## 2024-12-26 PROCEDURE — 2580000003 HC RX 258: Performed by: INTERNAL MEDICINE

## 2024-12-26 PROCEDURE — 6370000000 HC RX 637 (ALT 250 FOR IP): Performed by: STUDENT IN AN ORGANIZED HEALTH CARE EDUCATION/TRAINING PROGRAM

## 2024-12-26 PROCEDURE — 99233 SBSQ HOSP IP/OBS HIGH 50: CPT | Performed by: INTERNAL MEDICINE

## 2024-12-26 PROCEDURE — 2500000003 HC RX 250 WO HCPCS: Performed by: FAMILY MEDICINE

## 2024-12-26 PROCEDURE — 6370000000 HC RX 637 (ALT 250 FOR IP): Performed by: FAMILY MEDICINE

## 2024-12-26 RX ADMIN — HYDROMORPHONE HYDROCHLORIDE 1 MG: 1 INJECTION, SOLUTION INTRAMUSCULAR; INTRAVENOUS; SUBCUTANEOUS at 09:16

## 2024-12-26 RX ADMIN — HYDROMORPHONE HYDROCHLORIDE 1 MG: 1 INJECTION, SOLUTION INTRAMUSCULAR; INTRAVENOUS; SUBCUTANEOUS at 01:04

## 2024-12-26 RX ADMIN — GABAPENTIN 300 MG: 300 CAPSULE ORAL at 09:22

## 2024-12-26 RX ADMIN — HYDRALAZINE HYDROCHLORIDE 50 MG: 25 TABLET, FILM COATED ORAL at 13:31

## 2024-12-26 RX ADMIN — HYDROMORPHONE HYDROCHLORIDE 1 MG: 1 INJECTION, SOLUTION INTRAMUSCULAR; INTRAVENOUS; SUBCUTANEOUS at 17:23

## 2024-12-26 RX ADMIN — LORAZEPAM 1 MG: 1 TABLET ORAL at 09:21

## 2024-12-26 RX ADMIN — LOSARTAN POTASSIUM 25 MG: 25 TABLET, FILM COATED ORAL at 09:22

## 2024-12-26 RX ADMIN — BUMETANIDE 2 MG: 1 TABLET ORAL at 09:21

## 2024-12-26 RX ADMIN — HYDROMORPHONE HYDROCHLORIDE 1 MG: 1 INJECTION, SOLUTION INTRAMUSCULAR; INTRAVENOUS; SUBCUTANEOUS at 21:37

## 2024-12-26 RX ADMIN — GABAPENTIN 300 MG: 300 CAPSULE ORAL at 13:31

## 2024-12-26 RX ADMIN — PIPERACILLIN AND TAZOBACTAM 3375 MG: 3; .375 INJECTION, POWDER, LYOPHILIZED, FOR SOLUTION INTRAVENOUS at 01:09

## 2024-12-26 RX ADMIN — HYDRALAZINE HYDROCHLORIDE 50 MG: 25 TABLET, FILM COATED ORAL at 06:15

## 2024-12-26 RX ADMIN — AMLODIPINE BESYLATE 10 MG: 5 TABLET ORAL at 09:22

## 2024-12-26 RX ADMIN — HYDROMORPHONE HYDROCHLORIDE 1 MG: 1 INJECTION, SOLUTION INTRAMUSCULAR; INTRAVENOUS; SUBCUTANEOUS at 13:22

## 2024-12-26 RX ADMIN — INSULIN GLARGINE 14 UNITS: 100 INJECTION, SOLUTION SUBCUTANEOUS at 09:22

## 2024-12-26 RX ADMIN — PANTOPRAZOLE SODIUM 40 MG: 40 TABLET, DELAYED RELEASE ORAL at 06:15

## 2024-12-26 RX ADMIN — HYDRALAZINE HYDROCHLORIDE 50 MG: 25 TABLET, FILM COATED ORAL at 21:37

## 2024-12-26 RX ADMIN — INSULIN LISPRO 2 UNITS: 100 INJECTION, SOLUTION INTRAVENOUS; SUBCUTANEOUS at 09:22

## 2024-12-26 RX ADMIN — Medication 1 CAPSULE: at 09:22

## 2024-12-26 RX ADMIN — LURASIDONE HYDROCHLORIDE 20 MG: 20 TABLET ORAL at 17:27

## 2024-12-26 RX ADMIN — INSULIN LISPRO 2 UNITS: 100 INJECTION, SOLUTION INTRAVENOUS; SUBCUTANEOUS at 21:37

## 2024-12-26 RX ADMIN — INSULIN LISPRO 2 UNITS: 100 INJECTION, SOLUTION INTRAVENOUS; SUBCUTANEOUS at 13:31

## 2024-12-26 RX ADMIN — EPOETIN ALFA-EPBX 10000 UNITS: 10000 INJECTION, SOLUTION INTRAVENOUS; SUBCUTANEOUS at 17:28

## 2024-12-26 RX ADMIN — BUMETANIDE 2 MG: 1 TABLET ORAL at 17:27

## 2024-12-26 RX ADMIN — LORAZEPAM 1 MG: 1 TABLET ORAL at 20:07

## 2024-12-26 RX ADMIN — PIPERACILLIN AND TAZOBACTAM 3375 MG: 3; .375 INJECTION, POWDER, LYOPHILIZED, FOR SOLUTION INTRAVENOUS at 13:29

## 2024-12-26 RX ADMIN — SODIUM CHLORIDE, PRESERVATIVE FREE 10 ML: 5 INJECTION INTRAVENOUS at 09:23

## 2024-12-26 RX ADMIN — HYDROMORPHONE HYDROCHLORIDE 1 MG: 1 INJECTION, SOLUTION INTRAMUSCULAR; INTRAVENOUS; SUBCUTANEOUS at 04:58

## 2024-12-26 RX ADMIN — INSULIN LISPRO 2 UNITS: 100 INJECTION, SOLUTION INTRAVENOUS; SUBCUTANEOUS at 17:28

## 2024-12-26 RX ADMIN — SODIUM CHLORIDE, PRESERVATIVE FREE 10 ML: 5 INJECTION INTRAVENOUS at 20:08

## 2024-12-26 RX ADMIN — GABAPENTIN 300 MG: 300 CAPSULE ORAL at 20:07

## 2024-12-26 RX ADMIN — NEOMYCIN SULFATE, POLYMYXIN B SULFATE AND DEXAMETHASONE 1 DROP: 3.5; 10000; 1 SUSPENSION/ DROPS OPHTHALMIC at 09:23

## 2024-12-26 ASSESSMENT — PAIN DESCRIPTION - LOCATION
LOCATION: HAND

## 2024-12-26 ASSESSMENT — PAIN DESCRIPTION - ORIENTATION
ORIENTATION: LEFT

## 2024-12-26 ASSESSMENT — PAIN SCALES - GENERAL
PAINLEVEL_OUTOF10: 8
PAINLEVEL_OUTOF10: 4
PAINLEVEL_OUTOF10: 7
PAINLEVEL_OUTOF10: 8
PAINLEVEL_OUTOF10: 3
PAINLEVEL_OUTOF10: 10
PAINLEVEL_OUTOF10: 8
PAINLEVEL_OUTOF10: 7
PAINLEVEL_OUTOF10: 7
PAINLEVEL_OUTOF10: 3
PAINLEVEL_OUTOF10: 8

## 2024-12-26 ASSESSMENT — PAIN DESCRIPTION - DESCRIPTORS
DESCRIPTORS: ACHING;DISCOMFORT;TIGHTNESS
DESCRIPTORS: THROBBING;ACHING
DESCRIPTORS: THROBBING
DESCRIPTORS: ACHING;THROBBING
DESCRIPTORS: ACHING;DISCOMFORT;TIGHTNESS

## 2024-12-26 NOTE — PROGRESS NOTES
recent):  XR HAND LEFT (MIN 3 VIEWS) 12/02/2024    Narrative  EXAM: XR HAND LEFT (MIN 3 VIEWS)    INDICATION: Left swelling, eval for infection.    COMPARISON: None.    FINDINGS: Three views of the left hand demonstrate no fracture, dislocation or  other acute osseous or articular abnormality. Mild soft tissue swelling.    Impression  Nonspecific soft tissue swelling    Electronically signed by Fozia Fang      CT HAND LEFT W CONTRAST    Result Date: 12/4/2024  EXAM: CT HAND LEFT W CONTRAST INDICATION: left hand cellulitis, cat bite, persistent pain, r/o abscess/fluid collection  COMPARISON: Left hand radiographs 12/2/2024 TECHNIQUE: Helical CT of the left hand with coronal and sagittal reformats. Images reviewed in soft tissue and bone windows.  CT dose reduction was achieved through the use of a standardized protocol tailored for this examination and automatic exposure control for dose modulation. CONTRAST: Post IV contrast 100 mL of Isovue-370 FINDINGS: Bones: No fracture, dislocation, or periosteal reaction. Joint fluid: Moderate size index finger MCP joint effusion with synovitis Articulations: No significant osteoarthritis. No evidence of inflammatory arthritis. Tendons: No full-thickness tendon tear. Muscles: Mild to moderate osteoarthritis throughout. Soft tissues: 1.9 cm x 1.6 cm x 0.9 cm rim-enhancing fluid collection lateral to the index finger MCP joint (302-62, 305-25), concerning for juxta articular abscess. Index finger skin thickening, soft tissue edema, and swelling, concerning for cellulitis..     Findings concerning for index finger cellulitis with MCP joint septic arthritis and juxta articular abscess. Electronically signed by ARMOND HOPKINS    XR HAND LEFT (MIN 3 VIEWS)    Result Date: 12/2/2024  EXAM: XR HAND LEFT (MIN 3 VIEWS) INDICATION: Left swelling, eval for infection. COMPARISON: None. FINDINGS: Three views of the left hand demonstrate no fracture, dislocation or other acute osseous or  articular abnormality. Mild soft tissue swelling.    Nonspecific soft tissue swelling Electronically signed by Fozia Fang    XR CHEST (2 VW)    Result Date: 12/2/2024  INDICATION: Suspected infection. Portable AP view of the chest. Direct comparison made to prior chest x-ray dated September 2024. Cardiomediastinal silhouette is stable. Dual-lumen central venous catheter extends to the distal SVC. Lungs are clear bilaterally. Pleural spaces are normal and there is no pneumothorax. Osseous structures are intact.     No acute cardiopulmonary disease. Electronically signed by MD Lesa Nix MD FACP

## 2024-12-26 NOTE — PLAN OF CARE
Problem: Safety - Adult  Goal: Free from fall injury  Outcome: Progressing     Problem: Chronic Conditions and Co-morbidities  Goal: Patient's chronic conditions and co-morbidity symptoms are monitored and maintained or improved  Outcome: Progressing     Problem: Discharge Planning  Goal: Discharge to home or other facility with appropriate resources  Outcome: Progressing     Problem: Pain  Goal: Verbalizes/displays adequate comfort level or baseline comfort level  Outcome: Progressing     Problem: ABCDS Injury Assessment  Goal: Absence of physical injury  Outcome: Progressing     Problem: Anxiety  Goal: Will report anxiety at manageable levels  Description: INTERVENTIONS:  1. Administer medication as ordered  2. Teach and rehearse alternative coping skills  3. Provide emotional support with 1:1 interaction with staff  Outcome: Progressing     Problem: Infection - Adult  Goal: Absence of infection during hospitalization  Outcome: Progressing     Problem: Gastrointestinal - Adult  Goal: Maintains or returns to baseline bowel function  Outcome: Progressing

## 2024-12-26 NOTE — DISCHARGE INSTRUCTIONS
Post op incision and drainage  Strict elevation  DR Owens office will call [facility] follow up appt .    Follow up is important         Your first postop appointment should be scheduled with Dr. Owens for 2 weeks post-op.     [facility]  [address], [address]  [place] [address]  Phone: [phone]  Fax: [phone]     Please follow these instructions for a safe and speedy recovery:     1. Surgical Bandage: Leave the bandage in place until we remove it. Please keep it clean and dry. To shower or bathe, apply a plastic bag or GLAD Press'n Seal® plastic wrap around the bandage or simply sponge bathe.     2. Elevation: Hand swelling is best prevented by keeping your hand elevated above the level of your heart at all times, night and day. The opposite, dangling your hand below your waist, will cause additional pain, swelling, and later stiffness. You can elevate the hand in a sling or by propping it on a pillow at night. Ice compresses may help but do not rep[lace elevation. Frequently, extreme pain is caused by a tight bandage, which should be loosened. If pain is severe and progressive, call us at [phone] during the day (ask for immediate connection to Dr. Owens's Team) or during the night [phone] (ask for the on-call physician).     3. Medication: You will be provided with an appropriate pain medication (over-the-counter or prescription). Please fill this at a pharmacy promptly so you will have it available when all local anesthetic wears off. Take this to relieve pain as directed on the bottle. Please refrain from driving, drinking alcohol, and making important medical decisions while taking the medication. Please call us if you need something stronger. Medication changes or refills must be made before 5pm or through your pharmacy.     4. Weight bearing: Do NOT bear any weight on the operative extremity.     I want to

## 2024-12-26 NOTE — BSMART NOTE
BSMART Liaison Team Note     LOS:  9     Patient goal(s) for today: take medications as prescribed, make needs known in an appropriate manner, use coping skills  [facility]MART Liaison team focus/goals: assess needs, provide support and education    Progress note: Liaison met with Pt face to face on medical unit. Pt was sitting up in bed, alert, oriented and calm. He used call bell to ask for pain medications; RN came in to provide.  Pt reported feeling depressed and anxious. He denied HI and AVH. He denied SI but shared plan to stop dialysis after discharge. He acknowledged stopping dialysis would end his life and he has \"made peace with that.\" He stated he is ready to \"go home\" with God. Pt shared he often prays God will \"take him home\" at night and feels disappointed when he wakes up in the morning. Pt was tearful throughout assessment, identify as a \"bad person\" who has done \"bad things\" and doesn't feel he can forgive himself. Pt shared emotional pain from guilt and shame of his sins is immense and he feels his death is the only way to make amends for the sins he has committed. He made statements about life being a \"joke\" or a \"game\". He does not feel like he \"fits in down here\" or \"belongs on this earth\" and repeatedly stated \"I don't want to be here anymore.\"     Liaison provided emotional support and active listening. Pt encouraged to use his tunde as a source of emotional support and healing. Pt encouraged to identify strengths and offered reframe for his thinking. Pt minimally receptive and using negative self talk. Liaison provided list of  resources for psychiatry and therapy. Liaison notified Pt of concerns for his unmanaged depression symptoms. Pt agreed his depression is unmanaged and agreed to meet with psychiatric provider again. Liaison urged Pt to be transparent and forthcoming about all this symptoms.     Liaison updated Terra YA and Amparo SALCIDO and requested psychiatric consult be placed.      Barriers to Discharge: medical clearance     Outpatient provider(s):  none reported  Insurance info/prescription coverage: MEDICARE PART A AND B      Diagnosis: mood disorder, h/o polysubstance abuse       Plan:  defer to most recent psychiatric consult note for disposition and recommendation.   Follow up Psych Consult placed? No - requested psychiatric consult .   Psychiatrist updated? No        Participating treatment team members: Miriam Gary, MSW

## 2024-12-26 NOTE — PLAN OF CARE
level:   Encourage patient to monitor pain and request assistance   Assess pain using appropriate pain scale   Administer analgesics based on type and severity of pain and evaluate response  12/26/2024 0307 by Lillian Doshi RN  Outcome: Progressing     Problem: ABCDS Injury Assessment  Goal: Absence of physical injury  12/26/2024 0856 by Jaylin Huang RN  Outcome: Progressing  Flowsheets (Taken 12/22/2024 1326 by Ania Zafar, RN)  Absence of Physical Injury: Implement safety measures based on patient assessment  12/26/2024 0307 by Lillian Doshi RN  Outcome: Progressing     Problem: Anxiety  Goal: Will report anxiety at manageable levels  Description: INTERVENTIONS:  1. Administer medication as ordered  2. Teach and rehearse alternative coping skills  3. Provide emotional support with 1:1 interaction with staff  12/26/2024 0856 by Jaylin Huang RN  Outcome: Progressing  Flowsheets (Taken 12/26/2024 0856)  Will report anxiety at manageable levels:   Administer medication as ordered   Teach and rehearse alternative coping skills  12/26/2024 0307 by Lillian Doshi RN  Outcome: Progressing     Problem: Infection - Adult  Goal: Absence of infection during hospitalization  12/26/2024 0856 by Jaylin Huang RN  Outcome: Progressing  Flowsheets (Taken 12/26/2024 0856)  Absence of infection during hospitalization:   Assess and monitor for signs and symptoms of infection   Monitor lab/diagnostic results   Monitor all insertion sites i.e., indwelling lines, tubes and drains  12/26/2024 0307 by Lillian Doshi RN  Outcome: Progressing     Problem: Gastrointestinal - Adult  Goal: Maintains or returns to baseline bowel function  12/26/2024 0856 by Jaylin Huang RN  Outcome: Progressing  Flowsheets (Taken 12/26/2024 0856)  Maintains or returns to baseline bowel function:   Assess bowel function   Encourage oral fluids to ensure adequate hydration   Administer IV fluids as ordered to ensure adequate  hydration  12/26/2024 0307 by Lillian Doshi, RN  Outcome: Progressing

## 2024-12-26 NOTE — PROGRESS NOTES
Nephrology Progress Note  [facility] [place] [facility]  [address], [address]  [place] [address]  Phone - [phone]  Fax - [phone]                 Patient: Keaton Van                     YOB: 1965        Date- 12/26/2024                                     Admit Date: 12/17/2024   CC: Follow up for ESRD  IMPRESSION & PLAN:   ESRD - TTS, [facility], right chest PermCath)  Surgical wound dehiscence  Cat bite/left upper extremity cellulitis,recurrent  Index finger cellulitis, MCP joint septic arthritis, juxta articular abscess  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       Subjective:  Interval History:   Patient refused hd this am. I d/w patient to reconsider his decision. He didn't get hd on last Tuesday.    Objective:   Vitals:    12/25/24 1657 12/25/24 1954 12/25/24 2226 12/26/24 0837   BP:  (!) 163/85 (!) 163/85 122/68   Pulse:  89  74   Resp: 19   16   Temp:  98.4 °F (36.9 °C)  97.7 °F (36.5 °C)   TempSrc:  Oral  Oral   SpO2:  96%  94%   Weight:       Height:          No intake/output data recorded.  No intake/output data recorded.      Physical exam:    GEN: nad  NECK- no mass  RESP: NO WHEEZING  NEURO: Normal speech, non focal  EXT: Left hand wound dehiscence noted  PSYCH: Normal Mood    Chart reviewed.         Pertinent Notes reviewed.     Data Review :  Lab Results   Component Value Date/Time     12/23/2024 02:20 AM    K 4.6 12/23/2024 02:20 AM    CL 99 12/23/2024 02:20 AM    CO2 28 12/23/2024 02:20 AM    BUN 50 12/23/2024 02:20 AM    CREATININE 3.81 12/23/2024 02:20 AM    GLUCOSE 318 12/23/2024 02:20 AM    CALCIUM 8.8 12/23/2024 02:20 AM       Lab Results   Component Value Date    WBC 9.0 12/23/2024    HGB 8.7 (L) 12/23/2024    HCT 25.6 (L) 12/23/2024    MCV  (PROTONIX) tablet 40 mg  40 mg Oral QAM AC    tiZANidine (ZANAFLEX) tablet 2 mg  2 mg Oral Q8H PRN    ibuprofen (ADVIL;MOTRIN) tablet 800 mg  800 mg Oral Q8H PRN        Eulogio Dumont MD  12/26/2024

## 2024-12-26 NOTE — PROGRESS NOTES
Ortho Hand Progress    S: reports pain the same, sitting in chair eating, has not been in splint or been keeping the wound wrapped, wound is unwrapped this morning    O:    Left hand:  There is induration present worse volarly. This is stable. There is no expressible or active drainage. Stiffness of the index finger present    Cultures from last OR NGTD    A/P: discussed with him that I think keeping the wound still and protected in a dressing and splint would help the pain and recovery but he continues to refuse. He is also declining dialysis. I had another long discussion about what I found during surgery on Saturday and that there was no purulence and I do not see any more need to continue exploring. Saturday's surgery was much more aggressive due to the negative findings and post-operative pain would be expected. Have recommended continuing IV antibiotics per ID and immobilization. Can d/c on IV antibiotics when set up.    [facility]  Post-operative instructions  For: Keaton Vna    Your first postop appointment should be scheduled with Dr. Owens for 2 weeks post-op.    [facility]  [address], [address]  [place] [address]  Phone: [phone]  Fax: [phone]    Please follow these instructions for a safe and speedy recovery:    1. Surgical Bandage: Leave the bandage in place until we remove it. Please keep it clean and dry. To shower or bathe, apply a plastic bag or GLAD Press'n Seal® plastic wrap around the bandage or simply sponge bathe.    2. Elevation: Hand swelling is best prevented by keeping your hand elevated above the level of your heart at all times, night and day. The opposite, dangling your hand below your waist, will cause additional pain, swelling, and later stiffness. You can elevate the hand in a sling or by propping it on a pillow at night. Ice compresses may help but do not rep[lace elevation.

## 2024-12-26 NOTE — PROGRESS NOTES
End of Shift Note    Bedside shift change report given to BHAKTI oMsqueda (oncoming nurse) by Lillian Doshi RN (offgoing nurse).  Report included the following information SBAR REPORTS LIST: SBAR    Shift worked:  1918-1426     Shift summary and any significant changes:     He refused dialysis, I called at let them know. Wound care was done. He did have loose stools yesterday evening/night before bed, watery and uncontrollable: and it has subsided and no more occurences after sleeping. He slept most of the night last night. Cdiff collection not indicated      Concerns for physician to address:  Dialysis Refusal, talking about long-term refusal   Remove C-diff precaution, he does not fit protocol.   Zone phone for oncoming shift:   [phone]       Activity:  Back and forth down hallway and around room     Cardiac:   Cardiac Monitoring: YES / NO: No          Access:  Hemodialysis Central Access - Temporary Right Subclavian   CVC 12/24/24 Right Internal jugular   Peripheral IV 12/17/24 Right Antecubital      Genitourinary:         Respiratory:   Room air     GI:  Current diet:       ADULT DIET; Regular; Low Sodium (2 gm); Low Potassium (Less than 3000 mg/day); Low Phosphorus (Less than 1000 mg)              Pain Management:   HYDROmorphone HCl PF (DILAUDID) injection 1 mg      Skin:  Incision 12/21/24 Finger (Comment which one) Left   Wound 12/05/24 Pretibial Proximal;Right DIME SIZE WOUND FROM RUBBNING OF THE PROSTHESIS     Patient Safety:  Bed/Chair alarm on; Bed/Chair-Wheels locked; Bed in low position; Call light within reach, gripper socks, bedside table within reach  Length of Stay:  Expected LOS: 8  Actual LOS: 9      Lillian Doshi RN

## 2024-12-27 LAB
ALBUMIN SERPL-MCNC: 3 G/DL (ref 3.5–5)
ANION GAP SERPL CALC-SCNC: 4 MMOL/L (ref 2–12)
BASOPHILS # BLD: 0 K/UL (ref 0–0.1)
BASOPHILS NFR BLD: 1 % (ref 0–1)
BUN SERPL-MCNC: 71 MG/DL (ref 6–20)
BUN/CREAT SERPL: 14 (ref 12–20)
CALCIUM SERPL-MCNC: 9.2 MG/DL (ref 8.5–10.1)
CHLORIDE SERPL-SCNC: 104 MMOL/L (ref 97–108)
CO2 SERPL-SCNC: 25 MMOL/L (ref 21–32)
CREAT SERPL-MCNC: 4.91 MG/DL (ref 0.7–1.3)
CRP SERPL-MCNC: 9.23 MG/DL (ref 0–0.3)
DIFFERENTIAL METHOD BLD: ABNORMAL
EOSINOPHIL # BLD: 0.4 K/UL (ref 0–0.4)
EOSINOPHIL NFR BLD: 8 % (ref 0–7)
ERYTHROCYTE [DISTWIDTH] IN BLOOD BY AUTOMATED COUNT: 14.6 % (ref 11.5–14.5)
ERYTHROCYTE [SEDIMENTATION RATE] IN BLOOD: 97 MM/HR (ref 0–20)
GLUCOSE BLD STRIP.AUTO-MCNC: 150 MG/DL (ref 65–117)
GLUCOSE BLD STRIP.AUTO-MCNC: 158 MG/DL (ref 65–117)
GLUCOSE BLD STRIP.AUTO-MCNC: 200 MG/DL (ref 65–117)
GLUCOSE BLD STRIP.AUTO-MCNC: 235 MG/DL (ref 65–117)
GLUCOSE SERPL-MCNC: 151 MG/DL (ref 65–100)
HCT VFR BLD AUTO: 25.8 % (ref 36.6–50.3)
HGB BLD-MCNC: 8.9 G/DL (ref 12.1–17)
IMM GRANULOCYTES # BLD AUTO: 0 K/UL (ref 0–0.04)
IMM GRANULOCYTES NFR BLD AUTO: 0 % (ref 0–0.5)
LYMPHOCYTES # BLD: 0.8 K/UL (ref 0.8–3.5)
LYMPHOCYTES NFR BLD: 15 % (ref 12–49)
MCH RBC QN AUTO: 30 PG (ref 26–34)
MCHC RBC AUTO-ENTMCNC: 34.5 G/DL (ref 30–36.5)
MCV RBC AUTO: 86.9 FL (ref 80–99)
MONOCYTES # BLD: 0.6 K/UL (ref 0–1)
MONOCYTES NFR BLD: 11 % (ref 5–13)
NEUTS SEG # BLD: 3.5 K/UL (ref 1.8–8)
NEUTS SEG NFR BLD: 65 % (ref 32–75)
NRBC # BLD: 0 K/UL (ref 0–0.01)
NRBC BLD-RTO: 0 PER 100 WBC
PHOSPHATE SERPL-MCNC: 4.2 MG/DL (ref 2.6–4.7)
PLATELET # BLD AUTO: 180 K/UL (ref 150–400)
PMV BLD AUTO: 9.7 FL (ref 8.9–12.9)
POTASSIUM SERPL-SCNC: 5.1 MMOL/L (ref 3.5–5.1)
RBC # BLD AUTO: 2.97 M/UL (ref 4.1–5.7)
SERVICE CMNT-IMP: ABNORMAL
SODIUM SERPL-SCNC: 133 MMOL/L (ref 136–145)
WBC # BLD AUTO: 5.4 K/UL (ref 4.1–11.1)

## 2024-12-27 PROCEDURE — 80069 RENAL FUNCTION PANEL: CPT

## 2024-12-27 PROCEDURE — 1100000003 HC PRIVATE W/ TELEMETRY

## 2024-12-27 PROCEDURE — 6360000002 HC RX W HCPCS: Performed by: INTERNAL MEDICINE

## 2024-12-27 PROCEDURE — 85025 COMPLETE CBC W/AUTO DIFF WBC: CPT

## 2024-12-27 PROCEDURE — 82962 GLUCOSE BLOOD TEST: CPT

## 2024-12-27 PROCEDURE — 2500000003 HC RX 250 WO HCPCS: Performed by: STUDENT IN AN ORGANIZED HEALTH CARE EDUCATION/TRAINING PROGRAM

## 2024-12-27 PROCEDURE — 6360000002 HC RX W HCPCS: Performed by: FAMILY MEDICINE

## 2024-12-27 PROCEDURE — 6370000000 HC RX 637 (ALT 250 FOR IP): Performed by: FAMILY MEDICINE

## 2024-12-27 PROCEDURE — 2580000003 HC RX 258: Performed by: INTERNAL MEDICINE

## 2024-12-27 PROCEDURE — 6370000000 HC RX 637 (ALT 250 FOR IP): Performed by: INTERNAL MEDICINE

## 2024-12-27 PROCEDURE — 2500000003 HC RX 250 WO HCPCS: Performed by: FAMILY MEDICINE

## 2024-12-27 PROCEDURE — 6370000000 HC RX 637 (ALT 250 FOR IP): Performed by: STUDENT IN AN ORGANIZED HEALTH CARE EDUCATION/TRAINING PROGRAM

## 2024-12-27 PROCEDURE — 85652 RBC SED RATE AUTOMATED: CPT

## 2024-12-27 PROCEDURE — 6370000000 HC RX 637 (ALT 250 FOR IP): Performed by: PHYSICIAN ASSISTANT

## 2024-12-27 PROCEDURE — 36415 COLL VENOUS BLD VENIPUNCTURE: CPT

## 2024-12-27 PROCEDURE — 86140 C-REACTIVE PROTEIN: CPT

## 2024-12-27 RX ORDER — LORAZEPAM 2 MG/ML
1 INJECTION INTRAMUSCULAR ONCE
Status: COMPLETED | OUTPATIENT
Start: 2024-12-27 | End: 2024-12-27

## 2024-12-27 RX ORDER — NEOMYCIN SULFATE, POLYMYXIN B SULFATE AND DEXAMETHASONE 3.5; 10000; 1 MG/ML; [USP'U]/ML; MG/ML
1 SUSPENSION/ DROPS OPHTHALMIC 4 TIMES DAILY
Qty: 1 EACH | Refills: 0 | Status: SHIPPED | OUTPATIENT
Start: 2024-12-27 | End: 2025-01-02

## 2024-12-27 RX ORDER — LACTOBACILLUS RHAMNOSUS GG 10B CELL
1 CAPSULE ORAL
Qty: 30 CAPSULE | Refills: 0 | Status: SHIPPED | OUTPATIENT
Start: 2024-12-27

## 2024-12-27 RX ADMIN — HYDROMORPHONE HYDROCHLORIDE 1 MG: 1 INJECTION, SOLUTION INTRAMUSCULAR; INTRAVENOUS; SUBCUTANEOUS at 23:11

## 2024-12-27 RX ADMIN — HYDRALAZINE HYDROCHLORIDE 50 MG: 25 TABLET, FILM COATED ORAL at 05:48

## 2024-12-27 RX ADMIN — HYDROMORPHONE HYDROCHLORIDE 1 MG: 1 INJECTION, SOLUTION INTRAMUSCULAR; INTRAVENOUS; SUBCUTANEOUS at 11:00

## 2024-12-27 RX ADMIN — AMLODIPINE BESYLATE 10 MG: 5 TABLET ORAL at 08:31

## 2024-12-27 RX ADMIN — INSULIN GLARGINE 14 UNITS: 100 INJECTION, SOLUTION SUBCUTANEOUS at 08:30

## 2024-12-27 RX ADMIN — PIPERACILLIN AND TAZOBACTAM 3375 MG: 3; .375 INJECTION, POWDER, LYOPHILIZED, FOR SOLUTION INTRAVENOUS at 23:16

## 2024-12-27 RX ADMIN — HYDROMORPHONE HYDROCHLORIDE 1 MG: 1 INJECTION, SOLUTION INTRAMUSCULAR; INTRAVENOUS; SUBCUTANEOUS at 19:18

## 2024-12-27 RX ADMIN — LORAZEPAM 1 MG: 1 TABLET ORAL at 08:31

## 2024-12-27 RX ADMIN — LORAZEPAM 1 MG: 1 TABLET ORAL at 20:53

## 2024-12-27 RX ADMIN — HYDROMORPHONE HYDROCHLORIDE 1 MG: 1 INJECTION, SOLUTION INTRAMUSCULAR; INTRAVENOUS; SUBCUTANEOUS at 15:10

## 2024-12-27 RX ADMIN — HYDROMORPHONE HYDROCHLORIDE 1 MG: 1 INJECTION, SOLUTION INTRAMUSCULAR; INTRAVENOUS; SUBCUTANEOUS at 02:37

## 2024-12-27 RX ADMIN — LORAZEPAM 1 MG: 2 INJECTION INTRAMUSCULAR; INTRAVENOUS at 18:48

## 2024-12-27 RX ADMIN — BUMETANIDE 2 MG: 1 TABLET ORAL at 08:32

## 2024-12-27 RX ADMIN — GABAPENTIN 300 MG: 300 CAPSULE ORAL at 14:41

## 2024-12-27 RX ADMIN — Medication 1 CAPSULE: at 08:32

## 2024-12-27 RX ADMIN — SODIUM CHLORIDE, PRESERVATIVE FREE 10 ML: 5 INJECTION INTRAVENOUS at 08:32

## 2024-12-27 RX ADMIN — INSULIN LISPRO 2 UNITS: 100 INJECTION, SOLUTION INTRAVENOUS; SUBCUTANEOUS at 08:31

## 2024-12-27 RX ADMIN — HYDROMORPHONE HYDROCHLORIDE 1 MG: 1 INJECTION, SOLUTION INTRAMUSCULAR; INTRAVENOUS; SUBCUTANEOUS at 06:55

## 2024-12-27 RX ADMIN — GABAPENTIN 300 MG: 300 CAPSULE ORAL at 08:32

## 2024-12-27 RX ADMIN — PIPERACILLIN AND TAZOBACTAM 3375 MG: 3; .375 INJECTION, POWDER, LYOPHILIZED, FOR SOLUTION INTRAVENOUS at 00:16

## 2024-12-27 RX ADMIN — LOSARTAN POTASSIUM 25 MG: 25 TABLET, FILM COATED ORAL at 08:32

## 2024-12-27 RX ADMIN — HYDRALAZINE HYDROCHLORIDE 50 MG: 25 TABLET, FILM COATED ORAL at 14:42

## 2024-12-27 RX ADMIN — PIPERACILLIN AND TAZOBACTAM 3375 MG: 3; .375 INJECTION, POWDER, LYOPHILIZED, FOR SOLUTION INTRAVENOUS at 12:15

## 2024-12-27 RX ADMIN — OXYCODONE 5 MG: 5 TABLET ORAL at 17:38

## 2024-12-27 RX ADMIN — SODIUM CHLORIDE, PRESERVATIVE FREE 10 ML: 5 INJECTION INTRAVENOUS at 20:54

## 2024-12-27 RX ADMIN — LURASIDONE HYDROCHLORIDE 20 MG: 20 TABLET ORAL at 17:38

## 2024-12-27 RX ADMIN — GABAPENTIN 300 MG: 300 CAPSULE ORAL at 20:54

## 2024-12-27 RX ADMIN — PANTOPRAZOLE SODIUM 40 MG: 40 TABLET, DELAYED RELEASE ORAL at 05:49

## 2024-12-27 RX ADMIN — HYDRALAZINE HYDROCHLORIDE 50 MG: 25 TABLET, FILM COATED ORAL at 20:53

## 2024-12-27 RX ADMIN — BUMETANIDE 2 MG: 1 TABLET ORAL at 17:38

## 2024-12-27 RX ADMIN — INSULIN LISPRO 2 UNITS: 100 INJECTION, SOLUTION INTRAVENOUS; SUBCUTANEOUS at 17:38

## 2024-12-27 RX ADMIN — OXYCODONE 5 MG: 5 TABLET ORAL at 21:48

## 2024-12-27 ASSESSMENT — PAIN SCALES - GENERAL
PAINLEVEL_OUTOF10: 10
PAINLEVEL_OUTOF10: 4
PAINLEVEL_OUTOF10: 6
PAINLEVEL_OUTOF10: 7
PAINLEVEL_OUTOF10: 9
PAINLEVEL_OUTOF10: 10
PAINLEVEL_OUTOF10: 8
PAINLEVEL_OUTOF10: 7
PAINLEVEL_OUTOF10: 8
PAINLEVEL_OUTOF10: 9
PAINLEVEL_OUTOF10: 8
PAINLEVEL_OUTOF10: 7
PAINLEVEL_OUTOF10: 7
PAINLEVEL_OUTOF10: 6
PAINLEVEL_OUTOF10: 8
PAINLEVEL_OUTOF10: 5

## 2024-12-27 ASSESSMENT — PAIN DESCRIPTION - LOCATION
LOCATION: BACK
LOCATION: FLANK
LOCATION: HAND
LOCATION: BACK
LOCATION: HAND
LOCATION: HAND

## 2024-12-27 ASSESSMENT — PAIN DESCRIPTION - ORIENTATION
ORIENTATION: LEFT
ORIENTATION: RIGHT

## 2024-12-27 ASSESSMENT — PAIN DESCRIPTION - DESCRIPTORS
DESCRIPTORS: ACHING
DESCRIPTORS: ACHING;DISCOMFORT;TIGHTNESS;THROBBING
DESCRIPTORS: ACHING

## 2024-12-27 NOTE — PLAN OF CARE
12/27/2024 0939)  Verbalizes/displays adequate comfort level or baseline comfort level:   Encourage patient to monitor pain and request assistance   Assess pain using appropriate pain scale   Administer analgesics based on type and severity of pain and evaluate response  12/27/2024 0029 by Abida Gongora RN  Outcome: Progressing     Problem: ABCDS Injury Assessment  Goal: Absence of physical injury  12/27/2024 0939 by Jaylin Huang RN  Outcome: Progressing  Flowsheets (Taken 12/27/2024 0939)  Absence of Physical Injury: Implement safety measures based on patient assessment  12/27/2024 0029 by Abida Gongora RN  Outcome: Progressing     Problem: Anxiety  Goal: Will report anxiety at manageable levels  Description: INTERVENTIONS:  1. Administer medication as ordered  2. Teach and rehearse alternative coping skills  3. Provide emotional support with 1:1 interaction with staff  12/27/2024 0939 by Jaylin Huang RN  Outcome: Progressing  Flowsheets (Taken 12/27/2024 0939)  Will report anxiety at manageable levels:   Administer medication as ordered   Teach and rehearse alternative coping skills  12/27/2024 0029 by Abida Gongora RN  Outcome: Progressing  Flowsheets (Taken 12/26/2024 2000)  Will report anxiety at manageable levels: Administer medication as ordered     Problem: Infection - Adult  Goal: Absence of infection during hospitalization  12/27/2024 0939 by Jaylin Huang RN  Outcome: Progressing  Flowsheets (Taken 12/27/2024 0939)  Absence of infection during hospitalization:   Assess and monitor for signs and symptoms of infection   Monitor lab/diagnostic results   Monitor all insertion sites i.e., indwelling lines, tubes and drains  12/27/2024 0029 by Abida Gongora RN  Outcome: Progressing     Problem: Gastrointestinal - Adult  Goal: Maintains or returns to baseline bowel function  12/27/2024 0939 by Jaylin Huang RN  Outcome: Progressing  Flowsheets (Taken 12/27/2024 0939)  Maintains or

## 2024-12-27 NOTE — CONSULTS
kept alive by machines and places his tunde in a higher power to determine his lifespan.    Past Psychiatric and Medical History  - History of stage 4 kidney disease  - Previous psychiatric medication use (details unspecified)    Social history  - Lives with sister  - Has children (details unspecified)    Mental status exam  - General and Appearance: Alert and oriented X 3. Patient appears well groomed and clean.  - Behavior: Appropriate eye contact, cooperative, engaged, motivated, open, and pleasant.  - Motor Behavior: No signs of abnormal and involuntary movements such as tremors, tics, mannerisms, lip smacking, or akathisias.  - Speech: Normal rate of speech, volume, and rhythm. No defects were noted in verbalizations or content.  - Affect: Irritable, angry.  - Mood: Angry  - Thought process: Goal-directed  - Thought content: Denies SI.  Expresses being tired of living and not wanting to continue dialysis, but denies expressing a desire to live anymore. States being sound of mind in decision-making. Denies HI. No evidence of delusions, thought insertions, thought broadcasting, or rumination.  - Perceptual Disturbances: No evidence of Hallucination (Auditory, Visual, Tactile, Olfactory), Derealization, or Depersonalization.  - Concentration: Good. The patient stayed on task and was able to answer questions appropriately and timely.  - Memory: Intact. No evidence of short-term or long-term memory issues.  - Insight: Good  - Judgment: Fair       Assessment  Keaton Van is experiencing significant psychological distress related to his decision to discontinue dialysis, which he perceives as a burden and an unacceptable way to live. He expresses a strong aversion to being dependent on a machine for survival and emphasizes that his decision is made with a sound mind. Despite ongoing physical pain and fatigue, Keaton is resolute in his choice, driven by a desire for a better quality of life rather than a lack of will to  live. He is tired of being sick and does not wish to prolong his life through medical intervention, placing his tunde in a higher power to determine his lifespan.     Diagnosis:  Adjustment disorder with anxiety  Stage 4 kidney disease    Plan:  -Right now, as of this moment, he has the capacity to make medical decisions. However, he is likely to become delirious discontinuing dialysis which could negatively effect his decision making capacity. This is a complex case. I strongly recommend an ethics panel consult and a palliative care consult. Presently there doesn't seem to be any criteria for psychiatric admission.

## 2024-12-27 NOTE — PROGRESS NOTES
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 hospice  Check labs  He has declined hd on Tuesday and thursday  Continue norvasc, hydralazine , losartan  Abx per primary team.       Subjective:  Interval History:   Patient hasn't received any hd this week  He is refusing dialysis    Objective:   Vitals:    12/27/24 0307 12/27/24 0546 12/27/24 0725 12/27/24 0749   BP:  (!) 166/85  (!) 154/67   Pulse:  77  88   Resp: 18 18 18 18   Temp:  98.6 °F (37 °C)  98.6 °F (37 °C)   TempSrc:  Oral  Oral   SpO2:    98%   Weight:       Height:          I/O last 3 completed shifts:  In: 118 [P.O.:118]  Out: -   No intake/output data recorded.      Physical exam:    GEN: NAD  NECK- no mass  RESP: no wheezing  NEURO: Normal speech, non focal  EXT: Left hand wound dehiscence noted  PSYCH: Normal Mood    Chart reviewed.         Pertinent Notes reviewed.     Data Review :  Lab Results   Component Value Date/Time     12/23/2024 02:20 AM    K 4.6 12/23/2024 02:20 AM    CL 99 12/23/2024 02:20 AM    CO2 28 12/23/2024 02:20 AM    BUN 50 12/23/2024 02:20 AM    CREATININE 3.81 12/23/2024 02:20 AM    GLUCOSE 318 12/23/2024 02:20 AM    CALCIUM 8.8 12/23/2024 02:20 AM       Lab Results   Component Value Date    WBC 9.0 12/23/2024    HGB  1 drop  1 drop Right Eye 4x daily    pantoprazole (PROTONIX) tablet 40 mg  40 mg Oral QAM AC    tiZANidine (ZANAFLEX) tablet 2 mg  2 mg Oral Q8H PRN    ibuprofen (ADVIL;MOTRIN) tablet 800 mg  800 mg Oral Q8H PRN        Eulogio Dumont MD  12/27/2024

## 2024-12-27 NOTE — PLAN OF CARE
Problem: Safety - Adult  Goal: Free from fall injury  Outcome: Progressing     Problem: Chronic Conditions and Co-morbidities  Goal: Patient's chronic conditions and co-morbidity symptoms are monitored and maintained or improved  Outcome: Progressing     Problem: Discharge Planning  Goal: Discharge to home or other facility with appropriate resources  Outcome: Progressing     Problem: Pain  Goal: Verbalizes/displays adequate comfort level or baseline comfort level  Outcome: Progressing     Problem: ABCDS Injury Assessment  Goal: Absence of physical injury  Outcome: Progressing     Problem: Anxiety  Goal: Will report anxiety at manageable levels  Description: INTERVENTIONS:  1. Administer medication as ordered  2. Teach and rehearse alternative coping skills  3. Provide emotional support with 1:1 interaction with staff  Outcome: Progressing  Flowsheets (Taken 12/26/2024 2000)  Will report anxiety at manageable levels: Administer medication as ordered     Problem: Infection - Adult  Goal: Absence of infection during hospitalization  Outcome: Progressing     Problem: Gastrointestinal - Adult  Goal: Maintains or returns to baseline bowel function  Outcome: Progressing

## 2024-12-27 NOTE — DISCHARGE SUMMARY
Discharge Summary    Name: Keaton Van  [id number]  YOB: 1965 (Age: 59 y.o.)   Date of Admission: 12/17/2024  Date of Discharge: 12/26/2024  Attending Physician: Jesse Gonzalez MD    Discharge Diagnosis: Cellulitis and abscess of left hand / Flexor tenosynovitis/Septic arthritis Secondary to cat bite failed to respond to initial treatment during the prior admission which included surgical intervention    Consultations:  IP CONSULT TO NEPHROLOGY  IP CONSULT TO HOSPITALIST  IP CONSULT TO ORTHOPEDIC SURGERY  IP CONSULT TO INFECTIOUS DISEASES  PHARMACY TO DOSE VANCOMYCIN  IP CONSULT TO CASE MANAGEMENT  IP CONSULT TO PALLIATIVE CARE  IP CONSULT TO PSYCHIATRY  IP CONSULT TO CASE MANAGEMENT  IP CONSULT TO CASE MANAGEMENT      Brief Admission History/Reason for Admission Per Cole Esqueda MD:   Keaton Van is a 59 y.o.  male with PMHx significant for ESRD on dialysis DM2, HTN, substance abuse history who recently was admitted 12/11/2024 with staph bacteremia due to left hand cellulitis complicated by abscess and flexor tenosynovitis after he got bitten by a cat.  Patient on that admission was taken to the OR by orthopedics for I&D washout.  Was seen by ID recommended vancomycin and cefepime and Flagyl after dialysis sessions until 1/16/2025.  Patient was discharged.  This time the patient comes back after having been off of the medication since Saturday.  Patient reports that he did not tolerate his dialysis session, and felt unwell, subsequently he missed his dialysis sessions and antibiotics IV's that he was supposed to get after dialysis sessions.  He also did not take his oral metronidazole because he felt nauseated.  Patient endorses fevers and chills, and worsening left hand pain that shoots up from his finger up to his elbow.  We were asked to admit for work up and evaluation of the above problems.        Brief Hospital Course by Main Problems:   59-year-old male  readmitted for worsening infection of left hand. Patient underwent I&D of the left hand on 12/5/2024 by Dr. Owens after suffering a cat bite which led to an infection. Patient was subsequently on IV antibiotics and discharged home. The patient presented to ortho office for follow-up with Dr. Owens and his symptoms appear to be significantly worse. Patient was not receiving IV antibiotics at home and likely was not taking oral antibiotics due to GI upset as per ortho note . As per ID   The Plan was for 6 weeks of antibiotics vancomycin and cefepime IV 12/5-1/16  Flagyl p.o. x 2 weeks end date 12/19-  Hemodialysis center contacted-patient had been receiving vancomycin and cefepime  as scheduled with hemodialysis.  Unfortunately patient did not  prescription for Flagyl.    Cellulitis and abscess of left hand  Flexor tenosynovitis  Septic arthritis  Secondary to cat bite.  CT hand 12/4+ for 1.9 x 1.6 x 0.9 cm rim-enhancing  fluid collection lateral to  Index finger MCP joint concerning for juxta-articular abscess  MCP joint septic arthritis  S/p I&D by hand surgery 12/5  Findings of copious infectious fluid. No gross purulence.  Fluid around joint noted.  IntraOp culture-NG, GS-no organisms.  Patient readmitted for concerns for worsening cellulitis, flexor tenosynovitis  Patient for repeat I&D.  On exam appears to be progressing as expected.  As per ID the plan is:   Continue Vancomycin, Zosyn IV x 6 weeks 12/18 -1/29  Keep  left hand elevated  The office of orthopedic surgeon will call the patient for follow-up ointment      Coagulase-negative staph  Bacillus bacteremia  Blood cultures 12/2+ for CoNS, bacillus species  Given presence of wound infection cannot assume it is a contaminant.  Repeat blood cultures 12/9-NGTD.        ESRD  On HD     AVF present LUE  Intact     Diabetes type 2  A1c 7.4     R/BKA      H/o diabetic right foot foot infection  Cellulitis, OM 6/2024     H/o mood disorder  Consulted

## 2024-12-27 NOTE — CARE COORDINATION
Pt is clear from CM standpoint for d/c.    Pt's sister will transport.    Transition of Care Plan:    RUR: 30%  Prior Level of Functioning: Independent   Disposition: Home with sister and hospice services-[facility]  TAN: 12/27/24  If SNF or IPR: Date FOC offered:   Date FOC received:   Accepting facility:   Date authorization started with reference number:   Date authorization received and expires:   Follow up appointments: Defer to hospice   DME needed: No DME needed   Transportation at discharge: Pt's sister   IM/IMM Medicare/ letter given: 12/27/24  Is patient a [place] and connected with [facility]? No   If yes, was [place] transfer form completed and [facility] notified? No  Caregiver Contact: Pt's sister   Discharge Caregiver contacted prior to discharge? Pt was contacted   Care Conference needed? No  Barriers to discharge: None       CM spoke to pt regarding his option either home with home health and IV ABX Vs. Placement due to pt is aware that pt will have a co payment for the IV ABX.    Pt express to CM that he can't pay the co payment for the IV ABX and that he just wants to go home with hospice services and stop dialysis.     CM express to pt that is other option is placement in a SNF. Pt decline the option and again stated to CM that he wants to go home with hospice services.    Pt requested when he wanted to go home with the IV ABX that CM send the referral to a home health agency that has home health and hospice. CM sent the referral to [facility].    The liaison from [facility] has come and meet with the pt.    Pt is adamant that he wants to go home with hospice services.       12/27/24 1421   Services At/After Discharge   Transition of Care Consult (CM Consult) Hospice   Services At/After Discharge Hospice   [place] Resource Information Provided? No   Mode of Transport at Discharge Self   Confirm Follow Up Transport Self   Condition of Participation: Discharge Planning   The Plan for Transition

## 2024-12-28 LAB
GLUCOSE BLD STRIP.AUTO-MCNC: 128 MG/DL (ref 65–117)
GLUCOSE BLD STRIP.AUTO-MCNC: 158 MG/DL (ref 65–117)
GLUCOSE BLD STRIP.AUTO-MCNC: 173 MG/DL (ref 65–117)
GLUCOSE BLD STRIP.AUTO-MCNC: 194 MG/DL (ref 65–117)
SERVICE CMNT-IMP: ABNORMAL

## 2024-12-28 PROCEDURE — 6360000002 HC RX W HCPCS: Performed by: FAMILY MEDICINE

## 2024-12-28 PROCEDURE — 1100000003 HC PRIVATE W/ TELEMETRY

## 2024-12-28 PROCEDURE — 6370000000 HC RX 637 (ALT 250 FOR IP): Performed by: STUDENT IN AN ORGANIZED HEALTH CARE EDUCATION/TRAINING PROGRAM

## 2024-12-28 PROCEDURE — 82962 GLUCOSE BLOOD TEST: CPT

## 2024-12-28 PROCEDURE — 6370000000 HC RX 637 (ALT 250 FOR IP): Performed by: INTERNAL MEDICINE

## 2024-12-28 PROCEDURE — 6370000000 HC RX 637 (ALT 250 FOR IP): Performed by: FAMILY MEDICINE

## 2024-12-28 PROCEDURE — 2500000003 HC RX 250 WO HCPCS: Performed by: STUDENT IN AN ORGANIZED HEALTH CARE EDUCATION/TRAINING PROGRAM

## 2024-12-28 PROCEDURE — 6360000002 HC RX W HCPCS: Performed by: INTERNAL MEDICINE

## 2024-12-28 PROCEDURE — 2580000003 HC RX 258: Performed by: INTERNAL MEDICINE

## 2024-12-28 PROCEDURE — 6370000000 HC RX 637 (ALT 250 FOR IP): Performed by: PHYSICIAN ASSISTANT

## 2024-12-28 RX ORDER — DIPHENOXYLATE HYDROCHLORIDE AND ATROPINE SULFATE 2.5; .025 MG/1; MG/1
1 TABLET ORAL 4 TIMES DAILY PRN
Status: DISCONTINUED | OUTPATIENT
Start: 2024-12-28 | End: 2024-12-31 | Stop reason: HOSPADM

## 2024-12-28 RX ORDER — HYDROMORPHONE HYDROCHLORIDE 1 MG/ML
1.5 INJECTION, SOLUTION INTRAMUSCULAR; INTRAVENOUS; SUBCUTANEOUS
Status: DISCONTINUED | OUTPATIENT
Start: 2024-12-28 | End: 2024-12-29

## 2024-12-28 RX ADMIN — HYDROMORPHONE HYDROCHLORIDE 1 MG: 1 INJECTION, SOLUTION INTRAMUSCULAR; INTRAVENOUS; SUBCUTANEOUS at 17:09

## 2024-12-28 RX ADMIN — OXYCODONE 5 MG: 5 TABLET ORAL at 11:03

## 2024-12-28 RX ADMIN — EPOETIN ALFA-EPBX 10000 UNITS: 10000 INJECTION, SOLUTION INTRAVENOUS; SUBCUTANEOUS at 17:44

## 2024-12-28 RX ADMIN — NEOMYCIN SULFATE, POLYMYXIN B SULFATE AND DEXAMETHASONE 1 DROP: 3.5; 10000; 1 SUSPENSION/ DROPS OPHTHALMIC at 22:01

## 2024-12-28 RX ADMIN — NEOMYCIN SULFATE, POLYMYXIN B SULFATE AND DEXAMETHASONE 1 DROP: 3.5; 10000; 1 SUSPENSION/ DROPS OPHTHALMIC at 17:00

## 2024-12-28 RX ADMIN — BUMETANIDE 2 MG: 1 TABLET ORAL at 17:44

## 2024-12-28 RX ADMIN — SODIUM CHLORIDE, PRESERVATIVE FREE 10 ML: 5 INJECTION INTRAVENOUS at 22:01

## 2024-12-28 RX ADMIN — AMLODIPINE BESYLATE 10 MG: 5 TABLET ORAL at 08:29

## 2024-12-28 RX ADMIN — HYDROMORPHONE HYDROCHLORIDE 1 MG: 1 INJECTION, SOLUTION INTRAMUSCULAR; INTRAVENOUS; SUBCUTANEOUS at 03:23

## 2024-12-28 RX ADMIN — LURASIDONE HYDROCHLORIDE 20 MG: 20 TABLET ORAL at 17:44

## 2024-12-28 RX ADMIN — LOSARTAN POTASSIUM 25 MG: 25 TABLET, FILM COATED ORAL at 08:36

## 2024-12-28 RX ADMIN — PANTOPRAZOLE SODIUM 40 MG: 40 TABLET, DELAYED RELEASE ORAL at 06:00

## 2024-12-28 RX ADMIN — HYDROMORPHONE HYDROCHLORIDE 1.5 MG: 1 INJECTION, SOLUTION INTRAMUSCULAR; INTRAVENOUS; SUBCUTANEOUS at 18:33

## 2024-12-28 RX ADMIN — GABAPENTIN 300 MG: 300 CAPSULE ORAL at 21:48

## 2024-12-28 RX ADMIN — LORAZEPAM 1 MG: 1 TABLET ORAL at 21:48

## 2024-12-28 RX ADMIN — SODIUM CHLORIDE, PRESERVATIVE FREE 10 ML: 5 INJECTION INTRAVENOUS at 08:31

## 2024-12-28 RX ADMIN — HYDRALAZINE HYDROCHLORIDE 50 MG: 25 TABLET, FILM COATED ORAL at 14:36

## 2024-12-28 RX ADMIN — Medication 1 CAPSULE: at 08:29

## 2024-12-28 RX ADMIN — HYDROMORPHONE HYDROCHLORIDE 1 MG: 1 INJECTION, SOLUTION INTRAMUSCULAR; INTRAVENOUS; SUBCUTANEOUS at 07:32

## 2024-12-28 RX ADMIN — HYDRALAZINE HYDROCHLORIDE 50 MG: 25 TABLET, FILM COATED ORAL at 21:48

## 2024-12-28 RX ADMIN — INSULIN GLARGINE 14 UNITS: 100 INJECTION, SOLUTION SUBCUTANEOUS at 08:26

## 2024-12-28 RX ADMIN — HYDROMORPHONE HYDROCHLORIDE 1.5 MG: 1 INJECTION, SOLUTION INTRAMUSCULAR; INTRAVENOUS; SUBCUTANEOUS at 21:49

## 2024-12-28 RX ADMIN — LORAZEPAM 1 MG: 1 TABLET ORAL at 08:28

## 2024-12-28 RX ADMIN — HYDRALAZINE HYDROCHLORIDE 50 MG: 25 TABLET, FILM COATED ORAL at 06:00

## 2024-12-28 RX ADMIN — PIPERACILLIN AND TAZOBACTAM 3375 MG: 3; .375 INJECTION, POWDER, LYOPHILIZED, FOR SOLUTION INTRAVENOUS at 12:33

## 2024-12-28 RX ADMIN — NEOMYCIN SULFATE, POLYMYXIN B SULFATE AND DEXAMETHASONE 1 DROP: 3.5; 10000; 1 SUSPENSION/ DROPS OPHTHALMIC at 08:32

## 2024-12-28 RX ADMIN — GABAPENTIN 300 MG: 300 CAPSULE ORAL at 14:36

## 2024-12-28 RX ADMIN — GABAPENTIN 300 MG: 300 CAPSULE ORAL at 08:30

## 2024-12-28 RX ADMIN — HYDROMORPHONE HYDROCHLORIDE 1 MG: 1 INJECTION, SOLUTION INTRAMUSCULAR; INTRAVENOUS; SUBCUTANEOUS at 12:22

## 2024-12-28 RX ADMIN — NEOMYCIN SULFATE, POLYMYXIN B SULFATE AND DEXAMETHASONE 1 DROP: 3.5; 10000; 1 SUSPENSION/ DROPS OPHTHALMIC at 13:30

## 2024-12-28 RX ADMIN — BUMETANIDE 2 MG: 1 TABLET ORAL at 08:30

## 2024-12-28 RX ADMIN — OXYCODONE 5 MG: 5 TABLET ORAL at 06:00

## 2024-12-28 RX ADMIN — INSULIN LISPRO 2 UNITS: 100 INJECTION, SOLUTION INTRAVENOUS; SUBCUTANEOUS at 08:27

## 2024-12-28 ASSESSMENT — PAIN DESCRIPTION - LOCATION
LOCATION: HAND
LOCATION: ARM
LOCATION: FLANK;HAND
LOCATION: FLANK
LOCATION: HAND
LOCATION: FLANK;HAND
LOCATION: HAND
LOCATION: HAND

## 2024-12-28 ASSESSMENT — PAIN DESCRIPTION - DESCRIPTORS
DESCRIPTORS: STABBING
DESCRIPTORS: STABBING;THROBBING
DESCRIPTORS: THROBBING
DESCRIPTORS: THROBBING;STABBING
DESCRIPTORS: ACHING
DESCRIPTORS: THROBBING;STABBING
DESCRIPTORS: THROBBING
DESCRIPTORS: STABBING

## 2024-12-28 ASSESSMENT — PAIN SCALES - GENERAL
PAINLEVEL_OUTOF10: 10
PAINLEVEL_OUTOF10: 6
PAINLEVEL_OUTOF10: 9
PAINLEVEL_OUTOF10: 8
PAINLEVEL_OUTOF10: 6
PAINLEVEL_OUTOF10: 7
PAINLEVEL_OUTOF10: 10
PAINLEVEL_OUTOF10: 10
PAINLEVEL_OUTOF10: 1
PAINLEVEL_OUTOF10: 8
PAINLEVEL_OUTOF10: 8
PAINLEVEL_OUTOF10: 7
PAINLEVEL_OUTOF10: 2
PAINLEVEL_OUTOF10: 1
PAINLEVEL_OUTOF10: 8
PAINLEVEL_OUTOF10: 8

## 2024-12-28 ASSESSMENT — PAIN DESCRIPTION - ONSET
ONSET: PROGRESSIVE

## 2024-12-28 ASSESSMENT — PAIN DESCRIPTION - PAIN TYPE
TYPE: ACUTE PAIN

## 2024-12-28 ASSESSMENT — PAIN SCALES - WONG BAKER
WONGBAKER_NUMERICALRESPONSE: HURTS A LITTLE BIT

## 2024-12-28 ASSESSMENT — PAIN DESCRIPTION - ORIENTATION
ORIENTATION: LEFT
ORIENTATION: LEFT;RIGHT
ORIENTATION: LEFT
ORIENTATION: RIGHT
ORIENTATION: LEFT
ORIENTATION: LEFT

## 2024-12-28 ASSESSMENT — PAIN - FUNCTIONAL ASSESSMENT
PAIN_FUNCTIONAL_ASSESSMENT: PREVENTS OR INTERFERES SOME ACTIVE ACTIVITIES AND ADLS

## 2024-12-28 ASSESSMENT — PAIN DESCRIPTION - FREQUENCY
FREQUENCY: CONTINUOUS

## 2024-12-28 NOTE — PROGRESS NOTES
End of Shift Note    Bedside shift change report given to Abida (oncoming nurse) by Jaylin Huang RN (offgoing nurse).  Report included the following information SBAR    Shift worked:  7a-7p     Shift summary and any significant changes:     Pt was seen by hospice since pt is wanting to stop HD completely. Psych also had tele visit with patient. Both line in pt's chest are tunneled and IR would need to be consulted to have them removed before discharge. Plan to stay over night and D/C tomorrow. Around 1815, pt is requesting more pain medication. He is saying his R kidney hurts crying out in pain. I explained this is probably from missing HD twice but he is not hearing me. He said he doesn't feel well and is in excrutiating pain and not able to walk. Pt very tearful, One time dose of Ativan ordered by MD.     Concerns for physician to address:       Zone phone for oncoming shift:          Activity:  Level of Assistance: Independent  Number times ambulated in hallways past shift: 6  Number of times OOB to chair past shift: 3    Cardiac:   Cardiac Monitoring: No      Cardiac Rhythm: Sinus rhythm    Access:  Current line(s): PIV     Genitourinary:   Urinary Status: Oliguria    Respiratory:   O2 Device: None (Room air)  Chronic home O2 use?: NO  Incentive spirometer at bedside: NO    GI:  Last BM (including prior to admit): 12/21/24  Current diet:  ADULT DIET; Regular; Low Sodium (2 gm); Low Potassium (Less than 3000 mg/day); Low Phosphorus (Less than 1000 mg)  Passing flatus: YES    Pain Management:   Patient states pain is manageable on current regimen: NO    Skin:  Kennedy Scale Score: 22  Interventions: Wound Offloading (Prevention Methods): Bed, pressure reduction mattress    Patient Safety:  Fall Risk: Nursing Judgement-Fall Risk High(Add Comments): Yes  Fall Risk Interventions  Nursing Judgement-Fall Risk High(Add Comments): Yes  Toilet Every 2 Hours-In Advance of Need: Yes  Hourly Visual Checks: Awake, In

## 2024-12-28 NOTE — PLAN OF CARE
Problem: Safety - Adult  Goal: Free from fall injury  Outcome: Progressing     Problem: Chronic Conditions and Co-morbidities  Goal: Patient's chronic conditions and co-morbidity symptoms are monitored and maintained or improved  Outcome: Progressing     Problem: Discharge Planning  Goal: Discharge to home or other facility with appropriate resources  Outcome: Progressing     Problem: Pain  Goal: Verbalizes/displays adequate comfort level or baseline comfort level  Outcome: Progressing     Problem: ABCDS Injury Assessment  Goal: Absence of physical injury  Outcome: Progressing     Problem: Anxiety  Goal: Will report anxiety at manageable levels  Description: INTERVENTIONS:  1. Administer medication as ordered  2. Teach and rehearse alternative coping skills  3. Provide emotional support with 1:1 interaction with staff  Outcome: Progressing  Flowsheets (Taken 12/27/2024 2000)  Will report anxiety at manageable levels: Administer medication as ordered     Problem: Infection - Adult  Goal: Absence of infection during hospitalization  Outcome: Progressing     Problem: Gastrointestinal - Adult  Goal: Maintains or returns to baseline bowel function  Outcome: Progressing

## 2024-12-28 NOTE — PROGRESS NOTES
Physician Progress Note      PATIENT:               KASEY TREJO  CSN #:                  [id number]  :                       1965  ADMIT DATE:       2024 8:40 AM  DISCH DATE:  RESPONDING  PROVIDER #:        Henry Owens MD          QUERY TEXT:    Patient admitted with recurrent cat bite infection. Per Op note dated    documentation of debridement was performed. To accurately reflect the   procedure performed please document if debridement was excisional or   nonexcisional and the deepest depth of tissue removed as down to and   including:    The medical record reflects the following:  Risk Factors: cat bite    Clinical Indicators:    Op :  His previous incision was utilized. Skin and subcutaneous tissue was taken   down sharply.  Deeper dissection was carefully performed.  It was noted that   there was copious serous fluid noted here.  This did track to the flexor   tendons of the index and long finger creating flexor tenosynovitis. This was   evacuated at both the index and long finger level.  Blunt debridement was   performed with a rongeur.  Due to concerns for septic arthritis I then   elevated the flexor mechanism out of the pulley system and made a   counterincision in the MCP joint.  It was noted that minimal serous fluid was   present and here as well.  Cultures were taken.    Treatment: I&D  Options provided:  -- Nonexcisional debridement of subcutaneous tissue  -- Excisional debridement of subcutaneous tissue  -- Nonexcisional debridement of fascia  -- Excisional debridement of fascia  -- Nonexcisional debridement of muscle  -- Excisional debridement of muscle  -- Nonexcisional debridement of joint  -- Excisional debridement of joint  -- Other - I will add my own diagnosis  -- Disagree - Not applicable / Not valid  -- Disagree - Clinically unable to determine / Unknown  -- Refer to Clinical Documentation Reviewer    PROVIDER RESPONSE TEXT:    Non-excisional debridement of

## 2024-12-28 NOTE — CARE COORDINATION
CM aware of discharge order, chart review completed. Unit CM worked with pt to coordinate hospice services per pt's request and in support of pt's stated goals. [facility] has completed information session with pt and is on board to provide hospice services for pt on his return home. Pt's sister to transport home at discharge. CM remains available to assist with any additions or changes to plan.    Megan Luna, [facility]  Care Management  [phone]

## 2024-12-29 LAB
ANION GAP SERPL CALC-SCNC: 6 MMOL/L (ref 2–12)
BUN SERPL-MCNC: 67 MG/DL (ref 6–20)
BUN/CREAT SERPL: 14 (ref 12–20)
CALCIUM SERPL-MCNC: 9.3 MG/DL (ref 8.5–10.1)
CHLORIDE SERPL-SCNC: 105 MMOL/L (ref 97–108)
CO2 SERPL-SCNC: 23 MMOL/L (ref 21–32)
CREAT SERPL-MCNC: 4.96 MG/DL (ref 0.7–1.3)
GLUCOSE BLD STRIP.AUTO-MCNC: 126 MG/DL (ref 65–117)
GLUCOSE BLD STRIP.AUTO-MCNC: 172 MG/DL (ref 65–117)
GLUCOSE BLD STRIP.AUTO-MCNC: 234 MG/DL (ref 65–117)
GLUCOSE SERPL-MCNC: 222 MG/DL (ref 65–100)
POTASSIUM SERPL-SCNC: 4.9 MMOL/L (ref 3.5–5.1)
SERVICE CMNT-IMP: ABNORMAL
SODIUM SERPL-SCNC: 134 MMOL/L (ref 136–145)

## 2024-12-29 PROCEDURE — 6360000002 HC RX W HCPCS: Performed by: INTERNAL MEDICINE

## 2024-12-29 PROCEDURE — P9047 ALBUMIN (HUMAN), 25%, 50ML: HCPCS

## 2024-12-29 PROCEDURE — 6360000002 HC RX W HCPCS

## 2024-12-29 PROCEDURE — 2500000003 HC RX 250 WO HCPCS: Performed by: STUDENT IN AN ORGANIZED HEALTH CARE EDUCATION/TRAINING PROGRAM

## 2024-12-29 PROCEDURE — 2580000003 HC RX 258: Performed by: INTERNAL MEDICINE

## 2024-12-29 PROCEDURE — 6370000000 HC RX 637 (ALT 250 FOR IP): Performed by: FAMILY MEDICINE

## 2024-12-29 PROCEDURE — 6370000000 HC RX 637 (ALT 250 FOR IP): Performed by: STUDENT IN AN ORGANIZED HEALTH CARE EDUCATION/TRAINING PROGRAM

## 2024-12-29 PROCEDURE — 80048 BASIC METABOLIC PNL TOTAL CA: CPT

## 2024-12-29 PROCEDURE — 1100000003 HC PRIVATE W/ TELEMETRY

## 2024-12-29 PROCEDURE — 6370000000 HC RX 637 (ALT 250 FOR IP): Performed by: INTERNAL MEDICINE

## 2024-12-29 PROCEDURE — 99024 POSTOP FOLLOW-UP VISIT: CPT | Performed by: ORTHOPAEDIC SURGERY

## 2024-12-29 PROCEDURE — 82962 GLUCOSE BLOOD TEST: CPT

## 2024-12-29 PROCEDURE — 36415 COLL VENOUS BLD VENIPUNCTURE: CPT

## 2024-12-29 PROCEDURE — 90935 HEMODIALYSIS ONE EVALUATION: CPT

## 2024-12-29 RX ORDER — INSULIN GLARGINE 100 [IU]/ML
30 INJECTION, SOLUTION SUBCUTANEOUS NIGHTLY
Status: DISCONTINUED | OUTPATIENT
Start: 2024-12-29 | End: 2024-12-31 | Stop reason: HOSPADM

## 2024-12-29 RX ORDER — VANCOMYCIN 1 G/200ML
1000 INJECTION, SOLUTION INTRAVENOUS ONCE
Status: COMPLETED | OUTPATIENT
Start: 2024-12-29 | End: 2024-12-29

## 2024-12-29 RX ORDER — HYDROMORPHONE HYDROCHLORIDE 1 MG/ML
0.5 INJECTION, SOLUTION INTRAMUSCULAR; INTRAVENOUS; SUBCUTANEOUS
Status: DISCONTINUED | OUTPATIENT
Start: 2024-12-29 | End: 2024-12-31 | Stop reason: HOSPADM

## 2024-12-29 RX ORDER — LOSARTAN POTASSIUM 100 MG/1
100 TABLET ORAL DAILY
Status: DISCONTINUED | OUTPATIENT
Start: 2024-12-30 | End: 2024-12-31 | Stop reason: HOSPADM

## 2024-12-29 RX ORDER — VANCOMYCIN 1 G/200ML
1000 INJECTION, SOLUTION INTRAVENOUS ONCE
Status: DISCONTINUED | OUTPATIENT
Start: 2024-12-29 | End: 2024-12-31

## 2024-12-29 RX ORDER — ALBUMIN (HUMAN) 12.5 G/50ML
SOLUTION INTRAVENOUS
Status: COMPLETED
Start: 2024-12-29 | End: 2024-12-29

## 2024-12-29 RX ORDER — INSULIN GLARGINE 100 [IU]/ML
20 INJECTION, SOLUTION SUBCUTANEOUS NIGHTLY
Status: DISCONTINUED | OUTPATIENT
Start: 2024-12-29 | End: 2024-12-29

## 2024-12-29 RX ORDER — LURASIDONE HYDROCHLORIDE 20 MG/1
20 TABLET, FILM COATED ORAL
Qty: 60 TABLET | Refills: 1 | OUTPATIENT
Start: 2024-12-29

## 2024-12-29 RX ADMIN — HEPARIN SODIUM 5000 UNITS: 5000 INJECTION INTRAVENOUS; SUBCUTANEOUS at 22:19

## 2024-12-29 RX ADMIN — HYDROMORPHONE HYDROCHLORIDE 1.5 MG: 1 INJECTION, SOLUTION INTRAMUSCULAR; INTRAVENOUS; SUBCUTANEOUS at 09:52

## 2024-12-29 RX ADMIN — NEOMYCIN SULFATE, POLYMYXIN B SULFATE AND DEXAMETHASONE 1 DROP: 3.5; 10000; 1 SUSPENSION/ DROPS OPHTHALMIC at 19:03

## 2024-12-29 RX ADMIN — GABAPENTIN 300 MG: 300 CAPSULE ORAL at 09:09

## 2024-12-29 RX ADMIN — ALBUMIN (HUMAN) 25 G: 0.25 INJECTION, SOLUTION INTRAVENOUS at 13:19

## 2024-12-29 RX ADMIN — HYDRALAZINE HYDROCHLORIDE 50 MG: 25 TABLET, FILM COATED ORAL at 05:32

## 2024-12-29 RX ADMIN — PIPERACILLIN AND TAZOBACTAM 3375 MG: 3; .375 INJECTION, POWDER, LYOPHILIZED, FOR SOLUTION INTRAVENOUS at 18:56

## 2024-12-29 RX ADMIN — SODIUM CHLORIDE, PRESERVATIVE FREE 10 ML: 5 INJECTION INTRAVENOUS at 20:47

## 2024-12-29 RX ADMIN — VANCOMYCIN 1000 MG: 1 INJECTION, SOLUTION INTRAVENOUS at 11:36

## 2024-12-29 RX ADMIN — HYDROMORPHONE HYDROCHLORIDE 1.5 MG: 1 INJECTION, SOLUTION INTRAMUSCULAR; INTRAVENOUS; SUBCUTANEOUS at 02:12

## 2024-12-29 RX ADMIN — PANTOPRAZOLE SODIUM 40 MG: 40 TABLET, DELAYED RELEASE ORAL at 07:31

## 2024-12-29 RX ADMIN — LORAZEPAM 1 MG: 1 TABLET ORAL at 20:46

## 2024-12-29 RX ADMIN — HYDRALAZINE HYDROCHLORIDE 50 MG: 25 TABLET, FILM COATED ORAL at 15:52

## 2024-12-29 RX ADMIN — HYDROMORPHONE HYDROCHLORIDE 0.5 MG: 1 INJECTION, SOLUTION INTRAMUSCULAR; INTRAVENOUS; SUBCUTANEOUS at 21:01

## 2024-12-29 RX ADMIN — DIPHENOXYLATE HYDROCHLORIDE AND ATROPINE SULFATE 1 TABLET: 2.5; .025 TABLET ORAL at 17:40

## 2024-12-29 RX ADMIN — PIPERACILLIN AND TAZOBACTAM 3375 MG: 3; .375 INJECTION, POWDER, LYOPHILIZED, FOR SOLUTION INTRAVENOUS at 00:10

## 2024-12-29 RX ADMIN — AMLODIPINE BESYLATE 10 MG: 5 TABLET ORAL at 09:09

## 2024-12-29 RX ADMIN — NEOMYCIN SULFATE, POLYMYXIN B SULFATE AND DEXAMETHASONE 1 DROP: 3.5; 10000; 1 SUSPENSION/ DROPS OPHTHALMIC at 15:55

## 2024-12-29 RX ADMIN — HYDRALAZINE HYDROCHLORIDE 50 MG: 25 TABLET, FILM COATED ORAL at 20:45

## 2024-12-29 RX ADMIN — BUMETANIDE 2 MG: 1 TABLET ORAL at 09:09

## 2024-12-29 RX ADMIN — HYDROMORPHONE HYDROCHLORIDE 1.5 MG: 1 INJECTION, SOLUTION INTRAMUSCULAR; INTRAVENOUS; SUBCUTANEOUS at 05:33

## 2024-12-29 RX ADMIN — LOSARTAN POTASSIUM 25 MG: 25 TABLET, FILM COATED ORAL at 09:10

## 2024-12-29 RX ADMIN — HEPARIN SODIUM 5000 UNITS: 5000 INJECTION INTRAVENOUS; SUBCUTANEOUS at 15:52

## 2024-12-29 RX ADMIN — INSULIN LISPRO 2 UNITS: 100 INJECTION, SOLUTION INTRAVENOUS; SUBCUTANEOUS at 11:30

## 2024-12-29 RX ADMIN — INSULIN GLARGINE 30 UNITS: 100 INJECTION, SOLUTION SUBCUTANEOUS at 21:02

## 2024-12-29 RX ADMIN — BUMETANIDE 2 MG: 1 TABLET ORAL at 16:59

## 2024-12-29 RX ADMIN — NEOMYCIN SULFATE, POLYMYXIN B SULFATE AND DEXAMETHASONE 1 DROP: 3.5; 10000; 1 SUSPENSION/ DROPS OPHTHALMIC at 20:46

## 2024-12-29 RX ADMIN — HEPARIN SODIUM 5000 UNITS: 5000 INJECTION INTRAVENOUS; SUBCUTANEOUS at 05:32

## 2024-12-29 RX ADMIN — GABAPENTIN 300 MG: 300 CAPSULE ORAL at 15:52

## 2024-12-29 RX ADMIN — SODIUM CHLORIDE, PRESERVATIVE FREE 10 ML: 5 INJECTION INTRAVENOUS at 09:15

## 2024-12-29 RX ADMIN — HYDROMORPHONE HYDROCHLORIDE 1.5 MG: 1 INJECTION, SOLUTION INTRAMUSCULAR; INTRAVENOUS; SUBCUTANEOUS at 14:30

## 2024-12-29 RX ADMIN — LURASIDONE HYDROCHLORIDE 20 MG: 20 TABLET ORAL at 16:59

## 2024-12-29 RX ADMIN — INSULIN GLARGINE 14 UNITS: 100 INJECTION, SOLUTION SUBCUTANEOUS at 09:08

## 2024-12-29 RX ADMIN — LORAZEPAM 1 MG: 1 TABLET ORAL at 09:09

## 2024-12-29 RX ADMIN — GABAPENTIN 300 MG: 300 CAPSULE ORAL at 20:45

## 2024-12-29 RX ADMIN — HYDROMORPHONE HYDROCHLORIDE 1.5 MG: 1 INJECTION, SOLUTION INTRAMUSCULAR; INTRAVENOUS; SUBCUTANEOUS at 17:34

## 2024-12-29 RX ADMIN — NEOMYCIN SULFATE, POLYMYXIN B SULFATE AND DEXAMETHASONE 1 DROP: 3.5; 10000; 1 SUSPENSION/ DROPS OPHTHALMIC at 09:10

## 2024-12-29 RX ADMIN — Medication 1 CAPSULE: at 09:10

## 2024-12-29 ASSESSMENT — PAIN DESCRIPTION - ORIENTATION
ORIENTATION: LEFT

## 2024-12-29 ASSESSMENT — PAIN DESCRIPTION - LOCATION
LOCATION: HAND

## 2024-12-29 ASSESSMENT — PAIN SCALES - GENERAL
PAINLEVEL_OUTOF10: 8
PAINLEVEL_OUTOF10: 7
PAINLEVEL_OUTOF10: 8
PAINLEVEL_OUTOF10: 9
PAINLEVEL_OUTOF10: 0
PAINLEVEL_OUTOF10: 8
PAINLEVEL_OUTOF10: 5
PAINLEVEL_OUTOF10: 8
PAINLEVEL_OUTOF10: 5
PAINLEVEL_OUTOF10: 2
PAINLEVEL_OUTOF10: 7

## 2024-12-29 ASSESSMENT — PAIN DESCRIPTION - DESCRIPTORS
DESCRIPTORS: ACHING
DESCRIPTORS: ACHING
DESCRIPTORS: STABBING;THROBBING
DESCRIPTORS: STABBING
DESCRIPTORS: ACHING
DESCRIPTORS: THROBBING
DESCRIPTORS: THROBBING

## 2024-12-29 ASSESSMENT — PAIN SCALES - WONG BAKER
WONGBAKER_NUMERICALRESPONSE: NO HURT
WONGBAKER_NUMERICALRESPONSE: NO HURT

## 2024-12-29 NOTE — PROGRESS NOTES
End of Shift Note    Bedside shift change report given to BHAKTI Guzman (oncoming nurse) by BONNIE Agudelo RN (offgoing nurse).  Report included the following information SBAR, Intake/Output, and MAR    Shift worked:  7pm-7am     Shift summary and any significant changes:     Pain managed with PRN dilaudid x3.IV antibiotic given. Hasn't c/o diarrhea in this shift. Awaits his HD. Pending Hand surgery consult due to severe pain and edema at site of the surgery.     Concerns for physician to address:  Above     Zone phone for oncoming shift:   [phone]           BONNIE Agudelo RN

## 2024-12-29 NOTE — PROGRESS NOTES
Hospitalist Progress Note    NAME:   Keaton Van   : 1965   MRN: [id number]     Date/Time: 2024 10:07 PM  Patient PCP: Robert Wells PA-C    Estimated discharge date:  Barriers:       Assessment / Plan:  Keaton Van is a 59 y.o.  male with PMHx significant for ESRD on dialysis DM2, HTN, substance abuse history who recently was admitted 2024 with staph bacteremia due to left hand cellulitis complicated by abscess and flexor tenosynovitis after he got bitten by a cat.  Patient on that admission was taken to the OR by orthopedics for I&D washout.  Was seen by ID recommended vancomycin and cefepime and Flagyl after dialysis sessions until 2025.  Patient was discharged.  This time the patient comes back after having been off of the medication since Saturday.  Patient reports that he did not tolerate his dialysis session, and felt unwell, subsequently he missed his dialysis sessions and antibiotics IV's that he was supposed to get after dialysis sessions.  He also did not take his oral metronidazole because he felt nauseated.  Patient endorses fevers and chills, and worsening left hand pain that shoots up from his finger up to his elbow.      Cellulitis and abscess of left hand / Flexor tenosynovitis/Septic arthritis Secondary to cat bite failed to respond to initial treatment during the prior admission which included surgical intervention     59-year-old male readmitted for worsening infection of left hand. Patient underwent I&D of the left hand on 2024 by Dr. Owens after suffering a cat bite which led to an infection. Patient was subsequently on IV antibiotics and discharged home. The patient presented to ortho office for follow-up with Dr. Owens and his symptoms appear to be significantly worse. Patient was not receiving IV antibiotics at home and likely was not taking oral antibiotics due to GI upset as per ortho note       CT hand + for 1.9 x 1.6 x 0.9 cm rim-enhancing   which were not copied into this note but were reviewed prior to creation of Plan.      LABS:  I reviewed today's most current labs and imaging studies.  Pertinent labs include:  Recent Labs     12/27/24  1036   WBC 5.4   HGB 8.9*   HCT 25.8*        Recent Labs     12/27/24  1036   *   K 5.1      CO2 25   GLUCOSE 151*   BUN 71*   CREATININE 4.91*   CALCIUM 9.2   PHOS 4.2       Signed: Jesse Gonzalez MD

## 2024-12-29 NOTE — PROGRESS NOTES
Hospitalist Progress Note    NAME:   Keaton Van   : 1965   MRN: [id number]     Date/Time: 2024 9:49 PM  Patient PCP: Robert Wells PA-C    Estimated discharge date:  Barriers:       Assessment / Plan:  Keaton Van is a 59 y.o.  male with PMHx significant for ESRD on dialysis DM2, HTN, substance abuse history who recently was admitted 2024 with staph bacteremia due to left hand cellulitis complicated by abscess and flexor tenosynovitis after he got bitten by a cat.  Patient on that admission was taken to the OR by orthopedics for I&D washout.  Was seen by ID recommended vancomycin and cefepime and Flagyl after dialysis sessions until 2025.  Patient was discharged.  This time the patient comes back after having been off of the medication since Saturday.  Patient reports that he did not tolerate his dialysis session, and felt unwell, subsequently he missed his dialysis sessions and antibiotics IV's that he was supposed to get after dialysis sessions.  He also did not take his oral metronidazole because he felt nauseated.  Patient endorses fevers and chills, and worsening left hand pain that shoots up from his finger up to his elbow.      Cellulitis and abscess of left hand / Flexor tenosynovitis/Septic arthritis Secondary to cat bite failed to respond to initial treatment during the prior admission which included surgical intervention     59-year-old male readmitted for worsening infection of left hand. Patient underwent I&D of the left hand on 2024 by Dr. Owens after suffering a cat bite which led to an infection. Patient was subsequently on IV antibiotics and discharged home. The patient presented to ortho office for follow-up with Dr. Owens and his symptoms appear to be significantly worse. Patient was not receiving IV antibiotics at home and likely was not taking oral antibiotics due to GI upset as per ortho note     CT hand + for 1.9 x 1.6 x 0.9 cm rim-enhancing   no weight-bearing restrictions

## 2024-12-29 NOTE — FLOWSHEET NOTE
Primary RN SBAR: Megan Chavez RN  Incapacitated Nurse [facility]. provided: Yes  Patient Education provided: Ordered Dialysis Treatment  Preferred Education method and Primary language: Verbal; English  Hospital General Consent Verified: Yes  Hospital associated wait time; reason: N/A  Hepatitis B Surface Ag   Date/Time Value Ref Range Status   12/03/2024 08:37 AM 0.30 Index Final     Hep B S Ag Interp   Date/Time Value Ref Range Status   12/03/2024 08:37 AM Negative NEG   Final     Hep B S Ab   Date/Time Value Ref Range Status   12/03/2024 08:37 AM 5.73 mIU/mL Final     Hep B S Ab Interp   Date/Time Value Ref Range Status   12/03/2024 08:37 AM NONREACTIVE NR   Final     Comment:     (NOTE)  The ADVIA Centaur Anti-HBs2 assay is traceable to the World Health   Organization (WHO) Hepatitis B Immunoglobulin 1st International   Reference Preparation (1977). Samples with a calculated value of 10   mIU/mL or greater are considered reactive (protective) in accordance   with the CDC guidelines. The accepted criteria for immunity to HBV is   anti-HBs activity greater than or equal to 10 mIU/mL, as defined by   the WHO International Reference Preparation.  Assay performance has not been established in pregnant women,   patients who are immunosuppressed or immunocompromised, nor have   performance characteristics been established in conjunction with   other 's assays for specific HBV serologic markers. This   assay does not differentiate between vaccine induced immune response   and a response due to infection with HBV. Passively acquired anti-HBs   may be identified following patient transfusion, receipt of   immunoglobulin products, etc.        PRE TREATMENT     12/29/24 1215   Observations & Evaluations   Level of Consciousness 0   Oriented X X4   Heart Rhythm Regular   Respiratory Quality/Effort Unlabored   O2 Device None (Room air)   Bilateral Breath Sounds Clear   Skin Color Ecchymosis (comment)   Skin  Condition/Temp Dry;Warm   Appetite Fair   Abdomen Inspection Soft   Bowel Sounds (All Quadrants) Active   Last BM (including prior to admit) 12/28/24   LUE Edema Non-pitting;Trace   Vital Signs   /72   Temp 98.4 °F (36.9 °C)   Pulse 72   Respirations 18   SpO2 95 %   Pain Assessment   Pain Assessment None - Denies Pain   Pain Level 0   Technical Checks   Dialysis Machine No. 02   RO Machine Number 02   Dialyzer Lot No. [id number]   Tubing Lot Number [id number]   All Connections Secure Yes   NS Bag Yes   Saline Line Double Clamped Yes   Dialyzer Nipro ELISIO   Prime Volume (mL) 200 mL   ICEBOAT I;C;E;B;O;A;T   RO Machine Log Sheet Completed Yes   Machine Alarm Self Test Completed;Passed   Air Foam Detector Tested;Proper Function   Extracorporeal Circuit Tested for Integrity Yes   Machine Conductivity 13.8   Bleach Test (Neg) Yes   Bath Temperature 98.6 °F (37 °C)   Treatment Initiation   Dialyze Hours 2   Treatment  Initiation Universal Precautions maintained;Lines secured to patient;Connections secured;Prime given;Venous Parameters set;Arterial Parameters set;Air foam detector engaged;Dialysate;Saline line double clamped;Dialyzer   Hemodialysis Central Access - Temporary Right Subclavian   No placement date or time found.   Orientation: Right  Access Location: Subclavian   Continued need for line? Yes   Site Assessment Clean, dry & intact   CVC Lumen Status Brisk blood return;Flushed   Blue Lumen Status Brisk blood return;Flushed   Red Lumen Status Brisk blood return;Flushed   Line Care Connections checked and tightened;Ports disinfected   Dressing Type Bacteriocidal;Sterile dressing, transparent   Date of Last Dressing Change 12/24/24   Dressing Status Clean, dry & intact   Dialysis Bath   K+ (Potassium) 2   Ca+ (Calcium) 2.5   Na+ (Sodium) 138   HCO3 (Bicarb) 40     INTRA TREATMENT      12/29/24 1225   Treatment   Time On 1225   Treatment Goal 2L   Vital Signs   /80   Temp 98.4 °F (36.9 °C)   Pulse 70

## 2024-12-29 NOTE — PROGRESS NOTES
sodium chloride 0.9 % 50 mL IVPB (mini-bag)  3,375 mg IntraVENous Q12H    lactobacillus (CULTURELLE) capsule 1 capsule  1 capsule Oral Daily with breakfast    ondansetron (ZOFRAN) injection 4 mg  4 mg IntraVENous Once    acetaminophen (TYLENOL) tablet 650 mg  650 mg Oral Q4H PRN    sodium chloride flush 0.9 % injection 5-40 mL  5-40 mL IntraVENous 2 times per day    sodium chloride flush 0.9 % injection 5-40 mL  5-40 mL IntraVENous PRN    0.9 % sodium chloride infusion   IntraVENous PRN    potassium chloride (KLOR-CON M) extended release tablet 40 mEq  40 mEq Oral PRN    Or    potassium bicarb-citric acid (EFFER-K) effervescent tablet 40 mEq  40 mEq Oral PRN    Or    potassium chloride 10 mEq/100 mL IVPB (Peripheral Line)  10 mEq IntraVENous PRN    magnesium sulfate 2000 mg in 50 mL IVPB premix  2,000 mg IntraVENous PRN    ondansetron (ZOFRAN-ODT) disintegrating tablet 4 mg  4 mg Oral Q8H PRN    Or    ondansetron (ZOFRAN) injection 4 mg  4 mg IntraVENous Q6H PRN    polyethylene glycol (GLYCOLAX) packet 17 g  17 g Oral Daily PRN    acetaminophen (TYLENOL) tablet 650 mg  650 mg Oral Q6H PRN    Or    acetaminophen (TYLENOL) suppository 650 mg  650 mg Rectal Q6H PRN    heparin (porcine) injection 5,000 Units  5,000 Units SubCUTAneous 3 times per day    glucose chewable tablet 16 g  4 tablet Oral PRN    dextrose bolus 10% 125 mL  125 mL IntraVENous PRN    Or    dextrose bolus 10% 250 mL  250 mL IntraVENous PRN    glucagon injection 1 mg  1 mg SubCUTAneous PRN    dextrose 10 % infusion   IntraVENous Continuous PRN    insulin lispro (HUMALOG,ADMELOG) injection vial 0-8 Units  0-8 Units SubCUTAneous 4x Daily AC & HS    amLODIPine (NORVASC) tablet 10 mg  10 mg Oral Daily    bumetanide (BUMEX) tablet 2 mg  2 mg Oral BID    cloNIDine (CATAPRES) tablet 0.2 mg  0.2 mg Oral Q8H PRN    gabapentin (NEURONTIN) capsule 300 mg  300 mg Oral TID    hydrALAZINE (APRESOLINE) tablet 50 mg  50 mg Oral 3 times per day    losartan (COZAAR)  tablet 25 mg  25 mg Oral Daily    lurasidone (LATUDA) tablet 20 mg  20 mg Oral Dinner    neomycin-polymyxin-dexameth (MAXITROL) 3.5-44771-3.1 ophthalmic suspension 1 drop  1 drop Right Eye 4x daily    pantoprazole (PROTONIX) tablet 40 mg  40 mg Oral QAM AC    tiZANidine (ZANAFLEX) tablet 2 mg  2 mg Oral Q8H PRN    ibuprofen (ADVIL;MOTRIN) tablet 800 mg  800 mg Oral Q8H PRN        Eulogio Dumont MD  12/29/2024

## 2024-12-29 NOTE — PROGRESS NOTES
ORTHO - Progress Note  Post Op day: 8 Days Post-Op    Keaton Van     [id number]  male    59 y.o.    1965    Admit date:12/17/2024  Procedures:Procedure(s):  LEFT HAND INCISION AND DRAINAGE  Surgeon:Surgeons and Role:     * Henry Owens MD - Primary        SUBJECTIVE:     Keaton Van is a 59 y.o. male walking through the halls.  Patient has complaints of worsening pain in the hand.  \"I guess I just did not wear my splint because of fall no one wanted to help me\" \" it was swelling up in my wrist last night\"  Denies F/C, nausea, vomiting, dizziness, lightheadedness, chest pain, or shortness of breath.    OBJECTIVE:       Physical Exam:  General: alert, cooperative, no distress.   Gastrointestinal:  non-distended .    Cardiovascular: equal pulses in the upper extremities,  Brisk cap refill in all distal extremities   Genitourinary: Voiding independently   Respiratory: No respiratory distress   Neurological:Neurovascular exam within normal limits.     Senstion intact: UE bilat.    Motor: + Able to move all digits.  Dressing/Wound: Inspection of the left index finger reveals surgical incision with sutures intact and signs of healing with some surrounding induration.  Palpation of the area reveals a callus texture to the indurated area on the ulnar aspect of the incision just proximal to the first MCP joint.  Moderate swelling diffusely of the left index finger.  Erythema improved from my previous exam preoperatively.  Patient is tolerating more range of motion today than he did preoperatively the last time I saw him.  Some limitation to full extension and flexion secondary to swelling.    Vital Signs:       Patient Vitals for the past 8 hrs:   BP Temp Temp src Pulse Resp SpO2 Weight   12/29/24 0952 -- -- -- -- 18 -- --   12/29/24 0845 (!) 162/81 97.3 °F (36.3 °C) Oral 71 16 100 % --   12/29/24 0714 -- -- -- -- -- -- 90.9 kg (200 lb 4.8 oz)   12/29/24 0400 -- -- Oral -- -- -- --

## 2024-12-30 LAB
GLUCOSE BLD STRIP.AUTO-MCNC: 104 MG/DL (ref 65–117)
GLUCOSE BLD STRIP.AUTO-MCNC: 110 MG/DL (ref 65–117)
GLUCOSE BLD STRIP.AUTO-MCNC: 160 MG/DL (ref 65–117)
GLUCOSE BLD STRIP.AUTO-MCNC: 172 MG/DL (ref 65–117)
GLUCOSE BLD STRIP.AUTO-MCNC: 183 MG/DL (ref 65–117)
SERVICE CMNT-IMP: ABNORMAL
SERVICE CMNT-IMP: NORMAL
SERVICE CMNT-IMP: NORMAL

## 2024-12-30 PROCEDURE — 99233 SBSQ HOSP IP/OBS HIGH 50: CPT | Performed by: INTERNAL MEDICINE

## 2024-12-30 PROCEDURE — 6360000002 HC RX W HCPCS: Performed by: INTERNAL MEDICINE

## 2024-12-30 PROCEDURE — 2500000003 HC RX 250 WO HCPCS: Performed by: STUDENT IN AN ORGANIZED HEALTH CARE EDUCATION/TRAINING PROGRAM

## 2024-12-30 PROCEDURE — 6370000000 HC RX 637 (ALT 250 FOR IP): Performed by: FAMILY MEDICINE

## 2024-12-30 PROCEDURE — 1100000003 HC PRIVATE W/ TELEMETRY

## 2024-12-30 PROCEDURE — 82962 GLUCOSE BLOOD TEST: CPT

## 2024-12-30 PROCEDURE — 6370000000 HC RX 637 (ALT 250 FOR IP): Performed by: INTERNAL MEDICINE

## 2024-12-30 PROCEDURE — 6370000000 HC RX 637 (ALT 250 FOR IP): Performed by: STUDENT IN AN ORGANIZED HEALTH CARE EDUCATION/TRAINING PROGRAM

## 2024-12-30 PROCEDURE — 2580000003 HC RX 258: Performed by: INTERNAL MEDICINE

## 2024-12-30 PROCEDURE — 6370000000 HC RX 637 (ALT 250 FOR IP): Performed by: PHYSICIAN ASSISTANT

## 2024-12-30 RX ORDER — LOSARTAN POTASSIUM 25 MG/1
100 TABLET ORAL DAILY
Qty: 30 TABLET | Refills: 3 | Status: SHIPPED
Start: 2024-12-30

## 2024-12-30 RX ORDER — METRONIDAZOLE 500 MG/1
500 TABLET ORAL 2 TIMES DAILY
Qty: 20 TABLET | Refills: 0 | Status: SHIPPED | OUTPATIENT
Start: 2024-12-30 | End: 2025-01-09

## 2024-12-30 RX ORDER — VANCOMYCIN 1 G/200ML
INJECTION, SOLUTION INTRAVENOUS
Qty: 1 ML | Refills: 0 | Status: SHIPPED
Start: 2024-12-30

## 2024-12-30 RX ORDER — HYDROCODONE BITARTRATE AND ACETAMINOPHEN 5; 325 MG/1; MG/1
1.5 TABLET ORAL EVERY 6 HOURS PRN
Status: DISCONTINUED | OUTPATIENT
Start: 2024-12-30 | End: 2024-12-31 | Stop reason: HOSPADM

## 2024-12-30 RX ADMIN — PIPERACILLIN AND TAZOBACTAM 3375 MG: 3; .375 INJECTION, POWDER, LYOPHILIZED, FOR SOLUTION INTRAVENOUS at 00:45

## 2024-12-30 RX ADMIN — SODIUM CHLORIDE, PRESERVATIVE FREE 10 ML: 5 INJECTION INTRAVENOUS at 09:27

## 2024-12-30 RX ADMIN — Medication 1 CAPSULE: at 09:21

## 2024-12-30 RX ADMIN — BUMETANIDE 2 MG: 1 TABLET ORAL at 18:52

## 2024-12-30 RX ADMIN — LORAZEPAM 1 MG: 1 TABLET ORAL at 21:28

## 2024-12-30 RX ADMIN — GABAPENTIN 300 MG: 300 CAPSULE ORAL at 21:28

## 2024-12-30 RX ADMIN — BUMETANIDE 2 MG: 1 TABLET ORAL at 09:21

## 2024-12-30 RX ADMIN — HEPARIN SODIUM 5000 UNITS: 5000 INJECTION INTRAVENOUS; SUBCUTANEOUS at 06:30

## 2024-12-30 RX ADMIN — AMLODIPINE BESYLATE 10 MG: 5 TABLET ORAL at 09:21

## 2024-12-30 RX ADMIN — HYDROCODONE BITARTRATE AND ACETAMINOPHEN 1.5 TABLET: 5; 325 TABLET ORAL at 18:51

## 2024-12-30 RX ADMIN — INSULIN LISPRO 2 UNITS: 100 INJECTION, SOLUTION INTRAVENOUS; SUBCUTANEOUS at 18:53

## 2024-12-30 RX ADMIN — HYDROMORPHONE HYDROCHLORIDE 0.5 MG: 1 INJECTION, SOLUTION INTRAMUSCULAR; INTRAVENOUS; SUBCUTANEOUS at 16:22

## 2024-12-30 RX ADMIN — PANTOPRAZOLE SODIUM 40 MG: 40 TABLET, DELAYED RELEASE ORAL at 06:34

## 2024-12-30 RX ADMIN — GABAPENTIN 300 MG: 300 CAPSULE ORAL at 15:12

## 2024-12-30 RX ADMIN — HYDROMORPHONE HYDROCHLORIDE 0.5 MG: 1 INJECTION, SOLUTION INTRAMUSCULAR; INTRAVENOUS; SUBCUTANEOUS at 13:36

## 2024-12-30 RX ADMIN — HYDROMORPHONE HYDROCHLORIDE 0.5 MG: 1 INJECTION, SOLUTION INTRAMUSCULAR; INTRAVENOUS; SUBCUTANEOUS at 05:11

## 2024-12-30 RX ADMIN — HYDROMORPHONE HYDROCHLORIDE 0.5 MG: 1 INJECTION, SOLUTION INTRAMUSCULAR; INTRAVENOUS; SUBCUTANEOUS at 09:22

## 2024-12-30 RX ADMIN — GABAPENTIN 300 MG: 300 CAPSULE ORAL at 09:21

## 2024-12-30 RX ADMIN — PIPERACILLIN AND TAZOBACTAM 3375 MG: 3; .375 INJECTION, POWDER, LYOPHILIZED, FOR SOLUTION INTRAVENOUS at 23:59

## 2024-12-30 RX ADMIN — NEOMYCIN SULFATE, POLYMYXIN B SULFATE AND DEXAMETHASONE 1 DROP: 3.5; 10000; 1 SUSPENSION/ DROPS OPHTHALMIC at 20:05

## 2024-12-30 RX ADMIN — HYDROMORPHONE HYDROCHLORIDE 0.5 MG: 1 INJECTION, SOLUTION INTRAMUSCULAR; INTRAVENOUS; SUBCUTANEOUS at 21:28

## 2024-12-30 RX ADMIN — HYDRALAZINE HYDROCHLORIDE 50 MG: 25 TABLET, FILM COATED ORAL at 21:28

## 2024-12-30 RX ADMIN — OXYCODONE 5 MG: 5 TABLET ORAL at 15:12

## 2024-12-30 RX ADMIN — HYDRALAZINE HYDROCHLORIDE 50 MG: 25 TABLET, FILM COATED ORAL at 15:12

## 2024-12-30 RX ADMIN — PIPERACILLIN AND TAZOBACTAM 3375 MG: 3; .375 INJECTION, POWDER, LYOPHILIZED, FOR SOLUTION INTRAVENOUS at 13:46

## 2024-12-30 RX ADMIN — LOSARTAN POTASSIUM 100 MG: 100 TABLET, FILM COATED ORAL at 09:22

## 2024-12-30 RX ADMIN — HEPARIN SODIUM 5000 UNITS: 5000 INJECTION INTRAVENOUS; SUBCUTANEOUS at 15:13

## 2024-12-30 RX ADMIN — INSULIN GLARGINE 30 UNITS: 100 INJECTION, SOLUTION SUBCUTANEOUS at 21:28

## 2024-12-30 RX ADMIN — NEOMYCIN SULFATE, POLYMYXIN B SULFATE AND DEXAMETHASONE 1 DROP: 3.5; 10000; 1 SUSPENSION/ DROPS OPHTHALMIC at 09:33

## 2024-12-30 RX ADMIN — NEOMYCIN SULFATE, POLYMYXIN B SULFATE AND DEXAMETHASONE 1 DROP: 3.5; 10000; 1 SUSPENSION/ DROPS OPHTHALMIC at 13:50

## 2024-12-30 RX ADMIN — LORAZEPAM 1 MG: 1 TABLET ORAL at 09:22

## 2024-12-30 RX ADMIN — SODIUM CHLORIDE, PRESERVATIVE FREE 10 ML: 5 INJECTION INTRAVENOUS at 21:32

## 2024-12-30 RX ADMIN — LURASIDONE HYDROCHLORIDE 20 MG: 20 TABLET ORAL at 18:52

## 2024-12-30 RX ADMIN — HYDRALAZINE HYDROCHLORIDE 50 MG: 25 TABLET, FILM COATED ORAL at 06:34

## 2024-12-30 ASSESSMENT — PAIN SCALES - GENERAL
PAINLEVEL_OUTOF10: 10
PAINLEVEL_OUTOF10: 6
PAINLEVEL_OUTOF10: 8
PAINLEVEL_OUTOF10: 5
PAINLEVEL_OUTOF10: 8
PAINLEVEL_OUTOF10: 6
PAINLEVEL_OUTOF10: 7

## 2024-12-30 ASSESSMENT — PAIN DESCRIPTION - ORIENTATION
ORIENTATION: LEFT

## 2024-12-30 ASSESSMENT — PAIN DESCRIPTION - LOCATION
LOCATION: HAND
LOCATION: FINGER (COMMENT WHICH ONE);HAND

## 2024-12-30 ASSESSMENT — PAIN DESCRIPTION - DESCRIPTORS
DESCRIPTORS: ACHING;THROBBING
DESCRIPTORS: ACHING;DISCOMFORT
DESCRIPTORS: ACHING

## 2024-12-30 NOTE — PROGRESS NOTES
Hospitalist Progress Note    NAME:   Keaton Van   : 1965   MRN: [id number]     Date/Time: 2024 8:38 PM  Patient PCP: Robert Wells PA-C    Estimated discharge date:  Barriers:       Assessment / Plan:  Keaton Van is a 59 y.o.  male with PMHx significant for ESRD on dialysis DM2, HTN, substance abuse history who recently was admitted 2024 with staph bacteremia due to left hand cellulitis complicated by abscess and flexor tenosynovitis after he got bitten by a cat.  Patient on that admission was taken to the OR by orthopedics for I&D washout.  Was seen by ID recommended vancomycin and cefepime and Flagyl after dialysis sessions until 2025.  Patient was discharged.  This time the patient comes back after having been off of the medication since Saturday.  Patient reports that he did not tolerate his dialysis session, and felt unwell, subsequently he missed his dialysis sessions and antibiotics IV's that he was supposed to get after dialysis sessions.  He also did not take his oral metronidazole because he felt nauseated.  Patient endorses fevers and chills, and worsening left hand pain that shoots up from his finger up to his elbow.      Cellulitis and abscess of left hand / Flexor tenosynovitis/Septic arthritis Secondary to cat bite failed to respond to initial treatment during the prior admission which included surgical intervention     59-year-old male readmitted for worsening infection of left hand. Patient underwent I&D of the left hand on 2024 by Dr. Owens after suffering a cat bite which led to an infection. Patient was subsequently on IV antibiotics and discharged home. The patient presented to ortho office for follow-up with Dr. Owens and his symptoms appear to be significantly worse. Patient was not receiving IV antibiotics at home and likely was not taking oral antibiotics due to GI upset as per ortho note       CT hand + for 1.9 x 1.6 x 0.9 cm rim-enhancing   use  CV:  Regular  rhythm,  No edema  GI:  Soft, Non distended, Non tender.  +Bowel sounds  Neurologic:  Alert and oriented X 3, normal speech,   Psych:   Good insight. Not anxious nor agitated  Skin:  No rashes.  No jaundice    Reviewed most current lab test results and cultures  YES  Reviewed most current radiology test results   YES  Review and summation of old records today    NO  Reviewed patient's current orders and MAR    YES  PMH/SH reviewed - no change compared to H&P    Procedures: see electronic medical records for all procedures/Xrays and details which were not copied into this note but were reviewed prior to creation of Plan.      LABS:  I reviewed today's most current labs and imaging studies.  Pertinent labs include:  Recent Labs     12/27/24  1036   WBC 5.4   HGB 8.9*   HCT 25.8*        Recent Labs     12/27/24  1036 12/29/24  1153   * 134*   K 5.1 4.9    105   CO2 25 23   GLUCOSE 151* 222*   BUN 71* 67*   CREATININE 4.91* 4.96*   CALCIUM 9.2 9.3   PHOS 4.2  --        Signed: Jesse Gonzalez MD

## 2024-12-30 NOTE — CARE COORDINATION
signing off; defer to unit CM for any additional questions/concerns    Update - 1:27 PM:  sent unit CM a list of pain management providers for reference; unit CM to provide list to pt at bedside.    Update - 12:51 PM:  sent unit CM information for the [facility] [place]; support group meets the 2nd Wednesday of each month at 6:00 PM at [facility] ([address], [address], [place] [address]). CM also placed [facility]'s information in pt's AVS for reference.    Unit CMYVROSE staffed complex case with .  familiar with pt's case due to previous involvement. Per chart, pt declined to continue HD 12/27/24 after refusing multiple sessions; pt requested to pursue hospice at that time. MD consulted psychiatry team to weigh in on pt's capacity to make decisions; pt was deemed to have capacity per psychiatric NP, ROSIE Duque. Pt decided to continue receiving HD on 12/29/24.      briefly met with pt & confirmed his preference to proceed with HD. Pt shared that he's not ready to pursue hospice, as he \"has too much to live for.\" Pt aware unit CM is actively working to coordinate his IV Abx needs with his HD clinic. Pt reported no immediate questions/concerns.  will work to compile disease specific support groups that pt can consider attending post d/c.  will continue to follow the case peripherally & support unit CM's efforts to facilitate a safe d/c plan.    RA Dong  [facility] CM   [phone]

## 2024-12-30 NOTE — PROGRESS NOTES
PCP Hospital follow-up transitional care appointment has been scheduled with EARLE Wells on 1/13/25 1300. This is the first available appt due to limited provider availability and that coordinates with patient's HD schedule - TTS. [facility] information on AVS for patient resource. Pending patient discharge.

## 2024-12-30 NOTE — PROGRESS NOTES
Nephrology Progress Note  [facility] [place] [facility]  [address], [address]  [place] [address]  Phone - [phone]  Fax - [phone]                 Patient: Keaton Van                     YOB: 1965        Date- 12/30/2024                                     Admit Date: 12/17/2024   CC: Follow up for ESRD  IMPRESSION & PLAN:   esrd- TTS, [facility], right chest PermCath)  Surgical wound dehiscence  Cat bite/left upper extremity cellulitis,recurrent  Index finger cellulitis, MCP joint septic arthritis, juxta articular abscess  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  Vancomycin can be given with hemodialysis and so is cefepime.  Zosyn cannot be given with hemodialysis.  If Zosyn can be switched to cefepime then both can be administered with hemodialysis.  Need to confirm with ID if they agree with this plan and if they can notify us about dosages and duration of antibiotics.  Check labs with hd  Continue norvasc, hydralazine , losartan  Abx per primary team.       Subjective:  Interval History:   Seen and examined today  He refused HD whole last week  Dialyzed yesterday      Objective:   Vitals:    12/30/24 0518 12/30/24 0541 12/30/24 0600 12/30/24 0921   BP: (!) 114/51   (!) 148/88   Pulse: 75      Resp: 18 18  22   Temp: 98.4 °F (36.9 °C)      TempSrc: Oral      SpO2: 92%      Weight:   90.8 kg (200 lb 2.8 oz)    Height:          I/O last 3 completed shifts:  In: 500   Out: 2500   No intake/output data recorded.      Physical exam:    GEN:  NAD  NECK:  Supple, no thyromegaly  RESP:  no  wheezing, decreased bs b/l  NEURO: non focal, normal speech  EXT: Edema +nt     PSYCH: Normal mood  SKIN: No Rash  RIGHT PERMACATH +    Chart

## 2024-12-30 NOTE — PROGRESS NOTES
Hospitalist Progress Note    NAME:   Keaton Van   : 1965   MRN: [id number]     Date/Time: 2024 8:41 PM  Patient PCP: Robert Wells PA-C    Estimated discharge date:  Barriers:       Assessment / Plan:  Keaton Van is a 59 y.o.  male with PMHx significant for ESRD on dialysis DM2, HTN, substance abuse history who recently was admitted 2024 with staph bacteremia due to left hand cellulitis complicated by abscess and flexor tenosynovitis after he got bitten by a cat.  Patient on that admission was taken to the OR by orthopedics for I&D washout.  Was seen by ID recommended vancomycin and cefepime and Flagyl after dialysis sessions until 2025.  Patient was discharged.  This time the patient comes back after having been off of the medication for few days.  The Patient reports that he did not tolerate his dialysis session, and felt unwell, subsequently he missed his dialysis sessions and antibiotics IV's that he was supposed to get after dialysis sessions.  He also did not take his oral metronidazole because he felt nauseated.  Patient endorses fevers and chills, and worsening left hand pain that shoots up from his finger up to his elbow.      Cellulitis and abscess of left hand / Flexor tenosynovitis/Septic arthritis Secondary to cat bite failed to respond to initial treatment during the prior admission which included surgical intervention     59-year-old male readmitted for worsening infection of left hand. Patient underwent I&D of the left hand on 2024 by Dr. Owens after suffering a cat bite which led to an infection. Patient was subsequently on IV antibiotics and discharged home. The patient presented to ortho office for follow-up with Dr. Owens and his symptoms appear to be significantly worse. Patient was not receiving IV antibiotics at home and likely was not taking oral antibiotics due to GI upset as per ortho note       CT hand + for 1.9 x 1.6 x 0.9 cm rim-enhancing

## 2024-12-30 NOTE — PROGRESS NOTES
End of Shift Note    Bedside shift change report given to BHAKTI Lindsey (oncoming nurse) by Tess Singer RN (offgoing nurse).  Report included the following information SBAR, Kardex, Procedure Summary, Intake/Output, and MAR    Shift worked:  7pm-7am     Shift summary and any significant changes:     Pt A&Ox$ on room air.   No labs this morning.  Diluadid 0.5 mg was effective  Central line clean and intact.  No acute distress noticed at this time.     Concerns for physician to address:  ...     Zone phone for oncoming shift:   ...       Activity:  Level of Assistance: Independent  Number times ambulated in hallways past shift: 2  Number of times OOB to chair past shift: 2    Cardiac:   Cardiac Monitoring: No      Cardiac Rhythm: Sinus rhythm    Access:  Current line(s): midline    Genitourinary:   Urinary Status: Voiding, Bathroom privileges    Respiratory:   O2 Device: None (Room air)  Chronic home O2 use?: NO  Incentive spirometer at bedside: YES    GI:  Last BM (including prior to admit): 12/28/24  Current diet:  ADULT DIET; Regular; Low Sodium (2 gm); Low Potassium (Less than 3000 mg/day); Low Phosphorus (Less than 1000 mg)  Passing flatus: YES    Pain Management:   Patient states pain is manageable on current regimen: YES    Skin:  Kennedy Scale Score: 20  Interventions: Wound Offloading (Prevention Methods): Bed, pressure reduction mattress    Patient Safety:  Fall Risk: Nursing Judgement-Fall Risk High(Add Comments): Yes  Fall Risk Interventions  Nursing Judgement-Fall Risk High(Add Comments): Yes  Toilet Every 2 Hours-In Advance of Need: Yes  Hourly Visual Checks: Awake  Fall Visual Posted: Socks, Armband  Room Door Open: Deferred to promote rest  Alarm On: Other (Comment)  Patient Moved Closer to Nursing Station: No    Active Consults:   IP CONSULT TO NEPHROLOGY  IP CONSULT TO HOSPITALIST  IP CONSULT TO ORTHOPEDIC SURGERY  IP CONSULT TO INFECTIOUS DISEASES  PHARMACY TO DOSE VANCOMYCIN  IP CONSULT TO CASE

## 2024-12-30 NOTE — DISCHARGE SUMMARY
Henry Noel MD Nasser, Omar A, MD Swanstrom, Morgan M, MD Slaven, Jon, PA Sinnatamby, Diane S, MD Jama, Henry Smis MD Tucker, Tina TOMLINSON, FNP  Korina Gongora, Melissa Joseph, Rosalia Taylor, RN   Consultations  IP CONSULT TO NEPHROLOGY  IP CONSULT TO HOSPITALIST  IP CONSULT TO ORTHOPEDIC SURGERY  IP CONSULT TO INFECTIOUS DISEASES  PHARMACY TO DOSE VANCOMYCIN  IP CONSULT TO CASE MANAGEMENT  IP CONSULT TO PALLIATIVE CARE  IP CONSULT TO PSYCHIATRY  IP CONSULT TO CASE MANAGEMENT  IP CONSULT TO CASE MANAGEMENT  IP CONSULT TO CASE MANAGEMENT  IP CONSULT TO HAND SURGERY  IP CONSULT TO CASE MANAGEMENT   Procedures/Surgeries  Procedure(s):  LEFT HAND INCISION AND DRAINAGE     Condition at the time of discharge  Improved and stable       Query  None noted today        Disposition Home w family   Diet renal diet   Care Plan discussed with Patient/Family, Nurse, , Consultant hand surg, ID, and Other J LUIS   Follow up Follow-up Information       Follow up With Specialties Details Why Contact Info    Robert Wells PA-C Physician Assistant Go on 1/13/2025 at 1:00pm for your PCP hospital follow up. [address] [address] [phone]      [facility]  [facility] mobile [facility] provider that comes to your home. You may call them if you would like to set up an appt to be seen for a follow up while waiting to be seen by your PCP. [address]  [address]  [facility] [facility] [place] [facility] [address] [phone]    Henry Owens MD Hand Surgery Schedule an appointment as soon as possible for a visit in 2 week(s) To schedule your POST-OP follow up. [address]  [address]  [place] [address] [phone]      [facility]  and Department of Health  Call Contact your local DSS to explore resources available to you in the community [address] [facility] [facility]  was performed for this patient who is at high risk for decompensation with multiple organ involvement.     Today total floor/unit time was 50 minutes while caring for this patient and greater than 50% of that time was spent with patient (and/or family) coordinating patient's clinical issues.     Melissa Leonard PA-C  12/30/2024

## 2024-12-30 NOTE — CARE COORDINATION
Pt is clear from CM standpoint for d/c.    Pt's sister to transport.    Transition of Care Plan:     RUR: 31%  Prior Level of Functioning: Independent   Disposition: Home with sister   TAN: 12/30/24  If SNF or IPR: Date FOC offered:   Date FOC received:   Accepting facility:   Date authorization started with reference number:   Date authorization received and expires:   Follow up appointments: Defer to hospice   DME needed: No DME needed   Transportation at discharge: Pt's sister   IM/IMM Medicare/ letter given: 12/30/24  Is patient a [place] and connected with VA? No              If yes, was  transfer form completed and VA notified? No  Caregiver Contact: Pt's sister   Discharge Caregiver contacted prior to discharge? Pt was contacted   Care Conference needed? No  Barriers to discharge: None       CM faxed ([phone]) the IV Abx order to [facility] ([phone]).    Pt no longer wants to go home with hospice services and pt wants to continue with dialysis and IV ABX. CM has updated [facility].       12/30/24 1242   Services At/After Discharge   Transition of Care Consult (CM Consult) N/A   Services At/After Discharge None    Resource Information Provided? No   Mode of Transport at Discharge Self   Confirm Follow Up Transport Self   Condition of Participation: Discharge Planning   The Plan for Transition of Care is related to the following treatment goals: Goal is to return home with sister and continue dialysis with IV ABX   The Patient and/or Patient Representative was provided with a Choice of Provider? Patient   The Patient and/Or Patient Representative agree with the Discharge Plan? Yes   Freedom of Choice list was provided with basic dialogue that supports the patient's individualized plan of care/goals, treatment preferences, and shares the quality data associated with the providers?  Yes     Amparo Argueta

## 2024-12-30 NOTE — PROGRESS NOTES
Infectious Disease progress        Impression    Cellulitis and abscess of left hand  Flexor tenosynovitis  Septic arthritis  Secondary to cat bite.  Clinically improved  CT hand 12/4+ for 1.9 x 1.6 x 0.9 cm rim-enhancing  fluid collection lateral to  Index finger MCP joint concerning for juxta-articular abscess  MCP joint septic arthritis  S/p I&D by hand surgery 12/5  Findings of copious infectious fluid. No gross purulence.  Fluid around joint noted.  IntraOp culture-NG, GS-no organisms.  Patient readmitted for concerns for worsening cellulitis, flexor tenosynovitis  MRI hand 12/20 + for?  OM of base of proximal phalanx of index finger & MC head,  ?  Septic arthritis, soft tissue abscess along palmar surface of joint.?   S/p I&D 12/20.  Minimal serous fluid.  IntraOp cultures negative.  No organisms.     Coagulase-negative staph  Bacillus bacteremia  Blood cultures 12/2+ for CoNS, bacillus species  Given presence of wound infection cannot assume it is a contaminant.  Repeat blood cultures 12/9-NGTD.        ESRD  On HD     AVF present LUE  Intact     Diabetes type 2  A1c 7.4     R/BKA     H/o diabetic right foot foot infection  Cellulitis, OM 6/2024     H/o mood disorder  Continue home meds     ESR 60, repeat 63  CRP 4.28      Plan  Plan was for 6 weeks of antibiotics vancomycin and cefepime IV 12/5-1/16  Flagyl p.o. x 2 weeks end date 12/19-  Hemodialysis center contacted-patient has been receiving vancomycin and cefepime  as scheduled with hemodialysis.  Unfortunately patient did not  prescription for Flagyl.    Patient was started on vancomycin, Zosyn IV   Plan was to complete antibiotic therapy with vancomycin Zosyn IV   x 6 weeks 12/18 -1/29  Today I was informed by nephrologist that patient would need to  Do antibiotic with hemodialysis given high cost of home IV therapy  Keep  left hand elevated    May DC from ID standpoint.  Please Dc harshad catheter prior to discharge    Antimicrobial orders for

## 2024-12-30 NOTE — PROGRESS NOTES
Ortho Hand Progress    S: patient seen and evaluated today. He refused dialysis for a week. Just got again yesterday. He did not wear the splint or dressing that had been placed.     O:    There is new wound maceration today, likely from lack of dialysis for a week. Wound has some seeping. Nothing expressible however. Stable swelling    A/P: discussed with him that lack of compliance with wound care, splinting and dialysis is putting us in a precarious position with the wound. Have recommended wound rest, dry dressing changes, betadine paint 2x daily to try to dry out wound. Elevation. Will need sutures out at about 2 weeks from last surgery. Continue IV antibiotics per ID. Appreciate input from multi-disciplinary team. He does keep asking for increase in his pain medication dosing. For this, would recommend pain management consult as patient does have a history of drug abuse.     MMS

## 2024-12-31 ENCOUNTER — HOSPITAL ENCOUNTER (INPATIENT)
Facility: HOSPITAL | Age: 59
Discharge: HOME | DRG: 513 | End: 2025-01-03
Payer: MEDICARE

## 2024-12-31 VITALS
SYSTOLIC BLOOD PRESSURE: 138 MMHG | OXYGEN SATURATION: 95 % | TEMPERATURE: 97.8 F | DIASTOLIC BLOOD PRESSURE: 71 MMHG | HEIGHT: 72 IN | HEART RATE: 70 BPM | RESPIRATION RATE: 18 BRPM | BODY MASS INDEX: 27.11 KG/M2 | WEIGHT: 200.18 LBS

## 2024-12-31 LAB
GLUCOSE BLD STRIP.AUTO-MCNC: 129 MG/DL (ref 65–117)
GLUCOSE BLD STRIP.AUTO-MCNC: 99 MG/DL (ref 65–117)
SERVICE CMNT-IMP: ABNORMAL
SERVICE CMNT-IMP: NORMAL

## 2024-12-31 PROCEDURE — 6370000000 HC RX 637 (ALT 250 FOR IP): Performed by: INTERNAL MEDICINE

## 2024-12-31 PROCEDURE — 2500000003 HC RX 250 WO HCPCS: Performed by: STUDENT IN AN ORGANIZED HEALTH CARE EDUCATION/TRAINING PROGRAM

## 2024-12-31 PROCEDURE — 90935 HEMODIALYSIS ONE EVALUATION: CPT

## 2024-12-31 PROCEDURE — 82962 GLUCOSE BLOOD TEST: CPT

## 2024-12-31 PROCEDURE — 6370000000 HC RX 637 (ALT 250 FOR IP): Performed by: PHYSICIAN ASSISTANT

## 2024-12-31 PROCEDURE — 02PYX3Z REMOVAL OF INFUSION DEVICE FROM GREAT VESSEL, EXTERNAL APPROACH: ICD-10-PCS | Performed by: RADIOLOGY

## 2024-12-31 PROCEDURE — 6360000002 HC RX W HCPCS: Performed by: INTERNAL MEDICINE

## 2024-12-31 PROCEDURE — 6370000000 HC RX 637 (ALT 250 FOR IP): Performed by: STUDENT IN AN ORGANIZED HEALTH CARE EDUCATION/TRAINING PROGRAM

## 2024-12-31 PROCEDURE — 36589 REMOVAL TUNNELED CV CATH: CPT

## 2024-12-31 PROCEDURE — 6370000000 HC RX 637 (ALT 250 FOR IP): Performed by: FAMILY MEDICINE

## 2024-12-31 RX ORDER — ALBUMIN (HUMAN) 12.5 G/50ML
SOLUTION INTRAVENOUS
Status: DISPENSED
Start: 2024-12-31 | End: 2025-01-01

## 2024-12-31 RX ADMIN — NEOMYCIN SULFATE, POLYMYXIN B SULFATE AND DEXAMETHASONE 1 DROP: 3.5; 10000; 1 SUSPENSION/ DROPS OPHTHALMIC at 13:01

## 2024-12-31 RX ADMIN — GABAPENTIN 300 MG: 300 CAPSULE ORAL at 08:34

## 2024-12-31 RX ADMIN — LOSARTAN POTASSIUM 100 MG: 100 TABLET, FILM COATED ORAL at 08:34

## 2024-12-31 RX ADMIN — SODIUM CHLORIDE, PRESERVATIVE FREE 10 ML: 5 INJECTION INTRAVENOUS at 08:33

## 2024-12-31 RX ADMIN — DIPHENOXYLATE HYDROCHLORIDE AND ATROPINE SULFATE 1 TABLET: 2.5; .025 TABLET ORAL at 08:34

## 2024-12-31 RX ADMIN — HYDRALAZINE HYDROCHLORIDE 50 MG: 25 TABLET, FILM COATED ORAL at 07:53

## 2024-12-31 RX ADMIN — AMLODIPINE BESYLATE 10 MG: 5 TABLET ORAL at 08:35

## 2024-12-31 RX ADMIN — NEOMYCIN SULFATE, POLYMYXIN B SULFATE AND DEXAMETHASONE 1 DROP: 3.5; 10000; 1 SUSPENSION/ DROPS OPHTHALMIC at 08:35

## 2024-12-31 RX ADMIN — PANTOPRAZOLE SODIUM 40 MG: 40 TABLET, DELAYED RELEASE ORAL at 07:53

## 2024-12-31 RX ADMIN — BUMETANIDE 2 MG: 1 TABLET ORAL at 08:35

## 2024-12-31 RX ADMIN — OXYCODONE 5 MG: 5 TABLET ORAL at 12:03

## 2024-12-31 RX ADMIN — Medication 1 CAPSULE: at 08:34

## 2024-12-31 RX ADMIN — LORAZEPAM 1 MG: 1 TABLET ORAL at 08:33

## 2024-12-31 RX ADMIN — DIPHENOXYLATE HYDROCHLORIDE AND ATROPINE SULFATE 1 TABLET: 2.5; .025 TABLET ORAL at 12:38

## 2024-12-31 RX ADMIN — HYDROMORPHONE HYDROCHLORIDE 0.5 MG: 1 INJECTION, SOLUTION INTRAMUSCULAR; INTRAVENOUS; SUBCUTANEOUS at 03:03

## 2024-12-31 RX ADMIN — HYDROMORPHONE HYDROCHLORIDE 0.5 MG: 1 INJECTION, SOLUTION INTRAMUSCULAR; INTRAVENOUS; SUBCUTANEOUS at 08:32

## 2024-12-31 ASSESSMENT — PAIN SCALES - GENERAL
PAINLEVEL_OUTOF10: 6
PAINLEVEL_OUTOF10: 8
PAINLEVEL_OUTOF10: 8
PAINLEVEL_OUTOF10: 7
PAINLEVEL_OUTOF10: 7
PAINLEVEL_OUTOF10: 0
PAINLEVEL_OUTOF10: 8

## 2024-12-31 ASSESSMENT — PAIN - FUNCTIONAL ASSESSMENT: PAIN_FUNCTIONAL_ASSESSMENT: ACTIVITIES ARE NOT PREVENTED

## 2024-12-31 ASSESSMENT — PAIN DESCRIPTION - ORIENTATION
ORIENTATION: RIGHT;LEFT

## 2024-12-31 ASSESSMENT — PAIN DESCRIPTION - LOCATION
LOCATION: HAND;FLANK
LOCATION: FLANK
LOCATION: HAND;FLANK
LOCATION: FLANK

## 2024-12-31 ASSESSMENT — PAIN DESCRIPTION - ONSET: ONSET: PROGRESSIVE

## 2024-12-31 ASSESSMENT — PAIN DESCRIPTION - PAIN TYPE: TYPE: ACUTE PAIN

## 2024-12-31 ASSESSMENT — PAIN DESCRIPTION - DESCRIPTORS
DESCRIPTORS: ACHING;THROBBING
DESCRIPTORS: BURNING;STABBING

## 2024-12-31 ASSESSMENT — PAIN DESCRIPTION - FREQUENCY: FREQUENCY: CONTINUOUS

## 2024-12-31 ASSESSMENT — PAIN SCALES - WONG BAKER: WONGBAKER_NUMERICALRESPONSE: NO HURT

## 2024-12-31 NOTE — PROGRESS NOTES
Interventional Radiology Procedure Note    Procedure Date:  12/31/2024    Procedure:  Removal of a right IJ tunneled central venous catheter    :  Maria Isabel Pelayo NP    Attending:  Zac Mittal MD    Preoperative Diagnosis:  Needs removal of power line, treatment completed    Postoperative Diagnosis:  Same    Technique:  This procedure was performed at the bedside without imaging guidance.  The skin was prepped and draped utilizing maximal sterile barrier technique.  The right upper chest tunneled central venous catheter was freed using firm external traction.  The catheter was then removed.  Pressure was applied locally at the side internal jugular venotomy site.  A dry sterile dressing was applied.  There were no immediate complications.  The patient tolerated the procedure without difficulty.    Complications:  None    Estimated Blood Loss:  < 1 ml    Specimens:  None    Procedure Findings:  Successful removal of the a right IJ tunneled central venous access.     Condition:  The patient tolerated the procedure without difficulty and remained in stable condition throughout.     LOIS Carias  [facility]

## 2024-12-31 NOTE — PROGRESS NOTES
Pharmacy Antimicrobial Kinetic Dosing    Indication for Antimicrobials: SSTI    Current Regimen of Each Antimicrobial:  Vancomycin IV pharmacy to dose by levels; Start Date ; Day # 14  Zosyn 3.375gm q12h; Start ; Day #13    Previous Antimicrobial Therapy:  Cefepime -   Metronidazole   -     Goal Level: Vancomycin -600 and Vancomycin trough 15-20    Date Dose & Interval Measured (mcg/mL) Predicted AUC 24-48 Predicted AUC 24,ss    AM random Vanc 2000mg iv x1 20.4 N/A N/A     15.5 NA NA     750mg x 1  11.8              Significant Cultures:    blood- paired- NGTD    Labs:  Recent Labs     Units 24  1153   CREATININE MG/DL 4.96*   BUN MG/DL 67*     Temp (24hrs), Av.3 °F (36.8 °C), Min:97.9 °F (36.6 °C), Max:98.6 °F (37 °C)    Conditions for Dosing Consideration: Hemodialysis    Creatinine Clearance (mL/min): Estimated Creatinine Clearance: 17 mL/min (A) (based on SCr of 4.96 mg/dL (H)).     Impression/Plan:   HD patient //S  If pt gets HD, will give vanc 1000 mg  Continue zosyn, change to cefepime on D/C  Discharge orders in  Antimicrobial stop date 6 weeks     Pharmacy will follow daily and adjust medications as appropriate for renal function and/or serum levels.    Thank you,  Tyron Weiss [facility]

## 2024-12-31 NOTE — FLOWSHEET NOTE
PRETREATMENT    12/31/24 1300   Observations & Evaluations   Level of Consciousness 0   Oriented X 4   Heart Rhythm Regular   Respiratory Quality/Effort Unlabored   O2 Device None (Room air)   Bilateral Breath Sounds Clear   Skin Color Pink   Skin Condition/Temp Dry;Warm   Abdomen Inspection Soft   Edema Left upper extremity   LUE Edema +1   Vital Signs   BP (!) 144/81   Temp 98.1 °F (36.7 °C)   Pulse 79   Respirations 18   SpO2 94 %   Pain Assessment   Pain Assessment None - Denies Pain   Pain Level 0   Villareal-Baker Pain Rating 0   Technical Checks   Dialysis Machine No. [id number]   RO Machine Number [id number]   Dialyzer Lot No. [id number]   Tubing Lot Number [id number]   All Connections Secure Yes   NS Bag Yes   Saline Line Double Clamped Yes   Dialyzer Nipro ELISIO   Prime Volume (mL) 200 mL   ICEBOAT I;C;E;B;O;A;T  (CHRONIC CONSENT CONVEYS VERA Polo)   RO Machine Log Sheet Completed Yes   Machine Alarm Self Test Completed;Passed   Air Foam Detector Tested;Proper Function   Extracorporeal Circuit Tested for Integrity Yes   Machine Conductivity 13.8   Manual Ph 7.4   Bleach Test (Neg) Yes   Bath Temperature 98.6 °F (37 °C)   Hemodialysis Central Access - Permanent/Tunneled   No placement date or time found.     Continued need for line? Yes   Site Assessment Clean, dry & intact   Blue Lumen Status Brisk blood return;Flushed;Infusing   Red Lumen Status Brisk blood return;Flushed;Infusing   Line Care Ports disinfected   Dressing Type Antimicrobial;Transparent   Date of Last Dressing Change 12/31/24   Dressing Status Clean, dry & intact   Dialysis Bath   K+ (Potassium) 2   Ca+ (Calcium) 2.5   Na+ (Sodium) 138   HCO3 (Bicarb) 40   Bicarbonate Concentrate Lot No. [id number]   Acid Concentrate Lot No. [id number]     Primary RN SBAR: HEIDI Chavez RN   Patient Education provided: cvc infection control wearing mask   Preferred Education method and Primary language: verbal/english  Hospital General Consent Verified:  line double clamped;Revaclear Dialyzer   During Hemodialysis Assessment   Blood Flow Rate (ml/min) 400 ml/min   Arterial Pressure (mmHg) -170 mmHg   Venous Pressure (mmHg) 120   TMP 60      Access Visible Yes   Ultrafiltration Rate (ml/hr) 830 ml/hr       POST TREATMENT     12/31/24 1615   Treatment   Time Off 1615   Observations & Evaluations   Level of Consciousness 0   Oriented X 4   Respiratory Quality/Effort Unlabored   O2 Device None (Room air)   Skin Condition/Temp Dry;Warm   Abdomen Inspection Soft   Edema Left upper extremity   LUE Edema +1   Vital Signs   /71   Temp 97.8 °F (36.6 °C)   Pulse 70   Respirations 18   SpO2 95 %   Hemodialysis Central Access - Permanent/Tunneled   No placement date or time found.     Continued need for line? Yes   Site Assessment Clean, dry & intact   Blue Lumen Status Flushed;Normal saline locked   Red Lumen Status Flushed;Normal saline locked   Line Care Cap changed   Dressing Type Antimicrobial;Transparent   Date of Last Dressing Change 12/31/24   Dressing Status Clean, dry & intact   During Hemodialysis Assessment   Ultrafiltration Removed (ml) 2500 ml   Post-Hemodialysis Assessment   Post-Treatment Procedures Blood returned;Catheter Capped, clamped with Saline x2 ports   Machine Disinfection Process Acid/Vinegar Clean;Heat Disinfect;Exterior Machine Disinfection   Rinseback Volume (ml) 300 ml   Blood Volume Processed (Liters) 67 L   Dialyzer Clearance Clear   Duration of Treatment (minutes) 180 minutes   Heparin Amount Administered During Treatment (mL) 0 mL   Hemodialysis Intake (ml) 500 ml   Hemodialysis Output (ml) 2500 ml   NET Removed (ml) 2000   Tolerated Treatment Good   Patient Response to Treatment tolerated treatment   Patient Disposition Return to room     Primary RN SBAR: KRISTAN Chavez RN  Comments: treatment completed, all possible blood returned, Each dialysis catheter limb disinfected per p&p, blood returned per p&p, each dialysis hub disinfected  no

## 2024-12-31 NOTE — PLAN OF CARE
Problem: Safety - Adult  Goal: Free from fall injury  Outcome: Progressing     Problem: Chronic Conditions and Co-morbidities  Goal: Patient's chronic conditions and co-morbidity symptoms are monitored and maintained or improved  Outcome: Progressing     Problem: Discharge Planning  Goal: Discharge to home or other facility with appropriate resources  Outcome: Progressing     Problem: Pain  Goal: Verbalizes/displays adequate comfort level or baseline comfort level  Outcome: Progressing     Problem: ABCDS Injury Assessment  Goal: Absence of physical injury  Outcome: Progressing     Problem: Anxiety  Goal: Will report anxiety at manageable levels  Description: INTERVENTIONS:  1. Administer medication as ordered  2. Teach and rehearse alternative coping skills  3. Provide emotional support with 1:1 interaction with staff  Outcome: Progressing  Flowsheets (Taken 12/30/2024 2000)  Will report anxiety at manageable levels:   Teach and rehearse alternative coping skills   Provide emotional support with 1:1 interaction with staff     Problem: Infection - Adult  Goal: Absence of infection during hospitalization  Outcome: Progressing     Problem: Gastrointestinal - Adult  Goal: Maintains or returns to baseline bowel function  Outcome: Progressing

## 2024-12-31 NOTE — PROGRESS NOTES
Comprehensive Nutrition Assessment    Type and Reason for Visit:  Reassess    Nutrition Recommendations/Plan:   Continue current diet  Please document % meals and supplements consumed in flowsheet I/O's under intake      Malnutrition Assessment:  Malnutrition Status:  Insufficient data (12/31/24 1528)    Context:  Acute Illness     Findings of the 6 clinical characteristics of malnutrition:  Energy Intake:  Unable to assess  Weight Loss:  Unable to assess     Body Fat Loss:  Unable to assess     Muscle Mass Loss:  Unable to assess    Fluid Accumulation:  Unable to assess     Strength:  Not Performed    Nutrition Assessment:     Chart reviewed. Pt medically noted for cellulitis and abscess of L hand, ESRD on HD, DM, HTN. Pt off the floor for HD when RD attempted visit today. No PO intake documented since 12/23 so unable to assess intakes. Potential discharge today after HD.     Wt Readings from Last 5 Encounters:   12/30/24 90.8 kg (200 lb 2.8 oz)   12/05/24 92 kg (202 lb 13.2 oz)   09/16/24 88.2 kg (194 lb 7.1 oz)   07/13/24 77.1 kg (170 lb)   06/22/24 82 kg (180 lb 12.4 oz)   ]    Nutrition Related Findings:    Labs: reviewed. Meds: Bumex, Lantus, Humalog, Culturelle, Protonix, Zosyn, Vancomycin, Lomotil, Dilaudid, Roxicodone. Edema: +1 LUE. BM 12/31. Wound Type: None       Current Nutrition Intake & Therapies:    Average Meal Intake: Unable to assess  Average Supplements Intake: None Ordered  ADULT DIET; Regular; Low Sodium (2 gm); Low Potassium (Less than 3000 mg/day); Low Phosphorus (Less than 1000 mg)    Anthropometric Measures:  Height: 182.9 cm (6')  Ideal Body Weight (IBW): 178 lbs (81 kg)       Current Body Weight: 90.8 kg (200 lb 2.8 oz),   IBW. Weight Source: Stated  Current BMI (kg/m2): 27.1           Weight Adjustment For: No Adjustment                 BMI Categories: Overweight (BMI 25.0-29.9)    Estimated Daily Nutrient Needs:  Energy Requirements Based On: Formula  Weight Used for Energy

## 2024-12-31 NOTE — CARE COORDINATION
Pt is clear from CM standpoint for d/c.     Pt's sister to transport.     Transition of Care Plan:     RUR: 30%  Prior Level of Functioning: Independent   Disposition: Home with sister   TAN: 12/30/24  If SNF or IPR: Date FOC offered:   Date FOC received:   Accepting facility:   Date authorization started with reference number:   Date authorization received and expires:   Follow up appointments: Defer to hospice   DME needed: No DME needed   Transportation at discharge: Pt's sister   IM/IMM Medicare/ letter given: 12/30/24  Is patient a [place] and connected with VA? No              If yes, was [place] transfer form completed and VA notified? No  Caregiver Contact: Pt's sister   Discharge Caregiver contacted prior to discharge? Pt was contacted   Care Conference needed? No  Barriers to discharge: None           Amparo Argueta

## 2024-12-31 NOTE — BSMART NOTE
[facility] Liaison Team Note     LOS:  14     Patient goal(s) for today: take medications as prescribed, make needs known in an appropriate manner, use coping skills  [facility] Liaison team focus/goals: assess needs, provide support and education    Progress note: Liaison met with Pt face to face. He was alert, oriented and calm. He denied SI, HI and AVH. His hand was wrapped in a bandage and he reported improved hand pain. He also reported slighting improved back pain which he said \"should continue to get better with dialysis.\" Pt states he is scheduled for dialysis later today and hoping for discharge today or tomorrow. Pt's mood was notably improved and from last visit. His affect was bright and his thought process was hopeful and future focused.     Barriers to Discharge: service coordination     Outpatient provider(s):  none reported  Insurance info/prescription coverage: MEDICARE PART A AND B      Diagnosis: mood disorder, h/o polysubstance abuse       Plan:  defer to most recent psychiatric consult note for disposition and recommendation.   Follow up Psych Consult placed? No   Psychiatrist updated? No        Participating treatment team members: Miriam Gary MSW

## 2024-12-31 NOTE — PROGRESS NOTES
day on Tuesday Thursday Saturday    albumin human 25% IV solution 25 g  25 g IntraVENous PRN    LORazepam (ATIVAN) tablet 1 mg  1 mg Oral BID    oxyCODONE (ROXICODONE) immediate release tablet 5 mg  5 mg Oral Q4H PRN    piperacillin-tazobactam (ZOSYN) 3,375 mg in sodium chloride 0.9 % 50 mL IVPB (mini-bag)  3,375 mg IntraVENous Q12H    lactobacillus (CULTURELLE) capsule 1 capsule  1 capsule Oral Daily with breakfast    ondansetron (ZOFRAN) injection 4 mg  4 mg IntraVENous Once    sodium chloride flush 0.9 % injection 5-40 mL  5-40 mL IntraVENous 2 times per day    sodium chloride flush 0.9 % injection 5-40 mL  5-40 mL IntraVENous PRN    0.9 % sodium chloride infusion   IntraVENous PRN    potassium chloride (KLOR-CON M) extended release tablet 40 mEq  40 mEq Oral PRN    Or    potassium bicarb-citric acid (EFFER-K) effervescent tablet 40 mEq  40 mEq Oral PRN    Or    potassium chloride 10 mEq/100 mL IVPB (Peripheral Line)  10 mEq IntraVENous PRN    magnesium sulfate 2000 mg in 50 mL IVPB premix  2,000 mg IntraVENous PRN    ondansetron (ZOFRAN-ODT) disintegrating tablet 4 mg  4 mg Oral Q8H PRN    Or    ondansetron (ZOFRAN) injection 4 mg  4 mg IntraVENous Q6H PRN    polyethylene glycol (GLYCOLAX) packet 17 g  17 g Oral Daily PRN    acetaminophen (TYLENOL) tablet 650 mg  650 mg Oral Q6H PRN    Or    acetaminophen (TYLENOL) suppository 650 mg  650 mg Rectal Q6H PRN    glucose chewable tablet 16 g  4 tablet Oral PRN    dextrose bolus 10% 125 mL  125 mL IntraVENous PRN    Or    dextrose bolus 10% 250 mL  250 mL IntraVENous PRN    glucagon injection 1 mg  1 mg SubCUTAneous PRN    dextrose 10 % infusion   IntraVENous Continuous PRN    insulin lispro (HUMALOG,ADMELOG) injection vial 0-8 Units  0-8 Units SubCUTAneous 4x Daily AC & HS    amLODIPine (NORVASC) tablet 10 mg  10 mg Oral Daily    bumetanide (BUMEX) tablet 2 mg  2 mg Oral BID    cloNIDine (CATAPRES) tablet 0.2 mg  0.2 mg Oral Q8H PRN    gabapentin (NEURONTIN) capsule

## 2025-01-20 ENCOUNTER — TELEPHONE (OUTPATIENT)
Age: 60
End: 2025-01-20

## 2025-01-20 NOTE — TELEPHONE ENCOUNTER
Patient is on hospice care and is no longer taking antibiotic. Called patient to confirm if he is still coming to his appointment and he asked that we cancel his appointment.

## 2025-02-11 NOTE — PROGRESS NOTES
Lab and/or imaging results reassuring. Patient contacted via Portal.  Report called to CALI Rico, on pt. And informed pt. Is in dialysis at this time.  Informed Trisha pt. Was hungry and dialysis stating \"we are not stocked with anything anymore\" - inquired if Trisha could have PCT take jose crackers, juice or something to pt. In dialysis if approved on his diet and she stated \"ok we can do that\".

## 2025-04-19 ENCOUNTER — HOSPITAL ENCOUNTER (EMERGENCY)
Facility: HOSPITAL | Age: 60
Discharge: ANOTHER HOSPITAL | End: 2025-04-23
Attending: EMERGENCY MEDICINE
Payer: MEDICARE

## 2025-04-19 DIAGNOSIS — R45.851 SUICIDAL IDEATION: Primary | ICD-10-CM

## 2025-04-19 DIAGNOSIS — F14.90 COCAINE USE: ICD-10-CM

## 2025-04-19 DIAGNOSIS — F32.A DEPRESSION, UNSPECIFIED DEPRESSION TYPE: ICD-10-CM

## 2025-04-19 LAB
ALBUMIN SERPL-MCNC: 3.9 G/DL (ref 3.5–5)
ALBUMIN/GLOB SERPL: 0.9 (ref 1.1–2.2)
ALP SERPL-CCNC: 130 U/L (ref 45–117)
ALT SERPL-CCNC: 20 U/L (ref 12–78)
AMPHET UR QL SCN: NEGATIVE
ANION GAP SERPL CALC-SCNC: 4 MMOL/L (ref 2–12)
APAP SERPL-MCNC: <2 UG/ML (ref 10–30)
APPEARANCE UR: CLEAR
AST SERPL-CCNC: 23 U/L (ref 15–37)
BACTERIA URNS QL MICRO: NEGATIVE /HPF
BARBITURATES UR QL SCN: NEGATIVE
BASOPHILS # BLD: 0.04 K/UL (ref 0–0.1)
BASOPHILS NFR BLD: 0.6 % (ref 0–1)
BENZODIAZ UR QL: NEGATIVE
BILIRUB SERPL-MCNC: 0.5 MG/DL (ref 0.2–1)
BILIRUB UR QL: NEGATIVE
BUN SERPL-MCNC: 49 MG/DL (ref 6–20)
BUN/CREAT SERPL: 15 (ref 12–20)
CALCIUM SERPL-MCNC: 9.4 MG/DL (ref 8.5–10.1)
CANNABINOIDS UR QL SCN: NEGATIVE
CHLORIDE SERPL-SCNC: 114 MMOL/L (ref 97–108)
CO2 SERPL-SCNC: 23 MMOL/L (ref 21–32)
COCAINE UR QL SCN: POSITIVE
COLOR UR: ABNORMAL
CREAT SERPL-MCNC: 3.29 MG/DL (ref 0.7–1.3)
DIFFERENTIAL METHOD BLD: ABNORMAL
EKG ATRIAL RATE: 69 BPM
EKG DIAGNOSIS: NORMAL
EKG P AXIS: 67 DEGREES
EKG P-R INTERVAL: 164 MS
EKG Q-T INTERVAL: 418 MS
EKG QRS DURATION: 90 MS
EKG QTC CALCULATION (BAZETT): 447 MS
EKG R AXIS: 57 DEGREES
EKG T AXIS: 18 DEGREES
EKG VENTRICULAR RATE: 69 BPM
EOSINOPHIL # BLD: 0.2 K/UL (ref 0–0.4)
EOSINOPHIL NFR BLD: 3.1 % (ref 0–7)
EPITH CASTS URNS QL MICRO: ABNORMAL /LPF
ERYTHROCYTE [DISTWIDTH] IN BLOOD BY AUTOMATED COUNT: 14.9 % (ref 11.5–14.5)
ETHANOL SERPL-MCNC: <10 MG/DL (ref 0–0.08)
GLOBULIN SER CALC-MCNC: 4.3 G/DL (ref 2–4)
GLUCOSE SERPL-MCNC: 132 MG/DL (ref 65–100)
GLUCOSE UR STRIP.AUTO-MCNC: NEGATIVE MG/DL
HCT VFR BLD AUTO: 31.8 % (ref 36.6–50.3)
HGB BLD-MCNC: 10.7 G/DL (ref 12.1–17)
HGB UR QL STRIP: ABNORMAL
HYALINE CASTS URNS QL MICRO: ABNORMAL /LPF (ref 0–2)
IMM GRANULOCYTES # BLD AUTO: 0.03 K/UL (ref 0–0.04)
IMM GRANULOCYTES NFR BLD AUTO: 0.5 % (ref 0–0.5)
KETONES UR QL STRIP.AUTO: NEGATIVE MG/DL
LEUKOCYTE ESTERASE UR QL STRIP.AUTO: NEGATIVE
LYMPHOCYTES # BLD: 0.86 K/UL (ref 0.8–3.5)
LYMPHOCYTES NFR BLD: 13.3 % (ref 12–49)
Lab: ABNORMAL
MCH RBC QN AUTO: 30.1 PG (ref 26–34)
MCHC RBC AUTO-ENTMCNC: 33.6 G/DL (ref 30–36.5)
MCV RBC AUTO: 89.6 FL (ref 80–99)
METHADONE UR QL: NEGATIVE
MONOCYTES # BLD: 0.33 K/UL (ref 0–1)
MONOCYTES NFR BLD: 5.1 % (ref 5–13)
NEUTS SEG # BLD: 5.03 K/UL (ref 1.8–8)
NEUTS SEG NFR BLD: 77.4 % (ref 32–75)
NITRITE UR QL STRIP.AUTO: NEGATIVE
NRBC # BLD: 0 K/UL (ref 0–0.01)
NRBC BLD-RTO: 0 PER 100 WBC
OPIATES UR QL: NEGATIVE
PCP UR QL: NEGATIVE
PH UR STRIP: 5.5 (ref 5–8)
PLATELET # BLD AUTO: 160 K/UL (ref 150–400)
PMV BLD AUTO: 9.7 FL (ref 8.9–12.9)
POTASSIUM SERPL-SCNC: 5.1 MMOL/L (ref 3.5–5.1)
PROT SERPL-MCNC: 8.2 G/DL (ref 6.4–8.2)
PROT UR STRIP-MCNC: 300 MG/DL
RBC # BLD AUTO: 3.55 M/UL (ref 4.1–5.7)
RBC #/AREA URNS HPF: ABNORMAL /HPF (ref 0–5)
SALICYLATES SERPL-MCNC: 2.2 MG/DL (ref 2.8–20)
SODIUM SERPL-SCNC: 141 MMOL/L (ref 136–145)
SP GR UR REFRACTOMETRY: 1.01
SPECIMEN HOLD: NORMAL
UROBILINOGEN UR QL STRIP.AUTO: 0.2 EU/DL (ref 0.2–1)
WBC # BLD AUTO: 6.5 K/UL (ref 4.1–11.1)
WBC URNS QL MICRO: ABNORMAL /HPF (ref 0–4)

## 2025-04-19 PROCEDURE — 80307 DRUG TEST PRSMV CHEM ANLYZR: CPT

## 2025-04-19 PROCEDURE — 81001 URINALYSIS AUTO W/SCOPE: CPT

## 2025-04-19 PROCEDURE — 36415 COLL VENOUS BLD VENIPUNCTURE: CPT

## 2025-04-19 PROCEDURE — 99285 EMERGENCY DEPT VISIT HI MDM: CPT

## 2025-04-19 PROCEDURE — 80053 COMPREHEN METABOLIC PANEL: CPT

## 2025-04-19 PROCEDURE — 80179 DRUG ASSAY SALICYLATE: CPT

## 2025-04-19 PROCEDURE — 90792 PSYCH DIAG EVAL W/MED SRVCS: CPT

## 2025-04-19 PROCEDURE — 80143 DRUG ASSAY ACETAMINOPHEN: CPT

## 2025-04-19 PROCEDURE — 96374 THER/PROPH/DIAG INJ IV PUSH: CPT

## 2025-04-19 PROCEDURE — 93005 ELECTROCARDIOGRAM TRACING: CPT | Performed by: STUDENT IN AN ORGANIZED HEALTH CARE EDUCATION/TRAINING PROGRAM

## 2025-04-19 PROCEDURE — 6360000002 HC RX W HCPCS: Performed by: EMERGENCY MEDICINE

## 2025-04-19 PROCEDURE — 85025 COMPLETE CBC W/AUTO DIFF WBC: CPT

## 2025-04-19 PROCEDURE — 82077 ASSAY SPEC XCP UR&BREATH IA: CPT

## 2025-04-19 PROCEDURE — 6370000000 HC RX 637 (ALT 250 FOR IP): Performed by: EMERGENCY MEDICINE

## 2025-04-19 RX ORDER — LOSARTAN POTASSIUM 25 MG/1
25 TABLET ORAL DAILY
Status: DISCONTINUED | OUTPATIENT
Start: 2025-04-19 | End: 2025-04-23 | Stop reason: HOSPADM

## 2025-04-19 RX ORDER — HYDRALAZINE HYDROCHLORIDE 50 MG/1
50 TABLET, FILM COATED ORAL EVERY 8 HOURS SCHEDULED
Status: DISCONTINUED | OUTPATIENT
Start: 2025-04-19 | End: 2025-04-23 | Stop reason: HOSPADM

## 2025-04-19 RX ORDER — HYDRALAZINE HYDROCHLORIDE 20 MG/ML
10 INJECTION INTRAMUSCULAR; INTRAVENOUS
Status: COMPLETED | OUTPATIENT
Start: 2025-04-19 | End: 2025-04-19

## 2025-04-19 RX ORDER — CLONIDINE HYDROCHLORIDE 0.1 MG/1
0.2 TABLET ORAL
Status: COMPLETED | OUTPATIENT
Start: 2025-04-19 | End: 2025-04-19

## 2025-04-19 RX ORDER — OXYCODONE HYDROCHLORIDE 5 MG/1
5 TABLET ORAL
Refills: 0 | Status: COMPLETED | OUTPATIENT
Start: 2025-04-19 | End: 2025-04-19

## 2025-04-19 RX ORDER — HYDRALAZINE HYDROCHLORIDE 50 MG/1
50 TABLET, FILM COATED ORAL
Status: COMPLETED | OUTPATIENT
Start: 2025-04-19 | End: 2025-04-19

## 2025-04-19 RX ORDER — BUMETANIDE 1 MG/1
2 TABLET ORAL 2 TIMES DAILY
Status: DISCONTINUED | OUTPATIENT
Start: 2025-04-19 | End: 2025-04-23 | Stop reason: HOSPADM

## 2025-04-19 RX ORDER — AMLODIPINE BESYLATE 5 MG/1
10 TABLET ORAL DAILY
Status: DISCONTINUED | OUTPATIENT
Start: 2025-04-20 | End: 2025-04-23 | Stop reason: HOSPADM

## 2025-04-19 RX ORDER — GABAPENTIN 300 MG/1
300 CAPSULE ORAL 3 TIMES DAILY
Status: DISCONTINUED | OUTPATIENT
Start: 2025-04-19 | End: 2025-04-23 | Stop reason: HOSPADM

## 2025-04-19 RX ORDER — LURASIDONE HYDROCHLORIDE 20 MG/1
20 TABLET, FILM COATED ORAL
Status: DISCONTINUED | OUTPATIENT
Start: 2025-04-19 | End: 2025-04-23 | Stop reason: HOSPADM

## 2025-04-19 RX ORDER — GABAPENTIN 300 MG/1
300 CAPSULE ORAL ONCE
Status: COMPLETED | OUTPATIENT
Start: 2025-04-19 | End: 2025-04-19

## 2025-04-19 RX ADMIN — LOSARTAN POTASSIUM 25 MG: 25 TABLET, FILM COATED ORAL at 15:32

## 2025-04-19 RX ADMIN — GABAPENTIN 300 MG: 300 CAPSULE ORAL at 23:50

## 2025-04-19 RX ADMIN — LURASIDONE HYDROCHLORIDE 20 MG: 20 TABLET, FILM COATED ORAL at 18:45

## 2025-04-19 RX ADMIN — HYDRALAZINE HYDROCHLORIDE 50 MG: 50 TABLET ORAL at 23:50

## 2025-04-19 RX ADMIN — OXYCODONE 5 MG: 5 TABLET ORAL at 13:28

## 2025-04-19 RX ADMIN — HYDRALAZINE HYDROCHLORIDE 10 MG: 20 INJECTION INTRAMUSCULAR; INTRAVENOUS at 16:29

## 2025-04-19 RX ADMIN — GABAPENTIN 300 MG: 300 CAPSULE ORAL at 16:37

## 2025-04-19 RX ADMIN — BUMETANIDE 2 MG: 1 TABLET ORAL at 18:45

## 2025-04-19 RX ADMIN — CLONIDINE HYDROCHLORIDE 0.2 MG: 0.1 TABLET ORAL at 15:32

## 2025-04-19 RX ADMIN — HYDRALAZINE HYDROCHLORIDE 50 MG: 50 TABLET ORAL at 15:32

## 2025-04-19 ASSESSMENT — PAIN - FUNCTIONAL ASSESSMENT: PAIN_FUNCTIONAL_ASSESSMENT: NONE - DENIES PAIN

## 2025-04-19 NOTE — ED NOTES
Patient refusing cardiac monitoring during triage. Patient refusing IV and bloodwork during triage. Patient is tearful and is expressing suicidal ideation during triage

## 2025-04-19 NOTE — BSMART NOTE
Was informed by Jeane dalal/ Marcela that [facility] is at capacity at this time and patient meets exclusionary criteria for all other [facility] Facilities d/t dialysis. Clinician spoke w/ Kelly, Charge RN and relayed information. Kelly agreed to have pt's primary nurse, Henry call back shortly to discuss pt's bed search preferences.     1515: Received a call back from Henry stating pt has not been compliant w/ dialysis since January 2025 and is currently on hospice services. Henry confirms pt is agreeable to a local bed search ([facility] only).

## 2025-04-19 NOTE — ED NOTES
Patient's shirt, pants, and left shoe given to patient's sister. Patient's sister took patient's belongings home with her. Patient's necklace placed in biohazard bag and remains with patient's prosthetic while patient is lying in stretcher. Patient's prosthetic remains with patient along with patient's right shoe. Patient refused to let this RN remove laces from shoe. Patient remains under SI precautions. NAD. Physiological needs met. 1:1 observation. q15min safety checks in place. Sitter remains at bedside

## 2025-04-19 NOTE — ED PROVIDER NOTES
[facility] EMERGENCY DEPARTMENT  EMERGENCY DEPARTMENT ENCOUNTER       Pt Name: Keaton Van  MRN: [id number]  Birthdate 1965  Date of evaluation: 4/19/2025  Provider: Zhang Brown DO   PCP: Robert Wells PA-C  Note Started: 1:28 PM EDT 4/19/25     CHIEF COMPLAINT       Chief Complaint   Patient presents with    Drug Overdose     Pt BIBEMS with c/o suicidal ideation. Per EMS patient took 60 mg of Lorazepam last night. Patient alert and oriented during triage. Patient reports suicidal ideation - patient reports he is on hospice.     Suicidal        HISTORY OF PRESENT ILLNESS: 1 or more elements      History From: patient, History limited by: none     Keaton Van is a 59 y.o. male presents to the emergency department for evaluation of suicidal thoughts and depression.       Please See MDM for Additional Details of the HPI/PMH  Nursing Notes were all reviewed and agreed with or any disagreements were addressed in the HPI.     REVIEW OF SYSTEMS        Positives and Pertinent negatives as per HPI.    PAST HISTORY     Past Medical History:  Past Medical History:   Diagnosis Date    Adverse effect of anesthesia     breathing diff. with versed    Bipolar 1 disorder, mixed, moderate (HCC) 3/6/2017    Chronic pain     Depression     Pt stated diagnosed years ago    Diabetes (HCC) 2003    Drug-induced mood disorder(292.84) 5/28/2013    Homicide attempt     HTN (hypertension) 11/3/2011    Narcotic dependence, episodic use (HCC) 11/3/2011    Non compliance with medical treatment 11/3/2011    Other ill-defined conditions(799.89)     chronic low back pain    Psychiatric disorder     bipolar    Sleep disorder     Substance abuse     Suicidal thoughts        Past Surgical History:  Past Surgical History:   Procedure Laterality Date    ARM SURGERY Left 12/5/2024    LEFT HAND INCISION AND DRAINAGE performed by Henry Owens MD at [facility] MAIN OR    ARM SURGERY Left 12/21/2024    LEFT HAND INCISION AND DRAINAGE performed by

## 2025-04-19 NOTE — ED NOTES
When I took over sitting for the pt, he was still wearing a necklace. He agreed to take it off and we put it in a clear bag with a pt label on it. I then took the bag and placed t into the top of the pt's prosthetic leg next to me.Pt is tearful and was given a paper towel. I am now sitting and charting for the Q15.

## 2025-04-19 NOTE — BSMART NOTE
ADULT VOLUNTARY BED SEARCH STARTED/RESUMED AT 4/19/2025:    [facility] ([facility]): contacted at 2500 spoke with Jeane reported [facility] is at capacity at this time and patient meets exclusionary criteria for all other [facility] facilities d/t dialysis.     [facility]: contacted at 1518 spoke with Stephania reported no private art rooms available at this time.     [facility] ([place], [place]): contacted at 1520 spoke with Angely reported at capacity.    No other facilities have been considered at this time d/t patient preference.     Megan Vuong LMSW

## 2025-04-19 NOTE — ED NOTES
Bedside report given to BHAKTI Allen by BHAKTI Figueroa. Nurse was informed of reason for arrival, vitals, labs, medications, orders, procedures, results, cardiac rhythm, any outstanding and pending orders and plan of care. Opportunity for questions were provided for receiving RN at this time.

## 2025-04-19 NOTE — ED NOTES
Patient wanded by security at this time. Patient changed into paper scrubs at this time. Patient keeping show with laces on prosthetic at this time. Patient remains under SI precautions. NAD. Physiological needs met. 1:1 observation. q15min safety checks in place. Sitter remains at bedside

## 2025-04-20 ENCOUNTER — APPOINTMENT (OUTPATIENT)
Facility: HOSPITAL | Age: 60
End: 2025-04-20
Payer: MEDICARE

## 2025-04-20 PROCEDURE — 2500000003 HC RX 250 WO HCPCS: Performed by: EMERGENCY MEDICINE

## 2025-04-20 PROCEDURE — 6360000002 HC RX W HCPCS: Performed by: EMERGENCY MEDICINE

## 2025-04-20 PROCEDURE — 99214 OFFICE O/P EST MOD 30 MIN: CPT | Performed by: REGISTERED NURSE

## 2025-04-20 PROCEDURE — 74176 CT ABD & PELVIS W/O CONTRAST: CPT

## 2025-04-20 PROCEDURE — 96376 TX/PRO/DX INJ SAME DRUG ADON: CPT

## 2025-04-20 PROCEDURE — 6370000000 HC RX 637 (ALT 250 FOR IP): Performed by: STUDENT IN AN ORGANIZED HEALTH CARE EDUCATION/TRAINING PROGRAM

## 2025-04-20 PROCEDURE — 6370000000 HC RX 637 (ALT 250 FOR IP): Performed by: EMERGENCY MEDICINE

## 2025-04-20 PROCEDURE — 96375 TX/PRO/DX INJ NEW DRUG ADDON: CPT

## 2025-04-20 RX ORDER — HYDROMORPHONE HYDROCHLORIDE 2 MG/1
2 TABLET ORAL EVERY 4 HOURS PRN
Refills: 0 | Status: DISCONTINUED | OUTPATIENT
Start: 2025-04-20 | End: 2025-04-21

## 2025-04-20 RX ORDER — ONDANSETRON 2 MG/ML
4 INJECTION INTRAMUSCULAR; INTRAVENOUS ONCE
Status: COMPLETED | OUTPATIENT
Start: 2025-04-20 | End: 2025-04-20

## 2025-04-20 RX ORDER — NICOTINE 21 MG/24HR
1 PATCH, TRANSDERMAL 24 HOURS TRANSDERMAL DAILY
Status: DISCONTINUED | OUTPATIENT
Start: 2025-04-20 | End: 2025-04-23 | Stop reason: HOSPADM

## 2025-04-20 RX ORDER — HYDROMORPHONE HYDROCHLORIDE 1 MG/ML
0.25 INJECTION, SOLUTION INTRAMUSCULAR; INTRAVENOUS; SUBCUTANEOUS
Status: COMPLETED | OUTPATIENT
Start: 2025-04-20 | End: 2025-04-20

## 2025-04-20 RX ADMIN — ONDANSETRON 4 MG: 2 INJECTION, SOLUTION INTRAMUSCULAR; INTRAVENOUS at 23:06

## 2025-04-20 RX ADMIN — GABAPENTIN 300 MG: 300 CAPSULE ORAL at 09:41

## 2025-04-20 RX ADMIN — HYDROMORPHONE HYDROCHLORIDE 2 MG: 2 TABLET ORAL at 11:27

## 2025-04-20 RX ADMIN — HYDROMORPHONE HYDROCHLORIDE 0.25 MG: 1 INJECTION, SOLUTION INTRAMUSCULAR; INTRAVENOUS; SUBCUTANEOUS at 23:06

## 2025-04-20 RX ADMIN — HYDROMORPHONE HYDROCHLORIDE 2 MG: 2 TABLET ORAL at 06:58

## 2025-04-20 RX ADMIN — CEPHALEXIN 500 MG: 250 CAPSULE ORAL at 20:05

## 2025-04-20 RX ADMIN — GABAPENTIN 300 MG: 300 CAPSULE ORAL at 15:06

## 2025-04-20 RX ADMIN — HYDROMORPHONE HYDROCHLORIDE 0.25 MG: 1 INJECTION, SOLUTION INTRAMUSCULAR; INTRAVENOUS; SUBCUTANEOUS at 21:45

## 2025-04-20 RX ADMIN — HYDRALAZINE HYDROCHLORIDE 50 MG: 50 TABLET ORAL at 06:58

## 2025-04-20 ASSESSMENT — PAIN SCALES - GENERAL
PAINLEVEL_OUTOF10: 5
PAINLEVEL_OUTOF10: 10
PAINLEVEL_OUTOF10: 9
PAINLEVEL_OUTOF10: 5

## 2025-04-20 ASSESSMENT — PAIN DESCRIPTION - LOCATION
LOCATION: BACK

## 2025-04-20 NOTE — BSMART NOTE
ADULT VOLUNTARY BED SEARCH STARTED/RESUMED AT 4/20/2025:    DEANN KAUFMAN ([facility]): contacted at 2500 spoke with Jeane reported  not appropriate placement at this time    [facility]: contacted at 841p spoke with Zeke reported  no appropriate placement at this time    [facility] ([place], [place], [place], [place], [facility]): contacted at 842 spoke with Zahraa reported at capacity      Patient preference local admission.     Odette Castillo MA

## 2025-04-20 NOTE — ED NOTES
Pt requesting urinal.  Sheets changed and toiletries provided to aid with patient cleaning up.  Pt provided with clean scrubs and music is playing for patients pleasure.

## 2025-04-20 NOTE — ED NOTES
Pastoral care paged and at bedside per patient request.   Albendazole Counseling:  I discussed with the patient the risks of albendazole including but not limited to cytopenia, kidney damage, nausea/vomiting and severe allergy.  The patient understands that this medication is being used in an off-label manner.

## 2025-04-20 NOTE — PROGRESS NOTES
Spiritual Health History and Assessment/Progress Note  [facility]    Crisis, Spiritual/Emotional Needs, Emotional distress, Life Adjustments,      Name: Keaton Van MRN: [id number]    Age: 59 y.o.     Sex: male   Language: English   Latter-day: Zoroastrian   <principal problem not specified>     Date: 4/20/2025            Total Time Calculated: 67 min              Spiritual Assessment began in [facility] EMERGENCY DEPARTMENT        Referral/Consult From: Nurse   Encounter Overview/Reason: Crisis, Spiritual/Emotional Needs  Service Provided For: Patient    Keisha, Belief, Meaning:   Patient identifies as spiritual and Other: is feeling lost and broken at this time  Family/Friends No family/friends present      Importance and Influence:  Patient has spiritual/personal beliefs that influence decisions regarding their health  Family/Friends No family/friends present    Community:  Patient expresses feelings of isolation: feeling there is no one to turn to for help and feeling no one understands  Family/Friends No family/friends present    Assessment and Plan of Care:     Patient Interventions include: Facilitated expression of thoughts and feelings, Explored spiritual coping/struggle/distress, Facilitated life review and/ or legacy, and Other: offered assurance of prayer and provided patient with soft cover bible   Family/Friends Interventions include: No family/friends present    Patient Plan of Care: Spiritual Care available upon further referral  Family/Friends Plan of Care: No family/friends present    Electronically signed by Chaplain JASON NAVARRO on 4/20/2025 at 1:43 PM

## 2025-04-20 NOTE — ED NOTES
Bedside shift change report given to Cassy RN (oncoming nurse) by Marianna RN (offgoing nurse). Report included the following information Nurse Handoff Report, ED Encounter Summary, ED SBAR, MAR, and Recent Results.   ]

## 2025-04-20 NOTE — ED NOTES
Pt asked that his stump be evaluated for infection.  Provider Placido called to bedside for evaluation.  New dressing applied to stump.

## 2025-04-20 NOTE — ED NOTES
Odette from [facility] called and spoke with this RN requesting that an Inpatient Psychiatrist Consult gets placed. Odette stated that the patient \"probably would not get a bed this weekend\" so she would like him to be seen by in patient psychology and [facility] will continue to follow along with his care.

## 2025-04-20 NOTE — BSMART NOTE
BSMART Liaison Team Note     LOS:  26.5 hours    Patient goal(s) for today: Take medications as prescribed, Make needs known in an appropriate manner  BSMART Liaison team focus/goals: Assess needs, Provide support and education, Coordinate care    Progress note: Patient is a 59 year old male seen face to face in the ER.  He is being held for inpatient admission.  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  He does have a prothesis.  He reported he is completely independent with his ADLs and able to ambulate independently as well.  He is in kidney failure, but has not had dialysis since 1/9/25.  He stated he does not want to be kept alive by a machine.  He said \"I lived my life on my terms and I'm going to go out on my terms.\"  His family understands this decision, and after initially being upset now have accepted it and support him.  He admitted that he is \"severely depressed,\" and said he has a \"death wish.\"  He reported ongoing suicidal ideation.  When asked about a plan he said he does have one, \"the one I should have used in the first place.\"  He declined to give specifics about this plan, saying it is \"between me and God.\"  He denied experiencing homicidal ideations or symptoms of psychosis.  He spoke about his best friend who passed away from a heroin overdose 5 years ago.  He reported he has \"no friends\" left and feels isolated because he can no longer drive due to this amputation.  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  When asked about his appetite he stated he is \"always hungry.\"  He has been sleeping \"on and off\" since coming to the hospital.  Clinician provided supportive listening and feedback.  He is aware that he is a difficult placement, but remains only interested in local admission.    Barriers to Discharge: local psychiatric bed placement, right below the knee amputation, in kidney failure and

## 2025-04-21 LAB
ANION GAP SERPL CALC-SCNC: 4 MMOL/L (ref 2–12)
BUN SERPL-MCNC: 49 MG/DL (ref 6–20)
BUN/CREAT SERPL: 14 (ref 12–20)
CALCIUM SERPL-MCNC: 8.7 MG/DL (ref 8.5–10.1)
CHLORIDE SERPL-SCNC: 110 MMOL/L (ref 97–108)
CO2 SERPL-SCNC: 24 MMOL/L (ref 21–32)
CREAT SERPL-MCNC: 3.61 MG/DL (ref 0.7–1.3)
GLUCOSE SERPL-MCNC: 205 MG/DL (ref 65–100)
POTASSIUM SERPL-SCNC: 5.6 MMOL/L (ref 3.5–5.1)
SODIUM SERPL-SCNC: 138 MMOL/L (ref 136–145)

## 2025-04-21 PROCEDURE — 6370000000 HC RX 637 (ALT 250 FOR IP): Performed by: STUDENT IN AN ORGANIZED HEALTH CARE EDUCATION/TRAINING PROGRAM

## 2025-04-21 PROCEDURE — 36415 COLL VENOUS BLD VENIPUNCTURE: CPT

## 2025-04-21 PROCEDURE — 6370000000 HC RX 637 (ALT 250 FOR IP): Performed by: EMERGENCY MEDICINE

## 2025-04-21 PROCEDURE — 80048 BASIC METABOLIC PNL TOTAL CA: CPT

## 2025-04-21 RX ORDER — HYDROMORPHONE HYDROCHLORIDE 2 MG/1
1 TABLET ORAL EVERY 4 HOURS PRN
Refills: 0 | Status: DISCONTINUED | OUTPATIENT
Start: 2025-04-21 | End: 2025-04-23 | Stop reason: HOSPADM

## 2025-04-21 RX ORDER — HYDROMORPHONE HYDROCHLORIDE 1 MG/ML
2 SOLUTION ORAL EVERY 4 HOURS PRN
Refills: 0 | Status: DISCONTINUED | OUTPATIENT
Start: 2025-04-21 | End: 2025-04-23 | Stop reason: HOSPADM

## 2025-04-21 RX ADMIN — HYDROMORPHONE HYDROCHLORIDE 1 MG: 2 TABLET ORAL at 07:34

## 2025-04-21 RX ADMIN — GABAPENTIN 300 MG: 300 CAPSULE ORAL at 14:35

## 2025-04-21 RX ADMIN — CEPHALEXIN 500 MG: 250 CAPSULE ORAL at 20:33

## 2025-04-21 RX ADMIN — GABAPENTIN 300 MG: 300 CAPSULE ORAL at 09:43

## 2025-04-21 RX ADMIN — Medication 2 MG: at 22:02

## 2025-04-21 RX ADMIN — Medication 2 MG: at 16:07

## 2025-04-21 RX ADMIN — Medication 2 MG: at 11:46

## 2025-04-21 RX ADMIN — GABAPENTIN 300 MG: 300 CAPSULE ORAL at 20:33

## 2025-04-21 ASSESSMENT — PAIN - FUNCTIONAL ASSESSMENT
PAIN_FUNCTIONAL_ASSESSMENT: ACTIVITIES ARE NOT PREVENTED

## 2025-04-21 ASSESSMENT — PAIN DESCRIPTION - FREQUENCY
FREQUENCY: CONTINUOUS

## 2025-04-21 ASSESSMENT — PAIN DESCRIPTION - DESCRIPTORS
DESCRIPTORS: ACHING
DESCRIPTORS: SHARP
DESCRIPTORS: ACHING

## 2025-04-21 ASSESSMENT — PAIN SCALES - GENERAL
PAINLEVEL_OUTOF10: 10
PAINLEVEL_OUTOF10: 10
PAINLEVEL_OUTOF10: 7
PAINLEVEL_OUTOF10: 7
PAINLEVEL_OUTOF10: 8
PAINLEVEL_OUTOF10: 9

## 2025-04-21 ASSESSMENT — PAIN DESCRIPTION - ORIENTATION
ORIENTATION: RIGHT;LEFT
ORIENTATION: MID;OUTER;LOWER
ORIENTATION: RIGHT

## 2025-04-21 ASSESSMENT — PAIN DESCRIPTION - PAIN TYPE
TYPE: CHRONIC PAIN

## 2025-04-21 ASSESSMENT — PAIN DESCRIPTION - LOCATION
LOCATION: FLANK
LOCATION: BACK
LOCATION: LEG
LOCATION: FLANK

## 2025-04-21 ASSESSMENT — PAIN DESCRIPTION - ONSET
ONSET: ON-GOING

## 2025-04-21 NOTE — ED NOTES
Spoke with MD Gongora at this time due to pt requesting IV dilaudid/lumbar x ray. MD Gongora at bedside. New orders in place.

## 2025-04-21 NOTE — BSMART NOTE
BSMART Liaison Team Note     LOS:   18 hours 30 minutes     Patient goal(s) for today: \"I need them to keep giving me the pain meds I ask for. I'm not afraid to die. I'm tired. I don't want to be here anymore. I'm tired.\"    BSMART Liaison team focus/goals: Access needs, provide support and education, engage in supportive counseling if possible, coordinate care, contact crisis if necessary.    Progress note:   This counselor met face to face with pt who reports,  \"I need them to keep giving me the pain meds I ask for. I'm not afraid to die. I'm tired. I don't want to be here anymore. I'm ready to go to heaven.\" Patient is sitting on bed and seems to be in good spirits. He seems eager to visit and initially said, \"Wow. I thought you were coming to tell me I had to leave.\" This writer explained the bed search progress, that a statewide search was currently in process.   Patient states he became upset earlier today when crisis came to assess him saying, I don't know what they were even here for. I know what I'm doing (wanting to die).\" This writer explained the TDO process and the difference between voluntary and involuntary admission. Patient asserts he is voluntary but he does not want to live. He repeatedly said, \"I'm just tired. This is a mean, ugly world and I'm tired of it. I want to go up there (pointed to [facility]). I'm a stubborn person. I want to live my way and die my way.\"  Patient continues to refuse to participate in dialysis treatment and reports last treatment on Jan 9th 2025. He states he has stopped taking all other medications except dilaudid and gabapentin, both for pain. He reports getting \"really upset\" earlier today \"because the doc wouldn't give me the dilaudid in my IV. The other ways of taking it takes too long to get in my system.\" Patient reports pain level to always be at 8 or 9 and he needs consistent pain meds to mange the intensity of pain.  Patient reports beginning hospice several months

## 2025-04-21 NOTE — ED NOTES
Pt tried to force his way to see the MD. While verbally accosting this tech. Pt walked out of room 2, toward the charge desk, and attempted to force his way into an active cardiac arrest in room 18 because he was \"here first\" and had to talk to a Physician. Brennon MILLER was again made aware and intervened, redirecting him back to his room and deescalating before the pt was able to interrupt. Pt has previously been made aware of the multitude of emergencies in the dept and continues to be displeased for not being seen or given pain meds. This tech once again asked if he would like a nurse to bring his oral Dilaudid and was given the same response that it does not work and the only thing he will accept is IV. Pt currently threatens legal action.

## 2025-04-21 NOTE — PROGRESS NOTES
Patient laying on his right side.  Resting with eyes closed at this time.  Sitter remains at bedside.

## 2025-04-21 NOTE — PROGRESS NOTES
Inpatient psychiatry consult placed for suicidal voluntary hold in ED.     BSMART called for update.  Bed search continued.

## 2025-04-21 NOTE — BSMART NOTE
Bsmart spoke to provider regarding patient to ask if patient might be a medical admission. Provider reports patient will not likely be a medical admission. Bsmart with continue statewide bed search. However, due to ongoing wound care and patient's refusal to attend dialysis appointments. He will likely be a difficult placement.

## 2025-04-21 NOTE — ED NOTES
Bedside and Verbal shift change report given to Kiera (oncoming nurse) by Cassy (offgoing nurse). Report included the following information Nurse Handoff Report, Index, ED Encounter Summary, ED SBAR, Adult Overview, Intake/Output, MAR, Recent Results, and Med Rec Status.

## 2025-04-21 NOTE — PROGRESS NOTES
Nephrology consult called to [facility] at this time.  Pending return call at this time.

## 2025-04-21 NOTE — ED NOTES
Pt requested IV dilaudid at this time. MD Goncalves made aware and stated to only give PO dilaudid. Pt made aware and pt became very agitated stating \"I don't want any other medications from you and I will walk out of here.\" Pt made aware that if he leaves, HCSO will be called due to suicidal statements being made during the shift. Pt stated he wanted his IV out and it was removed by GRIFFIN Rees at this time. MD Goncalves made aware of agitation.

## 2025-04-21 NOTE — BSMART NOTE
BSMART Liaison Team Note     LOS:  35:04     Patient goal(s) for today: Take medications as prescribed, Make needs known in an appropriate manner  BSMART Liaison team focus/goals: Assess needs, Provide support and education, Coordinate care    Progress note: Writer met via-tele Taggable platform with Pt who is a 59 year old male with hx of Major Depressive Disorder, below the knee ambulation of R leg, kidney failure , and non compliant in dialysis treatment since 1/9/25. Pt strongly endorsed suicidal ideation and intent during interview. Pt reporting to want to die and being \"tire of life and forced to live\". Pt shared that he has been praying to God in order to die and \"If I had another chance I would do it on my own terms \".Pt depressed and sobbing. Pt reported to have a DNR and have not been followed by hospice in over a month per BHAKTI Madera. Pt denying HI/AH/VH but reporting to have believed to of seen shadows in his peripheral at some point while in the hospital. Pt believes it to be his mind playing tricks on him . Pt pleasant and cooperative in agreement to expand bed search .     Barriers to Discharge: Pt actively suicidal with strong intent to end life \" on my terms\", Critical access hospital wide psychiatric bed , right below the knee amputation, in kidney failure and refusing dialysis since 1/9/25 . Per Marie in bed access Pt's hospice status awaiting verification in chart and through psych consult before bed is acceptance     Outpatient provider(s):  none  Insurance info/prescription coverage:  Medicare    Diagnosis: Major Depressive Disorder   Past Medical History:   Diagnosis Date    Adverse effect of anesthesia     breathing diff. with versed    Bipolar 1 disorder, mixed, moderate (HCC) 3/6/2017    Chronic pain     Depression     Pt stated diagnosed years ago    Diabetes (HCC) 2003    Drug-induced mood disorder(292.84) 5/28/2013    Homicide attempt     HTN (hypertension) 11/3/2011    Narcotic dependence, episodic use (HCC)

## 2025-04-21 NOTE — BSMART NOTE
ADULT VOLUNTARY BED SEARCH STARTED/RESUMED AT 1300 on 4/21/2025:    BON SECGIOVANNI ([facility], [facility], [facility], [facility]): Valentina reports no dialysis bed available at this time    [facility]: tSephania - on call psych does not feel comfortable taking a voluntary patient who is refusing dialysis    [facility] ([place], [place], [place], [place], [place], [facility]): Alish -  exclusionary due to patient on dialysis and need a private bed    [facility] [place]: Lillie - exclusionary due to patient on dialysis and need a private bed    [facility]: Milagros -  exclusionary for dialysis and need for wound care.    [facility]: Sandra -  exclusionary for dialysis and need for wound care.    [facility]: Rafiq - at capacity     [facility]: Kings - wound care is exclusionary    [facility], [place]: Elsa -  exclusionary for dialysis and need for wound care.    [facility]: Orin -  exclusionary for dialysis and need for wound care.    [facility] [place]: [place] - fax over pt packet    [facility]: [facility] - fax over patient's packet for review    [facility] ([place], [place], [place]): [place] - fax over patient's packet for review    [facility]: busy, attempted to fax (busy)    [facility]: Rylan - exclusionary for dialysis and need for wound care.    [facility]:  Abhishek - exclusionary for dialysis      [facility]:  Guilherme - exclusionary for wound care. We are at capacity.      [facility] [place] ([facility]): Davon - exclusionary due to patient on dialysis and need a private bed    Next Bsmart counselor will continue to monitor bed search beginning at approximately 9pm.

## 2025-04-21 NOTE — BSMART NOTE
BSMART Note    Spoke to patient's nurse Cassy to get clarification regarding whether patient is currently engaged in hospice services.  She stated she will ask patient and document his response.    Mine Allen MA

## 2025-04-21 NOTE — PROGRESS NOTES
Patient took Gabapentin.  Refused his AM blood pressure medications.  Dr. Rushing notified.  Patient calm.  Respirations not labored.  Sitter at bedside.

## 2025-04-21 NOTE — PROGRESS NOTES
Wound care completed to right lower leg/ before right knee cap and right stump.  Cleansed with wound cleanser.  Dried with 4x4.  Xeroform to both areas and large bandaids x 2.    Medicated with Dilaudid oral solution for lower back and kidney pain.      Lunch provided.    Sitter remains at bedside.

## 2025-04-21 NOTE — PROGRESS NOTES
Patient stating he just wants to leave and get help as outpatient.  Patient instructed if he leaves I have been directed to call [facility].    Dr. Srivastava notified.  Remains 1:1

## 2025-04-21 NOTE — ED NOTES
Pt ambulating agitated through the hallway with sitter stating \"I don't want to be on this earth anymore and I don't want to be here\". Brennon abarcautlorelei contacted and is with pt at this time. MD Goncalves requested to call crisis as well as BSMART at this time.

## 2025-04-21 NOTE — BSMART NOTE
ADULT VOLUNTARY BED SEARCH STARTED/RESUMED AT 4/21/2025:    DEANN KAUFMAN ([facility], [facility], [facility], [place], [facility], [place]): contacted at spoke with Marie reported awaiting discharges; follow up later once Pt's hospice status is confirm in chart and through psychiatry consult     [facility]: contacted at 12:10 spoke with Amy reported at capacity no private beds availiable    [facility] ([place], [place], [place], [place], [facility]): contacted at 12:15 spoke with Scott reported awaiting discharges; follow up later    [facility] [place]: contacted at 12:30a spoke with Laverne reported Admission on hold due to staffing     [facility]: contacted at 12:39 spoke with Elvia reported awaiting discharges in morning , clinical requested    [facility]: contacted at 12:40a spoke with Anastasiia  reported at capacity [facility]: contacted at 12:43 spoke with Tatiana reported at [facility]: no answer    [facility]:left voicemail requesting call back    [facility]: contacted at 12:50a spoke with Orin  reported exclusionary due to dialysis and needing a private bed    [facility]: contacted at 12:50 spoke with adriana reported at capacity    [facility]: Clinicals faxed per [facility] ([place], [place], [place]): contacted at 12:52 spoke with Betty reported at capacity    [facility]: Clinicals faxed per [facility]: Clinicals faxed per [facility]: contacted at 12:55a spoke with karel reported at capacity    [facility]: contacted at 12:56a spoke with paola reported awaiting discharges; follow up later    [place] [place] ([facility]): contacted at 1am spoke with Davon  reported exclusionary due to due to medical needs and private bed needed      Parker Jones, RA, LMHP-S

## 2025-04-21 NOTE — PROGRESS NOTES
Attempted to call Vera [phone] for ethics consult ordered by Dr. Srivastava.  No answer at that number.

## 2025-04-21 NOTE — PROGRESS NOTES
Bedside shift report received at this time.    Remains 1:1 at this time.    Patient sitting on stretcher with arms folded across chest.  Patient states he has not received pain medication for 3 hours.  Order for 2 mg Dilaudid PO tablets ordered.  Patient states tablets do not work and he takes solution.  Dr. Rushing notified and order changed to solution.  Patient then informed switched to oral solution.  Patient then stated the oral solution does not work he gets IV.  Patient does not have an IV.  Dr. Rushing stated patient can have either oral solution or tablets.  No IV Dilaudid will be ordered.  Patient informed either oral solution or tablets for pain.  Patient requested \"way I can get medication the fastest\".  Medicated with 1 mg Dilaudid tablet at this time.  Tucker provided for comfort.

## 2025-04-21 NOTE — BSMART NOTE
ADULT VOLUNTARY BED SEARCH RESUMED AT 4/20/2025:    DEANN KAUFMAN ([facility]): spoke to Shonda numerous times.  Ultimately spoke to Dr. Abdul who stated that they cannot move forward with placing onto BHU until it is verified if patient is not receiving hospice services.  There is no bed available today.    [facility]: no private art rooms available.    [facility] ([place] [facility] [place], [place], [place], [facility]): at capacity.    Mine Allen MA

## 2025-04-21 NOTE — BSMART NOTE
Bsmart progress note:    Writer spoke with Cassy for update reguarding Pt's hospice status . Pt reported to have been not been followed by hospice is over a month . Writer updated bed access

## 2025-04-22 ENCOUNTER — HOSPITAL ENCOUNTER (EMERGENCY)
Facility: HOSPITAL | Age: 60
Discharge: HOME | End: 2025-04-25
Payer: MEDICARE

## 2025-04-22 PROCEDURE — 6370000000 HC RX 637 (ALT 250 FOR IP): Performed by: STUDENT IN AN ORGANIZED HEALTH CARE EDUCATION/TRAINING PROGRAM

## 2025-04-22 PROCEDURE — 90792 PSYCH DIAG EVAL W/MED SRVCS: CPT | Performed by: NURSE PRACTITIONER

## 2025-04-22 PROCEDURE — 6360000002 HC RX W HCPCS: Performed by: STUDENT IN AN ORGANIZED HEALTH CARE EDUCATION/TRAINING PROGRAM

## 2025-04-22 PROCEDURE — 96372 THER/PROPH/DIAG INJ SC/IM: CPT

## 2025-04-22 PROCEDURE — 6370000000 HC RX 637 (ALT 250 FOR IP): Performed by: EMERGENCY MEDICINE

## 2025-04-22 PROCEDURE — 36589 REMOVAL TUNNELED CV CATH: CPT

## 2025-04-22 RX ORDER — LORAZEPAM 2 MG/ML
1 INJECTION INTRAMUSCULAR ONCE
Status: DISCONTINUED | OUTPATIENT
Start: 2025-04-22 | End: 2025-04-22

## 2025-04-22 RX ORDER — HALOPERIDOL 5 MG/ML
5 INJECTION INTRAMUSCULAR ONCE
Status: COMPLETED | OUTPATIENT
Start: 2025-04-22 | End: 2025-04-22

## 2025-04-22 RX ORDER — DIPHENHYDRAMINE HYDROCHLORIDE 50 MG/ML
25 INJECTION, SOLUTION INTRAMUSCULAR; INTRAVENOUS
Status: COMPLETED | OUTPATIENT
Start: 2025-04-22 | End: 2025-04-22

## 2025-04-22 RX ORDER — LIDOCAINE HYDROCHLORIDE 10 MG/ML
2 INJECTION, SOLUTION EPIDURAL; INFILTRATION; INTRACAUDAL; PERINEURAL ONCE
Status: COMPLETED | OUTPATIENT
Start: 2025-04-22 | End: 2025-04-22

## 2025-04-22 RX ADMIN — CEPHALEXIN 500 MG: 250 CAPSULE ORAL at 08:34

## 2025-04-22 RX ADMIN — HALOPERIDOL LACTATE 5 MG: 5 INJECTION, SOLUTION INTRAMUSCULAR at 16:22

## 2025-04-22 RX ADMIN — Medication 2 MG: at 11:38

## 2025-04-22 RX ADMIN — Medication 2 MG: at 02:26

## 2025-04-22 RX ADMIN — HYDROMORPHONE HYDROCHLORIDE 1 MG: 2 TABLET ORAL at 21:31

## 2025-04-22 RX ADMIN — GABAPENTIN 300 MG: 300 CAPSULE ORAL at 08:34

## 2025-04-22 RX ADMIN — DIPHENHYDRAMINE HYDROCHLORIDE 25 MG: 50 INJECTION INTRAMUSCULAR; INTRAVENOUS at 16:21

## 2025-04-22 RX ADMIN — TIZANIDINE 2 MG: 4 TABLET ORAL at 13:13

## 2025-04-22 RX ADMIN — LIDOCAINE HYDROCHLORIDE 2 ML: 10 INJECTION, SOLUTION EPIDURAL; INFILTRATION; INTRACAUDAL; PERINEURAL at 09:54

## 2025-04-22 RX ADMIN — GABAPENTIN 300 MG: 300 CAPSULE ORAL at 15:08

## 2025-04-22 RX ADMIN — Medication 2 MG: at 08:33

## 2025-04-22 ASSESSMENT — PAIN SCALES - GENERAL
PAINLEVEL_OUTOF10: 6
PAINLEVEL_OUTOF10: 6
PAINLEVEL_OUTOF10: 8
PAINLEVEL_OUTOF10: 6
PAINLEVEL_OUTOF10: 0
PAINLEVEL_OUTOF10: 9
PAINLEVEL_OUTOF10: 9
PAINLEVEL_OUTOF10: 8
PAINLEVEL_OUTOF10: 8

## 2025-04-22 ASSESSMENT — PAIN DESCRIPTION - LOCATION
LOCATION: FLANK

## 2025-04-22 ASSESSMENT — PAIN DESCRIPTION - DESCRIPTORS
DESCRIPTORS: SHARP;SHOOTING
DESCRIPTORS: STABBING
DESCRIPTORS: SHARP;SHOOTING;SPASM
DESCRIPTORS: STABBING
DESCRIPTORS: SPASM
DESCRIPTORS: SHOOTING;SHARP

## 2025-04-22 ASSESSMENT — PAIN DESCRIPTION - ORIENTATION
ORIENTATION: RIGHT

## 2025-04-22 ASSESSMENT — PAIN - FUNCTIONAL ASSESSMENT
PAIN_FUNCTIONAL_ASSESSMENT: NONE - DENIES PAIN
PAIN_FUNCTIONAL_ASSESSMENT: 0-10

## 2025-04-22 ASSESSMENT — PAIN DESCRIPTION - PAIN TYPE: TYPE: CHRONIC PAIN

## 2025-04-22 ASSESSMENT — PAIN DESCRIPTION - ONSET: ONSET: ON-GOING

## 2025-04-22 NOTE — PROGRESS NOTES
Nephrology Progress Note  [facility] / [place] Office  [address], [address]  [place] [address]  Phone - [phone]  Fax - [phone]                   Patient: Keaton Van                     YOB: 1965        Date- 4/22/2025                                     Admit Date: 4/19/2025   CC: Follow up for ESRD          IMPRESSION & PLAN:   ESRD- TTS, [facility], right chest PermCath)  Surgical wound dehiscence  Cat bite/left upper extremity cellulitis,recurrent  Index finger cellulitis, MCP joint septic arthritis, juxta articular abscess  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  PermCath has been removed  Nothing much to add from renal standpoint, renal team will sign off call if needed  Spoke to both her sisters at bedside discussed the plan in detail with them along with the patient.     Subjective:  Interval History:   - Seen and examined today in the ED  - Spoke to both the sisters at bedside    Objective:   Vitals:    04/22/25 0722 04/22/25 0833 04/22/25 1138 04/22/25 1427   BP:  (!) 161/81  (!) 174/87   Pulse:  70  73   Resp:  18 18 19   Temp:    98.1 °F (36.7 °C)   TempSrc:    Oral   SpO2:    98%   Weight: 81.6 kg (180 lb)         No intake/output data recorded.  I/O this shift:  In: 720 [P.O.:720]  Out: -       Physical exam:    GEN: NAD  NECK- no mass  RESP: No wheezing, decreased BS b/l  CVS: S1,S2  RRR  NEURO: Normal speech, Non focal  EXT: No Edema       Chart reviewed.         Pertinent Notes reviewed.       Data Review :  Lab Results   Component Value Date/Time     04/21/2025 03:11 PM    K 5.6 04/21/2025 03:11 PM     04/21/2025 03:11 PM    CO2 24 04/21/2025 03:11 PM    BUN 49 04/21/2025 03:11 PM    CREATININE 3.61 04/21/2025 03:11 PM    GLUCOSE

## 2025-04-22 NOTE — BSMART NOTE
Sonjat spoke to attending provider to request a tele psych consult to determine if patient's disposition has changed since being in the ED. Provider will order consult accordingly.

## 2025-04-22 NOTE — ED NOTES
Perfect [employer] message sent to TelePsych team after reading note from Psych NP Jyoti Jauregui. Please called [facility] [phone] or [phone] for Charge RN to coordinate tele-psych session.

## 2025-04-22 NOTE — BSMART NOTE
BSMART Liaison Team Note     LOS:  56.36     Patient goal(s) for today: Patient expressed he would rather go home then sit in the ED any longer. Patient is motivated to get the help he needs and acknowledges he needs some assistance.  BSMART Liaison team focus/goals: access needs, provide support, coordinate care    Progress note: This counselor met with patient via telepsych. Patient reported he was feeling in better spirits now. Patient denied any active suicidal thoughts, denied homicidal thoughts and hallucinations. Patient reported having an episode earlier in which he was upset because he pain medication he was given wasnot working. Patient stated he knows he was wrong for what he did and mostly to his sister because he broke their trust but he stated they do not know how it feel to live how he is living. Patient stated he is open to medications or whatever can help him but it is not fair for him to continue to stay in ED. Patient denied any concerns at this time.       Barriers to Discharge: appropriate bed placement  Guns in the home: No     Outpatient provider(s):  none  Insurance info/prescription coverage:  Medicare Part A and B    Diagnosis: Major Depressive Disorder without psychosis    Plan:  Continue bed search.   Follow up Psych Consult placed? Yes .   Psychiatrist updated? No        Participating treatment team members: Odette Gary MA

## 2025-04-22 NOTE — PROGRESS NOTES
Medicated with Dilaudid for back pain.  Pain 10/10 at this time per patient report.    Supper tray ordered.

## 2025-04-22 NOTE — PROGRESS NOTES
Bedside handoff to Ijeoma YA.  Patient voluntary psychiatric hold for suicidal ideation.  Sitter continued.  [facility] continues bed search.  Telepsych consult pending at this time.

## 2025-04-22 NOTE — ED NOTES
Patient just received his pain meds, still in severe pain. stated  to his nurse that he could not take the pain any more, it's unbearable.  It's a stabbing pain into his back that will not stop.  He now is laying on his left side and crying.

## 2025-04-22 NOTE — VIRTUAL HEALTH
Confirm you are appropriately licensed, registered, or certified to deliver care in the state where the patient is located as indicated above. If you are not or unsure, please re-schedule the visit: Yes, I confirm.   Consults     Total time spent on this encounter: Not billed by time    --Jyoti Jauregui, LOIS - CNP on 4/22/2025 at 11:00 AM    An electronic signature was used to authenticate this note.  Received request for Telepsychiatry evaluation.  Delay Information, Chart reviewed, and Unable to reach site on multiple attempts, to coordinate moving video equipment for evaluation.  Will check back later, or reach out via [facility] app in the interim, if ER staff are available to assist.   Unsuccessfully attempted to contact ED x 3 through Perfect Serve in the past half hour to initiate consult.   
Consults     Reason for Cancel:  Unable to perform consult.    Consult placed for Capacity.  Per [place] law, 2 physicians are required to determine if patient has capacity.  Spoke with Dr. PAGE Srivastava regarding patient.  Consult order will be canceled.        --Zhang Jones, LOIS - CNP on 4/21/2025 at 6:06 PM    An electronic signature was used to authenticate this note.    
Keaton Ramirezach  [id number]  1965     EMERGENCY DEPARTMENT TELEPSYCHIATRY EVALUATION    04/19/25    Chief Complaint:  “Suicide Attempt”  HPI: Patient is a 59 y.o.  male who presents for drug overdose and continuing suicidal ideation. Patient presented to the ED on 04/19/25 from home after overdosing on 65 - 1mg ativan last night.  States \"I'm in kidney failure and I'm gonna die from that and I just don't want to live anymore. Nobody cares about me I should just die.\"    Past Psychiatric History:  Previous Diagnoses/symptoms: personality disorder NOS, bipolar 1 disorder, polysubstance abuse  Previous suicide attempts/self-harm: yes - multiple  Inpatient psychiatric hospitalizations: yes - 6x  Current outpatient psychiatric provider: Denies  Current therapist: States not in therapy  Previous psychiatric medication trials: ativan, \"a lot in the past\"  Current psychiatric medications: No current psychiatric medications  History of ECT: no  Family Psychiatric History: Denies    Substance Abuse History:  Tobacco: Endorses use  Alcohol: Denies  Marijuana: Denies  Stimulant: Endorses history of cocaine  Opiates: Denies  Benzodiazepine: Denies  Other illicit drug usage: Denies  History of substance/alcohol abuse treatment: Denies    Social History:  Education: Some college  Living Situation/Interest: with sister  Marital/Committed relationship and parenting hx:   Occupation: Unemployed  Legal History/Hx of Violence: Denies  Spiritual History: Denies  Psychological trauma, neglect, or abuse: denies hx of trauma/abuse   Access to guns or other weapons: denies having access to firearms/dangerous weapons     Past Medical History:  Active Ambulatory Problems     Diagnosis Date Noted    Diabetic foot ulcer (HCC) 02/12/2022    Personality disorder (HCC) 07/26/2013    Non compliance with medical treatment 11/03/2011    Tobacco abuse 01/25/2018    Poorly-controlled hypertension 01/25/2018    Closed fracture of right 
Reason for Cancel: It has not been 24 hr since inpatient psych admission was already recommended; was last seen on 4/19/25 @ 1:23 pm for suicide attempt.     Telepsych BH Crisis 24-Hour Reassessment       Chief Complaint: \"presents for drug overdose and continuing suicidal ideation. Patient presented to the ED on 04/19/25 from home after overdosing on 65 - 1mg ativan last night.  States \"I'm in kidney failure and I'm gonna die from that and I just don't want to live anymore. Nobody cares about me I should just die.\"     Current Suicide Risk (Low, Moderate, High): continue HIGH; has not not 24 hr    Dx:   Suicide Attempt  Major depressive disorder     Keaton Van, was evaluated through a synchronous (real-time) audio-video encounter. The patient (and/or guardian if applicable) is aware that this is a billable service, which includes applicable co-pays. This virtual visit was conducted with patient's (and/or legal guardian's) consent. Patient identification was verified, and a caregiver was present when appropriate.  The patient was located at Facility (Appt Department): [facility] EMERGENCY DEPARTMENT  [address] [id number]  Loc: [phone]  The provider was located at Home (City/State): [place]  Confirm you are appropriately licensed, registered, or certified to deliver care in the state where the patient is located as indicated above. If you are not or unsure, please re-schedule the visit: No, this visit will be rescheduled.   [place] Consult to Tele-Psych  Consult performed by: Brenda Martines LISW  Consult ordered by: Sahil Durham MD  Reason for consult: re-assess           Total time spent on this encounter: Not billed by time    --JEZ Carvalho on 4/20/2025 at 7:05 AM    An electronic signature was used to authenticate this note.    
genitourinary, integumentary, neurological, musculoskeletal, or immunological symptoms today.   PSYCHIATRIC: See HPI above.    PSYCHIATRIC EXAMINATION / MENTAL STATUS EXAM    Level of consciousness:  within normal limits   Appearance:  hospital attire.  Does appear stated age. No acute distress.  Behavior/Motor: no psychomotor agitation, retardation, or abnormal movements noted  Attitude toward examiner:  minimizing  SI/HI: minimizing SI, denies HI  Speech:  rapid and pressured   Mood: anxious  Affect:  anxious  Thought Processes:  circumstantial.   Thought Content:  No delusions or other perceptual abnormalities.  Cognition:  oriented to person, place, and time   Concentration: intact  Memory: intact, though not formally tested.  Insight: poor   Judgement: poor   Fund of Knowledge: adequate    C-SSRS Score       4/20/2025     8:00 PM   C-SSRS Suicide Screening   1) Within the past month, have you wished you were dead or wished you could go to sleep and not wake up?  Yes   2) Have you actually had any thoughts of killing yourself?  Yes   3) Have you been thinking about how you might kill yourself?  Yes   4) Have you had these thoughts and had some intention of acting on them?  Yes   5) Have you started to work out or worked out the details of how to kill yourself? Do you intend to carry out this plan?  Yes   6) Have you ever done anything, started to do anything, or prepared to do anything to end your life? Yes   Did this occur within the past 3 months?  Yes    :      Overall Level Suicide Risk: [facility] CSSRS Risk Level: High Risk    Assessment:   This is a 59 y.o. male patient who is being seen for a reassessment. Patient remains at high risk for suicide despite minimizing SI today. Given recent suicide attempt by ingesting #65 Ativan and multiple factors that place him at high risk, recommend inpatient admission for safety and stabilization. Patient appears unwilling to be admitted voluntarily and will likely 
  TelePsych recommendations:Inpatient psychiatric admission     Legal hold: No Involuntary Hold    Telepsychiatry will sign off. Thank you for allowing us to participate in the care of this patient. Please send message or call via [employer] if anything more is required.     Electronically signed by LOIS Golden CNP on 4/20/2025 at 2:48 PM.       Keaton Van, was evaluated through a synchronous (real-time) audio-video encounter. The patient (and/or guardian if applicable) is aware that this is a billable service, which includes applicable co-pays. This virtual visit was conducted with patient's (and/or legal guardian's) consent. Patient identification was verified, and a caregiver was present when appropriate.  The patient was located at Facility (Appt Department): [facility] EMERGENCY DEPARTMENT  [address] [address]  Loc: [phone]  The provider was located at Home (City/State): [facility]  Confirm you are appropriately licensed, registered, or certified to deliver care in the state where the patient is located as indicated above. If you are not or unsure, please re-schedule the visit: Yes, I confirm.   Consults     Total time spent on this encounter:  60 minutes    --LOIS Golden CNP on 4/20/2025 at 2:48 PM    An electronic signature was used to authenticate this note.

## 2025-04-22 NOTE — ED NOTES
Pt agreed to take Cephalexin, Gabapentin, and Dilaudid PO for pain. Pt refused cardiac medications, see MAR. 1:1 sitter at bedside.

## 2025-04-22 NOTE — ED NOTES
Patient is trying to sleep, but he keeps on waking up because his pain is so severe & he's not given anything for breakthrough pain.

## 2025-04-22 NOTE — ED NOTES
Assumed care at this time from Ijeoma YA. Pt denies SI/HI. 1:1 constant observer at bedside. Pt resting quietly. Pt denies pain at this time, see flowsheets.

## 2025-04-22 NOTE — BSMART NOTE
[facility] Liaison Team Note     LOS:   41 hours 05 minutes     Patient goal(s) for today: \"I'm hoping to get a bed today. I don't think I can stay in this room much longer.\" Take medications as prescribed. Make needs known in an appropriate manner.    [facility]MART Liaison team focus/goals: Access needs, provide support and education, engage in supportive counseling if possible, coordinate care, contact crisis if necessary.    Progress note:   This counselor met face to face with pt who reports, \"I'm hoping to get a bed today. I don't think I can stay in this room much longer.\" Patient seems to be in good spirits as evidenced by smiling and saying, \"Hey, It's good to see you again.\" Patient reports having a rough night sleep and states, \"I was drawn up in the fetal position crying, the pain was so bad, but eventually I went to sleep.\" He reports that he spoke with tele psych earlier today and let them know \"nothing has really changed. I told them I didn't know why I overdosed on those pills. I've overdosed before. I guess God's not ready for me yet.\" Patient states he had planned most recent suicide attempt for about a month, \"even made sure the cats were fed.\" He continues to state, \"I lived the way I wanted to live and I'm going to die the way I want to die. I'm not suicidal but I'm just not interested in doing dialysis. That's no way to live man.\" Patient says he is willing to stay in the ED for a couple more days but if a bed is not found soon he will choose to be discharged to his sister/Anuradha's home. Patient reports enjoying the company of the 1:1 techs saying, \"I bet I talked about 9 hours last might with one of them.\" He also enjoys watching TV to pass the time or paces in his room when he feels \"boxed in.\"  Patient has no other concerns or complaints at this time.   Liaison service will continue to follow patient while being held in the ED and provide updates on bed search.       Barriers to Discharge: voluntary bed

## 2025-04-22 NOTE — ED NOTES
Assumed care of patient at this time, patient found to be resting in bed, patient found to be sleeping with bilateral chest rise and fall, bed in lowest position. 1:1 sitter at bedside, room removed of ligature risks. Will continue to monitor and re access when pt is awake.

## 2025-04-22 NOTE — BSMART NOTE
ADULT VOLUNTARY BED SEARCH STARTED/RESUMED AT 1440 on 4/22/2025:     DEANN SHAE ([facility], [facility], [facility], [facility]): Valentina reports no dialysis bed available at this time     [facility]: Stephania - on call psych does not feel comfortable taking a voluntary patient who is refusing dialysis     [facility] ([place], [place], [place], [place], [place], [facility]): Alish -  exclusionary due to patient on dialysis and need a private bed     [place]: Lillie - exclusionary due to patient on dialysis and need a private bed     [facility]: Milagros -  exclusionary for dialysis and need for wound care.     [place]: Sandra -  exclusionary for dialysis and need for wound care.     [facility]: Rafiq - at capacity      [facility]: Kings - wound care is exclusionary     [facility]: Elsa -  exclusionary for dialysis and need for wound care.     [facility]: Orin -  exclusionary for dialysis and need for wound care.     [facility]: Ani - wound care exclusionary     [facility]: Briana - fax over patient's packet for review. We have packet but it hasn't been reviewed     [employer] ([facility], [facility], [place]): Malissa - No psych medical beds available at this time. Call back tomorrow.     [facility]: busy, attempted to fax (busy)     [facility]: Rylan - exclusionary for dialysis and need for wound care.     [facility]:  Abhishek - exclusionary for dialysis       [facility]:  Guilherme - exclusionary for wound care. We are at capacity.       [facility] ([facility]): Davon - exclusionary due to patient on dialysis and need a private bed     Only hospitals noted in BOLD have possible bed(s) at this time. Bsmart counselor will continue to monitor bed search.

## 2025-04-22 NOTE — ED NOTES
Patient is in excruciating pain and only receiving pain meds every 4 hours.  He has not been offered any break through pain meds, since I have been sitting with him. He said

## 2025-04-23 ENCOUNTER — HOSPITAL ENCOUNTER (INPATIENT)
Facility: HOSPITAL | Age: 60
LOS: 4 days | Discharge: STILL ADMITTED OR ADMINISTRATIVE | DRG: 885 | End: 2025-04-27
Attending: PSYCHIATRY & NEUROLOGY | Admitting: HOSPITALIST
Payer: MEDICARE

## 2025-04-23 VITALS
SYSTOLIC BLOOD PRESSURE: 188 MMHG | BODY MASS INDEX: 24.41 KG/M2 | WEIGHT: 180 LBS | TEMPERATURE: 97.9 F | DIASTOLIC BLOOD PRESSURE: 78 MMHG | OXYGEN SATURATION: 98 % | RESPIRATION RATE: 16 BRPM | HEART RATE: 77 BPM

## 2025-04-23 PROBLEM — F32.A DEPRESSION: Status: ACTIVE | Noted: 2025-04-23

## 2025-04-23 PROCEDURE — 1240000000 HC EMOTIONAL WELLNESS R&B

## 2025-04-23 PROCEDURE — 6370000000 HC RX 637 (ALT 250 FOR IP)

## 2025-04-23 PROCEDURE — 6370000000 HC RX 637 (ALT 250 FOR IP): Performed by: STUDENT IN AN ORGANIZED HEALTH CARE EDUCATION/TRAINING PROGRAM

## 2025-04-23 PROCEDURE — 5A1935Z RESPIRATORY VENTILATION, LESS THAN 24 CONSECUTIVE HOURS: ICD-10-PCS | Performed by: STUDENT IN AN ORGANIZED HEALTH CARE EDUCATION/TRAINING PROGRAM

## 2025-04-23 PROCEDURE — 96372 THER/PROPH/DIAG INJ SC/IM: CPT

## 2025-04-23 PROCEDURE — 6360000002 HC RX W HCPCS: Performed by: STUDENT IN AN ORGANIZED HEALTH CARE EDUCATION/TRAINING PROGRAM

## 2025-04-23 PROCEDURE — 0BH17EZ INSERTION OF ENDOTRACHEAL AIRWAY INTO TRACHEA, VIA NATURAL OR ARTIFICIAL OPENING: ICD-10-PCS | Performed by: STUDENT IN AN ORGANIZED HEALTH CARE EDUCATION/TRAINING PROGRAM

## 2025-04-23 RX ORDER — HALOPERIDOL 5 MG/1
5 TABLET ORAL EVERY 4 HOURS PRN
Status: DISCONTINUED | OUTPATIENT
Start: 2025-04-23 | End: 2025-04-27 | Stop reason: HOSPADM

## 2025-04-23 RX ORDER — AMLODIPINE BESYLATE 5 MG/1
10 TABLET ORAL
Status: COMPLETED | OUTPATIENT
Start: 2025-04-23 | End: 2025-04-23

## 2025-04-23 RX ORDER — HYDROXYZINE HYDROCHLORIDE 50 MG/1
50 TABLET, FILM COATED ORAL 3 TIMES DAILY PRN
Status: DISCONTINUED | OUTPATIENT
Start: 2025-04-23 | End: 2025-04-27 | Stop reason: HOSPADM

## 2025-04-23 RX ORDER — MAGNESIUM HYDROXIDE/ALUMINUM HYDROXICE/SIMETHICONE 120; 1200; 1200 MG/30ML; MG/30ML; MG/30ML
30 SUSPENSION ORAL EVERY 6 HOURS PRN
Status: DISCONTINUED | OUTPATIENT
Start: 2025-04-23 | End: 2025-04-27 | Stop reason: HOSPADM

## 2025-04-23 RX ORDER — NICOTINE 21 MG/24HR
1 PATCH, TRANSDERMAL 24 HOURS TRANSDERMAL DAILY
Status: DISCONTINUED | OUTPATIENT
Start: 2025-04-24 | End: 2025-04-27 | Stop reason: HOSPADM

## 2025-04-23 RX ORDER — TRAZODONE HYDROCHLORIDE 50 MG/1
50 TABLET ORAL NIGHTLY PRN
Status: DISCONTINUED | OUTPATIENT
Start: 2025-04-23 | End: 2025-04-27 | Stop reason: HOSPADM

## 2025-04-23 RX ORDER — HYDRALAZINE HYDROCHLORIDE 50 MG/1
50 TABLET, FILM COATED ORAL
Status: COMPLETED | OUTPATIENT
Start: 2025-04-23 | End: 2025-04-23

## 2025-04-23 RX ORDER — DIPHENHYDRAMINE HYDROCHLORIDE 50 MG/ML
12.5 INJECTION, SOLUTION INTRAMUSCULAR; INTRAVENOUS
Status: DISCONTINUED | OUTPATIENT
Start: 2025-04-23 | End: 2025-04-23

## 2025-04-23 RX ORDER — DIPHENHYDRAMINE HYDROCHLORIDE 50 MG/ML
50 INJECTION, SOLUTION INTRAMUSCULAR; INTRAVENOUS EVERY 4 HOURS PRN
Status: DISCONTINUED | OUTPATIENT
Start: 2025-04-23 | End: 2025-04-27 | Stop reason: HOSPADM

## 2025-04-23 RX ORDER — POLYETHYLENE GLYCOL 3350 17 G/17G
17 POWDER, FOR SOLUTION ORAL DAILY PRN
Status: DISCONTINUED | OUTPATIENT
Start: 2025-04-23 | End: 2025-04-27

## 2025-04-23 RX ORDER — HALOPERIDOL 5 MG/ML
5 INJECTION INTRAMUSCULAR ONCE
Status: COMPLETED | OUTPATIENT
Start: 2025-04-23 | End: 2025-04-23

## 2025-04-23 RX ORDER — SENNOSIDES A AND B 8.6 MG/1
1 TABLET, FILM COATED ORAL DAILY PRN
Status: DISCONTINUED | OUTPATIENT
Start: 2025-04-23 | End: 2025-04-27 | Stop reason: HOSPADM

## 2025-04-23 RX ORDER — DIPHENHYDRAMINE HYDROCHLORIDE 50 MG/ML
12.5 INJECTION, SOLUTION INTRAMUSCULAR; INTRAVENOUS
Status: COMPLETED | OUTPATIENT
Start: 2025-04-23 | End: 2025-04-23

## 2025-04-23 RX ORDER — BUMETANIDE 1 MG/1
2 TABLET ORAL
Status: COMPLETED | OUTPATIENT
Start: 2025-04-23 | End: 2025-04-23

## 2025-04-23 RX ORDER — ACETAMINOPHEN 325 MG/1
650 TABLET ORAL EVERY 4 HOURS PRN
Status: DISCONTINUED | OUTPATIENT
Start: 2025-04-23 | End: 2025-04-27 | Stop reason: HOSPADM

## 2025-04-23 RX ORDER — LOSARTAN POTASSIUM 25 MG/1
25 TABLET ORAL
Status: COMPLETED | OUTPATIENT
Start: 2025-04-23 | End: 2025-04-23

## 2025-04-23 RX ORDER — HALOPERIDOL 5 MG/ML
5 INJECTION INTRAMUSCULAR EVERY 4 HOURS PRN
Status: DISCONTINUED | OUTPATIENT
Start: 2025-04-23 | End: 2025-04-27 | Stop reason: HOSPADM

## 2025-04-23 RX ADMIN — HYDRALAZINE HYDROCHLORIDE 50 MG: 50 TABLET ORAL at 13:33

## 2025-04-23 RX ADMIN — TRAZODONE HYDROCHLORIDE 50 MG: 50 TABLET ORAL at 23:23

## 2025-04-23 RX ADMIN — DIPHENHYDRAMINE HYDROCHLORIDE 12.5 MG: 50 INJECTION INTRAMUSCULAR; INTRAVENOUS at 17:46

## 2025-04-23 RX ADMIN — HALOPERIDOL LACTATE 5 MG: 5 INJECTION, SOLUTION INTRAMUSCULAR at 17:49

## 2025-04-23 RX ADMIN — HYDROMORPHONE HYDROCHLORIDE 1 MG: 2 TABLET ORAL at 16:08

## 2025-04-23 RX ADMIN — BUMETANIDE 2 MG: 1 TABLET ORAL at 13:34

## 2025-04-23 RX ADMIN — AMLODIPINE BESYLATE 10 MG: 5 TABLET ORAL at 13:33

## 2025-04-23 RX ADMIN — HYDROMORPHONE HYDROCHLORIDE 1 MG: 2 TABLET ORAL at 09:50

## 2025-04-23 RX ADMIN — LOSARTAN POTASSIUM 25 MG: 25 TABLET, FILM COATED ORAL at 13:33

## 2025-04-23 ASSESSMENT — PAIN SCALES - GENERAL
PAINLEVEL_OUTOF10: 7
PAINLEVEL_OUTOF10: 6
PAINLEVEL_OUTOF10: 7
PAINLEVEL_OUTOF10: 0
PAINLEVEL_OUTOF10: 8
PAINLEVEL_OUTOF10: 7

## 2025-04-23 ASSESSMENT — PAIN DESCRIPTION - LOCATION: LOCATION: FLANK

## 2025-04-23 ASSESSMENT — SLEEP AND FATIGUE QUESTIONNAIRES
AVERAGE NUMBER OF SLEEP HOURS: 9
DO YOU HAVE DIFFICULTY SLEEPING: NO
DO YOU USE A SLEEP AID: NO

## 2025-04-23 ASSESSMENT — PAIN - FUNCTIONAL ASSESSMENT
PAIN_FUNCTIONAL_ASSESSMENT: 0-10

## 2025-04-23 NOTE — ED NOTES
Provided patient with meal at this time, patient is alert and oriented, sitting up in the bed, reports pain in his right flank, updated Mj, primary RN.

## 2025-04-23 NOTE — ED NOTES
Pt informed of transfer being discontinued. PT is frustrated stating \"I feel like I am in a long term. This makes me want to end my life. I should have ended my life\". Sitter 1:1 at bedside. This sitter is providing music to help calm pt.

## 2025-04-23 NOTE — PROGRESS NOTES
Spoke with ED provider, Dr. Goncalves, to provide NP Mariama Briscoe's contact information for a doc to doc. She would like to discuss patient's K level and high BP.

## 2025-04-23 NOTE — ED NOTES
Following attending MD and Dr Davis conference, Dr Davis accepting pt to [facility] once SBP < 190

## 2025-04-23 NOTE — BSMART NOTE
Patient was accepted to [facility] bed 733 by VALENTÍN Briscoe. The number for report is [phone]. Nurse Jadyn made aware.

## 2025-04-23 NOTE — ED NOTES
Bedside shift change report given to Alan RN (oncoming nurse) by Yandy RN (offgoing nurse). Report included the following information Nurse Handoff Report.

## 2025-04-23 NOTE — BSMART NOTE
[facility]MART Liaison Team Note     LOS:  95 hours     Patient goal(s) for today: Make needs known in an appropriate manner, utilize coping skills, take medications as prescribed  [facility]ART Liaison team focus/goals: Assess needs, provide supportive counseling, coordinate care    Progress note: This clinician met with Keaton face to face in the ED. He was dressed in paper scrubs and was sitting on his bed eating breakfast. He reports being in better spirits today and states he requested to be sedated yesterday because he was getting antsy and wanting to leave. He reports feeling claustrophobic in his room and is ready to get a U bed. He reports being in chronic pain, especially from his kidneys, but states he will still not do dialysis. He denies SI this morning and states \"I have had a lot of time to think about what I did.\" This was not his first suicide attempt. He states he \"doesn't like life\" because \"there is too much hate in this world and that's not me.\" His sister arrived while this clinician was meeting with Keaton. He was happy to see her. Keaton denies any needs at this time other than assistance with wound care on his leg and a new set of paper scrubs. Supportive counseling was provided.     Barriers to Discharge: Bed placement that can accommodate wound care  Guns in the home: No     Outpatient provider(s):  none provided  Insurance info/prescription coverage:  MEDICARE PART A AND B     Diagnosis: Major Depressive disorder    Plan:  to continue voluntary bed search.   Follow up Psych Consult placed? Yes .   Psychiatrist updated? No        Participating treatment team members: Aayush Gary LCSW

## 2025-04-23 NOTE — ED NOTES
[facility] lanette Pino called for patient update, patient sleeping at this time but this author provided update on patient thus far during shift.

## 2025-04-23 NOTE — BSMART NOTE
ADULT VOLUNTARY BED SEARCH STARTED/RESUMED AT 4/23/2025:    DEANN KAUFMAN ([facility], [facility]): contacted at 10:05 am spoke with Access reported they are waiting to hear back from [facility]: exclusionary    [facility] ([facility], [place], [place], [facility]): exclusionary    [place]: exclusionary     [facility]: exclusionary    [place]: exclusionary    [facility]: exclusionary    [facility]: exclusionary    [facility]: exclusionary    [facility]    [facility] exclusion[facility]    [facility]: exclusionary    [facility] ([place], [place], [facility] exclusionary    [facility]: exclusionary    [facility]: exclusionary    [facility]: exclusionary    [facility]: exclusionary    [facility] ([facility]): exclusionary      Aayush Shannon

## 2025-04-23 NOTE — ED NOTES
This RN confirmed with patient that he is not receiving outpatient hospice and has been receiving no inpatient hospice care

## 2025-04-23 NOTE — ED NOTES
Bedside and Verbal shift change report given to Yandy (oncoming nurse) by Mj (offgoing nurse). Report included the following information Nurse Handoff Report, Index, ED Encounter Summary, and ED SBAR.

## 2025-04-23 NOTE — ED NOTES
Deferred CSSR-S frequent screen and shift vital signs d/t patient sleeping. Constant observer to alert this RN when the patient wakes up.

## 2025-04-23 NOTE — ED NOTES
Pt used the bathroom in the bedside commode, once finished I provided pt with a new pair of paper scrub pants and cleaned and replaced a bedside commode in the pts room.

## 2025-04-23 NOTE — BSMART NOTE
Spoke with Access who states that Dr. Davis is recommending a medical admission with a psychiatric consult to manage his benzo withdrawal and elevated blood pressure. Dr. Davis is available for a doc to doc if needed. Charge nurse, Jadyn, made aware. She will relay information to ED provider, Dr. Cummins.

## 2025-04-23 NOTE — CONSULTS
Clinical Ethics Consultation Note    History and Context:  Consulted by Dr. Murrell to discuss 60 yo male presented to ED for attempted suicide.     Length of Stay: 0  Verified Advance Directive: Yes      Ethical Question(s) and Concern(s):  Is it ethically appropriate not to offer dialysis?    Analysis:    Beneficence:   The care team is committed to promoting the patient's interests, doing good for the patient through medically appropriate care, and protecting the patient from undue harm. Therefore, the care team is obligated to offer treatments that, in their medical opinion, have a reasonable chance of achieving medical benefit. The team should avoid providing medical interventions that do not have a reasonable chance of achieving medical benefit.      ERD #33: “The well-being of the whole person must be taken into account in deciding about any therapeutic intervention or use of technology. Therapeutic procedures that are likely to cause harm or undesirable side-effects can be justified only by a proportionate benefit to the patient.”   Autonomy  Patients have the right to make their own decisions to accept or reject medical treatment.    Recommendations:  Dialysis is morally optional given the patient's autonomy and dialysis's risk and benefits.  Continue to attend to potential patient suffering and whole person care.    Closing:  Thank you for including me in the care of Mr. Van. Ethics will sign off.      Nanette Owusu  Ethics Consultant    Primary Ethical Themes: End of Life  Secondary Ethical Themes: Treatment Appropriateness, Treatment Adherence (Consistent Pt refusal of dialysis)  
PALLIATIVE MEDICINE      Palliative Medicine was consulted for ESRD, previously on hospice, has not had dialysis since ashley wants to die .  This patient is in the ED for suicide attempts, continuing to threaten suicide, is being followed by psychiatry, therefore, not appropriate for Palliative services.  I will cancel this consult.  Please re-consult if Palliative Medicine can be of further assistance.     Thank you for including Palliative Medicine in this patient's care.   THOM Ramirez    
Associates:  [email or web address]  [email or web address]    [place] office:  [address] [place] [address]  Phone: [phone]  Fax :     [phone]    [place] office:  [address] [place] [address]  Phone - [phone]  Fax - [phone]

## 2025-04-23 NOTE — ED NOTES
Pt made aware that he no longer has a bed assignment at [facility] and is frustrating. Pt remains in stretcher at this time but stating that he is \"pissed\" and \"wants to be sedated\"

## 2025-04-24 PROCEDURE — 1240000000 HC EMOTIONAL WELLNESS R&B

## 2025-04-24 PROCEDURE — 6370000000 HC RX 637 (ALT 250 FOR IP): Performed by: PSYCHIATRY & NEUROLOGY

## 2025-04-24 PROCEDURE — 6370000000 HC RX 637 (ALT 250 FOR IP)

## 2025-04-24 RX ORDER — FUROSEMIDE 40 MG/1
40 TABLET ORAL DAILY
Status: ON HOLD | COMMUNITY

## 2025-04-24 RX ORDER — DULOXETIN HYDROCHLORIDE 20 MG/1
20 CAPSULE, DELAYED RELEASE ORAL 2 TIMES DAILY
Status: DISCONTINUED | OUTPATIENT
Start: 2025-04-24 | End: 2025-04-26

## 2025-04-24 RX ORDER — HYDRALAZINE HYDROCHLORIDE 50 MG/1
50 TABLET, FILM COATED ORAL 2 TIMES DAILY
Status: ON HOLD | COMMUNITY

## 2025-04-24 RX ORDER — GABAPENTIN 300 MG/1
300 CAPSULE ORAL ONCE
Status: COMPLETED | OUTPATIENT
Start: 2025-04-24 | End: 2025-04-24

## 2025-04-24 RX ORDER — CLONIDINE HYDROCHLORIDE 0.1 MG/1
0.1 TABLET ORAL EVERY 4 HOURS PRN
Status: DISCONTINUED | OUTPATIENT
Start: 2025-04-24 | End: 2025-04-27 | Stop reason: HOSPADM

## 2025-04-24 RX ORDER — AMLODIPINE BESYLATE 5 MG/1
10 TABLET ORAL DAILY
Status: DISCONTINUED | OUTPATIENT
Start: 2025-04-24 | End: 2025-04-27 | Stop reason: HOSPADM

## 2025-04-24 RX ORDER — HYDRALAZINE HYDROCHLORIDE 25 MG/1
25 TABLET, FILM COATED ORAL ONCE
Status: COMPLETED | OUTPATIENT
Start: 2025-04-24 | End: 2025-04-24

## 2025-04-24 RX ORDER — LORAZEPAM 1 MG/1
1 TABLET ORAL EVERY 6 HOURS PRN
Status: ON HOLD | COMMUNITY

## 2025-04-24 RX ORDER — GABAPENTIN 300 MG/1
300 CAPSULE ORAL 2 TIMES DAILY
Status: DISCONTINUED | OUTPATIENT
Start: 2025-04-24 | End: 2025-04-27 | Stop reason: HOSPADM

## 2025-04-24 RX ORDER — HYDRALAZINE HYDROCHLORIDE 50 MG/1
50 TABLET, FILM COATED ORAL EVERY 8 HOURS SCHEDULED
Status: DISCONTINUED | OUTPATIENT
Start: 2025-04-24 | End: 2025-04-27 | Stop reason: HOSPADM

## 2025-04-24 RX ORDER — BUMETANIDE 1 MG/1
2 TABLET ORAL 2 TIMES DAILY
Status: DISCONTINUED | OUTPATIENT
Start: 2025-04-24 | End: 2025-04-24

## 2025-04-24 RX ORDER — GABAPENTIN 800 MG/1
800 TABLET ORAL 3 TIMES DAILY
Status: ON HOLD | COMMUNITY

## 2025-04-24 RX ORDER — LOSARTAN POTASSIUM 25 MG/1
25 TABLET ORAL DAILY
Status: DISCONTINUED | OUTPATIENT
Start: 2025-04-24 | End: 2025-04-24

## 2025-04-24 RX ORDER — FUROSEMIDE 20 MG/1
40 TABLET ORAL DAILY
Status: DISCONTINUED | OUTPATIENT
Start: 2025-04-24 | End: 2025-04-27 | Stop reason: HOSPADM

## 2025-04-24 RX ADMIN — ACETAMINOPHEN 650 MG: 325 TABLET ORAL at 18:26

## 2025-04-24 RX ADMIN — TRAZODONE HYDROCHLORIDE 50 MG: 50 TABLET ORAL at 21:00

## 2025-04-24 RX ADMIN — HYDROXYZINE HYDROCHLORIDE 50 MG: 50 TABLET, FILM COATED ORAL at 23:39

## 2025-04-24 RX ADMIN — AMLODIPINE BESYLATE 10 MG: 5 TABLET ORAL at 09:50

## 2025-04-24 RX ADMIN — HYDRALAZINE HYDROCHLORIDE 25 MG: 25 TABLET ORAL at 09:51

## 2025-04-24 RX ADMIN — CLONIDINE HYDROCHLORIDE 0.1 MG: 0.1 TABLET ORAL at 11:13

## 2025-04-24 RX ADMIN — FUROSEMIDE 40 MG: 20 TABLET ORAL at 15:48

## 2025-04-24 RX ADMIN — HYDRALAZINE HYDROCHLORIDE 50 MG: 50 TABLET ORAL at 21:01

## 2025-04-24 RX ADMIN — CLONIDINE HYDROCHLORIDE 0.1 MG: 0.1 TABLET ORAL at 21:39

## 2025-04-24 RX ADMIN — DULOXETINE 20 MG: 20 CAPSULE, DELAYED RELEASE ORAL at 21:01

## 2025-04-24 RX ADMIN — HYDRALAZINE HYDROCHLORIDE 50 MG: 50 TABLET ORAL at 14:22

## 2025-04-24 RX ADMIN — ACETAMINOPHEN 650 MG: 325 TABLET ORAL at 22:25

## 2025-04-24 RX ADMIN — GABAPENTIN 300 MG: 300 CAPSULE ORAL at 15:48

## 2025-04-24 RX ADMIN — HYDRALAZINE HYDROCHLORIDE 25 MG: 25 TABLET ORAL at 11:24

## 2025-04-24 RX ADMIN — GABAPENTIN 300 MG: 300 CAPSULE ORAL at 21:00

## 2025-04-24 ASSESSMENT — PAIN SCALES - GENERAL
PAINLEVEL_OUTOF10: 3
PAINLEVEL_OUTOF10: 8

## 2025-04-24 ASSESSMENT — PAIN DESCRIPTION - LOCATION
LOCATION: BACK;LEG
LOCATION: BACK;LEG

## 2025-04-24 ASSESSMENT — PAIN DESCRIPTION - ORIENTATION
ORIENTATION: LOWER
ORIENTATION: LEFT

## 2025-04-24 ASSESSMENT — PAIN DESCRIPTION - DESCRIPTORS: DESCRIPTORS: ACHING

## 2025-04-24 ASSESSMENT — PAIN SCALES - WONG BAKER: WONGBAKER_NUMERICALRESPONSE: NO HURT

## 2025-04-24 NOTE — BH NOTE
[facility] [place]  Admission Note     Admission Type:   Admission Type: Voluntary    Reason for admission:  Reason for Admission: OD on medications, hopelessness d/t medical condition    Medical Problems:   Past Medical History:   Diagnosis Date    Adverse effect of anesthesia     breathing diff. with versed    Bipolar 1 disorder, mixed, moderate ([facility]) 3/6/2017    Chronic pain     Depression     Pt stated diagnosed years ago    Diabetes ([facility]) 2003    Drug-induced mood disorder(292.84) 5/28/2013    Homicide attempt     HTN (hypertension) 11/3/2011    Narcotic dependence, episodic use ([facility]) 11/3/2011    Non compliance with medical treatment 11/3/2011    Other ill-defined conditions(799.89)     chronic low back pain    Psychiatric disorder     bipolar    Sleep disorder     Substance abuse ([facility])     Suicidal thoughts        Status EXAM:  Mental Status and Behavioral Exam  Normal: No  Level of Assistance: Independent/Self  Facial Expression: Sad  Affect: Appropriate  Level of Consciousness: Alert  Frequency of Checks: 4 times per hour, close  Mood:Normal: No  Mood: Depressed, Worthless, low self-esteem  Motor Activity:Normal: No  Motor Activity: Unusual posture/gait (R BKA, uses prosthetic for ambulation)  Eye Contact: Fair  Observed Behavior: Friendly, Cooperative  Sexual Misconduct History: Current - no  Preception: [place] to person, [place] to time, [place] to place, [place] to situation  Attention:Normal: Yes  Thought Processes: Unremarkable  Thought Content:Normal: Yes  Depression Symptoms: Feelings of helplessness, Feelings of hopelessess, Feelings of worthlessness  Anxiety Symptoms: Generalized  Makayla Symptoms: No problems reported or observed.  Hallucinations: None  Delusions: No  Memory:Normal: No  Memory: Poor remote  Insight and Judgment: No  Insight and Judgment: Poor insight, Poor judgment    Patient oriented to [facility]. Consents reviewed, signed YES. Patient verbalize understanding:  YES.    Patient

## 2025-04-24 NOTE — DISCHARGE INSTRUCTIONS
DISCHARGE SUMMARY    NAME:Keaton Van  : 1965  MRN: [id number]    The patient Keaton Van exhibits the ability to control behavior in a less restrictive environment.  Patient's level of functioning is improving.  No assaultive/destructive behavior has been observed for the past 24 hours.  No suicidal/homicidal threat or behavior has been observed for the past 24 hours.  There is no evidence of serious medication side effects.  Patient has not been in physical or protective restraints for at least the past 24 hours.    If weapons involved, how are they secured? none    Is patient aware of and in agreement with discharge plan? yes    Arrangements for medication:  Prescriptions filled here     Copy of discharge instructions to provider?:  yes    Arrangements for transportation home:  lyft     Keep all follow up appointments as scheduled, continue to take prescribed medications per physician instructions.  Mental health crisis number:  911 or your local mental health crisis line number at [phone]      Mental Health Emergency WARM LINE      [phone] ([phone])      M-F: 9am to 9pm      Sat & Sun: 5pm - 9pm  National suicide prevention lines:                             2-237-YLGSDKL (7-549-823-7586)       4-873-553-TALK ([phone])    Crisis Text Line:  Text HOME to 995915

## 2025-04-24 NOTE — ED NOTES
ED TO INPATIENT SBAR HANDOFF        Verbal report given to [facility] Tutu Martinez on Keaton Van  being transferred to [facility] 733 for routine progression of patient care       Patient Name: Keaton Van   Preferred Name: Keaton HIGGINSB: 1965  59 y.o.   Family/Caregiver Present: no   Code Status Order: Prior  PO Status: NPO:No  Telemetry Order:   C-SSRS: Risk of Suicide: High Risk  Sitter no Safety, Behavioral Health, and Suicidal Ideation  Restraints:       Information from the following report(s) Nurse Handoff Report, ED SBAR, and Adult Overview was reviewed with the receiving nurse.    Alphonso Fall Assessment:    Presents to emergency department  because of falls (Syncope, seizure, or loss of consciousness): No  Age > 70: No  Altered Mental Status, Intoxication with alcohol or substance confusion (Disorientation, impaired judgment, poor safety awaremess, or inability to follow instructions): No  Impaired Mobility: Ambulates or transfers with assistive devices or assistance; Unable to ambulate or transer.: Yes  Nursing Judgement: Yes          Situation  Chief Complaint   Patient presents with    Drug Overdose     Pt BIBEMS with c/o suicidal ideation. Per EMS patient took 60 mg of Lorazepam last night. Patient alert and oriented during triage. Patient reports suicidal ideation - patient reports he is on hospice.     Suicidal     Brief Description of Patient's Condition: Stable  Mental Status: oriented  Arrived from:Home  Imaging:   IR REMOVE TUNNELED CVAD WO SQ PORT/PUMP   Final Result   Technically successful tunneled dialysis catheter removal.      Electronically signed by VERN AHUMADA      CT ABDOMEN PELVIS WO CONTRAST Additional Contrast? None   Final Result   No renal or ureteral stone or evidence of hydronephrosis. Cholelithiasis. No   acute abnormality.      Electronically signed by CARLOS TORREZ        Abnormal labs:   Abnormal Labs Reviewed   CBC WITH AUTO DIFFERENTIAL - Abnormal; Notable for the following

## 2025-04-24 NOTE — BH NOTE
GROUP THERAPY PROGRESS NOTE    Patient did not participate in Self-care group.    Katherine Gillies, MSW, [facility]

## 2025-04-24 NOTE — BH NOTE
Behavioral Health Interdisciplinary Rounds     Patient Name: Keaton Van  Age: 59 y.o.  Room/Bed:  [facility]/  Primary Diagnosis: Depression   Admission Status:  Voluntary    Readmission within 30 days:   Power of  in place:   Patient requires a blocked bed: no          Reason for blocked bed:   Sleep hours:   Flu vaccine :       Participation in Care/Groups:    Medication Compliant?:   PRNS (last 24 hours):     Restraints (last 24 hours):    __________________________________________________  OQ Admission Analysis Survey completed:yes  OQ Admission Analysis Survey score:60    __________________________________________________     Alcohol screening (AUDIT) completed - yes    Score: 0  Tobacco :yes ( a pack of day )   Illegal Drugs use: in the past   CSSR Lifetime: completed     24 hour chart check complete:      _______________________________________________    Patient goal(s) for today: meet with treatment team   Treatment team focus/goals: Plan to assess for medications and discharge needs   Progress note:    plan to start medications, denies SI today and willing to get treatment for hi depression and kidney failure   Spiritual Care Consult: yes   Financial concerns/prescription coverage:  Medicare   Family contact:    sister                     Family requesting physician contact today:    Discharge plan: He will return home with his sister   Access to weapons : no                                                             Outpatient provider(s): TBD     LOS:  1  Expected LOS: TBD     Participating treatment team members: [place] SW- Dr. Susan Sheikh,BHAKTI -Mary Beth Hannah, PharmD.

## 2025-04-24 NOTE — PROGRESS NOTES
Admission Medication Reconciliation:    Information obtained from:  patient interview, Insurance claims data, review of EMR, and [facility]  RxQuery data available¹:  YES    Comments/Recommendations: Updated PTA meds/reviewed patient's allergies.    1)  The patient reports they were only taking gabapentin regularly PTA. He reports that he was attempting to \"stretch\" his fentanyl patches by keeping them on for a week (vs. 3 days) with last patch on 2-3 days prior to admission, per patient. He reports that he was on HD for 6-7 months but stopped HD in 1/2025 as he \"did not want to be hooked up to a machine to live.\" He also reports going into hospice but they \"kicked\" him out ~1 month ago because he \"was not dying fast enough.\" While with hospice, he was receiving hydromorphone and fentanyl 50 mcg/hr patches. He does not have a pain management provider and states that his PCP will not prescribe anything more than gabapentin. He also was prescribed lorazepam 1 mg q6h PRN which he states he used very sparingly. Of note, the patient reports overdosing on ~#60 lorazepam 1 mg tablets PTA.    In regards to this other medications, he denies taking them. The only (non-hospice) medications filled recently were amlodipine 10 mg daily, furosemide 40 mg daily, and gabapentin 800 mg TID. He is not currently taking any psychiatric medications but has been prescribed medications in the past. He states that he never took medications long enough to see a benefit. Most recently he was prescribed lurasidone 8/2024-9/2024. Additionally, he has filled quetiapine, aripiprazole, duloxetine, mirtazapine, sertraline, buspirone, and hydroxyzine. Previous [facility] hospitalizations and discharge psychiatric medications are listed below:  5/12/22-5/16/22 ([facility]): duloxetine 60 mg daily + trazodone 50 mg QHS PRN  6/5/17-6/8/17 ([place]): aripiprazole 10 mg daily  10/17/16-10/20/16 ([place]):  no psych medications  7/25/13-7/29/13 ([place]): mirtazapine 30 mg QHS +  sertraline 100 mg daily + gabapentin 600 mg TID  5/27/13-6/2/13 ([facility]): mirtazapine 30 mg QHS + sertraline 100 mg daily  3/12/13-3/18/13 ([facility]): mirtazapine 30 mg QHS + sertraline 50 mg daily + hydroxyzine 50 mg TID PRN anxiety  1/23/13-1/29/13 ([facility]): hydroxyzine 50 mg TID anxiety + zolpidem 10 mg QHS PRN    UDS positive for cocaine. He endorses only occasional use; however, every UDS since 2023 has been positive for cocaine.     2)  The [place] Prescription Monitoring Program () was assessed to determine fill history of any controlled medications. The patient has filled the following controlled medications in the last  2 months.  - 3/12/25: lorazepam 1 mg, #60 for 15 day supply  - 3/12/25: gabapentin 100 mg, #15 for 15 day supply  - 3/12/25: fentanyl 50 mcg/hr patches, #5 for 15 day supply  - 2/28/25: gabapentin 100 mg, #15 for 15 day supply  - 2/26/25: hydromorphone powder, for 5 day supply  - 2/20/25: lorazepam 1 mg, #60 for 15 day supply  - 2/20/25: fentanyl 50 mcg/hr patches, #5 for 15 day supply  - 2/14/25: gabapentin 100 mg, #15 for 15 day supply  - 2/12/25: fentanyl 12 mcg/hr patches, #5 for 15 day supply  - 2/12/25: lorazepam 1 mg, #60 for 15 day supply  - 2/12/25: hydromorphone powder, for 5 day supply    3)  Medication changes (since last review):  Added  - furosemide 40 mg daily  - lorazepam 1 mg q6h PRN    Adjusted  - gabapentin 800 mg TID (from 300 mg)  - hydralazine 50 mg BID (from q8h)    Removed  - bumetanide, clonidine, insulin, diabetes supplies, lactobacillus, losartan, lurasidone, pantoprazole, silver sulfadiazine, tizanidine, vancomcyin   ¹RxQuery pharmacy benefit data reflects medications filled and processed through the patient's insurance, however this data does NOT capture whether the medication was picked up or is currently being taken by the patient.    Allergies:  Midazolam    Significant PMH/Disease States:   Past Medical History:   Diagnosis Date    Adverse effect of anesthesia      breathing diff. with versed    Bipolar 1 disorder, mixed, moderate ([facility]) 3/6/2017    Chronic pain     Depression     Pt stated diagnosed years ago    Diabetes ([facility]) 2003    Drug-induced mood disorder(292.84) 5/28/2013    Homicide attempt     HTN (hypertension) 11/3/2011    Narcotic dependence, episodic use ([facility]) 11/3/2011    Non compliance with medical treatment 11/3/2011    Other ill-defined conditions(799.89)     chronic low back pain    Psychiatric disorder     bipolar    Sleep disorder     Substance abuse ([facility])     Suicidal thoughts        Chief Complaint for this Admission:  No chief complaint on file.    Prior to Admission Medications:   Prior to Admission Medications   Prescriptions Last Dose Informant   LORazepam (ATIVAN) 1 MG tablet 4/19/2025    Sig: Take 1 tablet by mouth every 6 hours as needed for Anxiety.   amLODIPine (NORVASC) 10 MG tablet Not Taking    Sig: Take 1 tablet by mouth daily   Patient not taking: Reported on 4/24/2025   furosemide (LASIX) 40 MG tablet Not Taking    Sig: Take 1 tablet by mouth daily   Patient not taking: Reported on 4/24/2025   gabapentin (NEURONTIN) 800 MG tablet 4/23/2025    Sig: Take 1 tablet by mouth 3 times daily.   hydrALAZINE (APRESOLINE) 50 MG tablet Not Taking    Sig: Take 1 tablet by mouth in the morning and at bedtime   Patient not taking: Reported on 4/24/2025      Facility-Administered Medications: None     Roxane Hannah RPH

## 2025-04-24 NOTE — PLAN OF CARE
Problem: Safety - Adult  Goal: Free from fall injury  Outcome: Progressing     Problem: Depression  Goal: Will be euthymic at discharge  Description: INTERVENTIONS:1. Administer medication as ordered2. Provide emotional support via 1:1 interaction with staff3. Encourage involvement in milieu/groups/activities4. Monitor for social isolation  4/24/2025 1734 by Remigio Rodas RN  Outcome: Progressing     Problem: Anxiety  Goal: Will report anxiety at manageable levels  Description: INTERVENTIONS:1. Administer medication as ordered2. Teach and rehearse alternative coping skills3. Provide emotional support with 1:1 interaction with staff  Outcome: Progressing     Pt received out on the unit watching TV. Denies SI/HI/AVH. Endorses significant depression and regret r/t recent SA. Med and meal compliant, cooperative and friendly with staff. Q15 minute safety rounds continue.

## 2025-04-24 NOTE — WOUND CARE
Wound Care Note:     New consult for R BKA    Seen in 733/01     59 y.o. y/o male admitted on 4/23/2025   Admitted for    Depression [F32.A]   History of   Past Medical History:   Diagnosis Date    Adverse effect of anesthesia     breathing diff. with versed    Bipolar 1 disorder, mixed, moderate ([facility]) 3/6/2017    Chronic pain     Depression     Pt stated diagnosed years ago    Diabetes ([facility]) 2003    Drug-induced mood disorder(292.84) 5/28/2013    Homicide attempt     HTN (hypertension) 11/3/2011    Narcotic dependence, episodic use ([facility]) 11/3/2011    Non compliance with medical treatment 11/3/2011    Other ill-defined conditions(799.89)     chronic low back pain    Psychiatric disorder     bipolar    Sleep disorder     Substance abuse ([facility])     Suicidal thoughts        Lab Results   Component Value Date/Time    WBC 6.5 04/19/2025 01:30 PM    LABA1C 7.5 (H) 12/18/2024 05:23 AM    POCGLU 129 (H) 12/31/2024 11:22 AM    POCGLU 99 12/31/2024 06:12 AM    HGB 10.7 (L) 04/19/2025 01:30 PM    HCT 31.8 (L) 04/19/2025 01:30 PM     04/19/2025 01:30 PM     Diet: ADULT DIET; Regular           Assessment:   Appropriately conversational and Communicative and reports no pain.  Mobile  Continent.    1. POA R BKA site stage 2 pressure injury  3 x 2.5 x 0.2 cm  100% pink wound base with open macerated and hyperkeratinized (callus) margins  serosanguinous exudate; no malodor or purulence - no gross S&S of infection  Periwound macerated and callused & without erythema    Tx: Cleansed with vashe. Applied 1/2 of Puracol Ag dressin and padded with gauze. Covered with foam.       2.  POA scattered partial thickness wounds  All covered with dry stable crusts  Tx: Left open to air    3.  POA right posterior leg  Partial thickness wound  Almost resurfaced with epithelium  Tx: Cleansed and applied xeroform and foam.    Recommend:    Right BKA Cleanse with vashe and apply 1/2 of Puracol square to wound bed and cover with folded gauze.

## 2025-04-24 NOTE — H&P
PSYCHIATRY EVALUATION NOTE    CHIEF COMPLAINT: \"just not dying fast enough.\"    HISTORY OF PRESENTING COMPLAINT:   Keaton Van is a 59 y.o. White (non-) male who is currently admitted to the acute/general side of 7th floor behavioral health Unit at [facility] [place].     Ashlie says that when he found out that he was 'dying' from kidney failure in 9/2024. He says that he stopped dialysis 1/9. He decided that he didn't want to be hooked to a machine to live. After that he was recommended for hospice, but he was discharged latter part of 2/2025. He was recommended to f/u to nephrologist, but upon finding that she was just prescribing a medication that his pcp could prescribe, he stopped follow up.    His sister went to [place] [facility] 2 weeks ago, so he decided that on Friday he planned the 'whole thing' over two week's time. He was thinking that his body is tired, and he is in pain. He got the 'gumption' to do it, and attempted suicide. He hadn't been taking his ativan as much, so he had plenty of it left and took it to end his life. He wanted to take his overdose at 5pm after having taken care of the cat.     However he says that he regrets this now. He hurt his two sisters and he is not the type of person to do this to his family.  He describes struggling with depressive symptoms for some time, but not seeking treatment.  He presents with significant hopelessness and helplessness.  Feels much decreased self-worth. His sleep and appetite have been poor. Evaluation is negative for experiencing hallucinations of any type.     Review of patient's history reveals no past symptoms of tomasa.  Patient does mood lability and poor sleep in the past to being in the context of significant substance use including heroin and cocaine.  No access to firearms.    PAST PSYCHIATRIC HISTORY   4 past inpatient psychiatric admissions, last being several years.  4 suicide attempts, last being    Tried:  Lexapro tried, was too long,  attempts/completions  M: 'mental problems.'  No Substance use in the family    PSYCHOSOCIAL HISTORY:  Has 2 sisters, stays with 1.  Has brother.   mother 5 years, father 10/2023.  39yo son, in [facility].  No significant other. Was  27 years.  2 years of college. 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  On disability.    MENTAL STATUS EXAM:  60yo WM has short hair. Has BKA on R Leg. He is dressed casually and appropriately. He is engaged  Speech: Spontaneous, has NL rate, volume and prosody.  Thought Process is Logical, linear, and goal directed.  Mood is reported as \"so down.\"  Affect is congruent and dysthymic  Passive death wish, but no suicidal intent or plan. Patient is post suicide attempt by overdose that he planned over 2 weeks, waited until   Homicidal thoughts, intents or plans. Denies access to fire-arms  Denies experiencing hallucinations of any type.  Perception is negative for paranoia or delusional thinking  Attention/Concentration are both intact  Recent/Remote memories are intact per answers to my evaluation questions.  Insight is poor. Judgment is poor  Cognition: Intact grossly.     ASSESSMENT:  Keaton Van meets criteria for a diagnosis of   .    MDD, recurrent, severe, without psychosis  Opioid use disorder  Tobacco use disorder      PLAN:  Recommend inpatient psychiatric admission to mitigate the risk of further decompensation.  Will obtain pertinent labs and collateral information.Encourage patient to participate in programming and group activities.  Medication Regimen As follows:  Discussed starting Cymbalta 20 mg p.o. twice daily to target depressive and pain symptoms.  We will restart patient's previously prescribed medications as follows:  Continue Norvasc 10 mg p.o. daily,   Bumex 2 mg p.o. twice daily,   hydralazine 50 mg every 8 hours,   losartan 25 mg p.o. daily  Wound care for patients right leg stump.  Patient is willing to discuss his care further with

## 2025-04-24 NOTE — BH NOTE
GROUP THERAPY PROGRESS NOTE    Patient is participating in psychoeducation group.    Group time: 30 minutes    Personal goal for participation: To gain an understanding of the 12 basic irrational beliefs and identify personal interpretations as they apply.    Goal orientation: Personal    Group therapy participation: Active     Therapeutic interventions reviewed and discussed:  Group members were guided through developing an understanding of irrational beliefs and how they affect thought processes and behaviors. Members completed and activity and then engaged in discussion. Handouts provided.    Impression of participation: Pt was present and engaged in group discussion. Pt added insight to topic. Pt was calm, cooperative.       Katherine Gillies, MSW, [facility]

## 2025-04-24 NOTE — CARE COORDINATION
04/24/25 0847   ITP   Date of Plan 04/24/25   Date of Next Review 05/01/25   Primary Diagnosis Code Depression   Barriers to Treatment Client resistance   Strengths Incorporated in Plan Verbal   Plan of Care   Long Term Goal (LTG) Stated in patient/guardian terms feel safe to return home   Short Term Goal 1   Short Term Goal 1 Client will learn and demonstrate effective coping skills   Baseline Functioning refusing dialysis and medications - works SI - I just want to die -   Target tkae medicaitons to help depression   Objectives Other (comment)  (medication compliance)   Intervention  Monitor medications   Frequency daily   Measured by Behavioral data;Self report;Staff observation   Staff Responsible Clinical staff   Crisis/Safety/Discharge Plan   Crisis/Safety Plan Standard program interventions and protocol   Comprehensive Assessment Completion Date 04/24/25   Discharge Plan He will return home with  follow up

## 2025-04-24 NOTE — BH NOTE
GROUP THERAPY PROGRESS NOTE    Patient did not participate in recreational therapy group.    Katherine Gillies, MSW, [facility]

## 2025-04-24 NOTE — PLAN OF CARE
Arrives to [facility] as a transfer from [facility]. Ambulates independently with the use of a RLE prosthetic (order for him to utilize this placed). Signs voluntary consent forms. Denies current SI/HI/AVH. Cooperative with admission process, orientation to unit and discussion of unit rules. Copy of unit rules and OQ survey placed at bedside for completion in AM. During skin assessment noted several scabbed abrasions to the arms and legs, patient states these were the result of a fall during his OD attempt PTA. There is a wound to the distal area of the residual limb, patient states this is the result of pressure and friction with the prosthetic limb. Dressing removed for wound assessment, noted maceration to wound edges and dried blood present. Wound is a ~1.5 inch circular open area, LDA updated to reflect assessment and care. Patient denies pain to the site, states the area is often moist with sweat and \"bodily fluids\". Dried blood evident on prosthetic BKA sleeve. Wound care consult order placed. Cleansed wound site and applied xeroform gauze and mepilex dressing.     Patient denies taking any medications, does states that he has a history of HTN, \"kidney disease and all of that stuff\", and was taking medications in the past, but that he \"will not take anything to prolong this\" [his life]. Patient is a DNR, scanned document found in patient chart, printed and placed copy on his chart. States he was having hospice treatment, but they \"let him go\" because he was \"taking too long to die\".     Following admission is resting in bed and in NAD, monitored for safety with q15 minute rounding. Given trazodone PRN.    Problem: Chronic Conditions and Co-morbidities  Goal: Patient's chronic conditions and co-morbidity symptoms are monitored and maintained or improved  Outcome: Progressing  Flowsheets (Taken 4/23/2025 2239)  Care Plan - Patient's Chronic Conditions and Co-Morbidity Symptoms are Monitored and Maintained or Improved:

## 2025-04-24 NOTE — CARE COORDINATION
04/24/25 1258   Suicidal Ideation   Wish to be Dead Lifetime - Yes;Past 1 month - Yes   Non-Specific Active Suicidal Thoughts Lifetime - Yes;Past 1 month - Yes   Suicidal Behavior Trigger Wish to be Dead   Active Suicidal Ideation with Any Methods (Not Plan) without Intent to Act Past 1 month - Yes   Active Suicidal Ideation with Some Intent to Act, without Specific Plan Past 1 month - Yes   Active Suicidal Ideation with Specific Plan and Intent Past 1 month - Yes   Intensity of Ideation   Lifetime - Most Severe Ideation 4   Lifetime - Most Recent Ideation 4   Frequency Once a week   Duration Less than 1 hour/some of the time   Controllability Can control thoughts with a lot of difficulty   Deterrents Deterrents most likely did not stop you   Reasons for Ideation Completely to end or stop the pain   Suicidal Behavior   Actual Attempt Past 3 months - Yes   Total # of Attempts   (several)   Has subject engaged in Non-Suicidal Self-Injurious Behavior? Past 3 months - Yes   Interrupted Attempt Past 3 months - No   Aborted or Self-Interrupted Attempt Lifetime - Yes   Preparatory Acts or Behavior Past 3 months - Yes   Actual Lethality/Medical Damage 4   Potential Lethality 1   Most Recent Attempt Date   (04/25)   Most Lethal Attempt Date   (04/25)   Initial/First Attempt Date   (many years ago)

## 2025-04-24 NOTE — BH NOTE
PSYCHOSOCIAL ASSESSMENT  :Patient identifying info:   Keaton Van is a 59 y.o., male admitted 4/23/2025  9:57 PM     Presenting problem and precipitating factors:     Mental status assessment: alert, oriented x's - denies SI, pleasant and compliant with treatment team     Strengths/Recreation/Coping Skills:safe housing - willingness to seek treatment     Collateral information: sister -     Current psychiatric /substance abuse providers and contact info: no current provider     Previous psychiatric/substance abuse providers and response to treatment: He has had several inpatient admissions for SA and suicidal ideations     Family history of mental illness or substance abuse:     Substance abuse history:    Social History     Tobacco Use    Smoking status: Every Day     Current packs/day: 0.50     Average packs/day: 0.5 packs/day for 1 year (0.5 ttl pk-yrs)     Types: Cigarettes     Start date: 5/1/2024    Smokeless tobacco: Never   Substance Use Topics    Alcohol use: No       History of biomedical complications associated with substance abuse:     Patient's current acceptance of treatment or motivation for change: he was admitted voluntary    Family constellation: single, one son - 2 sisters - one brother   Is significant other involved?     Describe support system:     Describe living arrangements and home environment: lives at home with his sister     GUARDIAN/POA:     Guardian Name:     Guardian Contact:     Health issues:   Past Medical History:   Diagnosis Date    Adverse effect of anesthesia     breathing diff. with versed    Bipolar 1 disorder, mixed, moderate (HCC) 3/6/2017    Chronic pain     Depression     Pt stated diagnosed years ago    Diabetes (HCC) 2003    Drug-induced mood disorder(292.84) 5/28/2013    Homicide attempt     HTN (hypertension) 11/3/2011    Narcotic dependence, episodic use (HCC) 11/3/2011    Non compliance with medical treatment 11/3/2011    Other ill-defined conditions(799.89)

## 2025-04-24 NOTE — PROGRESS NOTES
Physical Therapy 4/24/2025     Orders acknowledged & chart reviewed up to date. Spoke with RN who states patient is at his functional baseline, able to don R LE prosthetic independently & ambulate ad davin. RN in agreement for PT to sign off at this time as he is at his baseline. Please re-consult if there is any change in functional status this admission.    Thank you,  Mine Cleveland PT, DPT, NCS

## 2025-04-24 NOTE — BH NOTE
GROUP THERAPY PROGRESS NOTE    Patient is participating in Expressions group.     Group time: 10 minutes    Personal goal for participation: To engage with SW and peers about likes/dislikes/needs on unit     Goal orientation: Personal    Group therapy participation: Active      Therapeutic interventions reviewed and discussed: Group allows members to interact with each other and SW to discuss concerns on unit     Impression of participation: Pt was present and engaged in group. Pt interacted with peers and .     Katherine Gillies MSW, [facility]A     28-Dec-2021 08:15

## 2025-04-24 NOTE — PROGRESS NOTES
Occupational Therapy  04/24/25     Orders acknowledged & chart reviewed up to date. Spoke with RN who states patient is at his functional baseline, able to don R LE prosthetic independently & ambulate ad davin, completing self care tasks. RN in agreement for OT to sign off at this time as he is at his baseline. Please re-consult if there is any change in functional status this admission.    Thank you,  Barbara Christina, OTR/L

## 2025-04-24 NOTE — PLAN OF CARE
Problem: Depression  Goal: Will be euthymic at discharge  Description: INTERVENTIONS:1. Administer medication as ordered2. Provide emotional support via 1:1 interaction with staff3. Encourage involvement in milieu/groups/activities4. Monitor for social isolation  Outcome: Progressing  Note: Out on unit engaged, social and participating in activities. Mood hopeful, affect bright full range. Thoughts organized, logical and goal directed. Denies SI, no plan, no intent and no self harm. Describes regret for his recent SA, states he is grateful for being alive and is motivated to take medications to maintain medical stability. Review his plan of care with verbalizing understanding of medications available and providers consulted.

## 2025-04-25 LAB
ALBUMIN SERPL-MCNC: 3.1 G/DL (ref 3.5–5)
ALBUMIN/GLOB SERPL: 1 (ref 1.1–2.2)
ALP SERPL-CCNC: 125 U/L (ref 45–117)
ALT SERPL-CCNC: 18 U/L (ref 12–78)
ANION GAP SERPL CALC-SCNC: 8 MMOL/L (ref 2–12)
AST SERPL-CCNC: 15 U/L (ref 15–37)
BILIRUB SERPL-MCNC: 0.2 MG/DL (ref 0.2–1)
BUN SERPL-MCNC: 69 MG/DL (ref 6–20)
BUN/CREAT SERPL: 19 (ref 12–20)
CALCIUM SERPL-MCNC: 8 MG/DL (ref 8.5–10.1)
CHLORIDE SERPL-SCNC: 104 MMOL/L (ref 97–108)
CO2 SERPL-SCNC: 22 MMOL/L (ref 21–32)
CREAT SERPL-MCNC: 3.55 MG/DL (ref 0.7–1.3)
EST. AVERAGE GLUCOSE BLD GHB EST-MCNC: 111 MG/DL
GLOBULIN SER CALC-MCNC: 3.1 G/DL (ref 2–4)
GLUCOSE BLD STRIP.AUTO-MCNC: 225 MG/DL (ref 65–117)
GLUCOSE BLD STRIP.AUTO-MCNC: 251 MG/DL (ref 65–117)
GLUCOSE BLD STRIP.AUTO-MCNC: 325 MG/DL (ref 65–117)
GLUCOSE SERPL-MCNC: 218 MG/DL (ref 65–100)
HBA1C MFR BLD: 5.5 % (ref 4–5.6)
POTASSIUM SERPL-SCNC: 5.4 MMOL/L (ref 3.5–5.1)
PROT SERPL-MCNC: 6.2 G/DL (ref 6.4–8.2)
SERVICE CMNT-IMP: ABNORMAL
SODIUM SERPL-SCNC: 134 MMOL/L (ref 136–145)

## 2025-04-25 PROCEDURE — 6370000000 HC RX 637 (ALT 250 FOR IP)

## 2025-04-25 PROCEDURE — 1240000000 HC EMOTIONAL WELLNESS R&B

## 2025-04-25 PROCEDURE — 6370000000 HC RX 637 (ALT 250 FOR IP): Performed by: NURSE PRACTITIONER

## 2025-04-25 PROCEDURE — 80053 COMPREHEN METABOLIC PANEL: CPT

## 2025-04-25 PROCEDURE — 83036 HEMOGLOBIN GLYCOSYLATED A1C: CPT

## 2025-04-25 PROCEDURE — 6370000000 HC RX 637 (ALT 250 FOR IP): Performed by: PSYCHIATRY & NEUROLOGY

## 2025-04-25 PROCEDURE — 6360000002 HC RX W HCPCS: Performed by: PSYCHIATRY & NEUROLOGY

## 2025-04-25 PROCEDURE — 82962 GLUCOSE BLOOD TEST: CPT

## 2025-04-25 RX ORDER — FENTANYL 25 UG/1
1 PATCH TRANSDERMAL
Refills: 0 | Status: DISCONTINUED | OUTPATIENT
Start: 2025-04-25 | End: 2025-04-25

## 2025-04-25 RX ORDER — INSULIN LISPRO 100 [IU]/ML
0-4 INJECTION, SOLUTION INTRAVENOUS; SUBCUTANEOUS
Status: DISCONTINUED | OUTPATIENT
Start: 2025-04-25 | End: 2025-04-27 | Stop reason: HOSPADM

## 2025-04-25 RX ORDER — BUPRENORPHINE HYDROCHLORIDE AND NALOXONE HYDROCHLORIDE DIHYDRATE 2; .5 MG/1; MG/1
1 TABLET SUBLINGUAL 2 TIMES DAILY
Status: DISCONTINUED | OUTPATIENT
Start: 2025-04-25 | End: 2025-04-27 | Stop reason: HOSPADM

## 2025-04-25 RX ORDER — DEXTROSE MONOHYDRATE 100 MG/ML
INJECTION, SOLUTION INTRAVENOUS CONTINUOUS PRN
Status: DISCONTINUED | OUTPATIENT
Start: 2025-04-25 | End: 2025-04-27 | Stop reason: HOSPADM

## 2025-04-25 RX ADMIN — HYDRALAZINE HYDROCHLORIDE 50 MG: 50 TABLET ORAL at 13:22

## 2025-04-25 RX ADMIN — HALOPERIDOL 5 MG: 5 TABLET ORAL at 09:14

## 2025-04-25 RX ADMIN — DULOXETINE 20 MG: 20 CAPSULE, DELAYED RELEASE ORAL at 09:10

## 2025-04-25 RX ADMIN — HYDRALAZINE HYDROCHLORIDE 50 MG: 50 TABLET ORAL at 20:34

## 2025-04-25 RX ADMIN — BUPRENORPHINE HYDROCHLORIDE AND NALOXONE HYDROCHLORIDE DIHYDRATE 1 TABLET: 2; .5 TABLET SUBLINGUAL at 20:34

## 2025-04-25 RX ADMIN — INSULIN LISPRO 1 UNITS: 100 INJECTION, SOLUTION INTRAVENOUS; SUBCUTANEOUS at 12:50

## 2025-04-25 RX ADMIN — GABAPENTIN 300 MG: 300 CAPSULE ORAL at 20:34

## 2025-04-25 RX ADMIN — INSULIN LISPRO 2 UNITS: 100 INJECTION, SOLUTION INTRAVENOUS; SUBCUTANEOUS at 16:33

## 2025-04-25 RX ADMIN — GABAPENTIN 300 MG: 300 CAPSULE ORAL at 09:10

## 2025-04-25 RX ADMIN — BUPRENORPHINE HYDROCHLORIDE AND NALOXONE HYDROCHLORIDE DIHYDRATE 1 TABLET: 2; .5 TABLET SUBLINGUAL at 13:22

## 2025-04-25 RX ADMIN — AMLODIPINE BESYLATE 10 MG: 5 TABLET ORAL at 09:10

## 2025-04-25 RX ADMIN — HYDROXYZINE HYDROCHLORIDE 50 MG: 50 TABLET, FILM COATED ORAL at 09:15

## 2025-04-25 RX ADMIN — DULOXETINE 20 MG: 20 CAPSULE, DELAYED RELEASE ORAL at 20:34

## 2025-04-25 RX ADMIN — INSULIN LISPRO 3 UNITS: 100 INJECTION, SOLUTION INTRAVENOUS; SUBCUTANEOUS at 20:34

## 2025-04-25 RX ADMIN — FUROSEMIDE 40 MG: 20 TABLET ORAL at 09:10

## 2025-04-25 RX ADMIN — HALOPERIDOL 5 MG: 5 TABLET ORAL at 01:18

## 2025-04-25 NOTE — PROGRESS NOTES
Behavioral Services  Medicare Certification Upon Admission    I certify that this patient's inpatient psychiatric hospital admission is medically necessary for:    [x] (1) Treatment which could reasonably be expected to improve this patient's condition,       [] (2) Or for diagnostic study;     AND     [x](2) The inpatient psychiatric services are provided while the individual is under the care of a physician and are included in the individualized plan of care.    Estimated length of stay/service 3-5 days.    Plan for post-hospital care outpatient psychiatry.    Electronically signed by Aleksandr Davis MD on 4/25/2025 at 10:44 AM

## 2025-04-25 NOTE — PROGRESS NOTES
Spiritual Health History and Assessment/Progress Note  [facility]    Loneliness/Social Isolation,  ,  , Follow up     Name: Keaton Van MRN: [id number]    Age: 59 y.o.     Sex: male   Language: English   Catholic: [employer]ism   Depression     Date: 4/25/2025            Total Time Calculated: 78 min              Spiritual Assessment began in [facility] 7[facility] GEN [facility]        Referral/Consult From: Rounding   Encounter Overview/Reason: Loneliness/Social Isolation  Service Provided For: Patient    Kiesha, Belief, Meaning:   Patient identifies as spiritual  Family/Friends No family/friends present      Importance and Influence:  Patient has spiritual/personal beliefs that influence decisions regarding their health  Family/Friends     Community:  Patient feels well-supported. Support system includes: Other: patient is supported by his sisters  Family/Friends     Assessment and Plan of Care:    visited with patient as follow up while rounding. Patient was alert and sitting on his bed for visit.  held the space with patient as she shared a life review. Patient shared his thoughts and feeling regarding his need for dialysis and how being in pain affects him. Patient lost both of his parents with in the last four years and may be grieving their loss. He is supported by his two sisters. Spiritually the patient was a member of a [employer] and does pray.  assessed patient to be spiritually distress/despair as evidence by him stating he can't always feel God's presence, repenting for moments when he is angry with God for not answering his prayers, patient's love for his sisters are his motivation for taking dialysis but he would rather not so he can die sooner.      Patient Interventions include: Facilitated expression of thoughts and feelings, Explored spiritual coping/struggle/distress, and Other:  sustained non judgmental presence,  explored the use of what the patient finds beautiful

## 2025-04-25 NOTE — BH NOTE
PRN Medication Documentation    Specific patient behavior that led to need for PRN medication: Patient increasing in agitation due to not being able to sleep.   Staff interventions attempted prior to PRN being given: Calming techniques, stimulation reduction   PRN medication given: haldol 5 mg   Patient response/effectiveness of PRN medication: no further needs reported at this time

## 2025-04-25 NOTE — PLAN OF CARE
Problem: Depression  Goal: Will be euthymic at discharge  Description: INTERVENTIONS:1. Administer medication as ordered2. Provide emotional support via 1:1 interaction with staff3. Encourage involvement in milieu/groups/activities4. Monitor for social isolation  4/24/2025 235 by Kris Santos, RN  Outcome: Progressing     Problem: Anxiety  Goal: Will report anxiety at manageable levels  Description: INTERVENTIONS:1. Administer medication as ordered2. Teach and rehearse alternative coping skills3. Provide emotional support with 1:1 interaction with staff  4/24/2025 6438 by Kris Santos, RN  Outcome: Progressing   Patient resting in bed, respirations even and unlabored.

## 2025-04-25 NOTE — PLAN OF CARE
Problem: Depression  Goal: Will be euthymic at discharge  Description: INTERVENTIONS:1. Administer medication as ordered2. Provide emotional support via 1:1 interaction with staff3. Encourage involvement in milieu/groups/activities4. Monitor for social isolation  4/25/2025 1035 by Jaylin Sheikh RN  Outcome: Progressing  Note: Out on unit social, engaged and attending groups/activities. Motivated to live and comply with tx team recommendations. Describes recent irritability related to his self doubt and regret regarding his recent self sabotaging behaviors. Insight into his substance abuse and his reliance on using prn medications to cope with negative emotions. Staff offer support, education and reassurance  Problem: Chronic Conditions and Co-morbidities  Goal: Patient's chronic conditions and co-morbidity symptoms are monitored and maintained or improved  Outcome: Progressing  Note: Nephrology and wound consults ordered

## 2025-04-25 NOTE — INTERDISCIPLINARY ROUNDS
Behavioral Health Interdisciplinary Rounds     Patient Name: Keaton Van  Age: 59 y.o.  Room/Bed:  3/  Primary Diagnosis: Depression   Admission Status:      Readmission within 30 days:   Power of  in place:   Patient requires a blocked bed: no          Reason for blocked bed:   Sleep hours: 4  Flu vaccine :       Participation in Care/Groups:  yes  Medication Compliant?: yes  PRNS (last 24 hours): pain, clonidine, anxiety, sleep    Restraints (last 24 hours):  no  __________________________________________________  OQ Admission Analysis Survey completed:  OQ Admission Analysis Survey score:    __________________________________________________     Alcohol screening (AUDIT) completed -     Score:   Tobacco :  Illegal Drugs use:   CSSR Lifetime:     24 hour chart check complete: yes     _______________________________________________    Patient goal(s) for today: Communicate needs to staff   Treatment team focus/goals: Assess pt, manage medications,discharge planning   Progress note: Pt endorses passive death wish but also reports increased hopefulness about future. Pt has decided to restart HD. Pt reports continued chronic pain in legs and back. PT is eating well. Pt reports difficulty sleeping due to noise.      Spiritual Care Consult:   Financial concerns/prescription coverage:    Family contact:     Anuradha Brown   [phone]                     Family requesting physician contact today:    Discharge plan: Pt to discharge home   Access to weapons : no                                                             Outpatient provider(s):     LOS:  2  Expected LOS: TBD     Participating treatment team members: Shruti Gary, MSW, Jaylin DOMINGUEZ RN, Dr. Davis

## 2025-04-25 NOTE — PLAN OF CARE
Problem: Chronic Conditions and Co-morbidities  Goal: Patient's chronic conditions and co-morbidity symptoms are monitored and maintained or improved  4/25/2025 1035 by Jaylin Sheikh, RN  Outcome: Progressing  Note: Nephrology and wound consults ordered      Problem: Discharge Planning  Goal: Discharge to home or other facility with appropriate resources  Outcome: Progressing     Problem: Safety - Adult  Goal: Free from fall injury  Outcome: Progressing

## 2025-04-25 NOTE — H&P
History and Physical    Date of Service:  4/25/2025  Primary Care Provider: Robert Wells PA-C  Source of information: patient    Chief Complaint: No chief complaint on file.      History of Presenting Illness:   Keaton Van is a 59 y.o. male with pmhx of Depression, HTN, DM 2 with ESRD, diabetic retinopathy and neuropathy who presents with depression and suicide attempt.  Patient was seen at [facility] ED before being transferred to [facility].  Hospitalist consulted to evaluate medical status.     Denies fever chest pain shortness of breath, attests to chronic pain with kidney dysfunction.  Does not want to do dialysis because he doesn't want to be hooked up to a machine 3 days a week.  Last dialysis was 1/9/25.  Does not have av fistula or any other HD access.  Patient had BKA last year and does attests to drainage from incision site.     REVIEW OF SYSTEMS:  A comprehensive review of systems was negative except for that written in the History of Present Illness.     Past Medical History:   Diagnosis Date    Adverse effect of anesthesia     breathing diff. with versed    Bipolar 1 disorder, mixed, moderate (HCC) 3/6/2017    Chronic pain     Depression     Pt stated diagnosed years ago    Diabetes (HCC) 2003    Drug-induced mood disorder(292.84) 5/28/2013    Homicide attempt     HTN (hypertension) 11/3/2011    Narcotic dependence, episodic use (HCC) 11/3/2011    Non compliance with medical treatment 11/3/2011    Other ill-defined conditions(799.89)     chronic low back pain    Psychiatric disorder     bipolar    Sleep disorder     Substance abuse ([facility])     Suicidal thoughts       Past Surgical History:   Procedure Laterality Date    ARM SURGERY Left 12/5/2024    LEFT HAND INCISION AND DRAINAGE performed by Henry Owens MD at [facility] MAIN OR    ARM SURGERY Left 12/21/2024    LEFT HAND INCISION AND DRAINAGE performed by Henry Owens MD at [facility] MAIN OR    COLONOSCOPY N/A 8/31/2016    COLONOSCOPY performed  Last Year: No   Interpersonal Safety: Not At Risk (4/23/2025)    Interpersonal Safety Domain Source: IP Abuse Screening     Physical abuse: Denies     Verbal abuse: Denies     Emotional abuse: Denies     Financial abuse: Denies     Sexual abuse: Denies   Utilities: Not At Risk (4/23/2025)    [facility] Utilities     Threatened with loss of utilities: No        Medications were reconciled to the best of my ability given all available resources at the time of admission. Route is PO if not otherwise noted.     Family and social history were personally reviewed, all pertinent and relevant details are outlined as above.    Objective:   BP (!) 144/69   Pulse 69   Temp 98.2 °F (36.8 °C) (Oral)   Resp 16   Ht 1.829 m (6')   Wt 79.4 kg (175 lb)   SpO2 98%   BMI 23.73 kg/m²         PHYSICAL EXAM:   General: Alert x oriented x 3, awake, no acute distress,   HEENT: PEERL, EOMI, moist mucus membranes  Neck: Supple, no JVD, no meningeal signs  Chest: Clear to auscultation bilaterally   CVS: RRR, S1 S2 heard, no murmurs/rubs/gallops  Abd: Soft, non-tender, non-distended, +bowel sounds   Ext: No clubbing, no cyanosis, no edema  Neuro/Psych: Pleasant mood and affect, CN 2-12 grossly intact, sensory grossly within normal limit, s/p right BKA with serous drainage on bandages  Cap refill: Brisk, less than 3 seconds  Pulses: 2+, symmetric in all extremities  Skin: Warm, dry, without rashes or lesions    Data Review:   I have independently reviewed and interpreted patient's lab and all other diagnostic data    Abnormal Labs Reviewed - No data to display    Results       Procedure Component Value Units Date/Time    Urine Culture Hold Sample [[id number]] Collected: 04/19/25 1455    Order Status: Completed Specimen: Urine Updated: 04/19/25 1458     Specimen HOld       Urine on hold in Microbiology dept for 2 days.  If unpreserved urine is submitted, it cannot be used for addtional testing after 24 hours, recollection will be required.                    IMAGING:   No orders to display        ECG/ECHO:    Encounter Date: 04/19/25   EKG 12 Lead   Result Value    Ventricular Rate 69    Atrial Rate 69    P-R Interval 164    QRS Duration 90    Q-T Interval 418    QTc Calculation (Bazett) 447    P Axis 67    R Axis 57    T Axis 18    Diagnosis      Normal sinus rhythm  T wave abnormality, consider anterior ischemia  When compared with ECG of 02-DEC-2024 15:05,  T wave inversion now evident in Anterior leads  Confirmed by Zhang Brown DO ([id number]) on 4/19/2025 1:45:37 PM       05/29/24    ECHO (TTE) LIMITED (PRN CONTRAST/BUBBLE/STRAIN/3D) 06/04/2024  3:24 PM (Final)    Interpretation Summary    Left Ventricle: Normal left ventricular systolic function with a visually estimated EF of 60 - 65%. Left ventricle size is normal. Mildly increased wall thickness. Normal wall motion.    Mitral Valve: Mild regurgitation with a posterior directed jet.    Tricuspid Valve: Mildly elevated RVSP, consistent with mild pulmonary hypertension. The estimated RVSP is 47 mmHg.    Left Atrium: Left atrium is dilated.    IVC/SVC: IVC diameter is greater than 21 mm and decreases less than 50% during inspiration; therefore the estimated right atrial pressure is elevated (~15 mmHg).    Image quality is adequate.    Signed by: Anisa Jaquez MD on 6/4/2024  3:24 PM        Notes reviewed from all clinical/nonclinical/nursing services involved in patient's clinical care. Care coordination discussions were held with appropriate clinical/nonclinical/ nursing providers based on care coordination needs.     Assessment:   Given the patient's current clinical presentation, there is a high level of concern for decompensation if discharged from the emergency department. Complex decision making was performed, which includes reviewing the patient's available past medical records, laboratory results, and imaging studies.    Principal Problem:    Depression  Resolved Problems:    * No resolved

## 2025-04-25 NOTE — BH NOTE
PSYCHIATRIC PROGRESS NOTE    Chief Complaint: 'i'm feeling some hope.'    Length of Stay: 2 Days    Interval History:  Patient shares that he's used fentanyl patches for the pain. Similarly gabapentin has been helping. He has pain in bilateral lower back, legs and feet. This has been chronic. He reports that he's been doing some thinking and wants to invest in his wellbeing. He is agreeable to restart HD and wanted to explore options. I informed him that I consulted nephrology. He is agreeable to meet with them.    Keaton is starting to feel hopeful, but remains with passive death wish.  No hallucinations.    Reflects on past substance use and has been using crack cocaine intermittently. In the past he used IV heroin. When he was on hospice he was on dilaudid, but no opiates (prescribed or used illicitly) since 2/2025. Patient shares that his biggest struggle is going to be quitting to smoke cigarettes. At this time patches are sufficient.      Past Medical History:  Past Medical History:   Diagnosis Date    Adverse effect of anesthesia     breathing diff. with versed    Bipolar 1 disorder, mixed, moderate (HCC) 3/6/2017    Chronic pain     Depression     Pt stated diagnosed years ago    Diabetes ([facility]) 2003    Drug-induced mood disorder(292.84) 5/28/2013    Homicide attempt     HTN (hypertension) 11/3/2011    Narcotic dependence, episodic use ([facility]) 11/3/2011    Non compliance with medical treatment 11/3/2011    Other ill-defined conditions(799.89)     chronic low back pain    Psychiatric disorder     bipolar    Sleep disorder     Substance abuse ([facility])     Suicidal thoughts           [START ON 4/26/2025] metFORMIN  500 mg Oral Daily with breakfast    insulin lispro  0-4 Units SubCUTAneous 4x Daily AC & HS    amLODIPine  10 mg Oral Daily    hydrALAZINE  50 mg Oral 3 times per day    furosemide  40 mg Oral Daily    gabapentin  300 mg Oral BID    DULoxetine  20 mg Oral BID    nicotine  1 patch TransDERmal Daily

## 2025-04-26 LAB
GLUCOSE BLD STRIP.AUTO-MCNC: 161 MG/DL (ref 65–117)
GLUCOSE BLD STRIP.AUTO-MCNC: 186 MG/DL (ref 65–117)
GLUCOSE BLD STRIP.AUTO-MCNC: 191 MG/DL (ref 65–117)
GLUCOSE BLD STRIP.AUTO-MCNC: 214 MG/DL (ref 65–117)
SERVICE CMNT-IMP: ABNORMAL

## 2025-04-26 PROCEDURE — 6370000000 HC RX 637 (ALT 250 FOR IP): Performed by: NURSE PRACTITIONER

## 2025-04-26 PROCEDURE — 6370000000 HC RX 637 (ALT 250 FOR IP): Performed by: PSYCHIATRY & NEUROLOGY

## 2025-04-26 PROCEDURE — 82962 GLUCOSE BLOOD TEST: CPT

## 2025-04-26 PROCEDURE — 1240000000 HC EMOTIONAL WELLNESS R&B

## 2025-04-26 PROCEDURE — 6360000002 HC RX W HCPCS: Performed by: PSYCHIATRY & NEUROLOGY

## 2025-04-26 PROCEDURE — 6370000000 HC RX 637 (ALT 250 FOR IP)

## 2025-04-26 RX ORDER — DULOXETIN HYDROCHLORIDE 30 MG/1
30 CAPSULE, DELAYED RELEASE ORAL 2 TIMES DAILY
Status: DISCONTINUED | OUTPATIENT
Start: 2025-04-26 | End: 2025-04-27 | Stop reason: HOSPADM

## 2025-04-26 RX ADMIN — HYDRALAZINE HYDROCHLORIDE 50 MG: 50 TABLET ORAL at 20:28

## 2025-04-26 RX ADMIN — INSULIN LISPRO 1 UNITS: 100 INJECTION, SOLUTION INTRAVENOUS; SUBCUTANEOUS at 13:08

## 2025-04-26 RX ADMIN — INSULIN LISPRO 1 UNITS: 100 INJECTION, SOLUTION INTRAVENOUS; SUBCUTANEOUS at 17:26

## 2025-04-26 RX ADMIN — GABAPENTIN 300 MG: 300 CAPSULE ORAL at 09:40

## 2025-04-26 RX ADMIN — HYDRALAZINE HYDROCHLORIDE 50 MG: 50 TABLET ORAL at 13:09

## 2025-04-26 RX ADMIN — BUPRENORPHINE HYDROCHLORIDE AND NALOXONE HYDROCHLORIDE DIHYDRATE 1 TABLET: 2; .5 TABLET SUBLINGUAL at 20:28

## 2025-04-26 RX ADMIN — GABAPENTIN 300 MG: 300 CAPSULE ORAL at 20:28

## 2025-04-26 RX ADMIN — DULOXETINE HYDROCHLORIDE 30 MG: 30 CAPSULE, DELAYED RELEASE ORAL at 20:28

## 2025-04-26 RX ADMIN — FUROSEMIDE 40 MG: 20 TABLET ORAL at 09:40

## 2025-04-26 RX ADMIN — INSULIN LISPRO 1 UNITS: 100 INJECTION, SOLUTION INTRAVENOUS; SUBCUTANEOUS at 09:36

## 2025-04-26 RX ADMIN — HYDRALAZINE HYDROCHLORIDE 50 MG: 50 TABLET ORAL at 06:17

## 2025-04-26 RX ADMIN — DULOXETINE 20 MG: 20 CAPSULE, DELAYED RELEASE ORAL at 09:40

## 2025-04-26 RX ADMIN — AMLODIPINE BESYLATE 10 MG: 5 TABLET ORAL at 09:40

## 2025-04-26 RX ADMIN — BUPRENORPHINE HYDROCHLORIDE AND NALOXONE HYDROCHLORIDE DIHYDRATE 1 TABLET: 2; .5 TABLET SUBLINGUAL at 09:40

## 2025-04-26 NOTE — PLAN OF CARE
Problem: Depression  Goal: Will be euthymic at discharge  Description: INTERVENTIONS:1. Administer medication as ordered2. Provide emotional support via 1:1 interaction with staff3. Encourage involvement in milieu/groups/activities4. Monitor for social isolation  4/25/2025 2352 by Adriana Santana, RN  Outcome: Progressing   PATIENT IS CURRENTLY RESTING IN BED. NO DISTRESS NOTED. SAFETY MEASURE IN PLACE. WILL CONTINUE TO MONITOR WITH Q15 MINUTE CHECKS.

## 2025-04-26 NOTE — BH NOTE
PSYCHIATRIC PROGRESS NOTE    Chief Complaint: \"I have tunde.\"    Length of Stay: 3 Days    Interval History:  04/26/2025  Nursing report patient slept well. He is taking his medications and eating his meals.  Keaton tells me that he is doing quite well with the combination of Neurontin, Cymbalta and now Suboxone.  He denies experiencing any untoward side effects.  He feels that his pain is very well-controlled and he is not experiencing numbness or tingling in his foot.  He is thankful for the treatment team and was not aware of the different medication options we provided for him.  He doubles down in terms of his motivation to restart dialysis and be adherent to it.  He would like to discuss with renal.    Otherwise patient denies having suicidal ideations.    04/25/2025  Patient shares that he's used fentanyl patches for the pain. Similarly gabapentin has been helping. He has pain in bilateral lower back, legs and feet. This has been chronic. He reports that he's been doing some thinking and wants to invest in his wellbeing. He is agreeable to restart HD and wanted to explore options. I informed him that I consulted nephrology. He is agreeable to meet with them.    Keaton is starting to feel hopeful, but remains with passive death wish.  No hallucinations.    Reflects on past substance use and has been using crack cocaine intermittently. In the past he used IV heroin. When he was on hospice he was on dilaudid, but no opiates (prescribed or used illicitly) since 2/2025. Patient shares that his biggest struggle is going to be quitting to smoke cigarettes. At this time patches are sufficient.      Past Medical History:  Past Medical History:   Diagnosis Date    Adverse effect of anesthesia     breathing diff. with versed    Bipolar 1 disorder, mixed, moderate (formerly [facility]) 3/6/2017    Chronic pain     Depression     Pt stated diagnosed years ago    Diabetes (formerly [facility]) 2003    Drug-induced mood disorder(292.84) 5/28/2013    Homicide  thinking  Attention/Concentration are both intact  Recent/Remote memories are intact per answers to my evaluation questions.  Insight is poor. Judgment is poor  Cognition: Intact grossly.     Assessment and Plan:  Keaton Van meets criteria for a diagnosis of   MDD, recurrent, severe, without psychosis  Opioid use disorder, in remission  Cocaine abuse  Tobacco use disorder     04/26/2025  Increase cymbalta from 20mg po bid to 30mg po bid.  Consult renal. Appreciate their time & expertise. Pt interested in HD. Plan to increase duloxetine & gabapentin. Appreciate recs re:dosing.    04/25/2025  Consult medicine for mgmt of HTN  Consult renal. Appreciate their time & expertise. Pt interested in HD. Plan to increase duloxetine & gabapentin. Appreciate recs re:dosing.  Start fentanyle 25mcg/hr patch q72h    A coordinated, multidisplinary treatment team round was conducted with the patient, nurses, pharmcist,  and writer present. Discussions held with , and/or with family members; Complete current electronic health record for patient was reviewed in full including consultant notes, ancillary staff notes, nurses and tech notes, labs and vitals.    I certify that this patients inpatient psychiatric hospital services furnished since the previous certification were, and continue to be, required for treatment that could reasonably be expected to improve the patient's condition, or for diagnostic study, and that the patient continues to need, on a daily basis, active treatment furnished directly by or requiring the supervision of inpatient psychiatric facility personnel. In addition, the hospital records show that services furnished were intensive treatment services, admission or related services, or equivalent services.

## 2025-04-26 NOTE — PROGRESS NOTES
Spiritual Health History and Assessment/Progress Note  [facility]    Follow-up, Behavioral Health,  ,  , Follow up     Name: Keaton Van MRN: [id number]    Age: 59 y.o.     Sex: male   Language: English   Anglican: Worship   Depression     Date: 4/26/2025            Total Time Calculated: 20 min              Spiritual Assessment continued in [facility] 7W GEN BEHAV HLTH        Referral/Consult From: Other    Encounter Overview/Reason: Follow-up, Behavioral Health  Service Provided For: Patient    Keisha, Belief, Meaning:   Patient is connected with a keisha tradition or spiritual practice  Family/Friends       Importance and Influence:  Patient has spiritual/personal beliefs that influence decisions regarding their health  Family/Friends     Community:  Patient feels well-supported. Support system includes: Extended family  Family/Friends     Assessment and Plan of Care:   Spiritual/Emotional Distress: Pt struggling with continuation of medical treatment; post-suicidal  Brief visit with pt. He was somewhat sleepy, but encouraged that he's been able to rest now. Continues to wrestle with returning to dialysis treatment b/c of physical and emotional fatigue. Pt is questioning God's plan and purpose for his life. Pt's sisters are his reason for living. He ponders how his death would affect them.    Patient Interventions include: Facilitated expression of thoughts and feelings and Explored spiritual coping/struggle/distress  Family/Friends Interventions include:     Patient Plan of Care: Spiritual Care available upon further referral  Family/Friends Plan of Care:     Electronically signed by JASON Gonzales on 4/26/2025 at 1:40 PM  For additional spiritual care, please contact the  on-call at ([phone]).    Patricia Burden MDiv, MS, BCC  Staff   Spiritual Health Services

## 2025-04-26 NOTE — INTERDISCIPLINARY ROUNDS
Behavioral Health Interdisciplinary Rounds     Patient Name: Keaton Van  Age: 59 y.o.  Room/Bed:  [facility]  Primary Diagnosis: Depression   Admission Status: Voluntary    Readmission within 30 days: No  Power of  in place: No  Patient requires a blocked bed: No          Reason for blocked bed: n/a  Sleep hours: 6-7       Flu Vaccine: No  Participation in Care/Groups:  Yes  Medication Compliant?: Yes  PRNS (last 24 hours): None   Restraints (last 24 hours):  No  __________________________________________________  OQ Admission Analysis Survey completed:  OQ Admission Analysis Survey score:  __________________________________________________     Alcohol screening (AUDIT) completed -     If applicable, date SBIRT discussed in treatment team AND documented:    Tobacco - patient is a smoker:    Illegal Drugs use:      24 hour chart check complete: Yes    _______________________________________________    Patient goal(s) for today:   Treatment team focus/goals:   Progress note:      Spiritual Care Consult:   Financial concerns/prescription coverage:    Family contact:                        Family requesting physician contact today:    Discharge plan:   Access to weapons :                                                              Outpatient provider(s):   Patient's preferred phone number for follow up call :   Patient's preferred e-mail address :    LOS:  3  Expected LOS:     Participating treatment team members: Keaton Van, * (assigned SW),

## 2025-04-26 NOTE — PLAN OF CARE
Problem: Depression  Goal: Will be euthymic at discharge  Description: INTERVENTIONS:1. Administer medication as ordered2. Provide emotional support via 1:1 interaction with staff3. Encourage involvement in milieu/groups/activities4. Monitor for social isolation  4/26/2025 0849 by Carolin Pittman, RN  Outcome: Progressing  4/25/2025 2352 by Adriana Santana, RN  Outcome: Progressing     Problem: Anxiety  Goal: Will report anxiety at manageable levels  Description: INTERVENTIONS:1. Administer medication as ordered2. Teach and rehearse alternative coping skills3. Provide emotional support with 1:1 interaction with staff  Outcome: Progressing    1330: Patient is participatory in treatment team. Mood and affect; calm, cooperative and pleasant. Patient presents as brightened and states he slept well last night. Denies SI/HI. Denies AH/VH. Medication and meal compliant. Patient has good insight and is grateful that he was admitted. Plans are ongoing, patient voices no additional concerns at this time.     1500: Completed patients wound care.

## 2025-04-26 NOTE — PROGRESS NOTES
[facility] Adult  Hospitalist Group                                                                                          Hospitalist Progress Note  LOIS Webber - CNP  Answering service: [phone] OR [phone] from in house phone        Date of Service:  2025  NAME:  Keaton Van  :  1965  MRN:  [id number]       Admission Summary:   Keaton Van is a 59 y.o. male with pmhx of Depression, HTN, DM 2 with ESRD, diabetic retinopathy and neuropathy who presents with depression and suicide attempt.  Patient was seen at [facility] ED before being transferred to [facility].  Hospitalist consulted to evaluate medical status.      Denies fever chest pain shortness of breath, attests to chronic pain with kidney dysfunction.  Does not want to do dialysis because he doesn't want to be hooked up to a machine 3 days a week.  Last dialysis was 25.  Does not have av fistula or any other HD access.  Patient had BKA last year and does attests to drainage from incision site.       Interval history / Subjective:     feeling much improved, attests to good pain control with suboxone which has relieved a chronic issue for patient.    Will sign off as patient's medical issues remain stable, we wish Mr. Van the best in his wellness journey, feel free to consult us as needed, thank you for allowing us to participate in his care     Assessment & Plan:      Suicide Attempt  Major Depressive Disorder  -per primary team     HTN  -continue home amlodipine, lasix and hydralazine  -clonidine added prn     DM 2  -restart home metformin  -will add sliding scale, check BS per protocol  -HgA1c 5.5     ESRD, currently refusing dialysis  -states he continues to make urine  -last labs  with BUN 49, Cr. 3.61 and GFR 18, Cr stable at 3.55 on   -followed by Dr. Asif from [facility], patient has not followed up      Chronic Pain  Right BKA   -needs QOD wound care on right leg  -pain control per      No results found for: \"GLUCPOC\"  [unfilled]    Notes reviewed from all clinical/nonclinical/nursing services involved in patient's clinical care. Care coordination discussions were held with appropriate clinical/nonclinical/ nursing providers based on care coordination needs.         Patients current active Medications were reviewed, considered, added and adjusted based on the clinical condition today.      Home Medications were reconciled to the best of my ability given all available resources at the time of admission. Route is PO if not otherwise noted.      Admission Status:[id number]:::1}      Medications Reviewed:     Current Facility-Administered Medications   Medication Dose Route Frequency    DULoxetine (CYMBALTA) extended release capsule 30 mg  30 mg Oral BID    [Held by provider] metFORMIN (GLUCOPHAGE) tablet 500 mg  500 mg Oral Daily with breakfast    glucose chewable tablet 16 g  4 tablet Oral PRN    dextrose bolus 10% 125 mL  125 mL IntraVENous PRN    Or    dextrose bolus 10% 250 mL  250 mL IntraVENous PRN    glucagon injection 1 mg  1 mg SubCUTAneous PRN    dextrose 10 % infusion   IntraVENous Continuous PRN    insulin lispro (HUMALOG,ADMELOG) injection vial 0-4 Units  0-4 Units SubCUTAneous 4x Daily AC & HS    buprenorphine-naloxone (SUBOXONE) 2-0.5 MG SL tablet 1 tablet  1 tablet SubLINGual BID    amLODIPine (NORVASC) tablet 10 mg  10 mg Oral Daily    hydrALAZINE (APRESOLINE) tablet 50 mg  50 mg Oral 3 times per day    cloNIDine (CATAPRES) tablet 0.1 mg  0.1 mg Oral Q4H PRN    furosemide (LASIX) tablet 40 mg  40 mg Oral Daily    gabapentin (NEURONTIN) capsule 300 mg  300 mg Oral BID    acetaminophen (TYLENOL) tablet 650 mg  650 mg Oral Q4H PRN    polyethylene glycol (GLYCOLAX) packet 17 g  17 g Oral Daily PRN    senna (SENOKOT) tablet 8.6 mg  1 tablet Oral Daily PRN    aluminum & magnesium hydroxide-simethicone (MAALOX PLUS) 200-200-20 MG/5ML suspension 30 mL  30 mL Oral Q6H PRN    hydrOXYzine HCl

## 2025-04-27 ENCOUNTER — APPOINTMENT (OUTPATIENT)
Facility: HOSPITAL | Age: 60
DRG: 885 | End: 2025-04-27
Attending: HOSPITALIST
Payer: MEDICARE

## 2025-04-27 ENCOUNTER — HOSPITAL ENCOUNTER (INPATIENT)
Facility: HOSPITAL | Age: 60
LOS: 15 days | Discharge: HOME HEALTH | DRG: 885 | End: 2025-05-12
Attending: HOSPITALIST | Admitting: HOSPITALIST
Payer: MEDICARE

## 2025-04-27 ENCOUNTER — HOSPITAL ENCOUNTER (INPATIENT)
Facility: HOSPITAL | Age: 60
Discharge: HOME | DRG: 885 | End: 2025-04-30
Attending: HOSPITALIST
Payer: MEDICARE

## 2025-04-27 VITALS
HEART RATE: 67 BPM | OXYGEN SATURATION: 94 % | SYSTOLIC BLOOD PRESSURE: 139 MMHG | DIASTOLIC BLOOD PRESSURE: 62 MMHG | TEMPERATURE: 97.3 F | RESPIRATION RATE: 18 BRPM | HEIGHT: 72 IN | BODY MASS INDEX: 23.7 KG/M2 | WEIGHT: 175 LBS

## 2025-04-27 DIAGNOSIS — N17.9 ACUTE RENAL FAILURE, UNSPECIFIED ACUTE RENAL FAILURE TYPE: ICD-10-CM

## 2025-04-27 DIAGNOSIS — S88.111S BELOW-KNEE AMPUTATION OF RIGHT LOWER EXTREMITY, SEQUELA: Primary | ICD-10-CM

## 2025-04-27 PROBLEM — E87.5 HYPERKALEMIA: Status: ACTIVE | Noted: 2025-04-27

## 2025-04-27 PROBLEM — G92.8 TOXIC METABOLIC ENCEPHALOPATHY: Status: ACTIVE | Noted: 2025-04-27

## 2025-04-27 LAB
ALBUMIN SERPL-MCNC: 2.8 G/DL (ref 3.5–5)
ALBUMIN SERPL-MCNC: 3.2 G/DL (ref 3.5–5)
ALBUMIN/GLOB SERPL: 0.8 (ref 1.1–2.2)
ALBUMIN/GLOB SERPL: 0.9 (ref 1.1–2.2)
ALP SERPL-CCNC: 116 U/L (ref 45–117)
ALP SERPL-CCNC: 140 U/L (ref 45–117)
ALT SERPL-CCNC: 15 U/L (ref 12–78)
ALT SERPL-CCNC: 20 U/L (ref 12–78)
ANION GAP SERPL CALC-SCNC: 4 MMOL/L (ref 2–12)
ANION GAP SERPL CALC-SCNC: 5 MMOL/L (ref 2–12)
ARTERIAL PATENCY WRIST A: YES
AST SERPL-CCNC: 14 U/L (ref 15–37)
AST SERPL-CCNC: 14 U/L (ref 15–37)
BASE DEFICIT BLDA-SCNC: 10.3 MMOL/L
BASOPHILS # BLD: 0.02 K/UL (ref 0–0.1)
BASOPHILS NFR BLD: 0.3 % (ref 0–1)
BDY SITE: ABNORMAL
BILIRUB SERPL-MCNC: 0.5 MG/DL (ref 0.2–1)
BILIRUB SERPL-MCNC: 0.6 MG/DL (ref 0.2–1)
BUN SERPL-MCNC: 40 MG/DL (ref 6–20)
BUN SERPL-MCNC: 75 MG/DL (ref 6–20)
BUN/CREAT SERPL: 16 (ref 12–20)
BUN/CREAT SERPL: 17 (ref 12–20)
CALCIUM SERPL-MCNC: 8.5 MG/DL (ref 8.5–10.1)
CALCIUM SERPL-MCNC: 9.1 MG/DL (ref 8.5–10.1)
CHLORIDE SERPL-SCNC: 102 MMOL/L (ref 97–108)
CHLORIDE SERPL-SCNC: 104 MMOL/L (ref 97–108)
CO2 SERPL-SCNC: 22 MMOL/L (ref 21–32)
CO2 SERPL-SCNC: 25 MMOL/L (ref 21–32)
CREAT SERPL-MCNC: 2.51 MG/DL (ref 0.7–1.3)
CREAT SERPL-MCNC: 4.36 MG/DL (ref 0.7–1.3)
DIFFERENTIAL METHOD BLD: ABNORMAL
EKG ATRIAL RATE: 65 BPM
EKG DIAGNOSIS: NORMAL
EKG P AXIS: 69 DEGREES
EKG P-R INTERVAL: 202 MS
EKG Q-T INTERVAL: 430 MS
EKG QRS DURATION: 122 MS
EKG QTC CALCULATION (BAZETT): 447 MS
EKG R AXIS: 49 DEGREES
EKG T AXIS: 72 DEGREES
EKG VENTRICULAR RATE: 65 BPM
EOSINOPHIL # BLD: 0.02 K/UL (ref 0–0.4)
EOSINOPHIL NFR BLD: 0.3 % (ref 0–7)
ERYTHROCYTE [DISTWIDTH] IN BLOOD BY AUTOMATED COUNT: 14.3 % (ref 11.5–14.5)
ERYTHROCYTE [DISTWIDTH] IN BLOOD BY AUTOMATED COUNT: 14.5 % (ref 11.5–14.5)
GAS FLOW.O2 SETTING OXYMISER: 16
GLOBULIN SER CALC-MCNC: 3.3 G/DL (ref 2–4)
GLOBULIN SER CALC-MCNC: 3.7 G/DL (ref 2–4)
GLUCOSE BLD STRIP.AUTO-MCNC: 116 MG/DL (ref 65–117)
GLUCOSE BLD STRIP.AUTO-MCNC: 192 MG/DL (ref 65–117)
GLUCOSE BLD STRIP.AUTO-MCNC: 241 MG/DL (ref 65–117)
GLUCOSE BLD STRIP.AUTO-MCNC: 275 MG/DL (ref 65–117)
GLUCOSE BLD STRIP.AUTO-MCNC: 286 MG/DL (ref 65–117)
GLUCOSE BLD STRIP.AUTO-MCNC: 314 MG/DL (ref 65–117)
GLUCOSE SERPL-MCNC: 110 MG/DL (ref 65–100)
GLUCOSE SERPL-MCNC: 293 MG/DL (ref 65–100)
HBV SURFACE AB SER QL: NONREACTIVE
HBV SURFACE AB SER-ACNC: 3.63 MIU/ML
HBV SURFACE AG SER QL: 0.14 INDEX
HBV SURFACE AG SER QL: NEGATIVE
HCO3 BLDA-SCNC: 17 MMOL/L (ref 22–26)
HCT VFR BLD AUTO: 21.1 % (ref 36.6–50.3)
HCT VFR BLD AUTO: 26 % (ref 36.6–50.3)
HGB BLD-MCNC: 7.4 G/DL (ref 12.1–17)
HGB BLD-MCNC: 8.8 G/DL (ref 12.1–17)
IMM GRANULOCYTES # BLD AUTO: 0.06 K/UL (ref 0–0.04)
IMM GRANULOCYTES NFR BLD AUTO: 0.8 % (ref 0–0.5)
LACTATE SERPL-SCNC: 1.8 MMOL/L (ref 0.4–2)
LYMPHOCYTES # BLD: 0.22 K/UL (ref 0.8–3.5)
LYMPHOCYTES NFR BLD: 2.9 % (ref 12–49)
MAGNESIUM SERPL-MCNC: 2.3 MG/DL (ref 1.6–2.4)
MCH RBC QN AUTO: 29.8 PG (ref 26–34)
MCH RBC QN AUTO: 30.8 PG (ref 26–34)
MCHC RBC AUTO-ENTMCNC: 33.8 G/DL (ref 30–36.5)
MCHC RBC AUTO-ENTMCNC: 35.1 G/DL (ref 30–36.5)
MCV RBC AUTO: 85.1 FL (ref 80–99)
MCV RBC AUTO: 90.9 FL (ref 80–99)
MONOCYTES # BLD: 0.26 K/UL (ref 0–1)
MONOCYTES NFR BLD: 3.5 % (ref 5–13)
NEUTS SEG # BLD: 6.92 K/UL (ref 1.8–8)
NEUTS SEG NFR BLD: 92.2 % (ref 32–75)
NRBC # BLD: 0 K/UL (ref 0–0.01)
NRBC # BLD: 0 K/UL (ref 0–0.01)
NRBC BLD-RTO: 0 PER 100 WBC
NRBC BLD-RTO: 0 PER 100 WBC
PCO2 BLDA: 44 MMHG (ref 35–45)
PEEP RESPIRATORY: 5
PH BLDA: 7.21 (ref 7.35–7.45)
PHOSPHATE SERPL-MCNC: 7.3 MG/DL (ref 2.6–4.7)
PLATELET # BLD AUTO: 129 K/UL (ref 150–400)
PLATELET # BLD AUTO: 133 K/UL (ref 150–400)
PMV BLD AUTO: 10.2 FL (ref 8.9–12.9)
PMV BLD AUTO: 10.3 FL (ref 8.9–12.9)
PO2 BLDA: 414 MMHG (ref 80–100)
POTASSIUM SERPL-SCNC: 4.2 MMOL/L (ref 3.5–5.1)
POTASSIUM SERPL-SCNC: 7.1 MMOL/L (ref 3.5–5.1)
PROT SERPL-MCNC: 6.1 G/DL (ref 6.4–8.2)
PROT SERPL-MCNC: 6.9 G/DL (ref 6.4–8.2)
RBC # BLD AUTO: 2.48 M/UL (ref 4.1–5.7)
RBC # BLD AUTO: 2.86 M/UL (ref 4.1–5.7)
RBC MORPH BLD: ABNORMAL
SAO2 % BLD: 100 % (ref 92–97)
SAO2% DEVICE SAO2% SENSOR NAME: ABNORMAL
SERVICE CMNT-IMP: ABNORMAL
SERVICE CMNT-IMP: NORMAL
SODIUM SERPL-SCNC: 128 MMOL/L (ref 136–145)
SODIUM SERPL-SCNC: 134 MMOL/L (ref 136–145)
SPECIMEN SITE: ABNORMAL
VENTILATION MODE VENT: ABNORMAL
VT SETTING VENT: 480
WBC # BLD AUTO: 5.2 K/UL (ref 4.1–11.1)
WBC # BLD AUTO: 7.5 K/UL (ref 4.1–11.1)

## 2025-04-27 PROCEDURE — 71045 X-RAY EXAM CHEST 1 VIEW: CPT

## 2025-04-27 PROCEDURE — 80053 COMPREHEN METABOLIC PANEL: CPT

## 2025-04-27 PROCEDURE — 6360000002 HC RX W HCPCS: Performed by: RADIOLOGY

## 2025-04-27 PROCEDURE — 85027 COMPLETE CBC AUTOMATED: CPT

## 2025-04-27 PROCEDURE — 2000000000 HC ICU R&B

## 2025-04-27 PROCEDURE — 74018 RADEX ABDOMEN 1 VIEW: CPT

## 2025-04-27 PROCEDURE — 6360000002 HC RX W HCPCS

## 2025-04-27 PROCEDURE — 2500000003 HC RX 250 WO HCPCS: Performed by: STUDENT IN AN ORGANIZED HEALTH CARE EDUCATION/TRAINING PROGRAM

## 2025-04-27 PROCEDURE — 6370000000 HC RX 637 (ALT 250 FOR IP): Performed by: NURSE PRACTITIONER

## 2025-04-27 PROCEDURE — 6360000002 HC RX W HCPCS: Performed by: PSYCHIATRY & NEUROLOGY

## 2025-04-27 PROCEDURE — 86706 HEP B SURFACE ANTIBODY: CPT

## 2025-04-27 PROCEDURE — 82803 BLOOD GASES ANY COMBINATION: CPT

## 2025-04-27 PROCEDURE — 2500000003 HC RX 250 WO HCPCS

## 2025-04-27 PROCEDURE — 90935 HEMODIALYSIS ONE EVALUATION: CPT

## 2025-04-27 PROCEDURE — 31500 INSERT EMERGENCY AIRWAY: CPT

## 2025-04-27 PROCEDURE — 6360000002 HC RX W HCPCS: Performed by: STUDENT IN AN ORGANIZED HEALTH CARE EDUCATION/TRAINING PROGRAM

## 2025-04-27 PROCEDURE — 2500000003 HC RX 250 WO HCPCS: Performed by: NURSE PRACTITIONER

## 2025-04-27 PROCEDURE — 36600 WITHDRAWAL OF ARTERIAL BLOOD: CPT

## 2025-04-27 PROCEDURE — 83735 ASSAY OF MAGNESIUM: CPT

## 2025-04-27 PROCEDURE — 5A1D70Z PERFORMANCE OF URINARY FILTRATION, INTERMITTENT, LESS THAN 6 HOURS PER DAY: ICD-10-PCS | Performed by: INTERNAL MEDICINE

## 2025-04-27 PROCEDURE — 36556 INSERT NON-TUNNEL CV CATH: CPT

## 2025-04-27 PROCEDURE — 6370000000 HC RX 637 (ALT 250 FOR IP): Performed by: PSYCHIATRY & NEUROLOGY

## 2025-04-27 PROCEDURE — 6370000000 HC RX 637 (ALT 250 FOR IP): Performed by: STUDENT IN AN ORGANIZED HEALTH CARE EDUCATION/TRAINING PROGRAM

## 2025-04-27 PROCEDURE — 93005 ELECTROCARDIOGRAM TRACING: CPT | Performed by: PSYCHIATRY & NEUROLOGY

## 2025-04-27 PROCEDURE — 83605 ASSAY OF LACTIC ACID: CPT

## 2025-04-27 PROCEDURE — 87340 HEPATITIS B SURFACE AG IA: CPT

## 2025-04-27 PROCEDURE — 82962 GLUCOSE BLOOD TEST: CPT

## 2025-04-27 PROCEDURE — 2580000003 HC RX 258

## 2025-04-27 PROCEDURE — 76942 ECHO GUIDE FOR BIOPSY: CPT

## 2025-04-27 PROCEDURE — 94002 VENT MGMT INPAT INIT DAY: CPT

## 2025-04-27 PROCEDURE — 85025 COMPLETE CBC W/AUTO DIFF WBC: CPT

## 2025-04-27 PROCEDURE — 02HV33Z INSERTION OF INFUSION DEVICE INTO SUPERIOR VENA CAVA, PERCUTANEOUS APPROACH: ICD-10-PCS | Performed by: INTERNAL MEDICINE

## 2025-04-27 PROCEDURE — 84100 ASSAY OF PHOSPHORUS: CPT

## 2025-04-27 RX ORDER — CALCIUM CHLORIDE 100 MG/ML
INJECTION INTRAVENOUS; INTRAVENTRICULAR
Status: DISCONTINUED | OUTPATIENT
Start: 2025-04-27 | End: 2025-04-27

## 2025-04-27 RX ORDER — ONDANSETRON 4 MG/1
4 TABLET, ORALLY DISINTEGRATING ORAL EVERY 8 HOURS PRN
Status: DISCONTINUED | OUTPATIENT
Start: 2025-04-27 | End: 2025-04-27 | Stop reason: HOSPADM

## 2025-04-27 RX ORDER — POLYETHYLENE GLYCOL 3350 17 G/17G
17 POWDER, FOR SOLUTION ORAL DAILY PRN
Status: DISCONTINUED | OUTPATIENT
Start: 2025-04-27 | End: 2025-04-27 | Stop reason: SDUPTHER

## 2025-04-27 RX ORDER — ETOMIDATE 2 MG/ML
INJECTION INTRAVENOUS
Status: COMPLETED | OUTPATIENT
Start: 2025-04-27 | End: 2025-04-27

## 2025-04-27 RX ORDER — FENTANYL CITRATE 50 UG/ML
50 INJECTION, SOLUTION INTRAMUSCULAR; INTRAVENOUS ONCE
Status: DISCONTINUED | OUTPATIENT
Start: 2025-04-27 | End: 2025-04-27

## 2025-04-27 RX ORDER — BUPRENORPHINE HYDROCHLORIDE AND NALOXONE HYDROCHLORIDE DIHYDRATE 2; .5 MG/1; MG/1
1 TABLET SUBLINGUAL 2 TIMES DAILY
Status: CANCELLED | OUTPATIENT
Start: 2025-04-27

## 2025-04-27 RX ORDER — ONDANSETRON 4 MG/1
4 TABLET, ORALLY DISINTEGRATING ORAL EVERY 8 HOURS PRN
Status: DISCONTINUED | OUTPATIENT
Start: 2025-04-27 | End: 2025-04-27

## 2025-04-27 RX ORDER — POLYETHYLENE GLYCOL 3350 17 G/17G
17 POWDER, FOR SOLUTION ORAL DAILY PRN
Status: DISCONTINUED | OUTPATIENT
Start: 2025-04-27 | End: 2025-04-27 | Stop reason: HOSPADM

## 2025-04-27 RX ORDER — MIDAZOLAM HYDROCHLORIDE 2 MG/2ML
2 INJECTION, SOLUTION INTRAMUSCULAR; INTRAVENOUS ONCE
Status: COMPLETED | OUTPATIENT
Start: 2025-04-27 | End: 2025-04-27

## 2025-04-27 RX ORDER — ACETAMINOPHEN 325 MG/1
650 TABLET ORAL EVERY 6 HOURS PRN
Status: DISCONTINUED | OUTPATIENT
Start: 2025-04-27 | End: 2025-04-27 | Stop reason: HOSPADM

## 2025-04-27 RX ORDER — HYDRALAZINE HYDROCHLORIDE 50 MG/1
50 TABLET, FILM COATED ORAL EVERY 8 HOURS SCHEDULED
Status: CANCELLED | OUTPATIENT
Start: 2025-04-27

## 2025-04-27 RX ORDER — CALCIUM CHLORIDE 100 MG/ML
INJECTION INTRAVENOUS; INTRAVENTRICULAR
Status: COMPLETED
Start: 2025-04-27 | End: 2025-04-27

## 2025-04-27 RX ORDER — FUROSEMIDE 20 MG/1
40 TABLET ORAL DAILY
Status: CANCELLED | OUTPATIENT
Start: 2025-04-28

## 2025-04-27 RX ORDER — ACETAMINOPHEN 650 MG/1
650 SUPPOSITORY RECTAL EVERY 6 HOURS PRN
Status: DISCONTINUED | OUTPATIENT
Start: 2025-04-27 | End: 2025-04-27 | Stop reason: SDUPTHER

## 2025-04-27 RX ORDER — MAGNESIUM HYDROXIDE/ALUMINUM HYDROXICE/SIMETHICONE 120; 1200; 1200 MG/30ML; MG/30ML; MG/30ML
30 SUSPENSION ORAL EVERY 6 HOURS PRN
Status: CANCELLED | OUTPATIENT
Start: 2025-04-27

## 2025-04-27 RX ORDER — ENOXAPARIN SODIUM 100 MG/ML
30 INJECTION SUBCUTANEOUS DAILY
Status: DISCONTINUED | OUTPATIENT
Start: 2025-04-27 | End: 2025-04-27 | Stop reason: HOSPADM

## 2025-04-27 RX ORDER — DIPHENHYDRAMINE HYDROCHLORIDE 50 MG/ML
50 INJECTION, SOLUTION INTRAMUSCULAR; INTRAVENOUS EVERY 4 HOURS PRN
Status: DISCONTINUED | OUTPATIENT
Start: 2025-04-27 | End: 2025-05-12 | Stop reason: HOSPADM

## 2025-04-27 RX ORDER — SODIUM CHLORIDE 0.9 % (FLUSH) 0.9 %
5-40 SYRINGE (ML) INJECTION EVERY 12 HOURS SCHEDULED
Status: DISCONTINUED | OUTPATIENT
Start: 2025-04-27 | End: 2025-04-27 | Stop reason: SDUPTHER

## 2025-04-27 RX ORDER — DEXTROSE MONOHYDRATE 100 MG/ML
INJECTION, SOLUTION INTRAVENOUS CONTINUOUS PRN
Status: CANCELLED | OUTPATIENT
Start: 2025-04-27

## 2025-04-27 RX ORDER — SODIUM CHLORIDE 9 MG/ML
INJECTION, SOLUTION INTRAVENOUS PRN
Status: DISCONTINUED | OUTPATIENT
Start: 2025-04-27 | End: 2025-04-27 | Stop reason: SDUPTHER

## 2025-04-27 RX ORDER — SODIUM CHLORIDE 0.9 % (FLUSH) 0.9 %
5-40 SYRINGE (ML) INJECTION EVERY 12 HOURS SCHEDULED
Status: DISCONTINUED | OUTPATIENT
Start: 2025-04-27 | End: 2025-05-12 | Stop reason: HOSPADM

## 2025-04-27 RX ORDER — ONDANSETRON 4 MG/1
4 TABLET, ORALLY DISINTEGRATING ORAL EVERY 8 HOURS PRN
Status: CANCELLED | OUTPATIENT
Start: 2025-04-27

## 2025-04-27 RX ORDER — ACETAMINOPHEN 650 MG/1
650 SUPPOSITORY RECTAL EVERY 6 HOURS PRN
Status: DISCONTINUED | OUTPATIENT
Start: 2025-04-27 | End: 2025-04-27 | Stop reason: HOSPADM

## 2025-04-27 RX ORDER — HALOPERIDOL 5 MG/1
5 TABLET ORAL EVERY 4 HOURS PRN
Status: DISCONTINUED | OUTPATIENT
Start: 2025-04-27 | End: 2025-05-12 | Stop reason: HOSPADM

## 2025-04-27 RX ORDER — HEPARIN SODIUM 5000 [USP'U]/ML
5000 INJECTION, SOLUTION INTRAVENOUS; SUBCUTANEOUS EVERY 8 HOURS SCHEDULED
Status: DISCONTINUED | OUTPATIENT
Start: 2025-04-27 | End: 2025-04-27 | Stop reason: HOSPADM

## 2025-04-27 RX ORDER — DEXMEDETOMIDINE HYDROCHLORIDE 4 UG/ML
.1-1.5 INJECTION, SOLUTION INTRAVENOUS CONTINUOUS
Status: DISCONTINUED | OUTPATIENT
Start: 2025-04-27 | End: 2025-04-29

## 2025-04-27 RX ORDER — CLONIDINE HYDROCHLORIDE 0.1 MG/1
0.1 TABLET ORAL EVERY 4 HOURS PRN
Status: DISCONTINUED | OUTPATIENT
Start: 2025-04-27 | End: 2025-04-27

## 2025-04-27 RX ORDER — FUROSEMIDE 20 MG/1
40 TABLET ORAL DAILY
Status: DISCONTINUED | OUTPATIENT
Start: 2025-04-28 | End: 2025-04-27

## 2025-04-27 RX ORDER — DULOXETIN HYDROCHLORIDE 30 MG/1
30 CAPSULE, DELAYED RELEASE ORAL 2 TIMES DAILY
Status: DISCONTINUED | OUTPATIENT
Start: 2025-04-27 | End: 2025-05-06

## 2025-04-27 RX ORDER — PROPOFOL 10 MG/ML
INJECTION, EMULSION INTRAVENOUS
Status: COMPLETED
Start: 2025-04-27 | End: 2025-04-27

## 2025-04-27 RX ORDER — NICOTINE 21 MG/24HR
1 PATCH, TRANSDERMAL 24 HOURS TRANSDERMAL DAILY
Status: DISCONTINUED | OUTPATIENT
Start: 2025-04-27 | End: 2025-05-12 | Stop reason: HOSPADM

## 2025-04-27 RX ORDER — SODIUM CHLORIDE 9 MG/ML
INJECTION, SOLUTION INTRAVENOUS PRN
Status: DISCONTINUED | OUTPATIENT
Start: 2025-04-27 | End: 2025-04-27 | Stop reason: HOSPADM

## 2025-04-27 RX ORDER — HALOPERIDOL 5 MG/ML
5 INJECTION INTRAMUSCULAR EVERY 4 HOURS PRN
Status: CANCELLED | OUTPATIENT
Start: 2025-04-27

## 2025-04-27 RX ORDER — MIDAZOLAM HYDROCHLORIDE 2 MG/2ML
4 INJECTION, SOLUTION INTRAMUSCULAR; INTRAVENOUS EVERY 4 HOURS PRN
Status: DISCONTINUED | OUTPATIENT
Start: 2025-04-27 | End: 2025-05-12 | Stop reason: HOSPADM

## 2025-04-27 RX ORDER — INDOMETHACIN 25 MG/1
50 CAPSULE ORAL ONCE
Status: DISCONTINUED | OUTPATIENT
Start: 2025-04-27 | End: 2025-04-28

## 2025-04-27 RX ORDER — INSULIN LISPRO 100 [IU]/ML
0-8 INJECTION, SOLUTION INTRAVENOUS; SUBCUTANEOUS
Status: DISCONTINUED | OUTPATIENT
Start: 2025-04-27 | End: 2025-04-27 | Stop reason: SDUPTHER

## 2025-04-27 RX ORDER — INSULIN LISPRO 100 [IU]/ML
0-4 INJECTION, SOLUTION INTRAVENOUS; SUBCUTANEOUS
Status: CANCELLED | OUTPATIENT
Start: 2025-04-27

## 2025-04-27 RX ORDER — DIPHENHYDRAMINE HYDROCHLORIDE 50 MG/ML
50 INJECTION, SOLUTION INTRAMUSCULAR; INTRAVENOUS EVERY 4 HOURS PRN
Status: CANCELLED | OUTPATIENT
Start: 2025-04-27

## 2025-04-27 RX ORDER — NICOTINE 21 MG/24HR
1 PATCH, TRANSDERMAL 24 HOURS TRANSDERMAL DAILY
Status: CANCELLED | OUTPATIENT
Start: 2025-04-28

## 2025-04-27 RX ORDER — SENNOSIDES A AND B 8.6 MG/1
1 TABLET, FILM COATED ORAL DAILY PRN
Status: DISCONTINUED | OUTPATIENT
Start: 2025-04-27 | End: 2025-05-12 | Stop reason: HOSPADM

## 2025-04-27 RX ORDER — GABAPENTIN 300 MG/1
300 CAPSULE ORAL 2 TIMES DAILY
Status: CANCELLED | OUTPATIENT
Start: 2025-04-27

## 2025-04-27 RX ORDER — PROPOFOL 10 MG/ML
5-50 INJECTION, EMULSION INTRAVENOUS CONTINUOUS
Status: DISCONTINUED | OUTPATIENT
Start: 2025-04-27 | End: 2025-04-29

## 2025-04-27 RX ORDER — NICOTINE 21 MG/24HR
1 PATCH, TRANSDERMAL 24 HOURS TRANSDERMAL DAILY
Status: DISCONTINUED | OUTPATIENT
Start: 2025-04-28 | End: 2025-04-27

## 2025-04-27 RX ORDER — FUROSEMIDE 10 MG/ML
40 INJECTION INTRAMUSCULAR; INTRAVENOUS ONCE
Status: DISCONTINUED | OUTPATIENT
Start: 2025-04-27 | End: 2025-04-27 | Stop reason: HOSPADM

## 2025-04-27 RX ORDER — INSULIN LISPRO 100 [IU]/ML
0-4 INJECTION, SOLUTION INTRAVENOUS; SUBCUTANEOUS
Status: DISCONTINUED | OUTPATIENT
Start: 2025-04-27 | End: 2025-05-12 | Stop reason: HOSPADM

## 2025-04-27 RX ORDER — LIDOCAINE HYDROCHLORIDE 10 MG/ML
INJECTION, SOLUTION EPIDURAL; INFILTRATION; INTRACAUDAL; PERINEURAL PRN
Status: COMPLETED | OUTPATIENT
Start: 2025-04-27 | End: 2025-04-27

## 2025-04-27 RX ORDER — CALCIUM GLUCONATE 20 MG/ML
1000 INJECTION, SOLUTION INTRAVENOUS ONCE
Status: DISCONTINUED | OUTPATIENT
Start: 2025-04-27 | End: 2025-04-27 | Stop reason: HOSPADM

## 2025-04-27 RX ORDER — ACETAMINOPHEN 325 MG/1
650 TABLET ORAL EVERY 4 HOURS PRN
Status: DISCONTINUED | OUTPATIENT
Start: 2025-04-27 | End: 2025-04-27 | Stop reason: SDUPTHER

## 2025-04-27 RX ORDER — POLYETHYLENE GLYCOL 3350 17 G/17G
17 POWDER, FOR SOLUTION ORAL DAILY PRN
Status: DISCONTINUED | OUTPATIENT
Start: 2025-04-27 | End: 2025-05-12 | Stop reason: HOSPADM

## 2025-04-27 RX ORDER — CALCIUM CHLORIDE 100 MG/ML
1000 INJECTION INTRAVENOUS; INTRAVENTRICULAR ONCE
Status: COMPLETED | OUTPATIENT
Start: 2025-04-27 | End: 2025-04-27

## 2025-04-27 RX ORDER — DEXTROSE MONOHYDRATE 100 MG/ML
INJECTION, SOLUTION INTRAVENOUS CONTINUOUS PRN
Status: DISCONTINUED | OUTPATIENT
Start: 2025-04-27 | End: 2025-04-27 | Stop reason: HOSPADM

## 2025-04-27 RX ORDER — TRAZODONE HYDROCHLORIDE 50 MG/1
50 TABLET ORAL NIGHTLY PRN
Status: DISCONTINUED | OUTPATIENT
Start: 2025-04-27 | End: 2025-05-12 | Stop reason: HOSPADM

## 2025-04-27 RX ORDER — HYDRALAZINE HYDROCHLORIDE 50 MG/1
50 TABLET, FILM COATED ORAL EVERY 8 HOURS SCHEDULED
Status: DISCONTINUED | OUTPATIENT
Start: 2025-04-27 | End: 2025-05-12 | Stop reason: HOSPADM

## 2025-04-27 RX ORDER — SODIUM CHLORIDE 0.9 % (FLUSH) 0.9 %
5-40 SYRINGE (ML) INJECTION PRN
Status: DISCONTINUED | OUTPATIENT
Start: 2025-04-27 | End: 2025-04-27 | Stop reason: HOSPADM

## 2025-04-27 RX ORDER — ACETAMINOPHEN 325 MG/1
650 TABLET ORAL EVERY 6 HOURS PRN
Status: DISCONTINUED | OUTPATIENT
Start: 2025-04-27 | End: 2025-04-27 | Stop reason: SDUPTHER

## 2025-04-27 RX ORDER — NOREPINEPHRINE BITARTRATE 0.06 MG/ML
1-20 INJECTION, SOLUTION INTRAVENOUS CONTINUOUS
Status: DISCONTINUED | OUTPATIENT
Start: 2025-04-27 | End: 2025-04-29

## 2025-04-27 RX ORDER — SODIUM CHLORIDE 0.9 % (FLUSH) 0.9 %
5-40 SYRINGE (ML) INJECTION PRN
Status: DISCONTINUED | OUTPATIENT
Start: 2025-04-27 | End: 2025-04-27 | Stop reason: SDUPTHER

## 2025-04-27 RX ORDER — AMLODIPINE BESYLATE 5 MG/1
10 TABLET ORAL DAILY
Status: DISCONTINUED | OUTPATIENT
Start: 2025-04-28 | End: 2025-05-01

## 2025-04-27 RX ORDER — ROCURONIUM BROMIDE 10 MG/ML
INJECTION, SOLUTION INTRAVENOUS
Status: COMPLETED | OUTPATIENT
Start: 2025-04-27 | End: 2025-04-27

## 2025-04-27 RX ORDER — TRAZODONE HYDROCHLORIDE 50 MG/1
50 TABLET ORAL NIGHTLY PRN
Status: CANCELLED | OUTPATIENT
Start: 2025-04-27

## 2025-04-27 RX ORDER — DULOXETIN HYDROCHLORIDE 30 MG/1
30 CAPSULE, DELAYED RELEASE ORAL 2 TIMES DAILY
Status: CANCELLED | OUTPATIENT
Start: 2025-04-27

## 2025-04-27 RX ORDER — DEXTROSE MONOHYDRATE 100 MG/ML
INJECTION, SOLUTION INTRAVENOUS CONTINUOUS PRN
Status: DISCONTINUED | OUTPATIENT
Start: 2025-04-27 | End: 2025-04-27 | Stop reason: SDUPTHER

## 2025-04-27 RX ORDER — HYDROXYZINE HYDROCHLORIDE 50 MG/1
50 TABLET, FILM COATED ORAL 3 TIMES DAILY PRN
Status: DISCONTINUED | OUTPATIENT
Start: 2025-04-27 | End: 2025-05-12 | Stop reason: HOSPADM

## 2025-04-27 RX ORDER — ACETAMINOPHEN 325 MG/1
650 TABLET ORAL EVERY 4 HOURS PRN
Status: CANCELLED | OUTPATIENT
Start: 2025-04-27

## 2025-04-27 RX ORDER — MAGNESIUM HYDROXIDE/ALUMINUM HYDROXICE/SIMETHICONE 120; 1200; 1200 MG/30ML; MG/30ML; MG/30ML
30 SUSPENSION ORAL EVERY 6 HOURS PRN
Status: DISCONTINUED | OUTPATIENT
Start: 2025-04-27 | End: 2025-04-27

## 2025-04-27 RX ORDER — ONDANSETRON 2 MG/ML
4 INJECTION INTRAMUSCULAR; INTRAVENOUS EVERY 6 HOURS PRN
Status: DISCONTINUED | OUTPATIENT
Start: 2025-04-27 | End: 2025-04-27 | Stop reason: HOSPADM

## 2025-04-27 RX ORDER — ATROPINE SULFATE 0.1 MG/ML
INJECTION INTRAVENOUS
Status: DISCONTINUED | OUTPATIENT
Start: 2025-04-27 | End: 2025-04-27

## 2025-04-27 RX ORDER — SENNOSIDES A AND B 8.6 MG/1
1 TABLET, FILM COATED ORAL DAILY PRN
Status: CANCELLED | OUTPATIENT
Start: 2025-04-27

## 2025-04-27 RX ORDER — HALOPERIDOL 5 MG/ML
5 INJECTION INTRAMUSCULAR EVERY 4 HOURS PRN
Status: DISCONTINUED | OUTPATIENT
Start: 2025-04-27 | End: 2025-05-12 | Stop reason: HOSPADM

## 2025-04-27 RX ORDER — BUPRENORPHINE HYDROCHLORIDE AND NALOXONE HYDROCHLORIDE DIHYDRATE 2; .5 MG/1; MG/1
1 TABLET SUBLINGUAL 2 TIMES DAILY
Status: DISCONTINUED | OUTPATIENT
Start: 2025-04-27 | End: 2025-05-12 | Stop reason: HOSPADM

## 2025-04-27 RX ORDER — DEXMEDETOMIDINE HYDROCHLORIDE 4 UG/ML
INJECTION, SOLUTION INTRAVENOUS
Status: COMPLETED
Start: 2025-04-27 | End: 2025-04-27

## 2025-04-27 RX ORDER — NOREPINEPHRINE BITARTRATE 0.06 MG/ML
INJECTION, SOLUTION INTRAVENOUS
Status: COMPLETED
Start: 2025-04-27 | End: 2025-04-27

## 2025-04-27 RX ORDER — POLYETHYLENE GLYCOL 3350 17 G/17G
17 POWDER, FOR SOLUTION ORAL DAILY PRN
Status: CANCELLED | OUTPATIENT
Start: 2025-04-27

## 2025-04-27 RX ORDER — HEPARIN SODIUM 1000 [USP'U]/ML
INJECTION, SOLUTION INTRAVENOUS; SUBCUTANEOUS PRN
Status: COMPLETED | OUTPATIENT
Start: 2025-04-27 | End: 2025-04-27

## 2025-04-27 RX ORDER — DEXTROSE 25 % IN WATER 25 %
20 SYRINGE (ML) INTRAVENOUS ONCE
Status: DISCONTINUED | OUTPATIENT
Start: 2025-04-27 | End: 2025-04-27

## 2025-04-27 RX ORDER — CLONIDINE HYDROCHLORIDE 0.1 MG/1
0.1 TABLET ORAL EVERY 4 HOURS PRN
Status: CANCELLED | OUTPATIENT
Start: 2025-04-27

## 2025-04-27 RX ORDER — HYDROXYZINE HYDROCHLORIDE 50 MG/1
50 TABLET, FILM COATED ORAL 3 TIMES DAILY PRN
Status: CANCELLED | OUTPATIENT
Start: 2025-04-27

## 2025-04-27 RX ORDER — PROPOFOL 10 MG/ML
5-50 INJECTION, EMULSION INTRAVENOUS CONTINUOUS
Status: DISCONTINUED | OUTPATIENT
Start: 2025-04-27 | End: 2025-04-27

## 2025-04-27 RX ORDER — HALOPERIDOL 5 MG/1
5 TABLET ORAL EVERY 4 HOURS PRN
Status: CANCELLED | OUTPATIENT
Start: 2025-04-27

## 2025-04-27 RX ORDER — CHLORHEXIDINE GLUCONATE ORAL RINSE 1.2 MG/ML
15 SOLUTION DENTAL 2 TIMES DAILY
Status: DISCONTINUED | OUTPATIENT
Start: 2025-04-27 | End: 2025-05-03

## 2025-04-27 RX ORDER — GABAPENTIN 300 MG/1
300 CAPSULE ORAL 2 TIMES DAILY
Status: DISCONTINUED | OUTPATIENT
Start: 2025-04-27 | End: 2025-05-05

## 2025-04-27 RX ORDER — SODIUM CHLORIDE 0.9 % (FLUSH) 0.9 %
5-40 SYRINGE (ML) INJECTION EVERY 12 HOURS SCHEDULED
Status: DISCONTINUED | OUTPATIENT
Start: 2025-04-27 | End: 2025-04-27 | Stop reason: HOSPADM

## 2025-04-27 RX ORDER — AMLODIPINE BESYLATE 5 MG/1
10 TABLET ORAL DAILY
Status: CANCELLED | OUTPATIENT
Start: 2025-04-28

## 2025-04-27 RX ORDER — SODIUM CHLORIDE 0.9 % (FLUSH) 0.9 %
5-40 SYRINGE (ML) INJECTION PRN
Status: DISCONTINUED | OUTPATIENT
Start: 2025-04-27 | End: 2025-05-12 | Stop reason: HOSPADM

## 2025-04-27 RX ORDER — CALCIUM GLUCONATE 94 MG/ML
1000 INJECTION, SOLUTION INTRAVENOUS ONCE
Status: DISCONTINUED | OUTPATIENT
Start: 2025-04-27 | End: 2025-04-27

## 2025-04-27 RX ORDER — HEPARIN SODIUM 5000 [USP'U]/ML
5000 INJECTION, SOLUTION INTRAVENOUS; SUBCUTANEOUS EVERY 8 HOURS SCHEDULED
Status: DISCONTINUED | OUTPATIENT
Start: 2025-04-27 | End: 2025-04-27 | Stop reason: SDUPTHER

## 2025-04-27 RX ADMIN — BUPRENORPHINE HYDROCHLORIDE AND NALOXONE HYDROCHLORIDE DIHYDRATE 1 TABLET: 2; .5 TABLET SUBLINGUAL at 21:05

## 2025-04-27 RX ADMIN — ETOMIDATE 25 MG: 2 INJECTION INTRAVENOUS at 15:55

## 2025-04-27 RX ADMIN — DEXMEDETOMIDINE HYDROCHLORIDE 0.2 MCG/KG/HR: 400 INJECTION, SOLUTION INTRAVENOUS at 19:01

## 2025-04-27 RX ADMIN — LIDOCAINE HYDROCHLORIDE 5 ML: 10 INJECTION, SOLUTION EPIDURAL; INFILTRATION; INTRACAUDAL; PERINEURAL at 16:32

## 2025-04-27 RX ADMIN — PROPOFOL 20 MCG/KG/MIN: 10 INJECTION, EMULSION INTRAVENOUS at 16:06

## 2025-04-27 RX ADMIN — INSULIN HUMAN 10 UNITS: 100 INJECTION, SOLUTION PARENTERAL at 15:50

## 2025-04-27 RX ADMIN — DULOXETINE HYDROCHLORIDE 30 MG: 30 CAPSULE, DELAYED RELEASE ORAL at 21:05

## 2025-04-27 RX ADMIN — MIDAZOLAM HYDROCHLORIDE 2 MG: 1 INJECTION, SOLUTION INTRAMUSCULAR; INTRAVENOUS at 16:38

## 2025-04-27 RX ADMIN — CHLORHEXIDINE GLUCONATE 15 ML: 1.2 RINSE ORAL at 21:05

## 2025-04-27 RX ADMIN — PROPOFOL 35 MCG/KG/MIN: 10 INJECTION, EMULSION INTRAVENOUS at 23:34

## 2025-04-27 RX ADMIN — ONDANSETRON 4 MG: 4 TABLET, ORALLY DISINTEGRATING ORAL at 10:09

## 2025-04-27 RX ADMIN — HEPARIN SODIUM 2400 UNITS: 1000 INJECTION, SOLUTION INTRAVENOUS; SUBCUTANEOUS at 16:32

## 2025-04-27 RX ADMIN — NOREPINEPHRINE BITARTRATE 5 MCG/MIN: 0.06 INJECTION, SOLUTION INTRAVENOUS at 18:24

## 2025-04-27 RX ADMIN — SODIUM CHLORIDE, PRESERVATIVE FREE 10 ML: 5 INJECTION INTRAVENOUS at 21:06

## 2025-04-27 RX ADMIN — CALCIUM CHLORIDE 1000 MG: 100 INJECTION INTRAVENOUS; INTRAVENTRICULAR at 17:07

## 2025-04-27 RX ADMIN — ATROPINE SULFATE 0.5 MG: 0.1 INJECTION, SOLUTION ENDOTRACHEAL; INTRAMUSCULAR; INTRAVENOUS; SUBCUTANEOUS at 15:44

## 2025-04-27 RX ADMIN — ROCURONIUM BROMIDE 80 MG: 10 SOLUTION INTRAVENOUS at 15:55

## 2025-04-27 RX ADMIN — GABAPENTIN 300 MG: 300 CAPSULE ORAL at 21:05

## 2025-04-27 RX ADMIN — INSULIN LISPRO 2 UNITS: 100 INJECTION, SOLUTION INTRAVENOUS; SUBCUTANEOUS at 10:36

## 2025-04-27 RX ADMIN — PROPOFOL 45 MCG/KG/MIN: 10 INJECTION, EMULSION INTRAVENOUS at 19:57

## 2025-04-27 RX ADMIN — SODIUM CHLORIDE 5 MCG/MIN: 0.9 INJECTION, SOLUTION INTRAVENOUS at 18:24

## 2025-04-27 RX ADMIN — CALCIUM CHLORIDE 2000 MG: 100 INJECTION, SOLUTION INTRAVENOUS; INTRAVENTRICULAR at 15:48

## 2025-04-27 RX ADMIN — CALCIUM CHLORIDE INJECTION 1000 MG: 100 INJECTION, SOLUTION INTRAVENOUS at 17:07

## 2025-04-27 ASSESSMENT — PAIN SCALES - GENERAL
PAINLEVEL_OUTOF10: 0

## 2025-04-27 ASSESSMENT — PULMONARY FUNCTION TESTS
PIF_VALUE: 16
PIF_VALUE: 18

## 2025-04-27 ASSESSMENT — PAIN SCALES - WONG BAKER: WONGBAKER_NUMERICALRESPONSE: NO HURT

## 2025-04-27 NOTE — DISCHARGE SUMMARY
DISCHARGE SUMMARY    Some parts of the discharge summary are from the initial Psychiatric interview that was done on admission by the admitting psychiatrist.      Date of Admission: 4/23/2025    Date of Discharge:4/27/2025     TYPE OF DISCHARGE:   REGULAR -  YES    ADMISSION EVALUATION:  CHIEF COMPLAINT: \"just not dying fast enough.\"     HISTORY OF PRESENTING COMPLAINT:   Keaton Van is a 59 y.o. White (non-) male who is currently admitted to the acute/general side of 7th floor behavioral health Unit at [facility].      Ashlie says that when he found out that he was 'dying' from kidney failure in 9/2024. He says that he stopped dialysis 1/9. He decided that he didn't want to be hooked to a machine to live. After that he was recommended for hospice, but he was discharged latter part of 2/2025. He was recommended to f/u to nephrologist, but upon finding that she was just prescribing a medication that his pcp could prescribe, he stopped follow up.     His sister went to [place] 2 weeks ago, so he decided that on Friday he planned the 'whole thing' over two week's time. He was thinking that his body is tired, and he is in pain. He got the 'gumption' to do it, and attempted suicide. He hadn't been taking his ativan as much, so he had plenty of it left and took it to end his life. He wanted to take his overdose at 5pm after having taken care of the cat.      However he says that he regrets this now. He hurt his two sisters and he is not the type of person to do this to his family.  He describes struggling with depressive symptoms for some time, but not seeking treatment.  He presents with significant hopelessness and helplessness.  Feels much decreased self-worth. His sleep and appetite have been poor. Evaluation is negative for experiencing hallucinations of any type.      Review of patient's history reveals no past symptoms of tomasa.  Patient does mood lability and poor sleep in the past to being in the  report patient slept well. He is taking his medications and eating his meals.  Keaton tells me that he is doing quite well with the combination of Neurontin, Cymbalta and now Suboxone.  He denies experiencing any untoward side effects.  He feels that his pain is very well-controlled and he is not experiencing numbness or tingling in his foot.  He is thankful for the treatment team and was not aware of the different medication options we provided for him.  He doubles down in terms of his motivation to restart dialysis and be adherent to it.  He would like to discuss with renal.     Otherwise patient denies having suicidal ideations.     04/25/2025  Patient shares that he's used fentanyl patches for the pain. Similarly gabapentin has been helping. He has pain in bilateral lower back, legs and feet. This has been chronic. He reports that he's been doing some thinking and wants to invest in his wellbeing. He is agreeable to restart HD and wanted to explore options. I informed him that I consulted nephrology. He is agreeable to meet with them.     Keaton is starting to feel hopeful, but remains with passive death wish.  No hallucinations.     Reflects on past substance use and has been using crack cocaine intermittently. In the past he used IV heroin. When he was on hospice he was on dilaudid, but no opiates (prescribed or used illicitly) since 2/2025. Patient shares that his biggest struggle is going to be quitting to smoke cigarettes. At this time patches are sufficient.      Medication Changes and rationale:  04/26/2025  Increase cymbalta from 20mg po bid to 30mg po bid.  Consult renal. Appreciate their time & expertise. Pt interested in HD. Plan to increase duloxetine & gabapentin. Appreciate recs re:dosing.     04/25/2025  Consult medicine for mgmt of HTN  Consult renal. Appreciate their time & expertise. Pt interested in HD. Plan to increase duloxetine & gabapentin. Appreciate recs re:dosing.  Start fentanyle 25mcg/hr  patch q72h        DISCHARGE DIAGNOSIS:         Medication List        STOP taking these medications      blood glucose test strips     bumetanide 2 MG tablet  Commonly known as: BUMEX     cloNIDine 0.2 MG tablet  Commonly known as: CATAPRES     glucose monitoring kit     Insulin Pen Needle 31G X 5 MM Misc     lactobacillus capsule     Lancets Misc     losartan 25 MG tablet  Commonly known as: COZAAR     lurasidone 20 MG Tabs tablet  Commonly known as: Latuda     pantoprazole 40 MG tablet  Commonly known as: PROTONIX     silver sulfADIAZINE 1 % cream  Commonly known as: SILVADENE     tiZANidine 2 MG tablet  Commonly known as: ZANAFLEX     Tresiba FlexTouch 100 UNIT/ML Sopn  Generic drug: Insulin Degludec     vancomycin 1000 MG/200ML Soln  Commonly known as: VANCOCIN            ASK your doctor about these medications      amLODIPine 10 MG tablet  Commonly known as: NORVASC  Take 1 tablet by mouth daily     furosemide 40 MG tablet  Commonly known as: LASIX     gabapentin 800 MG tablet  Commonly known as: NEURONTIN  Ask about: Which instructions should I use?     hydrALAZINE 50 MG tablet  Commonly known as: APRESOLINE  Ask about: Which instructions should I use?     LORazepam 1 MG tablet  Commonly known as: ATIVAN             WOUND CARE: none needed.    PROGNOSIS:   Fair based on nature of patient's pathology/ies and treatment compliance issues.  Prognosis is greatly dependent upon patient's ability to  follow up on psychiatric/psychotherapy appointments as well as to comply with psychiatric medications as prescribed.

## 2025-04-27 NOTE — INTERDISCIPLINARY ROUNDS
Behavioral Health Interdisciplinary Rounds     Patient Name: Keaton Van  Age: 59 y.o.  Room/Bed:  [facility]  Primary Diagnosis: Depression   Admission Status:      Readmission within 30 days:   Power of  in place:   Patient requires a blocked bed: no          Reason for blocked bed:   Sleep hours:   Flu vaccine :no       Participation in Care/Groups:  yes  Medication Compliant?: yes  PRNS (last 24 hours): none    Restraints (last 24 hours):  no  __________________________________________________  OQ Admission Analysis Survey completed:  OQ Admission Analysis Survey score:    __________________________________________________     Alcohol screening (AUDIT) completed -     Score:   Tobacco :  Illegal Drugs use:   CSSR Lifetime:     24 hour chart check complete: yes     _______________________________________________    Patient goal(s) for today:   Treatment team focus/goals:   Progress note:      Spiritual Care Consult:   Financial concerns/prescription coverage:    Family contact:                        Family requesting physician contact today:    Discharge plan:   Access to weapons :                                                              Outpatient provider(s):     LOS:  4  Expected LOS:     Participating treatment team members: Keaton Van, * (assigned SW),

## 2025-04-27 NOTE — BH NOTE
TRANSFER - OUT REPORT:    Verbal report given to BHAKTI Cta on Keaton Van  being transferred to 4E room 425 for change in patient condition.       Report consisted of patient’s Situation, Background, Assessment and   Recommendations(SBAR).     Information from the following report(s) Nurse Handoff Report was reviewed with the receiving nurse.    Opportunity for questions and clarification was provided.      Patient transported with:   O2 @ 4 liters    Lines:  Help Text      This SmartLink retrieves the last documented value for LDA assessment data, retrieved by either LDA type or by LDA group ID.     This SmartLink should be used in a SmartText or SmartPhrase. If one is not available, please contact your .

## 2025-04-27 NOTE — BH NOTE
Pt in bed snoring. Writer unable to fully arouse pt. Notified Dr. Davis and initiated rapid response. Pt discharged to room 425.

## 2025-04-27 NOTE — PLAN OF CARE
Problem: Depression  Goal: Will be euthymic at discharge  Description: INTERVENTIONS:1. Administer medication as ordered2. Provide emotional support via 1:1 interaction with staff3. Encourage involvement in milieu/groups/activities4. Monitor for social isolation  Outcome: Progressing     Problem: Anxiety  Goal: Will report anxiety at manageable levels  Description: INTERVENTIONS:1. Administer medication as ordered2. Teach and rehearse alternative coping skills3. Provide emotional support with 1:1 interaction with staff  Outcome: Progressing   Patient resting in bed with eyes closed, appears to be sleeping, respirations even and unlabored.

## 2025-04-27 NOTE — PROCEDURES
PROCEDURE NOTE  Date: 4/27/2025   Name: Keaton Van  YOB: 1965    Intubation    Date/Time: 4/27/2025 5:13 PM    Performed by: Haim Durand MD  Authorized by: Haim Durand MD  Consent: The procedure was performed in an emergent situation.  Required items: required blood products, implants, devices, and special equipment available  Patient identity confirmed: arm band and hospital-assigned identification number  Time out: Immediately prior to procedure a \"time out\" was called to verify the correct patient, procedure, equipment, support staff and site/side marked as required.  Indications: airway protection and hypoxemia  Intubation method: video-assisted  Preoxygenation: BVM  Sedatives: etomidate  Paralytic: rocuronium  Tube size: 7.5 mm  Number of attempts: 1  Post-procedure assessment: chest rise and ETCO2 monitor  Breath sounds: equal  Tube secured with: ETT pineda  Chest x-ray interpreted by me.  Chest x-ray findings: endotracheal tube in appropriate position  Patient tolerance: patient tolerated the procedure well with no immediate complications  Comments: Emergent intubation for AMS and hypoxia with inability to protect airway.

## 2025-04-27 NOTE — SIGNIFICANT EVENT
Rapid Response Note       04/27/25 at 1231    A rapid response was called on this patient for lethargy.    Response    Rapid Response Team present for evaluation.    Samara Abdul NP responding at the bedside.    Situation    1231- Staff were concerned the patient was becoming more lethargic.  He is resting in bed with the arrival of the Rapid Response Team. He is in no apparent distress. He is lethargic but will open his eyes when prompted.  VSS.  EKG performed.  Low oxygen saturation noted prior to staff arrival. He has been placed on oxygen.    1251- Transferring to [facility] with nurse and monitor.  as well.  1303- Transfer to [facility] 425 is complete. Stephania YA in the room to accept patient.    The patient was left in the care of his primary nursing team.    Rapid Response end time approximately 1305      Rapid Response Team Sepsis Screening  Is the patient's history suggestive of a new infection? No    Are two or more SIRS criteria present? No    Is there evidence of Organ Dysfunction? [facility] Sepsis OD: None    Communication with provider: No    Was a Code Sepsis called at this encounter? No. Why? Not indicated

## 2025-04-27 NOTE — CASE COMMUNICATION
Patient stated that he no longer wanted to be DNR and that he wants to be FULL CODE as he wants to pursue treatment for depression and his ESRD with hemodialysis.    This was witnessed by Dr. Lopez from nephrology as well as his nurse, Lucia Limon.    I have discussed this with the hospitalist team as well as his case for further clarification regarding his presentation and his initial wavering decision about hemodialysis.  There was consensus with renal, hospitalist and psychiatry regarding patient being full code, pursuing hemodialysis.

## 2025-04-27 NOTE — H&P
History and Physical    Date of Service:  4/27/2025  Primary Care Provider: Robert Wells PA-C  Source of information: Chart review and Information is unobtainable from patient due to the patient's physical and/or mental condition at the time of admission    Chief Complaint: No chief complaint on file.      History of Presenting Illness:   Keaton Van is a 59 y.o. male with PMHx s/f depression, ESRD, HTN, DM, diabetic retinopathy and neuropathy who was admitted to the behavioral health unit after a suicide attempt.  He reportedly had stopped his dialysis and had previously been on hospice but that was reportedly revoked.  He was seen yesterday by the hospitalist team and was reportedly getting good pain control and his Suboxone but at that point he was continuing to refuse dialysis.  His port has been removed as his last dialysis was in January of this year.    A rapid response was called today to the behavioral health unit as the patient was reportedly was unresponsive.  Psychiatry had spoken to the patient who reportedly told him that now he wants dialysis so renal was consulted.  Labs were drawn and he was found to be hyponatremic at 128 and hyperkalemic at 7.1.  He reportedly told the psychiatrist that he no longer wanted to be a DNR.  When he did awaken I asked him if he wanted to do dialysis now and he said \"no\" however psychiatry is stating that he is delirious so patient will be transferred to medical unit for continuation of care and his DNR status will be changed to full code based on information provided by psychiatrist at the bedside until patient is alert enough to provide information himself.  I am unable to gather information from the patient due to his decreased level of consciousness.           REVIEW OF SYSTEMS:  Unable to obtain from patient due to current mental status       Past Medical History:   Diagnosis Date    Adverse effect of anesthesia     breathing diff. with versed     PERFORMED FOR THIS ENCOUNTER: YES. I had a face to face encounter with the patient, reviewed and interpreted patient data including clinical events, labs, images, vital signs, I/O's, and examined patient.  I have discussed the case and the plan and management of the patient's care with the consulting services, the bedside nurses and necessary ancillary providers.  This patient has a high probability of imminent, clinically significant deterioration, which requires the highest level of preparedness to intervene urgently. I participated in the decision-making and personally managed or directed the management of the following life and organ supporting interventions that required my frequent assessment to treat or prevent imminent deterioration.  I personally spent 71 minutes of critical care time.  This is time spent at this critically ill patient's bedside actively involved in patient care as well as the coordination of care and discussions with the patient's family.  This does not include any procedural time which has been billed separately.      Signed By: LOIS John - CNP     April 27, 2025         Please note that this dictation may have been completed with Dragon, the computer voice recognition software.  Quite often unanticipated grammatical, syntax, homophones, and other interpretive errors are inadvertently transcribed by the computer software.  Please disregard these errors.  Please excuse any errors that have escaped final proofreading.

## 2025-04-27 NOTE — SIGNIFICANT EVENT
Rapid Response Note       04/27/25 at 1541    A rapid response was called on this patient for Slow Heart Rate.    Response    Rapid Response Team present for evaluation.     1544- Code Blue called for severe symptomatic bradycardia with faint pulse and ineffective breathing pattern.    Stephania RN is the primary nurse during this episode.    Situation    1544- The patient was noted to have bradycardia with unresponsiveness and ineffective breathing pattern.  Rescue breathing initiated with a BVM.  0.5 mg Atropine given now.    1546- Atropine is effective and heart rate is increasing.   Dr. Durand at the bedside now. Orders for Calcium and Insulin push for hyperkalemia treatment.    1548- Calcium given.    1550- Insulin given IV.    1555- RSI medications given. Patient intubated without difficulty.    1605- Transfer to CCU is complete. Staff in the room to accept patient.    The patient was left in the care of his primary nursing team.    Code Blue Report  Code Blue room 425 called overhead at 1544. RRT responding.    In summary, Pulse was weak but never lost. Patient improved with Atropine, Calcium, and Insulin injections.    Pt transferred to CCU 25.    Please see additional documentation, such as notes, Code Blue Narrator, and MAR for more information.    Provider making the decision: Dr. Durand     Present during code:  Haim Durand, intensivist  Yoana Aguero NP and Samara Abdul NP, hospitalist  Kyle RN, RRT  Dony, CCU RN  Migdalia, ICU RN  Stephania, primary RN  Maricruz, RT  Jaylin, Pharmacist  Bulmaro, Nursing supervisor            Rapid Response Team Sepsis Screening  Is the patient's history suggestive of a new infection? No    Are two or more SIRS criteria present? No    Is there evidence of Organ Dysfunction? [facility] Sepsis OD: None    Communication with provider: No    Was a Code Sepsis called at this encounter? No. Why? Not indicated

## 2025-04-27 NOTE — BH NOTE
Received and verbally repeated the following test results Lab: K is 7.1     Ahmet on 4/27/2025 , at 12:14 PM.    Dr. Aleksandr Davis  was notified and provided a verbal readback of the results listed above on 12:16 PM.     Orders were received at this time Yes        Additional comments: Rapid Response team called, Hospitalist and RRT responded.     Remigio Rodas RN

## 2025-04-27 NOTE — H&P
History and Physical    Date of Service:  4/27/2025  Primary Care Provider: Robert Wells PA-C  Source of information: Chart review and Information is unobtainable from patient due to the patient's physical and/or mental condition at the time of admission    Chief Complaint: No chief complaint on file.      History of Presenting Illness:   Keaton Van is a 59 y.o. male with PMHx s/f depression, ESRD, HTN, DM, diabetic retinopathy and neuropathy who was admitted to the behavioral health unit after a suicide attempt.  He reportedly had stopped his dialysis and had previously been on hospice but that was reportedly revoked.  He was seen yesterday by the hospitalist team and was reportedly getting good pain control and his Suboxone but at that point he was continuing to refuse dialysis.  His port has been removed as his last dialysis was in January of this year.    A rapid response was called today to the behavioral health unit as the patient was reportedly was unresponsive.  Psychiatry had spoken to the patient who reportedly told him that now he wants dialysis so renal was consulted.  Labs were drawn and he was found to be hyponatremic at 128 and hyperkalemic at 7.1.  He reportedly told the psychiatrist that he no longer wanted to be a DNR.  When he did awaken I asked him if he wanted to do dialysis now and he said \"no\" however psychiatry is stating that he is delirious so patient will be transferred to medical unit for continuation of care and his DNR status will be changed to full code based on information provided by psychiatrist at the bedside until patient is alert enough to provide information himself.  I am unable to gather information from the patient due to his decreased level of consciousness.     Discussed in person with psych who confirms patient verbalized to him that he would restart HD and that he did not wish to be DNR any longer.            REVIEW OF SYSTEMS:  Unable to obtain from patient 
24, weight 89.6 kg (197 lb 8.5 oz), SpO2 94%.  Physical Exam  Gen: NAD  HEENT: NC/AT, supple  Cardiac: RRR, no M/R/G  Pulm: CTA bilaterally  ABD: S/NT/ND, normal bowel sounds  Extremities: no edema  Skin: no rash  Neuro: sedated    I have personally reviewed and interpreted all relevant imaging and laboratory data.     CRITICAL CARE DOCUMENTATION  I had a face to face encounter with the patient, reviewed and interpreted patient data including clinical events, labs, images, vital signs, I/O's, and examined patient.  I have discussed the case and the plan and management of the patient's care with the consulting services, the bedside nurses and the respiratory therapist.      NOTE OF PERSONAL INVOLVEMENT IN CARE   This patient has a high probability of imminent, clinically significant deterioration, which requires the highest level of preparedness to intervene urgently. I participated in the decision-making and personally managed or directed the management of the following life and organ supporting interventions that required my frequent assessment to treat or prevent imminent deterioration.    I personally spent 40 minutes of critical care time.  This is time spent at this critically ill patient's bedside actively involved in patient care as well as the coordination of care.  This does not include any procedural time which has been billed separately.    Francisco Durand M.D.  [facility]  4/27/2025

## 2025-04-28 ENCOUNTER — APPOINTMENT (OUTPATIENT)
Facility: HOSPITAL | Age: 60
DRG: 885 | End: 2025-04-28
Attending: HOSPITALIST
Payer: MEDICARE

## 2025-04-28 LAB
ALBUMIN SERPL-MCNC: 3 G/DL (ref 3.5–5)
ALBUMIN/GLOB SERPL: 0.8 (ref 1.1–2.2)
ALP SERPL-CCNC: 120 U/L (ref 45–117)
ALT SERPL-CCNC: 18 U/L (ref 12–78)
ANION GAP SERPL CALC-SCNC: 5 MMOL/L (ref 2–12)
ARTERIAL PATENCY WRIST A: POSITIVE
AST SERPL-CCNC: 16 U/L (ref 15–37)
BASE EXCESS BLD CALC-SCNC: 1.8 MMOL/L
BASE EXCESS BLD CALC-SCNC: 1.9 MMOL/L
BDY SITE: ABNORMAL
BILIRUB SERPL-MCNC: 0.5 MG/DL (ref 0.2–1)
BUN SERPL-MCNC: 42 MG/DL (ref 6–20)
BUN/CREAT SERPL: 15 (ref 12–20)
CALCIUM SERPL-MCNC: 9.2 MG/DL (ref 8.5–10.1)
CHLORIDE SERPL-SCNC: 104 MMOL/L (ref 97–108)
CO2 SERPL-SCNC: 25 MMOL/L (ref 21–32)
CREAT SERPL-MCNC: 2.82 MG/DL (ref 0.7–1.3)
ERYTHROCYTE [DISTWIDTH] IN BLOOD BY AUTOMATED COUNT: 14.2 % (ref 11.5–14.5)
GAS FLOW.O2 O2 DELIVERY SYS: ABNORMAL
GAS FLOW.O2 SETTING OXYMISER: 16 BPM
GLOBULIN SER CALC-MCNC: 3.7 G/DL (ref 2–4)
GLUCOSE BLD STRIP.AUTO-MCNC: 155 MG/DL (ref 65–117)
GLUCOSE BLD STRIP.AUTO-MCNC: 166 MG/DL (ref 65–117)
GLUCOSE BLD STRIP.AUTO-MCNC: 178 MG/DL (ref 65–117)
GLUCOSE BLD STRIP.AUTO-MCNC: 192 MG/DL (ref 65–117)
GLUCOSE SERPL-MCNC: 125 MG/DL (ref 65–100)
HCO3 BLD-SCNC: 23.8 MMOL/L (ref 21–28)
HCO3 BLD-SCNC: 25.3 MMOL/L (ref 21–28)
HCT VFR BLD AUTO: 24.9 % (ref 36.6–50.3)
HGB BLD-MCNC: 8.6 G/DL (ref 12.1–17)
IRON SATN MFR SERPL: 14 % (ref 20–50)
IRON SERPL-MCNC: 34 UG/DL (ref 35–150)
MAGNESIUM SERPL-MCNC: 1.8 MG/DL (ref 1.6–2.4)
MCH RBC QN AUTO: 30.2 PG (ref 26–34)
MCHC RBC AUTO-ENTMCNC: 34.5 G/DL (ref 30–36.5)
MCV RBC AUTO: 87.4 FL (ref 80–99)
NRBC # BLD: 0 K/UL (ref 0–0.01)
NRBC BLD-RTO: 0 PER 100 WBC
O2/TOTAL GAS SETTING VFR VENT: 50 %
PCO2 BLD: 26.2 MMHG (ref 35–48)
PCO2 BLD: 34.1 MMHG (ref 35–48)
PEEP RESPIRATORY: 8 CMH2O
PH BLD: 7.48 (ref 7.35–7.45)
PH BLD: 7.57 (ref 7.35–7.45)
PHOSPHATE SERPL-MCNC: 2.5 MG/DL (ref 2.6–4.7)
PLATELET # BLD AUTO: 140 K/UL (ref 150–400)
PMV BLD AUTO: 10.2 FL (ref 8.9–12.9)
PO2 BLD: 108 MMHG (ref 83–108)
PO2 BLD: 73 MMHG (ref 83–108)
POTASSIUM SERPL-SCNC: 5.1 MMOL/L (ref 3.5–5.1)
PROT SERPL-MCNC: 6.7 G/DL (ref 6.4–8.2)
RBC # BLD AUTO: 2.85 M/UL (ref 4.1–5.7)
SAO2 % BLD: 96.7 % (ref 92–97)
SAO2 % BLD: 98.6 % (ref 92–97)
SERVICE CMNT-IMP: ABNORMAL
SODIUM SERPL-SCNC: 134 MMOL/L (ref 136–145)
SPECIMEN TYPE: ABNORMAL
SPECIMEN TYPE: ABNORMAL
TIBC SERPL-MCNC: 244 UG/DL (ref 250–450)
VENTILATION MODE VENT: ABNORMAL
VT SETTING VENT: 480 ML
WBC # BLD AUTO: 5.2 K/UL (ref 4.1–11.1)

## 2025-04-28 PROCEDURE — 51701 INSERT BLADDER CATHETER: CPT

## 2025-04-28 PROCEDURE — 83550 IRON BINDING TEST: CPT

## 2025-04-28 PROCEDURE — 80053 COMPREHEN METABOLIC PANEL: CPT

## 2025-04-28 PROCEDURE — 6370000000 HC RX 637 (ALT 250 FOR IP): Performed by: PSYCHIATRY & NEUROLOGY

## 2025-04-28 PROCEDURE — 6360000002 HC RX W HCPCS: Performed by: ANESTHESIOLOGY

## 2025-04-28 PROCEDURE — 84100 ASSAY OF PHOSPHORUS: CPT

## 2025-04-28 PROCEDURE — 6360000002 HC RX W HCPCS: Performed by: PSYCHIATRY & NEUROLOGY

## 2025-04-28 PROCEDURE — 6370000000 HC RX 637 (ALT 250 FOR IP): Performed by: STUDENT IN AN ORGANIZED HEALTH CARE EDUCATION/TRAINING PROGRAM

## 2025-04-28 PROCEDURE — 2000000000 HC ICU R&B

## 2025-04-28 PROCEDURE — 94003 VENT MGMT INPAT SUBQ DAY: CPT

## 2025-04-28 PROCEDURE — 5A09357 ASSISTANCE WITH RESPIRATORY VENTILATION, LESS THAN 24 CONSECUTIVE HOURS, CONTINUOUS POSITIVE AIRWAY PRESSURE: ICD-10-PCS | Performed by: STUDENT IN AN ORGANIZED HEALTH CARE EDUCATION/TRAINING PROGRAM

## 2025-04-28 PROCEDURE — 6360000002 HC RX W HCPCS: Performed by: STUDENT IN AN ORGANIZED HEALTH CARE EDUCATION/TRAINING PROGRAM

## 2025-04-28 PROCEDURE — 36600 WITHDRAWAL OF ARTERIAL BLOOD: CPT

## 2025-04-28 PROCEDURE — 74018 RADEX ABDOMEN 1 VIEW: CPT

## 2025-04-28 PROCEDURE — 83735 ASSAY OF MAGNESIUM: CPT

## 2025-04-28 PROCEDURE — 51798 US URINE CAPACITY MEASURE: CPT

## 2025-04-28 PROCEDURE — 6370000000 HC RX 637 (ALT 250 FOR IP): Performed by: NURSE PRACTITIONER

## 2025-04-28 PROCEDURE — 82803 BLOOD GASES ANY COMBINATION: CPT

## 2025-04-28 PROCEDURE — 2580000003 HC RX 258: Performed by: STUDENT IN AN ORGANIZED HEALTH CARE EDUCATION/TRAINING PROGRAM

## 2025-04-28 PROCEDURE — 85027 COMPLETE CBC AUTOMATED: CPT

## 2025-04-28 PROCEDURE — 83540 ASSAY OF IRON: CPT

## 2025-04-28 PROCEDURE — 2500000003 HC RX 250 WO HCPCS: Performed by: NURSE PRACTITIONER

## 2025-04-28 PROCEDURE — 82962 GLUCOSE BLOOD TEST: CPT

## 2025-04-28 PROCEDURE — 2500000003 HC RX 250 WO HCPCS: Performed by: ANESTHESIOLOGY

## 2025-04-28 RX ORDER — HEPARIN SODIUM 5000 [USP'U]/ML
5000 INJECTION, SOLUTION INTRAVENOUS; SUBCUTANEOUS EVERY 8 HOURS SCHEDULED
Status: DISCONTINUED | OUTPATIENT
Start: 2025-04-28 | End: 2025-05-02

## 2025-04-28 RX ADMIN — EPOETIN ALFA 20000 UNITS: 20000 SOLUTION INTRAVENOUS; SUBCUTANEOUS at 17:51

## 2025-04-28 RX ADMIN — NICARDIPINE HYDROCHLORIDE 5 MG/HR: 0.1 INJECTION, SOLUTION INTRAVENOUS at 02:54

## 2025-04-28 RX ADMIN — HYDROXYZINE HYDROCHLORIDE 50 MG: 25 TABLET, FILM COATED ORAL at 22:08

## 2025-04-28 RX ADMIN — CHLORHEXIDINE GLUCONATE 15 ML: 1.2 RINSE ORAL at 21:55

## 2025-04-28 RX ADMIN — PROPOFOL 40 MCG/KG/MIN: 10 INJECTION, EMULSION INTRAVENOUS at 05:03

## 2025-04-28 RX ADMIN — GABAPENTIN 300 MG: 300 CAPSULE ORAL at 21:52

## 2025-04-28 RX ADMIN — GABAPENTIN 300 MG: 300 CAPSULE ORAL at 08:30

## 2025-04-28 RX ADMIN — DULOXETINE HYDROCHLORIDE 30 MG: 30 CAPSULE, DELAYED RELEASE ORAL at 08:26

## 2025-04-28 RX ADMIN — DEXMEDETOMIDINE HYDROCHLORIDE 1.5 MCG/KG/HR: 400 INJECTION, SOLUTION INTRAVENOUS at 10:21

## 2025-04-28 RX ADMIN — HYDRALAZINE HYDROCHLORIDE 50 MG: 50 TABLET ORAL at 21:52

## 2025-04-28 RX ADMIN — HEPARIN SODIUM 5000 UNITS: 5000 INJECTION INTRAVENOUS; SUBCUTANEOUS at 14:17

## 2025-04-28 RX ADMIN — SODIUM CHLORIDE, PRESERVATIVE FREE 40 MG: 5 INJECTION INTRAVENOUS at 09:35

## 2025-04-28 RX ADMIN — BUPRENORPHINE HYDROCHLORIDE AND NALOXONE HYDROCHLORIDE DIHYDRATE 1 TABLET: 2; .5 TABLET SUBLINGUAL at 21:52

## 2025-04-28 RX ADMIN — PROPOFOL 20 MCG/KG/MIN: 10 INJECTION, EMULSION INTRAVENOUS at 09:03

## 2025-04-28 RX ADMIN — CHLORHEXIDINE GLUCONATE 15 ML: 1.2 RINSE ORAL at 08:26

## 2025-04-28 RX ADMIN — SODIUM CHLORIDE, PRESERVATIVE FREE 10 ML: 5 INJECTION INTRAVENOUS at 22:22

## 2025-04-28 RX ADMIN — DEXMEDETOMIDINE HYDROCHLORIDE 0.5 MCG/KG/HR: 400 INJECTION, SOLUTION INTRAVENOUS at 01:59

## 2025-04-28 RX ADMIN — NICARDIPINE HYDROCHLORIDE 5 MG/HR: 0.1 INJECTION, SOLUTION INTRAVENOUS at 06:35

## 2025-04-28 RX ADMIN — DULOXETINE HYDROCHLORIDE 30 MG: 30 CAPSULE, DELAYED RELEASE ORAL at 21:52

## 2025-04-28 RX ADMIN — DEXMEDETOMIDINE HYDROCHLORIDE 1 MCG/KG/HR: 400 INJECTION, SOLUTION INTRAVENOUS at 06:36

## 2025-04-28 RX ADMIN — TRAZODONE HYDROCHLORIDE 50 MG: 50 TABLET ORAL at 22:08

## 2025-04-28 RX ADMIN — HEPARIN SODIUM 5000 UNITS: 5000 INJECTION INTRAVENOUS; SUBCUTANEOUS at 21:52

## 2025-04-28 RX ADMIN — NICARDIPINE HYDROCHLORIDE 5 MG/HR: 0.1 INJECTION, SOLUTION INTRAVENOUS at 10:18

## 2025-04-28 RX ADMIN — BUPRENORPHINE HYDROCHLORIDE AND NALOXONE HYDROCHLORIDE DIHYDRATE 1 TABLET: 2; .5 TABLET SUBLINGUAL at 08:26

## 2025-04-28 RX ADMIN — INSULIN LISPRO 1 UNITS: 100 INJECTION, SOLUTION INTRAVENOUS; SUBCUTANEOUS at 22:21

## 2025-04-28 ASSESSMENT — PAIN SCALES - GENERAL
PAINLEVEL_OUTOF10: 0

## 2025-04-28 ASSESSMENT — PULMONARY FUNCTION TESTS
PIF_VALUE: 17
PIF_VALUE: 16
PIF_VALUE: 16

## 2025-04-28 ASSESSMENT — PAIN SCALES - WONG BAKER
WONGBAKER_NUMERICALRESPONSE: NO HURT
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
## 2025-04-28 NOTE — CARE COORDINATION
Care Management Initial Assessment       RUR:  29% High Risk  Readmission? Yes - 4/27/2025  No a true readmission.  Patient was a transfer from the behavioral health unit.  1st IM letter given? Yes - 4/28/2025  1st  letter given: No     04/28/25 1105   Service Assessment   Patient Orientation Sedated;Unable to Assess;Other (see comment)  (Mr. Van is ventilated.)   Cognition Other (see comment)  (Mr. Van is ventilated.)   History Provided By Child/Family   Primary Caregiver Self   Support Systems Family Members   Patient's Healthcare Decision Maker is: Named in Scanned ACP Document   PCP Verified by CM Yes   Last Visit to PCP Within last 3 months   Prior Functional Level Independent in ADLs/IADLs   Current Functional Level Assistance with the following:;Bathing;Dressing;Toileting;Feeding;Cooking;Housework;Shopping;Mobility   Can patient return to prior living arrangement Yes   Ability to make needs known: Good   Family able to assist with home care needs: Yes   Would you like for me to discuss the discharge plan with any other family members/significant others, and if so, who? Yes   Financial Resources Medicare   Social/Functional History   Lives With Family   Type of Home House   Home Layout Two level   Home Access Stairs to enter with rails   Entrance Stairs - Number of Steps 4   Entrance Stairs - Rails Both   Bathroom Shower/Tub Tub/Shower unit  (Mr. Van takes sponge baths.)   Bathroom Toilet Standard   Bathroom Equipment None   Bathroom Accessibility Wheelchair accessible   Home Equipment None   Receives Help From Family   Prior Level of Assist for ADLs Independent   Prior Level of Assist for Homemaking Independent   Homemaking Responsibilities No   Ambulation Assistance Independent   Prior Level of Assist for Transfers Independent   Active  No   Patient's  Info Anuradha and Evelyn   Mode of Transportation Car   Education 2 years college   Occupation On disability   Discharge Planning

## 2025-04-28 NOTE — BSMART NOTE
BSMART Liaison Team Note     LOS:  1     Patient goal(s) for today:  take medications as prescribed, make needs known in an appropriate manner  BSMART Liaison team focus/goals: assess needs, provide support and education, coordinate care    Progress note: Per H&P physician note on 4/27/2025: \"This is a 59 year old male with history of HTN, HLD, DM and ESRD who presented to the ED on 4/19 with complaint of SI. History obtained from sister and from the chart. She describes intentional overdose on ativan. Approximately 65 1 mg pills.\" Also per 4/27/2025 note: \"Of note, patient with history of similar. He reported multiple prior suicide attempts. Prior psychiatric hospitalizations. Family state the patient had previously been on HD. He was last dialyzed in January. He self discontinued HD and was placed on hospice. However, patient discharged off of hospice due to failure to progress. He had been DNR on admission this hospitalization and refusing HD. However, earlier today he reported that he would like to be full code and undergo dialysis (see Dr. Davis's note from earlier today). The patient was transferred to the floor for further treatment.\"     Liaison attempted to meet f/f with pt in his room on the medical floor of [facility]. Pt's 2 sisters at pt's bedside. Sisters report that pt having a hard time being comfortable. Pt presents as intubated. Primary nurse reports that pt soon to be extubated today. Pt reportedly received dialysis yesterday. Pt reportedly a [facility] from the [facility]. Pt had been a DNR and was on hospice since January 2025. Pt reportedly rescinded DNR yesterday. Liaison will continue to monitor and to support.     Barriers to Discharge: medical and psychiatric  Guns in the Home: No (Per psychiatry note on 4/19/2025)    Outpatient provider(s):  none reported in medical record this hospitalization  Insurance info/prescription coverage:   MEDICARE PART A AND B     Diagnosis: Per psychiatric H&P note on

## 2025-04-28 NOTE — CARE COORDINATION
04/28/25 0859   Readmission Assessment   Number of Days since last admission? 1-7 days   Previous Disposition Other (comment)  (Behavioral Health Unit at [facility])   Who is being Interviewed Patient   What was the patient's/caregiver's perception as to why they think they needed to return back to the hospital? Other (Comment)  (This admission was not a readmission.  Patient was transferred from the Behavioral [facility] Unit.)   Did you visit your Primary Care Physician after you left the hospital, before you returned this time? No   Why weren't you able to visit your PCP? Did not have an appointment   Did you see a specialist, such as Cardiac, Pulmonary, Orthopedic Physician, etc. after you left the hospital? No   Who advised the patient to return to the hospital? Physician   Does the patient report anything that got in the way of taking their medications? No   In our efforts to provide the best possible care to you and others like you, can you think of anything that we could have done to help you after you left the hospital the first time, so that you might not have needed to return so soon? Other (Comment)  (This admission was not a readmission.  Patient was a transfer from the Behavioral Health Unit.)
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
## 2025-04-29 LAB
ALBUMIN SERPL-MCNC: 2.9 G/DL (ref 3.5–5)
ALBUMIN/GLOB SERPL: 0.9 (ref 1.1–2.2)
ALP SERPL-CCNC: 127 U/L (ref 45–117)
ALT SERPL-CCNC: 16 U/L (ref 12–78)
ANION GAP SERPL CALC-SCNC: 7 MMOL/L (ref 2–12)
AST SERPL-CCNC: 21 U/L (ref 15–37)
BASOPHILS # BLD: 0.04 K/UL (ref 0–0.1)
BASOPHILS NFR BLD: 0.8 % (ref 0–1)
BILIRUB SERPL-MCNC: 0.4 MG/DL (ref 0.2–1)
BUN SERPL-MCNC: 47 MG/DL (ref 6–20)
BUN/CREAT SERPL: 14 (ref 12–20)
CALCIUM SERPL-MCNC: 8.5 MG/DL (ref 8.5–10.1)
CHLORIDE SERPL-SCNC: 106 MMOL/L (ref 97–108)
CO2 SERPL-SCNC: 25 MMOL/L (ref 21–32)
CREAT SERPL-MCNC: 3.44 MG/DL (ref 0.7–1.3)
DIFFERENTIAL METHOD BLD: ABNORMAL
EOSINOPHIL # BLD: 0.17 K/UL (ref 0–0.4)
EOSINOPHIL NFR BLD: 3.2 % (ref 0–7)
ERYTHROCYTE [DISTWIDTH] IN BLOOD BY AUTOMATED COUNT: 14.5 % (ref 11.5–14.5)
GLOBULIN SER CALC-MCNC: 3.2 G/DL (ref 2–4)
GLUCOSE BLD STRIP.AUTO-MCNC: 162 MG/DL (ref 65–117)
GLUCOSE BLD STRIP.AUTO-MCNC: 167 MG/DL (ref 65–117)
GLUCOSE BLD STRIP.AUTO-MCNC: 215 MG/DL (ref 65–117)
GLUCOSE BLD STRIP.AUTO-MCNC: 223 MG/DL (ref 65–117)
GLUCOSE BLD STRIP.AUTO-MCNC: 259 MG/DL (ref 65–117)
GLUCOSE SERPL-MCNC: 156 MG/DL (ref 65–100)
HCT VFR BLD AUTO: 24.8 % (ref 36.6–50.3)
HGB BLD-MCNC: 8.4 G/DL (ref 12.1–17)
IMM GRANULOCYTES # BLD AUTO: 0.01 K/UL (ref 0–0.04)
IMM GRANULOCYTES NFR BLD AUTO: 0.2 % (ref 0–0.5)
LYMPHOCYTES # BLD: 0.95 K/UL (ref 0.8–3.5)
LYMPHOCYTES NFR BLD: 18.1 % (ref 12–49)
MAGNESIUM SERPL-MCNC: 2.2 MG/DL (ref 1.6–2.4)
MCH RBC QN AUTO: 30.3 PG (ref 26–34)
MCHC RBC AUTO-ENTMCNC: 33.9 G/DL (ref 30–36.5)
MCV RBC AUTO: 89.5 FL (ref 80–99)
MONOCYTES # BLD: 0.49 K/UL (ref 0–1)
MONOCYTES NFR BLD: 9.4 % (ref 5–13)
NEUTS SEG # BLD: 3.58 K/UL (ref 1.8–8)
NEUTS SEG NFR BLD: 68.3 % (ref 32–75)
NRBC # BLD: 0 K/UL (ref 0–0.01)
NRBC BLD-RTO: 0 PER 100 WBC
PHOSPHATE SERPL-MCNC: 4.4 MG/DL (ref 2.6–4.7)
PLATELET # BLD AUTO: 129 K/UL (ref 150–400)
PMV BLD AUTO: 10.8 FL (ref 8.9–12.9)
POTASSIUM SERPL-SCNC: 4.6 MMOL/L (ref 3.5–5.1)
PROT SERPL-MCNC: 6.1 G/DL (ref 6.4–8.2)
RBC # BLD AUTO: 2.77 M/UL (ref 4.1–5.7)
SERVICE CMNT-IMP: ABNORMAL
SODIUM SERPL-SCNC: 138 MMOL/L (ref 136–145)
WBC # BLD AUTO: 5.2 K/UL (ref 4.1–11.1)

## 2025-04-29 PROCEDURE — 85025 COMPLETE CBC W/AUTO DIFF WBC: CPT

## 2025-04-29 PROCEDURE — 6360000002 HC RX W HCPCS: Performed by: STUDENT IN AN ORGANIZED HEALTH CARE EDUCATION/TRAINING PROGRAM

## 2025-04-29 PROCEDURE — 2580000003 HC RX 258: Performed by: STUDENT IN AN ORGANIZED HEALTH CARE EDUCATION/TRAINING PROGRAM

## 2025-04-29 PROCEDURE — 80053 COMPREHEN METABOLIC PANEL: CPT

## 2025-04-29 PROCEDURE — 97530 THERAPEUTIC ACTIVITIES: CPT

## 2025-04-29 PROCEDURE — 84100 ASSAY OF PHOSPHORUS: CPT

## 2025-04-29 PROCEDURE — 97161 PT EVAL LOW COMPLEX 20 MIN: CPT

## 2025-04-29 PROCEDURE — 97535 SELF CARE MNGMENT TRAINING: CPT

## 2025-04-29 PROCEDURE — 82962 GLUCOSE BLOOD TEST: CPT

## 2025-04-29 PROCEDURE — 51798 US URINE CAPACITY MEASURE: CPT

## 2025-04-29 PROCEDURE — 83735 ASSAY OF MAGNESIUM: CPT

## 2025-04-29 PROCEDURE — 2700000000 HC OXYGEN THERAPY PER DAY

## 2025-04-29 PROCEDURE — 1100000000 HC RM PRIVATE

## 2025-04-29 PROCEDURE — 51702 INSERT TEMP BLADDER CATH: CPT

## 2025-04-29 PROCEDURE — 94760 N-INVAS EAR/PLS OXIMETRY 1: CPT

## 2025-04-29 PROCEDURE — 6370000000 HC RX 637 (ALT 250 FOR IP): Performed by: NURSE PRACTITIONER

## 2025-04-29 PROCEDURE — 6370000000 HC RX 637 (ALT 250 FOR IP): Performed by: PSYCHIATRY & NEUROLOGY

## 2025-04-29 PROCEDURE — 2500000003 HC RX 250 WO HCPCS: Performed by: NURSE PRACTITIONER

## 2025-04-29 PROCEDURE — 6360000002 HC RX W HCPCS: Performed by: PSYCHIATRY & NEUROLOGY

## 2025-04-29 PROCEDURE — 6370000000 HC RX 637 (ALT 250 FOR IP): Performed by: STUDENT IN AN ORGANIZED HEALTH CARE EDUCATION/TRAINING PROGRAM

## 2025-04-29 PROCEDURE — 97165 OT EVAL LOW COMPLEX 30 MIN: CPT

## 2025-04-29 RX ADMIN — DULOXETINE HYDROCHLORIDE 30 MG: 30 CAPSULE, DELAYED RELEASE ORAL at 20:37

## 2025-04-29 RX ADMIN — GABAPENTIN 300 MG: 300 CAPSULE ORAL at 08:21

## 2025-04-29 RX ADMIN — INSULIN LISPRO 1 UNITS: 100 INJECTION, SOLUTION INTRAVENOUS; SUBCUTANEOUS at 12:11

## 2025-04-29 RX ADMIN — BUPRENORPHINE HYDROCHLORIDE AND NALOXONE HYDROCHLORIDE DIHYDRATE 1 TABLET: 2; .5 TABLET SUBLINGUAL at 20:38

## 2025-04-29 RX ADMIN — SODIUM CHLORIDE, PRESERVATIVE FREE 40 MG: 5 INJECTION INTRAVENOUS at 08:32

## 2025-04-29 RX ADMIN — HEPARIN SODIUM 5000 UNITS: 5000 INJECTION INTRAVENOUS; SUBCUTANEOUS at 05:59

## 2025-04-29 RX ADMIN — GABAPENTIN 300 MG: 300 CAPSULE ORAL at 20:37

## 2025-04-29 RX ADMIN — HYDRALAZINE HYDROCHLORIDE 50 MG: 50 TABLET ORAL at 14:10

## 2025-04-29 RX ADMIN — BUPRENORPHINE HYDROCHLORIDE AND NALOXONE HYDROCHLORIDE DIHYDRATE 1 TABLET: 2; .5 TABLET SUBLINGUAL at 08:22

## 2025-04-29 RX ADMIN — HEPARIN SODIUM 5000 UNITS: 5000 INJECTION INTRAVENOUS; SUBCUTANEOUS at 20:38

## 2025-04-29 RX ADMIN — INSULIN LISPRO 1 UNITS: 100 INJECTION, SOLUTION INTRAVENOUS; SUBCUTANEOUS at 20:44

## 2025-04-29 RX ADMIN — HYDRALAZINE HYDROCHLORIDE 50 MG: 50 TABLET ORAL at 20:38

## 2025-04-29 RX ADMIN — HEPARIN SODIUM 5000 UNITS: 5000 INJECTION INTRAVENOUS; SUBCUTANEOUS at 14:09

## 2025-04-29 RX ADMIN — AMLODIPINE BESYLATE 10 MG: 5 TABLET ORAL at 08:22

## 2025-04-29 RX ADMIN — SODIUM CHLORIDE, PRESERVATIVE FREE 10 ML: 5 INJECTION INTRAVENOUS at 20:39

## 2025-04-29 RX ADMIN — CHLORHEXIDINE GLUCONATE 15 ML: 1.2 RINSE ORAL at 08:45

## 2025-04-29 RX ADMIN — HYDROXYZINE HYDROCHLORIDE 50 MG: 25 TABLET, FILM COATED ORAL at 19:02

## 2025-04-29 RX ADMIN — DULOXETINE HYDROCHLORIDE 30 MG: 30 CAPSULE, DELAYED RELEASE ORAL at 08:21

## 2025-04-29 RX ADMIN — SODIUM CHLORIDE, PRESERVATIVE FREE 10 ML: 5 INJECTION INTRAVENOUS at 08:32

## 2025-04-29 RX ADMIN — HYDRALAZINE HYDROCHLORIDE 50 MG: 50 TABLET ORAL at 05:59

## 2025-04-29 RX ADMIN — CHLORHEXIDINE GLUCONATE 15 ML: 1.2 RINSE ORAL at 20:39

## 2025-04-29 ASSESSMENT — PAIN SCALES - GENERAL: PAINLEVEL_OUTOF10: 0

## 2025-04-29 NOTE — WOUND CARE
Wound care    Re-consulted for R BKA.  Admitted for toxic metabolic encephalopathy    Spoke to RN    Saw patient yesterday.  Orders and note are in.    Will follow up in a week.  Kaitlynn THOMASONN RN
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
## 2025-04-30 LAB
ALBUMIN SERPL-MCNC: 3.1 G/DL (ref 3.5–5)
ALBUMIN/GLOB SERPL: 0.8 (ref 1.1–2.2)
ALP SERPL-CCNC: 126 U/L (ref 45–117)
ALT SERPL-CCNC: 17 U/L (ref 12–78)
ANION GAP SERPL CALC-SCNC: 5 MMOL/L (ref 2–12)
AST SERPL-CCNC: 15 U/L (ref 15–37)
BILIRUB SERPL-MCNC: 0.4 MG/DL (ref 0.2–1)
BUN SERPL-MCNC: 44 MG/DL (ref 6–20)
BUN/CREAT SERPL: 13 (ref 12–20)
CALCIUM SERPL-MCNC: 8.9 MG/DL (ref 8.5–10.1)
CHLORIDE SERPL-SCNC: 105 MMOL/L (ref 97–108)
CO2 SERPL-SCNC: 26 MMOL/L (ref 21–32)
CREAT SERPL-MCNC: 3.34 MG/DL (ref 0.7–1.3)
ERYTHROCYTE [DISTWIDTH] IN BLOOD BY AUTOMATED COUNT: 14.2 % (ref 11.5–14.5)
GLOBULIN SER CALC-MCNC: 3.8 G/DL (ref 2–4)
GLUCOSE BLD STRIP.AUTO-MCNC: 146 MG/DL (ref 65–117)
GLUCOSE BLD STRIP.AUTO-MCNC: 161 MG/DL (ref 65–117)
GLUCOSE BLD STRIP.AUTO-MCNC: 175 MG/DL (ref 65–117)
GLUCOSE BLD STRIP.AUTO-MCNC: 217 MG/DL (ref 65–117)
GLUCOSE SERPL-MCNC: 127 MG/DL (ref 65–100)
HCT VFR BLD AUTO: 26.2 % (ref 36.6–50.3)
HGB BLD-MCNC: 8.6 G/DL (ref 12.1–17)
MAGNESIUM SERPL-MCNC: 2.3 MG/DL (ref 1.6–2.4)
MCH RBC QN AUTO: 30.1 PG (ref 26–34)
MCHC RBC AUTO-ENTMCNC: 32.8 G/DL (ref 30–36.5)
MCV RBC AUTO: 91.6 FL (ref 80–99)
NRBC # BLD: 0 K/UL (ref 0–0.01)
NRBC BLD-RTO: 0 PER 100 WBC
PHOSPHATE SERPL-MCNC: 5 MG/DL (ref 2.6–4.7)
PLATELET # BLD AUTO: 149 K/UL (ref 150–400)
PMV BLD AUTO: 10.1 FL (ref 8.9–12.9)
POTASSIUM SERPL-SCNC: 5 MMOL/L (ref 3.5–5.1)
PROT SERPL-MCNC: 6.9 G/DL (ref 6.4–8.2)
RBC # BLD AUTO: 2.86 M/UL (ref 4.1–5.7)
SERVICE CMNT-IMP: ABNORMAL
SODIUM SERPL-SCNC: 136 MMOL/L (ref 136–145)
WBC # BLD AUTO: 4.2 K/UL (ref 4.1–11.1)

## 2025-04-30 PROCEDURE — 6370000000 HC RX 637 (ALT 250 FOR IP): Performed by: STUDENT IN AN ORGANIZED HEALTH CARE EDUCATION/TRAINING PROGRAM

## 2025-04-30 PROCEDURE — 93005 ELECTROCARDIOGRAM TRACING: CPT | Performed by: STUDENT IN AN ORGANIZED HEALTH CARE EDUCATION/TRAINING PROGRAM

## 2025-04-30 PROCEDURE — 94760 N-INVAS EAR/PLS OXIMETRY 1: CPT

## 2025-04-30 PROCEDURE — 2500000003 HC RX 250 WO HCPCS: Performed by: STUDENT IN AN ORGANIZED HEALTH CARE EDUCATION/TRAINING PROGRAM

## 2025-04-30 PROCEDURE — 97530 THERAPEUTIC ACTIVITIES: CPT

## 2025-04-30 PROCEDURE — 2700000000 HC OXYGEN THERAPY PER DAY

## 2025-04-30 PROCEDURE — 2060000000 HC ICU INTERMEDIATE R&B

## 2025-04-30 PROCEDURE — 90935 HEMODIALYSIS ONE EVALUATION: CPT

## 2025-04-30 PROCEDURE — 2580000003 HC RX 258: Performed by: STUDENT IN AN ORGANIZED HEALTH CARE EDUCATION/TRAINING PROGRAM

## 2025-04-30 PROCEDURE — 97535 SELF CARE MNGMENT TRAINING: CPT

## 2025-04-30 PROCEDURE — 85027 COMPLETE CBC AUTOMATED: CPT

## 2025-04-30 PROCEDURE — 80053 COMPREHEN METABOLIC PANEL: CPT

## 2025-04-30 PROCEDURE — 6370000000 HC RX 637 (ALT 250 FOR IP): Performed by: PSYCHIATRY & NEUROLOGY

## 2025-04-30 PROCEDURE — 83735 ASSAY OF MAGNESIUM: CPT

## 2025-04-30 PROCEDURE — 6360000002 HC RX W HCPCS: Performed by: PSYCHIATRY & NEUROLOGY

## 2025-04-30 PROCEDURE — 6360000002 HC RX W HCPCS: Performed by: STUDENT IN AN ORGANIZED HEALTH CARE EDUCATION/TRAINING PROGRAM

## 2025-04-30 PROCEDURE — 84100 ASSAY OF PHOSPHORUS: CPT

## 2025-04-30 PROCEDURE — 2500000003 HC RX 250 WO HCPCS: Performed by: NURSE PRACTITIONER

## 2025-04-30 PROCEDURE — 6370000000 HC RX 637 (ALT 250 FOR IP): Performed by: NURSE PRACTITIONER

## 2025-04-30 PROCEDURE — 82962 GLUCOSE BLOOD TEST: CPT

## 2025-04-30 RX ORDER — PANTOPRAZOLE SODIUM 40 MG/1
40 TABLET, DELAYED RELEASE ORAL
Status: DISCONTINUED | OUTPATIENT
Start: 2025-05-01 | End: 2025-05-12 | Stop reason: HOSPADM

## 2025-04-30 RX ADMIN — DULOXETINE HYDROCHLORIDE 30 MG: 30 CAPSULE, DELAYED RELEASE ORAL at 20:09

## 2025-04-30 RX ADMIN — HYDRALAZINE HYDROCHLORIDE 50 MG: 50 TABLET ORAL at 06:13

## 2025-04-30 RX ADMIN — CHLORHEXIDINE GLUCONATE 15 ML: 1.2 RINSE ORAL at 09:05

## 2025-04-30 RX ADMIN — HYDRALAZINE HYDROCHLORIDE 50 MG: 50 TABLET ORAL at 20:09

## 2025-04-30 RX ADMIN — GABAPENTIN 300 MG: 300 CAPSULE ORAL at 20:09

## 2025-04-30 RX ADMIN — SODIUM CHLORIDE, PRESERVATIVE FREE 40 MG: 5 INJECTION INTRAVENOUS at 09:04

## 2025-04-30 RX ADMIN — DULOXETINE HYDROCHLORIDE 30 MG: 30 CAPSULE, DELAYED RELEASE ORAL at 09:05

## 2025-04-30 RX ADMIN — AMLODIPINE BESYLATE 10 MG: 5 TABLET ORAL at 09:05

## 2025-04-30 RX ADMIN — SODIUM CHLORIDE, PRESERVATIVE FREE 10 ML: 5 INJECTION INTRAVENOUS at 20:10

## 2025-04-30 RX ADMIN — EPOETIN ALFA 20000 UNITS: 20000 SOLUTION INTRAVENOUS; SUBCUTANEOUS at 17:15

## 2025-04-30 RX ADMIN — HEPARIN SODIUM 5000 UNITS: 5000 INJECTION INTRAVENOUS; SUBCUTANEOUS at 06:13

## 2025-04-30 RX ADMIN — CHLORHEXIDINE GLUCONATE 15 ML: 1.2 RINSE ORAL at 20:10

## 2025-04-30 RX ADMIN — BUPRENORPHINE HYDROCHLORIDE AND NALOXONE HYDROCHLORIDE DIHYDRATE 1 TABLET: 2; .5 TABLET SUBLINGUAL at 09:05

## 2025-04-30 RX ADMIN — DILTIAZEM HYDROCHLORIDE 5 MG/HR: 5 INJECTION, SOLUTION INTRAVENOUS at 17:19

## 2025-04-30 RX ADMIN — HEPARIN SODIUM 5000 UNITS: 5000 INJECTION INTRAVENOUS; SUBCUTANEOUS at 20:10

## 2025-04-30 RX ADMIN — INSULIN LISPRO 1 UNITS: 100 INJECTION, SOLUTION INTRAVENOUS; SUBCUTANEOUS at 20:09

## 2025-04-30 RX ADMIN — GABAPENTIN 300 MG: 300 CAPSULE ORAL at 09:05

## 2025-04-30 RX ADMIN — BUPRENORPHINE HYDROCHLORIDE AND NALOXONE HYDROCHLORIDE DIHYDRATE 1 TABLET: 2; .5 TABLET SUBLINGUAL at 20:09

## 2025-04-30 ASSESSMENT — PAIN SCALES - GENERAL
PAINLEVEL_OUTOF10: 0

## 2025-04-30 NOTE — CASE COMMUNICATION
Nursing paged about patient making inappropriate comments towards staff, as well as saying that he is too tired to do anything or undergo dialysis.  Such shifts are likely consistent with delirium.  Because of this I would recommend re-instating 1:1 sitter.

## 2025-04-30 NOTE — BSMART NOTE
BSMART Liaison Team Note     LOS:  3     Patient goal(s) for today: take medications as prescribed, make needs known in an appropriate manner, \"get discharged before Tuesday\"  BSMART Liaison team focus/goals: assess needs, provide support and education, coordinate care    Progress note: Met with patient face to face after speaking with BHAKTI Leung about his mood today. She reports patient has been cycling between upbeat and positive moods to depressive and noncompliant moods where he does not want dialysis after interacting with PT. She reports otherwise he has no issues with taking medications and has been pleasant today. Patient is seen sitting in his recliner watching TV. He is agreeable to speak with me, and appears to be in a positive mood today. He initially is unable to continue the conversation because he becomes focused on trying to mute the TV, but eventually is able to let this go after multiple times telling him it is ok if the TV remains on. He shares that he is feeling better today, and that he has been able to look back at his actions and reflect. He reports being grateful that his suicide attempt did not work, and shares that he realizes now how many people care about him. He states \"my sisters especially, I never realized how much they cared until now and it makes me feel really supported\". The patient shares that he is trying to discharge by Tuesday, as his sister were planning on going to [place] to their family home for a visit and he would really like to go. \"I think it would be nice to see the place I grew up and just reminisce\". We discussed the possibility of not being discharged before then, and the patient acknowledges that it would be ok if he was not able to go. He expands on his feelings before and after the suicide attempt, and understands that his treatment team wants to make sure he is safe before discharging him. He reports no SI/HI/AH/VH. He reports no anxiety today, though does state he is

## 2025-04-30 NOTE — CARE COORDINATION
Transition of Care Plan:    RUR: 31% High Risk  Prior Level of Functioning: Imdependent  Disposition: Home with dialysis vs rehab  TAN: 5/5/2025  If SNF or IPR: Date FOC offered: TBD  Date FOC received: TBD  Accepting facility: TBD  Date authorization started with reference number: N/A  Date authorization received and expires: N/A  Follow up appointments: Nephrology; PCP; Psychiatry  DME needed: TBD  Transportation at discharge:  Automobile; Family   IM/IMM Medicare/ letter given: 4/28/2025  Is patient a  and connected with VA? No   If yes, was  transfer form completed and VA notified?   Caregiver Contact: Anuradha Brown, Sister - [phone] and Erikjamari Guallpa, Sister - [phone]  Discharge Caregiver contacted prior to discharge? Yes  Care Conference needed? No  Barriers to discharge: Medical stability, Psychological stability, Dialysis placement    Outpatient Dialysis Center referral noted.  The Hepatitis Total Core lab is needed.      Nursing was informed.      4:00 PM    Mr. Van was seen.  He had been going to [facility] for dialysis on M, W, and Friday.  He is agreeable to return to the dialysis center.    A  referral was sent through [facility].    Will continue to follow for discharge planning.  SANDRA PRECIADO LCSW

## 2025-05-01 LAB
ALBUMIN SERPL-MCNC: 2.7 G/DL (ref 3.5–5)
ALBUMIN/GLOB SERPL: 0.7 (ref 1.1–2.2)
ALP SERPL-CCNC: 122 U/L (ref 45–117)
ALT SERPL-CCNC: 15 U/L (ref 12–78)
ANION GAP SERPL CALC-SCNC: 5 MMOL/L (ref 2–12)
AST SERPL-CCNC: 16 U/L (ref 15–37)
BASOPHILS # BLD: 0.03 K/UL (ref 0–0.1)
BASOPHILS NFR BLD: 0.7 % (ref 0–1)
BILIRUB SERPL-MCNC: 0.3 MG/DL (ref 0.2–1)
BUN SERPL-MCNC: 22 MG/DL (ref 6–20)
BUN/CREAT SERPL: 10 (ref 12–20)
CALCIUM SERPL-MCNC: 8.6 MG/DL (ref 8.5–10.1)
CHLORIDE SERPL-SCNC: 102 MMOL/L (ref 97–108)
CO2 SERPL-SCNC: 28 MMOL/L (ref 21–32)
CREAT SERPL-MCNC: 2.31 MG/DL (ref 0.7–1.3)
DIFFERENTIAL METHOD BLD: ABNORMAL
EKG DIAGNOSIS: NORMAL
EKG Q-T INTERVAL: 358 MS
EKG QRS DURATION: 100 MS
EKG QTC CALCULATION (BAZETT): 468 MS
EKG R AXIS: 1 DEGREES
EKG T AXIS: 121 DEGREES
EKG VENTRICULAR RATE: 103 BPM
EOSINOPHIL # BLD: 0.13 K/UL (ref 0–0.4)
EOSINOPHIL NFR BLD: 3 % (ref 0–7)
ERYTHROCYTE [DISTWIDTH] IN BLOOD BY AUTOMATED COUNT: 14.2 % (ref 11.5–14.5)
GLOBULIN SER CALC-MCNC: 3.8 G/DL (ref 2–4)
GLUCOSE BLD STRIP.AUTO-MCNC: 173 MG/DL (ref 65–117)
GLUCOSE BLD STRIP.AUTO-MCNC: 190 MG/DL (ref 65–117)
GLUCOSE BLD STRIP.AUTO-MCNC: 201 MG/DL (ref 65–117)
GLUCOSE BLD STRIP.AUTO-MCNC: 225 MG/DL (ref 65–117)
GLUCOSE SERPL-MCNC: 170 MG/DL (ref 65–100)
HCT VFR BLD AUTO: 23.9 % (ref 36.6–50.3)
HGB BLD-MCNC: 8.1 G/DL (ref 12.1–17)
IMM GRANULOCYTES # BLD AUTO: 0.01 K/UL (ref 0–0.04)
IMM GRANULOCYTES NFR BLD AUTO: 0.2 % (ref 0–0.5)
LYMPHOCYTES # BLD: 0.77 K/UL (ref 0.8–3.5)
LYMPHOCYTES NFR BLD: 17.4 % (ref 12–49)
MCH RBC QN AUTO: 30 PG (ref 26–34)
MCHC RBC AUTO-ENTMCNC: 33.9 G/DL (ref 30–36.5)
MCV RBC AUTO: 88.5 FL (ref 80–99)
MONOCYTES # BLD: 0.49 K/UL (ref 0–1)
MONOCYTES NFR BLD: 11.2 % (ref 5–13)
NEUTS SEG # BLD: 2.97 K/UL (ref 1.8–8)
NEUTS SEG NFR BLD: 67.5 % (ref 32–75)
NRBC # BLD: 0 K/UL (ref 0–0.01)
NRBC BLD-RTO: 0 PER 100 WBC
PLATELET # BLD AUTO: 125 K/UL (ref 150–400)
PMV BLD AUTO: 10.2 FL (ref 8.9–12.9)
POTASSIUM SERPL-SCNC: 4.4 MMOL/L (ref 3.5–5.1)
PROT SERPL-MCNC: 6.5 G/DL (ref 6.4–8.2)
RBC # BLD AUTO: 2.7 M/UL (ref 4.1–5.7)
RBC MORPH BLD: ABNORMAL
SERVICE CMNT-IMP: ABNORMAL
SODIUM SERPL-SCNC: 135 MMOL/L (ref 136–145)
WBC # BLD AUTO: 4.4 K/UL (ref 4.1–11.1)

## 2025-05-01 PROCEDURE — 2700000000 HC OXYGEN THERAPY PER DAY

## 2025-05-01 PROCEDURE — 6370000000 HC RX 637 (ALT 250 FOR IP): Performed by: STUDENT IN AN ORGANIZED HEALTH CARE EDUCATION/TRAINING PROGRAM

## 2025-05-01 PROCEDURE — 97535 SELF CARE MNGMENT TRAINING: CPT

## 2025-05-01 PROCEDURE — 2060000000 HC ICU INTERMEDIATE R&B

## 2025-05-01 PROCEDURE — 97530 THERAPEUTIC ACTIVITIES: CPT

## 2025-05-01 PROCEDURE — 80053 COMPREHEN METABOLIC PANEL: CPT

## 2025-05-01 PROCEDURE — 85025 COMPLETE CBC W/AUTO DIFF WBC: CPT

## 2025-05-01 PROCEDURE — 6360000002 HC RX W HCPCS: Performed by: STUDENT IN AN ORGANIZED HEALTH CARE EDUCATION/TRAINING PROGRAM

## 2025-05-01 PROCEDURE — 2500000003 HC RX 250 WO HCPCS: Performed by: STUDENT IN AN ORGANIZED HEALTH CARE EDUCATION/TRAINING PROGRAM

## 2025-05-01 PROCEDURE — 93010 ELECTROCARDIOGRAM REPORT: CPT | Performed by: HOSPITALIST

## 2025-05-01 PROCEDURE — 6370000000 HC RX 637 (ALT 250 FOR IP): Performed by: NURSE PRACTITIONER

## 2025-05-01 PROCEDURE — 94760 N-INVAS EAR/PLS OXIMETRY 1: CPT

## 2025-05-01 PROCEDURE — 6360000002 HC RX W HCPCS: Performed by: PSYCHIATRY & NEUROLOGY

## 2025-05-01 PROCEDURE — 99223 1ST HOSP IP/OBS HIGH 75: CPT | Performed by: HOSPITALIST

## 2025-05-01 PROCEDURE — 82962 GLUCOSE BLOOD TEST: CPT

## 2025-05-01 PROCEDURE — 6370000000 HC RX 637 (ALT 250 FOR IP)

## 2025-05-01 PROCEDURE — 2500000003 HC RX 250 WO HCPCS: Performed by: NURSE PRACTITIONER

## 2025-05-01 PROCEDURE — 2580000003 HC RX 258: Performed by: STUDENT IN AN ORGANIZED HEALTH CARE EDUCATION/TRAINING PROGRAM

## 2025-05-01 PROCEDURE — 6370000000 HC RX 637 (ALT 250 FOR IP): Performed by: PSYCHIATRY & NEUROLOGY

## 2025-05-01 RX ORDER — QUETIAPINE FUMARATE 25 MG/1
50 TABLET, FILM COATED ORAL NIGHTLY
Status: DISCONTINUED | OUTPATIENT
Start: 2025-05-01 | End: 2025-05-05

## 2025-05-01 RX ORDER — WARFARIN SODIUM 5 MG/1
5 TABLET ORAL
Status: COMPLETED | OUTPATIENT
Start: 2025-05-01 | End: 2025-05-01

## 2025-05-01 RX ORDER — DILTIAZEM HYDROCHLORIDE 120 MG/1
120 CAPSULE, COATED, EXTENDED RELEASE ORAL DAILY
Status: DISCONTINUED | OUTPATIENT
Start: 2025-05-01 | End: 2025-05-12 | Stop reason: HOSPADM

## 2025-05-01 RX ADMIN — HYDROXYZINE HYDROCHLORIDE 50 MG: 25 TABLET, FILM COATED ORAL at 21:28

## 2025-05-01 RX ADMIN — DILTIAZEM HYDROCHLORIDE 120 MG: 120 CAPSULE, COATED, EXTENDED RELEASE ORAL at 11:58

## 2025-05-01 RX ADMIN — WARFARIN SODIUM 5 MG: 5 TABLET ORAL at 20:21

## 2025-05-01 RX ADMIN — CHLORHEXIDINE GLUCONATE 15 ML: 1.2 RINSE ORAL at 21:28

## 2025-05-01 RX ADMIN — BUPRENORPHINE HYDROCHLORIDE AND NALOXONE HYDROCHLORIDE DIHYDRATE 1 TABLET: 2; .5 TABLET SUBLINGUAL at 20:46

## 2025-05-01 RX ADMIN — HYDRALAZINE HYDROCHLORIDE 50 MG: 50 TABLET ORAL at 23:30

## 2025-05-01 RX ADMIN — HEPARIN SODIUM 5000 UNITS: 5000 INJECTION INTRAVENOUS; SUBCUTANEOUS at 06:08

## 2025-05-01 RX ADMIN — DILTIAZEM HYDROCHLORIDE 10 MG/HR: 5 INJECTION, SOLUTION INTRAVENOUS at 06:54

## 2025-05-01 RX ADMIN — SODIUM CHLORIDE, PRESERVATIVE FREE 10 ML: 5 INJECTION INTRAVENOUS at 09:15

## 2025-05-01 RX ADMIN — HEPARIN SODIUM 5000 UNITS: 5000 INJECTION INTRAVENOUS; SUBCUTANEOUS at 16:17

## 2025-05-01 RX ADMIN — INSULIN LISPRO 2 UNITS: 100 INJECTION, SOLUTION INTRAVENOUS; SUBCUTANEOUS at 11:58

## 2025-05-01 RX ADMIN — BUPRENORPHINE HYDROCHLORIDE AND NALOXONE HYDROCHLORIDE DIHYDRATE 1 TABLET: 2; .5 TABLET SUBLINGUAL at 09:15

## 2025-05-01 RX ADMIN — HEPARIN SODIUM 5000 UNITS: 5000 INJECTION INTRAVENOUS; SUBCUTANEOUS at 21:30

## 2025-05-01 RX ADMIN — GABAPENTIN 300 MG: 300 CAPSULE ORAL at 09:15

## 2025-05-01 RX ADMIN — PANTOPRAZOLE SODIUM 40 MG: 40 TABLET, DELAYED RELEASE ORAL at 09:20

## 2025-05-01 RX ADMIN — CHLORHEXIDINE GLUCONATE 15 ML: 1.2 RINSE ORAL at 09:15

## 2025-05-01 RX ADMIN — HYDRALAZINE HYDROCHLORIDE 50 MG: 50 TABLET ORAL at 16:17

## 2025-05-01 RX ADMIN — TRAZODONE HYDROCHLORIDE 50 MG: 50 TABLET ORAL at 20:46

## 2025-05-01 RX ADMIN — INSULIN LISPRO 1 UNITS: 100 INJECTION, SOLUTION INTRAVENOUS; SUBCUTANEOUS at 21:00

## 2025-05-01 RX ADMIN — GABAPENTIN 300 MG: 300 CAPSULE ORAL at 20:46

## 2025-05-01 RX ADMIN — QUETIAPINE FUMARATE 50 MG: 25 TABLET ORAL at 20:46

## 2025-05-01 RX ADMIN — INSULIN LISPRO 1 UNITS: 100 INJECTION, SOLUTION INTRAVENOUS; SUBCUTANEOUS at 18:02

## 2025-05-01 RX ADMIN — SODIUM CHLORIDE, PRESERVATIVE FREE 10 ML: 5 INJECTION INTRAVENOUS at 20:53

## 2025-05-01 RX ADMIN — DULOXETINE HYDROCHLORIDE 30 MG: 30 CAPSULE, DELAYED RELEASE ORAL at 20:46

## 2025-05-01 RX ADMIN — DULOXETINE HYDROCHLORIDE 30 MG: 30 CAPSULE, DELAYED RELEASE ORAL at 09:15

## 2025-05-01 ASSESSMENT — PAIN SCALES - GENERAL
PAINLEVEL_OUTOF10: 0
PAINLEVEL_OUTOF10: 0

## 2025-05-01 NOTE — BSMART NOTE
BSMART Liaison Team Note     LOS:  4     Patient goals for today: take medications as prescribed, make needs known in an appropriate manner.  BSMART Liaison team focus/goals: assess MH needs, provide therapeutic support, brief therapy, and education, as needed.  Assist medical care management team with recommendations for coordination of care.    Progress note: Pt was admitted to the [facility] on 4/24/25 for ativan OD with continuing SI.  Per chart, \"Patient presented to the ED on 04/19/25 from home after overdosing on 65 - 1mg ativan last night.  States \"I'm in kidney failure and I'm gonna die from that and I just don't want to live anymore. Nobody cares about me I should just die.\"   On 4/27/25, pt was transferred to medical after a rapid response was called - pt was found to be hyponatremic to 128 and hyperkalemic to 7.1.     Liaison met with pt ftf on the medical floor.  He is received sitting on the edge of the bed eating breakfast with his sitter at bedside.  He is alert, oriented, logical and goal directed.  His affect is bright and he is smiling.  His eye contact and speech are WNL and he is cooperative and pleasant.  He denies SI/HI/AVH.  He reports his balance is \"way off\" and states when he reaches for things, like a cup, he sometimes knocks it down because his perception is \"off\".  Pt states \"I don't feel myself at all - I just don't feel right inside my body\".   He states when he closes his eyes he feels the room spinning and he sees people who are not there.  Pt clarifies that when he sees people it is only when his eyes are closed and reports seeing an old woman hunched over.   Pt expresses anger and \"disgust\" toward himself and states he has a difficult time with being positive but is trying.  He reports he cannot stop thinking about the damage he has done to his body throughout his life, including his ativan OD. He reports he is a worrier by nature and now his anxiety is severe, describing it as \"Like 2

## 2025-05-02 LAB
ALBUMIN SERPL-MCNC: 2.5 G/DL (ref 3.5–5)
ALBUMIN/GLOB SERPL: 0.8 (ref 1.1–2.2)
ALP SERPL-CCNC: 106 U/L (ref 45–117)
ALT SERPL-CCNC: 14 U/L (ref 12–78)
ANION GAP SERPL CALC-SCNC: 5 MMOL/L (ref 2–12)
AST SERPL-CCNC: 14 U/L (ref 15–37)
BASOPHILS # BLD: 0.03 K/UL (ref 0–0.1)
BASOPHILS NFR BLD: 0.9 % (ref 0–1)
BILIRUB SERPL-MCNC: 0.3 MG/DL (ref 0.2–1)
BUN SERPL-MCNC: 31 MG/DL (ref 6–20)
BUN/CREAT SERPL: 9 (ref 12–20)
CALCIUM SERPL-MCNC: 8.5 MG/DL (ref 8.5–10.1)
CHLORIDE SERPL-SCNC: 101 MMOL/L (ref 97–108)
CO2 SERPL-SCNC: 30 MMOL/L (ref 21–32)
CREAT SERPL-MCNC: 3.32 MG/DL (ref 0.7–1.3)
DIFFERENTIAL METHOD BLD: ABNORMAL
EKG ATRIAL RATE: 87 BPM
EKG DIAGNOSIS: NORMAL
EKG P AXIS: 71 DEGREES
EKG P-R INTERVAL: 188 MS
EKG Q-T INTERVAL: 362 MS
EKG QRS DURATION: 118 MS
EKG QTC CALCULATION (BAZETT): 435 MS
EKG R AXIS: 25 DEGREES
EKG T AXIS: 79 DEGREES
EKG VENTRICULAR RATE: 87 BPM
EOSINOPHIL # BLD: 0.15 K/UL (ref 0–0.4)
EOSINOPHIL NFR BLD: 4.6 % (ref 0–7)
ERYTHROCYTE [DISTWIDTH] IN BLOOD BY AUTOMATED COUNT: 14.4 % (ref 11.5–14.5)
GLOBULIN SER CALC-MCNC: 3.2 G/DL (ref 2–4)
GLUCOSE BLD STRIP.AUTO-MCNC: 119 MG/DL (ref 65–117)
GLUCOSE BLD STRIP.AUTO-MCNC: 172 MG/DL (ref 65–117)
GLUCOSE BLD STRIP.AUTO-MCNC: 191 MG/DL (ref 65–117)
GLUCOSE SERPL-MCNC: 153 MG/DL (ref 65–100)
HCT VFR BLD AUTO: 21 % (ref 36.6–50.3)
HGB BLD-MCNC: 7.2 G/DL (ref 12.1–17)
IMM GRANULOCYTES # BLD AUTO: 0.01 K/UL (ref 0–0.04)
IMM GRANULOCYTES NFR BLD AUTO: 0.3 % (ref 0–0.5)
INR PPP: 1 (ref 0.9–1.1)
LYMPHOCYTES # BLD: 0.93 K/UL (ref 0.8–3.5)
LYMPHOCYTES NFR BLD: 28.3 % (ref 12–49)
MAGNESIUM SERPL-MCNC: 2.2 MG/DL (ref 1.6–2.4)
MCH RBC QN AUTO: 30.8 PG (ref 26–34)
MCHC RBC AUTO-ENTMCNC: 34.3 G/DL (ref 30–36.5)
MCV RBC AUTO: 89.7 FL (ref 80–99)
MONOCYTES # BLD: 0.43 K/UL (ref 0–1)
MONOCYTES NFR BLD: 13.1 % (ref 5–13)
NEUTS SEG # BLD: 1.74 K/UL (ref 1.8–8)
NEUTS SEG NFR BLD: 52.8 % (ref 32–75)
NRBC # BLD: 0 K/UL (ref 0–0.01)
NRBC BLD-RTO: 0 PER 100 WBC
PHOSPHATE SERPL-MCNC: 4.2 MG/DL (ref 2.6–4.7)
PLATELET # BLD AUTO: 118 K/UL (ref 150–400)
PMV BLD AUTO: 10.1 FL (ref 8.9–12.9)
POTASSIUM SERPL-SCNC: 4.3 MMOL/L (ref 3.5–5.1)
PROT SERPL-MCNC: 5.7 G/DL (ref 6.4–8.2)
PROTHROMBIN TIME: 10.5 SEC (ref 9.2–11.2)
RBC # BLD AUTO: 2.34 M/UL (ref 4.1–5.7)
SERVICE CMNT-IMP: ABNORMAL
SODIUM SERPL-SCNC: 136 MMOL/L (ref 136–145)
WBC # BLD AUTO: 3.3 K/UL (ref 4.1–11.1)

## 2025-05-02 PROCEDURE — 85025 COMPLETE CBC W/AUTO DIFF WBC: CPT

## 2025-05-02 PROCEDURE — 85610 PROTHROMBIN TIME: CPT

## 2025-05-02 PROCEDURE — 83735 ASSAY OF MAGNESIUM: CPT

## 2025-05-02 PROCEDURE — 6370000000 HC RX 637 (ALT 250 FOR IP): Performed by: HOSPITALIST

## 2025-05-02 PROCEDURE — 2060000000 HC ICU INTERMEDIATE R&B

## 2025-05-02 PROCEDURE — 6360000002 HC RX W HCPCS: Performed by: STUDENT IN AN ORGANIZED HEALTH CARE EDUCATION/TRAINING PROGRAM

## 2025-05-02 PROCEDURE — 6360000002 HC RX W HCPCS: Performed by: INTERNAL MEDICINE

## 2025-05-02 PROCEDURE — 6370000000 HC RX 637 (ALT 250 FOR IP): Performed by: NURSE PRACTITIONER

## 2025-05-02 PROCEDURE — 2700000000 HC OXYGEN THERAPY PER DAY

## 2025-05-02 PROCEDURE — P9047 ALBUMIN (HUMAN), 25%, 50ML: HCPCS | Performed by: INTERNAL MEDICINE

## 2025-05-02 PROCEDURE — 6370000000 HC RX 637 (ALT 250 FOR IP): Performed by: PSYCHIATRY & NEUROLOGY

## 2025-05-02 PROCEDURE — 80053 COMPREHEN METABOLIC PANEL: CPT

## 2025-05-02 PROCEDURE — 6360000002 HC RX W HCPCS: Performed by: HOSPITALIST

## 2025-05-02 PROCEDURE — 2580000003 HC RX 258: Performed by: INTERNAL MEDICINE

## 2025-05-02 PROCEDURE — 6370000000 HC RX 637 (ALT 250 FOR IP)

## 2025-05-02 PROCEDURE — 94760 N-INVAS EAR/PLS OXIMETRY 1: CPT

## 2025-05-02 PROCEDURE — 84100 ASSAY OF PHOSPHORUS: CPT

## 2025-05-02 PROCEDURE — 6360000002 HC RX W HCPCS: Performed by: PSYCHIATRY & NEUROLOGY

## 2025-05-02 PROCEDURE — 6370000000 HC RX 637 (ALT 250 FOR IP): Performed by: STUDENT IN AN ORGANIZED HEALTH CARE EDUCATION/TRAINING PROGRAM

## 2025-05-02 PROCEDURE — 82962 GLUCOSE BLOOD TEST: CPT

## 2025-05-02 RX ORDER — ALBUMIN (HUMAN) 12.5 G/50ML
25 SOLUTION INTRAVENOUS ONCE
Status: COMPLETED | OUTPATIENT
Start: 2025-05-02 | End: 2025-05-02

## 2025-05-02 RX ORDER — WARFARIN SODIUM 5 MG/1
5 TABLET ORAL
Status: COMPLETED | OUTPATIENT
Start: 2025-05-02 | End: 2025-05-02

## 2025-05-02 RX ORDER — HEPARIN SODIUM 5000 [USP'U]/ML
5000 INJECTION, SOLUTION INTRAVENOUS; SUBCUTANEOUS EVERY 8 HOURS SCHEDULED
Status: DISCONTINUED | OUTPATIENT
Start: 2025-05-02 | End: 2025-05-08 | Stop reason: ALTCHOICE

## 2025-05-02 RX ORDER — ALBUMIN (HUMAN) 12.5 G/50ML
25 SOLUTION INTRAVENOUS ONCE
Status: DISCONTINUED | OUTPATIENT
Start: 2025-05-02 | End: 2025-05-02

## 2025-05-02 RX ADMIN — PANTOPRAZOLE SODIUM 40 MG: 40 TABLET, DELAYED RELEASE ORAL at 05:32

## 2025-05-02 RX ADMIN — WARFARIN SODIUM 5 MG: 5 TABLET ORAL at 18:16

## 2025-05-02 RX ADMIN — GABAPENTIN 300 MG: 300 CAPSULE ORAL at 08:35

## 2025-05-02 RX ADMIN — ALBUMIN (HUMAN) 25 G: 0.25 INJECTION, SOLUTION INTRAVENOUS at 10:10

## 2025-05-02 RX ADMIN — HEPARIN SODIUM 5000 UNITS: 5000 INJECTION INTRAVENOUS; SUBCUTANEOUS at 05:32

## 2025-05-02 RX ADMIN — HEPARIN SODIUM 5000 UNITS: 5000 INJECTION INTRAVENOUS; SUBCUTANEOUS at 14:59

## 2025-05-02 RX ADMIN — DULOXETINE HYDROCHLORIDE 30 MG: 30 CAPSULE, DELAYED RELEASE ORAL at 08:35

## 2025-05-02 RX ADMIN — DILTIAZEM HYDROCHLORIDE 120 MG: 120 CAPSULE, COATED, EXTENDED RELEASE ORAL at 08:36

## 2025-05-02 RX ADMIN — INSULIN LISPRO 1 UNITS: 100 INJECTION, SOLUTION INTRAVENOUS; SUBCUTANEOUS at 18:16

## 2025-05-02 RX ADMIN — SODIUM CHLORIDE 125 MG: 9 INJECTION, SOLUTION INTRAVENOUS at 16:02

## 2025-05-02 RX ADMIN — EPOETIN ALFA 20000 UNITS: 20000 SOLUTION INTRAVENOUS; SUBCUTANEOUS at 20:26

## 2025-05-02 RX ADMIN — HYDRALAZINE HYDROCHLORIDE 50 MG: 50 TABLET ORAL at 08:36

## 2025-05-02 RX ADMIN — HYDRALAZINE HYDROCHLORIDE 50 MG: 50 TABLET ORAL at 16:00

## 2025-05-02 RX ADMIN — BUPRENORPHINE HYDROCHLORIDE AND NALOXONE HYDROCHLORIDE DIHYDRATE 1 TABLET: 2; .5 TABLET SUBLINGUAL at 08:36

## 2025-05-02 ASSESSMENT — PAIN SCALES - GENERAL
PAINLEVEL_OUTOF10: 0
PAINLEVEL_OUTOF10: 0

## 2025-05-02 NOTE — DIALYSIS
Hep B verification performed by (RN): JESSIE Ayon RN  Hep B results, dates, and Primary Source: epic  Hepatitis B Surface Ag   Date/Time Value Ref Range Status   04/27/2025 02:20 PM 0.14 Index Final     Hep B S Ag Interp   Date/Time Value Ref Range Status   04/27/2025 02:20 PM Negative NEG   Final     Hep B S Ab   Date/Time Value Ref Range Status   04/27/2025 02:20 PM 3.63 mIU/mL Final     Hep B S Ab Interp   Date/Time Value Ref Range Status   04/27/2025 02:20 PM NONREACTIVE NR   Final     Comment:     (NOTE)  The [employer] Centaur Anti-HBs2 assay is traceable to the World Health   Organization (WHO) Hepatitis B Immunoglobulin 1st International   Reference Preparation (1977). Samples with a calculated value of 10   mIU/mL or greater are considered reactive (protective) in accordance   with the CDC guidelines. The accepted criteria for immunity to HBV is   anti-HBs activity greater than or equal to 10 mIU/mL, as defined by   the WHO International Reference Preparation.  Assay performance has not been established in pregnant women,   patients who are immunosuppressed or immunocompromised, nor have   performance characteristics been established in conjunction with   other 's assays for specific HBV serologic markers. This   assay does not differentiate between vaccine induced immune response   and a response due to infection with HBV. Passively acquired anti-HBs   may be identified following patient transfusion, receipt of   immunoglobulin products, etc.       Machine disinfect process:heat

## 2025-05-02 NOTE — CARE COORDINATION
Transition of Care Plan:    RUR: 32%-high  Prior Level of Functioning: Independent  Disposition: Home  TAN:   If SNF or IPR: Date FOC offered:   Date FOC received:   Accepting facility:   Date authorization started with reference number:   Date authorization received and expires:   Follow up appointments:   DME needed:   Transportation at discharge:   [facility] Medicare/ letter given:   Is patient a [place] and connected with VA?   If yes, was [facility] transfer form completed and VA notified?   Caregiver Contact:   Discharge Caregiver contacted prior to discharge?   Care Conference needed?   Barriers to discharge: medical stability    Order received for outpatient dialysis chair setup. Patient was on dialysis previously but decided to stop treatments.   Referrals sent to [facility] [phone] and fax [phone] for medical review. CM followed up with call to [facility] central scheduling and left HIPAA complainant voicemail requesting confirmation of receipt of medicals and status of request. CM will continue to follow for PRECIOUS needs.    Update received call from central scheduling and they have not received medicals. Re-faxed by CM to [phone].

## 2025-05-02 NOTE — BSMART NOTE
BSMART Liaison Team Note     LOS:  5     Patient goal(s) for today: take medications as prescribed, make needs known in an appropriate manner  BSMART Liaison team focus/goals: assess needs, provide support and education, coordinate care    Progress note: Met with patient face to face while he was receiving dialysis treatment. He is initially seen sleeping but awakes easily to his name being called and is agreeable to talk. Throughout the conversation, patient is seen drifting to sleep, but is easily awoken and reports he does want to talk. He shares that he is feeling better mentally today, though physically he is very tired. We discuss how much his body has been through and the importance of listening to signs that our body needs rest. Patient reports no anxiety today, stating he hasn't had any thoughts of SI today. He shares that he believes he can be without a sitter now, and we discuss the need for a sitter based on his fall risk or the possibility that he may have bad days, which the patient agrees are valid reasons for a sitter. He shares that he is looking forward to discharging from the hospital and feeling himself again. He reports that one of the first few things he would like to do is go to [facility] or [facility] with his sister's and their families. He shares that both of his sisters visited him yesterday and he enjoyed seeing them a lot. We discussed other goals for self-care the patient had and space was provided for the patient to share.      Barriers to Discharge: medical and psychiatric clearance  Guns in the home: No     Outpatient provider(s):  none reported  Insurance info/prescription coverage:  MEDICARE PART A AND B     Diagnosis: Per psychiatric consult note on 4/25/2025: MDD, recurrent, severe, without psychosis  Opioid use disorder, in remission  Cocaine abuse  Tobacco use disorder    Plan:  Defer to medical team notes. Liaison team to monitor and to support. Please defer to psychiatric

## 2025-05-03 LAB
GLUCOSE BLD STRIP.AUTO-MCNC: 133 MG/DL (ref 65–117)
GLUCOSE BLD STRIP.AUTO-MCNC: 157 MG/DL (ref 65–117)
GLUCOSE BLD STRIP.AUTO-MCNC: 170 MG/DL (ref 65–117)
GLUCOSE BLD STRIP.AUTO-MCNC: 222 MG/DL (ref 65–117)
GLUCOSE BLD STRIP.AUTO-MCNC: 223 MG/DL (ref 65–117)
INR PPP: 1 (ref 0.9–1.1)
PROTHROMBIN TIME: 10.6 SEC (ref 9.2–11.2)
SERVICE CMNT-IMP: ABNORMAL

## 2025-05-03 PROCEDURE — 6370000000 HC RX 637 (ALT 250 FOR IP): Performed by: HOSPITALIST

## 2025-05-03 PROCEDURE — 2700000000 HC OXYGEN THERAPY PER DAY

## 2025-05-03 PROCEDURE — 6360000002 HC RX W HCPCS: Performed by: PSYCHIATRY & NEUROLOGY

## 2025-05-03 PROCEDURE — 6370000000 HC RX 637 (ALT 250 FOR IP): Performed by: NURSE PRACTITIONER

## 2025-05-03 PROCEDURE — 6370000000 HC RX 637 (ALT 250 FOR IP)

## 2025-05-03 PROCEDURE — 82962 GLUCOSE BLOOD TEST: CPT

## 2025-05-03 PROCEDURE — 6360000002 HC RX W HCPCS: Performed by: INTERNAL MEDICINE

## 2025-05-03 PROCEDURE — 6360000002 HC RX W HCPCS: Performed by: HOSPITALIST

## 2025-05-03 PROCEDURE — 2060000000 HC ICU INTERMEDIATE R&B

## 2025-05-03 PROCEDURE — 6370000000 HC RX 637 (ALT 250 FOR IP): Performed by: STUDENT IN AN ORGANIZED HEALTH CARE EDUCATION/TRAINING PROGRAM

## 2025-05-03 PROCEDURE — 6370000000 HC RX 637 (ALT 250 FOR IP): Performed by: PSYCHIATRY & NEUROLOGY

## 2025-05-03 PROCEDURE — 85610 PROTHROMBIN TIME: CPT

## 2025-05-03 PROCEDURE — 94760 N-INVAS EAR/PLS OXIMETRY 1: CPT

## 2025-05-03 PROCEDURE — 2500000003 HC RX 250 WO HCPCS: Performed by: NURSE PRACTITIONER

## 2025-05-03 PROCEDURE — 2580000003 HC RX 258: Performed by: INTERNAL MEDICINE

## 2025-05-03 RX ORDER — WARFARIN SODIUM 5 MG/1
5 TABLET ORAL
Status: COMPLETED | OUTPATIENT
Start: 2025-05-03 | End: 2025-05-03

## 2025-05-03 RX ADMIN — HEPARIN SODIUM 5000 UNITS: 5000 INJECTION INTRAVENOUS; SUBCUTANEOUS at 13:48

## 2025-05-03 RX ADMIN — DULOXETINE HYDROCHLORIDE 30 MG: 30 CAPSULE, DELAYED RELEASE ORAL at 11:06

## 2025-05-03 RX ADMIN — BUPRENORPHINE HYDROCHLORIDE AND NALOXONE HYDROCHLORIDE DIHYDRATE 1 TABLET: 2; .5 TABLET SUBLINGUAL at 00:10

## 2025-05-03 RX ADMIN — GABAPENTIN 300 MG: 300 CAPSULE ORAL at 22:31

## 2025-05-03 RX ADMIN — GABAPENTIN 300 MG: 300 CAPSULE ORAL at 00:11

## 2025-05-03 RX ADMIN — HYDROXYZINE HYDROCHLORIDE 50 MG: 25 TABLET, FILM COATED ORAL at 13:48

## 2025-05-03 RX ADMIN — BUPRENORPHINE HYDROCHLORIDE AND NALOXONE HYDROCHLORIDE DIHYDRATE 1 TABLET: 2; .5 TABLET SUBLINGUAL at 11:05

## 2025-05-03 RX ADMIN — SODIUM CHLORIDE 125 MG: 9 INJECTION, SOLUTION INTRAVENOUS at 11:11

## 2025-05-03 RX ADMIN — WARFARIN SODIUM 5 MG: 5 TABLET ORAL at 18:09

## 2025-05-03 RX ADMIN — GABAPENTIN 300 MG: 300 CAPSULE ORAL at 11:06

## 2025-05-03 RX ADMIN — QUETIAPINE FUMARATE 50 MG: 25 TABLET ORAL at 00:10

## 2025-05-03 RX ADMIN — PANTOPRAZOLE SODIUM 40 MG: 40 TABLET, DELAYED RELEASE ORAL at 08:14

## 2025-05-03 RX ADMIN — HEPARIN SODIUM 5000 UNITS: 5000 INJECTION INTRAVENOUS; SUBCUTANEOUS at 08:14

## 2025-05-03 RX ADMIN — INSULIN LISPRO 1 UNITS: 100 INJECTION, SOLUTION INTRAVENOUS; SUBCUTANEOUS at 11:36

## 2025-05-03 RX ADMIN — HEPARIN SODIUM 5000 UNITS: 5000 INJECTION INTRAVENOUS; SUBCUTANEOUS at 00:11

## 2025-05-03 RX ADMIN — DULOXETINE HYDROCHLORIDE 30 MG: 30 CAPSULE, DELAYED RELEASE ORAL at 00:11

## 2025-05-03 RX ADMIN — QUETIAPINE FUMARATE 50 MG: 25 TABLET ORAL at 22:31

## 2025-05-03 RX ADMIN — HYDRALAZINE HYDROCHLORIDE 50 MG: 50 TABLET ORAL at 02:15

## 2025-05-03 RX ADMIN — SODIUM CHLORIDE, PRESERVATIVE FREE 10 ML: 5 INJECTION INTRAVENOUS at 22:36

## 2025-05-03 RX ADMIN — INSULIN LISPRO 1 UNITS: 100 INJECTION, SOLUTION INTRAVENOUS; SUBCUTANEOUS at 22:30

## 2025-05-03 RX ADMIN — BUPRENORPHINE HYDROCHLORIDE AND NALOXONE HYDROCHLORIDE DIHYDRATE 1 TABLET: 2; .5 TABLET SUBLINGUAL at 22:31

## 2025-05-03 RX ADMIN — DULOXETINE HYDROCHLORIDE 30 MG: 30 CAPSULE, DELAYED RELEASE ORAL at 22:31

## 2025-05-03 RX ADMIN — HEPARIN SODIUM 5000 UNITS: 5000 INJECTION INTRAVENOUS; SUBCUTANEOUS at 22:30

## 2025-05-03 RX ADMIN — DILTIAZEM HYDROCHLORIDE 120 MG: 120 CAPSULE, COATED, EXTENDED RELEASE ORAL at 11:06

## 2025-05-03 RX ADMIN — SODIUM CHLORIDE, PRESERVATIVE FREE 10 ML: 5 INJECTION INTRAVENOUS at 11:10

## 2025-05-04 LAB
ALBUMIN SERPL-MCNC: 2.9 G/DL (ref 3.5–5)
ALBUMIN/GLOB SERPL: 0.8 (ref 1.1–2.2)
ALP SERPL-CCNC: 103 U/L (ref 45–117)
ALT SERPL-CCNC: 16 U/L (ref 12–78)
AMMONIA PLAS-SCNC: <10 UMOL/L
ANION GAP SERPL CALC-SCNC: 5 MMOL/L (ref 2–12)
AST SERPL-CCNC: 11 U/L (ref 15–37)
BASOPHILS # BLD: 0.03 K/UL (ref 0–0.1)
BASOPHILS NFR BLD: 0.7 % (ref 0–1)
BILIRUB SERPL-MCNC: 0.4 MG/DL (ref 0.2–1)
BUN SERPL-MCNC: 25 MG/DL (ref 6–20)
BUN/CREAT SERPL: 6 (ref 12–20)
CALCIUM SERPL-MCNC: 8.7 MG/DL (ref 8.5–10.1)
CHLORIDE SERPL-SCNC: 98 MMOL/L (ref 97–108)
CO2 SERPL-SCNC: 30 MMOL/L (ref 21–32)
CREAT SERPL-MCNC: 4.25 MG/DL (ref 0.7–1.3)
DIFFERENTIAL METHOD BLD: ABNORMAL
EOSINOPHIL # BLD: 0.2 K/UL (ref 0–0.4)
EOSINOPHIL NFR BLD: 4.9 % (ref 0–7)
ERYTHROCYTE [DISTWIDTH] IN BLOOD BY AUTOMATED COUNT: 14.8 % (ref 11.5–14.5)
GLOBULIN SER CALC-MCNC: 3.5 G/DL (ref 2–4)
GLUCOSE BLD STRIP.AUTO-MCNC: 124 MG/DL (ref 65–117)
GLUCOSE BLD STRIP.AUTO-MCNC: 176 MG/DL (ref 65–117)
GLUCOSE BLD STRIP.AUTO-MCNC: 177 MG/DL (ref 65–117)
GLUCOSE BLD STRIP.AUTO-MCNC: 244 MG/DL (ref 65–117)
GLUCOSE SERPL-MCNC: 128 MG/DL (ref 65–100)
HCT VFR BLD AUTO: 23.8 % (ref 36.6–50.3)
HGB BLD-MCNC: 7.7 G/DL (ref 12.1–17)
IMM GRANULOCYTES # BLD AUTO: 0.03 K/UL (ref 0–0.04)
IMM GRANULOCYTES NFR BLD AUTO: 0.7 % (ref 0–0.5)
INR PPP: 1 (ref 0.9–1.1)
LYMPHOCYTES # BLD: 0.88 K/UL (ref 0.8–3.5)
LYMPHOCYTES NFR BLD: 21.6 % (ref 12–49)
MCH RBC QN AUTO: 30.2 PG (ref 26–34)
MCHC RBC AUTO-ENTMCNC: 32.4 G/DL (ref 30–36.5)
MCV RBC AUTO: 93.3 FL (ref 80–99)
MONOCYTES # BLD: 0.46 K/UL (ref 0–1)
MONOCYTES NFR BLD: 11.3 % (ref 5–13)
NEUTS SEG # BLD: 2.47 K/UL (ref 1.8–8)
NEUTS SEG NFR BLD: 60.8 % (ref 32–75)
NRBC # BLD: 0 K/UL (ref 0–0.01)
NRBC BLD-RTO: 0 PER 100 WBC
PLATELET # BLD AUTO: 145 K/UL (ref 150–400)
PMV BLD AUTO: 9.6 FL (ref 8.9–12.9)
POTASSIUM SERPL-SCNC: 4.2 MMOL/L (ref 3.5–5.1)
PROT SERPL-MCNC: 6.4 G/DL (ref 6.4–8.2)
PROTHROMBIN TIME: 10.9 SEC (ref 9.2–11.2)
RBC # BLD AUTO: 2.55 M/UL (ref 4.1–5.7)
SERVICE CMNT-IMP: ABNORMAL
SODIUM SERPL-SCNC: 133 MMOL/L (ref 136–145)
WBC # BLD AUTO: 4.1 K/UL (ref 4.1–11.1)

## 2025-05-04 PROCEDURE — 6370000000 HC RX 637 (ALT 250 FOR IP): Performed by: HOSPITALIST

## 2025-05-04 PROCEDURE — 2060000000 HC ICU INTERMEDIATE R&B

## 2025-05-04 PROCEDURE — 6370000000 HC RX 637 (ALT 250 FOR IP): Performed by: NURSE PRACTITIONER

## 2025-05-04 PROCEDURE — 80053 COMPREHEN METABOLIC PANEL: CPT

## 2025-05-04 PROCEDURE — 6360000002 HC RX W HCPCS: Performed by: HOSPITALIST

## 2025-05-04 PROCEDURE — 85610 PROTHROMBIN TIME: CPT

## 2025-05-04 PROCEDURE — 6370000000 HC RX 637 (ALT 250 FOR IP): Performed by: PSYCHIATRY & NEUROLOGY

## 2025-05-04 PROCEDURE — 82140 ASSAY OF AMMONIA: CPT

## 2025-05-04 PROCEDURE — 2500000003 HC RX 250 WO HCPCS: Performed by: NURSE PRACTITIONER

## 2025-05-04 PROCEDURE — 85025 COMPLETE CBC W/AUTO DIFF WBC: CPT

## 2025-05-04 PROCEDURE — 6370000000 HC RX 637 (ALT 250 FOR IP)

## 2025-05-04 PROCEDURE — 2700000000 HC OXYGEN THERAPY PER DAY

## 2025-05-04 PROCEDURE — 82962 GLUCOSE BLOOD TEST: CPT

## 2025-05-04 PROCEDURE — 6370000000 HC RX 637 (ALT 250 FOR IP): Performed by: STUDENT IN AN ORGANIZED HEALTH CARE EDUCATION/TRAINING PROGRAM

## 2025-05-04 PROCEDURE — 2580000003 HC RX 258: Performed by: INTERNAL MEDICINE

## 2025-05-04 PROCEDURE — 6360000002 HC RX W HCPCS: Performed by: PSYCHIATRY & NEUROLOGY

## 2025-05-04 PROCEDURE — 6360000002 HC RX W HCPCS: Performed by: INTERNAL MEDICINE

## 2025-05-04 RX ORDER — WARFARIN SODIUM 5 MG/1
7.5 TABLET ORAL
Status: COMPLETED | OUTPATIENT
Start: 2025-05-04 | End: 2025-05-04

## 2025-05-04 RX ADMIN — HEPARIN SODIUM 5000 UNITS: 5000 INJECTION INTRAVENOUS; SUBCUTANEOUS at 15:13

## 2025-05-04 RX ADMIN — PANTOPRAZOLE SODIUM 40 MG: 40 TABLET, DELAYED RELEASE ORAL at 05:58

## 2025-05-04 RX ADMIN — HYDROXYZINE HYDROCHLORIDE 50 MG: 25 TABLET, FILM COATED ORAL at 11:49

## 2025-05-04 RX ADMIN — QUETIAPINE FUMARATE 50 MG: 25 TABLET ORAL at 21:03

## 2025-05-04 RX ADMIN — HEPARIN SODIUM 5000 UNITS: 5000 INJECTION INTRAVENOUS; SUBCUTANEOUS at 05:58

## 2025-05-04 RX ADMIN — SODIUM CHLORIDE, PRESERVATIVE FREE 10 ML: 5 INJECTION INTRAVENOUS at 09:55

## 2025-05-04 RX ADMIN — DULOXETINE HYDROCHLORIDE 30 MG: 30 CAPSULE, DELAYED RELEASE ORAL at 21:03

## 2025-05-04 RX ADMIN — HEPARIN SODIUM 5000 UNITS: 5000 INJECTION INTRAVENOUS; SUBCUTANEOUS at 21:03

## 2025-05-04 RX ADMIN — BUPRENORPHINE HYDROCHLORIDE AND NALOXONE HYDROCHLORIDE DIHYDRATE 1 TABLET: 2; .5 TABLET SUBLINGUAL at 21:03

## 2025-05-04 RX ADMIN — DILTIAZEM HYDROCHLORIDE 120 MG: 120 CAPSULE, COATED, EXTENDED RELEASE ORAL at 09:47

## 2025-05-04 RX ADMIN — DULOXETINE HYDROCHLORIDE 30 MG: 30 CAPSULE, DELAYED RELEASE ORAL at 09:47

## 2025-05-04 RX ADMIN — SODIUM CHLORIDE 125 MG: 9 INJECTION, SOLUTION INTRAVENOUS at 09:53

## 2025-05-04 RX ADMIN — SODIUM CHLORIDE, PRESERVATIVE FREE 10 ML: 5 INJECTION INTRAVENOUS at 21:04

## 2025-05-04 RX ADMIN — INSULIN LISPRO 1 UNITS: 100 INJECTION, SOLUTION INTRAVENOUS; SUBCUTANEOUS at 12:19

## 2025-05-04 RX ADMIN — BUPRENORPHINE HYDROCHLORIDE AND NALOXONE HYDROCHLORIDE DIHYDRATE 1 TABLET: 2; .5 TABLET SUBLINGUAL at 09:47

## 2025-05-04 RX ADMIN — GABAPENTIN 300 MG: 300 CAPSULE ORAL at 09:47

## 2025-05-04 RX ADMIN — GABAPENTIN 300 MG: 300 CAPSULE ORAL at 21:03

## 2025-05-04 RX ADMIN — WARFARIN SODIUM 7.5 MG: 5 TABLET ORAL at 18:40

## 2025-05-05 LAB
ANION GAP SERPL CALC-SCNC: 3 MMOL/L (ref 2–12)
BASOPHILS # BLD: 0.03 K/UL (ref 0–0.1)
BASOPHILS NFR BLD: 0.6 % (ref 0–1)
BUN SERPL-MCNC: 41 MG/DL (ref 6–20)
BUN/CREAT SERPL: 7 (ref 12–20)
CALCIUM SERPL-MCNC: 8.7 MG/DL (ref 8.5–10.1)
CHLORIDE SERPL-SCNC: 98 MMOL/L (ref 97–108)
CO2 SERPL-SCNC: 31 MMOL/L (ref 21–32)
CREAT SERPL-MCNC: 5.78 MG/DL (ref 0.7–1.3)
DIFFERENTIAL METHOD BLD: ABNORMAL
EOSINOPHIL # BLD: 0.2 K/UL (ref 0–0.4)
EOSINOPHIL NFR BLD: 4.3 % (ref 0–7)
ERYTHROCYTE [DISTWIDTH] IN BLOOD BY AUTOMATED COUNT: 15.6 % (ref 11.5–14.5)
GLUCOSE BLD STRIP.AUTO-MCNC: 157 MG/DL (ref 65–117)
GLUCOSE BLD STRIP.AUTO-MCNC: 160 MG/DL (ref 65–117)
GLUCOSE BLD STRIP.AUTO-MCNC: 174 MG/DL (ref 65–117)
GLUCOSE BLD STRIP.AUTO-MCNC: 261 MG/DL (ref 65–117)
GLUCOSE SERPL-MCNC: 131 MG/DL (ref 65–100)
HCT VFR BLD AUTO: 24.2 % (ref 36.6–50.3)
HGB BLD-MCNC: 7.9 G/DL (ref 12.1–17)
IMM GRANULOCYTES # BLD AUTO: 0.03 K/UL (ref 0–0.04)
IMM GRANULOCYTES NFR BLD AUTO: 0.6 % (ref 0–0.5)
INR PPP: 1.1 (ref 0.9–1.1)
LYMPHOCYTES # BLD: 0.69 K/UL (ref 0.8–3.5)
LYMPHOCYTES NFR BLD: 14.9 % (ref 12–49)
MCH RBC QN AUTO: 30.2 PG (ref 26–34)
MCHC RBC AUTO-ENTMCNC: 32.6 G/DL (ref 30–36.5)
MCV RBC AUTO: 92.4 FL (ref 80–99)
MONOCYTES # BLD: 0.49 K/UL (ref 0–1)
MONOCYTES NFR BLD: 10.6 % (ref 5–13)
NEUTS SEG # BLD: 3.16 K/UL (ref 1.8–8)
NEUTS SEG NFR BLD: 69 % (ref 32–75)
NRBC # BLD: 0 K/UL (ref 0–0.01)
NRBC BLD-RTO: 0 PER 100 WBC
PLATELET # BLD AUTO: 139 K/UL (ref 150–400)
PMV BLD AUTO: 9.5 FL (ref 8.9–12.9)
POTASSIUM SERPL-SCNC: 4.2 MMOL/L (ref 3.5–5.1)
PROTHROMBIN TIME: 11.7 SEC (ref 9.2–11.2)
RBC # BLD AUTO: 2.62 M/UL (ref 4.1–5.7)
RBC MORPH BLD: ABNORMAL
SERVICE CMNT-IMP: ABNORMAL
SODIUM SERPL-SCNC: 132 MMOL/L (ref 136–145)
WBC # BLD AUTO: 4.6 K/UL (ref 4.1–11.1)

## 2025-05-05 PROCEDURE — 90935 HEMODIALYSIS ONE EVALUATION: CPT

## 2025-05-05 PROCEDURE — 2700000000 HC OXYGEN THERAPY PER DAY

## 2025-05-05 PROCEDURE — 94760 N-INVAS EAR/PLS OXIMETRY 1: CPT

## 2025-05-05 PROCEDURE — 6360000002 HC RX W HCPCS: Performed by: INTERNAL MEDICINE

## 2025-05-05 PROCEDURE — 6370000000 HC RX 637 (ALT 250 FOR IP): Performed by: PSYCHIATRY & NEUROLOGY

## 2025-05-05 PROCEDURE — 2060000000 HC ICU INTERMEDIATE R&B

## 2025-05-05 PROCEDURE — 6360000002 HC RX W HCPCS: Performed by: HOSPITALIST

## 2025-05-05 PROCEDURE — 80048 BASIC METABOLIC PNL TOTAL CA: CPT

## 2025-05-05 PROCEDURE — 6370000000 HC RX 637 (ALT 250 FOR IP): Performed by: HOSPITALIST

## 2025-05-05 PROCEDURE — 6370000000 HC RX 637 (ALT 250 FOR IP): Performed by: NURSE PRACTITIONER

## 2025-05-05 PROCEDURE — 85025 COMPLETE CBC W/AUTO DIFF WBC: CPT

## 2025-05-05 PROCEDURE — 2500000003 HC RX 250 WO HCPCS: Performed by: NURSE PRACTITIONER

## 2025-05-05 PROCEDURE — 6370000000 HC RX 637 (ALT 250 FOR IP): Performed by: STUDENT IN AN ORGANIZED HEALTH CARE EDUCATION/TRAINING PROGRAM

## 2025-05-05 PROCEDURE — 82962 GLUCOSE BLOOD TEST: CPT

## 2025-05-05 PROCEDURE — 6360000002 HC RX W HCPCS: Performed by: PSYCHIATRY & NEUROLOGY

## 2025-05-05 PROCEDURE — 85610 PROTHROMBIN TIME: CPT

## 2025-05-05 RX ORDER — WARFARIN SODIUM 5 MG/1
7.5 TABLET ORAL
Status: COMPLETED | OUTPATIENT
Start: 2025-05-05 | End: 2025-05-05

## 2025-05-05 RX ORDER — GABAPENTIN 300 MG/1
300 CAPSULE ORAL NIGHTLY
Status: DISCONTINUED | OUTPATIENT
Start: 2025-05-06 | End: 2025-05-12 | Stop reason: HOSPADM

## 2025-05-05 RX ORDER — DEXTROSE MONOHYDRATE 100 MG/ML
INJECTION, SOLUTION INTRAVENOUS CONTINUOUS PRN
Status: DISCONTINUED | OUTPATIENT
Start: 2025-05-05 | End: 2025-05-12 | Stop reason: HOSPADM

## 2025-05-05 RX ORDER — QUETIAPINE FUMARATE 25 MG/1
100 TABLET, FILM COATED ORAL NIGHTLY
Status: DISCONTINUED | OUTPATIENT
Start: 2025-05-05 | End: 2025-05-12 | Stop reason: HOSPADM

## 2025-05-05 RX ORDER — ALBUMIN (HUMAN) 12.5 G/50ML
50 SOLUTION INTRAVENOUS PRN
Status: DISCONTINUED | OUTPATIENT
Start: 2025-05-05 | End: 2025-05-12 | Stop reason: HOSPADM

## 2025-05-05 RX ADMIN — HEPARIN SODIUM 5000 UNITS: 5000 INJECTION INTRAVENOUS; SUBCUTANEOUS at 17:28

## 2025-05-05 RX ADMIN — QUETIAPINE FUMARATE 100 MG: 100 TABLET ORAL at 22:08

## 2025-05-05 RX ADMIN — BUPRENORPHINE HYDROCHLORIDE AND NALOXONE HYDROCHLORIDE DIHYDRATE 1 TABLET: 2; .5 TABLET SUBLINGUAL at 22:08

## 2025-05-05 RX ADMIN — HEPARIN SODIUM 5000 UNITS: 5000 INJECTION INTRAVENOUS; SUBCUTANEOUS at 07:09

## 2025-05-05 RX ADMIN — EPOETIN ALFA 10000 UNITS: 10000 SOLUTION INTRAVENOUS; SUBCUTANEOUS at 17:28

## 2025-05-05 RX ADMIN — SODIUM CHLORIDE, PRESERVATIVE FREE 10 ML: 5 INJECTION INTRAVENOUS at 22:12

## 2025-05-05 RX ADMIN — BUPRENORPHINE HYDROCHLORIDE AND NALOXONE HYDROCHLORIDE DIHYDRATE 1 TABLET: 2; .5 TABLET SUBLINGUAL at 12:12

## 2025-05-05 RX ADMIN — DULOXETINE HYDROCHLORIDE 30 MG: 30 CAPSULE, DELAYED RELEASE ORAL at 22:08

## 2025-05-05 RX ADMIN — DULOXETINE HYDROCHLORIDE 30 MG: 30 CAPSULE, DELAYED RELEASE ORAL at 12:12

## 2025-05-05 RX ADMIN — SODIUM CHLORIDE, PRESERVATIVE FREE 10 ML: 5 INJECTION INTRAVENOUS at 12:15

## 2025-05-05 RX ADMIN — INSULIN LISPRO 2 UNITS: 100 INJECTION, SOLUTION INTRAVENOUS; SUBCUTANEOUS at 17:29

## 2025-05-05 RX ADMIN — WARFARIN SODIUM 7.5 MG: 5 TABLET ORAL at 19:42

## 2025-05-05 RX ADMIN — PANTOPRAZOLE SODIUM 40 MG: 40 TABLET, DELAYED RELEASE ORAL at 07:08

## 2025-05-05 NOTE — CARE COORDINATION
Transition Of Care:     Cardiology, Nephrology, Psyche, PT/OT following. Therapy eval pending. Psyche following. Patient may discharge to [facility]? CM followed up with [facility] about outpatient HD chair set up. CM spoke with Kaylen with [facility] admission. Referral pending insurance approval. CM following. Transport TBD.      [facility] ([facility])   Kaylen: Admissions liaison  Phone: [phone] and fax [phone]     RUR: 33% High  Prior Level of Functioning: Independent  Disposition: Home  Follow up appointments: Per attending's recommendations  DME needed: N/A   Transportation at discharge: Family  IM/IMM Medicare/ letter given: Yes: 5/2/25   Is patient a  and connected with VA? No  If yes, was  transfer form completed and VA notified?   Caregiver Contact: Sister: [phone]  Discharge Caregiver contacted prior to discharge? Y  Care Conference needed? No  Barriers to discharge: medical stability     Megan Lambert RN/CRM  [phone]

## 2025-05-05 NOTE — BSMART NOTE
BSMART Liaison Team Note     LOS:  8     Patient goal(s) for today: take medications as prescribed, make needs known in an appropriate manner, use coping skills  BSMART Liaison team focus/goals: assess needs, provide support and education    Progress note: Pt was resting in bed, alert, oriented x4, calm and cooperative. He denied depression, anxiety, SI, HI and AVH. He reported feeling tired and sleep more than he wants. He denied issues with appetite. He denied any MH questions or concerns, feels well and does not feel he needs [facility] admissions. Liaison agreed to ask RN to order psychiatric consult for clarification as psychiatry note from 5/2/25 stated \"Continue one-on-one sitter for now, and if patient continues to do well we will DC sitter on Monday.\" Pt appreciative and declined interested in brief therapy session.     Barriers to Discharge: medical & psychiatric    Outpatient provider(s):  none reported  Insurance info/prescription coverage:  MEDICARE PART A AND B      Diagnosis: MDD, recurrent, severe, without psychosis, Opioid use disorder, in remission, Cocaine abuse     Plan:.  Please refer to the most recent psychiatric consult note and medical team for recommendations and disposition.      Follow up Psych Consult placed? No - BHAKTI Salazar agreed to order consult  Psychiatrist updated? No        Participating treatment team members: Miriam Gary LCSW

## 2025-05-06 ENCOUNTER — HOSPITAL ENCOUNTER (INPATIENT)
Facility: HOSPITAL | Age: 60
Discharge: HOME | DRG: 885 | End: 2025-05-09
Attending: HOSPITALIST
Payer: MEDICARE

## 2025-05-06 VITALS
RESPIRATION RATE: 17 BRPM | OXYGEN SATURATION: 99 % | HEART RATE: 88 BPM | TEMPERATURE: 98.7 F | SYSTOLIC BLOOD PRESSURE: 158 MMHG | DIASTOLIC BLOOD PRESSURE: 77 MMHG

## 2025-05-06 LAB
ANION GAP SERPL CALC-SCNC: 6 MMOL/L (ref 2–12)
BUN SERPL-MCNC: 28 MG/DL (ref 6–20)
BUN/CREAT SERPL: 6 (ref 12–20)
CALCIUM SERPL-MCNC: 8.6 MG/DL (ref 8.5–10.1)
CHLORIDE SERPL-SCNC: 98 MMOL/L (ref 97–108)
CO2 SERPL-SCNC: 29 MMOL/L (ref 21–32)
CREAT SERPL-MCNC: 4.76 MG/DL (ref 0.7–1.3)
ERYTHROCYTE [DISTWIDTH] IN BLOOD BY AUTOMATED COUNT: 16.3 % (ref 11.5–14.5)
GLUCOSE BLD STRIP.AUTO-MCNC: 157 MG/DL (ref 65–117)
GLUCOSE BLD STRIP.AUTO-MCNC: 174 MG/DL (ref 65–117)
GLUCOSE BLD STRIP.AUTO-MCNC: 203 MG/DL (ref 65–117)
GLUCOSE BLD STRIP.AUTO-MCNC: 207 MG/DL (ref 65–117)
GLUCOSE SERPL-MCNC: 171 MG/DL (ref 65–100)
HCT VFR BLD AUTO: 25.6 % (ref 36.6–50.3)
HGB BLD-MCNC: 8.4 G/DL (ref 12.1–17)
INR PPP: 1.3 (ref 0.9–1.1)
MCH RBC QN AUTO: 30.9 PG (ref 26–34)
MCHC RBC AUTO-ENTMCNC: 32.8 G/DL (ref 30–36.5)
MCV RBC AUTO: 94.1 FL (ref 80–99)
NRBC # BLD: 0 K/UL (ref 0–0.01)
NRBC BLD-RTO: 0 PER 100 WBC
PLATELET # BLD AUTO: 140 K/UL (ref 150–400)
PMV BLD AUTO: 9.5 FL (ref 8.9–12.9)
POTASSIUM SERPL-SCNC: 3.8 MMOL/L (ref 3.5–5.1)
PROTHROMBIN TIME: 13.4 SEC (ref 9.2–11.2)
RBC # BLD AUTO: 2.72 M/UL (ref 4.1–5.7)
SERVICE CMNT-IMP: ABNORMAL
SODIUM SERPL-SCNC: 133 MMOL/L (ref 136–145)
WBC # BLD AUTO: 3.5 K/UL (ref 4.1–11.1)

## 2025-05-06 PROCEDURE — 6370000000 HC RX 637 (ALT 250 FOR IP)

## 2025-05-06 PROCEDURE — 94760 N-INVAS EAR/PLS OXIMETRY 1: CPT

## 2025-05-06 PROCEDURE — 6370000000 HC RX 637 (ALT 250 FOR IP): Performed by: HOSPITALIST

## 2025-05-06 PROCEDURE — 6370000000 HC RX 637 (ALT 250 FOR IP): Performed by: PSYCHIATRY & NEUROLOGY

## 2025-05-06 PROCEDURE — 2709999900 IR INSERT TUNNELED CV CATH WO SQ PORT/PUMP < 5YRS

## 2025-05-06 PROCEDURE — 6360000002 HC RX W HCPCS

## 2025-05-06 PROCEDURE — 6370000000 HC RX 637 (ALT 250 FOR IP): Performed by: NURSE PRACTITIONER

## 2025-05-06 PROCEDURE — 2500000003 HC RX 250 WO HCPCS: Performed by: NURSE PRACTITIONER

## 2025-05-06 PROCEDURE — 82962 GLUCOSE BLOOD TEST: CPT

## 2025-05-06 PROCEDURE — 6370000000 HC RX 637 (ALT 250 FOR IP): Performed by: INTERNAL MEDICINE

## 2025-05-06 PROCEDURE — 51798 US URINE CAPACITY MEASURE: CPT

## 2025-05-06 PROCEDURE — 2700000000 HC OXYGEN THERAPY PER DAY

## 2025-05-06 PROCEDURE — 85610 PROTHROMBIN TIME: CPT

## 2025-05-06 PROCEDURE — 80048 BASIC METABOLIC PNL TOTAL CA: CPT

## 2025-05-06 PROCEDURE — 85027 COMPLETE CBC AUTOMATED: CPT

## 2025-05-06 PROCEDURE — 6360000002 HC RX W HCPCS: Performed by: HOSPITALIST

## 2025-05-06 PROCEDURE — 2060000000 HC ICU INTERMEDIATE R&B

## 2025-05-06 PROCEDURE — 6370000000 HC RX 637 (ALT 250 FOR IP): Performed by: STUDENT IN AN ORGANIZED HEALTH CARE EDUCATION/TRAINING PROGRAM

## 2025-05-06 PROCEDURE — 0JH63XZ INSERTION OF TUNNELED VASCULAR ACCESS DEVICE INTO CHEST SUBCUTANEOUS TISSUE AND FASCIA, PERCUTANEOUS APPROACH: ICD-10-PCS | Performed by: INTERNAL MEDICINE

## 2025-05-06 PROCEDURE — 02H633Z INSERTION OF INFUSION DEVICE INTO RIGHT ATRIUM, PERCUTANEOUS APPROACH: ICD-10-PCS | Performed by: INTERNAL MEDICINE

## 2025-05-06 PROCEDURE — 2500000003 HC RX 250 WO HCPCS

## 2025-05-06 PROCEDURE — 6360000002 HC RX W HCPCS: Performed by: PSYCHIATRY & NEUROLOGY

## 2025-05-06 RX ORDER — ONDANSETRON 2 MG/ML
INJECTION INTRAMUSCULAR; INTRAVENOUS PRN
Status: COMPLETED | OUTPATIENT
Start: 2025-05-06 | End: 2025-05-06

## 2025-05-06 RX ORDER — FENTANYL CITRATE 50 UG/ML
INJECTION, SOLUTION INTRAMUSCULAR; INTRAVENOUS PRN
Status: COMPLETED | OUTPATIENT
Start: 2025-05-06 | End: 2025-05-06

## 2025-05-06 RX ORDER — DULOXETIN HYDROCHLORIDE 30 MG/1
30 CAPSULE, DELAYED RELEASE ORAL DAILY
Status: DISCONTINUED | OUTPATIENT
Start: 2025-05-06 | End: 2025-05-12 | Stop reason: HOSPADM

## 2025-05-06 RX ORDER — HEPARIN SODIUM 1000 [USP'U]/ML
INJECTION, SOLUTION INTRAVENOUS; SUBCUTANEOUS PRN
Status: COMPLETED | OUTPATIENT
Start: 2025-05-06 | End: 2025-05-06

## 2025-05-06 RX ORDER — WARFARIN SODIUM 5 MG/1
7.5 TABLET ORAL
Status: COMPLETED | OUTPATIENT
Start: 2025-05-06 | End: 2025-05-06

## 2025-05-06 RX ORDER — LIDOCAINE HYDROCHLORIDE AND EPINEPHRINE BITARTRATE 20; .01 MG/ML; MG/ML
INJECTION, SOLUTION SUBCUTANEOUS PRN
Status: COMPLETED | OUTPATIENT
Start: 2025-05-06 | End: 2025-05-06

## 2025-05-06 RX ADMIN — SODIUM CHLORIDE, PRESERVATIVE FREE 10 ML: 5 INJECTION INTRAVENOUS at 22:23

## 2025-05-06 RX ADMIN — HEPARIN SODIUM 3600 UNITS: 1000 INJECTION, SOLUTION INTRAVENOUS; SUBCUTANEOUS at 14:20

## 2025-05-06 RX ADMIN — DULOXETINE HYDROCHLORIDE 30 MG: 30 CAPSULE, DELAYED RELEASE ORAL at 12:00

## 2025-05-06 RX ADMIN — FENTANYL CITRATE 50 MCG: 50 INJECTION INTRAMUSCULAR; INTRAVENOUS at 14:05

## 2025-05-06 RX ADMIN — LIDOCAINE HYDROCHLORIDE AND EPINEPHRINE 10 ML: 20; 10 INJECTION, SOLUTION INFILTRATION; PERINEURAL at 14:20

## 2025-05-06 RX ADMIN — ONDANSETRON 4 MG: 2 INJECTION INTRAMUSCULAR; INTRAVENOUS at 14:05

## 2025-05-06 RX ADMIN — GABAPENTIN 300 MG: 300 CAPSULE ORAL at 22:17

## 2025-05-06 RX ADMIN — FENTANYL CITRATE 50 MCG: 50 INJECTION INTRAMUSCULAR; INTRAVENOUS at 14:12

## 2025-05-06 RX ADMIN — WARFARIN SODIUM 7.5 MG: 5 TABLET ORAL at 17:37

## 2025-05-06 RX ADMIN — QUETIAPINE FUMARATE 100 MG: 100 TABLET ORAL at 22:17

## 2025-05-06 RX ADMIN — PANTOPRAZOLE SODIUM 40 MG: 40 TABLET, DELAYED RELEASE ORAL at 06:22

## 2025-05-06 RX ADMIN — HEPARIN SODIUM 5000 UNITS: 5000 INJECTION INTRAVENOUS; SUBCUTANEOUS at 00:23

## 2025-05-06 RX ADMIN — BUPRENORPHINE HYDROCHLORIDE AND NALOXONE HYDROCHLORIDE DIHYDRATE 1 TABLET: 2; .5 TABLET SUBLINGUAL at 12:00

## 2025-05-06 RX ADMIN — INSULIN LISPRO 1 UNITS: 100 INJECTION, SOLUTION INTRAVENOUS; SUBCUTANEOUS at 22:23

## 2025-05-06 RX ADMIN — DILTIAZEM HYDROCHLORIDE 120 MG: 120 CAPSULE, COATED, EXTENDED RELEASE ORAL at 12:00

## 2025-05-06 RX ADMIN — SODIUM CHLORIDE, PRESERVATIVE FREE 10 ML: 5 INJECTION INTRAVENOUS at 12:01

## 2025-05-06 RX ADMIN — WATER 2000 MG: 1 INJECTION INTRAMUSCULAR; INTRAVENOUS; SUBCUTANEOUS at 13:50

## 2025-05-06 RX ADMIN — INSULIN LISPRO 1 UNITS: 100 INJECTION, SOLUTION INTRAVENOUS; SUBCUTANEOUS at 17:36

## 2025-05-06 RX ADMIN — BUPRENORPHINE HYDROCHLORIDE AND NALOXONE HYDROCHLORIDE DIHYDRATE 1 TABLET: 2; .5 TABLET SUBLINGUAL at 22:17

## 2025-05-06 ASSESSMENT — PAIN - FUNCTIONAL ASSESSMENT: PAIN_FUNCTIONAL_ASSESSMENT: NONE - DENIES PAIN

## 2025-05-06 NOTE — PROGRESS NOTES
TRANSFER - OUT REPORT:    Verbal report given to IMCU RN on Keaton Van  being transferred to [facility] for routine progression of patient care       Report consisted of patient's Situation, Background, Assessment and   Recommendations(SBAR).     Information from the following report(s) Nurse Handoff Report was reviewed with the receiving nurse.           Lines:   Peripheral IV 04/27/25 Posterior;Right Hand (Active)   Site Assessment Clean, dry & intact 05/06/25 0715   Line Status Capped;Normal saline locked 05/06/25 0715   Line Care Connections checked and tightened 05/06/25 0715   Phlebitis Assessment No symptoms 05/06/25 0715   Infiltration Assessment 0 05/06/25 0715   Alcohol Cap Used Yes 05/06/25 0715   Dressing Status Clean, dry & intact 05/06/25 0715   Dressing Type Transparent 05/06/25 0715   Dressing Intervention New 05/03/25 1600       Peripheral IV 05/06/25 Right Forearm (Active)       Peripheral IV 05/06/25 Proximal;Right Forearm (Active)   Site Assessment Clean, dry & intact 05/06/25 1401   Line Status Normal saline locked 05/06/25 1401   Phlebitis Assessment No symptoms 05/06/25 1401   Infiltration Assessment 0 05/06/25 1401   Alcohol Cap Used Yes 05/06/25 1401   Dressing Status Clean, dry & intact 05/06/25 1401   Dressing Type Transparent 05/06/25 1401       Hemodialysis Central Access - Permanent/Tunneled Right Neck (Active)   $Tunneled Hemodialysis Catheter $Yes 05/06/25 1421   Continued need for line? Yes 05/06/25 1421   Site Assessment Clean, dry & intact 05/06/25 1421   Blue Lumen Status Brisk blood return;Heparin locked;Alcohol cap applied 05/06/25 1421   Red Lumen Status Alcohol cap applied;Heparin locked;Brisk blood return 05/06/25 1421   Dressing Status New dressing applied;Clean, dry & intact 05/06/25 1421        Opportunity for questions and clarification was provided.      Patient transported with:  O2 @ 2lpm  RN      fair minus
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
## 2025-05-06 NOTE — CARE COORDINATION
Transition Of Care:    Update 11:41am: CM received update from [facility] Kaylen oliver that Dr. Lacho Price (nephrologist) has accepted the patient at the [facility] for noted schedule.    10:06am: Cardiology, Nephrology, Psyche, PT/OT following. Therapy eval pending. Psyche following. Patient may discharge to [facility]? Outpatient HD chair accepted by [facility] per liaizabela Abdullahi. Clinic awaiting Dr. Lacho Price (clinic nephrologist director) to accept patient. The patient accepts the chair option for 12:00 noon MWF. His sister Anuradha [facility]      [facility] ([facility])   Kaylen: Admissions liaison  Phone: [phone] and fax [phone]   MWF at 12:00 schedule  [address] [facility] [address] [facility] [address]  Phone: [phone]  Fax: [phone]     RUR: 32% High  Prior Level of Functioning: Independent  Disposition: Home  Follow up appointments: Per attending's recommendations  DME needed: N/A   Transportation at discharge: Family  IM/IMM Medicare/ letter given: Yes: 5/2/25   Is patient a  and connected with VA? No  If yes, was  transfer form completed and VA notified?   Caregiver Contact: Sister: [phone]  Discharge Caregiver contacted prior to discharge? Y  Care Conference needed? No  Barriers to discharge: medical stability     Megan Lambert RN/CRM  [phone]

## 2025-05-06 NOTE — PROGRESS NOTES
Pt arrives via stretcher to angio department accompanied by transport for permcath procedure. All assessments completed and consent was reviewed.  Education given was regarding procedure, local anesthetic, post-procedure care and  management/follow-up. Opportunity for questions was provided and all questions and concerns were addressed.

## 2025-05-07 ENCOUNTER — APPOINTMENT (OUTPATIENT)
Facility: HOSPITAL | Age: 60
DRG: 885 | End: 2025-05-07
Attending: HOSPITALIST
Payer: MEDICARE

## 2025-05-07 LAB
ALBUMIN SERPL-MCNC: 3 G/DL (ref 3.5–5)
ALBUMIN/GLOB SERPL: 0.9 (ref 1.1–2.2)
ALP SERPL-CCNC: 107 U/L (ref 45–117)
ALT SERPL-CCNC: 17 U/L (ref 12–78)
AMMONIA PLAS-SCNC: 10 UMOL/L
ANION GAP SERPL CALC-SCNC: 9 MMOL/L (ref 2–12)
ARTERIAL PATENCY WRIST A: POSITIVE
ARTERIAL PATENCY WRIST A: POSITIVE
AST SERPL-CCNC: 14 U/L (ref 15–37)
BASE EXCESS BLD CALC-SCNC: 0.2 MMOL/L
BASE EXCESS BLD CALC-SCNC: 1.2 MMOL/L
BASOPHILS # BLD: 0.02 K/UL (ref 0–0.1)
BASOPHILS NFR BLD: 0.5 % (ref 0–1)
BDY SITE: ABNORMAL
BDY SITE: ABNORMAL
BILIRUB SERPL-MCNC: 0.4 MG/DL (ref 0.2–1)
BUN SERPL-MCNC: 37 MG/DL (ref 6–20)
BUN/CREAT SERPL: 6 (ref 12–20)
CALCIUM SERPL-MCNC: 8.8 MG/DL (ref 8.5–10.1)
CHLORIDE SERPL-SCNC: 99 MMOL/L (ref 97–108)
CO2 SERPL-SCNC: 25 MMOL/L (ref 21–32)
CREAT SERPL-MCNC: 5.87 MG/DL (ref 0.7–1.3)
DIFFERENTIAL METHOD BLD: ABNORMAL
EOSINOPHIL # BLD: 0.15 K/UL (ref 0–0.4)
EOSINOPHIL NFR BLD: 4 % (ref 0–7)
ERYTHROCYTE [DISTWIDTH] IN BLOOD BY AUTOMATED COUNT: 16.4 % (ref 11.5–14.5)
GAS FLOW.O2 O2 DELIVERY SYS: ABNORMAL
GAS FLOW.O2 O2 DELIVERY SYS: ABNORMAL
GLOBULIN SER CALC-MCNC: 3.4 G/DL (ref 2–4)
GLUCOSE BLD STRIP.AUTO-MCNC: 102 MG/DL (ref 65–117)
GLUCOSE BLD STRIP.AUTO-MCNC: 109 MG/DL (ref 65–117)
GLUCOSE BLD STRIP.AUTO-MCNC: 118 MG/DL (ref 65–117)
GLUCOSE BLD STRIP.AUTO-MCNC: 190 MG/DL (ref 65–117)
GLUCOSE SERPL-MCNC: 114 MG/DL (ref 65–100)
HCO3 BLD-SCNC: 25.1 MMOL/L (ref 21–28)
HCO3 BLD-SCNC: 29.5 MMOL/L (ref 21–28)
HCT VFR BLD AUTO: 25.9 % (ref 36.6–50.3)
HGB BLD-MCNC: 8.5 G/DL (ref 12.1–17)
IMM GRANULOCYTES # BLD AUTO: 0.02 K/UL (ref 0–0.04)
IMM GRANULOCYTES NFR BLD AUTO: 0.5 % (ref 0–0.5)
INR PPP: 1.9 (ref 0.9–1.1)
IPAP/PIP/HIGH PEEP: 18
LACTATE SERPL-SCNC: 0.7 MMOL/L (ref 0.4–2)
LYMPHOCYTES # BLD: 0.84 K/UL (ref 0.8–3.5)
LYMPHOCYTES NFR BLD: 22.5 % (ref 12–49)
MAGNESIUM SERPL-MCNC: 2.3 MG/DL (ref 1.6–2.4)
MCH RBC QN AUTO: 30.6 PG (ref 26–34)
MCHC RBC AUTO-ENTMCNC: 32.8 G/DL (ref 30–36.5)
MCV RBC AUTO: 93.2 FL (ref 80–99)
MONOCYTES # BLD: 0.51 K/UL (ref 0–1)
MONOCYTES NFR BLD: 13.7 % (ref 5–13)
NEUTS SEG # BLD: 2.19 K/UL (ref 1.8–8)
NEUTS SEG NFR BLD: 58.8 % (ref 32–75)
NRBC # BLD: 0 K/UL (ref 0–0.01)
NRBC BLD-RTO: 0 PER 100 WBC
O2/TOTAL GAS SETTING VFR VENT: 10 %
O2/TOTAL GAS SETTING VFR VENT: 40 %
PCO2 BLD: 41.3 MMHG (ref 35–48)
PCO2 BLD: 69.8 MMHG (ref 35–48)
PEEP RESPIRATORY: 6 CMH2O
PH BLD: 7.23 (ref 7.35–7.45)
PH BLD: 7.39 (ref 7.35–7.45)
PHOSPHATE SERPL-MCNC: 7.2 MG/DL (ref 2.6–4.7)
PLATELET # BLD AUTO: 131 K/UL (ref 150–400)
PMV BLD AUTO: 9.4 FL (ref 8.9–12.9)
PO2 BLD: 104 MMHG (ref 83–108)
PO2 BLD: 89 MMHG (ref 83–108)
POTASSIUM SERPL-SCNC: 4.3 MMOL/L (ref 3.5–5.1)
PRESSURE SUPPORT SETTING VENT: 12 CMH2O
PROT SERPL-MCNC: 6.4 G/DL (ref 6.4–8.2)
PROTHROMBIN TIME: 19.7 SEC (ref 9.2–11.2)
RBC # BLD AUTO: 2.78 M/UL (ref 4.1–5.7)
SAO2 % BLD: 94.4 % (ref 92–97)
SAO2 % BLD: 98 % (ref 92–97)
SERVICE CMNT-IMP: ABNORMAL
SERVICE CMNT-IMP: NORMAL
SERVICE CMNT-IMP: NORMAL
SODIUM SERPL-SCNC: 133 MMOL/L (ref 136–145)
SPECIMEN TYPE: ABNORMAL
SPECIMEN TYPE: ABNORMAL
VENTILATION MODE VENT: ABNORMAL
WBC # BLD AUTO: 3.7 K/UL (ref 4.1–11.1)

## 2025-05-07 PROCEDURE — 6370000000 HC RX 637 (ALT 250 FOR IP): Performed by: INTERNAL MEDICINE

## 2025-05-07 PROCEDURE — 36600 WITHDRAWAL OF ARTERIAL BLOOD: CPT

## 2025-05-07 PROCEDURE — 6360000002 HC RX W HCPCS: Performed by: HOSPITALIST

## 2025-05-07 PROCEDURE — 83605 ASSAY OF LACTIC ACID: CPT

## 2025-05-07 PROCEDURE — 6370000000 HC RX 637 (ALT 250 FOR IP): Performed by: HOSPITALIST

## 2025-05-07 PROCEDURE — 83735 ASSAY OF MAGNESIUM: CPT

## 2025-05-07 PROCEDURE — 6360000002 HC RX W HCPCS: Performed by: PSYCHIATRY & NEUROLOGY

## 2025-05-07 PROCEDURE — 94660 CPAP INITIATION&MGMT: CPT

## 2025-05-07 PROCEDURE — 90935 HEMODIALYSIS ONE EVALUATION: CPT

## 2025-05-07 PROCEDURE — 2700000000 HC OXYGEN THERAPY PER DAY

## 2025-05-07 PROCEDURE — 2060000000 HC ICU INTERMEDIATE R&B

## 2025-05-07 PROCEDURE — 6370000000 HC RX 637 (ALT 250 FOR IP): Performed by: PSYCHIATRY & NEUROLOGY

## 2025-05-07 PROCEDURE — 6370000000 HC RX 637 (ALT 250 FOR IP): Performed by: STUDENT IN AN ORGANIZED HEALTH CARE EDUCATION/TRAINING PROGRAM

## 2025-05-07 PROCEDURE — 70450 CT HEAD/BRAIN W/O DYE: CPT

## 2025-05-07 PROCEDURE — 82962 GLUCOSE BLOOD TEST: CPT

## 2025-05-07 PROCEDURE — 2500000003 HC RX 250 WO HCPCS: Performed by: NURSE PRACTITIONER

## 2025-05-07 PROCEDURE — 80053 COMPREHEN METABOLIC PANEL: CPT

## 2025-05-07 PROCEDURE — 85610 PROTHROMBIN TIME: CPT

## 2025-05-07 PROCEDURE — 85025 COMPLETE CBC W/AUTO DIFF WBC: CPT

## 2025-05-07 PROCEDURE — 71045 X-RAY EXAM CHEST 1 VIEW: CPT

## 2025-05-07 PROCEDURE — 51798 US URINE CAPACITY MEASURE: CPT

## 2025-05-07 PROCEDURE — 82140 ASSAY OF AMMONIA: CPT

## 2025-05-07 PROCEDURE — 94760 N-INVAS EAR/PLS OXIMETRY 1: CPT

## 2025-05-07 PROCEDURE — 82803 BLOOD GASES ANY COMBINATION: CPT

## 2025-05-07 PROCEDURE — 6370000000 HC RX 637 (ALT 250 FOR IP): Performed by: NURSE PRACTITIONER

## 2025-05-07 PROCEDURE — 84100 ASSAY OF PHOSPHORUS: CPT

## 2025-05-07 PROCEDURE — 6360000002 HC RX W HCPCS: Performed by: INTERNAL MEDICINE

## 2025-05-07 RX ADMIN — INSULIN LISPRO 1 UNITS: 100 INJECTION, SOLUTION INTRAVENOUS; SUBCUTANEOUS at 21:01

## 2025-05-07 RX ADMIN — HEPARIN SODIUM 5000 UNITS: 5000 INJECTION INTRAVENOUS; SUBCUTANEOUS at 00:09

## 2025-05-07 RX ADMIN — HEPARIN SODIUM 5000 UNITS: 5000 INJECTION INTRAVENOUS; SUBCUTANEOUS at 19:18

## 2025-05-07 RX ADMIN — BUPRENORPHINE HYDROCHLORIDE AND NALOXONE HYDROCHLORIDE DIHYDRATE 1 TABLET: 2; .5 TABLET SUBLINGUAL at 08:52

## 2025-05-07 RX ADMIN — BUPRENORPHINE HYDROCHLORIDE AND NALOXONE HYDROCHLORIDE DIHYDRATE 1 TABLET: 2; .5 TABLET SUBLINGUAL at 21:00

## 2025-05-07 RX ADMIN — EPOETIN ALFA 10000 UNITS: 10000 SOLUTION INTRAVENOUS; SUBCUTANEOUS at 19:18

## 2025-05-07 RX ADMIN — SODIUM CHLORIDE, PRESERVATIVE FREE 10 ML: 5 INJECTION INTRAVENOUS at 21:03

## 2025-05-07 RX ADMIN — GABAPENTIN 300 MG: 300 CAPSULE ORAL at 21:00

## 2025-05-07 RX ADMIN — HEPARIN SODIUM 5000 UNITS: 5000 INJECTION INTRAVENOUS; SUBCUTANEOUS at 08:52

## 2025-05-07 RX ADMIN — DULOXETINE HYDROCHLORIDE 30 MG: 30 CAPSULE, DELAYED RELEASE ORAL at 08:52

## 2025-05-07 RX ADMIN — WARFARIN SODIUM 6 MG: 5 TABLET ORAL at 19:18

## 2025-05-07 RX ADMIN — PANTOPRAZOLE SODIUM 40 MG: 40 TABLET, DELAYED RELEASE ORAL at 08:52

## 2025-05-07 RX ADMIN — QUETIAPINE FUMARATE 100 MG: 100 TABLET ORAL at 21:00

## 2025-05-07 ASSESSMENT — PAIN SCALES - GENERAL: PAINLEVEL_OUTOF10: 0

## 2025-05-07 ASSESSMENT — PAIN SCALES - WONG BAKER: WONGBAKER_NUMERICALRESPONSE: NO HURT

## 2025-05-07 NOTE — CONSULTS
CRITICAL CARE ADMISSION NOTE      Name: Keaton Van   : 1965   MRN: [id number]   Date: 2025      Reason for ICU Admission: acute respiratory failure     ASSESSMENT and PLAN   Acute hypercapnic respiratory failure: oversedation post IR procedure   -much better with bipap.    -can dc all NIV  -ok for floor   -no history of VANESSA or chronic hypercapnia  -discussed with Dr Barraza    ESRD on HD:  -cont HD     Afib:   -started on warfarin   -DOAC would be strongly preferred due to decreased bleeding risk and safety profile    Ok for downgrade. Discussed with Dr Barraza.        HISTORY OF PRESENT ILLNESS:     Mr Van is a 59 y.o. male admitted to ICU overnight withconcern for bipap intolerance.   He is looking great today and volume on bipap are adequate.       PMHx bipolar, depression, ESRD, HTN, BKA, DM, diabetic retinopathy and neuropathy who was admitted to the behavioral health unit after a suicide attempt .  He reportedly had stopped his dialysis and had previously been on hospice but that was reportedly revoked.  He was seen  by the hospitalist team and was reportedly getting good pain control and his Suboxone but at that point he was continuing to refuse dialysis.  His port has been removed as his last dialysis was in January of this year.     A rapid response was called  to the behavioral health unit as the patient was reportedly was unresponsive.  Psychiatry had spoken to the patient who reportedly told him that now he wants dialysis so renal was consulted.  Labs were drawn and he was found to be hyponatremic at 128 and hyperkalemic at 7.1.  He reportedly told the psychiatrist that he no longer wanted to be a DNR.  When he did awaken he was asked if he wanted to do dialysis and he said \"no\" however psychiatry is stating that he is delirious so patient was transferred to medical unit for continuation of care and his DNR status was changed to full code. He was transferred to ICU after 
    [address], [place] [address]  Phone: [phone]   [phone]       NEPHROLOGY CONSULT NOTE     Patient: Keaton Van MRN: [id number]  PCP: Robert Wells PA-C   :     1965  Age:   59 y.o.  Sex:  male      Referring physician: Krista Vega MD  Reason for consultation: 59 y.o. male with No admission diagnoses are documented for this encounter. complicated by KIN   Admission Date: 2025  1:00 PM  LOS: 0 days      ASSESSMENT and PLAN :   1 ESRD   - on HD before and stopped during last admission at [facility] in 2025  - transferred here for Psychiatry management   - pt now requesting to be started back on HD   - seen and examined in room and is AAA X 3   - pt signed consent for HD as well as Mickey cath placement   - nurse as well as Dr Davis  ( Psychiatrist)  in room   - HD orders placed and [facility] notified   - IR consulted. ( Nurse has my number in case IR wants to reach me)   - was dialyzing at [facility] before     2 Hyperkalemia  - rx with Insulin, Sodium bicarb and calcium gluconate   - will correct with HD     3 HTN  - continue home meds       4 ACD  - will start epogen       5 H/o RT BKA     6 DM    7 CHF     8 Depression and Suicide attempt   - managed by psychiatry team         Care Plan discussed with:  pt  and bed side nurse      Thank you for consulting [facility] in the care of your patient.      Subjective:   HPI: Keaton Van is a 59 y.o.  male who has been admitted to the hospital for management off depression and suicide attempt. . He was at [facility] and there he decided to stop RRT and his permacath was removed. He was transferred her management off unspecified mood disorder as well as suicide attempt.   He had mentioned to the psychiatrist that he has changed his mind and wants to DO HD. SO Shankar was consulted and livan this consultation is done.     Pt seen and examined. Psychiatrist as well 
Completed   
PSYCHIATRIC PROGRESS NOTE    Chief Complaint: \"I have tunde.\"    Length of Stay: 2 Days    Interval History:    04/29/2025  Mr. Van was resting with eyes closed initially when I entered his room today with sitter at bedside. He was easily aroused by my calling his name. Reports, \" I stayed awake all night talking with my sitter, I am paying for it now feeling a little groggy\".  Otherwise, reports being in good spirits and feels that medications and treatment continue to help and he is so thankful to have this chance at feeling well for longer periods.   Denies any SI, HI, AVH, paranoia or other bothersome psychiatric symptoms of concern today.  His RN confirms this has been his disposition throughout the day, he continues to feel better and engaged with his treatment.      04/28/2025  Mr. Van was intubated when I went to assess him today, currently sedated on precedex and propofol drip. I spoke to RN and also his sisters whom were both at bedside. RN reports he has been doing well today and received dialysis earlier this morning. Currently attempting breathing trial. RN denies any symptoms currently that are unmanaged or appear bothersome needing any interventions/modifications at this current time.   Was unable to interact with Mr Van but introduced self to sisters and informed them lead Psychiatrist, Dr. Davis, has been working with Keaton and medical team to ensure collaborative care in the manner defined by Mr Van's wishes and that we will be rounding daily as part of care team.       Psychiatric services will follow up tomorrow and daily.    4/27/2025: Dr. Davis note from case communication yesterday  Patient stated that he no longer wanted to be DNR and that he wants to be FULL CODE as he wants to pursue treatment for depression and his ESRD with hemodialysis.     This was witnessed by Dr. Lopez from nephrology as well as his nurse, Lucia Limon.     I have discussed this with the hospitalist team 
PSYCHIATRIC PROGRESS NOTE    Chief Complaint: \"I have tunde.\"    Length of Stay: 3 Days    Interval History:    04/30/2025  Nursing report no acute events overnight.  Patient remains on 1 on 1 oversight for suicidality.  He is on O2 by nasal cannula and has a Carbone.  Upon my evaluation patient was awake, alert and oriented to person, place, date, but not situation.  He maintained good eye contact throughout and said that he vaguely remembered coming down to the medical floor.  He does remember being admitted to inpatient psychiatry after a suicide attempt and having the conversations regarding starting treatment for depression as well as dialysis.  He expressed his gratitude for all staff taking care of him, and reiterated his renewed motivation for treatment and getting better.    He appeared somewhat tremulous.  He denied having any thoughts to harm himself or end his life.  He denied any thoughts to harm anyone.  He denied experiencing hallucinations of any type.  I discussed with him having to remove the sitter, and he expressed agreement with that plan.  I discussed this with nursing staff as well.      04/29/2025  Mr. Van was resting with eyes closed initially when I entered his room today with sitter at bedside. He was easily aroused by my calling his name. Reports, \" I stayed awake all night talking with my sitter, I am paying for it now feeling a little groggy\".  Otherwise, reports being in good spirits and feels that medications and treatment continue to help and he is so thankful to have this chance at feeling well for longer periods.   Denies any SI, HI, AVH, paranoia or other bothersome psychiatric symptoms of concern today.  His RN confirms this has been his disposition throughout the day, he continues to feel better and engaged with his treatment.      04/28/2025  Mr. Van was intubated when I went to assess him today, currently sedated on precedex and propofol drip. I spoke to RN and also his 
PSYCHIATRIC PROGRESS NOTE    Chief Complaint: \"I have tunde.\"    Length of Stay: 4 Days    Interval History:  05/01/2025  Patient tells me that he is having a challenging time sleeping soundly at night.    He reports his moods are stabilizing and that he is slowly, but surely is feeling more like his usual self.  Upon my evaluation patient was awake, alert and oriented x 4.  He recalled his initial evaluation and working with the treatment team on the seventh floor prior to his admission to the fourth floor.  He says that he is experiencing imagery that is illogical to him, describing seeing figures at the edge of his bed thinking that his catheter was on the floor with urine dripping out of it.  He is experiencing this as he feels he is falling into sleep or just awakening from sleep during the day.  He denies experiencing hallucinations of any type in the past.    In the past we discussed augmentation with an antipsychotic for treatment of his depressive symptoms. He is agreeable to having seroquel low dose to help with depressive and anxiety symptoms, and to help with such experiences. I discussed with him that the likely explanation is delirium from many medical co-morbidities in the context of poor night-time sleep,    04/30/2025  Nursing report no acute events overnight.  Patient remains on 1 on 1 oversight for suicidality.  He is on O2 by nasal cannula and has a Carbone.  Upon my evaluation patient was awake, alert and oriented to person, place, date, but not situation.  He maintained good eye contact throughout and said that he vaguely remembered coming down to the medical floor.  He does remember being admitted to inpatient psychiatry after a suicide attempt and having the conversations regarding starting treatment for depression as well as dialysis.  He expressed his gratitude for all staff taking care of him, and reiterated his renewed motivation for treatment and getting better.    He appeared somewhat 
PSYCHIATRIC PROGRESS NOTE    Chief Complaint: \"I have tunde.\"    Length of Stay: 5 Days    Interval History:  05/02/2025  Nursing report patient was upset yesterday about limit-setting with use of phone and admitted for one-on-one sitter.  Otherwise he slept well having taking the Seroquel last night.  Today he tolerated dialysis well.  Upon my evaluation he was awake, alert and oriented x 4.  He had his 2 sisters by his side.  The expressed appreciation for the staff and the treatment team taking care of Keaton.  He reports that his mood is hopeful and that he is blessed to have his sister is more supportive of him.  He denies experiencing hallucinations of any type last night or throughout the day today.  He does feel tired and sleepy.    05/01/2025  Patient tells me that he is having a challenging time sleeping soundly at night.    He reports his moods are stabilizing and that he is slowly, but surely is feeling more like his usual self.  Upon my evaluation patient was awake, alert and oriented x 4.  He recalled his initial evaluation and working with the treatment team on the seventh floor prior to his admission to the fourth floor.  He says that he is experiencing imagery that is illogical to him, describing seeing figures at the edge of his bed thinking that his catheter was on the floor with urine dripping out of it.  He is experiencing this as he feels he is falling into sleep or just awakening from sleep during the day.  He denies experiencing hallucinations of any type in the past.    In the past we discussed augmentation with an antipsychotic for treatment of his depressive symptoms. He is agreeable to having seroquel low dose to help with depressive and anxiety symptoms, and to help with such experiences. I discussed with him that the likely explanation is delirium from many medical co-morbidities in the context of poor night-time sleep,    04/30/2025  Nursing report no acute events overnight.  Patient 
PSYCHIATRIC PROGRESS NOTE    Chief Complaint: \"I have tunde.\"    Length of Stay: 7 Days    Interval History:    5/4/24 - Keaton reports no significant change in mood. He complained of some difficulty sleeping last night but this admit sleeping excessively during the day. He denies side effects to medications. He denies any suicidal or homicidal thoughts. He reports no episodes of hallucinations in the interim.    5/3/25 - Pt seen by the weekend provider. No significant changes in the interim. He denies suicidal or homicidal thoughts. He denies side effects of medications. He reports a brief period of visual hallucinations last night otherwise denied auditory hallucinations. Overall he reports some improvement in mood past several days.    05/02/2025  Nursing report patient was upset yesterday about limit-setting with use of phone and admitted for one-on-one sitter.  Otherwise he slept well having taking the Seroquel last night.  Today he tolerated dialysis well.  Upon my evaluation he was awake, alert and oriented x 4.  He had his 2 sisters by his side.  The expressed appreciation for the staff and the treatment team taking care of Keaton.  He reports that his mood is hopeful and that he is blessed to have his sister is more supportive of him.  He denies experiencing hallucinations of any type last night or throughout the day today.  He does feel tired and sleepy.    05/01/2025  Patient tells me that he is having a challenging time sleeping soundly at night.    He reports his moods are stabilizing and that he is slowly, but surely is feeling more like his usual self.  Upon my evaluation patient was awake, alert and oriented x 4.  He recalled his initial evaluation and working with the treatment team on the seventh floor prior to his admission to the fourth floor.  He says that he is experiencing imagery that is illogical to him, describing seeing figures at the edge of his bed thinking that his catheter was on the floor 
PSYCHIATRIC PROGRESS NOTE    Chief Complaint: \"I have tunde.\"    Length of Stay: 8 Days    Interval History:    05/05/2024  Nursing report patient has been cooperative and appropriate throughout.  He has been accepting care recommendations as well as his medications.  Mr. Van says that his mood is improving, and he is truly in disbelief as to how well he is doing after being on dialysis and \"the right meds.\"  He denies having any suicidal or homicidal ideations, intents or plans.  He denies experiencing hallucinations of any type.    Nursing confirm that he is not said anything about death or dying; rather, he has been quite pleasant and polite.  Discussed with patient further increase of Seroquel to target depressive, anxiety, and insomnia symptoms.  He was agreeable.  I discussed with him that we would stop the sitter for now.  I informed nursing about this.    5/4/24 - Keaton reports no significant change in mood. He complained of some difficulty sleeping last night but this admit sleeping excessively during the day. He denies side effects to medications. He denies any suicidal or homicidal thoughts. He reports no episodes of hallucinations in the interim.    5/3/25 - Pt seen by the weekend provider. No significant changes in the interim. He denies suicidal or homicidal thoughts. He denies side effects of medications. He reports a brief period of visual hallucinations last night otherwise denied auditory hallucinations. Overall he reports some improvement in mood past several days.    05/02/2025  Nursing report patient was upset yesterday about limit-setting with use of phone and admitted for one-on-one sitter.  Otherwise he slept well having taking the Seroquel last night.  Today he tolerated dialysis well.  Upon my evaluation he was awake, alert and oriented x 4.  He had his 2 sisters by his side.  The expressed appreciation for the staff and the treatment team taking care of Keaton.  He reports that his mood is 
SubCUTAneous 3 times per day    [Held by provider] amLODIPine  10 mg Oral Daily    buprenorphine-naloxone  1 tablet SubLINGual BID    DULoxetine  30 mg Oral BID    gabapentin  300 mg Oral BID    hydrALAZINE  50 mg Oral 3 times per day    insulin lispro  0-4 Units SubCUTAneous 4x Daily AC & HS    sodium chloride flush  5-40 mL IntraVENous 2 times per day    nicotine  1 patch TransDERmal Daily    chlorhexidine  15 mL Mouth/Throat BID       Labs:  Lab Results   Component Value Date/Time    WBC 5.2 04/28/2025 03:13 AM    HGB 8.6 04/28/2025 03:13 AM    HCT 24.9 04/28/2025 03:13 AM     04/28/2025 03:13 AM    MCV 87.4 04/28/2025 03:13 AM      Lab Results   Component Value Date/Time     04/28/2025 03:13 AM    K 5.1 04/28/2025 03:13 AM     04/28/2025 03:13 AM    CO2 25 04/28/2025 03:13 AM    BUN 42 04/28/2025 03:13 AM    GFRAA >60 05/21/2022 04:07 PM    GLOB 3.7 04/28/2025 03:13 AM    ALT 18 04/28/2025 03:13 AM          Vitals:    04/28/25 1400   BP: (!) 99/58   Pulse: (!) 46   Resp: (!) 9   Temp:    SpO2:         Physical Exam:  Body habitus: Body mass index is 27.33 kg/m².  Musculoskeletal system: normal gait  Tremor - neg  Cog wheeling - neg    Mental Status Exam:  60yo WM has short hair. Has BKA on R Leg. He is dressed casually and appropriately. He is engaged  Speech: Spontaneous, has NL rate, volume and prosody.  Thought Process is Logical, linear, and goal directed.  Mood is reported as \"good.\"  Affect is congruent and euthymic  Denies si. Denies hi.  Homicidal thoughts, intents or plans. Denies access to fire-arms  Denies experiencing hallucinations of any type.  Perception is negative for paranoia or delusional thinking  Attention/Concentration are both intact  Recent/Remote memories are intact per answers to my evaluation questions.  Insight is poor. Judgment is poor  Cognition: Intact grossly.     Assessment and Plan:  Keaton Van meets criteria for a diagnosis of   MDD, recurrent, severe, 
20,000 Units SubCUTAneous Once per day on Monday Wednesday Friday    buprenorphine-naloxone  1 tablet SubLINGual BID    DULoxetine  30 mg Oral BID    gabapentin  300 mg Oral BID    hydrALAZINE  50 mg Oral 3 times per day    insulin lispro  0-4 Units SubCUTAneous 4x Daily AC & HS    sodium chloride flush  5-40 mL IntraVENous 2 times per day    nicotine  1 patch TransDERmal Daily    chlorhexidine  15 mL Mouth/Throat BID       Labs:  Lab Results   Component Value Date/Time    WBC 3.3 05/02/2025 06:31 AM    HGB 7.2 05/02/2025 06:31 AM    HCT 21.0 05/02/2025 06:31 AM     05/02/2025 06:31 AM    MCV 89.7 05/02/2025 06:31 AM      Lab Results   Component Value Date/Time     05/02/2025 06:31 AM    K 4.3 05/02/2025 06:31 AM     05/02/2025 06:31 AM    CO2 30 05/02/2025 06:31 AM    BUN 31 05/02/2025 06:31 AM    GFRAA >60 05/21/2022 04:07 PM    GLOB 3.2 05/02/2025 06:31 AM    ALT 14 05/02/2025 06:31 AM          Vitals:    05/03/25 1400   BP:    Pulse: 96   Resp:    Temp:    SpO2:       Physical Exam:  Body habitus: Body mass index is 26.52 kg/m².  Musculoskeletal system: normal gait  Tremor - neg  Cog wheeling - neg    Mental Status Exam:  60yo WM has short hair. Has BKA on R Leg. He is dressed casually and appropriately. He is engaged in my evaluation and maintains good eye contact throughout.  He appears somewhat tremulous.  He was awake, alert, and oriented to person, place, date, but not the details of the situation.  Speech: Spontaneous, has NL rate, volume and prosody.  Thought Process is Logical, linear, and goal directed.  Mood is reported as \"okay.\"  Affect is congruent and euthymic  Denies si. Denies hi.  No Homicidal thoughts, intents or plans. Denies access to fire-arms  Denies experiencing hallucinations of any type.  Perception is negative for paranoia or delusional thinking  Attention/Concentration are both intact  Recent/Remote memories are intact per answers to my evaluation questions.  Insight 
mg, 10 mg, Oral, Daily, Haim Durand MD, 10 mg at 04/30/25 0905    buprenorphine-naloxone (SUBOXONE) 2-0.5 MG SL tablet 1 tablet, 1 tablet, SubLINGual, BID, Aleksandr Davis MD, 1 tablet at 05/01/25 0915    diphenhydrAMINE (BENADRYL) injection 50 mg, 50 mg, IntraMUSCular, Q4H PRN, Aleksandr Davis MD    DULoxetine (CYMBALTA) extended release capsule 30 mg, 30 mg, Oral, BID, Aleksandr Davis MD, 30 mg at 05/01/25 0915    gabapentin (NEURONTIN) capsule 300 mg, 300 mg, Oral, BID, Aleksandr Davis MD, 300 mg at 05/01/25 0915    glucagon injection 1 mg, 1 mg, SubCUTAneous, PRN, Aleksandr Davis MD    haloperidol (HALDOL) tablet 5 mg, 5 mg, Oral, Q4H PRN **OR** haloperidol lactate (HALDOL) injection 5 mg, 5 mg, IntraMUSCular, Q4H PRN, Aleksandr Davis MD    hydrALAZINE (APRESOLINE) tablet 50 mg, 50 mg, Oral, 3 times per day, Haim Durand MD, 50 mg at 04/30/25 2009    hydrOXYzine HCl (ATARAX) tablet 50 mg, 50 mg, Oral, TID PRN, Aleksandr Davis MD, 50 mg at 04/29/25 1902    insulin lispro (HUMALOG,ADMELOG) injection vial 0-4 Units, 0-4 Units, SubCUTAneous, 4x Daily AC & HS, Aleksandr Davis MD, 1 Units at 04/30/25 2009    senna (SENOKOT) tablet 8.6 mg, 1 tablet, Oral, Daily PRN, Aleksandr Davis MD    traZODone (DESYREL) tablet 50 mg, 50 mg, Oral, Nightly PRN, Aleksandr Davis MD, 50 mg at 04/28/25 2208    sodium chloride flush 0.9 % injection 5-40 mL, 5-40 mL, IntraVENous, 2 times per day, FrankoSamara ernandez APRN - CNP, 10 mL at 05/01/25 0915    sodium chloride flush 0.9 % injection 5-40 mL, 5-40 mL, IntraVENous, PRN, Samara Abdul APRN - CNP    nicotine (NICODERM CQ) 21 MG/24HR 1 patch, 1 patch, TransDERmal, Daily, Samara Abdul APRN - CNP, 1 patch at 05/01/25 0915    polyethylene glycol (GLYCOLAX) packet 17 g, 17 g, Oral, Daily PRN, Samara Abdul APRN - CNP    midazolam PF (VERSED) injection 4 mg, 4 mg, IntraVENous, Q4H PRN, Haim Durand MD    chlorhexidine (PERIDEX) 0.12 % solution 15 mL,

## 2025-05-07 NOTE — SIGNIFICANT EVENT
Rapid Response  Rapid response room 404   called overhead at 0335. RRT responding.    Rapid response called for AMS and desaturation    Upon arrival patient was showing a saturation of 83% on 5L NC with an increase in stimulation needed for arousal. With hard sternal rubbing patient did orient, while remaining extremely lethargic. Patient was able to answer orientation questions. O2 was turned up to 10L NC with saturation increasing to 94-97%. ABG was conducted and labwork was drawn. ABG results showed the following:    pH: 7.234  CO2: 6.98  O2: 88.5  HCO3: 29.5    Patient is now being placed on bipap, and an ABG will be done in approximately 1 hour to determine oxygenation status.    Checked in with primary RN prior to leaving. Opportunity for questions and concerns provided.      Rapid Response Team Sepsis Screening  Is the patient's history suggestive of a new infection? No    Are two or more SIRS criteria present? No    Is there evidence of Organ Dysfunction? [facility] Sepsis OD: None    Communication with provider: No    Was a Code Sepsis called at this encounter? No. Why? NA

## 2025-05-08 PROBLEM — E44.0 MODERATE PROTEIN-ENERGY MALNUTRITION: Status: ACTIVE | Noted: 2025-05-08

## 2025-05-08 LAB
ANION GAP SERPL CALC-SCNC: 7 MMOL/L (ref 2–12)
BUN SERPL-MCNC: 29 MG/DL (ref 6–20)
BUN/CREAT SERPL: 7 (ref 12–20)
CALCIUM SERPL-MCNC: 8.4 MG/DL (ref 8.5–10.1)
CHLORIDE SERPL-SCNC: 100 MMOL/L (ref 97–108)
CO2 SERPL-SCNC: 28 MMOL/L (ref 21–32)
CREAT SERPL-MCNC: 4.35 MG/DL (ref 0.7–1.3)
ERYTHROCYTE [DISTWIDTH] IN BLOOD BY AUTOMATED COUNT: 16.6 % (ref 11.5–14.5)
GLUCOSE BLD STRIP.AUTO-MCNC: 141 MG/DL (ref 65–117)
GLUCOSE BLD STRIP.AUTO-MCNC: 173 MG/DL (ref 65–117)
GLUCOSE BLD STRIP.AUTO-MCNC: 183 MG/DL (ref 65–117)
GLUCOSE BLD STRIP.AUTO-MCNC: 219 MG/DL (ref 65–117)
GLUCOSE BLD STRIP.AUTO-MCNC: 227 MG/DL (ref 65–117)
GLUCOSE SERPL-MCNC: 154 MG/DL (ref 65–100)
HCT VFR BLD AUTO: 24.2 % (ref 36.6–50.3)
HGB BLD-MCNC: 8 G/DL (ref 12.1–17)
INR PPP: 2 (ref 0.9–1.1)
MAGNESIUM SERPL-MCNC: 2.2 MG/DL (ref 1.6–2.4)
MCH RBC QN AUTO: 31 PG (ref 26–34)
MCHC RBC AUTO-ENTMCNC: 33.1 G/DL (ref 30–36.5)
MCV RBC AUTO: 93.8 FL (ref 80–99)
NRBC # BLD: 0 K/UL (ref 0–0.01)
NRBC BLD-RTO: 0 PER 100 WBC
PHOSPHATE SERPL-MCNC: 5.2 MG/DL (ref 2.6–4.7)
PLATELET # BLD AUTO: 139 K/UL (ref 150–400)
PMV BLD AUTO: 9.6 FL (ref 8.9–12.9)
POTASSIUM SERPL-SCNC: 4 MMOL/L (ref 3.5–5.1)
PROTHROMBIN TIME: 20.8 SEC (ref 9.2–11.2)
RBC # BLD AUTO: 2.58 M/UL (ref 4.1–5.7)
SERVICE CMNT-IMP: ABNORMAL
SODIUM SERPL-SCNC: 135 MMOL/L (ref 136–145)
WBC # BLD AUTO: 2.7 K/UL (ref 4.1–11.1)

## 2025-05-08 PROCEDURE — 6360000002 HC RX W HCPCS: Performed by: PSYCHIATRY & NEUROLOGY

## 2025-05-08 PROCEDURE — 94760 N-INVAS EAR/PLS OXIMETRY 1: CPT

## 2025-05-08 PROCEDURE — 2500000003 HC RX 250 WO HCPCS: Performed by: NURSE PRACTITIONER

## 2025-05-08 PROCEDURE — 6370000000 HC RX 637 (ALT 250 FOR IP): Performed by: STUDENT IN AN ORGANIZED HEALTH CARE EDUCATION/TRAINING PROGRAM

## 2025-05-08 PROCEDURE — 6370000000 HC RX 637 (ALT 250 FOR IP)

## 2025-05-08 PROCEDURE — 80048 BASIC METABOLIC PNL TOTAL CA: CPT

## 2025-05-08 PROCEDURE — 97530 THERAPEUTIC ACTIVITIES: CPT

## 2025-05-08 PROCEDURE — 1100000000 HC RM PRIVATE

## 2025-05-08 PROCEDURE — 6370000000 HC RX 637 (ALT 250 FOR IP): Performed by: HOSPITALIST

## 2025-05-08 PROCEDURE — 6370000000 HC RX 637 (ALT 250 FOR IP): Performed by: INTERNAL MEDICINE

## 2025-05-08 PROCEDURE — 6370000000 HC RX 637 (ALT 250 FOR IP): Performed by: PSYCHIATRY & NEUROLOGY

## 2025-05-08 PROCEDURE — 85610 PROTHROMBIN TIME: CPT

## 2025-05-08 PROCEDURE — 6360000002 HC RX W HCPCS: Performed by: HOSPITALIST

## 2025-05-08 PROCEDURE — 84100 ASSAY OF PHOSPHORUS: CPT

## 2025-05-08 PROCEDURE — 85027 COMPLETE CBC AUTOMATED: CPT

## 2025-05-08 PROCEDURE — 97116 GAIT TRAINING THERAPY: CPT

## 2025-05-08 PROCEDURE — 6370000000 HC RX 637 (ALT 250 FOR IP): Performed by: NURSE PRACTITIONER

## 2025-05-08 PROCEDURE — 82962 GLUCOSE BLOOD TEST: CPT

## 2025-05-08 PROCEDURE — 2700000000 HC OXYGEN THERAPY PER DAY

## 2025-05-08 PROCEDURE — 83735 ASSAY OF MAGNESIUM: CPT

## 2025-05-08 RX ADMIN — BUPRENORPHINE HYDROCHLORIDE AND NALOXONE HYDROCHLORIDE DIHYDRATE 1 TABLET: 2; .5 TABLET SUBLINGUAL at 09:46

## 2025-05-08 RX ADMIN — PANTOPRAZOLE SODIUM 40 MG: 40 TABLET, DELAYED RELEASE ORAL at 06:35

## 2025-05-08 RX ADMIN — BUPRENORPHINE HYDROCHLORIDE AND NALOXONE HYDROCHLORIDE DIHYDRATE 1 TABLET: 2; .5 TABLET SUBLINGUAL at 20:38

## 2025-05-08 RX ADMIN — INSULIN LISPRO 1 UNITS: 100 INJECTION, SOLUTION INTRAVENOUS; SUBCUTANEOUS at 11:46

## 2025-05-08 RX ADMIN — GABAPENTIN 300 MG: 300 CAPSULE ORAL at 20:38

## 2025-05-08 RX ADMIN — INSULIN LISPRO 1 UNITS: 100 INJECTION, SOLUTION INTRAVENOUS; SUBCUTANEOUS at 17:34

## 2025-05-08 RX ADMIN — HEPARIN SODIUM 5000 UNITS: 5000 INJECTION INTRAVENOUS; SUBCUTANEOUS at 01:05

## 2025-05-08 RX ADMIN — SODIUM CHLORIDE, PRESERVATIVE FREE 10 ML: 5 INJECTION INTRAVENOUS at 09:52

## 2025-05-08 RX ADMIN — SODIUM CHLORIDE, PRESERVATIVE FREE 10 ML: 5 INJECTION INTRAVENOUS at 20:39

## 2025-05-08 RX ADMIN — WARFARIN SODIUM 6 MG: 5 TABLET ORAL at 19:01

## 2025-05-08 RX ADMIN — DILTIAZEM HYDROCHLORIDE 120 MG: 120 CAPSULE, COATED, EXTENDED RELEASE ORAL at 09:46

## 2025-05-08 RX ADMIN — QUETIAPINE FUMARATE 100 MG: 100 TABLET ORAL at 20:38

## 2025-05-08 RX ADMIN — HEPARIN SODIUM 5000 UNITS: 5000 INJECTION INTRAVENOUS; SUBCUTANEOUS at 09:47

## 2025-05-08 NOTE — CARE COORDINATION
Transition Of Care:    Cardiology, Nephrology, Psyche, PT/OT following. Therapy recommending IPR vs SNF and attending MD agrees with plan. Referrals sent to [facility] are SNF with dialysis: [facility] and [facility] are back up plan. If SNF is plan, a level 2 will be needed due to psyche history. The patient is agreeable to these referrals. No insurance auth will be required with Medicare. Outpatient HD chair accepted by [facility] per liaison Kaylen. Clinic awaiting Dr. Lacho Price (clinic nephrologist director) to accept patient. The patient accepts the chair option for 12:00 noon MWF. His sister Anuradha will assist with transporting him to dialysis and home from hosptial when stable for discharge.     If IPR plan falls through and SNF is plan, a level 2 and updated progress notes from psyche and attending MD stating patient is medically stable for discharge will be needed in order to start the level 2 by CM lead.      [facility] ([facility])   Kaylen: Admissions liaison  Phone: [phone] and fax [phone]   MWF at 12:00 schedule  Address: [address] ([facility], [address]  Phone: [phone]  Fax: [phone]     RUR: 32% High  Prior Level of Functioning: Independent  Disposition: Home  Follow up appointments: Per attending's recommendations  DME needed: N/A   Transportation at discharge: Family  IM/IMM Medicare/ letter given: Yes: 5/2/25   Is patient a  and connected with [facility]? No  If yes, was [place] transfer form completed and [facility] notified?   Caregiver Contact: Sister: [phone]  Discharge Caregiver contacted prior to discharge? Y  Care Conference needed? No  Barriers to discharge: medical stability     Megan Lambert RN/CRM  [phone]

## 2025-05-08 NOTE — BSMART NOTE
BSMART Liaison Team Note     LOS:  11     Patient goals for today: take medications as prescribed, make needs known in an appropriate manner.  BSMART Liaison team focus/goals: assess MH needs, provide therapeutic support, brief therapy, and education, as needed.  Assist medical care management team with recommendations for coordination of care.    Progress note: Pt was admitted to the [facility] on 4/24/25 for ativan OD with continuing SI.  Per chart, \"Patient presented to the ED on 04/19/25 from home after overdosing on 65 - 1mg ativan last night.  States \"I'm in kidney failure and I'm gonna die from that and I just don't want to live anymore. Nobody cares about me I should just die.\"   On 4/27/25, pt was transferred to medical after a rapid response was called - pt was found to be hyponatremic to 128 and hyperkalemic to 7.1.      Liaison met with pt ftf on the medical floor.  He is received resting in bed, alert, oriented, linear and concrete.  His affect is bright and he reports his mood is \"pretty good\".  Eye contact and speech are WNL.  He is cooperative and pleasant.  Pt denies SI/HI/AVH.  He states \"I'm happy to be alive\".  When asked to clarify pt states, \"I am grateful for so many things in my life - you just don't know\".  Pt reports his body is tired but he is trying to stay positive through God's strength.  He reports he wants to stay clean and states \"For once in my life - and I mean honestly - I don't want to use\".  Liaison provided supportive therapy and encouragement and assisted pt with mindful prompts to stay focused on gratitude and positivity.   Liaison team will continue to monitor    Barriers to Discharge:  SNF with dialysis: referrals sent to: [facility] and [facility]. The patient is agreeable to these referrals.      Outpatient provider(s):  none reported  Insurance info/prescription coverage:  MEDICARE PART A AND B      Diagnosis: MDD, recurrent, severe, without psychosis, Opioid use

## 2025-05-09 LAB
ALBUMIN SERPL-MCNC: 2.8 G/DL (ref 3.5–5)
ALBUMIN/GLOB SERPL: 0.8 (ref 1.1–2.2)
ALP SERPL-CCNC: 124 U/L (ref 45–117)
ALT SERPL-CCNC: 14 U/L (ref 12–78)
ANION GAP SERPL CALC-SCNC: 9 MMOL/L (ref 2–12)
AST SERPL-CCNC: 22 U/L (ref 15–37)
BILIRUB SERPL-MCNC: 0.3 MG/DL (ref 0.2–1)
BUN SERPL-MCNC: 41 MG/DL (ref 6–20)
BUN/CREAT SERPL: 8 (ref 12–20)
CALCIUM SERPL-MCNC: 8.6 MG/DL (ref 8.5–10.1)
CHLORIDE SERPL-SCNC: 99 MMOL/L (ref 97–108)
CO2 SERPL-SCNC: 27 MMOL/L (ref 21–32)
CREAT SERPL-MCNC: 5.13 MG/DL (ref 0.7–1.3)
ERYTHROCYTE [DISTWIDTH] IN BLOOD BY AUTOMATED COUNT: 17 % (ref 11.5–14.5)
GLOBULIN SER CALC-MCNC: 3.4 G/DL (ref 2–4)
GLUCOSE BLD STRIP.AUTO-MCNC: 100 MG/DL (ref 65–117)
GLUCOSE BLD STRIP.AUTO-MCNC: 168 MG/DL (ref 65–117)
GLUCOSE BLD STRIP.AUTO-MCNC: 170 MG/DL (ref 65–117)
GLUCOSE BLD STRIP.AUTO-MCNC: 224 MG/DL (ref 65–117)
GLUCOSE SERPL-MCNC: 167 MG/DL (ref 65–100)
HCT VFR BLD AUTO: 25.9 % (ref 36.6–50.3)
HGB BLD-MCNC: 8.5 G/DL (ref 12.1–17)
INR PPP: 2 (ref 0.9–1.1)
MAGNESIUM SERPL-MCNC: 2.2 MG/DL (ref 1.6–2.4)
MCH RBC QN AUTO: 30.7 PG (ref 26–34)
MCHC RBC AUTO-ENTMCNC: 32.8 G/DL (ref 30–36.5)
MCV RBC AUTO: 93.5 FL (ref 80–99)
NRBC # BLD: 0 K/UL (ref 0–0.01)
NRBC BLD-RTO: 0 PER 100 WBC
PHOSPHATE SERPL-MCNC: 5.6 MG/DL (ref 2.6–4.7)
PLATELET # BLD AUTO: 151 K/UL (ref 150–400)
PMV BLD AUTO: 9.5 FL (ref 8.9–12.9)
POTASSIUM SERPL-SCNC: 3.7 MMOL/L (ref 3.5–5.1)
PROT SERPL-MCNC: 6.2 G/DL (ref 6.4–8.2)
PROTHROMBIN TIME: 20.6 SEC (ref 9.2–11.2)
RBC # BLD AUTO: 2.77 M/UL (ref 4.1–5.7)
SERVICE CMNT-IMP: ABNORMAL
SERVICE CMNT-IMP: NORMAL
SODIUM SERPL-SCNC: 135 MMOL/L (ref 136–145)
WBC # BLD AUTO: 3.1 K/UL (ref 4.1–11.1)

## 2025-05-09 PROCEDURE — 6370000000 HC RX 637 (ALT 250 FOR IP): Performed by: STUDENT IN AN ORGANIZED HEALTH CARE EDUCATION/TRAINING PROGRAM

## 2025-05-09 PROCEDURE — 51701 INSERT BLADDER CATHETER: CPT

## 2025-05-09 PROCEDURE — 2500000003 HC RX 250 WO HCPCS: Performed by: NURSE PRACTITIONER

## 2025-05-09 PROCEDURE — 85610 PROTHROMBIN TIME: CPT

## 2025-05-09 PROCEDURE — 83735 ASSAY OF MAGNESIUM: CPT

## 2025-05-09 PROCEDURE — 6360000002 HC RX W HCPCS: Performed by: INTERNAL MEDICINE

## 2025-05-09 PROCEDURE — 6370000000 HC RX 637 (ALT 250 FOR IP): Performed by: HOSPITALIST

## 2025-05-09 PROCEDURE — 84100 ASSAY OF PHOSPHORUS: CPT

## 2025-05-09 PROCEDURE — 90935 HEMODIALYSIS ONE EVALUATION: CPT

## 2025-05-09 PROCEDURE — 51798 US URINE CAPACITY MEASURE: CPT

## 2025-05-09 PROCEDURE — 80053 COMPREHEN METABOLIC PANEL: CPT

## 2025-05-09 PROCEDURE — 1100000000 HC RM PRIVATE

## 2025-05-09 PROCEDURE — 6370000000 HC RX 637 (ALT 250 FOR IP): Performed by: NURSE PRACTITIONER

## 2025-05-09 PROCEDURE — 82962 GLUCOSE BLOOD TEST: CPT

## 2025-05-09 PROCEDURE — 6370000000 HC RX 637 (ALT 250 FOR IP)

## 2025-05-09 PROCEDURE — 85027 COMPLETE CBC AUTOMATED: CPT

## 2025-05-09 PROCEDURE — 6370000000 HC RX 637 (ALT 250 FOR IP): Performed by: INTERNAL MEDICINE

## 2025-05-09 PROCEDURE — 6370000000 HC RX 637 (ALT 250 FOR IP): Performed by: PSYCHIATRY & NEUROLOGY

## 2025-05-09 PROCEDURE — 6360000002 HC RX W HCPCS: Performed by: PSYCHIATRY & NEUROLOGY

## 2025-05-09 RX ADMIN — PANTOPRAZOLE SODIUM 40 MG: 40 TABLET, DELAYED RELEASE ORAL at 05:40

## 2025-05-09 RX ADMIN — SODIUM CHLORIDE, PRESERVATIVE FREE 10 ML: 5 INJECTION INTRAVENOUS at 12:57

## 2025-05-09 RX ADMIN — INSULIN LISPRO 1 UNITS: 100 INJECTION, SOLUTION INTRAVENOUS; SUBCUTANEOUS at 22:01

## 2025-05-09 RX ADMIN — QUETIAPINE FUMARATE 100 MG: 100 TABLET ORAL at 22:06

## 2025-05-09 RX ADMIN — BUPRENORPHINE HYDROCHLORIDE AND NALOXONE HYDROCHLORIDE DIHYDRATE 1 TABLET: 2; .5 TABLET SUBLINGUAL at 12:22

## 2025-05-09 RX ADMIN — GABAPENTIN 300 MG: 300 CAPSULE ORAL at 22:06

## 2025-05-09 RX ADMIN — EPOETIN ALFA 10000 UNITS: 10000 SOLUTION INTRAVENOUS; SUBCUTANEOUS at 17:58

## 2025-05-09 RX ADMIN — WARFARIN SODIUM 6 MG: 5 TABLET ORAL at 17:57

## 2025-05-09 RX ADMIN — SODIUM CHLORIDE, PRESERVATIVE FREE 10 ML: 5 INJECTION INTRAVENOUS at 22:09

## 2025-05-09 RX ADMIN — DILTIAZEM HYDROCHLORIDE 120 MG: 120 CAPSULE, COATED, EXTENDED RELEASE ORAL at 12:23

## 2025-05-09 RX ADMIN — BUPRENORPHINE HYDROCHLORIDE AND NALOXONE HYDROCHLORIDE DIHYDRATE 1 TABLET: 2; .5 TABLET SUBLINGUAL at 22:06

## 2025-05-09 ASSESSMENT — PAIN SCALES - GENERAL: PAINLEVEL_OUTOF10: 0

## 2025-05-09 NOTE — CARE COORDINATION
Transition Of Care:    3:45PM: CM spoke with patient, patient gave CM permission to speak with his sister (who he lives with) regarding discharge planning. Anuradha is concerned about taking this patient home, she feels he has not walked much in the past 3 weeks and is a fall risk. The patient is stating he would like to go home, but is willing to stay for placement. Patient states he will stay no later than Tuesday, if no placement happens he will return home. OMARI sent a perfectserve to the attending.     CM reached out to [facility] to re review the referral.     [facility] sent.     Cardiology, Nephrology, Psych, PT/OT following. Therapy recommending IPR vs SNF and attending MD agrees with plan. [facility]: [facility] and [facility] are back up plan. If SNF is plan, a level 2 will be needed due to psyche history. The patient is agreeable to these referrals. No insurance auth will be required with Medicare. Outpatient HD chair accepted by [facility] per liaison Kaylen. Clinic awaiting Dr. Lacho Price (clinic nephrologist director) to accept patient. The patient accepts the chair option for 12:00 noon MWF. His sister Anuradha will assist with transporting him to dialysis and home from hosptial when stable for discharge.     [facility] can not accept patient due to his current and past history.  [facility] can not accept as they can not meet the care needs for this patient.  [facility] can not accept patients on Suboxone.      Attending has been made aware and spoke with patient. The attending is fine with the patient discharging home with his sister who is there every day to assist the patient. Plan to discharge home tomorrow with HH PT/OT.      [facility])   Kaylen: Admissions liaison  Phone: [phone] and fax [phone]   MWF at 12:00 schedule  Address: [address] ([facility])  [facility], [address]  Phone: [phone]  Fax:

## 2025-05-10 LAB
ALBUMIN SERPL-MCNC: 3 G/DL (ref 3.5–5)
ALBUMIN/GLOB SERPL: 0.9 (ref 1.1–2.2)
ALP SERPL-CCNC: 120 U/L (ref 45–117)
ALT SERPL-CCNC: 14 U/L (ref 12–78)
ANION GAP SERPL CALC-SCNC: 4 MMOL/L (ref 2–12)
AST SERPL-CCNC: 23 U/L (ref 15–37)
BILIRUB SERPL-MCNC: 0.4 MG/DL (ref 0.2–1)
BUN SERPL-MCNC: 24 MG/DL (ref 6–20)
BUN/CREAT SERPL: 7 (ref 12–20)
CALCIUM SERPL-MCNC: 8.1 MG/DL (ref 8.5–10.1)
CHLORIDE SERPL-SCNC: 101 MMOL/L (ref 97–108)
CO2 SERPL-SCNC: 30 MMOL/L (ref 21–32)
CREAT SERPL-MCNC: 3.35 MG/DL (ref 0.7–1.3)
ERYTHROCYTE [DISTWIDTH] IN BLOOD BY AUTOMATED COUNT: 16.9 % (ref 11.5–14.5)
GLOBULIN SER CALC-MCNC: 3.4 G/DL (ref 2–4)
GLUCOSE BLD STRIP.AUTO-MCNC: 167 MG/DL (ref 65–117)
GLUCOSE BLD STRIP.AUTO-MCNC: 176 MG/DL (ref 65–117)
GLUCOSE BLD STRIP.AUTO-MCNC: 264 MG/DL (ref 65–117)
GLUCOSE BLD STRIP.AUTO-MCNC: 277 MG/DL (ref 65–117)
GLUCOSE SERPL-MCNC: 121 MG/DL (ref 65–100)
HCT VFR BLD AUTO: 27.2 % (ref 36.6–50.3)
HGB BLD-MCNC: 9.2 G/DL (ref 12.1–17)
INR PPP: 2.2 (ref 0.9–1.1)
MAGNESIUM SERPL-MCNC: 1.3 MG/DL (ref 1.6–2.4)
MCH RBC QN AUTO: 31.3 PG (ref 26–34)
MCHC RBC AUTO-ENTMCNC: 33.8 G/DL (ref 30–36.5)
MCV RBC AUTO: 92.5 FL (ref 80–99)
NRBC # BLD: 0 K/UL (ref 0–0.01)
NRBC BLD-RTO: 0 PER 100 WBC
PHOSPHATE SERPL-MCNC: 4.6 MG/DL (ref 2.6–4.7)
PLATELET # BLD AUTO: 167 K/UL (ref 150–400)
PMV BLD AUTO: 9.2 FL (ref 8.9–12.9)
POTASSIUM SERPL-SCNC: 3.9 MMOL/L (ref 3.5–5.1)
PROT SERPL-MCNC: 6.4 G/DL (ref 6.4–8.2)
PROTHROMBIN TIME: 22.4 SEC (ref 9.2–11.2)
RBC # BLD AUTO: 2.94 M/UL (ref 4.1–5.7)
SERVICE CMNT-IMP: ABNORMAL
SODIUM SERPL-SCNC: 135 MMOL/L (ref 136–145)
WBC # BLD AUTO: 3.1 K/UL (ref 4.1–11.1)

## 2025-05-10 PROCEDURE — 83735 ASSAY OF MAGNESIUM: CPT

## 2025-05-10 PROCEDURE — 6370000000 HC RX 637 (ALT 250 FOR IP)

## 2025-05-10 PROCEDURE — 6370000000 HC RX 637 (ALT 250 FOR IP): Performed by: PSYCHIATRY & NEUROLOGY

## 2025-05-10 PROCEDURE — 6370000000 HC RX 637 (ALT 250 FOR IP): Performed by: FAMILY MEDICINE

## 2025-05-10 PROCEDURE — 6370000000 HC RX 637 (ALT 250 FOR IP): Performed by: NURSE PRACTITIONER

## 2025-05-10 PROCEDURE — 80053 COMPREHEN METABOLIC PANEL: CPT

## 2025-05-10 PROCEDURE — 6370000000 HC RX 637 (ALT 250 FOR IP): Performed by: STUDENT IN AN ORGANIZED HEALTH CARE EDUCATION/TRAINING PROGRAM

## 2025-05-10 PROCEDURE — 85610 PROTHROMBIN TIME: CPT

## 2025-05-10 PROCEDURE — 6370000000 HC RX 637 (ALT 250 FOR IP): Performed by: INTERNAL MEDICINE

## 2025-05-10 PROCEDURE — 2500000003 HC RX 250 WO HCPCS: Performed by: NURSE PRACTITIONER

## 2025-05-10 PROCEDURE — 6360000002 HC RX W HCPCS: Performed by: PSYCHIATRY & NEUROLOGY

## 2025-05-10 PROCEDURE — 84100 ASSAY OF PHOSPHORUS: CPT

## 2025-05-10 PROCEDURE — 1100000000 HC RM PRIVATE

## 2025-05-10 PROCEDURE — 82962 GLUCOSE BLOOD TEST: CPT

## 2025-05-10 PROCEDURE — 85027 COMPLETE CBC AUTOMATED: CPT

## 2025-05-10 PROCEDURE — 94760 N-INVAS EAR/PLS OXIMETRY 1: CPT

## 2025-05-10 RX ADMIN — SODIUM CHLORIDE, PRESERVATIVE FREE 10 ML: 5 INJECTION INTRAVENOUS at 22:35

## 2025-05-10 RX ADMIN — QUETIAPINE FUMARATE 100 MG: 100 TABLET ORAL at 22:34

## 2025-05-10 RX ADMIN — SODIUM CHLORIDE, PRESERVATIVE FREE 10 ML: 5 INJECTION INTRAVENOUS at 08:57

## 2025-05-10 RX ADMIN — WARFARIN SODIUM 6 MG: 1 TABLET ORAL at 18:04

## 2025-05-10 RX ADMIN — BUPRENORPHINE HYDROCHLORIDE AND NALOXONE HYDROCHLORIDE DIHYDRATE 1 TABLET: 2; .5 TABLET SUBLINGUAL at 08:56

## 2025-05-10 RX ADMIN — GABAPENTIN 300 MG: 300 CAPSULE ORAL at 22:34

## 2025-05-10 RX ADMIN — DILTIAZEM HYDROCHLORIDE 120 MG: 120 CAPSULE, COATED, EXTENDED RELEASE ORAL at 08:56

## 2025-05-10 RX ADMIN — BUPRENORPHINE HYDROCHLORIDE AND NALOXONE HYDROCHLORIDE DIHYDRATE 1 TABLET: 2; .5 TABLET SUBLINGUAL at 22:34

## 2025-05-10 RX ADMIN — PANTOPRAZOLE SODIUM 40 MG: 40 TABLET, DELAYED RELEASE ORAL at 05:36

## 2025-05-10 RX ADMIN — INSULIN LISPRO 2 UNITS: 100 INJECTION, SOLUTION INTRAVENOUS; SUBCUTANEOUS at 18:03

## 2025-05-10 RX ADMIN — INSULIN LISPRO 2 UNITS: 100 INJECTION, SOLUTION INTRAVENOUS; SUBCUTANEOUS at 11:58

## 2025-05-10 ASSESSMENT — PAIN SCALES - WONG BAKER: WONGBAKER_NUMERICALRESPONSE: NO HURT

## 2025-05-10 ASSESSMENT — PAIN SCALES - GENERAL
PAINLEVEL_OUTOF10: 4
PAINLEVEL_OUTOF10: 0
PAINLEVEL_OUTOF10: 0

## 2025-05-11 LAB
ALBUMIN SERPL-MCNC: 2.9 G/DL (ref 3.5–5)
ALBUMIN/GLOB SERPL: 0.8 (ref 1.1–2.2)
ALP SERPL-CCNC: 152 U/L (ref 45–117)
ALT SERPL-CCNC: 20 U/L (ref 12–78)
ANION GAP SERPL CALC-SCNC: 6 MMOL/L (ref 2–12)
AST SERPL-CCNC: 27 U/L (ref 15–37)
BILIRUB SERPL-MCNC: 0.3 MG/DL (ref 0.2–1)
BUN SERPL-MCNC: 35 MG/DL (ref 6–20)
BUN/CREAT SERPL: 9 (ref 12–20)
CALCIUM SERPL-MCNC: 8.8 MG/DL (ref 8.5–10.1)
CHLORIDE SERPL-SCNC: 103 MMOL/L (ref 97–108)
CO2 SERPL-SCNC: 30 MMOL/L (ref 21–32)
CREAT SERPL-MCNC: 4.03 MG/DL (ref 0.7–1.3)
ERYTHROCYTE [DISTWIDTH] IN BLOOD BY AUTOMATED COUNT: 17.1 % (ref 11.5–14.5)
GLOBULIN SER CALC-MCNC: 3.5 G/DL (ref 2–4)
GLUCOSE BLD STRIP.AUTO-MCNC: 146 MG/DL (ref 65–117)
GLUCOSE BLD STRIP.AUTO-MCNC: 198 MG/DL (ref 65–117)
GLUCOSE BLD STRIP.AUTO-MCNC: 209 MG/DL (ref 65–117)
GLUCOSE BLD STRIP.AUTO-MCNC: 213 MG/DL (ref 65–117)
GLUCOSE SERPL-MCNC: 230 MG/DL (ref 65–100)
HCT VFR BLD AUTO: 28 % (ref 36.6–50.3)
HGB BLD-MCNC: 9.1 G/DL (ref 12.1–17)
INR PPP: 2 (ref 0.9–1.1)
MCH RBC QN AUTO: 30.6 PG (ref 26–34)
MCHC RBC AUTO-ENTMCNC: 32.5 G/DL (ref 30–36.5)
MCV RBC AUTO: 94.3 FL (ref 80–99)
NRBC # BLD: 0 K/UL (ref 0–0.01)
NRBC BLD-RTO: 0 PER 100 WBC
PLATELET # BLD AUTO: 158 K/UL (ref 150–400)
PMV BLD AUTO: 9.1 FL (ref 8.9–12.9)
POTASSIUM SERPL-SCNC: 4 MMOL/L (ref 3.5–5.1)
PROT SERPL-MCNC: 6.4 G/DL (ref 6.4–8.2)
PROTHROMBIN TIME: 20.3 SEC (ref 9.2–11.2)
RBC # BLD AUTO: 2.97 M/UL (ref 4.1–5.7)
SERVICE CMNT-IMP: ABNORMAL
SODIUM SERPL-SCNC: 139 MMOL/L (ref 136–145)
WBC # BLD AUTO: 4.3 K/UL (ref 4.1–11.1)

## 2025-05-11 PROCEDURE — 82962 GLUCOSE BLOOD TEST: CPT

## 2025-05-11 PROCEDURE — 6370000000 HC RX 637 (ALT 250 FOR IP): Performed by: NURSE PRACTITIONER

## 2025-05-11 PROCEDURE — 80053 COMPREHEN METABOLIC PANEL: CPT

## 2025-05-11 PROCEDURE — 6370000000 HC RX 637 (ALT 250 FOR IP): Performed by: PSYCHIATRY & NEUROLOGY

## 2025-05-11 PROCEDURE — 6360000002 HC RX W HCPCS: Performed by: PSYCHIATRY & NEUROLOGY

## 2025-05-11 PROCEDURE — 6370000000 HC RX 637 (ALT 250 FOR IP): Performed by: STUDENT IN AN ORGANIZED HEALTH CARE EDUCATION/TRAINING PROGRAM

## 2025-05-11 PROCEDURE — 1100000000 HC RM PRIVATE

## 2025-05-11 PROCEDURE — 85027 COMPLETE CBC AUTOMATED: CPT

## 2025-05-11 PROCEDURE — 6370000000 HC RX 637 (ALT 250 FOR IP): Performed by: FAMILY MEDICINE

## 2025-05-11 PROCEDURE — 2500000003 HC RX 250 WO HCPCS: Performed by: NURSE PRACTITIONER

## 2025-05-11 PROCEDURE — 85610 PROTHROMBIN TIME: CPT

## 2025-05-11 PROCEDURE — 6370000000 HC RX 637 (ALT 250 FOR IP): Performed by: INTERNAL MEDICINE

## 2025-05-11 PROCEDURE — 6370000000 HC RX 637 (ALT 250 FOR IP)

## 2025-05-11 PROCEDURE — 94760 N-INVAS EAR/PLS OXIMETRY 1: CPT

## 2025-05-11 RX ORDER — WARFARIN SODIUM 7.5 MG/1
7.5 TABLET ORAL
Status: COMPLETED | OUTPATIENT
Start: 2025-05-11 | End: 2025-05-11

## 2025-05-11 RX ADMIN — GABAPENTIN 300 MG: 300 CAPSULE ORAL at 22:44

## 2025-05-11 RX ADMIN — INSULIN LISPRO 1 UNITS: 100 INJECTION, SOLUTION INTRAVENOUS; SUBCUTANEOUS at 17:17

## 2025-05-11 RX ADMIN — PANTOPRAZOLE SODIUM 40 MG: 40 TABLET, DELAYED RELEASE ORAL at 07:58

## 2025-05-11 RX ADMIN — SENNOSIDES 8.6 MG: 8.6 TABLET, FILM COATED ORAL at 22:45

## 2025-05-11 RX ADMIN — WARFARIN SODIUM 7.5 MG: 7.5 TABLET ORAL at 17:16

## 2025-05-11 RX ADMIN — SODIUM CHLORIDE, PRESERVATIVE FREE 10 ML: 5 INJECTION INTRAVENOUS at 22:46

## 2025-05-11 RX ADMIN — DILTIAZEM HYDROCHLORIDE 120 MG: 120 CAPSULE, COATED, EXTENDED RELEASE ORAL at 09:34

## 2025-05-11 RX ADMIN — QUETIAPINE FUMARATE 100 MG: 100 TABLET ORAL at 22:45

## 2025-05-11 RX ADMIN — BUPRENORPHINE HYDROCHLORIDE AND NALOXONE HYDROCHLORIDE DIHYDRATE 1 TABLET: 2; .5 TABLET SUBLINGUAL at 09:34

## 2025-05-11 RX ADMIN — INSULIN LISPRO 1 UNITS: 100 INJECTION, SOLUTION INTRAVENOUS; SUBCUTANEOUS at 07:58

## 2025-05-11 RX ADMIN — INSULIN LISPRO 1 UNITS: 100 INJECTION, SOLUTION INTRAVENOUS; SUBCUTANEOUS at 22:43

## 2025-05-11 RX ADMIN — SODIUM CHLORIDE, PRESERVATIVE FREE 10 ML: 5 INJECTION INTRAVENOUS at 09:35

## 2025-05-11 RX ADMIN — BUPRENORPHINE HYDROCHLORIDE AND NALOXONE HYDROCHLORIDE DIHYDRATE 1 TABLET: 2; .5 TABLET SUBLINGUAL at 22:44

## 2025-05-12 VITALS
HEART RATE: 60 BPM | OXYGEN SATURATION: 100 % | TEMPERATURE: 97.9 F | WEIGHT: 202.16 LBS | SYSTOLIC BLOOD PRESSURE: 128 MMHG | HEIGHT: 72 IN | BODY MASS INDEX: 27.38 KG/M2 | RESPIRATION RATE: 16 BRPM | DIASTOLIC BLOOD PRESSURE: 61 MMHG

## 2025-05-12 LAB
ALBUMIN SERPL-MCNC: 2.8 G/DL (ref 3.5–5)
ALBUMIN/GLOB SERPL: 0.8 (ref 1.1–2.2)
ALP SERPL-CCNC: 139 U/L (ref 45–117)
ALT SERPL-CCNC: 24 U/L (ref 12–78)
ANION GAP SERPL CALC-SCNC: 6 MMOL/L (ref 2–12)
AST SERPL-CCNC: 32 U/L (ref 15–37)
BILIRUB SERPL-MCNC: 0.3 MG/DL (ref 0.2–1)
BUN SERPL-MCNC: 46 MG/DL (ref 6–20)
BUN/CREAT SERPL: 11 (ref 12–20)
CALCIUM SERPL-MCNC: 9 MG/DL (ref 8.5–10.1)
CHLORIDE SERPL-SCNC: 106 MMOL/L (ref 97–108)
CO2 SERPL-SCNC: 27 MMOL/L (ref 21–32)
CREAT SERPL-MCNC: 4.22 MG/DL (ref 0.7–1.3)
ERYTHROCYTE [DISTWIDTH] IN BLOOD BY AUTOMATED COUNT: 16.6 % (ref 11.5–14.5)
GLOBULIN SER CALC-MCNC: 3.4 G/DL (ref 2–4)
GLUCOSE BLD STRIP.AUTO-MCNC: 177 MG/DL (ref 65–117)
GLUCOSE BLD STRIP.AUTO-MCNC: 198 MG/DL (ref 65–117)
GLUCOSE BLD STRIP.AUTO-MCNC: 201 MG/DL (ref 65–117)
GLUCOSE SERPL-MCNC: 154 MG/DL (ref 65–100)
HCT VFR BLD AUTO: 27.9 % (ref 36.6–50.3)
HGB BLD-MCNC: 8.9 G/DL (ref 12.1–17)
INR PPP: 2.1 (ref 0.9–1.1)
MAGNESIUM SERPL-MCNC: 2.2 MG/DL (ref 1.6–2.4)
MCH RBC QN AUTO: 30.6 PG (ref 26–34)
MCHC RBC AUTO-ENTMCNC: 31.9 G/DL (ref 30–36.5)
MCV RBC AUTO: 95.9 FL (ref 80–99)
NRBC # BLD: 0 K/UL (ref 0–0.01)
NRBC BLD-RTO: 0 PER 100 WBC
PHOSPHATE SERPL-MCNC: 4 MG/DL (ref 2.6–4.7)
PLATELET # BLD AUTO: 174 K/UL (ref 150–400)
PMV BLD AUTO: 9.3 FL (ref 8.9–12.9)
POTASSIUM SERPL-SCNC: 4.3 MMOL/L (ref 3.5–5.1)
PROT SERPL-MCNC: 6.2 G/DL (ref 6.4–8.2)
PROTHROMBIN TIME: 21.6 SEC (ref 9.2–11.2)
RBC # BLD AUTO: 2.91 M/UL (ref 4.1–5.7)
SERVICE CMNT-IMP: ABNORMAL
SODIUM SERPL-SCNC: 139 MMOL/L (ref 136–145)
WBC # BLD AUTO: 4.8 K/UL (ref 4.1–11.1)

## 2025-05-12 PROCEDURE — 83735 ASSAY OF MAGNESIUM: CPT

## 2025-05-12 PROCEDURE — 6370000000 HC RX 637 (ALT 250 FOR IP): Performed by: STUDENT IN AN ORGANIZED HEALTH CARE EDUCATION/TRAINING PROGRAM

## 2025-05-12 PROCEDURE — 6370000000 HC RX 637 (ALT 250 FOR IP): Performed by: FAMILY MEDICINE

## 2025-05-12 PROCEDURE — 2500000003 HC RX 250 WO HCPCS: Performed by: NURSE PRACTITIONER

## 2025-05-12 PROCEDURE — 97530 THERAPEUTIC ACTIVITIES: CPT

## 2025-05-12 PROCEDURE — 97116 GAIT TRAINING THERAPY: CPT

## 2025-05-12 PROCEDURE — 6360000002 HC RX W HCPCS: Performed by: INTERNAL MEDICINE

## 2025-05-12 PROCEDURE — 6360000002 HC RX W HCPCS: Performed by: PSYCHIATRY & NEUROLOGY

## 2025-05-12 PROCEDURE — 85610 PROTHROMBIN TIME: CPT

## 2025-05-12 PROCEDURE — 6370000000 HC RX 637 (ALT 250 FOR IP): Performed by: PSYCHIATRY & NEUROLOGY

## 2025-05-12 PROCEDURE — 6370000000 HC RX 637 (ALT 250 FOR IP)

## 2025-05-12 PROCEDURE — 97535 SELF CARE MNGMENT TRAINING: CPT

## 2025-05-12 PROCEDURE — 90935 HEMODIALYSIS ONE EVALUATION: CPT

## 2025-05-12 PROCEDURE — 80053 COMPREHEN METABOLIC PANEL: CPT

## 2025-05-12 PROCEDURE — 84100 ASSAY OF PHOSPHORUS: CPT

## 2025-05-12 PROCEDURE — 6370000000 HC RX 637 (ALT 250 FOR IP): Performed by: NURSE PRACTITIONER

## 2025-05-12 PROCEDURE — 85027 COMPLETE CBC AUTOMATED: CPT

## 2025-05-12 PROCEDURE — 82962 GLUCOSE BLOOD TEST: CPT

## 2025-05-12 RX ORDER — PANTOPRAZOLE SODIUM 40 MG/1
40 TABLET, DELAYED RELEASE ORAL
Qty: 30 TABLET | Refills: 3 | Status: SHIPPED | OUTPATIENT
Start: 2025-05-13

## 2025-05-12 RX ORDER — DILTIAZEM HYDROCHLORIDE 120 MG/1
120 CAPSULE, COATED, EXTENDED RELEASE ORAL DAILY
Qty: 30 CAPSULE | Refills: 3 | Status: SHIPPED | OUTPATIENT
Start: 2025-05-13

## 2025-05-12 RX ORDER — WARFARIN SODIUM 2 MG/1
6 TABLET ORAL DAILY
Qty: 90 TABLET | Refills: 1 | Status: SHIPPED | OUTPATIENT
Start: 2025-05-12

## 2025-05-12 RX ORDER — QUETIAPINE FUMARATE 100 MG/1
100 TABLET, FILM COATED ORAL NIGHTLY
Qty: 60 TABLET | Refills: 3 | Status: SHIPPED | OUTPATIENT
Start: 2025-05-12

## 2025-05-12 RX ORDER — NICOTINE 21 MG/24HR
1 PATCH, TRANSDERMAL 24 HOURS TRANSDERMAL DAILY
Qty: 30 PATCH | Refills: 3 | Status: SHIPPED | OUTPATIENT
Start: 2025-05-13

## 2025-05-12 RX ORDER — GABAPENTIN 300 MG/1
300 CAPSULE ORAL NIGHTLY
Qty: 30 CAPSULE | Refills: 0 | Status: SHIPPED | OUTPATIENT
Start: 2025-05-12 | End: 2025-06-11

## 2025-05-12 RX ADMIN — INSULIN LISPRO 1 UNITS: 100 INJECTION, SOLUTION INTRAVENOUS; SUBCUTANEOUS at 18:06

## 2025-05-12 RX ADMIN — SODIUM CHLORIDE, PRESERVATIVE FREE 10 ML: 5 INJECTION INTRAVENOUS at 11:21

## 2025-05-12 RX ADMIN — DILTIAZEM HYDROCHLORIDE 120 MG: 120 CAPSULE, COATED, EXTENDED RELEASE ORAL at 11:17

## 2025-05-12 RX ADMIN — PANTOPRAZOLE SODIUM 40 MG: 40 TABLET, DELAYED RELEASE ORAL at 05:19

## 2025-05-12 RX ADMIN — WARFARIN SODIUM 6 MG: 5 TABLET ORAL at 12:08

## 2025-05-12 RX ADMIN — INSULIN LISPRO 1 UNITS: 100 INJECTION, SOLUTION INTRAVENOUS; SUBCUTANEOUS at 12:09

## 2025-05-12 RX ADMIN — EPOETIN ALFA 10000 UNITS: 10000 SOLUTION INTRAVENOUS; SUBCUTANEOUS at 18:08

## 2025-05-12 RX ADMIN — BUPRENORPHINE HYDROCHLORIDE AND NALOXONE HYDROCHLORIDE DIHYDRATE 1 TABLET: 2; .5 TABLET SUBLINGUAL at 11:18

## 2025-05-12 NOTE — PLAN OF CARE
Problem: Chronic Conditions and Co-morbidities  Goal: Patient's chronic conditions and co-morbidity symptoms are monitored and maintained or improved  5/3/2025 1757 by Cooksey, Hannah, RN  Outcome: Progressing     Problem: Discharge Planning  Goal: Discharge to home or other facility with appropriate resources  5/4/2025 0314 by Raegan Bush RN  Outcome: Progressing  5/3/2025 1757 by Cooksey, Hannah, RN  Outcome: Progressing     Problem: Safety - Adult  Goal: Free from fall injury  5/4/2025 0314 by Raegan Bush RN  Outcome: Progressing  5/3/2025 1757 by Cooksey, Hannah, RN  Outcome: Progressing     Problem: Self Harm/Suicidality  Goal: Will have no self-injury during hospital stay  Description: INTERVENTIONS:1.  Ensure constant observer at bedside with Q15M safety checks2.  Maintain a safe environment3.  Secure patient belongings4.  Ensure family/visitors adhere to safety recommendations5.  Ensure safety tray has been added to patient's diet order6.  Every shift and PRN: Re-assess suicidal risk via Frequent Screener  5/4/2025 0314 by Raegan Bush RN  Outcome: Progressing  5/3/2025 1757 by Cooksey, Hannah, RN  Outcome: Progressing     Problem: Skin/Tissue Integrity  Goal: Skin integrity remains intact  Description: 1.  Monitor for areas of redness and/or skin breakdown2.  Assess vascular access sites hourly3.  Every 4-6 hours minimum:  Change oxygen saturation probe site4.  Every 4-6 hours:  If on nasal continuous positive airway pressure, respiratory therapy assess nares and determine need for appliance change or resting period  5/3/2025 1757 by Cooksey, Hannah, RN  Outcome: Progressing     Problem: Safety - Medical Restraint  Goal: Remains free of injury from restraints (Restraint for Interference with Medical Device)  Description: INTERVENTIONS:1. Determine that other, less restrictive measures have been tried or would not be effective before applying the restraint2. 
  Problem: Chronic Conditions and Co-morbidities  Goal: Patient's chronic conditions and co-morbidity symptoms are monitored and maintained or improved  5/8/2025 0457 by Oralia Ruiz RN  Outcome: Progressing  5/7/2025 1913 by Jackie Hernandez RN  Outcome: Progressing     Problem: Discharge Planning  Goal: Discharge to home or other facility with appropriate resources  5/8/2025 0457 by Oralia Ruiz RN  Outcome: Progressing  5/7/2025 1913 by Jackie Hernandez RN  Outcome: Progressing     Problem: Safety - Adult  Goal: Free from fall injury  5/8/2025 0457 by Oraila Ruiz RN  Outcome: Progressing  5/7/2025 1913 by Jackie Hernandez RN  Outcome: Progressing     Problem: Self Harm/Suicidality  Goal: Will have no self-injury during hospital stay  Description: INTERVENTIONS:1.  Ensure constant observer at bedside with Q15M safety checks2.  Maintain a safe environment3.  Secure patient belongings4.  Ensure family/visitors adhere to safety recommendations5.  Ensure safety tray has been added to patient's diet order6.  Every shift and PRN: Re-assess suicidal risk via Frequent Screener  5/8/2025 0457 by Oralia Ruiz RN  Outcome: Progressing  5/7/2025 1913 by Jackie Hernandez RN  Outcome: Progressing     Problem: Skin/Tissue Integrity  Goal: Skin integrity remains intact  Description: 1.  Monitor for areas of redness and/or skin breakdown2.  Assess vascular access sites hourly3.  Every 4-6 hours minimum:  Change oxygen saturation probe site4.  Every 4-6 hours:  If on nasal continuous positive airway pressure, respiratory therapy assess nares and determine need for appliance change or resting period  5/8/2025 0457 by Oralia Ruiz RN  Outcome: Progressing  5/7/2025 1913 by Jackie Hernandez RN  Outcome: Progressing  Flowsheets  Taken 5/7/2025 0800 by Anabel Power RN  Skin Integrity Remains Intact: 
  Problem: Chronic Conditions and Co-morbidities  Goal: Patient's chronic conditions and co-morbidity symptoms are monitored and maintained or improved  Outcome: Progressing     Problem: Discharge Planning  Goal: Discharge to home or other facility with appropriate resources  5/3/2025 1757 by Cooksey, Hannah, RN  Outcome: Progressing  5/3/2025 0452 by Raegan Bush RN  Outcome: Progressing     Problem: Safety - Adult  Goal: Free from fall injury  5/3/2025 1757 by Cooksey, Hannah, RN  Outcome: Progressing  5/3/2025 0452 by Raegan Bush RN  Outcome: Progressing     Problem: Self Harm/Suicidality  Goal: Will have no self-injury during hospital stay  Description: INTERVENTIONS:1.  Ensure constant observer at bedside with Q15M safety checks2.  Maintain a safe environment3.  Secure patient belongings4.  Ensure family/visitors adhere to safety recommendations5.  Ensure safety tray has been added to patient's diet order6.  Every shift and PRN: Re-assess suicidal risk via Frequent Screener  5/3/2025 1757 by Cooksey, Hannah, RN  Outcome: Progressing  5/3/2025 0452 by Raegan Bush RN  Outcome: Progressing     Problem: Skin/Tissue Integrity  Goal: Skin integrity remains intact  Description: 1.  Monitor for areas of redness and/or skin breakdown2.  Assess vascular access sites hourly3.  Every 4-6 hours minimum:  Change oxygen saturation probe site4.  Every 4-6 hours:  If on nasal continuous positive airway pressure, respiratory therapy assess nares and determine need for appliance change or resting period  Outcome: Progressing     Problem: Safety - Medical Restraint  Goal: Remains free of injury from restraints (Restraint for Interference with Medical Device)  Description: INTERVENTIONS:1. Determine that other, less restrictive measures have been tried or would not be effective before applying the restraint2. Evaluate the patient's condition at the time of restraint application3. Inform 
  Problem: Chronic Conditions and Co-morbidities  Goal: Patient's chronic conditions and co-morbidity symptoms are monitored and maintained or improved  Outcome: Progressing     Problem: Discharge Planning  Goal: Discharge to home or other facility with appropriate resources  5/4/2025 1049 by Silvia An RN  Outcome: Progressing  5/4/2025 0314 by Raegan Bush RN  Outcome: Progressing     Problem: Safety - Adult  Goal: Free from fall injury  5/4/2025 1049 by Silvia An RN  Outcome: Progressing  5/4/2025 0314 by Raegan Bush RN  Outcome: Progressing     Problem: Self Harm/Suicidality  Goal: Will have no self-injury during hospital stay  Description: INTERVENTIONS:1.  Ensure constant observer at bedside with Q15M safety checks2.  Maintain a safe environment3.  Secure patient belongings4.  Ensure family/visitors adhere to safety recommendations5.  Ensure safety tray has been added to patient's diet order6.  Every shift and PRN: Re-assess suicidal risk via Frequent Screener  5/4/2025 1049 by Silvia An RN  Outcome: Progressing  5/4/2025 0314 by Raegan Bush RN  Outcome: Progressing     Problem: Skin/Tissue Integrity  Goal: Skin integrity remains intact  Description: 1.  Monitor for areas of redness and/or skin breakdown2.  Assess vascular access sites hourly3.  Every 4-6 hours minimum:  Change oxygen saturation probe site4.  Every 4-6 hours:  If on nasal continuous positive airway pressure, respiratory therapy assess nares and determine need for appliance change or resting period  Outcome: Progressing  Flowsheets (Taken 5/4/2025 0946)  Skin Integrity Remains Intact: Monitor for areas of redness and/or skin breakdown     Problem: Safety - Medical Restraint  Goal: Remains free of injury from restraints (Restraint for Interference with Medical Device)  Description: INTERVENTIONS:1. Determine that other, less restrictive measures have been tried or would not be 
  Problem: Chronic Conditions and Co-morbidities  Goal: Patient's chronic conditions and co-morbidity symptoms are monitored and maintained or improved  Outcome: Progressing     Problem: Discharge Planning  Goal: Discharge to home or other facility with appropriate resources  Outcome: Progressing     Problem: Safety - Adult  Goal: Free from fall injury  Outcome: Progressing     Problem: Self Harm/Suicidality  Goal: Will have no self-injury during hospital stay  Description: INTERVENTIONS:1.  Ensure constant observer at bedside with Q15M safety checks2.  Maintain a safe environment3.  Secure patient belongings4.  Ensure family/visitors adhere to safety recommendations5.  Ensure safety tray has been added to patient's diet order6.  Every shift and PRN: Re-assess suicidal risk via Frequent Screener  Outcome: Progressing     Problem: Skin/Tissue Integrity  Goal: Skin integrity remains intact  Description: 1.  Monitor for areas of redness and/or skin breakdown2.  Assess vascular access sites hourly3.  Every 4-6 hours minimum:  Change oxygen saturation probe site4.  Every 4-6 hours:  If on nasal continuous positive airway pressure, respiratory therapy assess nares and determine need for appliance change or resting period  Outcome: Progressing     Problem: Safety - Medical Restraint  Goal: Remains free of injury from restraints (Restraint for Interference with Medical Device)  Description: INTERVENTIONS:1. Determine that other, less restrictive measures have been tried or would not be effective before applying the restraint2. Evaluate the patient's condition at the time of restraint application3. Inform patient/family regarding the reason for restraint4. Q2H: Monitor safety, psychosocial status, comfort, nutrition and hydration  Outcome: Progressing     Problem: Pain  Goal: Verbalizes/displays adequate comfort level or baseline comfort level  Outcome: Progressing     
  Problem: Chronic Conditions and Co-morbidities  Goal: Patient's chronic conditions and co-morbidity symptoms are monitored and maintained or improved  Outcome: Progressing     Problem: Discharge Planning  Goal: Discharge to home or other facility with appropriate resources  Outcome: Progressing     Problem: Safety - Adult  Goal: Free from fall injury  Outcome: Progressing     Problem: Self Harm/Suicidality  Goal: Will have no self-injury during hospital stay  Description: INTERVENTIONS:1.  Ensure constant observer at bedside with Q15M safety checks2.  Maintain a safe environment3.  Secure patient belongings4.  Ensure family/visitors adhere to safety recommendations5.  Ensure safety tray has been added to patient's diet order6.  Every shift and PRN: Re-assess suicidal risk via Frequent Screener  Outcome: Progressing     Problem: Skin/Tissue Integrity  Goal: Skin integrity remains intact  Description: 1.  Monitor for areas of redness and/or skin breakdown2.  Assess vascular access sites hourly3.  Every 4-6 hours minimum:  Change oxygen saturation probe site4.  Every 4-6 hours:  If on nasal continuous positive airway pressure, respiratory therapy assess nares and determine need for appliance change or resting period  Outcome: Progressing     Problem: Safety - Medical Restraint  Goal: Remains free of injury from restraints (Restraint for Interference with Medical Device)  Description: INTERVENTIONS:1. Determine that other, less restrictive measures have been tried or would not be effective before applying the restraint2. Evaluate the patient's condition at the time of restraint application3. Inform patient/family regarding the reason for restraint4. Q2H: Monitor safety, psychosocial status, comfort, nutrition and hydration  Outcome: Progressing     Problem: Pain  Goal: Verbalizes/displays adequate comfort level or baseline comfort level  Outcome: Progressing     Problem: Nutrition Deficit:  Goal: Optimize nutritional 
  Problem: Chronic Conditions and Co-morbidities  Goal: Patient's chronic conditions and co-morbidity symptoms are monitored and maintained or improved  Outcome: Progressing     Problem: Discharge Planning  Goal: Discharge to home or other facility with appropriate resources  Outcome: Progressing     Problem: Safety - Adult  Goal: Free from fall injury  Outcome: Progressing     Problem: Self Harm/Suicidality  Goal: Will have no self-injury during hospital stay  Description: INTERVENTIONS:1.  Ensure constant observer at bedside with Q15M safety checks2.  Maintain a safe environment3.  Secure patient belongings4.  Ensure family/visitors adhere to safety recommendations5.  Ensure safety tray has been added to patient's diet order6.  Every shift and PRN: Re-assess suicidal risk via Frequent Screener  Outcome: Progressing     Problem: Skin/Tissue Integrity  Goal: Skin integrity remains intact  Description: 1.  Monitor for areas of redness and/or skin breakdown2.  Assess vascular access sites hourly3.  Every 4-6 hours minimum:  Change oxygen saturation probe site4.  Every 4-6 hours:  If on nasal continuous positive airway pressure, respiratory therapy assess nares and determine need for appliance change or resting period  Outcome: Progressing     Problem: Safety - Medical Restraint  Goal: Remains free of injury from restraints (Restraint for Interference with Medical Device)  Description: INTERVENTIONS:1. Determine that other, less restrictive measures have been tried or would not be effective before applying the restraint2. Evaluate the patient's condition at the time of restraint application3. Inform patient/family regarding the reason for restraint4. Q2H: Monitor safety, psychosocial status, comfort, nutrition and hydration  Outcome: Progressing     Problem: Pain  Goal: Verbalizes/displays adequate comfort level or baseline comfort level  Outcome: Progressing     Problem: Occupational Therapy - Adult  Goal: By 
  Problem: Chronic Conditions and Co-morbidities  Goal: Patient's chronic conditions and co-morbidity symptoms are monitored and maintained or improved  Outcome: Progressing     Problem: Discharge Planning  Goal: Discharge to home or other facility with appropriate resources  Outcome: Progressing     Problem: Safety - Adult  Goal: Free from fall injury  Outcome: Progressing     Problem: Self Harm/Suicidality  Goal: Will have no self-injury during hospital stay  Description: INTERVENTIONS:1.  Ensure constant observer at bedside with Q15M safety checks2.  Maintain a safe environment3.  Secure patient belongings4.  Ensure family/visitors adhere to safety recommendations5.  Ensure safety tray has been added to patient's diet order6.  Every shift and PRN: Re-assess suicidal risk via Frequent Screener  Outcome: Progressing     Problem: Skin/Tissue Integrity  Goal: Skin integrity remains intact  Description: 1.  Monitor for areas of redness and/or skin breakdown2.  Assess vascular access sites hourly3.  Every 4-6 hours minimum:  Change oxygen saturation probe site4.  Every 4-6 hours:  If on nasal continuous positive airway pressure, respiratory therapy assess nares and determine need for appliance change or resting period  Outcome: Progressing     Problem: Safety - Medical Restraint  Goal: Remains free of injury from restraints (Restraint for Interference with Medical Device)  Description: INTERVENTIONS:1. Determine that other, less restrictive measures have been tried or would not be effective before applying the restraint2. Evaluate the patient's condition at the time of restraint application3. Inform patient/family regarding the reason for restraint4. Q2H: Monitor safety, psychosocial status, comfort, nutrition and hydration  Outcome: Progressing     Problem: Pain  Goal: Verbalizes/displays adequate comfort level or baseline comfort level  Outcome: Progressing     Problem: Physical Therapy - Adult  Goal: By Discharge: 
  Problem: Chronic Conditions and Co-morbidities  Goal: Patient's chronic conditions and co-morbidity symptoms are monitored and maintained or improved  Outcome: Progressing     Problem: Discharge Planning  Goal: Discharge to home or other facility with appropriate resources  Outcome: Progressing     Problem: Safety - Adult  Goal: Free from fall injury  Outcome: Progressing     Problem: Self Harm/Suicidality  Goal: Will have no self-injury during hospital stay  Description: INTERVENTIONS:1.  Ensure constant observer at bedside with Q15M safety checks2.  Maintain a safe environment3.  Secure patient belongings4.  Ensure family/visitors adhere to safety recommendations5.  Ensure safety tray has been added to patient's diet order6.  Every shift and PRN: Re-assess suicidal risk via Frequent Screener  Outcome: Progressing     Problem: Skin/Tissue Integrity  Goal: Skin integrity remains intact  Description: 1.  Monitor for areas of redness and/or skin breakdown2.  Assess vascular access sites hourly3.  Every 4-6 hours minimum:  Change oxygen saturation probe site4.  Every 4-6 hours:  If on nasal continuous positive airway pressure, respiratory therapy assess nares and determine need for appliance change or resting period  Outcome: Progressing  Flowsheets  Taken 5/7/2025 0800 by Anabel Power, RN  Skin Integrity Remains Intact: Assess vascular access sites hourly  Taken 5/7/2025 0703 by Henry Farley, RN  Skin Integrity Remains Intact:   Assess vascular access sites hourly   Monitor for areas of redness and/or skin breakdown   Every 4-6 hours minimum:  Change oxygen saturation probe site     Problem: Safety - Medical Restraint  Goal: Remains free of injury from restraints (Restraint for Interference with Medical Device)  Description: INTERVENTIONS:1. Determine that other, less restrictive measures have been tried or would not be effective before applying the restraint2. Evaluate the patient's condition at the time of 
  Problem: Chronic Conditions and Co-morbidities  Goal: Patient's chronic conditions and co-morbidity symptoms are monitored and maintained or improved  Outcome: Progressing  Flowsheets (Taken 5/11/2025 1311)  Care Plan - Patient's Chronic Conditions and Co-Morbidity Symptoms are Monitored and Maintained or Improved: Monitor and assess patient's chronic conditions and comorbid symptoms for stability, deterioration, or improvement     Problem: Discharge Planning  Goal: Discharge to home or other facility with appropriate resources  Outcome: Progressing  Flowsheets (Taken 5/11/2025 1311)  Discharge to home or other facility with appropriate resources: Identify barriers to discharge with patient and caregiver     
  Problem: Discharge Planning  Goal: Discharge to home or other facility with appropriate resources  Outcome: Progressing     Problem: Safety - Adult  Goal: Free from fall injury  Outcome: Progressing     Problem: Self Harm/Suicidality  Goal: Will have no self-injury during hospital stay  Description: INTERVENTIONS:1.  Ensure constant observer at bedside with Q15M safety checks2.  Maintain a safe environment3.  Secure patient belongings4.  Ensure family/visitors adhere to safety recommendations5.  Ensure safety tray has been added to patient's diet order6.  Every shift and PRN: Re-assess suicidal risk via Frequent Screener  Outcome: Progressing     Problem: Safety - Medical Restraint  Goal: Remains free of injury from restraints (Restraint for Interference with Medical Device)  Description: INTERVENTIONS:1. Determine that other, less restrictive measures have been tried or would not be effective before applying the restraint2. Evaluate the patient's condition at the time of restraint application3. Inform patient/family regarding the reason for restraint4. Q2H: Monitor safety, psychosocial status, comfort, nutrition and hydration  Outcome: Progressing     Problem: Pain  Goal: Verbalizes/displays adequate comfort level or baseline comfort level  Outcome: Progressing     
  Problem: Discharge Planning  Goal: Discharge to home or other facility with appropriate resources  Outcome: Progressing     Problem: Safety - Adult  Goal: Free from fall injury  Outcome: Progressing     Problem: Self Harm/Suicidality  Goal: Will have no self-injury during hospital stay  Description: INTERVENTIONS:1.  Ensure constant observer at bedside with Q15M safety checks2.  Maintain a safe environment3.  Secure patient belongings4.  Ensure family/visitors adhere to safety recommendations5.  Ensure safety tray has been added to patient's diet order6.  Every shift and PRN: Re-assess suicidal risk via Frequent Screener  Outcome: Progressing     Problem: Safety - Medical Restraint  Goal: Remains free of injury from restraints (Restraint for Interference with Medical Device)  Description: INTERVENTIONS:1. Determine that other, less restrictive measures have been tried or would not be effective before applying the restraint2. Evaluate the patient's condition at the time of restraint application3. Inform patient/family regarding the reason for restraint4. Q2H: Monitor safety, psychosocial status, comfort, nutrition and hydration  Outcome: Progressing     Problem: Pain  Goal: Verbalizes/displays adequate comfort level or baseline comfort level  Outcome: Progressing     
  Problem: Occupational Therapy - Adult  Goal: By Discharge: Performs self-care activities at highest level of function for planned discharge setting.  See evaluation for individualized goals.  Description: FUNCTIONAL STATUS PRIOR TO ADMISSION:  Patient was ambulatory using no DME  Receives Help From: Family, Prior Level of Assist for ADLs: Independent,  ,  ,  ,  ,  , Prior Level of Assist for Homemaking: Independent, Ambulation Assistance: Independent, Prior Level of Assist for Transfers: Independent, Active : No     HOME SUPPORT: Patient lived with family but didn't require assistance.    Occupational Therapy Goals:  Initiated 4/29/2025  1.  Patient will perform grooming with Supervision within 7 day(s).  2.  Patient will perform bathing with Supervision within 7 day(s).  3.  Patient will perform lower body dressing with Supervision within 7 day(s).  4.  Patient will perform toilet transfers with Contact Guard Assist  within 7 day(s).  5.  Patient will perform all aspects of toileting with Supervision within 7 day(s).  6.  Patient will participate in upper extremity therapeutic exercise/activities with Supervision for 15 minutes within 7 day(s).    7.  Patient will utilize energy conservation techniques during functional activities with verbal cues within 7 day(s).   Outcome: Progressing     OCCUPATIONAL THERAPY TREATMENT  Patient: Keaton Van (59 y.o. male)  Date: 4/30/2025  Primary Diagnosis: Toxic metabolic encephalopathy [G92.8]       Precautions: Skin, Fall Risk, Suicide                Chart, occupational therapy assessment, plan of care, and goals were reviewed.    ASSESSMENT  Patient continues to benefit from skilled OT services and is slowly progressing towards goals. Pt continues to be limited by poor safety awareness, carryover between sessions, insight into deficits, decreased balance, activity tolerance, and ability to perform ADLs and functional transfers compared to baseline. Use of prosthetic 
  Problem: Occupational Therapy - Adult  Goal: By Discharge: Performs self-care activities at highest level of function for planned discharge setting.  See evaluation for individualized goals.  Description: FUNCTIONAL STATUS PRIOR TO ADMISSION:  Patient was ambulatory using no DME  Receives Help From: Family, Prior Level of Assist for ADLs: Independent,  ,  ,  ,  ,  , Prior Level of Assist for Homemaking: Independent, Ambulation Assistance: Independent, Prior Level of Assist for Transfers: Independent, Active : No     HOME SUPPORT: Patient lived with family but didn't require assistance.    Occupational Therapy Goals:  Initiated 4/29/2025  1.  Patient will perform grooming with Supervision within 7 day(s).  2.  Patient will perform bathing with Supervision within 7 day(s).  3.  Patient will perform lower body dressing with Supervision within 7 day(s).  4.  Patient will perform toilet transfers with Contact Guard Assist  within 7 day(s).  5.  Patient will perform all aspects of toileting with Supervision within 7 day(s).  6.  Patient will participate in upper extremity therapeutic exercise/activities with Supervision for 15 minutes within 7 day(s).    7.  Patient will utilize energy conservation techniques during functional activities with verbal cues within 7 day(s).   Outcome: Progressing     OCCUPATIONAL THERAPY TREATMENT  Patient: Keaton Van (59 y.o. male)  Date: 5/1/2025  Primary Diagnosis: Toxic metabolic encephalopathy [G92.8]       Precautions: Skin, Fall Risk, Suicide                Chart, occupational therapy assessment, plan of care, and goals were reviewed.    ASSESSMENT  Patient continues to benefit from skilled OT services and is slowly progressing towards goals. Pt continues to be limited by overall endurance, activity tolerance, balance during functional mobility, decreased insight into deficits, difficulty dividing attention, hyperverbal requiring frequent redirection for safety, and dizziness 
  Problem: Occupational Therapy - Adult  Goal: By Discharge: Performs self-care activities at highest level of function for planned discharge setting.  See evaluation for individualized goals.  Description: FUNCTIONAL STATUS PRIOR TO ADMISSION:  Patient was ambulatory using no DME  Receives Help From: Family, Prior Level of Assist for ADLs: Independent,  ,  ,  ,  ,  , Prior Level of Assist for Homemaking: Independent, Ambulation Assistance: Independent, Prior Level of Assist for Transfers: Independent, Active : No     HOME SUPPORT: Patient lived with family but didn't require assistance.    Occupational Therapy Goals:  Initiated 4/29/2025  1.  Patient will perform grooming with Supervision within 7 day(s).  2.  Patient will perform bathing with Supervision within 7 day(s).  3.  Patient will perform lower body dressing with Supervision within 7 day(s).  4.  Patient will perform toilet transfers with Contact Guard Assist  within 7 day(s).  5.  Patient will perform all aspects of toileting with Supervision within 7 day(s).  6.  Patient will participate in upper extremity therapeutic exercise/activities with Supervision for 15 minutes within 7 day(s).    7.  Patient will utilize energy conservation techniques during functional activities with verbal cues within 7 day(s).   Outcome: Progressing   OCCUPATIONAL THERAPY EVALUATION    Patient: Keaton Van (59 y.o. male)  Date: 4/29/2025  Primary Diagnosis: Toxic metabolic encephalopathy [G92.8]         Precautions: Skin, Fall Risk, Suicide                  ASSESSMENT :  The patient is limited by decreased functional mobility, independence in ADLs, high-level IADLs, ROM, strength, body mechanics, activity tolerance, endurance, safety awareness, coordination, balance, posture. Admitted to the hospital with intentional drug overdose,  requiring intubation. Pt was L BKA with prosthetic but reports wound on residual limb with limited feeling, reports primary care doctor 
  Problem: Occupational Therapy - Adult  Goal: By Discharge: Performs self-care activities at highest level of function for planned discharge setting.  See evaluation for individualized goals.  Description: FUNCTIONAL STATUS PRIOR TO ADMISSION:  Patient was ambulatory using no DME  Receives Help From: Family, Prior Level of Assist for ADLs: Independent,  ,  ,  ,  ,  , Prior Level of Assist for Homemaking: Independent, Ambulation Assistance: Independent, Prior Level of Assist for Transfers: Independent, Active : No     HOME SUPPORT: Patient lived with family but didn't require assistance.    Occupational Therapy Goals:  Initiated 4/29/2025, re-assessed on 5/8 and progressed  1.  Patient will perform grooming with [place] seated EOB within 7 day(s).  2.  Patient will perform bathing with Mod I within 7 day(s).  3.  Patient will perform lower body dressing with Mod I within 7 day(s).  4.  Patient will perform toilet transfers with Contact Guard Assist  within 7 day(s).  5.  Patient will perform all aspects of toileting with Supervision within 7 day(s).  6.  Patient will participate in upper extremity therapeutic exercise/activities with Supervision for 15 minutes within 7 day(s).    7.  Patient will utilize energy conservation techniques during functional activities with verbal cues within 7 day(s).     Initiated 4/29/2025  1.  Patient will perform grooming with Supervision within 7 day(s).  2.  Patient will perform bathing with Supervision within 7 day(s).  3.  Patient will perform lower body dressing with Supervision within 7 day(s).  4.  Patient will perform toilet transfers with Contact Guard Assist  within 7 day(s).  5.  Patient will perform all aspects of toileting with Supervision within 7 day(s).  6.  Patient will participate in upper extremity therapeutic exercise/activities with Supervision for 15 minutes within 7 day(s).    7.  Patient will utilize energy conservation techniques during functional 
  Problem: Occupational Therapy - Adult  Goal: By Discharge: Performs self-care activities at highest level of function for planned discharge setting.  See evaluation for individualized goals.  Description: FUNCTIONAL STATUS PRIOR TO ADMISSION:  Patient was ambulatory using no DME  Receives Help From: Family, Prior Level of Assist for ADLs: Independent,  ,  ,  ,  ,  , Prior Level of Assist for Homemaking: Independent, Ambulation Assistance: Independent, Prior Level of Assist for Transfers: Independent, Active : No     HOME SUPPORT: Patient lived with family but didn't require assistance.    Occupational Therapy Goals:  Initiated 4/29/2025, re-assessed on 5/8 and progressed  1.  Patient will perform grooming with [place] seated EOB within 7 day(s).  2.  Patient will perform bathing with Mod I within 7 day(s).  3.  Patient will perform lower body dressing with Mod I within 7 day(s).  4.  Patient will perform toilet transfers with Contact Guard Assist  within 7 day(s).  5.  Patient will perform all aspects of toileting with Supervision within 7 day(s).  6.  Patient will participate in upper extremity therapeutic exercise/activities with Supervision for 15 minutes within 7 day(s).    7.  Patient will utilize energy conservation techniques during functional activities with verbal cues within 7 day(s).     Initiated 4/29/2025  1.  Patient will perform grooming with Supervision within 7 day(s).  2.  Patient will perform bathing with Supervision within 7 day(s).  3.  Patient will perform lower body dressing with Supervision within 7 day(s).  4.  Patient will perform toilet transfers with Contact Guard Assist  within 7 day(s).  5.  Patient will perform all aspects of toileting with Supervision within 7 day(s).  6.  Patient will participate in upper extremity therapeutic exercise/activities with Supervision for 15 minutes within 7 day(s).    7.  Patient will utilize energy conservation techniques during functional 
  Problem: Physical Therapy - Adult  Goal: By Discharge: Performs mobility at highest level of function for planned discharge setting.  See evaluation for individualized goals.  Description: FUNCTIONAL STATUS PRIOR TO ADMISSION: Pt was Mod I for all mobility with use of RLE prosthetic limb; independent with ADLs, sisters assist with IADLs and medicine management.     HOME SUPPORT PRIOR TO ADMISSION: The patient lived with sister; unclear level of A available upon discharge.    Physical Therapy Goals  Initiated 4/29/2025  1.  Patient will move from supine to sit and sit to supine in bed with modified independence within 7 day(s).    2.  Patient will perform sit to stand with modified independence within 7 day(s).  3.  Patient will transfer from bed to chair and chair to bed with modified independence using the least restrictive device within 7 day(s).  4.  Patient will ambulate with modified independence for 50 feet with the least restrictive device within 7 day(s).   5.  Patient will ascend/descend 10 stairs with B handrail(s) with supervision/set-up within 7 day(s).   Outcome: Progressing   PHYSICAL THERAPY EVALUATION    Patient: Keaton Van (59 y.o. male)  Date: 4/29/2025  Primary Diagnosis: Toxic metabolic encephalopathy [G92.8]       Precautions: Restrictions/Precautions  Restrictions/Precautions: Skin, Fall Risk, Suicide            ASSESSMENT :   Pt is a 58 y/o male with history of HTN, HLD, DM and ESRD who presented to the ED on 4/19 with suicide attempt (ativan OD) - admitted to the ICU for hyperkalemia and emergent HD.  Required intubation for airway protection and was extubated 4/28 to NC.  Pt has also been undergoing medical management of toxic metabolic encephalopathy.  Seen for PT evaluation this date with good tolerance to PT intervention, able to transition upright OOBTC this date while remaining hemodynamically stable.  Patient currently performing all mobility at an overall CGA-Matthew level and able to 
  Problem: Physical Therapy - Adult  Goal: By Discharge: Performs mobility at highest level of function for planned discharge setting.  See evaluation for individualized goals.  Description: FUNCTIONAL STATUS PRIOR TO ADMISSION: Pt was Mod I for all mobility with use of RLE prosthetic limb; independent with ADLs, sisters assist with IADLs and medicine management.     HOME SUPPORT PRIOR TO ADMISSION: The patient lived with sister; unclear level of A available upon discharge.    Physical Therapy Goals  Initiated 4/29/2025  1.  Patient will move from supine to sit and sit to supine in bed with modified independence within 7 day(s).    2.  Patient will perform sit to stand with modified independence within 7 day(s).  3.  Patient will transfer from bed to chair and chair to bed with modified independence using the least restrictive device within 7 day(s).  4.  Patient will ambulate with modified independence for 50 feet with the least restrictive device within 7 day(s).   5.  Patient will ascend/descend 10 stairs with B handrail(s) with supervision/set-up within 7 day(s).   Outcome: Progressing   PHYSICAL THERAPY TREATMENT    Patient: Keaton Van (59 y.o. male)  Date: 4/30/2025  Diagnosis: Toxic metabolic encephalopathy [G92.8] Toxic metabolic encephalopathy      Precautions: Restrictions/Precautions  Restrictions/Precautions: Skin, Fall Risk, Suicide            ASSESSMENT:  Patient continues to benefit from skilled PT services and is slowly progressing towards goals. Patient seen for ongoing acute PT intervention this date with fair tolerance to PT intervention, but requiring increased level of assistance for mobility this date with decreased tolerance to upright activity due to c/o fatigue and weakness.  Focus of session on progression of upright tolerance, functional transfers, and ambulation this date.  Pt noted with flat affect and increasingly depressed mood as compared to prior session with some impulsivity with 
  Problem: Physical Therapy - Adult  Goal: By Discharge: Performs mobility at highest level of function for planned discharge setting.  See evaluation for individualized goals.  Description: FUNCTIONAL STATUS PRIOR TO ADMISSION: Pt was Mod I for all mobility with use of RLE prosthetic limb; independent with ADLs, sisters assist with IADLs and medicine management.     HOME SUPPORT PRIOR TO ADMISSION: The patient lived with sister; unclear level of A available upon discharge.    Physical Therapy Goals  Updated 5/08/2025  1.  Patient will move from supine to sit and sit to supine in bed with modified independence within 7 day(s).    2.  Patient will perform sit to stand with modified independence within 7 day(s).  3.  Patient will transfer from bed to chair and chair to bed with modified independence using the least restrictive device within 7 day(s).  4.  Patient will ambulate with contact guard assistance for 20 feet with the least restrictive device within 7 day(s).   5.  Patient will ascend/descend 4 stairs (increase up to 10 per home setup as goals are met) with B handrail(s) with minimal assistance within 7 day(s).     Initiated 4/29/2025  1.  Patient will move from supine to sit and sit to supine in bed with modified independence within 7 day(s).    2.  Patient will perform sit to stand with modified independence within 7 day(s).  3.  Patient will transfer from bed to chair and chair to bed with modified independence using the least restrictive device within 7 day(s).  4.  Patient will ambulate with modified independence for 50 feet with the least restrictive device within 7 day(s).   5.  Patient will ascend/descend 10 stairs with B handrail(s) with supervision/set-up within 7 day(s).   Outcome: Progressing   PHYSICAL THERAPY TREATMENT: WEEKLY REASSESSMENT    Patient: Keaton Van (59 y.o. male)  Date: 5/8/2025  Primary Diagnosis: Toxic metabolic encephalopathy [G92.8]       Precautions: 
  Problem: Safety - Adult  Goal: Free from fall injury  Outcome: Progressing     Problem: Self Harm/Suicidality  Goal: Will have no self-injury during hospital stay  Description: INTERVENTIONS:1.  Ensure constant observer at bedside with Q15M safety checks2.  Maintain a safe environment3.  Secure patient belongings4.  Ensure family/visitors adhere to safety recommendations5.  Ensure safety tray has been added to patient's diet order6.  Every shift and PRN: Re-assess suicidal risk via Frequent Screener  Outcome: Progressing     Problem: Skin/Tissue Integrity  Goal: Skin integrity remains intact  Description: 1.  Monitor for areas of redness and/or skin breakdown2.  Assess vascular access sites hourly3.  Every 4-6 hours minimum:  Change oxygen saturation probe site4.  Every 4-6 hours:  If on nasal continuous positive airway pressure, respiratory therapy assess nares and determine need for appliance change or resting period  Outcome: Progressing     Problem: Safety - Medical Restraint  Goal: Remains free of injury from restraints (Restraint for Interference with Medical Device)  Description: INTERVENTIONS:1. Determine that other, less restrictive measures have been tried or would not be effective before applying the restraint2. Evaluate the patient's condition at the time of restraint application3. Inform patient/family regarding the reason for restraint4. Q2H: Monitor safety, psychosocial status, comfort, nutrition and hydration  Outcome: Progressing     Problem: Nutrition Deficit:  Goal: Optimize nutritional status  Outcome: Progressing     
Dialysis ETA @ 0615H today  
Patients sacrum is nonblanchable, nurse attempted to put a foam dressing on patients bottom. The patient refused, education was provided on the prevention of skin breakdown. Patient stated that he understood and still refused. Will continue to monitor.     Problem: Chronic Conditions and Co-morbidities  Goal: Patient's chronic conditions and co-morbidity symptoms are monitored and maintained or improved  Outcome: Progressing     Problem: Discharge Planning  Goal: Discharge to home or other facility with appropriate resources  Outcome: Progressing     Problem: Safety - Adult  Goal: Free from fall injury  Outcome: Progressing     Problem: Self Harm/Suicidality  Goal: Will have no self-injury during hospital stay  Description: INTERVENTIONS:1.  Ensure constant observer at bedside with Q15M safety checks2.  Maintain a safe environment3.  Secure patient belongings4.  Ensure family/visitors adhere to safety recommendations5.  Ensure safety tray has been added to patient's diet order6.  Every shift and PRN: Re-assess suicidal risk via Frequent Screener  Outcome: Progressing     Problem: Skin/Tissue Integrity  Goal: Skin integrity remains intact  Description: 1.  Monitor for areas of redness and/or skin breakdown2.  Assess vascular access sites hourly3.  Every 4-6 hours minimum:  Change oxygen saturation probe site4.  Every 4-6 hours:  If on nasal continuous positive airway pressure, respiratory therapy assess nares and determine need for appliance change or resting period  Outcome: Progressing     Problem: Safety - Medical Restraint  Goal: Remains free of injury from restraints (Restraint for Interference with Medical Device)  Description: INTERVENTIONS:1. Determine that other, less restrictive measures have been tried or would not be effective before applying the restraint2. Evaluate the patient's condition at the time of restraint application3. Inform patient/family regarding the reason for restraint4. Q2H: Monitor 
Restrictions/Precautions  Restrictions/Precautions: Skin, Fall Risk, Suicide            ASSESSMENT:  Patient continues to benefit from skilled PT services and is progressing towards goals. Patient received supine in bed, agreeable to therapy with encouragement. Continues to mobilize quickly during transitions, cues provided for safety and hand placement with transfers. Deferred donning prosthetic given wound on residual limb and reviewed need to avoid prosthetic use until cleared by MD that skin is ready for prosthetic use. He was able to hop a short household distance with RW-- still needing min A to steady during activity and would recommend sister assist him at all times when on his feet upon first few days home until balance improves. At stairs in the therapy gym, patient described use of shower chair (legs on two different heights), in order to ascend stairs to enter his home (was taught in inpatient rehab after amputation) and would use this technique to enter his home. Of note, his bedroom is on the second floor and currently he does not present with enough endurance to use this chair technique over the course of a full flight of steps and PT would recommend first floor set up as able at home. Returned to chair at end of session and reviewed mobility concerns with CM/OT. Continues to present with decreased insight into limitations and safety.         PLAN:  Patient continues to benefit from skilled intervention to address the above impairments.  Continue treatment per established plan of care.    Recommendations for staff mobility and toileting assistance:  Recommend that staff completes patient mobility with assist x1 using gait belt and rolling walker.    Recommend for next PT session: sit to stand transfers, bed to bedside chair transfers, and further progression of gait with existing device    Recommendation for discharge: (in order for the patient to meet his/her long term goals):   Moderate intensity 
been tried or would not be effective before applying the restraint2. Evaluate the patient's condition at the time of restraint application3. Inform patient/family regarding the reason for restraint4. Q2H: Monitor safety, psychosocial status, comfort, nutrition and hydration  Outcome: Adequate for Discharge     Problem: Pain  Goal: Verbalizes/displays adequate comfort level or baseline comfort level  Outcome: Adequate for Discharge     Problem: Nutrition Deficit:  Goal: Optimize nutritional status  Outcome: Adequate for Discharge

## 2025-05-12 NOTE — DISCHARGE INSTRUCTIONS
Discharge Instructions       PATIENT ID: Keaton Van  MRN: [id number]   YOB: 1965    DATE OF ADMISSION: [unfilled]    DATE OF DISCHARGE: 5/12/2025    PRIMARY CARE PROVIDER: @PCP@     ATTENDING PHYSICIAN: [unfilled]  DISCHARGING PROVIDER: Feroz Alvarado MD    To contact this individual call [phone] and ask the  to page.   If unavailable ask to be transferred the Adult Hospitalist Department.    DISCHARGE DIAGNOSES Acute respiratory failure    CONSULTATIONS: Cardiology    PROCEDURES/SURGERIES: * No surgery found *    PENDING TEST RESULTS:   At the time of discharge the following test results are still pending: none    FOLLOW UP APPOINTMENTS:   PCP  Cardiology    ADDITIONAL CARE RECOMMENDATIONS:   INR checks every 3-4 days, report to  be sent to PMD    DIET: cardiac diet    ACTIVITY: activity as tolerated    DISCHARGE MEDICATIONS:   See Medication Reconciliation Form    It is important that you take the medication exactly as they are prescribed.   Keep your medication in the bottles provided by the pharmacist and keep a list of the medication names, dosages, and times to be taken in your wallet.   Do not take other medications without consulting your doctor.       NOTIFY YOUR PHYSICIAN FOR ANY OF THE FOLLOWING:   Fever over 101 degrees for 24 hours.   Chest pain, shortness of breath, fever, chills, nausea, vomiting, diarrhea, change in mentation, falling, weakness, bleeding. Severe pain or pain not relieved by medications.  Or, any other signs or symptoms that you may have questions about.      DISPOSITION:   x Home With:   OT  PT  HH  RN       SNF/Inpatient Rehab/LTAC    Independent/assisted living    Hospice    Other:     CDMP Checked:   Yes x     PROBLEM LIST Updated:  Yes x       Signed:   Feroz Alvarado MD  5/12/2025  4:04 PM

## 2025-05-12 NOTE — CARE COORDINATION
Transition Of Care:    [facility] sent.     [facility] can not accept due to past and recent medical history.     5/12/25: Currently no SNF PRECIOUS acceptance at this time.     Cardiology, Nephrology, Psych, PT/OT following. If SNF is plan, a level 2 will be needed due to psyche history. The patient is agreeable to these referrals. No insurance auth will be required with Medicare. Outpatient HD chair accepted by [facility] per liaison Kaylen. Clinic awaiting Dr. Lacho Price (clinic nephrologist director) to accept patient. The patient accepts the chair option for 12:00 noon MWF. His sister Anuradha [facility].     The attending is fine with the patient discharging home with his sister who is there every day to assist the patient. Plan to discharge home tomorrow with HH PT/OT.      [facility] (Critical access hospital [facility])   Kaylen: Admissions liaison  Phone: [phone] and fax [phone]   MWF at 12:00 schedule  Address: [facility] [address] [facility], [address]  Phone: [phone]  Fax: [phone]    CM called [facility] and spoke with Kaylen to provide updates. At discharge they will need CM to fax the most recent Nephro note, HD flow sheet, labs, and DC summary.     12:20PM: CM met with patient at bedside to discuss PRECIOUS planning. Patient is requesting to discharge home. CM requested to speak to his sister and him together to discuss, patient attempted to call Anuradha, she did not answer.     CM attempted to call Anuradha 1:00PM and left a voicemail.    1:20: CM spoke with patients sister Anuradha, she agrees to the patient returning to her home, she is very concerned about the 4 steps to enter and 15 steps to reach his bed room/ area of the home. Anuradha is concerned he will go home and fall. FOC offered for HH, referrals sent.     [facility] has accepted for HH, CM added to AVS.     CM reached out to [facility] and left a voicemail for HD start up

## 2025-05-12 NOTE — FLOWSHEET NOTE
05/12/25 1857   AVS Reviewed   AVS & discharge instructions reviewed with patient and/or representative? Yes   Reviewed instructions with Patient   Level of Understanding Questions answered;Verbalized understanding       
Primary RN SBAR: Carlos YA  Incapacitated Nurse [facility]. provided: Yes  Patient Education provided: HD treatment procedure  Preferred Education method and Primary language: English/Verbal  Dialysis consent: Yes  Hospital General Consent Verified: YEs  Hospital associated wait time; reason: NA  Hepatitis B Surface Ag   Date/Time Value Ref Range Status   04/27/2025 02:20 PM 0.14 Index Final     Hep B S Ag Interp   Date/Time Value Ref Range Status   04/27/2025 02:20 PM Negative NEG   Final     Hep B S Ab   Date/Time Value Ref Range Status   04/27/2025 02:20 PM 3.63 mIU/mL Final     Hep B S Ab Interp   Date/Time Value Ref Range Status   04/27/2025 02:20 PM NONREACTIVE NR   Final     Comment:     (NOTE)  The [employer]IA Centaur Anti-HBs2 assay is traceable to the World Health   Organization (WHO) Hepatitis B Immunoglobulin 1st International   Reference Preparation (1977). Samples with a calculated value of 10   mIU/mL or greater are considered reactive (protective) in accordance   with the CDC guidelines. The accepted criteria for immunity to HBV is   anti-HBs activity greater than or equal to 10 mIU/mL, as defined by   the WHO International Reference Preparation.  Assay performance has not been established in pregnant women,   patients who are immunosuppressed or immunocompromised, nor have   performance characteristics been established in conjunction with   other 's assays for specific HBV serologic markers. This   assay does not differentiate between vaccine induced immune response   and a response due to infection with HBV. Passively acquired anti-HBs   may be identified following patient transfusion, receipt of   immunoglobulin products, etc.            Pre-HD Assessment   05/09/25 0816   Vital Signs   /68   Temp 97.6 °F (36.4 °C)   Pulse 57   Respirations 16   SpO2 96 %   Pain Assessment   Pain Assessment None - Denies Pain   Treatment   Time On 0823   Treatment Goal 3L/3.5hrs   Observations & Evaluations 
Primary RN SBAR: Henry YA  Incapacitated Nurse [facility]. provided: Yes  Patient Education provided: HD Treatment procedure  Preferred Education method and Primary language: English/Verbal  Dialysis consent: Yes  Hospital General Consent Verified: Yes  Hospital associated wait time; reason: NA  Hepatitis B Surface Ag   Date/Time Value Ref Range Status   04/27/2025 02:20 PM 0.14 Index Final     Hep B S Ag Interp   Date/Time Value Ref Range Status   04/27/2025 02:20 PM Negative NEG   Final     Hep B S Ab   Date/Time Value Ref Range Status   04/27/2025 02:20 PM 3.63 mIU/mL Final     Hep B S Ab Interp   Date/Time Value Ref Range Status   04/27/2025 02:20 PM NONREACTIVE NR   Final     Comment:     (NOTE)  The [employer] Centaur Anti-HBs2 assay is traceable to the World Health   Organization (WHO) Hepatitis B Immunoglobulin 1st International   Reference Preparation (1977). Samples with a calculated value of 10   mIU/mL or greater are considered reactive (protective) in accordance   with the CDC guidelines. The accepted criteria for immunity to HBV is   anti-HBs activity greater than or equal to 10 mIU/mL, as defined by   the WHO International Reference Preparation.  Assay performance has not been established in pregnant women,   patients who are immunosuppressed or immunocompromised, nor have   performance characteristics been established in conjunction with   other 's assays for specific HBV serologic markers. This   assay does not differentiate between vaccine induced immune response   and a response due to infection with HBV. Passively acquired anti-HBs   may be identified following patient transfusion, receipt of   immunoglobulin products, etc.          PRE-HD Assessment     05/07/25 0745   Vital Signs   /74   Temp (!) 0.6 °F (-17.4 °C)   Pulse 67   Respirations 24   SpO2 99 %   Pain Assessment   Pain Assessment None - Denies Pain   Treatment   Time On 0800   Treatment Goal 3L/3.5hrs   Observations & 
Primary RN SBAR: Nicholas RN  Incapacitated Nurse [facility]. provided: yes   Patient Education provided: procedural   Preferred Education method and Primary language: verbal/english  Dialysis consent: yes   Hospital General Consent Verified: yes   Hospital associated wait time; reason: none   Hepatitis B Surface Ag   Date/Time Value Ref Range Status   04/27/2025 02:20 PM 0.14 Index Final     Hep B S Ag Interp   Date/Time Value Ref Range Status   04/27/2025 02:20 PM Negative NEG   Final     Hep B S Ab   Date/Time Value Ref Range Status   04/27/2025 02:20 PM 3.63 mIU/mL Final     Hep B S Ab Interp   Date/Time Value Ref Range Status   04/27/2025 02:20 PM NONREACTIVE NR   Final     Comment:     (NOTE)  The [employer] Anti-HBs2 assay is traceable to the World Health   Organization (WHO) Hepatitis B Immunoglobulin 1st International   Reference Preparation (1977). Samples with a calculated value of 10   mIU/mL or greater are considered reactive (protective) in accordance   with the CDC guidelines. The accepted criteria for immunity to HBV is   anti-HBs activity greater than or equal to 10 mIU/mL, as defined by   the WHO International Reference Preparation.  Assay performance has not been established in pregnant women,   patients who are immunosuppressed or immunocompromised, nor have   performance characteristics been established in conjunction with   other 's assays for specific HBV serologic markers. This   assay does not differentiate between vaccine induced immune response   and a response due to infection with HBV. Passively acquired anti-HBs   may be identified following patient transfusion, receipt of   immunoglobulin products, etc.        Pretreatment    05/02/25 0856   Observations & Evaluations   Level of Consciousness 0   Oriented X 4   Heart Rhythm Other (Comment)  (aflutter)   Respiratory Quality/Effort Unlabored   O2 Device Nasal cannula   Bilateral Breath Sounds Diminished   Skin Color Pale   Skin 
Primary RN SBAR: Raegan Bush   Incapacitated Nurse [facility]. provided: yes  Patient Education provided: Do not shower or submerge your catheter in water. Always wear a mask during catheter connection and disconnection to reduce the risk of bloodstream infections.  Preferred Education method and Primary language: verbal and English  Dialysis consent: yes  Hospital General Consent Verified: yes  Hospital associated wait time; reason: 0  Hepatitis B Surface Ag   Date/Time Value Ref Range Status   04/27/2025 02:20 PM 0.14 Index Final     Hep B S Ag Interp   Date/Time Value Ref Range Status   04/27/2025 02:20 PM Negative NEG   Final     Hep B S Ab   Date/Time Value Ref Range Status   04/27/2025 02:20 PM 3.63 mIU/mL Final     Hep B S Ab Interp   Date/Time Value Ref Range Status   04/27/2025 02:20 PM NONREACTIVE NR   Final     Comment:     (NOTE)  The [employer] Anti-HBs2 assay is traceable to the World Health   Organization (WHO) Hepatitis B Immunoglobulin 1st International   Reference Preparation (1977). Samples with a calculated value of 10   mIU/mL or greater are considered reactive (protective) in accordance   with the CDC guidelines. The accepted criteria for immunity to HBV is   anti-HBs activity greater than or equal to 10 mIU/mL, as defined by   the WHO International Reference Preparation.  Assay performance has not been established in pregnant women,   patients who are immunosuppressed or immunocompromised, nor have   performance characteristics been established in conjunction with   other 's assays for specific HBV serologic markers. This   assay does not differentiate between vaccine induced immune response   and a response due to infection with HBV. Passively acquired anti-HBs   may be identified following patient transfusion, receipt of   immunoglobulin products, etc.          Pre Tx-     05/05/25 0815   Observations & Evaluations   Level of Consciousness 0   Oriented X 4   Heart Rhythm 
Primary RN SBAR: TANNA Allison RN  Incapacitated Nurse [facility]. provided: Yes  Patient Education provided: procedural / infection control  Preferred Education method and Primary language: English / verbal   Dialysis consent: Yes  Hospital General Consent Verified: yes  Hospital associated wait time; reason: n/a  Hepatitis B Surface Ag   Date/Time Value Ref Range Status   04/27/2025 02:20 PM 0.14 Index Final     Hep B S Ag Interp   Date/Time Value Ref Range Status   04/27/2025 02:20 PM Negative NEG   Final     Hep B S Ab   Date/Time Value Ref Range Status   04/27/2025 02:20 PM 3.63 mIU/mL Final     Hep B S Ab Interp   Date/Time Value Ref Range Status   04/27/2025 02:20 PM NONREACTIVE NR   Final     Comment:     (NOTE)  The [employer]VIA Cent[facility] Anti-HBs2 assay is traceable to the World Health   Organization (WHO) Hepatitis B Immunoglobulin 1st International   Reference Preparation (1977). Samples with a calculated value of 10   mIU/mL or greater are considered reactive (protective) in accordance   with the CDC guidelines. The accepted criteria for immunity to HBV is   anti-HBs activity greater than or equal to 10 mIU/mL, as defined by   the WHO International Reference Preparation.  Assay performance has not been established in pregnant women,   patients who are immunosuppressed or immunocompromised, nor have   performance characteristics been established in conjunction with   other 's assays for specific HBV serologic markers. This   assay does not differentiate between vaccine induced immune response   and a response due to infection with HBV. Passively acquired anti-HBs   may be identified following patient transfusion, receipt of   immunoglobulin products, etc.            04/30/25 1300   Observations & Evaluations   Level of Consciousness 0   Oriented X 4   Heart Rhythm Regular   Respiratory Quality/Effort Unlabored   O2 Device Nasal cannula   Skin Color Pale   Skin Condition/Temp Warm;Dry   Edema None   Vital Signs 
(mmHg) 90   TMP 60      Comments HD tx started   Access Visible Yes   Ultrafiltration Rate (ml/hr) 470 ml/hr   Ultrafiltration Removed (ml) 0 ml     Primary RN SBAR: Jadyn Veronica, BHAKTI    Incapacitated Nurse edu. provided: yes  Patient Education provided: pt intubated/sedated  Preferred Education method and Primary language: pt intubated/sedated  Dialysis consent: yes  Hospital General Consent Verified: yes  Hospital associated wait time; reason: N/A  Hepatitis B Surface Ag   Date/Time Value Ref Range Status   04/27/2025 02:20 PM 0.14 Index Final     Hep B S Ag Interp   Date/Time Value Ref Range Status   04/27/2025 02:20 PM Negative NEG   Final     Hep B S Ab   Date/Time Value Ref Range Status   04/27/2025 02:20 PM 3.63 mIU/mL Final     Hep B S Ab Interp   Date/Time Value Ref Range Status   04/27/2025 02:20 PM NONREACTIVE NR   Final     Comment:     (NOTE)  The [employer] Anti-HBs2 assay is traceable to the World Health   Organization (WHO) Hepatitis B Immunoglobulin 1st International   Reference Preparation (1977). Samples with a calculated value of 10   mIU/mL or greater are considered reactive (protective) in accordance   with the CDC guidelines. The accepted criteria for immunity to HBV is   anti-HBs activity greater than or equal to 10 mIU/mL, as defined by   the WHO International Reference Preparation.  Assay performance has not been established in pregnant women,   patients who are immunosuppressed or immunocompromised, nor have   performance characteristics been established in conjunction with   other 's assays for specific HBV serologic markers. This   assay does not differentiate between vaccine induced immune response   and a response due to infection with HBV. Passively acquired anti-HBs   may be identified following patient transfusion, receipt of   immunoglobulin products, etc.        04/27/25 2045   Observations & Evaluations   Level of Consciousness 2   Respiratory 
vitally stable.    NOTE: S/B MD Swanson during HD, UFG discussed.  0940: Patient insisted to eat, educated on the cons of eating during HD, and He proceed to eating anyway. Will monitor closely for possible hypotensive episodes.     05/12/25 0623   Treatment   Time On 0700   Treatment Goal 3.5H/3L   Observations & Evaluations   Level of Consciousness 0   Oriented X 4   Respiratory Quality/Effort Unlabored   O2 Device None (Room air)   Bilateral Breath Sounds Clear   Skin Condition/Temp Warm   Appetite Good   Abdomen Inspection Soft   Vital Signs   /65   Temp 98.1 °F (36.7 °C)  (Temporal)   Pulse 62   Weight - Scale 91.7 kg (202 lb 2.6 oz)   Weight Method Bedside scale   Percent Weight Change 0   Pain Assessment   Pain Assessment None - Denies Pain   Access   Add Access? Yes   Hemodialysis Central Access - Permanent/Tunneled Right Neck   Placement Date/Time: 05/06/25 1421   Present on Admission/Arrival: No  Inserted by: Ana Patino  Insertion Practices: Chlorohexadine skin antisepsis;Betadine skin antisepsis;Hand hygiene  Orientation: Right  Access Location: Neck  Catheter Size: 14.5...   Continued need for line? Yes   Site Assessment Clean, dry & intact   Blue Lumen Status Flushed;Brisk blood return;Infusing   Red Lumen Status Flushed;Brisk blood return;Infusing   Line Care Cap changed;Connections checked and tightened;Chlorhexidine wipes;Ports disinfected   Dressing Type Antimicrobial;Sterile dressing, transparent   Date of Last Dressing Change 05/09/25   Dressing Status Clean, dry & intact   Technical Checks   Dialysis Machine No. 7   RO Machine Number 7   Dialyzer Lot No. [id number]   Tubing Lot Number [id number]   All Connections Secure Yes   NS Bag Yes   Saline Line Double Clamped Yes   Dialyzer Nipro ELISIO   Prime Volume (mL) 250 mL   ICEBOAT I;C;E;B;O;A;T   RO Machine Log Sheet Completed Yes   Machine Alarm Self Test Completed;Passed   Air Foam Detector Tested;Proper Function;pH Reading   Extracorporeal

## 2025-05-12 NOTE — PROGRESS NOTES
Hospitalist Progress Note  Albaro Barcenas MD  Answering service: [phone]        Date of Service:  2025  NAME:  Keaton Van  :  1965  MRN:  [id number]      Admission Summary:   Keaton Van is a 59 y.o. male with PMHx bipolar, depression, ESRD, HTN, BKA, DM, diabetic retinopathy and neuropathy who was admitted to the behavioral health unit after a suicide attempt .  He reportedly had stopped his dialysis and had previously been on hospice but that was reportedly revoked.  He was seen  by the hospitalist team and was reportedly getting good pain control and his Suboxone but at that point he was continuing to refuse dialysis.  His port has been removed as his last dialysis was in January of this year.     A rapid response was called  to the behavioral health unit as the patient was reportedly was unresponsive.  Psychiatry had spoken to the patient who reportedly told him that now he wants dialysis so renal was consulted.  Labs were drawn and he was found to be hyponatremic at 128 and hyperkalemic at 7.1.  He reportedly told the psychiatrist that he no longer wanted to be a DNR.  When he did awaken he was asked if he wanted to do dialysis and he said \"no\" however psychiatry is stating that he is delirious so patient was transferred to medical unit for continuation of care and his DNR status was changed to full code. He was transferred to ICU after evaluation by PCCM and developed AMS and hypoxia, subsequently intubated for airway protection with widened QRS.        Interval history / Subjective:   Patient transferred to ICU with hyperkalemia on  widened QRS symptomatic bradycardia and edy-arrest requiring sedation and intubation for airway protection but has been improving and is stabilized, he has been managed by PCCM and nephrology, is improved with HD and management per nephro. He was 
                                                                                                Hospitalist Progress Note  Feroz Alvarado MD  Answering service: [phone] OR [phone] from in house phone        Date of Service:  2025  NAME:  Keaton Van  :  1965  MRN:  [id number]      Admission Summary:   Keaton Van is a 59 y.o. male with PMHx bipolar, depression, ESRD, HTN, BKA, DM, diabetic retinopathy and neuropathy who was admitted to the behavioral health unit after a suicide attempt .  He reportedly had stopped his dialysis and had previously been on hospice but that was reportedly revoked.  He was seen  by the hospitalist team and was reportedly getting good pain control and his Suboxone but at that point he was continuing to refuse dialysis.  His port has been removed as his last dialysis was in January of this year.     A rapid response was called  to the behavioral health unit as the patient was reportedly was unresponsive.  Psychiatry had spoken to the patient who reportedly told him that now he wants dialysis so renal was consulted.  Labs were drawn and he was found to be hyponatremic at 128 and hyperkalemic at 7.1.  He reportedly told the psychiatrist that he no longer wanted to be a DNR.  When he did awaken he was asked if he wanted to do dialysis and he said \"no\" however psychiatry is stating that he is delirious so patient was transferred to medical unit for continuation of care and his DNR status was changed to full code. He was transferred to ICU after evaluation by PCCM and developed AMS and hypoxia, subsequently intubated for airway protection with widened QRS.     Interval history / Subjective:   Follow up New A fib  RRT called 5/7 am for AMS and he was found to have metabolic acidosis  Continues to remain medically stable  Spoke to CM, having difficulty finding out a SNF for him sec to being on suboxone or esrd  Spoke to the patient about discharge planning. He wanted to go 
                                                                                                Hospitalist Progress Note  Feroz Alvarado MD  Answering service: [phone] OR [phone] from in house phone        Date of Service:  2025  NAME:  Keaton Van  :  1965  MRN:  [id number]      Admission Summary:   Keaton Van is a 59 y.o. male with PMHx bipolar, depression, ESRD, HTN, BKA, DM, diabetic retinopathy and neuropathy who was admitted to the behavioral health unit after a suicide attempt .  He reportedly had stopped his dialysis and had previously been on hospice but that was reportedly revoked.  He was seen  by the hospitalist team and was reportedly getting good pain control and his Suboxone but at that point he was continuing to refuse dialysis.  His port has been removed as his last dialysis was in January of this year.     A rapid response was called  to the behavioral health unit as the patient was reportedly was unresponsive.  Psychiatry had spoken to the patient who reportedly told him that now he wants dialysis so renal was consulted.  Labs were drawn and he was found to be hyponatremic at 128 and hyperkalemic at 7.1.  He reportedly told the psychiatrist that he no longer wanted to be a DNR.  When he did awaken he was asked if he wanted to do dialysis and he said \"no\" however psychiatry is stating that he is delirious so patient was transferred to medical unit for continuation of care and his DNR status was changed to full code. [facility] and developed [facility]MS and hypoxia, subsequently intubated for airway protection with widened QRS.     Interval history / Subjective:   [facility]  RRT called 5/7 am for AMS and he was found to have metabolic acidosis  He was placed on BiPAP and transferred to the ICU.  He only stayed in the ICU for a few hours  Now on room air       Assessment & Plan:     Respiratory failure s/p arrest. Resolved, extubated 
                                                                                                Hospitalist Progress Note  Junior Fong MD  Answering service: [phone] OR [phone] from in house phone        Date of Service:  2025  NAME:  Keaton Van  :  1965  MRN:  [id number]      Admission Summary:   Keaton Van is a 59 y.o. male with PMHx bipolar, depression, ESRD, HTN, BKA, DM, diabetic retinopathy and neuropathy who was admitted to the behavioral health unit after a suicide attempt .  He reportedly had stopped his dialysis and had previously been on hospice but that was reportedly revoked.  He was seen  by the hospitalist team and was reportedly getting good pain control and his Suboxone but at that point he was continuing to refuse dialysis.  His port has been removed as his last dialysis was in January of this year.     A rapid response was called  to the behavioral health unit as the patient was reportedly was unresponsive.  Psychiatry had spoken to the patient who reportedly told him that now he wants dialysis so renal was consulted.  Labs were drawn and he was found to be hyponatremic at 128 and hyperkalemic at 7.1.  He reportedly told the psychiatrist that he no longer wanted to be a DNR.  When he did awaken he was asked if he wanted to do dialysis and he said \"no\" however psychiatry is stating that he is delirious so patient was transferred to medical unit for continuation of care and his DNR status was changed to full code. He was transferred to ICU after evaluation by [facility] and developed AMS and hypoxia, subsequently intubated for airway protection with widened QRS.     Interval history / Subjective:   Follow up New A fib, patient reports no discomfort.   medically stable  SNF referral sent     Assessment & Plan:     Respiratory failure s/p arrest. Resolved, extubated    Acute hypercarbic respiratory failure early a.m. of --Cymbalta held. now resolved    New onset atrial 
                                                                                                Hospitalist Progress Note  Junior Fong MD  Answering service: [phone] OR [phone] from in house phone        Date of Service:  5/10/2025  NAME:  Keaton Van  :  1965  MRN:  [id number]      Admission Summary:   Keaton Van is a 59 y.o. male with PMHx bipolar, depression, ESRD, HTN, BKA, DM, diabetic retinopathy and neuropathy who was admitted to the behavioral health unit after a suicide attempt .  He reportedly had stopped his dialysis and had previously been on hospice but that was reportedly revoked.  He was seen  by the hospitalist team and was reportedly getting good pain control and his Suboxone but at that point he was continuing to refuse dialysis.  His port has been removed as his last dialysis was in January of this year.     A rapid response was called  to the behavioral health unit as the patient was reportedly was unresponsive.  Psychiatry had spoken to the patient who reportedly told him that now he wants dialysis so renal was consulted.  Labs were drawn and he was found to be hyponatremic at 128 and hyperkalemic at 7.1.  He reportedly told the psychiatrist that he no longer wanted to be a DNR.  When he did awaken he was asked if he wanted to do dialysis and he said \"no\" however psychiatry is stating that he is delirious so patient was transferred to medical unit for continuation of care and his DNR status was changed to full code. He was transferred to ICU after evaluation by [facility] and developed AMS and hypoxia, subsequently intubated for airway protection with widened QRS.     Interval history / Subjective:   Follow up New A fib, patient reports no discomfort.   medically stable  Reached out to case management regarding discharge 5.10       Assessment & Plan:     Respiratory failure s/p arrest. Resolved, extubated    Acute hypercarbic respiratory failure early a.m. of --Cymbalta 
                                                                                                Hospitalist Progress Note  Krista Vega MD  Answering service: [phone] OR [phone] from in house phone        Date of Service:  2025  NAME:  Keaton Van  :  1965  MRN:  [id number]      Admission Summary:   Keaton Van is a 59 y.o. male with PMHx bipolar, depression, ESRD, HTN, BKA, DM, diabetic retinopathy and neuropathy who was admitted to the behavioral health unit after a suicide attempt .  He reportedly had stopped his dialysis and had previously been on hospice but that was reportedly revoked.  He was seen  by the hospitalist team and was reportedly getting good pain control and his Suboxone but at that point he was continuing to refuse dialysis.  His port has been removed as his last dialysis was in January of this year.     A rapid response was called  to the behavioral health unit as the patient was reportedly was unresponsive.  Psychiatry had spoken to the patient who reportedly told him that now he wants dialysis so renal was consulted.  Labs were drawn and he was found to be hyponatremic at 128 and hyperkalemic at 7.1.  He reportedly told the psychiatrist that he no longer wanted to be a DNR.  When he did awaken he was asked if he wanted to do dialysis and he said \"no\" however psychiatry is stating that he is delirious so patient was transferred to medical unit for continuation of care and his DNR status was changed to full code. [facility] and developed [facility]MS and hypoxia, subsequently intubated for airway protection with widened QRS.     Interval history / Subjective:   Seen and examined for follow up of ESRD, electrolyte derangements, suicide attempt.   Patient weaned off Cardizem drip now oral Cardizem patient is getting hemodialysis today as per RN patient gets impulsive and agitated during night does not want a sitter  Discussed with him that 
                                                                                                Hospitalist Progress Note  Krista Vega MD  Answering service: [phone] OR [phone] from in house phone        Date of Service:  2025  NAME:  Keaton Van  :  1965  MRN:  [id number]      Admission Summary:   Keaton Van is a 59 y.o. male with PMHx bipolar, depression, ESRD, HTN, BKA, DM, diabetic retinopathy and neuropathy who was admitted to the behavioral health unit after a suicide attempt .  He reportedly had stopped his dialysis and had previously been on hospice but that was reportedly revoked.  He was seen  by the hospitalist team and was reportedly getting good pain control and his Suboxone but at that point he was continuing to refuse dialysis.  His port has been removed as his last dialysis was in January of this year.     A rapid response was called  to the behavioral health unit as the patient was reportedly was unresponsive.  Psychiatry had spoken to the patient who reportedly told him that now he wants dialysis so renal was consulted.  Labs were drawn and he was found to be hyponatremic at 128 and hyperkalemic at 7.1.  He reportedly told the psychiatrist that he no longer wanted to be a DNR.  When he did awaken he was asked if he wanted to do dialysis and he said \"no\" however psychiatry is stating that he is delirious so patient was transferred to medical unit for continuation of care and his DNR status was changed to full code. [facility] and developed AMS and hypoxia, subsequently intubated for airway protection with widened QRS.     Interval history / Subjective:   Seen and examined for follow up of ESRD, electrolyte derangements, suicide attempt.   Patient developed A-fib with RVR last night on diltiazem drip awaiting cardiology evaluation patient had dialysis yesterday patient has Mickey catheter from now onwards patient should have Monday 
                                                                                                Hospitalist Progress Note  Mai Barraza MD  Answering service: [phone] OR [phone] from in house phone        Date of Service:  5/3/2025  NAME:  Keaton Van  :  1965  MRN:  [id number]      Admission Summary:   Keaton Van is a 59 y.o. male with PMHx bipolar, depression, ESRD, HTN, BKA, DM, diabetic retinopathy and neuropathy who was admitted to the behavioral health unit after a suicide attempt .  He reportedly had stopped his dialysis and had previously been on hospice but that was reportedly revoked.  He was seen  by the hospitalist team and was reportedly getting good pain control and his Suboxone but at that point he was continuing to refuse dialysis.  His port has been removed as his last dialysis was in January of this year.     A rapid response was called  to the behavioral health unit as the patient was reportedly was unresponsive.  Psychiatry had spoken to the patient who reportedly told him that now he wants dialysis so renal was consulted.  Labs were drawn and he was found to be hyponatremic at 128 and hyperkalemic at 7.1.  He reportedly told the psychiatrist that he no longer wanted to be a DNR.  When he did awaken he was asked if he wanted to do dialysis and he said \"no\" however psychiatry is stating that he is delirious so patient was transferred to medical unit for continuation of care and his DNR status was changed to full code. He was transferred to ICU after evaluation by [facility] and developed AMS and hypoxia, subsequently intubated for airway protection with widened QRS.     Interval history / Subjective:   Sitter in the room  Patient is emotional, denied SI or HI     Assessment & Plan:     New onset atrial fibrillation  Anticoagulation:  - No prior history of A-fib patient EKG showing A-fib rate around 100 patient is on Cardizem drip  - Stop Diltiazem infusion, start PO Diltiazem 120 
                                                                                                Hospitalist Progress Note  Mai Barraza MD  Answering service: [phone] OR [phone] from in house phone        Date of Service:  2025  NAME:  Keaton aVn  :  1965  MRN:  [id number]      Admission Summary:   Keaton Van is a 59 y.o. male with PMHx bipolar, depression, ESRD, HTN, BKA, DM, diabetic retinopathy and neuropathy who was admitted to the behavioral health unit after a suicide attempt .  He reportedly had stopped his dialysis and had previously been on hospice but that was reportedly revoked.  He was seen  by the hospitalist team and was reportedly getting good pain control and his Suboxone but at that point he was continuing to refuse dialysis.  His port has been removed as his last dialysis was in January of this year.     A rapid response was called  to the behavioral health unit as the patient was reportedly was unresponsive.  Psychiatry had spoken to the patient who reportedly told him that now he wants dialysis so renal was consulted.  Labs were drawn and he was found to be hyponatremic at 128 and hyperkalemic at 7.1.  He reportedly told the psychiatrist that he no longer wanted to be a DNR.  When he did awaken he was asked if he wanted to do dialysis and he said \"no\" however psychiatry is stating that he is delirious so patient was transferred to medical unit for continuation of care and his DNR status was changed to full code. He was transferred to ICU after evaluation by [facility] and developed AMS and hypoxia, subsequently intubated for airway protection with widened QRS.     Interval history / Subjective:   Patient returned from [facility], alert and oriented, in good mood, appropriate interactive.       Assessment & Plan:     New onset atrial fibrillation  Anticoagulation:  - No prior history of A-fib patient EKG showing A-fib rate around 100 patient is on Cardizem drip  - IV Cardizem 
                                                                                                Hospitalist Progress Note  Mai Barraza MD  Answering service: [phone] OR [phone] from in house phone        Date of Service:  2025  NAME:  Keaton Van  :  1965  MRN:  [id number]      Admission Summary:   Keaton Van is a 59 y.o. male with PMHx bipolar, depression, ESRD, HTN, BKA, DM, diabetic retinopathy and neuropathy who was admitted to the behavioral health unit after a suicide attempt .  He reportedly had stopped his dialysis and had previously been on hospice but that was reportedly revoked.  He was seen  by the hospitalist team and was reportedly getting good pain control and his Suboxone but at that point he was continuing to refuse dialysis.  His port has been removed as his last dialysis was in January of this year.     A rapid response was called  to the behavioral health unit as the patient was reportedly was unresponsive.  Psychiatry had spoken to the patient who reportedly told him that now he wants dialysis so renal was consulted.  Labs were drawn and he was found to be hyponatremic at 128 and hyperkalemic at 7.1.  He reportedly told the psychiatrist that he no longer wanted to be a DNR.  When he did awaken he was asked if he wanted to do dialysis and he said \"no\" however psychiatry is stating that he is delirious so patient was transferred to medical unit for continuation of care and his DNR status was changed to full code. He was transferred to ICU after evaluation by [facility] and developed AMS and hypoxia, subsequently intubated for airway protection with widened QRS.     Interval history / Subjective:   He says he feels tremulous.  He did not sleep well last night.  Sitter in the room.  His sister present     Assessment & Plan:     New onset atrial fibrillation  Anticoagulation:  - No prior history of A-fib patient EKG showing A-fib rate around 100 patient is on Cardizem drip  - 
                                                                                                Hospitalist Progress Note  Mai Barraza MD  Answering service: [phone] OR [phone] from in house phone        Date of Service:  2025  NAME:  Keaton Van  :  1965  MRN:  [id number]      Admission Summary:   Keaton Van is a 59 y.o. male with PMHx bipolar, depression, ESRD, HTN, BKA, DM, diabetic retinopathy and neuropathy who was admitted to the behavioral health unit after a suicide attempt .  He reportedly had stopped his dialysis and had previously been on hospice but that was reportedly revoked.  He was seen  by the hospitalist team and was reportedly getting good pain control and his Suboxone but at that point he was continuing to refuse dialysis.  His port has been removed as his last dialysis was in January of this year.     A rapid response was called  to the behavioral health unit as the patient was reportedly was unresponsive.  Psychiatry had spoken to the patient who reportedly told him that now he wants dialysis so renal was consulted.  Labs were drawn and he was found to be hyponatremic at 128 and hyperkalemic at 7.1.  He reportedly told the psychiatrist that he no longer wanted to be a DNR.  When he did awaken he was asked if he wanted to do dialysis and he said \"no\" however psychiatry is stating that he is delirious so patient was transferred to medical unit for continuation of care and his DNR status was changed to full code. He was transferred to ICU after evaluation by [facility] and developed AMS and hypoxia, subsequently intubated for airway protection with widened QRS.     Interval history / Subjective:   Alert and calm, he sisters present in the room.  He denied visual or auditory sensation, SI or HI  Discussed with Dr. Abdullahi,brandee one-to-one sitter     Assessment & Plan:     New onset atrial fibrillation  Anticoagulation:  - No prior history of A-fib patient EKG showing A-fib 
                                                                                                Hospitalist Progress Note  Mai Barraza MD  Answering service: [phone] OR [phone] from in house phone        Date of Service:  2025  NAME:  Keaton Van  :  1965  MRN:  [id number]      Admission Summary:   Keaton Van is a 59 y.o. male with PMHx bipolar, depression, ESRD, HTN, BKA, DM, diabetic retinopathy and neuropathy who was admitted to the behavioral health unit after a suicide attempt .  He reportedly had stopped his dialysis and had previously been on hospice but that was reportedly revoked.  He was seen  by the hospitalist team and was reportedly getting good pain control and his Suboxone but at that point he was continuing to refuse dialysis.  His port has been removed as his last dialysis was in January of this year.     A rapid response was called  to the behavioral health unit as the patient was reportedly was unresponsive.  Psychiatry had spoken to the patient who reportedly told him that now he wants dialysis so renal was consulted.  Labs were drawn and he was found to be hyponatremic at 128 and hyperkalemic at 7.1.  He reportedly told the psychiatrist that he no longer wanted to be a DNR.  When he did awaken he was asked if he wanted to do dialysis and he said \"no\" however psychiatry is stating that he is delirious so patient was transferred to medical unit for continuation of care and his DNR status was changed to full code. [facility] and developed AMS and hypoxia, subsequently intubated for airway protection with widened QRS.     Interval history / Subjective:   Overnight events noted  RRT called for for AMS overnight and he was found to have metabolic acidosis  He was placed on BiPAP and transferred to the ICU.  He only stayed in the ICU for a few hours, he is stable on oxygen by nasal collar.  Intensivist recommended transferring to [facility]  I 
                                                                                                Hospitalist Progress Note  Serafin Molina MD  Answering service: [phone] OR [phone] from in house phone        Date of Service:  2025  NAME:  Keaton Van  :  1965  MRN:  [id number]      Admission Summary:   Keaton Van is a 59 y.o. male with PMHx bipolar, depression, ESRD, HTN, BKA, DM, diabetic retinopathy and neuropathy who was admitted to the behavioral health unit after a suicide attempt .  He reportedly had stopped his dialysis and had previously been on hospice but that was reportedly revoked.  He was seen  by the hospitalist team and was reportedly getting good pain control and his Suboxone but at that point he was continuing to refuse dialysis.  His port has been removed as his last dialysis was in January of this year.     A rapid response was called  to the behavioral health unit as the patient was reportedly was unresponsive.  Psychiatry had spoken to the patient who reportedly told him that now he wants dialysis so renal was consulted.  Labs were drawn and he was found to be hyponatremic at 128 and hyperkalemic at 7.1.  He reportedly told the psychiatrist that he no longer wanted to be a DNR.  When he did awaken he was asked if he wanted to do dialysis and he said \"no\" however psychiatry is stating that he is delirious so patient was transferred to medical unit for continuation of care and his DNR status was changed to full code. [facility] and developed AMS and hypoxia, subsequently intubated for airway protection with widened QRS.     Interval history / Subjective:   Seen and examined for follow up of ESRD, electrolyte derangements, suicide attempt. No new complaints when I saw him. He denies SI. Very thankful for the help received in the hospital.      Assessment & Plan:     Respiratory failure s/p arrest  - Resolved, extubated 
                                                 Hospitalist Night Cover     Name: Keaton Van  YOB: 1965      Overnight update:        Keaton Van is a 58yo with a pmhx of bipolar, depression, ESRD on HD, HTN DM, right BKA, diabetic retinopathy and neuropathy who is admitted with afib with rvr and respiratory failure s/p arrest.     Rapid response called for AMS   Seen and examined patient at bedside. Minimally responsive to sternal rub.     Acute respiratory failure   - Chest xray pending   - ABG pH 7.23, pCO2 69.8, O2 94.4, HCO3 29.5  - Placed on bipap.  Patient with minimal effort on bipap  - Evaluated by ICU- Bipap adjustments made. Repeat ABG in 1 hour    Addendum   ABG- pH 7.39, pCO2 41.3, pO2 98, HCO3 25.1   Patient still very drowsy and has to be prompted to breath on his own.   Updated ICU- Patient at high risk for intubation. Transfer to ICU.      Betty Shultz [facility]-NP   
    [address], [place] [address]  Phone: [phone]   [phone]    [email or web address]     Nephrology Progress Note    Patient Name : Keaton Van      : 1965     MRN : [id number]  Date of Admission : 2025  Date of Servive : 25    CC:  Follow up for CKD       Assessment and Plan   CKD 5: Diabetic Kidney Disease. Has PC - AVF in LUE   Hyperkalemia :s/p HD x 1   Bradycardic arrest   Acute resp failure, Encephalopathy : On vent   Suicidal attempt  Anemia in CKD   PVD: hx of R BKA  HTN : stable   Substance abuse : chronic cocaine use     Plan:  - No urgent need for dialysis today   - we can dialyze as needed until he gets participate in the decision making   - has percutaneously placed LUE AVF ---> ? After he saw Dr Davenport in 3/25  - will discuss with Dr Davenport at [facility] regarding pt follow up   - Resume oral meds and wean cardene gtt    - Hold diuretics   - daily labs, strict I.O         Interval History:  Pt seen and examined along w/ RN. Had HD yesterday and K better.  cc. BP much controlled, needing precedex for sedation on vent. CXR from  reviewed     Review of Systems: Review of systems not obtained due to patient factors.    Current Medications:   Current Facility-Administered Medications   Medication Dose Route Frequency    niCARdipine (CARDENE) 20 mg in 0.86 % sodium chloride 200 mL infusion  2.5-15 mg/hr IntraVENous Continuous    [Held by provider] amLODIPine (NORVASC) tablet 10 mg  10 mg Oral Daily    buprenorphine-naloxone (SUBOXONE) 2-0.5 MG SL tablet 1 tablet  1 tablet SubLINGual BID    diphenhydrAMINE (BENADRYL) injection 50 mg  50 mg IntraMUSCular Q4H PRN    DULoxetine (CYMBALTA) extended release capsule 30 mg  30 mg Oral BID    gabapentin (NEURONTIN) capsule 300 mg  300 mg Oral BID    glucagon injection 1 mg  1 mg SubCUTAneous PRN    haloperidol (HALDOL) tablet 5 mg  5 mg Oral Q4H PRN    Or    haloperidol 
   Nephrology Progress Note         Patient: Keaton Van MRN: [id number]  SSN: [id number]    YOB: 1965  Age: 59 y.o.  Sex: male          Subjective:   Patient resting comfortably    Objective:     Vitals:    05/06/25 0349 05/06/25 0600 05/06/25 0625 05/06/25 1000   BP:   109/63    Pulse: 91 94 94 80   Resp:   20    Temp:   98.3 °F (36.8 °C)    TempSrc:   Oral    SpO2:       Weight:   88.2 kg (194 lb 7.1 oz)    Height:            Intake and Output:  Yesterday's Intake and Output:  05/05 0701 - 05/06 0700  In: 900 [P.O.:400]  Out: 650 [Urine:300]     Physical Exam:   GENERAL:NAD/sleeping  NICK Arevalo  No edema      Dialysis access: Right [facility]      Data Review      Lab    Recent Labs     05/04/25  0154 05/05/25  0734 05/06/25  0704   WBC 4.1 4.6 3.5*   HGB 7.7* 7.9* 8.4*   HCT 23.8* 24.2* 25.6*   * 139* 140*     Recent Labs     05/04/25  0154 05/05/25  0734 05/06/25  0704   * 132* 133*   K 4.2 4.2 3.8   CL 98 98 98   CO2 30 31 29   BUN 25* 41* 28*   CREATININE 4.25* 5.78* 4.76*   LABGLOM 15* 10* 13*           Assessment & Plan:     Principal Problem:    Toxic metabolic encephalopathy  Active Problems:    Poorly-controlled hypertension    Poorly controlled diabetes mellitus (HCC)    Bipolar 1 disorder, mixed, moderate (HCC)    Type 2 diabetes mellitus with hyperglycemia, with long-term current use of insulin (HCC)    ESRD (end stage renal disease) on dialysis (HCC)    Hx of BKA, right (HCC)    Hyperkalemia  Resolved Problems:    * No resolved hospital problems. *    CKD 5>> -patient was on dialysis until around 2025 and had refused  treatments on his own  He presented with severe hyperkalemia and had bradycardic arrest for which emergent dialysis was done *1 time done by  and Team  - His First and only visit to our office was in March 2025  He was admitted to psychiatric unit here for management of depression and suicidal attempt.    - Will do HD MWF and monitor any signs of 
   Nephrology Progress Note         Patient: Keaton Van MRN: [id number]  SSN: [id number]    YOB: 1965  Age: 59 y.o.  Sex: male          Subjective:   Patient seen on HD at 10am. VSS. +AMS post permacath yesterday-> required BIPAP and ICU transfer. Feeling better this morning.     Objective:     Vitals:    05/07/25 1045 05/07/25 1050 05/07/25 1100 05/07/25 1115   BP: 99/64 (!) 107/57 114/69 103/61   Pulse: 63 61 62 62   Resp:       Temp:       TempSrc:       SpO2:       Weight:       Height:            Intake and Output:  Yesterday's Intake and Output:  05/06 0701 - 05/07 0700  In: 100 [P.O.:100]  Out: 0      Physical Exam:   GENERAL:NAD/  R permacath  No edema        Data Review      Lab    Recent Labs     05/05/25  0734 05/06/25  0704 05/07/25  0350   WBC 4.6 3.5* 3.7*   HGB 7.9* 8.4* 8.5*   HCT 24.2* 25.6* 25.9*   * 140* 131*     Recent Labs     05/05/25  0734 05/06/25  0704 05/07/25  0350   * 133* 133*   K 4.2 3.8 4.3   CL 98 98 99   CO2 31 29 25   BUN 41* 28* 37*   CREATININE 5.78* 4.76* 5.87*   LABGLOM 10* 13* 10*   MG  --   --  2.3   PHOS  --   --  7.2*           Assessment & Plan:     Principal Problem:    Toxic metabolic encephalopathy  Active Problems:    Poorly-controlled hypertension    Poorly controlled diabetes mellitus (HCC)    Bipolar 1 disorder, mixed, moderate (HCC)    Type 2 diabetes mellitus with hyperglycemia, with long-term current use of insulin (HCC)    ESRD (end stage renal disease) on dialysis (HCC)    Hx of BKA, right (HCC)    Hyperkalemia  Resolved Problems:    * No resolved hospital problems. *    CKD 5>> -patient was on dialysis until around 2025 and had refused  treatments on his own  He presented with severe hyperkalemia and had bradycardic arrest for which emergent dialysis was done *1 time done by  and Team  - His First and only visit to our office was in March 2025  He was admitted to psychiatric unit here for management of depression and 
   Nephrology Progress Note         Patient: Keaton Van MRN: [id number]  SSN: [id number]    YOB: 1965  Age: 59 y.o.  Sex: male          Subjective:   Patient seen on regular telemetry floor.  Reports feeling okay.  Has no active complaints.    Had HD on 5/2    Objective:     Vitals:    05/04/25 1200 05/04/25 1500 05/04/25 1646 05/04/25 1800   BP:   125/80    Pulse: 82 79 77 91   Resp:   12    Temp:   98.7 °F (37.1 °C)    TempSrc:   Axillary    SpO2:   94%    Weight:       Height:            Intake and Output:  Yesterday's Intake and Output:  05/03 0701 - 05/04 0700  In: -   Out: 250 [Urine:250]     Physical Exam:   GENERAL: aao*3  EYE: negative , clear cornea and conjunctiva   HEENT - Normal nose, moist mucous membranes , no oral ulcers/thrush  LUNG: clear to auscultation bilaterally  HEART: regular rate and rhythm, S1, S2 normal  ABDOMEN: soft, non-tender. Bowel sounds normal. No masses,  no organomegaly  EXTREMITIES:  rt BKA   Skin -  no rash or dry, intact , no abnormalities  NEUROLOGIC: negative, aao*3 , normal speech      Dialysis access: Right [facility] [place]      Data Review      Lab    Recent Labs     05/02/25  0631 05/04/25  0154   WBC 3.3* 4.1   HGB 7.2* 7.7*   HCT 21.0* 23.8*   * 145*     Recent Labs     05/02/25  0631 05/04/25  0154    133*   K 4.3 4.2    98   CO2 30 30   BUN 31* 25*   CREATININE 3.32* 4.25*   LABGLOM 20* 15*   MG 2.2  --    PHOS 4.2  --      UOP - 1900 ml      Assessment & Plan:     Principal Problem:    Toxic metabolic encephalopathy  Active Problems:    Poorly-controlled hypertension    Poorly controlled diabetes mellitus (HCC)    Bipolar 1 disorder, mixed, moderate (HCC)    Type 2 diabetes mellitus with hyperglycemia, with long-term current use of insulin (HCC)    ESRD (end stage renal disease) on dialysis (HCC)    Hx of BKA, right ([facility])    Hyperkalemia  Resolved Problems:    * No resolved hospital problems. *    CKD 5>> -patient was on dialysis until 
   Nephrology Progress Note         Patient: Keaton Van MRN: [id number]  SSN: [id number]    YOB: 1965  Age: 59 y.o.  Sex: male          Subjective:   Seen on HD at 9:40am. VSS. Resting off O2.    Objective:     Vitals:    05/09/25 0930 05/09/25 0945 05/09/25 1000 05/09/25 1015   BP: 119/68 (!) 119/58 (!) 144/62 (!) (P) 150/73   Pulse: 61 85 59 (P) 64   Resp:       Temp:       TempSrc:       SpO2: 92% 92%     Weight:       Height:            Intake and Output:  Yesterday's Intake and Output:  05/08 0701 - 05/09 0700  In: -   Out: 1507 [Urine:1507]     Physical Exam:   GENERAL:NAD/Sleeping  R permacath  No edema        Data Review      Lab    Recent Labs     05/07/25  0350 05/08/25  0625 05/09/25  0617   WBC 3.7* 2.7* 3.1*   HGB 8.5* 8.0* 8.5*   HCT 25.9* 24.2* 25.9*   * 139* 151     Recent Labs     05/07/25  0350 05/08/25  0625 05/09/25  0617   * 135* 135*   K 4.3 4.0 3.7   CL 99 100 99   CO2 25 28 27   BUN 37* 29* 41*   CREATININE 5.87* 4.35* 5.13*   LABGLOM 10* 15* 12*   MG 2.3 2.2 2.2   PHOS 7.2* 5.2* 5.6*           Assessment & Plan:     Principal Problem:    Toxic metabolic encephalopathy  Active Problems:    Poorly-controlled hypertension    Poorly controlled diabetes mellitus (HCC)    Bipolar 1 disorder, mixed, moderate (HCC)    Type 2 diabetes mellitus with hyperglycemia, with long-term current use of insulin (HCC)    ESRD (end stage renal disease) on dialysis (HCC)    Hx of BKA, right (HCC)    Hyperkalemia    Moderate protein-energy malnutrition  Resolved Problems:    * No resolved hospital problems. *    CKD 5>> -patient was on dialysis until around 2025 and had refused  treatments on his own  He presented with severe hyperkalemia and had bradycardic arrest for which emergent dialysis was done *1 time done by  and Team  - His First and only visit to our office was in March 2025  He was admitted to psychiatric unit here for management of depression and suicidal 
   Nephrology Progress Note         Patient: Keaton Van MRN: [id number]  SSN: [id number]    YOB: 1965  Age: 59 y.o.  Sex: male          Subjective:   Patient seen on regular telemetry floor.  Reports feeling okay.  Has no active complaints.    Had HD on 5/2    Objective:     Vitals:    05/03/25 0350 05/03/25 0511 05/03/25 0552 05/03/25 0700   BP:    107/63   Pulse: 65  79 79   Resp:    12   Temp:    99 °F (37.2 °C)   TempSrc:    Oral   SpO2:    96%   Weight:  88.7 kg (195 lb 8.8 oz)     Height:            Intake and Output:  Yesterday's Intake and Output:  05/02 0701 - 05/03 0700  In: 500   Out: 3627 [Urine:430]     Physical Exam:   GENERAL: aao*3  EYE: negative , clear cornea and conjunctiva   HEENT - Normal nose, moist mucous membranes , no oral ulcers/thrush  LUNG: clear to auscultation bilaterally  HEART: regular rate and rhythm, S1, S2 normal  ABDOMEN: soft, non-tender. Bowel sounds normal. No masses,  no organomegaly  EXTREMITIES:  rt BKA   Skin -  no rash or dry, intact , no abnormalities  NEUROLOGIC: negative, aao*3 , normal speech      Dialysis access: Right [facility] [place]      Data Review      Lab    Recent Labs     05/01/25  0429 05/02/25  0631   WBC 4.4 3.3*   HGB 8.1* 7.2*   HCT 23.9* 21.0*   * 118*     Recent Labs     05/01/25  0429 05/02/25  0631   * 136   K 4.4 4.3    101   CO2 28 30   BUN 22* 31*   CREATININE 2.31* 3.32*   LABGLOM 32* 20*   MG  --  2.2   PHOS  --  4.2     UOP - 1900 ml      Assessment & Plan:     Principal Problem:    Toxic metabolic encephalopathy  Active Problems:    Poorly-controlled hypertension    Poorly controlled diabetes mellitus (HCC)    Bipolar 1 disorder, mixed, moderate (HCC)    Type 2 diabetes mellitus with hyperglycemia, with long-term current use of insulin (HCC)    ESRD (end stage renal disease) on dialysis ([facility])    Hx of BKA, right ([facility])    Hyperkalemia  Resolved Problems:    * No resolved hospital problems. *    CKD 5>> -patient was on 
   Nephrology Progress Note         Patient: Keaton Van MRN: [id number]  SSN: [id number]    YOB: 1965  Age: 59 y.o.  Sex: male          Subjective:   Patient seen in ICU today, he is comfortably sitting up alert oriented.  Did not have any active complaints of shortness of breath at rest.    Had HD today     Objective:     Vitals:    05/02/25 1215 05/02/25 1230 05/02/25 1236 05/02/25 1252   BP: 95/66 123/63 129/70    Pulse: 83 82 80    Resp:  15 15    Temp:  97.4 °F (36.3 °C)  97.7 °F (36.5 °C)   TempSrc:    Oral   SpO2:  98% 98%    Weight:       Height:            Intake and Output:  Yesterday's Intake and Output:  05/01 0701 - 05/02 0700  In: 310 [P.O.:300; I.V.:10]  Out: 880 [Urine:880]     Physical Exam:   GENERAL: aao*3  EYE: negative , clear cornea and conjunctiva   HEENT - Normal nose, moist mucous membranes , no oral ulcers/thrush  LUNG: clear to auscultation bilaterally  HEART: regular rate and rhythm, S1, S2 normal  ABDOMEN: soft, non-tender. Bowel sounds normal. No masses,  no organomegaly  EXTREMITIES:  rt BKA   Skin -  no rash or dry, intact , no abnormalities  NEUROLOGIC: negative, aao*3 , normal speech      Dialysis access: Right [facility]      Data Review      Lab    Recent Labs     04/30/25  0320 05/01/25  0429 05/02/25  0631   WBC 4.2 4.4 3.3*   HGB 8.6* 8.1* 7.2*   HCT 26.2* 23.9* 21.0*   * 125* 118*     Recent Labs     04/30/25  0320 05/01/25  0429 05/02/25  0631    135* 136   K 5.0 4.4 4.3    102 101   CO2 26 28 30   BUN 44* 22* 31*   CREATININE 3.34* 2.31* 3.32*   LABGLOM 20* 32* 20*   MG 2.3  --  2.2   PHOS 5.0*  --  4.2     UOP - 1900 ml      Assessment & Plan:     Principal Problem:    Toxic metabolic encephalopathy  Active Problems:    Poorly-controlled hypertension    Poorly controlled diabetes mellitus (HCC)    Bipolar 1 disorder, mixed, moderate (HCC)    Type 2 diabetes mellitus with hyperglycemia, with long-term current use of insulin (HCC)    ESRD 
   Nephrology Progress Note         Patient: Keaton Van MRN: [id number]  SSN: [id number]    YOB: 1965  Age: 59 y.o.  Sex: male          Subjective:   Patient feeling better. Less tremors. In better spirits.     Objective:     Vitals:    05/08/25 1042 05/08/25 1116 05/08/25 1127 05/08/25 1132   BP: 130/74 126/72 126/74 115/71   Pulse: 65 66 69 76   Resp: 19      Temp: 98.4 °F (36.9 °C)      TempSrc: Oral      SpO2: 100% 98% 94% 96%   Weight:       Height:            Intake and Output:  Yesterday's Intake and Output:  05/07 0701 - 05/08 0700  In: 1190 [P.O.:680; I.V.:10]  Out: 3500      Physical Exam:   GENERAL:NAD  R permacath  No edema        Data Review      Lab    Recent Labs     05/06/25  0704 05/07/25  0350 05/08/25  0625   WBC 3.5* 3.7* 2.7*   HGB 8.4* 8.5* 8.0*   HCT 25.6* 25.9* 24.2*   * 131* 139*     Recent Labs     05/06/25  0704 05/07/25  0350 05/08/25  0625   * 133* 135*   K 3.8 4.3 4.0   CL 98 99 100   CO2 29 25 28   BUN 28* 37* 29*   CREATININE 4.76* 5.87* 4.35*   LABGLOM 13* 10* 15*   MG  --  2.3 2.2   PHOS  --  7.2* 5.2*           Assessment & Plan:     Principal Problem:    Toxic metabolic encephalopathy  Active Problems:    Poorly-controlled hypertension    Poorly controlled diabetes mellitus (HCC)    Bipolar 1 disorder, mixed, moderate (HCC)    Type 2 diabetes mellitus with hyperglycemia, with long-term current use of insulin (HCC)    ESRD (end stage renal disease) on dialysis (HCC)    Hx of BKA, right (HCC)    Hyperkalemia  Resolved Problems:    * No resolved hospital problems. *    CKD 5>> -patient was on dialysis until around 2025 and had refused  treatments on his own  He presented with severe hyperkalemia and had bradycardic arrest for which emergent dialysis was done *1 time done by  and Team  - His First and only visit to our office was in March 2025  He was admitted to psychiatric unit here for management of depression and suicidal attempt.    - Will 
   Nephrology Progress Note         Patient: Keaton Van MRN: [id number]  SSN: [id number]    YOB: 1965  Age: 59 y.o.  Sex: male          Subjective:   Patient seen on HD at ~10am. VSS  No new complaints    Objective:     Vitals:    05/05/25 1045 05/05/25 1050 05/05/25 1100 05/05/25 1115   BP: 121/73 121/73 100/80 115/80   Pulse: 85 83 82 82   Resp:  14 15 13   Temp:       TempSrc:       SpO2:  96% 96% 97%   Weight:       Height:            Intake and Output:  Yesterday's Intake and Output:  05/04 0701 - 05/05 0700  In: 1245 [P.O.:1245]  Out: 650 [Urine:650]     Physical Exam:   GENERAL: aao*3  EYE: negative , clear cornea and conjunctiva   HEENT - Normal nose, moist mucous membranes , no oral ulcers/thrush  LUNG: clear to auscultation bilaterally  HEART: regular rate and rhythm, S1, S2 normal  ABDOMEN: soft, non-tender. Bowel sounds normal. No masses,  no organomegaly  EXTREMITIES:  rt BKA   Skin -  no rash or dry, intact , no abnormalities  NEUROLOGIC: negative, aao*3 , normal speech      Dialysis access: Right [facility]      Data Review      Lab    Recent Labs     05/04/25  0154 05/05/25  0734   WBC 4.1 4.6   HGB 7.7* 7.9*   HCT 23.8* 24.2*   * 139*     Recent Labs     05/04/25  0154 05/05/25  0734   * 132*   K 4.2 4.2   CL 98 98   CO2 30 31   BUN 25* 41*   CREATININE 4.25* 5.78*   LABGLOM 15* 10*           Assessment & Plan:     Principal Problem:    Toxic metabolic encephalopathy  Active Problems:    Poorly-controlled hypertension    Poorly controlled diabetes mellitus (HCC)    Bipolar 1 disorder, mixed, moderate (HCC)    Type 2 diabetes mellitus with hyperglycemia, with long-term current use of insulin (HCC)    ESRD (end stage renal disease) on dialysis (HCC)    Hx of BKA, right (HCC)    Hyperkalemia  Resolved Problems:    * No resolved hospital problems. *    CKD 5>> -patient was on dialysis until around 2025 and had refused  treatments on his own  He presented with severe 
   Nephrology Progress Note         Patient: Keaton Van MRN: [id number]  SSN: [id number]    YOB: 1965  Age: 59 y.o.  Sex: male          Subjective:   Patient seen in ICU today, he is comfortably sitting up alert oriented.  Did not have any active complaints of shortness of breath at rest.    - His First and only visit to our office was in March 2025    Objective:     Vitals:    04/29/25 0400 04/29/25 0500 04/29/25 0600 04/29/25 0800   BP: 130/67 127/70 (!) 147/74 (!) 141/73   Pulse: 76 78 79 78   Resp: 12 16 15 11   Temp: 98.4 °F (36.9 °C)   98.3 °F (36.8 °C)   TempSrc: Axillary   Oral   SpO2: 94% 96% 93% 95%   Weight:   92 kg (202 lb 13.2 oz)    Height:            Intake and Output:  Yesterday's Intake and Output:  04/28 0701 - 04/29 0700  In: 560.1 [I.V.:560.1]  Out: 1900 [Urine:1900]     Physical Exam:   GENERAL: sedated  EYE: negative , clear cornea and conjunctiva   HEENT - Normal nose, moist mucous membranes , no oral ulcers/thrush  LUNG: clear to auscultation bilaterally  HEART: regular rate and rhythm, S1, S2 normal  ABDOMEN: soft, non-tender. Bowel sounds normal. No masses,  no organomegaly  EXTREMITIES:  rt BKA   Skin -  no rash or dry, intact , no abnormalities  NEUROLOGIC: negative, aao*3 , normal speech      Dialysis access: Right [facility]      Data Review      Lab    Recent Labs     04/27/25 2226 04/28/25  0313 04/29/25  0535   WBC 5.2 5.2 5.2   HGB 7.4* 8.6* 8.4*   HCT 21.1* 24.9* 24.8*   * 140* 129*     Recent Labs     04/27/25  1105 04/27/25 2226 04/28/25  0313 04/29/25  0535   * 134* 134* 138   K 7.1* 4.2 5.1 4.6    104 104 106   CO2 22 25 25 25   BUN 75* 40* 42* 47*   CREATININE 4.36* 2.51* 2.82* 3.44*   LABGLOM 15* 29* 25* 20*   MG 2.3  --  1.8 2.2   PHOS 7.3*  --  2.5* 4.4     UOP - 950ml       Assessment & Plan:     Principal Problem:    Toxic metabolic encephalopathy  Active Problems:    Poorly-controlled hypertension    Poorly controlled diabetes mellitus 
   Nephrology Progress Note         Patient: Keaton Van MRN: [id number]  SSN: [id number]    YOB: 1965  Age: 59 y.o.  Sex: male          Subjective:   Patient seen in ICU today, he is comfortably sitting up alert oriented.  Did not have any active complaints of shortness of breath at rest.    Discussed about resuming dialysis while in the hospital, will watch for any signs of renal recovery in the outpatient setting.    - His First and only visit to our office was in March 2025  He was admitted to psychiatric unit here for management of depression and suicidal attempt.  Was emergently dialyzed for hyperkalemia( 7.1) with Bradycardic arrest    Objective:     Vitals:    04/30/25 0000 04/30/25 0200 04/30/25 0400 04/30/25 0600   BP: 137/76 (!) 147/78 (!) 151/74    Pulse: 65 67 75    Resp: 18 15 24    Temp: 98.2 °F (36.8 °C)  98.1 °F (36.7 °C)    TempSrc: Oral  Oral    SpO2: 100% 100% 99%    Weight:    91.8 kg (202 lb 6.1 oz)   Height:            Intake and Output:  Yesterday's Intake and Output:  04/29 0701 - 04/30 0700  In: -   Out: 1840 [Urine:1840]     Physical Exam:   GENERAL: aao*3  EYE: negative , clear cornea and conjunctiva   HEENT - Normal nose, moist mucous membranes , no oral ulcers/thrush  LUNG: clear to auscultation bilaterally  HEART: regular rate and rhythm, S1, S2 normal  ABDOMEN: soft, non-tender. Bowel sounds normal. No masses,  no organomegaly  EXTREMITIES:  rt BKA   Skin -  no rash or dry, intact , no abnormalities  NEUROLOGIC: negative, aao*3 , normal speech      Dialysis access: Right [facility]      Data Review      Lab    Recent Labs     04/28/25  0313 04/29/25  0535 04/30/25  0320   WBC 5.2 5.2 4.2   HGB 8.6* 8.4* 8.6*   HCT 24.9* 24.8* 26.2*   * 129* 149*     Recent Labs     04/28/25  0313 04/29/25  0535 04/30/25  0320   * 138 136   K 5.1 4.6 5.0    106 105   CO2 25 25 26   BUN 42* 47* 44*   CREATININE 2.82* 3.44* 3.34*   LABGLOM 25* 20* 20*   MG 1.8 2.2 2.3 
  Physician Progress Note      PATIENT:               KASEY TREJO  CSN #:                  [id number]  :                       1965  ADMIT DATE:       2025 1:00 PM  DISCH DATE:  RESPONDING  PROVIDER #:        Krista Vega MD          QUERY TEXT:    Based on your medical judgment, please clarify these findings and document if   any of the following are being evaluated and/or treated:    The clinical indicators include:  - New onset atrial fibrillation  - Start Warfarin for embolic CVA prophylaxis. Discussed Eliquis initially but   patient and family opted for warfarin      male;  age  <  65;  Diabetes ;  HTN  CKD  Options provided:  -- Secondary hypercoagulable state in a patient with atrial fibrillation  -- Other - I will add my own diagnosis  -- Disagree - Not applicable / Not valid  -- Disagree - Clinically unable to determine / Unknown  -- Refer to Clinical Documentation Reviewer    PROVIDER RESPONSE TEXT:    This patient has secondary hypercoagulable state in a patient with atrial   fibrillation.    Query created by: Addis Flor on 2025 1:19 PM      Electronically signed by:  Krista Vega MD 2025 3:48 PM          
  Renal Note :  Percutaneous LUE AVF created by Dr Schaeffer in Feb   Patient was recently seen in office by Dr Davenport with [facility]   She will be assuming care of the patient starting tomorrow         Laquita Hernandez MD  [facility]  Office :[phone]  Fax: [phone]    
0703: Patient arrived to ICU with IMCU RN and Rapid RN. Patient transferred on NRB and then placed on BIPAP on arrival. Patient bathed and skin assessed per protocol. Patient pleasant and conversational at this time.     0730: Report given to Tono YA  
0730 Bedside and Verbal shift change report given to Charles RN (oncoming nurse) by Jillian RN (offgoing nurse). Report included the following information Nurse Handoff Report, Adult Overview, Intake/Output, MAR, Recent Results, and Cardiac Rhythm A-flutter .     1930 Bedside and Verbal shift change report given to Sara RN (oncoming nurse) by Charles RN (offgoing nurse). Report included the following information Nurse Handoff Report, Adult Overview, Intake/Output, MAR, Recent Results, and Cardiac Rhythm A-flutter .     
0730 Bedside shift change report given to Jadyn RN (oncoming nurse) by Irene RN (offgoing nurse). Report included the following information Nurse Handoff Report, Index, Intake/Output, MAR, Recent Results, Med Rec Status, and Cardiac Rhythm SB .      Drips- Prop @ 40, Dex @ 1, Cardene @ 5    1000 Prop gtt stopped for SAT    1015 Ladarius INFANTE @ bedside, SBT initiated    1330 Pt extubated to 4L NC. Following commands, passed swallow exam, A/O x 2.    1415  BP 1202/80s, cardene gtt stopped    1630 Bladder scanned 511cc. Straight cathed per protocol 375cc out    1930 Bedside shift change report given to Sara YA (oncoming nurse) by Jadyn RN (offgoing nurse). Report included the following information Nurse Handoff Report.    
0730: Bedside and Verbal shift change report given to Keisha YA/ BHAKTI Daly (oncoming nurse) by BHAKTI Ruiz (offgoing nurse). Report included the following information Nurse Handoff Report, Intake/Output, MAR, Recent Results, and Cardiac Rhythm NSR    0830: Dr Durand at bedside, planned to transfer pt to hospitalist service.    0930: Dr Barcenas at bedside, orders received for PT/OT and orders to transfer to remote/tele.    1325: PT/OT at bedside and transferred pt to chair, observed wound pt's RLE stomp advised wound care consult, states no prosthetic use at this time, pt to ambulate with walker.     1745: Pt returned to bed by RN and tech.    1930: Bedside and Verbal shift change report given to BHAKTI Betts (oncoming nurse) by BHAKTI Valdes/BHAKTI Daly (offgoing nurse). Report included the following information Nurse Handoff Report, Intake/Output, MAR, Recent Results, and Cardiac Rhythm VENTRICULAR PACED .    
0730: Bedside shift change report given to Xochitl RN (oncoming nurse) by Jillian RN (offgoing nurse). Report included the following information Nurse Handoff Report.     0915: Psych MD at bedside- 1:1 constant observer Dc'd.     1100: 1:1 sitter reinstated.  Dialysis at bedside.     1700: Pt in new onset Afib post dialysis- MD notified. HR , BP stable.  Orders to start dilt drip.   
1605 TRANSFER - IN REPORT:    Verbal report received from IMCU RN on Keaton Van  being received from IMCU for urgent transfer      Report consisted of patient's Situation, Background, Assessment and   Recommendations(SBAR).     Information from the following report(s) Nurse Handoff Report, Intake/Output, MAR, Recent Results, Med Rec Status, and Cardiac Rhythm SB  was reviewed with the receiving nurse.    Opportunity for questions and clarification was provided.      Assessment completed upon patient's arrival to unit and care assumed.      1615 Kyrie INFANTE @ bedside to place R SCL HD catheter.    1630 CXR ordered to confirm placement.    1638 SBP 200s, 2mg IV versed given per Ladarius INFANTE.    1645 Rectal temp 34.8C, genaro hugger applied    1700 ABG ordered, pH 7.21, pO2 414. Ladarius INFANTE aware. RT @ bedside, FiO2 decreased to 40%, RR increaed to 22.    1720 OGT placed @ 65cm, KUB ordered to confirm placement.     1730 Davita @ bedside to initiate HD    1745 Orders to advance ETT by 2cm per Ladarius INFANTE. ETT 25 @ teeth.    1815 SBP 80s, orders received to start levo gtt per Ladarius INFANTE    Drips- Prop @ 45, Levo @ 2, Dex @ 0.2    1930 Bedside shift change report given to Irene RN (oncoming nurse) by Jadyn YA (offgoing nurse). Report included the following information Nurse Handoff Report, Index, ED Encounter Summary, ED SBAR, Adult Overview, Surgery Report, Intake/Output, MAR, Recent Results, Med Rec Status, and Cardiac Rhythm SB .    
1930 - Bedside and Verbal shift change report given to Irene RN (oncoming nurse) by Jadyn RN (offgoing nurse). Report included the following information Nurse Handoff Report, Index, Adult Overview, Intake/Output, MAR, Recent Results, Cardiac Rhythm Sinus Adonis, Alarm Parameters, Quality Measures, Neuro Assessment, and Event Log.      Drips: Levo @ 2, Prop @ 45, Dex @ 0.2    2345 - Propofol off per Dr. Cool order.     0000 - Patient awake and agitated during SAT on 1.5 mcg/kg of precedex. Dr. Cool at bedside. Orders received to restart propofol.     0205 - Patient hypertensive - 178/89 (111). Dr. Cool notified. Orders received to increase precedex gtt to 1 mcg/kg.     0248 - Cardene started for /88 (115)    0600 - Bladder Scanned - 911 mL. Staight cath per protocol - 950 mL out.    0730 - Bedside and Verbal shift change report given to Jadyn YA (oncoming nurse) by Irene RN (offgoing nurse). Report included the following information Nurse Handoff Report, Index, Adult Overview, Intake/Output, MAR, Recent Results, Cardiac Rhythm NSR, Alarm Parameters, Quality Measures, Neuro Assessment, and Event Log.      Drips: Prop @ 40, Dex @ 1, Cardene @ 5  
1930: Bedside and Verbal shift change report given to Jillian RN (oncoming nurse) by Malika RN (offgoing nurse). Report included the following information Nurse Handoff Report, Index, Adult Overview, Intake/Output, MAR, Recent Results, Alarm Parameters, Quality Measures, Neuro Assessment, and Event Log.    
3096 Shift report received from Sara YA    2181 [facility] @ bedside to initiate HD.  
4 Eyes Skin Assessment     NAME:  Keaton Van  YOB: 1965  MEDICAL RECORD NUMBER:  [id number]    The patient is being assessed for  Admission    I agree that at least one RN has performed a thorough Head to Toe Skin Assessment on the patient. ALL assessment sites listed below have been assessed.      Areas assessed by both nurses:    Head, Face, Ears, Shoulders, Back, Chest, Arms, Elbows, Hands, Sacrum. Buttock, Coccyx, Ischium, Legs. Feet and Heels, and Under Medical Devices         Does the Patient have a Wound? Yes wound(s) were present on assessment. LDA wound assessment was Initiated and completed by RN       Kennedy Prevention initiated by RN: Yes  Wound Care Orders initiated by RN: Yes    Pressure Injury (Stage 3,4, Unstageable, DTI, NWPT, and Complex wounds) if present, place Wound referral order by RN under : No    New Ostomies, if present place, Ostomy referral order under : No     Nurse 1 eSignature: Electronically signed by Jadyn Veronica RN on 4/27/25 at 7:56 PM EDT    **SHARE this note so that the co-signing nurse can place an eSignature**    Nurse 2 eSignature: Electronically signed by Carrie Posada RN on 4/27/25 at 7:56 PM EDT   
4 Eyes Skin Assessment     NAME:  Keaton Van  YOB: 1965  MEDICAL RECORD NUMBER:  [id number]    The patient is being assessed for  Admission    I agree that at least one RN has performed a thorough Head to Toe Skin Assessment on the patient. ALL assessment sites listed below have been assessed.      Areas assessed by both nurses:    Head, Face, Ears, Shoulders, Back, Chest, Arms, Elbows, Hands, Sacrum. Buttock, Coccyx, Ischium, Legs. Feet and Heels, and Under Medical Devices         Does the Patient have a Wound? Yes wound(s) were present on assessment. LDA wound assessment was Initiated and completed by RN       Kennedy Prevention initiated by RN: Yes  Wound Care Orders initiated by RN: Yes    Pressure Injury (Stage 3,4, Unstageable, DTI, NWPT, and Complex wounds) if present, place Wound referral order by RN under : Yes    New Ostomies, if present place, Ostomy referral order under : No     Nurse 1 eSignature: Electronically signed by Henry Farley RN on 5/7/25 at 7:12 AM EDT    **SHARE this note so that the co-signing nurse can place an eSignature**    Nurse 2 eSignature: Electronically signed by Shonda Gardner RN on 5/7/25 at 7:12 AM EDT    
CRITICAL CARE PROGRESS NOTE    Name: Keaton Van   : 1965   MRN: [id number]   Date: 2025      ICU Diagnosis    Acute Renal Failure  Symptomatic Bradycardia  Acute Hyperkalemia  Altered Mental Status  Hypertensive Emergency     Overnight Events   HD completed and K normalized.     ROS negative except as otherwise documented.     A/P   This is a 59 year old male with history of HTN, HLD, DM and ESRD who presented to the ED on  with suicide attempt (ativan OD) - admitted to the ICU for hyperkalemia and emergent HD.    NEUROLOGICAL:    AMS - I suspect toxic metabolic encephalopathy. Less likely CVA or drug use.   - RAAS 0 to -1  - propofol for sedation  - versed PRN  - daily SAT  - continue suboxone (would stop if needs opioid infusion)  - psychiatry consult      PULMONOLOGY:   Intubated for airway protection. CXR without acute pathology. Mentation is barrier to extubation   - maintain SpO2>=92, pH>=7.30  - lung protective ventilation; wean vent as tolerated  - daily SAT/SBT  - follow ABG and CXR     CARDIOVASCULAR:   Bradycardia with near arrest - likely due to profound metabolic derangements (hyperkalemia). Normalization after HD.   - MAP > 65  - continue nicardipine  - restart home BP medications as tolerated  - consider TTE pending clinical course                RENAL/ELECTROLYTE:   Acute renal failure with severe hyperkalemia. Has fistula - but not currently mature. Now post emergent HD  - nephology consulted; on intermittent HD  - goal K>=4, Mg>=2, Phos >3  - daily BMP  - strict I/O's; daily weights  - avoid nephrotoxic medications     ENDOCRINE:   - ISS     ID/MICRO:   No signs or symptoms of active infection. Monitor off ABX  - monitor off ABX    ICU DAILY CHECKLIST     Code Status: Full  DVT Prophylaxis: SCDs  T/L/D: PIV  SUP: N/A  Diet: NPO  ABCDEF Bundle/Checklist Completed:Yes  Disposition: Stay in ICU  Multidisciplinary Rounds Completed:  Yes  Patient/Family Updated: Yes    OBJECTIVE: 
CRITICAL CARE PROGRESS NOTE    Name: Keaton Van   : 1965   MRN: [id number]   Date: 2025      ICU Diagnosis    Acute Renal Failure  Symptomatic Bradycardia  Acute Hyperkalemia  Altered Mental Status  Hypertensive Emergency     Overnight Events   Extubated without event. No complaints this AM.     ROS negative except as otherwise documented.     A/P   This is a 59 year old male with history of HTN, HLD, DM and ESRD who presented to the ED on  with suicide attempt (ativan OD) - admitted to the ICU for hyperkalemia and emergent HD.    NEUROLOGICAL:    Mentation baseline. No focal deficits.   - tylenol PRN pain   - continue suboxone   - psychiatry consulted     PULMONOLOGY:   CXR clear. On 2L - likely mild de-recruitment post extubation  - maintain SpO2>=92, pH>=7.30  - O2 PRN; wean as tolerated  - IS  - OOB to chair      CARDIOVASCULAR:   Bradycardia with near arrest - likely due to profound metabolic derangements (hyperkalemia). Normalization after HD.   - MAP > 65  - restart home BP medications as tolerated  - consider TTE pending clinical course                RENAL/ELECTROLYTE:   Acute renal failure with severe hyperkalemia. Has fistula - but not currently mature. Now post emergent HD via CVC.   - nephology consulted; on intermittent HD  - goal K>=4, Mg>=2, Phos >3  - daily BMP  - strict I/O's; daily weights  - avoid nephrotoxic medications     ENDOCRINE:   - ISS     ID/MICRO:   No signs or symptoms of active infection. Monitor off ABX  - monitor off ABX    ICU DAILY CHECKLIST     Code Status: Full  DVT Prophylaxis: SCDs  T/L/D: PIV  SUP: N/A  Diet: NPO  ABCDEF Bundle/Checklist Completed:Yes  Disposition: floor  Multidisciplinary Rounds Completed:  Yes  Patient/Family Updated: Yes    OBJECTIVE:     Blood pressure (!) 147/74, pulse 79, temperature 98.4 °F (36.9 °C), temperature source Axillary, resp. rate 15, height 1.829 m (6'), weight 92 kg (202 lb 13.2 oz), SpO2 93%.  Physical Exam  Gen: 
Called and updated [facility] nurse   Pt in ICU after bradycardai and now intubated. Not on any pressors .  Has HD cath in and plan for HD as soon as possible  [facility] team aware      
Clinical Pharmacy Note: IV to PO Automatic Conversion  Please note: Keaton Van’s medication(s) (pantoprazole) has/have been changed from IV to PO based on the following critiera:    Patient is tolerating oral medications  Patient is tolerating a diet more advanced than clear liquids  Patient is not requiring vasopressors    This IV to PO conversion is based on the P&T approved automatic conversion policy for eligible patients.  Please call with questions.    
Comprehensive Nutrition Assessment    Type and Reason for Visit: Initial, LOS    Nutrition Recommendations/Plan:   Continue regular diet.  Continue Nepro once daily. This will provide 420 calories and 19 g protein per day (18% and 15% estimated needs, respectively).   Daily weights.     Malnutrition Assessment:  Malnutrition Status:  Moderate malnutrition (05/08/25 1356)    Context:  Acute Illness     Findings of the 6 clinical characteristics of malnutrition:  Energy Intake:  75% or less of estimated energy requirements for 7 or more days  Weight Loss:  Mild weight loss (poss fluid)     Body Fat Loss:  No body fat loss     Muscle Mass Loss:  Mild muscle mass loss    Fluid Accumulation:  No fluid accumulation     Strength:  Not Performed     Nutrition Assessment:    58 yo male admitted originally for SI and + cocaine on admission, on BHU, however a RR was called d/t patient being unresponsive on unit, intubated 2 days. Patient was previously on HD, stopped earlier this year. He was restarted on HD this admission, large volumes removed.     His weight is slightly down from UBW, however fluid volume considerations. His intakes have been suboptimal but are improving slowly, Nepro ordered yesterday once daily, which provides 420 calories and 19 g protein per day (18% and 15% estimated needs, respectively).     Nutritionally Significant Medications:  Scheduled: suboxone, diltiazem, epogen;procrit, gabapentin, ss insulin, protonix, seroquel, warfarin    Estimated Daily Nutrient Needs:  Energy Requirements Based On: Kcal/kg  Weight Used for Energy Requirements: Ideal  Energy (kcal/day): 0184-3002 kcal/d (30-35 kcal/kg/d on HD)  Weight Used for Protein Requirements: Ideal  Protein (g/day): 114-137 g/d (1.5-1.8 g/kg/d HD + PU)  Method Used for Fluid Requirements: Standard renal  Fluid (ml/day): UOP + 500 mL    Nutrition Related Findings:   Edema: None   Edema Generalized: None    Recent Labs     05/06/25  0704 
Educated regarding pt safety protocols and importance of calling for help. Pt refused bed alarm pad. Call light kept within reach.  
End of Shift Note    Bedside shift change report given to Laura YA (oncoming nurse) by Rosalia Palacios LPN (offgoing nurse).  Report included the following information SBAR, Kardex, Intake/Output, and MAR    Shift worked:  0730-2000     Shift summary and any significant changes:    Patient up with staff in wheel chair self repositioning. Up to bathroom for output   Patient room air   Family present at bedside during shift   Tolerated diet   Tolerated medication   Insulin coverage during shift    Concerns for physician to address: Na    Zone phone for oncoming shift:  Na        Activity:     Number times ambulated in hallways past shift: 0  Number of times OOB to chair past shift: 2    Cardiac:   Cardiac Monitoring: No           Access:  Current line(s): PIV     Genitourinary:   Urinary status: voiding    Respiratory:      Chronic home O2 use?: NO  Incentive spirometer at bedside: YES       GI:     Current diet:  ADULT DIET; Regular  ADULT ORAL NUTRITION SUPPLEMENT; Lunch; Renal Oral Supplement  Passing flatus: YES  Tolerating current diet: NO       Pain Management:   Patient states pain is manageable on current regimen: YES    Skin:     Interventions:     Patient Safety:  Fall Score:    Interventions: gripper socks, stay with me (per policy), and sitter at bedside       Length of Stay:  Expected LOS: 13  Actual LOS: 13      Rosalia Palacios LPN                            
Occupational Therapy  05/02/25    Chart reviewed and attempted to see pt for OT treatment session. Pt currently on dialysis. OT will continue to follow and see as able.    Thank you,  Shanta Fatima, OTR/L  
Occupational Therapy  05/06/25    Chart reviewed and OT attempted to see pt for OT treatment. Pt received semi-fowlers in bed fast asleep. Pt easily rousable but adamantly refusing OOB activity. OT educated on importance of OOB and pt stated \"I'm not trying to be rude but you're going to get me really aggravated. I'm not doing anything with you sweetie.\" OT will defer at this time and see pt as able and appropriate.     14:15 OT attempted to see pt again in pm and pt off floor for permacath placement.     Thank you,  Shanta Fatima,OTR/L  
Occupational Therapy  05/09/25    Chart reviewed and discussed with RN. Pt with bedside HD at this time. Will defer and follow up as able and appropriate.    Thank you!  Catrachita Mendieta, OT    
Occupational Therapy  05/12/25     Chart reviewed in prep for OT treatment. OT spoke with RN who stated pt is currently on dialysis. OT will continue to follow and see as able.    Thank you,  Shanta Fatima, OTR/L  
Occupational Therapy Note:     Chart reviewed in preparation for OT treatment. Note events of early this morning including RRT for respiratory failure with subsequent transfer to ICU, per ICU notes, patient is at high risk for intubation at this time. Will defer and continue to follow as able.     Glenna Romeo, OTR/L        
Occupational Therapy:     Chart reviewed in prep for OT tx session. Attempted to see pt, however bedside HD in process. Will defer and continue to follow as able and medically appropriate.     Thank you,   Barbara Amin, OTD, OTR/L    
On rounding patient noted to have oxygen off, also unresponsive to deep sternal rub, Heart rate seen at 55 on monitor, Carotid pulse palpated, placed on 02, rapid response called in the interim. Pupils dilated at 2 mm and react slowly to light.     Rapid response team in attendance, Pt opens eyes to name calling and obeys command  but very lethargic ABG done, placed on Bipap therapy.    Report given, pt being prepared for ICU.  
PCCM - full note to follow.     Transfer to ICU .   Low volumes on BiPAP dispute higher pressure support.   Atelectasis on Chest xray   Keep HOB > 45'  Change to AVAP mode  Repeat ABG 0800.  If worsens again may need intubation    Charles Bhatia, NP-C    Critical Care Medicine  [facility]      
Patient having new afib on HD with HR at times into the 120s. Hemodynamically stable. Will start on a diltiazem drip in the hopes that we slow him down and can wean off relatively quickly. H/o normal EF with TTE last year. Will consult cardiology. Will likely need to be on a blood thinner. [id number] is 2.  
Pharmacist Note - Warfarin Dosing  Consult provided for this 59 y.o.male to manage warfarin for Atrial Fibrillation    INR Goal: 2 - 3    Home regimen/ tablet size: New start    Drugs that may increase INR: None  Drugs that may decrease INR: None  Other current anticoagulants/ drugs that may increase bleeding risk: Heparin  Risk factors: None  Daily INR ordered through: indefinitely     Recent Labs     04/29/25  0535 04/30/25  0320 05/01/25  0429   HGB 8.4* 8.6* 8.1*     Date               INR                  Dose  5/01  ordered 5 mg                                                                                Assessment/ Plan:  Will order warfarin 5 mg PO x 1 dose.  Baseline INR for today ordered.    Pharmacy will continue to monitor daily and adjust therapy as indicated.  Please contact the pharmacist at [phone] or [phone] for outpatient recommendations if needed.      
Pharmacist Note - Warfarin Dosing  Consult provided for this 59 y.o.male to manage warfarin for Atrial Fibrillation    INR Goal: 2 - 3    Home regimen/ tablet size: New start    Drugs that may increase INR: None  Drugs that may decrease INR: None  Other current anticoagulants/ drugs that may increase bleeding risk: Heparin  Risk factors: None  Daily INR ordered through: indefinitely    Recent Labs     04/30/25  0320 05/01/25  0429 05/02/25  0631   HGB 8.6* 8.1* 7.2*   INR  --   --  1.0     Date               INR                  Dose  5/01  --  5 mg  5/02  1.0  5 mg                                                                                 Assessment/ Plan:  Will order warfarin 5 mg PO x 1 dose.    Pharmacy will continue to monitor daily and adjust therapy as indicated.  Please contact the pharmacist at [phone] or [phone] for outpatient recommendations if needed.      
Pharmacist Note - Warfarin Dosing  Consult provided for this 59 y.o.male to manage warfarin for Atrial Fibrillation    INR Goal: 2 - 3    Home regimen/ tablet size: New start    Drugs that may increase INR: None  Drugs that may decrease INR: None  Other current anticoagulants/ drugs that may increase bleeding risk: Heparin (SC)  Risk factors: None  Daily INR ordered through: 8/8/25    Recent Labs     05/03/25  0639 05/04/25  0154 05/05/25  0438 05/05/25  0734   HGB  --  7.7*  --  7.9*   INR 1.0 1.0 1.1  --      Date               INR                  Dose  5/01  --  5 mg  5/02  1.0  5 mg    5/03  1.0  5 mg    5/04  1.0  7.5 mg    5/05                 1.1                  7.5 mg                                                                               Assessment/ Plan:  Will order warfarin 7.5 mg PO x 1 dose.    Pharmacy will continue to monitor daily and adjust therapy as indicated.  Please contact the pharmacist at [phone] for outpatient recommendations if needed.            
Pharmacist Note - Warfarin Dosing  Consult provided for this 59 y.o.male to manage warfarin for Atrial Fibrillation    INR Goal: 2 - 3    Home regimen/ tablet size: New start    Drugs that may increase INR: None  Drugs that may decrease INR: None  Other current anticoagulants/ drugs that may increase bleeding risk: Heparin (SC)  Risk factors: None  Daily INR ordered through: 8/8/25    Recent Labs     05/04/25  0154 05/05/25  0438 05/05/25  0734 05/06/25  0704   HGB 7.7*  --  7.9* 8.4*   INR 1.0 1.1  --  1.3*     Date               INR                  Dose  5/01  --  5 mg  5/02  1.0  5 mg    5/03  1.0  5 mg    5/04  1.0  7.5 mg    5/05                 1.1                  7.5 mg  5/06                 1.3                  7.5 mg                                                                               Assessment/ Plan:  Will order warfarin 7.5 mg PO x 1 dose.    Pharmacy will continue to monitor daily and adjust therapy as indicated.  Please contact the pharmacist at [phone] for outpatient recommendations if needed.              
Pharmacist Note - Warfarin Dosing  Consult provided for this 59 y.o.male to manage warfarin for Atrial Fibrillation    INR Goal: 2 - 3    Home regimen/ tablet size: New start    Drugs that may increase INR: None  Drugs that may decrease INR: None  Other current anticoagulants/ drugs that may increase bleeding risk: Heparin (SC)  Risk factors: None  Daily INR ordered through: 8/8/25    Recent Labs     05/05/25  0438 05/05/25  0734 05/06/25  0704 05/07/25  0350   HGB  --  7.9* 8.4* 8.5*   INR 1.1  --  1.3* 1.9*     Date               INR                  Dose  5/01  --  5 mg  5/02  1.0  5 mg    5/03  1.0  5 mg    5/04  1.0  7.5 mg    5/05                 1.1                  7.5 mg  5/06                 1.3                  7.5 mg  5/07                 1.9                  6 mg                                                                               Assessment/ Plan:  Will order warfarin 6 mg PO x 1 dose.    Pharmacy will continue to monitor daily and adjust therapy as indicated.  Please contact the pharmacist at [phone] for outpatient recommendations if needed.                
Pharmacist Note - Warfarin Dosing  Consult provided for this 59 y.o.male to manage warfarin for Atrial Fibrillation    INR Goal: 2 - 3    Home regimen/ tablet size: New start    Drugs that may increase INR: None  Drugs that may decrease INR: None  Other current anticoagulants/ drugs that may increase bleeding risk: Heparin (SC)  Risk factors: None  Daily INR ordered through: indefinitely    Recent Labs     05/01/25  0429 05/02/25  0631 05/03/25  0639   HGB 8.1* 7.2*  --    INR  --  1.0 1.0     Date               INR                  Dose  5/01  --  5 mg  5/02  1.0  5 mg    5/03  1.0  5 mg                                                                                 Assessment/ Plan:  Will order warfarin 5 mg PO x 1 dose.    Pharmacy will continue to monitor daily and adjust therapy as indicated.  Please contact the pharmacist at [phone] or [phone] for outpatient recommendations if needed.        
Pharmacist Note - Warfarin Dosing  Consult provided for this 59 y.o.male to manage warfarin for Atrial Fibrillation    INR Goal: 2 - 3    Home regimen/ tablet size: New start    Drugs that may increase INR: None  Drugs that may decrease INR: None  Other current anticoagulants/ drugs that may increase bleeding risk: Heparin (SC)  Risk factors: None  Daily INR ordered through: indefinitely    Recent Labs     05/02/25  0631 05/03/25  0639 05/04/25  0154   HGB 7.2*  --  7.7*   INR 1.0 1.0 1.0     Date               INR                  Dose  5/01  --  5 mg  5/02  1.0  5 mg    5/03  1.0  5 mg    5/04  1.0  7.5 mg                                                                                 Assessment/ Plan:  Will order warfarin 7.5 mg PO x 1 dose.    Pharmacy will continue to monitor daily and adjust therapy as indicated.  Please contact the pharmacist at [phone] or [phone] for outpatient recommendations if needed.      
Pharmacist Note - Warfarin Dosing  Consult provided for this 59 y.o.male to manage warfarin for Atrial Fibrillation    INR Goal: 2 - 3    Home regimen/ tablet size: New start    Drugs that may increase INR: None  Drugs that may decrease INR: None  Other current anticoagulants/ drugs that may increase bleeding risk: None  Risk factors: None  Daily INR ordered through: 8/8/25    Recent Labs     05/06/25  0704 05/07/25  0350 05/08/25  0625   HGB 8.4* 8.5* 8.0*   INR 1.3* 1.9* 2.0*     Date               INR                  Dose  5/01  --  5 mg  5/02  1.0  5 mg    5/03  1.0  5 mg    5/04  1.0  7.5 mg    5/05                 1.1                  7.5 mg  5/06                 1.3                  7.5 mg  5/07                 1.9                  6 mg  5/08                 2.0                  6 mg                                                                               Assessment/ Plan:  Will order warfarin 6 mg PO x 1 dose.    Pharmacy will continue to monitor daily and adjust therapy as indicated.  Please contact the pharmacist at [phone] for outpatient recommendations if needed.                  
Pharmacist Note - Warfarin Dosing  Consult provided for this 59 y.o.male to manage warfarin for Atrial Fibrillation    INR Goal: 2 - 3    Home regimen/ tablet size: New start    Drugs that may increase INR: None  Drugs that may decrease INR: None  Other current anticoagulants/ drugs that may increase bleeding risk: None  Risk factors: None  Daily INR ordered through: 8/8/25    Recent Labs     05/08/25  0625 05/09/25  0617 05/10/25  0003   HGB 8.0* 8.5* 9.2*   INR 2.0* 2.0* 2.2*     Date               INR                  Dose  5/01  --  5 mg  5/02  1.0  5 mg    5/03  1.0  5 mg    5/04  1.0  7.5 mg    5/05                 1.1                  7.5 mg  5/06                 1.3                  7.5 mg  5/07                 1.9                  6 mg  5/08                 2.0                  6 mg  5/9                   2.0                  6 mg                    5/10  2.2  6 mg                                                             Assessment/ Plan:  Will order warfarin 6 mg PO x 1 dose.    Pharmacy will continue to monitor daily and adjust therapy as indicated.  Please contact the pharmacist at [phone] for outpatient recommendations if needed.                      
Pharmacist Note - Warfarin Dosing  Consult provided for this 59 y.o.male to manage warfarin for Atrial Fibrillation    INR Goal: 2 - 3    Home regimen/ tablet size: New start    Drugs that may increase INR: None  Drugs that may decrease INR: None  Other current anticoagulants/ drugs that may increase bleeding risk: None  Risk factors: None  Daily INR ordered through: 8/8/25    Recent Labs     05/10/25  0003 05/11/25  0446 05/12/25  0342   HGB 9.2* 9.1* 8.9*   INR 2.2* 2.0* 2.1*     Date               INR                  Dose  5/01  --  5 mg  5/02  1.0  5 mg    5/03  1.0  5 mg    5/04  1.0  7.5 mg    5/05                 1.1                  7.5 mg  5/06                 1.3                  7.5 mg  5/07                 1.9                  6 mg  5/08                 2.0                  6 mg  5/9                   2.0                  6 mg                    5/10  2.2  6 mg  5/11  2.0  7.5 mg  5/12  2.1  6 mg                                                               Assessment/ Plan:  Will order warfarin 6 mg PO x 1 dose.    Pharmacy will continue to monitor daily and adjust therapy as indicated.  Please contact the pharmacist at [phone] for outpatient recommendations if needed.                          
Physical Therapy    Chart reviewed in prep for PT intervention.  Pt with bedside HD at this time. Will defer and follow up as able and appropriate.  SHANNA GONZALEZ, PT       
Physical Therapy  05/02/2025    PT continues to follow patient.  Attempted to see patient for PT treatment session, however patient undergoing dialysis at bedside.  Not available for PT.  Will defer and follow-up next week as patient is medically appropriate and available for PT.     Thank you,  Bebe Bales, PT, DPT  
Physical Therapy  5/12/2025    Chart reviewed. Patient on HD at bedside. Hold PT and continue to follow. Thank you.    Sarah Gutierrez, PT, DPT, CCS  
Physical Therapy:   5/5/25    Chart reviewed in prep for PT tx session. Attempted to see pt, however bedside HD in process. Will defer and continue to follow as able and medically appropriate.     Thank you,   Sara More, PT, DPT    
Physical Therapy:  05/06/25     Chart reviewed in preparation for PT treatment. Patient is currently [place] for permacath placement. Noted patient's adamant refusal of OT services earlier today. Will re-attempt as able.     Thank you,  Sara More, PT, DPT    
Physical Therapy:  05/07/25     Chart reviewed in preparation for PT treatment. Note events of early this morning including RRT for respiratory failure with subsequent transfer to ICU, per ICU notes, patient is at high risk for intubation at this time. Will defer and continue to follow as able.     Thank you,  Sara More, PT, DPT    
Pt came to [facility] from [facility] as RRT with lethargy, potassium of 7.1. Creatine 4.3 and a sodium of 128.   Pt had some confusion but responsive to voice.   Pt was able to answer questions appropriately. Pt stated he wanted dialysis in front of the psych MD and nephrologist.   Primary RN inquired if patient wanted to remain DNR.   Pt stated he wanted to live.   Psych MD Susan spoke with patient about becoming a full code. Patient agreed.   Rapid response nurse and Primary RN discussed patients MAR and all of the orders that were a \" read only\". There was a concern to make sure patient received the calcium, insulin and 250ml dextrose.     RRT nurse Solomon spoke with nursing supervisors about making sure patient's medications being corrected was a priority.  Pt was cleaned up and acquainted to the room. Patient spoke to sister on the phone and told his sister that he wanted to live and that we has no longer feeling suicidal.   RN stepped out of the room and assisted another pt in moving beds and spoke with IR about placing a HD cath for pt to receive dialysis.   After speaking on the phone with IR RN went to check on patient and inform him that IR was coming up to place the cath.   Upon entering patient's room who was seemingly \"sleeping\", Primary RN called patients name. Patient did not respond.   RN called patient again and attempted a sternal rub.   Patient was still unresponsive. Primary RN Noted patients HR dropped to 30's and then to 29.   Sitter was at bedside looking at her cellphone, unaware that patient had a change in status.   RN called Rapid. A code blue was then called due to bradycardia.  Patient maintained a pulse. Patient remained unresponsive.Help at bedside. Atropine given. Pt . Calcium and insulin were given. .   Patient was intubated at bedside.   Patient was transferred to CCU. Report given at bedside.  
Reordered Insulin/ sodium bicarb and calcium gluconate and Dextrose as these meds ordered at Psy unit  did not roll over to the 4 th floor  Also had discussed with 4 th floor charge nurse to call IR on need for Mickey cath placment    
Spiritual Health History and Assessment/Progress Note  [facility]    Initial Encounter, Code Duc,  ,      Name: Keaton Van MRN: [id number]    Age: 59 y.o.     Sex: male   Language: English   Buddhism: Yazidi   Toxic metabolic encephalopathy     Date: 4/28/2025            Total Time Calculated: 30 min              Spiritual Assessment began in [facility] 4 CORONARY CARE        Referral/Consult From: Rounding   Encounter Overview/Reason: Initial Encounter  Service Provided For: Family    Keisha, Belief, Meaning:   Patient unable to assess at this time  Family/Friends have beliefs or practices that help with coping during difficult times      Importance and Influence:  Patient unable to assess at this time  Family/Friends have no beliefs influential to healthcare decision-making identified during this visit    Community:  Patient unable to assess at this time  Family/Friends feel well-supported. Support system includes: Extended family    Assessment and Plan of Care:   Pt was intubated. Encountered pt sisters Anuradha and Evelyn. They shared they are still just processing everything. They are hoping to talk to their brother today if he is able. Intro spiritual health.     Patient Interventions include: unable to assess at this time  Family/Friends Interventions include: Facilitated expression of thoughts and feelings and Affirmed coping skills/support systems    Patient Plan of Care: unable to assess at this time  Family/Friends Plan of Care: Spiritual Care available upon further referral    Electronically signed by REV. TULIO DILLON M.Div, [facility] on 4/28/2025 at 11:50 AM    
Spiritual Health History and Assessment/Progress Note  [facility]    Grief, Loss, and Adjustments, Code Blue, Adjustment to illness,      Name: Keaton Van MRN: [id number]    Age: 59 y.o.     Sex: male   Language: English   Yazidi: Buddhism   Toxic metabolic encephalopathy     Date: 4/28/2025            Total Time Calculated: 45 min              Spiritual Assessment continued in [facility] 4 CORONARY CARE        Referral/Consult From: Other (comment) ( Consult)   Encounter Overview/Reason: Grief, Loss, and Adjustments  Service Provided For: Patient    Keisha, Belief, Meaning:   Patient is connected with a keisha tradition or spiritual practice and has beliefs or practices that help with coping during difficult times  Family/Friends No family/friends present      Importance and Influence:  Patient has spiritual/personal beliefs that influence decisions regarding their health  Family/Friends     Community:  Patient feels well-supported. Support system includes: Other: Sisters  Family/Friends     Assessment and Plan of Care:   Spiritual/Emotional Distress: Post-code blue event; following along with  Team  Met with pt in his room. Constant observer was present during this encounter  Pt recounted the medical crises, his decisions with life/death, and reflected on meaning/purpose, as he now wants to live. He expressed remorse about wanted to end his life; he stated that he believes his suicidal ideations were selfish and would have hurt his sisters tremendously. Pt discussed the advantages of receiving dialysis, but also acknowledged that there will limitations to his quality of life. I explored what quality of life would look like moving forward. At this time, pt is still processing what his life would entail. However, he finds comfort in knowing that his sisters love him, God forgives him, and God's love and strength will enable him to cope.    Pt seemed brighter and future focused today, in comparison to our last 
Spiritual Health History and Assessment/Progress Note  [facility]    Follow-up, Code Blue, Adjustment to illness,      Name: Keaton Van MRN: [id number]    Age: 59 y.o.     Sex: male   Language: English   Uatsdin: Yazidi   Toxic metabolic encephalopathy     Date: 5/5/2025            Total Time Calculated: 20 min              Spiritual Assessment continued in [facility]        Referral/Consult From: Other    Encounter Overview/Reason: Follow-up  Service Provided For: Patient    Keisha, Belief, Meaning:   Patient unable to assess at this time  Family/Friends No family/friends present      Importance and Influence:  Patient unable to assess at this time  Family/Friends No family/friends present    Community:  Patient unable to assess at this time  Family/Friends No family/friends present    Assessment and Plan of Care:   Pt appeared to be resting.     Patient Interventions include: unable to assess at this time  Family/Friends Interventions include: No family/friends present    Patient Plan of Care: Other: unable to assess at this time  Family/Friends Plan of Care: No family/friends present    Electronically signed by REV. TULIO DILLON M.Div, [facility] on 5/5/2025 at 12:24 PM    
Spiritual Health History and Assessment/Progress Note  [facility]    Loneliness/Social Isolation, Code Blue, Adjustment to illness,      Name: Keaton Van MRN: [id number]    Age: 59 y.o.     Sex: male   Language: English   Presybeterian: Sikh   Toxic metabolic encephalopathy     Date: 5/2/2025            Total Time Calculated: 15 min              Spiritual Assessment continued in [facility] 4 CORONARY CARE        Referral/Consult From: Rounding   Encounter Overview/Reason: Loneliness/Social Isolation  Service Provided For: Patient not available    Keisha, Belief, Meaning:   Patient   Family/Friends       Importance and Influence:  Patient   Family/Friends     Community:  Patient   Family/Friends     Assessment and Plan of Care:   Follow up visit for spiritual/emotional care; pt was receiving dialysis when I rounded. Received an update from nursing staff about potential ongoing spiritual care needs. Will return later in the day or refer to next shift for a visit.    Patient Interventions include:   Family/Friends Interventions include:     Patient Plan of Care: Spiritual Care available upon further referral  Family/Friends Plan of Care:     Electronically signed by JASON Gonzales on 5/2/2025 at 11:47 AM  For additional spiritual care, please contact the  on-call at ([phone]).    Patricia Burden MDiv, MS, BCC  Staff   Spiritual Health Services  
Spiritual Health History and Assessment/Progress Note  [facility]    Attempted Encounter, Code Blue, Adjustment to illness,      Name: Keaton Van MRN: [id number]    Age: 59 y.o.     Sex: male   Language: English   Pentecostalism: Presybeterian   Toxic metabolic encephalopathy     Date: 5/4/2025            Total Time Calculated: 10 min              Patient appeared to be resting during attempted encounter.     Spiritual care is available upon request.    Electronically signed by Chaplain Moni Resident on 5/4/2025 at 4:14 PM    
Spiritual Health History and Assessment/Progress Note  [facility]    Crisis, Code Duc,  ,      Name: Keaton Van MRN: [id number]    Age: 59 y.o.     Sex: male   Language: English   Muslim: Anabaptism   Toxic metabolic encephalopathy     Date: 4/27/2025            Total Time Calculated: 10 min              Per protocol, I responded to Code Blue. No family present at time of attempted encounter. Patient moved to CCU.  Spiritual care available upon request.     Electronically signed by Shahida Montenegroin Resident on 4/27/2025 at 6:00 PM    
TRANSFER - OUT REPORT:    Verbal report given to Anahi YA on Keaton Van  being transferred to [facility] for routine progression of patient care       Report consisted of patient's Situation, Background, Assessment and   Recommendations(SBAR).     Information from the following report(s) Nurse Handoff Report, Intake/Output, Recent Results, Cardiac Rhythm SR, Procedure Verification, and Neuro Assessment was reviewed with the receiving nurse.           Lines:   Peripheral IV 05/06/25 Proximal;Right Forearm (Active)   Site Assessment Clean, dry & intact 05/07/25 0230   Line Status Normal saline locked 05/07/25 0230   Phlebitis Assessment No symptoms 05/07/25 0230   Infiltration Assessment 0 05/06/25 1401   Alcohol Cap Used Yes 05/07/25 0230   Dressing Status Clean, dry & intact 05/07/25 0230   Dressing Type Transparent 05/07/25 0230       Hemodialysis Central Access - Permanent/Tunneled Right Neck (Active)   $Tunneled Hemodialysis Catheter $Yes 05/06/25 1421   Continued need for line? Yes 05/07/25 0745   Site Assessment Clean, dry & intact 05/07/25 1130   Blue Lumen Status Alcohol cap applied;Flushed;Normal saline locked 05/07/25 1130   Red Lumen Status Alcohol cap applied;Flushed;Normal saline locked 05/07/25 1130   Line Care Chlorhexidine wipes;Ports disinfected 05/07/25 1130   Dressing Type Antimicrobial;Bacteriocidal;Sterile dressing, transparent 05/07/25 1130   Date of Last Dressing Change 05/07/25 05/07/25 1130   Dressing Status New dressing applied;Clean, dry & intact 05/07/25 1130   Dressing Intervention Dressing changed 05/07/25 0745        Opportunity for questions and clarification was provided.      Patient transported with:  Monitor and Registered Nurse X2        
amlodipine and hydralazine    Afib/Flutter -  on Coumadin      History of peripheral arterial disease -status post right BKA in 2024     Diabetes mellitus type 2      Bipolar disorder -was admitted to psychiatric floor for suicidal attempt     Substance abuse -cocaine positive on UDS     Plan:     CM consult for HD chair under KIN status at [facility] facility  LESLIE/IV Iron   Ct to assess mentation   Will follow  Discussed with HD RN  Alona Swanson MD       
disease) on dialysis (HCC)    Hx of BKA, right (HCC)    Hyperkalemia  Resolved Problems:    * No resolved hospital problems. *    CKD 5 -patient was on dialysis until around 2025 and had refused  treatments on his own  He presented with severe hyperkalemia and had bradycardic arrest for which emergent dialysis was done.s/p HD *1 time done by  and Team  - His First and only visit to our office was in March 2025  He was admitted to psychiatric unit here for management of depression and suicidal attempt.  Was emergently dialyzed for hyperkalemia( 7.1) with Bradycardic arrest   -Labs from this morning shows stable electrolytes,  he had a urine output of 1500 mL.  - Will do HD MWF and monitor any signs of Renal recovery while in the outpatient unit     Hyperkalemia - had K on 7 at the time of admission  resolved after HD, BMP from this morning showed normal potassium    Anemia -received Retacrit with dialysis.    HTN -blood pressure stable on amlodipine and hydralazine    History of peripheral arterial disease -status post right BKA in 2024    Diabetes mellitus type 2     Bipolar disorder -was admitted to psychiatric floor for suicidal attempt    Substance abuse -cocaine positive on UDS    Will do HD MWF     Signed By: Susannah Davenport MD     May 1, 2025      This note was prepared using voice recognition system and is likely to have sound alike errors that may have been overlooked even during proofreading.  Please contact the author for any clarifications    
of Last Dressing Change 05/02/25 05/02/25 1230   Dressing Status Clean, dry & intact 05/02/25 1230   Dressing Intervention Dressing changed 05/02/25 0000        Opportunity for questions and clarification was provided.      Patient transported with:  Monitor, RN, 2L NC

## 2025-05-12 NOTE — DISCHARGE SUMMARY
Discharge Summary       PATIENT ID: Keaton Van  MRN: [id number]   YOB: 1965    DATE OF ADMISSION: 4/27/2025  1:00 PM    DATE OF DISCHARGE: 5/12/2025   PRIMARY CARE PROVIDER: Robert Wells PA-C     ATTENDING PHYSICIAN: Dr Feroz Alvarado  DISCHARGING PROVIDER: Feroz Alvarado MD    To contact this individual call [phone] and ask the  to page.  If unavailable ask to be transferred the Adult Hospitalist Department.    CONSULTATIONS: IP CONSULT TO CASE MANAGEMENT  IP CONSULT TO HOSPITALIST  IP CONSULT TO NEPHROLOGY  IP CONSULT TO NEPHROLOGY  IP CONSULT TO PSYCHIATRY  IP CONSULT TO PSYCHIATRY  IP CONSULT TO SOCIAL WORK  IP CONSULT TO CARDIOLOGY  IP CONSULT TO PHARMACY  IP CONSULT TO INTERVENTIONAL RADIOLOGY  IP CONSULT TO PSYCHIATRY  IP CONSULT TO INTENSIVIST  IP CONSULT TO CASE MANAGEMENT    PROCEDURES/SURGERIES: * No surgery found *    ADMITTING DIAGNOSES & HOSPITAL COURSE:   Respiratory failure s/p arrest. Resolved, extubated 4/29   Acute hypercarbic respiratory failure early a.m. of 5/7--Cymbalta held. now resolved     New onset atrial fibrillation  - Now on PO diltiazem.   - Initiated on warfarin for embolic CVA prophylaxis. Discussed Eliquis initially but patient and family opted for warfarin due to high cost of Eliquis.   - Appreciate Dr Cavazos, now signed off     Hyperkalemia  - Currently improved  - Depends heavily on compliance and ongoing management  - Continue current management with HD for now     ESRD: Has LUE AVF not mature. Right IJ PermCath placed on 5/6. Appreciate neph  DM, poorly controlled: SSI  Hypertension, BP soft: Hold hydralazine  S/p BKA: Has his prosthetic leg with him  Depression, bipolar/Suicide attempt: Appreciate Psych, continue  neurontin lowered dose,suboxone, nicotine patch  Anemia: Stable    PENDING TEST RESULTS:   At the time of discharge the following test results are still pending: none    FOLLOW UP APPOINTMENTS:    PCP  Cardiology    ADDITIONAL CARE

## 2025-06-04 ENCOUNTER — HOSPITAL ENCOUNTER (OUTPATIENT)
Facility: HOSPITAL | Age: 60
Setting detail: OBSERVATION
Discharge: HOME | End: 2025-06-05
Attending: STUDENT IN AN ORGANIZED HEALTH CARE EDUCATION/TRAINING PROGRAM | Admitting: STUDENT IN AN ORGANIZED HEALTH CARE EDUCATION/TRAINING PROGRAM
Payer: MEDICARE

## 2025-06-04 DIAGNOSIS — T14.8XXA BLEEDING FROM WOUND: Primary | ICD-10-CM

## 2025-06-04 PROBLEM — R58 BLEEDING: Status: ACTIVE | Noted: 2025-06-04

## 2025-06-04 LAB
ABO + RH BLD: NORMAL
ALBUMIN SERPL-MCNC: 3.2 G/DL (ref 3.5–5)
ALBUMIN/GLOB SERPL: 0.9 (ref 1.1–2.2)
ALP SERPL-CCNC: 140 U/L (ref 45–117)
ALT SERPL-CCNC: 28 U/L (ref 12–78)
ANION GAP SERPL CALC-SCNC: 5 MMOL/L (ref 2–12)
AST SERPL-CCNC: 31 U/L (ref 15–37)
BASOPHILS # BLD: 0.08 K/UL (ref 0–0.1)
BASOPHILS NFR BLD: 1.3 % (ref 0–1)
BILIRUB SERPL-MCNC: 0.5 MG/DL (ref 0.2–1)
BLOOD GROUP ANTIBODIES SERPL: NORMAL
BUN SERPL-MCNC: 22 MG/DL (ref 6–20)
BUN/CREAT SERPL: 10 (ref 12–20)
CALCIUM SERPL-MCNC: 7.9 MG/DL (ref 8.5–10.1)
CHLORIDE SERPL-SCNC: 103 MMOL/L (ref 97–108)
CO2 SERPL-SCNC: 28 MMOL/L (ref 21–32)
CREAT SERPL-MCNC: 2.2 MG/DL (ref 0.7–1.3)
DIFFERENTIAL METHOD BLD: ABNORMAL
EOSINOPHIL # BLD: 0.26 K/UL (ref 0–0.4)
EOSINOPHIL NFR BLD: 4.4 % (ref 0–7)
ERYTHROCYTE [DISTWIDTH] IN BLOOD BY AUTOMATED COUNT: 13 % (ref 11.5–14.5)
GLOBULIN SER CALC-MCNC: 3.7 G/DL (ref 2–4)
GLUCOSE BLD STRIP.AUTO-MCNC: 130 MG/DL (ref 65–117)
GLUCOSE SERPL-MCNC: 137 MG/DL (ref 65–100)
HCT VFR BLD AUTO: 33.5 % (ref 36.6–50.3)
HGB BLD-MCNC: 10.9 G/DL (ref 12.1–17)
IMM GRANULOCYTES # BLD AUTO: 0.02 K/UL (ref 0–0.04)
IMM GRANULOCYTES NFR BLD AUTO: 0.3 % (ref 0–0.5)
INR PPP: 1.1 (ref 0.9–1.1)
LYMPHOCYTES # BLD: 1.3 K/UL (ref 0.8–3.5)
LYMPHOCYTES NFR BLD: 21.9 % (ref 12–49)
MCH RBC QN AUTO: 29.4 PG (ref 26–34)
MCHC RBC AUTO-ENTMCNC: 32.5 G/DL (ref 30–36.5)
MCV RBC AUTO: 90.3 FL (ref 80–99)
MONOCYTES # BLD: 0.39 K/UL (ref 0–1)
MONOCYTES NFR BLD: 6.6 % (ref 5–13)
NEUTS SEG # BLD: 3.89 K/UL (ref 1.8–8)
NEUTS SEG NFR BLD: 65.5 % (ref 32–75)
NRBC # BLD: 0 K/UL (ref 0–0.01)
NRBC BLD-RTO: 0 PER 100 WBC
PLATELET # BLD AUTO: 154 K/UL (ref 150–400)
PMV BLD AUTO: 10.2 FL (ref 8.9–12.9)
POTASSIUM SERPL-SCNC: 3.9 MMOL/L (ref 3.5–5.1)
PROT SERPL-MCNC: 6.9 G/DL (ref 6.4–8.2)
PROTHROMBIN TIME: 11.6 SEC (ref 9.2–11.2)
RBC # BLD AUTO: 3.71 M/UL (ref 4.1–5.7)
SERVICE CMNT-IMP: ABNORMAL
SODIUM SERPL-SCNC: 136 MMOL/L (ref 136–145)
SPECIMEN EXP DATE BLD: NORMAL
WBC # BLD AUTO: 5.9 K/UL (ref 4.1–11.1)

## 2025-06-04 PROCEDURE — 86900 BLOOD TYPING SEROLOGIC ABO: CPT

## 2025-06-04 PROCEDURE — 6370000000 HC RX 637 (ALT 250 FOR IP): Performed by: STUDENT IN AN ORGANIZED HEALTH CARE EDUCATION/TRAINING PROGRAM

## 2025-06-04 PROCEDURE — 80053 COMPREHEN METABOLIC PANEL: CPT

## 2025-06-04 PROCEDURE — 85025 COMPLETE CBC W/AUTO DIFF WBC: CPT

## 2025-06-04 PROCEDURE — 82962 GLUCOSE BLOOD TEST: CPT

## 2025-06-04 PROCEDURE — 99285 EMERGENCY DEPT VISIT HI MDM: CPT

## 2025-06-04 PROCEDURE — G0378 HOSPITAL OBSERVATION PER HR: HCPCS

## 2025-06-04 PROCEDURE — 36415 COLL VENOUS BLD VENIPUNCTURE: CPT

## 2025-06-04 PROCEDURE — 86850 RBC ANTIBODY SCREEN: CPT

## 2025-06-04 PROCEDURE — 85610 PROTHROMBIN TIME: CPT

## 2025-06-04 PROCEDURE — 86901 BLOOD TYPING SEROLOGIC RH(D): CPT

## 2025-06-04 RX ORDER — SODIUM CHLORIDE 0.9 % (FLUSH) 0.9 %
5-40 SYRINGE (ML) INJECTION PRN
Status: DISCONTINUED | OUTPATIENT
Start: 2025-06-04 | End: 2025-06-05 | Stop reason: HOSPADM

## 2025-06-04 RX ORDER — OXYCODONE AND ACETAMINOPHEN 10; 325 MG/1; MG/1
1 TABLET ORAL
Refills: 0 | Status: COMPLETED | OUTPATIENT
Start: 2025-06-04 | End: 2025-06-04

## 2025-06-04 RX ORDER — ONDANSETRON 2 MG/ML
4 INJECTION INTRAMUSCULAR; INTRAVENOUS EVERY 6 HOURS PRN
Status: DISCONTINUED | OUTPATIENT
Start: 2025-06-04 | End: 2025-06-05 | Stop reason: HOSPADM

## 2025-06-04 RX ORDER — PANTOPRAZOLE SODIUM 40 MG/1
40 TABLET, DELAYED RELEASE ORAL
Status: DISCONTINUED | OUTPATIENT
Start: 2025-06-05 | End: 2025-06-05 | Stop reason: HOSPADM

## 2025-06-04 RX ORDER — NICOTINE 21 MG/24HR
1 PATCH, TRANSDERMAL 24 HOURS TRANSDERMAL DAILY
Status: DISCONTINUED | OUTPATIENT
Start: 2025-06-04 | End: 2025-06-05 | Stop reason: HOSPADM

## 2025-06-04 RX ORDER — OXYCODONE HYDROCHLORIDE 5 MG/1
5 TABLET ORAL EVERY 4 HOURS PRN
Status: DISCONTINUED | OUTPATIENT
Start: 2025-06-04 | End: 2025-06-05 | Stop reason: HOSPADM

## 2025-06-04 RX ORDER — LORAZEPAM 1 MG/1
1 TABLET ORAL EVERY 6 HOURS PRN
Status: DISCONTINUED | OUTPATIENT
Start: 2025-06-04 | End: 2025-06-05 | Stop reason: HOSPADM

## 2025-06-04 RX ORDER — POTASSIUM CHLORIDE 7.45 MG/ML
10 INJECTION INTRAVENOUS PRN
Status: DISCONTINUED | OUTPATIENT
Start: 2025-06-04 | End: 2025-06-05 | Stop reason: HOSPADM

## 2025-06-04 RX ORDER — ONDANSETRON 4 MG/1
4 TABLET, ORALLY DISINTEGRATING ORAL EVERY 8 HOURS PRN
Status: DISCONTINUED | OUTPATIENT
Start: 2025-06-04 | End: 2025-06-05 | Stop reason: HOSPADM

## 2025-06-04 RX ORDER — QUETIAPINE FUMARATE 100 MG/1
100 TABLET, FILM COATED ORAL NIGHTLY
Status: DISCONTINUED | OUTPATIENT
Start: 2025-06-04 | End: 2025-06-05 | Stop reason: HOSPADM

## 2025-06-04 RX ORDER — SODIUM CHLORIDE 9 MG/ML
INJECTION, SOLUTION INTRAVENOUS PRN
Status: DISCONTINUED | OUTPATIENT
Start: 2025-06-04 | End: 2025-06-05 | Stop reason: HOSPADM

## 2025-06-04 RX ORDER — SODIUM CHLORIDE 0.9 % (FLUSH) 0.9 %
5-40 SYRINGE (ML) INJECTION EVERY 12 HOURS SCHEDULED
Status: DISCONTINUED | OUTPATIENT
Start: 2025-06-04 | End: 2025-06-05 | Stop reason: HOSPADM

## 2025-06-04 RX ORDER — MAGNESIUM SULFATE IN WATER 40 MG/ML
2000 INJECTION, SOLUTION INTRAVENOUS PRN
Status: DISCONTINUED | OUTPATIENT
Start: 2025-06-04 | End: 2025-06-05 | Stop reason: HOSPADM

## 2025-06-04 RX ORDER — ACETAMINOPHEN 650 MG/1
650 SUPPOSITORY RECTAL EVERY 6 HOURS PRN
Status: DISCONTINUED | OUTPATIENT
Start: 2025-06-04 | End: 2025-06-05 | Stop reason: HOSPADM

## 2025-06-04 RX ORDER — POTASSIUM CHLORIDE 1500 MG/1
40 TABLET, EXTENDED RELEASE ORAL PRN
Status: DISCONTINUED | OUTPATIENT
Start: 2025-06-04 | End: 2025-06-05 | Stop reason: HOSPADM

## 2025-06-04 RX ORDER — GABAPENTIN 300 MG/1
300 CAPSULE ORAL NIGHTLY
Status: DISCONTINUED | OUTPATIENT
Start: 2025-06-04 | End: 2025-06-05 | Stop reason: HOSPADM

## 2025-06-04 RX ORDER — DILTIAZEM HYDROCHLORIDE 120 MG/1
120 CAPSULE, COATED, EXTENDED RELEASE ORAL DAILY
Status: DISCONTINUED | OUTPATIENT
Start: 2025-06-04 | End: 2025-06-05 | Stop reason: HOSPADM

## 2025-06-04 RX ORDER — ACETAMINOPHEN 325 MG/1
650 TABLET ORAL EVERY 6 HOURS PRN
Status: DISCONTINUED | OUTPATIENT
Start: 2025-06-04 | End: 2025-06-05 | Stop reason: HOSPADM

## 2025-06-04 RX ORDER — POLYETHYLENE GLYCOL 3350 17 G/17G
17 POWDER, FOR SOLUTION ORAL DAILY PRN
Status: DISCONTINUED | OUTPATIENT
Start: 2025-06-04 | End: 2025-06-05 | Stop reason: HOSPADM

## 2025-06-04 RX ADMIN — OXYCODONE 5 MG: 5 TABLET ORAL at 22:17

## 2025-06-04 RX ADMIN — OXYCODONE AND ACETAMINOPHEN 1 TABLET: 10; 325 TABLET ORAL at 16:42

## 2025-06-04 RX ADMIN — GABAPENTIN 300 MG: 300 CAPSULE ORAL at 22:17

## 2025-06-04 RX ADMIN — SILVER NITRATE 1 EACH: 38.21; 12.74 STICK TOPICAL at 18:10

## 2025-06-04 RX ADMIN — QUETIAPINE FUMARATE 100 MG: 100 TABLET ORAL at 22:17

## 2025-06-04 ASSESSMENT — PAIN DESCRIPTION - LOCATION
LOCATION: LEG
LOCATION: LEG

## 2025-06-04 ASSESSMENT — PAIN - FUNCTIONAL ASSESSMENT
PAIN_FUNCTIONAL_ASSESSMENT: 0-10
PAIN_FUNCTIONAL_ASSESSMENT: PREVENTS OR INTERFERES SOME ACTIVE ACTIVITIES AND ADLS

## 2025-06-04 ASSESSMENT — PAIN DESCRIPTION - ONSET: ONSET: ON-GOING

## 2025-06-04 ASSESSMENT — PAIN DESCRIPTION - ORIENTATION
ORIENTATION: RIGHT
ORIENTATION: RIGHT

## 2025-06-04 ASSESSMENT — PAIN DESCRIPTION - DESCRIPTORS
DESCRIPTORS: ACHING
DESCRIPTORS: THROBBING

## 2025-06-04 ASSESSMENT — PAIN DESCRIPTION - FREQUENCY: FREQUENCY: INTERMITTENT

## 2025-06-04 ASSESSMENT — PAIN DESCRIPTION - PAIN TYPE: TYPE: ACUTE PAIN

## 2025-06-04 ASSESSMENT — PAIN SCALES - GENERAL
PAINLEVEL_OUTOF10: 7
PAINLEVEL_OUTOF10: 5
PAINLEVEL_OUTOF10: 7

## 2025-06-04 NOTE — ED PROVIDER NOTES
Education --       Exclude from Growth Chart --               Physical Exam  Vitals reviewed.   Constitutional:       General: He is not in acute distress.     Appearance: Normal appearance. He is not toxic-appearing.   HENT:      Head: Normocephalic and atraumatic.   Eyes:      Pupils: Pupils are equal, round, and reactive to light.   Cardiovascular:      Rate and Rhythm: Normal rate and regular rhythm.   Pulmonary:      Effort: Pulmonary effort is normal. No respiratory distress.   Musculoskeletal:         General: No deformity.   Skin:     Comments: Right BKA, circular area of skin breakdown consistent with a pressure wound.  No surrounding erythema, no foul smell, no discharge.  There is brisk venous ooze.  Direct pressure controls  bleeding   Neurological:      Mental Status: He is alert and oriented to person, place, and time. Mental status is at baseline.          DIAGNOSTIC RESULTS   LABS:     Recent Results (from the past 24 hours)   CBC with Auto Differential    Collection Time: 06/04/25  4:47 PM   Result Value Ref Range    WBC 5.9 4.1 - 11.1 K/uL    RBC 3.71 (L) 4.10 - 5.70 M/uL    Hemoglobin 10.9 (L) 12.1 - 17.0 g/dL    Hematocrit 33.5 (L) 36.6 - 50.3 %    MCV 90.3 80.0 - 99.0 FL    MCH 29.4 26.0 - 34.0 PG    MCHC 32.5 30.0 - 36.5 g/dL    RDW 13.0 11.5 - 14.5 %    Platelets 154 150 - 400 K/uL    MPV 10.2 8.9 - 12.9 FL    Nucleated RBCs 0.0 0  WBC    nRBC 0.00 0.00 - 0.01 K/uL    Neutrophils % 65.5 32.0 - 75.0 %    Lymphocytes % 21.9 12.0 - 49.0 %    Monocytes % 6.6 5.0 - 13.0 %    Eosinophils % 4.4 0.0 - 7.0 %    Basophils % 1.3 (H) 0.0 - 1.0 %    Immature Granulocytes % 0.3 0.0 - 0.5 %    Neutrophils Absolute 3.89 1.80 - 8.00 K/UL    Lymphocytes Absolute 1.30 0.80 - 3.50 K/UL    Monocytes Absolute 0.39 0.00 - 1.00 K/UL    Eosinophils Absolute 0.26 0.00 - 0.40 K/UL    Basophils Absolute 0.08 0.00 - 0.10 K/UL    Immature Granulocytes Absolute 0.02 0.00 - 0.04 K/UL    Differential Type AUTOMATED       Narcotic dependence, episodic use (HCC) 11/3/2011    Non compliance with medical treatment 11/3/2011    Other ill-defined conditions(799.89)     chronic low back pain    Psychiatric disorder     bipolar    Sleep disorder     Substance abuse (HCC)     Suicidal thoughts        Social Determinants affecting Dx or Tx: Patient lacks support at home or lives alone.    Records Reviewed (source and summary of external notes): Old Medical Records, Nursing Notes, Prior ED/Hospital Visits    CC/HPI Summary, DDx, ED Course, and Reassessment:   MDM  60-year-old male with multiple comorbid conditions who presents for bleeding from his right stump, BKA.  Wound hemostatic with direct pressure.  No arterial bleeding.  No exposed bone.  No secondary signs of infection.  Will dress it with a hemostatic gauze and pressure dressing.  Will reassess for hemostasis.  Will speak with vascular surgery for recommendations regarding hemostasis and wound management.        ED Course as of 06/04/25 1924 Wed Jun 04, 2025   1639 Quick clot applied with a pressure dressing over the right BKA wound. [DT]   1650 Spoke with Vasc Surgery, Dr. Perez, recommended pressure dressing, hemostasis and the patient can follow-up in the clinic. [DT]      ED Course User Index  [DT] Duglas Martinez MD     720pm  Bleeding has improved but not fully stopped.  Bleeding stopped with direct pressure.  Unable to get the wound to have complete hemostasis.  Redressed with a pressure dressing and quick clot gauze.  This has achieved good hemostasis but the patient is unable to put his prosthetic leg back on.  This would cause further bleeding.  We discussed that his INR is subtherapeutic and may need to have this adjusted.  After discussion with him and his sisters will speak with hospital medicine regarding admission for evaluation of the wound tomorrow.  Until the wound is reached hemostasis and he can obtain either a sleeve or different prosthetic leg to help

## 2025-06-04 NOTE — ED NOTES
Verbal shift change report given to Cassy (oncoming nurse) by Yoana (offgoing nurse). Report included the following information Nurse Handoff Report, ED Encounter Summary, ED SBAR, Adult Overview, Intake/Output, MAR, Recent Results, and Neuro Assessment.

## 2025-06-04 NOTE — H&P
Hospitalist Admission Note    NAME:  Keaton Van   :  1965   MRN:  [id number]     Date/Time:  2025 7:57 PM    Patient PCP: Robert Wells PA-C    ______________________________________________________________________  Given the patient's current clinical presentation, I have a high level of concern for decompensation if discharged from the emergency department.  Complex decision making was performed, which includes reviewing the patient's available past medical records, laboratory results, and x-ray films.       My assessment of this patient's clinical condition and my plan of care is as follows.    Assessment / Plan:    Active Problems:  Bleeding from right BKA stump  Malfunctioning left AV fistula  ESRD on  IHD  - Right chest hemodialysis catheter  Anemia of chronic disease, improved from prior  Atrial fibrillation on warfarin  Subtherapeutic INR  Essential hypertension  Diabetes mellitus   MDD, YUE, insomnia  Peripheral neuropathy  Tobacco abuse  GERD    Plan:  Bleeding from right BKA stump  Immature left AV fistula  Admit to observation  Trend hemoglobin  - Transfuse to maintain hemoglobin greater than 7.0  Vascular surgery consulted in ED, greatly appreciate their expertise  - Planned for patient evaluation of left AV fistula tomorrow  PT/OT consulted    Predominant reason patient is remaining in hospital is because he cannot balance on crutches and is unable to utilize his prosthetic given bleeding wound.  Will need to see prosthetists for adjustment.  Currently resides with his sisters both which have multiple stairs in his house which he would not be able to manage.  May need placement if something cannot be figured out.    ESRD on  IHD  - Right chest hemodialysis catheter  Nephrology consulted, greatly appreciate their expertise    Anemia of chronic disease, improved from prior  Trend hemoglobin  Transfuse to maintain hemoglobin greater than

## 2025-06-05 VITALS
OXYGEN SATURATION: 98 % | SYSTOLIC BLOOD PRESSURE: 175 MMHG | WEIGHT: 190 LBS | DIASTOLIC BLOOD PRESSURE: 85 MMHG | HEART RATE: 84 BPM | RESPIRATION RATE: 15 BRPM | BODY MASS INDEX: 25.18 KG/M2 | TEMPERATURE: 97.7 F | HEIGHT: 73 IN

## 2025-06-05 LAB
ANION GAP SERPL CALC-SCNC: 4 MMOL/L (ref 2–12)
BASOPHILS # BLD: 0.07 K/UL (ref 0–0.1)
BASOPHILS NFR BLD: 1.6 % (ref 0–1)
BUN SERPL-MCNC: 33 MG/DL (ref 6–20)
BUN/CREAT SERPL: 11 (ref 12–20)
CALCIUM SERPL-MCNC: 8.4 MG/DL (ref 8.5–10.1)
CHLORIDE SERPL-SCNC: 104 MMOL/L (ref 97–108)
CO2 SERPL-SCNC: 29 MMOL/L (ref 21–32)
CREAT SERPL-MCNC: 2.89 MG/DL (ref 0.7–1.3)
DIFFERENTIAL METHOD BLD: ABNORMAL
EOSINOPHIL # BLD: 0.26 K/UL (ref 0–0.4)
EOSINOPHIL NFR BLD: 6 % (ref 0–7)
ERYTHROCYTE [DISTWIDTH] IN BLOOD BY AUTOMATED COUNT: 12.9 % (ref 11.5–14.5)
GLUCOSE SERPL-MCNC: 125 MG/DL (ref 65–100)
HBV SURFACE AB SER QL: NONREACTIVE
HBV SURFACE AB SER-ACNC: <3.1 MIU/ML
HBV SURFACE AG SER QL: <0.1 INDEX
HBV SURFACE AG SER QL: NEGATIVE
HCT VFR BLD AUTO: 32 % (ref 36.6–50.3)
HGB BLD-MCNC: 10.6 G/DL (ref 12.1–17)
IMM GRANULOCYTES # BLD AUTO: 0.01 K/UL (ref 0–0.04)
IMM GRANULOCYTES NFR BLD AUTO: 0.2 % (ref 0–0.5)
LYMPHOCYTES # BLD: 1.21 K/UL (ref 0.8–3.5)
LYMPHOCYTES NFR BLD: 27.9 % (ref 12–49)
MCH RBC QN AUTO: 29.8 PG (ref 26–34)
MCHC RBC AUTO-ENTMCNC: 33.1 G/DL (ref 30–36.5)
MCV RBC AUTO: 89.9 FL (ref 80–99)
MONOCYTES # BLD: 0.36 K/UL (ref 0–1)
MONOCYTES NFR BLD: 8.3 % (ref 5–13)
NEUTS SEG # BLD: 2.42 K/UL (ref 1.8–8)
NEUTS SEG NFR BLD: 56 % (ref 32–75)
NRBC # BLD: 0 K/UL (ref 0–0.01)
NRBC BLD-RTO: 0 PER 100 WBC
PLATELET # BLD AUTO: 127 K/UL (ref 150–400)
PMV BLD AUTO: 10 FL (ref 8.9–12.9)
POTASSIUM SERPL-SCNC: 3.7 MMOL/L (ref 3.5–5.1)
RBC # BLD AUTO: 3.56 M/UL (ref 4.1–5.7)
SODIUM SERPL-SCNC: 137 MMOL/L (ref 136–145)
WBC # BLD AUTO: 4.3 K/UL (ref 4.1–11.1)

## 2025-06-05 PROCEDURE — 87340 HEPATITIS B SURFACE AG IA: CPT

## 2025-06-05 PROCEDURE — 97165 OT EVAL LOW COMPLEX 30 MIN: CPT

## 2025-06-05 PROCEDURE — G0378 HOSPITAL OBSERVATION PER HR: HCPCS

## 2025-06-05 PROCEDURE — 86706 HEP B SURFACE ANTIBODY: CPT

## 2025-06-05 PROCEDURE — 80048 BASIC METABOLIC PNL TOTAL CA: CPT

## 2025-06-05 PROCEDURE — 2500000003 HC RX 250 WO HCPCS: Performed by: STUDENT IN AN ORGANIZED HEALTH CARE EDUCATION/TRAINING PROGRAM

## 2025-06-05 PROCEDURE — 85025 COMPLETE CBC W/AUTO DIFF WBC: CPT

## 2025-06-05 PROCEDURE — 97161 PT EVAL LOW COMPLEX 20 MIN: CPT | Performed by: PHYSICAL THERAPIST

## 2025-06-05 PROCEDURE — 6370000000 HC RX 637 (ALT 250 FOR IP): Performed by: STUDENT IN AN ORGANIZED HEALTH CARE EDUCATION/TRAINING PROGRAM

## 2025-06-05 PROCEDURE — 36415 COLL VENOUS BLD VENIPUNCTURE: CPT

## 2025-06-05 PROCEDURE — 6370000000 HC RX 637 (ALT 250 FOR IP)

## 2025-06-05 RX ADMIN — SODIUM CHLORIDE, PRESERVATIVE FREE 10 ML: 5 INJECTION INTRAVENOUS at 09:22

## 2025-06-05 RX ADMIN — OXYCODONE 5 MG: 5 TABLET ORAL at 09:27

## 2025-06-05 RX ADMIN — DILTIAZEM HYDROCHLORIDE 120 MG: 120 CAPSULE, EXTENDED RELEASE ORAL at 09:19

## 2025-06-05 ASSESSMENT — ENCOUNTER SYMPTOMS
COLOR CHANGE: 1
NAUSEA: 0
VOMITING: 0
COUGH: 0
SHORTNESS OF BREATH: 0

## 2025-06-05 ASSESSMENT — PAIN SCALES - GENERAL: PAINLEVEL_OUTOF10: 5

## 2025-06-05 NOTE — FLOWSHEET NOTE
End of Shift Note    Bedside shift change report given to Abida      (oncoming nurse) by Danica Wilson RN (offgoing nurse).  Report included the following information SBAR, Kardex, and MAR    Shift worked:  7p-7a     Shift summary and any significant changes:     Pt Aox4, no bleeding noted to dressing.  VSS, prn pain medication given per order, see MAR.      Concerns for physician to address:  none           Activity:     Number times ambulated in hallways past shift: 0  Number of times OOB to chair past shift: 0    Cardiac:   Cardiac Monitoring: No           Access:  Current line(s): PIV     Genitourinary:   Urinary Status: Voiding, Oliguria    Respiratory:   O2 Device: None (Room air)  Chronic home O2 use?: NO  Incentive spirometer at bedside: NO    GI:  Last BM (including prior to admit): 06/04/25  Current diet:  Diet NPO Exceptions are: Ice Chips, Sips of Water with Meds  Passing flatus: YES    Pain Management:   Patient states pain is manageable on current regimen: YES    Skin:  Kennedy Scale Score: 20  Interventions: Wound Offloading (Prevention Methods): Pillows, Repositioning    Patient Safety:  Fall Risk:    Fall Risk Interventions  Toilet Every 2 Hours-In Advance of Need: Yes  Hourly Visual Checks: Eyes closed    Active Consults:   IP CONSULT TO NEPHROLOGY  IP CONSULT TO VASCULAR SURGERY  IP CONSULT TO CASE MANAGEMENT    Length of Stay:  Expected LOS: 2  Actual LOS: 0    Danica Wilson RN

## 2025-06-05 NOTE — PROGRESS NOTES
Pt in a hurry to leave so he can attend his 11am appointment.  Pt given d/c instructions and vascular wrapped his wound.  Pt in NAD.

## 2025-06-05 NOTE — CONSULTS
Vascular Surgery Consult Note  6/5/2025    Subjective:     Mr. Keaton Van is a 60 y.o. male with a pmhx significant for diabetes mellitus, hypertension, heart failure, atrial fibrillation, end-stage renal disease, bipolar disorder, hepatitis C, and polysubstance abuse.  He is a current smoker.  He is taking warfarin.  His podiatrist is Dr. Michael Gerber.       He is admitted to the hospital with a bleeding stump wound.  He is s/p right BKA on 7/16/24 secondary to a non-healing diabetic wound.  He has a persistent stump wound with intermittently bleeding. Quick clot was applied.  This am he is bleeding has resolved.     He has a history of creation of a percutaneous AVF on 11/25/24.  It is non-functional.  Plan was for evaluation at the [facility] access center today at 11:00 am.  He has a palpable thrill.       Past medical history  Insulin dependent diabetes mellitus   Hypertension  Heart failure w/ preserved EF  Atrial fibrillation  End stage renal failure   Dialysis dependent MWF  -via chest wall catheter   Anemia of chronic disease   Gastroesophageal reflux disease   Bipolar disorder  Personality disorder   -w/ hx of suicidal ideation   Chronic hepatitis C  Polysubstance abuse   -including tobacco, opioids   -w/ hx of overdose   DDD of the lumbar spine  Chronic pain syndrome   Renal calculi     Past procedural history  Creation of percutaneous AVF 11/25/24  Right partial fifth ray amputation   Right below the knee amputation 7/16/24  Right hand surgery 8/2018  Left knee arthroplasty  Cardiac stenting   Lithotripsy      Family history   Father: Hypertension and heart disease  Mother: Hypertension and stroke      Social history  He is a current smoker  He has a hx of opioid abuse and overdose  He denies alcohol use.       Home medication   gabapentin (NEURONTIN) 300 MG capsule Take 1 capsule by mouth nightly for 30 days. Max Daily Amount: 300 mg   warfarin (COUMADIN) 2 MG tablet Take 3 tablets by mouth daily 
  INR 1.1      No results for input(s): \"TIBC\" in the last 72 hours.    Invalid input(s): \"FE\", \"PSAT\", \"FERR\"   No results for input(s): \"PH\", \"PCO2\", \"PO2\" in the last 72 hours.  No results for input(s): \"CPK\", \"CKMB\", \"TROPONINI\" in the last 72 hours.  No results found for: \"GLUCPOC\"    Procedures: see electronic medical records for all procedures/Xrays and details which were not copied into this note but were reviewed prior to creation of Plan.    ________________________________________________________________________       ___________________________________________________  Consulting Physician: Rolando Adrian MD

## 2025-06-05 NOTE — PLAN OF CARE
Problem: Physical Therapy - Adult  Goal: By Discharge: Performs mobility at highest level of function for planned discharge setting.  See evaluation for individualized goals.  Description: FUNCTIONAL STATUS PRIOR TO ADMISSION: Patient was independent and active without use of DME.  Had been ambulatory with his prosthesis and no device at home per patient.      HOME SUPPORT PRIOR TO ADMISSION: The patient lived with sister who assisted him as needed.    Physical Therapy Goals  Initiated 6/5/2025  1.  Patient will move from supine to sit and sit to supine in bed with modified independence within 7 day(s).    2.  Patient will perform sit to stand with modified independence within 7 day(s).  3.  Patient will transfer from bed to chair and chair to bed with modified independence using the least restrictive device within 7 day(s).  4.  Patient will ambulate with modified independence for 50 feet with the least restrictive device within 7 day(s).     Outcome: Progressing   PHYSICAL THERAPY EVALUATION    Patient: Keaton Van (60 y.o. male)  Date: 6/5/2025  Primary Diagnosis: Bleeding [R58]  Bleeding from wound [T14.8XXA]       Precautions: Restrictions/Precautions  Restrictions/Precautions: Fall Risk            ASSESSMENT :   DEFICITS/IMPAIRMENTS:   The patient presents with functional mobility below functional baseline.  Currently unable to wear prosthesis secondary to wound and opening of the wound.  He is sitting EOB and able to demo CGA-SBA for stand pivot transfers to the commode and is able to hop a short distance with RW and CGA with no overt LOB but slow shahram.  Plans for patient to DC home today but if he stays will continue to follow.  Patient does have some decreased insight into his deficits and does have a risk for falls as he is unable to wear prosthesis.    Patient will benefit from skilled intervention to address the above impairments.         PLAN :  Recommendations and Planned Interventions:   bed  7/25/2024 Maria Isabel Pelayo APRN - NP [facility] RAD ANGIO IR    IR INSERT TUNNELED CV CATH WO SQ PORT/PUMP < 5YRS  5/6/2025    IR INSERT TUNNELED CV CATH WO SQ PORT/PUMP < 5YRS 5/6/2025 [facility] RAD ANGIO IR    IR NONTUNNELED VASCULAR CATHETER > 5 YEARS  07/23/2024    IR NONTUNNELED VASCULAR CATHETER 7/23/2024 German Rdz MD [facility] RAD ANGIO IR    IR NONTUNNELED VASCULAR CATHETER > 5 YEARS  4/27/2025    IR NONTUNNELED VASCULAR CATHETER > 5 YEARS 4/27/2025 [facility] RAD ANGIO IR    IR TUNNELED CVC PLACE WO SQ PORT/PUMP > 5 YEARS  12/24/2024    IR TUNNELED CVC PLACE WO SQ PORT/PUMP > 5 YEARS 12/24/2024 Ricky Dumont MD [facility] RAD ANGIO IR    LAMINECTOMY  07/1999    LEG AMPUTATION BELOW KNEE Right 07/16/2024    RIGHT BELOW KNEE LEG AMPUTATION performed by James Schaeffer MD at [facility] MAIN OR    ORTHOPEDIC SURGERY  08/2018    right hand    ORTHOPEDIC SURGERY      left knee arthroscopy    ORTHOPEDIC SURGERY      chronic back pain    VT UNLISTED PROCEDURE CARDIAC SURGERY      cardiac stents X2 lad drug eluting    REMV KIDNEY,COMPLICATED  2006    side unknown - kidney stones    TOE AMPUTATION Right 06/01/2024    RIGHT PARTIAL FIFTH RAY AMPUTATION performed by Michael Jama DPM at [facility] MAIN OR    UPPER GI ENDOSCOPY,BIOPSY  8/31/2016            Home Situation:  Social/Functional History  Lives With: Family  Type of Home: House  Home Layout: Two level  Home Access: Stairs to enter with rails  Entrance Stairs - Number of Steps: 4  Bathroom Shower/Tub: Tub/Shower unit  Home Equipment: Walker - Rolling    Cognitive/Behavioral Status:  Orientation  Orientation Level: Oriented X4           Strength:    Strength: Generally decreased, functional    Tone & Sensation:   Tone: Normal  Sensation: Intact    Coordination:  Coordination: Within functional limits    Range Of Motion:  AROM: Generally decreased, functional  PROM: Generally decreased, functional    Functional Mobility:  Bed Mobility:        Transfers:     Transfer Training  Transfer

## 2025-06-05 NOTE — CONSENT
Informed Consent for Blood Component Transfusion Note    I have discussed with the patient the rationale for blood component transfusion; its benefits in treating or preventing fatigue, organ damage, or death; and its risk which includes mild transfusion reactions, rare risk of blood borne infection, or more serious but rare reactions. I have discussed the alternatives to transfusion, including the risk and consequences of not receiving transfusion. The patient had an opportunity to ask questions and had agreed to proceed with transfusion of blood components.    Electronically signed by Zeke Franklin DO on 6/4/25 at 8:20 PM EDT    Blood consent obtained preemptively-no plans for transfusion at present

## 2025-06-05 NOTE — ED NOTES
TRANSFER - OUT REPORT:    Verbal report given to Danica RN on Keaton Van  being transferred to 1112 for routine progression of patient care       Report consisted of patient's Situation, Background, Assessment and   Recommendations(SBAR).     Information from the following report(s) Nurse Handoff Report, Index, ED Encounter Summary, ED SBAR, Adult Overview, MAR, and Recent Results was reviewed with the receiving nurse.    [place] Fall Assessment:    Presents to emergency department  because of falls (Syncope, seizure, or loss of consciousness): No  Age > 70: No  Altered Mental Status, Intoxication with alcohol or substance confusion (Disorientation, impaired judgment, poor safety awaremess, or inability to follow instructions): No  Impaired Mobility: Ambulates or transfers with assistive devices or assistance; Unable to ambulate or transer.: Yes  Nursing Judgement: Yes          Lines:   Hemodialysis Central Access - Permanent/Tunneled Right Neck (Active)        Opportunity for questions and clarification was provided.      Patient transported with:  Tech
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
## 2025-06-09 NOTE — DISCHARGE SUMMARY
Physician Discharge Summary     Pt Name  Keaton Van   Admit date:  6/4/2025   Discharge date and time:  6/5/2025    Room Number  1112/01    [facility]    Medical Record Number  [id number]    Age  60 y.o.   Date of Birth 1965   PCP Robert Wells PA-C   Admission Diagnoses: Bleeding   Present on Admission:   Bleeding     Allergies   Allergen Reactions    Midazolam Shortness Of Breath     Other reaction(s): Unknown (comments)  Numbness, with shortness of breath  Weakness            Excerpt from HPI :     Keaton Van is a 60 y.o.  male with PMHx as listed below presenting to emergency department after experiencing hemorrhage from right BKA site.  Patient with small wound which appears secondary to friction/abrasion without evidence of erythema or infection.  Patient's status post BKA (Dr. Schaeffer 7/2024) and dependent on prosthetic to ambulate.  Patient reports heavy bleeding prior to presentation stating there was 1 inch of blood and a 5 gallon bucket as well as feeling his prosthetic sleeve.  He was recently started on warfarin for new onset atrial fibrillation-patient electing for warfarin use due to DOAC cost.  He reports he is unable to balance on crutches.  Currently lives with his sisters both of which have houses with multiple stairs which he does not feel he could manage.  ROS otherwise negative.  Currently smoking 1 pack/day.  Denies alcohol or illicit drug use.  Is very anxious about staying in the hospital reporting that he has an appointment with Dr. Schaeffer to evaluate his left AV fistula which has not yet matured/been cleared for use and hemodialysis currently receiving via right chest wall catheter.     In the ED, patient afebrile and hemodynamically stable (hypertensive 140s/70s), saturating upper 90s on room air.  Labs demonstrate: WBC 5.9, hemoglobin 10.9 (improved from prior), platelets 154, INR 1.1, sodium 136, potassium 3.9, glucose 137, BUN 22, creatinine 2.20, LFTs

## 2025-06-16 ENCOUNTER — APPOINTMENT (OUTPATIENT)
Facility: HOSPITAL | Age: 60
DRG: 463 | End: 2025-06-16
Payer: MEDICARE

## 2025-06-16 ENCOUNTER — HOSPITAL ENCOUNTER (INPATIENT)
Facility: HOSPITAL | Age: 60
LOS: 12 days | Discharge: HOSPICE | DRG: 463 | End: 2025-06-28
Attending: EMERGENCY MEDICINE | Admitting: STUDENT IN AN ORGANIZED HEALTH CARE EDUCATION/TRAINING PROGRAM
Payer: MEDICARE

## 2025-06-16 DIAGNOSIS — M86.161: ICD-10-CM

## 2025-06-16 DIAGNOSIS — I16.0 HYPERTENSIVE URGENCY: ICD-10-CM

## 2025-06-16 DIAGNOSIS — M86.161 ACUTE OSTEOMYELITIS OF RIGHT LOWER LEG (HCC): Primary | ICD-10-CM

## 2025-06-16 DIAGNOSIS — F41.9 ANXIETY: ICD-10-CM

## 2025-06-16 DIAGNOSIS — S88.111S BELOW-KNEE AMPUTATION OF RIGHT LOWER EXTREMITY, SEQUELA: ICD-10-CM

## 2025-06-16 LAB
ALBUMIN SERPL-MCNC: 3.2 G/DL (ref 3.5–5)
ALBUMIN/GLOB SERPL: 0.8 (ref 1.1–2.2)
ALP SERPL-CCNC: 123 U/L (ref 45–117)
ALT SERPL-CCNC: 19 U/L (ref 12–78)
ANION GAP SERPL CALC-SCNC: 4 MMOL/L (ref 2–12)
AST SERPL-CCNC: 16 U/L (ref 15–37)
BASOPHILS # BLD: 0.07 K/UL (ref 0–0.1)
BASOPHILS NFR BLD: 1.4 % (ref 0–1)
BILIRUB SERPL-MCNC: 0.3 MG/DL (ref 0.2–1)
BUN SERPL-MCNC: 50 MG/DL (ref 6–20)
BUN/CREAT SERPL: 15 (ref 12–20)
CALCIUM SERPL-MCNC: 8.8 MG/DL (ref 8.5–10.1)
CHLORIDE SERPL-SCNC: 108 MMOL/L (ref 97–108)
CO2 SERPL-SCNC: 26 MMOL/L (ref 21–32)
CREAT SERPL-MCNC: 3.3 MG/DL (ref 0.7–1.3)
DIFFERENTIAL METHOD BLD: ABNORMAL
EOSINOPHIL # BLD: 0.23 K/UL (ref 0–0.4)
EOSINOPHIL NFR BLD: 4.5 % (ref 0–7)
ERYTHROCYTE [DISTWIDTH] IN BLOOD BY AUTOMATED COUNT: 13 % (ref 11.5–14.5)
ERYTHROCYTE [SEDIMENTATION RATE] IN BLOOD: 64 MM/HR (ref 0–20)
GLOBULIN SER CALC-MCNC: 4 G/DL (ref 2–4)
GLUCOSE SERPL-MCNC: 135 MG/DL (ref 65–100)
HCT VFR BLD AUTO: 30.3 % (ref 36.6–50.3)
HGB BLD-MCNC: 10 G/DL (ref 12.1–17)
IMM GRANULOCYTES # BLD AUTO: 0.02 K/UL (ref 0–0.04)
IMM GRANULOCYTES NFR BLD AUTO: 0.4 % (ref 0–0.5)
INR PPP: 1.1 (ref 0.9–1.1)
LYMPHOCYTES # BLD: 0.85 K/UL (ref 0.8–3.5)
LYMPHOCYTES NFR BLD: 16.7 % (ref 12–49)
MCH RBC QN AUTO: 29.5 PG (ref 26–34)
MCHC RBC AUTO-ENTMCNC: 33 G/DL (ref 30–36.5)
MCV RBC AUTO: 89.4 FL (ref 80–99)
MONOCYTES # BLD: 0.35 K/UL (ref 0–1)
MONOCYTES NFR BLD: 6.9 % (ref 5–13)
NEUTS SEG # BLD: 3.58 K/UL (ref 1.8–8)
NEUTS SEG NFR BLD: 70.1 % (ref 32–75)
NRBC # BLD: 0 K/UL (ref 0–0.01)
NRBC BLD-RTO: 0 PER 100 WBC
PLATELET # BLD AUTO: 164 K/UL (ref 150–400)
PMV BLD AUTO: 10.1 FL (ref 8.9–12.9)
POTASSIUM SERPL-SCNC: 5.1 MMOL/L (ref 3.5–5.1)
PROT SERPL-MCNC: 7.2 G/DL (ref 6.4–8.2)
PROTHROMBIN TIME: 11.1 SEC (ref 9.2–11.2)
RBC # BLD AUTO: 3.39 M/UL (ref 4.1–5.7)
SODIUM SERPL-SCNC: 138 MMOL/L (ref 136–145)
TROPONIN I SERPL HS-MCNC: 9 NG/L (ref 0–76)
WBC # BLD AUTO: 5.1 K/UL (ref 4.1–11.1)

## 2025-06-16 PROCEDURE — 96368 THER/DIAG CONCURRENT INF: CPT

## 2025-06-16 PROCEDURE — 85610 PROTHROMBIN TIME: CPT

## 2025-06-16 PROCEDURE — 6360000002 HC RX W HCPCS: Performed by: STUDENT IN AN ORGANIZED HEALTH CARE EDUCATION/TRAINING PROGRAM

## 2025-06-16 PROCEDURE — 2500000003 HC RX 250 WO HCPCS: Performed by: STUDENT IN AN ORGANIZED HEALTH CARE EDUCATION/TRAINING PROGRAM

## 2025-06-16 PROCEDURE — 1100000000 HC RM PRIVATE

## 2025-06-16 PROCEDURE — 87040 BLOOD CULTURE FOR BACTERIA: CPT

## 2025-06-16 PROCEDURE — 96365 THER/PROPH/DIAG IV INF INIT: CPT

## 2025-06-16 PROCEDURE — 6370000000 HC RX 637 (ALT 250 FOR IP): Performed by: STUDENT IN AN ORGANIZED HEALTH CARE EDUCATION/TRAINING PROGRAM

## 2025-06-16 PROCEDURE — 73562 X-RAY EXAM OF KNEE 3: CPT

## 2025-06-16 PROCEDURE — 84484 ASSAY OF TROPONIN QUANT: CPT

## 2025-06-16 PROCEDURE — 99285 EMERGENCY DEPT VISIT HI MDM: CPT

## 2025-06-16 PROCEDURE — 6360000002 HC RX W HCPCS: Performed by: EMERGENCY MEDICINE

## 2025-06-16 PROCEDURE — 96375 TX/PRO/DX INJ NEW DRUG ADDON: CPT

## 2025-06-16 PROCEDURE — 80053 COMPREHEN METABOLIC PANEL: CPT

## 2025-06-16 PROCEDURE — 85025 COMPLETE CBC W/AUTO DIFF WBC: CPT

## 2025-06-16 PROCEDURE — 2580000003 HC RX 258: Performed by: EMERGENCY MEDICINE

## 2025-06-16 PROCEDURE — 70450 CT HEAD/BRAIN W/O DYE: CPT

## 2025-06-16 PROCEDURE — 85652 RBC SED RATE AUTOMATED: CPT

## 2025-06-16 PROCEDURE — 86140 C-REACTIVE PROTEIN: CPT

## 2025-06-16 PROCEDURE — 36415 COLL VENOUS BLD VENIPUNCTURE: CPT

## 2025-06-16 PROCEDURE — 96366 THER/PROPH/DIAG IV INF ADDON: CPT

## 2025-06-16 PROCEDURE — 71045 X-RAY EXAM CHEST 1 VIEW: CPT

## 2025-06-16 RX ORDER — ACETAMINOPHEN 325 MG/1
650 TABLET ORAL EVERY 6 HOURS SCHEDULED
Status: DISCONTINUED | OUTPATIENT
Start: 2025-06-17 | End: 2025-06-27

## 2025-06-16 RX ORDER — ACETAMINOPHEN 325 MG/1
650 TABLET ORAL EVERY 6 HOURS PRN
Status: DISCONTINUED | OUTPATIENT
Start: 2025-06-16 | End: 2025-06-28 | Stop reason: HOSPADM

## 2025-06-16 RX ORDER — PANTOPRAZOLE SODIUM 40 MG/1
40 TABLET, DELAYED RELEASE ORAL
Status: DISCONTINUED | OUTPATIENT
Start: 2025-06-17 | End: 2025-06-28 | Stop reason: HOSPADM

## 2025-06-16 RX ORDER — LORAZEPAM 1 MG/1
1 TABLET ORAL EVERY 6 HOURS PRN
Status: DISCONTINUED | OUTPATIENT
Start: 2025-06-16 | End: 2025-06-24

## 2025-06-16 RX ORDER — OXYCODONE HYDROCHLORIDE 5 MG/1
5 TABLET ORAL EVERY 4 HOURS PRN
Status: DISCONTINUED | OUTPATIENT
Start: 2025-06-16 | End: 2025-06-17

## 2025-06-16 RX ORDER — ONDANSETRON 2 MG/ML
4 INJECTION INTRAMUSCULAR; INTRAVENOUS ONCE
Status: COMPLETED | OUTPATIENT
Start: 2025-06-16 | End: 2025-06-16

## 2025-06-16 RX ORDER — QUETIAPINE FUMARATE 100 MG/1
100 TABLET, FILM COATED ORAL NIGHTLY
Status: DISCONTINUED | OUTPATIENT
Start: 2025-06-16 | End: 2025-06-28 | Stop reason: HOSPADM

## 2025-06-16 RX ORDER — HEPARIN SODIUM 5000 [USP'U]/ML
5000 INJECTION, SOLUTION INTRAVENOUS; SUBCUTANEOUS EVERY 8 HOURS SCHEDULED
Status: DISCONTINUED | OUTPATIENT
Start: 2025-06-16 | End: 2025-06-16

## 2025-06-16 RX ORDER — SODIUM CHLORIDE 0.9 % (FLUSH) 0.9 %
5-40 SYRINGE (ML) INJECTION PRN
Status: DISCONTINUED | OUTPATIENT
Start: 2025-06-16 | End: 2025-06-27

## 2025-06-16 RX ORDER — INSULIN LISPRO 100 [IU]/ML
0-8 INJECTION, SOLUTION INTRAVENOUS; SUBCUTANEOUS
Status: DISCONTINUED | OUTPATIENT
Start: 2025-06-16 | End: 2025-06-27

## 2025-06-16 RX ORDER — HYDROMORPHONE HYDROCHLORIDE 1 MG/ML
0.5 INJECTION, SOLUTION INTRAMUSCULAR; INTRAVENOUS; SUBCUTANEOUS
Status: COMPLETED | OUTPATIENT
Start: 2025-06-16 | End: 2025-06-16

## 2025-06-16 RX ORDER — VANCOMYCIN 2 G/400ML
2000 INJECTION, SOLUTION INTRAVENOUS ONCE
Status: COMPLETED | OUTPATIENT
Start: 2025-06-16 | End: 2025-06-16

## 2025-06-16 RX ORDER — SODIUM CHLORIDE 9 MG/ML
INJECTION, SOLUTION INTRAVENOUS PRN
Status: DISCONTINUED | OUTPATIENT
Start: 2025-06-16 | End: 2025-06-27

## 2025-06-16 RX ORDER — POLYETHYLENE GLYCOL 3350 17 G/17G
17 POWDER, FOR SOLUTION ORAL DAILY PRN
Status: DISCONTINUED | OUTPATIENT
Start: 2025-06-16 | End: 2025-06-28 | Stop reason: HOSPADM

## 2025-06-16 RX ORDER — SODIUM CHLORIDE 0.9 % (FLUSH) 0.9 %
5-40 SYRINGE (ML) INJECTION EVERY 12 HOURS SCHEDULED
Status: DISCONTINUED | OUTPATIENT
Start: 2025-06-16 | End: 2025-06-18

## 2025-06-16 RX ORDER — HYDRALAZINE HYDROCHLORIDE 20 MG/ML
10 INJECTION INTRAMUSCULAR; INTRAVENOUS EVERY 4 HOURS PRN
Status: DISCONTINUED | OUTPATIENT
Start: 2025-06-16 | End: 2025-06-27

## 2025-06-16 RX ORDER — GABAPENTIN 300 MG/1
300 CAPSULE ORAL NIGHTLY
Status: DISCONTINUED | OUTPATIENT
Start: 2025-06-16 | End: 2025-06-22

## 2025-06-16 RX ORDER — GLUCAGON 1 MG/ML
1 KIT INJECTION PRN
Status: DISCONTINUED | OUTPATIENT
Start: 2025-06-16 | End: 2025-06-27

## 2025-06-16 RX ORDER — DILTIAZEM HYDROCHLORIDE 120 MG/1
120 CAPSULE, COATED, EXTENDED RELEASE ORAL DAILY
Status: DISCONTINUED | OUTPATIENT
Start: 2025-06-17 | End: 2025-06-27

## 2025-06-16 RX ORDER — ONDANSETRON 2 MG/ML
4 INJECTION INTRAMUSCULAR; INTRAVENOUS EVERY 6 HOURS PRN
Status: DISCONTINUED | OUTPATIENT
Start: 2025-06-16 | End: 2025-06-28 | Stop reason: HOSPADM

## 2025-06-16 RX ORDER — NICOTINE 21 MG/24HR
1 PATCH, TRANSDERMAL 24 HOURS TRANSDERMAL DAILY
Status: DISCONTINUED | OUTPATIENT
Start: 2025-06-16 | End: 2025-06-28 | Stop reason: HOSPADM

## 2025-06-16 RX ORDER — HYDROMORPHONE HYDROCHLORIDE 1 MG/ML
1 INJECTION, SOLUTION INTRAMUSCULAR; INTRAVENOUS; SUBCUTANEOUS ONCE
Status: COMPLETED | OUTPATIENT
Start: 2025-06-16 | End: 2025-06-16

## 2025-06-16 RX ORDER — ONDANSETRON 4 MG/1
4 TABLET, ORALLY DISINTEGRATING ORAL EVERY 8 HOURS PRN
Status: DISCONTINUED | OUTPATIENT
Start: 2025-06-16 | End: 2025-06-28 | Stop reason: HOSPADM

## 2025-06-16 RX ORDER — DEXTROSE MONOHYDRATE 100 MG/ML
INJECTION, SOLUTION INTRAVENOUS CONTINUOUS PRN
Status: DISCONTINUED | OUTPATIENT
Start: 2025-06-16 | End: 2025-06-27

## 2025-06-16 RX ORDER — ACETAMINOPHEN 650 MG/1
650 SUPPOSITORY RECTAL EVERY 6 HOURS PRN
Status: DISCONTINUED | OUTPATIENT
Start: 2025-06-16 | End: 2025-06-28 | Stop reason: HOSPADM

## 2025-06-16 RX ORDER — OXYCODONE HYDROCHLORIDE 5 MG/1
10 TABLET ORAL EVERY 4 HOURS PRN
Status: DISCONTINUED | OUTPATIENT
Start: 2025-06-16 | End: 2025-06-17

## 2025-06-16 RX ADMIN — VANCOMYCIN 2000 MG: 2 INJECTION, SOLUTION INTRAVENOUS at 20:08

## 2025-06-16 RX ADMIN — HYDROMORPHONE HYDROCHLORIDE 1 MG: 1 INJECTION, SOLUTION INTRAMUSCULAR; INTRAVENOUS; SUBCUTANEOUS at 22:05

## 2025-06-16 RX ADMIN — GABAPENTIN 300 MG: 300 CAPSULE ORAL at 22:05

## 2025-06-16 RX ADMIN — PIPERACILLIN AND TAZOBACTAM 4500 MG: 4; .5 INJECTION, POWDER, LYOPHILIZED, FOR SOLUTION INTRAVENOUS at 20:09

## 2025-06-16 RX ADMIN — SODIUM CHLORIDE, PRESERVATIVE FREE 10 ML: 5 INJECTION INTRAVENOUS at 22:09

## 2025-06-16 RX ADMIN — HYDRALAZINE HYDROCHLORIDE 10 MG: 20 INJECTION INTRAMUSCULAR; INTRAVENOUS at 22:49

## 2025-06-16 RX ADMIN — QUETIAPINE FUMARATE 100 MG: 100 TABLET ORAL at 22:05

## 2025-06-16 RX ADMIN — ONDANSETRON 4 MG: 2 INJECTION, SOLUTION INTRAMUSCULAR; INTRAVENOUS at 19:52

## 2025-06-16 RX ADMIN — HYDROMORPHONE HYDROCHLORIDE 0.5 MG: 1 INJECTION, SOLUTION INTRAMUSCULAR; INTRAVENOUS; SUBCUTANEOUS at 19:53

## 2025-06-16 ASSESSMENT — PAIN DESCRIPTION - LOCATION
LOCATION: LEG
LOCATION: LEG

## 2025-06-16 ASSESSMENT — PAIN DESCRIPTION - ORIENTATION
ORIENTATION: RIGHT
ORIENTATION: RIGHT

## 2025-06-16 ASSESSMENT — PAIN DESCRIPTION - DESCRIPTORS
DESCRIPTORS: SHARP
DESCRIPTORS: SHARP

## 2025-06-16 ASSESSMENT — PAIN SCALES - GENERAL
PAINLEVEL_OUTOF10: 10
PAINLEVEL_OUTOF10: 9
PAINLEVEL_OUTOF10: 8

## 2025-06-16 NOTE — ED NOTES
Pt wheeled to room & placed on cardiac monitor x3. Rolan INFANTE at bedside. Call bell within reach.

## 2025-06-17 LAB
ANION GAP SERPL CALC-SCNC: 5 MMOL/L (ref 2–12)
BASOPHILS # BLD: 0.06 K/UL (ref 0–0.1)
BASOPHILS NFR BLD: 1.8 % (ref 0–1)
BUN SERPL-MCNC: 53 MG/DL (ref 6–20)
BUN/CREAT SERPL: 16 (ref 12–20)
CALCIUM SERPL-MCNC: 8.7 MG/DL (ref 8.5–10.1)
CHLORIDE SERPL-SCNC: 109 MMOL/L (ref 97–108)
CO2 SERPL-SCNC: 23 MMOL/L (ref 21–32)
CREAT SERPL-MCNC: 3.28 MG/DL (ref 0.7–1.3)
CRP SERPL-MCNC: 7.49 MG/DL (ref 0–0.3)
DIFFERENTIAL METHOD BLD: ABNORMAL
EOSINOPHIL # BLD: 0.24 K/UL (ref 0–0.4)
EOSINOPHIL NFR BLD: 7 % (ref 0–7)
ERYTHROCYTE [DISTWIDTH] IN BLOOD BY AUTOMATED COUNT: 13.1 % (ref 11.5–14.5)
GLUCOSE BLD STRIP.AUTO-MCNC: 141 MG/DL (ref 65–117)
GLUCOSE BLD STRIP.AUTO-MCNC: 191 MG/DL (ref 65–117)
GLUCOSE BLD STRIP.AUTO-MCNC: 244 MG/DL (ref 65–117)
GLUCOSE BLD STRIP.AUTO-MCNC: 266 MG/DL (ref 65–117)
GLUCOSE SERPL-MCNC: 153 MG/DL (ref 65–100)
HCT VFR BLD AUTO: 26.2 % (ref 36.6–50.3)
HGB BLD-MCNC: 8.7 G/DL (ref 12.1–17)
IMM GRANULOCYTES # BLD AUTO: 0.01 K/UL (ref 0–0.04)
IMM GRANULOCYTES NFR BLD AUTO: 0.3 % (ref 0–0.5)
LYMPHOCYTES # BLD: 0.58 K/UL (ref 0.8–3.5)
LYMPHOCYTES NFR BLD: 17 % (ref 12–49)
MCH RBC QN AUTO: 30.1 PG (ref 26–34)
MCHC RBC AUTO-ENTMCNC: 33.2 G/DL (ref 30–36.5)
MCV RBC AUTO: 90.7 FL (ref 80–99)
MONOCYTES # BLD: 0.34 K/UL (ref 0–1)
MONOCYTES NFR BLD: 10 % (ref 5–13)
NEUTS SEG # BLD: 2.18 K/UL (ref 1.8–8)
NEUTS SEG NFR BLD: 63.9 % (ref 32–75)
NRBC # BLD: 0 K/UL (ref 0–0.01)
NRBC BLD-RTO: 0 PER 100 WBC
PLATELET # BLD AUTO: 124 K/UL (ref 150–400)
PMV BLD AUTO: 10.2 FL (ref 8.9–12.9)
POTASSIUM SERPL-SCNC: 4.9 MMOL/L (ref 3.5–5.1)
RBC # BLD AUTO: 2.89 M/UL (ref 4.1–5.7)
SERVICE CMNT-IMP: ABNORMAL
SODIUM SERPL-SCNC: 137 MMOL/L (ref 136–145)
WBC # BLD AUTO: 3.4 K/UL (ref 4.1–11.1)

## 2025-06-17 PROCEDURE — 80048 BASIC METABOLIC PNL TOTAL CA: CPT

## 2025-06-17 PROCEDURE — 82962 GLUCOSE BLOOD TEST: CPT

## 2025-06-17 PROCEDURE — 1100000000 HC RM PRIVATE

## 2025-06-17 PROCEDURE — 6370000000 HC RX 637 (ALT 250 FOR IP): Performed by: STUDENT IN AN ORGANIZED HEALTH CARE EDUCATION/TRAINING PROGRAM

## 2025-06-17 PROCEDURE — 6360000002 HC RX W HCPCS: Performed by: STUDENT IN AN ORGANIZED HEALTH CARE EDUCATION/TRAINING PROGRAM

## 2025-06-17 PROCEDURE — 2580000003 HC RX 258: Performed by: STUDENT IN AN ORGANIZED HEALTH CARE EDUCATION/TRAINING PROGRAM

## 2025-06-17 PROCEDURE — 94760 N-INVAS EAR/PLS OXIMETRY 1: CPT

## 2025-06-17 PROCEDURE — 85025 COMPLETE CBC W/AUTO DIFF WBC: CPT

## 2025-06-17 PROCEDURE — 6360000002 HC RX W HCPCS: Performed by: NURSE PRACTITIONER

## 2025-06-17 PROCEDURE — 36415 COLL VENOUS BLD VENIPUNCTURE: CPT

## 2025-06-17 PROCEDURE — 2500000003 HC RX 250 WO HCPCS: Performed by: STUDENT IN AN ORGANIZED HEALTH CARE EDUCATION/TRAINING PROGRAM

## 2025-06-17 RX ORDER — HYDROMORPHONE HYDROCHLORIDE 2 MG/1
2 TABLET ORAL EVERY 4 HOURS PRN
Refills: 0 | Status: DISCONTINUED | OUTPATIENT
Start: 2025-06-17 | End: 2025-06-22

## 2025-06-17 RX ORDER — NALOXONE HYDROCHLORIDE 0.4 MG/ML
0.4 INJECTION, SOLUTION INTRAMUSCULAR; INTRAVENOUS; SUBCUTANEOUS PRN
Status: DISCONTINUED | OUTPATIENT
Start: 2025-06-17 | End: 2025-06-27

## 2025-06-17 RX ADMIN — OXYCODONE 10 MG: 5 TABLET ORAL at 04:41

## 2025-06-17 RX ADMIN — HYDROMORPHONE HYDROCHLORIDE 0.5 MG: 1 INJECTION, SOLUTION INTRAMUSCULAR; INTRAVENOUS; SUBCUTANEOUS at 14:17

## 2025-06-17 RX ADMIN — ACETAMINOPHEN 650 MG: 325 TABLET ORAL at 14:15

## 2025-06-17 RX ADMIN — INSULIN LISPRO 2 UNITS: 100 INJECTION, SOLUTION INTRAVENOUS; SUBCUTANEOUS at 09:22

## 2025-06-17 RX ADMIN — OXYCODONE 10 MG: 5 TABLET ORAL at 00:35

## 2025-06-17 RX ADMIN — PIPERACILLIN AND TAZOBACTAM 3375 MG: 3; .375 INJECTION, POWDER, LYOPHILIZED, FOR SOLUTION INTRAVENOUS at 16:29

## 2025-06-17 RX ADMIN — ACETAMINOPHEN 650 MG: 325 TABLET ORAL at 19:53

## 2025-06-17 RX ADMIN — PANTOPRAZOLE SODIUM 40 MG: 40 TABLET, DELAYED RELEASE ORAL at 06:24

## 2025-06-17 RX ADMIN — SODIUM CHLORIDE, PRESERVATIVE FREE 10 ML: 5 INJECTION INTRAVENOUS at 21:33

## 2025-06-17 RX ADMIN — INSULIN LISPRO 2 UNITS: 100 INJECTION, SOLUTION INTRAVENOUS; SUBCUTANEOUS at 18:08

## 2025-06-17 RX ADMIN — DILTIAZEM HYDROCHLORIDE 120 MG: 120 CAPSULE, EXTENDED RELEASE ORAL at 09:16

## 2025-06-17 RX ADMIN — HYDROMORPHONE HYDROCHLORIDE 0.5 MG: 1 INJECTION, SOLUTION INTRAMUSCULAR; INTRAVENOUS; SUBCUTANEOUS at 19:51

## 2025-06-17 RX ADMIN — ACETAMINOPHEN 650 MG: 325 TABLET ORAL at 06:24

## 2025-06-17 RX ADMIN — HYDROMORPHONE HYDROCHLORIDE 0.5 MG: 1 INJECTION, SOLUTION INTRAMUSCULAR; INTRAVENOUS; SUBCUTANEOUS at 09:11

## 2025-06-17 RX ADMIN — PIPERACILLIN AND TAZOBACTAM 3375 MG: 3; .375 INJECTION, POWDER, LYOPHILIZED, FOR SOLUTION INTRAVENOUS at 04:29

## 2025-06-17 RX ADMIN — GABAPENTIN 300 MG: 300 CAPSULE ORAL at 21:24

## 2025-06-17 RX ADMIN — INSULIN LISPRO 4 UNITS: 100 INJECTION, SOLUTION INTRAVENOUS; SUBCUTANEOUS at 21:30

## 2025-06-17 RX ADMIN — ACETAMINOPHEN 650 MG: 325 TABLET ORAL at 00:35

## 2025-06-17 ASSESSMENT — PAIN SCALES - GENERAL
PAINLEVEL_OUTOF10: 9
PAINLEVEL_OUTOF10: 5
PAINLEVEL_OUTOF10: 8
PAINLEVEL_OUTOF10: 5
PAINLEVEL_OUTOF10: 6
PAINLEVEL_OUTOF10: 10
PAINLEVEL_OUTOF10: 5
PAINLEVEL_OUTOF10: 9

## 2025-06-17 ASSESSMENT — PAIN DESCRIPTION - DESCRIPTORS
DESCRIPTORS: DULL
DESCRIPTORS: ACHING;THROBBING
DESCRIPTORS: ACHING
DESCRIPTORS: ACHING;THROBBING;BURNING
DESCRIPTORS: CRAMPING;ACHING
DESCRIPTORS: DULL;ACHING
DESCRIPTORS: ACHING
DESCRIPTORS: ACHING;SHARP

## 2025-06-17 ASSESSMENT — PAIN DESCRIPTION - DIRECTION: RADIATING_TOWARDS: LOWER LEG

## 2025-06-17 ASSESSMENT — PAIN DESCRIPTION - ORIENTATION
ORIENTATION: LEFT;OTHER (COMMENT)
ORIENTATION: RIGHT;LOWER
ORIENTATION: RIGHT;DISTAL
ORIENTATION: LOWER;RIGHT
ORIENTATION: RIGHT
ORIENTATION: RIGHT;LOWER
ORIENTATION: RIGHT
ORIENTATION: LOWER;RIGHT
ORIENTATION: RIGHT

## 2025-06-17 ASSESSMENT — PAIN DESCRIPTION - FREQUENCY
FREQUENCY: CONTINUOUS
FREQUENCY: CONTINUOUS
FREQUENCY: INTERMITTENT
FREQUENCY: INTERMITTENT

## 2025-06-17 ASSESSMENT — PAIN DESCRIPTION - LOCATION
LOCATION: LEG

## 2025-06-17 ASSESSMENT — PAIN - FUNCTIONAL ASSESSMENT
PAIN_FUNCTIONAL_ASSESSMENT: PREVENTS OR INTERFERES SOME ACTIVE ACTIVITIES AND ADLS
PAIN_FUNCTIONAL_ASSESSMENT: ACTIVITIES ARE NOT PREVENTED
PAIN_FUNCTIONAL_ASSESSMENT: PREVENTS OR INTERFERES SOME ACTIVE ACTIVITIES AND ADLS
PAIN_FUNCTIONAL_ASSESSMENT: PREVENTS OR INTERFERES SOME ACTIVE ACTIVITIES AND ADLS
PAIN_FUNCTIONAL_ASSESSMENT: ACTIVITIES ARE NOT PREVENTED

## 2025-06-17 ASSESSMENT — PAIN DESCRIPTION - PAIN TYPE: TYPE: ACUTE PAIN

## 2025-06-17 NOTE — CONSULTS
06/17/25        I have been asked to see this patient by Krista Mejía MD  for advice/opinion re: -----                                                        Assessment:         End-stage kidney disease on hemodialysis-right tunneled hemodialysis catheter-[facility]-Monday Wednesday Friday schedule  Noncompliance-missed dialysis treatment  Right BKA stump cellulitis plus osteomyelitis  Chronic atrial fibrillation on anticoagulant therapy  Hypertension with CKD stage V  Type 2 diabetes mellitus  Peripheral neuropathy Discussion:     While he has not attended dialysis for the last 2 weeks, his potassium is 4.9 and his creatinine is 3.28.  He is not in fluid overload and not uremic.  It seems that he has good urine output but has episodes of urinary retention while hospitalized especially  He wants to make up his mind about whether he wants to continue dialysis and is especially concerned about his infected stump now    Plan:   No urgent need for renal replacement therapy today.  Will wait for him to understand treatment options and see if he wants to continue with dialysis.  He wants to be DNR                 Thanks for consulting me. Renal service will follow patient with you.Please don't hesitate to contact me if any questions arise of if I can assist in any manner      Zee Mann MD  Cell no- [phone]  Available on perfect serve.          Signed By: Zee Mann MD     June 17, 2025           Consult Date: 6/17/2025    Inpatient consult to Nephrology  Consult performed by: Zee Mann MD  Consult ordered by: Zeke Franklin DO            Subjective   HISTORY OF PRESENTING ILLNESS :  Keaton Van is a 60 y.o.,male ,White (non-) with past medical history of peripheral arterial disease, right BKA, infected dehisced right BKA stump, chronic  0.0 - 0.5 %    Neutrophils Absolute 2.18 1.80 - 8.00 K/UL    Lymphocytes Absolute 0.58 (L) 0.80 - 3.50 K/UL    Monocytes Absolute 0.34 0.00 - 1.00 K/UL    Eosinophils Absolute 0.24 0.00 - 0.40 K/UL    Basophils Absolute 0.06 0.00 - 0.10 K/UL    Immature Granulocytes Absolute 0.01 0.00 - 0.04 K/UL    Differential Type AUTOMATED     Basic Metabolic Panel    Collection Time: 06/17/25  4:32 AM   Result Value Ref Range    Sodium 137 136 - 145 mmol/L    Potassium 4.9 3.5 - 5.1 mmol/L    Chloride 109 (H) 97 - 108 mmol/L    CO2 23 21 - 32 mmol/L    Anion Gap 5 2 - 12 mmol/L    Glucose 153 (H) 65 - 100 mg/dL    BUN 53 (H) 6 - 20 MG/DL    Creatinine 3.28 (H) 0.70 - 1.30 MG/DL    BUN/Creatinine Ratio 16 12 - 20      Est, Glom Filt Rate 21 (L) >60 ml/min/1.73m2    Calcium 8.7 8.5 - 10.1 MG/DL   POCT Glucose    Collection Time: 06/17/25  6:24 AM   Result Value Ref Range    POC Glucose 191 (H) 65 - 117 mg/dL    Performed by: Pooja Perdomo RN          CT Head W/O Contrast   Final Result   No acute intracranial process.   There is no intracranial mass or hemorrhage.         Electronically signed by [email or web address]      XR KNEE RIGHT (3 VIEWS)   Final Result      1. Subtle cortical irregularity of the distal margins of the tibia and fibula.   This could represent early osteomyelitis. MR imaging would better evaluate.      Electronically signed by Vignesh Foss      XR CHEST PORTABLE   Final Result   No acute intrathoracic process is identified.             Electronically signed by [email or web address]           Patient Active Problem List   Diagnosis    Diabetic foot ulcer (HCC)    Personality disorder (HCC)    Non compliance with medical treatment    Tobacco abuse    Poorly-controlled hypertension    Closed fracture of right hand    Poorly controlled diabetes mellitus (HCC)    Spinal stenosis of lumbar region with neurogenic claudication    Cellulitis of left ankle    Bipolar 1 disorder, mixed, moderate (HCC)    Other male erectile dysfunction

## 2025-06-17 NOTE — PROGRESS NOTES
Hospitalist Progress Note     Demographics    Patient Name  Keaton Van    Date of Birth 1965   Medical Record Number  [id number]      Age  60 y.o.   PCP Robert Wells PA-C   Admit date:  6/16/2025  7:20 PM     Room Number  3101/01  @ [facility]           Admission Diagnoses:  Acute osteomyelitis of lower leg, right (HCC)    Admission Summary:  \" Keaton Van is a 60 y.o.  male with PMHx as listed below presenting to the emergency department at direction of Dr. Schaeffer and vascular surgery with concern for infected dehisced right BKA stump with underlying osteomyelitis planned for I&D on Wednesday per patient.  Patient referred for admission and IV antibiotics preceding operative intervention.  Patient reports worsening appearance of wound with copious seropurulent drainage for the past 1-2 weeks.  Patient denies fever/chills/shakes.  No other symptoms reported outside of significant pain in right leg.  Patient prescribed Coumadin for atrial fibrillation (elected this agent for low cost), but states he has not been taking this since discharge from hospital out of concern that his leg would start bleeding again.  Also reports that he has not attended hemodialysis for the last 2 weeks reporting that he did not feel up to it with the pain in his leg.  Unfortunately, continues to smoke.     In the ED, patient afebrile, heart rate WNL, hypertensive with systolics to 230s, saturating upper 90s on room air.  Right knee radiographs demonstrate subtle cortical irregularity on distal margins of tibia and fibula concerning for early osteomyelitis.  CXR negative for acute process.  CT head negative for acute process.  Labs demonstrate: WBC 5.1, hemoglobin 10.0, platelets 164, sodium 138, potassium 5.1, glucose 135, BUN 50, creatinine 3.30, LFTs grossly unremarkable,-troponin 9, INR 1.1, ESR 64.  Patient given vancomycin and Zosyn and Dilaudid by ED provider.    \"     Assessment and plan:   Infected  clinical issues; this includes time spent during multidisciplinary rounds.        Krista Mejía MD MPH FACP [facility]Adv  6/17/2025

## 2025-06-17 NOTE — PROGRESS NOTES
Pharmacy Antimicrobial Kinetic Dosing    Indication for Antimicrobials: Bone and Joint infection (Rt BKA stump)    Current Regimen of Each Antimicrobial:  Vancomycin 2 gm IV x1, then per level; Start Date ; Day 1  Zosyn 4.5 gm IV x1, then 3.375 gm IV q12h.  Start date ; day 1    Previous Antimicrobial Therapy:      Goal Level: Vancomycin trough 15-20    Date Dose & Interval Measured (mcg/mL) Predicted AUC 24-48 Predicted AUC 24,ss                          Significant Cultures:    blood x2. Pending     Labs:  Recent Labs     Units 25  1706   CREATININE MG/DL 3.30*   BUN MG/DL 50*   WBC K/uL 5.1     Temp (24hrs), Av.1 °F (36.7 °C), Min:98.1 °F (36.7 °C), Max:98.1 °F (36.7 °C)      Conditions for Dosing Consideration: Hemodialysis MWF    Creatinine Clearance (mL/min): Estimated Creatinine Clearance: 27 mL/min (A) (based on SCr of 3.3 mg/dL (H)).       Impression/Plan:   Vancomycin 2 gm IV x1, then per HD protocol.  Zosyn 3.375 gm IV q12h per HD protocol  BMP daily.   Antimicrobial stop date TBD,     Pharmacy will follow daily and adjust medications as appropriate for renal function and/or serum levels.    Thank you,  Genaro Santana [facility]

## 2025-06-17 NOTE — ED PROVIDER NOTES
[facility] EMERGENCY DEPARTMENT  EMERGENCY DEPARTMENT ENCOUNTER       Pt Name: Keaton Van  MRN: [id number]  Birthdate 1965  Date of evaluation: 6/16/2025  Provider: Yamel Gongora MD   PCP: Robert Wells PA-C  Note Started: 8:04 PM EDT 6/16/25     CHIEF COMPLAINT       Chief Complaint   Patient presents with    Wound Infection     Patient wheeled into triage sent by MD Walsh to be admitted for surgery (I&D). Patient reports pain 10/10        HISTORY OF PRESENT ILLNESS: 1 or more elements      History From: Patient and medical, History limited by: none     Keaton Van is a 60 y.o. male patient with history of end-stage renal disease on dialysis, right BKA, acute on chronic CHF, diabetes mellitus, atrial fibrillation, chronic pain syndrome, bipolar disorder, here for right BKA infection.  The patient was admitted June 4 to 5 for bleeding from right BKA.  He has had worsening pain involving the right BKA over the last week.  Pain is beyond a 10 out of 10 in severity.  He saw Dr. Walsh, vascular surgery, today.  Dr. Mas recommended that he come to the ER, receive IV antibiotics and pain medications, and Dr. Walsh will take him to the OR for an I&D of his wound in 2 days.  Patient denies fever.  States he had chest pain yesterday, which has resolved.  He denies shortness of breath currently.  He states he has not been to dialysis in 9 days, due to his stump pain.  He is also not taking his warfarin since he was discharged from the hospital, for fear that the wound will start bleeding again.  His blood pressure is currently 237/77.  He has not missed any dosages of blood pressure medicine, and does have a 7 out of 10 headache.       Please See MDM for Additional Details of the HPI/PMH  Nursing Notes were all reviewed and agreed with or any disagreements were addressed in the HPI.     REVIEW OF SYSTEMS        Positives and Pertinent negatives as per HPI.    PAST HISTORY     Past Medical History:  Past  MG/24HR  Commonly known as: NICODERM CQ  Place 1 patch onto the skin daily     pantoprazole 40 MG tablet  Commonly known as: PROTONIX  Take 1 tablet by mouth every morning (before breakfast)     QUEtiapine 100 MG tablet  Commonly known as: SEROQUEL  Take 1 tablet by mouth nightly     warfarin 2 MG tablet  Commonly known as: COUMADIN  Take 3 tablets by mouth daily                DISCONTINUED MEDICATIONS:  Current Discharge Medication List          I am the Primary Clinician of Record.   Yamel Gongora MD (electronically signed)    (Please note that parts of this dictation were completed with voice recognition software. Quite often unanticipated grammatical, syntax, homophones, and other interpretive errors are inadvertently transcribed by the computer software. Please disregards these errors. Please excuse any errors that have escaped final proofreading.)        Yamel Gongora MD  06/17/25 0028

## 2025-06-17 NOTE — CARE COORDINATION
Care Management Initial Assessment       RUR: 35% High Risk  Readmission? No  1st IM letter given? Yes   1st  letter given: No     Initial Assessment: Chart reviewed. CM spoke with pts sister over the phone to introduce self and role. Verified contact information and demographics. Pt resides with sister in a two level home with no TAN. Pt PCP is Dr. Wells with last visit being within the last 6 months. Preferred pharmacy is [facility] in [place]. Pt has hx of needing IPR through [facility]. Pt recently needs assistance with ADL's and uses a walker to ambulate when needed. Pt is not an active . ?Pts has no transportation and will need BLS for discharge.?Pts sister stated she would like pt to discharge to a rehab facility prior to returning back home.    Mr. Van does have a right leg prosthesis. Mr. Van does have a history of substance use. He had been on the behavioral health unit at [facility], [place]. He is being followed by behavioral health. If SNF is recommended pt will need level 2 screening. CM will continue to follow.    Plan A: Rehab  Plan B: Home with HH  Full assessment below:          06/17/25 8819   Service Assessment   Patient Orientation Other (see comment)  (Completed intial assessment with pts sister)   Cognition Other (see comment)  (Completed intial assessment with pts sister)   History Provided By Child/Family   Primary Caregiver Family   Support Systems Family Members   Patient's Healthcare Decision Maker is: Legal Next of Kin   PCP Verified by CM Yes   Last Visit to PCP Within last 6 months   Prior Functional Level Independent in ADLs/IADLs   Current Functional Level Assistance with the following:   Can patient return to prior living arrangement Yes   Ability to make needs known: Good   Family able to assist with home care needs: Yes   Would you like for me to discuss the discharge plan with any other family members/significant others, and if so, who? Yes  (Anuradha Brown

## 2025-06-17 NOTE — PROGRESS NOTES
End of Shift Note    Bedside shift change report given to JADEN Garcia (oncoming nurse) by Leanne Patton RN (offgoing nurse).  Report included the following information SBAR, Kardex, MAR, and Recent Results    Shift worked:  1900-0730       Shift summary and any significant changes:     Admitted the patient to the unit from ER was settled in bed due meds administered as per MAR with good effect. VSS ,reported one episode of pain during the shift tylenol and oxy 10mg given and tolerated well, he is voiding well in the urinal. He has an order for nephrology, vascular and wound consult today . Left arm limb restriction to due hemodialysis fistula graft in situ.     Concerns for physician to address:  Pt is requesting for dilaudid IV pain meds, he verbalized oxycodone does not work well for his pain   Zone phone for oncoming shift:          Activity:     Number times ambulated in hallways past shift: 0  Number of times OOB to chair past shift: 0    Cardiac:   Cardiac Monitoring: Yes           Access:  Current line(s): PIV    Genitourinary:   Urinary Status: Voiding, Oliguria    Respiratory:   O2 Device: None (Room air)  Chronic home O2 use?: NO  Incentive spirometer at bedside: NO    GI:     Current diet:  ADULT DIET; Regular; 4 carb choices (60 gm/meal); Low Fat/Low Chol/High Fiber/CARA  Passing flatus: YES    Pain Management:   Patient states pain is manageable on current regimen: YES    Skin:  Kennedy Scale Score: 20  Interventions: Wound Offloading (Prevention Methods): Repositioning, Pillows    Patient Safety:  Fall Risk:    Fall Risk Interventions  Toilet Every 2 Hours-In Advance of Need: Yes  Hourly Visual Checks: In bed, Awake  Fall Visual Posted: Socks, Fall sign posted  Room Door Open: Deferred to promote rest  Alarm On: Bed    Active Consults:   IP CONSULT TO PHARMACY  IP CONSULT TO NEPHROLOGY  IP CONSULT TO VASCULAR SURGERY    Length of Stay:  Expected LOS: 2  Actual LOS: 1    Leanne Patton, BHAKTI

## 2025-06-17 NOTE — PROGRESS NOTES
.End of Shift Note    Bedside shift change report given to BHAKTI Coronado (oncoming nurse) by KATHRYN VILLALTA LPN (offgoing nurse).  Report included the following information SBAR, Kardex, ED Summary, Procedure Summary, Intake/Output, MAR, and Recent Results, SR/SA    Shift worked:  7-7     Shift summary and any significant changes:     Patient in bed this shift resting right leg/stump. Reports pain well controlled with Dilaudid IV. Stump ion in color, warm to touch,. Some dry skin breakdown around stump. Patient request n dressing to be applied until surgery. Stump is dry. Alert oriented and pleasant. Continues on IV antibiotics. NPO at midnight for surgical procedure , I&D of the right stump by Vascular 6/18/25. Diabetic status stable.     Concerns for physician to address:  Monitor labs, NPO at midnight for surgery     Zone phone for oncoming shift:   [phone]       Activity:  Level of Assistance: Moderate assist, patient does 50-74%  Number times ambulated in hallways past shift: 0  Number of times OOB to chair past shift: 0    Cardiac:   Cardiac Monitoring: Yes      Cardiac Rhythm: Sinus rhythm    Access:  Current line(s): PIV     Genitourinary:   Urinary Status: Voiding, Oliguria    Respiratory:   O2 Device: None (Room air)  Chronic home O2 use?: NO  Incentive spirometer at bedside: YES    GI:  Last BM (including prior to admit): 06/16/25  Current diet:  ADULT DIET; Regular; 4 carb choices (60 gm/meal); Low Fat/Low Chol/High Fiber/CARA  Diet NPO Exceptions are: Sips of Water with Meds  Passing flatus: NO    Pain Management:   Patient states pain is manageable on current regimen: YES    Skin:  Kennedy Scale Score: 17  Interventions: Wound Offloading (Prevention Methods): Bed, pressure reduction mattress, Pillows, Repositioning    Patient Safety:  Fall Risk: Nursing Judgement-Fall Risk High(Add Comments): Yes  Fall Risk Interventions  Nursing Judgement-Fall Risk High(Add Comments): Yes  Toilet Every 2 Hours-In

## 2025-06-17 NOTE — H&P
Hospitalist Admission Note    NAME:  Keaton Van   :  1965   MRN:  [id number]     Date/Time:  2025 9:46 PM    Patient PCP: Robert Wells PA-C    ______________________________________________________________________  Given the patient's current clinical presentation, I have a high level of concern for decompensation if discharged from the emergency department.  Complex decision making was performed, which includes reviewing the patient's available past medical records, laboratory results, and x-ray films.       My assessment of this patient's clinical condition and my plan of care is as follows.    Assessment / Plan:    Active Problems:  Right BKA stump infection with concern for osteomyelitis  ESRD on MWF IHD-missed last 2 weeks of sessions  Anemia of chronic disease  Atrial fibrillation on warfarin  Essential hypertension  Diabetes mellitus  MDD, YUE, insomnia  Peripheral neuropathy  Tobacco abuse  GERD    Plan:  Right BKA stump infection with concern for osteomyelitis  Admit to telemetry monitoring  Continue empiric vancomycin and Zosyn  - Pharmacy consulted for vancomycin dosing  Check ESR and CRP  Wound care consulted  Vascular surgery consulted, greatly appreciate their expertise  - Plan for I&D Wednesday per patient    ESRD on MWF IHD-missed last 2 weeks of sessions  Anemia of chronic disease  Nephrology consulted, greatly appreciate their expertise  Renally dose medications as appropriate    Atrial fibrillation on warfarin  Essential hypertension  Continue PTA diltiazem  Hold PTA warfarin in anticipation of I&D  Check INR    Diabetes mellitus  Corrective coverage insulin  Accu-Cheks  Diabetic diet  Hypoglycemia protocol in place    MDD, YUE, insomnia  Continue PTA Seroquel nightly  Continue PTA as needed Ativan  Melatonin nightly as needed    Peripheral neuropathy  Continue PTA gabapentin    Tobacco abuse  3 minutes tobacco cessation counseling provided with emphasis on adverse

## 2025-06-17 NOTE — CONSULTS
Vascular Surgery Consult Note  6/17/2025    Subjective:     Mr. Keaton Van is a 60 y.o. male with a pmhx significant for diabetes mellitus, hypertension, heart failure, atrial fibrillation, end-stage renal disease, bipolar disorder, hepatitis C, and polysubstance abuse.  He is a current smoker.  He has not been taking is warfarin due to the bleeding.  His podiatrist is Dr. Michael Gerber.  He has a history of creation of a percutaneous AVF on 11/25/24.      He was recently admitted to the hospital with a bleeding stump wound.  He is s/p right BKA on 7/16/24 secondary to a non-healing diabetic wound that has been presented for nearly a year. He is now readmitted to the hospital with an infected stump.  Plan is for surgical debridement in the am.     Past medical history  Insulin dependent diabetes mellitus   Hypertension  Heart failure w/ preserved EF  Atrial fibrillation  End stage renal failure   Dialysis dependent MWF  -via chest wall catheter   Anemia of chronic disease   Gastroesophageal reflux disease   Bipolar disorder  Personality disorder   -w/ hx of suicidal ideation   Chronic hepatitis C  Polysubstance abuse   -including tobacco, opioids   -w/ hx of overdose   DDD of the lumbar spine  Chronic pain syndrome   Renal calculi     Past procedural history  Creation of percutaneous AVF 11/25/24  Right partial fifth ray amputation   Right below the knee amputation 7/16/24  Right hand surgery 8/2018  Left knee arthroplasty  Cardiac stenting   Lithotripsy      Family history   Father: Hypertension and heart disease  Mother: Hypertension and stroke      Social history  He is a current smoker  He has a hx of opioid abuse and overdose  He denies alcohol use.       Home medication   warfarin (COUMADIN) 2 MG tablet Take 3 tablets by mouth daily   dilTIAZem (CARDIZEM CD) 120 MG extended release capsule Take 1 capsule by mouth daily   gabapentin (NEURONTIN) 300 MG capsule Take 1 capsule by mouth nightly for 30 days. Max  (!) 237/101 -- -- -- -- -- -- --   06/16/25 1452 (!) 141/91 98.1 °F (36.7 °C) Oral 69 18 100 % -- --   06/16/25 1450 -- -- -- -- -- -- -- 88 kg (194 lb)        Physical Exam  Eyes:      Extraocular Movements: Extraocular movements intact.   Cardiovascular:      Rate and Rhythm: Normal rate and regular rhythm.      Arteriovenous access: Left arteriovenous access is present.     Comments: With palpable thrill  Pulmonary:      Effort: Pulmonary effort is normal. No respiratory distress.   Abdominal:      General: There is no distension.   Musculoskeletal:         General: Normal range of motion.   Skin:     General: Skin is warm.      Comments: Right stump malodorous wound  Neurological:      General: No focal deficit present.      Mental Status: He is alert. Mental status is at baseline.             Pertinent Test Results:   Recent Results (from the past 24 hours)   CBC with Auto Differential    Collection Time: 06/16/25  5:06 PM   Result Value Ref Range    WBC 5.1 4.1 - 11.1 K/uL    RBC 3.39 (L) 4.10 - 5.70 M/uL    Hemoglobin 10.0 (L) 12.1 - 17.0 g/dL    Hematocrit 30.3 (L) 36.6 - 50.3 %    MCV 89.4 80.0 - 99.0 FL    MCH 29.5 26.0 - 34.0 PG    MCHC 33.0 30.0 - 36.5 g/dL    RDW 13.0 11.5 - 14.5 %    Platelets 164 150 - 400 K/uL    MPV 10.1 8.9 - 12.9 FL    Nucleated RBCs 0.0 0  WBC    nRBC 0.00 0.00 - 0.01 K/uL    Neutrophils % 70.1 32.0 - 75.0 %    Lymphocytes % 16.7 12.0 - 49.0 %    Monocytes % 6.9 5.0 - 13.0 %    Eosinophils % 4.5 0.0 - 7.0 %    Basophils % 1.4 (H) 0.0 - 1.0 %    Immature Granulocytes % 0.4 0.0 - 0.5 %    Neutrophils Absolute 3.58 1.80 - 8.00 K/UL    Lymphocytes Absolute 0.85 0.80 - 3.50 K/UL    Monocytes Absolute 0.35 0.00 - 1.00 K/UL    Eosinophils Absolute 0.23 0.00 - 0.40 K/UL    Basophils Absolute 0.07 0.00 - 0.10 K/UL    Immature Granulocytes Absolute 0.02 0.00 - 0.04 K/UL    Differential Type AUTOMATED     Comprehensive Metabolic Panel    Collection Time: 06/16/25  5:06 PM   Result

## 2025-06-18 ENCOUNTER — ANESTHESIA EVENT (OUTPATIENT)
Facility: HOSPITAL | Age: 60
End: 2025-06-18
Payer: MEDICARE

## 2025-06-18 ENCOUNTER — ANESTHESIA (OUTPATIENT)
Facility: HOSPITAL | Age: 60
End: 2025-06-18
Payer: MEDICARE

## 2025-06-18 LAB
ABO + RH BLD: NORMAL
ANION GAP BLD CALC-SCNC: 9.6 MMOL/L (ref 10–20)
ANION GAP SERPL CALC-SCNC: 6 MMOL/L (ref 2–12)
BASOPHILS # BLD: 0.04 K/UL (ref 0–0.1)
BASOPHILS NFR BLD: 1.1 % (ref 0–1)
BLOOD GROUP ANTIBODIES SERPL: NORMAL
BUN SERPL-MCNC: 57 MG/DL (ref 6–20)
BUN/CREAT SERPL: 15 (ref 12–20)
CA-I BLD-MCNC: 1.29 MMOL/L (ref 1.15–1.33)
CALCIUM SERPL-MCNC: 8.4 MG/DL (ref 8.5–10.1)
CHLORIDE BLD-SCNC: 111 MMOL/L (ref 98–107)
CHLORIDE SERPL-SCNC: 110 MMOL/L (ref 97–108)
CO2 BLD-SCNC: 19.4 MMOL/L (ref 21–32)
CO2 SERPL-SCNC: 21 MMOL/L (ref 21–32)
CREAT BLD-MCNC: 3.69 MG/DL (ref 0.6–1.3)
CREAT SERPL-MCNC: 3.68 MG/DL (ref 0.7–1.3)
DIFFERENTIAL METHOD BLD: ABNORMAL
EOSINOPHIL # BLD: 0.23 K/UL (ref 0–0.4)
EOSINOPHIL NFR BLD: 6.1 % (ref 0–7)
ERYTHROCYTE [DISTWIDTH] IN BLOOD BY AUTOMATED COUNT: 13.2 % (ref 11.5–14.5)
GLUCOSE BLD STRIP.AUTO-MCNC: 120 MG/DL (ref 65–117)
GLUCOSE BLD STRIP.AUTO-MCNC: 227 MG/DL (ref 65–117)
GLUCOSE BLD STRIP.AUTO-MCNC: 285 MG/DL (ref 65–117)
GLUCOSE BLD STRIP.AUTO-MCNC: 437 MG/DL (ref 65–117)
GLUCOSE BLD STRIP.AUTO-MCNC: 469 MG/DL (ref 65–117)
GLUCOSE BLD-MCNC: 136 MG/DL (ref 74–99)
GLUCOSE SERPL-MCNC: 194 MG/DL (ref 65–100)
HCT VFR BLD AUTO: 27.6 % (ref 36.6–50.3)
HGB BLD-MCNC: 9 G/DL (ref 12.1–17)
IMM GRANULOCYTES # BLD AUTO: 0 K/UL (ref 0–0.04)
IMM GRANULOCYTES NFR BLD AUTO: 0 % (ref 0–0.5)
LYMPHOCYTES # BLD: 0.55 K/UL (ref 0.8–3.5)
LYMPHOCYTES NFR BLD: 14.5 % (ref 12–49)
MCH RBC QN AUTO: 29.4 PG (ref 26–34)
MCHC RBC AUTO-ENTMCNC: 32.6 G/DL (ref 30–36.5)
MCV RBC AUTO: 90.2 FL (ref 80–99)
MONOCYTES # BLD: 0.26 K/UL (ref 0–1)
MONOCYTES NFR BLD: 6.8 % (ref 5–13)
NEUTS SEG # BLD: 2.72 K/UL (ref 1.8–8)
NEUTS SEG NFR BLD: 71.5 % (ref 32–75)
NRBC # BLD: 0 K/UL (ref 0–0.01)
NRBC BLD-RTO: 0 PER 100 WBC
PLATELET # BLD AUTO: 136 K/UL (ref 150–400)
PMV BLD AUTO: 10.1 FL (ref 8.9–12.9)
POTASSIUM BLD-SCNC: 5.3 MMOL/L (ref 3.5–5.1)
POTASSIUM SERPL-SCNC: 5.6 MMOL/L (ref 3.5–5.1)
RBC # BLD AUTO: 3.06 M/UL (ref 4.1–5.7)
RBC MORPH BLD: ABNORMAL
SERVICE CMNT-IMP: ABNORMAL
SODIUM BLD-SCNC: 140 MMOL/L (ref 136–145)
SODIUM SERPL-SCNC: 137 MMOL/L (ref 136–145)
SPECIMEN EXP DATE BLD: NORMAL
WBC # BLD AUTO: 3.8 K/UL (ref 4.1–11.1)

## 2025-06-18 PROCEDURE — 3700000000 HC ANESTHESIA ATTENDED CARE: Performed by: SURGERY

## 2025-06-18 PROCEDURE — 86901 BLOOD TYPING SEROLOGIC RH(D): CPT

## 2025-06-18 PROCEDURE — 2580000003 HC RX 258: Performed by: STUDENT IN AN ORGANIZED HEALTH CARE EDUCATION/TRAINING PROGRAM

## 2025-06-18 PROCEDURE — 3600000003 HC SURGERY LEVEL 3 BASE: Performed by: SURGERY

## 2025-06-18 PROCEDURE — 80048 BASIC METABOLIC PNL TOTAL CA: CPT

## 2025-06-18 PROCEDURE — 86900 BLOOD TYPING SEROLOGIC ABO: CPT

## 2025-06-18 PROCEDURE — 1100000000 HC RM PRIVATE

## 2025-06-18 PROCEDURE — 86850 RBC ANTIBODY SCREEN: CPT

## 2025-06-18 PROCEDURE — 2500000003 HC RX 250 WO HCPCS: Performed by: NURSE ANESTHETIST, CERTIFIED REGISTERED

## 2025-06-18 PROCEDURE — 6360000002 HC RX W HCPCS: Performed by: SURGERY

## 2025-06-18 PROCEDURE — 6360000002 HC RX W HCPCS: Performed by: STUDENT IN AN ORGANIZED HEALTH CARE EDUCATION/TRAINING PROGRAM

## 2025-06-18 PROCEDURE — 36415 COLL VENOUS BLD VENIPUNCTURE: CPT

## 2025-06-18 PROCEDURE — 2709999900 HC NON-CHARGEABLE SUPPLY: Performed by: SURGERY

## 2025-06-18 PROCEDURE — 6360000002 HC RX W HCPCS: Performed by: NURSE ANESTHETIST, CERTIFIED REGISTERED

## 2025-06-18 PROCEDURE — L1830 KO IMMOB CANVAS LONG PRE OTS: HCPCS | Performed by: SURGERY

## 2025-06-18 PROCEDURE — 3600000013 HC SURGERY LEVEL 3 ADDTL 15MIN: Performed by: SURGERY

## 2025-06-18 PROCEDURE — 0HRKXJZ REPLACEMENT OF RIGHT LOWER LEG SKIN WITH SYNTHETIC SUBSTITUTE, EXTERNAL APPROACH: ICD-10-PCS | Performed by: SURGERY

## 2025-06-18 PROCEDURE — 6370000000 HC RX 637 (ALT 250 FOR IP): Performed by: SURGERY

## 2025-06-18 PROCEDURE — 2580000003 HC RX 258: Performed by: NURSE ANESTHETIST, CERTIFIED REGISTERED

## 2025-06-18 PROCEDURE — 7100000000 HC PACU RECOVERY - FIRST 15 MIN: Performed by: SURGERY

## 2025-06-18 PROCEDURE — 82962 GLUCOSE BLOOD TEST: CPT

## 2025-06-18 PROCEDURE — 6360000002 HC RX W HCPCS: Performed by: NURSE PRACTITIONER

## 2025-06-18 PROCEDURE — 2580000003 HC RX 258: Performed by: NURSE PRACTITIONER

## 2025-06-18 PROCEDURE — 6370000000 HC RX 637 (ALT 250 FOR IP): Performed by: STUDENT IN AN ORGANIZED HEALTH CARE EDUCATION/TRAINING PROGRAM

## 2025-06-18 PROCEDURE — 85025 COMPLETE CBC W/AUTO DIFF WBC: CPT

## 2025-06-18 PROCEDURE — 3700000001 HC ADD 15 MINUTES (ANESTHESIA): Performed by: SURGERY

## 2025-06-18 PROCEDURE — 0JDN0ZZ EXTRACTION OF RIGHT LOWER LEG SUBCUTANEOUS TISSUE AND FASCIA, OPEN APPROACH: ICD-10-PCS | Performed by: SURGERY

## 2025-06-18 PROCEDURE — 80047 BASIC METABLC PNL IONIZED CA: CPT

## 2025-06-18 PROCEDURE — 7100000001 HC PACU RECOVERY - ADDTL 15 MIN: Performed by: SURGERY

## 2025-06-18 DEVICE — GRAFT SKIN GRAFIX PL PRIME 3X4CM LYPOPRESERVED: Type: IMPLANTABLE DEVICE | Site: LEG | Status: FUNCTIONAL

## 2025-06-18 RX ORDER — SODIUM CHLORIDE 9 MG/ML
INJECTION, SOLUTION INTRAVENOUS PRN
Status: DISCONTINUED | OUTPATIENT
Start: 2025-06-18 | End: 2025-06-18 | Stop reason: HOSPADM

## 2025-06-18 RX ORDER — METRONIDAZOLE 500 MG/100ML
500 INJECTION, SOLUTION INTRAVENOUS EVERY 8 HOURS
Status: DISCONTINUED | OUTPATIENT
Start: 2025-06-18 | End: 2025-06-27

## 2025-06-18 RX ORDER — MIDAZOLAM HYDROCHLORIDE 1 MG/ML
INJECTION, SOLUTION INTRAMUSCULAR; INTRAVENOUS
Status: DISCONTINUED | OUTPATIENT
Start: 2025-06-18 | End: 2025-06-18 | Stop reason: SDUPTHER

## 2025-06-18 RX ORDER — NALOXONE HYDROCHLORIDE 0.4 MG/ML
INJECTION, SOLUTION INTRAMUSCULAR; INTRAVENOUS; SUBCUTANEOUS PRN
Status: DISCONTINUED | OUTPATIENT
Start: 2025-06-18 | End: 2025-06-18 | Stop reason: HOSPADM

## 2025-06-18 RX ORDER — GLUCAGON 1 MG/ML
1 KIT INJECTION PRN
Status: DISCONTINUED | OUTPATIENT
Start: 2025-06-18 | End: 2025-06-18 | Stop reason: HOSPADM

## 2025-06-18 RX ORDER — SODIUM CHLORIDE, SODIUM LACTATE, POTASSIUM CHLORIDE, CALCIUM CHLORIDE 600; 310; 30; 20 MG/100ML; MG/100ML; MG/100ML; MG/100ML
INJECTION, SOLUTION INTRAVENOUS
Status: DISCONTINUED | OUTPATIENT
Start: 2025-06-18 | End: 2025-06-18

## 2025-06-18 RX ORDER — FENTANYL CITRATE 50 UG/ML
25 INJECTION, SOLUTION INTRAMUSCULAR; INTRAVENOUS EVERY 5 MIN PRN
Status: DISCONTINUED | OUTPATIENT
Start: 2025-06-18 | End: 2025-06-18 | Stop reason: HOSPADM

## 2025-06-18 RX ORDER — ONDANSETRON 2 MG/ML
INJECTION INTRAMUSCULAR; INTRAVENOUS
Status: DISCONTINUED | OUTPATIENT
Start: 2025-06-18 | End: 2025-06-18 | Stop reason: SDUPTHER

## 2025-06-18 RX ORDER — ONDANSETRON 2 MG/ML
4 INJECTION INTRAMUSCULAR; INTRAVENOUS
Status: DISCONTINUED | OUTPATIENT
Start: 2025-06-18 | End: 2025-06-18 | Stop reason: HOSPADM

## 2025-06-18 RX ORDER — SODIUM CHLORIDE 9 MG/ML
INJECTION, SOLUTION INTRAVENOUS
Status: DISCONTINUED | OUTPATIENT
Start: 2025-06-18 | End: 2025-06-18 | Stop reason: SDUPTHER

## 2025-06-18 RX ORDER — DEXAMETHASONE SODIUM PHOSPHATE 4 MG/ML
INJECTION, SOLUTION INTRA-ARTICULAR; INTRALESIONAL; INTRAMUSCULAR; INTRAVENOUS; SOFT TISSUE
Status: DISCONTINUED | OUTPATIENT
Start: 2025-06-18 | End: 2025-06-18 | Stop reason: SDUPTHER

## 2025-06-18 RX ORDER — PROCHLORPERAZINE EDISYLATE 5 MG/ML
5 INJECTION INTRAMUSCULAR; INTRAVENOUS
Status: DISCONTINUED | OUTPATIENT
Start: 2025-06-18 | End: 2025-06-18 | Stop reason: HOSPADM

## 2025-06-18 RX ORDER — ROCURONIUM BROMIDE 10 MG/ML
INJECTION, SOLUTION INTRAVENOUS
Status: DISCONTINUED | OUTPATIENT
Start: 2025-06-18 | End: 2025-06-18 | Stop reason: SDUPTHER

## 2025-06-18 RX ORDER — HYDROMORPHONE HYDROCHLORIDE 1 MG/ML
0.5 INJECTION, SOLUTION INTRAMUSCULAR; INTRAVENOUS; SUBCUTANEOUS EVERY 5 MIN PRN
Status: DISCONTINUED | OUTPATIENT
Start: 2025-06-18 | End: 2025-06-18 | Stop reason: HOSPADM

## 2025-06-18 RX ORDER — IPRATROPIUM BROMIDE AND ALBUTEROL SULFATE 2.5; .5 MG/3ML; MG/3ML
1 SOLUTION RESPIRATORY (INHALATION)
Status: DISCONTINUED | OUTPATIENT
Start: 2025-06-18 | End: 2025-06-18 | Stop reason: HOSPADM

## 2025-06-18 RX ORDER — SODIUM CHLORIDE 0.9 % (FLUSH) 0.9 %
5-40 SYRINGE (ML) INJECTION EVERY 12 HOURS SCHEDULED
Status: DISCONTINUED | OUTPATIENT
Start: 2025-06-18 | End: 2025-06-18 | Stop reason: HOSPADM

## 2025-06-18 RX ORDER — PROPOFOL 10 MG/ML
INJECTION, EMULSION INTRAVENOUS
Status: DISCONTINUED | OUTPATIENT
Start: 2025-06-18 | End: 2025-06-18 | Stop reason: SDUPTHER

## 2025-06-18 RX ORDER — INSULIN LISPRO 100 [IU]/ML
8 INJECTION, SOLUTION INTRAVENOUS; SUBCUTANEOUS ONCE
Status: COMPLETED | OUTPATIENT
Start: 2025-06-18 | End: 2025-06-18

## 2025-06-18 RX ORDER — DEXTROSE MONOHYDRATE 100 MG/ML
INJECTION, SOLUTION INTRAVENOUS CONTINUOUS PRN
Status: DISCONTINUED | OUTPATIENT
Start: 2025-06-18 | End: 2025-06-18 | Stop reason: HOSPADM

## 2025-06-18 RX ORDER — EPHEDRINE SULFATE/0.9% NACL/PF 50 MG/5 ML
SYRINGE (ML) INTRAVENOUS
Status: DISCONTINUED | OUTPATIENT
Start: 2025-06-18 | End: 2025-06-18 | Stop reason: SDUPTHER

## 2025-06-18 RX ORDER — METHADONE HYDROCHLORIDE 10 MG/ML
10 INJECTION, SOLUTION INTRAMUSCULAR; INTRAVENOUS; SUBCUTANEOUS ONCE
Refills: 0 | Status: COMPLETED | OUTPATIENT
Start: 2025-06-18 | End: 2025-06-18

## 2025-06-18 RX ORDER — FENTANYL CITRATE 50 UG/ML
INJECTION, SOLUTION INTRAMUSCULAR; INTRAVENOUS
Status: DISCONTINUED | OUTPATIENT
Start: 2025-06-18 | End: 2025-06-18 | Stop reason: SDUPTHER

## 2025-06-18 RX ORDER — SODIUM CHLORIDE 0.9 % (FLUSH) 0.9 %
5-40 SYRINGE (ML) INJECTION PRN
Status: DISCONTINUED | OUTPATIENT
Start: 2025-06-18 | End: 2025-06-18 | Stop reason: HOSPADM

## 2025-06-18 RX ADMIN — FENTANYL CITRATE 50 MCG: 50 INJECTION, SOLUTION INTRAMUSCULAR; INTRAVENOUS at 12:19

## 2025-06-18 RX ADMIN — GABAPENTIN 300 MG: 300 CAPSULE ORAL at 20:35

## 2025-06-18 RX ADMIN — METRONIDAZOLE 500 MG: 500 INJECTION, SOLUTION INTRAVENOUS at 23:56

## 2025-06-18 RX ADMIN — INSULIN LISPRO 4 UNITS: 100 INJECTION, SOLUTION INTRAVENOUS; SUBCUTANEOUS at 17:38

## 2025-06-18 RX ADMIN — CEFEPIME 1000 MG: 1 INJECTION, POWDER, FOR SOLUTION INTRAMUSCULAR; INTRAVENOUS at 09:02

## 2025-06-18 RX ADMIN — METHADONE HYDROCHLORIDE 10 MG: 10 INJECTION, SOLUTION INTRAMUSCULAR; INTRAVENOUS; SUBCUTANEOUS at 12:51

## 2025-06-18 RX ADMIN — ROCURONIUM BROMIDE 50 MG: 10 INJECTION INTRAVENOUS at 12:41

## 2025-06-18 RX ADMIN — ACETAMINOPHEN 650 MG: 325 TABLET ORAL at 23:54

## 2025-06-18 RX ADMIN — INSULIN LISPRO 8 UNITS: 100 INJECTION, SOLUTION INTRAVENOUS; SUBCUTANEOUS at 22:15

## 2025-06-18 RX ADMIN — MIDAZOLAM HYDROCHLORIDE 2 MG: 1 INJECTION, SOLUTION INTRAMUSCULAR; INTRAVENOUS at 12:32

## 2025-06-18 RX ADMIN — INSULIN LISPRO 8 UNITS: 100 INJECTION, SOLUTION INTRAVENOUS; SUBCUTANEOUS at 21:54

## 2025-06-18 RX ADMIN — ONDANSETRON 4 MG: 2 INJECTION, SOLUTION INTRAMUSCULAR; INTRAVENOUS at 13:27

## 2025-06-18 RX ADMIN — Medication 10 MG: at 12:59

## 2025-06-18 RX ADMIN — PANTOPRAZOLE SODIUM 40 MG: 40 TABLET, DELAYED RELEASE ORAL at 06:11

## 2025-06-18 RX ADMIN — SUGAMMADEX 200 MG: 100 INJECTION, SOLUTION INTRAVENOUS at 13:30

## 2025-06-18 RX ADMIN — DEXAMETHASONE SODIUM PHOSPHATE 4 MG: 4 INJECTION, SOLUTION INTRAMUSCULAR; INTRAVENOUS at 12:49

## 2025-06-18 RX ADMIN — METRONIDAZOLE 500 MG: 500 INJECTION, SOLUTION INTRAVENOUS at 17:37

## 2025-06-18 RX ADMIN — ACETAMINOPHEN 650 MG: 325 TABLET ORAL at 17:37

## 2025-06-18 RX ADMIN — QUETIAPINE FUMARATE 100 MG: 100 TABLET ORAL at 20:35

## 2025-06-18 RX ADMIN — DILTIAZEM HYDROCHLORIDE 120 MG: 120 CAPSULE, EXTENDED RELEASE ORAL at 11:03

## 2025-06-18 RX ADMIN — HYDROMORPHONE HYDROCHLORIDE 0.5 MG: 1 INJECTION, SOLUTION INTRAMUSCULAR; INTRAVENOUS; SUBCUTANEOUS at 00:13

## 2025-06-18 RX ADMIN — SUGAMMADEX 200 MG: 100 INJECTION, SOLUTION INTRAVENOUS at 13:35

## 2025-06-18 RX ADMIN — PHENYLEPHRINE HYDROCHLORIDE 20 MCG/MIN: 10 INJECTION INTRAVENOUS at 13:18

## 2025-06-18 RX ADMIN — METRONIDAZOLE 500 MG: 500 INJECTION, SOLUTION INTRAVENOUS at 08:41

## 2025-06-18 RX ADMIN — SODIUM CHLORIDE: 9 INJECTION, SOLUTION INTRAVENOUS at 11:56

## 2025-06-18 RX ADMIN — HYDROMORPHONE HYDROCHLORIDE 0.5 MG: 1 INJECTION, SOLUTION INTRAMUSCULAR; INTRAVENOUS; SUBCUTANEOUS at 23:53

## 2025-06-18 RX ADMIN — ACETAMINOPHEN 650 MG: 325 TABLET ORAL at 00:12

## 2025-06-18 RX ADMIN — PIPERACILLIN AND TAZOBACTAM 3375 MG: 3; .375 INJECTION, POWDER, LYOPHILIZED, FOR SOLUTION INTRAVENOUS at 04:36

## 2025-06-18 RX ADMIN — HYDROMORPHONE HYDROCHLORIDE 0.5 MG: 1 INJECTION, SOLUTION INTRAMUSCULAR; INTRAVENOUS; SUBCUTANEOUS at 04:28

## 2025-06-18 RX ADMIN — ACETAMINOPHEN 650 MG: 325 TABLET ORAL at 06:11

## 2025-06-18 RX ADMIN — Medication 10 MG: at 13:00

## 2025-06-18 RX ADMIN — FENTANYL CITRATE 50 MCG: 50 INJECTION, SOLUTION INTRAMUSCULAR; INTRAVENOUS at 12:41

## 2025-06-18 RX ADMIN — HYDROMORPHONE HYDROCHLORIDE 0.5 MG: 1 INJECTION, SOLUTION INTRAMUSCULAR; INTRAVENOUS; SUBCUTANEOUS at 15:31

## 2025-06-18 RX ADMIN — LIDOCAINE HYDROCHLORIDE 100 MG: 20 INJECTION, SOLUTION EPIDURAL; INFILTRATION; INTRACAUDAL; PERINEURAL at 12:41

## 2025-06-18 RX ADMIN — PROPOFOL 200 MG: 10 INJECTION, EMULSION INTRAVENOUS at 12:41

## 2025-06-18 RX ADMIN — HYDROMORPHONE HYDROCHLORIDE 0.5 MG: 1 INJECTION, SOLUTION INTRAMUSCULAR; INTRAVENOUS; SUBCUTANEOUS at 19:33

## 2025-06-18 RX ADMIN — HYDROMORPHONE HYDROCHLORIDE 0.5 MG: 1 INJECTION, SOLUTION INTRAMUSCULAR; INTRAVENOUS; SUBCUTANEOUS at 08:34

## 2025-06-18 ASSESSMENT — PAIN DESCRIPTION - ORIENTATION
ORIENTATION: RIGHT;DISTAL
ORIENTATION: RIGHT
ORIENTATION: RIGHT;DISTAL
ORIENTATION: RIGHT

## 2025-06-18 ASSESSMENT — PAIN SCALES - GENERAL
PAINLEVEL_OUTOF10: 7
PAINLEVEL_OUTOF10: 5
PAINLEVEL_OUTOF10: 7
PAINLEVEL_OUTOF10: 8
PAINLEVEL_OUTOF10: 10
PAINLEVEL_OUTOF10: 5
PAINLEVEL_OUTOF10: 5
PAINLEVEL_OUTOF10: 6
PAINLEVEL_OUTOF10: 8
PAINLEVEL_OUTOF10: 5
PAINLEVEL_OUTOF10: 10
PAINLEVEL_OUTOF10: 8
PAINLEVEL_OUTOF10: 8

## 2025-06-18 ASSESSMENT — PAIN - FUNCTIONAL ASSESSMENT
PAIN_FUNCTIONAL_ASSESSMENT: ACTIVITIES ARE NOT PREVENTED
PAIN_FUNCTIONAL_ASSESSMENT: 0-10
PAIN_FUNCTIONAL_ASSESSMENT: ACTIVITIES ARE NOT PREVENTED
PAIN_FUNCTIONAL_ASSESSMENT: ACTIVITIES ARE NOT PREVENTED

## 2025-06-18 ASSESSMENT — PAIN DESCRIPTION - DESCRIPTORS
DESCRIPTORS: ACHING
DESCRIPTORS: STABBING;SHARP
DESCRIPTORS: ACHING
DESCRIPTORS: ACHING
DESCRIPTORS: ACHING;BURNING
DESCRIPTORS: ACHING;THROBBING
DESCRIPTORS: ACHING;THROBBING;TIGHTNESS
DESCRIPTORS: ACHING
DESCRIPTORS: BURNING;ACHING

## 2025-06-18 ASSESSMENT — PAIN DESCRIPTION - LOCATION
LOCATION: LEG

## 2025-06-18 ASSESSMENT — LIFESTYLE VARIABLES: SMOKING_STATUS: 1

## 2025-06-18 NOTE — PROGRESS NOTES
End of Shift Note    Bedside shift change report given to Ivonne (oncoming nurse) by Jeane Maldonado RN (offgoing nurse).  Report included the following information SBAR, Kardex, OR Summary, Procedure Summary, MAR, and Recent Results    Shift worked:  7a-7pm     Shift summary and any significant changes:     Patient is Aox4. Patient irritable this am c/o poor sleep and anxiety, but later pleasant and cooperative with care. Patient remains NPO for procedure. Patientn c/o diarrhea after starting antibitoics. Patient pulled IV out while transferring to bedside commode, new IV restarted prior to OR.  Patient completed I& D to right aka and returned to unit with ace wrap in place. Prn dilaudid give for pain. Patient tolerating regular diet. No concerns at this time.     Concerns for physician to address:       Zone phone for oncoming shift:          Activity:  Level of Assistance: Moderate assist, patient does 50-74%  Number times ambulated in hallways past shift: 0  Number of times OOB to chair past shift: 0    Cardiac:   Cardiac Monitoring: Yes      Cardiac Rhythm: Sinus rhythm    Access:  Current line(s): PIV    Genitourinary:   Urinary Status: Voiding, Oliguria    Respiratory:   O2 Device: None (Room air)  Chronic home O2 use?: NO  Incentive spirometer at bedside: YES    GI:  Last BM (including prior to admit): 06/18/25  Current diet:  Diet NPO Exceptions are: Sips of Water with Meds  Passing flatus: YES    Pain Management:   Patient states pain is manageable on current regimen: YES    Skin:  Kennedy Scale Score: 19  Interventions: Wound Offloading (Prevention Methods): Repositioning, Pillows, Turning    Patient Safety:  Fall Risk: Nursing Judgement-Fall Risk High(Add Comments): Yes  Fall Risk Interventions  Nursing Judgement-Fall Risk High(Add Comments): Yes  Toilet Every 2 Hours-In Advance of Need: Yes  Hourly Visual Checks: In bed, Awake, Quiet  Fall Visual Posted: Fall sign posted, Socks  Room Door Open:

## 2025-06-18 NOTE — PROGRESS NOTES
Pharmacy Antimicrobial Kinetic Dosing    Indication for Antimicrobials: Bone and Joint infection (Rt BKA stump)    Current Regimen of Each Antimicrobial:  Vancomycin HD dosing per level; Start Date ; Day 3  Cefepime 1gm q 24; Start ; Day #1  Metronidazole 500mg q 8; Start ; Day #1     Previous Antimicrobial Therapy:  Zosyn  -     Goal Level: Vancomycin trough 15-20    Date Dose & Interval Measured (mcg/mL) Predicted AUC 24-48 Predicted AUC 24,ss                          Significant Cultures:    blood x2. Ng     Labs:  Recent Labs     Units 25  1204 25  0455 25  0432 25  1706   CREATININE mg/dL 3.69* 3.68* 3.28* 3.30*   BUN MG/DL  --  57* 53* 50*   WBC K/uL  --  3.8* 3.4* 5.1     Temp (24hrs), Av °F (36.7 °C), Min:97.7 °F (36.5 °C), Max:98.2 °F (36.8 °C)      Conditions for Dosing Consideration: Hemodialysis MWF    Creatinine Clearance (mL/min): HD MWF PTA    Impression/Plan:   Afebrile, Scr wnl, WBC wnl  Zosyn changed to cefepime/flagyl   Plan to redose Vancomycin 750mg IV after each HD  Plan to order Vancomycin level  with am labs  BMP and CBC daily ordered  Antimicrobial stop date TBD     Pharmacy will follow daily and adjust medications as appropriate for renal function and/or serum levels.    Thank you,  Aimee Spears [facility]

## 2025-06-18 NOTE — OP NOTE
Operative Note      Patient: Keaton Van  YOB: 1965  MRN: [id number]    Date of Procedure: 6/18/2025    Pre-Op Diagnosis Codes:      * Hx of right BKA (HCC) [Z89.511]    Post-Op Diagnosis: Same       Procedure(s):  RIGHT BELOW KNEE AMPUTATION  DEBRIDEMENT WITH GRAFIX PLACEMENT    Surgeon(s):  James Schaeffer MD    Assistant:   Surgical Assistant: Shayy Payne    Anesthesia: Monitor Anesthesia Care    Estimated Blood Loss (mL): Minimal    Complications: None    Specimens:   * No specimens in log *    Implants:  * No implants in log *      Drains: * No LDAs found *    Findings:  Infection Present At Time Of Surgery (PATOS) (choose all levels that have infection present):  No infection present  Other Findings: no deep space infection. Skin much improved on antibiotics. Incision closed primarily and grafix PL placed on BKA stump wound    Detailed Description of Procedure:   The patient was brought into the operating room and prepped and draped in the usual sterile fashion.  An incision was made next to the approximately 2 cm x 2 cm granulating wound at the base of the below-knee amputation.  This was deepened down to subcutaneous tissue with Bovie electrocautery.  This did not probe to any sort of deep collection.  This was then copiously irrigated and closed with vertical mattress 2-0 nylon sutures.  The 2 x 2 cm wound was debrided down to healthy bleeding.  A 4 cm x 3 cm piece of Grafix PL was applied.  Sterile dressings were applied.  All needle and sponge counts were correct at the end the case.  The patient tolerated the procedure well was extubated and taken to PACU in stable condition    Electronically signed by James Schaeffer MD on 6/18/2025 at 1:28 PM

## 2025-06-18 NOTE — PROGRESS NOTES
06/18/25        I have been asked to see this patient by Krista Mejía MD  for advice/opinion re: -----                                                        Assessment:         End-stage kidney disease on hemodialysis-right tunneled hemodialysis catheter-[facility]-Monday Wednesday Friday schedule  Noncompliance-missed dialysis treatment  Right BKA stump cellulitis plus osteomyelitis  Chronic atrial fibrillation on anticoagulant therapy  Hypertension with CKD stage V  Type 2 diabetes mellitus  Peripheral neuropathy Discussion:     Unable to see him today-he went to OR for BKA  Potassium elevated 5.6    Plan:   Defer dialysis today  Treat medically for hyperkalemia-including Lokelma  Will reevaluate tomorrow for need of dialysis and discuss long-term plan with him                 Thanks for consulting me. Renal service will follow patient with you.Please don't hesitate to contact me if any questions arise of if I can assist in any manner      Zee Mann MD  Cell no- [phone]  Available on perfect serve.          Signed By: Zee Mann MD     June 18, 2025         Unable to see him today as he is goe to OR     Family history:  No history of CKD or ESRD in the family.     Allergies   Allergen Reactions    Midazolam Shortness Of Breath     Other reaction(s): Unknown (comments)  Numbness, with shortness of breath  Weakness          Current Facility-Administered Medications   Medication Dose Route Frequency Provider Last Rate Last Admin    cefepime (MAXIPIME) 1,000 mg in sodium chloride 0.9 % 50 mL IVPB (addEASE)  1,000 mg IntraVENous Q24H Quiana Forte, APRN - NP 12.5 mL/hr at 06/18/25 0902 1,000 mg at 06/18/25 0902    metroNIDAZOLE (FLAGYL) 500 mg in 0.9% NaCl 100 mL IVPB premix  500 mg IntraVENous Q8H Quiana Forte APRN - NP   Stopped at 06/18/25 1006

## 2025-06-18 NOTE — PERIOP NOTE
Patient stated that he has DNR paperwork in place. To clarify patients understanding about DNR patient was asked if his heart was to stop would he want CPR or any interventions done. Patient stated no just let me go. Dr. Ingram, anesthesiologist, spoke with patient regarding suspension of DNR while in the operating room. Patient stated he did not want to suspend his DNR and if his heart was to stop in the OR to just let him go peacefully. Paperwork was reviewed with patient again prior to signing and patient stated that he was a DNR and did not want any life saving measures taken if needed. A copy of the patients Advanced Directive/DNR was printed and placed on paper chart.

## 2025-06-18 NOTE — PERIOP NOTE
1000:TRANSFER - IN REPORT:    Verbal report received from Audrey YA on Keaton Van  being received from 3101 for ordered procedure      Report consisted of patient’s Situation, Background, Assessment and   Recommendations(SBAR).     Information from the following report(s) Intake/Output, MAR, Recent Results, and Cardiac Rhythm SR was reviewed with the receiving nurse.    Opportunity for questions and clarification was provided.      Assessment completed upon patient’s arrival to unit and care assume

## 2025-06-18 NOTE — ANESTHESIA POST-OP
TRANSFER - OUT REPORT:    Verbal report given to lucy Davenport RN (name) on Keaton Van  being transferred to [facility](unit) for routine post-op       Report consisted of patient’s Situation, Background, Assessment and   Recommendations(SBAR).     Information from the following report(s) Surgery Report, Intake/Output, MAR, Recent Results, and Cardiac Rhythm SR was reviewed with the receiving nurse.    Opportunity for questions and clarification was provided.      Patient transported with:   Monitor  Registered Nurse  Tech

## 2025-06-18 NOTE — PROGRESS NOTES
End of Shift Note    Bedside shift change report given to BHAKTI Ching (oncoming nurse) by Leanne Patton RN (offgoing nurse).  Report included the following information SBAR, Kardex, MAR, and Recent Results    Shift worked:  1900-0730     Shift summary and any significant changes:     Patient slept well,had two pain episodes that needed dilaudid x 3which was administered with good effect. He is on ACHS blood sugar monitor ,He is NPO for surgery today in the course of the day incision and drainage of the stump.His blood sugar is 227     Concerns for physician to address:       Zone phone for oncoming shift:          Activity:  Level of Assistance: Moderate assist, patient does 50-74%  Number times ambulated in hallways past shift: 0  Number of times OOB to chair past shift: 0    Cardiac:   Cardiac Monitoring: Yes      Cardiac Rhythm: Sinus rhythm    Access:  Current line(s): PIV    Genitourinary:   Urinary Status: Voiding, Oliguria    Respiratory:   O2 Device: None (Room air)  Chronic home O2 use?: NO  Incentive spirometer at bedside: YES    GI:  Last BM (including prior to admit): 06/17/25  Current diet:  Diet NPO Exceptions are: Sips of Water with Meds  Passing flatus: YES    Pain Management:   Patient states pain is manageable on current regimen: YES    Skin:  Kennedy Scale Score: 19  Interventions: Wound Offloading (Prevention Methods): Bed, pressure reduction mattress    Patient Safety:  Fall Risk: Nursing Judgement-Fall Risk High(Add Comments): Yes  Fall Risk Interventions  Nursing Judgement-Fall Risk High(Add Comments): Yes  Toilet Every 2 Hours-In Advance of Need: Yes  Hourly Visual Checks: In bed, Awake, Quiet  Fall Visual Posted: Socks, Fall sign posted  Room Door Open: Deferred to promote rest  Alarm On: Bed  Patient Moved Closer to Nursing Station: No    Active Consults:   IP CONSULT TO PHARMACY  IP CONSULT TO NEPHROLOGY  IP CONSULT TO VASCULAR SURGERY    Length of Stay:  Expected LOS: 4  Actual LOS:

## 2025-06-18 NOTE — PROGRESS NOTES
Hospitalist Progress Note     Demographics    Patient Name  Keaton Van    Date of Birth 1965   Medical Record Number  [id number]      Age  60 y.o.   PCP Robert Wells PA-C   Admit date:  6/16/2025  7:20 PM     Room Number  3101/01  @ [facility]           Admission Diagnoses:  Acute osteomyelitis of lower leg, right (HCC)    Admission Summary:  \" Keaton Van is a 60 y.o.  male with PMHx as listed below presenting to the emergency department at direction of Dr. Schaeffer and vascular surgery with concern for infected dehisced right BKA stump with underlying osteomyelitis planned for I&D on Wednesday per patient.  Patient referred for admission and IV antibiotics preceding operative intervention.  Patient reports worsening appearance of wound with copious seropurulent drainage for the past 1-2 weeks.  Patient denies fever/chills/shakes.  No other symptoms reported outside of significant pain in right leg.  Patient prescribed Coumadin for atrial fibrillation (elected this agent for low cost), but states he has not been taking this since discharge from hospital out of concern that his leg would start bleeding again.  Also reports that he has not attended hemodialysis for the last 2 weeks reporting that he did not feel up to it with the pain in his leg.  Unfortunately, continues to smoke.     In the ED, patient afebrile, heart rate WNL, hypertensive with systolics to 230s, saturating upper 90s on room air.  Right knee radiographs demonstrate subtle cortical irregularity on distal margins of tibia and fibula concerning for early osteomyelitis.  CXR negative for acute process.  CT head negative for acute process.  Labs demonstrate: WBC 5.1, hemoglobin 10.0, platelets 164, sodium 138, potassium 5.1, glucose 135, BUN 50, creatinine 3.30, LFTs grossly unremarkable,-troponin 9, INR 1.1, ESR 64.  Patient given vancomycin and Zosyn and Dilaudid by ED provider.    \"     Assessment and plan:   Infected

## 2025-06-18 NOTE — FLOWSHEET NOTE
06/18/25 1345   Handoff   Communication Given Periop Handoff/Relief   Handoff phase Phase I receiving   Handoff Given To Jean Pierre Harris RN   Handoff Received From Herve Sauceda RN/SUZANNA Andre CRNA/Aamir PEÑA   Handoff Communication Face to Face;At bedside   Time Handoff Given 5191

## 2025-06-18 NOTE — ANESTHESIA PRE PROCEDURE
Alcohol use: No                                Ready to quit: Not Answered  Counseling given: Not Answered      Vital Signs (Current):   Vitals:    06/18/25 0043 06/18/25 0428 06/18/25 0458 06/18/25 0725   BP:    (!) 143/72   Pulse:    57   Resp: 16 14 16 15   Temp:    98.2 °F (36.8 °C)   TempSrc:    Oral   SpO2:    93%   Weight:       Height:                                                  BP Readings from Last 3 Encounters:   06/18/25 (!) 143/72   06/05/25 (!) 175/85   05/12/25 128/61       NPO Status:                                                                                 BMI:   Wt Readings from Last 3 Encounters:   06/17/25 88 kg (194 lb 0.1 oz)   06/05/25 86.2 kg (190 lb)   05/12/25 91.7 kg (202 lb 2.6 oz)     Body mass index is 25.6 kg/m².    CBC:   Lab Results   Component Value Date/Time    WBC 3.8 06/18/2025 04:55 AM    RBC 3.06 06/18/2025 04:55 AM    HGB 9.0 06/18/2025 04:55 AM    HCT 27.6 06/18/2025 04:55 AM    MCV 90.2 06/18/2025 04:55 AM    RDW 13.2 06/18/2025 04:55 AM     06/18/2025 04:55 AM       CMP:   Lab Results   Component Value Date/Time     06/18/2025 04:55 AM    K 5.6 06/18/2025 04:55 AM     06/18/2025 04:55 AM    CO2 21 06/18/2025 04:55 AM    BUN 57 06/18/2025 04:55 AM    CREATININE 3.68 06/18/2025 04:55 AM    GFRAA >60 05/21/2022 04:07 PM    AGRATIO 0.9 01/05/2023 11:24 PM    LABGLOM 18 06/18/2025 04:55 AM    LABGLOM 37 07/20/2023 04:05 AM    LABGLOM 38 01/05/2023 11:24 PM    GLUCOSE 194 06/18/2025 04:55 AM    CALCIUM 8.4 06/18/2025 04:55 AM    BILITOT 0.3 06/16/2025 05:06 PM    ALKPHOS 123 06/16/2025 05:06 PM    ALKPHOS 100 01/05/2023 11:24 PM    AST 16 06/16/2025 05:06 PM    ALT 19 06/16/2025 05:06 PM       POC Tests:   Recent Labs     06/18/25  0636   POCGLU 227*       Coags:   Lab Results   Component Value Date/Time    PROTIME 11.1 06/16/2025 09:53 PM    INR 1.1 06/16/2025 09:53 PM    APTT 25.5 06/16/2024 02:39 AM       HCG (If Applicable): No results found

## 2025-06-19 LAB
ANION GAP SERPL CALC-SCNC: 7 MMOL/L (ref 2–12)
BASOPHILS # BLD: 0 K/UL (ref 0–0.1)
BASOPHILS NFR BLD: 0 % (ref 0–1)
BUN SERPL-MCNC: 54 MG/DL (ref 6–20)
BUN/CREAT SERPL: 14 (ref 12–20)
CALCIUM SERPL-MCNC: 8.6 MG/DL (ref 8.5–10.1)
CHLORIDE SERPL-SCNC: 108 MMOL/L (ref 97–108)
CO2 SERPL-SCNC: 19 MMOL/L (ref 21–32)
CREAT SERPL-MCNC: 3.73 MG/DL (ref 0.7–1.3)
DIFFERENTIAL METHOD BLD: ABNORMAL
EOSINOPHIL # BLD: 0 K/UL (ref 0–0.4)
EOSINOPHIL NFR BLD: 0 % (ref 0–7)
ERYTHROCYTE [DISTWIDTH] IN BLOOD BY AUTOMATED COUNT: 12.8 % (ref 11.5–14.5)
GLUCOSE BLD STRIP.AUTO-MCNC: 240 MG/DL (ref 65–117)
GLUCOSE BLD STRIP.AUTO-MCNC: 249 MG/DL (ref 65–117)
GLUCOSE BLD STRIP.AUTO-MCNC: 260 MG/DL (ref 65–117)
GLUCOSE BLD STRIP.AUTO-MCNC: 274 MG/DL (ref 65–117)
GLUCOSE SERPL-MCNC: 219 MG/DL (ref 65–100)
HCT VFR BLD AUTO: 26.7 % (ref 36.6–50.3)
HGB BLD-MCNC: 8.7 G/DL (ref 12.1–17)
IMM GRANULOCYTES # BLD AUTO: 0.01 K/UL (ref 0–0.04)
IMM GRANULOCYTES NFR BLD AUTO: 0.4 % (ref 0–0.5)
LYMPHOCYTES # BLD: 0.18 K/UL (ref 0.8–3.5)
LYMPHOCYTES NFR BLD: 6.5 % (ref 12–49)
MCH RBC QN AUTO: 29.3 PG (ref 26–34)
MCHC RBC AUTO-ENTMCNC: 32.6 G/DL (ref 30–36.5)
MCV RBC AUTO: 89.9 FL (ref 80–99)
MONOCYTES # BLD: 0.04 K/UL (ref 0–1)
MONOCYTES NFR BLD: 1.4 % (ref 5–13)
NEUTS SEG # BLD: 2.57 K/UL (ref 1.8–8)
NEUTS SEG NFR BLD: 91.7 % (ref 32–75)
NRBC # BLD: 0 K/UL (ref 0–0.01)
NRBC BLD-RTO: 0 PER 100 WBC
PLATELET # BLD AUTO: 114 K/UL (ref 150–400)
PMV BLD AUTO: 10.3 FL (ref 8.9–12.9)
POTASSIUM SERPL-SCNC: 6.2 MMOL/L (ref 3.5–5.1)
RBC # BLD AUTO: 2.97 M/UL (ref 4.1–5.7)
RBC MORPH BLD: ABNORMAL
SERVICE CMNT-IMP: ABNORMAL
SODIUM SERPL-SCNC: 134 MMOL/L (ref 136–145)
WBC # BLD AUTO: 2.8 K/UL (ref 4.1–11.1)

## 2025-06-19 PROCEDURE — 6360000002 HC RX W HCPCS: Performed by: SURGERY

## 2025-06-19 PROCEDURE — 80048 BASIC METABOLIC PNL TOTAL CA: CPT

## 2025-06-19 PROCEDURE — 6370000000 HC RX 637 (ALT 250 FOR IP): Performed by: NURSE PRACTITIONER

## 2025-06-19 PROCEDURE — 36415 COLL VENOUS BLD VENIPUNCTURE: CPT

## 2025-06-19 PROCEDURE — 94760 N-INVAS EAR/PLS OXIMETRY 1: CPT

## 2025-06-19 PROCEDURE — 1100000000 HC RM PRIVATE

## 2025-06-19 PROCEDURE — 6370000000 HC RX 637 (ALT 250 FOR IP): Performed by: SURGERY

## 2025-06-19 PROCEDURE — 97161 PT EVAL LOW COMPLEX 20 MIN: CPT | Performed by: PHYSICAL THERAPIST

## 2025-06-19 PROCEDURE — 97165 OT EVAL LOW COMPLEX 30 MIN: CPT | Performed by: OCCUPATIONAL THERAPIST

## 2025-06-19 PROCEDURE — 97535 SELF CARE MNGMENT TRAINING: CPT | Performed by: OCCUPATIONAL THERAPIST

## 2025-06-19 PROCEDURE — 82962 GLUCOSE BLOOD TEST: CPT

## 2025-06-19 PROCEDURE — 2580000003 HC RX 258: Performed by: SURGERY

## 2025-06-19 PROCEDURE — 85025 COMPLETE CBC W/AUTO DIFF WBC: CPT

## 2025-06-19 PROCEDURE — 6370000000 HC RX 637 (ALT 250 FOR IP): Performed by: INTERNAL MEDICINE

## 2025-06-19 PROCEDURE — 97116 GAIT TRAINING THERAPY: CPT | Performed by: PHYSICAL THERAPIST

## 2025-06-19 RX ORDER — WARFARIN SODIUM 5 MG/1
5 TABLET ORAL
Status: COMPLETED | OUTPATIENT
Start: 2025-06-19 | End: 2025-06-19

## 2025-06-19 RX ORDER — WARFARIN SODIUM 2 MG/1
4 TABLET ORAL
Status: DISCONTINUED | OUTPATIENT
Start: 2025-06-19 | End: 2025-06-19

## 2025-06-19 RX ORDER — LACTOBACILLUS RHAMNOSUS GG 10B CELL
1 CAPSULE ORAL DAILY
Status: DISCONTINUED | OUTPATIENT
Start: 2025-06-19 | End: 2025-06-28 | Stop reason: HOSPADM

## 2025-06-19 RX ADMIN — INSULIN LISPRO 2 UNITS: 100 INJECTION, SOLUTION INTRAVENOUS; SUBCUTANEOUS at 18:46

## 2025-06-19 RX ADMIN — METRONIDAZOLE 500 MG: 500 INJECTION, SOLUTION INTRAVENOUS at 08:16

## 2025-06-19 RX ADMIN — HYDROMORPHONE HYDROCHLORIDE 0.5 MG: 1 INJECTION, SOLUTION INTRAMUSCULAR; INTRAVENOUS; SUBCUTANEOUS at 16:50

## 2025-06-19 RX ADMIN — HYDROMORPHONE HYDROCHLORIDE 0.5 MG: 1 INJECTION, SOLUTION INTRAMUSCULAR; INTRAVENOUS; SUBCUTANEOUS at 08:07

## 2025-06-19 RX ADMIN — ACETAMINOPHEN 650 MG: 325 TABLET ORAL at 06:32

## 2025-06-19 RX ADMIN — WARFARIN SODIUM 5 MG: 5 TABLET ORAL at 18:46

## 2025-06-19 RX ADMIN — CEFEPIME 1000 MG: 1 INJECTION, POWDER, FOR SOLUTION INTRAMUSCULAR; INTRAVENOUS at 08:15

## 2025-06-19 RX ADMIN — LORAZEPAM 1 MG: 1 TABLET ORAL at 18:46

## 2025-06-19 RX ADMIN — INSULIN LISPRO 2 UNITS: 100 INJECTION, SOLUTION INTRAVENOUS; SUBCUTANEOUS at 21:22

## 2025-06-19 RX ADMIN — GABAPENTIN 300 MG: 300 CAPSULE ORAL at 21:22

## 2025-06-19 RX ADMIN — METRONIDAZOLE 500 MG: 500 INJECTION, SOLUTION INTRAVENOUS at 16:54

## 2025-06-19 RX ADMIN — SODIUM ZIRCONIUM CYCLOSILICATE 10 G: 10 POWDER, FOR SUSPENSION ORAL at 12:23

## 2025-06-19 RX ADMIN — HYDROMORPHONE HYDROCHLORIDE 0.5 MG: 1 INJECTION, SOLUTION INTRAMUSCULAR; INTRAVENOUS; SUBCUTANEOUS at 12:22

## 2025-06-19 RX ADMIN — ACETAMINOPHEN 650 MG: 325 TABLET ORAL at 12:25

## 2025-06-19 RX ADMIN — QUETIAPINE FUMARATE 100 MG: 100 TABLET ORAL at 21:22

## 2025-06-19 RX ADMIN — PANTOPRAZOLE SODIUM 40 MG: 40 TABLET, DELAYED RELEASE ORAL at 06:32

## 2025-06-19 RX ADMIN — INSULIN LISPRO 4 UNITS: 100 INJECTION, SOLUTION INTRAVENOUS; SUBCUTANEOUS at 12:23

## 2025-06-19 RX ADMIN — DILTIAZEM HYDROCHLORIDE 120 MG: 120 CAPSULE, EXTENDED RELEASE ORAL at 08:16

## 2025-06-19 RX ADMIN — Medication 1 CAPSULE: at 12:22

## 2025-06-19 RX ADMIN — HYDROMORPHONE HYDROCHLORIDE 0.5 MG: 1 INJECTION, SOLUTION INTRAMUSCULAR; INTRAVENOUS; SUBCUTANEOUS at 21:27

## 2025-06-19 RX ADMIN — INSULIN LISPRO 4 UNITS: 100 INJECTION, SOLUTION INTRAVENOUS; SUBCUTANEOUS at 08:17

## 2025-06-19 RX ADMIN — HYDROMORPHONE HYDROCHLORIDE 0.5 MG: 1 INJECTION, SOLUTION INTRAMUSCULAR; INTRAVENOUS; SUBCUTANEOUS at 03:57

## 2025-06-19 ASSESSMENT — PAIN - FUNCTIONAL ASSESSMENT
PAIN_FUNCTIONAL_ASSESSMENT: ACTIVITIES ARE NOT PREVENTED
PAIN_FUNCTIONAL_ASSESSMENT: PREVENTS OR INTERFERES SOME ACTIVE ACTIVITIES AND ADLS

## 2025-06-19 ASSESSMENT — PAIN DESCRIPTION - ORIENTATION
ORIENTATION: RIGHT

## 2025-06-19 ASSESSMENT — PAIN DESCRIPTION - LOCATION
LOCATION: LEG
LOCATION: ABDOMEN
LOCATION: LEG

## 2025-06-19 ASSESSMENT — PAIN SCALES - GENERAL
PAINLEVEL_OUTOF10: 5
PAINLEVEL_OUTOF10: 7
PAINLEVEL_OUTOF10: 8
PAINLEVEL_OUTOF10: 5
PAINLEVEL_OUTOF10: 5
PAINLEVEL_OUTOF10: 9
PAINLEVEL_OUTOF10: 8
PAINLEVEL_OUTOF10: 8
PAINLEVEL_OUTOF10: 5
PAINLEVEL_OUTOF10: 8
PAINLEVEL_OUTOF10: 8

## 2025-06-19 ASSESSMENT — PAIN DESCRIPTION - DESCRIPTORS
DESCRIPTORS: THROBBING;SHARP;STABBING
DESCRIPTORS: ACHING;BURNING
DESCRIPTORS: ACHING
DESCRIPTORS: ACHING
DESCRIPTORS: ACHING;BURNING

## 2025-06-19 NOTE — PROGRESS NOTES
Pharmacist Daily Dosing of Warfarin    Indication & Goal INR: AFib, Goal INR 2-3    PTA Warfarin Dose: 6mg daily     Notable Concurrent Conditions and Medications:   Drug Interactions: Metronidazole  Conditions: None    Recent Labs     Units 06/19/25  0458 06/18/25  0455 06/17/25  0432 06/16/25  2153 06/16/25  1706   INR    --   --   --  1.1  --    HGB g/dL 8.7* 9.0* 8.7*  --  10.0*   HCT % 26.7* 27.6* 26.2*  --  30.3*   PLT K/uL 114* 136* 124*  --  164   AST U/L  --   --   --   --  16   ALT U/L  --   --   --   --  19     Impression/Plan:   Ordered warfarin 5 mg PO x 1 dose today - reduced PTA 6mg dose slightly due to interaction with metronidazole   Bridge Therapy: Not indicated   Daily INR has been ordered  CBC w/o differential every other day has been ordered     Pharmacy will follow daily and adjust the dose as appropriate.    Thank you,  Aimee Spears [facility]      Warfarin Protocol    Located on pharmacy Teams site: Clinical Practice -> Anticoagulation & Cardiology -> Anticoagulation Policies, Protocols, Guidance

## 2025-06-19 NOTE — PROGRESS NOTES
Vascular Surgery Progress Note  Quiana Forte ACNP-BC      Date:2025       Room:[address] [place] [address]  Patient Name:Keaton Van     YOB: 1965     Age:60 y.o.    Subjective      Mr. Keaton Van is a 60 y.o. male with a pmhx significant for diabetes mellitus, hypertension, heart failure, atrial fibrillation, end-stage renal disease, bipolar disorder, hepatitis C, and polysubstance abuse.  He is a current smoker.  He has not been taking his warfarin due to bleeding.  His podiatrist is Dr. Michael Gerber.  He has a history of creation of a percutaneous AVF on 24.      He was recently admitted to the hospital with a bleeding stump wound.  He is s/p right BKA on 24 secondary to a non-healing diabetic wound that has been presented for nearly a year. He is now readmitted to the hospital with an infected stump. He is s/p surgical debridement with Grafix placement on .    This am he complains of pain.  Overnight he hit his stump on the bed rail.  This a.m. his dressing is dry and intact.    Objective           Vitals Last 24 Hours:  TEMPERATURE:  Temp  Av.8 °F (36.6 °C)  Min: 97.3 °F (36.3 °C)  Max: 98.2 °F (36.8 °C)  RESPIRATIONS RANGE: Resp  Avg: 15.7  Min: 12  Max: 18  PULSE OXIMETRY RANGE: SpO2  Av.5 %  Min: 93 %  Max: 100 %  PULSE RANGE: Pulse  Av.1  Min: 65  Max: 124  BLOOD PRESSURE RANGE: Systolic (24hrs), Av , Min:156 , Max:194   ; Diastolic (24hrs), Av, Min:69, Max:89    I/O (24Hr):    Intake/Output Summary (Last 24 hours) at 2025 1029  Last data filed at 2025 1342  Gross per 24 hour   Intake 300 ml   Output 2 ml   Net 298 ml     Objective:  General Appearance:  Comfortable.    Vital signs: (most recent): Blood pressure (!) 160/80, pulse 67, temperature 97.7 °F (36.5 °C), resp. rate 18, height 1.854 m (6' 1\"), weight 88 kg (194 lb 0.1 oz), SpO2 94%.  Vital signs are normal.  No fever.    Lungs:  Normal effort and normal respiratory rate.    Heart: Normal rate.

## 2025-06-19 NOTE — PROGRESS NOTES
End of Shift Note    Bedside shift change report given to BHAKTI Ching (oncoming nurse) by Ivonne Ortiz RN (offgoing nurse).  Report included the following information SBAR, Intake/Output, MAR, and Recent Results    Shift worked:  7pm-7am     Shift summary and any significant changes:    Patient's blood sugar was elevated to 469mg/dl last night. Administered 8units of insulin per sliding scale as per MAR. Provider also ordered an additional 8units, which was given.   Latest BG this morning is 260mg/dl.  PRN dilaudid given 2x for pain relief. Dressing on R BKA stump remains CDI.   Patient also had a bowel movement x2  K 6.2 this morning, notified provider   Otherwise, pt's stable and no other concerns reported during the night.   Concerns for physician to address: K 6.2    Zone phone for oncoming shift:       Activity:  Level of Assistance: Moderate assist, patient does 50-74%  Number times ambulated in hallways past shift: 0  Number of times OOB to chair past shift: 0    Cardiac:   Cardiac Monitoring: Yes      Cardiac Rhythm: Sinus rhythm    Access:  Current line(s): PIV     Genitourinary:   Urinary Status: Oliguria, Voiding    Respiratory:   O2 Device: None (Room air)  Chronic home O2 use?: NO  Incentive spirometer at bedside: YES    GI:  Last BM (including prior to admit): 06/18/25  Current diet:  ADULT DIET; Regular; 3 carb choices (45 gm/meal)  DIET ONE TIME MESSAGE;  Passing flatus: YES    Pain Management:   Patient states pain is manageable on current regimen: YES    Skin:  Kennedy Scale Score: 21  Interventions: Wound Offloading (Prevention Methods): Repositioning, Pillows, Turning    Patient Safety:  Fall Risk: Nursing Judgement-Fall Risk High(Add Comments): Yes  Fall Risk Interventions  Nursing Judgement-Fall Risk High(Add Comments): Yes  Toilet Every 2 Hours-In Advance of Need: Yes  Hourly Visual Checks: In bed, Awake, Quiet  Fall Visual Posted: Fall sign posted, [place]  Room Door Open: Deferred to

## 2025-06-19 NOTE — PROGRESS NOTES
06/19/25        I have been asked to see this patient by Krista Mejía MD  for advice/opinion re: -----                                                        Assessment:         End-stage kidney disease on hemodialysis-right tunneled hemodialysis catheter-[facility]-Monday Wednesday Friday schedule  Noncompliance-missed dialysis treatment  Hyperkalemia  Right BKA stump cellulitis plus osteomyelitis  Chronic atrial fibrillation on anticoagulant therapy  Hypertension with CKD stage V  Type 2 diabetes mellitus  Peripheral neuropathy Discussion:     K is 6.2  Na 134  Creatinine 3.73  Offered dialysis but he does not want it. I will respect his wishes.     Plan:   Treat with Lokelma  He does not want dialysis                  Thanks for consulting me. Renal service will follow patient with you.Please don't hesitate to contact me if any questions arise of if I can assist in any manner      Zee Mann MD  Cell no- [phone]  Available on perfect serve.          Signed By: Zee Mann MD     June 19, 2025         Unable to see him today as he is goe to OR     Family history:  No history of CKD or ESRD in the family.     Allergies   Allergen Reactions   • Midazolam Shortness Of Breath     Other reaction(s): Unknown (comments)  Numbness, with shortness of breath  Weakness          Current Facility-Administered Medications   Medication Dose Route Frequency Provider Last Rate Last Admin   • lactobacillus (CULTURELLE) capsule 1 capsule  1 capsule Oral Daily Krista Mejía MD   1 capsule at 06/19/25 1222   • cefepime (MAXIPIME) 1,000 mg in sodium chloride 0.9 % 50 mL IVPB (addEASE)  1,000 mg IntraVENous Q24H James Schaeffer MD   Stopped at 06/19/25 1226   • metroNIDAZOLE (FLAGYL) 500 mg in 0.9% NaCl 100 mL IVPB premix  500 mg IntraVENous Q8H James Schaeffer MD      Intensivist     Hospitalist x        Comments   >50% of visit spent in counseling and coordination of care         This dictation was done by dragon, computer voice recognition software.  Often unanticipated grammatical, syntax, phones and other interpretive errors are inadvertently transcribed.  Please excuse errors that have escaped final proofreading. Please contact me if you suspect dictation or transcription errors.      Signed By: Zee Mann MD     June 19, 2025       Dr Zee Mann    Office No- [phone]  [place] Office  [address] [facility]  [address] [place] [id number]    Fax: [phone]

## 2025-06-19 NOTE — PLAN OF CARE
Problem: Physical Therapy - Adult  Goal: By Discharge: Performs mobility at highest level of function for planned discharge setting.  See evaluation for individualized goals.  Description: FUNCTIONAL STATUS PRIOR TO ADMISSION: Patient was modified independent using a rolling walker for functional mobility. Has been using RW for ambulation since last admission secondary to inability to laine prosthesis.    HOME SUPPORT PRIOR TO ADMISSION: The patient lived with sister who assisted as needed. Per chart, sister is unable to provide assistance for patient following discharge.    Physical Therapy Goals  Initiated 6/19/2025  1.  Patient will move from supine to sit and sit to supine , scoot up and down, and roll side to side in bed with independence within 7 day(s).    2.  Patient will transfer from bed to chair and chair to bed with modified independence using the least restrictive device within 7 day(s).  3.  Patient will perform sit to stand with modified independence within 7 day(s).  4.  Patient will ambulate with modified independence for 100 feet with the least restrictive device within 7 day(s).   5.  Patient will ascend/descend 14 stairs with 1 handrail(s) with supervision/set-up within 7 day(s).     PHYSICAL THERAPY EVALUATION    Patient: Keaton Van (60 y.o. male)  Date: 6/19/2025  Primary Diagnosis: Acute osteomyelitis of right lower leg (HCC) [M86.161]  Hypertensive urgency [I16.0]  Acute osteomyelitis of lower leg, right (HCC) [M86.161]  Procedure(s) (LRB):  RIGHT BELOW KNEE AMPUTATION  DEBRIDEMENT WITH GRAFIX PLACEMENT (Right) 1 Day Post-Op   Precautions:  falls            ASSESSMENT :   Patient seen for PT evaluation following s/p R BKA debridement with grafix placement yesterday. Patient in bed upon arrival and agreeable to therapy. He present with mildly impaired functional mobility, impaired balance and endurance. Patient able to complete bed mobility with supervision and is able to complete transfers  performed by James Schaeffer MD at [facility] MAIN OR    ORTHOPEDIC SURGERY  08/2018    right hand    ORTHOPEDIC SURGERY      left knee arthroscopy    ORTHOPEDIC SURGERY      chronic back pain    LA UNLISTED PROCEDURE CARDIAC SURGERY      cardiac stents X2 lad drug eluting    REMV KIDNEY,COMPLICATED  2006    side unknown - kidney stones    TOE AMPUTATION Right 06/01/2024    RIGHT PARTIAL FIFTH RAY AMPUTATION performed by Michael Jama DPM at [facility] MAIN OR    UPPER GI ENDOSCOPY,BIOPSY  8/31/2016            Home Situation:  Social/Functional History  Lives With: Family  Type of Home: House  Home Layout: Two level  Entrance Stairs - Number of Steps: 4  Home Equipment: Walker - Rolling  Has the patient had two or more falls in the past year or any fall with injury in the past year?:  (pt reports several near falls especially when turning)  Prior Level of Assist for ADLs: Independent  Prior Level of Assist for Homemaking: Independent  Prior Level of Assist for Ambulation: Independent household ambulator, with or without device  Prior Level of Assist for Transfers: Independent  Active : No    Cognitive/Behavioral Status:   Patient is alert and oriented x 4       Skin: R residual limb dressing is dry and intact          Strength:    Strength: Generally decreased, functional    Tone & Sensation:   Tone: Normal  Sensation: Impaired (pt reports neuropathy in L LE and B hands)    Coordination:  Coordination: Within functional limits    Range Of Motion:  AROM: Within functional limits       Functional Mobility:  Bed Mobility:     Bed Mobility Training  Bed Mobility Training: Yes  Rolling: Independent  Supine to Sit: Supervision  Scooting: Supervision  Transfers:     Transfer Training  Transfer Training: Yes  Interventions: Safety awareness training;Verbal cues  Sit to Stand: Contact guard assistance  Stand to Sit: Contact guard assistance  Balance:               Balance  Sitting: Intact  Standing: Impaired  Standing -

## 2025-06-19 NOTE — PROGRESS NOTES
Patient took ACE wrap to Right BKA off and declined replacement dressing despite education. Patient allowed foam dressing to be allowed with encouragement.

## 2025-06-19 NOTE — PROGRESS NOTES
Pharmacist Daily Dosing of Warfarin    Indication & Goal INR: AFib, Goal INR 2-3    PTA Warfarin Dose: 6mg daily     Notable Concurrent Conditions and Medications:   Drug Interactions: Metronidazole  Conditions: None    Recent Labs     Units 06/19/25  0458 06/18/25  0455 06/17/25  0432 06/16/25  2153 06/16/25  1706   INR    --   --   --  1.1  --    HGB g/dL 8.7* 9.0* 8.7*  --  10.0*   HCT % 26.7* 27.6* 26.2*  --  30.3*   PLT K/uL 114* 136* 124*  --  164   AST U/L  --   --   --   --  16   ALT U/L  --   --   --   --  19     Impression/Plan:   Ordered warfarin 4 mg PO x 1 dose today - reduced PTA 6mg dose by 30% due to interaction with metronidazole   Bridge Therapy: Not indicated   Daily INR has been ordered  CBC w/o differential every other day has been ordered     Pharmacy will follow daily and adjust the dose as appropriate.    Thank you,  Aimee Spears [facility]      Warfarin Protocol    Located on pharmacy Teams site: Clinical Practice -> Anticoagulation & Cardiology -> Anticoagulation Policies, Protocols, Guidance

## 2025-06-19 NOTE — PLAN OF CARE
Problem: Occupational Therapy - Adult  Goal: By Discharge: Performs self-care activities at highest level of function for planned discharge setting.  See evaluation for individualized goals.  Description: FUNCTIONAL STATUS PRIOR TO ADMISSION:  has prosthesis and has been unable to use prosthesis recently due to wound, was ambulatory on his own to second floor of his sister's home, ambulated with rolling walker, performed ADLS on her own   , Prior Level of Assist for ADLs: Independent,  ,  ,  ,  ,  , Prior Level of Assist for Homemaking: Independent,  , Prior Level of Assist for Transfers: Independent, Active : No     HOME SUPPORT: Patient lived with sister whom provided assist as needed. Per chart review pts sister will not be able to assist at discharge.     Occupational Therapy Goals:  Initiated 6/19/2025  1.  Patient will perform grooming standing without prosthesis with Modified [place] within 7 day(s).  2.  Patient will perform lower body dressing with Modified [place] within 7 day(s).  3.  Patient will perform toileting with Modified [place] within 7 day(s).  4.  Patient will perform toilet transfers with Modified [place]  within 7 day(s).  5.  Patient will perform sponge bathing with Modified [place] within 7 days  Outcome: Progressing  OCCUPATIONAL THERAPY EVALUATION    Patient: Keaton Van (60 y.o. male)  Date: 6/19/2025  Primary Diagnosis: Acute osteomyelitis of right lower leg (HCC) [M86.161]  Hypertensive urgency [I16.0]  Acute osteomyelitis of lower leg, right (HCC) [M86.161]  Procedure(s) (LRB):  RIGHT BELOW KNEE AMPUTATION  DEBRIDEMENT WITH GRAFIX PLACEMENT (Right) 1 Day Post-Op     Precautions: Bed Alarm, Fall Risk, Skin, Weight Bearing (no BP or sticks LUE, DNR) Right Lower Extremity Weight Bearing: Non Weight Bearing                ASSESSMENT :  The patient is limited by decreased functional mobility, independence in ADLs, high-level IADLs, activity tolerance,

## 2025-06-19 NOTE — PROGRESS NOTES
End of Shift Note    Bedside shift change report given to Lesia(oncoming nurse) by Jeane Maldonado RN (offgoing nurse).  Report included the following information SBAR, Kardex, and Procedure Summary    Shift worked:  7a-7pm     Shift summary and any significant changes:     Patient is Aox4. Patient reports 101/10 right BKA pain with prn dilaudid given x. Patient declines non pharmeutical interventions stating \" nothing helps.\" Patient is tolerating diet, but has loose Bms due to antibiotic, probiotic added to medications. Patient ambulates with walker independently in room and declines bed alarm despite education. Patients potassium continues to trend up- MD notified. No other concerns or reports at this time.     Concerns for physician to address:  Labs, DNR     Zone phone for oncoming shift:          Activity:  Level of Assistance: Independent  Number times ambulated in hallways past shift: 1  Number of times OOB to chair past shift: 1    Cardiac:   Cardiac Monitoring: Yes      Cardiac Rhythm: Sinus rhythm    Access:  Current line(s): PIV    Genitourinary:   Urinary Status: Has not voided, Oliguria    Respiratory:   O2 Device: None (Room air)  Chronic home O2 use?: NO  Incentive spirometer at bedside: YES    GI:  Last BM (including prior to admit): 06/19/25  Current diet:  ADULT DIET; Regular; 3 carb choices (45 gm/meal)  DIET ONE TIME MESSAGE;  Passing flatus: YES    Pain Management:   Patient states pain is manageable on current regimen: YES    Skin:  Kennedy Scale Score: 21  Interventions: Wound Offloading (Prevention Methods): Pillows, Repositioning, Turning    Patient Safety:  Fall Risk: Nursing Judgement-Fall Risk High(Add Comments): Yes  Fall Risk Interventions  Nursing Judgement-Fall Risk High(Add Comments): Yes  Toilet Every 2 Hours-In Advance of Need: Yes  Hourly Visual Checks: In bed, Awake  Fall Visual Posted: Fall sign posted  Room Door Open: Yes  Alarm On: Other (Comment) (refuses)  Patient Moved

## 2025-06-19 NOTE — PLAN OF CARE
Problem: Chronic Conditions and Co-morbidities  Goal: Patient's chronic conditions and co-morbidity symptoms are monitored and maintained or improved  6/18/2025 2056 by Ivonne Ortiz, RN  Outcome: Progressing  6/18/2025 1635 by Jeane Maldonado, RN  Outcome: Progressing  Flowsheets (Taken 6/18/2025 0902)  Care Plan - Patient's Chronic Conditions and Co-Morbidity Symptoms are Monitored and Maintained or Improved: Monitor and assess patient's chronic conditions and comorbid symptoms for stability, deterioration, or improvement     Problem: Pain  Goal: Verbalizes/displays adequate comfort level or baseline comfort level  Outcome: Progressing     Problem: ABCDS Injury Assessment  Goal: Absence of physical injury  Outcome: Progressing     Problem: Safety - Adult  Goal: Free from fall injury  Outcome: Progressing

## 2025-06-19 NOTE — ANESTHESIA POSTPROCEDURE EVALUATION
Department of Anesthesiology  Postprocedure Note    Patient: Keaton Van  MRN: [id number]  YOB: 1965  Date of evaluation: 6/19/2025    Procedure Summary       Date: 06/18/25 [facility] / [facility] MAIN OR    Anesthesia Start: 1236 Anesthesia Stop: 1345    Procedure: RIGHT BELOW KNEE AMPUTATION  DEBRIDEMENT WITH GRAFIX PLACEMENT (Right: Leg Lower) Diagnosis:       Hx of right BKA (HCC)      (Hx of right BKA (HCC) [Z89.511])    Providers: James Schaeffer MD Responsible Provider: Chas Ingram MD    Anesthesia Type: General ASA Status: 4            Anesthesia Type: General    Agustin Phase I: Agustin Score: 10    Agustin Phase II:      Anesthesia Post Evaluation    Patient location during evaluation: PACU  Patient participation: complete - patient participated  Level of consciousness: awake and alert  Airway patency: patent  Nausea & Vomiting: no nausea  Cardiovascular status: hemodynamically stable  Respiratory status: acceptable  Hydration status: euvolemic  Multimodal analgesia pain management approach  Pain management: adequate        No notable events documented.

## 2025-06-20 LAB
ANION GAP SERPL CALC-SCNC: 8 MMOL/L (ref 2–12)
BASOPHILS # BLD: 0.01 K/UL (ref 0–0.1)
BASOPHILS NFR BLD: 0.2 % (ref 0–1)
BUN SERPL-MCNC: 59 MG/DL (ref 6–20)
BUN/CREAT SERPL: 15 (ref 12–20)
CALCIUM SERPL-MCNC: 8.2 MG/DL (ref 8.5–10.1)
CHLORIDE SERPL-SCNC: 110 MMOL/L (ref 97–108)
CO2 SERPL-SCNC: 19 MMOL/L (ref 21–32)
CREAT SERPL-MCNC: 3.93 MG/DL (ref 0.7–1.3)
DIFFERENTIAL METHOD BLD: ABNORMAL
EOSINOPHIL # BLD: 0.08 K/UL (ref 0–0.4)
EOSINOPHIL NFR BLD: 1.6 % (ref 0–7)
ERYTHROCYTE [DISTWIDTH] IN BLOOD BY AUTOMATED COUNT: 13.2 % (ref 11.5–14.5)
GLUCOSE BLD STRIP.AUTO-MCNC: 179 MG/DL (ref 65–117)
GLUCOSE BLD STRIP.AUTO-MCNC: 197 MG/DL (ref 65–117)
GLUCOSE BLD STRIP.AUTO-MCNC: 201 MG/DL (ref 65–117)
GLUCOSE BLD STRIP.AUTO-MCNC: 233 MG/DL (ref 65–117)
GLUCOSE BLD STRIP.AUTO-MCNC: 241 MG/DL (ref 65–117)
GLUCOSE SERPL-MCNC: 190 MG/DL (ref 65–100)
HCT VFR BLD AUTO: 25.4 % (ref 36.6–50.3)
HGB BLD-MCNC: 8.2 G/DL (ref 12.1–17)
IMM GRANULOCYTES # BLD AUTO: 0.03 K/UL (ref 0–0.04)
IMM GRANULOCYTES NFR BLD AUTO: 0.6 % (ref 0–0.5)
INR PPP: 1 (ref 0.9–1.1)
LYMPHOCYTES # BLD: 0.62 K/UL (ref 0.8–3.5)
LYMPHOCYTES NFR BLD: 12.7 % (ref 12–49)
MCH RBC QN AUTO: 29.4 PG (ref 26–34)
MCHC RBC AUTO-ENTMCNC: 32.3 G/DL (ref 30–36.5)
MCV RBC AUTO: 91 FL (ref 80–99)
MONOCYTES # BLD: 0.28 K/UL (ref 0–1)
MONOCYTES NFR BLD: 5.7 % (ref 5–13)
NEUTS SEG # BLD: 3.86 K/UL (ref 1.8–8)
NEUTS SEG NFR BLD: 79.2 % (ref 32–75)
NRBC # BLD: 0 K/UL (ref 0–0.01)
NRBC BLD-RTO: 0 PER 100 WBC
PLATELET # BLD AUTO: 112 K/UL (ref 150–400)
PMV BLD AUTO: 10.2 FL (ref 8.9–12.9)
POTASSIUM SERPL-SCNC: 5.2 MMOL/L (ref 3.5–5.1)
PROTHROMBIN TIME: 11.1 SEC (ref 9.2–11.2)
RBC # BLD AUTO: 2.79 M/UL (ref 4.1–5.7)
SERVICE CMNT-IMP: ABNORMAL
SODIUM SERPL-SCNC: 137 MMOL/L (ref 136–145)
VANCOMYCIN SERPL-MCNC: 10.6 UG/ML
WBC # BLD AUTO: 4.9 K/UL (ref 4.1–11.1)

## 2025-06-20 PROCEDURE — 82962 GLUCOSE BLOOD TEST: CPT

## 2025-06-20 PROCEDURE — 6360000002 HC RX W HCPCS: Performed by: INTERNAL MEDICINE

## 2025-06-20 PROCEDURE — 36415 COLL VENOUS BLD VENIPUNCTURE: CPT

## 2025-06-20 PROCEDURE — 80048 BASIC METABOLIC PNL TOTAL CA: CPT

## 2025-06-20 PROCEDURE — 6360000002 HC RX W HCPCS: Performed by: SURGERY

## 2025-06-20 PROCEDURE — 2580000003 HC RX 258: Performed by: SURGERY

## 2025-06-20 PROCEDURE — 6370000000 HC RX 637 (ALT 250 FOR IP): Performed by: INTERNAL MEDICINE

## 2025-06-20 PROCEDURE — 85025 COMPLETE CBC W/AUTO DIFF WBC: CPT

## 2025-06-20 PROCEDURE — 6370000000 HC RX 637 (ALT 250 FOR IP): Performed by: SURGERY

## 2025-06-20 PROCEDURE — 2580000003 HC RX 258: Performed by: INTERNAL MEDICINE

## 2025-06-20 PROCEDURE — 85610 PROTHROMBIN TIME: CPT

## 2025-06-20 PROCEDURE — 2500000003 HC RX 250 WO HCPCS: Performed by: SURGERY

## 2025-06-20 PROCEDURE — 1100000000 HC RM PRIVATE

## 2025-06-20 PROCEDURE — 80202 ASSAY OF VANCOMYCIN: CPT

## 2025-06-20 RX ORDER — WARFARIN SODIUM 5 MG/1
5 TABLET ORAL
Status: COMPLETED | OUTPATIENT
Start: 2025-06-20 | End: 2025-06-20

## 2025-06-20 RX ORDER — VANCOMYCIN 1.75 G/350ML
1250 INJECTION, SOLUTION INTRAVENOUS ONCE
Status: DISCONTINUED | OUTPATIENT
Start: 2025-06-20 | End: 2025-06-20

## 2025-06-20 RX ORDER — L.ACID/L.CASEI/B.BIF/B.LON/FOS 2B CELL-50
1 CAPSULE ORAL DAILY
Status: DISCONTINUED | OUTPATIENT
Start: 2025-06-20 | End: 2025-06-20 | Stop reason: ALTCHOICE

## 2025-06-20 RX ADMIN — WARFARIN SODIUM 5 MG: 5 TABLET ORAL at 17:13

## 2025-06-20 RX ADMIN — METRONIDAZOLE 500 MG: 500 INJECTION, SOLUTION INTRAVENOUS at 16:53

## 2025-06-20 RX ADMIN — HYDROMORPHONE HYDROCHLORIDE 0.5 MG: 1 INJECTION, SOLUTION INTRAMUSCULAR; INTRAVENOUS; SUBCUTANEOUS at 06:14

## 2025-06-20 RX ADMIN — VANCOMYCIN HYDROCHLORIDE 1250 MG: 1.25 INJECTION, POWDER, LYOPHILIZED, FOR SOLUTION INTRAVENOUS at 12:36

## 2025-06-20 RX ADMIN — SODIUM CHLORIDE: 0.9 INJECTION, SOLUTION INTRAVENOUS at 10:44

## 2025-06-20 RX ADMIN — ACETAMINOPHEN 650 MG: 325 TABLET ORAL at 06:15

## 2025-06-20 RX ADMIN — ACETAMINOPHEN 650 MG: 325 TABLET ORAL at 10:40

## 2025-06-20 RX ADMIN — METRONIDAZOLE 500 MG: 500 INJECTION, SOLUTION INTRAVENOUS at 00:30

## 2025-06-20 RX ADMIN — CEFEPIME 1000 MG: 1 INJECTION, POWDER, FOR SOLUTION INTRAMUSCULAR; INTRAVENOUS at 10:46

## 2025-06-20 RX ADMIN — QUETIAPINE FUMARATE 100 MG: 100 TABLET ORAL at 20:51

## 2025-06-20 RX ADMIN — PANTOPRAZOLE SODIUM 40 MG: 40 TABLET, DELAYED RELEASE ORAL at 06:16

## 2025-06-20 RX ADMIN — SODIUM CHLORIDE: 0.9 INJECTION, SOLUTION INTRAVENOUS at 10:52

## 2025-06-20 RX ADMIN — HYDROMORPHONE HYDROCHLORIDE 0.5 MG: 1 INJECTION, SOLUTION INTRAMUSCULAR; INTRAVENOUS; SUBCUTANEOUS at 10:40

## 2025-06-20 RX ADMIN — ACETAMINOPHEN 650 MG: 325 TABLET ORAL at 00:15

## 2025-06-20 RX ADMIN — HYDROMORPHONE HYDROCHLORIDE 0.5 MG: 1 INJECTION, SOLUTION INTRAMUSCULAR; INTRAVENOUS; SUBCUTANEOUS at 19:56

## 2025-06-20 RX ADMIN — SODIUM CHLORIDE, PRESERVATIVE FREE 10 ML: 5 INJECTION INTRAVENOUS at 20:00

## 2025-06-20 RX ADMIN — INSULIN LISPRO 2 UNITS: 100 INJECTION, SOLUTION INTRAVENOUS; SUBCUTANEOUS at 17:12

## 2025-06-20 RX ADMIN — Medication 1 CAPSULE: at 10:40

## 2025-06-20 RX ADMIN — DILTIAZEM HYDROCHLORIDE 120 MG: 120 CAPSULE, EXTENDED RELEASE ORAL at 10:40

## 2025-06-20 RX ADMIN — GABAPENTIN 300 MG: 300 CAPSULE ORAL at 20:51

## 2025-06-20 RX ADMIN — HYDROMORPHONE HYDROCHLORIDE 0.5 MG: 1 INJECTION, SOLUTION INTRAMUSCULAR; INTRAVENOUS; SUBCUTANEOUS at 15:15

## 2025-06-20 RX ADMIN — ACETAMINOPHEN 650 MG: 325 TABLET ORAL at 17:13

## 2025-06-20 RX ADMIN — INSULIN LISPRO 2 UNITS: 100 INJECTION, SOLUTION INTRAVENOUS; SUBCUTANEOUS at 20:52

## 2025-06-20 RX ADMIN — HYDROMORPHONE HYDROCHLORIDE 0.5 MG: 1 INJECTION, SOLUTION INTRAMUSCULAR; INTRAVENOUS; SUBCUTANEOUS at 02:01

## 2025-06-20 RX ADMIN — METRONIDAZOLE 500 MG: 500 INJECTION, SOLUTION INTRAVENOUS at 10:53

## 2025-06-20 ASSESSMENT — PAIN DESCRIPTION - ORIENTATION
ORIENTATION: LEFT
ORIENTATION: RIGHT
ORIENTATION: RIGHT;PROXIMAL
ORIENTATION: RIGHT;PROXIMAL
ORIENTATION: RIGHT

## 2025-06-20 ASSESSMENT — PAIN DESCRIPTION - LOCATION
LOCATION: LEG

## 2025-06-20 ASSESSMENT — PAIN DESCRIPTION - DESCRIPTORS
DESCRIPTORS: ACHING;BURNING
DESCRIPTORS: ACHING;DISCOMFORT;BURNING
DESCRIPTORS: BURNING
DESCRIPTORS: BURNING
DESCRIPTORS: ACHING;THROBBING;BURNING
DESCRIPTORS: ACHING;BURNING;THROBBING

## 2025-06-20 ASSESSMENT — PAIN SCALES - GENERAL
PAINLEVEL_OUTOF10: 8
PAINLEVEL_OUTOF10: 9
PAINLEVEL_OUTOF10: 5
PAINLEVEL_OUTOF10: 8
PAINLEVEL_OUTOF10: 8
PAINLEVEL_OUTOF10: 9
PAINLEVEL_OUTOF10: 6
PAINLEVEL_OUTOF10: 2
PAINLEVEL_OUTOF10: 6
PAINLEVEL_OUTOF10: 6
PAINLEVEL_OUTOF10: 8

## 2025-06-20 ASSESSMENT — PAIN - FUNCTIONAL ASSESSMENT
PAIN_FUNCTIONAL_ASSESSMENT: ACTIVITIES ARE NOT PREVENTED

## 2025-06-20 ASSESSMENT — PAIN SCALES - WONG BAKER: WONGBAKER_NUMERICALRESPONSE: HURTS A LITTLE BIT

## 2025-06-20 NOTE — PROGRESS NOTES
06/20/25        I have been asked to see this patient by Krista Mejía MD  for advice/opinion re: -----                                                        Assessment:         End-stage kidney disease on hemodialysis-right tunneled hemodialysis catheter-[facility]-Monday Wednesday Friday schedule  Noncompliance-missed dialysis treatment  Hyperkalemia  Right BKA stump cellulitis plus osteomyelitis  Chronic atrial fibrillation on anticoagulant therapy  Hypertension with CKD stage V  Type 2 diabetes mellitus  Peripheral neuropathy Discussion:     K is 6.2 => 5.2.  Given low,  Creatinine 3.93  Confirmed with him again today, he does not want dialysis and would like catheter removed    Plan:   Removal of dialysis catheter before he discharges from the hospital.  Use Lokelma as needed.  Will suggest Lokelma every other day                   Thanks for consulting me. Renal service will follow patient with you.Please don't hesitate to contact me if any questions arise of if I can assist in any manner      Zee Mann MD  Cell no- [phone]  Available on perfect serve.          Signed By: Zee Mann MD     June 20, 2025         Seen and examined  No new complaints.  Pain well-controlled  States that he does not want to do dialysis-same as yesterday  Family history:  No history of CKD or ESRD in the family.     Allergies   Allergen Reactions    Midazolam Shortness Of Breath     Other reaction(s): Unknown (comments)  Numbness, with shortness of breath  Weakness          Current Facility-Administered Medications   Medication Dose Route Frequency Provider Last Rate Last Admin    vancomycin (VANCOCIN) 1,250 mg in sodium chloride 0.9 % 250 mL IVPB ([id number])  1,250 mg IntraVENous Once Krista Mejía MD        warfarin (COUMADIN) tablet 5 mg  5 mg Oral Once Krista Mejía

## 2025-06-20 NOTE — PROGRESS NOTES
Physical Therapy Note    Pt declines PT tx at this  time reporting exhaustion. Will return for PT tx as able.

## 2025-06-20 NOTE — PROGRESS NOTES
End of Shift Note    Bedside shift change report given to BHAKTI Domínguez (oncoming nurse) by Leanne Patton RN (offgoing nurse).  Report included the following information SBAR, Kardex, MAR, and Recent Results    Shift worked: [id number]   Shift summary and any significant changes:    Received the patient who was asleep in bed from day shift.He complained of 3 episodes of pain dilaudid was given x 3. The blood sugar at night was 240 mg/dl insulin 2 units given as per Mar. Patient had two episodes of diarrhea during the shift thick greenish . He voided in the commode alongside loose stool hence could not estimate the amount of urine.His blood sugar this morning is 201 mg/dl   Concerns for physician to address:    Zone phone for oncoming shift:       Activity:  Level of Assistance: Independent  Number times ambulated in hallways past shift: 0  Number of times OOB to chair past shift: 0    Cardiac:   Cardiac Monitoring: Yes      Cardiac Rhythm: Sinus rhythm    Access:  Current line(s): PIV    Genitourinary:   Urinary Status: Has not voided    Respiratory:   O2 Device: None (Room air)  Chronic home O2 use?: NO  Incentive spirometer at bedside: NO    GI:  Last BM (including prior to admit): 06/19/25  Current diet:  DIET ONE TIME MESSAGE;  ADULT DIET; Regular; 3 carb choices (45 gm/meal); Low Potassium (Less than 3000 mg/day)  Passing flatus: YES    Pain Management:   Patient states pain is manageable on current regimen: YES    Skin:  Kennedy Scale Score: 20  Interventions: Wound Offloading (Prevention Methods): Pillows, Repositioning    Patient Safety:  Fall Risk: Nursing Judgement-Fall Risk High(Add Comments): Yes  Fall Risk Interventions  Nursing Judgement-Fall Risk High(Add Comments): Yes  Toilet Every 2 Hours-In Advance of Need: Yes  Hourly Visual Checks: In bed, Awake  Fall Visual Posted: Fall sign posted  Room Door Open: Yes  Alarm On: Other (Comment) (pt refuses)  Patient Moved Closer to Nursing Station:

## 2025-06-20 NOTE — PROGRESS NOTES
Vascular Surgery Progress Note  Quiana Forte ACNP-BC      Date:2025       Room:[address] [place] [address]  Patient Name:Keaton Van     YOB: 1965     Age:60 y.o.    Subjective      Mr. Ketaon Van is a 60 y.o. male with a pmhx significant for diabetes mellitus, hypertension, heart failure, atrial fibrillation, end-stage renal disease, bipolar disorder, hepatitis C, and polysubstance abuse.  He is a current smoker.  He has not been taking his warfarin due to bleeding.  His podiatrist is Dr. Michael Gerber.  He has a history of creation of a percutaneous AVF on 24.      He was recently admitted to the hospital with a bleeding stump wound.  He is s/p right BKA on 24 secondary to a non-healing diabetic wound that has been presented for nearly a year. He is now readmitted to the hospital with an infected stump. He is s/p surgical debridement with Grafix placement on .    He is  sleeping this am.  His dressing fell off overnight.  A dressing has been placed over wound.      Objective           Vitals Last 24 Hours:  TEMPERATURE:  Temp  Av.3 °F (36.8 °C)  Min: 98.2 °F (36.8 °C)  Max: 98.6 °F (37 °C)  RESPIRATIONS RANGE: Resp  Av.6  Min: 14  Max: 20  PULSE OXIMETRY RANGE: SpO2  Av %  Min: 92 %  Max: 96 %  PULSE RANGE: Pulse  Av.3  Min: 62  Max: 71  BLOOD PRESSURE RANGE: Systolic (24hrs), Av , Min:140 , Max:166   ; Diastolic (24hrs), Av, Min:75, Max:80    I/O (24Hr):  No intake or output data in the 24 hours ending 25 1323    Objective:  General Appearance:  Comfortable.    Vital signs: (most recent): Blood pressure (!) 150/80, pulse 67, temperature 98.2 °F (36.8 °C), temperature source Oral, resp. rate 17, height 1.854 m (6' 1\"), weight 88 kg (194 lb 0.1 oz), SpO2 96%.  Vital signs are normal.  No fever.    Lungs:  Normal effort and normal respiratory rate.    Heart: Normal rate.  Regular rhythm.    Abdomen: Abdomen is non-distended.    Extremities: Normal range of

## 2025-06-20 NOTE — CARE COORDINATION
Transition of Care Plan:    RUR: 35% High Risk  Prior Level of Functioning: Independent with ADL's   Disposition: IPR  TAN: 6/23  If SNF or IPR: Date FOC offered: 6/20  Date FOC received: 6/20  Accepting facility: Pending  Date authorization started with reference number:   Date authorization received and expires:   Follow up appointments: Defer to rehab  DME needed: Defer to rehab  Transportation at discharge: BLS  IM/IMM Medicare/ letter given: 2 IM to be given prior to discharge  Is patient a [place] and connected with VA? N/A  Caregiver Contact:   Anuradha Brown (Brother/Sister)  [phone] (Mobile)  Discharge Caregiver contacted prior to discharge? Yes  Care Conference needed? N/A  Barriers to discharge:  Nephro clearance, placement    Updated Note: CM has acknowledged consult for SNF intervention. Due to pt stating he no longer wants HD Per Manager of Case Management we can not move forward with placement until we have a plan for HD. MD has been notified.       Initial Note: Chart Reviewed: Pt pending nephro clearance and placement for d/c. Pt and pts sister is in agreement with PT/OT recs for high intensity/comprehensive skilled physical therapy in a multidisciplinary setting as patient is working towards tolerating up to 3 hours of therapy/day 5-7x/week. Pts top choice is [facility]. CM has sent referral.     Pts sister has stated pt can return back to her house after rehab as long as pt is stronger and able to use is prosthetic leg. CM will continue to follow.    MONIQUE Alcaraz,OMARI  [phone]

## 2025-06-20 NOTE — PROGRESS NOTES
End of Shift Note    Bedside shift change report given to Brea (oncoming nurse) by Catrachita Sharpe RN (offgoing nurse).  Report included the following information SBAR, Intake/Output, MAR, Recent Results, Med Rec Status, Cardiac Rhythm NSR, Alarm Parameters , and Quality Measures    Shift worked:  7a-7p     Shift summary and any significant changes:     Patient is still refusing to trial PO pain medication for pain control, requests IV pain medication frequently. No major changes.      Concerns for physician to address:  Pain control     Zone phone for oncoming shift:   [phone]       Activity:  Level of Assistance: Independent  Number times ambulated in hallways past shift: 0  Number of times OOB to chair past shift: 1    Cardiac:   Cardiac Monitoring: Yes      Cardiac Rhythm: Sinus rhythm    Access:  Current line(s): PIV     Genitourinary:   Urinary Status: Has not voided    Respiratory:   O2 Device: None (Room air)  Chronic home O2 use?: NO  Incentive spirometer at bedside: YES    GI:  Last BM (including prior to admit): 06/19/25  Current diet:  DIET ONE TIME MESSAGE;  ADULT DIET; Regular; 3 carb choices (45 gm/meal); Low Potassium (Less than 3000 mg/day)  Passing flatus: YES    Pain Management:   Patient states pain is manageable on current regimen: YES    Skin:  Kennedy Scale Score: 20  Interventions: Wound Offloading (Prevention Methods): Pillows, Repositioning    Patient Safety:  Fall Risk: Nursing Judgement-Fall Risk High(Add Comments): Yes  Fall Risk Interventions  Nursing Judgement-Fall Risk High(Add Comments): Yes  Toilet Every 2 Hours-In Advance of Need: Yes  Hourly Visual Checks: In bed, Awake  Fall Visual Posted: Fall sign posted  Room Door Open: Yes  Alarm On: Other (Comment)  Patient Moved Closer to Nursing Station: No    Active Consults:   IP CONSULT TO PHARMACY  IP CONSULT TO NEPHROLOGY  IP CONSULT TO VASCULAR SURGERY  IP CONSULT TO NEPHROLOGY  IP CONSULT TO CASE MANAGEMENT  IP CONSULT TO PHARMACY  IP

## 2025-06-20 NOTE — PROGRESS NOTES
Pharmacy Antimicrobial Kinetic Dosing    Indication for Antimicrobials: Bone and Joint infection (Rt BKA stump)    Current Regimen of Each Antimicrobial:  Vancomycin HD dosing per level; Start Date ; Day 5  Cefepime 1gm q 24; Start ; Day # 3  Metronidazole 500mg q 8; Start ; Day # 3     Previous Antimicrobial Therapy:  Zosyn  -     Goal Level: Vancomycin trough 15-    Date Dose & Interval Measured (mcg/mL) Predicted AUC 24-48 Predicted AUC 24,ss    04:54 S/p 2000 mg  10.6 N/a N/a                   Significant Cultures:    blood x2: NGTDx4 days    Labs:  Recent Labs     Units 25  0451 25  0458 25  1204 25  0455   CREATININE MG/DL 3.93* 3.73* 3.69* 3.68*   BUN MG/DL 59* 54*  --  57*   WBC K/uL 4.9 2.8*  --  3.8*     Temp (24hrs), Av.3 °F (36.8 °C), Min:98.2 °F (36.8 °C), Max:98.6 °F (37 °C)    Conditions for Dosing Consideration: ESRD, R BKA     Creatinine Clearance (mL/min): HD MWF PTA    Impression/Plan:   Zosyn changed to cefepime/flagyl   Patient does not want dialysis per nephrologist note   The vancomycin level resulted at 10.6 mcg/ml, s/p 2000 mg loading dose on . Will order vancomycin 1250 mg IV x 1 dose and will dose vancomycin by levels. Check level  morning.   BMP and CBC daily ordered  Antimicrobial stop date TBD     Pharmacy will follow daily and adjust medications as appropriate for renal function and/or serum levels.    Thank you,  Christy Escobedo, [facility]

## 2025-06-20 NOTE — PROGRESS NOTES
Pharmacist Daily Dosing of Warfarin    Indication & Goal INR: AFib, Goal INR 2-3    PTA Warfarin Dose: 6mg daily     Notable Concurrent Conditions and Medications:   Drug Interactions: Metronidazole  Conditions: None    Recent Labs     Units 06/20/25  0451 06/19/25  0458 06/18/25  0455   INR   1.0  --   --    HGB g/dL 8.2* 8.7* 9.0*   HCT % 25.4* 26.7* 27.6*   PLT K/uL 112* 114* 136*     Impression/Plan:   Ordered warfarin 5 mg PO x 1 dose today - repeat reduced dose today due to major drug interaction with metronidazole   Bridge Therapy: Not indicated   Daily INR has been ordered  CBC w/o differential every other day has been ordered     Pharmacy will follow daily and adjust the dose as appropriate.    Thank you,  Christy Escobedo, [facility]

## 2025-06-20 NOTE — PLAN OF CARE
Problem: Chronic Conditions and Co-morbidities  Goal: Patient's chronic conditions and co-morbidity symptoms are monitored and maintained or improved  6/20/2025 1147 by Catrachita Sharpe RN  Outcome: Progressing  Flowsheets (Taken 6/20/2025 1000)  Care Plan - Patient's Chronic Conditions and Co-Morbidity Symptoms are Monitored and Maintained or Improved: Monitor and assess patient's chronic conditions and comorbid symptoms for stability, deterioration, or improvement  6/19/2025 2209 by Leanne Patton RN  Outcome: Progressing  Flowsheets (Taken 6/19/2025 1949)  Care Plan - Patient's Chronic Conditions and Co-Morbidity Symptoms are Monitored and Maintained or Improved: Monitor and assess patient's chronic conditions and comorbid symptoms for stability, deterioration, or improvement     Problem: Discharge Planning  Goal: Discharge to home or other facility with appropriate resources  Outcome: Progressing  Flowsheets (Taken 6/20/2025 1000)  Discharge to home or other facility with appropriate resources: Identify barriers to discharge with patient and caregiver     Problem: Pain  Goal: Verbalizes/displays adequate comfort level or baseline comfort level  6/20/2025 1147 by Catrachita Sharpe, RN  Outcome: Progressing  6/19/2025 2209 by Leanne Patton RN  Outcome: Progressing

## 2025-06-20 NOTE — PROGRESS NOTES
Hospitalist Progress Note     Demographics    Patient Name  Keaton Van    Date of Birth 1965   Medical Record Number  [id number]      Age  60 y.o.   PCP Robert Wells PA-C   Admit date:  6/16/2025  7:20 PM     Room Number  3101/01  @ [facility]           Admission Diagnoses:  Acute osteomyelitis of lower leg, right (HCC)    Admission Summary:  \" Keaton Van is a 60 y.o.  male with PMHx as listed below presenting to the emergency department at direction of Dr. Schaeffer and vascular surgery with concern for infected dehisced right BKA stump with underlying osteomyelitis planned for I&D on Wednesday per patient.  Patient referred for admission and IV antibiotics preceding operative intervention.  Patient reports worsening appearance of wound with copious seropurulent drainage for the past 1-2 weeks.  Patient denies fever/chills/shakes.  No other symptoms reported outside of significant pain in right leg.  Patient prescribed Coumadin for atrial fibrillation (elected this agent for low cost), but states he has not been taking this since discharge from hospital out of concern that his leg would start bleeding again.  Also reports that he has not attended hemodialysis for the last 2 weeks reporting that he did not feel up to it with the pain in his leg.  Unfortunately, continues to smoke.     In the ED, patient afebrile, heart rate WNL, hypertensive with systolics to 230s, saturating upper 90s on room air.  Right knee radiographs demonstrate subtle cortical irregularity on distal margins of tibia and fibula concerning for early osteomyelitis.  CXR negative for acute process.  CT head negative for acute process.  Labs demonstrate: WBC 5.1, hemoglobin 10.0, platelets 164, sodium 138, potassium 5.1, glucose 135, BUN 50, creatinine 3.30, LFTs grossly unremarkable,-troponin 9, INR 1.1, ESR 64.  Patient given vancomycin and Zosyn and Dilaudid by ED provider.    \"     Assessment and plan:   Infected  right BKA stump  Concern for osteomyelitis  So far blood cultures negative about 18 hours since collected  I do not see any wound culture  Continue telemetry monitoring  Oxygen supplementation as needed  Continue vancomycin per pharmacy protocol  Continue IV Cefepime adjusted for renal impairment   Appreciate input from vascular surgery  Check daily CBC basic metabolic panel  Wound care to see the patient    Atrial fibrillation  Secondary hypercoagulable status  Chronic anticoagulation with warfarin  Essential hypertension  Continue Cardizem CD    End-stage renal disease on hemodialysis  Patient is oliguric    Type 2 diabetes mellitus  Diabetic neuropathy  Diabetic diet   SSI   No basal insulin     Bipolar disorder with depression  Personality disorder  History of suicidal ideation  History of polysubstance abuse hx   Depression   Continue Seroquel as was PTA   Supportive care     Homelessness    I was informed this afternoon, that his sister, his only caregiver is unable to provide him shelter or care. Essentially, pt is homeless.     Chronic hepatitis C carrier status    GERD   Continue PPI as was PTA     Degenerative disc disease of lumbar spine  Chronic pain syndrome  Dilaudid     Tobacco abuse disorder  Nicotine patch PRN     Body mass index is 25.6 kg/m². -   Reference: BMI greater than 30 is classified as obesity and greater than 40 is classified as morbid obesity.          CODE STATUS   Full    Functional Status  Patient was receiving support from his sister.  At this point I learned that the patient's sister is unable to provide him as much care as he needs   Surrogate decision maker:  Patient's sister    Prophylaxis   Lovenox   Discharge Plan:  I will ask  to pursue discharge to [facility] in [place].    [facility]  [place]or: [place]ICARE / Plan: MEDICARE PART A AND B / Product Type: *No Product type* /   No active isolations No active infections    Query

## 2025-06-21 LAB
GLUCOSE BLD STRIP.AUTO-MCNC: 149 MG/DL (ref 65–117)
GLUCOSE BLD STRIP.AUTO-MCNC: 164 MG/DL (ref 65–117)
GLUCOSE BLD STRIP.AUTO-MCNC: 180 MG/DL (ref 65–117)
GLUCOSE BLD STRIP.AUTO-MCNC: 197 MG/DL (ref 65–117)
SERVICE CMNT-IMP: ABNORMAL

## 2025-06-21 PROCEDURE — 2580000003 HC RX 258: Performed by: SURGERY

## 2025-06-21 PROCEDURE — 1100000000 HC RM PRIVATE

## 2025-06-21 PROCEDURE — 6370000000 HC RX 637 (ALT 250 FOR IP): Performed by: SURGERY

## 2025-06-21 PROCEDURE — 6360000002 HC RX W HCPCS: Performed by: SURGERY

## 2025-06-21 PROCEDURE — 2500000003 HC RX 250 WO HCPCS: Performed by: SURGERY

## 2025-06-21 PROCEDURE — 82962 GLUCOSE BLOOD TEST: CPT

## 2025-06-21 PROCEDURE — 6370000000 HC RX 637 (ALT 250 FOR IP): Performed by: INTERNAL MEDICINE

## 2025-06-21 RX ORDER — WARFARIN SODIUM 2 MG/1
6 TABLET ORAL
Status: COMPLETED | OUTPATIENT
Start: 2025-06-21 | End: 2025-06-21

## 2025-06-21 RX ADMIN — WARFARIN SODIUM 6 MG: 2 TABLET ORAL at 18:05

## 2025-06-21 RX ADMIN — INSULIN LISPRO 2 UNITS: 100 INJECTION, SOLUTION INTRAVENOUS; SUBCUTANEOUS at 18:04

## 2025-06-21 RX ADMIN — HYDROMORPHONE HYDROCHLORIDE 0.5 MG: 1 INJECTION, SOLUTION INTRAMUSCULAR; INTRAVENOUS; SUBCUTANEOUS at 00:02

## 2025-06-21 RX ADMIN — ACETAMINOPHEN 650 MG: 325 TABLET ORAL at 18:04

## 2025-06-21 RX ADMIN — HYDROMORPHONE HYDROCHLORIDE 0.5 MG: 1 INJECTION, SOLUTION INTRAMUSCULAR; INTRAVENOUS; SUBCUTANEOUS at 22:15

## 2025-06-21 RX ADMIN — QUETIAPINE FUMARATE 100 MG: 100 TABLET ORAL at 21:04

## 2025-06-21 RX ADMIN — ACETAMINOPHEN 650 MG: 325 TABLET ORAL at 00:03

## 2025-06-21 RX ADMIN — PANTOPRAZOLE SODIUM 40 MG: 40 TABLET, DELAYED RELEASE ORAL at 06:47

## 2025-06-21 RX ADMIN — ACETAMINOPHEN 650 MG: 325 TABLET ORAL at 13:41

## 2025-06-21 RX ADMIN — METRONIDAZOLE 500 MG: 500 INJECTION, SOLUTION INTRAVENOUS at 16:40

## 2025-06-21 RX ADMIN — HYDROMORPHONE HYDROCHLORIDE 0.5 MG: 1 INJECTION, SOLUTION INTRAMUSCULAR; INTRAVENOUS; SUBCUTANEOUS at 13:42

## 2025-06-21 RX ADMIN — METRONIDAZOLE 500 MG: 500 INJECTION, SOLUTION INTRAVENOUS at 09:22

## 2025-06-21 RX ADMIN — HYDROMORPHONE HYDROCHLORIDE 0.5 MG: 1 INJECTION, SOLUTION INTRAMUSCULAR; INTRAVENOUS; SUBCUTANEOUS at 18:06

## 2025-06-21 RX ADMIN — SODIUM CHLORIDE, PRESERVATIVE FREE 10 ML: 5 INJECTION INTRAVENOUS at 09:37

## 2025-06-21 RX ADMIN — ACETAMINOPHEN 650 MG: 325 TABLET ORAL at 06:47

## 2025-06-21 RX ADMIN — CEFEPIME 1000 MG: 1 INJECTION, POWDER, FOR SOLUTION INTRAMUSCULAR; INTRAVENOUS at 09:25

## 2025-06-21 RX ADMIN — SODIUM CHLORIDE, PRESERVATIVE FREE 10 ML: 5 INJECTION INTRAVENOUS at 21:06

## 2025-06-21 RX ADMIN — HYDROMORPHONE HYDROCHLORIDE 0.5 MG: 1 INJECTION, SOLUTION INTRAMUSCULAR; INTRAVENOUS; SUBCUTANEOUS at 09:33

## 2025-06-21 RX ADMIN — GABAPENTIN 300 MG: 300 CAPSULE ORAL at 21:04

## 2025-06-21 RX ADMIN — SODIUM CHLORIDE, PRESERVATIVE FREE 10 ML: 5 INJECTION INTRAVENOUS at 09:18

## 2025-06-21 RX ADMIN — Medication 1 CAPSULE: at 09:17

## 2025-06-21 RX ADMIN — HYDROMORPHONE HYDROCHLORIDE 0.5 MG: 1 INJECTION, SOLUTION INTRAMUSCULAR; INTRAVENOUS; SUBCUTANEOUS at 04:19

## 2025-06-21 RX ADMIN — INSULIN LISPRO 2 UNITS: 100 INJECTION, SOLUTION INTRAVENOUS; SUBCUTANEOUS at 13:41

## 2025-06-21 RX ADMIN — METRONIDAZOLE 500 MG: 500 INJECTION, SOLUTION INTRAVENOUS at 00:06

## 2025-06-21 RX ADMIN — DILTIAZEM HYDROCHLORIDE 120 MG: 120 CAPSULE, EXTENDED RELEASE ORAL at 09:17

## 2025-06-21 ASSESSMENT — PAIN SCALES - WONG BAKER
WONGBAKER_NUMERICALRESPONSE: HURTS A LITTLE BIT
WONGBAKER_NUMERICALRESPONSE: HURTS LITTLE MORE

## 2025-06-21 ASSESSMENT — PAIN - FUNCTIONAL ASSESSMENT
PAIN_FUNCTIONAL_ASSESSMENT: ACTIVITIES ARE NOT PREVENTED
PAIN_FUNCTIONAL_ASSESSMENT: PREVENTS OR INTERFERES SOME ACTIVE ACTIVITIES AND ADLS
PAIN_FUNCTIONAL_ASSESSMENT: PREVENTS OR INTERFERES SOME ACTIVE ACTIVITIES AND ADLS
PAIN_FUNCTIONAL_ASSESSMENT: ACTIVITIES ARE NOT PREVENTED
PAIN_FUNCTIONAL_ASSESSMENT: ACTIVITIES ARE NOT PREVENTED

## 2025-06-21 ASSESSMENT — PAIN SCALES - GENERAL
PAINLEVEL_OUTOF10: 2
PAINLEVEL_OUTOF10: 3
PAINLEVEL_OUTOF10: 8
PAINLEVEL_OUTOF10: 6
PAINLEVEL_OUTOF10: 8
PAINLEVEL_OUTOF10: 9
PAINLEVEL_OUTOF10: 7
PAINLEVEL_OUTOF10: 7
PAINLEVEL_OUTOF10: 8

## 2025-06-21 ASSESSMENT — PAIN DESCRIPTION - DESCRIPTORS
DESCRIPTORS: ACHING;BURNING
DESCRIPTORS: ACHING;BURNING
DESCRIPTORS: ACHING;SHARP
DESCRIPTORS: ACHING

## 2025-06-21 ASSESSMENT — PAIN DESCRIPTION - ORIENTATION
ORIENTATION: RIGHT
ORIENTATION: RIGHT;LOWER
ORIENTATION: RIGHT;LOWER
ORIENTATION: RIGHT

## 2025-06-21 ASSESSMENT — PAIN DESCRIPTION - LOCATION
LOCATION: LEG

## 2025-06-21 NOTE — PLAN OF CARE
Problem: Chronic Conditions and Co-morbidities  Goal: Patient's chronic conditions and co-morbidity symptoms are monitored and maintained or improved  6/20/2025 1147 by Catrachita Sharpe RN  Outcome: Progressing  Flowsheets (Taken 6/20/2025 1000)  Care Plan - Patient's Chronic Conditions and Co-Morbidity Symptoms are Monitored and Maintained or Improved: Monitor and assess patient's chronic conditions and comorbid symptoms for stability, deterioration, or improvement     Problem: Discharge Planning  Goal: Discharge to home or other facility with appropriate resources  6/20/2025 2151 by BONNIE Agudelo RN  Outcome: Progressing  6/20/2025 1147 by Catrachita Sharpe RN  Outcome: Progressing  Flowsheets (Taken 6/20/2025 1000)  Discharge to home or other facility with appropriate resources: Identify barriers to discharge with patient and caregiver     Problem: Pain  Goal: Verbalizes/displays adequate comfort level or baseline comfort level  6/20/2025 2151 by BONNIE Agudelo RN  Outcome: Progressing  6/20/2025 1147 by Catrachita Sharpe RN  Outcome: Progressing     Problem: ABCDS Injury Assessment  Goal: Absence of physical injury  6/20/2025 2151 by BONNIE Agudelo RN  Outcome: Progressing  6/20/2025 1147 by Catrachita Sharpe RN  Outcome: Progressing     Problem: Safety - Adult  Goal: Free from fall injury  6/20/2025 2151 by BONNIE Agudelo RN  Outcome: Progressing  6/20/2025 1147 by Catrachita Sharpe RN  Outcome: Progressing

## 2025-06-21 NOTE — PROGRESS NOTES
Pharmacist Daily Dosing of Warfarin    Indication & Goal INR: AFib, Goal INR 2-3    PTA Warfarin Dose: 6mg daily     Notable Concurrent Conditions and Medications:   Drug Interactions: Metronidazole  Conditions: None    Recent Labs     Units 06/20/25  0451 06/19/25  0458   INR   1.0  --    HGB g/dL 8.2* 8.7*   HCT % 25.4* 26.7*   PLT K/uL 112* 114*     Impression/Plan:   Ordered warfarin 6 mg PO x 1 dose today - repeat reduced dosing due to major drug interaction with metronidazole   Bridge Therapy: Not indicated   Daily INR has been ordered  CBC w/o differential every other day has been ordered     Pharmacy will follow daily and adjust the dose as appropriate.    Thank you,  Sal Ramirez RPH      Warfarin Protocol    Located on pharmacy Teams site: Clinical Practice -> Anticoagulation & Cardiology -> Anticoagulation Policies, Protocols, Guidance

## 2025-06-21 NOTE — PROGRESS NOTES
Spiritual Health History and Assessment/Progress Note  [facility]    Initial Encounter,  , Life Adjustments, Adjustment to illness,      Name: Keaton Van MRN: [id number]    Age: 60 y.o.     Sex: male   Language: English   Mormonism: Yarsanism   Acute osteomyelitis of lower leg, right (HCC)     Date: 6/21/2025            Total Time Calculated: 28 min              Spiritual Assessment began in [facility] 3 SURG TELE        Referral/Consult From: Rounding   Encounter Overview/Reason: Initial Encounter  Service Provided For: Patient and family together    Keisha, Belief, Meaning:   Patient has beliefs or practices that help with coping during difficult times  Family/Friends have beliefs or practices that help with coping during difficult times      Importance and Influence:  Patient has spiritual/personal beliefs that influence decisions regarding their health  Family/Friends have spiritual/personal beliefs that influence decisions regarding the patient's health    Community:  Patient expresses feelings of isolation: feeling no one understands  Family/Friends are connected with a spiritual community:    Assessment and Plan of Care:     Patient Interventions include: Facilitated expression of thoughts and feelings, Explored spiritual coping/struggle/distress, Affirmed coping skills/support systems, and Facilitated life review and/ or legacy  Family/Friends Interventions include: Affirmed coping skills/support systems    Patient Plan of Care: Spiritual Care available upon further referral  Family/Friends Plan of Care: Spiritual Care available upon further referral    Electronically signed by JASON Krause on 6/21/2025 at 1:06 PM

## 2025-06-21 NOTE — PROGRESS NOTES
End of Shift Note    Bedside shift change report given to BHAKTI Guidry (oncoming nurse) by Rylee Phillip RN (offgoing nurse).  Report included the following information SBAR, Intake/Output, MAR, and Recent Results    Shift worked:  9220-1691     Shift summary and any significant changes:     PRN Dilaudid x3 today for right stump pain. IV antibiotics given as ordered. Tolerating diet, insulin provided per order.   Up to bedside commode independently, tolerated well. Fistula intact.      Concerns for physician to address:  AM Labs? Unable to obtain today.     Zone phone for oncoming shift:   [phone]       Activity:  Level of Assistance: Independent  Number times ambulated in hallways past shift: 0  Number of times OOB to chair past shift: 0    Cardiac:   Cardiac Monitoring: Yes      Cardiac Rhythm: Atrial fib, Sinus rhythm, Sinus syl    Access:  Current line(s): PIV     Genitourinary:   Urinary Status: Has not voided, Oliguria    Respiratory:   O2 Device: None (Room air)  Chronic home O2 use?: NO  Incentive spirometer at bedside: YES    GI:  Last BM (including prior to admit): 06/20/25  Current diet:  DIET ONE TIME MESSAGE;  ADULT DIET; Regular; 3 carb choices (45 gm/meal); Low Potassium (Less than 3000 mg/day)  Passing flatus: YES    Pain Management:   Patient states pain is manageable on current regimen: YES    Skin:  Kennedy Scale Score: 19  Interventions: Wound Offloading (Prevention Methods): Pillows, Repositioning, Turning    Patient Safety:  Fall Risk: Nursing Judgement-Fall Risk High(Add Comments): Yes  Fall Risk Interventions  Nursing Judgement-Fall Risk High(Add Comments): Yes  Toilet Every 2 Hours-In Advance of Need: Yes  Hourly Visual Checks: Awake, In bed  Fall Visual Posted: Fall sign posted  Room Door Open: Deferred to promote rest  Alarm On: Other (Comment) (pt refused, calls appropriately)  Patient Moved Closer to Nursing Station: No    Active Consults:   IP CONSULT TO PHARMACY  IP CONSULT TO  NEPHROLOGY  IP CONSULT TO VASCULAR SURGERY  IP CONSULT TO NEPHROLOGY  IP CONSULT TO CASE MANAGEMENT  IP CONSULT TO PHARMACY  IP CONSULT TO CASE MANAGEMENT    Length of Stay:  Expected LOS: 7  Actual LOS: 5    Rylee Phillip RN

## 2025-06-21 NOTE — PROGRESS NOTES
End of Shift Note    Bedside shift change report given to BHAKTI Mckee (oncoming nurse) by BONNIE Agudelo RN (offgoing nurse).  Report included the following information SBAR, Intake/Output, and MAR    Shift worked:  7pm-7am     Shift summary and any significant changes:     Pain managed with PRN dilaudid x3 and still pt complained of pain of 8/10 on the  right BKA stump. IV antibiotic given as per order. Had BM this shift using side bed commode. Refused AM labs. His dressing fell off overnight.     Concerns for physician to address:    above   Zone phone for oncoming shift:     [phone]         BONNIE Agudelo RN

## 2025-06-21 NOTE — PROGRESS NOTES
Pharmacy Antimicrobial Kinetic Dosing    Indication for Antimicrobials: Bone and Joint infection (Rt BKA stump)    Current Regimen of Each Antimicrobial:  Vancomycin HD dosing per level; Start Date ; Day 6  Cefepime 1gm q 24; Start ; Day # 4  Metronidazole 500mg q 8; Start ; Day # 4     Previous Antimicrobial Therapy:  Zosyn  -     Goal Level: Vancomycin trough 15-    Date Dose & Interval Measured (mcg/mL) Predicted AUC 24-48 Predicted AUC 24,ss    04:54 S/p 2000 mg  10.6 N/a N/a                   Significant Cultures:    blood x2: NGTDx4 days    Labs:  Recent Labs     Units 25  0451 25  0458 25  1204   CREATININE MG/DL 3.93* 3.73* 3.69*   BUN MG/DL 59* 54*  --    WBC K/uL 4.9 2.8*  --      Temp (24hrs), Av.8 °F (36.6 °C), Min:97.5 °F (36.4 °C), Max:98.2 °F (36.8 °C)      Conditions for Dosing Consideration: ESRD, R BKA     Creatinine Clearance (mL/min): HD MWF PTA    Impression/Plan:   Zosyn changed to cefepime/flagyl   Patient does not want dialysis per nephrologist note   The vancomycin level resulted at 10.6 mcg/ml, s/p 2000 mg loading dose on . Will order vancomycin 1250 mg IV x 1 dose and will dose vancomycin by levels. Check level  morning.   BMP and CBC daily ordered  Antimicrobial stop date TBD     Pharmacy will follow daily and adjust medications as appropriate for renal function and/or serum levels.    Thank you,  Sal Ramirez [facility]

## 2025-06-22 LAB
ANION GAP SERPL CALC-SCNC: 6 MMOL/L (ref 2–12)
BACTERIA SPEC CULT: NORMAL
BACTERIA SPEC CULT: NORMAL
BASOPHILS # BLD: 0.04 K/UL (ref 0–0.1)
BASOPHILS NFR BLD: 1 % (ref 0–1)
BUN SERPL-MCNC: 58 MG/DL (ref 6–20)
BUN/CREAT SERPL: 16 (ref 12–20)
CALCIUM SERPL-MCNC: 8.3 MG/DL (ref 8.5–10.1)
CHLORIDE SERPL-SCNC: 115 MMOL/L (ref 97–108)
CO2 SERPL-SCNC: 18 MMOL/L (ref 21–32)
CREAT SERPL-MCNC: 3.52 MG/DL (ref 0.7–1.3)
DIFFERENTIAL METHOD BLD: ABNORMAL
EOSINOPHIL # BLD: 0.27 K/UL (ref 0–0.4)
EOSINOPHIL NFR BLD: 6.7 % (ref 0–7)
ERYTHROCYTE [DISTWIDTH] IN BLOOD BY AUTOMATED COUNT: 13.4 % (ref 11.5–14.5)
GLUCOSE BLD STRIP.AUTO-MCNC: 123 MG/DL (ref 65–117)
GLUCOSE BLD STRIP.AUTO-MCNC: 123 MG/DL (ref 65–117)
GLUCOSE BLD STRIP.AUTO-MCNC: 182 MG/DL (ref 65–117)
GLUCOSE BLD STRIP.AUTO-MCNC: 183 MG/DL (ref 65–117)
GLUCOSE SERPL-MCNC: 149 MG/DL (ref 65–100)
HCT VFR BLD AUTO: 25.9 % (ref 36.6–50.3)
HGB BLD-MCNC: 8.4 G/DL (ref 12.1–17)
IMM GRANULOCYTES # BLD AUTO: 0.01 K/UL (ref 0–0.04)
IMM GRANULOCYTES NFR BLD AUTO: 0.2 % (ref 0–0.5)
INR PPP: 1 (ref 0.9–1.1)
LYMPHOCYTES # BLD: 0.95 K/UL (ref 0.8–3.5)
LYMPHOCYTES NFR BLD: 23.7 % (ref 12–49)
MCH RBC QN AUTO: 29.5 PG (ref 26–34)
MCHC RBC AUTO-ENTMCNC: 32.4 G/DL (ref 30–36.5)
MCV RBC AUTO: 90.9 FL (ref 80–99)
MONOCYTES # BLD: 0.28 K/UL (ref 0–1)
MONOCYTES NFR BLD: 7 % (ref 5–13)
NEUTS SEG # BLD: 2.46 K/UL (ref 1.8–8)
NEUTS SEG NFR BLD: 61.4 % (ref 32–75)
NRBC # BLD: 0 K/UL (ref 0–0.01)
NRBC BLD-RTO: 0 PER 100 WBC
PLATELET # BLD AUTO: 141 K/UL (ref 150–400)
PMV BLD AUTO: 10.2 FL (ref 8.9–12.9)
POTASSIUM SERPL-SCNC: 5.2 MMOL/L (ref 3.5–5.1)
PROTHROMBIN TIME: 11 SEC (ref 9.2–11.2)
RBC # BLD AUTO: 2.85 M/UL (ref 4.1–5.7)
SERVICE CMNT-IMP: ABNORMAL
SERVICE CMNT-IMP: NORMAL
SERVICE CMNT-IMP: NORMAL
SODIUM SERPL-SCNC: 139 MMOL/L (ref 136–145)
VANCOMYCIN SERPL-MCNC: 14.9 UG/ML
WBC # BLD AUTO: 4 K/UL (ref 4.1–11.1)

## 2025-06-22 PROCEDURE — 85025 COMPLETE CBC W/AUTO DIFF WBC: CPT

## 2025-06-22 PROCEDURE — 80048 BASIC METABOLIC PNL TOTAL CA: CPT

## 2025-06-22 PROCEDURE — 6370000000 HC RX 637 (ALT 250 FOR IP): Performed by: INTERNAL MEDICINE

## 2025-06-22 PROCEDURE — 1100000000 HC RM PRIVATE

## 2025-06-22 PROCEDURE — 36415 COLL VENOUS BLD VENIPUNCTURE: CPT

## 2025-06-22 PROCEDURE — 2580000003 HC RX 258: Performed by: SURGERY

## 2025-06-22 PROCEDURE — 82962 GLUCOSE BLOOD TEST: CPT

## 2025-06-22 PROCEDURE — 6360000002 HC RX W HCPCS: Performed by: SURGERY

## 2025-06-22 PROCEDURE — 6370000000 HC RX 637 (ALT 250 FOR IP): Performed by: SURGERY

## 2025-06-22 PROCEDURE — 85610 PROTHROMBIN TIME: CPT

## 2025-06-22 PROCEDURE — 6360000002 HC RX W HCPCS: Performed by: INTERNAL MEDICINE

## 2025-06-22 PROCEDURE — 80202 ASSAY OF VANCOMYCIN: CPT

## 2025-06-22 RX ORDER — WARFARIN SODIUM 2 MG/1
6 TABLET ORAL
Status: COMPLETED | OUTPATIENT
Start: 2025-06-22 | End: 2025-06-22

## 2025-06-22 RX ORDER — GABAPENTIN 100 MG/1
100 CAPSULE ORAL 3 TIMES DAILY
Status: DISCONTINUED | OUTPATIENT
Start: 2025-06-22 | End: 2025-06-28 | Stop reason: HOSPADM

## 2025-06-22 RX ORDER — HYDROMORPHONE HYDROCHLORIDE 2 MG/1
1 TABLET ORAL EVERY 4 HOURS PRN
Refills: 0 | Status: DISCONTINUED | OUTPATIENT
Start: 2025-06-22 | End: 2025-06-24

## 2025-06-22 RX ORDER — HYDROMORPHONE HYDROCHLORIDE 1 MG/ML
1 INJECTION, SOLUTION INTRAMUSCULAR; INTRAVENOUS; SUBCUTANEOUS EVERY 4 HOURS PRN
Status: DISCONTINUED | OUTPATIENT
Start: 2025-06-22 | End: 2025-06-24

## 2025-06-22 RX ADMIN — HYDROMORPHONE HYDROCHLORIDE 1 MG: 2 TABLET ORAL at 13:04

## 2025-06-22 RX ADMIN — HYDROMORPHONE HYDROCHLORIDE 0.5 MG: 1 INJECTION, SOLUTION INTRAMUSCULAR; INTRAVENOUS; SUBCUTANEOUS at 07:49

## 2025-06-22 RX ADMIN — HYDROMORPHONE HYDROCHLORIDE 0.5 MG: 1 INJECTION, SOLUTION INTRAMUSCULAR; INTRAVENOUS; SUBCUTANEOUS at 02:42

## 2025-06-22 RX ADMIN — ACETAMINOPHEN 650 MG: 325 TABLET ORAL at 19:08

## 2025-06-22 RX ADMIN — CEFEPIME 1000 MG: 1 INJECTION, POWDER, FOR SOLUTION INTRAMUSCULAR; INTRAVENOUS at 08:26

## 2025-06-22 RX ADMIN — WARFARIN SODIUM 6 MG: 2 TABLET ORAL at 19:10

## 2025-06-22 RX ADMIN — ACETAMINOPHEN 650 MG: 325 TABLET ORAL at 12:12

## 2025-06-22 RX ADMIN — ACETAMINOPHEN 650 MG: 325 TABLET ORAL at 06:32

## 2025-06-22 RX ADMIN — HYDROMORPHONE HYDROCHLORIDE 1 MG: 1 INJECTION, SOLUTION INTRAMUSCULAR; INTRAVENOUS; SUBCUTANEOUS at 21:29

## 2025-06-22 RX ADMIN — GABAPENTIN 100 MG: 100 CAPSULE ORAL at 21:27

## 2025-06-22 RX ADMIN — ACETAMINOPHEN 650 MG: 325 TABLET ORAL at 19:13

## 2025-06-22 RX ADMIN — METRONIDAZOLE 500 MG: 500 INJECTION, SOLUTION INTRAVENOUS at 09:06

## 2025-06-22 RX ADMIN — ACETAMINOPHEN 650 MG: 325 TABLET ORAL at 00:10

## 2025-06-22 RX ADMIN — METRONIDAZOLE 500 MG: 500 INJECTION, SOLUTION INTRAVENOUS at 00:14

## 2025-06-22 RX ADMIN — LORAZEPAM 1 MG: 1 TABLET ORAL at 13:04

## 2025-06-22 RX ADMIN — METRONIDAZOLE 500 MG: 500 INJECTION, SOLUTION INTRAVENOUS at 16:52

## 2025-06-22 RX ADMIN — INSULIN LISPRO 2 UNITS: 100 INJECTION, SOLUTION INTRAVENOUS; SUBCUTANEOUS at 08:30

## 2025-06-22 RX ADMIN — QUETIAPINE FUMARATE 100 MG: 100 TABLET ORAL at 21:27

## 2025-06-22 RX ADMIN — DILTIAZEM HYDROCHLORIDE 120 MG: 120 CAPSULE, EXTENDED RELEASE ORAL at 08:27

## 2025-06-22 RX ADMIN — GABAPENTIN 100 MG: 100 CAPSULE ORAL at 12:25

## 2025-06-22 RX ADMIN — INSULIN LISPRO 2 UNITS: 100 INJECTION, SOLUTION INTRAVENOUS; SUBCUTANEOUS at 19:11

## 2025-06-22 RX ADMIN — HYDROMORPHONE HYDROCHLORIDE 1 MG: 1 INJECTION, SOLUTION INTRAMUSCULAR; INTRAVENOUS; SUBCUTANEOUS at 17:13

## 2025-06-22 RX ADMIN — PANTOPRAZOLE SODIUM 40 MG: 40 TABLET, DELAYED RELEASE ORAL at 06:32

## 2025-06-22 RX ADMIN — Medication 1 CAPSULE: at 08:27

## 2025-06-22 ASSESSMENT — PAIN SCALES - GENERAL
PAINLEVEL_OUTOF10: 3
PAINLEVEL_OUTOF10: 8
PAINLEVEL_OUTOF10: 4
PAINLEVEL_OUTOF10: 5
PAINLEVEL_OUTOF10: 8
PAINLEVEL_OUTOF10: 7
PAINLEVEL_OUTOF10: 6
PAINLEVEL_OUTOF10: 4
PAINLEVEL_OUTOF10: 5
PAINLEVEL_OUTOF10: 3
PAINLEVEL_OUTOF10: 7

## 2025-06-22 ASSESSMENT — PAIN DESCRIPTION - ORIENTATION
ORIENTATION: LEFT;LOWER
ORIENTATION: RIGHT;LOWER
ORIENTATION: RIGHT
ORIENTATION: RIGHT;LOWER
ORIENTATION: RIGHT;LOWER
ORIENTATION: RIGHT

## 2025-06-22 ASSESSMENT — PAIN - FUNCTIONAL ASSESSMENT
PAIN_FUNCTIONAL_ASSESSMENT: PREVENTS OR INTERFERES SOME ACTIVE ACTIVITIES AND ADLS
PAIN_FUNCTIONAL_ASSESSMENT: ACTIVITIES ARE NOT PREVENTED
PAIN_FUNCTIONAL_ASSESSMENT: PREVENTS OR INTERFERES SOME ACTIVE ACTIVITIES AND ADLS
PAIN_FUNCTIONAL_ASSESSMENT: ACTIVITIES ARE NOT PREVENTED
PAIN_FUNCTIONAL_ASSESSMENT: ACTIVITIES ARE NOT PREVENTED
PAIN_FUNCTIONAL_ASSESSMENT: PREVENTS OR INTERFERES SOME ACTIVE ACTIVITIES AND ADLS
PAIN_FUNCTIONAL_ASSESSMENT: ACTIVITIES ARE NOT PREVENTED
PAIN_FUNCTIONAL_ASSESSMENT: PREVENTS OR INTERFERES SOME ACTIVE ACTIVITIES AND ADLS

## 2025-06-22 ASSESSMENT — PAIN DESCRIPTION - DESCRIPTORS
DESCRIPTORS: DULL;ACHING
DESCRIPTORS: ACHING
DESCRIPTORS: ACHING
DESCRIPTORS: ACHING;BURNING
DESCRIPTORS: ACHING
DESCRIPTORS: DULL
DESCRIPTORS: ACHING
DESCRIPTORS: ACHING

## 2025-06-22 ASSESSMENT — PAIN DESCRIPTION - LOCATION
LOCATION: LEG

## 2025-06-22 ASSESSMENT — PAIN SCALES - WONG BAKER
WONGBAKER_NUMERICALRESPONSE: HURTS A LITTLE BIT
WONGBAKER_NUMERICALRESPONSE: HURTS A LITTLE BIT

## 2025-06-22 ASSESSMENT — PAIN DESCRIPTION - DIRECTION: RADIATING_TOWARDS: RIGHT LOWER LEG

## 2025-06-22 ASSESSMENT — PAIN DESCRIPTION - FREQUENCY: FREQUENCY: CONTINUOUS

## 2025-06-22 ASSESSMENT — PAIN DESCRIPTION - ONSET: ONSET: AWAKENED FROM SLEEP

## 2025-06-22 NOTE — PROGRESS NOTES
Pharmacist Daily Dosing of Warfarin    Indication & Goal INR: AFib, Goal INR 2-3    PTA Warfarin Dose: 6mg daily     Notable Concurrent Conditions and Medications:   Drug Interactions: Metronidazole  Conditions: None    Recent Labs     Units 06/22/25  0524 06/20/25  0451   INR   1.0 1.0   HGB g/dL 8.4* 8.2*   HCT % 25.9* 25.4*   PLT K/uL 141* 112*     Impression/Plan:   Ordered warfarin 6 mg PO x 1 dose today - watch interaction with flagyl therapy  Bridge Therapy: Not indicated   Daily INR has been ordered  CBC w/o differential every other day has been ordered     Pharmacy will follow daily and adjust the dose as appropriate.    Thank you,  Sal Ramirez RPH      Warfarin Protocol    Located on pharmacy Teams site: Clinical Practice -> Anticoagulation & Cardiology -> Anticoagulation Policies, Protocols, Guidance

## 2025-06-22 NOTE — PROGRESS NOTES
Pharmacy Antimicrobial Kinetic Dosing    Indication for Antimicrobials: Bone and Joint infection (Rt BKA stump)    Current Regimen of Each Antimicrobial:  Vancomycin HD dosing per level; Start Date ; Day 7  Cefepime 1gm q 24; Start ; Day # 5  Metronidazole 500mg q 8; Start ; Day # 5    Previous Antimicrobial Therapy:  Zosyn  -     Goal Level: Vancomycin trough 15-    Date Dose & Interval Measured (mcg/mL) Predicted AUC 24-48 Predicted AUC 24,ss    04:54 S/p 2000 mg  10.6 N/a N/a     S/p 1250mg x 1   14.9 NA NA            Significant Cultures:    blood x2: NGTDx4 days    Labs:  Recent Labs     Units 25  0524 25  0451   CREATININE MG/DL 3.52* 3.93*   BUN MG/DL 58* 59*   WBC K/uL 4.0* 4.9     Temp (24hrs), Av °F (36.7 °C), Min:97.9 °F (36.6 °C), Max:98.1 °F (36.7 °C)      Conditions for Dosing Consideration: ESRD, R BKA     Creatinine Clearance (mL/min): HD MWF PTA    Impression/Plan:   Zosyn changed to cefepime/flagyl   Patient does not want dialysis per nephrologist note    The vancomycin level resulted at 14.9. Give Vancomycin 1000mg x 1 post HD   BMP and CBC daily ordered  Antimicrobial stop date TBD     Pharmacy will follow daily and adjust medications as appropriate for renal function and/or serum levels.    Thank you,  Aimee Spears [facility]

## 2025-06-22 NOTE — PROGRESS NOTES
1230 IV site leaking, removed, attempted by 2 nurses on Surg Tele, unsuccessfully.   No answer at L & D  or ED charge nurse.  RRT RN aware of need for IV site.

## 2025-06-22 NOTE — PROGRESS NOTES
.End of Shift Note    Bedside shift change report given to BHAKTI Guidry (oncoming nurse) by KATHRYN VILLALTA LPN (offgoing nurse).  Report included the following information Kardex, OR Summary, Procedure Summary, Intake/Output, MAR, and Accordion    Shift worked:  7-7     Shift summary and any significant changes:     Patient continues to report pain in lower right stump area. Patient prefers to take IV Dilaudid, but did try the po Dilaudid today with good results.Medicated for pain x three this shift.  New PIV placed right arm today. Right stump dressing intact. Patient reported having had BM in am.      Concerns for physician to address:  Plan of care     Zone phone for oncoming shift:   [phone]       Activity:  Level of Assistance: Independent  Number times ambulated in hallways past shift: 0  Number of times OOB to chair past shift: 0    Cardiac:   Cardiac Monitoring: Yes      Cardiac Rhythm: Atrial fib, Sinus rhythm, Sinus syl    Access:  Current line(s): PIV     Genitourinary:   Urinary Status: Voiding, Oliguria    Respiratory:   O2 Device: None (Room air)  Chronic home O2 use?: NO  Incentive spirometer at bedside: YES    GI:  Last BM (including prior to admit): 06/20/25  Current diet:  DIET ONE TIME MESSAGE;  ADULT DIET; Regular; 3 carb choices (45 gm/meal); Low Potassium (Less than 3000 mg/day)  Passing flatus: YES    Pain Management:   Patient states pain is manageable on current regimen: YES    Skin:  Kennedy Scale Score: 19  Interventions: Wound Offloading (Prevention Methods): Bed, pressure reduction mattress, Repositioning, Pillows, Turning    Patient Safety:  Fall Risk: Nursing Judgement-Fall Risk High(Add Comments): Yes  Fall Risk Interventions  Nursing Judgement-Fall Risk High(Add Comments): Yes  Toilet Every 2 Hours-In Advance of Need: Yes  Hourly Visual Checks: In bed  Fall Visual Posted: Fall sign posted  Room Door Open: Deferred to promote rest  Alarm On: Other (Comment)  Patient Moved Closer to

## 2025-06-22 NOTE — PROGRESS NOTES
End of Shift Note    Bedside shift change report given to JADEN Garcia (oncoming nurse) by BONNIE Agudelo RN (offgoing nurse).  Report included the following information SBAR, Intake/Output, and MAR    Shift worked:  7pm-7am     Shift summary and any significant changes:     Pain managed with PRN dilaudid x2 this shift. Iv antibiotic given as per order. Diet well tolerated. Pt using bedside commode. X1 bm in this shift.     Concerns for physician to address:  Uncontrolled pain on the right stump     Zone phone for oncoming shift:   [phone]           BONNIE Agudelo, RN

## 2025-06-22 NOTE — PROGRESS NOTES
Hospitalist Progress Note     Demographics    Patient Name  Keaton Van    Date of Birth 1965   Medical Record Number  [id number]      Age  60 y.o.   PCP Robert Wells PA-C   Admit date:  6/16/2025  7:20 PM     Room Number  3101/01  @ [facility]           Admission Diagnoses:  Acute osteomyelitis of lower leg, right (HCC)    Admission Summary:  \" Keaton Van is a 60 y.o.  male with PMHx as listed below presenting to the emergency department at direction of Dr. Schaeffer and vascular surgery with concern for infected dehisced right BKA stump with underlying osteomyelitis planned for I&D on Wednesday per patient.  Patient referred for admission and IV antibiotics preceding operative intervention.  Patient reports worsening appearance of wound with copious seropurulent drainage for the past 1-2 weeks.  Patient denies fever/chills/shakes.  No other symptoms reported outside of significant pain in right leg.  Patient prescribed Coumadin for atrial fibrillation (elected this agent for low cost), but states he has not been taking this since discharge from hospital out of concern that his leg would start bleeding again.  Also reports that he has not attended hemodialysis for the last 2 weeks reporting that he did not feel up to it with the pain in his leg.  Unfortunately, continues to smoke.     In the ED, patient afebrile, heart rate WNL, hypertensive with systolics to 230s, saturating upper 90s on room air.  Right knee radiographs demonstrate subtle cortical irregularity on distal margins of tibia and fibula concerning for early osteomyelitis.  CXR negative for acute process.  CT head negative for acute process.  Labs demonstrate: WBC 5.1, hemoglobin 10.0, platelets 164, sodium 138, potassium 5.1, glucose 135, BUN 50, creatinine 3.30, LFTs grossly unremarkable,-troponin 9, INR 1.1, ESR 64.  Patient given vancomycin and Zosyn and Dilaudid by ED provider.    \"     Assessment and plan:   Infected  (194 lb 7.1 oz)   07/13/24 77.1 kg (170 lb)   06/22/24 82 kg (180 lb 12.4 oz)   06/14/24 86.3 kg (190 lb 4.1 oz)      No intake/output data recorded.  No intake/output data recorded.        Physical Exam:         Physical Exam  Constitutional:       Appearance: Normal appearance.   HENT:      Head: Normocephalic and atraumatic.      Mouth/Throat:      Mouth: Mucous membranes are moist.      Pharynx: Oropharynx is clear.   Eyes:      Extraocular Movements: Extraocular movements intact.      Pupils: Pupils are equal, round, and reactive to light.   Cardiovascular:      Rate and Rhythm: Normal rate and regular rhythm.   Pulmonary:      Effort: Pulmonary effort is normal.      Breath sounds: Normal breath sounds.   Abdominal:      General: Abdomen is flat. Bowel sounds are normal.   Musculoskeletal:      Comments: Below-knee amputation status noted.  Stump is dressed   Neurological:      General: No focal deficit present.      Mental Status: He is alert and oriented to person, place, and time.   Psychiatric:         Mood and Affect: Mood normal.         Behavior: Behavior normal.                Medications reviewed   Scheduled Meds:   warfarin  6 mg Oral Once    lactobacillus  1 capsule Oral Daily    warfarin placeholder: dosing by pharmacy   Oral RX Placeholder    cefepime  1,000 mg IntraVENous Q24H    metroNIDAZOLE  500 mg IntraVENous Q8H    dilTIAZem  120 mg Oral Daily    gabapentin  300 mg Oral Nightly    nicotine  1 patch TransDERmal Daily    pantoprazole  40 mg Oral QAM AC    QUEtiapine  100 mg Oral Nightly    acetaminophen  650 mg Oral 4 times per day    vancomycin (VANCOCIN) intermittent dosing (placeholder)   Other RX Placeholder    insulin lispro  0-8 Units SubCUTAneous 4x Daily AC & HS     Continuous Infusions:   sodium chloride 20 mL/hr at 06/21/25 1640    dextrose       PRN Meds:.HYDROmorphone, HYDROmorphone, naloxone, LORazepam, sodium chloride flush, sodium chloride, ondansetron **OR** ondansetron,

## 2025-06-22 NOTE — PLAN OF CARE
Problem: Chronic Conditions and Co-morbidities  Goal: Patient's chronic conditions and co-morbidity symptoms are monitored and maintained or improved  6/21/2025 1301 by Rylee Phillip RN  Outcome: Progressing     Problem: Discharge Planning  Goal: Discharge to home or other facility with appropriate resources  6/21/2025 2138 by BONNIE Agudelo RN  Outcome: Progressing  6/21/2025 1301 by Rylee Phillip RN  Outcome: Progressing     Problem: Pain  Goal: Verbalizes/displays adequate comfort level or baseline comfort level  6/21/2025 2138 by BONNIE Agudelo RN  Outcome: Progressing  6/21/2025 1301 by Rylee Phillip RN  Outcome: Progressing     Problem: ABCDS Injury Assessment  Goal: Absence of physical injury  6/21/2025 2138 by BONNIE Agudelo RN  Outcome: Progressing  6/21/2025 1301 by Rylee Phillip RN  Outcome: Progressing     Problem: Safety - Adult  Goal: Free from fall injury  6/21/2025 2138 by BONNIE Agudelo RN  Outcome: Progressing  6/21/2025 1301 by Rylee Phillip RN  Outcome: Progressing

## 2025-06-22 NOTE — PROGRESS NOTES
Hospitalist Progress Note     Demographics    Patient Name  Keaton Van    Date of Birth 1965   Medical Record Number  [id number]      Age  60 y.o.   PCP Robert Wells PA-C   Admit date:  6/16/2025  7:20 PM     Room Number  3101/01  @ [facility]           Admission Diagnoses:  Acute osteomyelitis of lower leg, right (HCC)    Admission Summary:  \" Keaton Van is a 60 y.o.  male with PMHx as listed below presenting to the emergency department at direction of Dr. Schaeffer and vascular surgery with concern for infected dehisced right BKA stump with underlying osteomyelitis planned for I&D on Wednesday per patient.  Patient referred for admission and IV antibiotics preceding operative intervention.  Patient reports worsening appearance of wound with copious seropurulent drainage for the past 1-2 weeks.  Patient denies fever/chills/shakes.  No other symptoms reported outside of significant pain in right leg.  Patient prescribed Coumadin for atrial fibrillation (elected this agent for low cost), but states he has not been taking this since discharge from hospital out of concern that his leg would start bleeding again.  Also reports that he has not attended hemodialysis for the last 2 weeks reporting that he did not feel up to it with the pain in his leg.  Unfortunately, continues to smoke.     In the ED, patient afebrile, heart rate WNL, hypertensive with systolics to 230s, saturating upper 90s on room air.  Right knee radiographs demonstrate subtle cortical irregularity on distal margins of tibia and fibula concerning for early osteomyelitis.  CXR negative for acute process.  CT head negative for acute process.  Labs demonstrate: WBC 5.1, hemoglobin 10.0, platelets 164, sodium 138, potassium 5.1, glucose 135, BUN 50, creatinine 3.30, LFTs grossly unremarkable,-troponin 9, INR 1.1, ESR 64.  Patient given vancomycin and Zosyn and Dilaudid by ED provider.    \"     Assessment and plan:   Infected  glucagon (rDNA), dextrose, hydrALAZINE       Relevant other information:   Results       Procedure Component Value Units Date/Time    Blood Culture 1 [[id number]] Collected: 06/16/25 2132    Order Status: Completed Specimen: Blood Updated: 06/22/25 0815     Special Requests --        RIGHT  ARM       Culture NO GROWTH 6 DAYS       Blood Culture 2 [[id number]] Collected: 06/16/25 2132    Order Status: Completed Specimen: Blood Updated: 06/22/25 0815     Special Requests --        RIGHT  HAND       Culture NO GROWTH 6 DAYS                Recent Labs     06/20/25 0451 06/22/25 0524   WBC 4.9 4.0*   HGB 8.2* 8.4*   HCT 25.4* 25.9*   * 141*     Recent Labs     06/20/25 0451 06/22/25 0524    139   K 5.2* 5.2*   * 115*   CO2 19* 18*   BUN 59* 58*   INR 1.0 1.0      Lab Results   Component Value Date/Time    TSH 2.080 05/30/2024 02:44 AM         Other medical conditions are listed in the active hospital problem list section; these and other pertinent data were taken into consideration when the treatment plan was developed and customized to this patient's unique overall circumstances and needs.  We have reviewed relevant medical records within the constraints of this admission process.   High  complexity decision making was performed for this patient who is at high risk for decompensation with multiple organ involvement.    Today total floor/unit time was 35 minutes while caring for this patient and greater than 50% of that time was spent with patient (and/or family/responsible party) coordinating patient's clinical issues; this includes time spent during multidisciplinary rounds.        Krista Mejía MD MPH FACP [facility]Adv  6/22/2025

## 2025-06-23 LAB
ANION GAP SERPL CALC-SCNC: 4 MMOL/L (ref 2–12)
BASOPHILS # BLD: 0.03 K/UL (ref 0–0.1)
BASOPHILS NFR BLD: 0.8 % (ref 0–1)
BUN SERPL-MCNC: 59 MG/DL (ref 6–20)
BUN/CREAT SERPL: 17 (ref 12–20)
CALCIUM SERPL-MCNC: 8.2 MG/DL (ref 8.5–10.1)
CHLORIDE SERPL-SCNC: 117 MMOL/L (ref 97–108)
CO2 SERPL-SCNC: 19 MMOL/L (ref 21–32)
CREAT SERPL-MCNC: 3.56 MG/DL (ref 0.7–1.3)
DIFFERENTIAL METHOD BLD: ABNORMAL
EOSINOPHIL # BLD: 0.21 K/UL (ref 0–0.4)
EOSINOPHIL NFR BLD: 5.7 % (ref 0–7)
ERYTHROCYTE [DISTWIDTH] IN BLOOD BY AUTOMATED COUNT: 13.3 % (ref 11.5–14.5)
GLUCOSE BLD STRIP.AUTO-MCNC: 162 MG/DL (ref 65–117)
GLUCOSE BLD STRIP.AUTO-MCNC: 165 MG/DL (ref 65–117)
GLUCOSE BLD STRIP.AUTO-MCNC: 185 MG/DL (ref 65–117)
GLUCOSE BLD STRIP.AUTO-MCNC: 189 MG/DL (ref 65–117)
GLUCOSE SERPL-MCNC: 177 MG/DL (ref 65–100)
HCT VFR BLD AUTO: 24.7 % (ref 36.6–50.3)
HGB BLD-MCNC: 8.1 G/DL (ref 12.1–17)
IMM GRANULOCYTES # BLD AUTO: 0.02 K/UL (ref 0–0.04)
IMM GRANULOCYTES NFR BLD AUTO: 0.5 % (ref 0–0.5)
INR PPP: 1.1 (ref 0.9–1.1)
LYMPHOCYTES # BLD: 0.96 K/UL (ref 0.8–3.5)
LYMPHOCYTES NFR BLD: 26.2 % (ref 12–49)
MCH RBC QN AUTO: 29.5 PG (ref 26–34)
MCHC RBC AUTO-ENTMCNC: 32.8 G/DL (ref 30–36.5)
MCV RBC AUTO: 89.8 FL (ref 80–99)
MONOCYTES # BLD: 0.27 K/UL (ref 0–1)
MONOCYTES NFR BLD: 7.4 % (ref 5–13)
NEUTS SEG # BLD: 2.18 K/UL (ref 1.8–8)
NEUTS SEG NFR BLD: 59.4 % (ref 32–75)
NRBC # BLD: 0 K/UL (ref 0–0.01)
NRBC BLD-RTO: 0 PER 100 WBC
PLATELET # BLD AUTO: 143 K/UL (ref 150–400)
PMV BLD AUTO: 10.4 FL (ref 8.9–12.9)
POTASSIUM SERPL-SCNC: 5.4 MMOL/L (ref 3.5–5.1)
PROTHROMBIN TIME: 11.8 SEC (ref 9.2–11.2)
RBC # BLD AUTO: 2.75 M/UL (ref 4.1–5.7)
SERVICE CMNT-IMP: ABNORMAL
SODIUM SERPL-SCNC: 140 MMOL/L (ref 136–145)
WBC # BLD AUTO: 3.7 K/UL (ref 4.1–11.1)

## 2025-06-23 PROCEDURE — 6360000002 HC RX W HCPCS: Performed by: STUDENT IN AN ORGANIZED HEALTH CARE EDUCATION/TRAINING PROGRAM

## 2025-06-23 PROCEDURE — 36415 COLL VENOUS BLD VENIPUNCTURE: CPT

## 2025-06-23 PROCEDURE — 2580000003 HC RX 258: Performed by: SURGERY

## 2025-06-23 PROCEDURE — 1100000000 HC RM PRIVATE

## 2025-06-23 PROCEDURE — 6370000000 HC RX 637 (ALT 250 FOR IP): Performed by: SURGERY

## 2025-06-23 PROCEDURE — 6360000002 HC RX W HCPCS: Performed by: SURGERY

## 2025-06-23 PROCEDURE — 6370000000 HC RX 637 (ALT 250 FOR IP): Performed by: INTERNAL MEDICINE

## 2025-06-23 PROCEDURE — 6370000000 HC RX 637 (ALT 250 FOR IP): Performed by: STUDENT IN AN ORGANIZED HEALTH CARE EDUCATION/TRAINING PROGRAM

## 2025-06-23 PROCEDURE — 80048 BASIC METABOLIC PNL TOTAL CA: CPT

## 2025-06-23 PROCEDURE — 85610 PROTHROMBIN TIME: CPT

## 2025-06-23 PROCEDURE — 82962 GLUCOSE BLOOD TEST: CPT

## 2025-06-23 PROCEDURE — 6360000002 HC RX W HCPCS: Performed by: INTERNAL MEDICINE

## 2025-06-23 PROCEDURE — 85025 COMPLETE CBC W/AUTO DIFF WBC: CPT

## 2025-06-23 RX ORDER — LACTOBACILLUS RHAMNOSUS GG 10B CELL
1 CAPSULE ORAL DAILY
Qty: 30 CAPSULE | Refills: 0 | Status: SHIPPED | OUTPATIENT
Start: 2025-06-24

## 2025-06-23 RX ORDER — HYDROMORPHONE HYDROCHLORIDE 2 MG/1
1 TABLET ORAL EVERY 4 HOURS PRN
Qty: 12 TABLET | Refills: 0 | Status: SHIPPED | OUTPATIENT
Start: 2025-06-23 | End: 2025-06-27

## 2025-06-23 RX ORDER — LORAZEPAM 1 MG/1
1 TABLET ORAL EVERY 6 HOURS PRN
Qty: 10 TABLET | Refills: 0 | Status: SHIPPED | OUTPATIENT
Start: 2025-06-23 | End: 2025-07-23

## 2025-06-23 RX ORDER — GABAPENTIN 300 MG/1
300 CAPSULE ORAL NIGHTLY
Qty: 30 CAPSULE | Refills: 0 | Status: SHIPPED | OUTPATIENT
Start: 2025-06-23 | End: 2025-07-23

## 2025-06-23 RX ADMIN — Medication 1 CAPSULE: at 09:22

## 2025-06-23 RX ADMIN — INSULIN LISPRO 2 UNITS: 100 INJECTION, SOLUTION INTRAVENOUS; SUBCUTANEOUS at 13:07

## 2025-06-23 RX ADMIN — ACETAMINOPHEN 650 MG: 325 TABLET ORAL at 17:12

## 2025-06-23 RX ADMIN — ACETAMINOPHEN 650 MG: 325 TABLET ORAL at 06:09

## 2025-06-23 RX ADMIN — HYDROMORPHONE HYDROCHLORIDE 1 MG: 1 INJECTION, SOLUTION INTRAMUSCULAR; INTRAVENOUS; SUBCUTANEOUS at 14:14

## 2025-06-23 RX ADMIN — METRONIDAZOLE 500 MG: 500 INJECTION, SOLUTION INTRAVENOUS at 00:11

## 2025-06-23 RX ADMIN — HYDROMORPHONE HYDROCHLORIDE 1 MG: 1 INJECTION, SOLUTION INTRAMUSCULAR; INTRAVENOUS; SUBCUTANEOUS at 09:50

## 2025-06-23 RX ADMIN — PANTOPRAZOLE SODIUM 40 MG: 40 TABLET, DELAYED RELEASE ORAL at 06:10

## 2025-06-23 RX ADMIN — QUETIAPINE FUMARATE 100 MG: 100 TABLET ORAL at 22:03

## 2025-06-23 RX ADMIN — HYDROMORPHONE HYDROCHLORIDE 1 MG: 1 INJECTION, SOLUTION INTRAMUSCULAR; INTRAVENOUS; SUBCUTANEOUS at 01:12

## 2025-06-23 RX ADMIN — WARFARIN SODIUM 7 MG: 5 TABLET ORAL at 17:12

## 2025-06-23 RX ADMIN — GABAPENTIN 100 MG: 100 CAPSULE ORAL at 14:12

## 2025-06-23 RX ADMIN — METRONIDAZOLE 500 MG: 500 INJECTION, SOLUTION INTRAVENOUS at 09:23

## 2025-06-23 RX ADMIN — DILTIAZEM HYDROCHLORIDE 120 MG: 120 CAPSULE, EXTENDED RELEASE ORAL at 09:21

## 2025-06-23 RX ADMIN — SODIUM ZIRCONIUM CYCLOSILICATE 10 G: 10 POWDER, FOR SUSPENSION ORAL at 13:07

## 2025-06-23 RX ADMIN — CEFEPIME 1000 MG: 1 INJECTION, POWDER, FOR SOLUTION INTRAMUSCULAR; INTRAVENOUS at 09:24

## 2025-06-23 RX ADMIN — HYDROMORPHONE HYDROCHLORIDE 1 MG: 1 INJECTION, SOLUTION INTRAMUSCULAR; INTRAVENOUS; SUBCUTANEOUS at 22:03

## 2025-06-23 RX ADMIN — ACETAMINOPHEN 650 MG: 325 TABLET ORAL at 00:09

## 2025-06-23 RX ADMIN — METRONIDAZOLE 500 MG: 500 INJECTION, SOLUTION INTRAVENOUS at 17:11

## 2025-06-23 RX ADMIN — GABAPENTIN 100 MG: 100 CAPSULE ORAL at 22:02

## 2025-06-23 RX ADMIN — INSULIN LISPRO 2 UNITS: 100 INJECTION, SOLUTION INTRAVENOUS; SUBCUTANEOUS at 22:04

## 2025-06-23 RX ADMIN — HYDROMORPHONE HYDROCHLORIDE 1 MG: 1 INJECTION, SOLUTION INTRAMUSCULAR; INTRAVENOUS; SUBCUTANEOUS at 18:34

## 2025-06-23 RX ADMIN — ACETAMINOPHEN 650 MG: 325 TABLET ORAL at 13:06

## 2025-06-23 RX ADMIN — GABAPENTIN 100 MG: 100 CAPSULE ORAL at 09:21

## 2025-06-23 RX ADMIN — HYDROMORPHONE HYDROCHLORIDE 1 MG: 1 INJECTION, SOLUTION INTRAMUSCULAR; INTRAVENOUS; SUBCUTANEOUS at 05:32

## 2025-06-23 ASSESSMENT — PAIN SCALES - GENERAL
PAINLEVEL_OUTOF10: 3
PAINLEVEL_OUTOF10: 4
PAINLEVEL_OUTOF10: 7
PAINLEVEL_OUTOF10: 10
PAINLEVEL_OUTOF10: 7
PAINLEVEL_OUTOF10: 8
PAINLEVEL_OUTOF10: 7
PAINLEVEL_OUTOF10: 2
PAINLEVEL_OUTOF10: 8

## 2025-06-23 ASSESSMENT — PAIN - FUNCTIONAL ASSESSMENT
PAIN_FUNCTIONAL_ASSESSMENT: ACTIVITIES ARE NOT PREVENTED

## 2025-06-23 ASSESSMENT — PAIN DESCRIPTION - ORIENTATION
ORIENTATION: RIGHT
ORIENTATION: RIGHT;LOWER

## 2025-06-23 ASSESSMENT — PAIN DESCRIPTION - DESCRIPTORS
DESCRIPTORS: ACHING
DESCRIPTORS: THROBBING;BURNING
DESCRIPTORS: ACHING
DESCRIPTORS: THROBBING;BURNING
DESCRIPTORS: ACHING
DESCRIPTORS: OTHER (COMMENT)

## 2025-06-23 ASSESSMENT — PAIN DESCRIPTION - LOCATION
LOCATION: LEG

## 2025-06-23 ASSESSMENT — PAIN SCALES - WONG BAKER
WONGBAKER_NUMERICALRESPONSE: HURTS A LITTLE BIT
WONGBAKER_NUMERICALRESPONSE: HURTS A LITTLE BIT

## 2025-06-23 NOTE — PROGRESS NOTES
Pharmacist Daily Dosing of Warfarin    Indication & Goal INR: AFib, Goal INR 2-3    PTA Warfarin Dose: 6mg daily     Notable Concurrent Conditions and Medications:   Drug Interactions: Metronidazole  Conditions: None    Recent Labs     Units 06/23/25  0421 06/22/25  0524   INR   1.1 1.0   HGB g/dL 8.1* 8.4*   HCT % 24.7* 25.9*   PLT K/uL 143* 141*     Impression/Plan:   Ordered warfarin 7 mg PO x 1 dose today - watch interaction with flagyl therapy  Bridge Therapy: Not indicated   Daily INR has been ordered  CBC w/o differential every other day has been ordered     Pharmacy will follow daily and adjust the dose as appropriate.    Thank you,  MARTHA STILES, [facility]

## 2025-06-23 NOTE — CARE COORDINATION
Transition of Care Plan:    RUR: 35% High Risk  Prior Level of Functioning: Independent with ADL's   Disposition: LTC with Hospice services  TAN: 6/23  If SNF or IPR: Date FOC offered: 6/23  Date FOC received: 6/23  Accepting facility: Pending  Date authorization started with reference number:   Date authorization received and expires:   Follow up appointments: Defer to rehab  DME needed: Defer to rehab  Transportation at discharge: BLS to transport  IM/IMM Medicare/ letter given: 2 IM to be given prior discharge  Is patient a [place] and connected with VA? N/A  Caregiver Contact:   Anuradha Brown (Brother/Sister)  [phone] (Mobile)  Discharge Caregiver contacted prior to discharge? Yes  Care Conference needed? N/A  Barriers to discharge:  Accepting facility       Initial Note: Chart Reviewed: MD stated pt is agreeable to hospice services. OMARI spoke with pt and pts sister at bedside and stated he would be agreeable to discharging to a SNF for LTC hospice services. Pt was screened for medicaid and is currently pending approval. OMARI spoke with [facility] liaison and she stated [facility] with pending medicaid. Pt stated he is agreeable with referrals being sent to [facility] not [facility]. Referrals sent and CM will continue to follow.     MONIQUE Alcaraz,OMARI  [phone]

## 2025-06-23 NOTE — DISCHARGE INSTRUCTIONS
Discharge instructions   Demographics    Patient Name  Keaton Van   Date of Birth 1965   Medical Record Number  [id number]    Age  60 y.o.   PCP Robert Wells MD    Admit date:  6/16/2025   Room Number  3415/01  [facility]    Discharge dispostion [facility]   Discharging clinician Crista Garnica MD    Discharge date/Time 6/27/25 3:40 PM       DISCHARGE DIAGNOSIS:  Acquired right below the knee amputation  Right stump infection  Right stump osteomyelitis  End-stage renal disease  Dialysis dependent  Medical noncompliance with dialysis  Hyperkalemia  Hyponatremia  Anemia of chronic disease   Uncontrolled insulin dependent diabetes mellitus with hyperglycemia  Uncontrolled hypertension  Heart failure w/ preserved EF  Atrial fibrillation   Gastroesophageal reflux disease   Bipolar disorder  Personality disorder   Polysubstance abuse   Chronic hepatitis C   DDD of the lumbar spine  Chronic pain syndrome    MEDICATIONS: Hospice Medical Doctor will issue medications according the hospice protocol for symptom management   You may continue to take the above listed medications    As per medication reconciliation  list  It is important that you take the medication exactly as they are prescribed.   Keep your medication in the bottles provided by the pharmacist and keep a list of the medication names, dosages, and times to be taken in your wallet.   Do not take other medications without consulting your doctor.      Recommended diet: Comfort feeding     Recommended activity: activity as tolerated    Wound care: See surgical/procedure care instructions    Indwelling devices:  None    Supplemental Oxygen: None    Required Lab work: NONE    Glucose management:  None    Code status: DNR    Outpatient physician follow up: Please see above list with follow up instructions.        If you experience any of the following symptoms   Fever, chills, nausea, vomiting, diarrhea, change in mentation,

## 2025-06-23 NOTE — DISCHARGE SUMMARY
Discharge Summary    Name: Keaton Van  [id number]  YOB: 1965 (Age: 60 y.o.)   Date of Admission: 6/16/2025  Date of Discharge: 6/23/2025  Attending Physician: Crista Garnica MD    Discharge Diagnosis:     Acquired right below the knee amputation  Right stump infection  Right stump osteomyelitis  End-stage renal disease  Dialysis dependent  Medical noncompliance with dialysis  Hyperkalemia  Hyponatremia  Anemia of chronic disease   Uncontrolled insulin dependent diabetes mellitus with hyperglycemia  Uncontrolled hypertension  Heart failure w/ preserved EF  Atrial fibrillation  Gastroesophageal reflux disease   Bipolar disorder  Personality disorder   Polysubstance abuse    Chronic hepatitis C   DDD of the lumbar spine  Chronic pain syndrome     Consultations:  IP CONSULT TO NEPHROLOGY  IP CONSULT TO VASCULAR SURGERY  IP CONSULT TO CASE MANAGEMENT  IP CONSULT TO PHARMACY  IP CONSULT TO CASE MANAGEMENT  IP CONSULT TO CASE MANAGEMENT  IP CONSULT TO CASE MANAGEMENT  IP CONSULT TO PHARMACY      Brief Admission History/Reason for Admission Per Zeke Franklin, DO:     Keaton Van is a 60 y.o.  male with PMHx as listed below presenting to the emergency department at direction of Dr. Schaeffer and vascular surgery with concern for infected dehisced right BKA stump with underlying osteomyelitis planned for I&D on Wednesday per patient.  Patient referred for admission and IV antibiotics preceding operative intervention.  Patient reports worsening appearance of wound with copious seropurulent drainage for the past 1-2 weeks.  Patient denies fever/chills/shakes.  No other symptoms reported outside of significant pain in right leg.  Patient prescribed Coumadin for atrial fibrillation (elected this agent for low cost), but states he has not been taking this since discharge from hospital out of concern that his leg would start bleeding again.  Also reports that he has not attended  patient

## 2025-06-23 NOTE — PROGRESS NOTES
End of Shift Note    Bedside shift change report given to BHAKTI Mckee (oncoming nurse) by BONNIE Agudelo RN (offgoing nurse).  Report included the following information SBAR, Intake/Output, and MAR    Shift worked:  7pm-7am     Shift summary and any significant changes:     Pain managed with PRN dilaudid x3 1mg and pt report being  relieved of pain and even able to slept for some hours before it wears off. Diet well tolerated. X1 bm this shift also voiding but unable to measure the output since its mixed with bm,using bedside commode. Iv antibiotic given as per MAR. Dressing intact.     Concerns for physician to address:       Zone phone for oncoming shift:   [phone]           BONNIE Agudelo, BHAKTI

## 2025-06-23 NOTE — PROGRESS NOTES
06/23/25        I have been asked to see this patient by Crista Garnica MD  for advice/opinion re: -----                                                        Assessment:         End-stage kidney disease on hemodialysis-right tunneled hemodialysis catheter-[facility] [address]-Monday Wednesday Friday schedule  Noncompliance-missed dialysis treatment  Hyperkalemia  Right BKA stump cellulitis plus osteomyelitis  Chronic atrial fibrillation on anticoagulant therapy  Hypertension with CKD stage V  Type 2 diabetes mellitus  Peripheral neuropathy Discussion:       Plan:   Removal of dialysis catheter before he discharges from the hospital.  Start lokelma QOD.  I talked with him again about dialysis and he is very clear that he does not want to do HD.                               Signed By: Rolando Adrian MD     June 23, 2025         Seen and examined  No new complaints.  Pain well-controlled    Family history:  No history of CKD or ESRD in the family.     Allergies   Allergen Reactions    Midazolam Shortness Of Breath     Other reaction(s): Unknown (comments)  Numbness, with shortness of breath  Weakness          Current Facility-Administered Medications   Medication Dose Route Frequency Provider Last Rate Last Admin    warfarin (COUMADIN) tablet 7 mg  7 mg Oral Once Crista Garnica MD        sodium zirconium cyclosilicate (LOKELMA) oral suspension 10 g  10 g Oral Every Other Day Rolando Adrian MD        gabapentin (NEURONTIN) capsule 100 mg  100 mg Oral TID Krista Mejía MD   100 mg at 06/23/25 0921    HYDROmorphone HCl PF (DILAUDID) injection 1 mg  1 mg IntraVENous Q4H PRN Krista Mejía MD   1 mg at 06/23/25 0950    HYDROmorphone (DILAUDID) tablet 1 mg  1 mg Oral Q4H PRN Krista Mejía MD   1 mg at 06/22/25 1304    lactobacillus (CULTURELLE) capsule 1 capsule  1  2 diabetes mellitus with hyperglycemia, with long-term current use of insulin ([facility])    Adjustment disorder    Lumbar back pain with radiculopathy affecting lower extremity    Controlled diabetes mellitus type 2 with complications ([facility])    Stage 4 chronic kidney disease ([facility])    Cellulitis of right foot    Methicillin susceptible Staphylococcus aureus infection    KIN (acute kidney injury)    Stage 3a chronic kidney disease ([facility])    Mood disorder    CHF (congestive heart failure), NYHA class I, acute on chronic, combined ([facility])    Acute on chronic congestive heart failure ([facility])    Acute metabolic encephalopathy    Hyperglycemia    Amputation of right lower extremity below knee ([facility])    Opioid overdose, accidental or unintentional, initial encounter ([facility])    Drug overdose, accidental or unintentional, initial encounter    Coagulase negative Staphylococcus bacteremia    Cellulitis of left hand    Flexor tenosynovitis of finger    Staphylococcal arthritis of right hand ([facility])    Cat bite    Abscess of left hand    Pyogenic arthritis of left hand ([facility])    Infection due to Bacillus species    ESRD (end stage renal disease) on dialysis ([facility])    A-V fistula    Right BKA infection ([facility])    Wound infection    Cat bite of left hand including fingers with infection, subsequent encounter    Hx of BKA, right ([facility])    Depression    Hyperkalemia    Toxic metabolic encephalopathy    Moderate protein-energy malnutrition    Bleeding    Acute osteomyelitis of lower leg, right ([facility])      DNR     Care Plan discussed with:  Patient x    Family      RN     Dialysis RN     Consultant:     ED     Intensivist     Hospitalist

## 2025-06-23 NOTE — PROGRESS NOTES
Physical Therapy  Attempting to see patient for PT this pm. Patient refusing therapy at this time stating he is too tired and \"has a lot on his mind\". Per chart review, patient does not wish to continue to dialysis. He reports that he is planning on transitioning to LTC then hospice for \"his final resting place. I'm tired I just don't want to do this anymore.\".   Will follow back tomorrow per patient request.  Thank you,  Anuradha Ignacio, PT

## 2025-06-23 NOTE — PROGRESS NOTES
End of Shift Note    Bedside shift change report given to BHAKTI Kaminski (oncoming nurse) by Rylee Phillip RN (offgoing nurse).  Report included the following information SBAR, Intake/Output, MAR, and Recent Results    Shift worked:  8400-6979     Shift summary and any significant changes:     PRN Dilaudid x3 given today for right stump pain. Good appetite, sliding scale insulin provided per order. Pt states he is \" ready to get out of here and just be pain free.\" Stated he would like to talk to spiritual services tomorrow prior to discharge.      Concerns for physician to address:  None     Zone phone for oncoming shift:   [phone]       Activity:  Level of Assistance: Independent  Number times ambulated in hallways past shift: 0  Number of times OOB to chair past shift: 0    Cardiac:   Cardiac Monitoring: Yes      Cardiac Rhythm: Sinus rhythm, Atrial fib    Access:  Current line(s): PIV     Genitourinary:   Urinary Status: Voiding, Oliguria    Respiratory:   O2 Device: None (Room air)  Chronic home O2 use?: NO  Incentive spirometer at bedside: YES    GI:  Last BM (including prior to admit): 06/22/25  Current diet:  DIET ONE TIME MESSAGE;  ADULT DIET; Regular; 3 carb choices (45 gm/meal); Low Potassium (Less than 3000 mg/day)  Passing flatus: YES    Pain Management:   Patient states pain is manageable on current regimen: YES    Skin:  Kennedy Scale Score: 19  Interventions: Wound Offloading (Prevention Methods): Pillows, Repositioning, Turning    Patient Safety:  Fall Risk: Nursing Judgement-Fall Risk High(Add Comments): Yes  Fall Risk Interventions  Nursing Judgement-Fall Risk High(Add Comments): Yes  Toilet Every 2 Hours-In Advance of Need: Yes  Hourly Visual Checks: Awake, In bed  Fall Visual Posted: Socks, Fall sign posted  Room Door Open: Deferred to promote rest  Alarm On: Other (Comment) (pt refused, calls appropriately, steady on feet)  Patient Moved Closer to Nursing Station: No    Active Consults:   IP CONSULT TO

## 2025-06-24 ENCOUNTER — HOSPITAL ENCOUNTER (INPATIENT)
Facility: HOSPITAL | Age: 60
Discharge: HOME | DRG: 463 | End: 2025-06-27
Payer: MEDICARE

## 2025-06-24 LAB
ANION GAP SERPL CALC-SCNC: 6 MMOL/L (ref 2–12)
BUN SERPL-MCNC: 59 MG/DL (ref 6–20)
BUN/CREAT SERPL: 16 (ref 12–20)
CALCIUM SERPL-MCNC: 8.1 MG/DL (ref 8.5–10.1)
CHLORIDE SERPL-SCNC: 117 MMOL/L (ref 97–108)
CO2 SERPL-SCNC: 18 MMOL/L (ref 21–32)
CREAT SERPL-MCNC: 3.79 MG/DL (ref 0.7–1.3)
GLUCOSE BLD STRIP.AUTO-MCNC: 151 MG/DL (ref 65–117)
GLUCOSE BLD STRIP.AUTO-MCNC: 163 MG/DL (ref 65–117)
GLUCOSE BLD STRIP.AUTO-MCNC: 220 MG/DL (ref 65–117)
GLUCOSE BLD STRIP.AUTO-MCNC: 276 MG/DL (ref 65–117)
GLUCOSE SERPL-MCNC: 128 MG/DL (ref 65–100)
INR PPP: 1.2 (ref 0.9–1.1)
POTASSIUM SERPL-SCNC: 5.2 MMOL/L (ref 3.5–5.1)
PROTHROMBIN TIME: 12.4 SEC (ref 9.2–11.2)
SERVICE CMNT-IMP: ABNORMAL
SODIUM SERPL-SCNC: 141 MMOL/L (ref 136–145)

## 2025-06-24 PROCEDURE — 6360000002 HC RX W HCPCS: Performed by: STUDENT IN AN ORGANIZED HEALTH CARE EDUCATION/TRAINING PROGRAM

## 2025-06-24 PROCEDURE — 85610 PROTHROMBIN TIME: CPT

## 2025-06-24 PROCEDURE — 82962 GLUCOSE BLOOD TEST: CPT

## 2025-06-24 PROCEDURE — 2500000003 HC RX 250 WO HCPCS: Performed by: SURGERY

## 2025-06-24 PROCEDURE — 1100000000 HC RM PRIVATE

## 2025-06-24 PROCEDURE — 6360000002 HC RX W HCPCS: Performed by: SURGERY

## 2025-06-24 PROCEDURE — 6370000000 HC RX 637 (ALT 250 FOR IP): Performed by: INTERNAL MEDICINE

## 2025-06-24 PROCEDURE — 6370000000 HC RX 637 (ALT 250 FOR IP): Performed by: SURGERY

## 2025-06-24 PROCEDURE — 80048 BASIC METABOLIC PNL TOTAL CA: CPT

## 2025-06-24 PROCEDURE — 36589 REMOVAL TUNNELED CV CATH: CPT

## 2025-06-24 PROCEDURE — 2580000003 HC RX 258: Performed by: SURGERY

## 2025-06-24 PROCEDURE — 2580000003 HC RX 258: Performed by: STUDENT IN AN ORGANIZED HEALTH CARE EDUCATION/TRAINING PROGRAM

## 2025-06-24 PROCEDURE — 6360000002 HC RX W HCPCS: Performed by: INTERNAL MEDICINE

## 2025-06-24 PROCEDURE — 36415 COLL VENOUS BLD VENIPUNCTURE: CPT

## 2025-06-24 PROCEDURE — 6370000000 HC RX 637 (ALT 250 FOR IP): Performed by: STUDENT IN AN ORGANIZED HEALTH CARE EDUCATION/TRAINING PROGRAM

## 2025-06-24 RX ORDER — HYDROMORPHONE HYDROCHLORIDE 1 MG/ML
1.5 INJECTION, SOLUTION INTRAMUSCULAR; INTRAVENOUS; SUBCUTANEOUS EVERY 4 HOURS PRN
Status: DISCONTINUED | OUTPATIENT
Start: 2025-06-24 | End: 2025-06-27

## 2025-06-24 RX ORDER — HYDROXYZINE HYDROCHLORIDE 25 MG/1
25 TABLET, FILM COATED ORAL 3 TIMES DAILY PRN
Status: DISCONTINUED | OUTPATIENT
Start: 2025-06-24 | End: 2025-06-27

## 2025-06-24 RX ORDER — LIDOCAINE HYDROCHLORIDE 20 MG/ML
20 INJECTION, SOLUTION INFILTRATION; PERINEURAL ONCE
Status: COMPLETED | OUTPATIENT
Start: 2025-06-24 | End: 2025-06-24

## 2025-06-24 RX ORDER — HYDROMORPHONE HYDROCHLORIDE 2 MG/1
2 TABLET ORAL EVERY 4 HOURS PRN
Refills: 0 | Status: DISCONTINUED | OUTPATIENT
Start: 2025-06-24 | End: 2025-06-27

## 2025-06-24 RX ORDER — HYDROXYZINE HYDROCHLORIDE 10 MG/1
10 TABLET, FILM COATED ORAL 3 TIMES DAILY PRN
Status: DISCONTINUED | OUTPATIENT
Start: 2025-06-24 | End: 2025-06-24

## 2025-06-24 RX ORDER — LABETALOL HYDROCHLORIDE 5 MG/ML
10 INJECTION, SOLUTION INTRAVENOUS EVERY 6 HOURS PRN
Status: DISCONTINUED | OUTPATIENT
Start: 2025-06-24 | End: 2025-06-27

## 2025-06-24 RX ADMIN — GABAPENTIN 100 MG: 100 CAPSULE ORAL at 10:45

## 2025-06-24 RX ADMIN — SODIUM CHLORIDE, PRESERVATIVE FREE 10 ML: 5 INJECTION INTRAVENOUS at 10:47

## 2025-06-24 RX ADMIN — INSULIN LISPRO 2 UNITS: 100 INJECTION, SOLUTION INTRAVENOUS; SUBCUTANEOUS at 21:25

## 2025-06-24 RX ADMIN — HYDRALAZINE HYDROCHLORIDE 10 MG: 20 INJECTION INTRAMUSCULAR; INTRAVENOUS at 11:49

## 2025-06-24 RX ADMIN — ACETAMINOPHEN 650 MG: 325 TABLET ORAL at 05:57

## 2025-06-24 RX ADMIN — ACETAMINOPHEN 650 MG: 325 TABLET ORAL at 01:45

## 2025-06-24 RX ADMIN — HYDROMORPHONE HYDROCHLORIDE 1 MG: 1 INJECTION, SOLUTION INTRAMUSCULAR; INTRAVENOUS; SUBCUTANEOUS at 09:59

## 2025-06-24 RX ADMIN — LABETALOL HYDROCHLORIDE 10 MG: 5 INJECTION, SOLUTION INTRAVENOUS at 14:11

## 2025-06-24 RX ADMIN — HYDROMORPHONE HYDROCHLORIDE 1 MG: 1 INJECTION, SOLUTION INTRAMUSCULAR; INTRAVENOUS; SUBCUTANEOUS at 01:46

## 2025-06-24 RX ADMIN — Medication 1 CAPSULE: at 10:45

## 2025-06-24 RX ADMIN — DILTIAZEM HYDROCHLORIDE 120 MG: 120 CAPSULE, EXTENDED RELEASE ORAL at 10:46

## 2025-06-24 RX ADMIN — SODIUM CHLORIDE: 0.9 INJECTION, SOLUTION INTRAVENOUS at 01:57

## 2025-06-24 RX ADMIN — HYDROMORPHONE HYDROCHLORIDE 1.5 MG: 1 INJECTION, SOLUTION INTRAMUSCULAR; INTRAVENOUS; SUBCUTANEOUS at 22:34

## 2025-06-24 RX ADMIN — LORAZEPAM 1 MG: 1 TABLET ORAL at 11:04

## 2025-06-24 RX ADMIN — HYDROMORPHONE HYDROCHLORIDE 1 MG: 1 INJECTION, SOLUTION INTRAMUSCULAR; INTRAVENOUS; SUBCUTANEOUS at 04:50

## 2025-06-24 RX ADMIN — LIDOCAINE HYDROCHLORIDE 5 ML: 20 INJECTION, SOLUTION INFILTRATION; PERINEURAL at 10:09

## 2025-06-24 RX ADMIN — METRONIDAZOLE 500 MG: 500 INJECTION, SOLUTION INTRAVENOUS at 01:59

## 2025-06-24 RX ADMIN — PANTOPRAZOLE SODIUM 40 MG: 40 TABLET, DELAYED RELEASE ORAL at 06:01

## 2025-06-24 RX ADMIN — HYDROMORPHONE HYDROCHLORIDE 1.5 MG: 1 INJECTION, SOLUTION INTRAMUSCULAR; INTRAVENOUS; SUBCUTANEOUS at 14:18

## 2025-06-24 RX ADMIN — METRONIDAZOLE 500 MG: 500 INJECTION, SOLUTION INTRAVENOUS at 16:46

## 2025-06-24 RX ADMIN — METRONIDAZOLE 500 MG: 500 INJECTION, SOLUTION INTRAVENOUS at 10:49

## 2025-06-24 RX ADMIN — GABAPENTIN 100 MG: 100 CAPSULE ORAL at 21:25

## 2025-06-24 RX ADMIN — QUETIAPINE FUMARATE 100 MG: 100 TABLET ORAL at 21:25

## 2025-06-24 RX ADMIN — ACETAMINOPHEN 650 MG: 325 TABLET ORAL at 18:28

## 2025-06-24 RX ADMIN — WARFARIN SODIUM 7 MG: 5 TABLET ORAL at 18:27

## 2025-06-24 RX ADMIN — GABAPENTIN 100 MG: 100 CAPSULE ORAL at 14:09

## 2025-06-24 RX ADMIN — HYDROMORPHONE HYDROCHLORIDE 1.5 MG: 1 INJECTION, SOLUTION INTRAMUSCULAR; INTRAVENOUS; SUBCUTANEOUS at 18:29

## 2025-06-24 RX ADMIN — ACETAMINOPHEN 650 MG: 325 TABLET ORAL at 14:08

## 2025-06-24 RX ADMIN — INSULIN LISPRO 4 UNITS: 100 INJECTION, SOLUTION INTRAVENOUS; SUBCUTANEOUS at 18:27

## 2025-06-24 RX ADMIN — CEFEPIME 1000 MG: 1 INJECTION, POWDER, FOR SOLUTION INTRAMUSCULAR; INTRAVENOUS at 10:55

## 2025-06-24 ASSESSMENT — PAIN DESCRIPTION - ORIENTATION
ORIENTATION: RIGHT
ORIENTATION: RIGHT;LOWER
ORIENTATION: RIGHT
ORIENTATION: RIGHT
ORIENTATION: RIGHT;LOWER
ORIENTATION: RIGHT
ORIENTATION: RIGHT

## 2025-06-24 ASSESSMENT — PAIN SCALES - GENERAL
PAINLEVEL_OUTOF10: 7
PAINLEVEL_OUTOF10: 7
PAINLEVEL_OUTOF10: 8
PAINLEVEL_OUTOF10: 7
PAINLEVEL_OUTOF10: 8
PAINLEVEL_OUTOF10: 9
PAINLEVEL_OUTOF10: 8
PAINLEVEL_OUTOF10: 8
PAINLEVEL_OUTOF10: 4
PAINLEVEL_OUTOF10: 9
PAINLEVEL_OUTOF10: 8

## 2025-06-24 ASSESSMENT — PAIN DESCRIPTION - LOCATION
LOCATION: LEG
LOCATION: GENERALIZED;BACK;LEG
LOCATION: LEG;OTHER (COMMENT)
LOCATION: OTHER (COMMENT)
LOCATION: FOOT
LOCATION: OTHER (COMMENT)
LOCATION: LEG

## 2025-06-24 ASSESSMENT — PAIN - FUNCTIONAL ASSESSMENT
PAIN_FUNCTIONAL_ASSESSMENT: PREVENTS OR INTERFERES SOME ACTIVE ACTIVITIES AND ADLS
PAIN_FUNCTIONAL_ASSESSMENT: PREVENTS OR INTERFERES SOME ACTIVE ACTIVITIES AND ADLS
PAIN_FUNCTIONAL_ASSESSMENT: ACTIVITIES ARE NOT PREVENTED
PAIN_FUNCTIONAL_ASSESSMENT: ACTIVITIES ARE NOT PREVENTED

## 2025-06-24 ASSESSMENT — PAIN DESCRIPTION - DESCRIPTORS
DESCRIPTORS: ACHING;BURNING
DESCRIPTORS: ACHING
DESCRIPTORS: ACHING;BURNING
DESCRIPTORS: ACHING
DESCRIPTORS: ACHING;THROBBING
DESCRIPTORS: ACHING

## 2025-06-24 ASSESSMENT — PAIN SCALES - WONG BAKER: WONGBAKER_NUMERICALRESPONSE: HURTS A LITTLE BIT

## 2025-06-24 NOTE — PROGRESS NOTES
End of Shift Note    Bedside shift change report given to BHAKTI Guidry (oncoming nurse) by Rylee Phillip RN (offgoing nurse).  Report included the following information SBAR, Intake/Output, MAR, and Recent Results    Shift worked:  5211-2381     Shift summary and any significant changes:     PRN Dilaudid given per order, dose increased by provider for comfort. DAPHNE hypertensive in afternoon, controlled by PRN Labetalol.  Decreased appetite today, but still tolerating food. BM x1 today, voiding in bedside commode.   Tunneled HD cath removed today, dressing CDI.      Concerns for physician to address:  Need Right stump wound care provided by provider on 6/25.      Zone phone for oncoming shift:   [phone]       Activity:  Level of Assistance: Independent  Number times ambulated in hallways past shift: 0  Number of times OOB to chair past shift: 0    Cardiac:   Cardiac Monitoring: Yes      Cardiac Rhythm: Sinus rhythm, Atrial fib    Access:  Current line(s): PIV     Genitourinary:   Urinary Status: Voiding, Oliguria    Respiratory:   O2 Device: None (Room air)  Chronic home O2 use?: NO  Incentive spirometer at bedside: YES    GI:  Last BM (including prior to admit): 06/24/25  Current diet:  DIET ONE TIME MESSAGE;  ADULT DIET; Regular; 3 carb choices (45 gm/meal); Low Potassium (Less than 3000 mg/day)  ADULT ORAL NUTRITION SUPPLEMENT; Lunch, Dinner; Diabetic Oral Supplement  Passing flatus: YES    Pain Management:   Patient states pain is manageable on current regimen: YES    Skin:  Kennedy Scale Score: 19  Interventions: Wound Offloading (Prevention Methods): Pillows, Repositioning, Turning    Patient Safety:  Fall Risk: Nursing Judgement-Fall Risk High(Add Comments): Yes  Fall Risk Interventions  Nursing Judgement-Fall Risk High(Add Comments): Yes  Toilet Every 2 Hours-In Advance of Need: No (Comment)  Hourly Visual Checks: In bed, Eyes closed  Fall Visual Posted: Armband, Fall sign posted, Socks  Room Door Open: Yes  Alarm On:

## 2025-06-24 NOTE — PROGRESS NOTES
Physical Therapy  Chart reviewed for updates and attempted to see patient for PT treatment. Patient in bed on arrival. He reports wishes for comfort care with plans to transition to hospice. He requests for therapy services to stop to allow him to rest. Patient verbalized he is aware of his choices and confident in his decision.   Will sign off per patients wishes.   Oral [place], PT, DPT

## 2025-06-24 NOTE — PROGRESS NOTES
Spiritual Health History and Assessment/Progress Note  [facility]    Follow-up,  , Life Adjustments, Adjustment to illness,      Name: Keaton Van MRN: [id number]    Age: 60 y.o.     Sex: male   Language: English   Latter-day: Adventism   Acute osteomyelitis of lower leg, right (HCC)     Date: 6/24/2025            Total Time Calculated: 8 min              Spiritual Assessment began in [facility] 3 SURG TELE        Referral/Consult From: Multi-disciplinary team   Encounter Overview/Reason: Follow-up  Service Provided For: Patient and family together    Keisha, Belief, Meaning:   Patient has beliefs or practices that help with coping during difficult times  Family/Friends have beliefs or practices that help with coping during difficult times      Importance and Influence:  Patient has spiritual/personal beliefs that influence decisions regarding their health  Family/Friends have spiritual/personal beliefs that influence decisions regarding the patient's health    Community:  Patient Other: unable to assess, staff attending at this time.  Family/Friends Other:      Assessment and Plan of Care:     Patient Interventions include: Affirmed coping skills/support systems  Family/Friends Interventions include: Affirmed coping skills/support systems. Patient receiving care from staff when  attempted visiting.he stated that this was not a good time, advised to reach a  whenever he was ready.     Patient Plan of Care: Spiritual Care available upon further referral  Family/Friends Plan of Care: Spiritual Care available upon further referral    Electronically signed by JASON Krause on 6/24/2025 at 10:55 AM

## 2025-06-24 NOTE — PROGRESS NOTES
1328:  Psychiatry consult called.    1516: Laura Leon is covering, she inquired on what all needs to be provided for Level 2 screening. CM number provided. Call back number is [phone]   Progress Note    Admit Date:  3/23/2021    67year old female presented with multiple complaints. She stated she had been feeling short of breath, dehydrated and weak. She also states that she has not been able to swallow over the last couple of days. She thinks her swallowing dysfunction is secondary to her neck and cervical spine issues that she has. A p.o. challenge resulted in coughing and concerns of possible aspiration. Decision was made to admit her for GI evaluation and possible speech evaluation for her dysphagia. Subjective:  Ms. Viola Mcginnis reports feeling much improved today. WBC dropped to 1.6  Platelets 74    Reports having ongoing issues with dysphagia, neck issues, and occasional shortness of breath. She reports she has been seeing a chiropractor and this helps temporarily. Sometimes she feels like her neck is in her chest.      Objective:   Patient Vitals for the past 4 hrs:   BP Temp Temp src Pulse Resp SpO2   03/24/21 0845 (!) 159/73 96.7 °F (35.9 °C) Oral 75 16 98 %            Intake/Output Summary (Last 24 hours) at 3/24/2021 1203  Last data filed at 3/24/2021 0820  Gross per 24 hour   Intake 1913 ml   Output    Net 1913 ml       Physical Exam:  Gen: No distress. Alert. Eyes: PERRL. No sclera icterus. No conjunctival injection. ENT: No discharge. Pharynx clear. Neck: No JVD. No Carotid Bruit. Trachea midline. + right supra-clavicular lymph node  Resp: No accessory muscle use. No crackles. No wheezes. No rhonchi. CV: Regular rate. Regular rhythm. No murmur. No rub. No edema. Capillary Refill: Brisk,< 3 seconds   Peripheral Pulses: +2 palpable, equal bilaterally   GI: Non-tender. Non-distended. Normal bowel sounds. No hernia. Skin: Warm and dry. No nodule on exposed extremities. No rash on exposed extremities. M/S: No cyanosis. No joint deformity. No clubbing. Neuro: Awake. Grossly nonfocal    Psych: Oriented x 3.  No anxiety or agitation      Data:  CBC:   Recent Labs

## 2025-06-24 NOTE — PROGRESS NOTES
Comprehensive Nutrition Assessment    Type and Reason for Visit: Initial, LOS, Wound    Nutrition Recommendations/Plan:   CC diet as tolerated.  Extra protein and vegetables on meal trays  Glucerna BID as supplement       Malnutrition Assessment:  Malnutrition Status:  At risk for malnutrition (wounds, noncompliance) (06/24/25 0918)           Nutrition Assessment:    Pt admitted with acute osteomyelitis RLL (s/p acquired R BKA, infected, dehisced); for I&D today and admitted for perioperative abx therapy.  Pt reports he has not attended HD past 2 weeks because he didn't feel up to it; not taking coumadin because he didn't want wound to start bleeding again.  Hx notable for BH issues, DM, HTN, narcotic dependence, medical and nutritional noncompliance, ESRD on HD.  Pt's wt stable this admission.    Nutrition Related Findings:    Pt tolerating diet; meds: nasir, insulin, flagyl, protonix, warfarin Wound Type: Open Wounds, Stage IV, Deep Tissue Injury       Lab Results   Component Value Date/Time     06/24/2025 03:14 AM    K 5.2 06/24/2025 03:14 AM     06/24/2025 03:14 AM    CO2 18 06/24/2025 03:14 AM    BUN 59 06/24/2025 03:14 AM    CREATININE 3.79 06/24/2025 03:14 AM    GLUCOSE 128 06/24/2025 03:14 AM    CALCIUM 8.1 06/24/2025 03:14 AM    PHOS 4.0 05/12/2025 03:42 AM    MG 2.2 05/12/2025 03:42 AM          Estimated Daily Nutrient Needs:  Energy Requirements Based On: Kcal/kg  Weight Used for Energy Requirements: Current  Energy (kcal/day): 2200  Weight Used for Protein Requirements: Current  Protein (g/day): 132  Method Used for Fluid Requirements: Defer to provider  Fluid (ml/day):      Nutrition Related Findings:   Edema: Right lower extremity   Edema Generalized: None        RLE Edema: Trace       Last BM: 06/22/25    Wounds:   Wound Type: Open Wounds, Stage IV, Deep Tissue Injury      Current Nutrition Intake & Therapies:    Average Meal Intake: %  Average Supplements Intake: None Ordered  DIET

## 2025-06-24 NOTE — CARE COORDINATION
Transition of Care Plan:     RUR: 35% High Risk  Prior Level of Functioning: Independent with ADL's   Disposition: SNF to LTC w/ hospice  TAN: 6/27  If SNF or IPR: Date FOC offered: 6/23  Date FOC received: 6/23  Accepting facility:   [facility]  Date authorization started with reference number:   Date authorization received and expires:   Follow up appointments: Defer to rehab  DME needed: Defer to rehab  Transportation at discharge: BLS to transport  IM/IMM Medicare/ letter given: 2 IM to be given prior discharge  Is patient a [place] and connected with VA? N/A  Caregiver Contact:   Anuradha Brown (Brother/Sister)  [phone] (Mobile)  Discharge Caregiver contacted prior to discharge? Yes  Care Conference needed? N/A  Barriers to discharge:  Level 2 screening        Initial Note: Chart Reviewed: Pt pending level 2 screening for d/c. OMARI spoke with Francisco OCAMPO liaison and she stated pt has been accepted to [facility] and pt can admit for SNF intervention and transition to LTC with hospice. Medicaid pending ([id number]). [facility] is aware pt no longer wants HD. Pt will need level 2 screening completed prior to discharge. OMARI has informed  for assistance. CM will continue to follow.    MONIQUE Alcaraz,OMARI  [phone]

## 2025-06-24 NOTE — DISCHARGE SUMMARY
Discharge Summary    Name: Keaton Van  [id number]  YOB: 1965 (Age: 60 y.o.)   Date of Admission: 6/16/2025  Date of Discharge: 6/24/2025  Attending Physician: Crista Garnica MD    Discharge Diagnosis:     Acquired right below the knee amputation  Right stump infection  Right stump osteomyelitis  End-stage renal disease  Dialysis dependent  Medical noncompliance with dialysis  Hyperkalemia  Hyponatremia  Anemia of chronic disease   Uncontrolled insulin dependent diabetes mellitus with hyperglycemia  Uncontrolled hypertension  Heart failure w/ preserved EF  Atrial fibrillation  Gastroesophageal reflux disease   Bipolar disorder  Personality disorder   Polysubstance abuse    Chronic hepatitis C   DDD of the lumbar spine  Chronic pain syndrome     Consultations:  IP CONSULT TO NEPHROLOGY  IP CONSULT TO VASCULAR SURGERY  IP CONSULT TO CASE MANAGEMENT  IP CONSULT TO PHARMACY  IP CONSULT TO CASE MANAGEMENT  IP CONSULT TO CASE MANAGEMENT  IP CONSULT TO CASE MANAGEMENT  IP CONSULT TO PHARMACY  IP CONSULT TO SPIRITUAL CARE  IP CONSULT TO INTERVENTIONAL RADIOLOGY      Brief Admission History/Reason for Admission Per Zeke Franklin, DO:     Keaton Van is a 60 y.o.  male with PMHx as listed below presenting to the emergency department at direction of Dr. Schaeffer and vascular surgery with concern for infected dehisced right BKA stump with underlying osteomyelitis planned for I&D on Wednesday per patient.  Patient referred for admission and IV antibiotics preceding operative intervention.  Patient reports worsening appearance of wound with copious seropurulent drainage for the past 1-2 weeks.  Patient denies fever/chills/shakes.  No other symptoms reported outside of significant pain in right leg.  Patient prescribed Coumadin for atrial fibrillation (elected this agent for low cost), but states he has not been taking this since discharge from hospital out of concern that his

## 2025-06-24 NOTE — PROGRESS NOTES
Occupational Therapy Contact Note  06/24/25    Spoke with PT who reports speaking with pt who has declined further therapy services while admitted and wants to move forward with hospice services only. Will sign off per pt's wishes.    Thank you,  ALTON Hernandez, OTR/L

## 2025-06-24 NOTE — PROGRESS NOTES
Bedside shift change report given to BHAKTI Mckee (oncoming nurse) by BHAKTI Kaminski (offgoing nurse). Report included the following information Nurse Handoff Report, ED SBAR, Surgery Report, Intake/Output, MAR, and Recent Results.

## 2025-06-24 NOTE — PROGRESS NOTES
Pharmacist Daily Dosing of Warfarin    Indication & Goal INR: AFib, Goal INR 2-3    PTA Warfarin Dose: 6mg daily     Notable Concurrent Conditions and Medications:   Drug Interactions: Metronidazole  Conditions: None    Recent Labs     Units 06/24/25  0314 06/23/25  0421 06/22/25  0524   INR   1.2* 1.1 1.0   HGB g/dL  --  8.1* 8.4*   HCT %  --  24.7* 25.9*   PLT K/uL  --  143* 141*     Impression/Plan:   Ordered warfarin 7 mg PO x 1 dose today - watch interaction with flagyl therapy  Bridge Therapy: Not indicated   Daily INR has been ordered  CBC w/o differential every other day has been ordered     Pharmacy will follow daily and adjust the dose as appropriate.    Thank you,  Prem Duenas, [facility]

## 2025-06-25 LAB
ERYTHROCYTE [DISTWIDTH] IN BLOOD BY AUTOMATED COUNT: 13.7 % (ref 11.5–14.5)
ERYTHROCYTE [DISTWIDTH] IN BLOOD BY AUTOMATED COUNT: 13.8 % (ref 11.5–14.5)
GLUCOSE BLD STRIP.AUTO-MCNC: 154 MG/DL (ref 65–117)
GLUCOSE BLD STRIP.AUTO-MCNC: 157 MG/DL (ref 65–117)
GLUCOSE BLD STRIP.AUTO-MCNC: 173 MG/DL (ref 65–117)
GLUCOSE BLD STRIP.AUTO-MCNC: 192 MG/DL (ref 65–117)
HCT VFR BLD AUTO: 24.5 % (ref 36.6–50.3)
HCT VFR BLD AUTO: 26.2 % (ref 36.6–50.3)
HGB BLD-MCNC: 7.8 G/DL (ref 12.1–17)
HGB BLD-MCNC: 8.4 G/DL (ref 12.1–17)
INR PPP: 1.4 (ref 0.9–1.1)
MCH RBC QN AUTO: 29 PG (ref 26–34)
MCH RBC QN AUTO: 29.4 PG (ref 26–34)
MCHC RBC AUTO-ENTMCNC: 31.8 G/DL (ref 30–36.5)
MCHC RBC AUTO-ENTMCNC: 32.1 G/DL (ref 30–36.5)
MCV RBC AUTO: 91.1 FL (ref 80–99)
MCV RBC AUTO: 91.6 FL (ref 80–99)
NRBC # BLD: 0 K/UL (ref 0–0.01)
NRBC # BLD: 0 K/UL (ref 0–0.01)
NRBC BLD-RTO: 0 PER 100 WBC
NRBC BLD-RTO: 0 PER 100 WBC
PLATELET # BLD AUTO: 126 K/UL (ref 150–400)
PLATELET # BLD AUTO: 142 K/UL (ref 150–400)
PMV BLD AUTO: 9.7 FL (ref 8.9–12.9)
PMV BLD AUTO: 9.9 FL (ref 8.9–12.9)
PROTHROMBIN TIME: 14 SEC (ref 9.2–11.2)
RBC # BLD AUTO: 2.69 M/UL (ref 4.1–5.7)
RBC # BLD AUTO: 2.86 M/UL (ref 4.1–5.7)
SERVICE CMNT-IMP: ABNORMAL
WBC # BLD AUTO: 3.5 K/UL (ref 4.1–11.1)
WBC # BLD AUTO: 4.7 K/UL (ref 4.1–11.1)

## 2025-06-25 PROCEDURE — 82962 GLUCOSE BLOOD TEST: CPT

## 2025-06-25 PROCEDURE — 6370000000 HC RX 637 (ALT 250 FOR IP): Performed by: SURGERY

## 2025-06-25 PROCEDURE — 85610 PROTHROMBIN TIME: CPT

## 2025-06-25 PROCEDURE — 6370000000 HC RX 637 (ALT 250 FOR IP): Performed by: STUDENT IN AN ORGANIZED HEALTH CARE EDUCATION/TRAINING PROGRAM

## 2025-06-25 PROCEDURE — 6360000002 HC RX W HCPCS: Performed by: SURGERY

## 2025-06-25 PROCEDURE — 36415 COLL VENOUS BLD VENIPUNCTURE: CPT

## 2025-06-25 PROCEDURE — 6360000002 HC RX W HCPCS: Performed by: STUDENT IN AN ORGANIZED HEALTH CARE EDUCATION/TRAINING PROGRAM

## 2025-06-25 PROCEDURE — 6370000000 HC RX 637 (ALT 250 FOR IP): Performed by: INTERNAL MEDICINE

## 2025-06-25 PROCEDURE — 85027 COMPLETE CBC AUTOMATED: CPT

## 2025-06-25 PROCEDURE — 2580000003 HC RX 258: Performed by: STUDENT IN AN ORGANIZED HEALTH CARE EDUCATION/TRAINING PROGRAM

## 2025-06-25 PROCEDURE — 1100000000 HC RM PRIVATE

## 2025-06-25 RX ORDER — WARFARIN SODIUM 2 MG/1
6 TABLET ORAL
Status: COMPLETED | OUTPATIENT
Start: 2025-06-25 | End: 2025-06-25

## 2025-06-25 RX ADMIN — INSULIN LISPRO 2 UNITS: 100 INJECTION, SOLUTION INTRAVENOUS; SUBCUTANEOUS at 13:15

## 2025-06-25 RX ADMIN — HYDROMORPHONE HYDROCHLORIDE 1.5 MG: 1 INJECTION, SOLUTION INTRAMUSCULAR; INTRAVENOUS; SUBCUTANEOUS at 10:41

## 2025-06-25 RX ADMIN — PANTOPRAZOLE SODIUM 40 MG: 40 TABLET, DELAYED RELEASE ORAL at 06:44

## 2025-06-25 RX ADMIN — Medication 1 CAPSULE: at 09:34

## 2025-06-25 RX ADMIN — ACETAMINOPHEN 650 MG: 325 TABLET ORAL at 18:32

## 2025-06-25 RX ADMIN — GABAPENTIN 100 MG: 100 CAPSULE ORAL at 14:37

## 2025-06-25 RX ADMIN — GABAPENTIN 100 MG: 100 CAPSULE ORAL at 09:34

## 2025-06-25 RX ADMIN — HYDRALAZINE HYDROCHLORIDE 10 MG: 20 INJECTION INTRAMUSCULAR; INTRAVENOUS at 21:12

## 2025-06-25 RX ADMIN — METRONIDAZOLE 500 MG: 500 INJECTION, SOLUTION INTRAVENOUS at 17:18

## 2025-06-25 RX ADMIN — GABAPENTIN 100 MG: 100 CAPSULE ORAL at 21:14

## 2025-06-25 RX ADMIN — ACETAMINOPHEN 650 MG: 325 TABLET ORAL at 06:44

## 2025-06-25 RX ADMIN — HYDROMORPHONE HYDROCHLORIDE 1.5 MG: 1 INJECTION, SOLUTION INTRAMUSCULAR; INTRAVENOUS; SUBCUTANEOUS at 06:41

## 2025-06-25 RX ADMIN — HYDROMORPHONE HYDROCHLORIDE 1.5 MG: 1 INJECTION, SOLUTION INTRAMUSCULAR; INTRAVENOUS; SUBCUTANEOUS at 02:46

## 2025-06-25 RX ADMIN — CEFEPIME 1000 MG: 1 INJECTION, POWDER, FOR SOLUTION INTRAMUSCULAR; INTRAVENOUS at 10:46

## 2025-06-25 RX ADMIN — QUETIAPINE FUMARATE 100 MG: 100 TABLET ORAL at 21:14

## 2025-06-25 RX ADMIN — METRONIDAZOLE 500 MG: 500 INJECTION, SOLUTION INTRAVENOUS at 00:27

## 2025-06-25 RX ADMIN — ACETAMINOPHEN 650 MG: 325 TABLET ORAL at 00:24

## 2025-06-25 RX ADMIN — ACETAMINOPHEN 650 MG: 325 TABLET ORAL at 13:14

## 2025-06-25 RX ADMIN — METRONIDAZOLE 500 MG: 500 INJECTION, SOLUTION INTRAVENOUS at 09:39

## 2025-06-25 RX ADMIN — ACETAMINOPHEN 650 MG: 325 TABLET ORAL at 23:58

## 2025-06-25 RX ADMIN — WARFARIN SODIUM 6 MG: 2 TABLET ORAL at 18:32

## 2025-06-25 RX ADMIN — SODIUM ZIRCONIUM CYCLOSILICATE 10 G: 10 POWDER, FOR SUSPENSION ORAL at 09:43

## 2025-06-25 RX ADMIN — HYDROMORPHONE HYDROCHLORIDE 1.5 MG: 1 INJECTION, SOLUTION INTRAMUSCULAR; INTRAVENOUS; SUBCUTANEOUS at 18:32

## 2025-06-25 RX ADMIN — HYDROMORPHONE HYDROCHLORIDE 1.5 MG: 1 INJECTION, SOLUTION INTRAMUSCULAR; INTRAVENOUS; SUBCUTANEOUS at 14:36

## 2025-06-25 RX ADMIN — DILTIAZEM HYDROCHLORIDE 120 MG: 120 CAPSULE, EXTENDED RELEASE ORAL at 09:34

## 2025-06-25 RX ADMIN — HYDROMORPHONE HYDROCHLORIDE 1.5 MG: 1 INJECTION, SOLUTION INTRAMUSCULAR; INTRAVENOUS; SUBCUTANEOUS at 22:10

## 2025-06-25 ASSESSMENT — PAIN SCALES - GENERAL
PAINLEVEL_OUTOF10: 3
PAINLEVEL_OUTOF10: 8
PAINLEVEL_OUTOF10: 8
PAINLEVEL_OUTOF10: 7
PAINLEVEL_OUTOF10: 9
PAINLEVEL_OUTOF10: 8
PAINLEVEL_OUTOF10: 5
PAINLEVEL_OUTOF10: 8
PAINLEVEL_OUTOF10: 7
PAINLEVEL_OUTOF10: 6
PAINLEVEL_OUTOF10: 8

## 2025-06-25 ASSESSMENT — PAIN DESCRIPTION - LOCATION
LOCATION: LEG
LOCATION: LEG;OTHER (COMMENT)
LOCATION: LEG
LOCATION: LEG

## 2025-06-25 ASSESSMENT — PAIN DESCRIPTION - ORIENTATION
ORIENTATION: RIGHT

## 2025-06-25 ASSESSMENT — PAIN SCALES - WONG BAKER: WONGBAKER_NUMERICALRESPONSE: HURTS A LITTLE BIT

## 2025-06-25 ASSESSMENT — PAIN DESCRIPTION - DESCRIPTORS
DESCRIPTORS: ACHING
DESCRIPTORS: ACHING;THROBBING;BURNING
DESCRIPTORS: ACHING
DESCRIPTORS: ACHING
DESCRIPTORS: ACHING;THROBBING
DESCRIPTORS: ACHING
DESCRIPTORS: ACHING
DESCRIPTORS: ACHING;DISCOMFORT

## 2025-06-25 NOTE — CARE COORDINATION
Psych note placed. Level 2 pending.      Vera Pardo, KATELINN, RN, CM  [facility]  [phone]

## 2025-06-25 NOTE — CONSULTS
Chief complaint  \"I'm not depressed, I just am okay with accepting that I don't want to do this anymore.\"    History of present illness  Keaton Van, a 60-year-old male, reports being admitted to the hospital due to complications following surgery on his right leg. Approximately three weeks ago, he experienced a massive hematoma burst, which led to hospitalization where the hematoma was cauterized. Since then, he has been experiencing significant swelling and severe pain in the leg, which he describes as \"off the charts.\" These issues have been challenging for him, and he is trying to cope with the situation, which is taking a considerable toll on him.    Keaton mentions that his sleep has been disrupted due to the pain in his leg, typically sleeping only 3 to 4 hours at a time. He notes that this pattern is consistent with his sleep habits at home, where he often wakes up in the middle of the night, watches TV, and then returns to bed. He lives with his older sister, which provides some support.    Regarding psychiatric medications, Keaton is uncertain about being prescribed Seroquel (quetiapine) and does not recall taking it. He believes he is on Cymbalta, although this is not confirmed in the hospital records. He is currently receiving gabapentin for nerve pain and Ativan for anxiety, which he was unaware was available to him until recently. He has a history of using benzodiazepines and has used them in the past.    Keaton has a history of substance use, including cocaine, heroin, and opiates. He acknowledges having had a significant problem with opiates in the past, which began in [place] due to prescriptions from doctors. After moving to his current location about 12 years ago, he became involved with individuals who supplied him with pills and heroin. His family assisted him in overcoming his addiction, and he was treated with Suboxone. He expresses anger over losing two best friends to heroin overdoses.    Keaton has  in his care. The provider concurs with the plan for skilled nursing facility placement and subsequent transition to hospice, and recommends a palliative care consultation to further support the patient during this transition. The patient’s current medications of psychiatric concern include gabapentin 100 mg TID, Ativan 1.5 mg q6hr prn for anxiety (with caution due to history of polysubstance abuse), Seroquel 100 mg QHS, and Atarax 25 mg TID PRN for itching. He reports taking Cymbalta at home, though this is not confirmed in the hospital records. The patient’s decision to stop dialysis is not motivated by suicidality but by a desire for comfort and quality of life in the context of advanced illness.    Diagnosis:  Bipolar disorder    Plan:    - At this time, he meets criteria for medical decision-making capacity.The patient is currently alert, oriented, and demonstrates good insight and judgment. He clearly articulates the rationale behind his decision to discontinue dialysis, framing it not as an act of suicidality but as acceptance of terminal illness and a preference for comfort care. However, due to expected cognitive decline from uremia following dialysis cessation, reassess capacity regularly prior to discharge to ensure continued alignment with the patient's stated goals.    -No urgent psychiatric medication changes are recommended; however, monitor use of Ativan closely due to history of benzodiazepine and opioid dependence.Gabapentin may help with neuropathic pain but should be monitored for sedation.  Ativan (lorazepam) should be used cautiously given past substance use; consider non-benzodiazepine alternatives for anxiety if symptoms worsen, such as increasing hydroxyzine to 50 mg Q 4 to 6 hrs PRN for anxiety. Seroquel (quetiapine) may assist with mood stabilization in Bipolar and sleep; continue if tolerated.  Patient states he takes Cymbalta (duloxetine) at home, albeit has not been administered in

## 2025-06-25 NOTE — PROGRESS NOTES
End of Shift Note    Bedside shift change report given to JADEN King (oncoming nurse) by BONNIE Agudelo RN (offgoing nurse).  Report included the following information SBAR, MAR, and Accordion    Shift worked:  7pm-7am     Shift summary and any significant changes:     Pain managed with PRN dilaudid x3 this shift. Pt spent most of the time sleeping. Voiding via bedside commode and also BM. Pt states he has been having scary dreams lately. Otherwise uneventful shift.     Concerns for physician to address:       Zone phone for oncoming shift:   [phone]         BONNIE Agudelo, RN

## 2025-06-25 NOTE — BSMART NOTE
Initial BSMART Liaison Assessment Form     Section I - Integrated Summary    Reason for consult is: level 2 screening for SNF placement.    LOS:  9     Presenting problem/Summary:  Met with patient face to face with sister in the room. Patient is agreeable for sister to stay in the room during our conversation. He is seen sitting up in his bed with his blinds half open and the television on. He mutes the television during our conversation and is polite and positive throughout the conversation. This liaison asked he remembered my visit while he was at [facility] a few months ago, which he reports not recalling. He shares \"I'm more mentally here now than I was then, that's for sure\". This liaison offered positive affirmations to the patient for his progress since then, and his sister also commented on how much he has changed. He shares that now he is still feeling anxiety and depression, and that his depression is the strongest right now. She shares that he is feeling \"iffy\" about the plan for him to go to a rehab and then possibly hospice, and we discuss how normal it is to feel anxious and uncertain about a new place and stage in his life. He shares that he feels a great deal of support from his sister, and that this has helped him a lot in the past few months. He shares appreciation for her care of him, and states \"I'm glad she won't be so stressed out taking care of me now\". We discuss goals he has for himself, including going to the gym to work on his upper body strength for when he uses his walker. He also brings up possibly using a wheelchair and going outside while he is admitted. This liaison acknowledged his request and informed BHAKTI King of this request. The patient ultimately reports no SI/HI/AH/VH, but does report some anxiety and depression because of his medical concerns and his uncertainty for the future.    Precipitant Factors are hx of bipolar, hx of medical concerns, housing instability.    The

## 2025-06-25 NOTE — PROGRESS NOTES
Pharmacist Daily Dosing of Warfarin    Indication & Goal INR: AFib, Goal INR 2-3    PTA Warfarin Dose: 6mg daily     Notable Concurrent Conditions and Medications:   Drug Interactions: Metronidazole  Conditions: None    Recent Labs     Units 06/25/25  0453 06/24/25  0314 06/23/25  0421   INR   1.4* 1.2* 1.1   HGB g/dL 7.8*  --  8.1*   HCT % 24.5*  --  24.7*   PLT K/uL 126*  --  143*     Impression/Plan:   Ordered warfarin 6 mg PO x 1 dose today - watch interaction with flagyl therapy  Bridge Therapy: Not indicated   Daily INR has been ordered  CBC w/o differential every other day has been ordered     Pharmacy will follow daily and adjust the dose as appropriate.    Thank you,  Prem Duenas, [facility]

## 2025-06-25 NOTE — PROGRESS NOTES
Hospitalist Progress Note    NAME:   Keaton Van   : 1965   MRN: [id number]     Date/Time: 2025 10:59 AM  Patient PCP: Robert Wells PA-C    Estimated discharge date: Medically stable for SNF--> LTC/Hospice discharge.  Barriers: Level 2      Assessment / Plan:    Acquired right below the knee amputation  Right stump infection  Right stump osteomyelitis  - Status postdebridement of stump with Grafix placement on   -Pancytopenia.  Vital signs stable.  Afebrile.  Continue current pain regimen.  Antibiotics per primary team.  Lactobacillus for complaint of loose stool.  Encourage mobility.  Out of bed to chair daily.  Provider will complete his wound care.  Current dressing will be in place for 5 days.    He is stable for discharge from a vascular standpoint once medically cleared. He can follow up as an outpatient for wound care.    Patient will not be able to wear prosthesis until wound has healed.       End-stage renal disease  Dialysis dependent  Medical noncompliance with dialysis  -has declined further hemodialysis. Patient is alert and oriented and was previously on hospice for this. Will need placement with medicaid pending to SNF, then transition to hospice with LTC.  -now refusing HD, alert and oriented and capable of making his own decisions. Verbalized understanding of consequences of dialysis cessation.      Hyperkalemia  -Lokelma qod as per nephro orders     Hyponatremia  Anemia of chronic disease   - H&H stable   - Appreciate input from nephrology     Uncontrolled insulin dependent diabetes mellitus with hyperglycemia  -SSI  -DM diet  -Hypoglycemia protocol     Uncontrolled hypertension  -Improved control on Norvasc & hydralazine  -Management per primary team     Heart failure w/ preserved EF  Atrial fibrillation   -Rate controlled   -Warfarin--d/w pharmacy modified management to INR/CBC qMWF     Gastroesophageal reflux disease   -Protonix      Bipolar disorder  Personality disorder

## 2025-06-25 NOTE — PROGRESS NOTES
End of Shift Note    Bedside shift change report given to BLANE Montemayor RN (oncoming nurse) by Christine Merlos LPN (offgoing nurse).  Report included the following information SBAR    Shift worked:  7a-7p     Shift summary and any significant changes:    Pt up as tolerated to bedside commode throughout the day. Pt tolerating diet well. Pt had complaints of pain, prn pain meds given 3x with positive effects. Uneventful day     Concerns for physician to address:  Awaiting dressing change, no orders in chart for nursing.      Zone phone for oncoming shift:   [phone]         Christine Merlos LPN

## 2025-06-25 NOTE — PLAN OF CARE
Problem: Chronic Conditions and Co-morbidities  Goal: Patient's chronic conditions and co-morbidity symptoms are monitored and maintained or improved  6/24/2025 2153 by BONNIE Agudelo RN  Outcome: Progressing  6/24/2025 1205 by Rylee Phillip RN  Outcome: Progressing     Problem: Discharge Planning  Goal: Discharge to home or other facility with appropriate resources  6/24/2025 2153 by BONNIE Agudelo RN  Outcome: Progressing  6/24/2025 1205 by Rylee Phillip RN  Outcome: Progressing     Problem: Pain  Goal: Verbalizes/displays adequate comfort level or baseline comfort level  6/24/2025 2153 by BONNIE Agudelo RN  Outcome: Progressing  6/24/2025 1205 by Rylee Phillip RN  Outcome: Progressing     Problem: ABCDS Injury Assessment  Goal: Absence of physical injury  6/24/2025 1205 by Rylee Phillip RN  Outcome: Progressing     Problem: Safety - Adult  Goal: Free from fall injury  6/24/2025 2153 by BONNIE Agudelo RN  Outcome: Progressing  6/24/2025 1205 by Rylee Phillip RN  Outcome: Progressing

## 2025-06-25 NOTE — PROGRESS NOTES
06/25/25        I have been asked to see this patient by Crista Garnica MD  for advice/opinion re: -----                                                        Assessment:         End-stage kidney disease on hemodialysis-right tunneled hemodialysis catheter-[facility]-Monday Wednesday Friday schedule  Noncompliance-missed dialysis treatment  Hyperkalemia  Right BKA stump cellulitis plus osteomyelitis  Chronic atrial fibrillation on anticoagulant therapy  Hypertension with CKD stage V  Type 2 diabetes mellitus  Peripheral neuropathy Discussion:       Plan:   Removal of dialysis catheter before he discharges from the hospital.  Continue lokelma QOD.  I talked with him again about dialysis and he is very clear that he does not want to do HD.      I have nothing further to add.  I will sign off.  Please call if any questions.                             Signed By: Rolando Adrian MD     June 25, 2025         Seen and examined  No new complaints.  Pain well-controlled    Family history:  No history of CKD or ESRD in the family.     Allergies   Allergen Reactions    Midazolam Shortness Of Breath     Other reaction(s): Unknown (comments)  Numbness, with shortness of breath  Weakness          Current Facility-Administered Medications   Medication Dose Route Frequency Provider Last Rate Last Admin    [START ON 6/26/2025] Vancomycin - level   Other Once Crista Garnica MD        HYDROmorphone (DILAUDID) tablet 2 mg  2 mg Oral Q4H PRN Crista Garnica MD        HYDROmorphone HCl PF (DILAUDID) injection 1.5 mg  1.5 mg IntraVENous Q4H PRN Crista Garnica MD   1.5 mg at 06/25/25 0641    LORazepam (ATIVAN) tablet 1.5 mg  1.5 mg Oral Q6H PRN Crista Garnica MD        hydrOXYzine HCl (ATARAX) tablet 25 mg  25 mg Oral TID PRN Crista Garnica MD        labetalol

## 2025-06-26 LAB
GLUCOSE BLD STRIP.AUTO-MCNC: 140 MG/DL (ref 65–117)
GLUCOSE BLD STRIP.AUTO-MCNC: 152 MG/DL (ref 65–117)
GLUCOSE BLD STRIP.AUTO-MCNC: 164 MG/DL (ref 65–117)
GLUCOSE BLD STRIP.AUTO-MCNC: 173 MG/DL (ref 65–117)
SERVICE CMNT-IMP: ABNORMAL
VANCOMYCIN SERPL-MCNC: 6.1 UG/ML

## 2025-06-26 PROCEDURE — 36415 COLL VENOUS BLD VENIPUNCTURE: CPT

## 2025-06-26 PROCEDURE — 6360000002 HC RX W HCPCS: Performed by: STUDENT IN AN ORGANIZED HEALTH CARE EDUCATION/TRAINING PROGRAM

## 2025-06-26 PROCEDURE — 82962 GLUCOSE BLOOD TEST: CPT

## 2025-06-26 PROCEDURE — 2580000003 HC RX 258: Performed by: NURSE PRACTITIONER

## 2025-06-26 PROCEDURE — 1100000000 HC RM PRIVATE

## 2025-06-26 PROCEDURE — 80202 ASSAY OF VANCOMYCIN: CPT

## 2025-06-26 PROCEDURE — 6370000000 HC RX 637 (ALT 250 FOR IP): Performed by: INTERNAL MEDICINE

## 2025-06-26 PROCEDURE — 2580000003 HC RX 258: Performed by: STUDENT IN AN ORGANIZED HEALTH CARE EDUCATION/TRAINING PROGRAM

## 2025-06-26 PROCEDURE — 6360000002 HC RX W HCPCS: Performed by: NURSE PRACTITIONER

## 2025-06-26 PROCEDURE — 6370000000 HC RX 637 (ALT 250 FOR IP): Performed by: SURGERY

## 2025-06-26 PROCEDURE — 6370000000 HC RX 637 (ALT 250 FOR IP): Performed by: STUDENT IN AN ORGANIZED HEALTH CARE EDUCATION/TRAINING PROGRAM

## 2025-06-26 RX ORDER — WARFARIN SODIUM 2 MG/1
6 TABLET ORAL
Status: COMPLETED | OUTPATIENT
Start: 2025-06-26 | End: 2025-06-26

## 2025-06-26 RX ADMIN — GABAPENTIN 100 MG: 100 CAPSULE ORAL at 08:20

## 2025-06-26 RX ADMIN — Medication 1 CAPSULE: at 08:20

## 2025-06-26 RX ADMIN — PANTOPRAZOLE SODIUM 40 MG: 40 TABLET, DELAYED RELEASE ORAL at 06:01

## 2025-06-26 RX ADMIN — ACETAMINOPHEN 650 MG: 325 TABLET ORAL at 17:36

## 2025-06-26 RX ADMIN — WARFARIN SODIUM 6 MG: 2 TABLET ORAL at 17:36

## 2025-06-26 RX ADMIN — ACETAMINOPHEN 650 MG: 325 TABLET ORAL at 04:41

## 2025-06-26 RX ADMIN — HYDROMORPHONE HYDROCHLORIDE 1.5 MG: 1 INJECTION, SOLUTION INTRAMUSCULAR; INTRAVENOUS; SUBCUTANEOUS at 19:56

## 2025-06-26 RX ADMIN — CEFEPIME 1000 MG: 1 INJECTION, POWDER, FOR SOLUTION INTRAMUSCULAR; INTRAVENOUS at 11:39

## 2025-06-26 RX ADMIN — LORAZEPAM 1.5 MG: 1 TABLET ORAL at 04:41

## 2025-06-26 RX ADMIN — DILTIAZEM HYDROCHLORIDE 120 MG: 120 CAPSULE, EXTENDED RELEASE ORAL at 08:20

## 2025-06-26 RX ADMIN — HYDROMORPHONE HYDROCHLORIDE 1.5 MG: 1 INJECTION, SOLUTION INTRAMUSCULAR; INTRAVENOUS; SUBCUTANEOUS at 03:21

## 2025-06-26 RX ADMIN — QUETIAPINE FUMARATE 100 MG: 100 TABLET ORAL at 19:59

## 2025-06-26 RX ADMIN — GABAPENTIN 100 MG: 100 CAPSULE ORAL at 14:49

## 2025-06-26 RX ADMIN — HYDROMORPHONE HYDROCHLORIDE 1.5 MG: 1 INJECTION, SOLUTION INTRAMUSCULAR; INTRAVENOUS; SUBCUTANEOUS at 23:33

## 2025-06-26 RX ADMIN — VANCOMYCIN HYDROCHLORIDE 750 MG: 750 INJECTION, POWDER, LYOPHILIZED, FOR SOLUTION INTRAVENOUS at 06:01

## 2025-06-26 RX ADMIN — GABAPENTIN 100 MG: 100 CAPSULE ORAL at 19:59

## 2025-06-26 RX ADMIN — METRONIDAZOLE 500 MG: 500 INJECTION, SOLUTION INTRAVENOUS at 00:01

## 2025-06-26 RX ADMIN — LORAZEPAM 1.5 MG: 1 TABLET ORAL at 11:37

## 2025-06-26 RX ADMIN — METRONIDAZOLE 500 MG: 500 INJECTION, SOLUTION INTRAVENOUS at 08:25

## 2025-06-26 RX ADMIN — HYDROMORPHONE HYDROCHLORIDE 1.5 MG: 1 INJECTION, SOLUTION INTRAMUSCULAR; INTRAVENOUS; SUBCUTANEOUS at 15:19

## 2025-06-26 RX ADMIN — ACETAMINOPHEN 650 MG: 325 TABLET ORAL at 23:34

## 2025-06-26 RX ADMIN — HYDROMORPHONE HYDROCHLORIDE 1.5 MG: 1 INJECTION, SOLUTION INTRAMUSCULAR; INTRAVENOUS; SUBCUTANEOUS at 06:58

## 2025-06-26 RX ADMIN — HYDROMORPHONE HYDROCHLORIDE 1.5 MG: 1 INJECTION, SOLUTION INTRAMUSCULAR; INTRAVENOUS; SUBCUTANEOUS at 11:04

## 2025-06-26 RX ADMIN — METRONIDAZOLE 500 MG: 500 INJECTION, SOLUTION INTRAVENOUS at 17:35

## 2025-06-26 RX ADMIN — METRONIDAZOLE 500 MG: 500 INJECTION, SOLUTION INTRAVENOUS at 23:39

## 2025-06-26 ASSESSMENT — PAIN DESCRIPTION - LOCATION
LOCATION: LEG

## 2025-06-26 ASSESSMENT — PAIN SCALES - GENERAL
PAINLEVEL_OUTOF10: 8
PAINLEVEL_OUTOF10: 0
PAINLEVEL_OUTOF10: 8
PAINLEVEL_OUTOF10: 3
PAINLEVEL_OUTOF10: 4
PAINLEVEL_OUTOF10: 6
PAINLEVEL_OUTOF10: 6
PAINLEVEL_OUTOF10: 0
PAINLEVEL_OUTOF10: 6
PAINLEVEL_OUTOF10: 7
PAINLEVEL_OUTOF10: 8

## 2025-06-26 ASSESSMENT — PAIN DESCRIPTION - ORIENTATION
ORIENTATION: RIGHT

## 2025-06-26 ASSESSMENT — PAIN DESCRIPTION - ONSET
ONSET: AWAKENED FROM SLEEP
ONSET: AWAKENED FROM SLEEP

## 2025-06-26 ASSESSMENT — PAIN DESCRIPTION - DESCRIPTORS
DESCRIPTORS: ACHING
DESCRIPTORS: ACHING;DISCOMFORT

## 2025-06-26 ASSESSMENT — PAIN DESCRIPTION - FREQUENCY
FREQUENCY: CONTINUOUS
FREQUENCY: CONTINUOUS

## 2025-06-26 ASSESSMENT — PAIN DESCRIPTION - PAIN TYPE
TYPE: CHRONIC PAIN
TYPE: CHRONIC PAIN

## 2025-06-26 NOTE — PLAN OF CARE
Problem: Safety - Adult  Goal: Free from fall injury  6/26/2025 0102 by Albina Miller RN  Outcome: Progressing  Note: Bed is in the lowest position and wheels are locked, call bell is within reach, bathroom light is on during evening hours and patient has been instructed to call out for assistance if needed.     As of now, patient is free from falls and will continue to be monitored.

## 2025-06-26 NOTE — PROGRESS NOTES
Hospitalist Progress Note    NAME:   Keaton Van   : 1965   MRN: [id number]     Date/Time: 2025 11:36 AM  Patient PCP: Robert Wells PA-C    Estimated discharge date: Medically stable for SNF--> LTC/Hospice discharge.  Barriers: Level 2      Assessment / Plan:    Acquired right below the knee amputation  Right stump infection  Right stump osteomyelitis  - Status postdebridement of stump with Grafix placement on   -Pancytopenia.  Vital signs stable.  Afebrile.  Continue current pain regimen.   Continue abx, can be transitioned to oral at discharge--continue due to maceration and odor. Would not expect good wound healing without good medical care including but not limited to dialysis  Encourage mobility.  Out of bed to chair daily.  Dressing changed : covered with dry gauze and secured wit kerlix  He is stable for discharge from a vascular standpoint once medically cleared. He can follow up as an outpatient for wound care.    Patient will not be able to wear prosthesis until wound has healed.       End-stage renal disease  Dialysis dependent  Medical noncompliance with dialysis  -has declined further hemodialysis. Patient is alert and oriented and was previously on hospice for this. Will need placement with medicaid pending to SNF, then transition to hospice with LTC.  -now refusing HD, alert and oriented and capable of making his own decisions. Verbalized understanding of consequences of dialysis cessation.      Hyperkalemia  -Lokelma qod as per nephro orders     Hyponatremia  Anemia of chronic disease   - H&H stable   - Appreciate input from nephrology     Uncontrolled insulin dependent diabetes mellitus with hyperglycemia  -SSI  -DM diet  -Hypoglycemia protocol     Uncontrolled hypertension  -Improved control on Norvasc & hydralazine  -Management per primary team     Heart failure w/ preserved EF  Atrial fibrillation   -Rate controlled   -Warfarin--d/w pharmacy modified management to  DNR  DVT Prophylaxis: Coumadin-CBC qMWF  GI Prophylaxis:    Subjective:     Chief Complaint / Reason for Physician Visit  Stable. Pleasant.   [facility]  Discussed with RN events overnight.       Objective:     VITALS:   Last 24hrs VS reviewed since prior progress note. Most recent are:  Patient Vitals for the past 24 hrs:   BP Temp Temp src Pulse Resp SpO2   06/26/25 0806 (!) 146/70 97.7 °F (36.5 °C) Oral 64 17 96 %   06/26/25 0321 139/76 98.1 °F (36.7 °C) Oral 63 17 94 %   06/26/25 0006 (!) 195/89 97.5 °F (36.4 °C) Oral 74 18 95 %   06/25/25 2233 (!) 176/94 97.7 °F (36.5 °C) Oral 82 15 96 %   06/25/25 2103 (!) 186/80 -- -- 75 -- --   06/25/25 1934 (!) 186/87 98.2 °F (36.8 °C) Oral 68 17 93 %         Intake/Output Summary (Last 24 hours) at 6/26/2025 1136  Last data filed at 6/25/2025 1934  Gross per 24 hour   Intake 342.45 ml   Output --   Net 342.45 ml        I had a face to face encounter and independently examined this patient on 6/26/2025, as outlined below:  PHYSICAL EXAM:  General: Alert, cooperative  EENT:  EOMI. Anicteric sclerae.  Resp:  CTA bilaterally, no wheezing or rales.  No accessory muscle use  CV:  Regular  rhythm,  No edema  GI:  Soft, Non distended, Non tender.  +Bowel sounds  Neurologic:  Alert and oriented X 3, normal speech,   Psych:   Good insight. Not anxious nor agitated  Skin:  No rashes.  No jaundice.  Ext:  Dressing around Right BKA stump.    Reviewed most current lab test results and cultures  YES  Reviewed most current radiology test results   YES  Review and summation of old records today    NO  Reviewed patient's current orders and MAR    YES  PMH/SH reviewed - no change compared to H&P    Procedures: see electronic medical records for all procedures/Xrays and details which were not copied into this note but were reviewed prior to creation of Plan.      LABS:  I reviewed today's most current labs and imaging studies.  Pertinent labs include:  Recent Labs

## 2025-06-26 NOTE — PROGRESS NOTES
Pharmacist Daily Dosing of Warfarin    Indication & Goal INR: AFib, Goal INR 2-3    PTA Warfarin Dose: 6mg daily     Notable Concurrent Conditions and Medications:   Drug Interactions: Metronidazole  Conditions: None    Recent Labs     Units 06/25/25  1144 06/25/25  0453 06/24/25  0314   INR    --  1.4* 1.2*   HGB g/dL 8.4* 7.8*  --    HCT % 26.2* 24.5*  --    PLT K/uL 142* 126*  --      Impression/Plan:   Ordered warfarin 6 mg PO x 1 dose today - watch interaction with flagyl therapy  Bridge Therapy: Not indicated   INR monitoring MWF per attending  CBC w/o differential every other day has been ordered     Pharmacy will follow daily and adjust the dose as appropriate.    Thank you,  Prem Duenas, [facility]

## 2025-06-26 NOTE — PROGRESS NOTES
Spiritual Health History and Assessment/Progress Note  [facility]    Spiritual/Emotional Needs,  , Life Adjustments,      Name: Keaton Van MRN: [id number]    Age: 60 y.o.     Sex: male   Language: English   Restoration: Jehovah's witness   Acute osteomyelitis of lower leg, right (HCC)     Date: 6/26/2025            Total Time Calculated: 55 min              Spiritual Assessment began in [facility] 3 SURG TELE        Referral/Consult From: Multi-disciplinary team   Encounter Overview/Reason: Spiritual/Emotional Needs  Service Provided For: Patient    Keisha, Belief, Meaning:   Patient is connected with a keisha tradition or spiritual practice and has beliefs or practices that help with coping during difficult times  Family/Friends No family/friends present      Importance and Influence:  Patient has spiritual/personal beliefs that influence decisions regarding their health  Family/Friends No family/friends present    Community:  Patient is connected with a spiritual community and feels well-supported. Support system includes: Extended family  Family/Friends No family/friends present    Assessment and Plan of Care:     Patient Interventions include: Facilitated expression of thoughts and feelings, Explored spiritual coping/struggle/distress, Engaged in theological reflection, Affirmed coping skills/support systems, and Facilitated life review and/ or legacy  Family/Friends Interventions include: No family/friends present    Patient Plan of Care: Spiritual Care available upon further referral  Family/Friends Plan of Care: No family/friends present    Patient had questions about AMD, he has a copy on file, which  printed for review. He expressed that he was satisfied with document and file and did not require any changes.     Electronically signed by JASON Krause on 6/26/2025 at 1:33 PM

## 2025-06-26 NOTE — PROGRESS NOTES
End of Shift Note    Bedside shift change report given to BHAKTI Bradshaw  (oncoming nurse) by Christine Merlos LPN (offgoing nurse).  Report included the following information SBAR    Shift worked:  7a-7p     Shift summary and any significant changes:    Pt up as tolerated to the bedside commode. Pt tolerating diet. Dressings changed today, CDI. PRN pain meds given with positive effects.      Concerns for physician to address:  none     Zone phone for oncoming shift:   [phone]         Christine Merlos LPN

## 2025-06-27 PROBLEM — Z71.89 GOALS OF CARE, COUNSELING/DISCUSSION: Status: ACTIVE | Noted: 2025-06-27

## 2025-06-27 PROBLEM — M86.161 ACUTE OSTEOMYELITIS OF RIGHT LOWER LEG (HCC): Status: ACTIVE | Noted: 2025-06-27

## 2025-06-27 PROBLEM — K52.1 DIARRHEA DUE TO DRUG: Status: ACTIVE | Noted: 2025-06-27

## 2025-06-27 PROBLEM — Z51.5 END OF LIFE CARE: Status: ACTIVE | Noted: 2025-06-27

## 2025-06-27 LAB
ANION GAP SERPL CALC-SCNC: 4 MMOL/L (ref 2–12)
BUN SERPL-MCNC: 48 MG/DL (ref 6–20)
BUN/CREAT SERPL: 14 (ref 12–20)
CALCIUM SERPL-MCNC: 8.3 MG/DL (ref 8.5–10.1)
CHLORIDE SERPL-SCNC: 115 MMOL/L (ref 97–108)
CO2 SERPL-SCNC: 19 MMOL/L (ref 21–32)
CREAT SERPL-MCNC: 3.45 MG/DL (ref 0.7–1.3)
GLUCOSE BLD STRIP.AUTO-MCNC: 140 MG/DL (ref 65–117)
GLUCOSE SERPL-MCNC: 148 MG/DL (ref 65–100)
POTASSIUM SERPL-SCNC: 5.8 MMOL/L (ref 3.5–5.1)
SERVICE CMNT-IMP: ABNORMAL
SODIUM SERPL-SCNC: 138 MMOL/L (ref 136–145)

## 2025-06-27 PROCEDURE — 80048 BASIC METABOLIC PNL TOTAL CA: CPT

## 2025-06-27 PROCEDURE — 6360000002 HC RX W HCPCS: Performed by: NURSE PRACTITIONER

## 2025-06-27 PROCEDURE — 6370000000 HC RX 637 (ALT 250 FOR IP): Performed by: SURGERY

## 2025-06-27 PROCEDURE — 6370000000 HC RX 637 (ALT 250 FOR IP): Performed by: INTERNAL MEDICINE

## 2025-06-27 PROCEDURE — 36415 COLL VENOUS BLD VENIPUNCTURE: CPT

## 2025-06-27 PROCEDURE — 6360000002 HC RX W HCPCS: Performed by: STUDENT IN AN ORGANIZED HEALTH CARE EDUCATION/TRAINING PROGRAM

## 2025-06-27 PROCEDURE — 1100000000 HC RM PRIVATE

## 2025-06-27 PROCEDURE — 82962 GLUCOSE BLOOD TEST: CPT

## 2025-06-27 RX ORDER — HYDROMORPHONE HYDROCHLORIDE 1 MG/ML
2 INJECTION, SOLUTION INTRAMUSCULAR; INTRAVENOUS; SUBCUTANEOUS
Status: DISCONTINUED | OUTPATIENT
Start: 2025-06-27 | End: 2025-06-27

## 2025-06-27 RX ORDER — HYDROMORPHONE HYDROCHLORIDE 2 MG/ML
2.5 INJECTION, SOLUTION INTRAMUSCULAR; INTRAVENOUS; SUBCUTANEOUS
Status: DISCONTINUED | OUTPATIENT
Start: 2025-06-27 | End: 2025-06-28 | Stop reason: HOSPADM

## 2025-06-27 RX ADMIN — PANTOPRAZOLE SODIUM 40 MG: 40 TABLET, DELAYED RELEASE ORAL at 06:16

## 2025-06-27 RX ADMIN — HYDROMORPHONE HYDROCHLORIDE 2.5 MG: 2 INJECTION INTRAMUSCULAR; INTRAVENOUS; SUBCUTANEOUS at 20:54

## 2025-06-27 RX ADMIN — METRONIDAZOLE 500 MG: 500 INJECTION, SOLUTION INTRAVENOUS at 09:52

## 2025-06-27 RX ADMIN — QUETIAPINE FUMARATE 100 MG: 100 TABLET ORAL at 20:57

## 2025-06-27 RX ADMIN — GABAPENTIN 100 MG: 100 CAPSULE ORAL at 20:57

## 2025-06-27 RX ADMIN — HYDROMORPHONE HYDROCHLORIDE 2.5 MG: 2 INJECTION INTRAMUSCULAR; INTRAVENOUS; SUBCUTANEOUS at 17:31

## 2025-06-27 RX ADMIN — ACETAMINOPHEN 650 MG: 325 TABLET ORAL at 06:15

## 2025-06-27 RX ADMIN — HYDROMORPHONE HYDROCHLORIDE 2.5 MG: 2 INJECTION INTRAMUSCULAR; INTRAVENOUS; SUBCUTANEOUS at 14:16

## 2025-06-27 RX ADMIN — DILTIAZEM HYDROCHLORIDE 120 MG: 120 CAPSULE, EXTENDED RELEASE ORAL at 09:46

## 2025-06-27 RX ADMIN — HYDROMORPHONE HYDROCHLORIDE 1.5 MG: 1 INJECTION, SOLUTION INTRAMUSCULAR; INTRAVENOUS; SUBCUTANEOUS at 07:21

## 2025-06-27 RX ADMIN — GABAPENTIN 100 MG: 100 CAPSULE ORAL at 09:46

## 2025-06-27 RX ADMIN — Medication 1 CAPSULE: at 09:46

## 2025-06-27 RX ADMIN — HYDROMORPHONE HYDROCHLORIDE 1.5 MG: 1 INJECTION, SOLUTION INTRAMUSCULAR; INTRAVENOUS; SUBCUTANEOUS at 03:24

## 2025-06-27 RX ADMIN — HYDROMORPHONE HYDROCHLORIDE 2 MG: 1 INJECTION, SOLUTION INTRAMUSCULAR; INTRAVENOUS; SUBCUTANEOUS at 10:54

## 2025-06-27 RX ADMIN — GABAPENTIN 100 MG: 100 CAPSULE ORAL at 14:15

## 2025-06-27 ASSESSMENT — PAIN SCALES - GENERAL
PAINLEVEL_OUTOF10: 8
PAINLEVEL_OUTOF10: 8
PAINLEVEL_OUTOF10: 6
PAINLEVEL_OUTOF10: 8
PAINLEVEL_OUTOF10: 6
PAINLEVEL_OUTOF10: 8

## 2025-06-27 ASSESSMENT — PAIN DESCRIPTION - FREQUENCY: FREQUENCY: CONTINUOUS

## 2025-06-27 ASSESSMENT — PAIN DESCRIPTION - LOCATION
LOCATION: LEG

## 2025-06-27 ASSESSMENT — PAIN DESCRIPTION - DESCRIPTORS
DESCRIPTORS: ACHING;BURNING;THROBBING
DESCRIPTORS: ACHING;BURNING
DESCRIPTORS: ACHING
DESCRIPTORS: ACHING;BURNING
DESCRIPTORS: ACHING
DESCRIPTORS: ACHING;BURNING

## 2025-06-27 ASSESSMENT — PAIN DESCRIPTION - ORIENTATION
ORIENTATION: RIGHT

## 2025-06-27 ASSESSMENT — PAIN - FUNCTIONAL ASSESSMENT: PAIN_FUNCTIONAL_ASSESSMENT: PREVENTS OR INTERFERES SOME ACTIVE ACTIVITIES AND ADLS

## 2025-06-27 ASSESSMENT — PAIN DESCRIPTION - PAIN TYPE: TYPE: CHRONIC PAIN

## 2025-06-27 ASSESSMENT — PAIN DESCRIPTION - ONSET: ONSET: AWAKENED FROM SLEEP

## 2025-06-27 NOTE — PROGRESS NOTES
..End of Shift Note    Bedside shift change report given to BHAKTI Whitmore   (oncoming nurse) by Philip Rankin RN (offgoing nurse).  Report included the following information SBAR, Intake/Output, MAR, and Recent Results    Shift worked:  3958-6221     Shift summary and any significant changes:    Pt arrived to unit this afternoon as a transfer. Given prescribed meds per MAR. PRN dilaudid given. Caring rounds completed.          Philip Rankin RN

## 2025-06-27 NOTE — CONSULTS
Palliative Medicine  Patient Name: Keaton Van  YOB: 1965  MRN: [id number]  Age: 60 y.o.  Gender: male    Date of Initial Consult: 6/27/2025  Date of Service: 6/27/2025  Time: 3:27 PM  Provider: LOIS Ramirez NP  Hospital Day: 12  Admit Date: 6/16/2025  Referring Provider: Crista Garnica MD       Reasons for Consultation:  Pain management before the weekend, CMO-stopped HD, BKA-increasing pain, h/o heroin use     HISTORY OF PRESENT ILLNESS (HPI):   Keaton Van is a 60 y.o. male with a past medical history of diabetes mellitus, hypertension, heart failure, atrial fibrillation, end-stage renal disease on HD, bipolar disorder, hepatitis C, and polysubstance abuse, who was admitted on 6/16/2025 from 's office for worsening pain involving right BKA with plan to take him to OR for an I&D of wound and IV abx. He had also not been to dialysis in 9 days due to stump pain, not taking his warfarin since his last discharge for fear that the wound would bleed again.  /77.  On 6/18 underwent debridement with grafix placement.   Throughout this admission pt refused dialysis and has requested removal of his dialysis catheter, he has been receiving lokelma for hyperkalemia.  He has requested admission to LTC facility with Hospice support.  He is currently receiving dilaudid 1.5mg IV q. 4 hours prn and getting it every 4 hours, tylenol 650mg qid scheduled, neurontin 100mg tid, ativan 1.5mg PO q. 6 hours prn which he is taking infrequently.    6/27: pt is sitting up in bed eating breakfast, is awake, alert, oriented.  His dialysis catheter has been removed.    Psychosocial: patient is not , lives with his sister, needs assistance with ADLs, uses walker to ambulate. 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   Reports that he no longer uses cocaine, has not for quite some time.  PALLIATIVE DIAGNOSES:    Right BKA stump bleeding, infection with concerns for

## 2025-06-27 NOTE — PROGRESS NOTES
End of Shift Note    Bedside shift change report given to BHAKTI Araya (oncoming nurse) by Leeann Solomon RN (offgoing nurse).  Report included the following information SBAR    Shift worked:  7p-7a     Shift summary and any significant changes:    Patient given Dilaudid X4 for pain.  Dressing C/D/I  VS stable.     Concerns for physician to address:       Zone phone for oncoming shift:          Activity:  Level of Assistance: Independent  Number times ambulated in hallways past shift: 0  Number of times OOB to chair past shift: 0    Cardiac:   Cardiac Monitoring: Yes      Cardiac Rhythm: Sinus rhythm, Atrial fib    Access:  Current line(s): PIV     Genitourinary:   Urinary Status: Voiding    Respiratory:   O2 Device: None (Room air)  Chronic home O2 use?: NO  Incentive spirometer at bedside: YES    GI:  Last BM (including prior to admit): 06/25/25  Current diet:  DIET ONE TIME MESSAGE;  ADULT DIET; Regular; 3 carb choices (45 gm/meal); Low Potassium (Less than 3000 mg/day)  ADULT ORAL NUTRITION SUPPLEMENT; Lunch, Dinner; Diabetic Oral Supplement  Passing flatus: YES    Pain Management:   Patient states pain is manageable on current regimen: YES    Skin:  Kennedy Scale Score: 19  Interventions: Wound Offloading (Prevention Methods): Pillows, Repositioning    Patient Safety:  Fall Risk: Nursing Judgement-Fall Risk High(Add Comments): Yes  Fall Risk Interventions  Nursing Judgement-Fall Risk High(Add Comments): Yes  Toilet Every 2 Hours-In Advance of Need: Yes  Hourly Visual Checks: Awake, In bed  Fall Visual Posted: Armband  Room Door Open: Deferred to promote rest  Alarm On: Bed  Patient Moved Closer to Nursing Station: No    Active Consults:   IP CONSULT TO NEPHROLOGY  IP CONSULT TO VASCULAR SURGERY  IP CONSULT TO CASE MANAGEMENT  IP CONSULT TO PHARMACY  IP CONSULT TO CASE MANAGEMENT  IP CONSULT TO CASE MANAGEMENT  IP CONSULT TO CASE MANAGEMENT  IP CONSULT TO PHARMACY  IP CONSULT TO SPIRITUAL CARE  IP CONSULT TO

## 2025-06-27 NOTE — PLAN OF CARE
Problem: Chronic Conditions and Co-morbidities  Goal: Patient's chronic conditions and co-morbidity symptoms are monitored and maintained or improved  6/26/2025 2309 by Leeann Solomon RN  Outcome: Progressing  Flowsheets (Taken 6/26/2025 2000)  Care Plan - Patient's Chronic Conditions and Co-Morbidity Symptoms are Monitored and Maintained or Improved: Monitor and assess patient's chronic conditions and comorbid symptoms for stability, deterioration, or improvement  6/26/2025 1150 by Christine Merlos LPN  Outcome: Progressing     Problem: Discharge Planning  Goal: Discharge to home or other facility with appropriate resources  6/26/2025 2309 by Leeann Solomon RN  Outcome: Progressing  Flowsheets (Taken 6/26/2025 2000)  Discharge to home or other facility with appropriate resources:   Identify barriers to discharge with patient and caregiver   Arrange for needed discharge resources and transportation as appropriate   Identify discharge learning needs (meds, wound care, etc)  6/26/2025 1150 by Christine Merlos LPN  Outcome: Progressing  Flowsheets (Taken 6/26/2025 0806)  Discharge to home or other facility with appropriate resources:   Identify barriers to discharge with patient and caregiver   Arrange for needed discharge resources and transportation as appropriate   Identify discharge learning needs (meds, wound care, etc)     Problem: Pain  Goal: Verbalizes/displays adequate comfort level or baseline comfort level  6/26/2025 2309 by Leeann Solomon RN  Outcome: Progressing  6/26/2025 1150 by Christine Merlos LPN  Outcome: Progressing  Flowsheets (Taken 6/26/2025 0806)  Verbalizes/displays adequate comfort level or baseline comfort level:   Encourage patient to monitor pain and request assistance   Assess pain using appropriate pain scale   Administer analgesics based on type and severity of pain and evaluate response     Problem: ABCDS Injury Assessment  Goal: Absence of physical injury  6/26/2025 2309

## 2025-06-27 NOTE — CARE COORDINATION
Case Management      Updated Note  CM was asked to arrange transportation for 6/28/25 and pt would be admitted there by [facility].    CM arranged transportation for 1300 with [facility]- BLS on 6/28/25.    Initial Note  CM sent referral to [facility] by [facility] after speaking with Leeann with palliative that pt has appropriate for GIP with plans to transition to Hospice House.    [facility] has accepted.    MALOU Hair,RN  Care   [facility]  Phone: [phone]

## 2025-06-27 NOTE — DISCHARGE SUMMARY
Discharge Summary    Name: Keaton Van  [id number]  YOB: 1965 (Age: 60 y.o.)   Date of Admission: 6/16/2025  Date of Discharge: 6/27/2025  Attending Physician: Crista Garnica MD    Discharge Diagnosis:     Acquired right below the knee amputation  Right stump infection  Right stump osteomyelitis  End-stage renal disease  Dialysis dependent  Medical noncompliance with dialysis  Hyperkalemia  Hyponatremia  Anemia of chronic disease   Uncontrolled insulin dependent diabetes mellitus with hyperglycemia  Uncontrolled hypertension  Heart failure w/ preserved EF  Atrial fibrillation   Gastroesophageal reflux disease   Bipolar disorder  Personality disorder   Polysubstance abuse   Chronic hepatitis C   DDD of the lumbar spine  Chronic pain syndrome     Consultations:  IP CONSULT TO NEPHROLOGY  IP CONSULT TO VASCULAR SURGERY  IP CONSULT TO CASE MANAGEMENT  IP CONSULT TO CASE MANAGEMENT  IP CONSULT TO CASE MANAGEMENT  IP CONSULT TO CASE MANAGEMENT  IP CONSULT TO SPIRITUAL CARE  IP CONSULT TO INTERVENTIONAL RADIOLOGY  IP CONSULT TO PSYCHIATRY  IP CONSULT TO SPIRITUAL CARE  IP CONSULT TO PALLIATIVE CARE  IP CONSULT TO CASE MANAGEMENT      Brief Admission History/Reason for Admission Per Zeke Franklin, DO:     Keaton Van is a 60 y.o.  male with PMHx as listed below presenting to the emergency department at direction of Dr. Schaeffer and vascular surgery with concern for infected dehisced right BKA stump with underlying osteomyelitis planned for I&D on Wednesday per patient.  Patient referred for admission and IV antibiotics preceding operative intervention.  Patient reports worsening appearance of wound with copious seropurulent drainage for the past 1-2 weeks.  Patient denies fever/chills/shakes.  No other symptoms reported outside of significant pain in right leg.  Patient prescribed Coumadin for atrial fibrillation (elected this agent for low cost), but states he has not

## 2025-06-28 ENCOUNTER — HOSPITAL ENCOUNTER (INPATIENT)
Facility: HOSPITAL | Age: 60
LOS: 1 days | Discharge: HOSPICE | DRG: 951 | End: 2025-06-29
Attending: FAMILY MEDICINE | Admitting: FAMILY MEDICINE
Payer: COMMERCIAL

## 2025-06-28 VITALS
BODY MASS INDEX: 25.71 KG/M2 | DIASTOLIC BLOOD PRESSURE: 82 MMHG | HEIGHT: 73 IN | OXYGEN SATURATION: 95 % | RESPIRATION RATE: 16 BRPM | WEIGHT: 194 LBS | HEART RATE: 77 BPM | TEMPERATURE: 97.5 F | SYSTOLIC BLOOD PRESSURE: 187 MMHG

## 2025-06-28 PROBLEM — N18.6 END STAGE RENAL DISEASE (HCC): Status: ACTIVE | Noted: 2025-06-28

## 2025-06-28 PROCEDURE — 6360000002 HC RX W HCPCS: Performed by: NURSE PRACTITIONER

## 2025-06-28 PROCEDURE — 6360000002 HC RX W HCPCS: Performed by: FAMILY MEDICINE

## 2025-06-28 PROCEDURE — 6560000002 HC HOSPICE GENERAL INPATIENT

## 2025-06-28 PROCEDURE — 6370000000 HC RX 637 (ALT 250 FOR IP): Performed by: INTERNAL MEDICINE

## 2025-06-28 PROCEDURE — 2500000003 HC RX 250 WO HCPCS: Performed by: FAMILY MEDICINE

## 2025-06-28 PROCEDURE — 6370000000 HC RX 637 (ALT 250 FOR IP): Performed by: FAMILY MEDICINE

## 2025-06-28 PROCEDURE — 6370000000 HC RX 637 (ALT 250 FOR IP): Performed by: SURGERY

## 2025-06-28 RX ORDER — HYDROMORPHONE HYDROCHLORIDE 2 MG/ML
4 INJECTION, SOLUTION INTRAMUSCULAR; INTRAVENOUS; SUBCUTANEOUS
Status: DISCONTINUED | OUTPATIENT
Start: 2025-06-28 | End: 2025-06-28

## 2025-06-28 RX ORDER — HYDROMORPHONE HYDROCHLORIDE 1 MG/ML
1 INJECTION, SOLUTION INTRAMUSCULAR; INTRAVENOUS; SUBCUTANEOUS
Status: DISCONTINUED | OUTPATIENT
Start: 2025-06-28 | End: 2025-06-29 | Stop reason: HOSPADM

## 2025-06-28 RX ORDER — ACETAMINOPHEN 325 MG/1
650 TABLET ORAL EVERY 4 HOURS PRN
Status: DISCONTINUED | OUTPATIENT
Start: 2025-06-28 | End: 2025-06-29 | Stop reason: HOSPADM

## 2025-06-28 RX ORDER — GLYCOPYRROLATE 0.2 MG/ML
0.2 INJECTION INTRAMUSCULAR; INTRAVENOUS EVERY 4 HOURS PRN
Status: DISCONTINUED | OUTPATIENT
Start: 2025-06-28 | End: 2025-06-29 | Stop reason: HOSPADM

## 2025-06-28 RX ORDER — SENNOSIDES 8.6 MG/1
1 TABLET ORAL DAILY PRN
Status: DISCONTINUED | OUTPATIENT
Start: 2025-06-28 | End: 2025-06-29 | Stop reason: HOSPADM

## 2025-06-28 RX ORDER — HYDROMORPHONE HYDROCHLORIDE 2 MG/ML
4 INJECTION, SOLUTION INTRAMUSCULAR; INTRAVENOUS; SUBCUTANEOUS EVERY 4 HOURS
Status: DISCONTINUED | OUTPATIENT
Start: 2025-06-28 | End: 2025-06-29 | Stop reason: HOSPADM

## 2025-06-28 RX ORDER — HALOPERIDOL 5 MG/ML
1 INJECTION INTRAMUSCULAR EVERY 4 HOURS PRN
Status: DISCONTINUED | OUTPATIENT
Start: 2025-06-28 | End: 2025-06-29 | Stop reason: HOSPADM

## 2025-06-28 RX ORDER — ONDANSETRON 2 MG/ML
4 INJECTION INTRAMUSCULAR; INTRAVENOUS EVERY 6 HOURS PRN
Status: DISCONTINUED | OUTPATIENT
Start: 2025-06-28 | End: 2025-06-29 | Stop reason: HOSPADM

## 2025-06-28 RX ORDER — GABAPENTIN 300 MG/1
300 CAPSULE ORAL NIGHTLY
Status: DISCONTINUED | OUTPATIENT
Start: 2025-06-28 | End: 2025-06-29 | Stop reason: HOSPADM

## 2025-06-28 RX ORDER — SODIUM CHLORIDE 0.9 % (FLUSH) 0.9 %
5-40 SYRINGE (ML) INJECTION EVERY 12 HOURS SCHEDULED
Status: DISCONTINUED | OUTPATIENT
Start: 2025-06-28 | End: 2025-06-29 | Stop reason: HOSPADM

## 2025-06-28 RX ORDER — HALOPERIDOL 5 MG/ML
1 INJECTION INTRAMUSCULAR EVERY 4 HOURS PRN
Status: DISCONTINUED | OUTPATIENT
Start: 2025-06-28 | End: 2025-06-28

## 2025-06-28 RX ORDER — HALOPERIDOL 1 MG/1
1 TABLET ORAL EVERY 6 HOURS PRN
Status: DISCONTINUED | OUTPATIENT
Start: 2025-06-28 | End: 2025-06-28

## 2025-06-28 RX ADMIN — HYDROMORPHONE HYDROCHLORIDE 4 MG: 2 INJECTION INTRAMUSCULAR; INTRAVENOUS; SUBCUTANEOUS at 20:20

## 2025-06-28 RX ADMIN — SODIUM CHLORIDE, PRESERVATIVE FREE 10 ML: 5 INJECTION INTRAVENOUS at 20:20

## 2025-06-28 RX ADMIN — HYDROMORPHONE HYDROCHLORIDE 4 MG: 2 INJECTION INTRAMUSCULAR; INTRAVENOUS; SUBCUTANEOUS at 23:36

## 2025-06-28 RX ADMIN — HYDROMORPHONE HYDROCHLORIDE 4 MG: 2 INJECTION INTRAMUSCULAR; INTRAVENOUS; SUBCUTANEOUS at 15:36

## 2025-06-28 RX ADMIN — PANTOPRAZOLE SODIUM 40 MG: 40 TABLET, DELAYED RELEASE ORAL at 06:24

## 2025-06-28 RX ADMIN — HYDROMORPHONE HYDROCHLORIDE 2.5 MG: 2 INJECTION INTRAMUSCULAR; INTRAVENOUS; SUBCUTANEOUS at 09:26

## 2025-06-28 RX ADMIN — HYDROMORPHONE HYDROCHLORIDE 2.5 MG: 2 INJECTION INTRAMUSCULAR; INTRAVENOUS; SUBCUTANEOUS at 06:24

## 2025-06-28 RX ADMIN — HYDROMORPHONE HYDROCHLORIDE 2.5 MG: 2 INJECTION INTRAMUSCULAR; INTRAVENOUS; SUBCUTANEOUS at 00:05

## 2025-06-28 RX ADMIN — SODIUM ZIRCONIUM CYCLOSILICATE 10 G: 10 POWDER, FOR SUSPENSION ORAL at 15:36

## 2025-06-28 RX ADMIN — Medication 1 CAPSULE: at 09:25

## 2025-06-28 RX ADMIN — HYDROMORPHONE HYDROCHLORIDE 4 MG: 2 INJECTION INTRAMUSCULAR; INTRAVENOUS; SUBCUTANEOUS at 12:37

## 2025-06-28 RX ADMIN — GABAPENTIN 100 MG: 100 CAPSULE ORAL at 09:26

## 2025-06-28 RX ADMIN — GABAPENTIN 300 MG: 300 CAPSULE ORAL at 21:25

## 2025-06-28 ASSESSMENT — PAIN DESCRIPTION - LOCATION
LOCATION: LEG

## 2025-06-28 ASSESSMENT — PAIN SCALES - GENERAL
PAINLEVEL_OUTOF10: 6
PAINLEVEL_OUTOF10: 8
PAINLEVEL_OUTOF10: 8
PAINLEVEL_OUTOF10: 9
PAINLEVEL_OUTOF10: 7

## 2025-06-28 ASSESSMENT — PAIN DESCRIPTION - ORIENTATION
ORIENTATION: RIGHT

## 2025-06-28 ASSESSMENT — PAIN DESCRIPTION - DESCRIPTORS
DESCRIPTORS: ACHING;BURNING;THROBBING

## 2025-06-28 NOTE — PROGRESS NOTES
CM Note:  Andrew of Hospice notified me that patient is not going to be going to the Hospice House at this point because of Hospice house not being able to take him presently. He will be admitted to [facility] here. I will put transport on Will call and if it changes and he can go, [facility] will need to be called to reschedule the transport or take it off of will call for today. CM will be here until 4:30 pm    Transportation is now on WILL CALL. If the patient ends up going to facility the RN will call [phone] [facility] And let them know to resume scheduled trip at different time. Nurse has been notified  English Renata YA CM [phone]

## 2025-06-28 NOTE — CARE COORDINATION
CM Note:  Plan is for patient to go to the [facility] with transport set up for 1:00 pm  I called the Hospice house and they are planning on patient coming to facility but are working on getting medications ready for patient before admission. I let rep know that is there are any problems to call me before scheduled transport.  English Renata YA CM [phone]

## 2025-06-28 NOTE — CARE COORDINATION
06/28/25 0922   Services At/After Discharge   Transition of Care Consult (CM Consult) Hospice   Services At/After Discharge Hospice   Mode of Transport at Discharge BLS   Condition of Participation: Discharge Planning   The Plan for Transition of Care is related to the following treatment goals: hospice House     Discharge order is in. Patient expected to go to the Hospice House today with [facility]  transport 1 pm.   Nurse to call report to [phone]  English Yanez   [phone]

## 2025-06-28 NOTE — DISCHARGE SUMMARY
Discharge Summary    Name: Keaton Van  [id number]  YOB: 1965 (Age: 60 y.o.)   Date of Admission: 6/16/2025  Date of Discharge: 6/28/2025  Attending Physician: No att. providers found    Discharge Diagnosis:     Acquired right below the knee amputation  Right stump infection  Right stump osteomyelitis  End-stage renal disease  Dialysis dependent  Medical noncompliance with dialysis  Hyperkalemia  Hyponatremia  Anemia of chronic disease   Uncontrolled insulin dependent diabetes mellitus with hyperglycemia  Uncontrolled hypertension  Heart failure w/ preserved EF  Atrial fibrillation   Gastroesophageal reflux disease   Bipolar disorder  Personality disorder   Polysubstance abuse   Chronic hepatitis C   DDD of the lumbar spine  Chronic pain syndrome     Consultations:  IP CONSULT TO NEPHROLOGY  IP CONSULT TO VASCULAR SURGERY  IP CONSULT TO CASE MANAGEMENT  IP CONSULT TO CASE MANAGEMENT  IP CONSULT TO CASE MANAGEMENT  IP CONSULT TO CASE MANAGEMENT  IP CONSULT TO SPIRITUAL CARE  IP CONSULT TO INTERVENTIONAL RADIOLOGY  IP CONSULT TO PSYCHIATRY  IP CONSULT TO SPIRITUAL CARE  IP CONSULT TO PALLIATIVE CARE  IP CONSULT TO CASE MANAGEMENT      Brief Admission History/Reason for Admission Per Zeke Franklin, DO:   Pt was transferred to Hospice care. I did not see the pt on 06/28/25   Chart reviewed.   Agree with discharge plan.   Keaotn Van is a 60 y.o.  male with PMHx as listed below presenting to the emergency department at direction of Dr. Schaeffer and vascular surgery with concern for infected dehisced right BKA stump with underlying osteomyelitis planned for I&D on Wednesday per patient.  Patient referred for admission and IV antibiotics preceding operative intervention.  Patient reports worsening appearance of wound with copious seropurulent drainage for the past 1-2 weeks.  Patient denies fever/chills/shakes.  No other symptoms reported outside of significant pain in  insulin dependent diabetes mellitus with hyperglycemia  -Comfort management     Uncontrolled hypertension  -Improved control on Norvasc & hydralazine      Heart failure w/ preserved EF  Atrial fibrillation   -Rate controlled      Gastroesophageal reflux disease   -Protonix      Bipolar disorder  Personality disorder   -w/ hx of suicidal ideation, none currently  -Seroquel      Polysubstance abuse   -including tobacco & opioids   -w/ hx of overdose   -Nicoderm      Chronic hepatitis C      DDD of the lumbar spine  Chronic pain syndrome   -Gabapentin     Discharge Exam:  Patient seen and examined by me on discharge day.  Pertinent Findings:  Patient Vitals for the past 24 hrs:   BP Temp Temp src Pulse Resp SpO2   06/28/25 0928 (!) 187/82 97.5 °F (36.4 °C) Oral 77 16 95 %   06/28/25 0624 -- -- -- -- 17 --   06/28/25 0005 -- -- -- -- 17 --   06/27/25 2124 -- -- -- -- 18 --   06/27/25 2045 (!) 195/88 98.2 °F (36.8 °C) Oral 76 18 --   06/27/25 1319 (!) 173/103 97.9 °F (36.6 °C) Oral 82 16 94 %       Gen:    Not in distress  Chest: Clear lungs  CVS:   Regular rhythm.  No edema  Abd:  Soft, not distended, not tender  Neuro: Awake, moving all exts    Discharge/Recent Laboratory Results:  Recent Labs     06/27/25  0455      K 5.8*   *   CO2 19*   BUN 48*   CREATININE 3.45*   GLUCOSE 148*   CALCIUM 8.3*     No results for input(s): \"HGB\", \"HCT\", \"WBC\", \"PLT\" in the last 72 hours.      Discharge Medications:     Medication List        START taking these medications      lactobacillus capsule  Take 1 capsule by mouth daily     sodium zirconium cyclosilicate 10 g Pack oral suspension  Commonly known as: LOKELMA  Take 1 packet by mouth every other day            CONTINUE taking these medications      dilTIAZem 120 MG extended release capsule  Commonly known as: CARDIZEM CD  Take 1 capsule by mouth daily     gabapentin 300 MG capsule  Commonly known as: NEURONTIN  Take 1 capsule by mouth nightly for 30 days. Max Daily

## 2025-06-28 NOTE — PROGRESS NOTES
..End of Shift Note    Bedside shift change report given to JADEN Cordon (oncoming nurse) by Philip Rankin RN (offgoing nurse).  Report included the following information SBAR, Intake/Output, MAR, and Recent Results    Shift worked:  5189-4007     Shift summary and any significant changes:     Pt given prescribed medications per MAR. PRN Dilaudid given for pain. Admitted to [facility] today. Pt stated no difficulties voiding. Caring rounds completed.         Philip Rankin RN

## 2025-06-29 VITALS
SYSTOLIC BLOOD PRESSURE: 169 MMHG | DIASTOLIC BLOOD PRESSURE: 85 MMHG | RESPIRATION RATE: 18 BRPM | HEART RATE: 77 BPM | OXYGEN SATURATION: 91 % | TEMPERATURE: 98.2 F

## 2025-06-29 PROCEDURE — 6360000002 HC RX W HCPCS: Performed by: FAMILY MEDICINE

## 2025-06-29 PROCEDURE — 2500000003 HC RX 250 WO HCPCS: Performed by: FAMILY MEDICINE

## 2025-06-29 RX ADMIN — HYDROMORPHONE HYDROCHLORIDE 1 MG: 1 INJECTION, SOLUTION INTRAMUSCULAR; INTRAVENOUS; SUBCUTANEOUS at 06:27

## 2025-06-29 RX ADMIN — HYDROMORPHONE HYDROCHLORIDE 4 MG: 2 INJECTION INTRAMUSCULAR; INTRAVENOUS; SUBCUTANEOUS at 11:18

## 2025-06-29 RX ADMIN — HYDROMORPHONE HYDROCHLORIDE 4 MG: 2 INJECTION INTRAMUSCULAR; INTRAVENOUS; SUBCUTANEOUS at 03:30

## 2025-06-29 RX ADMIN — SODIUM CHLORIDE, PRESERVATIVE FREE 10 ML: 5 INJECTION INTRAVENOUS at 08:20

## 2025-06-29 RX ADMIN — HYDROMORPHONE HYDROCHLORIDE 1 MG: 1 INJECTION, SOLUTION INTRAMUSCULAR; INTRAVENOUS; SUBCUTANEOUS at 01:40

## 2025-06-29 RX ADMIN — HYDROMORPHONE HYDROCHLORIDE 1 MG: 1 INJECTION, SOLUTION INTRAMUSCULAR; INTRAVENOUS; SUBCUTANEOUS at 14:03

## 2025-06-29 RX ADMIN — HYDROMORPHONE HYDROCHLORIDE 4 MG: 2 INJECTION INTRAMUSCULAR; INTRAVENOUS; SUBCUTANEOUS at 08:11

## 2025-06-29 ASSESSMENT — PAIN DESCRIPTION - LOCATION
LOCATION: BACK;LEG
LOCATION: LEG;BACK
LOCATION: LEG
LOCATION: LEG;BACK

## 2025-06-29 ASSESSMENT — PAIN DESCRIPTION - ORIENTATION
ORIENTATION: RIGHT
ORIENTATION: RIGHT;UPPER
ORIENTATION: RIGHT
ORIENTATION: RIGHT

## 2025-06-29 ASSESSMENT — PAIN SCALES - GENERAL
PAINLEVEL_OUTOF10: 8
PAINLEVEL_OUTOF10: 8
PAINLEVEL_OUTOF10: 9
PAINLEVEL_OUTOF10: 8
PAINLEVEL_OUTOF10: 7

## 2025-06-29 ASSESSMENT — PAIN DESCRIPTION - DESCRIPTORS
DESCRIPTORS: THROBBING
DESCRIPTORS: THROBBING;BURNING
DESCRIPTORS: THROBBING
DESCRIPTORS: PRESSURE
DESCRIPTORS: THROBBING

## 2025-06-29 NOTE — PROGRESS NOTES
End of Shift Note    Bedside shift change report given to BHAKTI Palacios (oncoming nurse) by EL PEGUERO LPN (offgoing nurse).  Report included the following information SBAR, Kardex, and MAR    Shift worked:  4385-5211     Shift summary and any significant changes:    Patient received scheduled medications per MAR. Patient received PRN Dilaudid x1. Patient rested periodically during the shift. Caring rounds completed.      Concerns for physician to address:       Zone phone for oncoming shift:          Activity:  Level of Assistance: Independent  Number times ambulated in hallways past shift: 0  Number of times OOB to chair past shift: 0    Cardiac:   Cardiac Monitoring: No           Access:  Current line(s): PIV     Genitourinary:   Urinary Status: Voiding (BSC)    Respiratory:      Chronic home O2 use?: NO  Incentive spirometer at bedside: NO    GI:  Last BM (including prior to admit): 06/27/25 (per patient)  Current diet:  ADULT DIET; Regular; Low Potassium (Less than 3000 mg/day)  Passing flatus: YES    Pain Management:   Patient states pain is manageable on current regimen: NO    Skin:  Kennedy Scale Score: 20  Interventions: Wound Offloading (Prevention Methods): Pillows, Repositioning    Patient Safety:  Fall Risk: Nursing Judgement-Fall Risk High(Add Comments): No  Fall Risk Interventions  Nursing Judgement-Fall Risk High(Add Comments): No  Toilet Every 2 Hours-In Advance of Need: No (Comment) (Independent)  Hourly Visual Checks: Awake, In bed  Fall Visual Posted: Fall sign posted, Socks  Room Door Open: Deferred to promote rest  Alarm On: Bed  Patient Moved Closer to Nursing Station: No    Active Consults:   None    Length of Stay:  Expected LOS: 7  Actual LOS: 1    EL PEGUERO LPN

## 2025-07-01 NOTE — DISCHARGE SUMMARY
Hospice Discharge Summary    [facility]  [facility] Help to Those in Need        Date of Admission: 6/28/2025  Date of Discharge: 6/29/2025    Keaton Van is a 60 y.o. year old who was admitted to [facility] with a Hospice diagnosis of End stage renal disease (HCC) [N18.6].            The patient was discharged to Hospice house for ongoing Hospice care on 6/29/2025

## (undated) DEVICE — BAG WST COLLCTN BIOHAZARD 38X38 IN DRWSTRNG RED

## (undated) DEVICE — GAUZE SPONGES,12 PLY: Brand: CURITY

## (undated) DEVICE — Device

## (undated) DEVICE — BANDAGE,GAUZE,BULKEE II,4.5"X4.1YD,STRL: Brand: MEDLINE

## (undated) DEVICE — BASIC PACK: Brand: CONVERTORS

## (undated) DEVICE — IMMOBILIZER KNEE 3 PNL 19 IN

## (undated) DEVICE — GLOVE SURG SZ 65 THK91MIL LTX FREE SYN POLYISOPRENE

## (undated) DEVICE — DISPOSABLE TOURNIQUET CUFF SINGLE BLADDER, DUAL PORT AND QUICK CONNECT CONNECTOR: Brand: COLOR CUFF

## (undated) DEVICE — GLOVE SURG SZ 7 L12IN FNGR THK79MIL GRN LTX FREE

## (undated) DEVICE — SUTURE PERMAHAND SZ 0 L30IN NONABSORBABLE BLK SILK BRAID A306H

## (undated) DEVICE — 450 ML BOTTLE OF 0.05% CHLORHEXIDINE GLUCONATE IN 99.95% STERILE WATER FOR IRRIGATION, USP AND APPLICATOR.: Brand: IRRISEPT ANTIMICROBIAL WOUND LAVAGE

## (undated) DEVICE — PREMIUM WET SKIN PREP TRAY: Brand: MEDLINE INDUSTRIES, INC.

## (undated) DEVICE — EXTREMITY-MRMC: Brand: MEDLINE INDUSTRIES, INC.

## (undated) DEVICE — APPLICATOR MEDICATED 26 CC SOLUTION HI LT ORNG CHLORAPREP

## (undated) DEVICE — STOCKINETTE,IMPERVIOUS,12X48,STERILE: Brand: MEDLINE

## (undated) DEVICE — KERLIX BANDAGE ROLL: Brand: KERLIX

## (undated) DEVICE — PAD,ABDOMINAL,5"X9",ST,LF,25/BX: Brand: MEDLINE INDUSTRIES, INC.

## (undated) DEVICE — PRECISION THIN (5.5 X 0.38 X 18.0MM)

## (undated) DEVICE — ZIMMER® STERILE DISPOSABLE TOURNIQUET CUFF WITH PROTECTIVE SLEEVE AND PLC, DUAL PORT, SINGLE BLADDER, 18 IN. (46 CM)

## (undated) DEVICE — SYRINGE MED 10ML LUERLOCK TIP W/O SFTY DISP

## (undated) DEVICE — GOWN,SIRUS,NONRNF,SETINSLV,XL,20/CS: Brand: MEDLINE

## (undated) DEVICE — ZIMMER® STERILE DISPOSABLE TOURNIQUET CUFF WITH PLC, DUAL PORT, SINGLE BLADDER, 18 IN. (46 CM)

## (undated) DEVICE — SPONGE GZ W4XL4IN COT 12 PLY TYP VII WVN C FLD DSGN STERILE

## (undated) DEVICE — SUTURE PERMAHAND SZ 2-0 L18IN NONABSORBABLE BLK L26MM SH C012D

## (undated) DEVICE — TUBING IRRIG L77IN DIA0.241IN L BOR FOR CYSTO W/ NVENT

## (undated) DEVICE — SUTURE PERMAHAND SZ 2-0 L30IN NONABSORBABLE BLK SILK W/O A305H

## (undated) DEVICE — CORD ES L12FT BPLR FRCP

## (undated) DEVICE — SYR IRR BLB 2OZ DISP BLU STRL -- CONVERT TO ITEM 357637

## (undated) DEVICE — DRESSING STERILE PETRO W3XL8IN N ADH OIL EMUL GZ CURAD

## (undated) DEVICE — SUTURE VICRYL SZ 2-0 L36IN ABSRB UD L36MM CT-1 1/2 CIR J945H

## (undated) DEVICE — SUTURE VICRYL SZ 0 L36IN ABSRB UD L36MM CT-1 1/2 CIR J946H

## (undated) DEVICE — SWAB CULT LIQ STUART AGR AERB MOD IN BRK SGL RAYON TIP PLAS

## (undated) DEVICE — BANDAGE COMPR W4INXL5YD WHT BGE POLY COT M E WRP WV HK AND

## (undated) DEVICE — KIT,1200CC CANISTER,3/16"X6' TUBING: Brand: MEDLINE INDUSTRIES, INC.

## (undated) DEVICE — BANDAGE COBAN 6 IN WND 6INX5YD FOAM

## (undated) DEVICE — TRAY PREP DRY W/ PREM GLV 2 APPL 6 SPNG 2 UNDPD 1 OVERWRAP

## (undated) DEVICE — SWAB CULT LIQ STUART AGR AERB MOD IN BRK SGL RAYON TIP PLAS 220099] BECTON DICKINSON MICRO]

## (undated) DEVICE — SUTURE VCRL SZ 4-0 L27IN ABSRB UD L19MM FS-2 3/8 CIR REV J422H

## (undated) DEVICE — SOL IRRIGATION INJ NACL 0.9% 500ML BTL

## (undated) DEVICE — GLOVE ORANGE PI 7   MSG9070

## (undated) DEVICE — SUTURE PROL 2-0 L48IN NONABSORBABLE BLU SH L26MM 1/2 CIR 8533H

## (undated) DEVICE — 2108 SERIES SAGITTAL BLADE (24.8 X 0.88 X 73.8MM)

## (undated) DEVICE — DRESSING PETRO W3XL8IN N ADH OIL EMUL GZ CURAD

## (undated) DEVICE — CULTURETTE SGL EVAC TUBE PALL -- 100/CA

## (undated) DEVICE — TOWEL,OR,DSP,ST,BLUE,STD,2/PK,40PK/CS: Brand: MEDLINE

## (undated) DEVICE — REM POLYHESIVE ADULT PATIENT RETURN ELECTRODE: Brand: VALLEYLAB

## (undated) DEVICE — NEEDLE HYPO 25GA L1.5IN BVL ORIENTED ECLIPSE

## (undated) DEVICE — DRAPE,EXTREMITY,89X128,STERILE: Brand: MEDLINE

## (undated) DEVICE — BLADE CLIPPER GEN PURP NS

## (undated) DEVICE — APPLICATOR  COTTON-TIPPED 6 IN WOOD STRL

## (undated) DEVICE — STRIP WND PK W0.25INXL5YD IODO GZ TIGHTLY WVN CURAD

## (undated) DEVICE — BONE WAX WHITE: Brand: BONE WAX WHITE

## (undated) DEVICE — SYR 10ML LUER LOK 1/5ML GRAD --

## (undated) DEVICE — BANDAGE,ELASTIC,ESMARK,STERILE,4"X9',LF: Brand: MEDLINE

## (undated) DEVICE — NEEDLE HYPO 18GA L1.5IN PNK S STL HUB POLYPR SHLD REG BVL

## (undated) DEVICE — SHEET,DRAPE,UNDERBUTTOCK,GRAD POUCH,PORT: Brand: MEDLINE

## (undated) DEVICE — PRECISION THIN (9.0 X 0.38 X 31.0MM)

## (undated) DEVICE — CONTAINER SPEC ANAERB VACTNR

## (undated) DEVICE — TUBE SET OR SONICVAC NEXUS SONICONE

## (undated) DEVICE — DRESSING,GAUZE,XEROFORM,CURAD,5"X9",ST: Brand: CURAD

## (undated) DEVICE — GLOVE SURG SZ 75 CRM LTX FREE POLYISOPRENE POLYMER BEAD ANTI

## (undated) DEVICE — GAUZE,PACKING STRIP,IODOFORM,1/2"X5YD,ST: Brand: CURAD

## (undated) DEVICE — HANDLE LT SNAP ON ULT DURABLE LENS FOR TRUMPF ALC DISPOSABLE

## (undated) DEVICE — SUTURE ETHLN SZ 2-0 L30IN NONABSORBABLE BLK L36MM PSLX 3/8 1697H

## (undated) DEVICE — ZIMMER® STERILE DISPOSABLE TOURNIQUET CUFF WITH PROTECTIVE SLEEVE AND PLC, DUAL PORT, SINGLE BLADDER, 34 IN. (86 CM)

## (undated) DEVICE — TOWEL,OR,DSP,ST,BLUE,STD,4/PK,20PK/CS: Brand: MEDLINE

## (undated) DEVICE — SUTURE ETHILON SZ 3-0 L18IN NONABSORBABLE BLK PS-2 L19MM 3/8 1669H

## (undated) DEVICE — BANDAGE COMPR M W6INXL10YD WHT BGE VELC E MTRX HK AND LOOP

## (undated) DEVICE — DEVON™ KNEE AND BODY STRAP 60" X 3" (1.5 M X 7.6 CM): Brand: DEVON

## (undated) DEVICE — SYR 3ML LL TIP 1/10ML GRAD --

## (undated) DEVICE — STERILE POLYISOPRENE POWDER-FREE SURGICAL GLOVES: Brand: PROTEXIS

## (undated) DEVICE — BANDAGE COMPR SELF ADH 5 YDX4 IN TAN STRL PREMIERPRO LF

## (undated) DEVICE — SOLUTION IRRIG 1000ML 0.9% SOD CHL USP POUR PLAS BTL

## (undated) DEVICE — HAND-MRMCASU: Brand: MEDLINE INDUSTRIES, INC.

## (undated) DEVICE — SUTURE NONABSORBABLE MONOFILAMENT 5-0 PS-2 18 IN BLK ETHILON 1666H

## (undated) DEVICE — INFECTION CONTROL KIT SYS

## (undated) DEVICE — STAPLER SKIN H3.9MM WIRE DIA0.58MM CRWN 6.9MM 35 STPL ROT

## (undated) DEVICE — SUTURE NONABSORBABLE MONOFILAMENT 3-0 PS-1 18 IN BLK ETHILON 1663H

## (undated) DEVICE — SUTURE ETHILON SZ 2-0 L30IN NONABSORBABLE BLK L36MM PSLX 3/8 1697H

## (undated) DEVICE — SOLUTION IRRIG 500ML 0.9% SOD CHLO USP POUR PLAS BTL

## (undated) DEVICE — PRECISION THIN, OFFSET (5.5 X 0.38 X 25.0MM)

## (undated) DEVICE — DRAPE SHT 3 QTR PROXIMA 53X77 --